# Patient Record
Sex: MALE | Race: BLACK OR AFRICAN AMERICAN | Employment: OTHER | URBAN - METROPOLITAN AREA
[De-identification: names, ages, dates, MRNs, and addresses within clinical notes are randomized per-mention and may not be internally consistent; named-entity substitution may affect disease eponyms.]

---

## 2018-09-06 ENCOUNTER — HOSPITAL ENCOUNTER (INPATIENT)
Age: 38
LOS: 3 days | Discharge: OTHER INSTITUTION | DRG: 885 | End: 2018-09-09
Attending: EMERGENCY MEDICINE | Admitting: PSYCHIATRY & NEUROLOGY
Payer: COMMERCIAL

## 2018-09-06 DIAGNOSIS — F20.0 PARANOID SCHIZOPHRENIA (HCC): Primary | ICD-10-CM

## 2018-09-06 PROBLEM — F20.9 SCHIZOPHRENIA (HCC): Status: ACTIVE | Noted: 2018-09-06

## 2018-09-06 LAB
ACETAMINOPHEN LEVEL: <5 MCG/ML (ref 10–30)
ALBUMIN SERPL-MCNC: 4.2 G/DL (ref 3.5–5.2)
ALP BLD-CCNC: 43 U/L (ref 40–129)
ALT SERPL-CCNC: 9 U/L (ref 0–40)
AMPHETAMINE SCREEN, URINE: NOT DETECTED
ANION GAP SERPL CALCULATED.3IONS-SCNC: 11 MMOL/L (ref 7–16)
AST SERPL-CCNC: 12 U/L (ref 0–39)
BACTERIA: ABNORMAL /HPF
BARBITURATE SCREEN URINE: NOT DETECTED
BASOPHILS ABSOLUTE: 0.07 E9/L (ref 0–0.2)
BASOPHILS RELATIVE PERCENT: 1.2 % (ref 0–2)
BENZODIAZEPINE SCREEN, URINE: NOT DETECTED
BILIRUB SERPL-MCNC: 0.3 MG/DL (ref 0–1.2)
BILIRUBIN URINE: ABNORMAL
BLOOD, URINE: ABNORMAL
BUN BLDV-MCNC: 13 MG/DL (ref 6–20)
CALCIUM SERPL-MCNC: 9.3 MG/DL (ref 8.6–10.2)
CANNABINOID SCREEN URINE: POSITIVE
CHLORIDE BLD-SCNC: 103 MMOL/L (ref 98–107)
CLARITY: CLEAR
CO2: 28 MMOL/L (ref 22–29)
COCAINE METABOLITE SCREEN URINE: NOT DETECTED
COLOR: YELLOW
CREAT SERPL-MCNC: 1 MG/DL (ref 0.7–1.2)
EOSINOPHILS ABSOLUTE: 0.17 E9/L (ref 0.05–0.5)
EOSINOPHILS RELATIVE PERCENT: 3 % (ref 0–6)
ETHANOL: <10 MG/DL (ref 0–0.08)
GFR AFRICAN AMERICAN: >60
GFR NON-AFRICAN AMERICAN: >60 ML/MIN/1.73
GLUCOSE BLD-MCNC: 90 MG/DL (ref 74–109)
GLUCOSE URINE: NEGATIVE MG/DL
HCT VFR BLD CALC: 43.6 % (ref 37–54)
HEMOGLOBIN: 14 G/DL (ref 12.5–16.5)
IMMATURE GRANULOCYTES #: 0.01 E9/L
IMMATURE GRANULOCYTES %: 0.2 % (ref 0–5)
KETONES, URINE: ABNORMAL MG/DL
LEUKOCYTE ESTERASE, URINE: NEGATIVE
LYMPHOCYTES ABSOLUTE: 1.54 E9/L (ref 1.5–4)
LYMPHOCYTES RELATIVE PERCENT: 27.3 % (ref 20–42)
MCH RBC QN AUTO: 29.7 PG (ref 26–35)
MCHC RBC AUTO-ENTMCNC: 32.1 % (ref 32–34.5)
MCV RBC AUTO: 92.6 FL (ref 80–99.9)
METHADONE SCREEN, URINE: NOT DETECTED
MONOCYTES ABSOLUTE: 0.4 E9/L (ref 0.1–0.95)
MONOCYTES RELATIVE PERCENT: 7.1 % (ref 2–12)
NEUTROPHILS ABSOLUTE: 3.46 E9/L (ref 1.8–7.3)
NEUTROPHILS RELATIVE PERCENT: 61.2 % (ref 43–80)
NITRITE, URINE: NEGATIVE
OPIATE SCREEN URINE: NOT DETECTED
PDW BLD-RTO: 13.2 FL (ref 11.5–15)
PH UA: 6 (ref 5–9)
PHENCYCLIDINE SCREEN URINE: NOT DETECTED
PLATELET # BLD: 277 E9/L (ref 130–450)
PMV BLD AUTO: 9.4 FL (ref 7–12)
POTASSIUM SERPL-SCNC: 3.6 MMOL/L (ref 3.5–5)
PROPOXYPHENE SCREEN: NOT DETECTED
PROTEIN UA: 100 MG/DL
RBC # BLD: 4.71 E12/L (ref 3.8–5.8)
RBC UA: ABNORMAL /HPF (ref 0–2)
SALICYLATE, SERUM: <0.3 MG/DL (ref 0–30)
SODIUM BLD-SCNC: 142 MMOL/L (ref 132–146)
SPECIFIC GRAVITY UA: >=1.03 (ref 1–1.03)
T4 TOTAL: 11.4 MCG/DL (ref 4.5–11.7)
TOTAL PROTEIN: 7 G/DL (ref 6.4–8.3)
TRICYCLIC ANTIDEPRESSANTS SCREEN SERUM: NEGATIVE NG/ML
TSH SERPL DL<=0.05 MIU/L-ACNC: 0.9 UIU/ML (ref 0.27–4.2)
UROBILINOGEN, URINE: 1 E.U./DL
VALPROIC ACID LEVEL: <3 MCG/ML (ref 50–100)
WBC # BLD: 5.7 E9/L (ref 4.5–11.5)
WBC UA: ABNORMAL /HPF (ref 0–5)

## 2018-09-06 PROCEDURE — 81001 URINALYSIS AUTO W/SCOPE: CPT

## 2018-09-06 PROCEDURE — 84702 CHORIONIC GONADOTROPIN TEST: CPT

## 2018-09-06 PROCEDURE — 84436 ASSAY OF TOTAL THYROXINE: CPT

## 2018-09-06 PROCEDURE — 80164 ASSAY DIPROPYLACETIC ACD TOT: CPT

## 2018-09-06 PROCEDURE — 1240000000 HC EMOTIONAL WELLNESS R&B

## 2018-09-06 PROCEDURE — 80307 DRUG TEST PRSMV CHEM ANLYZR: CPT

## 2018-09-06 PROCEDURE — 99285 EMERGENCY DEPT VISIT HI MDM: CPT

## 2018-09-06 PROCEDURE — G0480 DRUG TEST DEF 1-7 CLASSES: HCPCS

## 2018-09-06 PROCEDURE — 85025 COMPLETE CBC W/AUTO DIFF WBC: CPT

## 2018-09-06 PROCEDURE — 36415 COLL VENOUS BLD VENIPUNCTURE: CPT

## 2018-09-06 PROCEDURE — 80053 COMPREHEN METABOLIC PANEL: CPT

## 2018-09-06 PROCEDURE — 84443 ASSAY THYROID STIM HORMONE: CPT

## 2018-09-06 ASSESSMENT — PAIN DESCRIPTION - ORIENTATION: ORIENTATION: LEFT;RIGHT

## 2018-09-06 ASSESSMENT — PAIN DESCRIPTION - LOCATION: LOCATION: LEG

## 2018-09-06 ASSESSMENT — PAIN DESCRIPTION - PAIN TYPE: TYPE: CHRONIC PAIN

## 2018-09-07 LAB — GONADOTROPIN, CHORIONIC (HCG) QUANT: <0.1 MIU/ML

## 2018-09-07 PROCEDURE — 99222 1ST HOSP IP/OBS MODERATE 55: CPT | Performed by: PSYCHIATRY & NEUROLOGY

## 2018-09-07 PROCEDURE — 6370000000 HC RX 637 (ALT 250 FOR IP): Performed by: PSYCHIATRY & NEUROLOGY

## 2018-09-07 PROCEDURE — 1240000000 HC EMOTIONAL WELLNESS R&B

## 2018-09-07 RX ORDER — HALOPERIDOL 5 MG/ML
10 INJECTION INTRAMUSCULAR EVERY 6 HOURS PRN
Status: DISCONTINUED | OUTPATIENT
Start: 2018-09-07 | End: 2018-09-09 | Stop reason: HOSPADM

## 2018-09-07 RX ORDER — BENZTROPINE MESYLATE 1 MG/ML
2 INJECTION INTRAMUSCULAR; INTRAVENOUS 2 TIMES DAILY PRN
Status: DISCONTINUED | OUTPATIENT
Start: 2018-09-07 | End: 2018-09-09 | Stop reason: HOSPADM

## 2018-09-07 RX ORDER — DIVALPROEX SODIUM 500 MG/1
1000 TABLET, EXTENDED RELEASE ORAL DAILY
Status: DISCONTINUED | OUTPATIENT
Start: 2018-09-07 | End: 2018-09-09 | Stop reason: HOSPADM

## 2018-09-07 RX ORDER — ACETAMINOPHEN 325 MG/1
650 TABLET ORAL EVERY 4 HOURS PRN
Status: DISCONTINUED | OUTPATIENT
Start: 2018-09-07 | End: 2018-09-09 | Stop reason: HOSPADM

## 2018-09-07 RX ORDER — OLANZAPINE 10 MG/1
10 TABLET ORAL
Status: ACTIVE | OUTPATIENT
Start: 2018-09-07 | End: 2018-09-07

## 2018-09-07 RX ORDER — QUETIAPINE 200 MG/1
200 TABLET, FILM COATED, EXTENDED RELEASE ORAL NIGHTLY
Status: DISCONTINUED | OUTPATIENT
Start: 2018-09-07 | End: 2018-09-08

## 2018-09-07 RX ORDER — TRAZODONE HYDROCHLORIDE 50 MG/1
50 TABLET ORAL NIGHTLY PRN
Status: DISCONTINUED | OUTPATIENT
Start: 2018-09-07 | End: 2018-09-09 | Stop reason: HOSPADM

## 2018-09-07 RX ORDER — MAGNESIUM HYDROXIDE/ALUMINUM HYDROXICE/SIMETHICONE 120; 1200; 1200 MG/30ML; MG/30ML; MG/30ML
30 SUSPENSION ORAL PRN
Status: DISCONTINUED | OUTPATIENT
Start: 2018-09-07 | End: 2018-09-09 | Stop reason: HOSPADM

## 2018-09-07 RX ORDER — HYDROXYZINE PAMOATE 50 MG/1
50 CAPSULE ORAL EVERY 6 HOURS PRN
Status: DISCONTINUED | OUTPATIENT
Start: 2018-09-07 | End: 2018-09-09 | Stop reason: HOSPADM

## 2018-09-07 RX ADMIN — TRAZODONE HYDROCHLORIDE 50 MG: 50 TABLET ORAL at 20:52

## 2018-09-07 RX ADMIN — DIVALPROEX SODIUM 1000 MG: 500 TABLET, EXTENDED RELEASE ORAL at 13:47

## 2018-09-07 RX ADMIN — QUETIAPINE FUMARATE 200 MG: 200 TABLET, EXTENDED RELEASE ORAL at 21:14

## 2018-09-07 ASSESSMENT — PAIN DESCRIPTION - ORIENTATION: ORIENTATION: RIGHT;LEFT

## 2018-09-07 ASSESSMENT — PATIENT HEALTH QUESTIONNAIRE - PHQ9: SUM OF ALL RESPONSES TO PHQ QUESTIONS 1-9: 9

## 2018-09-07 ASSESSMENT — PAIN DESCRIPTION - PAIN TYPE: TYPE: CHRONIC PAIN

## 2018-09-07 ASSESSMENT — SLEEP AND FATIGUE QUESTIONNAIRES
DO YOU HAVE DIFFICULTY SLEEPING: YES
RESTFUL SLEEP: NO
DIFFICULTY ARISING: NO
DIFFICULTY STAYING ASLEEP: YES
SLEEP PATTERN: NIGHTMARES/TERRORS
DO YOU HAVE DIFFICULTY SLEEPING: NO
AVERAGE NUMBER OF SLEEP HOURS: 5
DO YOU USE A SLEEP AID: NO
DO YOU USE A SLEEP AID: NO
DIFFICULTY FALLING ASLEEP: YES

## 2018-09-07 ASSESSMENT — PAIN DESCRIPTION - FREQUENCY: FREQUENCY: INTERMITTENT

## 2018-09-07 ASSESSMENT — PAIN SCALES - GENERAL
PAINLEVEL_OUTOF10: 0
PAINLEVEL_OUTOF10: 10

## 2018-09-07 ASSESSMENT — LIFESTYLE VARIABLES: HISTORY_ALCOHOL_USE: NO

## 2018-09-07 ASSESSMENT — PAIN DESCRIPTION - LOCATION: LOCATION: LEG

## 2018-09-07 ASSESSMENT — PAIN DESCRIPTION - ONSET: ONSET: ON-GOING

## 2018-09-07 ASSESSMENT — PAIN DESCRIPTION - DESCRIPTORS: DESCRIPTORS: ACHING

## 2018-09-07 NOTE — ED NOTES
Patient accepted to 7W by Dr. Allan Yousif, room 7308-MONICA.         Apoorva Kurtz RN  09/06/18 [id number]

## 2018-09-07 NOTE — H&P
Depression  [] Anxiety  [] Bipolar  [] Psychosis  []  Other                  [] Mother               [] Depression  [] Anxiety  [] Bipolar  [] Psychosis  []  Other                  [] Sibling               [] Depression  [] Anxiety  [] Bipolar  [] Psychosis  []  Other                  [] Grandparent               [] Depression  [] Anxiety  [] Bipolar  [] Psychosis  []  Other       SOCIAL HISTORY:     1. Living Situation:[] Private Residence [x] Homeless [] [address]             [] [address] [] [facility]  [] Shelter [] Other   2. Employment:  [] Unemployed  [] Employed  [x] Disabled  [] Retired   1. Legal History: [x] No Arrest [] Arrest  [] Theft  []  Assault  [] Substances   4. History of Trama/ Abuse: [] Denies  [] Emotional [] Physical [] Sexual   5. Spirituality: [x] Spiritual [] Not Spiritual   6. Substance Abuse: [x] Denies  [] Drug of choice      [] Amphetamines [] Marijuana [] Cocaine      [] Opioids  [] Alcohol  [] Benzodiazepines     For further SH review SW note. Risk Assessment:  1. Risk Factors:   [x] Depression  [] Anxiety  [x] Psychosis   [] Suicidal/Homicidal Thoughts [] Suicide Attempt [] Substance Abuse     2. Protective Factors: [] Controlled Environment         [] Supportive Family []         [x] Yazidi Support     3. Level of Risk: [] Mild [] Moderate [x] Severe      Strengths & Weaknesses:    1. Strengths: [] Ability to communicate feelings     [] Independent ADL's     [] Supportive Family    [x] Current Health Status     2.  Weaknesses: [x] Emotional          [x] Motivational     MEDICATIONS: Current Facility-Administered Medications: acetaminophen (TYLENOL) tablet 650 mg, 650 mg, Oral, Q4H PRN  hydrOXYzine (VISTARIL) capsule 50 mg, 50 mg, Oral, Q6H PRN  OLANZapine (ZYPREXA) tablet 10 mg, 10 mg, Oral, Once PRN  haloperidol lactate (HALDOL) injection 10 mg, 10 mg, Intramuscular, Q6H PRN  traZODone (DESYREL) tablet 50 mg, 50 mg, Oral, Nightly PRN  benztropine mesylate (COGENTIN) injection 2 mg, 2 mg, Intramuscular, BID PRN  magnesium hydroxide (MILK OF MAGNESIA) 400 MG/5ML suspension 30 mL, 30 mL, Oral, Daily PRN  aluminum & magnesium hydroxide-simethicone (MAALOX) 200-200-20 MG/5ML suspension 30 mL, 30 mL, Oral, PRN  divalproex (DEPAKOTE ER) extended release tablet 1,000 mg, 1,000 mg, Oral, Daily  QUEtiapine (SEROQUEL XR) extended release tablet 200 mg, 200 mg, Oral, Nightly    Medical Review of Systems:     All other than marked systmes have been reviewed and are all negative. Constitutional Symptoms: []  fever []  Chills  Skin Symptoms: [] rash []  Pruritus   Eye Symptoms: [] Vision unchanged []  recent vision problems[] blurred vision   Respiratory Symptoms:[] shortness of breath [] cough  Cardiovascular Symptoms:  [] chest pain   [] palpitations   Gastrointestinal Symptoms: []  abdominal pain []  nausea []  vomiting []  diarrhea  Genitourinary Symptoms: []  dysuria  []  hematuria   Musculoskeletal Symptoms: []  back pain []  muscle pain []  joint pain  Neurologic Symptoms: []  headache []  dizziness  Hematolymphoid Symptoms: [] Adenopathy [] Bruises   [] Schimosis       VITALS: /72   Pulse 60   Temp 97.2 °F (36.2 °C) (Temporal)   Resp 16   Ht 5' 11\" (1.803 m)   Wt 170 lb (77.1 kg)   SpO2 98%   BMI 23.71 kg/m²     ALLERGIES: Patient has no known allergies.             Physical Examination:    Head:  [x] Atraumatic:  [x] normocephalic  Skin and Mucosa       [] Moist [] Dry [] Pale [x] Normal   Neck: [x] Thyroid [x] Palpable    [] Not palpable []  venus distention [] adenopathy   Chest: [x] Clear [] Rhonchi  [] Wheezing   CV: [x] S1 [x] S2 [] No murmer   Abdomen:  [x] Soft   [] Tender  [] Viceromegaly   Extremities:  [x] No Edema   [] Edema    Cranial Nerves Examination:    CN II: [x] Pupils are reactive to light [] Pupils are non reactive to light  CN III, IV, VI:[x] No eye deviation  [] No diplopia or ptosis   CN V: [x] Facial Sensation is intact  [] Facial
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
## 2018-09-07 NOTE — PROGRESS NOTES
Attempted to engaged patient in assessment, patient pulled blankets over head and stated people won't let me just sleep.

## 2018-09-07 NOTE — CARE COORDINATION
met with patient to complete the social assessment, Pt report he presented to the  Emergency as he is paranoid. Pt reports he has been off medication. Pt reports he resides in [address] [place] [address] and wants to return. Pt report he was residing in [facility],he came to  [address] [place] [address] to visit and his grandmother     Pt reports stressors as his grandmother death . Pt reports he resides with his family, however he does not want to return as he reports, the environment is unsanitary. Pt repots his goal is to return to [address] [place] [address] when he receives his Social security  check in Oct. Pt reports he will stay at the [address] [place] [address] until this time. Pt reports he receives out patient tx in [facility]. Pt refuses to follow up with mental health tx in [facility], . Pt report he is not a [place]. Pt reports he has no support  system in , his girlfriend is very supportive , they ar e engaged. Patient will be discharged to the [facility] upon discharge.

## 2018-09-07 NOTE — PLAN OF CARE
Problem: Altered Mood, Depressive Behavior:  Goal: Able to verbalize and/or display a decrease in depressive symptoms  Able to verbalize and/or display a decrease in depressive symptoms  Outcome: Ongoing
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
## 2018-09-08 PROCEDURE — 1240000000 HC EMOTIONAL WELLNESS R&B

## 2018-09-08 PROCEDURE — 99232 SBSQ HOSP IP/OBS MODERATE 35: CPT | Performed by: PSYCHIATRY & NEUROLOGY

## 2018-09-08 PROCEDURE — 6370000000 HC RX 637 (ALT 250 FOR IP): Performed by: PSYCHIATRY & NEUROLOGY

## 2018-09-08 RX ORDER — QUETIAPINE 150 MG/1
300 TABLET, FILM COATED, EXTENDED RELEASE ORAL NIGHTLY
Status: DISCONTINUED | OUTPATIENT
Start: 2018-09-08 | End: 2018-09-09 | Stop reason: HOSPADM

## 2018-09-08 RX ADMIN — TRAZODONE HYDROCHLORIDE 50 MG: 50 TABLET ORAL at 20:46

## 2018-09-08 RX ADMIN — DIVALPROEX SODIUM 1000 MG: 500 TABLET, EXTENDED RELEASE ORAL at 09:57

## 2018-09-08 RX ADMIN — QUETIAPINE 300 MG: 150 TABLET, EXTENDED RELEASE ORAL at 20:45

## 2018-09-08 ASSESSMENT — PAIN SCALES - GENERAL: PAINLEVEL_OUTOF10: 0
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
## 2018-09-08 NOTE — PROGRESS NOTES
Patient denies SI, HI or hallucinations. Patient states he feels depressed. Patient did not attend groups this evening. Patient stated that he would like to go to the crisis center upon discharge. Will continue to observe, and support.

## 2018-09-08 NOTE — PROGRESS NOTES
Patient declined to attend community meeting.  Attempted to have patient identify goal for th day but patient stated: \"No\"

## 2018-09-09 VITALS
BODY MASS INDEX: 23.8 KG/M2 | OXYGEN SATURATION: 96 % | RESPIRATION RATE: 16 BRPM | SYSTOLIC BLOOD PRESSURE: 123 MMHG | HEART RATE: 49 BPM | DIASTOLIC BLOOD PRESSURE: 67 MMHG | WEIGHT: 170 LBS | TEMPERATURE: 98.3 F | HEIGHT: 71 IN

## 2018-09-09 LAB — CANNABINOIDS CONF, URINE: 37 NG/ML

## 2018-09-09 PROCEDURE — 6370000000 HC RX 637 (ALT 250 FOR IP): Performed by: PSYCHIATRY & NEUROLOGY

## 2018-09-09 PROCEDURE — 99238 HOSP IP/OBS DSCHRG MGMT 30/<: CPT | Performed by: PSYCHIATRY & NEUROLOGY

## 2018-09-09 RX ORDER — DIVALPROEX SODIUM 500 MG/1
1000 TABLET, EXTENDED RELEASE ORAL DAILY
Qty: 30 TABLET | Refills: 0 | Status: ON HOLD | OUTPATIENT
Start: 2018-09-10 | End: 2018-12-13 | Stop reason: HOSPADM

## 2018-09-09 RX ORDER — QUETIAPINE 300 MG/1
300 TABLET, FILM COATED, EXTENDED RELEASE ORAL NIGHTLY
Qty: 30 TABLET | Refills: 0 | Status: ON HOLD | OUTPATIENT
Start: 2018-09-09 | End: 2018-12-13 | Stop reason: HOSPADM

## 2018-09-09 RX ADMIN — DIVALPROEX SODIUM 1000 MG: 500 TABLET, EXTENDED RELEASE ORAL at 09:05

## 2018-09-09 ASSESSMENT — PAIN SCALES - GENERAL: PAINLEVEL_OUTOF10: 0

## 2018-09-09 NOTE — PLAN OF CARE
Problem: Altered Mood, Depressive Behavior:  Goal: Able to verbalize acceptance of life and situations over which he or she has no control  Able to verbalize acceptance of life and situations over which he or she has no control   Outcome: Ongoing  Pt denies SI, HI, and hallucinations. Pt stated \"I been asleep\". Pt is irritable, stating \"I'm not trying to get to know any of you, I'm just trying to get up out of this hospital\". Pt refused to talk with one of  nursing staff, calling him \"faith\".

## 2018-09-09 NOTE — PROGRESS NOTES
[facility]  Discharge Note    Pt discharged with followings belongings:   Dentures: None  Vision - Corrective Lenses: None  Hearing Aid: None  Jewelry: Necklace  Body Piercings Removed: N/A  Clothing: Footwear, Pants, Shirt, Socks  Were All Patient Medications Collected?: Not Applicable  Other Valuables: Money (Comment), Other (Comment) (misc change, debit card, ID)   Valuables sent home with pt. Valuables retrieved from safe, Security envelope number:  [id number] and returned to patient. Patient left department with Departure Mode: By self, In cab via Mobility at Departure: Ambulatory, discharged to Discharged to: Private Residence. Patient education on aftercare instructions: yes  Information faxed to n/a by n/a Patient verbalize understanding of AVS:  yes. Status EXAM upon discharge:  Status and Exam  Normal:  (Patient sleeping)  Facial Expression: Avoids Gaze, Flat, Hostile  Affect: Unstable  Level of Consciousness: Alert  Mood:Normal: No  Mood: Anxious, Depressed, Suspicious, Irritable  Motor Activity:Normal: No  Motor Activity: Decreased  Interview Behavior: Cooperative  Preception: [place] to Person, Jenn Ahumada to Time, [place] to Place, [place] to Situation  Attention:Normal: No  Attention: Distractible  Thought Processes: Circumstantial  Thought Content:Normal: No  Thought Content: Paranoia, Preoccupations, Delusions  Hallucinations: None  Delusions: Yes  Delusions: Persecution  Memory:Normal: Yes  Insight and Judgment: No  Insight and Judgment: Poor Judgment, Poor Insight  Present Suicidal Ideation: No  Present Homicidal Ideation: No    Pt denies SI, HI, and hallucinations on discharge. Pt verbalized contract for safety on discharge. Pt stated \"I'm good, I just need to take my medications. \" Pt discharged with taxi voucher to [address].      Trevon Herrera RN

## 2018-09-30 ENCOUNTER — APPOINTMENT (OUTPATIENT)
Dept: GENERAL RADIOLOGY | Age: 38
End: 2018-09-30
Payer: COMMERCIAL

## 2018-09-30 ENCOUNTER — HOSPITAL ENCOUNTER (EMERGENCY)
Age: 38
Discharge: HOME | End: 2018-09-30
Payer: COMMERCIAL

## 2018-09-30 VITALS
WEIGHT: 174 LBS | BODY MASS INDEX: 24.36 KG/M2 | DIASTOLIC BLOOD PRESSURE: 79 MMHG | TEMPERATURE: 97.3 F | RESPIRATION RATE: 14 BRPM | HEIGHT: 71 IN | OXYGEN SATURATION: 97 % | SYSTOLIC BLOOD PRESSURE: 136 MMHG | HEART RATE: 64 BPM

## 2018-09-30 DIAGNOSIS — G89.29 CHRONIC PAIN OF RIGHT KNEE: ICD-10-CM

## 2018-09-30 DIAGNOSIS — M79.605 LEG PAIN, LEFT: Primary | ICD-10-CM

## 2018-09-30 DIAGNOSIS — M25.561 CHRONIC PAIN OF RIGHT KNEE: ICD-10-CM

## 2018-09-30 PROCEDURE — 73590 X-RAY EXAM OF LOWER LEG: CPT

## 2018-09-30 PROCEDURE — 73562 X-RAY EXAM OF KNEE 3: CPT

## 2018-09-30 PROCEDURE — 99283 EMERGENCY DEPT VISIT LOW MDM: CPT

## 2018-09-30 RX ORDER — IBUPROFEN 800 MG/1
800 TABLET ORAL EVERY 8 HOURS PRN
Qty: 21 TABLET | Refills: 0 | Status: ON HOLD | OUTPATIENT
Start: 2018-09-30 | End: 2019-02-14 | Stop reason: HOSPADM

## 2018-09-30 ASSESSMENT — PAIN SCALES - GENERAL: PAINLEVEL_OUTOF10: 10

## 2018-09-30 ASSESSMENT — PAIN DESCRIPTION - PAIN TYPE: TYPE: CHRONIC PAIN

## 2018-09-30 ASSESSMENT — PAIN DESCRIPTION - DESCRIPTORS: DESCRIPTORS: ACHING

## 2018-09-30 ASSESSMENT — PAIN DESCRIPTION - LOCATION: LOCATION: LEG

## 2018-09-30 ASSESSMENT — PAIN DESCRIPTION - ORIENTATION: ORIENTATION: LEFT;RIGHT

## 2018-09-30 ASSESSMENT — PAIN DESCRIPTION - PROGRESSION: CLINICAL_PROGRESSION: GRADUALLY WORSENING

## 2018-09-30 ASSESSMENT — PAIN DESCRIPTION - FREQUENCY: FREQUENCY: CONTINUOUS

## 2018-09-30 NOTE — ED NOTES
Pt alert and oriented x4. Respirations easy and unlabored. No signs of distress noted. Pt teaching provided: verbalized understanding. Pt stable for discharge.        Ilya Mccain RN  09/30/18 2935

## 2018-10-03 NOTE — ED PROVIDER NOTES
subsequently for symptom control, limited use and  immobilization with appropriate outpatient follow-up. Counseling: The emergency provider has spoken with the patient and discussed todays results, in addition to providing specific details for the plan of care and counseling regarding the diagnosis and prognosis. Questions are answered at this time and they are agreeable with the plan. Assessment      1. Chronic pain of right knee    2. Leg pain, left      Plan   Discharge to home  Patient condition is good    New Medications     Discharge Medication List as of 9/30/2018  4:02 PM      START taking these medications    Details   ibuprofen (IBU) 800 MG tablet Take 1 tablet by mouth every 8 hours as needed for Pain, Disp-21 tablet, R-0Print           Electronically signed by JUICE Montgomery CNP   DD: 10/3/18  **This report was transcribed using voice recognition software. Every effort was made to ensure accuracy; however, inadvertent computerized transcription errors may be present.   END OF ED PROVIDER NOTE      JUICE Smith CNP  10/03/18 1200

## 2018-10-22 ENCOUNTER — HOSPITAL ENCOUNTER (EMERGENCY)
Age: 38
Discharge: LEFT BEFORE DISCHARGE | End: 2018-10-22
Payer: COMMERCIAL

## 2018-10-22 VITALS
HEART RATE: 82 BPM | SYSTOLIC BLOOD PRESSURE: 131 MMHG | OXYGEN SATURATION: 99 % | TEMPERATURE: 98.5 F | BODY MASS INDEX: 25.06 KG/M2 | DIASTOLIC BLOOD PRESSURE: 73 MMHG | HEIGHT: 71 IN | RESPIRATION RATE: 17 BRPM | WEIGHT: 179 LBS

## 2018-12-09 ENCOUNTER — HOSPITAL ENCOUNTER (EMERGENCY)
Age: 38
Discharge: HOME | End: 2018-12-09
Attending: EMERGENCY MEDICINE
Payer: COMMERCIAL

## 2018-12-09 VITALS
RESPIRATION RATE: 16 BRPM | TEMPERATURE: 98.4 F | WEIGHT: 178 LBS | DIASTOLIC BLOOD PRESSURE: 73 MMHG | SYSTOLIC BLOOD PRESSURE: 109 MMHG | OXYGEN SATURATION: 97 % | BODY MASS INDEX: 24.92 KG/M2 | HEART RATE: 56 BPM | HEIGHT: 71 IN

## 2018-12-09 DIAGNOSIS — G89.21 CHRONIC PAIN DUE TO TRAUMA: Primary | ICD-10-CM

## 2018-12-09 DIAGNOSIS — F32.A DEPRESSION, UNSPECIFIED DEPRESSION TYPE: ICD-10-CM

## 2018-12-09 PROCEDURE — 6370000000 HC RX 637 (ALT 250 FOR IP): Performed by: EMERGENCY MEDICINE

## 2018-12-09 PROCEDURE — 99284 EMERGENCY DEPT VISIT MOD MDM: CPT

## 2018-12-09 RX ORDER — IBUPROFEN 800 MG/1
800 TABLET ORAL ONCE
Status: COMPLETED | OUTPATIENT
Start: 2018-12-09 | End: 2018-12-09

## 2018-12-09 RX ORDER — OXYCODONE HYDROCHLORIDE AND ACETAMINOPHEN 5; 325 MG/1; MG/1
1 TABLET ORAL ONCE
Status: COMPLETED | OUTPATIENT
Start: 2018-12-09 | End: 2018-12-09

## 2018-12-09 RX ADMIN — OXYCODONE AND ACETAMINOPHEN 1 TABLET: 5; 325 TABLET ORAL at 07:34

## 2018-12-09 RX ADMIN — IBUPROFEN 800 MG: 800 TABLET, FILM COATED ORAL at 07:34

## 2018-12-09 ASSESSMENT — PAIN SCALES - GENERAL: PAINLEVEL_OUTOF10: 10

## 2018-12-09 ASSESSMENT — ENCOUNTER SYMPTOMS
EYE DISCHARGE: 0
WHEEZING: 0
EYE REDNESS: 0
SINUS PRESSURE: 0
SORE THROAT: 0
EYE PAIN: 0
COUGH: 0
SHORTNESS OF BREATH: 0
NAUSEA: 0
BACK PAIN: 0
ABDOMINAL PAIN: 0
VOMITING: 0
DIARRHEA: 0

## 2018-12-09 NOTE — ED PROVIDER NOTES
smokeless tobacco. He reports that he does not drink alcohol or use drugs. Family History: family history is not on file. The patients home medications have been reviewed. Allergies: Patient has no known allergies. -------------------------------------------------- RESULTS -------------------------------------------------  Labs:  No results found for this visit on 12/09/18. Radiology:  No orders to display       ------------------------- NURSING NOTES AND VITALS REVIEWED ---------------------------  Date / Time Roomed:  12/9/2018  6:55 AM  ED Bed Assignment:  MART D/AMBROSIO    The nursing notes within the ED encounter and vital signs as below have been reviewed. /73   Pulse 56   Temp 98.4 °F (36.9 °C) (Temporal)   Resp 16   Ht 5' 11\" (1.803 m)   Wt 178 lb (80.7 kg)   SpO2 97%   BMI 24.83 kg/m²   Oxygen Saturation Interpretation: Normal      ------------------------------------------ PROGRESS NOTES ------------------------------------------         7:23 AM  I have spoken with the patient and discussed todays results, in addition to providing specific details for the plan of care and counseling regarding the diagnosis and prognosis. Their questions are answered at this time and they are agreeable with the plan. I discussed at length with them reasons for immediate return here for re evaluation. They will followup with their primary care physician by calling their office tomorrow. --------------------------------- ADDITIONAL PROVIDER NOTES ---------------------------------  At this time the patient is without objective evidence of an acute process requiring hospitalization or inpatient management. They have remained hemodynamically stable throughout their entire ED visit and are stable for discharge with outpatient follow-up. The plan has been discussed in detail and they are aware of the specific conditions for emergent return, as well as the importance of follow-up.       New Prescriptions    No medications on file       Diagnosis:  1. Chronic pain due to trauma    2. Depression, unspecified depression type        Disposition:  Patient's disposition: Discharge to home  Patient's condition is stable.             Boom Born, DO  Resident  12/09/18 1186

## 2018-12-10 ENCOUNTER — HOSPITAL ENCOUNTER (INPATIENT)
Age: 38
LOS: 3 days | Discharge: HOME | DRG: 885 | End: 2018-12-13
Attending: EMERGENCY MEDICINE | Admitting: PSYCHIATRY & NEUROLOGY
Payer: COMMERCIAL

## 2018-12-10 DIAGNOSIS — R45.850 HOMICIDAL IDEATION: ICD-10-CM

## 2018-12-10 DIAGNOSIS — R45.851 SUICIDAL IDEATION: Primary | ICD-10-CM

## 2018-12-10 LAB
ACETAMINOPHEN LEVEL: <5 MCG/ML (ref 10–30)
ALBUMIN SERPL-MCNC: 4.6 G/DL (ref 3.5–5.2)
ALP BLD-CCNC: 48 U/L (ref 40–129)
ALT SERPL-CCNC: 8 U/L (ref 0–40)
AMPHETAMINE SCREEN, URINE: NOT DETECTED
ANION GAP SERPL CALCULATED.3IONS-SCNC: 11 MMOL/L (ref 7–16)
AST SERPL-CCNC: 18 U/L (ref 0–39)
BARBITURATE SCREEN URINE: NOT DETECTED
BASOPHILS ABSOLUTE: 0.07 E9/L (ref 0–0.2)
BASOPHILS RELATIVE PERCENT: 0.9 % (ref 0–2)
BENZODIAZEPINE SCREEN, URINE: NOT DETECTED
BILIRUB SERPL-MCNC: 0.5 MG/DL (ref 0–1.2)
BUN BLDV-MCNC: 11 MG/DL (ref 6–20)
CALCIUM SERPL-MCNC: 9.4 MG/DL (ref 8.6–10.2)
CANNABINOID SCREEN URINE: POSITIVE
CHLORIDE BLD-SCNC: 102 MMOL/L (ref 98–107)
CO2: 29 MMOL/L (ref 22–29)
COCAINE METABOLITE SCREEN URINE: NOT DETECTED
CREAT SERPL-MCNC: 0.9 MG/DL (ref 0.7–1.2)
EKG ATRIAL RATE: 56 BPM
EKG P AXIS: 68 DEGREES
EKG P-R INTERVAL: 154 MS
EKG Q-T INTERVAL: 394 MS
EKG QRS DURATION: 92 MS
EKG QTC CALCULATION (BAZETT): 380 MS
EKG R AXIS: 97 DEGREES
EKG T AXIS: 55 DEGREES
EKG VENTRICULAR RATE: 56 BPM
EOSINOPHILS ABSOLUTE: 0.05 E9/L (ref 0.05–0.5)
EOSINOPHILS RELATIVE PERCENT: 0.6 % (ref 0–6)
ETHANOL: <10 MG/DL (ref 0–0.08)
GFR AFRICAN AMERICAN: >60
GFR NON-AFRICAN AMERICAN: >60 ML/MIN/1.73
GLUCOSE BLD-MCNC: 74 MG/DL (ref 74–99)
HCT VFR BLD CALC: 48.7 % (ref 37–54)
HEMOGLOBIN: 15.7 G/DL (ref 12.5–16.5)
IMMATURE GRANULOCYTES #: 0.02 E9/L
IMMATURE GRANULOCYTES %: 0.3 % (ref 0–5)
LYMPHOCYTES ABSOLUTE: 1.5 E9/L (ref 1.5–4)
LYMPHOCYTES RELATIVE PERCENT: 19.3 % (ref 20–42)
MCH RBC QN AUTO: 30.3 PG (ref 26–35)
MCHC RBC AUTO-ENTMCNC: 32.2 % (ref 32–34.5)
MCV RBC AUTO: 94 FL (ref 80–99.9)
METHADONE SCREEN, URINE: NOT DETECTED
MONOCYTES ABSOLUTE: 0.49 E9/L (ref 0.1–0.95)
MONOCYTES RELATIVE PERCENT: 6.3 % (ref 2–12)
NEUTROPHILS ABSOLUTE: 5.63 E9/L (ref 1.8–7.3)
NEUTROPHILS RELATIVE PERCENT: 72.6 % (ref 43–80)
OPIATE SCREEN URINE: NOT DETECTED
PDW BLD-RTO: 13.3 FL (ref 11.5–15)
PHENCYCLIDINE SCREEN URINE: NOT DETECTED
PLATELET # BLD: 336 E9/L (ref 130–450)
PMV BLD AUTO: 9.7 FL (ref 7–12)
POTASSIUM SERPL-SCNC: 3.8 MMOL/L (ref 3.5–5)
PROPOXYPHENE SCREEN: NOT DETECTED
RBC # BLD: 5.18 E12/L (ref 3.8–5.8)
SALICYLATE, SERUM: <0.3 MG/DL (ref 0–30)
SODIUM BLD-SCNC: 142 MMOL/L (ref 132–146)
TOTAL PROTEIN: 7.7 G/DL (ref 6.4–8.3)
TRICYCLIC ANTIDEPRESSANTS SCREEN SERUM: NEGATIVE NG/ML
VALPROIC ACID LEVEL: <3 MCG/ML (ref 50–100)
WBC # BLD: 7.8 E9/L (ref 4.5–11.5)

## 2018-12-10 PROCEDURE — 80164 ASSAY DIPROPYLACETIC ACD TOT: CPT

## 2018-12-10 PROCEDURE — G0480 DRUG TEST DEF 1-7 CLASSES: HCPCS

## 2018-12-10 PROCEDURE — 80307 DRUG TEST PRSMV CHEM ANLYZR: CPT

## 2018-12-10 PROCEDURE — 1240000000 HC EMOTIONAL WELLNESS R&B

## 2018-12-10 PROCEDURE — 99285 EMERGENCY DEPT VISIT HI MDM: CPT

## 2018-12-10 PROCEDURE — 85025 COMPLETE CBC W/AUTO DIFF WBC: CPT

## 2018-12-10 PROCEDURE — 80053 COMPREHEN METABOLIC PANEL: CPT

## 2018-12-10 PROCEDURE — 36415 COLL VENOUS BLD VENIPUNCTURE: CPT

## 2018-12-10 ASSESSMENT — PAIN - FUNCTIONAL ASSESSMENT: PAIN_FUNCTIONAL_ASSESSMENT: 0-10

## 2018-12-11 PROBLEM — F31.5 BIPOLAR AFFECTIVE DISORDER, DEPRESSED, SEVERE, WITH PSYCHOTIC BEHAVIOR (HCC): Status: ACTIVE | Noted: 2018-12-11

## 2018-12-11 PROCEDURE — 1240000000 HC EMOTIONAL WELLNESS R&B

## 2018-12-11 PROCEDURE — 6370000000 HC RX 637 (ALT 250 FOR IP): Performed by: NURSE PRACTITIONER

## 2018-12-11 PROCEDURE — 99222 1ST HOSP IP/OBS MODERATE 55: CPT | Performed by: NURSE PRACTITIONER

## 2018-12-11 RX ORDER — OLANZAPINE 10 MG/1
10 TABLET ORAL
Status: ACTIVE | OUTPATIENT
Start: 2018-12-11 | End: 2018-12-11

## 2018-12-11 RX ORDER — MAGNESIUM HYDROXIDE/ALUMINUM HYDROXICE/SIMETHICONE 120; 1200; 1200 MG/30ML; MG/30ML; MG/30ML
30 SUSPENSION ORAL PRN
Status: DISCONTINUED | OUTPATIENT
Start: 2018-12-11 | End: 2018-12-13 | Stop reason: HOSPADM

## 2018-12-11 RX ORDER — TRAZODONE HYDROCHLORIDE 50 MG/1
50 TABLET ORAL NIGHTLY PRN
Status: DISCONTINUED | OUTPATIENT
Start: 2018-12-11 | End: 2018-12-13 | Stop reason: HOSPADM

## 2018-12-11 RX ORDER — QUETIAPINE 150 MG/1
300 TABLET, FILM COATED, EXTENDED RELEASE ORAL NIGHTLY
Status: DISCONTINUED | OUTPATIENT
Start: 2018-12-11 | End: 2018-12-11

## 2018-12-11 RX ORDER — VALPROIC ACID 250 MG/1
250 CAPSULE, LIQUID FILLED ORAL 2 TIMES DAILY
Status: DISCONTINUED | OUTPATIENT
Start: 2018-12-11 | End: 2018-12-12

## 2018-12-11 RX ORDER — ACETAMINOPHEN 325 MG/1
650 TABLET ORAL EVERY 4 HOURS PRN
Status: DISCONTINUED | OUTPATIENT
Start: 2018-12-11 | End: 2018-12-13 | Stop reason: HOSPADM

## 2018-12-11 RX ORDER — HALOPERIDOL 5 MG/ML
10 INJECTION INTRAMUSCULAR EVERY 6 HOURS PRN
Status: DISCONTINUED | OUTPATIENT
Start: 2018-12-11 | End: 2018-12-13 | Stop reason: HOSPADM

## 2018-12-11 RX ORDER — HYDROXYZINE PAMOATE 50 MG/1
50 CAPSULE ORAL EVERY 6 HOURS PRN
Status: DISCONTINUED | OUTPATIENT
Start: 2018-12-11 | End: 2018-12-13 | Stop reason: HOSPADM

## 2018-12-11 RX ORDER — BENZTROPINE MESYLATE 1 MG/ML
2 INJECTION INTRAMUSCULAR; INTRAVENOUS 2 TIMES DAILY PRN
Status: DISCONTINUED | OUTPATIENT
Start: 2018-12-11 | End: 2018-12-13 | Stop reason: HOSPADM

## 2018-12-11 RX ORDER — NICOTINE 21 MG/24HR
1 PATCH, TRANSDERMAL 24 HOURS TRANSDERMAL DAILY
Status: DISCONTINUED | OUTPATIENT
Start: 2018-12-11 | End: 2018-12-13 | Stop reason: HOSPADM

## 2018-12-11 RX ORDER — IBUPROFEN 800 MG/1
800 TABLET ORAL EVERY 8 HOURS PRN
Status: DISCONTINUED | OUTPATIENT
Start: 2018-12-11 | End: 2018-12-13 | Stop reason: HOSPADM

## 2018-12-11 RX ORDER — QUETIAPINE 200 MG/1
200 TABLET, FILM COATED, EXTENDED RELEASE ORAL NIGHTLY
Status: DISCONTINUED | OUTPATIENT
Start: 2018-12-11 | End: 2018-12-12

## 2018-12-11 RX ADMIN — VALPROIC ACID 250 MG: 250 CAPSULE, LIQUID FILLED ORAL at 09:46

## 2018-12-11 ASSESSMENT — LIFESTYLE VARIABLES: HISTORY_ALCOHOL_USE: NO

## 2018-12-11 ASSESSMENT — SLEEP AND FATIGUE QUESTIONNAIRES
DO YOU USE A SLEEP AID: NO
AVERAGE NUMBER OF SLEEP HOURS: 8
DO YOU HAVE DIFFICULTY SLEEPING: NO

## 2018-12-11 ASSESSMENT — PAIN SCALES - GENERAL: PAINLEVEL_OUTOF10: 0

## 2018-12-11 ASSESSMENT — PATIENT HEALTH QUESTIONNAIRE - PHQ9: SUM OF ALL RESPONSES TO PHQ QUESTIONS 1-9: 10

## 2018-12-11 ASSESSMENT — PAIN DESCRIPTION - PAIN TYPE: TYPE: ACUTE PAIN

## 2018-12-11 NOTE — H&P
have been reviewed and are all negative. Constitutional Symptoms: []  fever []  Chills  Skin Symptoms: [] rash []  Pruritus   Eye Symptoms: [] Vision unchanged []  recent vision problems[] blurred vision   Respiratory Symptoms:[] shortness of breath [x] cough  Cardiovascular Symptoms:  [] chest pain   [] palpitations   Gastrointestinal Symptoms: []  abdominal pain []  nausea []  vomiting []  diarrhea  Genitourinary Symptoms: []  dysuria  []  hematuria   Musculoskeletal Symptoms: []  back pain []  muscle pain [x]  joint pain  Neurologic Symptoms: []  headache []  dizziness  Hematolymphoid Symptoms: [] Adenopathy [] Bruises   [] Schimosis       VITALS: /68   Pulse 67   Temp 99.2 °F (37.3 °C) (Oral)   Resp 18   Ht 5' 11\" (1.803 m)   Wt 178 lb (80.7 kg)   SpO2 96%   BMI 24.83 kg/m²     ALLERGIES: Patient has no known allergies.             Physical Examination:    Head:  [x] Atraumatic:  [x] normocephalic  Skin and Mucosa       [] Moist [] Dry [] Pale [x] Normal   Neck: [x] Thyroid [] Palpable    [x] Not palpable []  venus distention [] adenopathy   Chest: [x] Clear [] Rhonchi  [] Wheezing   CV: [x] S1 [x] S2 [x] No murmer   Abdomen:  [x] Soft   [] Tender  [] Viceromegaly   Extremities:  [] No Edema   [x] Edema BLE    Cranial Nerves Examination:    CN II: [x] Pupils are reactive to light [] Pupils are non reactive to light  CN III, IV, VI:[x] No eye deviation  [x] No diplopia or ptosis   CN V: [x] Facial Sensation is intact  [] Facial Sensation is not intact   CN IIIV:  [x] Hearing is normal to rubbing fingers   CN IX, X:  [x] Normal gag reflex and phonation   CN XI: [x] Shoulder shrug and neck rotation is normal  CNXII: [x] Tongue is midline no deviation or atrophy       For further PE refer to ED note      MENTAL STATUS EXAM:       Cognition:      [x] Alert  [x] Awake  [x] Oriented  [x] Person  [x] Place [x] Time      [] drowsy  [] tired  [] lethargic  [] distractable     Attention/Concentration:   [x] treatment:  1- admit to inpatient unit  [facility]   3- Medication Management  4- Group therapy and one on one. 5- Routine precautions      Signed:  Rani Gil  12/11/2018  8:30 AM      I saw and examined the patient and I agree with the above documentation.
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
## 2018-12-12 PROCEDURE — 2500000003 HC RX 250 WO HCPCS: Performed by: NURSE PRACTITIONER

## 2018-12-12 PROCEDURE — 6370000000 HC RX 637 (ALT 250 FOR IP): Performed by: NURSE PRACTITIONER

## 2018-12-12 PROCEDURE — 2580000003 HC RX 258

## 2018-12-12 PROCEDURE — 6370000000 HC RX 637 (ALT 250 FOR IP): Performed by: PSYCHIATRY & NEUROLOGY

## 2018-12-12 PROCEDURE — 6360000002 HC RX W HCPCS: Performed by: NURSE PRACTITIONER

## 2018-12-12 PROCEDURE — 99232 SBSQ HOSP IP/OBS MODERATE 35: CPT | Performed by: PSYCHIATRY & NEUROLOGY

## 2018-12-12 PROCEDURE — 1240000000 HC EMOTIONAL WELLNESS R&B

## 2018-12-12 RX ORDER — QUETIAPINE 150 MG/1
300 TABLET, FILM COATED, EXTENDED RELEASE ORAL NIGHTLY
Status: DISCONTINUED | OUTPATIENT
Start: 2018-12-12 | End: 2018-12-13 | Stop reason: HOSPADM

## 2018-12-12 RX ORDER — OLANZAPINE 10 MG/1
10 INJECTION, POWDER, LYOPHILIZED, FOR SOLUTION INTRAMUSCULAR ONCE
Status: COMPLETED | OUTPATIENT
Start: 2018-12-12 | End: 2018-12-12

## 2018-12-12 RX ORDER — VALPROIC ACID 250 MG/1
250 CAPSULE, LIQUID FILLED ORAL 2 TIMES DAILY
Status: DISCONTINUED | OUTPATIENT
Start: 2018-12-12 | End: 2018-12-13 | Stop reason: HOSPADM

## 2018-12-12 RX ADMIN — OLANZAPINE 10 MG: 10 INJECTION, POWDER, LYOPHILIZED, FOR SOLUTION INTRAMUSCULAR at 12:13

## 2018-12-12 RX ADMIN — WATER 10 ML: 1 INJECTION INTRAMUSCULAR; INTRAVENOUS; SUBCUTANEOUS at 12:15

## 2018-12-12 RX ADMIN — HALOPERIDOL LACTATE 10 MG: 5 INJECTION, SOLUTION INTRAMUSCULAR at 12:14

## 2018-12-12 RX ADMIN — BENZTROPINE MESYLATE 2 MG: 1 INJECTION, SOLUTION INTRAMUSCULAR; INTRAVENOUS at 12:14

## 2018-12-12 RX ADMIN — VALPROIC ACID 250 MG: 250 CAPSULE, LIQUID FILLED ORAL at 08:42

## 2018-12-12 NOTE — CARE COORDINATION
Left message with Erinn Gale at [facility] in [place] [phone] (phone) left message.   As client reports that he is currently receiving services through their ACTT Program.  Electronically signed by Karen Wallace on 12/12/2018 at 11:18 AM

## 2018-12-13 VITALS
TEMPERATURE: 97.5 F | RESPIRATION RATE: 16 BRPM | HEART RATE: 57 BPM | HEIGHT: 71 IN | WEIGHT: 178 LBS | SYSTOLIC BLOOD PRESSURE: 96 MMHG | BODY MASS INDEX: 24.92 KG/M2 | DIASTOLIC BLOOD PRESSURE: 65 MMHG | OXYGEN SATURATION: 97 %

## 2018-12-13 PROBLEM — F31.5 BIPOLAR AFFECTIVE DISORDER, DEPRESSED, SEVERE, WITH PSYCHOTIC BEHAVIOR (HCC): Status: RESOLVED | Noted: 2018-12-11 | Resolved: 2018-12-13

## 2018-12-13 PROCEDURE — 99238 HOSP IP/OBS DSCHRG MGMT 30/<: CPT | Performed by: PSYCHIATRY & NEUROLOGY

## 2018-12-13 PROCEDURE — G0008 ADMIN INFLUENZA VIRUS VAC: HCPCS | Performed by: PSYCHIATRY & NEUROLOGY

## 2018-12-13 PROCEDURE — 6370000000 HC RX 637 (ALT 250 FOR IP): Performed by: NURSE PRACTITIONER

## 2018-12-13 PROCEDURE — 6360000002 HC RX W HCPCS: Performed by: PSYCHIATRY & NEUROLOGY

## 2018-12-13 PROCEDURE — 90686 IIV4 VACC NO PRSV 0.5 ML IM: CPT | Performed by: PSYCHIATRY & NEUROLOGY

## 2018-12-13 RX ORDER — NICOTINE 21 MG/24HR
1 PATCH, TRANSDERMAL 24 HOURS TRANSDERMAL DAILY
Qty: 30 PATCH | Refills: 0 | Status: SHIPPED | OUTPATIENT
Start: 2018-12-13 | End: 2019-10-26

## 2018-12-13 RX ORDER — VALPROIC ACID 250 MG/1
250 CAPSULE, LIQUID FILLED ORAL 2 TIMES DAILY
Qty: 120 CAPSULE | Refills: 0 | Status: ON HOLD | OUTPATIENT
Start: 2018-12-13 | End: 2019-02-14 | Stop reason: HOSPADM

## 2018-12-13 RX ORDER — QUETIAPINE 300 MG/1
300 TABLET, FILM COATED, EXTENDED RELEASE ORAL NIGHTLY
Qty: 30 TABLET | Refills: 0 | Status: ON HOLD | OUTPATIENT
Start: 2018-12-13 | End: 2019-02-14 | Stop reason: HOSPADM

## 2018-12-13 RX ADMIN — HYDROXYZINE PAMOATE 50 MG: 50 CAPSULE ORAL at 11:28

## 2018-12-13 RX ADMIN — INFLUENZA A VIRUS A/MICHIGAN/45/2015 X-275 (H1N1) ANTIGEN (FORMALDEHYDE INACTIVATED), INFLUENZA A VIRUS A/SINGAPORE/INFIMH-16-0019/2016 IVR-186 (H3N2) ANTIGEN (FORMALDEHYDE INACTIVATED), INFLUENZA B VIRUS B/PHUKET/3073/2013 ANTIGEN (FORMALDEHYDE INACTIVATED), AND INFLUENZA B VIRUS B/MARYLAND/15/2016 BX-69A ANTIGEN (FORMALDEHYDE INACTIVATED) 0.5 ML: 15; 15; 15; 15 INJECTION, SUSPENSION INTRAMUSCULAR at 14:20

## 2018-12-13 NOTE — PROGRESS NOTES
[facility]  Initial Interdisciplinary Treatment Plan NOTE    Review Date & Time: 12/11/18 1130    Patient was in treatment team    Admission Type:   Admission Type: Involuntary    Reason for admission:  Reason for Admission: \"I actually got tired. I felt like I was gonna lose it. People got jealous and keep putting my name in thier mouth. Now I'm in contact with bad people\". Estimated Length of Stay Update:  3-5 days  Estimated Discharge Date Update: 12/15/18    PATIENT STRENGTHS:  Patient Strengths Strengths: Positive Support, Communication  Patient Strengths and Limitations:Limitations: External locus of control, Tendency to isolate self, Difficulty problem solving/relies on others to help solve problems, Hopeless about future, Multiple barriers to leisure interests  Addictive Behavior:Addictive Behavior  In the past 3 months, have you felt or has someone told you that you have a problem with:  : None  Do you have a history of Chemical Use?: No  Do you have a history of Alcohol Use?: No  Do you have a history of Street Drug Abuse?: Yes  Histroy of Prescripton Drug Abuse?: No  Medical Problems:  Past Medical History:   Diagnosis Date    Schizoaffective disorder ([address]        EDUCATION:   Learner Progress Toward Treatment Goals: Reviewed group plan and strategies    Method: Small group    Outcome: Verbalized understanding    PATIENT GOALS: \"Get back to [facility]    PLAN/TREATMENT RECOMMENDATIONS UPDATE: Provide emotional support. Offer and encourage groups. Transfer to [facility].     GOALS UPDATE:   Time frame for Short-Term Goals: 3-5 days    Aide Garcia RN
[facility]  Discharge Note    Pt discharged with followings belongings:   Dentures: None  Vision - Corrective Lenses: None  Hearing Aid: None  Jewelry: None  Clothing: Footwear, Other (Comment), Shirt, Pants, Jacket / coat (1 coat, 1 pair of shoes, 2 t shirts, 1 pant, 1 pair socks  )  Were All Patient Medications Collected?: Not Applicable  Other Valuables: Cell phone (1 cell phone. 1 mastercard)   Valuables sent home with yes. Valuables retrieved from safe, Security envelope number:  n/a and returned to patient. Patient left department with Departure Mode: By self, In cab via Mobility at Departure: Ambulatory, discharged to Discharged to: Other (Comment) (Crisis Unit). Patient education on aftercare instructions: yes  Information faxed to n/a by n/a Patient verbalize understanding of AVS:  yes.     Status EXAM upon discharge:  Status and Exam  Normal: No  Facial Expression: Worried, Exaggerated  Affect: Congruent  Level of Consciousness: Alert  Mood:Normal: No  Mood: Anxious, Labile, Suspicious, Irritable, Sad  Motor Activity:Normal: No  Motor Activity: Decreased  Interview Behavior: Cooperative, Evasive, Impulsive  Preception: [place] to Person, [place] to Time, [place] to Situation, [place] to Place  Attention:Normal: No  Attention: Distractible  Thought Processes: Circumstantial  Thought Content:Normal: No  Thought Content: Paranoia  Hallucinations: None  Delusions: Yes  Delusions: Persecution  Memory:Normal: No  Memory: Confabulation  Insight and Judgment: No  Insight and Judgment: Poor Judgment, Poor Insight  Present Suicidal Ideation: No  Present Homicidal Ideation: No    Marlin Dumont RN
Attended community meeting, shared goal for the day as to succeed in what I believe in.    Group Therapy Note    Date: 12/12/2018  Start Time: 1000  End Time:  1040  Number of Participants: 13    Type of Group: Psychoeducation    Wellness Binder Information  Module Name:  Dimensions of wellness   Session Number:  na    Patient's Goal: patient will be able to id dimensions of wellness and areas where one can improve. Notes: attended group, pleasant and sharing when prompted. Status After Intervention:  Improved    Participation Level:  Active Listener and Interactive    Participation Quality: Appropriate, Attentive, Sharing and Supportive      Speech:  normal      Thought Process/Content: Logical      Affective Functioning: Congruent      Mood: euthymic      Level of consciousness:  Alert, Oriented x4 and Attentive      Response to Learning: Able to verbalize/acknowledge new learning, Able to retain information and Progressing to goal      Endings: None Reported    Modes of Intervention: Education, Support, Socialization, Exploration and Problem-solving      Discipline Responsible: Psychoeducational Specialist      Signature:  Natalio Maloney
CLINICAL PHARMACY NOTE: MEDS TO [address] Select Patient?: No  Total # of Prescriptions Filled: 3   The following medications were delivered to the patient:  · seroquel er 300mg  · Nicotine 21mg patch  · Valproic 250mg  Total # of Interventions Completed: 3  Time Spent (min): 30    Additional Documentation:
DATE OF SERVICE:     12/12/2018    Karen Rodriguez seen today for the purpose of continuation of care. Nursing, social work reports, laboratory studies and vital signs are reviewed. Patient chief complaint today is:             [] Depression      [] Anxiety        [x] Psychosis         [] Suicidal/Homicidal                         [] Delusions           [] Aggression          Subjective: Today patient states that \" Something is wrong, book, ticket bus\"    Sleep:  [] Good [] Fair  [x] Poor  Appetite:  [] Good [x] Fair  [] Poor    Depression:  [] Mild [x] Moderate [] Severe                [] Constant [x] Sporadic     Anxiety: [] Mild [] Moderate [x] Severe    [] Constant [] Sporadic     Delusions: [] Mild [] Moderate [x] Severe     [] Constant [] Sporadic     [x] Paranoid [] Somatic [] Grandiose     Hallucinations: [] Mild [] Moderate [x] Severe     [] Constant [x] Sporadic    [x] Auditory  [] Visual [] Tactile       Suicidal: [] Constant [] Sporadic  Homicidal: [] Constant [x] Sporadic    Unscheduled Medications     [] Patient Receiving Emergency Medications \" Chemical Restraint\"   [] Requesting PRN medications for anxiety    Medical Review of Systems:     All other than marked systmes have been reviewed and are all negative.     Constitutional Symptoms: []  fever []  Chills  Skin Symptoms: [] rash []  Pruritus   Eye Symptoms: [] Vision unchanged []  recent vision problems[] blurred vision   Respiratory Symptoms:[] shortness of breath [] cough  Cardiovascular Symptoms:  [] chest pain   [] palpitations   Gastrointestinal Symptoms: []  abdominal pain []  nausea []  vomiting []  diarrhea  Genitourinary Symptoms: []  dysuria  []  hematuria   Musculoskeletal Symptoms: []  back pain []  muscle pain []  joint pain  Neurologic Symptoms: []  headache []  dizziness  Hematolymphoid Symptoms: [] Adenopathy [] Bruises   [] Schimosis       Psychiatric Review of systems  Delusions:  [] Denies [] Endorses   Withdrawals:  []
Patient declined to attend community meeting.  Patient identified goal for the day as: \"Stay away from trouble\"
Patient has only slept 3 hours this shift. No prn's administered this shift. Has been up to nurses station asking for random things but has remained in control and respectful. Patient currently denies all at this time. Will continue to monitor and observe.  Q 15 minute checks maintained
Pt in bed resting with eyes closed. No prn's administered at this time. No signs or symptoms of discomfort or distress noted. Will continue to monitor and observe.  Q 15 minute checks maintained
Report called to [facility] 7 se
Judgment: No  Insight and Judgment: Poor Judgment, Poor Insight  Present Suicidal Ideation: Yes  Present Homicidal Ideation: No    Tobacco Screening:  Practical Counseling, on admission, lydia X, if applicable and completed (first 3 are required if patient doesn't refuse): (x )  Recognizing danger situations (included triggers and roadblocks)                    (x )  Coping skills (new ways to manage stress, exercise, relaxation techniques, changing routine, distraction)                                                           ( x)  Basic information about quitting (benefits of quitting, techniques in how to quit, available resources  (x ) Referral for counseling faxed to [facility]                                           ( ) Patient refused counseling  ( ) Patient has not smoked in the last 30 days    Metabolic Screening:    No results found for: LABA1C    No results for input(s): CHOL, TRIG, HDL, LDLCALC, LABVLDL in the last 72 hours. Body mass index is 24.83 kg/m². BP Readings from Last 2 Encounters:   12/10/18 103/68   12/09/18 109/73           Pt admitted with followings belongings:  Dentures: None  Vision - Corrective Lenses: None  Hearing Aid: None  Jewelry: None  Clothing: Footwear, Other (Comment), Shirt, Pants, Jacket / coat (1 coat, 1 pair of shoes, 2 t shirts, 1 pant, 1 pair socks  )  Were All Patient Medications Collected?: Not Applicable  Other Valuables: Cell phone (1 cell phone. 1 mastercard)     Belongings placed in locker. Patient's home medications were not brought. Patient oriented to surroundings and program expectations and copy of patient rights given. Received admission packet:  yes. Consents reviewed, signed yes. Patient verbalize understanding:  yes.     Patient education on precautions: yes                  Betsy Duque

## 2018-12-13 NOTE — DISCHARGE SUMMARY
[] [facility]    [] AMA  [] Other           Prescriptions: Continue same medications, review with patient.        Reason for more than one antipsychotic:  [x] N/A  [] 3 failed monotherapy(drugs tried):  [] Cross over to a new antipsychotic  [] Taper to monotherapy from polypharmacy  [] Augmentation of Clozapine therapy due to treatment resistance to single therapy      Diagnosis:        Patient Active Problem List   Diagnosis Code    Severe bipolar affective disorder with psychosis ([facility] F31.89    Paranoia ([facility] F22    Schizophrenia ([facility] F20.9    Bipolar affective disorder, depressed, severe, with psychotic behavior ([facility] F31.5       Education and Follow-up:  Counseled:  [x] Patient     [] Family    [] Guardian      SignedVernsukumar Varela   12/13/2018   8:08 AM

## 2018-12-14 LAB — CANNABINOIDS CONF, URINE: 33 NG/ML

## 2019-02-04 ENCOUNTER — HOSPITAL ENCOUNTER (INPATIENT)
Age: 39
LOS: 10 days | Discharge: HOME | DRG: 885 | End: 2019-02-14
Attending: EMERGENCY MEDICINE | Admitting: PSYCHIATRY & NEUROLOGY
Payer: COMMERCIAL

## 2019-02-04 DIAGNOSIS — F29 PSYCHOSIS, UNSPECIFIED PSYCHOSIS TYPE (HCC): Primary | ICD-10-CM

## 2019-02-04 PROBLEM — F23 ACUTE PSYCHOSIS (HCC): Status: ACTIVE | Noted: 2019-02-04

## 2019-02-04 LAB
ACETAMINOPHEN LEVEL: <5 MCG/ML (ref 10–30)
ALBUMIN SERPL-MCNC: 4.3 G/DL (ref 3.5–5.2)
ALP BLD-CCNC: 39 U/L (ref 40–129)
ALT SERPL-CCNC: <5 U/L (ref 0–40)
AMPHETAMINE SCREEN, URINE: NOT DETECTED
ANION GAP SERPL CALCULATED.3IONS-SCNC: 7 MMOL/L (ref 7–16)
AST SERPL-CCNC: 12 U/L (ref 0–39)
BARBITURATE SCREEN URINE: NOT DETECTED
BENZODIAZEPINE SCREEN, URINE: NOT DETECTED
BILIRUB SERPL-MCNC: 0.6 MG/DL (ref 0–1.2)
BUN BLDV-MCNC: 16 MG/DL (ref 6–20)
CALCIUM SERPL-MCNC: 9.1 MG/DL (ref 8.6–10.2)
CANNABINOID SCREEN URINE: POSITIVE
CHLORIDE BLD-SCNC: 107 MMOL/L (ref 98–107)
CO2: 26 MMOL/L (ref 22–29)
COCAINE METABOLITE SCREEN URINE: POSITIVE
CREAT SERPL-MCNC: 0.9 MG/DL (ref 0.7–1.2)
ETHANOL: <10 MG/DL (ref 0–0.08)
GFR AFRICAN AMERICAN: >60
GFR NON-AFRICAN AMERICAN: >60 ML/MIN/1.73
GLUCOSE BLD-MCNC: 121 MG/DL (ref 74–99)
HCT VFR BLD CALC: 44.1 % (ref 37–54)
HEMOGLOBIN: 14.9 G/DL (ref 12.5–16.5)
MCH RBC QN AUTO: 30.7 PG (ref 26–35)
MCHC RBC AUTO-ENTMCNC: 33.8 % (ref 32–34.5)
MCV RBC AUTO: 90.7 FL (ref 80–99.9)
METHADONE SCREEN, URINE: NOT DETECTED
OPIATE SCREEN URINE: NOT DETECTED
PDW BLD-RTO: 13.2 FL (ref 11.5–15)
PHENCYCLIDINE SCREEN URINE: NOT DETECTED
PLATELET # BLD: 259 E9/L (ref 130–450)
PMV BLD AUTO: 10.5 FL (ref 7–12)
POTASSIUM SERPL-SCNC: 3.7 MMOL/L (ref 3.5–5)
PROPOXYPHENE SCREEN: NOT DETECTED
RBC # BLD: 4.86 E12/L (ref 3.8–5.8)
SALICYLATE, SERUM: <0.3 MG/DL (ref 0–30)
SODIUM BLD-SCNC: 140 MMOL/L (ref 132–146)
TOTAL PROTEIN: 6.9 G/DL (ref 6.4–8.3)
TRICYCLIC ANTIDEPRESSANTS SCREEN SERUM: NEGATIVE NG/ML
VALPROIC ACID LEVEL: <3 MCG/ML (ref 50–100)
WBC # BLD: 3.4 E9/L (ref 4.5–11.5)

## 2019-02-04 PROCEDURE — 1240000000 HC EMOTIONAL WELLNESS R&B

## 2019-02-04 PROCEDURE — G0480 DRUG TEST DEF 1-7 CLASSES: HCPCS

## 2019-02-04 PROCEDURE — 99285 EMERGENCY DEPT VISIT HI MDM: CPT

## 2019-02-04 PROCEDURE — 85027 COMPLETE CBC AUTOMATED: CPT

## 2019-02-04 PROCEDURE — 80164 ASSAY DIPROPYLACETIC ACD TOT: CPT

## 2019-02-04 PROCEDURE — 80053 COMPREHEN METABOLIC PANEL: CPT

## 2019-02-04 PROCEDURE — 36415 COLL VENOUS BLD VENIPUNCTURE: CPT

## 2019-02-04 PROCEDURE — 80307 DRUG TEST PRSMV CHEM ANLYZR: CPT

## 2019-02-04 RX ORDER — ZIPRASIDONE MESYLATE 20 MG/ML
10 INJECTION, POWDER, LYOPHILIZED, FOR SOLUTION INTRAMUSCULAR ONCE
Status: COMPLETED | OUTPATIENT
Start: 2019-02-05 | End: 2019-02-05

## 2019-02-04 RX ORDER — LORAZEPAM 2 MG/ML
1 INJECTION INTRAMUSCULAR ONCE
Status: COMPLETED | OUTPATIENT
Start: 2019-02-05 | End: 2019-02-05

## 2019-02-05 PROCEDURE — 2580000003 HC RX 258

## 2019-02-05 PROCEDURE — 99222 1ST HOSP IP/OBS MODERATE 55: CPT | Performed by: NURSE PRACTITIONER

## 2019-02-05 PROCEDURE — 6360000002 HC RX W HCPCS: Performed by: EMERGENCY MEDICINE

## 2019-02-05 PROCEDURE — 1240000000 HC EMOTIONAL WELLNESS R&B

## 2019-02-05 PROCEDURE — 6370000000 HC RX 637 (ALT 250 FOR IP): Performed by: PSYCHIATRY & NEUROLOGY

## 2019-02-05 RX ORDER — TRAZODONE HYDROCHLORIDE 50 MG/1
50 TABLET ORAL NIGHTLY PRN
Status: DISCONTINUED | OUTPATIENT
Start: 2019-02-05 | End: 2019-02-14 | Stop reason: HOSPADM

## 2019-02-05 RX ORDER — BENZTROPINE MESYLATE 1 MG/ML
2 INJECTION INTRAMUSCULAR; INTRAVENOUS 2 TIMES DAILY PRN
Status: DISCONTINUED | OUTPATIENT
Start: 2019-02-05 | End: 2019-02-14 | Stop reason: HOSPADM

## 2019-02-05 RX ORDER — HALOPERIDOL 5 MG/ML
10 INJECTION INTRAMUSCULAR EVERY 6 HOURS PRN
Status: DISCONTINUED | OUTPATIENT
Start: 2019-02-05 | End: 2019-02-14 | Stop reason: HOSPADM

## 2019-02-05 RX ORDER — OLANZAPINE 10 MG/1
10 TABLET ORAL
Status: ACTIVE | OUTPATIENT
Start: 2019-02-05 | End: 2019-02-05

## 2019-02-05 RX ORDER — MAGNESIUM HYDROXIDE/ALUMINUM HYDROXICE/SIMETHICONE 120; 1200; 1200 MG/30ML; MG/30ML; MG/30ML
30 SUSPENSION ORAL PRN
Status: DISCONTINUED | OUTPATIENT
Start: 2019-02-05 | End: 2019-02-14 | Stop reason: HOSPADM

## 2019-02-05 RX ORDER — ACETAMINOPHEN 325 MG/1
650 TABLET ORAL EVERY 4 HOURS PRN
Status: DISCONTINUED | OUTPATIENT
Start: 2019-02-05 | End: 2019-02-14 | Stop reason: HOSPADM

## 2019-02-05 RX ORDER — VALPROIC ACID 250 MG/1
250 CAPSULE, LIQUID FILLED ORAL 2 TIMES DAILY
Status: DISCONTINUED | OUTPATIENT
Start: 2019-02-05 | End: 2019-02-06

## 2019-02-05 RX ORDER — HYDROXYZINE PAMOATE 50 MG/1
50 CAPSULE ORAL EVERY 6 HOURS PRN
Status: DISCONTINUED | OUTPATIENT
Start: 2019-02-05 | End: 2019-02-14 | Stop reason: HOSPADM

## 2019-02-05 RX ORDER — QUETIAPINE 150 MG/1
300 TABLET, FILM COATED, EXTENDED RELEASE ORAL NIGHTLY
Status: DISCONTINUED | OUTPATIENT
Start: 2019-02-05 | End: 2019-02-08

## 2019-02-05 RX ORDER — NICOTINE 21 MG/24HR
1 PATCH, TRANSDERMAL 24 HOURS TRANSDERMAL DAILY
Status: DISCONTINUED | OUTPATIENT
Start: 2019-02-05 | End: 2019-02-14 | Stop reason: HOSPADM

## 2019-02-05 RX ADMIN — WATER 0.6 ML: 1 INJECTION INTRAMUSCULAR; INTRAVENOUS; SUBCUTANEOUS at 00:52

## 2019-02-05 RX ADMIN — LORAZEPAM 1 MG: 2 INJECTION INTRAMUSCULAR; INTRAVENOUS at 00:52

## 2019-02-05 RX ADMIN — ZIPRASIDONE MESYLATE 10 MG: 20 INJECTION, POWDER, LYOPHILIZED, FOR SOLUTION INTRAMUSCULAR at 00:52

## 2019-02-05 ASSESSMENT — SLEEP AND FATIGUE QUESTIONNAIRES
SLEEP PATTERN: DIFFICULTY FALLING ASLEEP
DIFFICULTY FALLING ASLEEP: YES
DO YOU USE A SLEEP AID: NO
DIFFICULTY ARISING: YES
DO YOU HAVE DIFFICULTY SLEEPING: YES
RESTFUL SLEEP: NO
AVERAGE NUMBER OF SLEEP HOURS: 7
DIFFICULTY STAYING ASLEEP: YES

## 2019-02-05 ASSESSMENT — PAIN SCALES - GENERAL
PAINLEVEL_OUTOF10: 0
PAINLEVEL_OUTOF10: 0

## 2019-02-05 ASSESSMENT — LIFESTYLE VARIABLES: HISTORY_ALCOHOL_USE: NO

## 2019-02-06 PROBLEM — F29 PSYCHOSIS (HCC): Status: ACTIVE | Noted: 2019-02-04

## 2019-02-06 PROCEDURE — 1240000000 HC EMOTIONAL WELLNESS R&B

## 2019-02-06 PROCEDURE — 99231 SBSQ HOSP IP/OBS SF/LOW 25: CPT | Performed by: NURSE PRACTITIONER

## 2019-02-06 RX ORDER — VALPROIC ACID 250 MG/1
250 CAPSULE, LIQUID FILLED ORAL 3 TIMES DAILY
Status: DISCONTINUED | OUTPATIENT
Start: 2019-02-06 | End: 2019-02-14 | Stop reason: HOSPADM

## 2019-02-06 ASSESSMENT — PAIN SCALES - GENERAL
PAINLEVEL_OUTOF10: 0
PAINLEVEL_OUTOF10: 0

## 2019-02-07 ENCOUNTER — APPOINTMENT (OUTPATIENT)
Dept: GENERAL RADIOLOGY | Age: 39
DRG: 885 | End: 2019-02-07
Payer: COMMERCIAL

## 2019-02-07 PROCEDURE — 1240000000 HC EMOTIONAL WELLNESS R&B

## 2019-02-07 PROCEDURE — 73590 X-RAY EXAM OF LOWER LEG: CPT

## 2019-02-07 PROCEDURE — 99231 SBSQ HOSP IP/OBS SF/LOW 25: CPT | Performed by: NURSE PRACTITIONER

## 2019-02-07 ASSESSMENT — PAIN SCALES - GENERAL: PAINLEVEL_OUTOF10: 10

## 2019-02-08 PROCEDURE — 1240000000 HC EMOTIONAL WELLNESS R&B

## 2019-02-08 PROCEDURE — 6370000000 HC RX 637 (ALT 250 FOR IP): Performed by: NURSE PRACTITIONER

## 2019-02-08 PROCEDURE — 99231 SBSQ HOSP IP/OBS SF/LOW 25: CPT | Performed by: NURSE PRACTITIONER

## 2019-02-08 RX ORDER — OLANZAPINE 10 MG/1
10 INJECTION, POWDER, LYOPHILIZED, FOR SOLUTION INTRAMUSCULAR DAILY
Status: DISCONTINUED | OUTPATIENT
Start: 2019-02-08 | End: 2019-02-09

## 2019-02-08 RX ORDER — LORAZEPAM 2 MG/ML
2 INJECTION INTRAMUSCULAR EVERY 6 HOURS PRN
Status: DISCONTINUED | OUTPATIENT
Start: 2019-02-08 | End: 2019-02-14 | Stop reason: HOSPADM

## 2019-02-08 RX ORDER — DIPHENHYDRAMINE HYDROCHLORIDE 50 MG/ML
50 INJECTION INTRAMUSCULAR; INTRAVENOUS EVERY 6 HOURS PRN
Status: DISCONTINUED | OUTPATIENT
Start: 2019-02-08 | End: 2019-02-14 | Stop reason: HOSPADM

## 2019-02-08 RX ORDER — PALIPERIDONE 3 MG/1
3 TABLET, EXTENDED RELEASE ORAL DAILY
Status: DISCONTINUED | OUTPATIENT
Start: 2019-02-08 | End: 2019-02-09

## 2019-02-08 RX ADMIN — PALIPERIDONE 3 MG: 3 TABLET, EXTENDED RELEASE ORAL at 13:48

## 2019-02-08 ASSESSMENT — PAIN SCALES - GENERAL
PAINLEVEL_OUTOF10: 0
PAINLEVEL_OUTOF10: 10

## 2019-02-09 PROCEDURE — 99231 SBSQ HOSP IP/OBS SF/LOW 25: CPT | Performed by: NURSE PRACTITIONER

## 2019-02-09 PROCEDURE — 6370000000 HC RX 637 (ALT 250 FOR IP): Performed by: NURSE PRACTITIONER

## 2019-02-09 PROCEDURE — 1240000000 HC EMOTIONAL WELLNESS R&B

## 2019-02-09 RX ORDER — PALIPERIDONE 6 MG/1
6 TABLET, EXTENDED RELEASE ORAL DAILY
Status: DISCONTINUED | OUTPATIENT
Start: 2019-02-10 | End: 2019-02-14 | Stop reason: HOSPADM

## 2019-02-09 RX ORDER — OLANZAPINE 10 MG/1
10 INJECTION, POWDER, LYOPHILIZED, FOR SOLUTION INTRAMUSCULAR DAILY
Status: DISCONTINUED | OUTPATIENT
Start: 2019-02-10 | End: 2019-02-14 | Stop reason: HOSPADM

## 2019-02-09 RX ADMIN — PALIPERIDONE 3 MG: 3 TABLET, EXTENDED RELEASE ORAL at 09:03

## 2019-02-09 ASSESSMENT — PAIN SCALES - GENERAL: PAINLEVEL_OUTOF10: 0

## 2019-02-10 LAB — COCAINE, CONFIRM, URINE: 361 NG/ML

## 2019-02-10 PROCEDURE — 6370000000 HC RX 637 (ALT 250 FOR IP): Performed by: PSYCHIATRY & NEUROLOGY

## 2019-02-10 PROCEDURE — 99231 SBSQ HOSP IP/OBS SF/LOW 25: CPT | Performed by: NURSE PRACTITIONER

## 2019-02-10 PROCEDURE — 6370000000 HC RX 637 (ALT 250 FOR IP): Performed by: NURSE PRACTITIONER

## 2019-02-10 PROCEDURE — 1240000000 HC EMOTIONAL WELLNESS R&B

## 2019-02-10 RX ADMIN — PALIPERIDONE 6 MG: 6 TABLET, EXTENDED RELEASE ORAL at 10:18

## 2019-02-10 RX ADMIN — VALPROIC ACID 250 MG: 250 CAPSULE, LIQUID FILLED ORAL at 10:18

## 2019-02-10 RX ADMIN — VALPROIC ACID 250 MG: 250 CAPSULE, LIQUID FILLED ORAL at 20:15

## 2019-02-10 RX ADMIN — TRAZODONE HYDROCHLORIDE 50 MG: 50 TABLET ORAL at 02:12

## 2019-02-10 RX ADMIN — HYDROXYZINE PAMOATE 50 MG: 50 CAPSULE ORAL at 02:12

## 2019-02-10 RX ADMIN — VALPROIC ACID 250 MG: 250 CAPSULE, LIQUID FILLED ORAL at 13:20

## 2019-02-11 PROBLEM — F25.0 SCHIZOAFFECTIVE DISORDER, BIPOLAR TYPE (HCC): Status: ACTIVE | Noted: 2019-02-11

## 2019-02-11 LAB — CANNABINOIDS CONF, URINE: 194 NG/ML

## 2019-02-11 PROCEDURE — 99231 SBSQ HOSP IP/OBS SF/LOW 25: CPT | Performed by: NURSE PRACTITIONER

## 2019-02-11 PROCEDURE — 6370000000 HC RX 637 (ALT 250 FOR IP): Performed by: PSYCHIATRY & NEUROLOGY

## 2019-02-11 PROCEDURE — 1240000000 HC EMOTIONAL WELLNESS R&B

## 2019-02-11 PROCEDURE — 6370000000 HC RX 637 (ALT 250 FOR IP): Performed by: NURSE PRACTITIONER

## 2019-02-11 RX ADMIN — VALPROIC ACID 250 MG: 250 CAPSULE, LIQUID FILLED ORAL at 22:09

## 2019-02-11 RX ADMIN — VALPROIC ACID 250 MG: 250 CAPSULE, LIQUID FILLED ORAL at 10:52

## 2019-02-11 RX ADMIN — VALPROIC ACID 250 MG: 250 CAPSULE, LIQUID FILLED ORAL at 14:09

## 2019-02-11 RX ADMIN — PALIPERIDONE 6 MG: 6 TABLET, EXTENDED RELEASE ORAL at 10:52

## 2019-02-11 ASSESSMENT — PAIN SCALES - GENERAL: PAINLEVEL_OUTOF10: 0

## 2019-02-12 PROCEDURE — 6370000000 HC RX 637 (ALT 250 FOR IP): Performed by: NURSE PRACTITIONER

## 2019-02-12 PROCEDURE — 99231 SBSQ HOSP IP/OBS SF/LOW 25: CPT | Performed by: NURSE PRACTITIONER

## 2019-02-12 PROCEDURE — 1240000000 HC EMOTIONAL WELLNESS R&B

## 2019-02-12 PROCEDURE — 6370000000 HC RX 637 (ALT 250 FOR IP): Performed by: PSYCHIATRY & NEUROLOGY

## 2019-02-12 RX ADMIN — PALIPERIDONE 6 MG: 6 TABLET, EXTENDED RELEASE ORAL at 09:40

## 2019-02-12 RX ADMIN — VALPROIC ACID 250 MG: 250 CAPSULE, LIQUID FILLED ORAL at 20:13

## 2019-02-12 RX ADMIN — VALPROIC ACID 250 MG: 250 CAPSULE, LIQUID FILLED ORAL at 09:40

## 2019-02-12 RX ADMIN — VALPROIC ACID 250 MG: 250 CAPSULE, LIQUID FILLED ORAL at 14:13

## 2019-02-12 ASSESSMENT — PAIN SCALES - GENERAL
PAINLEVEL_OUTOF10: 0

## 2019-02-13 PROCEDURE — 99231 SBSQ HOSP IP/OBS SF/LOW 25: CPT | Performed by: NURSE PRACTITIONER

## 2019-02-13 PROCEDURE — 1240000000 HC EMOTIONAL WELLNESS R&B

## 2019-02-13 PROCEDURE — 6370000000 HC RX 637 (ALT 250 FOR IP): Performed by: NURSE PRACTITIONER

## 2019-02-13 PROCEDURE — 6370000000 HC RX 637 (ALT 250 FOR IP): Performed by: PSYCHIATRY & NEUROLOGY

## 2019-02-13 RX ADMIN — VALPROIC ACID 250 MG: 250 CAPSULE, LIQUID FILLED ORAL at 14:30

## 2019-02-13 RX ADMIN — PALIPERIDONE 6 MG: 6 TABLET, EXTENDED RELEASE ORAL at 08:30

## 2019-02-13 RX ADMIN — VALPROIC ACID 250 MG: 250 CAPSULE, LIQUID FILLED ORAL at 20:44

## 2019-02-13 RX ADMIN — VALPROIC ACID 250 MG: 250 CAPSULE, LIQUID FILLED ORAL at 08:30

## 2019-02-13 ASSESSMENT — PAIN SCALES - GENERAL
PAINLEVEL_OUTOF10: 0

## 2019-02-14 ENCOUNTER — HOSPITAL ENCOUNTER (EMERGENCY)
Age: 39
Discharge: LEFT BEFORE DISCHARGE | End: 2019-02-14
Payer: COMMERCIAL

## 2019-02-14 VITALS
RESPIRATION RATE: 16 BRPM | OXYGEN SATURATION: 99 % | DIASTOLIC BLOOD PRESSURE: 94 MMHG | HEART RATE: 108 BPM | SYSTOLIC BLOOD PRESSURE: 133 MMHG | TEMPERATURE: 98 F

## 2019-02-14 VITALS
WEIGHT: 160 LBS | DIASTOLIC BLOOD PRESSURE: 64 MMHG | TEMPERATURE: 98.5 F | HEIGHT: 72 IN | SYSTOLIC BLOOD PRESSURE: 84 MMHG | BODY MASS INDEX: 21.67 KG/M2 | OXYGEN SATURATION: 100 % | HEART RATE: 60 BPM | RESPIRATION RATE: 14 BRPM

## 2019-02-14 DIAGNOSIS — Z53.21 ELOPED FROM EMERGENCY DEPARTMENT: Primary | ICD-10-CM

## 2019-02-14 PROCEDURE — 99238 HOSP IP/OBS DSCHRG MGMT 30/<: CPT | Performed by: NURSE PRACTITIONER

## 2019-02-14 PROCEDURE — 99281 EMR DPT VST MAYX REQ PHY/QHP: CPT

## 2019-02-14 PROCEDURE — 6370000000 HC RX 637 (ALT 250 FOR IP): Performed by: PSYCHIATRY & NEUROLOGY

## 2019-02-14 PROCEDURE — 6370000000 HC RX 637 (ALT 250 FOR IP): Performed by: NURSE PRACTITIONER

## 2019-02-14 RX ORDER — VALPROIC ACID 250 MG/1
250 CAPSULE, LIQUID FILLED ORAL 3 TIMES DAILY
Qty: 90 CAPSULE | Refills: 0 | Status: SHIPPED | OUTPATIENT
Start: 2019-02-14 | End: 2019-07-17

## 2019-02-14 RX ADMIN — VALPROIC ACID 250 MG: 250 CAPSULE, LIQUID FILLED ORAL at 09:57

## 2019-02-14 RX ADMIN — PALIPERIDONE 6 MG: 6 TABLET, EXTENDED RELEASE ORAL at 09:57

## 2019-02-14 ASSESSMENT — PAIN DESCRIPTION - ORIENTATION: ORIENTATION: LEFT;RIGHT

## 2019-02-14 ASSESSMENT — PAIN DESCRIPTION - PAIN TYPE: TYPE: CHRONIC PAIN

## 2019-02-14 ASSESSMENT — PAIN SCALES - GENERAL
PAINLEVEL_OUTOF10: 0
PAINLEVEL_OUTOF10: 10

## 2019-02-14 ASSESSMENT — PAIN DESCRIPTION - LOCATION: LOCATION: LEG

## 2019-02-19 ENCOUNTER — TELEPHONE (OUTPATIENT)
Dept: INTERNAL MEDICINE | Age: 39
End: 2019-02-19

## 2019-07-16 ENCOUNTER — HOSPITAL ENCOUNTER (EMERGENCY)
Age: 39
Discharge: NURSING, REHAB OR LONG-TERM CARE | End: 2019-07-17
Attending: EMERGENCY MEDICINE
Payer: COMMERCIAL

## 2019-07-16 DIAGNOSIS — F14.10 COCAINE ABUSE (HCC): ICD-10-CM

## 2019-07-16 DIAGNOSIS — F29 PSYCHOSIS, UNSPECIFIED PSYCHOSIS TYPE (HCC): Primary | ICD-10-CM

## 2019-07-16 LAB
ACETAMINOPHEN LEVEL: <5 MCG/ML (ref 10–30)
ALBUMIN SERPL-MCNC: 4.3 G/DL (ref 3.5–5.2)
ALP BLD-CCNC: 43 U/L (ref 40–129)
ALT SERPL-CCNC: 9 U/L (ref 0–40)
AMPHETAMINE SCREEN, URINE: NOT DETECTED
ANION GAP SERPL CALCULATED.3IONS-SCNC: 11 MMOL/L (ref 7–16)
AST SERPL-CCNC: 14 U/L (ref 0–39)
BACTERIA: ABNORMAL /HPF
BARBITURATE SCREEN URINE: NOT DETECTED
BASOPHILS ABSOLUTE: 0.06 E9/L (ref 0–0.2)
BASOPHILS RELATIVE PERCENT: 0.7 % (ref 0–2)
BENZODIAZEPINE SCREEN, URINE: NOT DETECTED
BILIRUB SERPL-MCNC: 0.4 MG/DL (ref 0–1.2)
BILIRUBIN URINE: NEGATIVE
BLOOD, URINE: NORMAL
BUN BLDV-MCNC: 8 MG/DL (ref 6–20)
CALCIUM SERPL-MCNC: 9.1 MG/DL (ref 8.6–10.2)
CANNABINOID SCREEN URINE: NOT DETECTED
CHLORIDE BLD-SCNC: 105 MMOL/L (ref 98–107)
CLARITY: CLEAR
CO2: 25 MMOL/L (ref 22–29)
COCAINE METABOLITE SCREEN URINE: POSITIVE
COLOR: YELLOW
CREAT SERPL-MCNC: 0.9 MG/DL (ref 0.7–1.2)
EOSINOPHILS ABSOLUTE: 0.31 E9/L (ref 0.05–0.5)
EOSINOPHILS RELATIVE PERCENT: 3.6 % (ref 0–6)
ETHANOL: <10 MG/DL (ref 0–0.08)
GFR AFRICAN AMERICAN: >60
GFR NON-AFRICAN AMERICAN: >60 ML/MIN/1.73
GLUCOSE BLD-MCNC: 89 MG/DL (ref 74–99)
GLUCOSE URINE: NEGATIVE MG/DL
HCT VFR BLD CALC: 44.4 % (ref 37–54)
HEMOGLOBIN: 14.9 G/DL (ref 12.5–16.5)
IMMATURE GRANULOCYTES #: 0.04 E9/L
IMMATURE GRANULOCYTES %: 0.5 % (ref 0–5)
KETONES, URINE: NEGATIVE MG/DL
LEUKOCYTE ESTERASE, URINE: NEGATIVE
LYMPHOCYTES ABSOLUTE: 1.84 E9/L (ref 1.5–4)
LYMPHOCYTES RELATIVE PERCENT: 21.3 % (ref 20–42)
MCH RBC QN AUTO: 30.8 PG (ref 26–35)
MCHC RBC AUTO-ENTMCNC: 33.6 % (ref 32–34.5)
MCV RBC AUTO: 91.9 FL (ref 80–99.9)
METHADONE SCREEN, URINE: NOT DETECTED
MONOCYTES ABSOLUTE: 0.62 E9/L (ref 0.1–0.95)
MONOCYTES RELATIVE PERCENT: 7.2 % (ref 2–12)
NEUTROPHILS ABSOLUTE: 5.75 E9/L (ref 1.8–7.3)
NEUTROPHILS RELATIVE PERCENT: 66.7 % (ref 43–80)
NITRITE, URINE: NEGATIVE
OPIATE SCREEN URINE: NOT DETECTED
PDW BLD-RTO: 13.4 FL (ref 11.5–15)
PH UA: 5.5 (ref 5–9)
PHENCYCLIDINE SCREEN URINE: NOT DETECTED
PLATELET # BLD: 307 E9/L (ref 130–450)
PMV BLD AUTO: 9.7 FL (ref 7–12)
POTASSIUM SERPL-SCNC: 3.9 MMOL/L (ref 3.5–5)
PROPOXYPHENE SCREEN: NOT DETECTED
PROTEIN UA: NEGATIVE MG/DL
RBC # BLD: 4.83 E12/L (ref 3.8–5.8)
RBC UA: ABNORMAL /HPF (ref 0–2)
SALICYLATE, SERUM: <0.3 MG/DL (ref 0–30)
SODIUM BLD-SCNC: 141 MMOL/L (ref 132–146)
SPECIFIC GRAVITY UA: >=1.03 (ref 1–1.03)
T4 TOTAL: 7.3 MCG/DL (ref 4.5–11.7)
TOTAL PROTEIN: 6.9 G/DL (ref 6.4–8.3)
TRICYCLIC ANTIDEPRESSANTS SCREEN SERUM: NEGATIVE NG/ML
TSH SERPL DL<=0.05 MIU/L-ACNC: 0.79 UIU/ML (ref 0.27–4.2)
UROBILINOGEN, URINE: 0.2 E.U./DL
WBC # BLD: 8.6 E9/L (ref 4.5–11.5)
WBC UA: ABNORMAL /HPF (ref 0–5)

## 2019-07-16 PROCEDURE — 84443 ASSAY THYROID STIM HORMONE: CPT

## 2019-07-16 PROCEDURE — G0480 DRUG TEST DEF 1-7 CLASSES: HCPCS

## 2019-07-16 PROCEDURE — 80053 COMPREHEN METABOLIC PANEL: CPT

## 2019-07-16 PROCEDURE — 81001 URINALYSIS AUTO W/SCOPE: CPT

## 2019-07-16 PROCEDURE — 36415 COLL VENOUS BLD VENIPUNCTURE: CPT

## 2019-07-16 PROCEDURE — 84436 ASSAY OF TOTAL THYROXINE: CPT

## 2019-07-16 PROCEDURE — 80307 DRUG TEST PRSMV CHEM ANLYZR: CPT

## 2019-07-16 PROCEDURE — 99285 EMERGENCY DEPT VISIT HI MDM: CPT

## 2019-07-16 PROCEDURE — 85025 COMPLETE CBC W/AUTO DIFF WBC: CPT

## 2019-07-16 RX ORDER — HYDROXYZINE PAMOATE 25 MG/1
25 CAPSULE ORAL 3 TIMES DAILY PRN
Qty: 21 CAPSULE | Refills: 0 | Status: SHIPPED | OUTPATIENT
Start: 2019-07-16 | End: 2019-07-23

## 2019-07-16 RX ORDER — FAMOTIDINE 20 MG/1
20 TABLET, FILM COATED ORAL 2 TIMES DAILY
Qty: 14 TABLET | Refills: 0 | Status: SHIPPED | OUTPATIENT
Start: 2019-07-16 | End: 2019-10-26

## 2019-07-16 RX ORDER — PREDNISONE 10 MG/1
40 TABLET ORAL DAILY
Qty: 20 TABLET | Refills: 0 | Status: SHIPPED | OUTPATIENT
Start: 2019-07-16 | End: 2019-07-21

## 2019-07-16 NOTE — ED NOTES
Completed observation assessment. Patient is a 41year old, male presenting to ED for psychiatric evaluation. Patient arrived in the [facility] AN AFFIL[facility]ATE OF [facility] bizarre, not answering questions appropriately, laughing inappropriately. Patient requires redirection, appears internally stimulated, and also talking to unseen others. Patient does not appear to know why he is in the ED. When inquired about SI/HI. Patient does not answer the questions, however this was patient initial complaint. Prior to sleeping, patient observed to be sitting in bed staring at the wall, mumbling to himself for 40 minutes. Patient has a mental health hx of schizoaffective disorder, patient is supposed to be on an Assisted Outpatient Treatment program with [facility] - however it is unclear if patient was compliant with his court hearing on 5/3/19. Patient semi cooperative, oriented x 4, elevated mood, congruent affect, thought process is delayed, speech rambling at times. Patient does not respond to assessment questions about A/V hallucinations - however patient is visibly talking to unseen others. Patient pink slipped for evaluation. SW will pursue inpatient admission for safety/stabilization.      FRANCISCO Leavitt, [place]  07/16/19 1940

## 2019-07-16 NOTE — ED PROVIDER NOTES
Oxygen Saturation Interpretation: Normal.    General Appearance:  well-appearing, hospital attire, lying in bed, poor grooming and fair hygiene. Constitutional:   Level of Consciousness: Awake and alert. ETOH: No.          Distress: none. Cooperativeness: cooperative. Eyes:  No discharge or conjunctival injection. Ears:  External ears without lesions. Throat:  Airway patient. Neck:  Normal ROM. Supple. Respiratory:  Clear to auscultation and breath sounds equal.  CV:  Regular rate and rhythm, normal heart sounds, without pathological murmurs, ectopy, gallops, or rubs. GI:  Abdomen Soft, nontender, good bowel sounds. No firm or pulsatile mass. Integument:  Normal turgor. Warm, dry, without visible rash, unless noted elsewhere. Lymphatics: No lymphangitis or adenopathy noted. Neurological:  Oriented. Motor functions intact. Psychiatric:        Thought Process:       Coherent:  Yes. Delusions / Paranoia: no evidence of paranoia. Flight of ideas:  No.         Rambling conversation:  No.       Affect: flat. Suicidal ideation:  See HPI. Homicidal ideation:  See HPI. Perceptions:  \"I don't know\". Insight: \"I don't know. \"          Judgement: \"I don't know\".     Lab / Imaging Results   (All laboratory and radiology results have been personally reviewed by myself)  Labs:  Results for orders placed or performed during the hospital encounter of 07/16/19   Comprehensive Metabolic Panel   Result Value Ref Range    Sodium 141 132 - 146 mmol/L    Potassium 3.9 3.5 - 5.0 mmol/L    Chloride 105 98 - 107 mmol/L    CO2 25 22 - 29 mmol/L    Anion Gap 11 7 - 16 mmol/L    Glucose 89 74 - 99 mg/dL    BUN 8 6 - 20 mg/dL    CREATININE 0.9 0.7 - 1.2 mg/dL    GFR Non-African American >60 >=60 mL/min/1.73    GFR African American >60     Calcium 9.1 8.6 - 10.2 mg/dL    Total Protein 6.9 6.4 - 8.3 g/dL    Alb 4.3 3.5 - 5.2 g/dL    Total Bilirubin 0.4 0.0 - 1.2 mg/dL Alkaline Phosphatase 43 40 - 129 U/L    ALT 9 0 - 40 U/L    AST 14 0 - 39 U/L   CBC Auto Differential   Result Value Ref Range    WBC 8.6 4.5 - 11.5 E9/L    RBC 4.83 3.80 - 5.80 E12/L    Hemoglobin 14.9 12.5 - 16.5 g/dL    Hematocrit 44.4 37.0 - 54.0 %    MCV 91.9 80.0 - 99.9 fL    MCH 30.8 26.0 - 35.0 pg    MCHC 33.6 32.0 - 34.5 %    RDW 13.4 11.5 - 15.0 fL    Platelets 138 292 - 603 E9/L    MPV 9.7 7.0 - 12.0 fL    Neutrophils % 66.7 43.0 - 80.0 %    Immature Granulocytes % 0.5 0.0 - 5.0 %    Lymphocytes % 21.3 20.0 - 42.0 %    Monocytes % 7.2 2.0 - 12.0 %    Eosinophils % 3.6 0.0 - 6.0 %    Basophils % 0.7 0.0 - 2.0 %    Neutrophils # 5.75 1.80 - 7.30 E9/L    Immature Granulocytes # 0.04 E9/L    Lymphocytes # 1.84 1.50 - 4.00 E9/L    Monocytes # 0.62 0.10 - 0.95 E9/L    Eosinophils # 0.31 0.05 - 0.50 E9/L    Basophils # 0.06 0.00 - 0.20 E9/L   Serum Drug Screen   Result Value Ref Range    Ethanol Lvl <10 mg/dL    Acetaminophen Level <5.0 (L) 10.0 - 84.5 mcg/mL    Salicylate, Serum <5.2 0.0 - 30.0 mg/dL    TCA Scrn NEGATIVE Cutoff:300 ng/mL   Urinalysis   Result Value Ref Range    Color, UA Yellow Straw/Yellow    Clarity, UA Clear Clear    Glucose, Ur Negative Negative mg/dL    Bilirubin Urine Negative Negative    Ketones, Urine Negative Negative mg/dL    Specific Gravity, UA >=1.030 1.005 - 1.030    Blood, Urine TRACE-INTACT Negative    pH, UA 5.5 5.0 - 9.0    Protein, UA Negative Negative mg/dL    Urobilinogen, Urine 0.2 <2.0 E.U./dL    Nitrite, Urine Negative Negative    Leukocyte Esterase, Urine Negative Negative   Urine Drug Screen   Result Value Ref Range    Amphetamine Screen, Urine NOT DETECTED Negative <1000 ng/mL    Barbiturate Screen, Ur NOT DETECTED Negative < 200 ng/mL    Benzodiazepine Screen, Urine NOT DETECTED Negative < 200 ng/mL    Cannabinoid Scrn, Ur NOT DETECTED Negative < 50ng/mL    Cocaine Metabolite Screen, Urine POSITIVE (A) Negative < 300 ng/mL    Opiate Scrn, Ur NOT DETECTED Negative <

## 2019-07-17 VITALS
TEMPERATURE: 97.1 F | OXYGEN SATURATION: 99 % | SYSTOLIC BLOOD PRESSURE: 123 MMHG | HEART RATE: 80 BPM | DIASTOLIC BLOOD PRESSURE: 74 MMHG | RESPIRATION RATE: 16 BRPM

## 2019-07-17 NOTE — CARE COORDINATION
Client was discharged from [address] [place] [address] with the Mental [facility] ht and Recovery Board and Probate Court on 3/13/19 for non compliance.    Electronically signed by Ronnell Jaimes, [facility] on 7/17/2019 at 8:08 AM

## 2019-07-17 NOTE — ED NOTES
Patient has been sleeping soundly in bed. No s/s pain or distress noted. Will continue to monitor.       Omar Masters RN  07/17/19 9514

## 2019-07-17 NOTE — ED NOTES
Patient referred to the [facility], spoke with Brian Nunes.       Luli Carney RN  07/16/19 2125

## 2019-07-20 LAB — COCAINE, CONFIRM, URINE: >1000 NG/ML

## 2019-08-15 ENCOUNTER — HOSPITAL ENCOUNTER (EMERGENCY)
Age: 39
Discharge: HOME | End: 2019-08-15
Payer: COMMERCIAL

## 2019-08-15 VITALS
HEIGHT: 72 IN | RESPIRATION RATE: 16 BRPM | DIASTOLIC BLOOD PRESSURE: 80 MMHG | HEART RATE: 89 BPM | BODY MASS INDEX: 21.67 KG/M2 | SYSTOLIC BLOOD PRESSURE: 109 MMHG | TEMPERATURE: 97.1 F | WEIGHT: 160 LBS | OXYGEN SATURATION: 98 %

## 2019-08-15 DIAGNOSIS — T50.905A ADVERSE EFFECT OF DRUG, INITIAL ENCOUNTER: Primary | ICD-10-CM

## 2019-08-15 PROCEDURE — 99281 EMR DPT VST MAYX REQ PHY/QHP: CPT

## 2019-09-05 ENCOUNTER — HOSPITAL ENCOUNTER (EMERGENCY)
Age: 39
Discharge: PSYCHIATRIC OR SUBSTANCE USE FACILITY | End: 2019-09-06
Attending: EMERGENCY MEDICINE
Payer: COMMERCIAL

## 2019-09-05 DIAGNOSIS — Z86.59 HISTORY OF SCHIZOPHRENIA: ICD-10-CM

## 2019-09-05 DIAGNOSIS — R44.0 AUDITORY HALLUCINATIONS: Primary | ICD-10-CM

## 2019-09-05 DIAGNOSIS — F14.10 COCAINE ABUSE (HCC): ICD-10-CM

## 2019-09-05 LAB
ACETAMINOPHEN LEVEL: <5 MCG/ML (ref 10–30)
ALBUMIN SERPL-MCNC: 4.6 G/DL (ref 3.5–5.2)
ALP BLD-CCNC: 45 U/L (ref 40–129)
ALT SERPL-CCNC: 10 U/L (ref 0–40)
AMPHETAMINE SCREEN, URINE: NOT DETECTED
ANION GAP SERPL CALCULATED.3IONS-SCNC: 12 MMOL/L (ref 7–16)
AST SERPL-CCNC: 18 U/L (ref 0–39)
BARBITURATE SCREEN URINE: NOT DETECTED
BASOPHILS ABSOLUTE: 0.08 E9/L (ref 0–0.2)
BASOPHILS RELATIVE PERCENT: 1.2 % (ref 0–2)
BENZODIAZEPINE SCREEN, URINE: NOT DETECTED
BILIRUB SERPL-MCNC: 0.7 MG/DL (ref 0–1.2)
BUN BLDV-MCNC: 15 MG/DL (ref 6–20)
CALCIUM SERPL-MCNC: 9.5 MG/DL (ref 8.6–10.2)
CANNABINOID SCREEN URINE: NOT DETECTED
CHLORIDE BLD-SCNC: 103 MMOL/L (ref 98–107)
CO2: 29 MMOL/L (ref 22–29)
COCAINE METABOLITE SCREEN URINE: POSITIVE
CREAT SERPL-MCNC: 1.2 MG/DL (ref 0.7–1.2)
EOSINOPHILS ABSOLUTE: 0.28 E9/L (ref 0.05–0.5)
EOSINOPHILS RELATIVE PERCENT: 4.2 % (ref 0–6)
ETHANOL: <10 MG/DL (ref 0–0.08)
GFR AFRICAN AMERICAN: >60
GFR NON-AFRICAN AMERICAN: >60 ML/MIN/1.73
GLUCOSE BLD-MCNC: 81 MG/DL (ref 74–99)
HCT VFR BLD CALC: 46.8 % (ref 37–54)
HEMOGLOBIN: 15.2 G/DL (ref 12.5–16.5)
IMMATURE GRANULOCYTES #: 0.01 E9/L
IMMATURE GRANULOCYTES %: 0.1 % (ref 0–5)
LYMPHOCYTES ABSOLUTE: 1.73 E9/L (ref 1.5–4)
LYMPHOCYTES RELATIVE PERCENT: 25.9 % (ref 20–42)
Lab: ABNORMAL
MCH RBC QN AUTO: 30.5 PG (ref 26–35)
MCHC RBC AUTO-ENTMCNC: 32.5 % (ref 32–34.5)
MCV RBC AUTO: 93.8 FL (ref 80–99.9)
METHADONE SCREEN, URINE: NOT DETECTED
MONOCYTES ABSOLUTE: 0.53 E9/L (ref 0.1–0.95)
MONOCYTES RELATIVE PERCENT: 7.9 % (ref 2–12)
NEUTROPHILS ABSOLUTE: 4.04 E9/L (ref 1.8–7.3)
NEUTROPHILS RELATIVE PERCENT: 60.7 % (ref 43–80)
OPIATE SCREEN URINE: NOT DETECTED
PDW BLD-RTO: 13.3 FL (ref 11.5–15)
PHENCYCLIDINE SCREEN URINE: NOT DETECTED
PLATELET # BLD: 367 E9/L (ref 130–450)
PMV BLD AUTO: 9.9 FL (ref 7–12)
POTASSIUM SERPL-SCNC: 4 MMOL/L (ref 3.5–5)
PROPOXYPHENE SCREEN: NOT DETECTED
RBC # BLD: 4.99 E12/L (ref 3.8–5.8)
SALICYLATE, SERUM: <0.3 MG/DL (ref 0–30)
SODIUM BLD-SCNC: 144 MMOL/L (ref 132–146)
TOTAL PROTEIN: 7.8 G/DL (ref 6.4–8.3)
TRICYCLIC ANTIDEPRESSANTS SCREEN SERUM: NEGATIVE NG/ML
WBC # BLD: 6.7 E9/L (ref 4.5–11.5)

## 2019-09-05 PROCEDURE — 36415 COLL VENOUS BLD VENIPUNCTURE: CPT

## 2019-09-05 PROCEDURE — 80053 COMPREHEN METABOLIC PANEL: CPT

## 2019-09-05 PROCEDURE — G0480 DRUG TEST DEF 1-7 CLASSES: HCPCS

## 2019-09-05 PROCEDURE — 80307 DRUG TEST PRSMV CHEM ANLYZR: CPT

## 2019-09-05 PROCEDURE — 85025 COMPLETE CBC W/AUTO DIFF WBC: CPT

## 2019-09-05 PROCEDURE — 99285 EMERGENCY DEPT VISIT HI MDM: CPT

## 2019-09-06 VITALS
HEART RATE: 83 BPM | TEMPERATURE: 98.2 F | SYSTOLIC BLOOD PRESSURE: 121 MMHG | OXYGEN SATURATION: 99 % | RESPIRATION RATE: 18 BRPM | DIASTOLIC BLOOD PRESSURE: 74 MMHG

## 2019-09-06 ASSESSMENT — PATIENT HEALTH QUESTIONNAIRE - PHQ9: SUM OF ALL RESPONSES TO PHQ QUESTIONS 1-9: 6

## 2019-09-06 NOTE — ED NOTES
Assessment, CSSRS and SBIRT complete    Pt is currently voluntary    Pt denies SI/HI, admits to [facility]    Pt reports seeing black dots/ images and reports hearing voices but is unable to understand what they are saying. Pt reports a Mh hx of Schizoaffective disorder. Pt reports he has been off meds for an unknown amount of time. Pt states he is no longer connected with [place] [address] outpatient services. Pt last hospitalization for MH according to our records was on 07/17/2019 at [facility]    Pt admits to using cocaine, denies alcohol    Pt was sleeping prior to assessment, Pt was calm, semi-alert, flat affect, low/mumbling speech, needed re-direction to answer assessment questions. Pt denies SI/HI, admits to [address] [place] [address]. Pt reports ok sleep/appetite. PATRICIO SW reviewed chart with ED Doc, Pt is in need of inpatient admission due to his psychosis to ensure Pt's safety and stabilization. Pt will be referred to the [address] [place] [address] due to no beds available at this facility at this time.      FRANCISCO Camacho, Loc Wade  09/06/19 9888

## 2019-09-06 NOTE — ED NOTES
Pt resting with eyes closed awakens easily alert oriented skin warm dry resp easy pt does not elaborate of feeling but denies suicidal ideations at this time     Beny Holguin RN  09/06/19 6575

## 2019-09-06 NOTE — ED NOTES
Pt resting with eyes closed awakens easily alert oriented skin warm dry resp easy pt denies suicidal ideations or depression pt c/o paranoia from a past incident pt has fair eye contact flat affect  Is calm and cooperative at this time pt states auditory hallucinations are telling him to get help     Darrell Carrel, RN  09/06/19 1881

## 2019-09-06 NOTE — ED NOTES
PATRICIO ALVAREZ spoke with Veronica Azevedo at [facility] to set up transport for Pt    Pt will be transported by [facility] [address] # [id number]     Katarina Castellon, MSW, LSW  09/06/19 0041

## 2019-09-09 LAB — COCAINE, CONFIRM, URINE: >1000 NG/ML

## 2019-09-14 ENCOUNTER — HOSPITAL ENCOUNTER (EMERGENCY)
Age: 39
Discharge: HOME | End: 2019-09-14
Payer: COMMERCIAL

## 2019-09-14 VITALS
RESPIRATION RATE: 16 BRPM | SYSTOLIC BLOOD PRESSURE: 115 MMHG | HEART RATE: 86 BPM | WEIGHT: 160 LBS | DIASTOLIC BLOOD PRESSURE: 74 MMHG | HEIGHT: 71 IN | TEMPERATURE: 97 F | BODY MASS INDEX: 22.4 KG/M2 | OXYGEN SATURATION: 97 %

## 2019-09-14 DIAGNOSIS — S30.812A ABRASION OF PENIS, INITIAL ENCOUNTER: ICD-10-CM

## 2019-09-14 DIAGNOSIS — Z71.1 CONCERN ABOUT STD IN MALE WITHOUT DIAGNOSIS: Primary | ICD-10-CM

## 2019-09-14 LAB
BACTERIA: ABNORMAL /HPF
BILIRUBIN URINE: ABNORMAL
BLOOD, URINE: ABNORMAL
CLARITY: CLEAR
COLOR: YELLOW
GLUCOSE URINE: NEGATIVE MG/DL
KETONES, URINE: ABNORMAL MG/DL
LEUKOCYTE ESTERASE, URINE: NEGATIVE
NITRITE, URINE: NEGATIVE
PH UA: 7 (ref 5–9)
PROTEIN UA: ABNORMAL MG/DL
RBC UA: ABNORMAL /HPF (ref 0–2)
SPECIFIC GRAVITY UA: 1.02 (ref 1–1.03)
UROBILINOGEN, URINE: 4 E.U./DL
WBC UA: ABNORMAL /HPF (ref 0–5)

## 2019-09-14 PROCEDURE — 87591 N.GONORRHOEAE DNA AMP PROB: CPT

## 2019-09-14 PROCEDURE — 6370000000 HC RX 637 (ALT 250 FOR IP): Performed by: NURSE PRACTITIONER

## 2019-09-14 PROCEDURE — 2580000003 HC RX 258

## 2019-09-14 PROCEDURE — 87491 CHLMYD TRACH DNA AMP PROBE: CPT

## 2019-09-14 PROCEDURE — 81001 URINALYSIS AUTO W/SCOPE: CPT

## 2019-09-14 PROCEDURE — 6360000002 HC RX W HCPCS: Performed by: NURSE PRACTITIONER

## 2019-09-14 PROCEDURE — 99283 EMERGENCY DEPT VISIT LOW MDM: CPT

## 2019-09-14 RX ORDER — DIAPER,BRIEF,INFANT-TODD,DISP
EACH MISCELLANEOUS ONCE
Status: COMPLETED | OUTPATIENT
Start: 2019-09-14 | End: 2019-09-14

## 2019-09-14 RX ORDER — METRONIDAZOLE 500 MG/1
2000 TABLET ORAL ONCE
Status: COMPLETED | OUTPATIENT
Start: 2019-09-14 | End: 2019-09-14

## 2019-09-14 RX ORDER — CEFTRIAXONE SODIUM 250 MG/1
250 INJECTION, POWDER, FOR SOLUTION INTRAMUSCULAR; INTRAVENOUS ONCE
Status: COMPLETED | OUTPATIENT
Start: 2019-09-14 | End: 2019-09-14

## 2019-09-14 RX ORDER — AZITHROMYCIN 250 MG/1
1000 TABLET, FILM COATED ORAL ONCE
Status: COMPLETED | OUTPATIENT
Start: 2019-09-14 | End: 2019-09-14

## 2019-09-14 RX ORDER — ONDANSETRON 4 MG/1
4 TABLET, ORALLY DISINTEGRATING ORAL ONCE
Status: COMPLETED | OUTPATIENT
Start: 2019-09-14 | End: 2019-09-14

## 2019-09-14 RX ORDER — BACITRACIN, NEOMYCIN, POLYMYXIN B 400; 3.5; 5 [USP'U]/G; MG/G; [USP'U]/G
OINTMENT TOPICAL
Qty: 30 G | Refills: 0 | Status: SHIPPED | OUTPATIENT
Start: 2019-09-14 | End: 2019-09-24

## 2019-09-14 RX ADMIN — AZITHROMYCIN 1000 MG: 250 TABLET, FILM COATED ORAL at 16:00

## 2019-09-14 RX ADMIN — ONDANSETRON 4 MG: 4 TABLET, ORALLY DISINTEGRATING ORAL at 16:01

## 2019-09-14 RX ADMIN — BACITRACIN ZINC: 500 OINTMENT TOPICAL at 16:01

## 2019-09-14 RX ADMIN — CEFTRIAXONE SODIUM 250 MG: 250 INJECTION, POWDER, FOR SOLUTION INTRAMUSCULAR; INTRAVENOUS at 16:01

## 2019-09-14 RX ADMIN — WATER 10 ML: 1 INJECTION INTRAMUSCULAR; INTRAVENOUS; SUBCUTANEOUS at 16:01

## 2019-09-14 RX ADMIN — METRONIDAZOLE 2000 MG: 500 TABLET ORAL at 16:00

## 2019-09-14 ASSESSMENT — PAIN DESCRIPTION - DESCRIPTORS: DESCRIPTORS: BURNING

## 2019-09-14 ASSESSMENT — PAIN SCALES - GENERAL: PAINLEVEL_OUTOF10: 10

## 2019-09-14 ASSESSMENT — PAIN DESCRIPTION - LOCATION: LOCATION: PENIS

## 2019-09-14 ASSESSMENT — PAIN DESCRIPTION - PAIN TYPE: TYPE: ACUTE PAIN

## 2019-09-14 ASSESSMENT — PAIN DESCRIPTION - FREQUENCY: FREQUENCY: CONTINUOUS

## 2019-09-18 LAB
C. TRACHOMATIS DNA ,URINE: NEGATIVE
N. GONORRHOEAE DNA, URINE: NEGATIVE
SOURCE: NORMAL

## 2019-10-20 ENCOUNTER — HOSPITAL ENCOUNTER (EMERGENCY)
Age: 39
Discharge: HOME | End: 2019-10-20
Attending: EMERGENCY MEDICINE
Payer: COMMERCIAL

## 2019-10-20 VITALS
SYSTOLIC BLOOD PRESSURE: 107 MMHG | RESPIRATION RATE: 18 BRPM | HEART RATE: 75 BPM | OXYGEN SATURATION: 97 % | DIASTOLIC BLOOD PRESSURE: 91 MMHG | TEMPERATURE: 98.6 F

## 2019-10-20 DIAGNOSIS — A60.00 GENITAL HERPES SIMPLEX, UNSPECIFIED SITE: Primary | ICD-10-CM

## 2019-10-20 LAB
BACTERIA: NORMAL /HPF
BILIRUBIN URINE: ABNORMAL
BLOOD, URINE: ABNORMAL
CLARITY: CLEAR
COLOR: YELLOW
CRYSTALS, UA: NORMAL
GLUCOSE URINE: NEGATIVE MG/DL
KETONES, URINE: ABNORMAL MG/DL
LEUKOCYTE ESTERASE, URINE: NEGATIVE
NITRITE, URINE: NEGATIVE
PH UA: 5.5 (ref 5–9)
PROTEIN UA: ABNORMAL MG/DL
RBC UA: NORMAL /HPF (ref 0–2)
SPECIFIC GRAVITY UA: >=1.03 (ref 1–1.03)
UROBILINOGEN, URINE: 0.2 E.U./DL
WBC UA: NORMAL /HPF (ref 0–5)

## 2019-10-20 PROCEDURE — 86592 SYPHILIS TEST NON-TREP QUAL: CPT

## 2019-10-20 PROCEDURE — 87491 CHLMYD TRACH DNA AMP PROBE: CPT

## 2019-10-20 PROCEDURE — 6370000000 HC RX 637 (ALT 250 FOR IP): Performed by: EMERGENCY MEDICINE

## 2019-10-20 PROCEDURE — 96372 THER/PROPH/DIAG INJ SC/IM: CPT

## 2019-10-20 PROCEDURE — 6360000002 HC RX W HCPCS: Performed by: EMERGENCY MEDICINE

## 2019-10-20 PROCEDURE — 81001 URINALYSIS AUTO W/SCOPE: CPT

## 2019-10-20 PROCEDURE — 99283 EMERGENCY DEPT VISIT LOW MDM: CPT

## 2019-10-20 PROCEDURE — 36415 COLL VENOUS BLD VENIPUNCTURE: CPT

## 2019-10-20 PROCEDURE — 87591 N.GONORRHOEAE DNA AMP PROB: CPT

## 2019-10-20 RX ORDER — AZITHROMYCIN 250 MG/1
1000 TABLET, FILM COATED ORAL ONCE
Status: COMPLETED | OUTPATIENT
Start: 2019-10-20 | End: 2019-10-20

## 2019-10-20 RX ORDER — VALACYCLOVIR HYDROCHLORIDE 1 G/1
1000 TABLET, FILM COATED ORAL 2 TIMES DAILY
Qty: 20 TABLET | Refills: 0 | Status: ON HOLD | OUTPATIENT
Start: 2019-10-20 | End: 2019-11-01 | Stop reason: HOSPADM

## 2019-10-20 RX ORDER — CEFTRIAXONE SODIUM 250 MG/1
250 INJECTION, POWDER, FOR SOLUTION INTRAMUSCULAR; INTRAVENOUS ONCE
Status: COMPLETED | OUTPATIENT
Start: 2019-10-20 | End: 2019-10-20

## 2019-10-20 RX ADMIN — CEFTRIAXONE SODIUM 250 MG: 250 INJECTION, POWDER, FOR SOLUTION INTRAMUSCULAR; INTRAVENOUS at 09:49

## 2019-10-20 RX ADMIN — AZITHROMYCIN 1000 MG: 250 TABLET, FILM COATED ORAL at 09:49

## 2019-10-20 ASSESSMENT — ENCOUNTER SYMPTOMS
SINUS PAIN: 0
COUGH: 0
BACK PAIN: 0
SHORTNESS OF BREATH: 0
VOMITING: 0
ABDOMINAL PAIN: 0
SORE THROAT: 0
DIARRHEA: 0
NAUSEA: 0
CHEST TIGHTNESS: 0

## 2019-10-21 LAB — RPR: NORMAL

## 2019-10-23 LAB
C. TRACHOMATIS DNA ,URINE: NEGATIVE
N. GONORRHOEAE DNA, URINE: NEGATIVE
SOURCE: NORMAL

## 2019-10-26 ENCOUNTER — HOSPITAL ENCOUNTER (INPATIENT)
Age: 39
LOS: 6 days | Discharge: HOME | DRG: 885 | End: 2019-11-01
Attending: EMERGENCY MEDICINE | Admitting: PSYCHIATRY & NEUROLOGY
Payer: COMMERCIAL

## 2019-10-26 DIAGNOSIS — F99 PSYCHIATRIC PROBLEM: Primary | ICD-10-CM

## 2019-10-26 DIAGNOSIS — F14.10 COCAINE ABUSE (HCC): ICD-10-CM

## 2019-10-26 LAB
ACETAMINOPHEN LEVEL: <5 MCG/ML (ref 10–30)
ALBUMIN SERPL-MCNC: 4 G/DL (ref 3.5–5.2)
ALP BLD-CCNC: 34 U/L (ref 40–129)
ALT SERPL-CCNC: 15 U/L (ref 0–40)
AMPHETAMINE SCREEN, URINE: NOT DETECTED
ANION GAP SERPL CALCULATED.3IONS-SCNC: 10 MMOL/L (ref 7–16)
AST SERPL-CCNC: 27 U/L (ref 0–39)
BARBITURATE SCREEN URINE: NOT DETECTED
BASOPHILS ABSOLUTE: 0.09 E9/L (ref 0–0.2)
BASOPHILS RELATIVE PERCENT: 2.1 % (ref 0–2)
BENZODIAZEPINE SCREEN, URINE: NOT DETECTED
BILIRUB SERPL-MCNC: 0.6 MG/DL (ref 0–1.2)
BUN BLDV-MCNC: 13 MG/DL (ref 6–20)
CALCIUM SERPL-MCNC: 8.9 MG/DL (ref 8.6–10.2)
CANNABINOID SCREEN URINE: POSITIVE
CHLORIDE BLD-SCNC: 106 MMOL/L (ref 98–107)
CO2: 27 MMOL/L (ref 22–29)
COCAINE METABOLITE SCREEN URINE: POSITIVE
CREAT SERPL-MCNC: 1 MG/DL (ref 0.7–1.2)
EOSINOPHILS ABSOLUTE: 0.3 E9/L (ref 0.05–0.5)
EOSINOPHILS RELATIVE PERCENT: 7.1 % (ref 0–6)
ETHANOL: <10 MG/DL (ref 0–0.08)
GFR AFRICAN AMERICAN: >60
GFR NON-AFRICAN AMERICAN: >60 ML/MIN/1.73
GLUCOSE BLD-MCNC: 87 MG/DL (ref 74–99)
HCT VFR BLD CALC: 46.6 % (ref 37–54)
HEMOGLOBIN: 14.8 G/DL (ref 12.5–16.5)
IMMATURE GRANULOCYTES #: 0 E9/L
IMMATURE GRANULOCYTES %: 0 % (ref 0–5)
LYMPHOCYTES ABSOLUTE: 1.46 E9/L (ref 1.5–4)
LYMPHOCYTES RELATIVE PERCENT: 34.4 % (ref 20–42)
Lab: ABNORMAL
MCH RBC QN AUTO: 30 PG (ref 26–35)
MCHC RBC AUTO-ENTMCNC: 31.8 % (ref 32–34.5)
MCV RBC AUTO: 94.3 FL (ref 80–99.9)
METER GLUCOSE: 43 MG/DL (ref 74–99)
METER GLUCOSE: 95 MG/DL (ref 74–99)
METHADONE SCREEN, URINE: NOT DETECTED
MONOCYTES ABSOLUTE: 0.33 E9/L (ref 0.1–0.95)
MONOCYTES RELATIVE PERCENT: 7.8 % (ref 2–12)
NEUTROPHILS ABSOLUTE: 2.07 E9/L (ref 1.8–7.3)
NEUTROPHILS RELATIVE PERCENT: 48.6 % (ref 43–80)
OPIATE SCREEN URINE: NOT DETECTED
PDW BLD-RTO: 13.7 FL (ref 11.5–15)
PHENCYCLIDINE SCREEN URINE: NOT DETECTED
PLATELET # BLD: 310 E9/L (ref 130–450)
PMV BLD AUTO: 10.2 FL (ref 7–12)
POTASSIUM SERPL-SCNC: 4.1 MMOL/L (ref 3.5–5)
PROPOXYPHENE SCREEN: NOT DETECTED
RBC # BLD: 4.94 E12/L (ref 3.8–5.8)
SALICYLATE, SERUM: <0.3 MG/DL (ref 0–30)
SODIUM BLD-SCNC: 143 MMOL/L (ref 132–146)
TOTAL PROTEIN: 6.5 G/DL (ref 6.4–8.3)
TRICYCLIC ANTIDEPRESSANTS SCREEN SERUM: NEGATIVE NG/ML
WBC # BLD: 4.3 E9/L (ref 4.5–11.5)

## 2019-10-26 PROCEDURE — G0480 DRUG TEST DEF 1-7 CLASSES: HCPCS

## 2019-10-26 PROCEDURE — 85025 COMPLETE CBC W/AUTO DIFF WBC: CPT

## 2019-10-26 PROCEDURE — 36415 COLL VENOUS BLD VENIPUNCTURE: CPT

## 2019-10-26 PROCEDURE — 2580000003 HC RX 258: Performed by: EMERGENCY MEDICINE

## 2019-10-26 PROCEDURE — 99284 EMERGENCY DEPT VISIT MOD MDM: CPT

## 2019-10-26 PROCEDURE — 80307 DRUG TEST PRSMV CHEM ANLYZR: CPT

## 2019-10-26 PROCEDURE — 1240000000 HC EMOTIONAL WELLNESS R&B

## 2019-10-26 PROCEDURE — 80053 COMPREHEN METABOLIC PANEL: CPT

## 2019-10-26 PROCEDURE — 82962 GLUCOSE BLOOD TEST: CPT

## 2019-10-26 RX ORDER — NICOTINE 21 MG/24HR
1 PATCH, TRANSDERMAL 24 HOURS TRANSDERMAL DAILY
Status: DISCONTINUED | OUTPATIENT
Start: 2019-10-26 | End: 2019-11-01 | Stop reason: HOSPADM

## 2019-10-26 RX ORDER — BENZTROPINE MESYLATE 1 MG/ML
2 INJECTION INTRAMUSCULAR; INTRAVENOUS 2 TIMES DAILY PRN
Status: DISCONTINUED | OUTPATIENT
Start: 2019-10-26 | End: 2019-11-01 | Stop reason: HOSPADM

## 2019-10-26 RX ORDER — TRAZODONE HYDROCHLORIDE 50 MG/1
50 TABLET ORAL NIGHTLY PRN
Status: DISCONTINUED | OUTPATIENT
Start: 2019-10-26 | End: 2019-11-01 | Stop reason: HOSPADM

## 2019-10-26 RX ORDER — HYDROXYZINE HYDROCHLORIDE 10 MG/1
50 TABLET, FILM COATED ORAL 3 TIMES DAILY PRN
Status: DISCONTINUED | OUTPATIENT
Start: 2019-10-26 | End: 2019-10-27 | Stop reason: SDUPTHER

## 2019-10-26 RX ORDER — MAGNESIUM HYDROXIDE/ALUMINUM HYDROXICE/SIMETHICONE 120; 1200; 1200 MG/30ML; MG/30ML; MG/30ML
30 SUSPENSION ORAL PRN
Status: DISCONTINUED | OUTPATIENT
Start: 2019-10-26 | End: 2019-11-01 | Stop reason: HOSPADM

## 2019-10-26 RX ORDER — 0.9 % SODIUM CHLORIDE 0.9 %
1000 INTRAVENOUS SOLUTION INTRAVENOUS ONCE
Status: COMPLETED | OUTPATIENT
Start: 2019-10-26 | End: 2019-10-26

## 2019-10-26 RX ORDER — OLANZAPINE 10 MG/1
10 INJECTION, POWDER, LYOPHILIZED, FOR SOLUTION INTRAMUSCULAR EVERY 4 HOURS PRN
Status: DISCONTINUED | OUTPATIENT
Start: 2019-10-26 | End: 2019-11-01 | Stop reason: HOSPADM

## 2019-10-26 RX ORDER — OLANZAPINE 5 MG/1
5 TABLET ORAL EVERY 4 HOURS PRN
Status: DISCONTINUED | OUTPATIENT
Start: 2019-10-26 | End: 2019-11-01 | Stop reason: HOSPADM

## 2019-10-26 RX ORDER — ACETAMINOPHEN 325 MG/1
650 TABLET ORAL EVERY 4 HOURS PRN
Status: DISCONTINUED | OUTPATIENT
Start: 2019-10-26 | End: 2019-11-01 | Stop reason: HOSPADM

## 2019-10-26 RX ORDER — VALPROIC ACID 250 MG/5ML
SOLUTION ORAL
Status: ON HOLD | COMMUNITY
Start: 2019-09-11 | End: 2019-11-01 | Stop reason: HOSPADM

## 2019-10-26 RX ORDER — QUETIAPINE FUMARATE 300 MG/1
TABLET, FILM COATED ORAL
Status: ON HOLD | COMMUNITY
Start: 2019-09-11 | End: 2019-11-01 | Stop reason: HOSPADM

## 2019-10-26 RX ADMIN — SODIUM CHLORIDE 1000 ML: 9 INJECTION, SOLUTION INTRAVENOUS at 16:08

## 2019-10-26 ASSESSMENT — ENCOUNTER SYMPTOMS
DIARRHEA: 0
EYE REDNESS: 0
NAUSEA: 0
ABDOMINAL PAIN: 0
RHINORRHEA: 0
VOMITING: 0
PHOTOPHOBIA: 0
COUGH: 0
EYE DISCHARGE: 0
BACK PAIN: 0
DOUBLE VISION: 0
WHEEZING: 0
SINUS PRESSURE: 0
CONSTIPATION: 0
SWOLLEN GLANDS: 0
SORE THROAT: 0
SHORTNESS OF BREATH: 0
STRIDOR: 0
EYE PAIN: 0
BLOOD IN STOOL: 0

## 2019-10-26 ASSESSMENT — SLEEP AND FATIGUE QUESTIONNAIRES
DIFFICULTY ARISING: YES
AVERAGE NUMBER OF SLEEP HOURS: 3
DIFFICULTY STAYING ASLEEP: YES
RESTFUL SLEEP: NO
DO YOU USE A SLEEP AID: NO
DO YOU HAVE DIFFICULTY SLEEPING: YES
DIFFICULTY FALLING ASLEEP: YES
SLEEP PATTERN: DIFFICULTY FALLING ASLEEP

## 2019-10-26 ASSESSMENT — PATIENT HEALTH QUESTIONNAIRE - PHQ9: SUM OF ALL RESPONSES TO PHQ QUESTIONS 1-9: 13

## 2019-10-26 ASSESSMENT — LIFESTYLE VARIABLES: HISTORY_ALCOHOL_USE: NO

## 2019-10-27 LAB
CHOLESTEROL, TOTAL: 120 MG/DL (ref 0–199)
HBA1C MFR BLD: 5.2 % (ref 4–5.6)
HDLC SERPL-MCNC: 42 MG/DL
LDL CHOLESTEROL CALCULATED: 68 MG/DL (ref 0–99)
TRIGL SERPL-MCNC: 52 MG/DL (ref 0–149)
VLDLC SERPL CALC-MCNC: 10 MG/DL

## 2019-10-27 PROCEDURE — 99222 1ST HOSP IP/OBS MODERATE 55: CPT | Performed by: NURSE PRACTITIONER

## 2019-10-27 PROCEDURE — 83036 HEMOGLOBIN GLYCOSYLATED A1C: CPT

## 2019-10-27 PROCEDURE — 1240000000 HC EMOTIONAL WELLNESS R&B

## 2019-10-27 PROCEDURE — 80061 LIPID PANEL: CPT

## 2019-10-27 PROCEDURE — 6360000002 HC RX W HCPCS

## 2019-10-27 PROCEDURE — 36415 COLL VENOUS BLD VENIPUNCTURE: CPT

## 2019-10-27 RX ORDER — DIPHENHYDRAMINE HYDROCHLORIDE 50 MG/ML
50 INJECTION INTRAMUSCULAR; INTRAVENOUS ONCE
Status: COMPLETED | OUTPATIENT
Start: 2019-10-27 | End: 2019-10-27

## 2019-10-27 RX ORDER — HALOPERIDOL 5 MG/ML
10 INJECTION INTRAMUSCULAR ONCE
Status: COMPLETED | OUTPATIENT
Start: 2019-10-27 | End: 2019-10-27

## 2019-10-27 RX ORDER — LORAZEPAM 2 MG/ML
INJECTION INTRAMUSCULAR
Status: COMPLETED
Start: 2019-10-27 | End: 2019-10-27

## 2019-10-27 RX ORDER — DIPHENHYDRAMINE HYDROCHLORIDE 50 MG/ML
INJECTION INTRAMUSCULAR; INTRAVENOUS
Status: COMPLETED
Start: 2019-10-27 | End: 2019-10-27

## 2019-10-27 RX ORDER — LORAZEPAM 2 MG/ML
2 INJECTION INTRAMUSCULAR ONCE
Status: COMPLETED | OUTPATIENT
Start: 2019-10-27 | End: 2019-10-27

## 2019-10-27 RX ORDER — HALOPERIDOL 5 MG/ML
INJECTION INTRAMUSCULAR
Status: COMPLETED
Start: 2019-10-27 | End: 2019-10-27

## 2019-10-27 RX ORDER — HYDROXYZINE PAMOATE 25 MG/1
50 CAPSULE ORAL 3 TIMES DAILY PRN
Status: DISCONTINUED | OUTPATIENT
Start: 2019-10-27 | End: 2019-11-01 | Stop reason: HOSPADM

## 2019-10-27 RX ADMIN — DIPHENHYDRAMINE HYDROCHLORIDE 50 MG: 50 INJECTION INTRAMUSCULAR; INTRAVENOUS at 08:43

## 2019-10-27 RX ADMIN — DIPHENHYDRAMINE HYDROCHLORIDE 50 MG: 50 INJECTION, SOLUTION INTRAMUSCULAR; INTRAVENOUS at 08:43

## 2019-10-27 RX ADMIN — LORAZEPAM 2 MG: 2 INJECTION INTRAMUSCULAR at 08:43

## 2019-10-27 RX ADMIN — LORAZEPAM 2 MG: 2 INJECTION INTRAMUSCULAR; INTRAVENOUS at 08:43

## 2019-10-27 RX ADMIN — HALOPERIDOL 10 MG: 5 INJECTION INTRAMUSCULAR at 08:43

## 2019-10-27 RX ADMIN — HALOPERIDOL LACTATE 10 MG: 5 INJECTION, SOLUTION INTRAMUSCULAR at 08:43

## 2019-10-27 ASSESSMENT — PAIN SCALES - GENERAL: PAINLEVEL_OUTOF10: 0

## 2019-10-28 PROBLEM — F23 ACUTE PSYCHOSIS (HCC): Status: ACTIVE | Noted: 2019-10-28

## 2019-10-28 PROCEDURE — 6370000000 HC RX 637 (ALT 250 FOR IP): Performed by: NURSE PRACTITIONER

## 2019-10-28 PROCEDURE — 99232 SBSQ HOSP IP/OBS MODERATE 35: CPT | Performed by: NURSE PRACTITIONER

## 2019-10-28 PROCEDURE — 1240000000 HC EMOTIONAL WELLNESS R&B

## 2019-10-28 RX ORDER — DIVALPROEX SODIUM 125 MG/1
500 CAPSULE, COATED PELLETS ORAL EVERY 12 HOURS SCHEDULED
Status: DISCONTINUED | OUTPATIENT
Start: 2019-10-28 | End: 2019-10-29

## 2019-10-28 RX ORDER — ARIPIPRAZOLE 2 MG/1
2 TABLET ORAL 2 TIMES DAILY
Status: DISCONTINUED | OUTPATIENT
Start: 2019-10-28 | End: 2019-10-29

## 2019-10-28 RX ADMIN — ARIPIPRAZOLE 2 MG: 2 TABLET ORAL at 12:50

## 2019-10-28 RX ADMIN — ARIPIPRAZOLE 2 MG: 2 TABLET ORAL at 21:27

## 2019-10-28 ASSESSMENT — SLEEP AND FATIGUE QUESTIONNAIRES
SLEEP PATTERN: DIFFICULTY FALLING ASLEEP
DIFFICULTY STAYING ASLEEP: NO
DO YOU USE A SLEEP AID: NO
RESTFUL SLEEP: NO
DO YOU HAVE DIFFICULTY SLEEPING: YES
DIFFICULTY ARISING: NO
DIFFICULTY FALLING ASLEEP: NO
AVERAGE NUMBER OF SLEEP HOURS: 3

## 2019-10-28 ASSESSMENT — LIFESTYLE VARIABLES: HISTORY_ALCOHOL_USE: NO

## 2019-10-28 ASSESSMENT — PATIENT HEALTH QUESTIONNAIRE - PHQ9: SUM OF ALL RESPONSES TO PHQ QUESTIONS 1-9: 0

## 2019-10-28 ASSESSMENT — PAIN SCALES - GENERAL: PAINLEVEL_OUTOF10: 0

## 2019-10-29 PROCEDURE — 6370000000 HC RX 637 (ALT 250 FOR IP): Performed by: NURSE PRACTITIONER

## 2019-10-29 PROCEDURE — 6370000000 HC RX 637 (ALT 250 FOR IP): Performed by: PSYCHIATRY & NEUROLOGY

## 2019-10-29 PROCEDURE — 1240000000 HC EMOTIONAL WELLNESS R&B

## 2019-10-29 PROCEDURE — 99232 SBSQ HOSP IP/OBS MODERATE 35: CPT | Performed by: NURSE PRACTITIONER

## 2019-10-29 RX ORDER — ARIPIPRAZOLE 10 MG/1
10 TABLET ORAL NIGHTLY
Status: DISCONTINUED | OUTPATIENT
Start: 2019-10-29 | End: 2019-10-30

## 2019-10-29 RX ORDER — CARBAMAZEPINE 200 MG/1
200 TABLET ORAL 2 TIMES DAILY
Status: DISCONTINUED | OUTPATIENT
Start: 2019-10-29 | End: 2019-10-30

## 2019-10-29 RX ADMIN — ARIPIPRAZOLE 2 MG: 2 TABLET ORAL at 09:02

## 2019-10-29 RX ADMIN — TRAZODONE HYDROCHLORIDE 50 MG: 50 TABLET ORAL at 21:00

## 2019-10-29 RX ADMIN — ARIPIPRAZOLE 10 MG: 10 TABLET ORAL at 21:00

## 2019-10-29 RX ADMIN — CARBAMAZEPINE 200 MG: 200 TABLET ORAL at 21:00

## 2019-10-29 ASSESSMENT — PAIN SCALES - GENERAL: PAINLEVEL_OUTOF10: 0

## 2019-10-30 PROCEDURE — 6370000000 HC RX 637 (ALT 250 FOR IP): Performed by: NURSE PRACTITIONER

## 2019-10-30 PROCEDURE — 99232 SBSQ HOSP IP/OBS MODERATE 35: CPT | Performed by: NURSE PRACTITIONER

## 2019-10-30 PROCEDURE — 1240000000 HC EMOTIONAL WELLNESS R&B

## 2019-10-30 RX ORDER — CARBAMAZEPINE 200 MG/1
300 TABLET ORAL 2 TIMES DAILY
Status: DISCONTINUED | OUTPATIENT
Start: 2019-10-30 | End: 2019-11-01 | Stop reason: HOSPADM

## 2019-10-30 RX ORDER — ARIPIPRAZOLE 10 MG/1
10 TABLET ORAL 2 TIMES DAILY
Status: DISCONTINUED | OUTPATIENT
Start: 2019-10-30 | End: 2019-10-31

## 2019-10-30 RX ADMIN — CARBAMAZEPINE 300 MG: 200 TABLET ORAL at 10:21

## 2019-10-30 RX ADMIN — CARBAMAZEPINE 300 MG: 200 TABLET ORAL at 22:35

## 2019-10-30 RX ADMIN — ARIPIPRAZOLE 10 MG: 10 TABLET ORAL at 10:22

## 2019-10-30 RX ADMIN — ARIPIPRAZOLE 10 MG: 10 TABLET ORAL at 22:37

## 2019-10-31 LAB — COCAINE, CONFIRM, URINE: >1000 NG/ML

## 2019-10-31 PROCEDURE — 99232 SBSQ HOSP IP/OBS MODERATE 35: CPT | Performed by: NURSE PRACTITIONER

## 2019-10-31 PROCEDURE — 6370000000 HC RX 637 (ALT 250 FOR IP): Performed by: NURSE PRACTITIONER

## 2019-10-31 PROCEDURE — 1240000000 HC EMOTIONAL WELLNESS R&B

## 2019-10-31 RX ADMIN — CARBAMAZEPINE 300 MG: 200 TABLET ORAL at 20:31

## 2019-10-31 RX ADMIN — CARBAMAZEPINE 300 MG: 200 TABLET ORAL at 09:37

## 2019-10-31 RX ADMIN — ARIPIPRAZOLE 10 MG: 10 TABLET ORAL at 09:37

## 2019-10-31 ASSESSMENT — PAIN SCALES - GENERAL: PAINLEVEL_OUTOF10: 0

## 2019-11-01 VITALS
HEIGHT: 70 IN | TEMPERATURE: 97.6 F | WEIGHT: 160 LBS | HEART RATE: 66 BPM | OXYGEN SATURATION: 97 % | BODY MASS INDEX: 22.9 KG/M2 | RESPIRATION RATE: 16 BRPM | SYSTOLIC BLOOD PRESSURE: 124 MMHG | DIASTOLIC BLOOD PRESSURE: 69 MMHG

## 2019-11-01 PROCEDURE — 99238 HOSP IP/OBS DSCHRG MGMT 30/<: CPT | Performed by: NURSE PRACTITIONER

## 2019-11-01 PROCEDURE — 6370000000 HC RX 637 (ALT 250 FOR IP): Performed by: NURSE PRACTITIONER

## 2019-11-01 RX ORDER — CARBAMAZEPINE 200 MG/1
300 TABLET ORAL 2 TIMES DAILY
Qty: 90 TABLET | Refills: 0 | Status: ON HOLD | OUTPATIENT
Start: 2019-11-01 | End: 2020-01-14 | Stop reason: SDUPTHER

## 2019-11-01 RX ORDER — NICOTINE 21 MG/24HR
1 PATCH, TRANSDERMAL 24 HOURS TRANSDERMAL DAILY
Qty: 30 PATCH | Refills: 0 | Status: ON HOLD | OUTPATIENT
Start: 2019-11-01 | End: 2020-03-27 | Stop reason: HOSPADM

## 2019-11-01 RX ADMIN — CARBAMAZEPINE 300 MG: 200 TABLET ORAL at 08:43

## 2019-11-01 ASSESSMENT — PAIN SCALES - GENERAL: PAINLEVEL_OUTOF10: 0

## 2019-11-02 LAB — CANNABINOIDS CONF, URINE: 85 NG/ML

## 2019-11-09 ENCOUNTER — HOSPITAL ENCOUNTER (EMERGENCY)
Age: 39
Discharge: LEFT BEFORE DISCHARGE | End: 2019-11-09
Payer: COMMERCIAL

## 2019-11-09 VITALS — OXYGEN SATURATION: 97 % | HEART RATE: 55 BPM

## 2019-11-10 ENCOUNTER — APPOINTMENT (OUTPATIENT)
Dept: CT IMAGING | Age: 39
End: 2019-11-10
Payer: COMMERCIAL

## 2019-11-10 ENCOUNTER — HOSPITAL ENCOUNTER (EMERGENCY)
Age: 39
Discharge: HOME | End: 2019-11-10
Attending: EMERGENCY MEDICINE
Payer: COMMERCIAL

## 2019-11-10 VITALS
RESPIRATION RATE: 18 BRPM | HEIGHT: 71 IN | DIASTOLIC BLOOD PRESSURE: 80 MMHG | SYSTOLIC BLOOD PRESSURE: 115 MMHG | OXYGEN SATURATION: 97 % | HEART RATE: 78 BPM | BODY MASS INDEX: 22.4 KG/M2 | TEMPERATURE: 96.9 F | WEIGHT: 160 LBS

## 2019-11-10 DIAGNOSIS — S09.90XA INJURY OF HEAD, INITIAL ENCOUNTER: Primary | ICD-10-CM

## 2019-11-10 PROCEDURE — 99284 EMERGENCY DEPT VISIT MOD MDM: CPT

## 2019-11-10 PROCEDURE — 72125 CT NECK SPINE W/O DYE: CPT

## 2019-11-10 PROCEDURE — 70450 CT HEAD/BRAIN W/O DYE: CPT

## 2020-01-03 ENCOUNTER — HOSPITAL ENCOUNTER (INPATIENT)
Age: 40
LOS: 4 days | Discharge: PSYCHIATRIC OR SUBSTANCE USE FACILITY | DRG: 200 | End: 2020-01-08
Attending: SURGERY | Admitting: SURGERY
Payer: COMMERCIAL

## 2020-01-03 ENCOUNTER — APPOINTMENT (OUTPATIENT)
Dept: GENERAL RADIOLOGY | Age: 40
DRG: 200 | End: 2020-01-03
Payer: COMMERCIAL

## 2020-01-03 LAB
ACETAMINOPHEN LEVEL: <5 MCG/ML (ref 10–30)
ALBUMIN SERPL-MCNC: 3.6 G/DL (ref 3.5–5.2)
ALP BLD-CCNC: 56 U/L (ref 40–129)
ALT SERPL-CCNC: 8 U/L (ref 0–40)
AMPHETAMINE SCREEN, URINE: NOT DETECTED
ANION GAP SERPL CALCULATED.3IONS-SCNC: 10 MMOL/L (ref 7–16)
AST SERPL-CCNC: 14 U/L (ref 0–39)
BARBITURATE SCREEN URINE: NOT DETECTED
BASOPHILS ABSOLUTE: 0.1 E9/L (ref 0–0.2)
BASOPHILS RELATIVE PERCENT: 1.3 % (ref 0–2)
BENZODIAZEPINE SCREEN, URINE: NOT DETECTED
BILIRUB SERPL-MCNC: 0.3 MG/DL (ref 0–1.2)
BUN BLDV-MCNC: 16 MG/DL (ref 6–20)
CALCIUM SERPL-MCNC: 8.7 MG/DL (ref 8.6–10.2)
CANNABINOID SCREEN URINE: POSITIVE
CHLORIDE BLD-SCNC: 107 MMOL/L (ref 98–107)
CO2: 29 MMOL/L (ref 22–29)
COCAINE METABOLITE SCREEN URINE: POSITIVE
CREAT SERPL-MCNC: 1 MG/DL (ref 0.7–1.2)
EOSINOPHILS ABSOLUTE: 0.5 E9/L (ref 0.05–0.5)
EOSINOPHILS RELATIVE PERCENT: 6.6 % (ref 0–6)
ETHANOL: <10 MG/DL (ref 0–0.08)
FENTANYL SCREEN, URINE: NOT DETECTED
GFR AFRICAN AMERICAN: >60
GFR NON-AFRICAN AMERICAN: >60 ML/MIN/1.73
GLUCOSE BLD-MCNC: 99 MG/DL (ref 74–99)
HCT VFR BLD CALC: 40.9 % (ref 37–54)
HEMOGLOBIN: 13.3 G/DL (ref 12.5–16.5)
IMMATURE GRANULOCYTES #: 0.01 E9/L
IMMATURE GRANULOCYTES %: 0.1 % (ref 0–5)
LYMPHOCYTES ABSOLUTE: 1.45 E9/L (ref 1.5–4)
LYMPHOCYTES RELATIVE PERCENT: 19 % (ref 20–42)
Lab: ABNORMAL
MCH RBC QN AUTO: 30.6 PG (ref 26–35)
MCHC RBC AUTO-ENTMCNC: 32.5 % (ref 32–34.5)
MCV RBC AUTO: 94.2 FL (ref 80–99.9)
METHADONE SCREEN, URINE: NOT DETECTED
MONOCYTES ABSOLUTE: 0.51 E9/L (ref 0.1–0.95)
MONOCYTES RELATIVE PERCENT: 6.7 % (ref 2–12)
NEUTROPHILS ABSOLUTE: 5.05 E9/L (ref 1.8–7.3)
NEUTROPHILS RELATIVE PERCENT: 66.3 % (ref 43–80)
OPIATE SCREEN URINE: NOT DETECTED
OXYCODONE URINE: NOT DETECTED
PDW BLD-RTO: 13.1 FL (ref 11.5–15)
PHENCYCLIDINE SCREEN URINE: NOT DETECTED
PLATELET # BLD: 485 E9/L (ref 130–450)
PMV BLD AUTO: 8.9 FL (ref 7–12)
POTASSIUM SERPL-SCNC: 4.4 MMOL/L (ref 3.5–5)
RBC # BLD: 4.34 E12/L (ref 3.8–5.8)
SALICYLATE, SERUM: <0.3 MG/DL (ref 0–30)
SODIUM BLD-SCNC: 146 MMOL/L (ref 132–146)
TOTAL PROTEIN: 6.5 G/DL (ref 6.4–8.3)
TRICYCLIC ANTIDEPRESSANTS SCREEN SERUM: NEGATIVE NG/ML
WBC # BLD: 7.6 E9/L (ref 4.5–11.5)

## 2020-01-03 PROCEDURE — 36415 COLL VENOUS BLD VENIPUNCTURE: CPT

## 2020-01-03 PROCEDURE — 32551 INSERTION OF CHEST TUBE: CPT

## 2020-01-03 PROCEDURE — 85025 COMPLETE CBC W/AUTO DIFF WBC: CPT

## 2020-01-03 PROCEDURE — 80053 COMPREHEN METABOLIC PANEL: CPT

## 2020-01-03 PROCEDURE — G0480 DRUG TEST DEF 1-7 CLASSES: HCPCS

## 2020-01-03 PROCEDURE — 71110 X-RAY EXAM RIBS BIL 3 VIEWS: CPT

## 2020-01-03 PROCEDURE — 99285 EMERGENCY DEPT VISIT HI MDM: CPT

## 2020-01-03 PROCEDURE — 80307 DRUG TEST PRSMV CHEM ANLYZR: CPT

## 2020-01-03 RX ORDER — IBUPROFEN 800 MG/1
800 TABLET ORAL ONCE
Status: DISCONTINUED | OUTPATIENT
Start: 2020-01-03 | End: 2020-01-04

## 2020-01-04 ENCOUNTER — APPOINTMENT (OUTPATIENT)
Dept: GENERAL RADIOLOGY | Age: 40
DRG: 200 | End: 2020-01-04
Payer: COMMERCIAL

## 2020-01-04 ENCOUNTER — APPOINTMENT (OUTPATIENT)
Dept: CT IMAGING | Age: 40
DRG: 200 | End: 2020-01-04
Payer: COMMERCIAL

## 2020-01-04 PROBLEM — T14.90XA TRAUMA: Status: ACTIVE | Noted: 2020-01-04

## 2020-01-04 PROBLEM — F25.9 SCHIZOAFFECTIVE DISORDER (HCC): Status: ACTIVE | Noted: 2019-10-28

## 2020-01-04 PROCEDURE — 93005 ELECTROCARDIOGRAM TRACING: CPT | Performed by: STUDENT IN AN ORGANIZED HEALTH CARE EDUCATION/TRAINING PROGRAM

## 2020-01-04 PROCEDURE — 6360000004 HC RX CONTRAST MEDICATION: Performed by: RADIOLOGY

## 2020-01-04 PROCEDURE — 0W9930Z DRAINAGE OF RIGHT PLEURAL CAVITY WITH DRAINAGE DEVICE, PERCUTANEOUS APPROACH: ICD-10-PCS | Performed by: SURGERY

## 2020-01-04 PROCEDURE — 72125 CT NECK SPINE W/O DYE: CPT

## 2020-01-04 PROCEDURE — 70450 CT HEAD/BRAIN W/O DYE: CPT

## 2020-01-04 PROCEDURE — 71045 X-RAY EXAM CHEST 1 VIEW: CPT

## 2020-01-04 PROCEDURE — 6360000002 HC RX W HCPCS: Performed by: SURGERY

## 2020-01-04 PROCEDURE — 71260 CT THORAX DX C+: CPT

## 2020-01-04 PROCEDURE — 74177 CT ABD & PELVIS W/CONTRAST: CPT

## 2020-01-04 PROCEDURE — 6360000002 HC RX W HCPCS: Performed by: EMERGENCY MEDICINE

## 2020-01-04 PROCEDURE — 2140000000 HC CCU INTERMEDIATE R&B

## 2020-01-04 PROCEDURE — 6360000002 HC RX W HCPCS

## 2020-01-04 PROCEDURE — 6370000000 HC RX 637 (ALT 250 FOR IP): Performed by: NURSE PRACTITIONER

## 2020-01-04 PROCEDURE — 6370000000 HC RX 637 (ALT 250 FOR IP): Performed by: STUDENT IN AN ORGANIZED HEALTH CARE EDUCATION/TRAINING PROGRAM

## 2020-01-04 PROCEDURE — 2500000003 HC RX 250 WO HCPCS: Performed by: STUDENT IN AN ORGANIZED HEALTH CARE EDUCATION/TRAINING PROGRAM

## 2020-01-04 PROCEDURE — 2580000003 HC RX 258: Performed by: RADIOLOGY

## 2020-01-04 PROCEDURE — 99223 1ST HOSP IP/OBS HIGH 75: CPT | Performed by: SURGERY

## 2020-01-04 PROCEDURE — 2580000003 HC RX 258: Performed by: STUDENT IN AN ORGANIZED HEALTH CARE EDUCATION/TRAINING PROGRAM

## 2020-01-04 RX ORDER — OXYCODONE HYDROCHLORIDE 5 MG/1
10 TABLET ORAL EVERY 4 HOURS PRN
Status: DISCONTINUED | OUTPATIENT
Start: 2020-01-04 | End: 2020-01-08 | Stop reason: HOSPADM

## 2020-01-04 RX ORDER — PROPOFOL 10 MG/ML
INJECTION, EMULSION INTRAVENOUS CONTINUOUS PRN
Status: COMPLETED | OUTPATIENT
Start: 2020-01-04 | End: 2020-01-04

## 2020-01-04 RX ORDER — PROPOFOL 10 MG/ML
INJECTION, EMULSION INTRAVENOUS
Status: COMPLETED
Start: 2020-01-04 | End: 2020-01-04

## 2020-01-04 RX ORDER — OXYCODONE HYDROCHLORIDE AND ACETAMINOPHEN 5; 325 MG/1; MG/1
1 TABLET ORAL ONCE
Status: COMPLETED | OUTPATIENT
Start: 2020-01-04 | End: 2020-01-04

## 2020-01-04 RX ORDER — PROPOFOL 10 MG/ML
200 INJECTION, EMULSION INTRAVENOUS ONCE
Status: COMPLETED | OUTPATIENT
Start: 2020-01-04 | End: 2020-01-04

## 2020-01-04 RX ORDER — SENNA AND DOCUSATE SODIUM 50; 8.6 MG/1; MG/1
1 TABLET, FILM COATED ORAL 2 TIMES DAILY
Status: DISCONTINUED | OUTPATIENT
Start: 2020-01-04 | End: 2020-01-08 | Stop reason: HOSPADM

## 2020-01-04 RX ORDER — OXYCODONE HYDROCHLORIDE 5 MG/1
5 TABLET ORAL EVERY 4 HOURS PRN
Status: DISCONTINUED | OUTPATIENT
Start: 2020-01-04 | End: 2020-01-08 | Stop reason: HOSPADM

## 2020-01-04 RX ORDER — KETOROLAC TROMETHAMINE 30 MG/ML
15 INJECTION, SOLUTION INTRAMUSCULAR; INTRAVENOUS EVERY 6 HOURS
Status: DISCONTINUED | OUTPATIENT
Start: 2020-01-04 | End: 2020-01-05

## 2020-01-04 RX ORDER — QUETIAPINE FUMARATE 300 MG/1
300 TABLET, FILM COATED ORAL NIGHTLY
Status: ON HOLD | COMMUNITY
End: 2020-01-14 | Stop reason: HOSPADM

## 2020-01-04 RX ORDER — HALOPERIDOL 5 MG/ML
5 INJECTION INTRAMUSCULAR EVERY 6 HOURS PRN
Status: DISCONTINUED | OUTPATIENT
Start: 2020-01-04 | End: 2020-01-08 | Stop reason: HOSPADM

## 2020-01-04 RX ORDER — LIDOCAINE HYDROCHLORIDE AND EPINEPHRINE 10; 10 MG/ML; UG/ML
20 INJECTION, SOLUTION INFILTRATION; PERINEURAL ONCE
Status: COMPLETED | OUTPATIENT
Start: 2020-01-04 | End: 2020-01-04

## 2020-01-04 RX ORDER — METHOCARBAMOL 500 MG/1
1500 TABLET, FILM COATED ORAL 4 TIMES DAILY
Status: DISCONTINUED | OUTPATIENT
Start: 2020-01-04 | End: 2020-01-08 | Stop reason: HOSPADM

## 2020-01-04 RX ORDER — SODIUM CHLORIDE 0.9 % (FLUSH) 0.9 %
10 SYRINGE (ML) INJECTION EVERY 12 HOURS SCHEDULED
Status: DISCONTINUED | OUTPATIENT
Start: 2020-01-04 | End: 2020-01-08 | Stop reason: HOSPADM

## 2020-01-04 RX ORDER — ONDANSETRON 2 MG/ML
4 INJECTION INTRAMUSCULAR; INTRAVENOUS EVERY 6 HOURS PRN
Status: DISCONTINUED | OUTPATIENT
Start: 2020-01-04 | End: 2020-01-08 | Stop reason: HOSPADM

## 2020-01-04 RX ORDER — ACETAMINOPHEN 500 MG
500 TABLET ORAL EVERY 6 HOURS SCHEDULED
Status: DISCONTINUED | OUTPATIENT
Start: 2020-01-04 | End: 2020-01-08 | Stop reason: HOSPADM

## 2020-01-04 RX ORDER — SODIUM CHLORIDE 0.9 % (FLUSH) 0.9 %
10 SYRINGE (ML) INJECTION PRN
Status: DISCONTINUED | OUTPATIENT
Start: 2020-01-04 | End: 2020-01-08 | Stop reason: HOSPADM

## 2020-01-04 RX ORDER — SODIUM CHLORIDE 0.9 % (FLUSH) 0.9 %
10 SYRINGE (ML) INJECTION ONCE
Status: COMPLETED | OUTPATIENT
Start: 2020-01-04 | End: 2020-01-04

## 2020-01-04 RX ORDER — FENTANYL CITRATE 50 UG/ML
50 INJECTION, SOLUTION INTRAMUSCULAR; INTRAVENOUS ONCE
Status: COMPLETED | OUTPATIENT
Start: 2020-01-04 | End: 2020-01-04

## 2020-01-04 RX ORDER — LIDOCAINE 4 G/G
1 PATCH TOPICAL DAILY
Status: DISCONTINUED | OUTPATIENT
Start: 2020-01-04 | End: 2020-01-08 | Stop reason: HOSPADM

## 2020-01-04 RX ADMIN — PROPOFOL 340 MG: 10 INJECTION, EMULSION INTRAVENOUS at 07:17

## 2020-01-04 RX ADMIN — METHOCARBAMOL TABLETS 1500 MG: 500 TABLET, COATED ORAL at 13:43

## 2020-01-04 RX ADMIN — METHOCARBAMOL TABLETS 1500 MG: 500 TABLET, COATED ORAL at 18:07

## 2020-01-04 RX ADMIN — KETOROLAC TROMETHAMINE 15 MG: 30 INJECTION, SOLUTION INTRAMUSCULAR; INTRAVENOUS at 20:38

## 2020-01-04 RX ADMIN — METHOCARBAMOL TABLETS 1500 MG: 500 TABLET, COATED ORAL at 22:52

## 2020-01-04 RX ADMIN — PROPOFOL 100 MG: 10 INJECTION, EMULSION INTRAVENOUS at 05:59

## 2020-01-04 RX ADMIN — LIDOCAINE HYDROCHLORIDE,EPINEPHRINE BITARTRATE 20 ML: 10; .01 INJECTION, SOLUTION INFILTRATION; PERINEURAL at 05:30

## 2020-01-04 RX ADMIN — OXYCODONE HYDROCHLORIDE AND ACETAMINOPHEN 1 TABLET: 5; 325 TABLET ORAL at 00:17

## 2020-01-04 RX ADMIN — IOPAMIDOL 110 ML: 755 INJECTION, SOLUTION INTRAVENOUS at 03:45

## 2020-01-04 RX ADMIN — SODIUM CHLORIDE, PRESERVATIVE FREE 10 ML: 5 INJECTION INTRAVENOUS at 22:53

## 2020-01-04 RX ADMIN — OXYCODONE 10 MG: 5 TABLET ORAL at 11:57

## 2020-01-04 RX ADMIN — SENNOSIDES AND DOCUSATE SODIUM 1 TABLET: 8.6; 5 TABLET ORAL at 22:53

## 2020-01-04 RX ADMIN — FENTANYL CITRATE 50 MCG: 50 INJECTION, SOLUTION INTRAMUSCULAR; INTRAVENOUS at 07:09

## 2020-01-04 RX ADMIN — Medication 10 ML: at 03:45

## 2020-01-04 RX ADMIN — KETOROLAC TROMETHAMINE 15 MG: 30 INJECTION, SOLUTION INTRAMUSCULAR; INTRAVENOUS at 15:10

## 2020-01-04 RX ADMIN — ACETAMINOPHEN 500 MG: 500 TABLET ORAL at 18:07

## 2020-01-04 ASSESSMENT — PAIN SCALES - GENERAL
PAINLEVEL_OUTOF10: 3
PAINLEVEL_OUTOF10: 3
PAINLEVEL_OUTOF10: 7
PAINLEVEL_OUTOF10: 10
PAINLEVEL_OUTOF10: 8
PAINLEVEL_OUTOF10: 10
PAINLEVEL_OUTOF10: 7
PAINLEVEL_OUTOF10: 0
PAINLEVEL_OUTOF10: 7
PAINLEVEL_OUTOF10: 7

## 2020-01-04 ASSESSMENT — PAIN DESCRIPTION - FREQUENCY
FREQUENCY: INTERMITTENT
FREQUENCY: CONTINUOUS

## 2020-01-04 ASSESSMENT — PAIN DESCRIPTION - ORIENTATION
ORIENTATION: RIGHT
ORIENTATION: RIGHT

## 2020-01-04 ASSESSMENT — PAIN DESCRIPTION - ONSET
ONSET: ON-GOING
ONSET: ON-GOING

## 2020-01-04 ASSESSMENT — PAIN DESCRIPTION - LOCATION
LOCATION: CHEST
LOCATION: CHEST

## 2020-01-04 ASSESSMENT — PAIN DESCRIPTION - DESCRIPTORS
DESCRIPTORS: DISCOMFORT;DULL;SORE
DESCRIPTORS: DISCOMFORT;SHARP;SHOOTING

## 2020-01-04 ASSESSMENT — PAIN - FUNCTIONAL ASSESSMENT
PAIN_FUNCTIONAL_ASSESSMENT: PREVENTS OR INTERFERES SOME ACTIVE ACTIVITIES AND ADLS
PAIN_FUNCTIONAL_ASSESSMENT: PREVENTS OR INTERFERES SOME ACTIVE ACTIVITIES AND ADLS

## 2020-01-04 ASSESSMENT — PAIN DESCRIPTION - PAIN TYPE
TYPE: ACUTE PAIN;SURGICAL PAIN
TYPE: ACUTE PAIN;SURGICAL PAIN

## 2020-01-04 ASSESSMENT — PAIN DESCRIPTION - PROGRESSION
CLINICAL_PROGRESSION: NOT CHANGED
CLINICAL_PROGRESSION: GRADUALLY IMPROVING
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
## 2020-01-04 NOTE — ED NOTES
Procedural sedation  Date/Time: 1/4/2020 6:33 AM  Performed by: Momo Lares DO  Authorized by: Momo Lares DO     Consent:     Consent obtained:  Emergent situation    Consent given by:  Healthcare agent    Risks discussed:  Prolonged hypoxia resulting in organ damage, prolonged sedation necessitating reversal, respiratory compromise necessitating ventilatory assistance and intubation, vomiting, allergic reaction, dysrhythmia, nausea and inadequate sedation  Indications:     Procedure performed:  Chest tube placement    Procedure necessitating sedation performed by:  Different physician    Intended level of sedation:  Moderate (conscious sedation)  Pre-sedation assessment:     NPO status caution: urgency dictates proceeding with non-ideal NPO status      ASA classification: class 1 - normal, healthy patient      Neck mobility: normal      Mouth opening:  3 or more finger widths    Thyromental distance:  4 finger widths    Mallampati score:  I - soft palate, uvula, fauces, pillars visible    Pre-sedation assessments completed and reviewed: airway patency, cardiovascular function, hydration status, mental status, nausea/vomiting, pain level, respiratory function and temperature      History of difficult intubation: no    Immediate pre-procedure details:     Reassessment: Patient reassessed immediately prior to procedure      Reviewed: vital signs, relevant labs/tests and NPO status      Verified: bag valve mask available, emergency equipment available, intubation equipment available, IV patency confirmed, oxygen available, reversal medications available and suction available    Procedure details (see MAR for exact dosages):     Preoxygenation:  Nasal cannula    Sedation:  Propofol    Intra-procedure monitoring:  Blood pressure monitoring, cardiac monitor, continuous capnometry, continuous pulse oximetry, frequent LOC assessments and frequent vital sign checks    Intra-procedure events: none Intra-procedure management:  Supplemental oxygen  Post-procedure details:     Attendance: Constant attendance by certified staff until patient recovered      Recovery: Patient returned to pre-procedure baseline      Post-sedation assessments completed and reviewed: airway patency, cardiovascular function and respiratory function      Specimens recovered:  None    Patient is stable for discharge or admission: yes      Patient tolerance:   Tolerated well, no immediate complications         Alexander Villa DO  01/04/20 6129

## 2020-01-04 NOTE — ED NOTES
x2 interrupted stitches   x1 U stitch   In place at chest tube site      Sybil Frye RN  01/04/20 2242

## 2020-01-04 NOTE — ED NOTES
Pt lying in bed with eyes closed, no distress noted, resp easy, skin warm and dry, right chest tube intact and functional, vss, nrs on monitor, call light in reach, safety maintained, will continue to monitor      Rafat Hernández RN  01/04/20 8518

## 2020-01-04 NOTE — ED NOTES
Surgical resident at bedside to insert Chest Tube at this time.       Carlos Degroot RN  01/04/20 1522
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
## 2020-01-05 ENCOUNTER — APPOINTMENT (OUTPATIENT)
Dept: GENERAL RADIOLOGY | Age: 40
DRG: 200 | End: 2020-01-05
Payer: COMMERCIAL

## 2020-01-05 LAB
ANION GAP SERPL CALCULATED.3IONS-SCNC: 11 MMOL/L (ref 7–16)
BASOPHILS ABSOLUTE: 0.05 E9/L (ref 0–0.2)
BASOPHILS RELATIVE PERCENT: 0.7 % (ref 0–2)
BUN BLDV-MCNC: 18 MG/DL (ref 6–20)
CALCIUM SERPL-MCNC: 8 MG/DL (ref 8.6–10.2)
CHLORIDE BLD-SCNC: 102 MMOL/L (ref 98–107)
CO2: 25 MMOL/L (ref 22–29)
CREAT SERPL-MCNC: 0.8 MG/DL (ref 0.7–1.2)
EKG ATRIAL RATE: 62 BPM
EKG P AXIS: 61 DEGREES
EKG P-R INTERVAL: 182 MS
EKG Q-T INTERVAL: 382 MS
EKG QRS DURATION: 94 MS
EKG QTC CALCULATION (BAZETT): 387 MS
EKG R AXIS: 84 DEGREES
EKG T AXIS: 70 DEGREES
EKG VENTRICULAR RATE: 62 BPM
EOSINOPHILS ABSOLUTE: 0.28 E9/L (ref 0.05–0.5)
EOSINOPHILS RELATIVE PERCENT: 4 % (ref 0–6)
GFR AFRICAN AMERICAN: >60
GFR NON-AFRICAN AMERICAN: >60 ML/MIN/1.73
GLUCOSE BLD-MCNC: 119 MG/DL (ref 74–99)
HCT VFR BLD CALC: 38.8 % (ref 37–54)
HEMOGLOBIN: 12.3 G/DL (ref 12.5–16.5)
IMMATURE GRANULOCYTES #: 0.02 E9/L
IMMATURE GRANULOCYTES %: 0.3 % (ref 0–5)
LYMPHOCYTES ABSOLUTE: 0.83 E9/L (ref 1.5–4)
LYMPHOCYTES RELATIVE PERCENT: 11.8 % (ref 20–42)
MCH RBC QN AUTO: 30.3 PG (ref 26–35)
MCHC RBC AUTO-ENTMCNC: 31.7 % (ref 32–34.5)
MCV RBC AUTO: 95.6 FL (ref 80–99.9)
MONOCYTES ABSOLUTE: 0.63 E9/L (ref 0.1–0.95)
MONOCYTES RELATIVE PERCENT: 9 % (ref 2–12)
NEUTROPHILS ABSOLUTE: 5.22 E9/L (ref 1.8–7.3)
NEUTROPHILS RELATIVE PERCENT: 74.2 % (ref 43–80)
PDW BLD-RTO: 13.2 FL (ref 11.5–15)
PLATELET # BLD: 407 E9/L (ref 130–450)
PMV BLD AUTO: 9 FL (ref 7–12)
POTASSIUM REFLEX MAGNESIUM: 3.8 MMOL/L (ref 3.5–5)
RBC # BLD: 4.06 E12/L (ref 3.8–5.8)
SODIUM BLD-SCNC: 138 MMOL/L (ref 132–146)
WBC # BLD: 7 E9/L (ref 4.5–11.5)

## 2020-01-05 PROCEDURE — 6370000000 HC RX 637 (ALT 250 FOR IP): Performed by: NURSE PRACTITIONER

## 2020-01-05 PROCEDURE — 99233 SBSQ HOSP IP/OBS HIGH 50: CPT | Performed by: SURGERY

## 2020-01-05 PROCEDURE — 1200000000 HC SEMI PRIVATE

## 2020-01-05 PROCEDURE — 6370000000 HC RX 637 (ALT 250 FOR IP): Performed by: STUDENT IN AN ORGANIZED HEALTH CARE EDUCATION/TRAINING PROGRAM

## 2020-01-05 PROCEDURE — 80048 BASIC METABOLIC PNL TOTAL CA: CPT

## 2020-01-05 PROCEDURE — 93010 ELECTROCARDIOGRAM REPORT: CPT | Performed by: INTERNAL MEDICINE

## 2020-01-05 PROCEDURE — 99221 1ST HOSP IP/OBS SF/LOW 40: CPT | Performed by: NURSE PRACTITIONER

## 2020-01-05 PROCEDURE — 97161 PT EVAL LOW COMPLEX 20 MIN: CPT

## 2020-01-05 PROCEDURE — 6370000000 HC RX 637 (ALT 250 FOR IP): Performed by: SURGERY

## 2020-01-05 PROCEDURE — 6360000002 HC RX W HCPCS: Performed by: SURGERY

## 2020-01-05 PROCEDURE — 85025 COMPLETE CBC W/AUTO DIFF WBC: CPT

## 2020-01-05 PROCEDURE — 2580000003 HC RX 258: Performed by: STUDENT IN AN ORGANIZED HEALTH CARE EDUCATION/TRAINING PROGRAM

## 2020-01-05 PROCEDURE — 71045 X-RAY EXAM CHEST 1 VIEW: CPT

## 2020-01-05 PROCEDURE — 97165 OT EVAL LOW COMPLEX 30 MIN: CPT

## 2020-01-05 PROCEDURE — 36415 COLL VENOUS BLD VENIPUNCTURE: CPT

## 2020-01-05 RX ORDER — QUETIAPINE FUMARATE 300 MG/1
300 TABLET, FILM COATED ORAL NIGHTLY
Status: DISCONTINUED | OUTPATIENT
Start: 2020-01-05 | End: 2020-01-08 | Stop reason: HOSPADM

## 2020-01-05 RX ORDER — QUETIAPINE FUMARATE 300 MG/1
300 TABLET, FILM COATED ORAL NIGHTLY
Status: DISCONTINUED | OUTPATIENT
Start: 2020-01-05 | End: 2020-01-05

## 2020-01-05 RX ORDER — NICOTINE 21 MG/24HR
1 PATCH, TRANSDERMAL 24 HOURS TRANSDERMAL DAILY
Status: DISCONTINUED | OUTPATIENT
Start: 2020-01-05 | End: 2020-01-08 | Stop reason: HOSPADM

## 2020-01-05 RX ORDER — CARBAMAZEPINE 200 MG/1
300 TABLET ORAL 2 TIMES DAILY
Status: DISCONTINUED | OUTPATIENT
Start: 2020-01-05 | End: 2020-01-08 | Stop reason: HOSPADM

## 2020-01-05 RX ADMIN — ACETAMINOPHEN 500 MG: 500 TABLET ORAL at 17:58

## 2020-01-05 RX ADMIN — OXYCODONE 10 MG: 5 TABLET ORAL at 12:05

## 2020-01-05 RX ADMIN — CARBAMAZEPINE 300 MG: 200 TABLET ORAL at 20:16

## 2020-01-05 RX ADMIN — ACETAMINOPHEN 500 MG: 500 TABLET ORAL at 12:06

## 2020-01-05 RX ADMIN — METHOCARBAMOL TABLETS 1500 MG: 500 TABLET, COATED ORAL at 12:05

## 2020-01-05 RX ADMIN — QUETIAPINE FUMARATE 300 MG: 300 TABLET ORAL at 20:16

## 2020-01-05 RX ADMIN — SODIUM CHLORIDE, PRESERVATIVE FREE 10 ML: 5 INJECTION INTRAVENOUS at 20:17

## 2020-01-05 RX ADMIN — METHOCARBAMOL TABLETS 1500 MG: 500 TABLET, COATED ORAL at 20:16

## 2020-01-05 RX ADMIN — OXYCODONE 10 MG: 5 TABLET ORAL at 16:07

## 2020-01-05 RX ADMIN — OXYCODONE 10 MG: 5 TABLET ORAL at 20:18

## 2020-01-05 RX ADMIN — ENOXAPARIN SODIUM 30 MG: 30 INJECTION SUBCUTANEOUS at 20:27

## 2020-01-05 RX ADMIN — METHOCARBAMOL TABLETS 1500 MG: 500 TABLET, COATED ORAL at 17:58

## 2020-01-05 RX ADMIN — SENNOSIDES AND DOCUSATE SODIUM 1 TABLET: 8.6; 5 TABLET ORAL at 20:18

## 2020-01-05 ASSESSMENT — PAIN DESCRIPTION - PAIN TYPE
TYPE: ACUTE PAIN
TYPE: ACUTE PAIN;SURGICAL PAIN

## 2020-01-05 ASSESSMENT — PAIN DESCRIPTION - DESCRIPTORS
DESCRIPTORS: DISCOMFORT;SORE;SHARP
DESCRIPTORS: STABBING

## 2020-01-05 ASSESSMENT — PAIN SCALES - GENERAL
PAINLEVEL_OUTOF10: 2
PAINLEVEL_OUTOF10: 9
PAINLEVEL_OUTOF10: 8
PAINLEVEL_OUTOF10: 9
PAINLEVEL_OUTOF10: 8
PAINLEVEL_OUTOF10: 0
PAINLEVEL_OUTOF10: 9

## 2020-01-05 ASSESSMENT — PAIN DESCRIPTION - LOCATION
LOCATION: CHEST
LOCATION: CHEST

## 2020-01-05 ASSESSMENT — PAIN DESCRIPTION - ONSET
ONSET: ON-GOING
ONSET: ON-GOING

## 2020-01-05 ASSESSMENT — PAIN DESCRIPTION - ORIENTATION
ORIENTATION: RIGHT
ORIENTATION: RIGHT

## 2020-01-05 ASSESSMENT — PAIN DESCRIPTION - PROGRESSION
CLINICAL_PROGRESSION: NOT CHANGED
CLINICAL_PROGRESSION: GRADUALLY IMPROVING

## 2020-01-05 ASSESSMENT — PAIN DESCRIPTION - FREQUENCY
FREQUENCY: CONTINUOUS
FREQUENCY: CONTINUOUS

## 2020-01-05 NOTE — PROGRESS NOTES
Patient agitated and feeling down his family prevent him getting the pain Percocet. Nurse educated patient and patient refuse to hear what nurse have to say. Patient say he doesn't want to live. Sitter at bed side near the door.     Electronically signed by Tessy Sloan RN on 1/5/2020 at 8:45 AM

## 2020-01-05 NOTE — CONSULTS
500 mg, 500 mg, Oral, 4 times per day  magnesium hydroxide (MILK OF MAGNESIA) 400 MG/5ML suspension 30 mL, 30 mL, Oral, Daily PRN  ondansetron (ZOFRAN) injection 4 mg, 4 mg, Intravenous, Q6H PRN  oxyCODONE (ROXICODONE) immediate release tablet 5 mg, 5 mg, Oral, Q4H PRN **OR** oxyCODONE (ROXICODONE) immediate release tablet 10 mg, 10 mg, Oral, Q4H PRN  methocarbamol (ROBAXIN) tablet 1,500 mg, 1,500 mg, Oral, 4x Daily  lidocaine 4 % external patch 1 patch, 1 patch, Transdermal, Daily  sennosides-docusate sodium (SENOKOT-S) 8.6-50 MG tablet 1 tablet, 1 tablet, Oral, BID  haloperidol lactate (HALDOL) injection 5 mg, 5 mg, Intravenous, Q6H PRN  ketorolac (TORADOL) injection 15 mg, 15 mg, Intravenous, Q6H    Medical Review of Systems:     All other than marked systmes have been reviewed and are all negative.     Constitutional Symptoms: []  fever []  Chills  Skin Symptoms: [] rash []  Pruritus   Eye Symptoms: [] Vision unchanged []  recent vision problems[] blurred vision   Respiratory Symptoms:[] shortness of breath [] cough  Cardiovascular Symptoms:  [] chest pain   [] palpitations   Gastrointestinal Symptoms: []  abdominal pain []  nausea []  vomiting []  diarrhea  Genitourinary Symptoms: []  dysuria  []  hematuria   Musculoskeletal Symptoms: []  back pain []  muscle pain []  joint pain  Neurologic Symptoms: []  headache []  dizziness  Hematolymphoid Symptoms: [] Adenopathy [] Bruises   [] Schimosis       VITALS: BP (!) 142/79   Pulse 87   Temp 98.6 °F (37 °C) (Temporal)   Resp 18   Ht 6' (1.829 m)   Wt 161 lb 11.2 oz (73.3 kg)   SpO2 95%   BMI 21.93 kg/m²     ALLERGIES: Rondec-d [chlophedianol-pseudoephedrine]                MENTAL STATUS EXAM:       Mental Status Examination:    Cognition:      [x] Alert  [x] Awake  [x] Oriented  [x] Person  [x] Place [x] Time      [] drowsy  [] tired  [] lethargic  [] distractable  []     Attention/Concentration:   [x] Attentive  [] Distracted        Memory Recent and Remote: [x] Intact   [] Impaired [] Partially Impaired     Language: [] Able to recognize and name objects          [] Unable to recognize and name Objects    Fund of Knowledge:  [] Poor []  Fair  [x] Good    Speech: [x] Normal  [] Soft  [] Slow  [] Fast [] Pressured            [] Loud [] Dysarthria  [] Incoherent       Appearance: [] Well Groomed  [x] Casual Dressed  [] Unkept  [] Disheveled          [] Normal weight[] Thin  [] Overweight  [] Obese           Attitude: [] Positive  [] Hostile  [] Demanding  [] Guarded  [] Defensive         [x] Cooperative  []  Uncooperative      Behavior:  [x] Normal Gait  [] Walks with Assistance  [] Daphne Austin    [] Walks with Farhat Long  [] In Hospital Bed  [] Sitting in Chair    Muscle-Skeletal:  [x] Normal Muscle Tone [] Muscle Atrophy       [] Abnormal Muscle Movement     Eye Contact:  [x] Good eye contact  [] Intermittent Eye Contact  [] Poor Eye Contact     Mood: [] Depressed  [x] Anxious  [] Irritated  [] Euthymic   [] Angry [] Restless    Affect:  [] Congruent  [] Incongruent  [x] Labile  [] Constricted  [] Flat  [] Bizarre     Thought Process and Association:  [] Logical [] Illogical       [x] Linear and Goal Directed  [] Tangential  [] Circumstantial     Thought Content:  [x] Denies [] Endorses [] Suicidal [] Homicidal  [] Delusional      [] Paranoid  [] Somatic  [] Grandiose    Perception: [x]  None  [] Auditory   [] Visual  [] tactile   [] olfactory  [] Illusions         Insight: [] Intact  [x] Fair  [] Limited    Judgement:  [] Intact  [x] Fair  [] Limited        ASSESSMENT  Patient Active Problem List   Diagnosis    Schizophrenia ([facility] [address]    Schizoaffective disorder, bipolar type ([facility] [address]    Schizoaffective disorder ([facility] [address]    Trauma    Pneumothorax, traumatic    Multiple closed fractures of ribs of right side    Rib pain on right side     Recommendations and plan of treatment:    Will order aristada Initio 675mg and Aristada 882 mg      Patient is mickey for safety denies

## 2020-01-05 NOTE — PROGRESS NOTES
[facility]  PROGRESS NOTE  ATTENDING NOTE    TRAUMA  MECHANISM:  ASSAULT 3 DAYS PTA    Patient Active Problem List   Diagnosis    Schizophrenia ([facility] [address]    Schizoaffective disorder, bipolar type ([facility] [address]    Schizoaffective disorder ([facility] [address]    Trauma    Pneumothorax, traumatic    Multiple closed fractures of ribs of right side    Rib pain on right side     BP (!) 147/72   Pulse 94   Temp 99.4 °F (37.4 °C) (Temporal)   Resp 18   Ht 6' (1.829 m)   Wt 161 lb 11.2 oz (73.3 kg)   SpO2 96%   BMI 21.93 kg/m²   Physical Exam  Constitutional:       Appearance: Normal appearance. He is normal weight. HENT:      Head: Normocephalic and atraumatic. Nose: Nose normal.      Mouth/Throat:      Mouth: Mucous membranes are moist.      Pharynx: Oropharynx is clear. Eyes:      Extraocular Movements: Extraocular movements intact. Pupils: Pupils are equal, round, and reactive to light. Neck:      Musculoskeletal: Normal range of motion and neck supple. Cardiovascular:      Rate and Rhythm: Normal rate and regular rhythm. Pulses: Normal pulses. Heart sounds: Normal heart sounds. Pulmonary:      Effort: Pulmonary effort is normal.      Breath sounds: Normal breath sounds. Comments: Right CT with serous drainage, no air leak  SMI 1250  Abdominal:      General: Abdomen is flat. There is no distension. Palpations: Abdomen is soft. Tenderness: There is no tenderness. Musculoskeletal: Normal range of motion. General: No swelling or tenderness. Skin:     General: Skin is warm and dry. Neurological:      General: No focal deficit present. Mental Status: He is alert and oriented to person, place, and time. Psychiatric:         Mood and Affect: Mood normal.         Behavior: Behavior normal.         Thought Content: Thought content normal.         Judgment: Judgment normal.     ASSESSMENT/PLAN:  1.   Right rib fractures  --analagesia  --pulmonary toilet  2. Right pneumothorax  --chest tube to waterseal  --pulmonary toilet  3.   Psychotic episode  --resume home meds  --haldol PRN  --psych following--f/u OP only    DVT/GI ppx--lovenox, diet    Natasha Beltran MD, MSc, FACS  1/5/2020  4:42 PM

## 2020-01-05 NOTE — PROGRESS NOTES
Attempted to give pt morning meds and he refused everything. Said he did not want anything except for his \"baby surekha\" [employer]. Pt is restless in bed. Pt will only take percocet for pain.

## 2020-01-05 NOTE — PLAN OF CARE
Problem: Suicide risk  Goal: Provide patient with safe environment  Description  Provide patient with safe environment  1/4/2020 1950 by Jolynn Hinds RN  Outcome: Met This Shift  1/4/2020 1950 by Jolynn Hinds RN  Outcome: Met This Shift  1/4/2020 1949 by Jolynn Hinds RN  Outcome: Met This Shift

## 2020-01-05 NOTE — PLAN OF CARE
Problem: Suicide risk  Goal: Provide patient with safe environment  Description  Provide patient with safe environment  1/5/2020 1631 by Nazia Rg RN  Outcome: Met This Shift  1/5/2020 1029 by Nazia Rg RN  Outcome: Met This Shift

## 2020-01-05 NOTE — PROGRESS NOTES
Pt in paper gown. Denies suicidal ideation at this time. CO at bedside. Pt calm and cooperative with direction.

## 2020-01-05 NOTE — PLAN OF CARE
Problem: Falls - Risk of:  Goal: Will remain free from falls  Description  Will remain free from falls  1/4/2020 1949 by Chana Worley RN  Outcome: Met This Shift  1/4/2020 1442 by Chana Worley RN  Outcome: Met This Shift  Goal: Absence of physical injury  Description  Absence of physical injury  1/4/2020 1949 by Chana Worley RN  Outcome: Met This Shift  1/4/2020 1442 by Chana Worley RN  Outcome: Met This Shift     Problem: Nutritional:  Goal: Nutritional status will improve  Description  Nutritional status will improve  1/4/2020 1949 by Chana Worley RN  Outcome: Met This Shift  1/4/2020 1442 by Chana Worley RN  Outcome: Met This Shift     Problem: Pain:  Goal: Pain level will decrease  Description  Pain level will decrease  Outcome: Met This Shift  Goal: Control of acute pain  Description  Control of acute pain  Outcome: Met This Shift  Goal: Control of chronic pain  Description  Control of chronic pain  Outcome: Met This Shift     Problem: Suicide risk  Goal: Provide patient with safe environment  Description  Provide patient with safe environment  Outcome: Met This Shift

## 2020-01-05 NOTE — PROGRESS NOTES
somewhat agitated. Pt assisted back to bed with sitter present at bedside. Pt educated on: safety with functional mobility     Pt's response to education:  Pt verbalized understanding  Pt demonstrated understanding  Pt unable to verbalize/ demonstrate understanding   No further education required in this area  Pt would benefit from further education           x        Pts/ family goals   1. To go home soon     Patient and or family understand(s) diagnosis, prognosis, and plan of care. Yes      Potential for reaching above stated goals: good     PLAN  PT care will be provided in accordance with the objectives noted above. Whenever appropriate, clear delegation orders will be provided for nursing staff. Exercises and functional mobility practice will be used as well as appropriate assistive devices or modalities to obtain goals. Patient and family education will also be administered as needed. Frequency of treatments: 2-5x/week x 3-5 days.      Time in: 0750   Time out: Manoj Hathorne, [place]   [id number]

## 2020-01-05 NOTE — PROGRESS NOTES
Surgery team on floor and over hear patient page out to nurse for pain medication. Surgical doctor agree for patient to receive Tylenol and oxyCodone. Will pass information to morning nurse.     Electronically signed by Zuhair Singh RN on 1/5/2020 at 7:10 AM

## 2020-01-05 NOTE — PLAN OF CARE
Problem: Falls - Risk of:  Goal: Will remain free from falls  Description  Will remain free from falls  Outcome: Met This Shift  Goal: Absence of physical injury  Description  Absence of physical injury  Outcome: Met This Shift     Problem: Nutritional:  Goal: Nutritional status will improve  Description  Nutritional status will improve  Outcome: Met This Shift     Problem: Physical Regulation:  Goal: Diagnostic test results will improve  Description  Diagnostic test results will improve  Outcome: Met This Shift  Goal: Will remain free from infection  Description  Will remain free from infection  Outcome: Met This Shift  Goal: Ability to maintain vital signs within normal range will improve  Description  Ability to maintain vital signs within normal range will improve  Outcome: Met This Shift     Problem: Respiratory:  Goal: Ability to maintain normal respiratory secretions will improve  Description  Ability to maintain normal respiratory secretions will improve  Outcome: Met This Shift     Problem: Pain:  Goal: Pain level will decrease  Description  Pain level will decrease  Outcome: Met This Shift  Goal: Control of acute pain  Description  Control of acute pain  Outcome: Met This Shift  Goal: Control of chronic pain  Description  Control of chronic pain  Outcome: Met This Shift     Problem: Suicide risk  Goal: Provide patient with safe environment  Description  Provide patient with safe environment  Outcome: Met This Shift

## 2020-01-06 ENCOUNTER — APPOINTMENT (OUTPATIENT)
Dept: GENERAL RADIOLOGY | Age: 40
DRG: 200 | End: 2020-01-06
Payer: COMMERCIAL

## 2020-01-06 PROCEDURE — 2140000000 HC CCU INTERMEDIATE R&B

## 2020-01-06 PROCEDURE — 6370000000 HC RX 637 (ALT 250 FOR IP): Performed by: STUDENT IN AN ORGANIZED HEALTH CARE EDUCATION/TRAINING PROGRAM

## 2020-01-06 PROCEDURE — 71045 X-RAY EXAM CHEST 1 VIEW: CPT

## 2020-01-06 PROCEDURE — 6360000002 HC RX W HCPCS: Performed by: SURGERY

## 2020-01-06 PROCEDURE — 2580000003 HC RX 258: Performed by: STUDENT IN AN ORGANIZED HEALTH CARE EDUCATION/TRAINING PROGRAM

## 2020-01-06 PROCEDURE — 6370000000 HC RX 637 (ALT 250 FOR IP): Performed by: SURGERY

## 2020-01-06 PROCEDURE — 99231 SBSQ HOSP IP/OBS SF/LOW 25: CPT | Performed by: SURGERY

## 2020-01-06 RX ADMIN — ACETAMINOPHEN 500 MG: 500 TABLET ORAL at 12:31

## 2020-01-06 RX ADMIN — SENNOSIDES AND DOCUSATE SODIUM 1 TABLET: 8.6; 5 TABLET ORAL at 10:24

## 2020-01-06 RX ADMIN — OXYCODONE 10 MG: 5 TABLET ORAL at 10:33

## 2020-01-06 RX ADMIN — SENNOSIDES AND DOCUSATE SODIUM 1 TABLET: 8.6; 5 TABLET ORAL at 20:42

## 2020-01-06 RX ADMIN — METHOCARBAMOL TABLETS 1500 MG: 500 TABLET, COATED ORAL at 14:20

## 2020-01-06 RX ADMIN — OXYCODONE 10 MG: 5 TABLET ORAL at 20:41

## 2020-01-06 RX ADMIN — SODIUM CHLORIDE, PRESERVATIVE FREE 10 ML: 5 INJECTION INTRAVENOUS at 10:22

## 2020-01-06 RX ADMIN — ENOXAPARIN SODIUM 30 MG: 30 INJECTION SUBCUTANEOUS at 20:41

## 2020-01-06 RX ADMIN — METHOCARBAMOL TABLETS 1500 MG: 500 TABLET, COATED ORAL at 20:46

## 2020-01-06 RX ADMIN — SODIUM CHLORIDE, PRESERVATIVE FREE 10 ML: 5 INJECTION INTRAVENOUS at 20:48

## 2020-01-06 RX ADMIN — ACETAMINOPHEN 500 MG: 500 TABLET ORAL at 17:55

## 2020-01-06 RX ADMIN — OXYCODONE 10 MG: 5 TABLET ORAL at 15:30

## 2020-01-06 RX ADMIN — OXYCODONE 10 MG: 5 TABLET ORAL at 03:33

## 2020-01-06 RX ADMIN — MAGNESIUM HYDROXIDE 30 ML: 400 SUSPENSION ORAL at 10:24

## 2020-01-06 RX ADMIN — METHOCARBAMOL TABLETS 1500 MG: 500 TABLET, COATED ORAL at 17:55

## 2020-01-06 ASSESSMENT — PAIN SCALES - GENERAL
PAINLEVEL_OUTOF10: 0
PAINLEVEL_OUTOF10: 10
PAINLEVEL_OUTOF10: 3
PAINLEVEL_OUTOF10: 0
PAINLEVEL_OUTOF10: 10
PAINLEVEL_OUTOF10: 10
PAINLEVEL_OUTOF10: 0
PAINLEVEL_OUTOF10: 10
PAINLEVEL_OUTOF10: 0
PAINLEVEL_OUTOF10: 0
PAINLEVEL_OUTOF10: 10

## 2020-01-06 NOTE — PROGRESS NOTES
Patient in RA with HOB 30 degree, breathing even and unlabored, table at bedside with belonging on top, and pain 8/10 and pain medication givne to patient. Patient resting quietly and watching tv with sitter at bedside near door. Nurse will keep monitor.     Electronically signed by Paloma Mathur RN on 1/5/2020 at 8:33 PM

## 2020-01-06 NOTE — PLAN OF CARE
Problem: Falls - Risk of:  Goal: Will remain free from falls  Description  Will remain free from falls  Outcome: Met This Shift  Goal: Absence of physical injury  Description  Absence of physical injury  Outcome: Met This Shift     Problem: Nutritional:  Goal: Nutritional status will improve  Description  Nutritional status will improve  Outcome: Met This Shift     Problem: Physical Regulation:  Goal: Diagnostic test results will improve  Description  Diagnostic test results will improve  Outcome: Met This Shift  Goal: Will remain free from infection  Description  Will remain free from infection  Outcome: Met This Shift  Goal: Ability to maintain vital signs within normal range will improve  Description  Ability to maintain vital signs within normal range will improve  Outcome: Met This Shift     Problem: Respiratory:  Goal: Ability to maintain normal respiratory secretions will improve  Description  Ability to maintain normal respiratory secretions will improve  Outcome: Met This Shift     Problem: Pain:  Goal: Pain level will decrease  Description  Pain level will decrease  Outcome: Met This Shift  Goal: Control of acute pain  Description  Control of acute pain  Outcome: Met This Shift  Goal: Control of chronic pain  Description  Control of chronic pain  Outcome: Met This Shift     Problem: Suicide risk  Goal: Provide patient with safe environment  Description  Provide patient with safe environment  Outcome: Met This Shift  Electronically signed by Shashank Fox RN on 1/6/2020 at 7:53 AM

## 2020-01-06 NOTE — CARE COORDINATION
SOCIAL WORK/DISCHARGE PLANNING;  Pt has C. O. went to see pt. He is sleeping soundly at this time. Will try back at later time. Kristina GONSALVES    Went back to see pt. He appears to be in the middle of personal care(pt standing in room in his underwear. Will try at another time.   Kristina GONSALVES

## 2020-01-06 NOTE — PLAN OF CARE
Problem: Falls - Risk of:  Goal: Will remain free from falls  Description  Will remain free from falls  1/6/2020 1650 by Judi Arteaga RN  Outcome: Met This Shift     Problem: Falls - Risk of:  Goal: Absence of physical injury  Description  Absence of physical injury  1/6/2020 1650 by Judi Arteaga RN  Outcome: Met This Shift     Problem: Nutritional:  Goal: Nutritional status will improve  Description  Nutritional status will improve  1/6/2020 1650 by Judi Arteaga RN  Outcome: Met This Shift     Problem: Physical Regulation:  Goal: Diagnostic test results will improve  Description  Diagnostic test results will improve  1/6/2020 0753 by Prema Lucero RN  Outcome: Met This Shift     Problem: Physical Regulation:  Goal: Will remain free from infection  Description  Will remain free from infection  1/6/2020 1650 by Judi Arteaga RN  Outcome: Met This Shift     Problem: Physical Regulation:  Goal: Ability to maintain vital signs within normal range will improve  Description  Ability to maintain vital signs within normal range will improve  1/6/2020 1650 by Judi Arteaga RN  Outcome: Met This Shift     Problem: Pain:  Goal: Pain level will decrease  Description  Pain level will decrease  1/6/2020 0753 by Prema Lucero RN  Outcome: Met This Shift     Problem: Suicide risk  Goal: Provide patient with safe environment  Description  Provide patient with safe environment  1/6/2020 1651 by Judi Arteaga RN  Outcome: Met This Shift

## 2020-01-06 NOTE — PLAN OF CARE
Problem: Falls - Risk of:  Goal: Will remain free from falls  Description  Will remain free from falls  1/6/2020 0753 by Prema Lucero RN  Outcome: Met This Shift  1/6/2020 0752 by Prema Lucero RN  Outcome: Met This Shift  Goal: Absence of physical injury  Description  Absence of physical injury  1/6/2020 0753 by Prema Lucero RN  Outcome: Met This Shift  1/6/2020 0752 by Prema Lucero RN  Outcome: Met This Shift     Problem: Nutritional:  Goal: Nutritional status will improve  Description  Nutritional status will improve  1/6/2020 0753 by Prema Lucero RN  Outcome: Met This Shift  1/6/2020 0752 by Prema Lucero RN  Outcome: Met This Shift     Problem: Physical Regulation:  Goal: Diagnostic test results will improve  Description  Diagnostic test results will improve  1/6/2020 0753 by Prema Lucero RN  Outcome: Met This Shift  1/6/2020 0752 by Prema Lucero RN  Outcome: Met This Shift  Goal: Will remain free from infection  Description  Will remain free from infection  1/6/2020 0753 by Prema Lucero RN  Outcome: Met This Shift  1/6/2020 0752 by Prema Lucero RN  Outcome: Met This Shift  Goal: Ability to maintain vital signs within normal range will improve  Description  Ability to maintain vital signs within normal range will improve  1/6/2020 0753 by Prema Lucero RN  Outcome: Met This Shift  1/6/2020 0752 by Prema Lucero RN  Outcome: Met This Shift     Problem: Respiratory:  Goal: Ability to maintain normal respiratory secretions will improve  Description  Ability to maintain normal respiratory secretions will improve  1/6/2020 0753 by Prema Lucero RN  Outcome: Met This Shift  1/6/2020 0752 by Perma Lucero RN  Outcome: Met This Shift     Problem: Pain:  Goal: Pain level will decrease  Description  Pain level will decrease  1/6/2020 0753 by Prema Lucero RN  Outcome: Met This Shift  1/6/2020 0752 by Prema Lucero RN  Outcome: Met This Shift  Goal: Control of acute pain  Description  Control of acute pain  1/6/2020 0753 by Prema Lucero RN  Outcome: Met This Shift  1/6/2020 0752 by Prema Lucero
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
## 2020-01-06 NOTE — PROGRESS NOTES
Patient share with nurse he has thought of harming self. Sitter at bedside and nurse will keep monitor.     Electronically signed by Judi Anguiano RN on 1/6/2020 at 7:49 AM
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
## 2020-01-07 ENCOUNTER — APPOINTMENT (OUTPATIENT)
Dept: GENERAL RADIOLOGY | Age: 40
DRG: 200 | End: 2020-01-07
Payer: COMMERCIAL

## 2020-01-07 PROBLEM — S22.41XA: Status: ACTIVE | Noted: 2020-01-07

## 2020-01-07 PROBLEM — J94.2 HEMOPNEUMOTHORAX, RIGHT: Status: ACTIVE | Noted: 2020-01-07

## 2020-01-07 PROBLEM — S82.91XA: Status: ACTIVE | Noted: 2020-01-07

## 2020-01-07 PROCEDURE — 2140000000 HC CCU INTERMEDIATE R&B

## 2020-01-07 PROCEDURE — 6370000000 HC RX 637 (ALT 250 FOR IP): Performed by: STUDENT IN AN ORGANIZED HEALTH CARE EDUCATION/TRAINING PROGRAM

## 2020-01-07 PROCEDURE — 71045 X-RAY EXAM CHEST 1 VIEW: CPT

## 2020-01-07 PROCEDURE — 6370000000 HC RX 637 (ALT 250 FOR IP): Performed by: NURSE PRACTITIONER

## 2020-01-07 PROCEDURE — 2580000003 HC RX 258: Performed by: STUDENT IN AN ORGANIZED HEALTH CARE EDUCATION/TRAINING PROGRAM

## 2020-01-07 PROCEDURE — APPSS15 APP SPLIT SHARED TIME 0-15 MINUTES: Performed by: NURSE PRACTITIONER

## 2020-01-07 PROCEDURE — 6360000002 HC RX W HCPCS: Performed by: SURGERY

## 2020-01-07 PROCEDURE — 99231 SBSQ HOSP IP/OBS SF/LOW 25: CPT | Performed by: SURGERY

## 2020-01-07 PROCEDURE — 6370000000 HC RX 637 (ALT 250 FOR IP): Performed by: SURGERY

## 2020-01-07 RX ORDER — PETROLATUM 42 G/100G
OINTMENT TOPICAL 2 TIMES DAILY PRN
Status: DISCONTINUED | OUTPATIENT
Start: 2020-01-07 | End: 2020-01-08 | Stop reason: HOSPADM

## 2020-01-07 RX ADMIN — ENOXAPARIN SODIUM 30 MG: 30 INJECTION SUBCUTANEOUS at 22:00

## 2020-01-07 RX ADMIN — CARBAMAZEPINE 300 MG: 200 TABLET ORAL at 21:52

## 2020-01-07 RX ADMIN — QUETIAPINE FUMARATE 300 MG: 300 TABLET ORAL at 21:52

## 2020-01-07 RX ADMIN — CARBAMAZEPINE 300 MG: 200 TABLET ORAL at 08:53

## 2020-01-07 RX ADMIN — ACETAMINOPHEN 500 MG: 500 TABLET ORAL at 02:34

## 2020-01-07 RX ADMIN — METHOCARBAMOL TABLETS 1500 MG: 500 TABLET, COATED ORAL at 12:57

## 2020-01-07 RX ADMIN — OXYCODONE 10 MG: 5 TABLET ORAL at 21:51

## 2020-01-07 RX ADMIN — ACETAMINOPHEN 500 MG: 500 TABLET ORAL at 06:45

## 2020-01-07 RX ADMIN — METHOCARBAMOL TABLETS 1500 MG: 500 TABLET, COATED ORAL at 08:52

## 2020-01-07 RX ADMIN — ACETAMINOPHEN 500 MG: 500 TABLET ORAL at 19:23

## 2020-01-07 RX ADMIN — OXYCODONE 10 MG: 5 TABLET ORAL at 17:51

## 2020-01-07 RX ADMIN — SODIUM CHLORIDE, PRESERVATIVE FREE 10 ML: 5 INJECTION INTRAVENOUS at 08:51

## 2020-01-07 RX ADMIN — SENNOSIDES AND DOCUSATE SODIUM 1 TABLET: 8.6; 5 TABLET ORAL at 21:51

## 2020-01-07 RX ADMIN — OXYCODONE 10 MG: 5 TABLET ORAL at 08:52

## 2020-01-07 RX ADMIN — SENNOSIDES AND DOCUSATE SODIUM 1 TABLET: 8.6; 5 TABLET ORAL at 08:55

## 2020-01-07 RX ADMIN — ACETAMINOPHEN 500 MG: 500 TABLET ORAL at 12:57

## 2020-01-07 RX ADMIN — SODIUM CHLORIDE, PRESERVATIVE FREE 10 ML: 5 INJECTION INTRAVENOUS at 21:52

## 2020-01-07 RX ADMIN — METHOCARBAMOL TABLETS 1500 MG: 500 TABLET, COATED ORAL at 21:54

## 2020-01-07 ASSESSMENT — PAIN SCALES - GENERAL
PAINLEVEL_OUTOF10: 5
PAINLEVEL_OUTOF10: 0
PAINLEVEL_OUTOF10: 5
PAINLEVEL_OUTOF10: 8
PAINLEVEL_OUTOF10: 8
PAINLEVEL_OUTOF10: 10
PAINLEVEL_OUTOF10: 8

## 2020-01-07 NOTE — PROGRESS NOTES
Right chest tube removed without issues. Occlusive dressing to site. SpO2 96%. No c/o SOB.       Electronically signed by Cande Panchal RN MSN APRN-NP [facility] NP  CCNS CCRN 1/7/2020 11:14 AM

## 2020-01-07 NOTE — PROGRESS NOTES
Message sent to Dr. Celestina Brody via [employer] serve that the primary team would like the patient to be re-evaluated by the doctor. Josy Brody messaged back and states to make sure there is another consult in the computer.   Cb Barrera

## 2020-01-07 NOTE — PLAN OF CARE
Problem: Suicide risk  Goal: Provide patient with safe environment  Description  Provide patient with safe environment  1/6/2020 2331 by Prema Lucero RN  Outcome: Met This Shift

## 2020-01-07 NOTE — PROGRESS NOTES
Patient is medically cleared for psychiatry discharge from trauma surgery perspective.     Electronically signed by Griselda Mays, MD on 1/7/2020 at 6:58 PM

## 2020-01-07 NOTE — PROGRESS NOTES
Patient agitated and patient share with nurse he still want to harm self and no plans in place. Emilia Mouse at bedside. Patient mother called Jasbir Benson. Nurse obtained patient's concent to give updated information to Manolo Galvez. Will keep monitor.

## 2020-01-07 NOTE — CARE COORDINATION
Care Coordination:  Received a call from Freedom Walls at [facility] for . She states she will follow him but in the past he has never returned calls or answered door. Kelsea Arvizu also follows in the community as well. Freedom Walls can be reached at [phone] ext [phone].   Freedom Walls will reach out to sister Renetta Swan

## 2020-01-07 NOTE — PROGRESS NOTES
hemopneumothorax. Right tib fractures 9 & 10. Room air. Monitor RR & SpO2. Chest tube to water seal.     Pep flutter valve. Encourage cough, SMI & deep breathing. Nicotine patch. GI: No acute issues. BMI 22. Monitor bowel function. Diet:  General.     Zofran. Renal: No acute issues -    ID: No acute issues  Endocrine: No acute issues. MSK: No acute issues.    ROM. Turn & reposition. Monitor for skin breakdown. Heme: No acute issues. Bowel regime:  Scheduled MOM. PRN MOM. Senokot. Pain control/Sedation: Lidoderm. Tylenol. Robaxin. Oxycodone. .    DVT prophylaxis: SCD. Lovenox. GI: Diet. Code status:  Full code. Patient/Family update:  Questions answered. Support given. Disposition: Continue 64. Discharge plan: Home with psych follow up.       Electronically signed by Yasmine Mason RN MSN APRN-NP [facility] NP  CCNS CCRN 1/7/2020 9:57 AM

## 2020-01-07 NOTE — PROGRESS NOTES
Message sent to trauma KRISTI via perfect serve regarding Dr. Carlos Gavin is on floor. Dr. Carlos Gavin recommends patient to have an inpatient stay once medically cleared.    Rob Coe

## 2020-01-08 ENCOUNTER — APPOINTMENT (OUTPATIENT)
Dept: GENERAL RADIOLOGY | Age: 40
DRG: 200 | End: 2020-01-08
Payer: COMMERCIAL

## 2020-01-08 ENCOUNTER — HOSPITAL ENCOUNTER (INPATIENT)
Age: 40
LOS: 6 days | Discharge: PSYCHIATRIC OR SUBSTANCE USE FACILITY | DRG: 885 | End: 2020-01-14
Attending: PSYCHIATRY & NEUROLOGY | Admitting: PSYCHIATRY & NEUROLOGY
Payer: COMMERCIAL

## 2020-01-08 VITALS
SYSTOLIC BLOOD PRESSURE: 124 MMHG | HEART RATE: 95 BPM | BODY MASS INDEX: 21.9 KG/M2 | DIASTOLIC BLOOD PRESSURE: 58 MMHG | WEIGHT: 161.7 LBS | HEIGHT: 72 IN | OXYGEN SATURATION: 97 % | TEMPERATURE: 98.3 F | RESPIRATION RATE: 16 BRPM

## 2020-01-08 PROBLEM — F32.4 MAJOR DEPRESSION SINGLE EPISODE, IN PARTIAL REMISSION (HCC): Status: ACTIVE | Noted: 2020-01-08

## 2020-01-08 PROCEDURE — 99231 SBSQ HOSP IP/OBS SF/LOW 25: CPT | Performed by: SURGERY

## 2020-01-08 PROCEDURE — 6370000000 HC RX 637 (ALT 250 FOR IP): Performed by: STUDENT IN AN ORGANIZED HEALTH CARE EDUCATION/TRAINING PROGRAM

## 2020-01-08 PROCEDURE — 1240000000 HC EMOTIONAL WELLNESS R&B

## 2020-01-08 PROCEDURE — 6370000000 HC RX 637 (ALT 250 FOR IP): Performed by: SURGERY

## 2020-01-08 PROCEDURE — 2580000003 HC RX 258: Performed by: STUDENT IN AN ORGANIZED HEALTH CARE EDUCATION/TRAINING PROGRAM

## 2020-01-08 PROCEDURE — 71045 X-RAY EXAM CHEST 1 VIEW: CPT

## 2020-01-08 RX ORDER — QUETIAPINE FUMARATE 300 MG/1
300 TABLET, FILM COATED ORAL NIGHTLY
Status: DISCONTINUED | OUTPATIENT
Start: 2020-01-08 | End: 2020-01-10

## 2020-01-08 RX ORDER — CARBAMAZEPINE 200 MG/1
300 TABLET ORAL 2 TIMES DAILY
Status: CANCELLED | OUTPATIENT
Start: 2020-01-08

## 2020-01-08 RX ORDER — OXYCODONE HYDROCHLORIDE 5 MG/1
5 TABLET ORAL EVERY 4 HOURS PRN
Status: DISCONTINUED | OUTPATIENT
Start: 2020-01-08 | End: 2020-01-14 | Stop reason: HOSPADM

## 2020-01-08 RX ORDER — LIDOCAINE 4 G/G
1 PATCH TOPICAL DAILY
Status: CANCELLED | OUTPATIENT
Start: 2020-01-09

## 2020-01-08 RX ORDER — CARBAMAZEPINE 200 MG/1
300 TABLET ORAL 2 TIMES DAILY
Status: DISCONTINUED | OUTPATIENT
Start: 2020-01-08 | End: 2020-01-14 | Stop reason: HOSPADM

## 2020-01-08 RX ORDER — LIDOCAINE 4 G/G
1 PATCH TOPICAL DAILY
Status: DISCONTINUED | OUTPATIENT
Start: 2020-01-09 | End: 2020-01-14 | Stop reason: HOSPADM

## 2020-01-08 RX ORDER — OXYCODONE HYDROCHLORIDE 5 MG/1
10 TABLET ORAL EVERY 4 HOURS PRN
Status: DISCONTINUED | OUTPATIENT
Start: 2020-01-08 | End: 2020-01-14 | Stop reason: HOSPADM

## 2020-01-08 RX ORDER — HALOPERIDOL 5 MG
5 TABLET ORAL EVERY 4 HOURS PRN
Status: DISCONTINUED | OUTPATIENT
Start: 2020-01-08 | End: 2020-01-14 | Stop reason: HOSPADM

## 2020-01-08 RX ORDER — HALOPERIDOL 5 MG/ML
5 INJECTION INTRAMUSCULAR EVERY 6 HOURS PRN
Status: CANCELLED | OUTPATIENT
Start: 2020-01-08

## 2020-01-08 RX ORDER — ONDANSETRON 2 MG/ML
4 INJECTION INTRAMUSCULAR; INTRAVENOUS EVERY 6 HOURS PRN
Status: DISCONTINUED | OUTPATIENT
Start: 2020-01-08 | End: 2020-01-14 | Stop reason: HOSPADM

## 2020-01-08 RX ORDER — SODIUM CHLORIDE 0.9 % (FLUSH) 0.9 %
10 SYRINGE (ML) INJECTION PRN
Status: CANCELLED | OUTPATIENT
Start: 2020-01-08

## 2020-01-08 RX ORDER — OXYCODONE HYDROCHLORIDE 5 MG/1
5 TABLET ORAL EVERY 4 HOURS PRN
Status: CANCELLED | OUTPATIENT
Start: 2020-01-08

## 2020-01-08 RX ORDER — HALOPERIDOL 5 MG/ML
5 INJECTION INTRAMUSCULAR EVERY 4 HOURS PRN
Status: DISCONTINUED | OUTPATIENT
Start: 2020-01-08 | End: 2020-01-14 | Stop reason: HOSPADM

## 2020-01-08 RX ORDER — NICOTINE 21 MG/24HR
1 PATCH, TRANSDERMAL 24 HOURS TRANSDERMAL DAILY
Status: DISCONTINUED | OUTPATIENT
Start: 2020-01-09 | End: 2020-01-14 | Stop reason: HOSPADM

## 2020-01-08 RX ORDER — ACETAMINOPHEN 500 MG
500 TABLET ORAL EVERY 6 HOURS SCHEDULED
Status: DISCONTINUED | OUTPATIENT
Start: 2020-01-09 | End: 2020-01-14 | Stop reason: HOSPADM

## 2020-01-08 RX ORDER — METHOCARBAMOL 750 MG/1
750 TABLET, FILM COATED ORAL 4 TIMES DAILY
Status: CANCELLED | OUTPATIENT
Start: 2020-01-08

## 2020-01-08 RX ORDER — METHOCARBAMOL 750 MG/1
750 TABLET, FILM COATED ORAL 4 TIMES DAILY
Status: DISCONTINUED | OUTPATIENT
Start: 2020-01-09 | End: 2020-01-14 | Stop reason: HOSPADM

## 2020-01-08 RX ORDER — SENNA AND DOCUSATE SODIUM 50; 8.6 MG/1; MG/1
1 TABLET, FILM COATED ORAL 2 TIMES DAILY
Status: DISCONTINUED | OUTPATIENT
Start: 2020-01-08 | End: 2020-01-14 | Stop reason: HOSPADM

## 2020-01-08 RX ORDER — HALOPERIDOL 5 MG/ML
5 INJECTION INTRAMUSCULAR EVERY 6 HOURS PRN
Status: DISCONTINUED | OUTPATIENT
Start: 2020-01-08 | End: 2020-01-08 | Stop reason: ALTCHOICE

## 2020-01-08 RX ORDER — SENNA AND DOCUSATE SODIUM 50; 8.6 MG/1; MG/1
1 TABLET, FILM COATED ORAL 2 TIMES DAILY
Status: CANCELLED | OUTPATIENT
Start: 2020-01-08

## 2020-01-08 RX ORDER — PETROLATUM 42 G/100G
OINTMENT TOPICAL 2 TIMES DAILY PRN
Status: CANCELLED | OUTPATIENT
Start: 2020-01-08

## 2020-01-08 RX ORDER — SODIUM CHLORIDE 0.9 % (FLUSH) 0.9 %
10 SYRINGE (ML) INJECTION PRN
Status: DISCONTINUED | OUTPATIENT
Start: 2020-01-08 | End: 2020-01-14 | Stop reason: HOSPADM

## 2020-01-08 RX ORDER — QUETIAPINE FUMARATE 300 MG/1
300 TABLET, FILM COATED ORAL NIGHTLY
Status: CANCELLED | OUTPATIENT
Start: 2020-01-08

## 2020-01-08 RX ORDER — HYDROXYZINE PAMOATE 50 MG/1
50 CAPSULE ORAL 3 TIMES DAILY PRN
Status: DISCONTINUED | OUTPATIENT
Start: 2020-01-08 | End: 2020-01-14 | Stop reason: HOSPADM

## 2020-01-08 RX ORDER — ONDANSETRON 2 MG/ML
4 INJECTION INTRAMUSCULAR; INTRAVENOUS EVERY 6 HOURS PRN
Status: CANCELLED | OUTPATIENT
Start: 2020-01-08

## 2020-01-08 RX ORDER — PETROLATUM 42 G/100G
OINTMENT TOPICAL 2 TIMES DAILY PRN
Status: DISCONTINUED | OUTPATIENT
Start: 2020-01-08 | End: 2020-01-14 | Stop reason: HOSPADM

## 2020-01-08 RX ORDER — OXYCODONE HYDROCHLORIDE 10 MG/1
10 TABLET ORAL EVERY 4 HOURS PRN
Status: CANCELLED | OUTPATIENT
Start: 2020-01-08

## 2020-01-08 RX ORDER — NICOTINE 21 MG/24HR
1 PATCH, TRANSDERMAL 24 HOURS TRANSDERMAL DAILY
Status: CANCELLED | OUTPATIENT
Start: 2020-01-09

## 2020-01-08 RX ORDER — ACETAMINOPHEN 500 MG
500 TABLET ORAL EVERY 6 HOURS SCHEDULED
Status: CANCELLED | OUTPATIENT
Start: 2020-01-08

## 2020-01-08 RX ADMIN — QUETIAPINE FUMARATE 300 MG: 300 TABLET ORAL at 21:07

## 2020-01-08 RX ADMIN — CARBAMAZEPINE 300 MG: 200 TABLET ORAL at 08:17

## 2020-01-08 RX ADMIN — SENNOSIDES AND DOCUSATE SODIUM 1 TABLET: 8.6; 5 TABLET ORAL at 08:17

## 2020-01-08 RX ADMIN — ACETAMINOPHEN 500 MG: 500 TABLET ORAL at 06:55

## 2020-01-08 RX ADMIN — SENNOSIDES AND DOCUSATE SODIUM 1 TABLET: 8.6; 5 TABLET ORAL at 21:03

## 2020-01-08 RX ADMIN — OXYCODONE HYDROCHLORIDE 5 MG: 5 TABLET ORAL at 08:29

## 2020-01-08 RX ADMIN — METHOCARBAMOL TABLETS 1500 MG: 500 TABLET, COATED ORAL at 08:17

## 2020-01-08 RX ADMIN — METHOCARBAMOL TABLETS 1500 MG: 500 TABLET, COATED ORAL at 18:47

## 2020-01-08 RX ADMIN — METHOCARBAMOL TABLETS 1500 MG: 500 TABLET, COATED ORAL at 13:21

## 2020-01-08 RX ADMIN — CARBAMAZEPINE 300 MG: 200 TABLET ORAL at 21:03

## 2020-01-08 RX ADMIN — SODIUM CHLORIDE, PRESERVATIVE FREE 10 ML: 5 INJECTION INTRAVENOUS at 08:18

## 2020-01-08 RX ADMIN — ACETAMINOPHEN 500 MG: 500 TABLET ORAL at 18:48

## 2020-01-08 RX ADMIN — MAGNESIUM HYDROXIDE 30 ML: 400 SUSPENSION ORAL at 08:13

## 2020-01-08 RX ADMIN — OXYCODONE 10 MG: 5 TABLET ORAL at 21:03

## 2020-01-08 RX ADMIN — ACETAMINOPHEN 500 MG: 500 TABLET ORAL at 13:20

## 2020-01-08 ASSESSMENT — PAIN DESCRIPTION - PROGRESSION
CLINICAL_PROGRESSION: GRADUALLY WORSENING
CLINICAL_PROGRESSION: GRADUALLY WORSENING

## 2020-01-08 ASSESSMENT — PAIN DESCRIPTION - ORIENTATION
ORIENTATION: RIGHT
ORIENTATION: RIGHT

## 2020-01-08 ASSESSMENT — PAIN SCALES - GENERAL
PAINLEVEL_OUTOF10: 10
PAINLEVEL_OUTOF10: 1
PAINLEVEL_OUTOF10: 0
PAINLEVEL_OUTOF10: 8
PAINLEVEL_OUTOF10: 0
PAINLEVEL_OUTOF10: 0
PAINLEVEL_OUTOF10: 6

## 2020-01-08 ASSESSMENT — PAIN DESCRIPTION - LOCATION
LOCATION: RIB CAGE
LOCATION: RIB CAGE

## 2020-01-08 ASSESSMENT — PAIN DESCRIPTION - ONSET
ONSET: ON-GOING
ONSET: ON-GOING

## 2020-01-08 ASSESSMENT — PAIN DESCRIPTION - FREQUENCY
FREQUENCY: INTERMITTENT
FREQUENCY: INTERMITTENT

## 2020-01-08 ASSESSMENT — PAIN DESCRIPTION - DESCRIPTORS: DESCRIPTORS: SHARP;DISCOMFORT;CONSTANT

## 2020-01-08 ASSESSMENT — PAIN - FUNCTIONAL ASSESSMENT
PAIN_FUNCTIONAL_ASSESSMENT: ACTIVITIES ARE NOT PREVENTED
PAIN_FUNCTIONAL_ASSESSMENT: ACTIVITIES ARE NOT PREVENTED

## 2020-01-08 ASSESSMENT — PAIN DESCRIPTION - PAIN TYPE
TYPE: ACUTE PAIN
TYPE: ACUTE PAIN

## 2020-01-08 NOTE — PROGRESS NOTES
Patient overnight pleasant and no sign of aggression or verbal abuse to staff. Sitter at bedside and nurse will keep monitor.     Electronically signed by Tessy Sloan RN on 1/8/2020 at 4:37 AM

## 2020-01-08 NOTE — PROGRESS NOTES
Patient family at bedside and patient eager to leave hospital.  After patient's family left bedside patient shared with nurse he denied wanting to harm himself. Patient smiling to nurse that his family came to visit him. Sitter at bedside and nurse will keep monitor.
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
## 2020-01-08 NOTE — CONSULTS
Patient seen, chart reviewed, spoke with nursing and his parents, who came to visit. Patients parents are concerned about his safety. Plan:    1. Transfer to psych once medically stable. Please psych consult report dated 1/5/20 for detail.     Will sign off

## 2020-01-08 NOTE — PROGRESS NOTES
Patient denies SI at this time. Patient remains calm and cooperative in room. CO remains at bedside.

## 2020-01-08 NOTE — PLAN OF CARE
Problem: Falls - Risk of:  Goal: Will remain free from falls  Description  Will remain free from falls  Outcome: Met This Shift  Goal: Absence of physical injury  Description  Absence of physical injury  Outcome: Met This Shift     Problem: Nutritional:  Goal: Nutritional status will improve  Description  Nutritional status will improve  Outcome: Met This Shift     Problem: Physical Regulation:  Goal: Diagnostic test results will improve  Description  Diagnostic test results will improve  Outcome: Met This Shift  Goal: Will remain free from infection  Description  Will remain free from infection  Outcome: Met This Shift  Goal: Ability to maintain vital signs within normal range will improve  Description  Ability to maintain vital signs within normal range will improve  Outcome: Met This Shift     Problem: Respiratory:  Goal: Ability to maintain normal respiratory secretions will improve  Description  Ability to maintain normal respiratory secretions will improve  Outcome: Met This Shift     Problem: Pain:  Goal: Pain level will decrease  Description  Pain level will decrease  Outcome: Met This Shift  Goal: Control of acute pain  Description  Control of acute pain  Outcome: Met This Shift  Goal: Control of chronic pain  Description  Control of chronic pain  Outcome: Met This Shift     Problem: Suicide risk  Goal: Provide patient with safe environment  Description  Provide patient with safe environment  1/8/2020 0424 by Zuhair Singh RN  Outcome: Met This Shift  1/7/2020 1836 by Joie Gomes RN  Outcome: Met This Shift     Electronically signed by Zuhair Singh RN on 1/8/2020 at 4:24 AM

## 2020-01-08 NOTE — PROGRESS NOTES
Patient denies SI/HI thoughts, extremely anxious and guarded at times. CO remains at bedtime for patient safety.

## 2020-01-08 NOTE — ED NOTES
PATRICIO AWARE OF PATIENT NEED FOR PSYCH BED.      FRANCISCO Samaniego, [employer]  01/07/20 2009

## 2020-01-09 PROCEDURE — 6370000000 HC RX 637 (ALT 250 FOR IP): Performed by: SURGERY

## 2020-01-09 PROCEDURE — 1240000000 HC EMOTIONAL WELLNESS R&B

## 2020-01-09 RX ADMIN — SENNOSIDES AND DOCUSATE SODIUM 1 TABLET: 8.6; 5 TABLET ORAL at 11:01

## 2020-01-09 RX ADMIN — CARBAMAZEPINE 300 MG: 200 TABLET ORAL at 19:34

## 2020-01-09 RX ADMIN — OXYCODONE 10 MG: 5 TABLET ORAL at 06:15

## 2020-01-09 RX ADMIN — ACETAMINOPHEN 500 MG: 500 TABLET ORAL at 06:15

## 2020-01-09 RX ADMIN — ACETAMINOPHEN 500 MG: 500 TABLET ORAL at 19:33

## 2020-01-09 RX ADMIN — METHOCARBAMOL TABLETS 750 MG: 750 TABLET, COATED ORAL at 11:02

## 2020-01-09 RX ADMIN — OXYCODONE 10 MG: 5 TABLET ORAL at 11:01

## 2020-01-09 RX ADMIN — METHOCARBAMOL TABLETS 750 MG: 750 TABLET, COATED ORAL at 19:33

## 2020-01-09 RX ADMIN — ACETAMINOPHEN 500 MG: 500 TABLET ORAL at 12:07

## 2020-01-09 RX ADMIN — OXYCODONE 10 MG: 5 TABLET ORAL at 19:33

## 2020-01-09 RX ADMIN — QUETIAPINE FUMARATE 300 MG: 300 TABLET ORAL at 19:34

## 2020-01-09 RX ADMIN — METHOCARBAMOL TABLETS 750 MG: 750 TABLET, COATED ORAL at 12:07

## 2020-01-09 RX ADMIN — CARBAMAZEPINE 300 MG: 200 TABLET ORAL at 11:02

## 2020-01-09 ASSESSMENT — SLEEP AND FATIGUE QUESTIONNAIRES
AVERAGE NUMBER OF SLEEP HOURS: 8
DO YOU HAVE DIFFICULTY SLEEPING: NO
DO YOU USE A SLEEP AID: NO

## 2020-01-09 ASSESSMENT — PAIN DESCRIPTION - FREQUENCY: FREQUENCY: CONTINUOUS

## 2020-01-09 ASSESSMENT — PAIN DESCRIPTION - PAIN TYPE
TYPE: ACUTE PAIN
TYPE: ACUTE PAIN

## 2020-01-09 ASSESSMENT — PAIN SCALES - GENERAL
PAINLEVEL_OUTOF10: 10
PAINLEVEL_OUTOF10: 0
PAINLEVEL_OUTOF10: 10
PAINLEVEL_OUTOF10: 10

## 2020-01-09 ASSESSMENT — PAIN DESCRIPTION - ORIENTATION
ORIENTATION: RIGHT
ORIENTATION: RIGHT

## 2020-01-09 ASSESSMENT — PAIN DESCRIPTION - LOCATION
LOCATION: RIB CAGE
LOCATION: RIB CAGE

## 2020-01-09 ASSESSMENT — PAIN DESCRIPTION - DESCRIPTORS
DESCRIPTORS: ACHING;STABBING;THROBBING
DESCRIPTORS: ACHING;STABBING;THROBBING

## 2020-01-09 ASSESSMENT — LIFESTYLE VARIABLES: HISTORY_ALCOHOL_USE: NO

## 2020-01-09 NOTE — PROGRESS NOTES
Pt. States he fills medications at [facility], per [facility] patient has not filled at location since early 2019. Pt. Is ordered Aristada 882mg for 02/04/2020. Unable to verify when last injection was given, per [facility] medication history, Aristada 882mg injection given on 11/30/2019. Will inform Nurse practitioner Karine Patino

## 2020-01-09 NOTE — PROGRESS NOTES
`[facility]  Admission Note     Admission Type:   Admission Type: Probate    Reason for admission:  Reason for Admission:  \"They lied on me\"    PATIENT STRENGTHS:  Strengths: No significant Physical Illness, Positive Support    Patient Strengths and Limitations:  Limitations: Tendency to isolate self    Addictive Behavior:   Addictive Behavior  In the past 3 months, have you felt or has someone told you that you have a problem with:  : None  Do you have a history of Chemical Use?: No  Do you have a history of Alcohol Use?: No  Do you have a history of Street Drug Abuse?: No  Histroy of Prescripton Drug Abuse?: No    Medical Problems:   Past Medical History:   Diagnosis Date    Schizoaffective disorder ([facility] [address]        Status EXAM:  Status and Exam  Normal: No  Facial Expression: Worried  Affect: Constricted  Level of Consciousness: Alert  Mood:Normal: No  Mood: Anxious, Labile, Irritable  Motor Activity:Normal: Yes  Interview Behavior: Cooperative, Irritable  Preception: [place] to Person, [place] to Time, [place] to Place, [place] to Situation  Attention:Normal: No  Attention: Distractible  Thought Content:Normal: Yes  Hallucinations: None  Delusions: No  Memory:Normal: No  Memory: Confabulation  Insight and Judgment: No  Insight and Judgment: Poor Insight, Poor Judgment  Present Suicidal Ideation: No  Present Homicidal Ideation: No    Tobacco Screening:  Practical Counseling, on admission, lydia X, if applicable and completed (first 3 are required if patient doesn't refuse):            ( )  Recognizing danger situations (included triggers and roadblocks)                    ( )  Coping skills (new ways to manage stress, exercise, relaxation techniques, changing routine, distraction)                                                           ( )  Basic information about quitting (benefits of quitting, techniques in how to quit, available resources  ( ) [facility]                                           ( ) Patient refused counseling  ( ) Patient has not smoked in the last 30 days    Metabolic Screening:    Lab Results   Component Value Date    LABA1C 5.2 10/27/2019       Lab Results   Component Value Date    CHOL 120 10/27/2019     Lab Results   Component Value Date    TRIG 52 10/27/2019     Lab Results   Component Value Date    HDL 42 10/27/2019     No components found for: [facility]  Lab Results   Component Value Date    LABVLDL 10 10/27/2019         Body mass index is 21.84 kg/m². BP Readings from Last 2 Encounters:   01/09/20 114/74   01/08/20 (!) 124/58           Pt admitted with followings belongings:  Dentures: None  Vision - Corrective Lenses: None  Hearing Aid: None  Jewelry: None  Body Piercings Removed: N/A  Clothing: Footwear, Jacket / coat, Shirt, Undergarments (Comment)  Were All Patient Medications Collected?: Not Applicable  Other Valuables: None     Valuables sent home with n/a. Valuables placed in safe in security envelope, number:  n/a. Patient's home medications were yes. Patient oriented to surroundings and program expectations and copy of patient rights given. Received admission packet:  yes. Consents reviewed, signed yes. Refused n/a. Patient verbalize understanding:  yes. Patient education on precautions: yes    Patient was complaint with admission with this staff, unable to locate patients paperwork from when patient was brought to unit during third shift. Patient states he is complaint with medication and his mother lied and stated he has not been on medications and had him probated. Patient did receive BARROS on medical unit on 01/05/2020. Patient denies SI, HI, hallucinations, history of abuse and drug and alcohol use, however, was positive for cocaine and marijuana. Patient states he lives with a cousin on the [address] [facility] and can return upon discharge.                     Eladio Neville RN
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
## 2020-01-09 NOTE — GROUP NOTE
Group Therapy Note    Group Therapy Note    Date: 1/9/2020    Group Start Time: 1120  Group End Time: 1200  Group Topic: Psychotherapy    [facility]    CELINA Lopez        Group Therapy Note    Attendees: 4         Patient's Goal:  To increase socialization and improve communication with others. Notes:  PT actively able to share feelings in group related to family dynamics. Pt shared frustration re: how he feels mom doesn't provide him with emotional or physical support he needs. He was able to provide positive feedback and support to other peers in group. Status After Intervention:  Improved    Participation Level: Active Listener and Interactive. Pt did fall asleep at end of group. Participation Quality: Appropriate, Attentive, Sharing and Supportive. Lethargic at end.       Speech:  normal      Thought Process/Content: Logical      Affective Functioning: Blunted      Mood: depressed      Level of consciousness:  Alert, Oriented x4 and Attentive      Response to Learning: Able to verbalize current knowledge/experience, Able to verbalize/acknowledge new learning and Progressing to goal      Endings: None Reported    Modes of Intervention: Support, Socialization, Exploration and Problem-solving      Discipline Responsible: /Counselor      Signature:  CELINA Morin

## 2020-01-09 NOTE — PROGRESS NOTES
Pt awoke hostile, intrusive and argumentative, easily offended. Would only let people of his race wait on him or take his vital signs. Refused to complete his admission and said \"see I have this hole in my side\", and he showed his left side dressing and it was dry and intact. Had to be frequently encouraged to put a shirt on. Maintained control of his behavior, but is irritable. Will monitor closely.

## 2020-01-10 PROCEDURE — 6370000000 HC RX 637 (ALT 250 FOR IP): Performed by: NURSE PRACTITIONER

## 2020-01-10 PROCEDURE — 1240000000 HC EMOTIONAL WELLNESS R&B

## 2020-01-10 PROCEDURE — 6370000000 HC RX 637 (ALT 250 FOR IP): Performed by: SURGERY

## 2020-01-10 RX ORDER — QUETIAPINE FUMARATE 200 MG/1
400 TABLET, FILM COATED ORAL NIGHTLY
Status: DISCONTINUED | OUTPATIENT
Start: 2020-01-10 | End: 2020-01-14 | Stop reason: HOSPADM

## 2020-01-10 RX ADMIN — CARBAMAZEPINE 300 MG: 200 TABLET ORAL at 20:00

## 2020-01-10 RX ADMIN — ACETAMINOPHEN 500 MG: 500 TABLET ORAL at 06:38

## 2020-01-10 RX ADMIN — CARBAMAZEPINE 300 MG: 200 TABLET ORAL at 10:43

## 2020-01-10 RX ADMIN — SENNOSIDES AND DOCUSATE SODIUM 1 TABLET: 8.6; 5 TABLET ORAL at 10:43

## 2020-01-10 RX ADMIN — ACETAMINOPHEN 500 MG: 500 TABLET ORAL at 01:53

## 2020-01-10 RX ADMIN — OXYCODONE 10 MG: 5 TABLET ORAL at 19:59

## 2020-01-10 RX ADMIN — OXYCODONE 10 MG: 5 TABLET ORAL at 01:53

## 2020-01-10 RX ADMIN — OXYCODONE 10 MG: 5 TABLET ORAL at 06:38

## 2020-01-10 RX ADMIN — METHOCARBAMOL TABLETS 750 MG: 750 TABLET, COATED ORAL at 20:00

## 2020-01-10 RX ADMIN — METHOCARBAMOL TABLETS 750 MG: 750 TABLET, COATED ORAL at 10:44

## 2020-01-10 RX ADMIN — OXYCODONE 10 MG: 5 TABLET ORAL at 10:44

## 2020-01-10 RX ADMIN — QUETIAPINE FUMARATE 400 MG: 200 TABLET ORAL at 20:00

## 2020-01-10 ASSESSMENT — PAIN SCALES - GENERAL
PAINLEVEL_OUTOF10: 10
PAINLEVEL_OUTOF10: 0
PAINLEVEL_OUTOF10: 10

## 2020-01-10 ASSESSMENT — PAIN DESCRIPTION - PAIN TYPE: TYPE: ACUTE PAIN

## 2020-01-10 ASSESSMENT — PAIN DESCRIPTION - DESCRIPTORS: DESCRIPTORS: CONSTANT;STABBING;THROBBING

## 2020-01-10 ASSESSMENT — PAIN DESCRIPTION - ORIENTATION: ORIENTATION: RIGHT

## 2020-01-10 ASSESSMENT — PAIN DESCRIPTION - LOCATION: LOCATION: RIB CAGE

## 2020-01-10 NOTE — PROGRESS NOTES
dizziness  Hematolymphoid Symptoms: [] Adenopathy [] Bruises   [] Schimosis       Psychiatric Review of systems  Delusions:  [] Denies [x] Endorses   Withdrawals:  [x] Denies [] Endorses    Hallucinations: [x] Denies [] Endorses    Extra Pyramidal Symptoms: [x] Denies [] Endorses      BP (!) 146/77   Pulse 99   Temp 97.6 °F (36.4 °C) (Temporal)   Resp 18   Ht 6' (1.829 m)   Wt 161 lb (73 kg)   SpO2 99%   BMI 21.84 kg/m²     Mental Status Examination:    Cognition:      [x] Alert  [x] Awake  [x] Oriented  [x] Person  [x] Place [x] Time      [] drowsy  [] tired  [] lethargic  [] distractable  [] Other     Attention/Concentration:   [x] Attentive  [] Distracted        Memory Recent and Remote: [x] Intact   [] Impaired [] Partially Impaired     Language: [x] Able to recognize and name objects          [] Unable to recognize and name Objects    Fund of Knowledge:  [] Poor [x]  Fair  [] Good    Speech: [] Normal  [] Soft  [] Slow  [] Fast [x] Pressured            [] Loud [] Dysarthria  [] Incoherent    Appearance: [] Well Groomed  [x] Casual Dressed  [] Unkept  [] Disheveled          [] Normal weight[] Thin  [] Overweight  [] Obese           Attitude: [] Positive  [] Hostile  [] Demanding  [] Guarded  [] Defensive         [] Cooperative  [x]  Uncooperative      Behavior:  [x] Normal Gait  [] Walks with Assistance  [] Rosaura Chair    [] Walks with Viraj Daniel  [] In Hospital Bed  [] Sitting in Chair    Muscle-Skeletal:  [x] Normal Muscle Tone [] Muscle Atrophy       [] Abnormal Muscle Movement     Eye Contact:  [] Good eye contact  [x] Intermittent Eye Contact  [] Poor Eye Contact     Mood: [] Depressed  [x] Anxious  [] Irritated  [] Euthymic   [] Angry [] Restless    Affect:  [] Congruent  [] Incongruent  [x] Labile  [] Constricted  [] Flat  [] Bizarre     Thought Process and Association:  [] Logical [] Illogical       [] Linear and Goal Directed  [] Tangential  [x] Circumstantial     Thought Content:  [] Denies [x]

## 2020-01-10 NOTE — PROGRESS NOTES
Spiritual Support Group Note    Number of Participants in Group:  5                      Time: 2    Goal: Relief from isolation and loneliness             Maddy Sharing             Self-understanding and gain insight              Acceptance and belonging            Recognize they are not alone                Socialization             Empowerment       Encouragement    Topic:  [] Spiritual Wellness and Self Care                  [] Hope                     [] Connecting with Divine/Others        [] Thankfulness and Gratitude               [x]  Meaningfulness and Purpose               [] Forgiveness               [] Peace               [] Connect to [employer]      [] Other    Participation Level:   [x] Active Listener   [] Minimal   [] Monopolizing   [] Interactive   [] No Participation   []  Other:     Attention:   [x] Alert   [] Distractible   [] Drowsy   [] Poor   [] Other:    Manner:   [x] Cooperative   [] Suspicious   [] Withdrawn   [] Guarded   [] Irritable   [] Inhospitable   [] Other:     Others Comments from Group:

## 2020-01-10 NOTE — GROUP NOTE
Group Therapy Note    Date: 1/10/2020    Group Start Time: 7235  Group End Time: 2159  Group Topic: Psychotherapy    [facility]  FRANCISCO Jimenez LSW        Group Therapy Note    Attendees: 9         Patient's Goal: HEALING AND IMPROVING RELATIONSHIPS WITH OTHERS    Notes:  Patient noted improvement could be made between him and his family members. Status After Intervention:  Unchanged    Participation Level:  Active Listener and Interactive    Participation Quality: Appropriate and Attentive      Speech:  normal      Thought Process/Content: Logical      Affective Functioning: Congruent      Mood: anxious and depressed      Level of consciousness:  Alert and Attentive      Response to Learning: Able to verbalize current knowledge/experience, Able to verbalize/acknowledge new learning, Able to retain information, Capable of insight, Able to change behavior and Progressing to goal      Endings: None Reported    Modes of Intervention: Education and Support      Discipline Responsible: /Counselor      Signature:  FRANCISCO Berry LSW

## 2020-01-10 NOTE — PLAN OF CARE
Patient is labile and hypomanic. Patient does present paranoid and suspicious. Patient patient presents blocking and irritability and will refrain from answering certain questions as Carlo Morataya is none of your business. \" No behavioral issues. Patient denies SI, HI, and AVH. Patient presents anxious, depressed and irritable. Insight is poor. Upset regarding discharge. Will monitor closely.

## 2020-01-11 PROCEDURE — 1240000000 HC EMOTIONAL WELLNESS R&B

## 2020-01-11 PROCEDURE — 6370000000 HC RX 637 (ALT 250 FOR IP): Performed by: NURSE PRACTITIONER

## 2020-01-11 PROCEDURE — 6370000000 HC RX 637 (ALT 250 FOR IP): Performed by: SURGERY

## 2020-01-11 RX ADMIN — OXYCODONE 10 MG: 5 TABLET ORAL at 06:07

## 2020-01-11 RX ADMIN — METHOCARBAMOL TABLETS 750 MG: 750 TABLET, COATED ORAL at 09:04

## 2020-01-11 RX ADMIN — QUETIAPINE FUMARATE 400 MG: 200 TABLET ORAL at 20:55

## 2020-01-11 RX ADMIN — CARBAMAZEPINE 300 MG: 200 TABLET ORAL at 09:04

## 2020-01-11 RX ADMIN — CARBAMAZEPINE 300 MG: 200 TABLET ORAL at 20:54

## 2020-01-11 RX ADMIN — SENNOSIDES AND DOCUSATE SODIUM 1 TABLET: 8.6; 5 TABLET ORAL at 09:04

## 2020-01-11 RX ADMIN — METHOCARBAMOL TABLETS 750 MG: 750 TABLET, COATED ORAL at 20:54

## 2020-01-11 RX ADMIN — METHOCARBAMOL TABLETS 750 MG: 750 TABLET, COATED ORAL at 17:02

## 2020-01-11 RX ADMIN — OXYCODONE 10 MG: 5 TABLET ORAL at 17:03

## 2020-01-11 RX ADMIN — OXYCODONE 10 MG: 5 TABLET ORAL at 21:09

## 2020-01-11 ASSESSMENT — PAIN SCALES - GENERAL
PAINLEVEL_OUTOF10: 10

## 2020-01-11 NOTE — PROGRESS NOTES
DATE OF SERVICE:     1/11/2020    Stacia Rodriguez seen today for the purpose of continuation of care. Nursing, social work reports, laboratory studies and vital signs are reviewed. Patient chief complaint today is:             [x] Depression      [x] Anxiety        [x] Psychosis         [] Suicidal/Homicidal                         [x] Delusions           [] Aggression          Subjective: Today patient states that \"I want to go home, I don't understand why you are not letting me go home. \"  Per nursing taking medications, no groups. Slept 4.5 hours. Continues to talk to unseen others. Denies SI HI    Sleep:  [] Good [] Fair  [x] Poor  Appetite:  [] Good [x] Fair  [] Poor    Depression:  [] Mild [] Moderate [] Severe                [] Constant [] Sporadic     Anxiety: [x] Mild [] Moderate [] Severe    [] Constant [x] Sporadic     Delusions: [] Mild [x] Moderate [] Severe     [x] Constant [] Sporadic     [] Paranoid [] Somatic [] Grandiose     Hallucinations: [] Mild [x] Moderate [] Severe     [] Constant [x] Sporadic    [x] Auditory  [] Visual [] Tactile       Suicidal: [] Constant [] Sporadic  Homicidal: [] Constant [] Sporadic    Unscheduled Medications     [] Patient Receiving Emergency Medications \" Chemical Restraint\"   [] Requesting PRN medications for anxiety    Medical Review of Systems:     All other than marked systmes have been reviewed and are all negative.     Constitutional Symptoms: []  fever []  Chills  Skin Symptoms: [] rash []  Pruritus   Eye Symptoms: [] Vision unchanged []  recent vision problems[] blurred vision   Respiratory Symptoms:[] shortness of breath [] cough  Cardiovascular Symptoms:  [] chest pain   [] palpitations   Gastrointestinal Symptoms: []  abdominal pain []  nausea []  vomiting []  diarrhea  Genitourinary Symptoms: []  dysuria  []  hematuria   Musculoskeletal Symptoms: []  back pain []  muscle pain []  joint pain  Neurologic Symptoms: []  headache [] Suicidal [] Homicidal  [x] Delusional      [x] Paranoid  [] Somatic  [] Grandiose    Perception: []  None  [x] Auditory   [] Visual  [] tactile   [] olfactory  [] Illusions         Insight: [] Intact  [] Fair  [x] Limited    Judgement:  [] Intact  [] Fair  [x] Limited      Assessment/Plan:        Patient Active Problem List   Diagnosis Code    Schizophrenia ([facility] [address] F20.9    Schizoaffective disorder, bipolar type ([facility] [address] F25.0    Schizoaffective disorder ([facility] [address] F25.9    Trauma T14.90XA    Pneumothorax, traumatic S27. 0XXA    Multiple closed fractures of ribs of right side S22.41XA    Rib pain on right side R07.81    Multiple fractures of right lower extremity and ribs, closed, initial encounter\. right tib fracture 9 & 10 S82. 91XA, S22.41XA    Hemopneumothorax, right J94.2    Major depression single episode, in partial remission ([facility] [address] F32.4         Plan:    []  Patient is refusing medications  [x] Improving as expected   [] Not improving as expected   [] Worsening    []  At Baseline     Continue current plan  Possible discharge Monday      Reason for more than one antipsychotic:  [x] N/A  [] 3 failed monotherapy(drugs tried):  [] Cross over to a new antipsychotic  [] Taper to monotherapy from polypharmacy  [] Augmentation of Clozapine therapy due to treatment resistance to single therapy    Met with patient and discussed the risks and benefits associated with treatment and the patient expressed understanding.      Signed:  Marten Leyden Carter-Robinson  1/11/2020  2:50 PM

## 2020-01-12 PROCEDURE — 1240000000 HC EMOTIONAL WELLNESS R&B

## 2020-01-12 PROCEDURE — 6370000000 HC RX 637 (ALT 250 FOR IP): Performed by: SURGERY

## 2020-01-12 PROCEDURE — 6370000000 HC RX 637 (ALT 250 FOR IP): Performed by: NURSE PRACTITIONER

## 2020-01-12 RX ADMIN — SENNOSIDES AND DOCUSATE SODIUM 1 TABLET: 8.6; 5 TABLET ORAL at 20:35

## 2020-01-12 RX ADMIN — SENNOSIDES AND DOCUSATE SODIUM 1 TABLET: 8.6; 5 TABLET ORAL at 09:25

## 2020-01-12 RX ADMIN — OXYCODONE 10 MG: 5 TABLET ORAL at 02:24

## 2020-01-12 RX ADMIN — CARBAMAZEPINE 300 MG: 200 TABLET ORAL at 20:35

## 2020-01-12 RX ADMIN — METHOCARBAMOL TABLETS 750 MG: 750 TABLET, COATED ORAL at 20:35

## 2020-01-12 RX ADMIN — QUETIAPINE FUMARATE 400 MG: 200 TABLET ORAL at 20:35

## 2020-01-12 RX ADMIN — OXYCODONE 10 MG: 5 TABLET ORAL at 09:25

## 2020-01-12 RX ADMIN — ACETAMINOPHEN 500 MG: 500 TABLET ORAL at 12:22

## 2020-01-12 RX ADMIN — CARBAMAZEPINE 300 MG: 200 TABLET ORAL at 09:25

## 2020-01-12 RX ADMIN — METHOCARBAMOL TABLETS 750 MG: 750 TABLET, COATED ORAL at 18:30

## 2020-01-12 RX ADMIN — OXYCODONE 10 MG: 5 TABLET ORAL at 18:31

## 2020-01-12 RX ADMIN — METHOCARBAMOL TABLETS 750 MG: 750 TABLET, COATED ORAL at 09:25

## 2020-01-12 RX ADMIN — METHOCARBAMOL TABLETS 750 MG: 750 TABLET, COATED ORAL at 12:22

## 2020-01-12 ASSESSMENT — PAIN SCALES - GENERAL
PAINLEVEL_OUTOF10: 10

## 2020-01-12 NOTE — GROUP NOTE
Group Therapy Note    Date: 1/12/2020    Group Start Time: 1115  Group End Time: 1200  Group Topic: Psychoeducation    [facility] [address]        Group Therapy Note    Attendees: 13       Notes: Active and engaged during discussion on coping with stress. Able to share and reflect with peers.       Status After Intervention:  Improved    Participation Level: Monopolizing    Participation Quality: Intrusive      Speech:  pressured      Thought Process/Content: Flight of ideas      Affective Functioning: Exaggerated      Mood: elevated      Level of consciousness:  Alert      Response to Learning: Progressing to goal      Endings: None Reported    Modes of Intervention: Education, Support, Socialization and Exploration      Discipline Responsible: Psychoeducational Specialist      Signature:  Rosalee Urrutia, [address]

## 2020-01-12 NOTE — PROGRESS NOTES
muscle pain []  joint pain  Neurologic Symptoms: []  headache []  dizziness  Hematolymphoid Symptoms: [] Adenopathy [] Bruises   [] Schimosis       Psychiatric Review of systems  Delusions:  [x] Denies [] Endorses   Withdrawals:  [x] Denies [] Endorses    Hallucinations: [] Denies [x] Endorses    Extra Pyramidal Symptoms: [x] Denies [] Endorses      BP (!) 123/59   Pulse 97   Temp 97.2 °F (36.2 °C) (Oral)   Resp 18   Ht 6' (1.829 m)   Wt 161 lb (73 kg)   SpO2 99%   BMI 21.84 kg/m²     Mental Status Examination:    Cognition:      [x] Alert  [x] Awake  [x] Oriented  [x] Person  [x] Place [x] Time      [] drowsy  [] tired  [] lethargic  [] distractable  [] Other     Attention/Concentration:   [x] Attentive  [] Distracted        Memory Recent and Remote: [x] Intact   [] Impaired [] Partially Impaired     Language: [x] Able to recognize and name objects          [] Unable to recognize and name Objects    Fund of Knowledge:  [] Poor []  Fair  [x] Good    Speech: [x] Normal  [] Soft  [] Slow  [] Fast [] Pressured            [] Loud [] Dysarthria  [] Incoherent    Appearance: [] Well Groomed  [x] Casual Dressed  [] Unkept  [] Disheveled          [] Normal weight[] Thin  [] Overweight  [] Obese           Attitude: [] Positive  [] Hostile  [] Demanding  [] Guarded  [] Defensive         [x] Cooperative  []  Uncooperative      Behavior:  [x] Normal Gait  [] Walks with Assistance  [] Rosaura Chair    [] Walks with THE FASHION  [] In Hospital Bed  [] Sitting in Chair    Muscle-Skeletal:  [x] Normal Muscle Tone [] Muscle Atrophy       [] Abnormal Muscle Movement     Eye Contact:  [x] Good eye contact  [] Intermittent Eye Contact  [] Poor Eye Contact     Mood: [] Depressed  [x] Anxious  [] Irritated  [] Euthymic   [] Angry [] Restless    Affect:  [] Congruent  [] Incongruent  [x] Labile  [] Constricted  [] Flat  [] Bizarre     Thought Process and Association:  [] Logical [] Illogical       [x] Linear and Goal Directed  []

## 2020-01-12 NOTE — PROGRESS NOTES
Pt resting in his room. Woke pt for assessment. States he has been \"trying to rest\". Denies SI, HI, and AVH. Per report earlier, pt has been seen talking to self. Pt c/o pain to rt ribcaleidy, 10/10, when getting up OOB. Fidencio Botello Pt encouraged to get up and move around and get a snack to eat. Night meds given with pain medications. Pt sat at table by self to eat and then went back to room. No social interaction or talking to unseen others noted. Will cont to monitor.  Pt currently sleeping in room

## 2020-01-13 PROCEDURE — 99231 SBSQ HOSP IP/OBS SF/LOW 25: CPT | Performed by: NURSE PRACTITIONER

## 2020-01-13 PROCEDURE — 1240000000 HC EMOTIONAL WELLNESS R&B

## 2020-01-13 PROCEDURE — 6370000000 HC RX 637 (ALT 250 FOR IP): Performed by: NURSE PRACTITIONER

## 2020-01-13 PROCEDURE — 6370000000 HC RX 637 (ALT 250 FOR IP): Performed by: SURGERY

## 2020-01-13 RX ADMIN — OXYCODONE 10 MG: 5 TABLET ORAL at 11:55

## 2020-01-13 RX ADMIN — SENNOSIDES AND DOCUSATE SODIUM 1 TABLET: 8.6; 5 TABLET ORAL at 20:35

## 2020-01-13 RX ADMIN — ACETAMINOPHEN 500 MG: 500 TABLET ORAL at 11:54

## 2020-01-13 RX ADMIN — SENNOSIDES AND DOCUSATE SODIUM 1 TABLET: 8.6; 5 TABLET ORAL at 09:04

## 2020-01-13 RX ADMIN — OXYCODONE 10 MG: 5 TABLET ORAL at 06:42

## 2020-01-13 RX ADMIN — METHOCARBAMOL TABLETS 750 MG: 750 TABLET, COATED ORAL at 11:55

## 2020-01-13 RX ADMIN — METHOCARBAMOL TABLETS 750 MG: 750 TABLET, COATED ORAL at 16:29

## 2020-01-13 RX ADMIN — CARBAMAZEPINE 300 MG: 200 TABLET ORAL at 09:04

## 2020-01-13 RX ADMIN — ACETAMINOPHEN 500 MG: 500 TABLET ORAL at 18:09

## 2020-01-13 RX ADMIN — OXYCODONE 10 MG: 5 TABLET ORAL at 20:35

## 2020-01-13 RX ADMIN — QUETIAPINE FUMARATE 400 MG: 200 TABLET ORAL at 20:35

## 2020-01-13 RX ADMIN — CARBAMAZEPINE 300 MG: 200 TABLET ORAL at 20:35

## 2020-01-13 RX ADMIN — OXYCODONE 10 MG: 5 TABLET ORAL at 16:29

## 2020-01-13 RX ADMIN — METHOCARBAMOL TABLETS 750 MG: 750 TABLET, COATED ORAL at 20:35

## 2020-01-13 RX ADMIN — METHOCARBAMOL TABLETS 750 MG: 750 TABLET, COATED ORAL at 09:04

## 2020-01-13 RX ADMIN — ACETAMINOPHEN 500 MG: 500 TABLET ORAL at 06:41

## 2020-01-13 ASSESSMENT — PAIN DESCRIPTION - LOCATION
LOCATION: RIB CAGE

## 2020-01-13 ASSESSMENT — PAIN SCALES - GENERAL
PAINLEVEL_OUTOF10: 10
PAINLEVEL_OUTOF10: 4
PAINLEVEL_OUTOF10: 10
PAINLEVEL_OUTOF10: 4
PAINLEVEL_OUTOF10: 0

## 2020-01-13 ASSESSMENT — PAIN DESCRIPTION - FREQUENCY
FREQUENCY: CONTINUOUS

## 2020-01-13 ASSESSMENT — PAIN DESCRIPTION - PAIN TYPE
TYPE: ACUTE PAIN;SURGICAL PAIN

## 2020-01-13 ASSESSMENT — PAIN DESCRIPTION - DESCRIPTORS
DESCRIPTORS: CONSTANT;STABBING;THROBBING

## 2020-01-13 ASSESSMENT — PAIN DESCRIPTION - ORIENTATION
ORIENTATION: RIGHT

## 2020-01-13 NOTE — GROUP NOTE
Group Therapy Note    Date: 1/13/2020    Group Start Time: 1115  Group End Time: 1200  Group Topic: Psychotherapy    [facility], MSW, LSW        Group Therapy Note    Attendees: 6       Patient's Goal:  To improve human relationships through social interactions    Notes:  Pt participated and made positive connections during group    Status After Intervention:  Improved    Participation Level:  Active Listener and Interactive    Participation Quality: Appropriate, Attentive and Sharing      Speech:  normal      Thought Process/Content: Logical      Affective Functioning: Congruent      Mood: euthymic      Level of consciousness:  Oriented x4      Response to Learning: Able to verbalize current knowledge/experience and Able to verbalize/acknowledge new learning      Endings: None Reported    Modes of Intervention: Support, Socialization and Exploration      Discipline Responsible: /Counselor      Signature:  FRANCISCO Brewer, LSW

## 2020-01-13 NOTE — PROGRESS NOTES
Continues to report 10/10 pain in right ribcage area, throbbing and stabbing, continues to request Oxycodone.

## 2020-01-13 NOTE — PROGRESS NOTES
Attended afternoon meet and greet and recreation activity of music. Patient was 1 of 11. Patient engaged and participated in group.

## 2020-01-13 NOTE — PROGRESS NOTES
Withdrawals:  [] Denies [] Endorses    Hallucinations: [] Denies [] Endorses    Extra Pyramidal Symptoms: [] Denies [] Endorses      /62   Pulse 104   Temp 98.4 °F (36.9 °C) (Temporal)   Resp 16   Ht 6' (1.829 m)   Wt 161 lb (73 kg)   SpO2 99%   BMI 21.84 kg/m²     Mental Status Examination:    Cognition:      [x] Alert  [x] Awake  [x] Oriented  [x] Person  [x] Place [] Time      [] drowsy  [] tired  [] lethargic  [] distractable  [] Other     Attention/Concentration:   [x] Attentive  [] Distracted        Memory Recent and Remote: [x] Intact   [] Impaired [] Partially Impaired     Language: [] Able to recognize and name objects          [] Unable to recognize and name Objects    Fund of Knowledge:  [] Poor []  Fair  [x] Good    Speech: [x] Normal  [] Soft  [] Slow  [] Fast [] Pressured            [] Loud [] Dysarthria  [] Incoherent    Appearance: [] Well Groomed  [x] Casual Dressed  [] Unkept  [] Disheveled          [] Normal weight[] Thin  [] Overweight  [] Obese           Attitude: [] Positive  [] Hostile  [] Demanding  [] Guarded  [] Defensive         [x] Cooperative  []  Uncooperative      Behavior:  [x] Normal Gait  [] Walks with Assistance  [] Rosaura Chair    [] Walks with [employer]  [] In Hospital Bed  [] Sitting in Chair    Muscle-Skeletal:  [] Normal Muscle Tone [] Muscle Atrophy       [] Abnormal Muscle Movement     Eye Contact:  [x] Good eye contact  [] Intermittent Eye Contact  [] Poor Eye Contact     Mood: [] Depressed  [x] Anxious  [] Irritated  [] Euthymic   [] Angry [] Restless    Affect:  [] Congruent  [] Incongruent  [] Labile  [] Constricted  [] Flat  [] Bizarre     Thought Process and Association:  [] Logical [] Illogical       [] Linear and Goal Directed  [x] Tangential  [] Circumstantial     Thought Content:  [x] Denies [] Endorses [] Suicidal [] Homicidal  [] Delusional      [] Paranoid  [] Somatic  [] Grandiose    Perception: [x]  None  [] Auditory   [] Visual  [] tactile   [] olfactory  [] Illusions         Insight: [] Intact  [] Fair  [x] Limited    Judgement:  [] Intact  [] Fair  [x] Limited      Assessment/Plan:        Patient Active Problem List   Diagnosis Code    Schizophrenia ([facility] [address] F20.9    Schizoaffective disorder, bipolar type ([facility] [address] F25.0    Schizoaffective disorder ([facility] [address] F25.9    Trauma T14.90XA    Pneumothorax, traumatic S27. 0XXA    Multiple closed fractures of ribs of right side S22.41XA    Rib pain on right side R07.81    Multiple fractures of right lower extremity and ribs, closed, initial encounter\. right tib fracture 9 & 10 S82. 91XA, S22.41XA    Hemopneumothorax, right J94.2    Major depression single episode, in partial remission ([facility] [address] F32.4         Plan:    []  Patient is refusing medications  [x] Improving as expected   [] Not improving as expected   [] Worsening    []  At Baseline     Probable d/c tomorrow    Reason for more than one antipsychotic:  [x] N/A  [] 3 failed monotherapy(drugs tried):  [] Cross over to a new antipsychotic  [] Taper to monotherapy from polypharmacy  [] Augmentation of Clozapine therapy due to treatment resistance to single therapy      Signed:  Alverto Rivera  1/13/2020  10:10 AM

## 2020-01-13 NOTE — GROUP NOTE
Group Therapy Note    Date: 1/13/2020    Group Start Time: 1020  Group End Time: 1100  Group Topic: Psychoeducation    [facility]  [address] [place], [place]                                                                        Group Therapy Note    Date: 1/13/2020  Type of Group: Psychoeducation    Wellness Binder Information  Patient's objective: patient will be able to id main components on being well lifestyles   Notes:  pleasant and engaged in group   Status After Intervention:  Improved  Participation Level:  Active Listener and Interactive  Participation Quality: Appropriate, Attentive, Sharing, and Supportive  Thought Process/Content: Logical  Affective Functioning: Congruent  Mood: euthymic  Level of consciousness:  Alert, Oriented x4, and Attentive  Response to Learning: Able to verbalize/acknowledge new learning, Able to retain information, and Progressing to goal  Endings: None Reported  Modes of Intervention: Education, Support, Socialization, Exploration, and Problem-solving  Discipline Responsible: Psychoeducational Specialist  Signature:  Alejandro Chandler   Group Therapy Note

## 2020-01-13 NOTE — CARE COORDINATION
SW attempted to call [facility] to schedule appointment    Pt attended tx and plans to be discharged tomorrow Pt denies si, denies Hi, and denies hallucinations

## 2020-01-14 ENCOUNTER — TELEPHONE (OUTPATIENT)
Dept: SURGERY | Age: 40
End: 2020-01-14

## 2020-01-14 VITALS
HEART RATE: 91 BPM | BODY MASS INDEX: 21.81 KG/M2 | TEMPERATURE: 97.7 F | HEIGHT: 72 IN | DIASTOLIC BLOOD PRESSURE: 70 MMHG | WEIGHT: 161 LBS | RESPIRATION RATE: 16 BRPM | SYSTOLIC BLOOD PRESSURE: 118 MMHG | OXYGEN SATURATION: 99 %

## 2020-01-14 PROCEDURE — 99238 HOSP IP/OBS DSCHRG MGMT 30/<: CPT | Performed by: NURSE PRACTITIONER

## 2020-01-14 PROCEDURE — 6370000000 HC RX 637 (ALT 250 FOR IP): Performed by: SURGERY

## 2020-01-14 RX ORDER — QUETIAPINE FUMARATE 400 MG/1
400 TABLET, FILM COATED ORAL NIGHTLY
Qty: 30 TABLET | Refills: 0 | Status: ON HOLD | OUTPATIENT
Start: 2020-01-14 | End: 2020-03-27 | Stop reason: HOSPADM

## 2020-01-14 RX ORDER — LIDOCAINE 4 G/G
1 PATCH TOPICAL DAILY
Qty: 14 PATCH | Refills: 0 | Status: ON HOLD | OUTPATIENT
Start: 2020-01-14 | End: 2020-03-27 | Stop reason: HOSPADM

## 2020-01-14 RX ORDER — CARBAMAZEPINE 200 MG/1
300 TABLET ORAL 2 TIMES DAILY
Qty: 90 TABLET | Refills: 0 | Status: ON HOLD | OUTPATIENT
Start: 2020-01-14 | End: 2020-03-27 | Stop reason: HOSPADM

## 2020-01-14 RX ADMIN — OXYCODONE 10 MG: 5 TABLET ORAL at 08:30

## 2020-01-14 RX ADMIN — CARBAMAZEPINE 300 MG: 200 TABLET ORAL at 08:30

## 2020-01-14 RX ADMIN — ACETAMINOPHEN 500 MG: 500 TABLET ORAL at 00:38

## 2020-01-14 RX ADMIN — SENNOSIDES AND DOCUSATE SODIUM 1 TABLET: 8.6; 5 TABLET ORAL at 12:06

## 2020-01-14 RX ADMIN — ACETAMINOPHEN 500 MG: 500 TABLET ORAL at 12:06

## 2020-01-14 RX ADMIN — METHOCARBAMOL TABLETS 750 MG: 750 TABLET, COATED ORAL at 08:30

## 2020-01-14 RX ADMIN — METHOCARBAMOL TABLETS 750 MG: 750 TABLET, COATED ORAL at 12:06

## 2020-01-14 RX ADMIN — ACETAMINOPHEN 500 MG: 500 TABLET ORAL at 06:58

## 2020-01-14 ASSESSMENT — PAIN SCALES - GENERAL
PAINLEVEL_OUTOF10: 8
PAINLEVEL_OUTOF10: 10
PAINLEVEL_OUTOF10: 8
PAINLEVEL_OUTOF10: 10

## 2020-01-14 NOTE — GROUP NOTE
Group Therapy Note    Date: 1/14/2020    Group Start Time: 9716  Group End Time: 1100  Group Topic: Psychoeducation    [facility] [address]        Group Therapy Note    Attendees: 6       Notes: Active and engaged during strengths exploration. Able to share and reflect one skill to apply today. Status After Intervention:  Improved    Participation Level:  Active Listener and Interactive    Participation Quality: Appropriate, Attentive and Sharing      Speech:  normal      Thought Process/Content: Logical      Affective Functioning: Congruent      Mood: euthymic      Level of consciousness:  Alert and Attentive      Response to Learning: Capable of insight, Able to change behavior and Progressing to goal      Endings: None Reported    Modes of Intervention: Education, Support, Socialization and Exploration      Discipline Responsible: Psychoeducational Specialist      Signature:  Karissa Epperson, [address]

## 2020-01-14 NOTE — GROUP NOTE
Group Therapy Note    Date: 1/14/2020    Group Start Time: 1115  Group End Time: 1200  Group Topic: Psychotherapy    [facility]    CELINA Hines        Group Therapy Note    Attendees: 6         Patient's Goal:  To increase socialization and improve communication with others through group psychotherapy. Notes: Pt was active in group discussion focusing on addiction and/or anger issues and how these problems can lead to decreased support from others and importance of respectful communication with others. Pt shared personal issues and provided a lot of positive feedback and support to peers. Status After Intervention:  Improved    Participation Level:  Active Listener and Interactive    Participation Quality: Appropriate, Attentive, Sharing and Supportive      Speech:  normal      Thought Process/Content: Logical      Affective Functioning: Blunted      Mood: depressed      Level of consciousness:  Alert, Oriented x4 and Attentive      Response to Learning: Able to verbalize current knowledge/experience, Able to verbalize/acknowledge new learning and Progressing to goal      Endings: None Reported    Modes of Intervention: Support, Socialization, Exploration and Problem-solving      Discipline Responsible: /Counselor      Signature:  CELINA Hines

## 2020-01-14 NOTE — PROGRESS NOTES
CLINICAL PHARMACY NOTE: MEDS TO [address] Select Patient?: No  Total # of Prescriptions Filled: 2   The following medications were delivered to the patient:  · CARBAMAZEPINE 200 MG   · SEROQUEL 400 MG   Total # of Interventions Completed: 3  Time Spent (min): 15    Additional Documentation:  MEDINA WILL BE TRANSFERRED TO PHARMACY OF CHOICE

## 2020-01-14 NOTE — PLAN OF CARE
Joce indicating gratitude re leandra today. However during 1:1 interview was briefly angry re reports that he has talked to himself in the past and how this beh may be reflective of hearing voices. He denies aH,vH this am.   Also denies hi, si.

## 2020-01-14 NOTE — PROGRESS NOTES
[facility]  Discharge Note    Pt discharged with followings belongings:   Dentures: None  Vision - Corrective Lenses: None  Hearing Aid: None  Jewelry: None  Body Piercings Removed: N/A  Clothing: Footwear, Jacket / coat, Shirt, Undergarments (Comment)  Were All Patient Medications Collected?: Not Applicable  Other Valuables: None   Valuables sent home with pt. No valuables to retriev fr safe. No security envelope number:  NA.  Patient education on aftercare instructions. Information faxed to [facility]. Patient verbalize understanding of AVS:  Y.     Status EXAM upon discharge:  Status and Exam  Normal: No  Facial Expression: Worried, Exaggerated  Affect: Unstable  Level of Consciousness: Alert  Mood:Normal: No  Mood: Anxious, Irritable, Suspicious  Motor Activity:Normal: No  Motor Activity: Increased  Interview Behavior: Cooperative, Irritable  Preception: [place]om to Person, Chaka Jeffers to Time, [place] to Place  Attention:Normal: No  Attention: Distractible  Thought Processes: Flt.of Ideas  Thought Content:Normal: No  Thought Content: Preoccupations  Hallucinations: None(denies but talks and laughs in the pat)  Delusions: No  Memory:Normal: No  Memory: Confabulation  Insight and Judgment: No  Insight and Judgment: Poor Judgment, Poor Insight  Present Suicidal Ideation: No  Present Homicidal Ideation: No      Metabolic Screening:    Lab Results   Component Value Date    LABA1C 5.2 10/27/2019       Lab Results   Component Value Date    CHOL 120 10/27/2019     Lab Results   Component Value Date    TRIG 52 10/27/2019     Lab Results   Component Value Date    HDL 42 10/27/2019     No components found for: [facility] EVALUATION AND TREATMENT [place]  Lab Results   Component Value Date    LABVLDL 10 10/27/2019       Scotty Hardwcik RN

## 2020-01-14 NOTE — PROGRESS NOTES
In hallway talks out loud to unseen others at times no c/o tells me he is going to be D/C rom support given

## 2020-01-14 NOTE — DISCHARGE SUMMARY
Mood: [] Depressed  [] Anxious  [] Irritated  [x] Euthymic   [] Angry [] Restless    Affect:  [x] Congruent  [] Incongruent  [] Labile  [] Constricted  [] Flat  [] Bizarre     Thought Process and Association:  [] Logical [] Illogical       [x] Linear and Goal Directed  [] Tangential  [] Circumstantial     Thought Content:  [x] Denies [] Endorses [] Suicidal [] Homicidal  [] Delusional      [] Paranoid  [] Somatic  [] Grandiose    Perception: [x]  None  [] Auditory   [] Visual  [] tactile   [] olfactory  [] Illusions         Insight: [] Intact  [x] Fair  [] Limited    Judgement:  [] Intact  [x] Fair  [] Limited    Hospital Course:   Admit Date: 1/8/2020     Discharge Date: 1/14/2020  Admitted from:  [x]  Emergency Room  []  Home  []  Another facility   []  [facility]     Admitting diagnosis:   Patient Active Problem List   Diagnosis    [facility]ia ([facility] [address]    Schizoaffective disorder, bipolar type ([facility] [address]    Schizoaffective disorder ([facility] [address]    Trauma    Pneumothorax, traumatic    Multiple closed fractures of ribs of right side    Rib pain on right side    Multiple fractures of right lower extremity and ribs, closed, initial encounter\. right tib fracture 9 & 10    Hemopneumothorax, right    Major depression single episode, in partial remission ([facility] [address]      Length of stay:  6 days              Francis Mcintosh was admitted in Psychiatric unit  from ER with depression and bipolar disorder. Patient was treated            With the above . Patient responded well to the treatment. Discharge Summary Plan:     Discharge Status:    [x] Improved [] Unchanged    [] Worse       Discharge instructions given:  [x] Patient    [] Family [] Other         Discharge disposition:  [x] Home [] Step Down unit  [] Group Home []  [facility]                                                    [] [facility] FAC[facility]    [] AMA  [] Other           Prescriptions: Continue same medications, review with patient.        Reason for more than one

## 2020-01-25 ENCOUNTER — HOSPITAL ENCOUNTER (EMERGENCY)
Age: 40
Discharge: HOME | End: 2020-01-25
Payer: COMMERCIAL

## 2020-01-25 VITALS
WEIGHT: 160 LBS | HEART RATE: 70 BPM | BODY MASS INDEX: 22.4 KG/M2 | SYSTOLIC BLOOD PRESSURE: 115 MMHG | RESPIRATION RATE: 18 BRPM | HEIGHT: 71 IN | TEMPERATURE: 98 F | OXYGEN SATURATION: 99 % | DIASTOLIC BLOOD PRESSURE: 77 MMHG

## 2020-01-25 PROCEDURE — 99281 EMR DPT VST MAYX REQ PHY/QHP: CPT

## 2020-01-25 RX ORDER — IBUPROFEN 600 MG/1
600 TABLET ORAL EVERY 8 HOURS PRN
Qty: 15 TABLET | Refills: 0 | Status: SHIPPED | OUTPATIENT
Start: 2020-01-25 | End: 2020-02-07 | Stop reason: ALTCHOICE

## 2020-01-25 NOTE — ED PROVIDER NOTES
[facility]       Department of Emergency Medicine   ED  Provider Note  Admit Date/RoomTime: 1/25/2020 11:13 AM  ED Room: /[facility]  Chief Complaint   Wound Check (sutures need removed, pt sutured removed a week ago and still one left in)    History of Present Illness   Source of history provided by:  patient. History/Exam Limitations: none. Gunner Gutierrez is a 46 y.o. old male who has a past medical history of:   Past Medical History:   Diagnosis Date    Schizoaffective disorder ([address]    presents to the emergency department for complaint of needing sutures removed from his left side. he reports that he had a collapsed lung and chest tube over 2 weeks ago. States that after that admission he was evaluated in the psychiatric [facility]. Reports that he did not follow-up with surgical clinic. Denies fever, chills, nausea, vomiting, chest pain, shortness of breath, abdominal pain, lightheadedness, dizziness, syncope, or any other complaints at this time. Current antibiotic use: None. ROS    Pertinent positives and negatives are stated within HPI, all other systems reviewed and are negative. Past Surgical History:   Procedure Laterality Date    EYE SURGERY  19 years ago   Social History:  reports that he has been smoking cigars. He has a 12.00 pack-year smoking history. He has never used smokeless tobacco. He reports current drug use. Drug: Marijuana. He reports that he does not drink alcohol. Family History: family history is not on file. Allergies: Rondec-d [chlophedianol-pseudoephedrine]    Physical Exam           ED Triage Vitals   BP Temp Temp Source Pulse Resp SpO2 Height Weight   01/25/20 1137 01/25/20 1137 01/25/20 1137 01/25/20 1113 01/25/20 1137 01/25/20 1113 01/25/20 1137 01/25/20 1137   115/77 98 °F (36.7 °C) Tympanic 88 18 100 % 5' 11\" (1.803 m) 160 lb (72.6 kg)      Oxygen Saturation Interpretation: Normal.    Constitutional:  Alert, development consistent with age. HEENT:  NC/NT. Airway patent. Neck:  Normal ROM. Supple. Respiratory:  Clear to auscultation and breath sounds equal.    CV: Regular rate and rhythm, normal heart sounds, without pathological murmurs, ectopy, gallops, or rubs. Peripheral pulses 2+ palpable  GI:  Abdomen Soft, nontender, good bowel sounds. No firm or pulsatile mass. Integument:  No rashes. Right lateral chest tube wound has crusting noted. Sutures present grossly loose. No surrounding erythema. No drainage noted. Mild swelling present with crusting. Lymphatics: No lymphangitis or adenopathy noted. Extremities: Moves all extremities equal.  Gait steady  Neurological:  Oriented. Motor functions intact. Lab / Imaging Results   (All laboratory and radiology results have been personally reviewed by myself)  Labs:  No results found for this visit on 01/25/20. Imaging: All Radiology results interpreted by Radiologist unless otherwise noted. No orders to display       ED Course / Medical Decision Making   Medications - No data to display     Consult(s):   None    Procedure(s):     PROCEDURE NOTE  1/25/20           SUTURE/STAPLE REMOVAL  Risks, benefits and alternatives (for applicable procedures below) described. Performed By: JUICE Sam CNP. Indication:  Wound healed. Informed consent obtained: The patient provided verbal consent for this procedure. .  Location: right lateral chest  Procedure:  3 sutures were removed without difficulty. Complications:  None. Wound care instructions provided. Patient/guardian was instructed to be alert for any signs of cutaneous infection. MDM:   Runell Boeck is a 61-year-old male who presents emergency department for complaint of needing sutures removed. Over 2 weeks ago he had a chest tube to his right chest wall. He subsequently went to the psychiatric [facility] after his pneumothorax admission. He did not follow-up with surgical clinic. Wound was cleansed and crusting removed.  Sutures

## 2020-01-26 ENCOUNTER — HOSPITAL ENCOUNTER (EMERGENCY)
Age: 40
Discharge: LEFT BEFORE DISCHARGE | End: 2020-01-26
Attending: EMERGENCY MEDICINE
Payer: COMMERCIAL

## 2020-01-26 ENCOUNTER — HOSPITAL ENCOUNTER (EMERGENCY)
Age: 40
Discharge: ANOTHER HOSPITAL | End: 2020-01-26
Attending: EMERGENCY MEDICINE
Payer: COMMERCIAL

## 2020-01-26 VITALS — OXYGEN SATURATION: 94 % | HEART RATE: 82 BPM

## 2020-01-26 VITALS
BODY MASS INDEX: 22.08 KG/M2 | HEIGHT: 72 IN | TEMPERATURE: 98.4 F | HEART RATE: 88 BPM | OXYGEN SATURATION: 97 % | RESPIRATION RATE: 14 BRPM | WEIGHT: 163 LBS | DIASTOLIC BLOOD PRESSURE: 71 MMHG | SYSTOLIC BLOOD PRESSURE: 123 MMHG

## 2020-01-26 LAB
ACETAMINOPHEN LEVEL: <5 MCG/ML (ref 10–30)
ALBUMIN SERPL-MCNC: 4.2 G/DL (ref 3.5–5.2)
ALP BLD-CCNC: 54 U/L (ref 40–129)
ALT SERPL-CCNC: 12 U/L (ref 0–40)
AMPHETAMINE SCREEN, URINE: NOT DETECTED
ANION GAP SERPL CALCULATED.3IONS-SCNC: 14 MMOL/L (ref 7–16)
AST SERPL-CCNC: 16 U/L (ref 0–39)
BARBITURATE SCREEN URINE: NOT DETECTED
BENZODIAZEPINE SCREEN, URINE: NOT DETECTED
BILIRUB SERPL-MCNC: 0.3 MG/DL (ref 0–1.2)
BUN BLDV-MCNC: 18 MG/DL (ref 6–20)
CALCIUM SERPL-MCNC: 9.2 MG/DL (ref 8.6–10.2)
CANNABINOID SCREEN URINE: NOT DETECTED
CHLORIDE BLD-SCNC: 104 MMOL/L (ref 98–107)
CO2: 24 MMOL/L (ref 22–29)
COCAINE METABOLITE SCREEN URINE: POSITIVE
CREAT SERPL-MCNC: 1 MG/DL (ref 0.7–1.2)
ETHANOL: <10 MG/DL (ref 0–0.08)
FENTANYL SCREEN, URINE: NOT DETECTED
GFR AFRICAN AMERICAN: >60
GFR NON-AFRICAN AMERICAN: >60 ML/MIN/1.73
GLUCOSE BLD-MCNC: 82 MG/DL (ref 74–99)
HCT VFR BLD CALC: 46.8 % (ref 37–54)
HEMOGLOBIN: 14.4 G/DL (ref 12.5–16.5)
Lab: ABNORMAL
MCH RBC QN AUTO: 29.9 PG (ref 26–35)
MCHC RBC AUTO-ENTMCNC: 30.8 % (ref 32–34.5)
MCV RBC AUTO: 97.3 FL (ref 80–99.9)
METHADONE SCREEN, URINE: NOT DETECTED
OPIATE SCREEN URINE: NOT DETECTED
OXYCODONE URINE: NOT DETECTED
PDW BLD-RTO: 13.5 FL (ref 11.5–15)
PHENCYCLIDINE SCREEN URINE: NOT DETECTED
PLATELET # BLD: 409 E9/L (ref 130–450)
PMV BLD AUTO: 9.3 FL (ref 7–12)
POTASSIUM SERPL-SCNC: 3.8 MMOL/L (ref 3.5–5)
RBC # BLD: 4.81 E12/L (ref 3.8–5.8)
SALICYLATE, SERUM: <0.3 MG/DL (ref 0–30)
SODIUM BLD-SCNC: 142 MMOL/L (ref 132–146)
TOTAL PROTEIN: 7.2 G/DL (ref 6.4–8.3)
TRICYCLIC ANTIDEPRESSANTS SCREEN SERUM: NEGATIVE NG/ML
WBC # BLD: 3.7 E9/L (ref 4.5–11.5)

## 2020-01-26 PROCEDURE — 99283 EMERGENCY DEPT VISIT LOW MDM: CPT

## 2020-01-26 PROCEDURE — 80307 DRUG TEST PRSMV CHEM ANLYZR: CPT

## 2020-01-26 PROCEDURE — 4500000002 HC ER NO CHARGE

## 2020-01-26 PROCEDURE — 80053 COMPREHEN METABOLIC PANEL: CPT

## 2020-01-26 PROCEDURE — G0480 DRUG TEST DEF 1-7 CLASSES: HCPCS

## 2020-01-26 PROCEDURE — 36415 COLL VENOUS BLD VENIPUNCTURE: CPT

## 2020-01-26 PROCEDURE — 85027 COMPLETE CBC AUTOMATED: CPT

## 2020-01-26 RX ORDER — ZIPRASIDONE MESYLATE 20 MG/ML
10 INJECTION, POWDER, LYOPHILIZED, FOR SOLUTION INTRAMUSCULAR ONCE
Status: DISCONTINUED | OUTPATIENT
Start: 2020-01-26 | End: 2020-01-26 | Stop reason: HOSPADM

## 2020-01-26 ASSESSMENT — PAIN DESCRIPTION - FREQUENCY: FREQUENCY: CONTINUOUS

## 2020-01-26 ASSESSMENT — PAIN SCALES - GENERAL: PAINLEVEL_OUTOF10: 3

## 2020-01-26 ASSESSMENT — PAIN DESCRIPTION - DESCRIPTORS: DESCRIPTORS: ACHING

## 2020-01-26 ASSESSMENT — PAIN DESCRIPTION - ORIENTATION: ORIENTATION: RIGHT

## 2020-01-26 ASSESSMENT — PAIN DESCRIPTION - LOCATION: LOCATION: RIB CAGE

## 2020-01-26 ASSESSMENT — PAIN DESCRIPTION - PAIN TYPE: TYPE: ACUTE PAIN
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
## 2020-01-26 NOTE — ED NOTES
[facility],    Patient is accepted to [facility] by Dr. Marjan Brown. Patient to go to [facility] Unit, Rm #[id number]    N2N: [phone]    SW placed phone call to [employer] [phone] and spoke with Rep. Betty Del Valle    [facility] to transport ETA 1400    Authorization #[id number]     FRANCISCO William, [place]  01/26/20 4961

## 2020-01-26 NOTE — ED NOTES
Received notification from [facility], patient is accepted to [facility]    Faxed pink slip to [phone], MSW, LSW  01/26/20 4991

## 2020-01-26 NOTE — ED NOTES
At this time this nurse went to call patient back for triage and he is no longer in the waiting room.  PD states that he walked out      Candie Putnam, MARY  01/26/20 0950

## 2020-02-07 ENCOUNTER — APPOINTMENT (OUTPATIENT)
Dept: GENERAL RADIOLOGY | Age: 40
End: 2020-02-07
Payer: COMMERCIAL

## 2020-02-07 ENCOUNTER — HOSPITAL ENCOUNTER (EMERGENCY)
Age: 40
Discharge: HOME | End: 2020-02-07
Payer: COMMERCIAL

## 2020-02-07 VITALS
RESPIRATION RATE: 16 BRPM | BODY MASS INDEX: 22.73 KG/M2 | TEMPERATURE: 97.3 F | HEART RATE: 78 BPM | OXYGEN SATURATION: 99 % | DIASTOLIC BLOOD PRESSURE: 84 MMHG | SYSTOLIC BLOOD PRESSURE: 125 MMHG | HEIGHT: 71 IN

## 2020-02-07 PROCEDURE — 71101 X-RAY EXAM UNILAT RIBS/CHEST: CPT

## 2020-02-07 PROCEDURE — 99283 EMERGENCY DEPT VISIT LOW MDM: CPT

## 2020-02-07 RX ORDER — IBUPROFEN 800 MG/1
800 TABLET ORAL EVERY 8 HOURS PRN
Qty: 21 TABLET | Refills: 0 | Status: SHIPPED | OUTPATIENT
Start: 2020-02-07 | End: 2020-08-09 | Stop reason: SDUPTHER

## 2020-02-07 RX ORDER — KETOROLAC TROMETHAMINE 30 MG/ML
30 INJECTION, SOLUTION INTRAMUSCULAR; INTRAVENOUS ONCE
Status: DISCONTINUED | OUTPATIENT
Start: 2020-02-07 | End: 2020-02-07 | Stop reason: HOSPADM

## 2020-02-07 ASSESSMENT — PAIN DESCRIPTION - LOCATION: LOCATION: RIB CAGE

## 2020-02-07 ASSESSMENT — PAIN DESCRIPTION - FREQUENCY: FREQUENCY: CONTINUOUS

## 2020-02-07 ASSESSMENT — PAIN SCALES - GENERAL: PAINLEVEL_OUTOF10: 10

## 2020-02-07 ASSESSMENT — PAIN DESCRIPTION - ORIENTATION: ORIENTATION: RIGHT

## 2020-02-07 ASSESSMENT — PAIN DESCRIPTION - PAIN TYPE: TYPE: CHRONIC PAIN

## 2020-02-07 ASSESSMENT — PAIN DESCRIPTION - DESCRIPTORS: DESCRIPTORS: THROBBING

## 2020-02-07 NOTE — ED PROVIDER NOTES
following up with his surgeon as well as the importance and reasoning why narcotics was not provided. Patient will be discharged home. Counseling: The emergency provider has spoken with the patient and discussed todays results, in addition to providing specific details for the plan of care and counseling regarding the diagnosis and prognosis. Questions are answered at this time and they are agreeable with the plan.      --------------------------------- IMPRESSION AND DISPOSITION ---------------------------------    IMPRESSION  1. Rib pain on right side        DISPOSITION  Disposition: Discharge to home  Patient condition is good      NOTE: This report was transcribed using voice recognition software.  Every effort was made to ensure accuracy; however, inadvertent computerized transcription errors may be present     JUICE Turpin - CNP  02/08/20 7981

## 2020-03-13 ENCOUNTER — HOSPITAL ENCOUNTER (EMERGENCY)
Age: 40
Discharge: PSYCHIATRIC OR SUBSTANCE USE FACILITY | End: 2020-03-13
Attending: EMERGENCY MEDICINE
Payer: COMMERCIAL

## 2020-03-13 VITALS
HEART RATE: 74 BPM | RESPIRATION RATE: 18 BRPM | OXYGEN SATURATION: 96 % | DIASTOLIC BLOOD PRESSURE: 53 MMHG | TEMPERATURE: 98.9 F | SYSTOLIC BLOOD PRESSURE: 111 MMHG

## 2020-03-13 LAB
ACETAMINOPHEN LEVEL: <5 MCG/ML (ref 10–30)
ALBUMIN SERPL-MCNC: 3.8 G/DL (ref 3.5–5.2)
ALP BLD-CCNC: 46 U/L (ref 40–129)
ALT SERPL-CCNC: 8 U/L (ref 0–40)
AMPHETAMINE SCREEN, URINE: NOT DETECTED
ANION GAP SERPL CALCULATED.3IONS-SCNC: 9 MMOL/L (ref 7–16)
AST SERPL-CCNC: 20 U/L (ref 0–39)
BACTERIA: NORMAL /HPF
BARBITURATE SCREEN URINE: NOT DETECTED
BENZODIAZEPINE SCREEN, URINE: NOT DETECTED
BILIRUB SERPL-MCNC: 0.4 MG/DL (ref 0–1.2)
BILIRUBIN URINE: NEGATIVE
BLOOD, URINE: ABNORMAL
BUN BLDV-MCNC: 17 MG/DL (ref 6–20)
CALCIUM SERPL-MCNC: 9 MG/DL (ref 8.6–10.2)
CANNABINOID SCREEN URINE: NOT DETECTED
CHLORIDE BLD-SCNC: 104 MMOL/L (ref 98–107)
CLARITY: CLEAR
CO2: 25 MMOL/L (ref 22–29)
COCAINE METABOLITE SCREEN URINE: POSITIVE
COLOR: YELLOW
CREAT SERPL-MCNC: 0.9 MG/DL (ref 0.7–1.2)
EKG ATRIAL RATE: 62 BPM
EKG P AXIS: 75 DEGREES
EKG P-R INTERVAL: 166 MS
EKG Q-T INTERVAL: 386 MS
EKG QRS DURATION: 92 MS
EKG QTC CALCULATION (BAZETT): 391 MS
EKG R AXIS: 88 DEGREES
EKG T AXIS: 71 DEGREES
EKG VENTRICULAR RATE: 62 BPM
ETHANOL: <10 MG/DL (ref 0–0.08)
FENTANYL SCREEN, URINE: POSITIVE
GFR AFRICAN AMERICAN: >60
GFR NON-AFRICAN AMERICAN: >60 ML/MIN/1.73
GLUCOSE BLD-MCNC: 85 MG/DL (ref 74–99)
GLUCOSE URINE: NEGATIVE MG/DL
HCT VFR BLD CALC: 40 % (ref 37–54)
HEMOGLOBIN: 12.7 G/DL (ref 12.5–16.5)
KETONES, URINE: NEGATIVE MG/DL
LEUKOCYTE ESTERASE, URINE: NEGATIVE
Lab: ABNORMAL
MCH RBC QN AUTO: 29.7 PG (ref 26–35)
MCHC RBC AUTO-ENTMCNC: 31.8 % (ref 32–34.5)
MCV RBC AUTO: 93.5 FL (ref 80–99.9)
METHADONE SCREEN, URINE: NOT DETECTED
NITRITE, URINE: NEGATIVE
OPIATE SCREEN URINE: NOT DETECTED
OXYCODONE URINE: NOT DETECTED
PDW BLD-RTO: 13.7 FL (ref 11.5–15)
PH UA: 5.5 (ref 5–9)
PHENCYCLIDINE SCREEN URINE: NOT DETECTED
PLATELET # BLD: 276 E9/L (ref 130–450)
PMV BLD AUTO: 9.4 FL (ref 7–12)
POTASSIUM SERPL-SCNC: 4 MMOL/L (ref 3.5–5)
PROTEIN UA: NEGATIVE MG/DL
RBC # BLD: 4.28 E12/L (ref 3.8–5.8)
RBC UA: NORMAL /HPF (ref 0–2)
SALICYLATE, SERUM: <0.3 MG/DL (ref 0–30)
SODIUM BLD-SCNC: 138 MMOL/L (ref 132–146)
SPECIFIC GRAVITY UA: >=1.03 (ref 1–1.03)
TOTAL PROTEIN: 5.9 G/DL (ref 6.4–8.3)
TRICYCLIC ANTIDEPRESSANTS SCREEN SERUM: NEGATIVE NG/ML
TROPONIN: <0.01 NG/ML (ref 0–0.03)
UROBILINOGEN, URINE: 0.2 E.U./DL
WBC # BLD: 5.2 E9/L (ref 4.5–11.5)
WBC UA: NORMAL /HPF (ref 0–5)

## 2020-03-13 PROCEDURE — 84484 ASSAY OF TROPONIN QUANT: CPT

## 2020-03-13 PROCEDURE — 81001 URINALYSIS AUTO W/SCOPE: CPT

## 2020-03-13 PROCEDURE — 85027 COMPLETE CBC AUTOMATED: CPT

## 2020-03-13 PROCEDURE — 80053 COMPREHEN METABOLIC PANEL: CPT

## 2020-03-13 PROCEDURE — 80307 DRUG TEST PRSMV CHEM ANLYZR: CPT

## 2020-03-13 PROCEDURE — 93010 ELECTROCARDIOGRAM REPORT: CPT | Performed by: INTERNAL MEDICINE

## 2020-03-13 PROCEDURE — 93005 ELECTROCARDIOGRAM TRACING: CPT | Performed by: EMERGENCY MEDICINE

## 2020-03-13 PROCEDURE — 99285 EMERGENCY DEPT VISIT HI MDM: CPT

## 2020-03-13 PROCEDURE — G0480 DRUG TEST DEF 1-7 CLASSES: HCPCS

## 2020-03-13 NOTE — ED NOTES
Joy Ozuna, [address] [facility] called     Pt accepted [facility] by Dr[facility] Kathia [facility]a  [phone]    Called, Chris Herrera to transport pt at 1800.      Berta Calvo  03/13/20 1633       Jessica Galvan  03/13/20 1728

## 2020-03-13 NOTE — ED NOTES
Nurse to nurse called to Sindy Delacruz at [facility], [facility]  03/13/20 4177

## 2020-03-13 NOTE — ED PROVIDER NOTES
HPI:  3/13/20, Time: 2:21 PM EDT         Jori Bhandari is a 46 y.o. male presenting to the ED for auditory hallucinations, beginning several days ago. The complaint has been persistent, moderate in severity, and worsened by nothing. Patient states he has been taking any of his psychiatric meds states for the last several days he has been hearing voices telling him to kill himself. Patient told staff that the voices are telling him that people are out to get him. .  He has no specific plan. He does suffer from schizophrenia and schizoaffective disorder. He denies fevers chills headache chest pain or shortness of breath. No recent trauma. ROS:   Pertinent positives and negatives are stated within HPI, all other systems reviewed and are negative.  --------------------------------------------- PAST HISTORY ---------------------------------------------  Past Medical History:  has a past medical history of Schizoaffective disorder ([facility] [address] Past Surgical History:  has a past surgical history that includes eye surgery (19 years ago). Social History:  reports that he has been smoking cigars. He has a 12.00 pack-year smoking history. He has never used smokeless tobacco. He reports current drug use. Drug: Marijuana. He reports that he does not drink alcohol. Family History: family history is not on file. The patients home medications have been reviewed. Allergies: Rondec-d [chlophedianol-pseudoephedrine]    ---------------------------------------------------PHYSICAL EXAM--------------------------------------    Constitutional/General: Alert and oriented x3, well appearing, non toxic in NAD  Head: Normocephalic and atraumatic  Eyes: PERRL, EOMI  Mouth: Oropharynx clear, handling secretions, no trismus  Neck: Supple, full ROM, non tender to palpation in the midline, no stridor, no crepitus, no meningeal signs  Pulmonary: Lungs clear to auscultation bilaterally, no wheezes, rales, or rhonchi.  Not in NOT DETECTED Negative <1000 ng/mL    Barbiturate Screen, Ur NOT DETECTED Negative < 200 ng/mL    Benzodiazepine Screen, Urine NOT DETECTED Negative < 200 ng/mL    Cannabinoid Scrn, Ur NOT DETECTED Negative < 50ng/mL    Cocaine Metabolite Screen, Urine POSITIVE (A) Negative < 300 ng/mL    Opiate Scrn, Ur NOT DETECTED Negative < 300ng/mL    PCP Screen, Urine NOT DETECTED Negative < 25 ng/mL    Methadone Screen, Urine NOT DETECTED Negative <300 ng/mL    Oxycodone Urine NOT DETECTED Negative <100 ng/mL    FENTANYL SCREEN, URINE POSITIVE (A) Negative <1 ng/mL    Drug Screen Comment: see below    Serum Drug Screen   Result Value Ref Range    Ethanol Lvl <10 mg/dL    Acetaminophen Level <5.0 (L) 10.0 - 08.7 mcg/mL    Salicylate, Serum <0.5 0.0 - 30.0 mg/dL    TCA Scrn NEGATIVE Cutoff:300 ng/mL   Urinalysis   Result Value Ref Range    Color, UA Yellow Straw/Yellow    Clarity, UA Clear Clear    Glucose, Ur Negative Negative mg/dL    Bilirubin Urine Negative Negative    Ketones, Urine Negative Negative mg/dL    Specific Gravity, UA >=1.030 1.005 - 1.030    Blood, Urine SMALL (A) Negative    pH, UA 5.5 5.0 - 9.0    Protein, UA Negative Negative mg/dL    Urobilinogen, Urine 0.2 <2.0 E.U./dL    Nitrite, Urine Negative Negative    Leukocyte Esterase, Urine Negative Negative   Troponin   Result Value Ref Range    Troponin <0.01 0.00 - 0.03 ng/mL   Microscopic Urinalysis   Result Value Ref Range    WBC, UA NONE 0 - 5 /HPF    RBC, UA 1-3 0 - 2 /HPF    Bacteria, UA NONE SEEN None Seen /HPF   EKG 12 Lead   Result Value Ref Range    Ventricular Rate 62 BPM    Atrial Rate 62 BPM    P-R Interval 166 ms    QRS Duration 92 ms    Q-T Interval 386 ms    QTc Calculation (Bazett) 391 ms    P Axis 75 degrees    R Axis 88 degrees    T Axis 71 degrees       RADIOLOGY:  Interpreted by Radiologist.  No orders to display         EKG Interpretation  Interpreted by emergency department physician    Rhythm: normal sinus   Rate: 62  Axis: normal  Conduction: normal  ST Segments: nonspecific changes  T Waves: non specific changes    Clinical Impression: NSR   Comparison to prior EKG: None      ------------------------- NURSING NOTES AND VITALS REVIEWED ---------------------------   The nursing notes within the ED encounter and vital signs as below have been reviewed by myself. BP (!) 111/53   Pulse 74   Temp 98.9 °F (37.2 °C)   Resp 18   SpO2 96%   Oxygen Saturation Interpretation: Normal    The patients available past medical records and past encounters were reviewed. ------------------------------ ED COURSE/MEDICAL DECISION MAKING----------------------  Medications - No data to display          Medical Decision Making:    Patient evaluated for acute psychosis. Patient is medically cleared for inpatient psychiatric valuation. Re-Evaluations:             Re-evaluation. Patients symptoms show no change      Consultations:             MOHSEN    Critical Care: NONE        This patient's ED course included: a personal history and physicial examination, re-evaluation prior to disposition, complex medical decision making and emergency management and a personal history and physicial eaxmination    This patient has remained hemodynamically stable and remained unchanged during their ED course. Counseling: The emergency provider has spoken with the patient and discussed todays results, in addition to providing specific details for the plan of care and counseling regarding the diagnosis and prognosis. Questions are answered at this time and they are agreeable with the plan.       --------------------------------- IMPRESSION AND DISPOSITION ---------------------------------    IMPRESSION  1. Acute psychosis ([facility] [address]        DISPOSITION  Disposition: Admit to mental health unit - medically cleared for admission  Patient condition is serious        NOTE: This report was transcribed using voice recognition software.  Every effort was made to ensure accuracy; however, inadvertent computerized transcription errors may be present        Medina Rahman DO  03/13/20 9415

## 2020-03-13 NOTE — ED NOTES
Attempt made to call nurse to nurse to [facility]. No answer at this time. Will attempt again later.      Chel Suazo RN  03/13/20 6445

## 2020-03-13 NOTE — ED NOTES
[facility] referred to [facility], spoke with Frantz Brown. Pt requested [address] due to just being there in Jan and wants to go back. Pt willing to sign voluntary. Advised will do VOL or PS depending on which [address] prefers.      Jessica Galvan  03/13/20 6103

## 2020-03-13 NOTE — ED NOTES
SW met with pt to complete assessment. Pt 46year old male appears stated age. Pt states he is here due to hearing voices, seeing things, and feeling paranoid. Pt states he has had increase in paranoia for the last month and feels that people are out to get him. Pt providing conflicting information throughout assessment. Pt appears anxious, paranoid, affect congruent, disorganized thoughts content, positive for auditory and visual hallucinations. Pt was observed by SW talking to unseen others several times in the ED. Pt states he is currently living with his cousin. Pt reports recent stressors including a break up roughly a month ago- although then states this is not a stressor and states his dad's health is declining and his uncle  this week. Pt reports last admission roughly 3 months ago at [facility]. Pt states his treatment provider is "[facility] (although SW is aware they do not provide outpatient services) then pt states that he has not follow up anywhere for medication since that admission. Pt out of meds for at least 3 months per pt. Pt reports he is unsure of how many hospitalizations but states \"more than 10. \"      Pt reports he quit smoking cigarettes and marijuana but started using crack. Pt reports smokes crack daily, \"not a lot\" for frequency,\" and last used today. Pt reports no alcohol use. Pt reports no history of substance abuse treatment. Pt denies HI and then adds \"but I feel like people want to come after me. \"  Pt provided conflicting response to SI and did not provide a clear answer to SW. Pt denies self harm. Pt states he knows that he needs help and would voluntarily be admitted for inpatient admission due to hallucinations and paranoia. Pt states he likes to treat at "[facility] as he was there last.  Pt reports he plans on moving to [place] after treatment and wants to continue treatment there.    Pt would benefit for inpatient hospitalization for

## 2020-03-18 ENCOUNTER — HOSPITAL ENCOUNTER (INPATIENT)
Age: 40
LOS: 9 days | Discharge: OTHER INSTITUTION | DRG: 885 | End: 2020-03-27
Attending: EMERGENCY MEDICINE | Admitting: PSYCHIATRY & NEUROLOGY
Payer: COMMERCIAL

## 2020-03-18 ENCOUNTER — APPOINTMENT (OUTPATIENT)
Dept: GENERAL RADIOLOGY | Age: 40
DRG: 885 | End: 2020-03-18
Payer: COMMERCIAL

## 2020-03-18 LAB
ACETAMINOPHEN LEVEL: <5 MCG/ML (ref 10–30)
AMPHETAMINE SCREEN, URINE: NOT DETECTED
ANION GAP SERPL CALCULATED.3IONS-SCNC: 12 MMOL/L (ref 7–16)
BACTERIA: NORMAL /HPF
BARBITURATE SCREEN URINE: NOT DETECTED
BASOPHILS ABSOLUTE: 0.08 E9/L (ref 0–0.2)
BASOPHILS RELATIVE PERCENT: 1.3 % (ref 0–2)
BENZODIAZEPINE SCREEN, URINE: NOT DETECTED
BILIRUBIN URINE: NEGATIVE
BLOOD, URINE: NORMAL
BUN BLDV-MCNC: 18 MG/DL (ref 6–20)
CALCIUM SERPL-MCNC: 9.5 MG/DL (ref 8.6–10.2)
CANNABINOID SCREEN URINE: POSITIVE
CHLORIDE BLD-SCNC: 101 MMOL/L (ref 98–107)
CLARITY: CLEAR
CO2: 30 MMOL/L (ref 22–29)
COCAINE METABOLITE SCREEN URINE: POSITIVE
COLOR: YELLOW
CREAT SERPL-MCNC: 0.9 MG/DL (ref 0.7–1.2)
EOSINOPHILS ABSOLUTE: 0.03 E9/L (ref 0.05–0.5)
EOSINOPHILS RELATIVE PERCENT: 0.5 % (ref 0–6)
ETHANOL: <10 MG/DL (ref 0–0.08)
FENTANYL SCREEN, URINE: NOT DETECTED
GFR AFRICAN AMERICAN: >60
GFR NON-AFRICAN AMERICAN: >60 ML/MIN/1.73
GLUCOSE BLD-MCNC: 112 MG/DL (ref 74–99)
GLUCOSE URINE: NEGATIVE MG/DL
HCT VFR BLD CALC: 48.5 % (ref 37–54)
HEMOGLOBIN: 15.5 G/DL (ref 12.5–16.5)
IMMATURE GRANULOCYTES #: 0.01 E9/L
IMMATURE GRANULOCYTES %: 0.2 % (ref 0–5)
KETONES, URINE: NEGATIVE MG/DL
LEUKOCYTE ESTERASE, URINE: NEGATIVE
LYMPHOCYTES ABSOLUTE: 1.35 E9/L (ref 1.5–4)
LYMPHOCYTES RELATIVE PERCENT: 22.5 % (ref 20–42)
Lab: ABNORMAL
MCH RBC QN AUTO: 30.3 PG (ref 26–35)
MCHC RBC AUTO-ENTMCNC: 32 % (ref 32–34.5)
MCV RBC AUTO: 94.9 FL (ref 80–99.9)
METHADONE SCREEN, URINE: NOT DETECTED
MONOCYTES ABSOLUTE: 0.49 E9/L (ref 0.1–0.95)
MONOCYTES RELATIVE PERCENT: 8.2 % (ref 2–12)
NEUTROPHILS ABSOLUTE: 4.03 E9/L (ref 1.8–7.3)
NEUTROPHILS RELATIVE PERCENT: 67.3 % (ref 43–80)
NITRITE, URINE: NEGATIVE
OPIATE SCREEN URINE: NOT DETECTED
OXYCODONE URINE: NOT DETECTED
PDW BLD-RTO: 13.5 FL (ref 11.5–15)
PH UA: 7 (ref 5–9)
PHENCYCLIDINE SCREEN URINE: NOT DETECTED
PLATELET # BLD: 338 E9/L (ref 130–450)
PMV BLD AUTO: 9.7 FL (ref 7–12)
POTASSIUM REFLEX MAGNESIUM: 4.4 MMOL/L (ref 3.5–5)
PROTEIN UA: NORMAL MG/DL
RBC # BLD: 5.11 E12/L (ref 3.8–5.8)
RBC UA: NORMAL /HPF (ref 0–2)
SALICYLATE, SERUM: <0.3 MG/DL (ref 0–30)
SODIUM BLD-SCNC: 143 MMOL/L (ref 132–146)
SPECIFIC GRAVITY UA: 1.02 (ref 1–1.03)
TRICYCLIC ANTIDEPRESSANTS SCREEN SERUM: NEGATIVE NG/ML
UROBILINOGEN, URINE: 1 E.U./DL
WBC # BLD: 6 E9/L (ref 4.5–11.5)
WBC UA: NORMAL /HPF (ref 0–5)

## 2020-03-18 PROCEDURE — 80307 DRUG TEST PRSMV CHEM ANLYZR: CPT

## 2020-03-18 PROCEDURE — 93005 ELECTROCARDIOGRAM TRACING: CPT | Performed by: EMERGENCY MEDICINE

## 2020-03-18 PROCEDURE — 80048 BASIC METABOLIC PNL TOTAL CA: CPT

## 2020-03-18 PROCEDURE — 71045 X-RAY EXAM CHEST 1 VIEW: CPT

## 2020-03-18 PROCEDURE — 81001 URINALYSIS AUTO W/SCOPE: CPT

## 2020-03-18 PROCEDURE — G0480 DRUG TEST DEF 1-7 CLASSES: HCPCS

## 2020-03-18 PROCEDURE — 1240000000 HC EMOTIONAL WELLNESS R&B

## 2020-03-18 PROCEDURE — 85025 COMPLETE CBC W/AUTO DIFF WBC: CPT

## 2020-03-18 PROCEDURE — 99285 EMERGENCY DEPT VISIT HI MDM: CPT

## 2020-03-18 PROCEDURE — 36415 COLL VENOUS BLD VENIPUNCTURE: CPT

## 2020-03-19 PROBLEM — F19.10 POLYSUBSTANCE ABUSE (HCC): Status: ACTIVE | Noted: 2020-03-19

## 2020-03-19 LAB
EKG ATRIAL RATE: 70 BPM
EKG P AXIS: 83 DEGREES
EKG P-R INTERVAL: 174 MS
EKG Q-T INTERVAL: 388 MS
EKG QRS DURATION: 92 MS
EKG QTC CALCULATION (BAZETT): 419 MS
EKG R AXIS: 70 DEGREES
EKG T AXIS: 76 DEGREES
EKG VENTRICULAR RATE: 70 BPM

## 2020-03-19 PROCEDURE — 6370000000 HC RX 637 (ALT 250 FOR IP): Performed by: NURSE PRACTITIONER

## 2020-03-19 PROCEDURE — 99222 1ST HOSP IP/OBS MODERATE 55: CPT | Performed by: NURSE PRACTITIONER

## 2020-03-19 PROCEDURE — 6370000000 HC RX 637 (ALT 250 FOR IP): Performed by: PSYCHIATRY & NEUROLOGY

## 2020-03-19 PROCEDURE — 1240000000 HC EMOTIONAL WELLNESS R&B

## 2020-03-19 RX ORDER — HALOPERIDOL 5 MG
5 TABLET ORAL EVERY 6 HOURS PRN
Status: DISCONTINUED | OUTPATIENT
Start: 2020-03-19 | End: 2020-03-27 | Stop reason: HOSPADM

## 2020-03-19 RX ORDER — TRAZODONE HYDROCHLORIDE 50 MG/1
50 TABLET ORAL NIGHTLY PRN
Status: DISCONTINUED | OUTPATIENT
Start: 2020-03-19 | End: 2020-03-27 | Stop reason: HOSPADM

## 2020-03-19 RX ORDER — ACETAMINOPHEN 325 MG/1
650 TABLET ORAL EVERY 6 HOURS PRN
Status: DISCONTINUED | OUTPATIENT
Start: 2020-03-19 | End: 2020-03-27 | Stop reason: HOSPADM

## 2020-03-19 RX ORDER — HYDROXYZINE PAMOATE 50 MG/1
50 CAPSULE ORAL 3 TIMES DAILY PRN
Status: DISCONTINUED | OUTPATIENT
Start: 2020-03-19 | End: 2020-03-27 | Stop reason: HOSPADM

## 2020-03-19 RX ORDER — OLANZAPINE 10 MG/1
10 TABLET ORAL NIGHTLY
Status: DISCONTINUED | OUTPATIENT
Start: 2020-03-19 | End: 2020-03-21

## 2020-03-19 RX ORDER — MAGNESIUM HYDROXIDE/ALUMINUM HYDROXICE/SIMETHICONE 120; 1200; 1200 MG/30ML; MG/30ML; MG/30ML
30 SUSPENSION ORAL PRN
Status: DISCONTINUED | OUTPATIENT
Start: 2020-03-19 | End: 2020-03-27 | Stop reason: HOSPADM

## 2020-03-19 RX ORDER — ARIPIPRAZOLE 10 MG/1
10 TABLET ORAL DAILY
Status: DISCONTINUED | OUTPATIENT
Start: 2020-03-19 | End: 2020-03-19

## 2020-03-19 RX ORDER — OXCARBAZEPINE 150 MG/1
150 TABLET, FILM COATED ORAL 2 TIMES DAILY
Status: DISCONTINUED | OUTPATIENT
Start: 2020-03-19 | End: 2020-03-21

## 2020-03-19 RX ORDER — HALOPERIDOL 5 MG/ML
5 INJECTION INTRAMUSCULAR EVERY 6 HOURS PRN
Status: DISCONTINUED | OUTPATIENT
Start: 2020-03-19 | End: 2020-03-27 | Stop reason: HOSPADM

## 2020-03-19 RX ADMIN — OLANZAPINE 10 MG: 10 TABLET, FILM COATED ORAL at 20:45

## 2020-03-19 RX ADMIN — OXCARBAZEPINE 150 MG: 150 TABLET, FILM COATED ORAL at 20:41

## 2020-03-19 RX ADMIN — OXCARBAZEPINE 150 MG: 150 TABLET, FILM COATED ORAL at 11:03

## 2020-03-19 RX ADMIN — TRAZODONE HYDROCHLORIDE 50 MG: 50 TABLET ORAL at 20:42

## 2020-03-19 ASSESSMENT — SLEEP AND FATIGUE QUESTIONNAIRES
AVERAGE NUMBER OF SLEEP HOURS: 4
SLEEP PATTERN: DIFFICULTY FALLING ASLEEP;INSOMNIA
SLEEP PATTERN: DIFFICULTY FALLING ASLEEP;DIFFICULTY ARISING;DISTURBED/INTERRUPTED SLEEP;RESTLESSNESS
DO YOU HAVE DIFFICULTY SLEEPING: YES
DIFFICULTY ARISING: YES
DIFFICULTY ARISING: YES
DIFFICULTY FALLING ASLEEP: YES
DO YOU USE A SLEEP AID: YES
DIFFICULTY FALLING ASLEEP: YES
RESTFUL SLEEP: NO
DO YOU HAVE DIFFICULTY SLEEPING: YES
DIFFICULTY STAYING ASLEEP: YES
DIFFICULTY STAYING ASLEEP: YES
DO YOU USE A SLEEP AID: NO
AVERAGE NUMBER OF SLEEP HOURS: 4
RESTFUL SLEEP: NO

## 2020-03-19 ASSESSMENT — PATIENT HEALTH QUESTIONNAIRE - PHQ9
SUM OF ALL RESPONSES TO PHQ QUESTIONS 1-9: 18
SUM OF ALL RESPONSES TO PHQ QUESTIONS 1-9: 24

## 2020-03-19 ASSESSMENT — LIFESTYLE VARIABLES
HISTORY_ALCOHOL_USE: NO
HISTORY_ALCOHOL_USE: NO

## 2020-03-19 ASSESSMENT — PAIN SCALES - GENERAL: PAINLEVEL_OUTOF10: 0

## 2020-03-19 NOTE — ED PROVIDER NOTES
Department of Emergency Medicine   ED  Provider Note  Admit Date/RoomTime: 3/18/2020  8:46 PM  ED Room: [address] [place] [address]     HPI: Chema Fox Favors 46 y.o. male with hx of schizoaffective disorder presents with a complaint of body aches with associated SOB that began today. Complaint has been constant and became more severe today which is what prompted the visit. Pt reports feeling depressed and anxious recently. He notes he is concerned about COVID-19 due to body aches and SOB. He denies Suicidal ideation or Homicidal ideation. No specific plan. Pt also denies A/V hallucinations, fever, chills, CP, abd pain, N/V/D. Nurse's triage note reviewed. Discussed suicidal and homicidal ideations with patient as well as A/V hallucinations. Pt denied all sx. Pt is concerned for having COVID-19. Review of Systems:   Pertinent positives and negatives are stated within HPI, all other systems reviewed and are negative.    --------------------------------------------- PAST HISTORY ---------------------------------------------  Past Medical History:  has a past medical history of Schizoaffective disorder ([facility] [address] Past Surgical History:  has a past surgical history that includes eye surgery (19 years ago). Social History:  reports that he has been smoking cigars. He has a 12.00 pack-year smoking history. He has never used smokeless tobacco. He reports current drug use. Drug: Marijuana. He reports that he does not drink alcohol. Family History: family history is not on file. The patients home medications have been reviewed. Allergies: Rondec-d [chlophedianol-pseudoephedrine]    ---------------------------------------------- RESULTS -------------------------------------------------  All laboratory and imaging studies were reviewed by myself.     LABS:  Results for orders placed or performed during the hospital encounter of 03/18/20   CBC Auto Differential   Result Value Ref Range    WBC 6.0 4.5 - 11.5 E9/L    RBC 5. 11 3.80 - 5.80 E12/L    Hemoglobin 15.5 12.5 - 16.5 g/dL    Hematocrit 48.5 37.0 - 54.0 %    MCV 94.9 80.0 - 99.9 fL    MCH 30.3 26.0 - 35.0 pg    MCHC 32.0 32.0 - 34.5 %    RDW 13.5 11.5 - 15.0 fL    Platelets 736 623 - 177 E9/L    MPV 9.7 7.0 - 12.0 fL    Neutrophils % 67.3 43.0 - 80.0 %    Immature Granulocytes % 0.2 0.0 - 5.0 %    Lymphocytes % 22.5 20.0 - 42.0 %    Monocytes % 8.2 2.0 - 12.0 %    Eosinophils % 0.5 0.0 - 6.0 %    Basophils % 1.3 0.0 - 2.0 %    Neutrophils Absolute 4.03 1.80 - 7.30 E9/L    Immature Granulocytes # 0.01 E9/L    Lymphocytes Absolute 1.35 (L) 1.50 - 4.00 E9/L    Monocytes Absolute 0.49 0.10 - 0.95 E9/L    Eosinophils Absolute 0.03 (L) 0.05 - 0.50 E9/L    Basophils Absolute 0.08 0.00 - 0.20 D4/O   Basic Metabolic Panel w/ Reflex to MG   Result Value Ref Range    Sodium 143 132 - 146 mmol/L    Potassium reflex Magnesium 4.4 3.5 - 5.0 mmol/L    Chloride 101 98 - 107 mmol/L    CO2 30 (H) 22 - 29 mmol/L    Anion Gap 12 7 - 16 mmol/L    Glucose 112 (H) 74 - 99 mg/dL    BUN 18 6 - 20 mg/dL    CREATININE 0.9 0.7 - 1.2 mg/dL    GFR Non-African American >60 >=60 mL/min/1.73    GFR African American >60     Calcium 9.5 8.6 - 10.2 mg/dL   Urinalysis, reflex to microscopic   Result Value Ref Range    Color, UA Yellow Straw/Yellow    Clarity, UA Clear Clear    Glucose, Ur Negative Negative mg/dL    Bilirubin Urine Negative Negative    Ketones, Urine Negative Negative mg/dL    Specific Gravity, UA 1.025 1.005 - 1.030    Blood, Urine TRACE-INTACT Negative    pH, UA 7.0 5.0 - 9.0    Protein, UA TRACE Negative mg/dL    Urobilinogen, Urine 1.0 <2.0 E.U./dL    Nitrite, Urine Negative Negative    Leukocyte Esterase, Urine Negative Negative   Urine Drug Screen   Result Value Ref Range    Drug Screen Comment: see below    Serum Drug Screen   Result Value Ref Range    Ethanol Lvl <10 mg/dL    Acetaminophen Level <5.0 (L) 10.0 - 19.1 mcg/mL    Salicylate, Serum <4.7 0.0 - 30.0 mg/dL    TCA Scrn NEGATIVE Cutoff:300 ng/mL   Microscopic Urinalysis   Result Value Ref Range    WBC, UA NONE 0 - 5 /HPF    RBC, UA 2-5 0 - 2 /HPF    Bacteria, UA NONE SEEN None Seen /HPF       RADIOLOGY:  Interpreted by Radiologist.  XR CHEST PORTABLE   Final Result      New right pleural effusion. Healing right rib fractures. No evidence of recurrent or residual pneumothorax.                ------------------------- NURSING NOTES AND VITALS REVIEWED ---------------------------   The nursing notes within the ED encounter and vital signs as below have been reviewed. /70   Pulse 55   Temp 97.9 °F (36.6 °C) (Oral)   Resp 16   Ht 6' (1.829 m)   Wt 150 lb (68 kg)   SpO2 99%   BMI 20.34 kg/m²   Oxygen Saturation Interpretation: Normal      ---------------------------------------------------PHYSICAL EXAM--------------------------------------    Constitutional/General: Alert and oriented x3, well appearing, non toxic in NAD  Head: Normocephalic, atraumatic  Eyes: PERRL, EOMI  Mouth: Oropharynx clear, handling secretions, no trismus  Neck: Supple, full ROM, non tenderness  Pulmonary: Lungs clear to auscultation bilaterally, no wheezes, rales, or rhonchi. Not in respiratory distress  Cardiovascular:  Regular rate and regular rhythm, no murmurs, gallops, or rubs. 2+ distal pulses  Abdomen: Soft, non tender, non distended, +BS, no rebound, guarding, or rigidity. Extremities: Moves all extremities x 4. Warm and well perfused, no clubbing or edema. Skin: warm and dry without rash  Neurologic: GCS 15, CN 2-12 grossly intact, no focal deficits  Psych: Anxious Affect, No Suicidal or Homicidal ideation, No A/V Hallucinations      ------------------------------------------ PROGRESS NOTES ------------------------------------------     Medical decision making:    Chest XR will be ordered to determine the progression of treatment that will be provided to the patient. Imaging obtained, reviewed; results discussed with patient. Patient's laboratory work-up reveals a normal CBC. Normal chemistry. Negative urinalysis. Serum drug screen negative. Patient's chest x-ray did show a new small pleural effusion on the right. Healing rib fractures noted. No evidence of pneumothorax. Patient does have stable vital signs. No hypoxia. No increased work of breathing. No indication for a chest tube at this time. Patient be medically clear at this time.  consulted. Disposition pending . Consultations:   Social work     Re-Evaluations:        Counseling: The emergency provider has spoken with thepatient and discussed todays results, in addition to providing specific details for the plan of care and counseling regarding the diagnosis and prognosis. Questions are answered at this time and they are agreeable with the plan.     ------------------------------ IMPRESSION AND DISPOSITION ---------------------------------    IMPRESSION  1. Depression, unspecified depression type    2. Pleural effusion        DISPOSITION  Disposition: as per consultation   Patient condition is stable    3/18/20, 8:55 PM EDT. This note is prepared by Jaden Temple, acting as Scribe for Ricki Zepeda DO. Ricki Zepeda DO:  The scribe's documentation has been prepared under my direction and personally reviewed by me in its entirety. I confirm that the note above accurately reflects all work, treatment, procedures, and medical decision making performed by me.       Ricki Zepeda DO  03/18/20 5536

## 2020-03-19 NOTE — PROGRESS NOTES
`[facility]  Admission Note     44yo male admitted from [facility] with dx of schizoaffective disorder. A&Ox4. Walks with a steady gait. Patient states \"I'm stressed out about my baby mom. I'm not allowed to see my kid. And we broke up. New relationships are stressing me out. I don't know what to do next\". Patient denies suicidal ideations at this time, but admitted to having them earlier in the day. Patient stated that when he is \"challeneged\", he becomes suicidal. Denies homicidal ideations and hallucinations as well. Patient admits to being noncompliant with his medications and does not remember when he took them last. Patient tox screen positive for marijuana and cocaine, but patient denies use and would not talk any further about the subject. Patient is homeless currently. Reports that the [address] [place] [address] will not accept him d/t not having his medications. Patient has a okay appetite and poor sleep. States that he sometimes does not sleep at night. Patient was guarded and suspicious during admission. Often replying with yes/no answers. Irritable and wanted to leave admission before completion. Patient would ramble at times and was difficult to understand. Patient is disheveled. Shown unit and room. Snack was provided. Will continue to monitor.         Admission Type:   Admission Type: Voluntary    Reason for admission:  Reason for Admission: \"I was feeling suicidal\"    PATIENT STRENGTHS:  Strengths: No significant Physical Illness, Motivated    Patient Strengths and Limitations:  Limitations: Difficult relationships / poor social skills, Lacks leisure interests, Inappropriate/potentially harmful leisure interests    Addictive Behavior:   Addictive Behavior  In the past 3 months, have you felt or has someone told you that you have a problem with:  : None  Do you have a history of Chemical Use?: No  Do you have a history of Alcohol Use?: No  Do you have a history of Street Drug Abuse?: Yes(patient found for: [facility]  Lab Results   Component Value Date    LABVLDL 10 10/27/2019         Body mass index is 20.79 kg/m². BP Readings from Last 2 Encounters:   03/19/20 126/71   03/13/20 (!) 111/53           Pt admitted with followings belongings:  Dentures: None  Vision - Corrective Lenses: None  Hearing Aid: None  Jewelry: None  Body Piercings Removed: N/A  Clothing: Footwear, Jacket / coat, Pants, Shirt, Socks, Other (Comment)(pr shoes, pr..socks, pants, jacket, shors. , coat)  Were All Patient Medications Collected?: Not Applicable  Other Valuables: None     Valuables sent home with NA. Valuables placed in safe in security envelope, number:  NA. Patient's home medications were NA. Patient oriented to surroundings and program expectations and copy of patient rights given. Received admission packet:  yes. Consents reviewed, signed yes. Refused NA. Patient verbalize understanding:  yes.     Patient education on precautions: yes                   Alphonso Morrison RN

## 2020-03-19 NOTE — ED NOTES
Assessment, CSSRS and SBIRT complete    Pt is voluntary    Pt admits to SI no plan, passive HI, denies AVH    Pt reports increased depression and paranoia over the past week. Pt is fearful of being around other people who may have the COVID 19. Pt reports a MH hx of schizo-affective d/o, is not med compliant and not connected with outpatient services. Pt's last inpatient [facility] was 01/08/2020. Pt admits to cocaine and marijuana use, denies alcohol    Pt is calm, oriented x 4, alert, congruent affect, good eye contact, clear speech pattern, admits to SI no plan, passive HI towards no one specific, denies AVH. Pt reports poor sleep and appetite. PATRICIO SW reviewed chart with ED Doc, Pt in need of inpatient admission to ensure safety and stabilization. PATRICIO RN to review chart with psych doc for admission to [address] [place] [address].        FRANCISCO Mason, Nicolle Calvo  03/18/20 3758

## 2020-03-19 NOTE — PLAN OF CARE
Problem: Anger Management/Homicidal Ideation:  Goal: Able to display appropriate communication and problem solving  Description: Able to display appropriate communication and problem solving  Outcome: Ongoing     Problem: Depressive Behavior With or Without Suicide Precautions:  Goal: Able to verbalize and/or display a decrease in depressive symptoms  Description: Able to verbalize and/or display a decrease in depressive symptoms  Outcome: Ongoing

## 2020-03-19 NOTE — PLAN OF CARE
Problem: Anger Management/Homicidal Ideation:  Goal: Ability to verbalize frustrations and anger appropriately will improve  Description: Ability to verbalize frustrations and anger appropriately will improve  Outcome: Met This Shift     Problem: Depressive Behavior With or Without Suicide Precautions:  Goal: Ability to disclose and discuss suicidal ideas will improve  Description: Ability to disclose and discuss suicidal ideas will improve  3/19/2020 1933 by Nicolas Mcgowan RN  Outcome: Met This Shift     Problem: Anger Management/Homicidal Ideation:  Goal: Able to display appropriate communication and problem solving  Description: Able to display appropriate communication and problem solving  3/19/2020 1933 by Nicolas Mcgowan RN  Outcome: Ongoing     Problem: Depressive Behavior With or Without Suicide Precautions:  Goal: Able to verbalize and/or display a decrease in depressive symptoms  Description: Able to verbalize and/or display a decrease in depressive symptoms  3/19/2020 1933 by Nicolas Mcgowan RN  Outcome: Ongoing

## 2020-03-19 NOTE — H&P
nightly. He has not followed up with any treatment did not get his next Aristada injection. He has been noncompliant is not currently on any medications. He states he is never attempted suicide he denies any when the family has any mental illness and he denies any when the family committed suicide. Legal history: Patient denies any legal history    Substance abuse history: Patient states he uses marijuana daily and states that he uses cocaine \"not that often about once a month. \"    Personal family and social history: Patient states he grew up in [place] was raised by his mom states he had 1 brother and 1 sister. He graduated high school. He states after high school he went to [employer]. He has never been  states he has 1 son who is 7 months old. He states he is currently homeless.   He is not currently working and states he gets $800 a month and Social Security disability he denies any history of physical sexual emotional abuse while growing up he denies access to any guns      Past Medical History:        Diagnosis Date    Schizoaffective disorder ([facility] [address]        Medications Prior to Admission:   Medications Prior to Admission: carBAMazepine (TEGRETOL) 200 MG tablet, Take 1.5 tablets by mouth 2 times daily  QUEtiapine (SEROQUEL) 400 MG tablet, Take 1 tablet by mouth nightly  ibuprofen (IBU) 800 MG tablet, Take 1 tablet by mouth every 8 hours as needed for Pain  ARIPiprazole lauroxil (ARISTADA) 882 MG/3.2ML PRSY injection, Inject 3.2 mLs into the muscle every 30 days  lidocaine 4 % external patch, Place 1 patch onto the skin daily  nicotine (NICODERM CQ) 21 MG/24HR, Place 1 patch onto the skin daily    Past Surgical History:        Procedure Laterality Date    EYE SURGERY  19 years ago       Allergies:   Rondec-d [chlophedianol-pseudoephedrine]    Family History  Family History   Problem Relation Age of Onset    No Known Problems Mother     No Known Problems Father abuse            LABS: REVIEWED TODAY:  Recent Labs     03/18/20 2111   WBC 6.0   HGB 15.5        Recent Labs     03/18/20 2111      K 4.4      CO2 30*   BUN 18   CREATININE 0.9   GLUCOSE 112*     No results for input(s): BILITOT, ALKPHOS, AST, ALT in the last 72 hours. Lab Results   Component Value Date    LABAMPH NOT DETECTED 03/18/2020    BARBSCNU NOT DETECTED 03/18/2020    LABBENZ NOT DETECTED 03/18/2020    LABMETH NOT DETECTED 03/18/2020    OPIATESCREENURINE NOT DETECTED 03/18/2020    PHENCYCLIDINESCREENURINE NOT DETECTED 03/18/2020    ETOH <10 03/18/2020     Lab Results   Component Value Date    TSH 0.791 07/16/2019     No results found for: LITHIUM  Lab Results   Component Value Date    VALPROATE <3 (L) 02/04/2019     Lab Results   Component Value Date    VALPROATE <3 02/04/2019         Radiology Xr Chest Portable    Result Date: 3/18/2020  Clinical indications: Shortness of breath. TECHNIQUE: Single frontal projection of the chest (1 view). COMPARISON: February 7, 2020. January 7, 2020. January 6, 2020. Fidencio Botello FINDINGS: Meniscus within the right lateral costophrenic angle. Healing right rib fractures. No evidence of recurrent or residual pneumothorax. The heart, lungs, mediastinum and regional skeleton are otherwise unremarkable. New right pleural effusion. Healing right rib fractures. No evidence of recurrent or residual pneumothorax. TREATMENT PLAN:    Risk Management: Based on the diagnosis and assessment biopsychosocial treatment model was presented to the patient and was given the opportunity to ask any question. The patient was agreeable to the plan and all the patient's questions were answered to the patient's satisfaction. I discussed with the patient the risk, benefit, alternative and common side effects for the proposed medication treatment. The patient is consenting to this treatment.      Collateral Information:  Will obtain collateral information from the family or

## 2020-03-20 PROCEDURE — 6370000000 HC RX 637 (ALT 250 FOR IP): Performed by: NURSE PRACTITIONER

## 2020-03-20 PROCEDURE — 6370000000 HC RX 637 (ALT 250 FOR IP): Performed by: PSYCHIATRY & NEUROLOGY

## 2020-03-20 PROCEDURE — 99232 SBSQ HOSP IP/OBS MODERATE 35: CPT | Performed by: NURSE PRACTITIONER

## 2020-03-20 PROCEDURE — 1240000000 HC EMOTIONAL WELLNESS R&B

## 2020-03-20 RX ADMIN — HYDROXYZINE PAMOATE 50 MG: 50 CAPSULE ORAL at 20:53

## 2020-03-20 RX ADMIN — OXCARBAZEPINE 150 MG: 150 TABLET, FILM COATED ORAL at 20:53

## 2020-03-20 RX ADMIN — TRAZODONE HYDROCHLORIDE 50 MG: 50 TABLET ORAL at 20:53

## 2020-03-20 RX ADMIN — OXCARBAZEPINE 150 MG: 150 TABLET, FILM COATED ORAL at 08:48

## 2020-03-20 RX ADMIN — OLANZAPINE 10 MG: 10 TABLET, FILM COATED ORAL at 20:53

## 2020-03-20 ASSESSMENT — PAIN SCALES - GENERAL: PAINLEVEL_OUTOF10: 0

## 2020-03-20 NOTE — PLAN OF CARE
Problem: Anger Management/Homicidal Ideation:  Goal: Able to display appropriate communication and problem solving  Description: Able to display appropriate communication and problem solving  Outcome: Ongoing  Goal: Ability to verbalize frustrations and anger appropriately will improve  Description: Ability to verbalize frustrations and anger appropriately will improve  Outcome: Ongoing     Problem: Depressive Behavior With or Without Suicide Precautions:  Goal: Able to verbalize and/or display a decrease in depressive symptoms  Description: Able to verbalize and/or display a decrease in depressive symptoms  3/20/2020 1054 by Odalys Montana RN  Outcome: Ongoing  3/20/2020 0533 by Alexa Carcamo RN  Outcome: Ongoing  Goal: Ability to disclose and discuss suicidal ideas will improve  Description: Ability to disclose and discuss suicidal ideas will improve  Outcome: Ongoing    Joce denies any SI, HI, or any Hallucinations at this time. Patient states he is sad and depressed 10/10. Patient also states he has a cold for about a week but refuses to take any meds when asked if he wanted something ordered. [facility] states he is homeless and requesting for inpatient therapy. [facility] states his drug choice is Cocaine and and MJ. Groups are offered and encouraged. Will continue to monitor patient.

## 2020-03-20 NOTE — PROGRESS NOTES
BEHAVIORAL HEALTH FOLLOW-UP NOTE     3/20/2020     Patient was seen and examined in person, Chart reviewed   Patient's case discussed with staff/team    Chief Complaint: \"I am good \"    Interim History: I met with the patient while he was in his room today. Per nursing staff he receives 7 hours of sleep last night. The patient remains with a flat and sad affect. The patient is taking his medications but he is not attending groups. The patient appears paranoid and is focused on people being \"racist \". The patient has been observed talking to himself and seems internally stimulated.       Appetite:  [x] Normal/Unchanged  [] Increased  [] Decreased      Sleep:       [x] Normal/Unchanged  [] Fair       [] Poor              Energy:    [x] Normal/Unchanged  [] Increased  [] Decreased        SI [] Present  [x] Absent    HI  []Present  [x] Absent     Aggression:  [] yes  [x] no    Patient is [x] able  [] unable to CONTRACT FOR SAFETY     PAST MEDICAL/PSYCHIATRIC HISTORY:   Past Medical History:   Diagnosis Date    [facility]        FAMILY/SOCIAL HISTORY:  Family History   Problem Relation Age of Onset    No Known Problems Mother     No Known Problems Father      Social History     Socioeconomic History    Marital status: Single     Spouse name: Not on file    Number of children: 0    Years of education: 12    Highest education level: Not on file   Occupational History    Not on file   Social Needs    Financial resource strain: Not on file    Food insecurity     Worry: Not on file     Inability: Not on file   [employer] needs     Medical: Not on file     Non-medical: Not on file   Tobacco Use    Smoking status: Current Every Day Smoker     Packs/day: 0.50     Years: 24.00     Pack years: 12.00     Types: Cigars    Smokeless tobacco: Never Used    Tobacco comment: \"alot\"   Substance and Sexual Activity    Alcohol use: No    Drug use: Yes     Types: Marijuana, Cocaine     Comment:

## 2020-03-20 NOTE — PROGRESS NOTES
Maria C Wright denies any SI, HI, or any Hallucinations at this time. Patient states he is sad and depressed 10/10. Patient also states he has a cold for about a week but refuses to take any meds when asked if he wanted something ordered. Maria C Wright states he is homeless and requesting for inpatient therapy. Maria C Wright states his drug choice is Cocaine and and MJ. Groups are offered and encouraged. Will continue to monitor patient.

## 2020-03-21 PROCEDURE — 6370000000 HC RX 637 (ALT 250 FOR IP): Performed by: PSYCHIATRY & NEUROLOGY

## 2020-03-21 PROCEDURE — 6370000000 HC RX 637 (ALT 250 FOR IP): Performed by: NURSE PRACTITIONER

## 2020-03-21 PROCEDURE — 99232 SBSQ HOSP IP/OBS MODERATE 35: CPT | Performed by: NURSE PRACTITIONER

## 2020-03-21 PROCEDURE — 1240000000 HC EMOTIONAL WELLNESS R&B

## 2020-03-21 RX ORDER — OXCARBAZEPINE 300 MG/1
300 TABLET, FILM COATED ORAL 2 TIMES DAILY
Status: DISCONTINUED | OUTPATIENT
Start: 2020-03-21 | End: 2020-03-21

## 2020-03-21 RX ORDER — OXCARBAZEPINE 150 MG/1
150 TABLET, FILM COATED ORAL 2 TIMES DAILY
Status: DISCONTINUED | OUTPATIENT
Start: 2020-03-21 | End: 2020-03-22

## 2020-03-21 RX ADMIN — HYDROXYZINE PAMOATE 50 MG: 50 CAPSULE ORAL at 20:27

## 2020-03-21 RX ADMIN — TRAZODONE HYDROCHLORIDE 50 MG: 50 TABLET ORAL at 20:27

## 2020-03-21 RX ADMIN — OLANZAPINE 15 MG: 5 TABLET, FILM COATED ORAL at 20:26

## 2020-03-21 RX ADMIN — ACETAMINOPHEN 650 MG: 325 TABLET, FILM COATED ORAL at 20:50

## 2020-03-21 RX ADMIN — OXCARBAZEPINE 150 MG: 150 TABLET, FILM COATED ORAL at 08:14

## 2020-03-21 RX ADMIN — OXCARBAZEPINE 150 MG: 150 TABLET, FILM COATED ORAL at 20:26

## 2020-03-21 ASSESSMENT — PAIN DESCRIPTION - DESCRIPTORS: DESCRIPTORS: ACHING;DISCOMFORT;THROBBING

## 2020-03-21 ASSESSMENT — PAIN SCALES - GENERAL
PAINLEVEL_OUTOF10: 0
PAINLEVEL_OUTOF10: 10
PAINLEVEL_OUTOF10: 0

## 2020-03-21 ASSESSMENT — PAIN DESCRIPTION - PAIN TYPE: TYPE: ACUTE PAIN

## 2020-03-21 ASSESSMENT — PAIN DESCRIPTION - ORIENTATION: ORIENTATION: LEFT;UPPER

## 2020-03-21 ASSESSMENT — PAIN DESCRIPTION - LOCATION: LOCATION: TEETH

## 2020-03-21 NOTE — GROUP NOTE
Group Therapy Note    Date: 3/21/2020    Group Start Time: 1100  Group End Time: 5602  Group Topic: Psychoeducation    [facility]    Vijaya Lees, CTRS        Group Therapy Note      Number of participants: 13  Type of group: Psychoeducation  Mode of intervention: Education, Support, Socialization, Exploration, Clarifying, and Problem-solving  Topic: A Mindful Response to Thoughts: APPLE  Objective: Pt will identify ways to acknowledge, pause, pull back, let go, and explore (APPLE) in recovery. Notes:  Pt was interactive during group sharing ways to utilize APPLE in recovery. Pt gave support and feedback to others. Status After Intervention:  Improved    Participation Level:  Active Listener and Interactive    Participation Quality: Appropriate, Attentive, Sharing and Supportive      Speech:  normal      Thought Process/Content: Logical      Affective Functioning: Congruent      Mood: euthymic      Level of consciousness:  Alert, Oriented x4 and Attentive      Response to Learning: Able to verbalize current knowledge/experience, Able to verbalize/acknowledge new learning, Able to retain information, Capable of insight, Able to change behavior and Progressing to goal      Endings: None Reported    Modes of Intervention: Education, Support, Socialization, Exploration, Clarifying and Problem-solving

## 2020-03-21 NOTE — PROGRESS NOTES
2:30-3:30    Pt attended and participated in music group. Enjoyed sharing favorite song. Pt was 1 out of 16 in attendance.

## 2020-03-21 NOTE — PLAN OF CARE
Loud, demanding, argumentative, pressured, suspicious, accusatory and intrusive when interacting at times, labile mood. Wearing a hospital gown wrapped tightly around his head. Taking po foods and fluids. Resting in his room at times. Noted to be talking aloud to unseen others.

## 2020-03-21 NOTE — BH NOTE
5 [facility]  Day 3 Interdisciplinary Treatment Plan NOTE    Review Date & Time: 3-21-20  045 am    Patient was not in treatment team.    Admission Type:   Admission Type: Voluntary    Reason for admission:  Reason for Admission: \"I was feeling suicidal\"  Estimated Length of Stay Update:  3-6 days  Estimated Discharge Date Update: 3-6 days    PATIENT STRENGTHS:  Patient Strengths Strengths: Communication, Social Skills  Patient Strengths and Limitations:Limitations: Multiple barriers to leisure interests  Addictive Behavior:Addictive Behavior  In the past 3 months, have you felt or has someone told you that you have a problem with:  : None  Do you have a history of Chemical Use?: Yes  Do you have a history of Alcohol Use?: No  Do you have a history of Street Drug Abuse?: Yes  Histroy of Prescripton Drug Abuse?: No  Medical Problems:  Past Medical History:   Diagnosis Date    Schizoaffective disorder ([facility] [address]        Risk:  Fall RiskTotal: 65  Andrew Scale:   BVC Total: 0  Change in scores:0. Changes to plan of Care: continue to assess and work to stabilize.      Status EXAM:   Status and Exam  Normal: No  Facial Expression: Worried, Exaggerated  Affect: Inappropriate, Unstable, Incongruent  Level of Consciousness: Alert  Mood:Normal: No  Mood: Anxious, Ambivalent, Labile  Motor Activity:Normal: Yes  Motor Activity: Increased  Interview Behavior: Cooperative  Preception: [place] to Person, [place] to Time, [place] to Place, [place] to Situation  Attention:Normal: No  Attention: Distractible  Thought Processes: Circumstantial, Tangential  Thought Content:Normal: No  Thought Content: Paranoia, Obsessions  Hallucinations: None  Delusions: Yes  Delusions: Persecution, Obsessions  Memory:Normal: No  Memory: Confabulation  Insight and Judgment: No  Insight and Judgment: Poor Judgment, Poor Insight, Unrealistic  Present Suicidal Ideation: No  Present Homicidal Ideation: No    Daily Assessment Last Entry:   Daily Sleep (WDL): Within Defined Limits         Patient Currently in Pain: Other (comment)(Patient is currently sleeping)  Daily Nutrition (WDL): Within Defined Limits    Patient Monitoring:  Frequency of Checks: 4 times per hour, close    Psychiatric Symptoms:   Depression Symptoms  Depression Symptoms: Impaired concentration, Increased irritability  Anxiety Symptoms  Anxiety Symptoms: Generalized, Compulsive  Candie Symptoms  Candie Symptoms: Poor judgment     Psychosis Symptoms  Hallucination Type: No problems reported or observed. Delusion Type: Grandeur, Paranoid(Believes he is a famous rapper. )    Suicide Risk CSSR-S:  1) Within the past month, have you wished you were dead or wished you could go to sleep and not wake up? : Yes  2) Have you actually had any thoughts of killing yourself? : No  3) Have you been thinking about how you might kill yourself? : No  5) Have you started to work out or worked out the details of how to kill yourself? Do you intend to carry out this plan? : No  6) Have you ever done anything, started to do anything, or prepared to do anything to end your life?: No  Change in Result: yes. Change in Plan of care: modify medications as needed for stabilization. EDUCATION:   Learner Progress Toward Treatment Goals: Reviewed results and recommendations of this team and Reviewed group plan and strategies    Method: Small group    Outcome: Refused Education    PATIENT GOALS: \"To get updates\" pr pt    PLAN/TREATMENT RECOMMENDATIONS UPDATE: To adjust medications for stabilization of pt course. GOALS UPDATE:   Time frame for Short-Term Goals: Daily re assessment.        Sinai Salinas RN

## 2020-03-21 NOTE — PROGRESS NOTES
BEHAVIORAL HEALTH FOLLOW-UP NOTE     3/21/2020     Patient was seen and examined in person, Chart reviewed   Patient's case discussed with staff/team    Chief Complaint: \"I am good \"    Interim History: I met with the patient while in his room today. He still remains highly guarded with a flat affect. Per nursing staff he is taking his medications and not going to any groups. The patient reports no depression and no anxiety. The patient denies suicidal homicidal ideation and audible and visual hallucinations. But can become easily agitated all of a sudden. Jennifer Reza       Appetite:  [x] Normal/Unchanged  [] Increased  [] Decreased      Sleep:       [x] Normal/Unchanged  [] Fair       [] Poor              Energy:    [x] Normal/Unchanged  [] Increased  [] Decreased        SI [] Present  [x] Absent    HI  []Present  [x] Absent     Aggression:  [] yes  [x] no    Patient is [x] able  [] unable to CONTRACT FOR SAFETY     PAST MEDICAL/PSYCHIATRIC HISTORY:   Past Medical History:   Diagnosis Date    [facility]        FAMILY/SOCIAL HISTORY:  Family History   Problem Relation Age of Onset    No Known Problems Mother     No Known Problems Father      Social History     Socioeconomic History    Marital status: Single     Spouse name: Not on file    Number of children: 0    Years of education: 12    Highest education level: Not on file   Occupational History    Not on file   Social Needs    Financial resource strain: Not on file    Food insecurity     Worry: Not on file     Inability: Not on file   [employer] needs     Medical: Not on file     Non-medical: Not on file   Tobacco Use    Smoking status: Current Every Day Smoker     Packs/day: 0.50     Years: 24.00     Pack years: 12.00     Types: Cigars    Smokeless tobacco: Never Used    Tobacco comment: \"alot\"   Substance and Sexual Activity    Alcohol use: No    Drug use: Yes     Types: Marijuana, Cocaine     Comment: patient denies use    200-200-20 MG/5ML suspension 30 mL, 30 mL, Oral, PRN, Kae Muniz MD      Examination:  BP (!) 113/57   Pulse 80   Temp 97.9 °F (36.6 °C) (Temporal)   Resp 16   Ht 6' (1.829 m)   Wt 153 lb 4.8 oz (69.5 kg)   SpO2 99%   BMI 20.79 kg/m²   Gait - steady  Medication side effects(SE):  No medication side effects to be noted, patient was educated on signs and symptoms of medication side effects instructed to notify medical staff if any signs and symptoms occur. Patient has the capacity to understand this information and what I instructed her to do if medication side effects arise. Mental Status Examination:    Level of consciousness:  within normal limits   Appearance:  fair grooming and fair hygiene  Behavior/Motor: No psychomotor agitation or retardation noted  Attitude toward examiner: Guarded and evasive  Speech: Normal rate rhythm volume and tone  Mood: constricted  Affect:  flat  Thought processes:  linear   Thought content: Paranoia  Cognition:  oriented to person, place, and time   Concentration intact  Insight poor   Judgement poor     ASSESSMENT:   Patient symptoms are:  [] Well controlled  [] Improving  [] Worsening  [x] No change      Diagnosis:  Principal Problem:    Schizoaffective disorder, bipolar type ([facility] [address]  Active Problems:    Polysubstance abuse ([facility]  Resolved Problems:    * No resolved hospital problems. *      LABS:    Recent Labs     03/18/20  2111   WBC 6.0   HGB 15.5        Recent Labs     03/18/20  2111      K 4.4      CO2 30*   BUN 18   CREATININE 0.9   GLUCOSE 112*     No results for input(s): BILITOT, ALKPHOS, AST, ALT in the last 72 hours.   Lab Results   Component Value Date    LABAMPH NOT DETECTED 03/18/2020    BARBSCNU NOT DETECTED 03/18/2020    LABBENZ NOT DETECTED 03/18/2020    LABMETH NOT DETECTED 03/18/2020    OPIATESCREENURINE NOT DETECTED 03/18/2020    PHENCYCLIDINESCREENURINE NOT DETECTED 03/18/2020    ETOH <10 03/18/2020     Lab Results   Component Value Date    TSH 0.791 07/16/2019     No results found for: LITHIUM  Lab Results   Component Value Date    VALPROATE <3 (L) 02/04/2019           Treatment Plan:  Reviewed current Medications with the patient. Zyprexa 10 mg nightly Trileptal 150 mg twice daily    Risks, benefits, side effects, drug-to-drug interactions and alternatives to treatment were discussed. Collateral information: To be obtained by  to insure patient safety on discharge  CD evaluation  Encourage patient to attend group and other milieu activities.   Discharge planning discussed with the patient and treatment team.    PSYCHOTHERAPY/COUNSELING:  [x] Therapeutic interview  [x] Supportive  [] CBT  [] Ongoing  [] Other    [x] Patient continues to need, on a daily basis, active treatment furnished directly by or requiring the supervision of inpatient psychiatric personnel      Anticipated Length of stay: 5 to 7 days            Electronically signed by JUICE Melendrez CNP on 3/21/2020 at 7:45 PM

## 2020-03-22 PROCEDURE — 1240000000 HC EMOTIONAL WELLNESS R&B

## 2020-03-22 PROCEDURE — 99232 SBSQ HOSP IP/OBS MODERATE 35: CPT | Performed by: NURSE PRACTITIONER

## 2020-03-22 PROCEDURE — 6370000000 HC RX 637 (ALT 250 FOR IP): Performed by: NURSE PRACTITIONER

## 2020-03-22 PROCEDURE — 6370000000 HC RX 637 (ALT 250 FOR IP): Performed by: PSYCHIATRY & NEUROLOGY

## 2020-03-22 RX ORDER — OXCARBAZEPINE 300 MG/1
300 TABLET, FILM COATED ORAL 2 TIMES DAILY
Status: DISCONTINUED | OUTPATIENT
Start: 2020-03-22 | End: 2020-03-23

## 2020-03-22 RX ADMIN — OXCARBAZEPINE 300 MG: 300 TABLET, FILM COATED ORAL at 20:27

## 2020-03-22 RX ADMIN — OLANZAPINE 15 MG: 5 TABLET, FILM COATED ORAL at 20:27

## 2020-03-22 RX ADMIN — OXCARBAZEPINE 150 MG: 150 TABLET, FILM COATED ORAL at 08:31

## 2020-03-22 RX ADMIN — TRAZODONE HYDROCHLORIDE 50 MG: 50 TABLET ORAL at 20:27

## 2020-03-22 RX ADMIN — HYDROXYZINE PAMOATE 50 MG: 50 CAPSULE ORAL at 20:27

## 2020-03-22 RX ADMIN — ACETAMINOPHEN 650 MG: 325 TABLET, FILM COATED ORAL at 22:21

## 2020-03-22 ASSESSMENT — PAIN SCALES - GENERAL
PAINLEVEL_OUTOF10: 0
PAINLEVEL_OUTOF10: 0
PAINLEVEL_OUTOF10: 2
PAINLEVEL_OUTOF10: 7

## 2020-03-22 NOTE — GROUP NOTE
Group Therapy Note    Date: 3/21/2020    Group Start Time: 2100  Group End Time: 2115  Group Topic: Healthy Living/Wellness    [facility]  Rogelio Foster, RN        Group Therapy Note    Attendees: 16/23       Signature:  Todd Vance RN

## 2020-03-22 NOTE — GROUP NOTE
Group Therapy Note    Date: 3/21/2020    Group Start Time: 2115  Group End Time: 2130  Group Topic: Wrap-Up    [facility] Nasir Wong, RN        Group Therapy Note    Attendees: 18/23       Signature:  Denisa Poon RN

## 2020-03-22 NOTE — PLAN OF CARE
Pt is stable and alert. Pt appears more in control, though can be irritable and spontaneous. Pt denies suicidal / homicidal ideations. Hallucinations are denied. Though pt has been seen talking to self on various subject topics. Pt is directable. Will follow and monitor.

## 2020-03-22 NOTE — GROUP NOTE
Group Therapy Note    Date: 3/22/2020    Group Start Time: 1100  Group End Time: 1140  Group Topic: Psychoeducation    [facility] 1    Vijaya Lees, CTRS        Group Therapy Note    Number of participants: 17  Type of group: Psychoeducation  Mode of intervention: Education, Support, Socialization, Exploration, Clarifying, Problem-solving, and Activity  Topic: Positive Reflections  Objective:  PT will identify 3 positive things from current treatment stay. Notes:  Pt was interactive during group sharing 3 positive things from current treatment stay. Pt able to give support and feedback to others. Status After Intervention:  Improved    Participation Level:  Active Listener and Interactive    Participation Quality: Appropriate, Attentive, Sharing and Supportive      Speech:  normal      Thought Process/Content: Logical      Affective Functioning: Congruent      Mood: irritable      Level of consciousness:  Alert, Oriented x4 and Attentive      Response to Learning: Able to verbalize current knowledge/experience, Able to verbalize/acknowledge new learning, Able to retain information, Capable of insight, Able to change behavior and Progressing to goal      Endings: None Reported    Modes of Intervention: Education, Support, Socialization, Exploration, Clarifying, Problem-solving and Activity

## 2020-03-22 NOTE — PROGRESS NOTES
Lab Results   Component Value Date    TSH 0.791 07/16/2019     No results found for: LITHIUM  Lab Results   Component Value Date    VALPROATE <3 (L) 02/04/2019           Treatment Plan:  Reviewed current Medications with the patient. Zyprexa 10 mg nightly   Trileptal 300 mg twice daily for mood stabilization    Risks, benefits, side effects, drug-to-drug interactions and alternatives to treatment were discussed. Collateral information: To be obtained by  to insure patient safety on discharge  CD evaluation  Encourage patient to attend group and other milieu activities.   Discharge planning discussed with the patient and treatment team.    PSYCHOTHERAPY/COUNSELING:  [x] Therapeutic interview  [x] Supportive  [] CBT  [] Ongoing  [] Other    [x] Patient continues to need, on a daily basis, active treatment furnished directly by or requiring the supervision of inpatient psychiatric personnel      Anticipated Length of stay: 5 to 7 days            Electronically signed by JUICE Vargas CNP on 3/22/2020 at 2:35 PM

## 2020-03-23 PROCEDURE — 99232 SBSQ HOSP IP/OBS MODERATE 35: CPT | Performed by: NURSE PRACTITIONER

## 2020-03-23 PROCEDURE — 6370000000 HC RX 637 (ALT 250 FOR IP): Performed by: NURSE PRACTITIONER

## 2020-03-23 PROCEDURE — 6370000000 HC RX 637 (ALT 250 FOR IP): Performed by: PSYCHIATRY & NEUROLOGY

## 2020-03-23 PROCEDURE — 1240000000 HC EMOTIONAL WELLNESS R&B

## 2020-03-23 RX ORDER — OLANZAPINE 10 MG/1
10 TABLET ORAL ONCE
Status: DISCONTINUED | OUTPATIENT
Start: 2020-03-23 | End: 2020-03-23

## 2020-03-23 RX ORDER — RISPERIDONE 1 MG/1
1 TABLET, FILM COATED ORAL 2 TIMES DAILY
Status: DISCONTINUED | OUTPATIENT
Start: 2020-03-23 | End: 2020-03-24

## 2020-03-23 RX ORDER — OLANZAPINE 10 MG/1
20 TABLET ORAL NIGHTLY
Status: DISCONTINUED | OUTPATIENT
Start: 2020-03-23 | End: 2020-03-23

## 2020-03-23 RX ORDER — DIVALPROEX SODIUM 500 MG/1
500 TABLET, EXTENDED RELEASE ORAL 2 TIMES DAILY
Status: DISCONTINUED | OUTPATIENT
Start: 2020-03-23 | End: 2020-03-24

## 2020-03-23 RX ADMIN — OXCARBAZEPINE 300 MG: 300 TABLET, FILM COATED ORAL at 08:33

## 2020-03-23 RX ADMIN — DIVALPROEX SODIUM 500 MG: 500 TABLET, EXTENDED RELEASE ORAL at 20:34

## 2020-03-23 RX ADMIN — RISPERIDONE 1 MG: 1 TABLET, FILM COATED ORAL at 20:34

## 2020-03-23 RX ADMIN — RISPERIDONE 1 MG: 1 TABLET, FILM COATED ORAL at 10:53

## 2020-03-23 RX ADMIN — TRAZODONE HYDROCHLORIDE 50 MG: 50 TABLET ORAL at 20:34

## 2020-03-23 RX ADMIN — DIVALPROEX SODIUM 500 MG: 500 TABLET, EXTENDED RELEASE ORAL at 10:53

## 2020-03-23 RX ADMIN — ACETAMINOPHEN 650 MG: 325 TABLET, FILM COATED ORAL at 11:02

## 2020-03-23 ASSESSMENT — PAIN SCALES - GENERAL
PAINLEVEL_OUTOF10: 8
PAINLEVEL_OUTOF10: 0
PAINLEVEL_OUTOF10: 0
PAINLEVEL_OUTOF10: 5
PAINLEVEL_OUTOF10: 0

## 2020-03-23 ASSESSMENT — PAIN DESCRIPTION - DESCRIPTORS: DESCRIPTORS: ACHING;DISCOMFORT;THROBBING

## 2020-03-23 ASSESSMENT — PAIN DESCRIPTION - ONSET: ONSET: ON-GOING

## 2020-03-23 ASSESSMENT — PAIN DESCRIPTION - LOCATION: LOCATION: TEETH

## 2020-03-23 ASSESSMENT — PAIN - FUNCTIONAL ASSESSMENT: PAIN_FUNCTIONAL_ASSESSMENT: ACTIVITIES ARE NOT PREVENTED

## 2020-03-23 ASSESSMENT — PAIN DESCRIPTION - PAIN TYPE: TYPE: CHRONIC PAIN

## 2020-03-23 ASSESSMENT — PAIN DESCRIPTION - PROGRESSION: CLINICAL_PROGRESSION: NOT CHANGED

## 2020-03-23 ASSESSMENT — PAIN DESCRIPTION - FREQUENCY: FREQUENCY: CONTINUOUS

## 2020-03-23 NOTE — CARE COORDINATION
ANTONIO note: d/c planning: SW met with pt and discussed d/c planning. ANTONIO informed him that referrals had been made to [address] and [facility]. He however states that he doesn't want inpt now as \"she's ruined it\" and he needs to focus on making money d/t coronavirus. [facility]

## 2020-03-23 NOTE — PROGRESS NOTES
BEHAVIORAL HEALTH FOLLOW-UP NOTE     3/23/2020     Patient was seen and examined in person, Chart reviewed   Patient's case discussed with staff/team    Chief Complaint: Internally stimulated    Interim History: Patient up on the unit agitated highly psychotic. He is internally stimulated talking and responding to unseen others. He is intrusive continues to ask about discharge. He shows no insight and judgment to hospitalization need for treatment. He is actively psychotic pacing the unit.     Appetite:  [x] Normal/Unchanged  [] Increased  [] Decreased      Sleep:       [x] Normal/Unchanged  [] Fair       [] Poor              Energy:    [x] Normal/Unchanged  [] Increased  [] Decreased        SI [] Present  [x] Absent    HI  []Present  [x] Absent     Aggression:  [x] yes  [] no    Patient is [x] able  [] unable to CONTRACT FOR SAFETY     PAST MEDICAL/PSYCHIATRIC HISTORY:   Past Medical History:   Diagnosis Date    Schizoaffective disorder ([facility] [address]        FAMILY/SOCIAL HISTORY:  Family History   Problem Relation Age of Onset    No Known Problems Mother     No Known Problems Father      Social History     Socioeconomic History    Marital status: Single     Spouse name: Not on file    Number of children: 0    Years of education: 12    Highest education level: Not on file   Occupational History    Not on file   Social Needs    Financial resource strain: Not on file    Food insecurity     Worry: Not on file     Inability: Not on file   [employer] needs     Medical: Not on file     Non-medical: Not on file   Tobacco Use    Smoking status: Current Every Day Smoker     Packs/day: 0.50     Years: 24.00     Pack years: 12.00     Types: Cigars    Smokeless tobacco: Never Used    Tobacco comment: \"alot\"   Substance and Sexual Activity    Alcohol use: No    Drug use: Yes     Types: Marijuana, Cocaine     Comment: patient denies use    Sexual activity: Not on file     Comment: refuses to answer   Lifestyle PHENCYCLIDINESCREENURINE NOT DETECTED 03/18/2020    ETOH <10 03/18/2020     Lab Results   Component Value Date    TSH 0.791 07/16/2019     No results found for: LITHIUM  Lab Results   Component Value Date    VALPROATE <3 (L) 02/04/2019           Treatment Plan:  Reviewed current Medications with the patient. Plan discussed with Dr. Mariana Joe and Trileptal  Start Depakote 5 mg twice daily for mood stabilization  Start Risperdal 1 mg twice daily for psychosis and plan for Risperdal Consta injection    Risks, benefits, side effects, drug-to-drug interactions and alternatives to treatment were discussed. Collateral information: To be obtained by  to insure patient safety on discharge  CD evaluation  Encourage patient to attend group and other milieu activities.   Discharge planning discussed with the patient and treatment team.    PSYCHOTHERAPY/COUNSELING:  [x] Therapeutic interview  [x] Supportive  [] CBT  [] Ongoing  [] Other    [x] Patient continues to need, on a daily basis, active treatment furnished directly by or requiring the supervision of inpatient psychiatric personnel      Anticipated Length of stay: 5 to 7 days            Electronically signed by JUICE Fulton CNP on 9/03/1874 at 2:43 PM

## 2020-03-23 NOTE — PLAN OF CARE
Problem: Depressive Behavior With or Without Suicide Precautions:  Goal: Ability to disclose and discuss suicidal ideas will improve  Description: Ability to disclose and discuss suicidal ideas will improve  3/23/2020 0909 by Rock Kanner, RN  Outcome: Met This Shift  3/23/2020 0331 by Ely Bojorquez RN  Outcome: Ongoing  3/22/2020 2218 by Ely Bojorquez RN  Outcome: Ongoing     Problem: Anger Management/Homicidal Ideation:  Goal: Able to display appropriate communication and problem solving  Description: Able to display appropriate communication and problem solving  3/23/2020 0909 by Rock Kanner, RN  Outcome: Ongoing  3/23/2020 0331 by Ely Bojorquez RN  Outcome: Ongoing  3/22/2020 2218 by Ely Bojorquez RN  Outcome: Ongoing  Goal: Ability to verbalize frustrations and anger appropriately will improve  Description: Ability to verbalize frustrations and anger appropriately will improve  3/23/2020 0909 by Rock Kanner, RN  Outcome: Ongoing  3/23/2020 0331 by Ely Bojorquez RN  Outcome: Ongoing  3/22/2020 2218 by Ely Bojorquez RN  Outcome: Ongoing     Problem: Depressive Behavior With or Without Suicide Precautions:  Goal: Able to verbalize and/or display a decrease in depressive symptoms  Description: Able to verbalize and/or display a decrease in depressive symptoms  3/23/2020 0909 by Rock Kanner, RN  Outcome: Ongoing  3/23/2020 0331 by Ely Bojorquez RN  Outcome: Ongoing  3/22/2020 2218 by Ely Bojorquez RN  Outcome: Ongoing     Problem: Pain:  Goal: Pain level will decrease  Description: Pain level will decrease  3/23/2020 0331 by Ely Bojorquez RN  Outcome: Ongoing  3/22/2020 2218 by Ely Bojorquez RN  Outcome: Ongoing     Joce denies any SI, HI, or any Hallucinations at this time. Patient denies and depression or anxiety. Patient states he feels better emotionally but has a cold, runny nose stating allergies. Patient taking his meds and going to groups. Will continue to monitor.

## 2020-03-23 NOTE — PLAN OF CARE
Patient resting quiet to self at this time, respirations are even and unlabored, no signs or symptoms of distress or discomfort. No medications given thus this shift for anxiety and sleep. Staff will continue to conduct environmental rounds to ensure the safety of everyone on the unit. Staff will provide support and interventions as requested or required.

## 2020-03-23 NOTE — PLAN OF CARE
Patient was out on unit, but noted little inaction with peers. Denies suicidal thought or thoughts of hurting others,seeing shadows or hearing voices. Patient does state his depression is 10 out of 10 do to \"I have female relationship problems. I'm not anxious,appetite is normal and sleep is good,I feel rested upon rising in am.\"  Patient observed talking to self,pacing in and out of room. Affect is flat,sad. Compliant with medications and attends groups. No unit problems or behaviors. Took shower this evening. Safety rounds continue.

## 2020-03-23 NOTE — PROGRESS NOTES
Tresa Key denies any SI, HI, or any Hallucinations at this time. Patient denies and depression or anxiety. Patient states he feels better emotionally but has a cold, runny nose stating allergies. Patient taking his meds and going to groups. Will continue to monitor.

## 2020-03-23 NOTE — PROGRESS NOTES
Attended afternoon meet and greet and group of amarilis howard. Patient pleasant and engaged in group. Patient was 1 of 14. Group was facilitated from [phone].

## 2020-03-23 NOTE — PROGRESS NOTES
Joce pacing the unit and talking to unseen others. Patient refused his one time order of Zyprexa 10 mg. Will continue to monitor.

## 2020-03-23 NOTE — GROUP NOTE
Group Therapy Note    Date: 3/23/2020    Group Start Time: 1000  Group End Time: 1050  Group Topic: Psychoeducation    [facility]  [id number]        Group Therapy Note    Attendees: 20                                                                      Group Therapy Note    Date: 3/23/2020  Type of Group: Psychoeducation    Wellness Binder Information  Module Name:my relapse prevention and management plan     Patient's Goal:  patient will be able to id core points to developing a relapse prevention plan and what signs to look for in the future. Modes of Intervention: Education, Support, Socialization, Exploration, and Problem-solving      Discipline Responsible: Psychoeducational Specialist      Signature:  Markus Yu, [address]        Notes: In and out of group, loud and talking to self thru-out. Patient left to see drs and declined to return. Status After Intervention:  Improved    Participation Level:  Active Listener and Interactive    Participation Quality: Appropriate, Attentive, Sharing and Supportive      Speech:  Normal       Thought Process/Content: Flight of ideas  Perseverating      Affective Functioning: Blunted      Mood: euthymic      Level of consciousness:  Alert, Oriented x4 and Attentive      Response to Learning: Able to verbalize/acknowledge new learning, Able to retain information and Progressing to goal      Endings: None Reported    Modes of Intervention: Education, Support, Socialization, Exploration and Problem-solving      Discipline Responsible: Psychoeducational Specialist      Signature:  Jigar Thompson

## 2020-03-24 PROCEDURE — 6370000000 HC RX 637 (ALT 250 FOR IP): Performed by: NURSE PRACTITIONER

## 2020-03-24 PROCEDURE — 6370000000 HC RX 637 (ALT 250 FOR IP): Performed by: PSYCHIATRY & NEUROLOGY

## 2020-03-24 PROCEDURE — 1240000000 HC EMOTIONAL WELLNESS R&B

## 2020-03-24 PROCEDURE — 99232 SBSQ HOSP IP/OBS MODERATE 35: CPT | Performed by: NURSE PRACTITIONER

## 2020-03-24 RX ORDER — DIVALPROEX SODIUM 500 MG/1
500 TABLET, DELAYED RELEASE ORAL EVERY 12 HOURS SCHEDULED
Status: DISCONTINUED | OUTPATIENT
Start: 2020-03-24 | End: 2020-03-27 | Stop reason: HOSPADM

## 2020-03-24 RX ORDER — RISPERIDONE 1 MG/1
1 TABLET, FILM COATED ORAL DAILY
Status: DISCONTINUED | OUTPATIENT
Start: 2020-03-25 | End: 2020-03-27 | Stop reason: HOSPADM

## 2020-03-24 RX ORDER — RISPERIDONE 2 MG/1
2 TABLET, FILM COATED ORAL NIGHTLY
Status: DISCONTINUED | OUTPATIENT
Start: 2020-03-24 | End: 2020-03-27 | Stop reason: HOSPADM

## 2020-03-24 RX ADMIN — TRAZODONE HYDROCHLORIDE 50 MG: 50 TABLET ORAL at 20:41

## 2020-03-24 RX ADMIN — DIVALPROEX SODIUM 500 MG: 500 TABLET, EXTENDED RELEASE ORAL at 09:13

## 2020-03-24 RX ADMIN — DIVALPROEX SODIUM 500 MG: 250 TABLET, DELAYED RELEASE ORAL at 20:43

## 2020-03-24 RX ADMIN — RISPERIDONE 2 MG: 2 TABLET, FILM COATED ORAL at 20:41

## 2020-03-24 RX ADMIN — RISPERIDONE 1 MG: 1 TABLET, FILM COATED ORAL at 09:13

## 2020-03-24 ASSESSMENT — PAIN SCALES - GENERAL
PAINLEVEL_OUTOF10: 0
PAINLEVEL_OUTOF10: 0

## 2020-03-24 NOTE — PLAN OF CARE
Problem: Anger Management/Homicidal Ideation:  Goal: Able to display appropriate communication and problem solving  Description: Able to display appropriate communication and problem solving  3/23/2020 2107 by Sridhar Bar RN  Outcome: Ongoing     Problem: Depressive Behavior With or Without Suicide Precautions:  Goal: Able to verbalize and/or display a decrease in depressive symptoms  Description: Able to verbalize and/or display a decrease in depressive symptoms  3/23/2020 2107 by Sridhar Bar RN  Outcome: Ongoing     Patient in and out of room this shift. Keeps to self. Calm and cooperative during conversation. States that he has depression and anxiety and that its \"iffy\" and that its \"up and down all day\". States that he feels safe here. Took HS medications without difficulty. Denies suicidal/homicidal ideations and hallucinations at this time. Was observed talking to unseen others. Purposeful rounding continued.

## 2020-03-24 NOTE — PROGRESS NOTES
BEHAVIORAL HEALTH FOLLOW-UP NOTE     3/24/2020     Patient was seen and examined in person, Chart reviewed   Patient's case discussed with staff/team    Chief Complaint: I feel better on the medications    Interim History: I met with the patient in his room today. The patient is taking his medications and going to groups. The patient is future oriented and stated that he wants to get his money together so we can get an apartment and get out of the homeless shelters. The patient was polite and cooperative with me today and stated that his thoughts are more clear. The patient was not witnessed talking to unseen others today. The patient seems less aggressive today than he did on previous days.       Appetite:  [x] Normal/Unchanged  [] Increased  [] Decreased      Sleep:       [x] Normal/Unchanged  [] Fair       [] Poor              Energy:    [x] Normal/Unchanged  [] Increased  [] Decreased        SI [] Present  [x] Absent    HI  []Present  [x] Absent     Aggression:  [x] yes  [] no    Patient is [x] able  [] unable to CONTRACT FOR SAFETY     PAST MEDICAL/PSYCHIATRIC HISTORY:   Past Medical History:   Diagnosis Date    [facility]        FAMILY/SOCIAL HISTORY:  Family History   Problem Relation Age of Onset    No Known Problems Mother     No Known Problems Father      Social History     Socioeconomic History    Marital status: Single     Spouse name: Not on file    Number of children: 0    Years of education: 12    Highest education level: Not on file   Occupational History    Not on file   Social Needs    Financial resource strain: Not on file    Food insecurity     Worry: Not on file     Inability: Not on file   [employer] needs     Medical: Not on file     Non-medical: Not on file   Tobacco Use    Smoking status: Current Every Day Smoker     Packs/day: 0.50     Years: 24.00     Pack years: 12.00     Types: Cigars    Smokeless tobacco: Never Used    Tobacco comment: \"alot\"

## 2020-03-24 NOTE — PROGRESS NOTES
(comment)(NAKIA pt. sleeping)  Daily Nutrition (WDL): Within Defined Limits    Patient Monitoring:  Frequency of Checks: 4 times per hour, close    Psychiatric Symptoms:   Depression Symptoms  Depression Symptoms: No problems reported or observed. Anxiety Symptoms  Anxiety Symptoms: Generalized  Candie Symptoms  Candie Symptoms: Poor judgment     Psychosis Symptoms  Hallucination Type: No problems reported or observed. Delusion Type: No problems reported or observed. Suicide Risk CSSR-S:  1) Within the past month, have you wished you were dead or wished you could go to sleep and not wake up? : Yes  2) Have you actually had any thoughts of killing yourself? : No  3) Have you been thinking about how you might kill yourself? : No  5) Have you started to work out or worked out the details of how to kill yourself?  Do you intend to carry out this plan? : No  6) Have you ever done anything, started to do anything, or prepared to do anything to end your life?: No  Change in Result none Change in Plan of care none      EDUCATION:   Learner Progress Toward Treatment Goals: Reviewed results and recommendations of this team    Method: Individual    Outcome: Verbalized understanding    PATIENT GOALS: no goal today    PLAN/TREATMENT RECOMMENDATIONS UPDATE:continue current treatment plan    GOALS UPDATE:   Time frame for Short-Term Goals: 3-5 days      Patrica Cooper RN

## 2020-03-24 NOTE — PROGRESS NOTES
[facility] Interdisciplinary Treatment Plan Note     Review Date & Time: 3/24/20 1000    Patient was in treatment team.    Admission Type:   Admission Type: Voluntary    Reason for admission:  Reason for Admission: \"I was feeling suicidal\"    Estimated Length of Stay Update:  3-5 days   Estimated Discharge Date Update: 3-5 days    PATIENT STRENGTHS:  Patient Strengths:Strengths: Communication, Social Skills  Patient Strengths and Limitations:Limitations: Multiple barriers to leisure interests  Addictive Behavior:Addictive Behavior  In the past 3 months, have you felt or has someone told you that you have a problem with:  : None  Do you have a history of Chemical Use?: Yes  Do you have a history of Alcohol Use?: No  Do you have a history of Street Drug Abuse?: Yes  Histroy of Prescripton Drug Abuse?: No  Medical Problems:   Past Medical History:   Diagnosis Date    [facility]izoaffective disorder ([facility] [address]        Risk:  Fall RiskTotal: 58  Andrew Scale Andrew Scale Score: 22  BVC Total: 0  Change in scores none.  Changes to plan of Care  none    Status EXAM:   Status and Exam  Normal: No  Facial Expression: Avoids Gaze, Sad, Worried  Affect: Congruent  Level of Consciousness: Alert  Mood:Normal: No  Mood: Anxious, Depressed, Suspicious  Motor Activity:Normal: No  Motor Activity: Decreased  Interview Behavior: Cooperative  Preception: Lilly to Person, Emily Hollis to Time, Lilly to Place, Lilly to Situation  Attention:Normal: No  Attention: Distractible  Thought Processes: Circumstantial  Thought Content:Normal: No  Thought Content: Preoccupations, Paranoia  Hallucinations: None  Delusions: Yes  Delusions: Obsessions  Memory:Normal: No  Memory: Confabulation  Insight and Judgment: No  Insight and Judgment: Poor Judgment, Poor Insight  Present Suicidal Ideation: No  Present Homicidal Ideation: No    Daily Assessment Last Entry:   Daily Sleep (WDL): Within Defined Limits         Patient Currently in Pain:

## 2020-03-24 NOTE — PLAN OF CARE
Problem: Depressive Behavior With or Without Suicide Precautions:  Goal: Ability to disclose and discuss suicidal ideas will improve  Description: Ability to disclose and discuss suicidal ideas will improve  Outcome: Met This Shift     Problem: Anger Management/Homicidal Ideation:  Goal: Able to display appropriate communication and problem solving  Description: Able to display appropriate communication and problem solving  Outcome: Ongoing     Problem: Depressive Behavior With or Without Suicide Precautions:  Goal: Able to verbalize and/or display a decrease in depressive symptoms  Description: Able to verbalize and/or display a decrease in depressive symptoms  Outcome: Ongoing     Joce denies SI, HI, or any Hallucinations at this time. Aki Magallanes has been pacing the unit and at times talking to self in the halls. Patient states he is being discharge and needs to go back to work. Patient taking his meds and attending groups. Will continue to monitor.

## 2020-03-25 PROCEDURE — 6370000000 HC RX 637 (ALT 250 FOR IP): Performed by: NURSE PRACTITIONER

## 2020-03-25 PROCEDURE — 1240000000 HC EMOTIONAL WELLNESS R&B

## 2020-03-25 PROCEDURE — 6360000002 HC RX W HCPCS: Performed by: NURSE PRACTITIONER

## 2020-03-25 PROCEDURE — 6370000000 HC RX 637 (ALT 250 FOR IP): Performed by: PSYCHIATRY & NEUROLOGY

## 2020-03-25 PROCEDURE — 99232 SBSQ HOSP IP/OBS MODERATE 35: CPT | Performed by: NURSE PRACTITIONER

## 2020-03-25 RX ADMIN — TRAZODONE HYDROCHLORIDE 50 MG: 50 TABLET ORAL at 20:27

## 2020-03-25 RX ADMIN — DIVALPROEX SODIUM 500 MG: 250 TABLET, DELAYED RELEASE ORAL at 08:12

## 2020-03-25 RX ADMIN — RISPERIDONE 25 MG: KIT at 13:34

## 2020-03-25 RX ADMIN — DIVALPROEX SODIUM 500 MG: 250 TABLET, DELAYED RELEASE ORAL at 20:27

## 2020-03-25 RX ADMIN — HYDROXYZINE PAMOATE 50 MG: 50 CAPSULE ORAL at 20:26

## 2020-03-25 RX ADMIN — RISPERIDONE 1 MG: 2 TABLET, FILM COATED ORAL at 08:17

## 2020-03-25 RX ADMIN — RISPERIDONE 2 MG: 2 TABLET, FILM COATED ORAL at 20:26

## 2020-03-25 ASSESSMENT — PAIN SCALES - GENERAL: PAINLEVEL_OUTOF10: 0

## 2020-03-25 NOTE — PROGRESS NOTES
BEHAVIORAL HEALTH FOLLOW-UP NOTE     3/25/2020     Patient was seen and examined in person, Chart reviewed   Patient's case discussed with staff/team    Chief Complaint: I want out here now    Interim History: I met with the patient in his room today with the . The patient is more aggressive with me today than he was on previous days. The patient is demanding to leave, I explained to the patient the necessity for him to further stabilize for a few more days and he agreed to this. The patient is seen at times walking the unit talking to unseen others, and being internally stimulated. The patient will be initiated on the Risperdal Consta injection today which he consents to treatment.     Appetite:  [x] Normal/Unchanged  [] Increased  [] Decreased      Sleep:       [x] Normal/Unchanged  [] Fair       [] Poor              Energy:    [x] Normal/Unchanged  [] Increased  [] Decreased        SI [] Present  [x] Absent    HI  []Present  [x] Absent     Aggression:  [x] yes  [] no    Patient is [x] able  [] unable to CONTRACT FOR SAFETY     PAST MEDICAL/PSYCHIATRIC HISTORY:   Past Medical History:   Diagnosis Date    Schizoaffective disorder ([facility] [address]        FAMILY/SOCIAL HISTORY:  Family History   Problem Relation Age of Onset    No Known Problems Mother     No Known Problems Father      Social History     Socioeconomic History    Marital status: Single     Spouse name: Not on file    Number of children: 0    Years of education: 12    Highest education level: Not on file   Occupational History    Not on file   Social Needs    Financial resource strain: Not on file    Food insecurity     Worry: Not on file     Inability: Not on file   [employer] needs     Medical: Not on file     Non-medical: Not on file   Tobacco Use    Smoking status: Current Every Day Smoker     Packs/day: 0.50     Years: 24.00     Pack years: 12.00     Types: Cigars    Smokeless tobacco: Never Used    Tobacco comment: mg at 03/22/20 2027    haloperidol lactate (HALDOL) injection 5 mg, 5 mg, Intramuscular, Q6H PRN **OR** haloperidol (HALDOL) tablet 5 mg, 5 mg, Oral, Q6H PRN, Chelsi Zuluaga MD    traZODone (DESYREL) tablet 50 mg, 50 mg, Oral, Nightly PRN, Chelsi Zuluaga MD, 50 mg at 03/24/20 2041    magnesium hydroxide (MILK OF MAGNESIA) 400 MG/5ML suspension 30 mL, 30 mL, Oral, Daily PRN, Chelsi Zuluaga MD    aluminum & magnesium hydroxide-simethicone (MAALOX) 200-200-20 MG/5ML suspension 30 mL, 30 mL, Oral, PRN, Chelsi Zuluaga MD      Examination:  /71   Pulse 67   Temp 97 °F (36.1 °C) (Oral)   Resp 16   Ht 6' (1.829 m)   Wt 153 lb 4.8 oz (69.5 kg)   SpO2 99%   BMI 20.79 kg/m²   Gait - steady  Medication side effects(SE):  No medication side effects to be noted, patient was educated on signs and symptoms of medication side effects instructed to notify medical staff if any signs and symptoms occur. Patient has the capacity to understand this information and what I instructed her to do if medication side effects arise. Mental Status Examination:    Level of consciousness:  within normal limits   Appearance:  fair grooming and fair hygiene  Behavior/Motor: No psychomotor agitation or retardation noted  Attitude toward examiner: Calm and cooperative  Speech: Normal rate rhythm volume and tone  Mood: Euthymic  Affect:  flat  Thought processes:  linear   Thought content: Without delusions hallucinations or paranoia  Cognition:  oriented to person, place, and time   Concentration intact  Insight poor   Judgement poor     ASSESSMENT:   Patient symptoms are:  [] Well controlled  [x] Improving  [] Worsening  [] No change      Diagnosis:  Principal Problem:    Schizoaffective disorder, bipolar type ([facility] [address]  Active Problems:    [facility]  Resolved Problems:    * No resolved hospital problems. *      LABS:    No results for input(s): WBC, HGB, PLT in the last 72 hours.   No results for input(s): NA, K,

## 2020-03-25 NOTE — PLAN OF CARE
Pt is stable and alert. Pt denies suicidal or homicidal ideations. Pt denies hallucinations. Pt has been seen talking to unseen others, rambling speech and loose associations in content. Pt can be directable. Pt is without other distress. Pt can be somewhat irritable. Pt has not been out of control. Pt denies suicidal / homicidal ideations. Pt denies hallucinations. Pt does appear to be internally stimulated. Pt reports goal, \"to talk with the Dr.\"  pr pt. Will follow and monitor.

## 2020-03-25 NOTE — GROUP NOTE
Group Therapy Note    Date: 3/25/2020    Group Start Time: 1115  Group End Time: 1200  Group Topic: Psychotherapy    [facility]    CELINA Joshi        Group Therapy Note    Attendees: 8         Patient's Goal:  Increase socialization with others in psychotherapy group. Notes:  Pt active in group discussion of importance of respectful communication with others. We discussed benefits of communication with others as well as barriers. PT called when called upon but didn't provide feedback with others. Status After Intervention:  Improved    Participation Level:  Active Listener and Interactive    Participation Quality: Appropriate, Attentive, Sharing and Supportive      Speech:  normal      Thought Process/Content: Logical      Affective Functioning: Blunted      Mood: depressed      Level of consciousness:  Alert, Oriented x4 and Attentive      Response to Learning: Able to verbalize current knowledge/experience, Able to verbalize/acknowledge new learning and Progressing to goal      Endings: None Reported    Modes of Intervention: Support, Socialization, Exploration and Problem-solving      Discipline Responsible: /Counselor      Signature:  CELINA Joshi

## 2020-03-25 NOTE — PLAN OF CARE
Problem: Anger Management/Homicidal Ideation:  Goal: Ability to verbalize frustrations and anger appropriately will improve  Description: Ability to verbalize frustrations and anger appropriately will improve  Outcome: Ongoing     Problem: Depressive Behavior With or Without Suicide Precautions:  Goal: Able to verbalize and/or display a decrease in depressive symptoms  Description: Able to verbalize and/or display a decrease in depressive symptoms  3/24/2020 2146 by Jose Melendez RN  Outcome: Ongoing     Patient out on unit. Keeps to self. Patient has an underlying irritability. Grandiose. Upset he wasn't discharged today. He states \"I have a lot to lose money wise\" and \"people are relying on me, everyone knows me out there, I'm Lil Joce\". Patient did take HS medications without difficulty. Denies suicidal/homicidal thoughts and hallucinations at this time. Purposeful rounding continued.

## 2020-03-26 PROCEDURE — 6370000000 HC RX 637 (ALT 250 FOR IP): Performed by: NURSE PRACTITIONER

## 2020-03-26 PROCEDURE — 1240000000 HC EMOTIONAL WELLNESS R&B

## 2020-03-26 PROCEDURE — 99232 SBSQ HOSP IP/OBS MODERATE 35: CPT | Performed by: NURSE PRACTITIONER

## 2020-03-26 RX ADMIN — DIVALPROEX SODIUM 500 MG: 250 TABLET, DELAYED RELEASE ORAL at 20:40

## 2020-03-26 RX ADMIN — RISPERIDONE 1 MG: 2 TABLET, FILM COATED ORAL at 09:02

## 2020-03-26 RX ADMIN — RISPERIDONE 2 MG: 2 TABLET, FILM COATED ORAL at 20:40

## 2020-03-26 RX ADMIN — DIVALPROEX SODIUM 500 MG: 250 TABLET, DELAYED RELEASE ORAL at 09:02

## 2020-03-26 ASSESSMENT — PAIN SCALES - GENERAL: PAINLEVEL_OUTOF10: 0

## 2020-03-26 NOTE — BH NOTE
Pt is stable. Cooperative at this time. Pt denies suicidal ideations. Pt denies homicidal ideations. Pt denies hallucinations. No other complications at this time. Will follow and monitor.

## 2020-03-26 NOTE — PLAN OF CARE
Pt is stable and alert. Pt denies suicidal or homicidal ideations. Pt denies hallucinations. Pt is less labile but remains talkative and can be mildly boisterous presently. Pt reports goal of \"To talk with DrNiko\" pr pt. Will follow and monitor.

## 2020-03-26 NOTE — PROGRESS NOTES
\"who are we to \" repetitive and talks to self wearing washcloth over his head  denies any SI HI or pat to me behavior relative good control at present time support given

## 2020-03-26 NOTE — PROGRESS NOTES
BEHAVIORAL HEALTH FOLLOW-UP NOTE     3/26/2020     Patient was seen and examined in person, Chart reviewed   Patient's case discussed with staff/team    Chief Complaint: I want out here now    Interim History: I met with the patient in his room today with the . The patient has a significant decrease in his aggression today. The patient is still eager to leave the hospital because he is about to receive his Social Security income check. The patient states that he has no suicidal or homicidal ideation. Patient denies any audible or visible hallucinations. The patient is more stable today than he was yesterday. Per  the patient is going to have a  and be referred to [facility] upon discharge.   Appetite:  [x] Normal/Unchanged  [] Increased  [] Decreased      Sleep:       [x] Normal/Unchanged  [] Fair       [] Poor              Energy:    [x] Normal/Unchanged  [] Increased  [] Decreased        SI [] Present  [x] Absent    HI  []Present  [x] Absent     Aggression:  [x] yes  [] no    Patient is [x] able  [] unable to CONTRACT FOR SAFETY     PAST MEDICAL/PSYCHIATRIC HISTORY:   Past Medical History:   Diagnosis Date    [facility]        FAMILY/SOCIAL HISTORY:  Family History   Problem Relation Age of Onset    No Known Problems Mother     No Known Problems Father      Social History     Socioeconomic History    Marital status: Single     Spouse name: Not on file    Number of children: 0    Years of education: 12    Highest education level: Not on file   Occupational History    Not on file   Social Needs    Financial resource strain: Not on file    Food insecurity     Worry: Not on file     Inability: Not on file   [employer] needs     Medical: Not on file     Non-medical: Not on file   Tobacco Use    Smoking status: Current Every Day Smoker     Packs/day: 0.50     Years: 24.00     Pack years: 12.00     Types: Cigars    Smokeless tobacco: Never Used    Tobacco comment: \"alot\"   Substance and Sexual Activity    Alcohol use: No    Drug use: Yes     Types: Marijuana, Cocaine     Comment: patient denies use    Sexual activity: Not on file     Comment: refuses to answer   Lifestyle    Physical activity     Days per week: Not on file     Minutes per session: Not on file    Stress: Not on file   Relationships    Social connections     Talks on phone: Not on file     Gets together: Not on file     Attends Mu-ism service: Not on file     Active member of club or organization: Not on file     Attends meetings of clubs or organizations: Not on file     Relationship status: Not on file    Intimate partner violence     Fear of current or ex partner: Not on file     Emotionally abused: Not on file     Physically abused: Not on file     Forced sexual activity: Not on file   Other Topics Concern    Not on file   Social History Narrative    Not on file           ROS:  [x] All negative/unchanged except if checked.  Explain positive(checked items) below:  [] Constitutional  [] Eyes  [] Ear/Nose/Mouth/Throat  [] Respiratory  [] CV  [] GI  []   [] Musculoskeletal  [] Skin/Breast  [] Neurological  [] Endocrine  [] Heme/Lymph  [] Allergic/Immunologic    Explanation:     MEDICATIONS:    Current Facility-Administered Medications:     risperiDONE microspheres ER (RISPERDAL CONSTA) injection 25 mg, 25 mg, Intramuscular, Q14 Days, Brain Cast, APRN - CNP, 25 mg at 03/25/20 1334    risperiDONE (RISPERDAL) tablet 1 mg, 1 mg, Oral, Daily, Brain Cast, APRN - CNP, 1 mg at 03/26/20 0902    risperiDONE (RISPERDAL) tablet 2 mg, 2 mg, Oral, Nightly, Brain Cast, APRN - CNP, 2 mg at 03/25/20 2026    divalproex (DEPAKOTE) DR tablet 500 mg, 500 mg, Oral, 2 times per day, Brain Cast, APRN - CNP, 500 mg at 03/26/20 0902    acetaminophen (TYLENOL) tablet 650 mg, 650 mg, Oral, Q6H PRN, Jeff Lacey MD, 650 mg at 03/23/20 1102    hydrOXYzine (VISTARIL) capsule 50 mg, 50 mg, Oral, TID PRN, Janina Haque MD, 50 mg at 03/25/20 2026    haloperidol lactate (HALDOL) injection 5 mg, 5 mg, Intramuscular, Q6H PRN **OR** haloperidol (HALDOL) tablet 5 mg, 5 mg, Oral, Q6H PRN, Janina Haque MD    traZODone (DESYREL) tablet 50 mg, 50 mg, Oral, Nightly PRN, Janina Haque MD, 50 mg at 03/25/20 2027    magnesium hydroxide (MILK OF MAGNESIA) 400 MG/5ML suspension 30 mL, 30 mL, Oral, Daily PRN, Janina Haque MD    aluminum & magnesium hydroxide-simethicone (MAALOX) 200-200-20 MG/5ML suspension 30 mL, 30 mL, Oral, PRN, Janina Haque MD      Examination:  /79   Pulse 90   Temp 94.6 °F (34.8 °C) (Oral)   Resp 16   Ht 6' (1.829 m)   Wt 153 lb 4.8 oz (69.5 kg)   SpO2 96%   BMI 20.79 kg/m²   Gait - steady  Medication side effects(SE):  No medication side effects to be noted, patient was educated on signs and symptoms of medication side effects instructed to notify medical staff if any signs and symptoms occur. Patient has the capacity to understand this information and what I instructed her to do if medication side effects arise. Mental Status Examination:    Level of consciousness:  within normal limits   Appearance:  fair grooming and fair hygiene  Behavior/Motor: No psychomotor agitation or retardation noted  Attitude toward examiner: Calm and cooperative  Speech: Normal rate rhythm volume and tone  Mood: Euthymic  Affect:  flat  Thought processes:  linear   Thought content: Without delusions hallucinations or paranoia  Cognition:  oriented to person, place, and time   Concentration intact  Insight poor   Judgement poor     ASSESSMENT:   Patient symptoms are:  [] Well controlled  [x] Improving  [] Worsening  [] No change      Diagnosis:  Principal Problem:    Schizoaffective disorder, bipolar type ([facility] [address] [facility]  Active Problems:    [facility]  Resolved Problems:    * No resolved hospital problems.  *      LABS:    No results for input(s): WBC, HGB, PLT in the

## 2020-03-27 VITALS
HEART RATE: 69 BPM | BODY MASS INDEX: 20.77 KG/M2 | DIASTOLIC BLOOD PRESSURE: 71 MMHG | OXYGEN SATURATION: 96 % | HEIGHT: 72 IN | SYSTOLIC BLOOD PRESSURE: 119 MMHG | RESPIRATION RATE: 14 BRPM | WEIGHT: 153.3 LBS | TEMPERATURE: 98.7 F

## 2020-03-27 LAB — VALPROIC ACID LEVEL: 51 MCG/ML (ref 50–100)

## 2020-03-27 PROCEDURE — 99239 HOSP IP/OBS DSCHRG MGMT >30: CPT | Performed by: NURSE PRACTITIONER

## 2020-03-27 PROCEDURE — 36415 COLL VENOUS BLD VENIPUNCTURE: CPT

## 2020-03-27 PROCEDURE — 6370000000 HC RX 637 (ALT 250 FOR IP): Performed by: NURSE PRACTITIONER

## 2020-03-27 PROCEDURE — 80164 ASSAY DIPROPYLACETIC ACD TOT: CPT

## 2020-03-27 RX ORDER — DIVALPROEX SODIUM 500 MG/1
500 TABLET, DELAYED RELEASE ORAL EVERY 12 HOURS SCHEDULED
Qty: 60 TABLET | Refills: 0 | Status: ON HOLD | OUTPATIENT
Start: 2020-03-27 | End: 2020-12-05

## 2020-03-27 RX ORDER — RISPERIDONE 1 MG/1
1 TABLET, FILM COATED ORAL DAILY
Qty: 30 TABLET | Refills: 0 | Status: ON HOLD | OUTPATIENT
Start: 2020-03-28 | End: 2020-12-05

## 2020-03-27 RX ORDER — RISPERIDONE 2 MG/1
2 TABLET, FILM COATED ORAL NIGHTLY
Qty: 30 TABLET | Refills: 0 | Status: ON HOLD | OUTPATIENT
Start: 2020-03-27 | End: 2020-12-05

## 2020-03-27 RX ADMIN — RISPERIDONE 1 MG: 2 TABLET, FILM COATED ORAL at 08:36

## 2020-03-27 RX ADMIN — DIVALPROEX SODIUM 500 MG: 250 TABLET, DELAYED RELEASE ORAL at 08:36

## 2020-03-27 ASSESSMENT — PAIN SCALES - GENERAL: PAINLEVEL_OUTOF10: 0

## 2020-03-27 NOTE — PROGRESS NOTES
Called [facility] outpatient pharmacy to forward Risperdal injection script to [address] [phone].

## 2020-03-27 NOTE — GROUP NOTE
Date: 3/27/2020    Group Start Time: 1010  Group End Time: 1100  Group Topic: Psychoeducation    [facility]    José Puri [place]                                                                        Group Therapy Note    Date: 3/27/2020    Wellness Binder Information  Module Name:  id of physical effects to stress and daily stressors     Patient's Goal: patient will be able to id physical effects to stress and current daily stressors. Notes:  pleasant and engaged in group. Able to participate appropriately, receptive to information shared, took hand out. Status After Intervention:  Improved    Participation Level:  Active Listener and Interactive    Participation Quality: Appropriate, Attentive, Sharing, and Supportive      Speech: normal       Thought Process/Content: Logical      Affective Functioning: Congruent      Mood: euthymic      Level of consciousness:  Alert, Oriented x4, and Attentive      Response to Learning: Able to verbalize/acknowledge new learning, Able to retain information, and Progressing to goal      Endings: None Reported    Modes of Intervention: Education, Support, Socialization, Exploration, and Problem-solving      Discipline Responsible: Psychoeducational Specialist      Signature:  Jonas Doan

## 2020-03-27 NOTE — CARE COORDINATION
In order to ensure appropriate transition and discharge planning is in place, the following documents have been transmitted to Avis José as the new outpatient provider:     The d/c diagnosis was transmitted to the next care provider   The reason for hospitalization was transmitted to the next care provider   The d/c medications (dosage and indication) were transmitted to the next care provider    The continuing care plan was transmitted to the next care provider

## 2020-03-27 NOTE — PROGRESS NOTES
CLINICAL PHARMACY NOTE: MEDS TO [address] Select Patient?: No  Total # of Prescriptions Filled: 3   The following medications were delivered to the patient:  · DEPAKOTE  MG   · RISPERDAL 1 MG   · RISPERDAL 2 MG   Total # of Interventions Completed: 3  Time Spent (min): 15    Additional Documentation:  RISPERDAL SINDHU- NURSE WILL INFORM US OF WHAT PHARMACY TO TRANSFER THIS TO.

## 2020-03-27 NOTE — PLAN OF CARE
Problem: Depressive Behavior With or Without Suicide Precautions:  Goal: Ability to disclose and discuss suicidal ideas will improve  Description: Ability to disclose and discuss suicidal ideas will improve  Outcome: Met This Shift     Problem: Anger Management/Homicidal Ideation:  Goal: Able to display appropriate communication and problem solving  Description: Able to display appropriate communication and problem solving  Outcome: Ongoing  Goal: Ability to verbalize frustrations and anger appropriately will improve  Description: Ability to verbalize frustrations and anger appropriately will improve  Outcome: Ongoing     Problem: Depressive Behavior With or Without Suicide Precautions:  Goal: Able to verbalize and/or display a decrease in depressive symptoms  Description: Able to verbalize and/or display a decrease in depressive symptoms  Outcome: Ongoing     Joce denies any SI, HI, Hallucinations at this time. Patient denies any depression or anxiety. Patients focus is on discharge approaching the nurses station numerous times asking. Patient taking his meds and groups are offered and encouraged. Will continue to monitor.

## 2020-03-27 NOTE — DISCHARGE SUMMARY
violence     Fear of current or ex partner: Not on file     Emotionally abused: Not on file     Physically abused: Not on file     Forced sexual activity: Not on file   Other Topics Concern    Not on file   Social History Narrative    Not on file       MEDICATIONS:    Current Facility-Administered Medications:     risperiDONE microspheres ER (RISPERDAL CONSTA) injection 25 mg, 25 mg, Intramuscular, Q14 Days, Nevin Renos, APRN - CNP, 25 mg at 03/25/20 1334    risperiDONE (RISPERDAL) tablet 1 mg, 1 mg, Oral, Daily, Horald Isha, APRN - CNP, 1 mg at 03/27/20 0836    risperiDONE (RISPERDAL) tablet 2 mg, 2 mg, Oral, Nightly, Horald Isha, APRN - CNP, 2 mg at 03/26/20 2040    divalproex (DEPAKOTE) DR tablet 500 mg, 500 mg, Oral, 2 times per day, Horald Isha, APRN - CNP, 500 mg at 03/27/20 0836    acetaminophen (TYLENOL) tablet 650 mg, 650 mg, Oral, Q6H PRN, Chance Samuels MD, 650 mg at 03/23/20 1102    hydrOXYzine (VISTARIL) capsule 50 mg, 50 mg, Oral, TID PRN, Chance Samuels MD, 50 mg at 03/25/20 2026    haloperidol lactate (HALDOL) injection 5 mg, 5 mg, Intramuscular, Q6H PRN **OR** haloperidol (HALDOL) tablet 5 mg, 5 mg, Oral, Q6H PRN, Chance Samuels MD    traZODone (DESYREL) tablet 50 mg, 50 mg, Oral, Nightly PRN, Chance Samuels MD, 50 mg at 03/25/20 2027    magnesium hydroxide (MILK OF MAGNESIA) 400 MG/5ML suspension 30 mL, 30 mL, Oral, Daily PRN, Chance Samuels MD    aluminum & magnesium hydroxide-simethicone (MAALOX) 200-200-20 MG/5ML suspension 30 mL, 30 mL, Oral, PRN, Chance Samuels MD    Examination:  /71   Pulse 69   Temp 98.7 °F (37.1 °C) (Oral)   Resp 14   Ht 6' (1.829 m)   Wt 153 lb 4.8 oz (69.5 kg)   SpO2 96%   BMI 20.79 kg/m²   Gait - steady    HOSPITAL COURSE[de-identified]  Following admission to the hospital, patient had a complete physical exam and blood work up.   Patient presented to the emergency department reporting he was depressed and paranoid and hearing voices telling

## 2020-03-27 NOTE — PROGRESS NOTES
Aki Magallanes denies any SI, HI, Hallucinations at this time. Patient denies any depression or anxiety. Patients focus is on discharge approaching the nurses station numerous times asking. Patient taking his meds and groups are offered and encouraged. Will continue to monitor.

## 2020-03-31 ENCOUNTER — HOSPITAL ENCOUNTER (EMERGENCY)
Age: 40
Discharge: HOME | End: 2020-03-31
Payer: COMMERCIAL

## 2020-03-31 VITALS
WEIGHT: 150 LBS | SYSTOLIC BLOOD PRESSURE: 103 MMHG | TEMPERATURE: 97 F | BODY MASS INDEX: 20.32 KG/M2 | DIASTOLIC BLOOD PRESSURE: 85 MMHG | HEIGHT: 72 IN | RESPIRATION RATE: 16 BRPM | HEART RATE: 96 BPM | OXYGEN SATURATION: 97 %

## 2020-03-31 PROCEDURE — 99283 EMERGENCY DEPT VISIT LOW MDM: CPT

## 2020-04-03 ENCOUNTER — HOSPITAL ENCOUNTER (EMERGENCY)
Age: 40
Discharge: PSYCHIATRIC OR SUBSTANCE USE FACILITY | End: 2020-04-03
Attending: EMERGENCY MEDICINE
Payer: COMMERCIAL

## 2020-04-03 VITALS
SYSTOLIC BLOOD PRESSURE: 115 MMHG | BODY MASS INDEX: 20.32 KG/M2 | HEIGHT: 72 IN | WEIGHT: 150 LBS | RESPIRATION RATE: 16 BRPM | TEMPERATURE: 98.2 F | OXYGEN SATURATION: 95 % | HEART RATE: 84 BPM | DIASTOLIC BLOOD PRESSURE: 64 MMHG

## 2020-04-03 LAB
ACETAMINOPHEN LEVEL: <5 MCG/ML (ref 10–30)
AMPHETAMINE SCREEN, URINE: NOT DETECTED
ANION GAP SERPL CALCULATED.3IONS-SCNC: 13 MMOL/L (ref 7–16)
BARBITURATE SCREEN URINE: NOT DETECTED
BASOPHILS ABSOLUTE: 0.09 E9/L (ref 0–0.2)
BASOPHILS RELATIVE PERCENT: 2.1 % (ref 0–2)
BENZODIAZEPINE SCREEN, URINE: NOT DETECTED
BUN BLDV-MCNC: 14 MG/DL (ref 6–20)
CALCIUM SERPL-MCNC: 9.4 MG/DL (ref 8.6–10.2)
CANNABINOID SCREEN URINE: POSITIVE
CHLORIDE BLD-SCNC: 103 MMOL/L (ref 98–107)
CO2: 27 MMOL/L (ref 22–29)
COCAINE METABOLITE SCREEN URINE: POSITIVE
CREAT SERPL-MCNC: 0.9 MG/DL (ref 0.7–1.2)
EOSINOPHILS ABSOLUTE: 0.1 E9/L (ref 0.05–0.5)
EOSINOPHILS RELATIVE PERCENT: 2.4 % (ref 0–6)
ETHANOL: <10 MG/DL (ref 0–0.08)
FENTANYL SCREEN, URINE: NOT DETECTED
GFR AFRICAN AMERICAN: >60
GFR NON-AFRICAN AMERICAN: >60 ML/MIN/1.73
GLUCOSE BLD-MCNC: 85 MG/DL (ref 74–99)
HCT VFR BLD CALC: 45.2 % (ref 37–54)
HEMOGLOBIN: 14.4 G/DL (ref 12.5–16.5)
IMMATURE GRANULOCYTES #: 0.01 E9/L
IMMATURE GRANULOCYTES %: 0.2 % (ref 0–5)
LYMPHOCYTES ABSOLUTE: 1.55 E9/L (ref 1.5–4)
LYMPHOCYTES RELATIVE PERCENT: 36.6 % (ref 20–42)
Lab: ABNORMAL
MCH RBC QN AUTO: 29.9 PG (ref 26–35)
MCHC RBC AUTO-ENTMCNC: 31.9 % (ref 32–34.5)
MCV RBC AUTO: 93.8 FL (ref 80–99.9)
METHADONE SCREEN, URINE: NOT DETECTED
MONOCYTES ABSOLUTE: 0.37 E9/L (ref 0.1–0.95)
MONOCYTES RELATIVE PERCENT: 8.7 % (ref 2–12)
NEUTROPHILS ABSOLUTE: 2.12 E9/L (ref 1.8–7.3)
NEUTROPHILS RELATIVE PERCENT: 50 % (ref 43–80)
OPIATE SCREEN URINE: NOT DETECTED
OXYCODONE URINE: NOT DETECTED
PDW BLD-RTO: 13.2 FL (ref 11.5–15)
PHENCYCLIDINE SCREEN URINE: NOT DETECTED
PLATELET # BLD: 356 E9/L (ref 130–450)
PMV BLD AUTO: 9.6 FL (ref 7–12)
POTASSIUM REFLEX MAGNESIUM: 4.3 MMOL/L (ref 3.5–5)
RBC # BLD: 4.82 E12/L (ref 3.8–5.8)
SALICYLATE, SERUM: <0.3 MG/DL (ref 0–30)
SODIUM BLD-SCNC: 143 MMOL/L (ref 132–146)
TRICYCLIC ANTIDEPRESSANTS SCREEN SERUM: NEGATIVE NG/ML
WBC # BLD: 4.2 E9/L (ref 4.5–11.5)

## 2020-04-03 PROCEDURE — 80048 BASIC METABOLIC PNL TOTAL CA: CPT

## 2020-04-03 PROCEDURE — 80307 DRUG TEST PRSMV CHEM ANLYZR: CPT

## 2020-04-03 PROCEDURE — 85025 COMPLETE CBC W/AUTO DIFF WBC: CPT

## 2020-04-03 PROCEDURE — G0480 DRUG TEST DEF 1-7 CLASSES: HCPCS

## 2020-04-03 PROCEDURE — 99285 EMERGENCY DEPT VISIT HI MDM: CPT

## 2020-04-03 ASSESSMENT — ENCOUNTER SYMPTOMS
VOMITING: 0
SORE THROAT: 0
EYE DISCHARGE: 0
EYE REDNESS: 0
SHORTNESS OF BREATH: 0
NAUSEA: 0
ABDOMINAL PAIN: 0
BACK PAIN: 0
EYE PAIN: 0
DIARRHEA: 0
SINUS PRESSURE: 0
WHEEZING: 0
COUGH: 0

## 2020-04-04 NOTE — ED PROVIDER NOTES
Patient is a 27-year-old male with past medical history of schizophrenia, depression, substance abuse presenting to the emergency department for hallucinations. Auditory hallucinations beginning 7 days ago. Symptoms are moderate in severity. The complaint has been persistent and worsened by nothing. History limited secondary to patient's mental status and posterior in the room, tangential speaking and not appropriately answering questions. States the voices are telling him to Amity the baby\". He states he has suicidal ideations but does not have a plan at this current time. Denies any homicidal ideations. He tells me he has been taking all of his medications as prescribed, but will not tolerate with his medications are. He denies any chest pain, shortness breath, fevers, chills, abdominal pain, nausea, vomiting, diarrhea, constipation. Review of Systems   Constitutional: Negative for chills and fever. HENT: Negative for ear pain, sinus pressure and sore throat. Eyes: Negative for pain, discharge and redness. Respiratory: Negative for cough, shortness of breath and wheezing. Cardiovascular: Negative for chest pain. Gastrointestinal: Negative for abdominal pain, diarrhea, nausea and vomiting. Genitourinary: Negative for dysuria and frequency. Musculoskeletal: Negative for arthralgias and back pain. Skin: Negative for rash and wound. Neurological: Negative for weakness and headaches. Hematological: Negative for adenopathy. Psychiatric/Behavioral: Positive for hallucinations and suicidal ideas. All other systems reviewed and are negative. Physical Exam  Vitals signs and nursing note reviewed. Constitutional:       Appearance: He is well-developed. HENT:      Head: Normocephalic and atraumatic. Eyes:      Pupils: Pupils are equal, round, and reactive to light. Neck:      Musculoskeletal: Normal range of motion and neck supple.    Cardiovascular:      Rate and

## 2020-04-04 NOTE — ED NOTES
Report called to Keron HERNANDEZ at [email or web address], [facility]  04/03/20 8304

## 2020-04-29 ENCOUNTER — HOSPITAL ENCOUNTER (EMERGENCY)
Age: 40
Discharge: PSYCHIATRIC OR SUBSTANCE USE FACILITY | End: 2020-04-29
Attending: EMERGENCY MEDICINE
Payer: COMMERCIAL

## 2020-04-29 VITALS
BODY MASS INDEX: 20.32 KG/M2 | RESPIRATION RATE: 16 BRPM | SYSTOLIC BLOOD PRESSURE: 102 MMHG | DIASTOLIC BLOOD PRESSURE: 61 MMHG | TEMPERATURE: 97.8 F | HEIGHT: 72 IN | OXYGEN SATURATION: 98 % | WEIGHT: 150 LBS | HEART RATE: 80 BPM

## 2020-04-29 LAB
ACETAMINOPHEN LEVEL: <5 MCG/ML (ref 10–30)
ALBUMIN SERPL-MCNC: 4.1 G/DL (ref 3.5–5.2)
ALP BLD-CCNC: 43 U/L (ref 40–129)
ALT SERPL-CCNC: 8 U/L (ref 0–40)
AMPHETAMINE SCREEN, URINE: NOT DETECTED
ANION GAP SERPL CALCULATED.3IONS-SCNC: 10 MMOL/L (ref 7–16)
AST SERPL-CCNC: 16 U/L (ref 0–39)
BARBITURATE SCREEN URINE: NOT DETECTED
BASOPHILS ABSOLUTE: 0.06 E9/L (ref 0–0.2)
BASOPHILS RELATIVE PERCENT: 2.1 % (ref 0–2)
BENZODIAZEPINE SCREEN, URINE: NOT DETECTED
BILIRUB SERPL-MCNC: 0.4 MG/DL (ref 0–1.2)
BILIRUBIN URINE: ABNORMAL
BLOOD, URINE: NEGATIVE
BUN BLDV-MCNC: 16 MG/DL (ref 6–20)
CALCIUM SERPL-MCNC: 8.7 MG/DL (ref 8.6–10.2)
CANNABINOID SCREEN URINE: POSITIVE
CHLORIDE BLD-SCNC: 102 MMOL/L (ref 98–107)
CLARITY: CLEAR
CO2: 26 MMOL/L (ref 22–29)
COCAINE METABOLITE SCREEN URINE: POSITIVE
COLOR: YELLOW
CREAT SERPL-MCNC: 1 MG/DL (ref 0.7–1.2)
EOSINOPHILS ABSOLUTE: 0.03 E9/L (ref 0.05–0.5)
EOSINOPHILS RELATIVE PERCENT: 1 % (ref 0–6)
ETHANOL: <10 MG/DL (ref 0–0.08)
FENTANYL SCREEN, URINE: NOT DETECTED
GFR AFRICAN AMERICAN: >60
GFR NON-AFRICAN AMERICAN: >60 ML/MIN/1.73
GLUCOSE BLD-MCNC: 128 MG/DL (ref 74–99)
GLUCOSE URINE: NEGATIVE MG/DL
HCT VFR BLD CALC: 42.4 % (ref 37–54)
HEMOGLOBIN: 13.9 G/DL (ref 12.5–16.5)
IMMATURE GRANULOCYTES #: 0 E9/L
IMMATURE GRANULOCYTES %: 0 % (ref 0–5)
KETONES, URINE: NEGATIVE MG/DL
LEUKOCYTE ESTERASE, URINE: NEGATIVE
LYMPHOCYTES ABSOLUTE: 1.55 E9/L (ref 1.5–4)
LYMPHOCYTES RELATIVE PERCENT: 54 % (ref 20–42)
Lab: ABNORMAL
MCH RBC QN AUTO: 30.6 PG (ref 26–35)
MCHC RBC AUTO-ENTMCNC: 32.8 % (ref 32–34.5)
MCV RBC AUTO: 93.4 FL (ref 80–99.9)
METHADONE SCREEN, URINE: NOT DETECTED
MONOCYTES ABSOLUTE: 0.21 E9/L (ref 0.1–0.95)
MONOCYTES RELATIVE PERCENT: 7.3 % (ref 2–12)
NEUTROPHILS ABSOLUTE: 1.02 E9/L (ref 1.8–7.3)
NEUTROPHILS RELATIVE PERCENT: 35.6 % (ref 43–80)
NITRITE, URINE: NEGATIVE
OPIATE SCREEN URINE: NOT DETECTED
OXYCODONE URINE: NOT DETECTED
PDW BLD-RTO: 12.9 FL (ref 11.5–15)
PH UA: 6 (ref 5–9)
PHENCYCLIDINE SCREEN URINE: NOT DETECTED
PLATELET # BLD: 287 E9/L (ref 130–450)
PMV BLD AUTO: 9.5 FL (ref 7–12)
POTASSIUM SERPL-SCNC: 4.2 MMOL/L (ref 3.5–5)
PROTEIN UA: NEGATIVE MG/DL
RBC # BLD: 4.54 E12/L (ref 3.8–5.8)
SALICYLATE, SERUM: <0.3 MG/DL (ref 0–30)
SODIUM BLD-SCNC: 138 MMOL/L (ref 132–146)
SPECIFIC GRAVITY UA: >=1.03 (ref 1–1.03)
TOTAL PROTEIN: 6.7 G/DL (ref 6.4–8.3)
TRICYCLIC ANTIDEPRESSANTS SCREEN SERUM: NEGATIVE NG/ML
UROBILINOGEN, URINE: 2 E.U./DL
VALPROIC ACID LEVEL: 3 MCG/ML (ref 50–100)
WBC # BLD: 2.9 E9/L (ref 4.5–11.5)

## 2020-04-29 PROCEDURE — 6370000000 HC RX 637 (ALT 250 FOR IP): Performed by: STUDENT IN AN ORGANIZED HEALTH CARE EDUCATION/TRAINING PROGRAM

## 2020-04-29 PROCEDURE — 81003 URINALYSIS AUTO W/O SCOPE: CPT

## 2020-04-29 PROCEDURE — 80307 DRUG TEST PRSMV CHEM ANLYZR: CPT

## 2020-04-29 PROCEDURE — 85025 COMPLETE CBC W/AUTO DIFF WBC: CPT

## 2020-04-29 PROCEDURE — 80053 COMPREHEN METABOLIC PANEL: CPT

## 2020-04-29 PROCEDURE — G0480 DRUG TEST DEF 1-7 CLASSES: HCPCS

## 2020-04-29 PROCEDURE — 93005 ELECTROCARDIOGRAM TRACING: CPT | Performed by: EMERGENCY MEDICINE

## 2020-04-29 PROCEDURE — 99285 EMERGENCY DEPT VISIT HI MDM: CPT

## 2020-04-29 PROCEDURE — 80164 ASSAY DIPROPYLACETIC ACD TOT: CPT

## 2020-04-29 RX ORDER — ACETAMINOPHEN 500 MG
1000 TABLET ORAL ONCE
Status: COMPLETED | OUTPATIENT
Start: 2020-04-29 | End: 2020-04-29

## 2020-04-29 RX ADMIN — ACETAMINOPHEN 1000 MG: 500 TABLET ORAL at 06:05

## 2020-04-29 ASSESSMENT — ENCOUNTER SYMPTOMS
COUGH: 0
DIARRHEA: 0
VOMITING: 0
COLOR CHANGE: 0
SHORTNESS OF BREATH: 0
ABDOMINAL PAIN: 0
CONSTIPATION: 0
SORE THROAT: 0
BACK PAIN: 0
NAUSEA: 0

## 2020-04-29 ASSESSMENT — PAIN DESCRIPTION - PAIN TYPE: TYPE: CHRONIC PAIN

## 2020-04-29 ASSESSMENT — PAIN SCALES - GENERAL: PAINLEVEL_OUTOF10: 10

## 2020-04-29 ASSESSMENT — PAIN DESCRIPTION - LOCATION: LOCATION: FOOT

## 2020-04-29 NOTE — ED NOTES
Patient referred to the [address] [place] [address] [facility] for placement.       Stan Paige RN  04/29/20 4193

## 2020-04-29 NOTE — ED NOTES
Sheldon Vaca ETA 0930    Ambulance packet is complete.      FRANCISCO Zacarias, DERECKW  04/29/20 2019

## 2020-04-29 NOTE — ED PROVIDER NOTES
swelling, deformity or signs of injury. Right lower leg: No edema. Left lower leg: No edema. Lymphadenopathy:      Cervical: No cervical adenopathy. Skin:     General: Skin is warm and dry. Capillary Refill: Capillary refill takes less than 2 seconds. Coloration: Skin is not jaundiced or pale. Findings: No bruising, erythema, lesion or rash. Neurological:      General: No focal deficit present. Mental Status: He is alert and oriented to person, place, and time. Procedures     MDM   Patient presents to the ED for suicidal thoughts, auditory hallucinations. Differential diagnoses included but not limited to suicidal, psychosis, electrolyte disturbance, drug use, other process. Workup in the ED revealed WBC count 2.9, cocaine and cannabinoid positive. Patient was given tylenol for their symptoms. Patient requires continued workup and management of their symptoms and will be admitted to the hospital for further evaluation and treatment. ED Course as of Apr 29 0638 Wed Apr 29, 2020 0625 Patient is medically cleared.    [LS]      ED Course User Index  [LS] Nick Hernadez DO      ED Course as of Apr 29 0638 Wed Apr 29, 2020 0625 Patient is medically cleared.    [LS]      ED Course User Index  [LS] Nick Hernadez DO       --------------------------------------------- PAST HISTORY ---------------------------------------------  Past Medical History:  has a past medical history of Schizoaffective disorder ([facility] [address] Past Surgical History:  has a past surgical history that includes eye surgery (19 years ago). Social History:  reports that he has been smoking cigars and cigarettes. He has a 12.00 pack-year smoking history. He has never used smokeless tobacco. He reports current drug use. Drugs: Marijuana, Cocaine, and Opiates . He reports that he does not drink alcohol. Family History: family history includes No Known Problems in his father and mother.      The

## 2020-05-01 LAB
EKG ATRIAL RATE: 52 BPM
EKG P AXIS: 76 DEGREES
EKG P-R INTERVAL: 166 MS
EKG Q-T INTERVAL: 424 MS
EKG QRS DURATION: 98 MS
EKG QTC CALCULATION (BAZETT): 394 MS
EKG R AXIS: 75 DEGREES
EKG T AXIS: 60 DEGREES
EKG VENTRICULAR RATE: 52 BPM

## 2020-05-29 ENCOUNTER — HOSPITAL ENCOUNTER (EMERGENCY)
Age: 40
Discharge: OTHER INSTITUTION | End: 2020-05-29
Attending: EMERGENCY MEDICINE
Payer: COMMERCIAL

## 2020-05-29 VITALS
TEMPERATURE: 97.9 F | BODY MASS INDEX: 21.54 KG/M2 | HEIGHT: 72 IN | WEIGHT: 159 LBS | OXYGEN SATURATION: 97 % | RESPIRATION RATE: 16 BRPM | DIASTOLIC BLOOD PRESSURE: 58 MMHG | SYSTOLIC BLOOD PRESSURE: 111 MMHG | HEART RATE: 69 BPM

## 2020-05-29 LAB
ACETAMINOPHEN LEVEL: <5 MCG/ML (ref 10–30)
AMPHETAMINE SCREEN, URINE: NOT DETECTED
ANION GAP SERPL CALCULATED.3IONS-SCNC: 11 MMOL/L (ref 7–16)
BACTERIA: NORMAL /HPF
BARBITURATE SCREEN URINE: NOT DETECTED
BASOPHILS ABSOLUTE: 0.08 E9/L (ref 0–0.2)
BASOPHILS RELATIVE PERCENT: 1.6 % (ref 0–2)
BENZODIAZEPINE SCREEN, URINE: NOT DETECTED
BILIRUBIN URINE: ABNORMAL
BLOOD, URINE: NEGATIVE
BUN BLDV-MCNC: 18 MG/DL (ref 6–20)
CALCIUM SERPL-MCNC: 8.9 MG/DL (ref 8.6–10.2)
CANNABINOID SCREEN URINE: POSITIVE
CHLORIDE BLD-SCNC: 109 MMOL/L (ref 98–107)
CLARITY: CLEAR
CO2: 26 MMOL/L (ref 22–29)
COCAINE METABOLITE SCREEN URINE: POSITIVE
COLOR: YELLOW
CREAT SERPL-MCNC: 1 MG/DL (ref 0.7–1.2)
EOSINOPHILS ABSOLUTE: 0.33 E9/L (ref 0.05–0.5)
EOSINOPHILS RELATIVE PERCENT: 6.6 % (ref 0–6)
ETHANOL: <10 MG/DL (ref 0–0.08)
FENTANYL SCREEN, URINE: NOT DETECTED
GFR AFRICAN AMERICAN: >60
GFR NON-AFRICAN AMERICAN: >60 ML/MIN/1.73
GLUCOSE BLD-MCNC: 91 MG/DL (ref 74–99)
GLUCOSE URINE: NEGATIVE MG/DL
HCT VFR BLD CALC: 43.4 % (ref 37–54)
HEMOGLOBIN: 13.9 G/DL (ref 12.5–16.5)
IMMATURE GRANULOCYTES #: 0.01 E9/L
IMMATURE GRANULOCYTES %: 0.2 % (ref 0–5)
KETONES, URINE: ABNORMAL MG/DL
LEUKOCYTE ESTERASE, URINE: NEGATIVE
LYMPHOCYTES ABSOLUTE: 1.71 E9/L (ref 1.5–4)
LYMPHOCYTES RELATIVE PERCENT: 34.3 % (ref 20–42)
Lab: ABNORMAL
MCH RBC QN AUTO: 30.1 PG (ref 26–35)
MCHC RBC AUTO-ENTMCNC: 32 % (ref 32–34.5)
MCV RBC AUTO: 93.9 FL (ref 80–99.9)
METHADONE SCREEN, URINE: NOT DETECTED
MONOCYTES ABSOLUTE: 0.36 E9/L (ref 0.1–0.95)
MONOCYTES RELATIVE PERCENT: 7.2 % (ref 2–12)
NEUTROPHILS ABSOLUTE: 2.5 E9/L (ref 1.8–7.3)
NEUTROPHILS RELATIVE PERCENT: 50.1 % (ref 43–80)
NITRITE, URINE: NEGATIVE
OPIATE SCREEN URINE: POSITIVE
OXYCODONE URINE: NOT DETECTED
PDW BLD-RTO: 13.5 FL (ref 11.5–15)
PH UA: 7.5 (ref 5–9)
PHENCYCLIDINE SCREEN URINE: NOT DETECTED
PLATELET # BLD: 306 E9/L (ref 130–450)
PMV BLD AUTO: 9.7 FL (ref 7–12)
POTASSIUM REFLEX MAGNESIUM: 4.2 MMOL/L (ref 3.5–5)
PROTEIN UA: 100 MG/DL
RBC # BLD: 4.62 E12/L (ref 3.8–5.8)
RBC UA: NORMAL /HPF (ref 0–2)
SALICYLATE, SERUM: <0.3 MG/DL (ref 0–30)
SODIUM BLD-SCNC: 146 MMOL/L (ref 132–146)
SPECIFIC GRAVITY UA: 1.02 (ref 1–1.03)
TRICYCLIC ANTIDEPRESSANTS SCREEN SERUM: NEGATIVE NG/ML
UROBILINOGEN, URINE: 4 E.U./DL
WBC # BLD: 5 E9/L (ref 4.5–11.5)
WBC UA: NORMAL /HPF (ref 0–5)

## 2020-05-29 PROCEDURE — G0480 DRUG TEST DEF 1-7 CLASSES: HCPCS

## 2020-05-29 PROCEDURE — 80307 DRUG TEST PRSMV CHEM ANLYZR: CPT

## 2020-05-29 PROCEDURE — 99284 EMERGENCY DEPT VISIT MOD MDM: CPT

## 2020-05-29 PROCEDURE — 80048 BASIC METABOLIC PNL TOTAL CA: CPT

## 2020-05-29 PROCEDURE — 81001 URINALYSIS AUTO W/SCOPE: CPT

## 2020-05-29 PROCEDURE — 85025 COMPLETE CBC W/AUTO DIFF WBC: CPT

## 2020-05-29 RX ORDER — HYDROCODONE BITARTRATE AND ACETAMINOPHEN 5; 325 MG/1; MG/1
2 TABLET ORAL ONCE
Status: DISCONTINUED | OUTPATIENT
Start: 2020-05-29 | End: 2020-05-29 | Stop reason: HOSPADM

## 2020-05-29 ASSESSMENT — PAIN SCALES - GENERAL: PAINLEVEL_OUTOF10: 10

## 2020-05-29 NOTE — ED NOTES
Bed: [facility].  Expected date:   Expected time:   Means of arrival:   Comments:  triage     Marlon Kaufman RN  05/29/20 0050

## 2020-05-29 NOTE — ED PROVIDER NOTES
HPI:  5/29/20,   Time: 1:20 AM EDT       Oliver Figueroa is a 46 y.o. male presenting to the ED for depression, beginning 1 day ago. The complaint has been persistent, mild in severity, and worsened by nothing. The patient states that over the last day he has been feeling depressed. He states he has been going through a lot with his family. He states he is also having some generalized body aches that comes and goes. Patient denies any SI HI or hallucinations to me but in triage the patient did state that he was SI with no plan and HI towards his mother. He states that his mother has not been listening to him and this is been anchoring him and depressing him therefore he came to the ED to be evaluated. Review of Systems:   Pertinent positives and negatives are stated within HPI, all other systems reviewed and are negative.          --------------------------------------------- PAST HISTORY ---------------------------------------------  Past Medical History:  has a past medical history of Depression and Schizoaffective disorder ([facility] [address] Past Surgical History:  has a past surgical history that includes eye surgery (19 years ago). Social History:  reports that he has been smoking cigars and cigarettes. He has a 12.00 pack-year smoking history. He has never used smokeless tobacco. He reports previous drug use. Drugs: Marijuana, Cocaine, and Opiates . He reports that he does not drink alcohol. Family History: family history includes No Known Problems in his father and mother. The patients home medications have been reviewed.     Allergies: Rondec-d [chlophedianol-pseudoephedrine]        ---------------------------------------------------PHYSICAL EXAM--------------------------------------    Constitutional/General: Alert and oriented x3, well appearing, non toxic in NAD  Head: Normocephalic and atraumatic  Eyes: PERRL, EOMI, conjunctive normal, sclera non icteric  Mouth: Oropharynx clear, handling Basophils Absolute 0.08 0.00 - 0.20 S2/E   Basic Metabolic Panel w/ Reflex to MG   Result Value Ref Range    Sodium 146 132 - 146 mmol/L    Potassium reflex Magnesium 4.2 3.5 - 5.0 mmol/L    Chloride 109 (H) 98 - 107 mmol/L    CO2 26 22 - 29 mmol/L    Anion Gap 11 7 - 16 mmol/L    Glucose 91 74 - 99 mg/dL    BUN 18 6 - 20 mg/dL    CREATININE 1.0 0.7 - 1.2 mg/dL    GFR Non-African American >60 >=60 mL/min/1.73    GFR African American >60     Calcium 8.9 8.6 - 10.2 mg/dL   Urinalysis, reflex to microscopic   Result Value Ref Range    Color, UA Yellow Straw/Yellow    Clarity, UA Clear Clear    Glucose, Ur Negative Negative mg/dL    Bilirubin Urine SMALL (A) Negative    Ketones, Urine TRACE (A) Negative mg/dL    Specific Gravity, UA 1.025 1.005 - 1.030    Blood, Urine Negative Negative    pH, UA 7.5 5.0 - 9.0    Protein,  (A) Negative mg/dL    Urobilinogen, Urine 4.0 (A) <2.0 E.U./dL    Nitrite, Urine Negative Negative    Leukocyte Esterase, Urine Negative Negative   Urine Drug Screen   Result Value Ref Range    Amphetamine Screen, Urine NOT DETECTED Negative <1000 ng/mL    Barbiturate Screen, Ur NOT DETECTED Negative < 200 ng/mL    Benzodiazepine Screen, Urine NOT DETECTED Negative < 200 ng/mL    Cannabinoid Scrn, Ur POSITIVE (A) Negative < 50ng/mL    Cocaine Metabolite Screen, Urine POSITIVE (A) Negative < 300 ng/mL    Opiate Scrn, Ur POSITIVE (A) Negative < 300ng/mL    PCP Screen, Urine NOT DETECTED Negative < 25 ng/mL    Methadone Screen, Urine NOT DETECTED Negative <300 ng/mL    Oxycodone Urine NOT DETECTED Negative <100 ng/mL    FENTANYL SCREEN, URINE NOT DETECTED Negative <1 ng/mL    Drug Screen Comment: see below    Serum Drug Screen   Result Value Ref Range    Ethanol Lvl <10 mg/dL    Acetaminophen Level <5.0 (L) 10.0 - 90.2 mcg/mL    Salicylate, Serum <4.0 0.0 - 30.0 mg/dL    TCA Scrn NEGATIVE Cutoff:300 ng/mL   Microscopic Urinalysis   Result Value Ref Range    WBC, UA NONE 0 - 5 /HPF    RBC, UA 0-1 0 -

## 2020-05-29 NOTE — ED NOTES
SW completed pt assessment. Pt is a  presenting to the ED with depression and suicidal ideation. Pt reports he is trying to get his life together and is having a difficult time in his life. Pt states he continues to make poor lifestyle choices and has put his life in danger on many recent occasions and states he needs help. Pt was vague and not forthcoming with details but states he was desperate for help and wants \"to get his baby mama back\". Pt's tox report is positive for cocaine and opioids and pt admits to recent drug use. Pt has mental health istory of Bipolar disorder and admits he has not been medication compliant. Pt's last admission for behavioral health at [facility]) was 3/18/2020. Pt admits to suicidal ideation but states he has no plan. Pt states he has not tried to harm himself in the past. Pt admits to poor sleep and poor appetite. Pt states he used to treat at [facility] and then [facility] and admits not following up with outpatient services. Pt is alert and oriented x 4, cooperative, mood anxious, irritable and depressed affect congruent, thought processes clear, organized, speech pattern normal.  Pt admits to SI without plan. Pt denies HI/AVH    Pt is voluntary. SW spoke with ED physician and reviewed pt's chart.  SW will pursue referral to Crisis Unit for safety/stabilization     FRANCISCO [facility]  05/29/20 [address] [facility] [place]  05/29/20 2604

## 2020-07-26 ENCOUNTER — HOSPITAL ENCOUNTER (EMERGENCY)
Age: 40
Discharge: PSYCHIATRIC OR SUBSTANCE USE FACILITY | End: 2020-07-27
Attending: EMERGENCY MEDICINE
Payer: COMMERCIAL

## 2020-07-26 LAB
ACETAMINOPHEN LEVEL: <5 MCG/ML (ref 10–30)
ALBUMIN SERPL-MCNC: 4.1 G/DL (ref 3.5–5.2)
ALP BLD-CCNC: 39 U/L (ref 40–129)
ALT SERPL-CCNC: 14 U/L (ref 0–40)
AMPHETAMINE SCREEN, URINE: NOT DETECTED
ANION GAP SERPL CALCULATED.3IONS-SCNC: 13 MMOL/L (ref 7–16)
AST SERPL-CCNC: 23 U/L (ref 0–39)
BARBITURATE SCREEN URINE: NOT DETECTED
BASOPHILS ABSOLUTE: 0.06 E9/L (ref 0–0.2)
BASOPHILS RELATIVE PERCENT: 1.4 % (ref 0–2)
BENZODIAZEPINE SCREEN, URINE: NOT DETECTED
BILIRUB SERPL-MCNC: 0.5 MG/DL (ref 0–1.2)
BUN BLDV-MCNC: 14 MG/DL (ref 6–20)
CALCIUM SERPL-MCNC: 8.7 MG/DL (ref 8.6–10.2)
CANNABINOID SCREEN URINE: NOT DETECTED
CHLORIDE BLD-SCNC: 105 MMOL/L (ref 98–107)
CO2: 25 MMOL/L (ref 22–29)
COCAINE METABOLITE SCREEN URINE: POSITIVE
CREAT SERPL-MCNC: 1 MG/DL (ref 0.7–1.2)
EOSINOPHILS ABSOLUTE: 0.12 E9/L (ref 0.05–0.5)
EOSINOPHILS RELATIVE PERCENT: 2.9 % (ref 0–6)
ETHANOL: <10 MG/DL (ref 0–0.08)
FENTANYL SCREEN, URINE: NOT DETECTED
GFR AFRICAN AMERICAN: >60
GFR NON-AFRICAN AMERICAN: >60 ML/MIN/1.73
GLUCOSE BLD-MCNC: 64 MG/DL (ref 74–99)
HCT VFR BLD CALC: 42.6 % (ref 37–54)
HEMOGLOBIN: 13.8 G/DL (ref 12.5–16.5)
IMMATURE GRANULOCYTES #: 0.01 E9/L
IMMATURE GRANULOCYTES %: 0.2 % (ref 0–5)
LYMPHOCYTES ABSOLUTE: 1.45 E9/L (ref 1.5–4)
LYMPHOCYTES RELATIVE PERCENT: 34.9 % (ref 20–42)
Lab: ABNORMAL
MCH RBC QN AUTO: 30.8 PG (ref 26–35)
MCHC RBC AUTO-ENTMCNC: 32.4 % (ref 32–34.5)
MCV RBC AUTO: 95.1 FL (ref 80–99.9)
METHADONE SCREEN, URINE: NOT DETECTED
MONOCYTES ABSOLUTE: 0.36 E9/L (ref 0.1–0.95)
MONOCYTES RELATIVE PERCENT: 8.7 % (ref 2–12)
NEUTROPHILS ABSOLUTE: 2.15 E9/L (ref 1.8–7.3)
NEUTROPHILS RELATIVE PERCENT: 51.9 % (ref 43–80)
OPIATE SCREEN URINE: NOT DETECTED
OXYCODONE URINE: NOT DETECTED
PDW BLD-RTO: 13 FL (ref 11.5–15)
PHENCYCLIDINE SCREEN URINE: NOT DETECTED
PLATELET # BLD: 311 E9/L (ref 130–450)
PMV BLD AUTO: 9 FL (ref 7–12)
POTASSIUM SERPL-SCNC: 4 MMOL/L (ref 3.5–5)
RBC # BLD: 4.48 E12/L (ref 3.8–5.8)
SALICYLATE, SERUM: <0.3 MG/DL (ref 0–30)
SODIUM BLD-SCNC: 143 MMOL/L (ref 132–146)
TOTAL PROTEIN: 6.3 G/DL (ref 6.4–8.3)
TRICYCLIC ANTIDEPRESSANTS SCREEN SERUM: NEGATIVE NG/ML
WBC # BLD: 4.2 E9/L (ref 4.5–11.5)

## 2020-07-26 PROCEDURE — 80053 COMPREHEN METABOLIC PANEL: CPT

## 2020-07-26 PROCEDURE — G0480 DRUG TEST DEF 1-7 CLASSES: HCPCS

## 2020-07-26 PROCEDURE — 80307 DRUG TEST PRSMV CHEM ANLYZR: CPT

## 2020-07-26 PROCEDURE — 99285 EMERGENCY DEPT VISIT HI MDM: CPT

## 2020-07-26 PROCEDURE — 85025 COMPLETE CBC W/AUTO DIFF WBC: CPT

## 2020-07-26 PROCEDURE — 82550 ASSAY OF CK (CPK): CPT

## 2020-07-26 PROCEDURE — 93005 ELECTROCARDIOGRAM TRACING: CPT | Performed by: EMERGENCY MEDICINE

## 2020-07-26 NOTE — ED NOTES
Belongings locked in [facility] 31 - pt placed into hospital scrub gown and bottoms at this time      Victoria Francis RN  07/26/20 Alden Mott RN  07/26/20 1928

## 2020-07-26 NOTE — ED PROVIDER NOTES
Department of Emergency Medicine   ED  Provider Note  Admit Date/RoomTime: 7/26/2020  6:06 PM  ED Room: [address] [place] [address]          History of Present Illness:  7/26/20, Time: 6:13 PM EDT  Chief Complaint   Patient presents with    Psychiatric Evaluation     states he needs evaluated, states he is depressed, hx depression, schizoaffective, bipolar, +SI, -HI, denies plan                Keyanna Yi is a 46 y.o. male presenting to the ED for paranoia with suicidal and homicidal ideations, beginning several days ago. The complaint has been persistent, severe in severity, and worsened by nothing. Patient states that he has history of paranoid schizophrenia and is feeling more paranoid. He has difficulty collecting his thoughts but stated several times \"I lost the balance. \"  When he describes the balance he describes a balance between thoughts of harming himself or the decision to harm others. Patient states that he feels right now that he is more suicidal but is unsure. He denies any hallucinations but states \"I do not call them hallucinations I call them influences. \"  Patient states that he has been noncompliant with any of his medication for several weeks. Review of Systems:   Pertinent positives and negatives are stated within HPI, all other systems reviewed and are negative.        --------------------------------------------- PAST HISTORY ---------------------------------------------  Past Medical History:  has a past medical history of Depression and Schizoaffective disorder ([facility] [address] Past Surgical History:  has a past surgical history that includes eye surgery (19 years ago). Social History:  reports that he has been smoking cigars and cigarettes. He has a 12.00 pack-year smoking history. He has never used smokeless tobacco. He reports previous drug use. Drugs: Marijuana, Cocaine, and Opiates . He reports that he does not drink alcohol.     Family History: family history includes No Known Problems in his father and mother. . Unless otherwise noted, family history is non contributory    The patients home medications have been reviewed. Allergies: Rondec-d [chlophedianol-pseudoephedrine]        ---------------------------------------------------PHYSICAL EXAM--------------------------------------    Constitutional/General: Alert and oriented x3  Head: Normocephalic and atraumatic  Eyes: PERRL, EOMI, sclera non icteric  Mouth: Oropharynx clear, handling secretions, no trismus, no asymmetry of the posterior oropharynx or uvular edema  Neck: Supple, full ROM, no stridor, no meningeal signs  Respiratory: Lungs clear to auscultation bilaterally, no wheezes, rales, or rhonchi. Not in respiratory distress  Cardiovascular:  Regular rate. Regular rhythm. No murmurs, no aortic murmurs, no gallops, or rubs. 2+ distal pulses. Equal extremity pulses. Chest: No chest wall tenderness  GI:  Abdomen Soft, Non tender, Non distended. No rebound, guarding, or rigidity. No pulsatile masses. Musculoskeletal: Moves all extremities x 4. Warm and well perfused, no clubbing, cyanosis, or edema. Capillary refill <3 seconds  Integument: skin warm and dry. No rashes. Neurologic: GCS 15, no focal deficits, symmetric strength 5/5 in the upper and lower extremities bilaterally  Psychiatric: Normal Affect, difficulty maintaining train of thought, flights of ideas. Suicidal ideation, homicidal ideation, apparent auditory hallucinations          -------------------------------------------------- RESULTS -------------------------------------------------  I have personally reviewed all laboratory and imaging results for this patient. Results are listed below.      LABS: (Lab results interpreted by me)  Results for orders placed or performed during the hospital encounter of 07/26/20   Urine Drug Screen   Result Value Ref Range    Amphetamine Screen, Urine NOT DETECTED Negative <1000 ng/mL    Barbiturate Screen, Ur NOT DETECTED Negative < 200 ng/mL    Benzodiazepine Screen, Urine NOT DETECTED Negative < 200 ng/mL    Cannabinoid Scrn, Ur NOT DETECTED Negative < 50ng/mL    Cocaine Metabolite Screen, Urine POSITIVE (A) Negative < 300 ng/mL    Opiate Scrn, Ur NOT DETECTED Negative < 300ng/mL    PCP Screen, Urine NOT DETECTED Negative < 25 ng/mL    Methadone Screen, Urine NOT DETECTED Negative <300 ng/mL    Oxycodone Urine NOT DETECTED Negative <100 ng/mL    FENTANYL SCREEN, URINE NOT DETECTED Negative <1 ng/mL    Drug Screen Comment: see below    Serum Drug Screen   Result Value Ref Range    Ethanol Lvl <10 mg/dL    Acetaminophen Level <5.0 (L) 10.0 - 19.4 mcg/mL    Salicylate, Serum <5.3 0.0 - 30.0 mg/dL    TCA Scrn NEGATIVE Cutoff:300 ng/mL   CBC Auto Differential   Result Value Ref Range    WBC 4.2 (L) 4.5 - 11.5 E9/L    RBC 4.48 3.80 - 5.80 E12/L    Hemoglobin 13.8 12.5 - 16.5 g/dL    Hematocrit 42.6 37.0 - 54.0 %    MCV 95.1 80.0 - 99.9 fL    MCH 30.8 26.0 - 35.0 pg    MCHC 32.4 32.0 - 34.5 %    RDW 13.0 11.5 - 15.0 fL    Platelets 756 469 - 321 E9/L    MPV 9.0 7.0 - 12.0 fL    Neutrophils % 51.9 43.0 - 80.0 %    Immature Granulocytes % 0.2 0.0 - 5.0 %    Lymphocytes % 34.9 20.0 - 42.0 %    Monocytes % 8.7 2.0 - 12.0 %    Eosinophils % 2.9 0.0 - 6.0 %    Basophils % 1.4 0.0 - 2.0 %    Neutrophils Absolute 2.15 1.80 - 7.30 E9/L    Immature Granulocytes # 0.01 E9/L    Lymphocytes Absolute 1.45 (L) 1.50 - 4.00 E9/L    Monocytes Absolute 0.36 0.10 - 0.95 E9/L    Eosinophils Absolute 0.12 0.05 - 0.50 E9/L    Basophils Absolute 0.06 0.00 - 0.20 E9/L   Comprehensive Metabolic Panel   Result Value Ref Range    Sodium 143 132 - 146 mmol/L    Potassium 4.0 3.5 - 5.0 mmol/L    Chloride 105 98 - 107 mmol/L    CO2 25 22 - 29 mmol/L    Anion Gap 13 7 - 16 mmol/L    Glucose 64 (L) 74 - 99 mg/dL    BUN 14 6 - 20 mg/dL    CREATININE 1.0 0.7 - 1.2 mg/dL    GFR Non-African American >60 >=60 mL/min/1.73    GFR African American >60     Calcium 8.7 8.6 - 10.2 mg/dL    Total Protein 6.3 (L) 6.4 - 8.3 g/dL    Alb 4.1 3.5 - 5.2 g/dL    Total Bilirubin 0.5 0.0 - 1.2 mg/dL    Alkaline Phosphatase 39 (L) 40 - 129 U/L    ALT 14 0 - 40 U/L    AST 23 0 - 39 U/L   EKG 12 Lead   Result Value Ref Range    Ventricular Rate 58 BPM    Atrial Rate 58 BPM    P-R Interval 176 ms    QRS Duration 102 ms    Q-T Interval 390 ms    QTc Calculation (Bazett) 382 ms    P Axis 82 degrees    R Axis 97 degrees    T Axis 79 degrees   ,       RADIOLOGY:  Interpreted by Radiologist unless otherwise specified  No orders to display         EKG Interpretation  Interpreted by emergency department physician, Dr. Norma Kinney    Date of EK20  Time:     Rhythm: sinus bradycardia  Rate: 58  Axis: right  Conduction: normal  ST Segments: no acute change  T Waves: no acute change    Clinical Impression: No findings suggestive of acute ischemia or injury  Comparison to prior EKG: stable as compared to patient's most recent EKG      ------------------------- NURSING NOTES AND VITALS REVIEWED ---------------------------   The nursing notes within the ED encounter and vital signs as below have been reviewed by myself  /87   Pulse 88   Temp 97.7 °F (36.5 °C)   Resp 16   Ht 5' 11\" (1.803 m)   Wt 150 lb (68 kg)   SpO2 94%   BMI 20.92 kg/m²     Oxygen Saturation Interpretation: Normal    The patients available past medical records and past encounters were reviewed. ------------------------------ ED COURSE/MEDICAL DECISION MAKING----------------------  Medications - No data to display              Medical Decision Making:     I, Dr. Natalie Aquino, am the primary provider of record    81-year-old male with history of paranoid schizophrenia presenting with flights of ideas as with thoughts of suicide as well as homicide. Patient appears to also be having some command hallucinations. Patient is a poor historian due to flights of ideas and non-tangential thoughts.   He appears to be acutely psychotic with some increased paranoia. Patient will be pink slipped based on the findings. He rested comfortably during his ED course. EKG shows no signs of ischemia or injury, no conduction delay. Metabolic panel is within acceptable limits, no leukocytosis or anemia. Urine drug screen positive for cocaine, serum drug screen negative. Patient is stable for mental health evaluation and admission for acute psychosis. Re-Evaluations: This patient's ED course included: a personal history and physicial examination and re-evaluation prior to disposition    This patient has remained hemodynamically stable and been closely monitored during their ED course. Counseling: The emergency provider has spoken with the patient and discussed todays results, in addition to providing specific details for the plan of care and counseling regarding the diagnosis and prognosis. Questions are answered at this time and they are agreeable with the plan.       --------------------------------- IMPRESSION AND DISPOSITION ---------------------------------    IMPRESSION  1. Suicidal ideation    2. Paranoid schizophrenia ([facility] [address]    3. Acute psychosis ([facility]        DISPOSITION  Disposition: Admit to mental health unit - medically cleared for admission  Patient condition is stable        NOTE: This report was transcribed using voice recognition software.  Every effort was made to ensure accuracy; however, inadvertent computerized transcription errors may be present        Goldie Villalta DO  07/26/20 4207

## 2020-07-27 VITALS
TEMPERATURE: 97.6 F | WEIGHT: 150 LBS | RESPIRATION RATE: 16 BRPM | HEART RATE: 65 BPM | HEIGHT: 71 IN | DIASTOLIC BLOOD PRESSURE: 57 MMHG | BODY MASS INDEX: 21 KG/M2 | SYSTOLIC BLOOD PRESSURE: 112 MMHG | OXYGEN SATURATION: 97 %

## 2020-07-27 LAB
SARS-COV-2, NAAT: NOT DETECTED
TOTAL CK: 459 U/L (ref 20–200)
TOTAL CK: 498 U/L (ref 20–200)
TOTAL CK: 576 U/L (ref 20–200)

## 2020-07-27 PROCEDURE — U0002 COVID-19 LAB TEST NON-CDC: HCPCS

## 2020-07-27 PROCEDURE — 82550 ASSAY OF CK (CPK): CPT

## 2020-07-27 ASSESSMENT — PATIENT HEALTH QUESTIONNAIRE - PHQ9: SUM OF ALL RESPONSES TO PHQ QUESTIONS 1-9: 14

## 2020-07-27 NOTE — ED PROVIDER NOTES
Patient is pink slipped. He is medically cleared. To be watched closely until he is evaluated by psych.      Arleth Bernard MD  07/27/20 2024

## 2020-07-27 NOTE — ED NOTES
Patient endorsed to me. Mauricio Lee is pink sliped.     He is medically clear     Jeff Crawford MD  07/27/20 0121

## 2020-07-27 NOTE — ED NOTES
Emergency Department [facility] AN AFFILIATE OF [facility] Biopsychosocial Assessment Note    Chief Complaint:   Pt is a 45 yo male presenting to the ED with increase depression with passive thoughts of SI/HI, no plan. Difficulty collecting his thoughts, reports \" I lost the balance\" describes thoughts of harming himself or the decision to harm others. Pt reports to this writer AVH of people. MSE:  Pt is calm, oriented x 1, sleepy, flat affect, no eye contact, mumbled speech, Pt admits to passive SI/HI and AVH. Pt reports good sleep and ok appetite. Clinical Summary/History:   Pt has a  hx of schizo-affective d/o. Pt reported to this writer that he is med compliant but told ED Doc he has been off for a few weeks. Pt reports he is not connected with [place] [address] outpatient services, the hospitals write his prescriptions for him. Pt's last inpatient hospitalization was in April 2020. Gender  [x] Male [] Female [] Transgender  [] Other    Sexual Orientation    [x] Heterosexual [] Homosexual [] Bisexual [] Other    Suicidal Behavioral: CSSR-S Complete. [x] Reports: passive SI, no plan   [x] Past [x] Present   [] Denies    Homicidal/ Violent Behavior  [x] Reports:passive, no plan   [x] Past [x] Present   [] Denies     Hallucinations/Delusions   [x] Reports: AVH, people  [] Denies     Substance Use/Alcohol Use/Addiction: SBIRT Screen Complete. [x] Reports: Cocaine. Pt is requesting help to stop using. [] Denies     Trauma History  [] Reports:  [x] Denies     Collateral Information:   No phone number listed to call for collateral information. Pt did not give name or phone # to call and get collateral information.     Level of Care/Disposition Plan  [] Home:   [] Outpatient Provider:   [] Crisis Unit:   [x] Inpatient Psychiatric Unit:  [] Other:        FRANCISCO Carlos, DERECKW  07/27/20 5291

## 2020-07-27 NOTE — ED NOTES
[facility] requested repeat CK Level to confirm trending downward. Order obtained and blood sent to lab. Pending results.

## 2020-07-27 NOTE — ED NOTES
Pt becoming slightly agitated at this time stating that \"no one has been here to see me, latrice been here for 5 hours and 52 min and no one has been over to see me. I have money in my pocket and I could have been eating this whole time. What are they going to do with [employer] on the roof? ?\" - This RN made patient aware that he has only been here for two hours and that a nurse and the doctor have both been over to see him since hes been here and that he is now awaiting social work consult      Robbie Prader, MARY  07/26/20 2014

## 2020-07-28 LAB
EKG ATRIAL RATE: 58 BPM
EKG P AXIS: 82 DEGREES
EKG P-R INTERVAL: 176 MS
EKG Q-T INTERVAL: 390 MS
EKG QRS DURATION: 102 MS
EKG QTC CALCULATION (BAZETT): 382 MS
EKG R AXIS: 97 DEGREES
EKG T AXIS: 79 DEGREES
EKG VENTRICULAR RATE: 58 BPM

## 2020-08-04 ENCOUNTER — HOSPITAL ENCOUNTER (EMERGENCY)
Age: 40
Discharge: HOME | End: 2020-08-04
Attending: EMERGENCY MEDICINE
Payer: COMMERCIAL

## 2020-08-04 VITALS
SYSTOLIC BLOOD PRESSURE: 116 MMHG | RESPIRATION RATE: 18 BRPM | HEART RATE: 71 BPM | OXYGEN SATURATION: 97 % | TEMPERATURE: 98.9 F | DIASTOLIC BLOOD PRESSURE: 87 MMHG

## 2020-08-04 LAB
ACETAMINOPHEN LEVEL: <5 MCG/ML (ref 10–30)
ALBUMIN SERPL-MCNC: 4.3 G/DL (ref 3.5–5.2)
ALP BLD-CCNC: 53 U/L (ref 40–129)
ALT SERPL-CCNC: 18 U/L (ref 0–40)
ANION GAP SERPL CALCULATED.3IONS-SCNC: 12 MMOL/L (ref 7–16)
AST SERPL-CCNC: 26 U/L (ref 0–39)
BASOPHILS ABSOLUTE: 0.1 E9/L (ref 0–0.2)
BASOPHILS RELATIVE PERCENT: 1.2 % (ref 0–2)
BILIRUB SERPL-MCNC: <0.2 MG/DL (ref 0–1.2)
BUN BLDV-MCNC: 20 MG/DL (ref 6–20)
CALCIUM SERPL-MCNC: 9.6 MG/DL (ref 8.6–10.2)
CHLORIDE BLD-SCNC: 101 MMOL/L (ref 98–107)
CO2: 27 MMOL/L (ref 22–29)
CREAT SERPL-MCNC: 0.9 MG/DL (ref 0.7–1.2)
EOSINOPHILS ABSOLUTE: 0.29 E9/L (ref 0.05–0.5)
EOSINOPHILS RELATIVE PERCENT: 3.6 % (ref 0–6)
ETHANOL: <10 MG/DL (ref 0–0.08)
GFR AFRICAN AMERICAN: >60
GFR NON-AFRICAN AMERICAN: >60 ML/MIN/1.73
GLUCOSE BLD-MCNC: 82 MG/DL (ref 74–99)
HCT VFR BLD CALC: 43.3 % (ref 37–54)
HEMOGLOBIN: 14 G/DL (ref 12.5–16.5)
IMMATURE GRANULOCYTES #: 0.03 E9/L
IMMATURE GRANULOCYTES %: 0.4 % (ref 0–5)
LYMPHOCYTES ABSOLUTE: 1.74 E9/L (ref 1.5–4)
LYMPHOCYTES RELATIVE PERCENT: 21.6 % (ref 20–42)
MCH RBC QN AUTO: 30.4 PG (ref 26–35)
MCHC RBC AUTO-ENTMCNC: 32.3 % (ref 32–34.5)
MCV RBC AUTO: 94.1 FL (ref 80–99.9)
MONOCYTES ABSOLUTE: 0.84 E9/L (ref 0.1–0.95)
MONOCYTES RELATIVE PERCENT: 10.4 % (ref 2–12)
NEUTROPHILS ABSOLUTE: 5.05 E9/L (ref 1.8–7.3)
NEUTROPHILS RELATIVE PERCENT: 62.8 % (ref 43–80)
PDW BLD-RTO: 13.3 FL (ref 11.5–15)
PLATELET # BLD: 316 E9/L (ref 130–450)
PMV BLD AUTO: 9.9 FL (ref 7–12)
POTASSIUM SERPL-SCNC: 4.4 MMOL/L (ref 3.5–5)
RBC # BLD: 4.6 E12/L (ref 3.8–5.8)
SALICYLATE, SERUM: <0.3 MG/DL (ref 0–30)
SODIUM BLD-SCNC: 140 MMOL/L (ref 132–146)
TOTAL PROTEIN: 6.8 G/DL (ref 6.4–8.3)
TRICYCLIC ANTIDEPRESSANTS SCREEN SERUM: NEGATIVE NG/ML
WBC # BLD: 8.1 E9/L (ref 4.5–11.5)

## 2020-08-04 PROCEDURE — 85025 COMPLETE CBC W/AUTO DIFF WBC: CPT

## 2020-08-04 PROCEDURE — 80053 COMPREHEN METABOLIC PANEL: CPT

## 2020-08-04 PROCEDURE — G0480 DRUG TEST DEF 1-7 CLASSES: HCPCS

## 2020-08-04 PROCEDURE — 99283 EMERGENCY DEPT VISIT LOW MDM: CPT

## 2020-08-04 PROCEDURE — 80307 DRUG TEST PRSMV CHEM ANLYZR: CPT

## 2020-08-04 NOTE — ED NOTES
Attempted to discharge patient. He reports he isn't leaving because he's paranoid and feels people are out to get him. Patient denies SI/HI. He states he never talked to anyone, but it was observed by [facility] staff that  spoke with patient. Patient states he doesn't have an outpatient provider. He was admitted to [facility] on 7/27/20 and discharged from [facility] on 8/3/20. Patient reported he was off his medications, as well. Alyssa Vazquez notified.       Gladis Mann RN  08/04/20 2004

## 2020-08-04 NOTE — ED PROVIDER NOTES
Department of Emergency Medicine   ED  Provider Note  Admit Date/RoomTime: 8/4/2020  5:01 PM  ED Room: [address] [place] [address]  Chief Complaint   Psychiatric Evaluation (-SI -HI has been feeling paranoid )    History of Present Illness   Source of history provided by:  patient. History/Exam Limitations: none. Shaw Rodriguez is a 46 y.o. old male who has a past medical history of:   Past Medical History:   Diagnosis Date    Depression     Schizoaffective disorder ([facility] [address]      Patient who is well-known to us for history of depression and schizoaffective disorder presents for psychiatric evaluation. He denies suicidal or homicidal ideation. He states that he does not feel right and that his brain is confusing him. He apparently told triage that he was feeling paranoid and depressed. Currently, he denies that to me. Per social workers, patient is at his usual baseline. Quality:      Hopelessness:   No.     Terror:   No.     Confusion:   No.     Hallucinations:   None. Able to care for self: Yes. Able to control self: Yes. ROS    Pertinent positives and negatives are stated within HPI, all other systems reviewed and are negative. Past Surgical History:  has a past surgical history that includes eye surgery (19 years ago). Social History:  reports that he has been smoking cigars and cigarettes. He has a 12.00 pack-year smoking history. He has never used smokeless tobacco. He reports previous drug use. Drugs: Marijuana, Cocaine, and Opiates . He reports that he does not drink alcohol. Family History: family history includes No Known Problems in his father and mother.   Allergies: Rondec-d [chlophedianol-pseudoephedrine]    Physical Exam           ED Triage Vitals   BP Temp Temp Source Pulse Resp SpO2 Height Weight   08/04/20 1618 08/04/20 1555 08/04/20 1555 08/04/20 1555 08/04/20 1618 08/04/20 1555 -- --   116/87 99 °F (37.2 °C) Tympanic 100 18 93 %        Oxygen Saturation Interpretation: Normal.    General Appearance:  well-appearing. Constitutional:   Level of Consciousness: Awake and alert. ETOH: No.          Distress: none. Cooperativeness: cooperative. Eyes:  PERRL, EOMI, no discharge or conjunctival injection. Ears:  External ears without lesions. Throat:  Pharynx without injection, exudate, or tonsillar hypertrophy. Airway patient. Neck:  Normal ROM. Supple. Respiratory:  Clear to auscultation and breath sounds equal.  CV:  Regular rate and rhythm, normal heart sounds, without pathological murmurs, ectopy, gallops, or rubs. GI:  Abdomen Soft, nontender, good bowel sounds. No firm or pulsatile mass. Back:  No costovertebral tenderness. Integument:  Normal turgor. Warm, dry, without visible rash, unless noted elsewhere. Lymphatics: No lymphangitis or adenopathy noted. Neurological:  Oriented. Motor functions intact. Psychiatric:        Thought Process:       Coherent:  Yes. Delusions / Paranoia: paranoid. Flight of ideas:  No.         Rambling conversation:  Yes. Affect: calm. Suicidal ideation:  no suicidal ideation. Homicidal ideation:  no homicidal ideation. Perceptions:  denies any perceptual disturbance present. Insight: below average. Judgement: below average.     Lab / Imaging Results   (All laboratory and radiology results have been personally reviewed by myself)  Labs:  Results for orders placed or performed during the hospital encounter of 08/04/20   Comprehensive Metabolic Panel   Result Value Ref Range    Sodium 140 132 - 146 mmol/L    Potassium 4.4 3.5 - 5.0 mmol/L    Chloride 101 98 - 107 mmol/L    CO2 27 22 - 29 mmol/L    Anion Gap 12 7 - 16 mmol/L    Glucose 82 74 - 99 mg/dL    BUN 20 6 - 20 mg/dL    CREATININE 0.9 0.7 - 1.2 mg/dL    GFR Non-African American >60 >=60 mL/min/1.73    GFR African American >60     Calcium 9.6 8.6 - 10.2 mg/dL    Total Protein 6.8 6.4 - 8.3 g/dL    Alb 4.3 3.5 - unspecified type [facility])      Plan   Discharge to home  Patient condition is stable    New Medications     Discharge Medication List as of 8/4/2020  7:44 PM        Electronically signed by Cristiane Hernandez DO   DD: 8/4/20  **This report was transcribed using voice recognition software. Every effort was made to ensure accuracy; however, inadvertent computerized transcription errors may be present.   END OF ED PROVIDER NOTE        Cristiane Hernandez DO  08/04/20 8337

## 2020-08-04 NOTE — ED NOTES
FIRST PROVIDER CONTACT ASSESSMENT NOTE      Department of Emergency Medicine   ED  First Provider Note   8/4/20  4:16 PM EDT    Chief Complaint: Psychiatric Evaluation (-SI -HI has been feeling paranoid )      History of Present Illness:    Satinder Fontaine is a 46 y.o. male who presents to the ED by private car for psychiatric evaluation. Patient states that he has been feeling paranoid. Patient states he feels like he might be \"depressed. \"  Patient is having rambling conversation. Patient states he needs a full physical before seeing a psychiatrist.  Patient denies any thoughts of hurting himself or others. Patient does not have a psychiatrist.  Patient denies any alcohol, marijuana, cigarettes or illicit drug abuse. Focused Screening Exam:  Constitutional:  Alert, appears stated age and is in no distress.       *ALLERGIES*     Rondec-d [chlophedianol-pseudoephedrine]     ED Triage Vitals [08/04/20 1555]   BP Temp Temp Source Pulse Resp SpO2 Height Weight   -- 99 °F (37.2 °C) Tympanic 100 -- 93 % -- --        Initial Plan of Care:  Initiate Treatment-Testing, Proceed toTreatment Area When Bed Available for ED Attending/MLP to Continue Care    -----------------END OF FIRST PROVIDER CONTACT ASSESSMENT NOTE--------------  Electronically signed by Zully Simms PA-C   DD: 8/4/20       Zully Simms PA-C  08/04/20 0000

## 2020-08-04 NOTE — ED NOTES
Bed: [facility]  Expected date:   Expected time:   Means of arrival:   Comments:  triage     Marco Cronin RN  08/04/20 0070

## 2020-08-05 ENCOUNTER — HOSPITAL ENCOUNTER (EMERGENCY)
Age: 40
Discharge: HOME | End: 2020-08-05
Payer: COMMERCIAL

## 2020-08-05 VITALS
HEIGHT: 71 IN | HEART RATE: 80 BPM | WEIGHT: 150 LBS | TEMPERATURE: 97.6 F | SYSTOLIC BLOOD PRESSURE: 127 MMHG | DIASTOLIC BLOOD PRESSURE: 79 MMHG | RESPIRATION RATE: 16 BRPM | OXYGEN SATURATION: 96 % | BODY MASS INDEX: 21 KG/M2

## 2020-08-05 PROCEDURE — 99282 EMERGENCY DEPT VISIT SF MDM: CPT

## 2020-08-05 PROCEDURE — 99281 EMR DPT VST MAYX REQ PHY/QHP: CPT

## 2020-08-05 RX ORDER — CLOTRIMAZOLE AND BETAMETHASONE DIPROPIONATE 10; .64 MG/G; MG/G
CREAM TOPICAL 2 TIMES DAILY
Qty: 1 TUBE | Refills: 0 | Status: SHIPPED | OUTPATIENT
Start: 2020-08-05 | End: 2020-09-18 | Stop reason: SDUPTHER

## 2020-08-05 ASSESSMENT — PAIN SCALES - GENERAL: PAINLEVEL_OUTOF10: 10

## 2020-08-05 ASSESSMENT — PAIN DESCRIPTION - DESCRIPTORS: DESCRIPTORS: ACHING

## 2020-08-05 ASSESSMENT — PAIN SCALES - WONG BAKER: WONGBAKER_NUMERICALRESPONSE: 0

## 2020-08-05 ASSESSMENT — PAIN DESCRIPTION - PAIN TYPE: TYPE: ACUTE PAIN

## 2020-08-05 NOTE — ED NOTES
FIRST PROVIDER CONTACT ASSESSMENT NOTE      Department of Emergency Medicine   ED  First Provider Note   8/5/20  3:24 PM EDT    Chief Complaint: Foot Pain (feet and leg blisters)      History of Present Illness:    Cullen Nesbitt is a 46 y.o. male who presents to the ED by private car for bilateral feet redness and swelling  Focused Screening Exam:  Constitutional:  Alert, appears stated age and is in no distress.       *ALLERGIES*     Rondec-d [chlophedianol-pseudoephedrine]     ED Triage Vitals [08/05/20 1520]   BP Temp Temp src Pulse Resp SpO2 Height Weight   -- 97.8 °F (36.6 °C) -- 76 -- 93 % -- --        Initial Plan of Care:  Initiate Treatment-Testing, Proceed toTreatment Area When Bed Available for ED Attending/MLP to Continue Care    -----------------END OF FIRST PROVIDER CONTACT ASSESSMENT NOTE--------------  Electronically signed by JUICE Lisa NP   DD: 8/5/20       JUICE To NP  08/05/20 1522

## 2020-08-05 NOTE — ED NOTES
Patient refused to sign discharge paperwork and refused vitals. Escorted out by [facility] TARIK TADEO.       Len Reinoso RN  08/04/20 2030

## 2020-08-06 NOTE — ED PROVIDER NOTES
HPI:  8/6/20, Time: 9:24 AM EDT  Independent MLP           Therese Smiley is a 46 y.o. male presenting to the ED for pain as well as a red rash noted to his feet. Patient states that he recently got a pair of air Jordans from his sister in which she has been wearing and started develop pain and a rash to his feet 2 days ago. Upon arrival to emergency department patient is ambulating with no difficulty. He is currently nondiabetic. He is having no other complaints    Review of Systems:   Pertinent positives and negatives are stated within HPI, all other systems reviewed and are negative.          --------------------------------------------- PAST HISTORY ---------------------------------------------  Past Medical History:  has a past medical history of Depression and Schizoaffective disorder ([facility] [address] Past Surgical History:  has a past surgical history that includes eye surgery (19 years ago). Social History:  reports that he has been smoking cigars and cigarettes. He has a 12.00 pack-year smoking history. He has never used smokeless tobacco. He reports previous drug use. Drugs: Marijuana, Cocaine, and Opiates . He reports that he does not drink alcohol. Family History: family history includes No Known Problems in his father and mother. The patients home medications have been reviewed. Allergies: Rondec-d [chlophedianol-pseudoephedrine]    -------------------------------------------------- RESULTS -------------------------------------------------  All laboratory and radiology results have been personally reviewed by myself   LABS:  No results found for this visit on 08/05/20. RADIOLOGY:  Interpreted by Radiologist.  No orders to display       ------------------------- NURSING NOTES AND VITALS REVIEWED ---------------------------   The nursing notes within the ED encounter and vital signs as below have been reviewed.    /79   Pulse 80   Temp 97.6 °F (36.4 °C) (Oral)   Resp 16   Ht 5' 11\" (1.803 m)   Wt 150 lb (68 kg)   SpO2 96%   BMI 20.92 kg/m²   Oxygen Saturation Interpretation: Normal      ---------------------------------------------------PHYSICAL EXAM--------------------------------------      Constitutional/General: Alert and oriented x3, well appearing, non toxic in NAD  Head: Normocephalic and atraumatic  Eyes: PERRL, EOMI  Mouth: Oropharynx clear, handling secretions, no trismus  Neck: Supple, full ROM,   Pulmonary: Lungs clear to auscultation bilaterally, no wheezes, rales, or rhonchi. Not in respiratory distress  Cardiovascular:  Regular rate and rhythm, no murmurs, gallops, or rubs. 2+ distal pulses  Abdomen: Soft, non tender, non distended,   Extremities: Moves all extremities x 4. Warm and well perfused  Skin: warm and dry without rash, bilateral feet dry peeling skin erythematous, no swelling noted, no vesicular rash noted pedal and posterior tibial pulses intact  Neurologic: GCS 15,  Psych: Normal Affect      ------------------------------ ED COURSE/MEDICAL DECISION MAKING----------------------  Medications - No data to display      ED COURSE:       Medical Decision Making:    Patient is a 71-year-old male came to the emergency department for evaluation of a rash and redness that he developed to both of his feet approximately 2 days ago. After examination it is thought that patient's erythema and rash is due to tinea pedis. Patient will be discharged at this time and is follow-up primary care provider. Patient was given Lotrisone cream to place twice a day to both feet. Counseling: The emergency provider has spoken with the patient and discussed todays results, in addition to providing specific details for the plan of care and counseling regarding the diagnosis and prognosis.   Questions are answered at this time and they are agreeable with the plan.      --------------------------------- IMPRESSION AND DISPOSITION ---------------------------------    IMPRESSION  1.

## 2020-08-09 ENCOUNTER — APPOINTMENT (OUTPATIENT)
Dept: GENERAL RADIOLOGY | Age: 40
End: 2020-08-09
Payer: COMMERCIAL

## 2020-08-09 ENCOUNTER — HOSPITAL ENCOUNTER (EMERGENCY)
Age: 40
Discharge: HOME | End: 2020-08-09
Attending: EMERGENCY MEDICINE
Payer: COMMERCIAL

## 2020-08-09 VITALS
OXYGEN SATURATION: 99 % | WEIGHT: 150 LBS | RESPIRATION RATE: 16 BRPM | TEMPERATURE: 97.3 F | HEART RATE: 77 BPM | DIASTOLIC BLOOD PRESSURE: 81 MMHG | BODY MASS INDEX: 21 KG/M2 | HEIGHT: 71 IN | SYSTOLIC BLOOD PRESSURE: 129 MMHG

## 2020-08-09 PROCEDURE — 99282 EMERGENCY DEPT VISIT SF MDM: CPT

## 2020-08-09 PROCEDURE — 71101 X-RAY EXAM UNILAT RIBS/CHEST: CPT

## 2020-08-09 PROCEDURE — 96372 THER/PROPH/DIAG INJ SC/IM: CPT

## 2020-08-09 PROCEDURE — 99283 EMERGENCY DEPT VISIT LOW MDM: CPT

## 2020-08-09 PROCEDURE — 6360000002 HC RX W HCPCS: Performed by: STUDENT IN AN ORGANIZED HEALTH CARE EDUCATION/TRAINING PROGRAM

## 2020-08-09 RX ORDER — IBUPROFEN 800 MG/1
800 TABLET ORAL EVERY 8 HOURS PRN
Qty: 21 TABLET | Refills: 0 | Status: ON HOLD | OUTPATIENT
Start: 2020-08-09 | End: 2020-12-05

## 2020-08-09 RX ORDER — KETOROLAC TROMETHAMINE 30 MG/ML
30 INJECTION, SOLUTION INTRAMUSCULAR; INTRAVENOUS ONCE
Status: COMPLETED | OUTPATIENT
Start: 2020-08-09 | End: 2020-08-09

## 2020-08-09 RX ADMIN — KETOROLAC TROMETHAMINE 30 MG: 30 INJECTION, SOLUTION INTRAMUSCULAR; INTRAVENOUS at 06:50

## 2020-08-09 ASSESSMENT — PAIN DESCRIPTION - FREQUENCY: FREQUENCY: INTERMITTENT

## 2020-08-09 ASSESSMENT — PAIN DESCRIPTION - PAIN TYPE: TYPE: ACUTE PAIN

## 2020-08-09 ASSESSMENT — PAIN SCALES - GENERAL
PAINLEVEL_OUTOF10: 10
PAINLEVEL_OUTOF10: 10

## 2020-08-09 ASSESSMENT — PAIN DESCRIPTION - LOCATION: LOCATION: RIB CAGE

## 2020-08-09 ASSESSMENT — PAIN DESCRIPTION - ORIENTATION: ORIENTATION: RIGHT

## 2020-08-09 ASSESSMENT — PAIN DESCRIPTION - DESCRIPTORS: DESCRIPTORS: DISCOMFORT

## 2020-08-09 NOTE — ED PROVIDER NOTES
[chlophedianol-pseudoephedrine]        ---------------------------------------------------PHYSICAL EXAM--------------------------------------    Constitutional:  Well developed, well nourished, no acute distress, non-toxic appearance   Eyes:  PERRL, conjunctiva normal, EOMI  HENT:  Atraumatic, external ears normal, nose normal, oropharynx moist. Neck- normal range of motion, no tenderness, supple   Respiratory:  No respiratory distress, normal breath sounds, no rales, no wheezing   Cardiovascular:  Normal rate, normal rhythm, no murmurs, no gallops, no rubs. Radial and DP pulses 2+ bilaterally. GI:  Soft, nondistended, normal bowel sounds, nontender, no organomegaly, no mass, no rebound, no guarding   :  No costovertebral angle tenderness   Musculoskeletal:  No edema, very tender to palpation of right rib area, no deformities. Back- no tenderness  Integument:  Well hydrated, no rash. Adequate perfusion. Lymphatic:  No lymphadenopathy noted   Neurologic:  Alert & oriented x 3, CN 2-12 normal, normal motor function, normal sensory function, no focal deficits noted. Psychiatric:  Speech and behavior appropriate       -------------------------------------------------- RESULTS -------------------------------------------------  I have personally reviewed all laboratory and imaging results for this patient. Results are listed below. LABS:  No results found for this visit on 08/09/20. RADIOLOGY:  Interpreted by Radiologist.  XR RIBS RIGHT INCLUDE CHEST (MIN 3 VIEWS)   Final Result   No evidence of pneumothorax. Nondisplaced fractures of the right posterior  10th rib and lateral   ninth rib, likely old. Possible acute component of the 10th rib   fracture.               ------------------------- NURSING NOTES AND VITALS REVIEWED ---------------------------   The nursing notes within the ED encounter and vital signs as below have been reviewed by myself.   /81   Pulse 77   Temp 97.3 °F (36.3 °C) (Infrared)   Resp 16   Ht 5' 11\" (1.803 m)   Wt 150 lb (68 kg)   SpO2 99%   BMI 20.92 kg/m²   Oxygen Saturation Interpretation: Normal    The patients available past medical records and past encounters were reviewed. ------------------------------ ED COURSE/MEDICAL DECISION MAKING----------------------  Medications   ketorolac (TORADOL) injection 30 mg (30 mg Intramuscular Given 8/9/20 0650)         Medical Decision Making:   Mr. Helen Gonzalez is a 60-year-old male presented with right rib pain following a fall a week ago. He has a history of pneumothorax and was concerned for repeat pneumothorax. X-ray of the right ribs and chest showed no evidence of pneumothorax did show a nondisplaced fracture of the right posterior 10th rib and lateral ninth rib that were thought to be old. He was given 1 dose of 30 mg IM Toradol and pain significantly improved. When reassessed he was laying on the right side sleeping without pain. Plan is for discharge with 7-day course of ibuprofen 800 mg for pain as needed every 8 hours. Patient was hemodynamically stable at the time of discharge. Patient was  to rest and preventing future falls. This patient's ED course included: a personal history and physicial examination    This patient has remained hemodynamically stable during their ED course. Re-Evaluations:             Time: 9:10 a.m. Re-evaluation. Patients symptoms are improving  Repeat physical examination is improved      Counseling: The emergency provider has spoken with the patient and discussed todays results, in addition to providing specific details for the plan of care and counseling regarding the diagnosis and prognosis. Questions are answered at this time and they are agreeable with the plan.       --------------------------------- IMPRESSION AND DISPOSITION ---------------------------------    IMPRESSION  1. Rib pain on right side    2.  Old nondisplaced rib fracture of 9th and 10th right rib    DISPOSITION  Disposition: Discharge to home  Patient condition is good                           Brittani Mack DO  Resident  08/09/20 4889

## 2020-09-02 ENCOUNTER — HOSPITAL ENCOUNTER (EMERGENCY)
Age: 40
Discharge: PSYCHIATRIC OR SUBSTANCE USE FACILITY | End: 2020-09-03
Attending: EMERGENCY MEDICINE
Payer: COMMERCIAL

## 2020-09-02 DIAGNOSIS — R45.851 DEPRESSION WITH SUICIDAL IDEATION: Primary | ICD-10-CM

## 2020-09-02 DIAGNOSIS — F32.A DEPRESSION WITH SUICIDAL IDEATION: Primary | ICD-10-CM

## 2020-09-02 LAB
ACETAMINOPHEN LEVEL: <5 MCG/ML (ref 10–30)
ALBUMIN SERPL-MCNC: 3.6 G/DL (ref 3.5–5.2)
ALP BLD-CCNC: 47 U/L (ref 40–129)
ALT SERPL-CCNC: 7 U/L (ref 0–40)
AMPHETAMINE SCREEN, URINE: NOT DETECTED
ANION GAP SERPL CALCULATED.3IONS-SCNC: 8 MMOL/L (ref 7–16)
AST SERPL-CCNC: 19 U/L (ref 0–39)
BARBITURATE SCREEN URINE: NOT DETECTED
BASOPHILS ABSOLUTE: 0.08 E9/L (ref 0–0.2)
BASOPHILS RELATIVE PERCENT: 2.1 % (ref 0–2)
BENZODIAZEPINE SCREEN, URINE: NOT DETECTED
BILIRUB SERPL-MCNC: <0.2 MG/DL (ref 0–1.2)
BUN BLDV-MCNC: 14 MG/DL (ref 6–20)
CALCIUM SERPL-MCNC: 8.8 MG/DL (ref 8.6–10.2)
CANNABINOID SCREEN URINE: NOT DETECTED
CHLORIDE BLD-SCNC: 107 MMOL/L (ref 98–107)
CO2: 29 MMOL/L (ref 22–29)
COCAINE METABOLITE SCREEN URINE: POSITIVE
CREAT SERPL-MCNC: 1 MG/DL (ref 0.7–1.2)
EOSINOPHILS ABSOLUTE: 0.23 E9/L (ref 0.05–0.5)
EOSINOPHILS RELATIVE PERCENT: 6 % (ref 0–6)
ETHANOL: <10 MG/DL (ref 0–0.08)
FENTANYL SCREEN, URINE: NOT DETECTED
GFR AFRICAN AMERICAN: >60
GFR NON-AFRICAN AMERICAN: >60 ML/MIN/1.73
GLUCOSE BLD-MCNC: 85 MG/DL (ref 74–99)
HCT VFR BLD CALC: 40.6 % (ref 37–54)
HEMOGLOBIN: 13.1 G/DL (ref 12.5–16.5)
IMMATURE GRANULOCYTES #: 0.01 E9/L
IMMATURE GRANULOCYTES %: 0.3 % (ref 0–5)
LYMPHOCYTES ABSOLUTE: 1.38 E9/L (ref 1.5–4)
LYMPHOCYTES RELATIVE PERCENT: 35.8 % (ref 20–42)
Lab: ABNORMAL
MCH RBC QN AUTO: 30.6 PG (ref 26–35)
MCHC RBC AUTO-ENTMCNC: 32.3 % (ref 32–34.5)
MCV RBC AUTO: 94.9 FL (ref 80–99.9)
METHADONE SCREEN, URINE: NOT DETECTED
MONOCYTES ABSOLUTE: 0.44 E9/L (ref 0.1–0.95)
MONOCYTES RELATIVE PERCENT: 11.4 % (ref 2–12)
NEUTROPHILS ABSOLUTE: 1.72 E9/L (ref 1.8–7.3)
NEUTROPHILS RELATIVE PERCENT: 44.4 % (ref 43–80)
OPIATE SCREEN URINE: NOT DETECTED
OXYCODONE URINE: NOT DETECTED
PDW BLD-RTO: 13 FL (ref 11.5–15)
PHENCYCLIDINE SCREEN URINE: NOT DETECTED
PLATELET # BLD: 278 E9/L (ref 130–450)
PMV BLD AUTO: 9.3 FL (ref 7–12)
POTASSIUM SERPL-SCNC: 4.6 MMOL/L (ref 3.5–5)
RBC # BLD: 4.28 E12/L (ref 3.8–5.8)
SALICYLATE, SERUM: <0.3 MG/DL (ref 0–30)
SODIUM BLD-SCNC: 144 MMOL/L (ref 132–146)
TOTAL CK: 454 U/L (ref 20–200)
TOTAL PROTEIN: 5.7 G/DL (ref 6.4–8.3)
TRICYCLIC ANTIDEPRESSANTS SCREEN SERUM: NEGATIVE NG/ML
WBC # BLD: 3.9 E9/L (ref 4.5–11.5)

## 2020-09-02 PROCEDURE — 99284 EMERGENCY DEPT VISIT MOD MDM: CPT

## 2020-09-02 PROCEDURE — 80053 COMPREHEN METABOLIC PANEL: CPT

## 2020-09-02 PROCEDURE — 85025 COMPLETE CBC W/AUTO DIFF WBC: CPT

## 2020-09-02 PROCEDURE — 99283 EMERGENCY DEPT VISIT LOW MDM: CPT

## 2020-09-02 PROCEDURE — 93005 ELECTROCARDIOGRAM TRACING: CPT | Performed by: EMERGENCY MEDICINE

## 2020-09-02 PROCEDURE — 82550 ASSAY OF CK (CPK): CPT

## 2020-09-02 PROCEDURE — U0003 INFECTIOUS AGENT DETECTION BY NUCLEIC ACID (DNA OR RNA); SEVERE ACUTE RESPIRATORY SYNDROME CORONAVIRUS 2 (SARS-COV-2) (CORONAVIRUS DISEASE [COVID-19]), AMPLIFIED PROBE TECHNIQUE, MAKING USE OF HIGH THROUGHPUT TECHNOLOGIES AS DESCRIBED BY CMS-2020-01-R: HCPCS

## 2020-09-02 PROCEDURE — 80307 DRUG TEST PRSMV CHEM ANLYZR: CPT

## 2020-09-02 PROCEDURE — G0480 DRUG TEST DEF 1-7 CLASSES: HCPCS

## 2020-09-02 NOTE — ED PROVIDER NOTES
Department of Emergency Medicine   ED  Provider Note  Admit Date/RoomTime: 9/2/2020  9:52 AM  ED Room: 28/BH-28              9/2/20  11:22 AM EDT    HPI: Kelsey Momin Favors 45 y.o. male presents with a complaint of depression and suicidal ideation beginning prior to arrival. Complaint has been constant and became more severe today which is what prompted the visit. Depression with suicidal ideation. No plan. Says he feels like no one at home is listening. He denies HI. He denies other complaints. Review of Systems:   Pertinent positives and negatives are stated within HPI, all other systems reviewed and are negative.        --------------------------------------------- PAST HISTORY ---------------------------------------------  Past Medical History:  has a past medical history of Depression and Schizoaffective disorder ([facility] [address] Past Surgical History:  has a past surgical history that includes eye surgery (19 years ago). Social History:  reports that he has been smoking cigars and cigarettes. He has a 12.00 pack-year smoking history. He has never used smokeless tobacco. He reports current drug use. Drugs: Marijuana and Cocaine. He reports that he does not drink alcohol. Family History: family history includes No Known Problems in his father and mother. Family history is non contributory unless otherwise noted    The patients home medications have been reviewed. Allergies: Rondec-d [chlophedianol-pseudoephedrine]    -------------------------------------------------- RESULTS -------------------------------------------------  All laboratory and imaging studies were reviewed by myself.     LABS: reviewed and interpreted by me  Results for orders placed or performed during the hospital encounter of 09/02/20   CBC Auto Differential   Result Value Ref Range    WBC 3.9 (L) 4.5 - 11.5 E9/L    RBC 4.28 3.80 - 5.80 E12/L    Hemoglobin 13.1 12.5 - 16.5 g/dL    Hematocrit 40.6 37.0 - 54.0 %    MCV 94.9 80.0 - 99.9 fL    MCH 30.6 26.0 - 35.0 pg    MCHC 32.3 32.0 - 34.5 %    RDW 13.0 11.5 - 15.0 fL    Platelets 218 785 - 263 E9/L    MPV 9.3 7.0 - 12.0 fL    Neutrophils % 44.4 43.0 - 80.0 %    Immature Granulocytes % 0.3 0.0 - 5.0 %    Lymphocytes % 35.8 20.0 - 42.0 %    Monocytes % 11.4 2.0 - 12.0 %    Eosinophils % 6.0 0.0 - 6.0 %    Basophils % 2.1 (H) 0.0 - 2.0 %    Neutrophils Absolute 1.72 (L) 1.80 - 7.30 E9/L    Immature Granulocytes # 0.01 E9/L    Lymphocytes Absolute 1.38 (L) 1.50 - 4.00 E9/L    Monocytes Absolute 0.44 0.10 - 0.95 E9/L    Eosinophils Absolute 0.23 0.05 - 0.50 E9/L    Basophils Absolute 0.08 0.00 - 0.20 E9/L   Urine Drug Screen   Result Value Ref Range    Drug Screen Comment: see below        RADIOLOGY:  Interpreted by Radiologist.  No orders to display            ------------------------- NURSING NOTES AND VITALS REVIEWED ---------------------------   The nursing notes within the ED encounter and vital signs as below have been reviewed. Pulse 86   Temp 97.6 °F (36.4 °C) (Tympanic)   SpO2 95%   Oxygen Saturation Interpretation: Normal            ---------------------------------------------------PHYSICAL EXAM--------------------------------------      Constitutional/General: Alert and oriented x3   Head: Normocephalic, atraumatic  Eyes: PERRL, EOMI  Mouth: Oropharynx clear, handling secretions, no trismus  Neck: Supple, full ROM, no meningeal signs  Pulmonary: Lungs clear to auscultation bilaterally, no wheezes, rales, or rhonchi. Not in respiratory distress  Cardiovascular:  Regular rate and rhythm, no murmurs, gallops, or rubs. 2+ distal pulses  Abdomen: Soft, non tender, non distended, +BS, no rebound, guarding, or rigidity. No pulsatile masses appreciated  Extremities: Moves all extremities x 4. Warm and well perfused, no clubbing, cyanosis, or edema.  Capillary refill <3 seconds  Skin: warm and dry without rash  Neurologic: GCS 15, no focal deficits  Psych: flat Affect      ------------------------------------------ PROGRESS NOTES ------------------------------------------     Medical decision making:  Patient is medically stable for mental health evaluation    Consultations:   Behavioral Health    Re-Evaluations:        Counseling: The emergency provider has spoken with thepatient and discussed todays results, in addition to providing specific details for the plan of care and counseling regarding the diagnosis and prognosis. Questions are answered at this time and they are agreeable with the plan.         --------------------------------- IMPRESSION AND DISPOSITION ---------------------------------    IMPRESSION  1.  Depression with suicidal ideation        DISPOSITION  Disposition: stable pending [facility] evaluation  Patient condition is stable           Leonela Mcelroy MD  09/02/20 7001

## 2020-09-02 NOTE — ED NOTES
Call placed to [facility], spoke to Maryjo Israel. Per Lidny, the COVID-19 send out test is fine due to patient's negative COVID-19 questionnaire. MARY Bender and ANTONIO Figueroa aware.       Jt Black  09/02/20 [place] 1  09/02/20 [place] 1 09/02/20 6263

## 2020-09-02 NOTE — ED NOTES
SW placed phone call to [employer] and spoke with Rep. Melany Hilton [phone].     Initiated referral for inpatient mental health Dual Dx.     FRANCISCO Selby, MAJOR  09/02/20 2406 [facility]HealthFRANCISCO, DERECKW  09/02/20 0613

## 2020-09-02 NOTE — ED NOTES
Emergency Department [facility] AN AFFILIATE OF [facility] Biopsychosocial Assessment Note    Chief Complaint:     Patient is a 45year old, male presenting to ED for depression, fleeting SI, & increased symptoms of psychosis - pacing, internally stimulated, laughing inappropriately, & requires multiple redirections. Upon assessment, patient has difficulties staying awake. Reports his main stressors have been homelessness and \"I'm going through some things\". MSE: Patient is alert & oriented x 3. Patient has a bizarre mood/affect. Patient thought process is tangential. Patient speech mumbled. Patient denies A/V hallucinations - however he is obviously internally stimulated and can be observed talking to unseen others at times. Clinical Summary/History:     Patient has a mental health hx of schizoaffective disorder, not following with an outpatient mental health provider - noncompliant with psychiatric medications; and patient last psychiatric hospitalization was 7/27/20 at [facility].      Gender  [x] Male [] Female [] Transgender  [] Other    Sexual Orientation    [x] Heterosexual [] Homosexual [] Bisexual [] Other    Suicidal Behavioral: CSSR-S Complete. [x] Reports:    [] Past [x] Present   [] Denies    Homicidal/ Violent Behavior  [] Reports:   [] Past [] Present   [x] Denies     Hallucinations/Delusions   [] Reports:   [x] Denies However, internally stimulated    Substance Use/Alcohol Use/Addiction: SBIRT Screen Complete. [x] Reports: Crack cocaine use  [] Denies     Trauma History  [] Reports:  [] Denies     Collateral Information:       Level of Care/Disposition Plan  [] Home:   [] Outpatient Provider:   [] Crisis Unit:   [x] Inpatient Psychiatric Unit:  [] Other:     SW will pursue inpatient admission for safety/stabilization.      Liliana Seo, FRANCISCO, MAJOR  09/02/20 6433

## 2020-09-03 VITALS
OXYGEN SATURATION: 97 % | RESPIRATION RATE: 16 BRPM | DIASTOLIC BLOOD PRESSURE: 72 MMHG | SYSTOLIC BLOOD PRESSURE: 116 MMHG | HEART RATE: 57 BPM | TEMPERATURE: 98.5 F

## 2020-09-03 LAB
EKG ATRIAL RATE: 57 BPM
EKG P AXIS: 74 DEGREES
EKG P-R INTERVAL: 184 MS
EKG Q-T INTERVAL: 390 MS
EKG QRS DURATION: 94 MS
EKG QTC CALCULATION (BAZETT): 379 MS
EKG R AXIS: 82 DEGREES
EKG T AXIS: 66 DEGREES
EKG VENTRICULAR RATE: 57 BPM

## 2020-09-03 PROCEDURE — 93010 ELECTROCARDIOGRAM REPORT: CPT | Performed by: INTERNAL MEDICINE

## 2020-09-03 NOTE — ED NOTES
Patient accepted to [facility] by Dr. Katherine Hughes, room 307-B. Nurse to nurse to be called to [phone].   [facility] ETA 0100     Corey Pallas, MARY  09/03/20 0004

## 2020-09-03 NOTE — ED NOTES
Attempted to call report to [facility], nurse stepped off unit.       Kamar Loomis RN  09/03/20 2488

## 2020-09-03 NOTE — ED NOTES
Received call from Lindy at [facility]. Lindy is requesting the patient's EKG, CK level, COVID-19 order request be faxed to [phone] for review with [facility] on-call doctor. Will call back if copy of pink slip is needed. RN [facility] and ANTONIO Ethel aware. Gerardo Theodore  09/02/20 1903    Received call from Lindy at [facility] requesting pink slip made out to [facility] be faxed. Patient is accepted to [facility]. [place] slip faxed.       Gerardo Theodore  09/02/20 9344

## 2020-09-09 LAB
SARS-COV-2: NOT DETECTED
SOURCE: NORMAL

## 2020-09-18 ENCOUNTER — HOSPITAL ENCOUNTER (EMERGENCY)
Age: 40
Discharge: HOME | End: 2020-09-18
Payer: COMMERCIAL

## 2020-09-18 VITALS
HEIGHT: 69 IN | DIASTOLIC BLOOD PRESSURE: 81 MMHG | SYSTOLIC BLOOD PRESSURE: 119 MMHG | BODY MASS INDEX: 22.22 KG/M2 | TEMPERATURE: 97.2 F | RESPIRATION RATE: 16 BRPM | WEIGHT: 150 LBS | OXYGEN SATURATION: 93 % | HEART RATE: 82 BPM

## 2020-09-18 VITALS
WEIGHT: 148 LBS | BODY MASS INDEX: 20.72 KG/M2 | SYSTOLIC BLOOD PRESSURE: 115 MMHG | RESPIRATION RATE: 16 BRPM | DIASTOLIC BLOOD PRESSURE: 57 MMHG | HEIGHT: 71 IN | TEMPERATURE: 97.7 F | OXYGEN SATURATION: 95 % | HEART RATE: 78 BPM

## 2020-09-18 LAB
BACTERIA: ABNORMAL /HPF
BILIRUBIN URINE: ABNORMAL
BLOOD, URINE: ABNORMAL
CLARITY: CLEAR
COLOR: YELLOW
EPITHELIAL CELLS, UA: ABNORMAL /HPF
GLUCOSE URINE: NEGATIVE MG/DL
KETONES, URINE: ABNORMAL MG/DL
LEUKOCYTE ESTERASE, URINE: NEGATIVE
NITRITE, URINE: NEGATIVE
PH UA: 5.5 (ref 5–9)
PROTEIN UA: NEGATIVE MG/DL
RBC UA: ABNORMAL /HPF (ref 0–2)
SPECIFIC GRAVITY UA: >=1.03 (ref 1–1.03)
UROBILINOGEN, URINE: 1 E.U./DL
WBC UA: ABNORMAL /HPF (ref 0–5)

## 2020-09-18 PROCEDURE — 81001 URINALYSIS AUTO W/SCOPE: CPT

## 2020-09-18 PROCEDURE — 6360000002 HC RX W HCPCS: Performed by: NURSE PRACTITIONER

## 2020-09-18 PROCEDURE — 99283 EMERGENCY DEPT VISIT LOW MDM: CPT

## 2020-09-18 PROCEDURE — 87491 CHLMYD TRACH DNA AMP PROBE: CPT

## 2020-09-18 PROCEDURE — 87591 N.GONORRHOEAE DNA AMP PROB: CPT

## 2020-09-18 PROCEDURE — 96372 THER/PROPH/DIAG INJ SC/IM: CPT

## 2020-09-18 PROCEDURE — 99282 EMERGENCY DEPT VISIT SF MDM: CPT

## 2020-09-18 PROCEDURE — 6370000000 HC RX 637 (ALT 250 FOR IP): Performed by: NURSE PRACTITIONER

## 2020-09-18 RX ORDER — AZITHROMYCIN 250 MG/1
1000 TABLET, FILM COATED ORAL ONCE
Status: COMPLETED | OUTPATIENT
Start: 2020-09-18 | End: 2020-09-18

## 2020-09-18 RX ORDER — ONDANSETRON 4 MG/1
4 TABLET, ORALLY DISINTEGRATING ORAL ONCE
Status: COMPLETED | OUTPATIENT
Start: 2020-09-18 | End: 2020-09-18

## 2020-09-18 RX ORDER — CLOTRIMAZOLE AND BETAMETHASONE DIPROPIONATE 10; .64 MG/G; MG/G
CREAM TOPICAL 2 TIMES DAILY
Qty: 1 TUBE | Refills: 0 | Status: ON HOLD | OUTPATIENT
Start: 2020-09-18 | End: 2020-12-05

## 2020-09-18 RX ORDER — CEFTRIAXONE SODIUM 250 MG/1
250 INJECTION, POWDER, FOR SOLUTION INTRAMUSCULAR; INTRAVENOUS ONCE
Status: COMPLETED | OUTPATIENT
Start: 2020-09-18 | End: 2020-09-18

## 2020-09-18 RX ADMIN — CEFTRIAXONE SODIUM 250 MG: 250 INJECTION, POWDER, FOR SOLUTION INTRAMUSCULAR; INTRAVENOUS at 02:05

## 2020-09-18 RX ADMIN — ONDANSETRON 4 MG: 4 TABLET, ORALLY DISINTEGRATING ORAL at 02:05

## 2020-09-18 RX ADMIN — AZITHROMYCIN 1000 MG: 250 TABLET, FILM COATED ORAL at 02:05

## 2020-09-18 NOTE — PROGRESS NOTES
CLINICAL PHARMACY NOTE: MEDS TO [address] [facility] Patient?: No  Total # of Prescriptions Filled: 1   The following medications were delivered to the patient:  · Clotrimazole-betamethasone 1-0.5 cream     Total # of Interventions Completed: 2  Time Spent (min): 45    Additional Documentation:

## 2020-09-18 NOTE — ED PROVIDER NOTES
noted.  Neurological:  Oriented. Motor functions intact. Lab / Imaging Results   (All laboratory and radiology results have been personally reviewed by myself)  Labs:  No results found for this visit on 09/18/20. Imaging: All Radiology results interpreted by Radiologist unless otherwise noted. No orders to display       ED Course / Medical Decision Making   Medications - No data to display     Consults:   None    Counseling/MDM:   The emergency provider has spoken with the patient and discussed todays emergency visit, in addition to providing specific details for the plan of care and counseling regarding the diagnosis and prognosis. He was counseled on the role of the emergency department regarding prescribing medications for chronic conditions including Narcotic and other controlled substances. Based on the presenting complaint and nature of illness, the requested medications will be provided today in prescription form and he is instructed to contact their provider for supplemental medications as soon as possible. Questions are answered at this time and they are agreeable with the plan. Assessment      1. Encounter for medication refill      Plan   Discharge to home  Patient condition is good    New Medications     Discharge Medication List as of 9/18/2020 12:38 PM        Electronically signed by JUICE Crooks CNP   DD: 9/18/20  **This report was transcribed using voice recognition software. Every effort was made to ensure accuracy; however, inadvertent computerized transcription errors may be present.   END OF ED PROVIDER NOTE      JUICE Atkins CNP  09/18/20 9640

## 2020-09-21 ENCOUNTER — HOSPITAL ENCOUNTER (EMERGENCY)
Age: 40
Discharge: PSYCHIATRIC OR SUBSTANCE USE FACILITY | End: 2020-09-21
Attending: EMERGENCY MEDICINE
Payer: COMMERCIAL

## 2020-09-21 VITALS
RESPIRATION RATE: 16 BRPM | DIASTOLIC BLOOD PRESSURE: 64 MMHG | SYSTOLIC BLOOD PRESSURE: 118 MMHG | OXYGEN SATURATION: 96 % | HEART RATE: 74 BPM | TEMPERATURE: 98 F

## 2020-09-21 LAB
ACETAMINOPHEN LEVEL: <5 MCG/ML (ref 10–30)
ALBUMIN SERPL-MCNC: 4.2 G/DL (ref 3.5–5.2)
ALP BLD-CCNC: 43 U/L (ref 40–129)
ALT SERPL-CCNC: 14 U/L (ref 0–40)
AMPHETAMINE SCREEN, URINE: NOT DETECTED
ANION GAP SERPL CALCULATED.3IONS-SCNC: 13 MMOL/L (ref 7–16)
AST SERPL-CCNC: 21 U/L (ref 0–39)
BARBITURATE SCREEN URINE: NOT DETECTED
BASOPHILS ABSOLUTE: 0.08 E9/L (ref 0–0.2)
BASOPHILS RELATIVE PERCENT: 1.5 % (ref 0–2)
BENZODIAZEPINE SCREEN, URINE: NOT DETECTED
BILIRUB SERPL-MCNC: 0.4 MG/DL (ref 0–1.2)
BILIRUBIN URINE: NEGATIVE
BLOOD, URINE: NEGATIVE
BUN BLDV-MCNC: 16 MG/DL (ref 6–20)
C. TRACHOMATIS DNA ,URINE: NEGATIVE
CALCIUM SERPL-MCNC: 9.2 MG/DL (ref 8.6–10.2)
CANNABINOID SCREEN URINE: NOT DETECTED
CHLORIDE BLD-SCNC: 104 MMOL/L (ref 98–107)
CLARITY: CLEAR
CO2: 26 MMOL/L (ref 22–29)
COCAINE METABOLITE SCREEN URINE: POSITIVE
COLOR: YELLOW
CREAT SERPL-MCNC: 1.1 MG/DL (ref 0.7–1.2)
EKG ATRIAL RATE: 60 BPM
EKG ATRIAL RATE: 63 BPM
EKG P AXIS: 78 DEGREES
EKG P AXIS: 79 DEGREES
EKG P-R INTERVAL: 176 MS
EKG P-R INTERVAL: 194 MS
EKG Q-T INTERVAL: 404 MS
EKG Q-T INTERVAL: 412 MS
EKG QRS DURATION: 104 MS
EKG QRS DURATION: 98 MS
EKG QTC CALCULATION (BAZETT): 404 MS
EKG QTC CALCULATION (BAZETT): 421 MS
EKG R AXIS: 86 DEGREES
EKG R AXIS: 87 DEGREES
EKG T AXIS: 63 DEGREES
EKG T AXIS: 68 DEGREES
EKG VENTRICULAR RATE: 60 BPM
EKG VENTRICULAR RATE: 63 BPM
EOSINOPHILS ABSOLUTE: 0.3 E9/L (ref 0.05–0.5)
EOSINOPHILS RELATIVE PERCENT: 5.5 % (ref 0–6)
ETHANOL: <10 MG/DL (ref 0–0.08)
FENTANYL SCREEN, URINE: NOT DETECTED
GFR AFRICAN AMERICAN: >60
GFR NON-AFRICAN AMERICAN: >60 ML/MIN/1.73
GLUCOSE BLD-MCNC: 82 MG/DL (ref 74–99)
GLUCOSE URINE: NEGATIVE MG/DL
HCT VFR BLD CALC: 45.2 % (ref 37–54)
HEMOGLOBIN: 14.7 G/DL (ref 12.5–16.5)
IMMATURE GRANULOCYTES #: 0.01 E9/L
IMMATURE GRANULOCYTES %: 0.2 % (ref 0–5)
KETONES, URINE: NEGATIVE MG/DL
LEUKOCYTE ESTERASE, URINE: NEGATIVE
LYMPHOCYTES ABSOLUTE: 1.76 E9/L (ref 1.5–4)
LYMPHOCYTES RELATIVE PERCENT: 32.3 % (ref 20–42)
Lab: ABNORMAL
MCH RBC QN AUTO: 30.6 PG (ref 26–35)
MCHC RBC AUTO-ENTMCNC: 32.5 % (ref 32–34.5)
MCV RBC AUTO: 94 FL (ref 80–99.9)
METHADONE SCREEN, URINE: NOT DETECTED
MONOCYTES ABSOLUTE: 0.35 E9/L (ref 0.1–0.95)
MONOCYTES RELATIVE PERCENT: 6.4 % (ref 2–12)
N. GONORRHOEAE DNA, URINE: NEGATIVE
NEUTROPHILS ABSOLUTE: 2.95 E9/L (ref 1.8–7.3)
NEUTROPHILS RELATIVE PERCENT: 54.1 % (ref 43–80)
NITRITE, URINE: NEGATIVE
OPIATE SCREEN URINE: NOT DETECTED
OXYCODONE URINE: NOT DETECTED
PDW BLD-RTO: 12.5 FL (ref 11.5–15)
PH UA: 7 (ref 5–9)
PHENCYCLIDINE SCREEN URINE: NOT DETECTED
PLATELET # BLD: 356 E9/L (ref 130–450)
PMV BLD AUTO: 9.6 FL (ref 7–12)
POTASSIUM SERPL-SCNC: 4.3 MMOL/L (ref 3.5–5)
PROTEIN UA: NEGATIVE MG/DL
RBC # BLD: 4.81 E12/L (ref 3.8–5.8)
SALICYLATE, SERUM: <0.3 MG/DL (ref 0–30)
SODIUM BLD-SCNC: 143 MMOL/L (ref 132–146)
SOURCE: NORMAL
SPECIFIC GRAVITY UA: 1.02 (ref 1–1.03)
T4 TOTAL: 9 MCG/DL (ref 4.5–11.7)
TOTAL CK: 548 U/L (ref 20–200)
TOTAL PROTEIN: 6.6 G/DL (ref 6.4–8.3)
TRICYCLIC ANTIDEPRESSANTS SCREEN SERUM: NEGATIVE NG/ML
TROPONIN: <0.01 NG/ML (ref 0–0.03)
TSH SERPL DL<=0.05 MIU/L-ACNC: 0.55 UIU/ML (ref 0.27–4.2)
UROBILINOGEN, URINE: 1 E.U./DL
WBC # BLD: 5.5 E9/L (ref 4.5–11.5)

## 2020-09-21 PROCEDURE — 80053 COMPREHEN METABOLIC PANEL: CPT

## 2020-09-21 PROCEDURE — 84436 ASSAY OF TOTAL THYROXINE: CPT

## 2020-09-21 PROCEDURE — 80307 DRUG TEST PRSMV CHEM ANLYZR: CPT

## 2020-09-21 PROCEDURE — 85025 COMPLETE CBC W/AUTO DIFF WBC: CPT

## 2020-09-21 PROCEDURE — 82550 ASSAY OF CK (CPK): CPT

## 2020-09-21 PROCEDURE — 93005 ELECTROCARDIOGRAM TRACING: CPT | Performed by: EMERGENCY MEDICINE

## 2020-09-21 PROCEDURE — 84443 ASSAY THYROID STIM HORMONE: CPT

## 2020-09-21 PROCEDURE — 84484 ASSAY OF TROPONIN QUANT: CPT

## 2020-09-21 PROCEDURE — 99284 EMERGENCY DEPT VISIT MOD MDM: CPT

## 2020-09-21 PROCEDURE — 93005 ELECTROCARDIOGRAM TRACING: CPT | Performed by: PHYSICIAN ASSISTANT

## 2020-09-21 PROCEDURE — U0003 INFECTIOUS AGENT DETECTION BY NUCLEIC ACID (DNA OR RNA); SEVERE ACUTE RESPIRATORY SYNDROME CORONAVIRUS 2 (SARS-COV-2) (CORONAVIRUS DISEASE [COVID-19]), AMPLIFIED PROBE TECHNIQUE, MAKING USE OF HIGH THROUGHPUT TECHNOLOGIES AS DESCRIBED BY CMS-2020-01-R: HCPCS

## 2020-09-21 PROCEDURE — 81003 URINALYSIS AUTO W/O SCOPE: CPT

## 2020-09-21 PROCEDURE — G0480 DRUG TEST DEF 1-7 CLASSES: HCPCS

## 2020-09-21 ASSESSMENT — PATIENT HEALTH QUESTIONNAIRE - PHQ9: SUM OF ALL RESPONSES TO PHQ QUESTIONS 1-9: 16

## 2020-09-21 NOTE — ED NOTES
Emergency Department [facility] AN AFFILIATE OF [facility] Biopsychosocial Assessment Note    Pt is pink slipped by ED Doc    Chief Complaint:     Pt states depression and SI. Denies HI and plan    MSE:    Pt is anxious, non sensical, alert, labile affect, poor eye contact, rambling speech, delusional, admits to SI w/ plan, and +auditory hallucinations, denies HI, reports ok sleep , poor appetite. Clinical Summary/History:     Pt has MH hx of schizo-affective d/o, Pt is suppose to be taking Zyprexa but it does not work. Pt reports SI, with multiple plans but will not disclose to this write, states his head has lost all information. Pt reports auditory hallucinations of the devil, denies HI. Pt reports he has used crack and heroin in the last 24 hours and wants to get help to stop using. Gender  [x] Male [] Female [] Transgender  [] Other    Sexual Orientation    [] Heterosexual [] Homosexual [] Bisexual [] Other    Suicidal Behavioral: CSSR-S Complete. [x] Reports:    [] Past [x] Present   [] Denies    Homicidal/ Violent Behavior  [] Reports:   [] Past [] Present   [x] Denies     Hallucinations/Delusions   [x] Reports: auditory  [] Denies     Substance Use/Alcohol Use/Addiction: SBIRT Screen Complete.    [x] Reports:   [] Denies     Trauma History  [] Reports:  [] Denies     Collateral Information:       Level of Care/Disposition Plan  [] Home:   [] Outpatient Provider:   [] Crisis Unit:   [x] Inpatient Psychiatric Unit: will be referred to a dual unit  [] Other:        FRANCISCO Montes, MAJOR  09/21/20 2780

## 2020-09-21 NOTE — ED NOTES
Pt cont with SI states \"I plead the 5th\" when asked if he has a plan.  Denies hi/ hallucinations pt sanjana makes no eye contact informed of plan of care      Polly Masters RN  09/21/20 7894

## 2020-09-21 NOTE — ED NOTES
Bed: [facility]  Expected date:   Expected time:   Means of arrival:   Comments:  triage     Nancy Briceño RN  09/21/20 1293

## 2020-09-21 NOTE — ED NOTES
Attempted to call report to generations informed rn is passing meds and will call this rn back     Sharon Marshall RN  09/21/20 6058

## 2020-09-21 NOTE — ED PROVIDER NOTES
ED Attending  CC: No  HPI:  9/21/20, Time: 12:32 AM EDT         Karie Bernardo is a 45 y.o. male presenting to the ED for Psych eval  beginning today . The complaint has been persistent, moderate in severity, and worsened by nothing. Patient comes in with complaint of hearing voices. States the voices are demanding respect. He has a history of schizophrenia states he has been off his medications. He is having suicidal ideations with no plan. He has attempted to cut himself in the past.  Denies any homicidal ideations. Patient states he last used cocaine 2 hours ago heroin yesterday. Denies any chest pain palpitations diaphoresis nausea. Dates he has intermittent congestion cough that started today. Denies any abdominal pain no leg pain or swelling. Review of Systems:   Pertinent positives and negatives are stated within HPI, all other systems reviewed and are negative.          --------------------------------------------- PAST HISTORY ---------------------------------------------  Past Medical History:  has a past medical history of Depression and Schizoaffective disorder ([facility] [address] Past Surgical History:  has a past surgical history that includes eye surgery (19 years ago). Social History:  reports that he has been smoking cigars and cigarettes. He has a 12.00 pack-year smoking history. He has never used smokeless tobacco. He reports current drug use. Drugs: Marijuana and Cocaine. He reports that he does not drink alcohol. Family History: family history includes No Known Problems in his father and mother. The patients home medications have been reviewed.     Allergies: Rondec-d [chlophedianol-pseudoephedrine]    -------------------------------------------------- RESULTS -------------------------------------------------  All laboratory and radiology results have been personally reviewed by myself   LABS:  Results for orders placed or performed during the hospital encounter of 09/21/20 CBC Auto Differential   Result Value Ref Range    WBC 5.5 4.5 - 11.5 E9/L    RBC 4.81 3.80 - 5.80 E12/L    Hemoglobin 14.7 12.5 - 16.5 g/dL    Hematocrit 45.2 37.0 - 54.0 %    MCV 94.0 80.0 - 99.9 fL    MCH 30.6 26.0 - 35.0 pg    MCHC 32.5 32.0 - 34.5 %    RDW 12.5 11.5 - 15.0 fL    Platelets 924 365 - 834 E9/L    MPV 9.6 7.0 - 12.0 fL    Neutrophils % 54.1 43.0 - 80.0 %    Immature Granulocytes % 0.2 0.0 - 5.0 %    Lymphocytes % 32.3 20.0 - 42.0 %    Monocytes % 6.4 2.0 - 12.0 %    Eosinophils % 5.5 0.0 - 6.0 %    Basophils % 1.5 0.0 - 2.0 %    Neutrophils Absolute 2.95 1.80 - 7.30 E9/L    Immature Granulocytes # 0.01 E9/L    Lymphocytes Absolute 1.76 1.50 - 4.00 E9/L    Monocytes Absolute 0.35 0.10 - 0.95 E9/L    Eosinophils Absolute 0.30 0.05 - 0.50 E9/L    Basophils Absolute 0.08 0.00 - 0.20 E9/L   Comprehensive Metabolic Panel   Result Value Ref Range    Sodium 143 132 - 146 mmol/L    Potassium 4.3 3.5 - 5.0 mmol/L    Chloride 104 98 - 107 mmol/L    CO2 26 22 - 29 mmol/L    Anion Gap 13 7 - 16 mmol/L    Glucose 82 74 - 99 mg/dL    BUN 16 6 - 20 mg/dL    CREATININE 1.1 0.7 - 1.2 mg/dL    GFR Non-African American >60 >=60 mL/min/1.73    GFR African American >60     Calcium 9.2 8.6 - 10.2 mg/dL    Total Protein 6.6 6.4 - 8.3 g/dL    Alb 4.2 3.5 - 5.2 g/dL    Total Bilirubin 0.4 0.0 - 1.2 mg/dL    Alkaline Phosphatase 43 40 - 129 U/L    ALT 14 0 - 40 U/L    AST 21 0 - 39 U/L   Urinalysis   Result Value Ref Range    Color, UA Yellow Straw/Yellow    Clarity, UA Clear Clear    Glucose, Ur Negative Negative mg/dL    Bilirubin Urine Negative Negative    Ketones, Urine Negative Negative mg/dL    Specific Gravity, UA 1.020 1.005 - 1.030    Blood, Urine Negative Negative    pH, UA 7.0 5.0 - 9.0    Protein, UA Negative Negative mg/dL    Urobilinogen, Urine 1.0 <2.0 E.U./dL    Nitrite, Urine Negative Negative    Leukocyte Esterase, Urine Negative Negative   Serum Drug Screen   Result Value Ref Range    Ethanol Lvl <10 mg/dL    Acetaminophen Level <5.0 (L) 10.0 - 08.2 mcg/mL    Salicylate, Serum <7.3 0.0 - 30.0 mg/dL    TCA Scrn NEGATIVE Cutoff:300 ng/mL   Urine Drug Screen   Result Value Ref Range    Amphetamine Screen, Urine NOT DETECTED Negative <1000 ng/mL    Barbiturate Screen, Ur NOT DETECTED Negative < 200 ng/mL    Benzodiazepine Screen, Urine NOT DETECTED Negative < 200 ng/mL    Cannabinoid Scrn, Ur NOT DETECTED Negative < 50ng/mL    Cocaine Metabolite Screen, Urine POSITIVE (A) Negative < 300 ng/mL    Opiate Scrn, Ur NOT DETECTED Negative < 300ng/mL    PCP Screen, Urine NOT DETECTED Negative < 25 ng/mL    Methadone Screen, Urine NOT DETECTED Negative <300 ng/mL    Oxycodone Urine NOT DETECTED Negative <100 ng/mL    FENTANYL SCREEN, URINE NOT DETECTED Negative <1 ng/mL    Drug Screen Comment: see below    TSH without Reflex   Result Value Ref Range    TSH 0.547 0.270 - 4.200 uIU/mL   T4   Result Value Ref Range    T4, Total 9.0 4.5 - 11.7 mcg/dL   CK   Result Value Ref Range    Total  (H) 20 - 200 U/L   Troponin   Result Value Ref Range    Troponin <0.01 0.00 - 0.03 ng/mL   EKG 12 Lead   Result Value Ref Range    Ventricular Rate 63 BPM    Atrial Rate 63 BPM    P-R Interval 176 ms    QRS Duration 104 ms    Q-T Interval 412 ms    QTc Calculation (Bazett) 421 ms    P Axis 79 degrees    R Axis 86 degrees    T Axis 68 degrees       RADIOLOGY:  Interpreted by Radiologist.  No orders to display       ------------------------- NURSING NOTES AND VITALS REVIEWED ---------------------------   The nursing notes within the ED encounter and vital signs as below have been reviewed.    /78   Pulse 70   Temp 97 °F (36.1 °C)   Resp 17   SpO2 93%   Oxygen Saturation Interpretation: Normal      ---------------------------------------------------PHYSICAL EXAM--------------------------------------      Constitutional/General: Alert and oriented x3, well appearing, non toxic in NAD  Head: Normocephalic and atraumatic  Eyes: PERRL, EOMI  Mouth: Oropharynx clear, handling secretions, no trismus  Neck: Supple, full ROM,   Pulmonary: Lungs clear to auscultation bilaterally, no wheezes, rales, or rhonchi. Not in respiratory distress  Cardiovascular:  Regular rate and rhythm, no murmurs, gallops, or rubs. 2+ distal pulses  Abdomen: Soft, non tender, non distended,   Extremities: Moves all extremities x 4. Warm and well perfused   Skin: warm and dry without rash  Neurologic: GCS 15,  Psych: Racing thoughts       ------------------------------ ED COURSE/MEDICAL DECISION MAKING----------------------  Medications - No data to display      ED COURSE:     EKG #1:  Interpreted by emergency department physician unless otherwise noted. Time:  0202   Rate:63  Rhythm: Sinus. Interpretation: normal sinus rhythm. With sinus arrhythmia      EKG # 1 Interpreted by emergency department physician unless otherwise noted. Time:  0245   Rate: 60  Rhythm: Sinus. Interpretation: Sinus with sinus arrhythmia. 8301 patient is medically cleared for social service evaluation        Medical Decision Making:    Patient was referred to [facility] for in patient treatment patient with history of schizoaffective disorder with suicidal ideation    Counseling: The emergency provider has spoken with the patient and discussed todays results, in addition to providing specific details for the plan of care and counseling regarding the diagnosis and prognosis. Questions are answered at this time and they are agreeable with the plan.      --------------------------------- IMPRESSION AND DISPOSITION ---------------------------------    IMPRESSION  1. [facility]ive disorder, [facility]        DISPOSITION  Disposition: Transfer to [facility] , mental health UNIT   Patient condition is fair      NOTE: This report was transcribed using voice recognition software.  Every effort was made to ensure accuracy; however, inadvertent computerized transcription

## 2020-09-21 NOTE — ED NOTES
Pt referred to Martina Cheadle at the [facility] for inpatient admission     FRANCISCO Fox, [place]  09/21/20 7356

## 2020-09-21 NOTE — ED NOTES
DR [place] PT TO DUAL UNIT ROOM 301A    [facility] CAN TRANSPORT BY [id number]    [address] 074 [facility]  09/21/20 3509

## 2020-09-21 NOTE — ED NOTES
[address] [facility], spoke with Kadeem. Advised to close case as pt accepted to [facility].      Chantell Ferreira  09/21/20 0929

## 2020-09-22 LAB
SARS-COV-2: NOT DETECTED
SOURCE: NORMAL

## 2020-10-18 ENCOUNTER — HOSPITAL ENCOUNTER (EMERGENCY)
Age: 40
Discharge: ANOTHER HOSPITAL | End: 2020-10-19
Attending: EMERGENCY MEDICINE
Payer: COMMERCIAL

## 2020-10-18 VITALS
OXYGEN SATURATION: 97 % | HEIGHT: 71 IN | WEIGHT: 148 LBS | HEART RATE: 69 BPM | TEMPERATURE: 97 F | RESPIRATION RATE: 16 BRPM | SYSTOLIC BLOOD PRESSURE: 126 MMHG | BODY MASS INDEX: 20.72 KG/M2 | DIASTOLIC BLOOD PRESSURE: 79 MMHG

## 2020-10-18 LAB
ACETAMINOPHEN LEVEL: <5 MCG/ML (ref 10–30)
ALBUMIN SERPL-MCNC: 4.2 G/DL (ref 3.5–5.2)
ALP BLD-CCNC: 41 U/L (ref 40–129)
ALT SERPL-CCNC: 9 U/L (ref 0–40)
AMPHETAMINE SCREEN, URINE: NOT DETECTED
ANION GAP SERPL CALCULATED.3IONS-SCNC: 12 MMOL/L (ref 7–16)
AST SERPL-CCNC: 18 U/L (ref 0–39)
BACTERIA: ABNORMAL /HPF
BARBITURATE SCREEN URINE: NOT DETECTED
BASOPHILS ABSOLUTE: 0.08 E9/L (ref 0–0.2)
BASOPHILS RELATIVE PERCENT: 2.1 % (ref 0–2)
BENZODIAZEPINE SCREEN, URINE: NOT DETECTED
BILIRUB SERPL-MCNC: 0.4 MG/DL (ref 0–1.2)
BILIRUBIN URINE: NEGATIVE
BLOOD, URINE: NORMAL
BUN BLDV-MCNC: 21 MG/DL (ref 6–20)
CALCIUM SERPL-MCNC: 9.1 MG/DL (ref 8.6–10.2)
CANNABINOID SCREEN URINE: POSITIVE
CHLORIDE BLD-SCNC: 103 MMOL/L (ref 98–107)
CLARITY: CLEAR
CO2: 24 MMOL/L (ref 22–29)
COCAINE METABOLITE SCREEN URINE: POSITIVE
COLOR: YELLOW
CREAT SERPL-MCNC: 1.1 MG/DL (ref 0.7–1.2)
CRYSTALS, UA: ABNORMAL /HPF
EOSINOPHILS ABSOLUTE: 0.06 E9/L (ref 0.05–0.5)
EOSINOPHILS RELATIVE PERCENT: 1.6 % (ref 0–6)
ETHANOL: <10 MG/DL (ref 0–0.08)
FENTANYL SCREEN, URINE: NOT DETECTED
GFR AFRICAN AMERICAN: >60
GFR NON-AFRICAN AMERICAN: >60 ML/MIN/1.73
GLUCOSE BLD-MCNC: 123 MG/DL (ref 74–99)
GLUCOSE URINE: NEGATIVE MG/DL
HCT VFR BLD CALC: 49.7 % (ref 37–54)
HEMOGLOBIN: 15.1 G/DL (ref 12.5–16.5)
IMMATURE GRANULOCYTES #: 0.01 E9/L
IMMATURE GRANULOCYTES %: 0.3 % (ref 0–5)
KETONES, URINE: NEGATIVE MG/DL
LEUKOCYTE ESTERASE, URINE: NEGATIVE
LYMPHOCYTES ABSOLUTE: 1.02 E9/L (ref 1.5–4)
LYMPHOCYTES RELATIVE PERCENT: 27 % (ref 20–42)
Lab: ABNORMAL
MCH RBC QN AUTO: 30.2 PG (ref 26–35)
MCHC RBC AUTO-ENTMCNC: 30.4 % (ref 32–34.5)
MCV RBC AUTO: 99.4 FL (ref 80–99.9)
METHADONE SCREEN, URINE: NOT DETECTED
MONOCYTES ABSOLUTE: 0.31 E9/L (ref 0.1–0.95)
MONOCYTES RELATIVE PERCENT: 8.2 % (ref 2–12)
NEUTROPHILS ABSOLUTE: 2.3 E9/L (ref 1.8–7.3)
NEUTROPHILS RELATIVE PERCENT: 60.8 % (ref 43–80)
NITRITE, URINE: NEGATIVE
OPIATE SCREEN URINE: NOT DETECTED
OXYCODONE URINE: NOT DETECTED
PDW BLD-RTO: 13.2 FL (ref 11.5–15)
PH UA: 5.5 (ref 5–9)
PHENCYCLIDINE SCREEN URINE: NOT DETECTED
PLATELET # BLD: 300 E9/L (ref 130–450)
PMV BLD AUTO: 9.6 FL (ref 7–12)
POTASSIUM SERPL-SCNC: 3.6 MMOL/L (ref 3.5–5)
PROTEIN UA: NEGATIVE MG/DL
RBC # BLD: 5 E12/L (ref 3.8–5.8)
RBC UA: ABNORMAL /HPF (ref 0–2)
SALICYLATE, SERUM: <0.3 MG/DL (ref 0–30)
SARS-COV-2, NAAT: NOT DETECTED
SODIUM BLD-SCNC: 139 MMOL/L (ref 132–146)
SPECIFIC GRAVITY UA: >=1.03 (ref 1–1.03)
TOTAL CK: 361 U/L (ref 20–200)
TOTAL PROTEIN: 6.7 G/DL (ref 6.4–8.3)
TRICYCLIC ANTIDEPRESSANTS SCREEN SERUM: NEGATIVE NG/ML
UROBILINOGEN, URINE: 1 E.U./DL
WBC # BLD: 3.8 E9/L (ref 4.5–11.5)
WBC UA: ABNORMAL /HPF (ref 0–5)

## 2020-10-18 PROCEDURE — 82550 ASSAY OF CK (CPK): CPT

## 2020-10-18 PROCEDURE — U0002 COVID-19 LAB TEST NON-CDC: HCPCS

## 2020-10-18 PROCEDURE — 6360000002 HC RX W HCPCS: Performed by: EMERGENCY MEDICINE

## 2020-10-18 PROCEDURE — 85025 COMPLETE CBC W/AUTO DIFF WBC: CPT

## 2020-10-18 PROCEDURE — 93005 ELECTROCARDIOGRAM TRACING: CPT | Performed by: EMERGENCY MEDICINE

## 2020-10-18 PROCEDURE — 99284 EMERGENCY DEPT VISIT MOD MDM: CPT

## 2020-10-18 PROCEDURE — 81001 URINALYSIS AUTO W/SCOPE: CPT

## 2020-10-18 PROCEDURE — 96372 THER/PROPH/DIAG INJ SC/IM: CPT

## 2020-10-18 PROCEDURE — G0480 DRUG TEST DEF 1-7 CLASSES: HCPCS

## 2020-10-18 PROCEDURE — 36415 COLL VENOUS BLD VENIPUNCTURE: CPT

## 2020-10-18 PROCEDURE — 99283 EMERGENCY DEPT VISIT LOW MDM: CPT

## 2020-10-18 PROCEDURE — 80053 COMPREHEN METABOLIC PANEL: CPT

## 2020-10-18 PROCEDURE — 80307 DRUG TEST PRSMV CHEM ANLYZR: CPT

## 2020-10-18 RX ORDER — DROPERIDOL 2.5 MG/ML
2.5 INJECTION, SOLUTION INTRAMUSCULAR; INTRAVENOUS ONCE
Status: COMPLETED | OUTPATIENT
Start: 2020-10-18 | End: 2020-10-18

## 2020-10-18 RX ADMIN — DROPERIDOL 2.5 MG: 2.5 INJECTION, SOLUTION INTRAMUSCULAR; INTRAVENOUS at 16:49

## 2020-10-18 NOTE — ED NOTES
Patient Sleeping at this time  Respirations easy and unlabored  No signs of distress noted  Will continue to monitor       Jessica Bhandari RN  10/18/20 1022

## 2020-10-18 NOTE — ED NOTES
Emergency Department [facility] AN AFFILIATE OF [facility] Biopsychosocial Assessment Note     Chief Complaint:      Patient is a 45year old, male presenting to ED for depression, fleeting SI, & increased symptoms of psychosis - auditory hallucinations and laughing inappropriately.     MSE: Patient is alert & oriented x 3. Patient has a bizarre mood/affect. Patient thought process is tangential. Patient speech mumbled. Patient denies A/V hallucinations - however he is obviously internally stimulated and can be observed talking to unseen others at times.     Clinical Summary/History:      Patient has a mental health hx of schizoaffective disorder, not following with an outpatient mental health provider - noncompliant with psychiatric medications; and patient [facility]  [x]? Male []? Female []? Transgender  []? Other     Sexual Orientation    [x]? Heterosexual []? Homosexual []? Bisexual []? Other     Suicidal Behavioral: CSSR-S Complete. [x]? Reports:               []? Past [x]? Present   []? Denies     Homicidal/ Violent Behavior  []? Reports:              []? Past []? Present   [x]? Denies      Hallucinations/Delusions   []? Reports:   [x]? Denies However, internally stimulated     Substance Use/Alcohol Use/Addiction: SBIRT Screen Complete. [x]? Reports: Crack cocaine use  []? Denies      Trauma History  []? Reports:  []? Denies      Collateral Information:         Level of Care/Disposition Plan  []? Home:   []? Outpatient Provider:   []? Crisis Unit:   [x]? Inpatient Psychiatric Unit:  []? Other:      SW will pursue inpatient admission for safety/stabilization.      FRANCISCO Gambino, [place]  10/18/20 0304

## 2020-10-18 NOTE — ED NOTES
Pt having conversation with unseen others. No needs at this time.       Fatou Cheek RN  10/18/20 4983

## 2020-10-18 NOTE — ED PROVIDER NOTES
Department of Emergency Medicine   ED  Provider Note  Admit Date/RoomTime: 10/18/2020  6:56 AM  ED Room: [facility]27              10/18/20  7:13 AM EDT    HPI: Alphonso Johnson 45 y.o. male presents with a complaint of hallucinations and hearing voices beginning a few days ago. Complaint has been constant and became more severe today which is what prompted the visit. Patient has a history of schizophrenia, he reports that he is hearing voices. He reports that he is depressed. He says he is afraid that he might harm himself although he has no plan. He says he hears voices all the time. He denies any other complaints. On arrival he is having some paranoid delusions. Review of Systems:   A complete review of systems was performed and pertinent positives and negatives are stated within HPI, all other systems reviewed and are negative.            --------------------------------------------- PAST HISTORY ---------------------------------------------  Past Medical History:  has a past medical history of Depression and Schizoaffective disorder ([facility] [address] Past Surgical History:  has a past surgical history that includes eye surgery (19 years ago). Social History:  reports that he has been smoking cigars and cigarettes. He has a 12.00 pack-year smoking history. He has never used smokeless tobacco. He reports current drug use. Drugs: Marijuana and Cocaine. He reports that he does not drink alcohol. Family History: family history includes No Known Problems in his father and mother. Family history is non contributory unless otherwise noted    The patients home medications have been reviewed. Allergies: Rondec-d [chlophedianol-pseudoephedrine]    -------------------------------------------------- RESULTS -------------------------------------------------  All laboratory and imaging studies were reviewed by myself.     LABS: reviewed and interpreted by me  Results for orders placed or performed during 50ng/mL    Cocaine Metabolite Screen, Urine POSITIVE (A) Negative < 300 ng/mL    Opiate Scrn, Ur NOT DETECTED Negative < 300ng/mL    PCP Screen, Urine NOT DETECTED Negative < 25 ng/mL    Methadone Screen, Urine NOT DETECTED Negative <300 ng/mL    Oxycodone Urine NOT DETECTED Negative <100 ng/mL    FENTANYL SCREEN, URINE NOT DETECTED Negative <1 ng/mL    Drug Screen Comment: see below    Urinalysis   Result Value Ref Range    Color, UA Yellow Straw/Yellow    Clarity, UA Clear Clear    Glucose, Ur Negative Negative mg/dL    Bilirubin Urine Negative Negative    Ketones, Urine Negative Negative mg/dL    Specific Gravity, UA >=1.030 1.005 - 1.030    Blood, Urine TRACE-INTACT Negative    pH, UA 5.5 5.0 - 9.0    Protein, UA Negative Negative mg/dL    Urobilinogen, Urine 1.0 <2.0 E.U./dL    Nitrite, Urine Negative Negative    Leukocyte Esterase, Urine Negative Negative   Microscopic Urinalysis   Result Value Ref Range    WBC, UA NONE 0 - 5 /HPF    RBC, UA 0-1 0 - 2 /HPF    Bacteria, UA NONE SEEN None Seen /HPF    Crystals, UA Many (A) None Seen /HPF       RADIOLOGY:  Interpreted by Radiologist.  No orders to display         ------------------------- NURSING NOTES AND VITALS REVIEWED ---------------------------   The nursing notes within the ED encounter and vital signs as below have been reviewed.    /79   Pulse 69   Temp 97 °F (36.1 °C) (Temporal)   Resp 16   Ht 5' 11\" (1.803 m)   Wt 148 lb (67.1 kg)   SpO2 97%   BMI 20.64 kg/m²   Oxygen Saturation Interpretation: Normal          EKG Interpretation  Interpreted by emergency department physician, Dr. Sarika Munguia     10/18/20  Time: 0767    Rhythm: normal sinus   Rate: normal  Axis: right  Conduction: normal  ST Segments: no acute change  T Waves: no acute change    Clinical Impression: Sinus rhythm, no acute ischemic changes  Comparison to Old EKG  Stable from prior      ---------------------------------------------------PHYSICAL EXAM--------------------------------------      Constitutional/General: Alert and oriented x3   Head: Normocephalic, atraumatic  Eyes: PERRL, EOMI  Mouth: Oropharynx clear, handling secretions, no trismus  Neck: Supple, full ROM, no meningeal signs  Pulmonary: Lungs clear to auscultation bilaterally, no wheezes, rales, or rhonchi. Not in respiratory distress  Cardiovascular:  Regular rate and rhythm, no murmurs, gallops, or rubs. 2+ distal pulses  Abdomen: Soft, non tender, non distended, +BS, no rebound, guarding, or rigidity. No pulsatile masses appreciated  Extremities: Moves all extremities x 4. Warm and well perfused, no clubbing, cyanosis, or edema. Capillary refill <3 seconds  Skin: warm and dry without rash  Neurologic: GCS 15, no focal deficits  Psych: Hallucinating with paranoia and flight of ideas. ------------------------------------------ PROGRESS NOTES ------------------------------------------     Medical decision making:  Patient is medically stable for mental health evaluation    Consultations:   Behavioral Health    Re-Evaluations:        Counseling: The emergency provider has spoken with thepatient and discussed todays results, in addition to providing specific details for the plan of care and counseling regarding the diagnosis and prognosis. Questions are answered at this time and they are agreeable with the plan.         --------------------------------- IMPRESSION AND DISPOSITION ---------------------------------    IMPRESSION  1.  [facility] ([facility]        DISPOSITION  Disposition: as per consultant  Patient condition is stable           Monik Suarez MD  10/18/20 0909

## 2020-10-18 NOTE — ED NOTES
Pt upset about being transferred, states he is just here for his shot.      Garima Tellez RN  10/18/20 9140

## 2020-10-18 NOTE — ED NOTES
Pt medicated for the safety of self and staff, becoming increasingly agitated and verbally abusive     Rupa Rodriguez RN  10/18/20 1898

## 2020-10-18 NOTE — ED NOTES
[facility] Burgos to collect Rapid COVID, [facility] is tentatively accepting.      Blanca Richter, MSW, LSW  10/18/20 7341

## 2020-10-18 NOTE — ED NOTES
2 bags of belongings secured in locker [address] [facility], [address] [place] [address]  10/18/20 7145

## 2020-10-19 NOTE — ED NOTES
Call from [address] [place] [address] [address] at the [facility] needs - Current med list, Covid results and the pink slip and CK levels. - fax to :[phone] [facility]  10/18/20 7812

## 2020-10-19 NOTE — ED NOTES
Antonette Angel called from [facility] and stated that they will not arrive until [address], [address], [facility]  10/18/20 4792

## 2020-10-19 NOTE — ED NOTES
The pt was accepted by Dr. Conrad Vines to [facility] room 214- [facility] side. N to N to be called to [phone]. [facility] will transport @ [phone].      Denisha Figueroa, [facility]  10/18/20 5731

## 2020-10-21 LAB
EKG ATRIAL RATE: 60 BPM
EKG P AXIS: 114 DEGREES
EKG P-R INTERVAL: 204 MS
EKG Q-T INTERVAL: 392 MS
EKG QRS DURATION: 104 MS
EKG QTC CALCULATION (BAZETT): 392 MS
EKG R AXIS: 99 DEGREES
EKG T AXIS: 124 DEGREES
EKG VENTRICULAR RATE: 60 BPM

## 2020-10-21 PROCEDURE — 93010 ELECTROCARDIOGRAM REPORT: CPT | Performed by: INTERNAL MEDICINE

## 2020-10-28 VITALS — TEMPERATURE: 97.7 F | OXYGEN SATURATION: 94 % | HEART RATE: 92 BPM

## 2020-10-28 RX ORDER — SODIUM CHLORIDE 9 MG/ML
INJECTION, SOLUTION INTRAVENOUS CONTINUOUS
Status: DISCONTINUED | OUTPATIENT
Start: 2020-10-29 | End: 2020-10-29 | Stop reason: HOSPADM

## 2020-10-29 ENCOUNTER — HOSPITAL ENCOUNTER (EMERGENCY)
Age: 40
Discharge: LEFT BEFORE DISCHARGE | End: 2020-10-29
Attending: EMERGENCY MEDICINE
Payer: COMMERCIAL

## 2020-11-11 ENCOUNTER — HOSPITAL ENCOUNTER (EMERGENCY)
Age: 40
Discharge: HOME | End: 2020-11-11
Attending: EMERGENCY MEDICINE
Payer: COMMERCIAL

## 2020-11-11 VITALS
SYSTOLIC BLOOD PRESSURE: 110 MMHG | HEIGHT: 71 IN | WEIGHT: 148 LBS | DIASTOLIC BLOOD PRESSURE: 67 MMHG | TEMPERATURE: 97.1 F | OXYGEN SATURATION: 97 % | BODY MASS INDEX: 20.72 KG/M2 | RESPIRATION RATE: 16 BRPM | HEART RATE: 58 BPM

## 2020-11-11 LAB
ACETAMINOPHEN LEVEL: <5 MCG/ML (ref 10–30)
ALBUMIN SERPL-MCNC: 3.9 G/DL (ref 3.5–5.2)
ALP BLD-CCNC: 38 U/L (ref 40–129)
ALT SERPL-CCNC: 12 U/L (ref 0–40)
AMPHETAMINE SCREEN, URINE: NOT DETECTED
ANION GAP SERPL CALCULATED.3IONS-SCNC: 10 MMOL/L (ref 7–16)
AST SERPL-CCNC: 17 U/L (ref 0–39)
BARBITURATE SCREEN URINE: NOT DETECTED
BASOPHILS ABSOLUTE: 0.07 E9/L (ref 0–0.2)
BASOPHILS RELATIVE PERCENT: 1.3 % (ref 0–2)
BENZODIAZEPINE SCREEN, URINE: NOT DETECTED
BILIRUB SERPL-MCNC: 0.2 MG/DL (ref 0–1.2)
BUN BLDV-MCNC: 16 MG/DL (ref 6–20)
CALCIUM SERPL-MCNC: 8.4 MG/DL (ref 8.6–10.2)
CANNABINOID SCREEN URINE: POSITIVE
CHLORIDE BLD-SCNC: 107 MMOL/L (ref 98–107)
CO2: 24 MMOL/L (ref 22–29)
COCAINE METABOLITE SCREEN URINE: POSITIVE
CREAT SERPL-MCNC: 1 MG/DL (ref 0.7–1.2)
EOSINOPHILS ABSOLUTE: 0.22 E9/L (ref 0.05–0.5)
EOSINOPHILS RELATIVE PERCENT: 4.2 % (ref 0–6)
ETHANOL: <10 MG/DL (ref 0–0.08)
FENTANYL SCREEN, URINE: NOT DETECTED
GFR AFRICAN AMERICAN: >60
GFR NON-AFRICAN AMERICAN: >60 ML/MIN/1.73
GLUCOSE BLD-MCNC: 92 MG/DL (ref 74–99)
HCT VFR BLD CALC: 40.6 % (ref 37–54)
HEMOGLOBIN: 13.5 G/DL (ref 12.5–16.5)
IMMATURE GRANULOCYTES #: 0.01 E9/L
IMMATURE GRANULOCYTES %: 0.2 % (ref 0–5)
LYMPHOCYTES ABSOLUTE: 1.99 E9/L (ref 1.5–4)
LYMPHOCYTES RELATIVE PERCENT: 38.3 % (ref 20–42)
Lab: ABNORMAL
MCH RBC QN AUTO: 30.8 PG (ref 26–35)
MCHC RBC AUTO-ENTMCNC: 33.3 % (ref 32–34.5)
MCV RBC AUTO: 92.5 FL (ref 80–99.9)
METHADONE SCREEN, URINE: NOT DETECTED
MONOCYTES ABSOLUTE: 0.43 E9/L (ref 0.1–0.95)
MONOCYTES RELATIVE PERCENT: 8.3 % (ref 2–12)
NEUTROPHILS ABSOLUTE: 2.47 E9/L (ref 1.8–7.3)
NEUTROPHILS RELATIVE PERCENT: 47.7 % (ref 43–80)
OPIATE SCREEN URINE: NOT DETECTED
OXYCODONE URINE: NOT DETECTED
PDW BLD-RTO: 13.4 FL (ref 11.5–15)
PHENCYCLIDINE SCREEN URINE: NOT DETECTED
PLATELET # BLD: 285 E9/L (ref 130–450)
PMV BLD AUTO: 9.3 FL (ref 7–12)
POTASSIUM SERPL-SCNC: 3.8 MMOL/L (ref 3.5–5)
RBC # BLD: 4.39 E12/L (ref 3.8–5.8)
SALICYLATE, SERUM: <0.3 MG/DL (ref 0–30)
SODIUM BLD-SCNC: 141 MMOL/L (ref 132–146)
TOTAL PROTEIN: 6 G/DL (ref 6.4–8.3)
TRICYCLIC ANTIDEPRESSANTS SCREEN SERUM: NEGATIVE NG/ML
WBC # BLD: 5.2 E9/L (ref 4.5–11.5)

## 2020-11-11 PROCEDURE — 80053 COMPREHEN METABOLIC PANEL: CPT

## 2020-11-11 PROCEDURE — G0480 DRUG TEST DEF 1-7 CLASSES: HCPCS

## 2020-11-11 PROCEDURE — 99283 EMERGENCY DEPT VISIT LOW MDM: CPT

## 2020-11-11 PROCEDURE — 80307 DRUG TEST PRSMV CHEM ANLYZR: CPT

## 2020-11-11 PROCEDURE — 85025 COMPLETE CBC W/AUTO DIFF WBC: CPT

## 2020-11-11 NOTE — ED NOTES
[facility] ACCEPTED PT TO U    [facility] APPROVED FOR TRANSPORT     Alize Vale, [facility]  11/11/20 8305

## 2020-11-11 NOTE — ED PROVIDER NOTES
Result Value Ref Range    Sodium 141 132 - 146 mmol/L    Potassium 3.8 3.5 - 5.0 mmol/L    Chloride 107 98 - 107 mmol/L    CO2 24 22 - 29 mmol/L    Anion Gap 10 7 - 16 mmol/L    Glucose 92 74 - 99 mg/dL    BUN 16 6 - 20 mg/dL    CREATININE 1.0 0.7 - 1.2 mg/dL    GFR Non-African American >60 >=60 mL/min/1.73    GFR African American >60     Calcium 8.4 (L) 8.6 - 10.2 mg/dL    Total Protein 6.0 (L) 6.4 - 8.3 g/dL    Alb 3.9 3.5 - 5.2 g/dL    Total Bilirubin 0.2 0.0 - 1.2 mg/dL    Alkaline Phosphatase 38 (L) 40 - 129 U/L    ALT 12 0 - 40 U/L    AST 17 0 - 39 U/L   Serum Drug Screen   Result Value Ref Range    Ethanol Lvl <10 mg/dL    Acetaminophen Level <5.0 (L) 10.0 - 49.5 mcg/mL    Salicylate, Serum <6.3 0.0 - 30.0 mg/dL    TCA Scrn NEGATIVE Cutoff:300 ng/mL   Urine Drug Screen   Result Value Ref Range    Amphetamine Screen, Urine NOT DETECTED Negative <1000 ng/mL    Barbiturate Screen, Ur NOT DETECTED Negative < 200 ng/mL    Benzodiazepine Screen, Urine NOT DETECTED Negative < 200 ng/mL    Cannabinoid Scrn, Ur POSITIVE (A) Negative < 50ng/mL    Cocaine Metabolite Screen, Urine POSITIVE (A) Negative < 300 ng/mL    Opiate Scrn, Ur NOT DETECTED Negative < 300ng/mL    PCP Screen, Urine NOT DETECTED Negative < 25 ng/mL    Methadone Screen, Urine NOT DETECTED Negative <300 ng/mL    Oxycodone Urine NOT DETECTED Negative <100 ng/mL    FENTANYL SCREEN, URINE NOT DETECTED Negative <1 ng/mL    Drug Screen Comment: see below        RADIOLOGY:  Interpreted by Radiologist.  No orders to display               ------------------------- NURSING NOTES AND VITALS REVIEWED ---------------------------   The nursing notes within the ED encounter and vital signs as below have been reviewed by myself.   /67   Pulse 58   Temp 97.1 °F (36.2 °C)   Resp 16   Ht 5' 11\" (1.803 m)   Wt 148 lb (67.1 kg)   SpO2 97%   BMI 20.64 kg/m²   Oxygen Saturation Interpretation: Normal    The patients available past medical records and past encounters were reviewed. ------------------------------ ED COURSE/MEDICAL DECISION MAKING----------------------  Medications - No data to display          Medical Decision Making:        Re-Evaluations:             Re-evaluation. Patients symptoms show no change    Patient reevaluated and unchanged. Patient medically clear  Consultations:                 Critical Care: This patient's ED course included: a personal history and physicial eaxmination    This patient has been closely monitored during their ED course. Counseling: The emergency provider has spoken with the patient and discussed todays results, in addition to providing specific details for the plan of care and counseling regarding the diagnosis and prognosis. Questions are answered at this time and they are agreeable with the plan.       --------------------------------- IMPRESSION AND DISPOSITION ---------------------------------    IMPRESSION  1. Mood disorder ([facility] [address]        DISPOSITION  Disposition:  to assist with disposition  Patient condition is stable        NOTE: This report was transcribed using voice recognition software.  Every effort was made to ensure accuracy; however, inadvertent computerized transcription errors may be present          Travis Abdi MD  11/11/20 46794 Fabian Shrestha MD  11/11/20 0005

## 2020-11-11 NOTE — ED NOTES
[facility] Biopsychosocial Assessment Note    Chief Complaint:     Patient presents to the ED for depression. Patient additionally reports suicidal thoughts and auditory hallucinations. MSE:    Pt presents as a 45year old male. He is alert and oriented. Speech is steady and clear. Mood is stable. Eye contact varies. Pt reports SI without a plan. He denies HI. Pt reports auditory hallucinations. Admits to drug abuse. Clinical Summary/History:     Upon entry to the [facility], pt reports that he came in because he is locked out of where he is staying, because he waited until it was too late. Pt rambles from topic to topic. He gives vague answers to questions throughout assessment and is somewhat difficult to follow. Pt reports having been hospitalized in the [facility] most recently. He stated it was scary, and he never wants to go back there. He also states that he was provided an [place] to hospitals, which was also scary to him. He states that he is not engaged in any outpatient services currently. Pt reports being on a shot, and believes he is prescribed a pill for psychiatric medication purposes. He is unable to name either. Pt is not taking any medications daily to address his psychiatric diagnoses. Pt reports feeling depressed. Fleeting SI is reported with no plan. The auditory hallucinations reported include voices that tell pt to \"get help\" and when he is angry to \"run away\" from his problems. Gender  [x] Male [] Female [] Transgender  [] Other    Sexual Orientation    [x] Heterosexual [] Homosexual [] Bisexual [] Other    Suicidal Behavioral: CSSR-S Complete. [x] Reports:    [] Past [] Present   [] Denies    Homicidal/ Violent Behavior  [] Reports:   [] Past [] Present   [x] Denies     Hallucinations/Delusions   [x] Reports:   [] Denies     Substance Use/Alcohol Use/Addiction: SBIRT Screen Complete.    [x] Reports:   [] Denies     Trauma History  [x] Reports:  [] Denies Collateral Information:   No collateral obtained at this time.     Level of Care/Disposition Plan  [] Home:   [] Outpatient Provider:   [] Crisis Unit:   [] Inpatient Psychiatric Unit:  [] Other:        FRANCISCO Goff, MAJOR  11/11/20 0422

## 2020-11-11 NOTE — ED NOTES
Called the crisis unit and spoke to Aj Calloway who inquired about pt's medications. PATRICIO nurse Kirsty Sumner and I spoke with pt, who reports that he has been off meds since 4/2020.       Faxed updated med list to [facility] - awaiting call back     Nicole B[facility]  11/11/20 4418

## 2020-11-26 ENCOUNTER — HOSPITAL ENCOUNTER (EMERGENCY)
Age: 40
Discharge: HOME | End: 2020-11-26
Payer: COMMERCIAL

## 2020-11-26 VITALS
OXYGEN SATURATION: 97 % | BODY MASS INDEX: 20.72 KG/M2 | HEART RATE: 61 BPM | WEIGHT: 148 LBS | HEIGHT: 71 IN | SYSTOLIC BLOOD PRESSURE: 129 MMHG | DIASTOLIC BLOOD PRESSURE: 75 MMHG | RESPIRATION RATE: 16 BRPM | TEMPERATURE: 97.2 F

## 2020-11-26 ASSESSMENT — PAIN DESCRIPTION - LOCATION: LOCATION: TEETH

## 2020-11-26 ASSESSMENT — PAIN DESCRIPTION - PAIN TYPE: TYPE: CHRONIC PAIN

## 2020-11-26 ASSESSMENT — PAIN DESCRIPTION - ORIENTATION: ORIENTATION: RIGHT;LEFT

## 2020-11-26 ASSESSMENT — PAIN DESCRIPTION - DESCRIPTORS: DESCRIPTORS: DISCOMFORT

## 2020-11-26 ASSESSMENT — PAIN SCALES - GENERAL: PAINLEVEL_OUTOF10: 8

## 2020-11-26 NOTE — ED NOTES
Pt approaches pivot desk at this time complaining about wait time to be seen. Explained to pt that all patients and taken back in order of acuity level and that there are currently no available beds for the doctor to see him. Pt states \"I just need a tooth pulled, that's why I came to the emergency clinic\". Educated pt that while we will be happy to get him more comfortable with medication, we do not have dental specialist on staff in the ED to pull teeth and that he will have to follow up with a dentist to have a tooth pulled if deemed necessary by the ED practitioner. Pt becoming argumentative with RN and states \"I came here for EMERGENT care\". Re-informed pt that there are no beds available at this time and about pts being seen in order of acuity and also that the ED staff will be able to give him something for his dental pain as soon as the doctor is able to see him and make recommendation for further care of his condition.  Pt storms away from desk loudly talking to self while pacing around waiting room making insulting statements towards RN and yelling that he isn't being seen due to being black       Mable Tello RN  11/26/20 6779

## 2020-11-26 NOTE — ED NOTES
Pt walks out of ED stating \"i'm leaving because yall aren't thomas do anything to help me\"     Donna Davison RN  11/26/20 9964

## 2020-12-05 ENCOUNTER — HOSPITAL ENCOUNTER (INPATIENT)
Age: 40
LOS: 4 days | Discharge: HOME | DRG: 885 | End: 2020-12-09
Attending: EMERGENCY MEDICINE | Admitting: PSYCHIATRY & NEUROLOGY
Payer: COMMERCIAL

## 2020-12-05 PROBLEM — F29 PSYCHOSIS (HCC): Status: ACTIVE | Noted: 2020-12-05

## 2020-12-05 LAB
ACETAMINOPHEN LEVEL: <5 MCG/ML (ref 10–30)
ALBUMIN SERPL-MCNC: 3.6 G/DL (ref 3.5–5.2)
ALP BLD-CCNC: 38 U/L (ref 40–129)
ALT SERPL-CCNC: 7 U/L (ref 0–40)
AMPHETAMINE SCREEN, URINE: NOT DETECTED
ANION GAP SERPL CALCULATED.3IONS-SCNC: 8 MMOL/L (ref 7–16)
AST SERPL-CCNC: 15 U/L (ref 0–39)
BARBITURATE SCREEN URINE: NOT DETECTED
BASOPHILS ABSOLUTE: 0.05 E9/L (ref 0–0.2)
BASOPHILS RELATIVE PERCENT: 1.1 % (ref 0–2)
BENZODIAZEPINE SCREEN, URINE: NOT DETECTED
BILIRUB SERPL-MCNC: 0.4 MG/DL (ref 0–1.2)
BUN BLDV-MCNC: 12 MG/DL (ref 6–20)
CALCIUM SERPL-MCNC: 8.4 MG/DL (ref 8.6–10.2)
CANNABINOID SCREEN URINE: NOT DETECTED
CHLORIDE BLD-SCNC: 107 MMOL/L (ref 98–107)
CO2: 27 MMOL/L (ref 22–29)
COCAINE METABOLITE SCREEN URINE: POSITIVE
CREAT SERPL-MCNC: 1.1 MG/DL (ref 0.7–1.2)
EKG ATRIAL RATE: 53 BPM
EKG P AXIS: 66 DEGREES
EKG P-R INTERVAL: 184 MS
EKG Q-T INTERVAL: 410 MS
EKG QRS DURATION: 96 MS
EKG QTC CALCULATION (BAZETT): 384 MS
EKG R AXIS: 66 DEGREES
EKG T AXIS: 34 DEGREES
EKG VENTRICULAR RATE: 53 BPM
EOSINOPHILS ABSOLUTE: 0.22 E9/L (ref 0.05–0.5)
EOSINOPHILS RELATIVE PERCENT: 4.8 % (ref 0–6)
ETHANOL: <10 MG/DL (ref 0–0.08)
FENTANYL SCREEN, URINE: NOT DETECTED
GFR AFRICAN AMERICAN: >60
GFR NON-AFRICAN AMERICAN: >60 ML/MIN/1.73
GLUCOSE BLD-MCNC: 89 MG/DL (ref 74–99)
HCT VFR BLD CALC: 40.5 % (ref 37–54)
HEMOGLOBIN: 13.6 G/DL (ref 12.5–16.5)
IMMATURE GRANULOCYTES #: 0.01 E9/L
IMMATURE GRANULOCYTES %: 0.2 % (ref 0–5)
LYMPHOCYTES ABSOLUTE: 1.85 E9/L (ref 1.5–4)
LYMPHOCYTES RELATIVE PERCENT: 40.7 % (ref 20–42)
Lab: ABNORMAL
MCH RBC QN AUTO: 30.6 PG (ref 26–35)
MCHC RBC AUTO-ENTMCNC: 33.6 % (ref 32–34.5)
MCV RBC AUTO: 91 FL (ref 80–99.9)
METHADONE SCREEN, URINE: NOT DETECTED
MONOCYTES ABSOLUTE: 0.3 E9/L (ref 0.1–0.95)
MONOCYTES RELATIVE PERCENT: 6.6 % (ref 2–12)
NEUTROPHILS ABSOLUTE: 2.11 E9/L (ref 1.8–7.3)
NEUTROPHILS RELATIVE PERCENT: 46.6 % (ref 43–80)
OPIATE SCREEN URINE: NOT DETECTED
OXYCODONE URINE: NOT DETECTED
PDW BLD-RTO: 13.2 FL (ref 11.5–15)
PHENCYCLIDINE SCREEN URINE: NOT DETECTED
PLATELET # BLD: 271 E9/L (ref 130–450)
PMV BLD AUTO: 9.5 FL (ref 7–12)
POTASSIUM SERPL-SCNC: 3.8 MMOL/L (ref 3.5–5)
RBC # BLD: 4.45 E12/L (ref 3.8–5.8)
SALICYLATE, SERUM: <0.3 MG/DL (ref 0–30)
SARS-COV-2, NAAT: NOT DETECTED
SODIUM BLD-SCNC: 142 MMOL/L (ref 132–146)
TOTAL PROTEIN: 6.1 G/DL (ref 6.4–8.3)
TRICYCLIC ANTIDEPRESSANTS SCREEN SERUM: NEGATIVE NG/ML
WBC # BLD: 4.5 E9/L (ref 4.5–11.5)

## 2020-12-05 PROCEDURE — 80307 DRUG TEST PRSMV CHEM ANLYZR: CPT

## 2020-12-05 PROCEDURE — 1240000000 HC EMOTIONAL WELLNESS R&B

## 2020-12-05 PROCEDURE — 6360000002 HC RX W HCPCS: Performed by: PSYCHIATRY & NEUROLOGY

## 2020-12-05 PROCEDURE — 93010 ELECTROCARDIOGRAM REPORT: CPT | Performed by: INTERNAL MEDICINE

## 2020-12-05 PROCEDURE — 93005 ELECTROCARDIOGRAM TRACING: CPT | Performed by: PSYCHIATRY & NEUROLOGY

## 2020-12-05 PROCEDURE — 85025 COMPLETE CBC W/AUTO DIFF WBC: CPT

## 2020-12-05 PROCEDURE — 6360000002 HC RX W HCPCS: Performed by: NURSE PRACTITIONER

## 2020-12-05 PROCEDURE — 80053 COMPREHEN METABOLIC PANEL: CPT

## 2020-12-05 PROCEDURE — 99285 EMERGENCY DEPT VISIT HI MDM: CPT

## 2020-12-05 PROCEDURE — G0480 DRUG TEST DEF 1-7 CLASSES: HCPCS

## 2020-12-05 PROCEDURE — U0002 COVID-19 LAB TEST NON-CDC: HCPCS

## 2020-12-05 RX ORDER — HALOPERIDOL 5 MG
5 TABLET ORAL EVERY 6 HOURS PRN
Status: DISCONTINUED | OUTPATIENT
Start: 2020-12-05 | End: 2020-12-09 | Stop reason: HOSPADM

## 2020-12-05 RX ORDER — LORAZEPAM 1 MG/1
2 TABLET ORAL EVERY 6 HOURS PRN
Status: DISCONTINUED | OUTPATIENT
Start: 2020-12-05 | End: 2020-12-09 | Stop reason: HOSPADM

## 2020-12-05 RX ORDER — ZIPRASIDONE MESYLATE 20 MG/ML
20 INJECTION, POWDER, LYOPHILIZED, FOR SOLUTION INTRAMUSCULAR ONCE
Status: DISCONTINUED | OUTPATIENT
Start: 2020-12-05 | End: 2020-12-09 | Stop reason: HOSPADM

## 2020-12-05 RX ORDER — MAGNESIUM HYDROXIDE/ALUMINUM HYDROXICE/SIMETHICONE 120; 1200; 1200 MG/30ML; MG/30ML; MG/30ML
30 SUSPENSION ORAL PRN
Status: DISCONTINUED | OUTPATIENT
Start: 2020-12-05 | End: 2020-12-09 | Stop reason: HOSPADM

## 2020-12-05 RX ORDER — HYDROXYZINE PAMOATE 25 MG/1
50 CAPSULE ORAL 3 TIMES DAILY PRN
Status: DISCONTINUED | OUTPATIENT
Start: 2020-12-05 | End: 2020-12-05

## 2020-12-05 RX ORDER — TRAZODONE HYDROCHLORIDE 50 MG/1
50 TABLET ORAL NIGHTLY PRN
Status: DISCONTINUED | OUTPATIENT
Start: 2020-12-05 | End: 2020-12-09 | Stop reason: HOSPADM

## 2020-12-05 RX ORDER — ACETAMINOPHEN 325 MG/1
650 TABLET ORAL EVERY 6 HOURS PRN
Status: DISCONTINUED | OUTPATIENT
Start: 2020-12-05 | End: 2020-12-09 | Stop reason: HOSPADM

## 2020-12-05 RX ORDER — DIPHENHYDRAMINE HYDROCHLORIDE 50 MG/ML
50 INJECTION INTRAMUSCULAR; INTRAVENOUS EVERY 6 HOURS PRN
Status: DISCONTINUED | OUTPATIENT
Start: 2020-12-05 | End: 2020-12-09 | Stop reason: HOSPADM

## 2020-12-05 RX ORDER — HALOPERIDOL 5 MG/ML
5 INJECTION INTRAMUSCULAR EVERY 6 HOURS PRN
Status: DISCONTINUED | OUTPATIENT
Start: 2020-12-05 | End: 2020-12-09 | Stop reason: HOSPADM

## 2020-12-05 RX ORDER — NICOTINE 21 MG/24HR
1 PATCH, TRANSDERMAL 24 HOURS TRANSDERMAL DAILY
Status: DISCONTINUED | OUTPATIENT
Start: 2020-12-05 | End: 2020-12-09 | Stop reason: HOSPADM

## 2020-12-05 RX ORDER — LORAZEPAM 2 MG/ML
2 INJECTION INTRAMUSCULAR EVERY 6 HOURS PRN
Status: DISCONTINUED | OUTPATIENT
Start: 2020-12-05 | End: 2020-12-09 | Stop reason: HOSPADM

## 2020-12-05 RX ORDER — DIPHENHYDRAMINE HCL 25 MG
50 TABLET ORAL EVERY 6 HOURS PRN
Status: DISCONTINUED | OUTPATIENT
Start: 2020-12-05 | End: 2020-12-09 | Stop reason: HOSPADM

## 2020-12-05 RX ADMIN — HALOPERIDOL LACTATE 5 MG: 5 INJECTION, SOLUTION INTRAMUSCULAR at 14:57

## 2020-12-05 RX ADMIN — DIPHENHYDRAMINE HYDROCHLORIDE 50 MG: 50 INJECTION, SOLUTION INTRAMUSCULAR; INTRAVENOUS at 14:56

## 2020-12-05 RX ADMIN — LORAZEPAM 2 MG: 2 INJECTION INTRAMUSCULAR; INTRAVENOUS at 14:56

## 2020-12-05 ASSESSMENT — PAIN - FUNCTIONAL ASSESSMENT: PAIN_FUNCTIONAL_ASSESSMENT: 0-10

## 2020-12-05 ASSESSMENT — SLEEP AND FATIGUE QUESTIONNAIRES
DO YOU HAVE DIFFICULTY SLEEPING: NO
AVERAGE NUMBER OF SLEEP HOURS: 4
DO YOU USE A SLEEP AID: NO

## 2020-12-05 ASSESSMENT — LIFESTYLE VARIABLES: HISTORY_ALCOHOL_USE: NO

## 2020-12-05 ASSESSMENT — PAIN SCALES - GENERAL: PAINLEVEL_OUTOF10: 0

## 2020-12-05 NOTE — ED NOTES
Notified Dr Burciaga/ Laura Silva NP of need for admission to [facility] answering machine received, message left will await return call     Leighann Kyle RN  12/05/20 6857

## 2020-12-05 NOTE — CARE COORDINATION
This note will not be viewable in [employer] for the following reason(s). Suspected substance abuse disorder. Peer Recovery Support Note    Name: Danilo Blakely  Date: 12/5/2020    Chief Complaint   Patient presents with    Psychiatric Evaluation     (+SI no plan, feeling depressed, \"I would hurt myself first before I would let anyone else hurt me\") rambling in triage. Peer Support met with patient. [x] Support and education provided  [x] Resources provided   [] Treatment referral:   [] Other:   [] Patient declined peer recovery services     Referred By: PATRICIO Glez    Notes:  Patient wants to go to inpatient drug rehab upon discharge. Will follow up on Tuesday.     Signed: Khushbu Parker, 12/5/2020

## 2020-12-05 NOTE — ED NOTES
Bed: [facility].  Expected date:   Expected time:   Means of arrival:   Comments:  triage     Alonzo Ortiz RN  12/05/20 7191

## 2020-12-05 NOTE — PROGRESS NOTES
and Judgment: No  Insight and Judgment: Poor Judgment, Poor Insight, Unrealistic  Present Suicidal Ideation: No  Present Homicidal Ideation: No    Tobacco Screening:  Practical Counseling, on admission, lydia X, if applicable and completed (first 3 are required if patient doesn't refuse):            ( )  Recognizing danger situations (included triggers and roadblocks)                    ( )  Coping skills (new ways to manage stress, exercise, relaxation techniques, changing routine, distraction)                                                           ( )  Basic information about quitting (benefits of quitting, techniques in how to quit, available resources  ( ) Referral for counseling faxed to [facility]                                           (x ) Patient refused counseling  ( ) Patient has not smoked in the last 30 days    Metabolic Screening:    Lab Results   Component Value Date    LABA1C 5.2 10/27/2019       Lab Results   Component Value Date    CHOL 113 10/20/2020    CHOL 120 10/27/2019     Lab Results   Component Value Date    TRIG 45 10/20/2020    TRIG 52 10/27/2019     Lab Results   Component Value Date    HDL 50 10/20/2020    HDL 42 10/27/2019     No components found for: LDLCAL  Lab Results   Component Value Date    LABVLDL 10 10/27/2019         Body mass index is 22.81 kg/m². BP Readings from Last 2 Encounters:   12/05/20 131/83   11/26/20 129/75           Pt admitted with followings belongings:  Dentures: None  Vision - Corrective Lenses: None  Hearing Aid: None  Jewelry: None  Body Piercings Removed: N/A  Clothing: Jacket / coat, Pants, Shirt, Socks, Undergarments (Comment)  Were All Patient Medications Collected?: Not Applicable     Valuables sent home with n/a. Valuables placed in safe in security envelope, number:  n/a. Patient's home medications were n/a. Patient oriented to surroundings and program expectations and copy of patient rights given. Received admission packet:  yes. Consents reviewed, signed no. Refused yes. Patient verbalize understanding:  yes.     Patient education on precautions: yes                   Freida Salazar RN
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
## 2020-12-05 NOTE — ED NOTES
Water given to patient and he has calmed down. He has declined to allow me to draw blood neither will he provide a urine sample until he gets back to section [facility].      Ramiro Horton RN  12/05/20 0693
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
## 2020-12-05 NOTE — ED PROVIDER NOTES
and dry without rash  Neurologic: GCS 15, CN 2-12 grossly intact, no focal deficits, symmetric strength 5/5 in the upper and lower extremities bilaterally  Psych: Bizarre Affect,Patient presents to the emergency department, noted to have rambling speech, aggressive speech at times as well. Patient reports having hallucinations screaming that he has paranoid schizophrenia, he reports that he has not taken any of his meds since since being released 1 month ago from [facility]. Patient reports that he sees Yady Ho and other people. Initially he denied suicidal ideations but then was repeatedly stating that he would hurt himself first he needed to. He also repeatedly is talking about homicidal ideations but will not name any one particular he just reports anyone that will hurt him. Patient reports not taking any meds. Patient is denying drug or alcohol use. Upon patient's arrival patient needed to have [facility] police called at bedside due to escalating behavior and screaming at staff. Patient otherwise denies chest pain denies shortness of breath denies any abdominal pain denies no recent fevers as well as no noted nausea vomiting or diarrhea      ------------------------------------------ PROGRESS NOTES ------------------------------------------     Medical decision making:  Plan will be for labs will also consult . Will pink slip patient    Consultations:   Social work     Re-Evaluations:        Counseling: The emergency provider has spoken with thepatient and discussed todays results, in addition to providing specific details for the plan of care and counseling regarding the diagnosis and prognosis. Questions are answered at this time and they are agreeable with the plan.         --------------------------------- IMPRESSION AND DISPOSITION ---------------------------------    IMPRESSION  1.  [facility]        DISPOSITION  Disposition: as per consultation   Patient condition is stable           JUICE Haro - CNP  12/07/20 3496

## 2020-12-06 PROBLEM — F14.10 COCAINE ABUSE (HCC): Status: ACTIVE | Noted: 2020-12-06

## 2020-12-06 PROCEDURE — 99222 1ST HOSP IP/OBS MODERATE 55: CPT | Performed by: NURSE PRACTITIONER

## 2020-12-06 PROCEDURE — 1240000000 HC EMOTIONAL WELLNESS R&B

## 2020-12-06 RX ORDER — DIVALPROEX SODIUM 500 MG/1
500 TABLET, DELAYED RELEASE ORAL EVERY 12 HOURS SCHEDULED
Status: DISCONTINUED | OUTPATIENT
Start: 2020-12-06 | End: 2020-12-09

## 2020-12-06 RX ORDER — RISPERIDONE 1 MG/1
1 TABLET, FILM COATED ORAL 2 TIMES DAILY
Status: DISCONTINUED | OUTPATIENT
Start: 2020-12-06 | End: 2020-12-07

## 2020-12-06 ASSESSMENT — PAIN SCALES - GENERAL: PAINLEVEL_OUTOF10: 0

## 2020-12-06 ASSESSMENT — LIFESTYLE VARIABLES: HISTORY_ALCOHOL_USE: NO

## 2020-12-06 ASSESSMENT — SLEEP AND FATIGUE QUESTIONNAIRES
DO YOU USE A SLEEP AID: NO
SLEEP PATTERN: NORMAL
DO YOU HAVE DIFFICULTY SLEEPING: NO
AVERAGE NUMBER OF SLEEP HOURS: 4

## 2020-12-06 NOTE — CARE COORDINATION
This note will not be viewable in [employer] for the following reason(s). Likely risk of substantial harm from context. Biopsychosocial Assessment Note    LPC met with patient to complete the biopsychosocial assessment and CSSR-S. Mental Status Exam: Pt presents alert and oriented x4, with euthymic mood. Pt's affect is congruent. Speech is unremarkable and motor functioning is normal. Pt's thought processes are disorganized, guarded, and somewhat paranoid. Pt denies AVH despite this. Pt denies current SI/HI at assessment. Pt was pleasant throughout assessment, but very guarded and resistant to provide a lot of information. Chief Complaint: Pt presented due to SI/HI and needing his medications. Patient Report: Pt admitted to having SI/HI at admission and stated \"I really just need my shot. \" Pt denies alcohol and substance use. He denies current SI/HI and AVH, however, he exhibits paranoid behaviors throughout the assessment. Gender  [x] Male [] Female [] Transgender  [] Other    Sexual Orientation    [x] Heterosexual [] Homosexual [] Bisexual [] Other    Suicidal Ideation  [] Reports [x] Denies    Homicidal Ideation  [] Reports [x] Denies      Hallucinations/Delusions (Specify type)  [] Reports [x] Denies     Substance Use/Alcohol Use/Addiction  [] Reports [x] Denies     Trauma History  [] Reports [x] Denies     Collateral Contact (CARL signed)  Name:   Opal Schmid (pt refused to provide last name and phone number)  Relationship: cousin  Number: unknown    Collateral Information: Pt states he would first like to call his cousin Opal Schmid to ask if he minds if he provides us with his phone number. Pt states he will try to inform SW as soon as he gets a hold of him. SW to follow up. Access to Weapons: Pt denies. Follow up provider: Pt denies current treatment. Plan for discharge (where they live can they return): Pt states he plans to return home to live with family.          Electronically signed by [employer], LPC on 12/6/2020 at 9:26 AM

## 2020-12-06 NOTE — H&P
Department of Psychiatry  History and Physical - Adult     CHIEF COMPLAINT:  \" I came here to get my shot. \"    Patient was seen after discussing with the treatment team and reviewing the chart    CIRCUMSTANCES OF ADMISSION: Patient presented to the ED reporting suicidal and homicidal ideations with rambling speech aggression Hunt hallucinations    HISTORY OF PRESENT ILLNESS:      The patient is a 42y. o. male with significant past history of schizoaffective disorder presented to the ED brought in by himself reporting he suicida and homicidal ideations. In the ED patient reported hearing screaming and reported that he sees Diego Cai, Emiliana Yu and other people. He reportedly talked about homicidal ideations but did not name any particular person. Patient had escalating behaviors with screaming at staff requiring [facility] police to be called to the ED. . In the ED his urine drug screen is positive for cocaine he was medically cleared admitted to [facility] adult psychiatric and for further psychiatric assessment stabilization treatment. Patient was last inpatient psychiatrically hospitalized here at [facility] in March 2020. At the time he was treated with Depakote and Risperdal and given the Risperdal Consta injection. Upon assessment today patient is minimizing the circumstance of his hospitalization. He states that he just came here to \"get my shot\". He states that he brought himself here by walking. He states that he had his last injection when he was at [facility] in [facility]. He is not sure the name of the medication he was given. Per the chart patient presented for suicidal and homicidal ideations however he is denying this and he is asking to be given his shot and be allowed to leave. He shows very poor insight and judgment hospitalization need for treatment.   He is guarded, paranoid and appears internally stimulated.        Past psychiatric history: Patient history of multiple inpatient psychiatric hospitalizations including here at [address] also [facility]. According the patient his last hospitalization was a [facility] in October 2020. He was last here at [facility] in March 2020. Papi Osei At that time he was prescribed Depakote Risperdal in the Risperdal consta injection  He follows outpatient at [facility]. . Per the chart he attempted to cut his wrists in high school. He denies any when the family has any mental illness and he denies any when the family committed suicide.     Legal history: Patient denies any legal history     Substance abuse history: Patient history of cannabis and crack cocaine use. His urine drug screen is positive for cocaine on admission. Personal family and social history: Patient states he grew up in [place] was raised by his mom states he had 1 brother and 1 sister. He graduated high school. He states after high school he went to [employer]. He has never been  states he has 1 son who is 7 months old. He states he is currently living with a family friend.   He is not currently working and states he gets $800 a month and Social Security disability he denies any history of physical sexual emotional abuse while growing up he denies access to any guns      Past Medical History:        Diagnosis Date    Depression     [facility]izoaffective disorder ([facility]        Medications Prior to Admission:   Medications Prior to Admission: [DISCONTINUED] clotrimazole-betamethasone (LOTRISONE) 1-0.05 % cream, Apply topically 2 times daily  [DISCONTINUED] ibuprofen (IBU) 800 MG tablet, Take 1 tablet by mouth every 8 hours as needed for Pain  [DISCONTINUED] divalproex (DEPAKOTE) 500 MG DR tablet, Take 1 tablet by mouth every 12 hours  [DISCONTINUED] risperiDONE (RISPERDAL) 1 MG tablet, Take 1 tablet by mouth daily  [DISCONTINUED] risperiDONE (RISPERDAL) 2 MG tablet, Take 1 tablet by mouth nightly  [DISCONTINUED] risperiDONE microspheres ER (RISPERDAL CONSTA) 25 MG SRER injection, Inject 2 mLs into the muscle every 14 days for 1 dose    Past Surgical History:        Procedure Laterality Date    EYE SURGERY  19 years ago       Allergies:   Rondec-d [chlophedianol-pseudoephedrine]    Family History  Family History   Problem Relation Age of Onset    No Known Problems Mother     No Known Problems Father              EXAMINATION:    REVIEW OF SYSTEMS:    ROS:  [x] All negative/unchanged except if checked.  Explain positive(checked items) below:  [] Constitutional  [] Eyes  [] Ear/Nose/Mouth/Throat  [] Respiratory  [] CV  [] GI  []   [] Musculoskeletal  [] Skin/Breast  [] Neurological  [] Endocrine  [] Heme/Lymph  [] Allergic/Immunologic    Explanation:     Vitals:  BP (!) 102/52   Pulse 57   Temp 97.5 °F (36.4 °C) (Tympanic)   Resp 14   Ht 5' 8\" (1.727 m)   Wt 150 lb (68 kg)   SpO2 99%   BMI 22.81 kg/m²      Physical Examination:   Head: x  Atraumatic: x normocephalic  Skin and Mucosa        Moist x  Dry   Pale  x Normal   Neck:  Thyroid  Palpable   x  Not palpable   venus distention   adenopathy   Chest: x Clear   Rhonchi     Wheezing   CV:  xS1   xS2    xNo murmer   Abdomen:  x  Soft    Tender    Viceromegaly   Extremities:  x No Edema     Edema     Cranial Nerves Examination:   CN II:   xPupils are reactive to light  Pupils are non reactive to light  CN III, IV, VI:  xNo eye deviation    No diplopia or ptosis   CN V:    xFacial Sensation is intact     Facial Sensation is not intact   CN IIIV:   x Hearing is normal to rubbing fingers   CN IX, X:     xNormal gag reflex and phonation   CN XI:   xShoulder shrug and neck rotation is normal  CNXII:    xTongue is midline no deviation or atrophy    Mental Status Examination:    Level of consciousness:  within normal limits   Appearance:  well-appearing  Behavior/Motor:  no abnormalities noted  Attitude toward examiner:  cooperative  Speech:  spontaneous, normal rate and normal volume   Mood: constricted  Affect:  mood congruent and flat  Thought processes:  goal directed   Thought content: Appears guarded and paranoid appears internally stimulated currently denies SI/HI intent or plan although he admitted this last night  Cognition:  oriented to person, place, and time   Concentration distractible  Memory intact  Insight poor   Judgement poor   Fund of Knowledge adequate      DIAGNOSIS:  Schizoaffective disorder bipolar type  Cocaine abuse          LABS: REVIEWED TODAY:  Recent Labs     12/05/20  0752   WBC 4.5   HGB 13.6        Recent Labs     12/05/20  0752      K 3.8      CO2 27   BUN 12   CREATININE 1.1   GLUCOSE 89     Recent Labs     12/05/20  0752   BILITOT 0.4   ALKPHOS 38*   AST 15   ALT 7     Lab Results   Component Value Date    LABAMPH NOT DETECTED 12/05/2020    BARBSCNU NOT DETECTED 12/05/2020    LABBENZ NOT DETECTED 12/05/2020    LABMETH NOT DETECTED 12/05/2020    OPIATESCREENURINE NOT DETECTED 12/05/2020    PHENCYCLIDINESCREENURINE NOT DETECTED 12/05/2020    ETOH <10 12/05/2020     Lab Results   Component Value Date    TSH 0.755 10/20/2020     No results found for: LITHIUM  Lab Results   Component Value Date    VALPROATE 3 (L) 04/29/2020     Lab Results   Component Value Date    VALPROATE 3 04/29/2020         Radiology No results found. TREATMENT PLAN:    Risk Management: Based on the diagnosis and assessment biopsychosocial treatment model was presented to the patient and was given the opportunity to ask any question. The patient was agreeable to the plan and all the patient's questions were answered to the patient's satisfaction. I discussed with the patient the risk, benefit, alternative and common side effects for the proposed medication treatment. The patient is consenting to this treatment. Collateral Information:  Will obtain collateral information from the family or friends.   Will obtain medical records as appropriate from out patient providers  Will consult the hospitalist for a physical exam to rule out any co-morbid physical condition. We will obtain records from [facility] about the last BARROS given    Patient seen and plan discussed with Dr. Hernandez Flatness  Start back on Depakote 500 mg twice daily for mood stabilization  Start back on Risperdal 1 mg twice daily for psychosis    Prn Haldol 5mg and Vistaril 50mg q6hr for extreme agitation. Trazodone as ordered for insomnia  Vistaril as ordered for anxiety      Psychotherapy:   Encourage participation in milieu and group therapy  Individual therapy as needed        Behavioral Services  Medicare Certification      Admission Day 1  I certify that this patient's inpatient psychiatric hospital admission is medically necessary for:     (1) treatment which could reasonably be expected to improve this patient's condition, or     (2) diagnostic study or its equivalent.        Electronically signed by JUICE Johnson CNP on 18/1/0966 at 8:39 AM

## 2020-12-06 NOTE — PLAN OF CARE
Problem: Altered Mood, Psychotic Behavior:  Goal: Able to demonstrate trust by eating, participating in treatment and following staff's direction  Description: Able to demonstrate trust by eating, participating in treatment and following staff's direction  12/6/2020 1642 by Aleisha Olvera RN  Outcome: Met This Shift  12/6/2020 1133 by Lauren Lujan RN  Outcome: Ongoing  12/6/2020 0506 by Randee Larsen RN  Outcome: Ongoing     Problem: Altered Mood, Psychotic Behavior:  Goal: Able to verbalize decrease in frequency and intensity of hallucinations  Description: Able to verbalize decrease in frequency and intensity of hallucinations  12/6/2020 1642 by Aleisha Olvera RN  Outcome: Met This Shift  12/6/2020 1133 by Lauren Lujan RN  Outcome: Ongoing  12/6/2020 0506 by Randee Larsen RN  Outcome: Ongoing     Problem: Altered Mood, Psychotic Behavior:  Goal: Able to verbalize reality based thinking  Description: Able to verbalize reality based thinking  12/6/2020 1642 by Aleisha Olvera RN  Outcome: Ongoing  12/6/2020 1133 by Lauren Lujan RN  Outcome: Ongoing  12/6/2020 0506 by Randee Larsen RN  Outcome: Ongoing       Pt denies suicidal ideations, homicidal ideations and hallucinations. Pt out on the unit for meals. Mostly isolative to room. Pt calm and cooperative. Denies depression and anxiety. Presents flat and depressed. More active after dinner. Will continue to monitor.

## 2020-12-07 PROCEDURE — 1240000000 HC EMOTIONAL WELLNESS R&B

## 2020-12-07 PROCEDURE — 99232 SBSQ HOSP IP/OBS MODERATE 35: CPT | Performed by: NURSE PRACTITIONER

## 2020-12-07 RX ORDER — PALIPERIDONE 3 MG/1
3 TABLET, EXTENDED RELEASE ORAL 2 TIMES DAILY
Status: DISCONTINUED | OUTPATIENT
Start: 2020-12-07 | End: 2020-12-09 | Stop reason: HOSPADM

## 2020-12-07 ASSESSMENT — PAIN SCALES - GENERAL: PAINLEVEL_OUTOF10: 0

## 2020-12-07 NOTE — PROGRESS NOTES
BEHAVIORAL HEALTH FOLLOW-UP NOTE     12/7/2020     Patient was seen and examined in person, Chart reviewed   Patient's case discussed with staff/team    Chief Complaint: \" I only take shots. \"    Interim History: Patient irritable easily agitated. He was yelling about his lunch tray than talking about $800 today. He is been isolate to his room refusing all oral medications taking that he \"only take shots. \"  He denies SI/HI intent or plan denies any auditory visualizations he appears internally stimulated and paranoid very poor insight and judgment hospitalization need for treatment.       Appetite:   [x] Normal/Unchanged  [] Increased  [] Decreased      Sleep:       [x] Normal/Unchanged  [] Fair       [] Poor              Energy:    [x] Normal/Unchanged  [] Increased  [] Decreased        SI [] Present  [x] Absent    HI  []Present  [x] Absent     Aggression:  [] yes  [x] no    Patient is [x] able  [] unable to CONTRACT FOR SAFETY     PAST MEDICAL/PSYCHIATRIC HISTORY:   Past Medical History:   Diagnosis Date    Depression     [facility]        FAMILY/SOCIAL HISTORY:  Family History   Problem Relation Age of Onset    No Known Problems Mother     No Known Problems Father      Social History     Socioeconomic History    Marital status: Single     Spouse name: Not on file    Number of children: 0    Years of education: 12    Highest education level: Not on file   Occupational History    Not on file   Social Needs    Financial resource strain: Not on file    Food insecurity     Worry: Not on file     Inability: Not on file   [employer] needs     Medical: Not on file     Non-medical: Not on file   Tobacco Use    Smoking status: Current Every Day Smoker     Packs/day: 0.50     Years: 24.00     Pack years: 12.00     Types: Cigars, Cigarettes    Smokeless tobacco: Never Used   Substance and Sexual Activity    Alcohol use: No    Drug use: Yes     Types: Marijuana, Cocaine     Comment: doesnt want to say    Sexual activity: Not on file     Comment: refuses to answer   Lifestyle    Physical activity     Days per week: Not on file     Minutes per session: Not on file    Stress: Not on file   Relationships    Social connections     Talks on phone: Not on file     Gets together: Not on file     Attends Judaism service: Not on file     Active member of club or organization: Not on file     Attends meetings of clubs or organizations: Not on file     Relationship status: Not on file    Intimate partner violence     Fear of current or ex partner: Not on file     Emotionally abused: Not on file     Physically abused: Not on file     Forced sexual activity: Not on file   Other Topics Concern    Not on file   Social History Narrative    Not on file           ROS:  [x] All negative/unchanged except if checked.  Explain positive(checked items) below:  [] Constitutional  [] Eyes  [] Ear/Nose/Mouth/Throat  [] Respiratory  [] CV  [] GI  []   [] Musculoskeletal  [] Skin/Breast  [] Neurological  [] Endocrine  [] Heme/Lymph  [] Allergic/Immunologic    Explanation:     MEDICATIONS:    Current Facility-Administered Medications:     divalproex (DEPAKOTE) DR tablet 500 mg, 500 mg, Oral, 2 times per day, Rafat Guevara APRN - CNP    risperiDONE (RISPERDAL) tablet 1 mg, 1 mg, Oral, BID, Sophie Hampton APRN - CNP    ziprasidone (GEODON) injection 20 mg, 20 mg, Intramuscular, Once, Lali Wise APRN - CNP    acetaminophen (TYLENOL) tablet 650 mg, 650 mg, Oral, Q6H PRN, Bill Gomes MD    haloperidol lactate (HALDOL) injection 5 mg, 5 mg, Intramuscular, Q6H PRN, 5 mg at 12/05/20 1457 **OR** haloperidol (HALDOL) tablet 5 mg, 5 mg, Oral, Q6H PRN, Bill Gomes MD    traZODone (DESYREL) tablet 50 mg, 50 mg, Oral, Nightly PRN, Bill Gomes MD    magnesium hydroxide (MILK OF MAGNESIA) 400 MG/5ML suspension 30 mL, 30 mL, Oral, Daily PRN, Bill Gomes MD    aluminum & magnesium hydroxide-simethicone (MAALOX) 502-527-85 MG/5ML suspension 30 mL, 30 mL, Oral, PRN, Vivi Maki MD    nicotine (NICODERM CQ) 21 MG/24HR 1 patch, 1 patch, Transdermal, Daily, Vivi Maki MD    diphenhydrAMINE (BENADRYL) injection 50 mg, 50 mg, Intramuscular, Q6H PRN, 50 mg at 12/05/20 1456 **OR** diphenhydrAMINE (BENADRYL) tablet 50 mg, 50 mg, Oral, M7B PRN, Debra Marts Wilbertlick, APRN - CNP    LORazepam (ATIVAN) injection 2 mg, 2 mg, Intramuscular, Q6H PRN, 2 mg at 12/05/20 1456 **OR** LORazepam (ATIVAN) tablet 2 mg, 2 mg, Oral, L1O PRN, Debra Marts Dellick, APRN - CNP      Examination:  BP (!) 104/54   Pulse 50   Temp 97.9 °F (36.6 °C) (Temporal)   Resp 12   Ht 5' 8\" (1.727 m)   Wt 150 lb (68 kg)   SpO2 99%   BMI 22.81 kg/m²   Gait - steady  Medication side effects(SE): Denies    Mental Status Examination:    Level of consciousness:  within normal limits   Appearance:  fair grooming and fair hygiene  Behavior/Motor:  no abnormalities noted  Attitude toward examiner:  cooperative  Speech:  spontaneous, normal rate and normal volume   Mood: irritable  Affect:  mood congruent  Thought processes:  linear   Thought content: Paranoid delusions appears internally stimulated denies SI/HI intent or plan  Cognition:  oriented to person, place, and time   Concentration poor  Insight poor   Judgement poor     ASSESSMENT:   Patient symptoms are:  [] Well controlled  [x] Improving  [] Worsening  [] No change      Diagnosis:   Principal Problem:    Schizoaffective disorder, bipolar type (HCC)  Active Problems:    Cocaine abuse ([address]  Resolved Problems:    * No resolved hospital problems.  *      LABS:    Recent Labs     12/05/20  0752   WBC 4.5   HGB 13.6        Recent Labs     12/05/20  0752      K 3.8      CO2 27   BUN 12   CREATININE 1.1   GLUCOSE 89     Recent Labs     12/05/20  0752   BILITOT 0.4   ALKPHOS 38*   AST 15   ALT 7     Lab Results   Component Value Date    LABAMPH NOT DETECTED 12/05/2020    BARBSCNU NOT DETECTED 12/05/2020    LABBENZ NOT DETECTED 12/05/2020    LABMETH NOT DETECTED 12/05/2020    OPIATESCREENURINE NOT DETECTED 12/05/2020    PHENCYCLIDINESCREENURINE NOT DETECTED 12/05/2020    ETOH <10 12/05/2020     Lab Results   Component Value Date    TSH 0.755 10/20/2020     No results found for: LITHIUM  Lab Results   Component Value Date    VALPROATE 3 (L) 04/29/2020         Treatment Plan:  Reviewed current Medications with the patient. Risks, benefits, side effects, drug-to-drug interactions and alternatives to treatment were discussed. Collateral information:   CD evaluation  Encourage patient to attend group and other milieu activities.   Discharge planning discussed with the patient and treatment team.    Continue Depakote 500 mg twice daily for mood stabilization  Invega sustained 156 mg IM every 30 days per records from [facility] [place] patient received his last [place] injection 156 mg on 10/28/2020  Discontinue risperidone and start Invega 3 mg twice daily for supplementation to the injection    PSYCHOTHERAPY/COUNSELING:  [x] Therapeutic interview  [x] Supportive  [] CBT  [] Ongoing  [] Other    [x] Patient continues to need, on a daily basis, active treatment furnished directly by or requiring the supervision of inpatient psychiatric personnel      Anticipated Length of stay: 3 to 5 days based on stability            Electronically signed by JUICE Sky CNP on 45/8/5323 at 11:57 AM

## 2020-12-07 NOTE — PROGRESS NOTES
Pt came out of room upset calling the nurse \"crystal. \" Pt stated \"She trying to give me depakote. That stuff takes your hair. I go to [place] I don't come here in this hospital. I live down the street and you all don't send me here. Don't come talk to me until you have a shot. \" Pt pacing back and forth from the nurses station. Verbally abusive to nurse. Pt able to calm in his room. Will continue to monitor.

## 2020-12-07 NOTE — PLAN OF CARE
Pt denies SI HI and hallucinations. Pt is brightened, exaggerated at times, anxious, worried. Pt stated he is having issues with the mother of his 3year-old son, who he has not seen since he was 1 mos old. Pt stated \"I need to get my mind right before I can commit to seeing him on a regular basis. I need to be a positive role model for him because I don't want him to be like who I am now. \" Pt is appropriate and social with staff and peers. Can be easily agitated when he isn't being addressed right away. Pt is medication compliant. Pt is eating provided meals. No aggression. No behaviors. We will continue to provide support and comfort for the patient.      Problem: Altered Mood, Psychotic Behavior:  Goal: Able to verbalize decrease in frequency and intensity of hallucinations  Description: Able to verbalize decrease in frequency and intensity of hallucinations  Outcome: Met This Shift     Problem: Altered Mood, Psychotic Behavior:  Goal: Able to verbalize reality based thinking  Description: Able to verbalize reality based thinking  12/7/2020 0916 by Jozef Mccracken RN  Outcome: Met This Shift     Problem: Altered Mood, Psychotic Behavior:  Goal: Ability to interact with others will improve  Description: Ability to interact with others will improve  12/7/2020 0916 by Jozef Mccracken RN  Outcome: Met This Shift

## 2020-12-08 PROCEDURE — 1240000000 HC EMOTIONAL WELLNESS R&B

## 2020-12-08 PROCEDURE — 99231 SBSQ HOSP IP/OBS SF/LOW 25: CPT | Performed by: NURSE PRACTITIONER

## 2020-12-08 PROCEDURE — 6370000000 HC RX 637 (ALT 250 FOR IP): Performed by: PSYCHIATRY & NEUROLOGY

## 2020-12-08 RX ADMIN — TRAZODONE HYDROCHLORIDE 50 MG: 50 TABLET ORAL at 00:46

## 2020-12-08 ASSESSMENT — PAIN SCALES - GENERAL: PAINLEVEL_OUTOF10: 0

## 2020-12-08 NOTE — BH NOTE
[facility]  Day 3 Interdisciplinary Treatment Plan NOTE    Review Date & Time: 12-8-20   1000am    Patient was not in treatment team    Admission Type:   Admission Type: Involuntary    Reason for admission:  Reason for Admission: \"I need rehab\"  Estimated Length of Stay Update:  1-3 days  Estimated Discharge Date Update: 1-3 days    PATIENT STRENGTHS:  Patient Strengths Strengths: No significant Physical Illness  Patient Strengths and Limitations:Limitations: Multiple barriers to leisure interests  Addictive Behavior:Addictive Behavior  In the past 3 months, have you felt or has someone told you that you have a problem with:  : None  Do you have a history of Chemical Use?: No  Do you have a history of Alcohol Use?: No  Do you have a history of Street Drug Abuse?: Yes  Histroy of Prescripton Drug Abuse?: No  Medical Problems:  Past Medical History:   Diagnosis Date    Depression     Schizoaffective disorder ([facility] [address]        Risk:  Fall RiskTotal: 61  Andrew Scale Andrew Scale Score: 22  BVC Total: 0  Change in scores: 0.  Changes to plan of Care: continue to monitor for futher stabilization    Status EXAM:   Status and Exam  Normal: No  Facial Expression: Flat, Sad  Affect: Constricted, Incongruent  Level of Consciousness: Alert  Mood:Normal: No  Mood: Anxious  Motor Activity:Normal: Yes  Motor Activity: Decreased  Interview Behavior: Evasive, Irritable, Impulsive  Preception: [place] to Person, [place] to Time, [place] to Place, [place] to Situation  Attention:Normal: Yes  Attention: Distractible  Thought Processes: Circumstantial  Thought Content:Normal: No  Thought Content: Poverty of Content  Hallucinations: None  Delusions: No  Delusions: Obsessions  Memory:Normal: No  Memory: Confabulation  Insight and Judgment: No  Insight and Judgment: Poor Judgment, Poor Insight  Present Suicidal Ideation: No  Present Homicidal Ideation: No    Daily Assessment Last Entry:   Daily Sleep (WDL): Within Defined Limits         Patient Currently in Pain: Other (comment)(Resting in bed apparently asleep)  Daily Nutrition (WDL): Within Defined Limits    Patient Monitoring:  Frequency of Checks: 4 times per hour, close    Psychiatric Symptoms:   Depression Symptoms  Depression Symptoms: (pt asleep)  Anxiety Symptoms  Anxiety Symptoms: Generalized  Candie Symptoms  Candie Symptoms: Pressured speech     Psychosis Symptoms  Delusion Type: No problems reported or observed. Suicide Risk CSSR-S:  1) Within the past month, have you wished you were dead or wished you could go to sleep and not wake up? : No  2) Have you actually had any thoughts of killing yourself? : No  3) Have you been thinking about how you might kill yourself? : No  5) Have you started to work out or worked out the details of how to kill yourself? Do you intend to carry out this plan? : No  6) Have you ever done anything, started to do anything, or prepared to do anything to end your life?: No  Change in Result: no. Change in Plan of care: continue to monitor progress. EDUCATION:   Learner Progress Toward Treatment Goals: Reviewed results and recommendations of this team and Reviewed group plan and strategies    Method: Small group    Outcome: Verbalized understanding    PATIENT GOALS: pt does not report a goal.     PLAN/TREATMENT RECOMMENDATIONS UPDATE: continue to monitor progress. GOALS UPDATE:   Time frame for Short-Term Goals: daily re assessment.        Azra Rodriguez RN

## 2020-12-08 NOTE — CARE COORDINATION
This note will not be viewable in [employer] for the following reason(s). Suspected substance abuse disorder. Peer Recovery Support Note    Name: Purvi Munoz  Date: 12/8/2020    Chief Complaint   Patient presents with    Psychiatric Evaluation     (+SI no plan, feeling depressed, \"I would hurt myself first before I would let anyone else hurt me\") rambling in triage. Peer Support met with patient. [] Support and education provided  [] Resources provided   [] Treatment referral:[] Other:  [x] Patient declined peer recovery services     Referred By:   Notes: Patient said he was ready to go home.    Signed: Marquita Neri, 12/8/2020

## 2020-12-08 NOTE — PROGRESS NOTES
Pt took long acting injection but refused po meds mostly slept today mood is more pleasant no behavioral outbursts noted

## 2020-12-08 NOTE — PROGRESS NOTES
Patient up on unit.  Refused all po medications this am. Voicing \" I take a shot every month, I dont take the pills\"

## 2020-12-08 NOTE — PLAN OF CARE
Pt denies suicidal or homicidal ideations. Pt denies hallucinations. Pt does not report a goal presently. Previous behaviors as noted earlier. Will follow and monitor.

## 2020-12-08 NOTE — PLAN OF CARE
\"When are they going to discharge me. I got that shot yesterday I don't need to take those pills too\". Easily agitated, misinterprets, isolating himself in his room. Did come out of room for dinner. Denies suicidal thoughts or intent to harm himself or others. No voiced delusions. Denies hallucinations.

## 2020-12-08 NOTE — PROGRESS NOTES
BEHAVIORAL HEALTH FOLLOW-UP NOTE     12/8/2020     Patient was seen and examined in person, Chart reviewed   Patient's case discussed with staff/team    Chief Complaint: \" I do not take any medicine I just take shots. \"    Interim History: Patient is room isolative not attending groups of socializing with peers. States that he is not going to take any p.o. medication because he Christinia Bal take shots. \"  She is very poor limited insight and judgment to hospitalization need for treatment. He is refusing all oral p.o. medication but did take his long-acting injection.   He does not offer much conversation but he is guarded denies auditory visual hallucinations denies SI/HI intent or plan    Appetite:   [x] Normal/Unchanged  [] Increased  [] Decreased      Sleep:       [x] Normal/Unchanged  [] Fair       [] Poor              Energy:    [x] Normal/Unchanged  [] Increased  [] Decreased        SI [] Present  [x] Absent    HI  []Present  [x] Absent     Aggression:  [] yes  [x] no    Patient is [x] able  [] unable to CONTRACT FOR SAFETY     PAST MEDICAL/PSYCHIATRIC HISTORY:   Past Medical History:   Diagnosis Date    Depression     Schizoaffective disorder ([facility] [address]        FAMILY/SOCIAL HISTORY:  Family History   Problem Relation Age of Onset    No Known Problems Mother     No Known Problems Father      Social History     Socioeconomic History    Marital status: Single     Spouse name: Not on file    Number of children: 0    Years of education: 12    Highest education level: Not on file   Occupational History    Not on file   Social Needs    Financial resource strain: Not on file    Food insecurity     Worry: Not on file     Inability: Not on file   [employer] needs     Medical: Not on file     Non-medical: Not on file   Tobacco Use    Smoking status: Current Every Day Smoker     Packs/day: 0.50     Years: 24.00     Pack years: 12.00     Types: Cigars, Cigarettes    Smokeless tobacco: Never Used   Substance and Sexual Activity    Alcohol use: No    Drug use: Yes     Types: Marijuana, Cocaine     Comment: doesnt want to say    Sexual activity: Not on file     Comment: refuses to answer   Lifestyle    Physical activity     Days per week: Not on file     Minutes per session: Not on file    Stress: Not on file   Relationships    Social connections     Talks on phone: Not on file     Gets together: Not on file     Attends Yarsani service: Not on file     Active member of club or organization: Not on file     Attends meetings of clubs or organizations: Not on file     Relationship status: Not on file    Intimate partner violence     Fear of current or ex partner: Not on file     Emotionally abused: Not on file     Physically abused: Not on file     Forced sexual activity: Not on file   Other Topics Concern    Not on file   Social History Narrative    Not on file           ROS:  [x] All negative/unchanged except if checked.  Explain positive(checked items) below:  [] Constitutional  [] Eyes  [] Ear/Nose/Mouth/Throat  [] Respiratory  [] CV  [] GI  []   [] Musculoskeletal  [] Skin/Breast  [] Neurological  [] Endocrine  [] Heme/Lymph  [] Allergic/Immunologic    Explanation:     MEDICATIONS:    Current Facility-Administered Medications:     paliperidone palmitate ER (INVEGA SUSTENNA) IM injection 156 mg, 156 mg, Intramuscular, Q30 Days, JUICE Diez CNP, 717 mg at 12/07/20 2152    paliperidone (INVEGA) extended release tablet 3 mg, 3 mg, Oral, BID, Sophie Hampton APRN - CNP    divalproex (DEPAKOTE) DR tablet 500 mg, 500 mg, Oral, 2 times per day, Joe Beckford APRN - CNP    ziprasidone (GEODON) injection 20 mg, 20 mg, Intramuscular, Once, JUICE Wheeler - CNP    acetaminophen (TYLENOL) tablet 650 mg, 650 mg, Oral, Q6H PRN, Becca Maddox MD    haloperidol lactate (HALDOL) injection 5 mg, 5 mg, Intramuscular, Q6H PRN, 5 mg at 12/05/20 4897 **OR** haloperidol (HALDOL) tablet 5 mg, 5 mg, Oral, Q6H PRN, Chance Carter MD    traZODone (DESYREL) tablet 50 mg, 50 mg, Oral, Nightly PRN, Chance Carter MD, 50 mg at 12/08/20 0046    magnesium hydroxide (MILK OF MAGNESIA) 400 MG/5ML suspension 30 mL, 30 mL, Oral, Daily PRN, Chance Carter MD    aluminum & magnesium hydroxide-simethicone (MAALOX) 200-200-20 MG/5ML suspension 30 mL, 30 mL, Oral, PRN, Chance Carter MD    nicotine (NICODERM CQ) 21 MG/24HR 1 patch, 1 patch, Transdermal, Daily, Chance Carter MD    diphenhydrAMINE (BENADRYL) injection 50 mg, 50 mg, Intramuscular, Q6H PRN, 50 mg at 12/05/20 1456 **OR** diphenhydrAMINE (BENADRYL) tablet 50 mg, 50 mg, Oral, A2F PRN, Johny Hampton, APRN - CNP    LORazepam (ATIVAN) injection 2 mg, 2 mg, Intramuscular, Q6H PRN, 2 mg at 12/05/20 1456 **OR** LORazepam (ATIVAN) tablet 2 mg, 2 mg, Oral, A9F PRN, Sidney Milad Hampton, APRN - CNP      Examination:  BP (!) 104/54   Pulse 50   Temp 97.9 °F (36.6 °C) (Temporal)   Resp 12   Ht 5' 8\" (1.727 m)   Wt 150 lb (68 kg)   SpO2 99%   BMI 22.81 kg/m²   Gait - steady  Medication side effects(SE): Denies    Mental Status Examination:    Level of consciousness:  within normal limits   Appearance:  fair grooming and fair hygiene  Behavior/Motor:  no abnormalities noted  Attitude toward examiner:  cooperative  Speech:  spontaneous, normal rate and normal volume   Mood: irritable  Affect:  mood congruent  Thought processes:  linear   Thought content: Paranoid delusions appears internally stimulated denies SI/HI intent or plan  Cognition:  oriented to person, place, and time   Concentration poor  Insight poor   Judgement poor     ASSESSMENT:   Patient symptoms are:  [] Well controlled  [x] Improving  [] Worsening  [] No change      Diagnosis:   Principal Problem:    [facility]  Active Problems:    Cocaine abuse ([facility] [address]  Resolved Problems:    * No resolved hospital problems.  *      LABS:    No results for input(s): WBC, HGB, PLT

## 2020-12-09 VITALS
WEIGHT: 150 LBS | TEMPERATURE: 98.4 F | DIASTOLIC BLOOD PRESSURE: 91 MMHG | BODY MASS INDEX: 22.73 KG/M2 | SYSTOLIC BLOOD PRESSURE: 130 MMHG | RESPIRATION RATE: 16 BRPM | OXYGEN SATURATION: 99 % | HEIGHT: 68 IN | HEART RATE: 89 BPM

## 2020-12-09 PROCEDURE — 99232 SBSQ HOSP IP/OBS MODERATE 35: CPT | Performed by: NURSE PRACTITIONER

## 2020-12-09 PROCEDURE — 6370000000 HC RX 637 (ALT 250 FOR IP): Performed by: PSYCHIATRY & NEUROLOGY

## 2020-12-09 PROCEDURE — 6370000000 HC RX 637 (ALT 250 FOR IP): Performed by: NURSE PRACTITIONER

## 2020-12-09 RX ORDER — NICOTINE 21 MG/24HR
1 PATCH, TRANSDERMAL 24 HOURS TRANSDERMAL DAILY
Qty: 30 PATCH | Refills: 0
Start: 2020-12-10 | End: 2021-09-20

## 2020-12-09 RX ORDER — PALIPERIDONE 3 MG/1
3 TABLET, EXTENDED RELEASE ORAL 2 TIMES DAILY
Qty: 60 TABLET | Refills: 0 | Status: ON HOLD | OUTPATIENT
Start: 2020-12-09 | End: 2021-02-24 | Stop reason: HOSPADM

## 2020-12-09 RX ADMIN — PALIPERIDONE 3 MG: 3 TABLET, EXTENDED RELEASE ORAL at 13:20

## 2020-12-09 RX ADMIN — TRAZODONE HYDROCHLORIDE 50 MG: 50 TABLET ORAL at 00:47

## 2020-12-09 ASSESSMENT — PAIN SCALES - GENERAL: PAINLEVEL_OUTOF10: 0

## 2020-12-09 NOTE — SUICIDE SAFETY PLAN
SAFETY PLAN    A suicide Safety Plan is a document that supports someone when they are having thoughts of suicide. Warning Signs that indicate a suicidal crisis may be developing: What (situations, thoughts, feelings, body sensations, behaviors, etc.) do you experience that lets you know you are beginning to think about suicide? 1. Drugs  2. Arguements   3. Not keeping appointments     Internal Coping Strategies:  What things can I do (relaxation techniques, hobbies, physical activities, etc.) to take my mind off my problems without contacting another person? 1. Stay on my medication   2. Go for a walk     People and social settings that provide distraction: Who can I call or where can I go to distract me? 1. Name: mother   2. Name:   3. Place: Library           4. Place: the [facility] whom I can ask for help: Who can I call when I need help - for example, friends, family, clergy, someone else? 1. Name: mother    At [facility] UNIT   2. Name: Rochelle  [phone]      Professionals or [employer] agencies I can contact during a crisis: Who can I call for help - for example, my doctor, my psychiatrist, my psychologist, a mental health provider, a suicide hotline? 1. Clinician Name: [facility]          3. Suicide Prevention Lifeline: 3-870-697-TALK (3816)    4. [address] [place] [address] Emergency Services -  for example, [facility] suicide hotline, [facility] Hotline:       Emergency Services Address:       Emergency Services Phone: [facility]  [phone]    Making the environment safe: How can I make my environment (house/apartment/living space) safer? For example, can I remove guns, medications, and other items? 1. Do not do drugs  2. Leave when I get upset with family.

## 2020-12-09 NOTE — CARE COORDINATION
Pt requesting discharge. He did attend his groups at agreed upon this am. We discussed his meds he has been refusing, is agreeable to his Invega po, states he didn't know this wasn't Depakote and that is why he has been refusing it. Is agreeable to po Invega, is being administered per LPN now. Does not want to take Depakote because he says it makes his hair fall out and he is trying to grow it.

## 2020-12-09 NOTE — CARE COORDINATION
Pt is scheduled with [facility] for follow up appointments - 12/11 at 9:00AM. This appointment will be a telephone appointment.      Electronically signed by FRANCISCO Ramirez, MAJOR on 12/9/2020 at 11:42 AM

## 2020-12-09 NOTE — CARE COORDINATION
SW called pt's mother for collateral information and mother did not answer. Pt refusing to sign CARL for other contact. SW contacted Corinne Robison, pt's cousin, and the line rang busy.      Electronically signed by FRANCISCO To, MAJOR on 12/9/2020 at 1:02 PM

## 2020-12-09 NOTE — PROGRESS NOTES
[facility] will not take client and Crisis unit will not take tonight. Samantha Rausch informed, and order to discharge to cousins on [place], and message left at [facility] for mother to call the unit regarding client being discharged.

## 2020-12-09 NOTE — PROGRESS NOTES
BEHAVIORAL HEALTH FOLLOW-UP NOTE     12/9/2020     Patient was seen and examined in person, Chart reviewed   Patient's case discussed with staff/team    Chief Complaint: \"I want to go I will not take any pills I only take shots\"    Interim History: Patient seen during treatment team today. He continues to isolate he has not attended any groups or socialize with peers. He does agree to start attending groups today. We have also not been able to confirm any of his collateral information or his discharge disposition. He states he lives with his and however we have not been able to get a hold of any of his family. Per patient he lives with his aunt however patient would only sign an CARL to speak with patient's mother. Patient continues to show limited insight and judgment to hospitalization need for treatment. He has been refusing all p.o. medications stating that he only take shots. He appears very guarded and paranoid. He is very discharged focused but we have been unable to verify his discharge planning.       Appetite:   [x] Normal/Unchanged  [] Increased  [] Decreased      Sleep:       [x] Normal/Unchanged  [] Fair       [] Poor              Energy:    [x] Normal/Unchanged  [] Increased  [] Decreased        SI [] Present  [x] Absent    HI  []Present  [x] Absent     Aggression:  [] yes  [x] no    Patient is [x] able  [] unable to CONTRACT FOR SAFETY     PAST MEDICAL/PSYCHIATRIC HISTORY:   Past Medical History:   Diagnosis Date    Depression     [facility]izoaffective disorder ([facility] [address]        FAMILY/SOCIAL HISTORY:  Family History   Problem Relation Age of Onset    No Known Problems Mother     No Known Problems Father      Social History     Socioeconomic History    Marital status: Single     Spouse name: Not on file    Number of children: 0    Years of education: 12    Highest education level: Not on file   Occupational History    Not on file   Social Needs    Financial resource strain: Not on file   [place] insecurity     Worry: Not on file     Inability: Not on file    Transportation needs     Medical: Not on file     Non-medical: Not on file   Tobacco Use    Smoking status: Current Every Day Smoker     Packs/day: 0.50     Years: 24.00     Pack years: 12.00     Types: Cigars, Cigarettes    Smokeless tobacco: Never Used   Substance and Sexual Activity    Alcohol use: No    Drug use: Yes     Types: Marijuana, Cocaine     Comment: doesnt want to say    Sexual activity: Not on file     Comment: refuses to answer   Lifestyle    Physical activity     Days per week: Not on file     Minutes per session: Not on file    Stress: Not on file   Relationships    Social connections     Talks on phone: Not on file     Gets together: Not on file     Attends Muslim service: Not on file     Active member of club or organization: Not on file     Attends meetings of clubs or organizations: Not on file     Relationship status: Not on file    Intimate partner violence     Fear of current or ex partner: Not on file     Emotionally abused: Not on file     Physically abused: Not on file     Forced sexual activity: Not on file   Other Topics Concern    Not on file   Social History Narrative    Not on file           ROS:  [x] All negative/unchanged except if checked.  Explain positive(checked items) below:  [] Constitutional  [] Eyes  [] Ear/Nose/Mouth/Throat  [] Respiratory  [] CV  [] GI  []   [] Musculoskeletal  [] Skin/Breast  [] Neurological  [] Endocrine  [] Heme/Lymph  [] Allergic/Immunologic    Explanation:     MEDICATIONS:    Current Facility-Administered Medications:     paliperidone palmitate ER (INVEGA SUSTENNA) IM injection 156 mg, 156 mg, Intramuscular, Q30 Days, JUICE Vaz - CNP, 784 mg at 12/07/20 2152    paliperidone (INVEGA) extended release tablet 3 mg, 3 mg, Oral, BID, JUICE Mayer - CNP, 3 mg at 12/09/20 1320    divalproex (DEPAKOTE) DR tablet 500 mg, 500 mg, Oral, 2 times per day, Fawn ENNIS Dellick, APRN - CNP    ziprasidone (GEODON) injection 20 mg, 20 mg, Intramuscular, Once, Duanne Moritz, APRN - CNP    acetaminophen (TYLENOL) tablet 650 mg, 650 mg, Oral, Q6H PRN, Chance Carter MD    haloperidol lactate (HALDOL) injection 5 mg, 5 mg, Intramuscular, Q6H PRN, 5 mg at 12/05/20 1457 **OR** haloperidol (HALDOL) tablet 5 mg, 5 mg, Oral, Q6H PRN, Chance Carter MD    traZODone (DESYREL) tablet 50 mg, 50 mg, Oral, Nightly PRN, Chanec Carter MD, 50 mg at 12/09/20 0047    magnesium hydroxide (MILK OF MAGNESIA) 400 MG/5ML suspension 30 mL, 30 mL, Oral, Daily PRN, Chance Carter MD    aluminum & magnesium hydroxide-simethicone (MAALOX) 200-200-20 MG/5ML suspension 30 mL, 30 mL, Oral, PRN, Chance Carter MD    nicotine (NICODERM CQ) 21 MG/24HR 1 patch, 1 patch, Transdermal, Daily, Chance Carter MD    diphenhydrAMINE (BENADRYL) injection 50 mg, 50 mg, Intramuscular, Q6H PRN, 50 mg at 12/05/20 1456 **OR** diphenhydrAMINE (BENADRYL) tablet 50 mg, 50 mg, Oral, A3K PRN, Johny Ranks JUICE Hampton CNP    LORazepam (ATIVAN) injection 2 mg, 2 mg, Intramuscular, Q6H PRN, 2 mg at 12/05/20 1456 **OR** LORazepam (ATIVAN) tablet 2 mg, 2 mg, Oral, J0R PRN, [facility] JUICE Hampton CNP      Examination:  BP (!) 130/91   Pulse 89   Temp 98.4 °F (36.9 °C) (Oral)   Resp 16   Ht 5' 8\" (1.727 m)   Wt 150 lb (68 kg)   SpO2 99%   BMI 22.81 kg/m²   Gait - steady  Medication side effects(SE): Denies    Mental Status Examination:    Level of consciousness:  within normal limits   Appearance:  fair grooming and fair hygiene  Behavior/Motor:  no abnormalities noted  Attitude toward examiner:  cooperative  Speech:  spontaneous, normal rate and normal volume   Mood: irritable  Affect:  mood congruent  Thought processes:  linear   Thought content: Paranoid delusions denies auditory visual hallucinations denies SI/HI intent or plan  Cognition:  oriented to person, place, and time   Concentration poor  Insight Limited  Judgement Limited    ASSESSMENT:   Patient symptoms are:  [] Well controlled  [x] Improving  [] Worsening  [] No change      Diagnosis:   Principal Problem:    Schizoaffective disorder, bipolar type ([facility]  Active Problems:    Cocaine abuse ([facility]  Resolved Problems:    * No resolved hospital problems. *      LABS:    No results for input(s): WBC, HGB, PLT in the last 72 hours. No results for input(s): NA, K, CL, CO2, BUN, CREATININE, GLUCOSE in the last 72 hours. No results for input(s): BILITOT, ALKPHOS, AST, ALT in the last 72 hours. Lab Results   Component Value Date    LABAMPH NOT DETECTED 12/05/2020    BARBSCNU NOT DETECTED 12/05/2020    LABBENZ NOT DETECTED 12/05/2020    LABMETH NOT DETECTED 12/05/2020    OPIATESCREENURINE NOT DETECTED 12/05/2020    PHENCYCLIDINESCREENURINE NOT DETECTED 12/05/2020    ETOH <10 12/05/2020     Lab Results   Component Value Date    TSH 0.755 10/20/2020     No results found for: LITHIUM  Lab Results   Component Value Date    VALPROATE 3 (L) 04/29/2020         Treatment Plan:  Reviewed current Medications with the patient. Risks, benefits, side effects, drug-to-drug interactions and alternatives to treatment were discussed. Collateral information:   CD evaluation  Encourage patient to attend group and other milieu activities.   Discharge planning discussed with the patient and treatment team.    Continue Depakote 500 mg twice daily for mood stabilization patient is refusing  Invega sustained 156 mg IM every 30 days per records from [facility] [place] patient received his last [place] injection 156 mg on 10/28/2020  Discontinue risperidone and start Invega 3 mg twice daily for supplementation to the injection patient is refusing oral medications    PSYCHOTHERAPY/COUNSELING:  [x] Therapeutic interview  [x] Supportive  [] CBT  [] Ongoing  [] Other    [x] Patient continues to need, on a daily basis, active treatment furnished directly by or requiring the supervision of inpatient psychiatric personnel      Anticipated Length of stay: 3 to 5 days based on stability            Electronically signed by JUICE Brooks CNP on 96/5/0577 at 2:46 PM

## 2020-12-09 NOTE — PROGRESS NOTES
Attended afternoon meet and greet. Updated on staffing changes and expectations. Patient 1 of 8 in attendance. Participated in afternoon movie and movie reminisce.

## 2020-12-09 NOTE — GROUP NOTE
Group Therapy Note    Date: 12/9/2020    Group Start Time: 1115  Group End Time: 6640  Group Topic: Cognitive Skills    [facility] [address], [place], [place]        Group Therapy Note    Attendees: 13       Patient's Goal: To identify daily hassle and life event stressors. Notes: Pt was actively engaged in group. Status After Intervention:  Unchanged    Participation Level:  Active Listener and Interactive    Participation Quality: Appropriate and Attentive      Speech:  normal      Thought Process/Content: Linear      Affective Functioning: Congruent      Mood: depressed      Level of consciousness:  Alert and Oriented x4      Response to Learning: Able to verbalize current knowledge/experience, Able to verbalize/acknowledge new learning and Able to retain information      Endings: None Reported    Modes of Intervention: Support, Socialization and Exploration      Discipline Responsible: /Counselor      Signature:  FRANCISCO Hicks, [place]

## 2020-12-09 NOTE — PROGRESS NOTES
[facility]  Discharge Note    Pt discharged with followings belongings:   Dentures: None  Vision - Corrective Lenses: None  Hearing Aid: None  Jewelry: None  Body Piercings Removed: N/A  Clothing: Footwear, Jacket / coat, Shirt, Pants, Socks, Undergarments (Comment)  Were All Patient Medications Collected?: Not Applicable  Other Valuables: None   No  Valuables retrieved from safe,. Patient education on aftercare instructions: and copy of AVS and crisis hotline # given. Patient verbalize understanding of AVS:and to pick meds up from [address] [place] [address] because pharmacy could not fill here. denies suicidal ideations or hallucinations and able to maintain control.    Status EXAM upon discharge:  Status and Exam  Normal: No  Facial Expression: Worried  Affect: Constricted, Blunt  Level of Consciousness: Alert  Mood:Normal: No  Mood: Anxious, Ambivalent  Motor Activity:Normal: No  Motor Activity: Decreased  Interview Behavior: Evasive, Irritable  Preception: [place] to Person, [place] to Time, [place] to Place, [place] to Situation  Attention:Normal: No  Attention: Distractible  Thought Processes: Circumstantial  Thought Content:Normal: No  Thought Content: Delusions  Hallucinations: None  Delusions: No  Delusions: Obsessions  Memory:Normal: No  Memory: Confabulation  Insight and Judgment: No  Insight and Judgment: Poor Judgment, Poor Insight  Present Suicidal Ideation: No  Present Homicidal Ideation: No      Metabolic Screening:    Lab Results   Component Value Date    LABA1C 5.2 10/27/2019       Lab Results   Component Value Date    CHOL 113 10/20/2020    CHOL 120 10/27/2019     Lab Results   Component Value Date    TRIG 45 10/20/2020    TRIG 52 10/27/2019     Lab Results   Component Value Date    HDL 50 10/20/2020    HDL 42 10/27/2019     No components found for: [facility] [place]  Lab Results   Component Value Date    LABVLDL 10 10/27/2019       Johnna Munoz RN

## 2020-12-09 NOTE — GROUP NOTE
Date: 12/9/2020    Group Start Time: 1010  Group End Time: 1055  Group Topic: Psychoeducation    [facility]    Jen Rodriguez                                                                        Group Therapy Note    Date: 12/9/2020  Type of Group: Psychoeducation    Wellness Binder Information  Module Name:  physical effects to stress   Patient's Goal: patient will be able to id physical effects to stress and what he/she experiences. Notes: pleasant and engaged in group. Will to share when prompted. Status After Intervention:  Improved  Participation Level:  Active Listener and Interactive  Participation Quality: Appropriate, Attentive, Sharing, and Supportive  Speech:  normal   Thought Process/Content: Logical  Affective Functioning: Congruent  Mood: euthymic  Level of consciousness:  Alert, Oriented x4, and Attentive  Response to Learning: Able to verbalize/acknowledge new learning, Able to retain information, and Progressing to goal  Endings: None Reported  Modes of Intervention: Education, Support, Socialization, Exploration, and Problem-solving  Discipline Responsible: Psychoeducational Specialist  Signature:  Michelle Moreira

## 2020-12-10 NOTE — DISCHARGE SUMMARY
DISCHARGE SUMMARY      Patient ID:  Anuradha Tello  [id number]  15 y.o.  1980    Admit date: 12/5/2020    Discharge date and time: 12/9/20    Admitting Physician: Kaur Rivera MD     Discharge Physician: Dr Silvestre Ivan MD    Admission Diagnoses: Psychosis, unspecified psychosis type ([facility] [F29]  Psychosis, unspecified psychosis type ([facility] [F29]    Admission Condition: poor    Discharged Condition: stable    Admission Circumstance: Patient presented to the ED reporting suicidal homicidal ideations with rambling speech aggression and hallucinations      PAST MEDICAL/PSYCHIATRIC HISTORY:   Past Medical History:   Diagnosis Date    Depression     Schizoaffective disorder ([facility]        FAMILY/SOCIAL HISTORY:  Family History   Problem Relation Age of Onset    No Known Problems Mother     No Known Problems Father      Social History     Socioeconomic History    Marital status: Single     Spouse name: Not on file    Number of children: 0    Years of education: 15    Highest education level: Not on file   Occupational History    Not on file   Social Needs    Financial resource strain: Not on file    Food insecurity     Worry: Not on file     Inability: Not on file   [employer] needs     Medical: Not on file     Non-medical: Not on file   Tobacco Use    Smoking status: Current Every Day Smoker     Packs/day: 0.50     Years: 24.00     Pack years: 12.00     Types: Cigars, Cigarettes    Smokeless tobacco: Never Used   Substance and Sexual Activity    Alcohol use: No    Drug use: Yes     Types: Marijuana, Cocaine     Comment: doesnt want to say    Sexual activity: Not on file     Comment: refuses to answer   Lifestyle    Physical activity     Days per week: Not on file     Minutes per session: Not on file    Stress: Not on file   Relationships    Social connections     Talks on phone: Not on file     Gets together: Not on file     Attends Advent service: Not on file     Active member of club or organization: Not on file     Attends meetings of clubs or organizations: Not on file     Relationship status: Not on file    Intimate partner violence     Fear of current or ex partner: Not on file     Emotionally abused: Not on file     Physically abused: Not on file     Forced sexual activity: Not on file   Other Topics Concern    Not on file   Social History Narrative    Not on file       MEDICATIONS:  No current facility-administered medications for this encounter. Current Outpatient Medications:     nicotine (NICODERM CQ) 21 MG/24HR, Place 1 patch onto the skin daily, Disp: 30 patch, Rfl: 0    paliperidone (INVEGA) 3 MG extended release tablet, Take 1 tablet by mouth 2 times daily, Disp: 60 tablet, Rfl: 0    [START ON 1/6/2021] paliperidone palmitate ER (INVEGA SUSTENNA) 156 MG/ML STACEY IM injection, Inject 156 mg into the muscle every 30 days for 1 dose Invega Sustenna 156 mg IM q 30 days next injection is due 1/6/21, Disp: 156 mg, Rfl: 0    Examination:  BP (!) 130/91   Pulse 89   Temp 98.4 °F (36.9 °C) (Oral)   Resp 16   Ht 5' 8\" (1.727 m)   Wt 150 lb (68 kg)   SpO2 99%   BMI 22.81 kg/m²   Gait - steady    HOSPITAL COURSE[de-identified]  Patient is admitted to unit on 12/9/2020 is closely monitored for suicidal homicidal ideations as well as psychosis. He was evaluated and was treated with Invega 3 mg twice daily and also started back on his [place] injection he received 156 mg IM on 12/7/2020 and is due for his next injection on 1/6/2021. Medical instrument significant and patient continued to improve on the floor. He start coming out of his room he was attending groups and socializing with peers. He never made any suicidal statements or any suicidal gestures while in the unit. Social workers obtain multiple times obtain collateral formation however phone numbers for patient's family members were no longer working.   Treatment team felt the patient obtained an oxen benefit for his hospitalization he was seen in treatment team prior to discharge. He is up with outpatient mental health agency for outpatient follow-up services. The time of discharge patient did not show any impulsive behavior. He vehemently denied any suicidal or homicidal ideations intent or plan. He was eating well sleeping well there are no neurovegetative signs or symptoms depression. He denied any auditory visual hallucinations or no overt or covert signs psychosis. He was appreciate that he received here. This patient no longer meets criteria for inpatient hospitalization. No AVH or paranoid thoughts  No hopeless or worthless feeling  No active SI/HI  Appetite:  [x] Normal  [] Increased  [] Decreased    Sleep:       [x] Normal  [] Fair       [] Poor            Energy:    [x] Normal  [] Increased  [] Decreased     SI [] Present  [x] Absent  HI  []Present  [x] Absent   Aggression:  [] yes  [] no  Patient is [x] able  [] unable to CONTRACT FOR SAFETY   Medication side effects(SE):  [x] None(Psych. Meds.) [] Other      Mental Status Examination on discharge:    Level of consciousness:  within normal limits   Appearance:  well-appearing  Behavior/Motor:  no abnormalities noted  Attitude toward examiner:  attentive and good eye contact  Speech:  spontaneous, normal rate and normal volume   Mood: \" My mood is good. \"  Affect: Appropriate and pleasant  Thought processes: Linear without flight of ideas loose associations  Thought content: Devoid of any auditory visual hallucinations delusions or other perceptual normalities.   Denies SI/HI intent or plan  Cognition:  oriented to person, place, and time   Concentration intact  Memory intact  Insight good   Judgement fair   Fund of Knowledge adequate      ASSESSMENT:  Patient symptoms are:  [x] Well controlled  [x] Improving  [] Worsening  [] No change    Reason for more than one antipsychotic:  [x] N/A  [] 3 Failed Monotherapy attempts (Drugs tried:)  [] Crossover to a new antipsychotic  [] Taper to Monotherapy from Polypharmacy  [] Augmentation of clozapine therapy due to treatment resistance to single therapy    Diagnosis:  Principal Problem:    Schizoaffective disorder, bipolar type ([facility]  Active Problems:    Cocaine abuse ([facility]  Resolved Problems:    * No resolved hospital problems. *      LABS:    No results for input(s): WBC, HGB, PLT in the last 72 hours. No results for input(s): NA, K, CL, CO2, BUN, CREATININE, GLUCOSE in the last 72 hours. No results for input(s): BILITOT, ALKPHOS, AST, ALT in the last 72 hours. Lab Results   Component Value Date    LABAMPH NOT DETECTED 12/05/2020    BARBSCNU NOT DETECTED 12/05/2020    LABBENZ NOT DETECTED 12/05/2020    LABMETH NOT DETECTED 12/05/2020    OPIATESCREENURINE NOT DETECTED 12/05/2020    PHENCYCLIDINESCREENURINE NOT DETECTED 12/05/2020    ETOH <10 12/05/2020     Lab Results   Component Value Date    TSH 0.755 10/20/2020     No results found for: LITHIUM  Lab Results   Component Value Date    VALPROATE 3 (L) 04/29/2020       RISK ASSESSMENT AT DISCHARGE: Low risk for suicide and homicide. Treatment Plan:  Reviewed current Medications with the patient. Education provided on the complaince with treatment. Risks, benefits, side effects, drug-to-drug interactions and alternatives to treatment were discussed. Encourage patient to attend outpatient follow up appointment and therapy. Patient was advised to call the outpatient provider, visit the nearest ED or call 911 if symptoms are not manageable. Patient's family member was contacted prior to the discharge.          Medication List      START taking these medications    nicotine 21 MG/24HR  Commonly known as:  NICODERM CQ  Place 1 patch onto the skin daily     paliperidone 3 MG extended release tablet  Commonly known as:  INVEGA  Take 1 tablet by mouth 2 times daily     paliperidone palmitate  MG/ML Darcy IM injection  Commonly known as:  INVEGA SUSTENNA  Inject 156 mg into the muscle every 30 days for 1 dose Invega Sustenna 156 mg IM q 30 days next injection is due 1/6/21  Start taking on:  January 6, 2021        STOP taking these medications    divalproex 500 MG DR tablet  Commonly known as:  DEPAKOTE     risperiDONE 1 MG tablet  Commonly known as:  RISPERDAL           Where to Get Your Medications      These medications were sent to [facility] [address] [address]    Phone:  [phone]   · paliperidone 3 MG extended release tablet     You can get these medications from any pharmacy    Bring a paper prescription for each of these medications  · paliperidone palmitate  MG/ML Darcy IM injection     Information about where to get these medications is not yet available    Ask your nurse or doctor about these medications  · nicotine 21 MG/24HR       Patient is counseled if he continues to abuse drugs or alcohol he could act out impulsively causing serious harm to himself or others even though may be unintentional.  He demonstrated understanding of this and has the capacity understand this    Patient is counseled he must been compliant with all medications outpatient follow-up appointments    She is discharged home in stable condition      TIME SPEND - 35 MINUTES TO COMPLETE THE EVALUATION, DISCHARGE SUMMARY, MEDICATION RECONCILIATION AND FOLLOW UP CARE     Signed:  Kindra Briones  [id number]  9:53 PM

## 2020-12-10 NOTE — CARE COORDINATION
In order to ensure appropriate transition and discharge planning is in place, the following documents have been transmitted to [facility], as the new outpatient provider:     The d/c diagnosis was transmitted to the next care provider   The reason for hospitalization was transmitted to the next care provider   The d/c medications (dosage and indication) were transmitted to the next care provider    The continuing care plan was transmitted to the next care provider

## 2020-12-22 ENCOUNTER — HOSPITAL ENCOUNTER (EMERGENCY)
Age: 40
Discharge: PSYCHIATRIC OR SUBSTANCE USE FACILITY | End: 2020-12-23
Attending: EMERGENCY MEDICINE
Payer: COMMERCIAL

## 2020-12-22 LAB
ACETAMINOPHEN LEVEL: <5 MCG/ML (ref 10–30)
AMPHETAMINE SCREEN, URINE: NOT DETECTED
ANION GAP SERPL CALCULATED.3IONS-SCNC: 10 MMOL/L (ref 7–16)
BARBITURATE SCREEN URINE: NOT DETECTED
BASOPHILS ABSOLUTE: 0.04 E9/L (ref 0–0.2)
BASOPHILS RELATIVE PERCENT: 1 % (ref 0–2)
BENZODIAZEPINE SCREEN, URINE: NOT DETECTED
BILIRUBIN URINE: NEGATIVE
BLOOD, URINE: NEGATIVE
BUN BLDV-MCNC: 16 MG/DL (ref 6–20)
CALCIUM SERPL-MCNC: 9.4 MG/DL (ref 8.6–10.2)
CANNABINOID SCREEN URINE: NOT DETECTED
CHLORIDE BLD-SCNC: 103 MMOL/L (ref 98–107)
CLARITY: CLEAR
CO2: 27 MMOL/L (ref 22–29)
COCAINE METABOLITE SCREEN URINE: POSITIVE
COLOR: YELLOW
CREAT SERPL-MCNC: 1.3 MG/DL (ref 0.7–1.2)
EOSINOPHILS ABSOLUTE: 0.07 E9/L (ref 0.05–0.5)
EOSINOPHILS RELATIVE PERCENT: 1.8 % (ref 0–6)
ETHANOL: <10 MG/DL (ref 0–0.08)
FENTANYL SCREEN, URINE: NOT DETECTED
GFR AFRICAN AMERICAN: >60
GFR NON-AFRICAN AMERICAN: >60 ML/MIN/1.73
GLUCOSE BLD-MCNC: 79 MG/DL (ref 74–99)
GLUCOSE URINE: NEGATIVE MG/DL
HCT VFR BLD CALC: 46 % (ref 37–54)
HEMOGLOBIN: 15 G/DL (ref 12.5–16.5)
IMMATURE GRANULOCYTES #: 0.01 E9/L
IMMATURE GRANULOCYTES %: 0.3 % (ref 0–5)
KETONES, URINE: NEGATIVE MG/DL
LEUKOCYTE ESTERASE, URINE: NEGATIVE
LYMPHOCYTES ABSOLUTE: 1.57 E9/L (ref 1.5–4)
LYMPHOCYTES RELATIVE PERCENT: 39.5 % (ref 20–42)
Lab: ABNORMAL
MCH RBC QN AUTO: 29.9 PG (ref 26–35)
MCHC RBC AUTO-ENTMCNC: 32.6 % (ref 32–34.5)
MCV RBC AUTO: 91.8 FL (ref 80–99.9)
METHADONE SCREEN, URINE: NOT DETECTED
MONOCYTES ABSOLUTE: 0.26 E9/L (ref 0.1–0.95)
MONOCYTES RELATIVE PERCENT: 6.5 % (ref 2–12)
NEUTROPHILS ABSOLUTE: 2.02 E9/L (ref 1.8–7.3)
NEUTROPHILS RELATIVE PERCENT: 50.9 % (ref 43–80)
NITRITE, URINE: NEGATIVE
OPIATE SCREEN URINE: NOT DETECTED
OXYCODONE URINE: NOT DETECTED
PDW BLD-RTO: 12.9 FL (ref 11.5–15)
PH UA: 6 (ref 5–9)
PHENCYCLIDINE SCREEN URINE: NOT DETECTED
PLATELET # BLD: 324 E9/L (ref 130–450)
PMV BLD AUTO: 9.9 FL (ref 7–12)
POTASSIUM REFLEX MAGNESIUM: 3.9 MMOL/L (ref 3.5–5)
PROTEIN UA: NEGATIVE MG/DL
RBC # BLD: 5.01 E12/L (ref 3.8–5.8)
SALICYLATE, SERUM: <0.3 MG/DL (ref 0–30)
SARS-COV-2, NAAT: ABNORMAL
SARS-COV-2, NAAT: NOT DETECTED
SODIUM BLD-SCNC: 140 MMOL/L (ref 132–146)
SPECIFIC GRAVITY UA: >=1.03 (ref 1–1.03)
TOTAL CK: 162 U/L (ref 20–200)
TRICYCLIC ANTIDEPRESSANTS SCREEN SERUM: NEGATIVE NG/ML
UROBILINOGEN, URINE: 1 E.U./DL
WBC # BLD: 4 E9/L (ref 4.5–11.5)

## 2020-12-22 PROCEDURE — 99283 EMERGENCY DEPT VISIT LOW MDM: CPT

## 2020-12-22 PROCEDURE — U0002 COVID-19 LAB TEST NON-CDC: HCPCS

## 2020-12-22 PROCEDURE — 81003 URINALYSIS AUTO W/O SCOPE: CPT

## 2020-12-22 PROCEDURE — 93005 ELECTROCARDIOGRAM TRACING: CPT | Performed by: EMERGENCY MEDICINE

## 2020-12-22 PROCEDURE — 82550 ASSAY OF CK (CPK): CPT

## 2020-12-22 PROCEDURE — G0480 DRUG TEST DEF 1-7 CLASSES: HCPCS

## 2020-12-22 PROCEDURE — 80048 BASIC METABOLIC PNL TOTAL CA: CPT

## 2020-12-22 PROCEDURE — 85025 COMPLETE CBC W/AUTO DIFF WBC: CPT

## 2020-12-22 PROCEDURE — 80307 DRUG TEST PRSMV CHEM ANLYZR: CPT

## 2020-12-22 NOTE — ED PROVIDER NOTES
HPI:  12/22/20,   Time: 12:56 AM JAYLENE Henning is a 45 y.o. male presenting to the ED for psychiatric evaluation, beginning 2 weeks ago. The complaint has been persistent, moderate in severity, and worsened by nothing. The patient states he has been off his meds for a month and a half. He states over the last 2 weeks he has been locked in a basement. He states he is unsure who locked him in there. He states he has been having a lot of homicidal ideations as well as hallucinations and paranoia. Therefore he came to the ED to be evaluated. Patient denies any chest pain shortness of breath nausea vomiting diarrhea numbness tingling or any other systemic symptoms    Review of Systems:   Pertinent positives and negatives are stated within HPI, all other systems reviewed and are negative.          --------------------------------------------- PAST HISTORY ---------------------------------------------  Past Medical History:  has a past medical history of Depression and Schizoaffective disorder ([facility] Past Surgical History:  has a past surgical history that includes eye surgery (19 years ago). Social History:  reports that he has been smoking cigars and cigarettes. He has a 12.00 pack-year smoking history. He has never used smokeless tobacco. He reports current drug use. Drugs: Marijuana and Cocaine. He reports that he does not drink alcohol. Family History: family history includes No Known Problems in his father and mother. The patients home medications have been reviewed.     Allergies: Rondec-d [chlophedianol-pseudoephedrine]        ---------------------------------------------------PHYSICAL EXAM--------------------------------------    Constitutional/General: Alert and oriented x3, well appearing, non toxic in NAD  Head: Normocephalic and atraumatic  Eyes: PERRL, EOMI, conjunctive normal, sclera non icteric  Mouth: Oropharynx clear, handling secretions, no trismus, no asymmetry of the posterior oropharynx or uvular edema  Neck: Supple, full ROM, non tender to palpation in the midline, no stridor, no crepitus, no meningeal signs  Respiratory: Lungs clear to auscultation bilaterally, no wheezes, rales, or rhonchi. Not in respiratory distress  Cardiovascular:  Regular rate. Regular rhythm. No murmurs, gallops, or rubs. 2+ distal pulses  GI:  Abdomen Soft, Non tender, Non distended. +BS. No organomegaly, no palpable masses,  No rebound, guarding, or rigidity. Musculoskeletal: Moves all extremities x 4. Warm and well perfused, no clubbing, cyanosis, or edema. Capillary refill <3 seconds  Integument: skin warm and dry. No rashes. Neurologic: GCS 15, no focal deficits, symmetric strength 5/5 in the upper and lower extremities bilaterally  Psychiatric: Flat affect, paranoia  -------------------------------------------------- RESULTS -------------------------------------------------  I have personally reviewed all laboratory and imaging results for this patient. Results are listed below.      LABS:  Results for orders placed or performed during the hospital encounter of 12/22/20   CBC Auto Differential   Result Value Ref Range    WBC 4.0 (L) 4.5 - 11.5 E9/L    RBC 5.01 3.80 - 5.80 E12/L    Hemoglobin 15.0 12.5 - 16.5 g/dL    Hematocrit 46.0 37.0 - 54.0 %    MCV 91.8 80.0 - 99.9 fL    MCH 29.9 26.0 - 35.0 pg    MCHC 32.6 32.0 - 34.5 %    RDW 12.9 11.5 - 15.0 fL    Platelets 637 491 - 272 E9/L    MPV 9.9 7.0 - 12.0 fL    Neutrophils % 50.9 43.0 - 80.0 %    Immature Granulocytes % 0.3 0.0 - 5.0 %    Lymphocytes % 39.5 20.0 - 42.0 %    Monocytes % 6.5 2.0 - 12.0 %    Eosinophils % 1.8 0.0 - 6.0 %    Basophils % 1.0 0.0 - 2.0 %    Neutrophils Absolute 2.02 1.80 - 7.30 E9/L    Immature Granulocytes # 0.01 E9/L    Lymphocytes Absolute 1.57 1.50 - 4.00 E9/L    Monocytes Absolute 0.26 0.10 - 0.95 E9/L    Eosinophils Absolute 0.07 0.05 - 0.50 E9/L    Basophils Absolute 0.04 0.00 - 0.20 B3/R   Basic Metabolic Panel w/ Reflex to MG   Result Value Ref Range    Sodium 140 132 - 146 mmol/L    Potassium reflex Magnesium 3.9 3.5 - 5.0 mmol/L    Chloride 103 98 - 107 mmol/L    CO2 27 22 - 29 mmol/L    Anion Gap 10 7 - 16 mmol/L    Glucose 79 74 - 99 mg/dL    BUN 16 6 - 20 mg/dL    CREATININE 1.3 (H) 0.7 - 1.2 mg/dL    GFR Non-African American >60 >=60 mL/min/1.73    GFR African American >60     Calcium 9.4 8.6 - 10.2 mg/dL   Urinalysis, reflex to microscopic   Result Value Ref Range    Color, UA Yellow Straw/Yellow    Clarity, UA Clear Clear    Glucose, Ur Negative Negative mg/dL    Bilirubin Urine Negative Negative    Ketones, Urine Negative Negative mg/dL    Specific Gravity, UA >=1.030 1.005 - 1.030    Blood, Urine Negative Negative    pH, UA 6.0 5.0 - 9.0    Protein, UA Negative Negative mg/dL    Urobilinogen, Urine 1.0 <2.0 E.U./dL    Nitrite, Urine Negative Negative    Leukocyte Esterase, Urine Negative Negative   Urine Drug Screen   Result Value Ref Range    Amphetamine Screen, Urine NOT DETECTED Negative <1000 ng/mL    Barbiturate Screen, Ur NOT DETECTED Negative < 200 ng/mL    Benzodiazepine Screen, Urine NOT DETECTED Negative < 200 ng/mL    Cannabinoid Scrn, Ur NOT DETECTED Negative < 50ng/mL    Cocaine Metabolite Screen, Urine POSITIVE (A) Negative < 300 ng/mL    Opiate Scrn, Ur NOT DETECTED Negative < 300ng/mL    PCP Screen, Urine NOT DETECTED Negative < 25 ng/mL    Methadone Screen, Urine NOT DETECTED Negative <300 ng/mL    Oxycodone Urine NOT DETECTED Negative <100 ng/mL    FENTANYL SCREEN, URINE NOT DETECTED Negative <1 ng/mL    Drug Screen Comment: see below    Serum Drug Screen   Result Value Ref Range    Ethanol Lvl <10 mg/dL    Acetaminophen Level <5.0 (L) 10.0 - 86.3 mcg/mL    Salicylate, Serum <5.8 0.0 - 30.0 mg/dL    TCA Scrn NEGATIVE Cutoff:300 ng/mL   COVID-19   Result Value Ref Range    SARS-CoV-2, NAAT Indeterminate (A) Not Detected   COVID-19   Result Value Ref Range    SARS-CoV-2, NAAT Not Detected Not Detected       RADIOLOGY:  Interpreted by Radiologist.  No orders to display       ------------------------- NURSING NOTES AND VITALS REVIEWED ---------------------------   The nursing notes within the ED encounter and vital signs as below have been reviewed by myself. /62   Pulse 59   Temp 98.3 °F (36.8 °C) (Oral)   Resp 16   SpO2 98%   Oxygen Saturation Interpretation: Normal    The patients available past medical records and past encounters were reviewed. ------------------------------ ED COURSE/MEDICAL DECISION MAKING----------------------  Medications - No data to display      ED COURSE:       Medical Decision Making: This is a 71-year-old male who presented to the ED for paranoia. Patient underwent laboratory work-up which showed a normal CBC except for leukopenia at 4.0. Normal chemistry. Negative urine and serum drug screen except for being positive for Covid pain. Urinalysis shows no signs of infection. Covid test negative. Patient medically cleared.  consulted. Disposition pending  evaluation. I, Dr. Raz Leonard, am the primary provider for this encounter    This patient's ED course included: a personal history and physicial examination and re-evaluation prior to disposition    This patient has remained hemodynamically stable during their ED course. Re-Evaluations:             Re-evaluation. Patients symptoms show no change    Counseling: The emergency provider has spoken with the patient and discussed todays results, in addition to providing specific details for the plan of care and counseling regarding the diagnosis and prognosis. Questions are answered at this time and they are agreeable with the plan.       --------------------------------- IMPRESSION AND DISPOSITION ---------------------------------    IMPRESSION  1.  Paranoia ([address]    2. Hallucinations        DISPOSITION  Disposition: Per   Patient condition is stable    NOTE: This report was transcribed using voice recognition software.  Every effort was made to ensure accuracy; however, inadvertent computerized transcription errors may be present        Karl Gitelman, DO  12/22/20 0402

## 2020-12-23 VITALS
TEMPERATURE: 97.6 F | RESPIRATION RATE: 16 BRPM | DIASTOLIC BLOOD PRESSURE: 96 MMHG | SYSTOLIC BLOOD PRESSURE: 117 MMHG | OXYGEN SATURATION: 99 % | HEART RATE: 64 BPM

## 2020-12-23 NOTE — ED NOTES
ANTONIO spoke with pt. Pt requesting [place] or [facility] [place]. ANTONIO spoke with Chioma Rodriguez NP and informed her of pt request. Chioma Rodriguez is in agreement. ANTONIO placed referral to [facility] and spoke with Genevia Roles. SW informed Genevia Roles that pt requests [facility] any facility not in the [facility] area.       FRANCISCO Goncalves, [place]  12/22/20 6561
Call to [facility] [phone], requested status of trip. Dispatcher states they \". .. are running really behind right now. \"  Reports they estimate they will be here in approximately minutes for now which would be 23:00.       [facility] [address], FRANCISCO, [facility]  12/22/20 2120
Celeste Blue at [place] court house notified of discharge.       Anupama العلي RN  12/23/20 9692
Dr. Montserrat Jolly will assess patient in the morning to determine disposition.       Birgit Benavidez RN  12/22/20 2787
[facility] ETA now 30-45 minutes.       Khalif King RN  12/22/20 9788
Pt accepted at [facility] per Ernesto Virgen RN at [facility], accepted by Dr Shanell Rahman to N #: [phone], per [facility] transport  Will not be arranged by [facility] until they have received report. Notified PATRICIO nurse Kiersten [facility], transport packet prepared and in pt record slot.       [facility], [facility], [place]  12/22/20 012
Pt belongings, 2 bags in [address]  12/22/20 6853
Report called to Damir Dias at [facility], RN  12/22/20 5864
States \"I want out of [facility]. They aren't doing anything for me physically or mentally\". States he wants to go to [facility] because they were begging to help him. Pt blaming \"[place]\" on all his demise and vague on what he wants done. Admits to being homeless, presently sleeping in someones' basement.       Donell Lacey RN  12/22/20 7349
Yudy from [facility] reports that [facility] will arrive for transport to [address] [place] [address]. Shared with [facility] AN AFFILIATE OF [facility] nurse Madelyn Benites      [address] [place] [address], [facility], [place]  12/22/20 0832
review with Psych Doc for ok to refer Pt out.      FRANCISCO Loera, [place]  12/22/20 9153

## 2020-12-24 LAB
EKG ATRIAL RATE: 58 BPM
EKG P AXIS: 67 DEGREES
EKG P-R INTERVAL: 194 MS
EKG Q-T INTERVAL: 388 MS
EKG QRS DURATION: 90 MS
EKG QTC CALCULATION (BAZETT): 380 MS
EKG R AXIS: 68 DEGREES
EKG T AXIS: 58 DEGREES
EKG VENTRICULAR RATE: 58 BPM

## 2021-01-16 ENCOUNTER — APPOINTMENT (OUTPATIENT)
Dept: CT IMAGING | Age: 41
End: 2021-01-16
Payer: COMMERCIAL

## 2021-01-16 ENCOUNTER — HOSPITAL ENCOUNTER (EMERGENCY)
Age: 41
Discharge: PSYCHIATRIC OR SUBSTANCE USE FACILITY | End: 2021-01-17
Attending: EMERGENCY MEDICINE
Payer: COMMERCIAL

## 2021-01-16 DIAGNOSIS — F25.9 SCHIZOAFFECTIVE DISORDER, UNSPECIFIED TYPE (HCC): Primary | ICD-10-CM

## 2021-01-16 DIAGNOSIS — R11.2 NON-INTRACTABLE VOMITING WITH NAUSEA, UNSPECIFIED VOMITING TYPE: ICD-10-CM

## 2021-01-16 DIAGNOSIS — R45.851 SUICIDAL IDEATION: ICD-10-CM

## 2021-01-16 LAB
ALBUMIN SERPL-MCNC: 4.2 G/DL (ref 3.5–5.2)
ALP BLD-CCNC: 52 U/L (ref 40–129)
ALT SERPL-CCNC: 11 U/L (ref 0–40)
AMPHETAMINE SCREEN, URINE: NOT DETECTED
ANION GAP SERPL CALCULATED.3IONS-SCNC: 10 MMOL/L (ref 7–16)
AST SERPL-CCNC: 21 U/L (ref 0–39)
BARBITURATE SCREEN URINE: NOT DETECTED
BASOPHILS ABSOLUTE: 0.02 E9/L (ref 0–0.2)
BASOPHILS RELATIVE PERCENT: 0.2 % (ref 0–2)
BENZODIAZEPINE SCREEN, URINE: NOT DETECTED
BILIRUB SERPL-MCNC: 0.7 MG/DL (ref 0–1.2)
BILIRUBIN URINE: NEGATIVE
BLOOD, URINE: NEGATIVE
BUN BLDV-MCNC: 18 MG/DL (ref 6–20)
CALCIUM SERPL-MCNC: 9.3 MG/DL (ref 8.6–10.2)
CANNABINOID SCREEN URINE: NOT DETECTED
CHLORIDE BLD-SCNC: 103 MMOL/L (ref 98–107)
CLARITY: CLEAR
CO2: 27 MMOL/L (ref 22–29)
COCAINE METABOLITE SCREEN URINE: POSITIVE
COLOR: YELLOW
CREAT SERPL-MCNC: 0.9 MG/DL (ref 0.7–1.2)
EOSINOPHILS ABSOLUTE: 0.06 E9/L (ref 0.05–0.5)
EOSINOPHILS RELATIVE PERCENT: 0.7 % (ref 0–6)
FENTANYL SCREEN, URINE: NOT DETECTED
GFR AFRICAN AMERICAN: >60
GFR NON-AFRICAN AMERICAN: >60 ML/MIN/1.73
GLUCOSE BLD-MCNC: 80 MG/DL (ref 74–99)
GLUCOSE URINE: NEGATIVE MG/DL
HCT VFR BLD CALC: 48.7 % (ref 37–54)
HEMOGLOBIN: 16.2 G/DL (ref 12.5–16.5)
IMMATURE GRANULOCYTES #: 0.02 E9/L
IMMATURE GRANULOCYTES %: 0.2 % (ref 0–5)
KETONES, URINE: ABNORMAL MG/DL
LACTIC ACID, SEPSIS: 1.3 MMOL/L (ref 0.5–1.9)
LEUKOCYTE ESTERASE, URINE: NEGATIVE
LIPASE: 13 U/L (ref 13–60)
LYMPHOCYTES ABSOLUTE: 0.34 E9/L (ref 1.5–4)
LYMPHOCYTES RELATIVE PERCENT: 3.8 % (ref 20–42)
Lab: ABNORMAL
MCH RBC QN AUTO: 30.7 PG (ref 26–35)
MCHC RBC AUTO-ENTMCNC: 33.3 % (ref 32–34.5)
MCV RBC AUTO: 92.2 FL (ref 80–99.9)
METHADONE SCREEN, URINE: NOT DETECTED
MONOCYTES ABSOLUTE: 0.46 E9/L (ref 0.1–0.95)
MONOCYTES RELATIVE PERCENT: 5.2 % (ref 2–12)
NEUTROPHILS ABSOLUTE: 7.98 E9/L (ref 1.8–7.3)
NEUTROPHILS RELATIVE PERCENT: 89.9 % (ref 43–80)
NITRITE, URINE: NEGATIVE
OPIATE SCREEN URINE: NOT DETECTED
OXYCODONE URINE: NOT DETECTED
PDW BLD-RTO: 13.2 FL (ref 11.5–15)
PH UA: 7 (ref 5–9)
PHENCYCLIDINE SCREEN URINE: NOT DETECTED
PLATELET # BLD: 322 E9/L (ref 130–450)
PMV BLD AUTO: 9.5 FL (ref 7–12)
POTASSIUM SERPL-SCNC: 4.4 MMOL/L (ref 3.5–5)
PROTEIN UA: NEGATIVE MG/DL
RBC # BLD: 5.28 E12/L (ref 3.8–5.8)
SARS-COV-2, NAAT: NOT DETECTED
SODIUM BLD-SCNC: 140 MMOL/L (ref 132–146)
SPECIFIC GRAVITY UA: 1.01 (ref 1–1.03)
TOTAL PROTEIN: 7.1 G/DL (ref 6.4–8.3)
TROPONIN: <0.01 NG/ML (ref 0–0.03)
TSH SERPL DL<=0.05 MIU/L-ACNC: 0.46 UIU/ML (ref 0.27–4.2)
UROBILINOGEN, URINE: 0.2 E.U./DL
WBC # BLD: 8.9 E9/L (ref 4.5–11.5)

## 2021-01-16 PROCEDURE — 93005 ELECTROCARDIOGRAM TRACING: CPT | Performed by: EMERGENCY MEDICINE

## 2021-01-16 PROCEDURE — U0002 COVID-19 LAB TEST NON-CDC: HCPCS

## 2021-01-16 PROCEDURE — 80307 DRUG TEST PRSMV CHEM ANLYZR: CPT

## 2021-01-16 PROCEDURE — 74177 CT ABD & PELVIS W/CONTRAST: CPT

## 2021-01-16 PROCEDURE — G0480 DRUG TEST DEF 1-7 CLASSES: HCPCS

## 2021-01-16 PROCEDURE — 83690 ASSAY OF LIPASE: CPT

## 2021-01-16 PROCEDURE — 80053 COMPREHEN METABOLIC PANEL: CPT

## 2021-01-16 PROCEDURE — 96374 THER/PROPH/DIAG INJ IV PUSH: CPT

## 2021-01-16 PROCEDURE — 6360000004 HC RX CONTRAST MEDICATION: Performed by: RADIOLOGY

## 2021-01-16 PROCEDURE — 81003 URINALYSIS AUTO W/O SCOPE: CPT

## 2021-01-16 PROCEDURE — 83605 ASSAY OF LACTIC ACID: CPT

## 2021-01-16 PROCEDURE — 99283 EMERGENCY DEPT VISIT LOW MDM: CPT

## 2021-01-16 PROCEDURE — 84443 ASSAY THYROID STIM HORMONE: CPT

## 2021-01-16 PROCEDURE — 84484 ASSAY OF TROPONIN QUANT: CPT

## 2021-01-16 PROCEDURE — 85025 COMPLETE CBC W/AUTO DIFF WBC: CPT

## 2021-01-16 RX ADMIN — IOPAMIDOL 75 ML: 755 INJECTION, SOLUTION INTRAVENOUS at 20:10

## 2021-01-16 ASSESSMENT — ENCOUNTER SYMPTOMS
SINUS PRESSURE: 0
VOMITING: 1
WHEEZING: 0
EYE REDNESS: 0
SORE THROAT: 0
COUGH: 0
EYE PAIN: 0
NAUSEA: 1
ABDOMINAL PAIN: 1
BACK PAIN: 0
EYE DISCHARGE: 0
SHORTNESS OF BREATH: 0
DIARRHEA: 0

## 2021-01-17 VITALS
BODY MASS INDEX: 21 KG/M2 | DIASTOLIC BLOOD PRESSURE: 83 MMHG | SYSTOLIC BLOOD PRESSURE: 108 MMHG | WEIGHT: 150 LBS | OXYGEN SATURATION: 94 % | TEMPERATURE: 98 F | HEIGHT: 71 IN | RESPIRATION RATE: 18 BRPM | HEART RATE: 93 BPM

## 2021-01-17 LAB
ACETAMINOPHEN LEVEL: <5 MCG/ML (ref 10–30)
EKG ATRIAL RATE: 86 BPM
EKG P AXIS: 78 DEGREES
EKG P-R INTERVAL: 162 MS
EKG Q-T INTERVAL: 354 MS
EKG QRS DURATION: 92 MS
EKG QTC CALCULATION (BAZETT): 423 MS
EKG R AXIS: 86 DEGREES
EKG T AXIS: 66 DEGREES
EKG VENTRICULAR RATE: 86 BPM
ETHANOL: <10 MG/DL (ref 0–0.08)
SALICYLATE, SERUM: <0.3 MG/DL (ref 0–30)
TRICYCLIC ANTIDEPRESSANTS SCREEN SERUM: NEGATIVE NG/ML

## 2021-01-17 PROCEDURE — 93010 ELECTROCARDIOGRAM REPORT: CPT | Performed by: INTERNAL MEDICINE

## 2021-01-17 PROCEDURE — 6370000000 HC RX 637 (ALT 250 FOR IP)

## 2021-01-17 RX ORDER — LORAZEPAM 1 MG/1
TABLET ORAL
Status: COMPLETED
Start: 2021-01-17 | End: 2021-01-17

## 2021-01-17 RX ORDER — LORAZEPAM 1 MG/1
1 TABLET ORAL ONCE
Status: COMPLETED | OUTPATIENT
Start: 2021-01-17 | End: 2021-01-17

## 2021-01-17 RX ADMIN — LORAZEPAM 1 MG: 1 TABLET ORAL at 10:18

## 2021-01-17 NOTE — ED NOTES
Esthela Case is performing tele health visit at this time.       Enmanuel Philip RN  01/17/21 4730
Call from Emil Jones, [address] [facility], [address]    Pt accepted to [facility].  Acceptance information is as follows:  Physician: Dr. Gonzales Huggins  [address]  N2N: Shira Aragon 171 arranging transport     FRANCISCO Solis, [facility]  01/17/21 0153
Consent for tele health was signed at this time.       Brian Sarabia RN  01/17/21 0040
Eric Rivera, RN  01/17/21 0227
Initiation of referral for inpatient psychiatric hospitalization to Pepper Vu RN, [facility].      FRANCISCO Silva, Tyson Delgado  01/17/21 9094
Konrad Swan onsite, report given and patient belonging bags given to EMS.      Daniel Quiroga RN  01/17/21 1024
Nurse 2 nurse report Gwynneth Gowers to [facility] @ [facility] [phone] [id number] unit     [address] [place] [address]  01/17/21 9847
Pt was uncooperative for blood draw and IV insertion. Would not sit up properly and was agitated with nurse who was attempting to complete the orders. This RN explained all the procedures and why they were ordered and being done and the pt became increasingly agitated when RN explained that we could not give him anything to eat or drink other than ice chips at this time because he was c/o nausea and vomiting and there is a CT scan ordered. The provider recommends that the pt not eat or drink until the results are back.       Shanon Stanley RN  01/16/21 6117
from his nurse. Pt must be redirected to focus on assessment. Endorses SI, state no plan. Pt relays that he has felt SI previously, with plans. He explains that he has \"tried to cut\" himself, but has never followed through on a plan/attempt. Pt denies HI. He reports experiencing auditory hallucinations. Pt relays he cannot make out what the voices he is hearing are saying. Denies visual hallucinations. Crack use is admitted, once pt is advised this was present in his UDS. Initially, he denied any drug use. Pt states he was scared to say he abuses any substances. Pt denies drinking. He states he is homeless, and has been \"for a while. \"    Gender  [x] Male [] Female [] Transgender  [] Other    Sexual Orientation    [x] Heterosexual [] Homosexual [] Bisexual [] Other    Suicidal Behavioral: CSSR-S Complete. [x] Reports:    [x] Past [x] Present   [] Denies    Homicidal/ Violent Behavior  [] Reports:   [] Past [] Present   [x] Denies     Hallucinations/Delusions   [x] Reports:   [] Denies     Substance Use/Alcohol Use/Addiction: SBIRT Screen Complete. [x] Reports:   [] Denies     Trauma History  [x] Reports:  [] Denies     Collateral Information:   No collateral collected at this time.     Level of Care/Disposition Plan  [] Home:   [] Outpatient Provider:   [] Crisis Unit:   [x] Inpatient Psychiatric Unit:  [] Other:   Pt will be reviewed with Dr. Kalyn Cedeño for potential admission to [facility], or permission to refer out     FRANCISCO Borden, [facility]  01/17/21 [address] [place] [address] MSMOHSEN, [facility]  01/17/21 6958

## 2021-01-17 NOTE — ED PROVIDER NOTES
Genie Zee is a 61-year-old male who presents with a chief complaint of nausea and vomiting. History comes primarily from the patient. Patient states that these symptoms came on this afternoon, after eating a spaghetti lunch that he thinks was bad. He states that since that time he has had persistent nausea, vomiting, decreased p.o. intake. States that he feels very dehydrated. Denies any other associated symptoms. Symptoms are made worse by nothing, made better by nothing. Patient also has a longstanding chronic history of schizophrenia, and states that his hallucinations have come worse. He states that he is now hearing voices that are telling him to hurt himself. Believe that he needs to be admitted for psychiatric management. On arrival, he was assessed with history, physical exam, imaging studies, laboratory studies, EKG, vital signs. Patient's vital signs were notable for tachycardia 108 but were otherwise stable and he was afebrile. Review of Systems   Constitutional: Negative for chills and fever. HENT: Negative for ear pain, sinus pressure and sore throat. Eyes: Negative for pain, discharge and redness. Respiratory: Negative for cough, shortness of breath and wheezing. Cardiovascular: Negative for chest pain. Gastrointestinal: Positive for abdominal pain, nausea and vomiting. Negative for diarrhea. Genitourinary: Negative for dysuria and frequency. Musculoskeletal: Negative for arthralgias and back pain. Skin: Negative for rash and wound. Neurological: Negative for weakness and headaches. Hematological: Negative for adenopathy. Psychiatric/Behavioral: Positive for agitation, hallucinations and suicidal ideas (Patient states that he hears voices telling him to hurt himself). All other systems reviewed and are negative. Physical Exam  Vitals signs reviewed. Constitutional:       General: He is not in acute distress. Appearance: He is well-developed.  He is not ill-appearing or diaphoretic. HENT:      Head: Normocephalic and atraumatic. Mouth/Throat:      Dentition: Abnormal dentition. Eyes:      Pupils: Pupils are equal, round, and reactive to light. Neck:      Musculoskeletal: Normal range of motion. Vascular: No JVD. Trachea: No tracheal deviation. Cardiovascular:      Rate and Rhythm: Regular rhythm. Heart sounds: No murmur. No friction rub. No gallop. Pulmonary:      Effort: Pulmonary effort is normal. No respiratory distress. Breath sounds: Normal breath sounds. No stridor. No wheezing or rales. Chest:      Chest wall: No tenderness. Abdominal:      General: Bowel sounds are normal. There is no distension. Palpations: Abdomen is soft. Tenderness: There is no abdominal tenderness. There is no guarding. Musculoskeletal: Normal range of motion. Skin:     General: Skin is warm and dry. Neurological:      General: No focal deficit present. Mental Status: He is alert and oriented to person, place, and time. Cranial Nerves: No cranial nerve deficit. Sensory: No sensory deficit. Psychiatric:         Behavior: Behavior normal.      Comments: Patient behaves in aggressive and agitated fashion at physician and nursing staff, asking why he has being repeatedly questioned. Procedures     MDM  Number of Diagnoses or Management Options  Non-intractable vomiting with nausea, unspecified vomiting type  Schizoaffective disorder, unspecified type [facility])  Diagnosis management comments: Emergency department evaluation was notable for laboratory studies revealed no significant pathophysiologic findings that would impair the patient receiving further psychiatric evaluation and treatment. The patient is medically cleared for psychiatric care. This information was relayed to the patient who understood this plan of care and was amenable to the plan.   Patient was discussed with the admitting service Platelets [phone] 612 E9/L    MPV 9.5 7.0 - 12.0 fL    Neutrophils % 89.9 (H) 43.0 - 80.0 %    Immature Granulocytes % 0.2 0.0 - 5.0 %    Lymphocytes % 3.8 (L) 20.0 - 42.0 %    Monocytes % 5.2 2.0 - 12.0 %    Eosinophils % 0.7 0.0 - 6.0 %    Basophils % 0.2 0.0 - 2.0 %    Neutrophils Absolute 7.98 (H) 1.80 - 7.30 E9/L    Immature Granulocytes # 0.02 E9/L    Lymphocytes Absolute 0.34 (L) 1.50 - 4.00 E9/L    Monocytes Absolute 0.46 0.10 - 0.95 E9/L    Eosinophils Absolute 0.06 0.05 - 0.50 E9/L    Basophils Absolute 0.02 0.00 - 0.20 E9/L   Serum Drug Screen   Result Value Ref Range    Ethanol Lvl <10 mg/dL    Acetaminophen Level <5.0 (L) 10.0 - 69.8 mcg/mL    Salicylate, Serum <5.2 0.0 - 30.0 mg/dL   Urine Drug Screen   Result Value Ref Range    Amphetamine Screen, Urine NOT DETECTED Negative <1000 ng/mL    Barbiturate Screen, Ur NOT DETECTED Negative < 200 ng/mL    Benzodiazepine Screen, Urine NOT DETECTED Negative < 200 ng/mL    Cannabinoid Scrn, Ur NOT DETECTED Negative < 50ng/mL    Cocaine Metabolite Screen, Urine POSITIVE (A) Negative < 300 ng/mL    Opiate Scrn, Ur NOT DETECTED Negative < 300ng/mL    PCP Screen, Urine NOT DETECTED Negative < 25 ng/mL    Methadone Screen, Urine NOT DETECTED Negative <300 ng/mL    Oxycodone Urine NOT DETECTED Negative <100 ng/mL    FENTANYL SCREEN, URINE NOT DETECTED Negative <1 ng/mL    Drug Screen Comment: see below    Urinalysis   Result Value Ref Range    Color, UA Yellow Straw/Yellow    Clarity, UA Clear Clear    Glucose, Ur Negative Negative mg/dL    Bilirubin Urine Negative Negative    Ketones, Urine TRACE (A) Negative mg/dL    Specific Gravity, UA 1.015 1.005 - 1.030    Blood, Urine Negative Negative    pH, UA 7.0 5.0 - 9.0    Protein, UA Negative Negative mg/dL    Urobilinogen, Urine 0.2 <2.0 E.U./dL    Nitrite, Urine Negative Negative    Leukocyte Esterase, Urine Negative Negative   TSH without Reflex   Result Value Ref Range    TSH 0.462 0.270 - 4.200 uIU/mL   Lipase Result Value Ref Range    Lipase 13 13 - 60 U/L   Troponin   Result Value Ref Range    Troponin <0.01 0.00 - 0.03 ng/mL   Lactate, Sepsis   Result Value Ref Range    Lactic Acid, Sepsis 1.3 0.5 - 1.9 mmol/L   COVID-19   Result Value Ref Range    SARS-CoV-2, NAAT Not Detected Not Detected   EKG 12 Lead   Result Value Ref Range    Ventricular Rate 86 BPM    Atrial Rate 86 BPM    P-R Interval 162 ms    QRS Duration 92 ms    Q-T Interval 354 ms    QTc Calculation (Bazett) 423 ms    P Axis 78 degrees    R Axis 86 degrees    T Axis 66 degrees       Radiology  CT ABDOMEN PELVIS W IV CONTRAST Additional Contrast? None   Final Result   Fluid-filled small bowel loops. Correlate with gastroenteritis clinically. Diffuse colonic fecal retention. The remainder of the exam is essentially   unremarkable. EKG #1:  Interpreted by emergency department physician unless otherwise noted. Time:  1816    Rate: 86 bpm  Rhythm: Sinus rhythm. Interpretation: Normal EKG, normal sinus rhythm.        ------------------------- NURSING NOTES AND VITALS REVIEWED ---------------------------  Date / Time Roomed:  1/16/2021  5:33 PM  ED Bed Assignment:  22/22    The nursing notes within the ED encounter and vital signs as below have been reviewed. Patient Vitals for the past 24 hrs:   BP Temp Temp src Pulse Resp SpO2 Height Weight   01/16/21 2023 111/70 -- -- 94 16 97 % -- --   01/16/21 1731 112/76 98 °F (36.7 °C) Oral 108 16 98 % 5' 11\" (1.803 m) 150 lb (68 kg)       Oxygen Saturation Interpretation: Normal      ------------------------------------------ PROGRESS NOTES ------------------------------------------  Re-evaluation(s):  Time: 12:55 AM EST. Patients symptoms show no change  Repeat physical examination is no worse        I have spoken with the patient and discussed todays results, in addition to providing specific details for the plan of care and counseling regarding the diagnosis and prognosis.   Their questions are answered at this time and they are agreeable with the plan. I have discussed the risks and benefits of transfer and they wish to proceed with the transfer. --------------------------------- ADDITIONAL PROVIDER NOTES ---------------------------------      Reason for transfer: Psychiatric care. This patient's ED course included: a personal history and physicial examination, re-evaluation prior to disposition, multiple bedside re-evaluations and continuous pulse oximetry    This patient has remained unchanged during their ED course. Please note that the withdrawal or failure to initiate urgent interventions for this patient would likely result in a life threatening deterioration or permanent disability. Clinical Impression  1. Schizoaffective disorder, unspecified type ([facility]    2. Non-intractable vomiting with nausea, unspecified vomiting type    3. Suicidal ideation          Disposition  Patient's disposition: Transfer to [facility]. Transferred by: ground. Patient's condition is fair.                    Gonzalez  44., DO  Resident  01/17/21 8134

## 2021-02-18 ENCOUNTER — HOSPITAL ENCOUNTER (INPATIENT)
Age: 41
LOS: 5 days | Discharge: HOME | DRG: 885 | End: 2021-02-24
Attending: EMERGENCY MEDICINE | Admitting: PSYCHIATRY & NEUROLOGY
Payer: COMMERCIAL

## 2021-02-18 DIAGNOSIS — F19.10 SUBSTANCE ABUSE (HCC): ICD-10-CM

## 2021-02-18 DIAGNOSIS — F39 MOOD DISORDER (HCC): Primary | ICD-10-CM

## 2021-02-18 LAB
ACETAMINOPHEN LEVEL: <5 MCG/ML (ref 10–30)
ALBUMIN SERPL-MCNC: 4.3 G/DL (ref 3.5–5.2)
ALP BLD-CCNC: 47 U/L (ref 40–129)
ALT SERPL-CCNC: 15 U/L (ref 0–40)
AMPHETAMINE SCREEN, URINE: NOT DETECTED
ANION GAP SERPL CALCULATED.3IONS-SCNC: 11 MMOL/L (ref 7–16)
AST SERPL-CCNC: 42 U/L (ref 0–39)
BARBITURATE SCREEN URINE: NOT DETECTED
BASOPHILS ABSOLUTE: 0.07 E9/L (ref 0–0.2)
BASOPHILS RELATIVE PERCENT: 1.5 % (ref 0–2)
BENZODIAZEPINE SCREEN, URINE: NOT DETECTED
BILIRUB SERPL-MCNC: 0.5 MG/DL (ref 0–1.2)
BILIRUBIN URINE: ABNORMAL
BLOOD, URINE: NEGATIVE
BUN BLDV-MCNC: 14 MG/DL (ref 6–20)
CALCIUM SERPL-MCNC: 8.9 MG/DL (ref 8.6–10.2)
CANNABINOID SCREEN URINE: NOT DETECTED
CHLORIDE BLD-SCNC: 105 MMOL/L (ref 98–107)
CLARITY: CLEAR
CO2: 25 MMOL/L (ref 22–29)
COCAINE METABOLITE SCREEN URINE: POSITIVE
COLOR: YELLOW
CREAT SERPL-MCNC: 1 MG/DL (ref 0.7–1.2)
EOSINOPHILS ABSOLUTE: 0.15 E9/L (ref 0.05–0.5)
EOSINOPHILS RELATIVE PERCENT: 3.2 % (ref 0–6)
ETHANOL: <10 MG/DL (ref 0–0.08)
FENTANYL SCREEN, URINE: NOT DETECTED
GFR AFRICAN AMERICAN: >60
GFR NON-AFRICAN AMERICAN: >60 ML/MIN/1.73
GLUCOSE BLD-MCNC: 64 MG/DL (ref 74–99)
GLUCOSE URINE: NEGATIVE MG/DL
HCT VFR BLD CALC: 46.5 % (ref 37–54)
HEMOGLOBIN: 14.9 G/DL (ref 12.5–16.5)
IMMATURE GRANULOCYTES #: 0.02 E9/L
IMMATURE GRANULOCYTES %: 0.4 % (ref 0–5)
KETONES, URINE: ABNORMAL MG/DL
LEUKOCYTE ESTERASE, URINE: NEGATIVE
LYMPHOCYTES ABSOLUTE: 1.74 E9/L (ref 1.5–4)
LYMPHOCYTES RELATIVE PERCENT: 37.1 % (ref 20–42)
Lab: ABNORMAL
MCH RBC QN AUTO: 29.5 PG (ref 26–35)
MCHC RBC AUTO-ENTMCNC: 32 % (ref 32–34.5)
MCV RBC AUTO: 92.1 FL (ref 80–99.9)
METHADONE SCREEN, URINE: NOT DETECTED
MONOCYTES ABSOLUTE: 0.3 E9/L (ref 0.1–0.95)
MONOCYTES RELATIVE PERCENT: 6.4 % (ref 2–12)
NEUTROPHILS ABSOLUTE: 2.41 E9/L (ref 1.8–7.3)
NEUTROPHILS RELATIVE PERCENT: 51.4 % (ref 43–80)
NITRITE, URINE: NEGATIVE
OPIATE SCREEN URINE: NOT DETECTED
OXYCODONE URINE: NOT DETECTED
PDW BLD-RTO: 13.6 FL (ref 11.5–15)
PH UA: 5.5 (ref 5–9)
PHENCYCLIDINE SCREEN URINE: NOT DETECTED
PLATELET # BLD: 292 E9/L (ref 130–450)
PMV BLD AUTO: 9.5 FL (ref 7–12)
POTASSIUM SERPL-SCNC: 3.7 MMOL/L (ref 3.5–5)
PROTEIN UA: NEGATIVE MG/DL
RBC # BLD: 5.05 E12/L (ref 3.8–5.8)
SALICYLATE, SERUM: <0.3 MG/DL (ref 0–30)
SODIUM BLD-SCNC: 141 MMOL/L (ref 132–146)
SPECIFIC GRAVITY UA: >=1.03 (ref 1–1.03)
TOTAL CK: 1904 U/L (ref 20–200)
TOTAL PROTEIN: 7.3 G/DL (ref 6.4–8.3)
TRICYCLIC ANTIDEPRESSANTS SCREEN SERUM: NEGATIVE NG/ML
TROPONIN: <0.01 NG/ML (ref 0–0.03)
UROBILINOGEN, URINE: 1 E.U./DL
WBC # BLD: 4.7 E9/L (ref 4.5–11.5)

## 2021-02-18 PROCEDURE — 84484 ASSAY OF TROPONIN QUANT: CPT

## 2021-02-18 PROCEDURE — 82550 ASSAY OF CK (CPK): CPT

## 2021-02-18 PROCEDURE — 93005 ELECTROCARDIOGRAM TRACING: CPT | Performed by: NURSE PRACTITIONER

## 2021-02-18 PROCEDURE — 82077 ASSAY SPEC XCP UR&BREATH IA: CPT

## 2021-02-18 PROCEDURE — 80179 DRUG ASSAY SALICYLATE: CPT

## 2021-02-18 PROCEDURE — 80053 COMPREHEN METABOLIC PANEL: CPT

## 2021-02-18 PROCEDURE — 2580000003 HC RX 258: Performed by: EMERGENCY MEDICINE

## 2021-02-18 PROCEDURE — 81003 URINALYSIS AUTO W/O SCOPE: CPT

## 2021-02-18 PROCEDURE — 85025 COMPLETE CBC W/AUTO DIFF WBC: CPT

## 2021-02-18 PROCEDURE — 80307 DRUG TEST PRSMV CHEM ANLYZR: CPT

## 2021-02-18 PROCEDURE — 80143 DRUG ASSAY ACETAMINOPHEN: CPT

## 2021-02-18 RX ORDER — 0.9 % SODIUM CHLORIDE 0.9 %
1000 INTRAVENOUS SOLUTION INTRAVENOUS ONCE
Status: COMPLETED | OUTPATIENT
Start: 2021-02-18 | End: 2021-02-19

## 2021-02-18 RX ADMIN — SODIUM CHLORIDE 1000 ML: 9 INJECTION, SOLUTION INTRAVENOUS at 23:42

## 2021-02-19 LAB
EKG ATRIAL RATE: 53 BPM
EKG P AXIS: 74 DEGREES
EKG P-R INTERVAL: 188 MS
EKG Q-T INTERVAL: 436 MS
EKG QRS DURATION: 98 MS
EKG QTC CALCULATION (BAZETT): 409 MS
EKG R AXIS: 69 DEGREES
EKG T AXIS: 54 DEGREES
EKG VENTRICULAR RATE: 53 BPM
GLUCOSE BLD-MCNC: 119 MG/DL
METER GLUCOSE: 119 MG/DL (ref 74–99)
SARS-COV-2, NAAT: NOT DETECTED
TOTAL CK: 1143 U/L (ref 20–200)
TOTAL CK: 1333 U/L (ref 20–200)

## 2021-02-19 PROCEDURE — 82962 GLUCOSE BLOOD TEST: CPT

## 2021-02-19 PROCEDURE — 6370000000 HC RX 637 (ALT 250 FOR IP): Performed by: PSYCHIATRY & NEUROLOGY

## 2021-02-19 PROCEDURE — 82550 ASSAY OF CK (CPK): CPT

## 2021-02-19 PROCEDURE — 93010 ELECTROCARDIOGRAM REPORT: CPT | Performed by: INTERNAL MEDICINE

## 2021-02-19 PROCEDURE — 1240000000 HC EMOTIONAL WELLNESS R&B

## 2021-02-19 PROCEDURE — 99285 EMERGENCY DEPT VISIT HI MDM: CPT

## 2021-02-19 PROCEDURE — 2580000003 HC RX 258: Performed by: EMERGENCY MEDICINE

## 2021-02-19 PROCEDURE — 87635 SARS-COV-2 COVID-19 AMP PRB: CPT

## 2021-02-19 RX ORDER — 0.9 % SODIUM CHLORIDE 0.9 %
2000 INTRAVENOUS SOLUTION INTRAVENOUS ONCE
Status: COMPLETED | OUTPATIENT
Start: 2021-02-19 | End: 2021-02-19

## 2021-02-19 RX ORDER — TRAZODONE HYDROCHLORIDE 50 MG/1
50 TABLET ORAL NIGHTLY PRN
Status: DISCONTINUED | OUTPATIENT
Start: 2021-02-19 | End: 2021-02-24 | Stop reason: HOSPADM

## 2021-02-19 RX ORDER — HYDROXYZINE PAMOATE 50 MG/1
50 CAPSULE ORAL 3 TIMES DAILY PRN
Status: DISCONTINUED | OUTPATIENT
Start: 2021-02-19 | End: 2021-02-24 | Stop reason: HOSPADM

## 2021-02-19 RX ORDER — NICOTINE 21 MG/24HR
1 PATCH, TRANSDERMAL 24 HOURS TRANSDERMAL DAILY
Status: DISCONTINUED | OUTPATIENT
Start: 2021-02-19 | End: 2021-02-24 | Stop reason: HOSPADM

## 2021-02-19 RX ORDER — MAGNESIUM HYDROXIDE/ALUMINUM HYDROXICE/SIMETHICONE 120; 1200; 1200 MG/30ML; MG/30ML; MG/30ML
30 SUSPENSION ORAL PRN
Status: DISCONTINUED | OUTPATIENT
Start: 2021-02-19 | End: 2021-02-24 | Stop reason: HOSPADM

## 2021-02-19 RX ORDER — HALOPERIDOL 5 MG
5 TABLET ORAL EVERY 6 HOURS PRN
Status: DISCONTINUED | OUTPATIENT
Start: 2021-02-19 | End: 2021-02-24 | Stop reason: HOSPADM

## 2021-02-19 RX ORDER — ACETAMINOPHEN 325 MG/1
650 TABLET ORAL EVERY 6 HOURS PRN
Status: DISCONTINUED | OUTPATIENT
Start: 2021-02-19 | End: 2021-02-24 | Stop reason: HOSPADM

## 2021-02-19 RX ADMIN — SODIUM CHLORIDE 2000 ML: 9 INJECTION, SOLUTION INTRAVENOUS at 04:49

## 2021-02-19 RX ADMIN — TRAZODONE HYDROCHLORIDE 50 MG: 50 TABLET ORAL at 21:28

## 2021-02-19 RX ADMIN — HALOPERIDOL 5 MG: 5 TABLET ORAL at 21:28

## 2021-02-19 RX ADMIN — HYDROXYZINE PAMOATE 50 MG: 50 CAPSULE ORAL at 21:28

## 2021-02-19 ASSESSMENT — SLEEP AND FATIGUE QUESTIONNAIRES
RESTFUL SLEEP: NO
AVERAGE NUMBER OF SLEEP HOURS: 2
DIFFICULTY FALLING ASLEEP: YES
DO YOU USE A SLEEP AID: YES
DIFFICULTY ARISING: NO
SLEEP PATTERN: INSOMNIA
DO YOU HAVE DIFFICULTY SLEEPING: YES

## 2021-02-19 ASSESSMENT — PATIENT HEALTH QUESTIONNAIRE - PHQ9
SUM OF ALL RESPONSES TO PHQ QUESTIONS 1-9: 6
SUM OF ALL RESPONSES TO PHQ QUESTIONS 1-9: 4

## 2021-02-19 NOTE — PROGRESS NOTES
`[facility]  Admission Note     Admission Type:   Admission Type: Involuntary    Reason for admission:  Reason for Admission: \"my voices in my head tell me to respect the flesh that I am\"/ Per ER report, patient was sujicidal with plan to freeze to death    PATIENT STRENGTHS:  Strengths: Positive Support, Social Skills, No significant Physical Illness, Communication    Patient Strengths and Limitations:  Limitations: Unrealistic self-view, Lacks leisure interests, Inappropriate/potentially harmful leisure interests, Apathetic / unmotivated, External locus of control, Multiple barriers to leisure interests    Addictive Behavior:   Addictive Behavior  In the past 3 months, have you felt or has someone told you that you have a problem with:  : None  Do you have a history of Chemical Use?: No  Do you have a history of Alcohol Use?: No  Do you have a history of Street Drug Abuse?: Yes  Histroy of Prescripton Drug Abuse?: No    Medical Problems:   Past Medical History:   Diagnosis Date    Depression     Schizoaffective disorder ([facility]        Status EXAM:  Status and Exam  Normal: No  Facial Expression: Worried, Avoids Gaze  Affect: Blunt  Level of Consciousness: Alert  Mood:Normal: No  Mood: Anxious, Depressed, Suspicious  Motor Activity:Normal: No  Motor Activity: Increased, Unusual Posture/Gait  Interview Behavior: Cooperative, Evasive  Preception: [employer] to Person, [employer] to Time, [employer] to Place, [employer] to Situation  Attention:Normal: No  Attention: Unable to Concentrate  Thought Processes: Flt.of Ideas, Loose Assoc., Tangential  Thought Content:Normal: No  Thought Content: Paranoia  Hallucinations:  Auditory (Comment)  Delusions: Yes  Delusions: Persecution  Memory:Normal: No  Memory: Poor Recent  Insight and Judgment: No  Insight and Judgment: Poor Insight, Poor Judgment  Present Suicidal Ideation: No  Present Homicidal Ideation: No    Tobacco Screening: Practical Counseling, on admission, lydia X, if applicable and completed (first 3 are required if patient doesn't refuse): (x )  Recognizing danger situations (included triggers and roadblocks)                    (x )  Coping skills (new ways to manage stress, exercise, relaxation techniques, changing routine, distraction)                                                           (x  Basic information about quitting (benefits of quitting, techniques in how to quit, available resources  ( ) Referral for counseling faxed to [facility]                                           (x ) Patient refused counseling  ( ) Patient has not smoked in the last 30 days    Metabolic Screening:    Lab Results   Component Value Date    LABA1C 5.2 10/27/2019       Lab Results   Component Value Date    CHOL 113 10/20/2020    CHOL 120 10/27/2019     Lab Results   Component Value Date    TRIG 45 10/20/2020    TRIG 52 10/27/2019     Lab Results   Component Value Date    HDL 50 10/20/2020    HDL 42 10/27/2019     No components found for: LDLCAL  Lab Results   Component Value Date    LABVLDL 10 10/27/2019         Body mass index is 21.62 kg/m². BP Readings from Last 2 Encounters:   02/19/21 133/73   01/17/21 108/83           Pt admitted with followings belongings:  Dentures: None  Vision - Corrective Lenses: None  Hearing Aid: None  Were All Patient Medications Collected?: Not Applicable     Valuables sent home with none. Valuables placed in safe in security envelope, number:  none. Patient's home medications were none. Patient oriented to surroundings and program expectations and copy of patient rights given. Received admission packet:  yes. Consents reviewed, signed yes. Refused no. Patient verbalize understanding:  yes.     Patient education on precautions: yes Patient admitted from [facility] states having  auditory hallucinations \"telling me to respect the flesh that I am\". Patient denies thoughts to harm self at this time, was stating in ER, \"I wanted to kill myself by freezing to death\". Patient paces unit and continues to \"rap \" and \"rhyme \". Patient is grandiose and believes he has special fuchs and is famous. Patient thoughts are racing and patient is disorganized, walks away in med sentence. Patient is cooperative and signed consents. Patient diner ordered. Patient oriented to unit, eye contact is poor.  Behavior in control                   Brendon Salvador RN

## 2021-02-19 NOTE — ED NOTES
[facility] in [facility] AN [facility]FFILIATE OF [facility] talking to picture on wall nonsensical redirected back to his room     Silvestre Monaco RN  02/19/21 2837

## 2021-02-19 NOTE — ED PROVIDER NOTES
Pulmonary: Lungs clear to auscultation bilaterally, no wheezes, rales, or rhonchi. Not in respiratory distress  Cardiovascular:  Regular rate. Regular rhythm. No murmurs, gallops, or rubs. 2+ distal pulses  Chest: no chest wall tenderness  Abdomen: Soft. Non tender. Non distended. +BS. No rebound, guarding, or rigidity. No pulsatile masses appreciated. Musculoskeletal: Moves all extremities x 4. Warm and well perfused, no clubbing, cyanosis, or edema. Capillary refill <3 seconds  Skin: warm and dry. No rashes. Neurologic: GCS 15, CN 2-12 grossly intact, no focal deficits, symmetric strength 5/5 in the upper and lower extremities bilaterally  Psych: Suicidal thoughts and positive hallucinations. Patient reporting no plan he reports no homicidal ideation    -------------------------------------------------- RESULTS -------------------------------------------------  I have personally reviewed all laboratory and imaging results for this patient. Results are listed below.      LABS:  Results for orders placed or performed during the hospital encounter of 02/18/21   Urine Drug Screen   Result Value Ref Range    Amphetamine Screen, Urine NOT DETECTED Negative <1000 ng/mL    Barbiturate Screen, Ur NOT DETECTED Negative < 200 ng/mL    Benzodiazepine Screen, Urine NOT DETECTED Negative < 200 ng/mL    Cannabinoid Scrn, Ur NOT DETECTED Negative < 50ng/mL    Cocaine Metabolite Screen, Urine POSITIVE (A) Negative < 300 ng/mL    Opiate Scrn, Ur NOT DETECTED Negative < 300ng/mL    PCP Screen, Urine NOT DETECTED Negative < 25 ng/mL    Methadone Screen, Urine NOT DETECTED Negative <300 ng/mL    Oxycodone Urine NOT DETECTED Negative <100 ng/mL    FENTANYL SCREEN, URINE NOT DETECTED Negative <1 ng/mL    Drug Screen Comment: see below    Serum Drug Screen   Result Value Ref Range    Ethanol Lvl <10 mg/dL    Acetaminophen Level <5.0 (L) 10.0 - 44.8 mcg/mL    Salicylate, Serum <4.4 0.0 - 30.0 mg/dL TCA Scrn NEGATIVE Cutoff:300 ng/mL   CBC Auto Differential   Result Value Ref Range    WBC 4.7 4.5 - 11.5 E9/L    RBC 5.05 3.80 - 5.80 E12/L    Hemoglobin 14.9 12.5 - 16.5 g/dL    Hematocrit 46.5 37.0 - 54.0 %    MCV 92.1 80.0 - 99.9 fL    MCH 29.5 26.0 - 35.0 pg    MCHC 32.0 32.0 - 34.5 %    RDW 13.6 11.5 - 15.0 fL    Platelets 419 822 - 740 E9/L    MPV 9.5 7.0 - 12.0 fL    Neutrophils % 51.4 43.0 - 80.0 %    Immature Granulocytes % 0.4 0.0 - 5.0 %    Lymphocytes % 37.1 20.0 - 42.0 %    Monocytes % 6.4 2.0 - 12.0 %    Eosinophils % 3.2 0.0 - 6.0 %    Basophils % 1.5 0.0 - 2.0 %    Neutrophils Absolute 2.41 1.80 - 7.30 E9/L    Immature Granulocytes # 0.02 E9/L    Lymphocytes Absolute 1.74 1.50 - 4.00 E9/L    Monocytes Absolute 0.30 0.10 - 0.95 E9/L    Eosinophils Absolute 0.15 0.05 - 0.50 E9/L    Basophils Absolute 0.07 0.00 - 0.20 E9/L   Comprehensive Metabolic Panel   Result Value Ref Range    Sodium 141 132 - 146 mmol/L    Potassium 3.7 3.5 - 5.0 mmol/L    Chloride 105 98 - 107 mmol/L    CO2 25 22 - 29 mmol/L    Anion Gap 11 7 - 16 mmol/L    Glucose 64 (L) 74 - 99 mg/dL    BUN 14 6 - 20 mg/dL    CREATININE 1.0 0.7 - 1.2 mg/dL    GFR Non-African American >60 >=60 mL/min/1.73    GFR African American >60     Calcium 8.9 8.6 - 10.2 mg/dL    Total Protein 7.3 6.4 - 8.3 g/dL    Albumin 4.3 3.5 - 5.2 g/dL    Total Bilirubin 0.5 0.0 - 1.2 mg/dL    Alkaline Phosphatase 47 40 - 129 U/L    ALT 15 0 - 40 U/L    AST 42 (H) 0 - 39 U/L   Urinalysis   Result Value Ref Range    Color, UA Yellow Straw/Yellow    Clarity, UA Clear Clear    Glucose, Ur Negative Negative mg/dL    Bilirubin Urine SMALL (A) Negative    Ketones, Urine TRACE (A) Negative mg/dL    Specific Gravity, UA >=1.030 1.005 - 1.030    Blood, Urine Negative Negative    pH, UA 5.5 5.0 - 9.0    Protein, UA Negative Negative mg/dL    Urobilinogen, Urine 1.0 <2.0 E.U./dL    Nitrite, Urine Negative Negative    Leukocyte Esterase, Urine Negative Negative   Troponin Result Value Ref Range    Troponin <0.01 0.00 - 0.03 ng/mL   CK   Result Value Ref Range    Total CK 1,904 (H) 20 - 200 U/L       RADIOLOGY:  Interpreted by Radiologist.  No orders to display       EKG: This EKG is signed and interpreted by me. Interpretation: no acute changes  Comparison: no previous EKG available        ------------------------- NURSING NOTES AND VITALS REVIEWED ---------------------------   The nursing notes within the ED encounter and vital signs as below have been reviewed by myself. /80   Pulse 106   Temp 97 °F (36.1 °C) (Temporal)   Resp 17   SpO2 93%   Oxygen Saturation Interpretation: Normal    The patients available past medical records and past encounters were reviewed. ------------------------------ ED COURSE/MEDICAL DECISION MAKING----------------------  Medications   0.9 % sodium chloride bolus (has no administration in time range)             Medical Decision Making:   Patient presents here because of having suicidal thoughts. Patient reporting no chest pain or difficulty breathing. Patient awake alert oriented. Labs noted reviewed EKG shows no acute ST elevation. Patient's labs noted reviewed. Patient in no distress here as far as any medical issues. Patient is medically clear from my standpoint    Re-Evaluations:             Re-evaluation. Patients symptoms show no change      Consultations:                 Critical Care: This patient's ED course included: a personal history and physicial eaxmination    This patient has been closely monitored during their ED course. Counseling: The emergency provider has spoken with the patient and discussed todays results, in addition to providing specific details for the plan of care and counseling regarding the diagnosis and prognosis. Questions are answered at this time and they are agreeable with the plan. --------------------------------- IMPRESSION AND DISPOSITION ---------------------------------    IMPRESSION  1. Mood disorder ([facility]    2. Substance abuse ([facility]        DISPOSITION  Disposition:  to assist with disposition patient is medically clear  Patient condition is stable        NOTE: This report was transcribed using voice recognition software.  Every effort was made to ensure accuracy; however, inadvertent computerized transcription errors may be present          Osiris Mixon MD  02/18/21 2017       Osiris Mixon MD  02/18/21 5723

## 2021-02-19 NOTE — ED NOTES
Emergency Department PATRICIO Biopsychosocial Assessment Note    Pt is voluntary at this time    Chief Complaint:     Pt is a 44 yo male presenting to the ED for a psych eval, +SI/-HI, non compliant with medication, states he is depressed, denies plan, hearing voices    MSE:    Pt is calm, oriented x 4, alert, flat affect, poor eye contact, pressured speech, admits to SI with plan to freeze to death, admits to [address], denies HI, reports good sleep and poor appetite. Pt likes to eat healthy and that's hard to due when your homeless    Clinical Summary/History:     Pt has a  hx of schizoaffective disorder and depression, Pt reports he stopped taking his medications a few weeks ago as he feels they were not the right ones. Pt is suppose to be connecting with [facility] for [facility] services but has not had his 1st appt yet. Pt reports increased depression with SI, plan to freeze to death. Pt states he has been staying at the [address] [place] [address] but was helping a cousin and stayed out to long and they gave his bed away and now he has no where to go. Pt reports visual hallucinations of dead people and hearing noises. Pt admits to using cocaine yesterday, denies alcohol. Gender  [x] Male [] Female [] Transgender  [] Other    Sexual Orientation    [x] Heterosexual [] Homosexual [] Bisexual [] Other    Suicidal Behavioral: CSSR-S Complete. [x] Reports:    [] Past [x] Present   [] Denies    Homicidal/ Violent Behavior  [] Reports:   [] Past [] Present   [x] Denies     Hallucinations/Delusions   [x] Reports:   [] Denies     Substance Use/Alcohol Use/Addiction: SBIRT Screen Complete. [x] Reports:   [] Denies     Trauma History  [] Reports:  [x] Denies     Collateral Information:     No collateral obtained at this time.     Level of Care/Disposition Plan  [] Home:   [] Outpatient Provider:   [] Crisis Unit:   [x] Inpatient Psychiatric Unit:  [] Other: PATRICIO SW reviewed chart with ED Doc, Pt in need of inpatient admission to ensure safety and stabilization.   Pt to be reviewed when CK level is < 500    PATRICIO SW called and spoke with Sudie Leventhal at [employer] and was told Pt may be able to return but will need to speak with management first.     Toney Dandy, MSW, LSW  02/19/21 3913

## 2021-02-19 NOTE — ED NOTES
The pt was accepted to [facility] room 7903. Disposition called to Katie Harrell in admitting.      [facility]  02/19/21 1600

## 2021-02-19 NOTE — ED NOTES
FIRST PROVIDER CONTACT ASSESSMENT NOTE      Department of Emergency Medicine   ED  First Provider Note   2/18/21  8:11 PM EST    Chief Complaint: Psychiatric Evaluation (+SI/-HI, non compliant with medication, states he is depressed, denies plan, hearing voices)      History of Present Illness:    Júnior Yuan is a 45 y.o. male who presents to the ED by private car for worsening depression, suicidal thoughts and having auditory hallucinations. Patient reports that he is not taking his meds for couple weeks now. Reports normally follows up with [facility]. Does report suicidal ideations but denies any specific plan. He also admits to cocaine use, reports using earlier today. Denies homicidal ideations. Focused Screening Exam:  Constitutional:  Alert, appears stated age and is in no distress.           *ALLERGIES*     Rondec-d [chlophedianol-pseudoephedrine]     ED Triage Vitals   BP Temp Temp Source Pulse Resp SpO2 Height Weight   -- 02/18/21 2002 02/18/21 2002 02/18/21 2002 02/18/21 2010 02/18/21 2002 -- --    97 °F (36.1 °C) Temporal 106 17 93 %          Initial Plan of Care:  Initiate Treatment-Testing, Proceed toTreatment Area When Bed Available for ED Attending/MLP to Continue Care    -----------------END OF FIRST PROVIDER CONTACT ASSESSMENT NOTE--------------  Electronically signed by JUICE Mcdonald CNP   DD: 2/18/21         JUICE Mcdonald CNP  02/18/21 2012

## 2021-02-19 NOTE — ED NOTES
Pt CK level down from 1333 to 1143, pt to be discussed with Momannika for possible admission when beds available on [address]. Advised PATRICIO nurse Isabella Orellana.       [address] [facility], [place] [facility] [place], [place]  02/19/21 [address] [facility], [place]  02/19/21 1148

## 2021-02-19 NOTE — ED NOTES
SW reviewed chart, EKG and Labs complete, pt in need of COVID prior to admission, advised PATRICIO nurse Brenda Bedoya.  FRANCISCO Cordero, [facility]  02/19/21 FRANCISCO Uribe, [place]  02/19/21 0238

## 2021-02-20 LAB — TOTAL CK: 795 U/L (ref 20–200)

## 2021-02-20 PROCEDURE — 6370000000 HC RX 637 (ALT 250 FOR IP): Performed by: PSYCHIATRY & NEUROLOGY

## 2021-02-20 PROCEDURE — 82550 ASSAY OF CK (CPK): CPT

## 2021-02-20 PROCEDURE — 36415 COLL VENOUS BLD VENIPUNCTURE: CPT

## 2021-02-20 PROCEDURE — 99222 1ST HOSP IP/OBS MODERATE 55: CPT | Performed by: PSYCHIATRY & NEUROLOGY

## 2021-02-20 PROCEDURE — 1240000000 HC EMOTIONAL WELLNESS R&B

## 2021-02-20 RX ORDER — PALIPERIDONE 3 MG/1
3 TABLET, EXTENDED RELEASE ORAL 2 TIMES DAILY
Status: DISCONTINUED | OUTPATIENT
Start: 2021-02-20 | End: 2021-02-21

## 2021-02-20 RX ADMIN — TRAZODONE HYDROCHLORIDE 50 MG: 50 TABLET ORAL at 20:59

## 2021-02-20 RX ADMIN — PALIPERIDONE 3 MG: 3 TABLET, EXTENDED RELEASE ORAL at 10:28

## 2021-02-20 RX ADMIN — PALIPERIDONE 3 MG: 3 TABLET, EXTENDED RELEASE ORAL at 15:41

## 2021-02-20 RX ADMIN — HYDROXYZINE PAMOATE 50 MG: 50 CAPSULE ORAL at 20:59

## 2021-02-20 RX ADMIN — HALOPERIDOL 5 MG: 5 TABLET ORAL at 21:00

## 2021-02-20 ASSESSMENT — SLEEP AND FATIGUE QUESTIONNAIRES
DIFFICULTY FALLING ASLEEP: YES
AVERAGE NUMBER OF SLEEP HOURS: 2
SLEEP PATTERN: INSOMNIA

## 2021-02-20 NOTE — CARE COORDINATION
Biopsychosocial Assessment Note    Social work met with patient to complete the biopsychosocial assessment and CSSR-S. Mental Status Exam: Pt was alert and oriented x4. Pt's mood anxious, affect congruent. Pt's thought processes loosely associated at times. Pt denied present SI/HI. Pt denied auditory hallucinations at time of assessment. Chief Complaint: +SI/-HI, non compliant with medication, states he is depressed, denies plan, hearing voices    Patient Report: Pt reported his \"schizoprhenia went into overdrive\". Pt expressed feeling worn and had increase in auditory hallucinations. Pt reported that he brought himself to the ED. Chart review indicates that pt expressed SI with a plan. Pt reported noncompliance with medications. 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 Pt reported that he was informed by [facility] ([place], [place]) that there is housing available for him. Pt receives SSDI. Pt reported having support from friends but not able to stay with them. Pt reported use of crack cocaine every 2-3 days for the past 1.5 years. Pt denied hx of treatment for substance abuse but was open to inpatient tx. Gender  [x] Male [] Female [] Transgender  [] Other    Sexual Orientation    [x] Heterosexual [] Homosexual [] Bisexual [] Other    Suicidal Ideation  [x] Reports [] Denies    Homicidal Ideation  [] Reports [x] Denies      Hallucinations/Delusions (Specify type)  [x] Reports [] Denies     Substance Use/Alcohol Use/Addiction  [x] Reports [] Denies     Trauma History  [] Reports [x] Denies     Collateral Contact (CARL signed)  Name: Nanetta Riedel   Relationship: mother  Number: [phone]    Collateral Information: LVM for mother Jasmyne Chappell at [facility] [phone]    Access to Weapons per Collateral Contact: [] Reports [x] Denies, per pt at this time     Follow up provider preference: Pt is open at [facility] and plans to continue tx there. 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 Pt reported interest in inpatient substance abuse treatment if possible.

## 2021-02-20 NOTE — BH NOTE
Pt is stable, but can be irritable at times. Is not out of control. Pt denies suicidal or homicidal ideations. Pt denies denies hallucinations. Pt is seen talking to unseen others or making random statements. Will follow and monitor.

## 2021-02-20 NOTE — PLAN OF CARE
Pt is stable and without distress. Pt denies suicidal or homicidal ideations. Pt denies hallucinations. Pt reports goal, \"to get better\" pr pt. Pt continues to have loose associations and flight of ideas. Pt mostly stands in the hallway areas and also gestures in front of the TV, occasional verbal statements to unseen others. Will follow and monitor.

## 2021-02-20 NOTE — H&P
Department of Psychiatry  History and Physical - Adult     CHIEF COMPLAINT:  \" I am afraid\"    Patient was seen after discussing with the treatment team and reviewing the chart    CIRCUMSTANCES OF ADMISSION: start ed note    Chris Arboleda. is a 45 y.o. male presenting to the ED for psychiatric evaluation, beginning days ago. The complaint has been persistent, moderate in severity, and worsened by nothing. Patient presenting here because of suicidal thoughts. Patient reporting feeling depressed. Patient denies plan he does admit to auditory hallucinations. Patient reporting no chest pain no difficulty breathing or cough. He reports no abdominal pain. Patient reporting no fever. There is no headache there is no history of trauma. Patient does have history of schizoaffective disorder and depression. End ED note.   + cocaine       HISTORY OF PRESENT ILLNESS:      The patient is a 45 y.o. male with significant past history of Schizoaffective Disorder who came in in context of hearing voices. He is worried that his condition is hereditary and he feels that he was fine without medications but when something stressful happens the voices are right back. \"The voices are justifications the state of matter, its illegal preferences of the more of the outer reaches, how can I put this at levels of communication that some people would overlook\". He was rambling. He noted that he is afraid as he has issues with the voices, not that they are bad but confusing. He admitted to suicidal thoughts yesterday, he had to cry out for help or he would have done something bad to himself. He had thought of doing drugs until he . He started doing cocaine and needed to stop so came in before he killed himself. He feels he had to come in here. paliperidone palmitate ER (INVEGA SUSTENNA) 156 MG/ML STACEY IM injection, Inject 156 mg into the muscle every 30 days for 1 dose Invega Sustenna 156 mg IM q 30 days next injection is due 1/6/21    Past Surgical History:        Procedure Laterality Date    EYE SURGERY  19 years ago       Allergies:   Rondec-d [chlophedianol-pseudoephedrine]    Family History  Family History   Problem Relation Age of Onset    No Known Problems Mother     No Known Problems Father              EXAMINATION:    REVIEW OF SYSTEMS:    ROS:  [x] All negative/unchanged except if checked.  Explain positive(checked items) below:  [] Constitutional  [] Eyes  [] Ear/Nose/Mouth/Throat  [] Respiratory  [] CV  [] GI  []   [] Musculoskeletal  [] Skin/Breast  [] Neurological  [] Endocrine  [] Heme/Lymph  [] Allergic/Immunologic    Explanation: denied all    Vitals:  /73   Pulse 70   Temp 97.6 °F (36.4 °C) (Temporal)   Resp 15   Ht 5' 11\" (1.803 m)   Wt 155 lb (70.3 kg)   SpO2 99%   BMI 21.62 kg/m²      Physical Examination:   Head: x  Atraumatic: x normocephalic  Skin and Mucosa        Moist  Normal   Neck:  Thyroid   Not palpable   venus distention   adenopathy   Chest: x Clear      CV:  xS1   xS2   Abdomen:  x  Soft       Extremities:  x No Edema      Cranial Nerves Examination:   CN II:   xPupils are reactive to light    CN III, IV, VI:  xNo eye deviation    No diplopia or ptosis   CN V:    xFacial Sensation is intact      CN IIIV:   x Hearing is normal to rubbing fingers   CN IX, X:     xNormal gag reflex and phonation   CN XI:   xShoulder shrug and neck rotation is normal  CNXII:    xTongue is midline no deviation or atrophy    Mental Status Examination:    Level of consciousness:  within normal limits   Appearance:  hospital attire  Behavior/Motor:  no abnormalities noted  Attitude toward examiner:  cooperative and attentive  Speech:  normal rate, hyperverbal and overabundance of ideas, rambling   Mood: anxious  Affect:  anxious Thought processes:  overabundance of ideas, illogical, loose associations and tangential   Thought content:  Homocidal ideation denied  Suicidal Ideation:  without intent but is thinking of hurting himself, but dont want to overdose or do anything here, is here for safety per himself  Delusions:  paranoid and persecutory  Perceptual Disturbance: Auditory command in nature telling him to overdose  Cognition:  oriented to person, place, and time   Concentration distractible  Memory intact  Insight poor   Judgement poor   Fund of Knowledge adequate      DIAGNOSIS:  Schizoaffective disorder acute exacerbation with command hallucinations asking him to overdose on drugs            LABS: REVIEWED TODAY:  Recent Labs     02/18/21 2139   WBC 4.7   HGB 14.9        Recent Labs     02/18/21 2139 02/19/21 0212     --    K 3.7  --      --    CO2 25  --    BUN 14  --    CREATININE 1.0  --    GLUCOSE 64* 119     Recent Labs     02/18/21 2139   BILITOT 0.5   ALKPHOS 47   AST 42*   ALT 15     Lab Results   Component Value Date    LABAMPH NOT DETECTED 02/18/2021    BARBSCNU NOT DETECTED 02/18/2021    LABBENZ NOT DETECTED 02/18/2021    LABMETH NOT DETECTED 02/18/2021    OPIATESCREENURINE NOT DETECTED 02/18/2021    PHENCYCLIDINESCREENURINE NOT DETECTED 02/18/2021    ETOH <10 02/18/2021     Lab Results   Component Value Date    TSH 0.462 01/16/2021     No results found for: LITHIUM  Lab Results   Component Value Date    VALPROATE 3 (L) 04/29/2020     Lab Results   Component Value Date    VALPROATE 3 04/29/2020         Radiology No results found.       TREATMENT PLAN: Risk Management: Based on the diagnosis and assessment biopsychosocial treatment model was presented to the patient and was given the opportunity to ask any question. The patient was agreeable to the plan and all the patient's questions were answered to the patient's satisfaction. I discussed with the patient the risk, benefit, alternative and common side effects for the proposed medication treatment. The patient is consenting to this treatment. Collateral Information:  Will obtain collateral information from the family or friends. Will obtain medical records as appropriate from out patient providers  Will consult the hospitalist for a physical exam to rule out any co-morbid physical condition. Home medication Reconciled       New Medications started during this admission :     Paliperidone 3mg po bid, plan for BARROS last injection in December 2020, needs verified    Prn Haldol 5mg and Vistaril 50mg q6hr for extreme agitation. Trazodone as ordered for insomnia  Vistaril as ordered for anxiety      Psychotherapy:   Encourage participation in milieu and group therapy  Individual therapy as needed        [address]  Medicare Certification Upon Admission    I certify that this patient's inpatient psychiatric hospital admission is medically necessary for:    [x] (1) Treatment which could reasonably be expected to improve this patient's condition,       [x] (2) Or for diagnostic study;     AND     [x](2) The inpatient psychiatric services are provided while the individual is under the care of a physician and are included in the individualized plan of care.     Estimated length of stay/service7-10 days    Plan for post-hospital care outpatient follow up    Electronically signed by Amada Beck MD on 2/21/2021 at 2:10 PM        Electronically signed by Amada Beck MD on 2/20/2021 at 10:00 AM

## 2021-02-20 NOTE — GROUP NOTE
Group Therapy Note    Date: 2/20/2021    Group Start Time: 1115  Group End Time: 5965  Group Topic: Cognitive Skills    [facility]    FRANCISCO Mueller, [facility]        Group Therapy Note    Attendees: 20         Patient's Goal:  To gain insight and awareness into cognitive distortions. Notes: Pt was actively engaged in group discussion and activity. Status After Intervention:  Unchanged    Participation Level:  Active Listener and Interactive    Participation Quality: Inappropriate      Speech:  normal      Thought Process/Content: Delusional      Affective Functioning: Congruent      Mood: euthymic      Level of consciousness:  Attentive      Response to Learning: Able to retain information      Endings: None Reported    Modes of Intervention: Support, Socialization and Exploration      Discipline Responsible: /Counselor      Signature:  FRANCISCO Mueller, [place]

## 2021-02-21 PROCEDURE — 6370000000 HC RX 637 (ALT 250 FOR IP): Performed by: PSYCHIATRY & NEUROLOGY

## 2021-02-21 PROCEDURE — 99232 SBSQ HOSP IP/OBS MODERATE 35: CPT | Performed by: PSYCHIATRY & NEUROLOGY

## 2021-02-21 PROCEDURE — 1240000000 HC EMOTIONAL WELLNESS R&B

## 2021-02-21 RX ORDER — PALIPERIDONE 3 MG/1
3 TABLET, EXTENDED RELEASE ORAL DAILY
Status: DISCONTINUED | OUTPATIENT
Start: 2021-02-22 | End: 2021-02-23

## 2021-02-21 RX ORDER — PALIPERIDONE 6 MG/1
6 TABLET, EXTENDED RELEASE ORAL NIGHTLY
Status: DISCONTINUED | OUTPATIENT
Start: 2021-02-21 | End: 2021-02-23

## 2021-02-21 RX ADMIN — TRAZODONE HYDROCHLORIDE 50 MG: 50 TABLET ORAL at 21:42

## 2021-02-21 RX ADMIN — PALIPERIDONE 3 MG: 3 TABLET, EXTENDED RELEASE ORAL at 09:19

## 2021-02-21 RX ADMIN — ACETAMINOPHEN 650 MG: 325 TABLET ORAL at 04:53

## 2021-02-21 RX ADMIN — PALIPERIDONE 6 MG: 6 TABLET, FILM COATED, EXTENDED RELEASE ORAL at 21:42

## 2021-02-21 RX ADMIN — HYDROXYZINE PAMOATE 50 MG: 50 CAPSULE ORAL at 21:42

## 2021-02-21 RX ADMIN — Medication: at 10:27

## 2021-02-21 RX ADMIN — Medication: at 21:43

## 2021-02-21 ASSESSMENT — PAIN SCALES - GENERAL
PAINLEVEL_OUTOF10: 0
PAINLEVEL_OUTOF10: 0
PAINLEVEL_OUTOF10: 10

## 2021-02-21 NOTE — GROUP NOTE
Group Therapy Note    Date: 2/21/2021    Group Start Time: 1000  Group End Time: 1050  Group Topic: Psychoeducation    [facility]    Vijaya Lees, [facility]S        Group Therapy Note    Number of participants: 13  Type of group: Psychoeducation  Mode of intervention: Education, Support, Socialization, Exploration, Clarifying, and Problem-solving  Topic: Overcoming Our Fears  Objective: Pt will identify 3 ways to overcome fears in recovery. Patient's Goal: \"get better\"     Notes:  Pt was interactive during group on discussion on overcoming our fears. Pt gave support and feedback to others. Pt also shared 3 ways to overcome fears in recovery. Status After Intervention:  Improved    Participation Level:  Active Listener and Interactive    Participation Quality: Appropriate, Attentive, Sharing and Supportive      Speech:  normal      Thought Process/Content: Logical      Affective Functioning: Congruent      Mood: euthymic      Level of consciousness:  Alert, Oriented x4 and Attentive      Response to Learning: Able to verbalize current knowledge/experience, Able to verbalize/acknowledge new learning, Able to retain information, Capable of insight, Able to change behavior and Progressing to goal      Endings: None reported      Modes of Intervention: Education, Support, Socialization, Exploration, Clarifying and Problem-solving

## 2021-02-21 NOTE — BH NOTE
[address] [place] [address]  Day 3 Interdisciplinary Treatment Plan NOTE    Review Date & Time: 2-21-21  0900 am    Patient was not in treatment team    Admission Type:   Admission Type: Involuntary    Reason for admission:  Reason for Admission: \"my voices in my head tell me to respect the flesh that I am\"/ Per ER report, patient was sujicidal with plan to freeze to death  Estimated Length of Stay Update:  1-3 days  Estimated Discharge Date Update: 1-3 days    PATIENT STRENGTHS:  Patient Strengths Strengths: Communication, Positive Support, Connection to output provider, No significant Physical Illness, Social Skills  Patient Strengths and Limitations:Limitations: Multiple barriers to leisure interests, Difficulty problem solving/relies on others to help solve problems, Tendency to isolate self  Addictive Behavior:Addictive Behavior  In the past 3 months, have you felt or has someone told you that you have a problem with:  : None  Do you have a history of Chemical Use?: No  Do you have a history of Alcohol Use?: No  Do you have a history of Street Drug Abuse?: Yes  Histroy of Prescripton Drug Abuse?: No  Medical Problems:  Past Medical History:   Diagnosis Date    Depression     Schizoaffective disorder ([facility] [address]        Risk:  Fall RiskTotal: 61  Andrew Scale Andrew Scale Score: 22  BVC Total: 0  Change in scores: 0. Changes to plan of Care: continue to monitor pt progress.      Status EXAM:   Status and Exam  Normal: No  Facial Expression: Worried  Affect: Blunt  Level of Consciousness: Alert  Mood:Normal: No  Mood: Depressed, Anxious  Motor Activity:Normal: Yes  Motor Activity: Increased  Interview Behavior: Cooperative  Preception: [place] to Person, [place] to Time, [place] to Place, [place] to Situation  Attention:Normal: Yes  Attention: Unable to Concentrate  Thought Processes: Tangential  Thought Content:Normal: Yes  Thought Content: Preoccupations Hallucinations: Other (Comment)(can be seen talking to unseen others at times.)  Delusions: No  Delusions: Persecution  Memory:Normal: Yes  Memory: Poor Recent  Insight and Judgment: No  Insight and Judgment: Poor Insight, Poor Judgment  Present Suicidal Ideation: No  Present Homicidal Ideation: No    Daily Assessment Last Entry:             Patient Currently in Pain: Denies       Patient Monitoring:       Psychiatric Symptoms:                    Suicide Risk CSSR-S:  1) Within the past month, have you wished you were dead or wished you could go to sleep and not wake up? : Yes  2) Have you actually had any thoughts of killing yourself? : Yes  3) Have you been thinking about how you might kill yourself? : No  5) Have you started to work out or worked out the details of how to kill yourself? Do you intend to carry out this plan? : No  6) Have you ever done anything, started to do anything, or prepared to do anything to end your life?: No  Change in Result: yes, pt denies all. Change in Plan of care: continue to monitor and assess pt progress. Adjustments as needed. EDUCATION:   Learner Progress Toward Treatment Goals: Reviewed results and recommendations of this team and Reviewed group plan and strategies    Method: Small group    Outcome: Verbalized understanding    PATIENT GOALS: pt does not report a goal during morning assessment. PLAN/TREATMENT RECOMMENDATIONS UPDATE: continue to monitor pt progress. GOALS UPDATE:   Time frame for Short-Term Goals: Daily re assessments.        Juliane Spangler RN

## 2021-02-21 NOTE — PROGRESS NOTES
 Smokeless tobacco: Never Used   Substance and Sexual Activity    Alcohol use: No    Drug use: Yes     Types: Marijuana, Cocaine     Comment: doesnt want to say    Sexual activity: Not on file     Comment: last use cocaine today 2/18/21   Lifestyle    Physical activity     Days per week: Not on file     Minutes per session: Not on file    Stress: Not on file   Relationships    Social connections     Talks on phone: Not on file     Gets together: Not on file     Attends [employer]ism service: Not on file     Active member of club or organization: Not on file     Attends meetings of clubs or organizations: Not on file     Relationship status: Not on file    Intimate partner violence     Fear of current or ex partner: Not on file     Emotionally abused: Not on file     Physically abused: Not on file     Forced sexual activity: Not on file   Other Topics Concern    Not on file   Social History Narrative    Not on file           ROS:  [x] All negative/unchanged except if checked.  Explain positive(checked items) below:  [] Constitutional  [] Eyes  [] Ear/Nose/Mouth/Throat  [] Respiratory  [] CV  [] GI  []   [] Musculoskeletal  [] Skin/Breast  [] Neurological  [] Endocrine  [] Heme/Lymph  [] Allergic/Immunologic    Explanation:     MEDICATIONS:    Current Facility-Administered Medications:     benzocaine (ORAJEL) 20 % mucosal gel, , Mouth/Throat, BID PRN, Jackie Hsu MD, Given at 02/21/21 1027    [START ON 2/22/2021] paliperidone (INVEGA) extended release tablet 3 mg, 3 mg, Oral, Daily, Jackie Hsu MD    paliperidone [facility]) extended release tablet 6 mg, 6 mg, Oral, Nightly, Jackie Hsu MD    acetaminophen (TYLENOL) tablet 650 mg, 650 mg, Oral, Q6H PRN, Lance Lemos MD, 650 mg at 02/21/21 2865    magnesium hydroxide (MILK OF MAGNESIA) 400 MG/5ML suspension 30 mL, 30 mL, Oral, Daily PRN, Lance Lemos MD   aluminum & magnesium hydroxide-simethicone (MAALOX) 200-200-20 MG/5ML suspension 30 mL, 30 mL, Oral, PRN, Amy Godinez MD    hydrOXYzine (VISTARIL) capsule 50 mg, 50 mg, Oral, TID PRN, Amy Godinez MD, 50 mg at 02/20/21 2059    traZODone (DESYREL) tablet 50 mg, 50 mg, Oral, Nightly PRN, Amy Godinez MD, 50 mg at 02/20/21 2059    nicotine (NICODERM CQ) 21 MG/24HR 1 patch, 1 patch, Transdermal, Daily, Amy Godinez MD, 1 patch at 02/21/21 0919    haloperidol (HALDOL) tablet 5 mg, 5 mg, Oral, Q6H PRN, Amy Godinez MD, 5 mg at 02/20/21 2100      Examination:  /73   Pulse 83   Temp 97.8 °F (36.6 °C) (Temporal)   Resp 14   Ht 5' 11\" (1.803 m)   Wt 155 lb (70.3 kg)   SpO2 97%   BMI 21.62 kg/m²   Gait - steady  Medication side effects(SE): denied    Mental Status Examination:    Level of consciousness:  within normal limits   Appearance:  good grooming and good hygiene  Behavior/Motor:  no abnormalities noted  Attitude toward examiner:  cooperative and attentive  Speech:  normal rate and whispered   Mood: anxious  Affect:  anxious  Thought processes:  linear and goal directed   Thought content:  Homocidal ideation denied  Suicidal Ideation:  denies suicidal ideation  Delusions:  paranoid  Perceptual Disturbance:  auditory, command  Cognition:  oriented to person, place, and time   Concentration intact  Insight poor   Judgement poor     ASSESSMENT:Schizoaffective disorder acute exacerbation with command hallucinations asking him to overdose on drugs     Patient symptoms are:  [] Well controlled  [x] Improving  [] Worsening  [] No change      Diagnosis: Schizoaffective disorder acute exacerbation with command hallucinations asking him to overdose on drugs     Active Problems:    Schizophrenia ([address]  Resolved Problems:    * No resolved hospital problems.  *      LABS:    Recent Labs     02/18/21 2139   WBC 4.7   HGB 14.9        Recent Labs     02/18/21 2139 02/19/21  [id number]

## 2021-02-21 NOTE — GROUP NOTE
Group Therapy Note    Date: 2/21/2021    Group Start Time: 1115  Group End Time: 1113  Group Topic: Cognitive Skills    [facility] [address], [place]W, DERECKW        Group Therapy Note    Attendees: 14         Patient's Goal: To participate in writing activity exploring feelings related to barriers. Notes: Pt was engaged in group mostly through active listening. Status After Intervention:  Unchanged    Participation Level:  Active Listener    Participation Quality: Appropriate and Attentive      Speech:  normal      Thought Process/Content: Logical  Linear      Affective Functioning: Congruent      Mood: anxious      Level of consciousness:  Alert and Oriented x4      Response to Learning: Able to retain information      Endings: None Reported    Modes of Intervention: Education, Support, Socialization, Exploration, Clarifying, Problem-solving and Activity      Discipline Responsible: /Counselor      Signature:  FRANCISCO Car, MAJOR

## 2021-02-21 NOTE — PROGRESS NOTES
Patient isolates in room and comes out occasionally. Makes needs known when he comes out. Does not stay out on unit when many other patients are out. Does come out at night when most of his peers are in bed. Has remained in control. No PRNs needed. Was not irritable this shift so far. Does appear internally stimulated. Patient remains flat and depressed. Visual hallucinations but did not elaborate. Paces and a bit anxious. Came out for snack, took HS meds.

## 2021-02-21 NOTE — BH NOTE
Pt is stable and alert. Pt is cooperative and in control. Pt denies suicidal or homicidal ideations. Pt denies hallucinations. No other behavioral concerns presently. Will follow and monitor.

## 2021-02-21 NOTE — PLAN OF CARE
Pt is stable and without major distress. Pt denies suicidal or homicidal ideations. Pt denies hallucinations. Pt is however, seen talking to the television or to unseen others at times. These episodes are not frequent. Pt does not report a goal during morning assessment. Pt remains in overall control. Will follow and monitor.

## 2021-02-22 PROCEDURE — 1240000000 HC EMOTIONAL WELLNESS R&B

## 2021-02-22 PROCEDURE — 99232 SBSQ HOSP IP/OBS MODERATE 35: CPT | Performed by: NURSE PRACTITIONER

## 2021-02-22 PROCEDURE — 6370000000 HC RX 637 (ALT 250 FOR IP): Performed by: PSYCHIATRY & NEUROLOGY

## 2021-02-22 RX ADMIN — ACETAMINOPHEN 650 MG: 325 TABLET ORAL at 21:22

## 2021-02-22 RX ADMIN — TRAZODONE HYDROCHLORIDE 50 MG: 50 TABLET ORAL at 21:18

## 2021-02-22 RX ADMIN — Medication: at 21:25

## 2021-02-22 RX ADMIN — Medication: at 09:19

## 2021-02-22 RX ADMIN — PALIPERIDONE 3 MG: 3 TABLET, EXTENDED RELEASE ORAL at 09:16

## 2021-02-22 RX ADMIN — PALIPERIDONE 6 MG: 6 TABLET, FILM COATED, EXTENDED RELEASE ORAL at 21:18

## 2021-02-22 ASSESSMENT — PAIN SCALES - GENERAL: PAINLEVEL_OUTOF10: 0

## 2021-02-22 NOTE — PROGRESS NOTES
BEHAVIORAL HEALTH FOLLOW-UP NOTE     2/22/2021     Patient was seen and examined in person, Chart reviewed   Patient's case discussed with staff/team    Chief Complaint:\" I'm tired\"    Interim History: Patient seen in his room his affect is flat and blunted he is offering very little conversation. He states he feels tired. He is isolative to his room he is not attending groups or socializing with peers. He makes very poor eye contact speech is low in rate low in tone.   He appears guarded and paranoid endorses auditory hallucinations      Appetite:  [] Normal/Unchanged  [x] Increased  [] Decreased      Sleep:       [x] Normal/Unchanged  [] Fair       [] Poor              Energy:    [] Normal/Unchanged  [] Increased  [x] Decreased        SI [] Present  [x] Absent    HI  []Present  [x] Absent     Aggression:  [] yes  [x] no    Patient is [x] able  [] unable to CONTRACT FOR SAFETY     PAST MEDICAL/PSYCHIATRIC HISTORY:   Past Medical History:   Diagnosis Date    Depression     Schizoaffective disorder ([facility] [address]        FAMILY/SOCIAL HISTORY:  Family History   Problem Relation Age of Onset    No Known Problems Mother     No Known Problems Father      Social History     Socioeconomic History    Marital status: Single     Spouse name: Not on file    Number of children: 0    Years of education: 12    Highest education level: Not on file   Occupational History    Not on file   Social Needs    Financial resource strain: Not on file    Food insecurity     Worry: Not on file     Inability: Not on file   [employer] needs     Medical: Not on file     Non-medical: Not on file   Tobacco Use    Smoking status: Current Every Day Smoker     Packs/day: 0.50     Years: 24.00     Pack years: 12.00     Types: Cigars, Cigarettes    Smokeless tobacco: Never Used   Substance and Sexual Activity    Alcohol use: No    Drug use: Yes     Types: Marijuana, Cocaine     Comment: doesnt want to say  Sexual activity: Not on file     Comment: last use cocaine today 2/18/21   Lifestyle    Physical activity     Days per week: Not on file     Minutes per session: Not on file    Stress: Not on file   Relationships    Social connections     Talks on phone: Not on file     Gets together: Not on file     Attends Rastafari service: Not on file     Active member of club or organization: Not on file     Attends meetings of clubs or organizations: Not on file     Relationship status: Not on file    Intimate partner violence     Fear of current or ex partner: Not on file     Emotionally abused: Not on file     Physically abused: Not on file     Forced sexual activity: Not on file   Other Topics Concern    Not on file   Social History Narrative    Not on file           ROS:  [x] All negative/unchanged except if checked.  Explain positive(checked items) below:  [] Constitutional  [] Eyes  [] Ear/Nose/Mouth/Throat  [] Respiratory  [] CV  [] GI  []   [] Musculoskeletal  [] Skin/Breast  [] Neurological  [] Endocrine  [] Heme/Lymph  [] Allergic/Immunologic    Explanation:     MEDICATIONS:    Current Facility-Administered Medications:     benzocaine (ORAJEL) 20 % mucosal gel, , Mouth/Throat, BID PRN, Jackelyn Vizcaino MD, Given at 02/22/21 0919    paliperidone (INVEGA) extended release tablet 3 mg, 3 mg, Oral, Daily, Jackelyn Vizcaino MD, 3 mg at 02/22/21 8321    paliperidone (INVEGA) extended release tablet 6 mg, 6 mg, Oral, Nightly, Jackelyn Vizcaino MD, 6 mg at 02/21/21 2142    acetaminophen (TYLENOL) tablet 650 mg, 650 mg, Oral, Q6H PRN, Sona Murrell MD, 650 mg at 02/21/21 0453    magnesium hydroxide (MILK OF MAGNESIA) 400 MG/5ML suspension 30 mL, 30 mL, Oral, Daily PRN, Sona Murrell MD    aluminum & magnesium hydroxide-simethicone (MAALOX) 200-200-20 MG/5ML suspension 30 mL, 30 mL, Oral, PRN, Sona Murrell MD   hydrOXYzine (VISTARIL) capsule 50 mg, 50 mg, Oral, TID PRN, Dinh Cassidy MD, 50 mg at 02/21/21 2142    traZODone (DESYREL) tablet 50 mg, 50 mg, Oral, Nightly PRN, Dinh Cassidy MD, 50 mg at 02/21/21 2142    nicotine (NICODERM CQ) 21 MG/24HR 1 patch, 1 patch, Transdermal, Daily, Dinh Cassidy MD, 1 patch at 02/22/21 0916    haloperidol (HALDOL) tablet 5 mg, 5 mg, Oral, Q6H PRN, Dinh Cassidy MD, 5 mg at 02/20/21 2100      Examination:  BP (!) 93/55   Pulse 74   Temp 97.8 °F (36.6 °C) (Oral)   Resp 15   Ht 5' 11\" (1.803 m)   Wt 155 lb (70.3 kg)   SpO2 98%   BMI 21.62 kg/m²   Gait - steady  Medication side effects(SE): denied    Mental Status Examination:    Level of consciousness:  within normal limits   Appearance:  good grooming and good hygiene  Behavior/Motor:  no abnormalities noted  Attitude toward examiner:  cooperative and attentive  Speech:  normal rate and whispered   Mood: anxious  Affect:  anxious  Thought processes:  linear and goal directed   Thought content:  Homocidal ideation denied  Suicidal Ideation:  denies suicidal ideation  Delusions:  paranoid  Perceptual Disturbance:  auditory, command  Cognition:  oriented to person, place, and time   Concentration intact  Insight poor   Judgement poor     ASSESSMENT:Schizoaffective disorder acute exacerbation with command hallucinations asking him to overdose on drugs     Patient symptoms are:  [] Well controlled  [x] Improving  [] Worsening  [] No change      Diagnosis: Schizoaffective disorder acute exacerbation with command hallucinations asking him to overdose on drugs     Principal Problem:    Schizoaffective disorder, bipolar type ([facility] [address]  Active Problems:    [facility]  Resolved Problems:    * No resolved hospital problems. *      LABS:    No results for input(s): WBC, HGB, PLT in the last 72 hours. No results for input(s): NA, K, CL, CO2, BUN, CREATININE, GLUCOSE in the last 72 hours. No results for input(s): BILITOT, ALKPHOS, AST, ALT in the last 72 hours. Lab Results   Component Value Date    LABAMPH NOT DETECTED 02/18/2021    BARBSCNU NOT DETECTED 02/18/2021    LABBENZ NOT DETECTED 02/18/2021    LABMETH NOT DETECTED 02/18/2021    OPIATESCREENURINE NOT DETECTED 02/18/2021    PHENCYCLIDINESCREENURINE NOT DETECTED 02/18/2021    ETOH <10 02/18/2021     Lab Results   Component Value Date    TSH 0.462 01/16/2021     No results found for: LITHIUM  Lab Results   Component Value Date    VALPROATE 3 (L) 04/29/2020       RISK ASSESSMENT: high risk due to paranoia    Treatment Plan:  Reviewed current Medications with the patient. Continue paliperidone to  3mg po daily and 6mg nightly  He refused BARROS, he noted that he will not take an injectable, he refused cogentin due to side effects    Risks, benefits, side effects, drug-to-drug interactions and alternatives to treatment were discussed. Collateral information:   CD evaluation  Encourage patient to attend group and other milieu activities.   Discharge planning discussed with the patient and treatment team.    PSYCHOTHERAPY/COUNSELING:  [x] Therapeutic interview  [x] Supportive  [] CBT  [] Ongoing  [] Other    [x] Patient continues to need, on a daily basis, active treatment furnished directly by or requiring the supervision of inpatient psychiatric personnel      Anticipated Length of stay:7-10 days            Electronically signed by JUICE Scott CNP on 4/82/6888 at 3:24 PM

## 2021-02-22 NOTE — PLAN OF CARE
Patient is isolative but is free from any behavioral disturbances. Patient is evasive during assessment and presents suspicious and watchful. He notes he is here because he was hearing voices but he had arrived at the hospital before the voices could tell him to do bad things. Patient denies SI, HI, and AVH. Patient insight and judgement improving. He is medication compliant. At times he is seen talking to himself. Impaired concentration and some internal stimulation. Will monitor closely.

## 2021-02-22 NOTE — GROUP NOTE
Group Therapy Note    Date: 2/22/2021    Group Start Time: 0115  Group End Time: 0200  Group Topic: Cognitive Skills    [facility]    FRANCISCO Alicia LSW        Group Therapy Note    Attendees: 14         Patient's Goal: To identify,develop or build  healthy habits. Notes:  Pt was observed to be attentive throughout group session. Status After Intervention:  Improved    Participation Level:  Active Listener and Interactive    Participation Quality: Appropriate, Attentive, Sharing and Supportive      Speech:  normal      Thought Process/Content: Logical      Affective Functioning: Congruent      Mood: depressed      Level of consciousness:  Alert, Oriented x4 and Attentive      Response to Learning: Able to verbalize/acknowledge new learning      Endings: None Reported    Modes of Intervention: Education, Support and Socialization      Discipline Responsible: /Counselor      Signature:  FRANCISCO Alicia LSW

## 2021-02-22 NOTE — PROGRESS NOTES
This note will not be viewable in [employer] for the following reason(s). This is a Psychotherapy Note. Patient has been isolated to room most of evening . Patient denies SI,HI and hallucinations. Patient denies depression or anxiety. States that his toothache is relieved with the orajel.  Voices no questions or concerns at this time will continue to monitor and observe

## 2021-02-23 PROCEDURE — 6370000000 HC RX 637 (ALT 250 FOR IP): Performed by: PSYCHIATRY & NEUROLOGY

## 2021-02-23 PROCEDURE — 1240000000 HC EMOTIONAL WELLNESS R&B

## 2021-02-23 PROCEDURE — 99232 SBSQ HOSP IP/OBS MODERATE 35: CPT | Performed by: NURSE PRACTITIONER

## 2021-02-23 PROCEDURE — 6370000000 HC RX 637 (ALT 250 FOR IP): Performed by: NURSE PRACTITIONER

## 2021-02-23 RX ORDER — PALIPERIDONE 6 MG/1
6 TABLET, EXTENDED RELEASE ORAL 2 TIMES DAILY
Status: DISCONTINUED | OUTPATIENT
Start: 2021-02-23 | End: 2021-02-24

## 2021-02-23 RX ADMIN — TRAZODONE HYDROCHLORIDE 50 MG: 50 TABLET ORAL at 20:47

## 2021-02-23 RX ADMIN — PALIPERIDONE 6 MG: 6 TABLET, EXTENDED RELEASE ORAL at 20:46

## 2021-02-23 RX ADMIN — ACETAMINOPHEN 650 MG: 325 TABLET ORAL at 20:47

## 2021-02-23 RX ADMIN — Medication: at 08:54

## 2021-02-23 RX ADMIN — PALIPERIDONE 3 MG: 3 TABLET, EXTENDED RELEASE ORAL at 08:55

## 2021-02-23 ASSESSMENT — PAIN SCALES - GENERAL
PAINLEVEL_OUTOF10: 0
PAINLEVEL_OUTOF10: 5

## 2021-02-23 NOTE — GROUP NOTE
Group Therapy Note    Date: 2/23/2021    Group Start Time: 1100  Group End Time: 8970  Group Topic: Cognitive Skills    [facility]    FRANCISCO Banda LSW        Group Therapy Note    Attendees: 13           Patient's Goal:  To understand the importance of establishing personal boundaries. Notes:  Pt was observed to be attentive throughout group discussion. Status After Intervention:  Improved    Participation Level:  Active Listener    Participation Quality: Attentive      Speech:  normal      Thought Process/Content: Logical      Affective Functioning: Congruent      Mood: depressed      Level of consciousness:  Alert, Oriented x4 and Attentive      Response to Learning: Able to verbalize current knowledge/experience      Endings: None Reported    Modes of Intervention: Education      Discipline Responsible: /Counselor      Signature:  FRANCISCO Banda LSW

## 2021-02-23 NOTE — PLAN OF CARE
Patient is pleasant but isolative. He remains in his room but attends occasional groups. Patient is anxious but he notes it is improving. He reports that he is anxious and excited about his date on the first of the month. Patient is evasive during assessment. Patient denies SI, HI, AVH. Patient insight and judgement improving. No behavioral issues. Will monitor closely.  Patient is free from paranoia

## 2021-02-23 NOTE — PLAN OF CARE
Problem: Depressive Behavior With or Without Suicide Precautions:  Goal: Able to verbalize acceptance of life and situations over which he or she has no control  Description: Able to verbalize acceptance of life and situations over which he or she has no control  Outcome: Ongoing     Problem: Depressive Behavior With or Without Suicide Precautions:  Goal: Able to verbalize and/or display a decrease in depressive symptoms  Description: Able to verbalize and/or display a decrease in depressive symptoms  2/22/2021 2052 by Pablito Horn RN  Outcome: Ongoing     Patient has been withdrawn to his room. Avoids eye contact during conversation. Has a flat affect. Depressed mood. Evasive. Denies suicidal/homicidal ideations and hallucinations. Purposeful rounding continued.

## 2021-02-23 NOTE — PROGRESS NOTES
BEHAVIORAL HEALTH FOLLOW-UP NOTE     2/23/2021     Patient was seen and examined in person, Chart reviewed   Patient's case discussed with staff/team    Chief Complaint:\" I am not illiterate\"    Interim History: Patient came out of his room today highly agitated yelling \"I am not illiterate. He is mostly isolative on the unit not attending groups and socializing with peers. Very irritable easily agitated states he is ready to leave his very poor insight and judgment. When I explained to patient that he has not been attending groups has not been participating in treatment and he would not be discharged today he became agitated stating \"I  am not illiterate. \"      Appetite:  [] Normal/Unchanged  [x] Increased  [] Decreased      Sleep:       [x] Normal/Unchanged  [] Fair       [] Poor              Energy:    [] Normal/Unchanged  [] Increased  [x] Decreased        SI [] Present  [x] Absent    HI  []Present  [x] Absent     Aggression:  [] yes  [x] no    Patient is [x] able  [] unable to CONTRACT FOR SAFETY     PAST MEDICAL/PSYCHIATRIC HISTORY:   Past Medical History:   Diagnosis Date    Depression     [facility]        FAMILY/SOCIAL HISTORY:  Family History   Problem Relation Age of Onset    No Known Problems Mother     No Known Problems Father      Social History     Socioeconomic History    Marital status: Single     Spouse name: Not on file    Number of children: 0    Years of education: 12    Highest education level: Not on file   Occupational History    Not on file   Social Needs    Financial resource strain: Not on file    Food insecurity     Worry: Not on file     Inability: Not on file   [employer] needs     Medical: Not on file     Non-medical: Not on file   Tobacco Use    Smoking status: Current Every Day Smoker     Packs/day: 0.50     Years: 24.00     Pack years: 12.00     Types: Cigars, Cigarettes    Smokeless tobacco: Never Used   Substance and Sexual Activity  Alcohol use: No    Drug use: Yes     Types: Marijuana, Cocaine     Comment: doesnt want to say    Sexual activity: Not on file     Comment: last use cocaine today 2/18/21   Lifestyle    Physical activity     Days per week: Not on file     Minutes per session: Not on file    Stress: Not on file   Relationships    Social connections     Talks on phone: Not on file     Gets together: Not on file     Attends Episcopal service: Not on file     Active member of club or organization: Not on file     Attends meetings of clubs or organizations: Not on file     Relationship status: Not on file    Intimate partner violence     Fear of current or ex partner: Not on file     Emotionally abused: Not on file     Physically abused: Not on file     Forced sexual activity: Not on file   Other Topics Concern    Not on file   Social History Narrative    Not on file           ROS:  [x] All negative/unchanged except if checked.  Explain positive(checked items) below:  [] Constitutional  [] Eyes  [] Ear/Nose/Mouth/Throat  [] Respiratory  [] CV  [] GI  []   [] Musculoskeletal  [] Skin/Breast  [] Neurological  [] Endocrine  [] Heme/Lymph  [] Allergic/Immunologic    Explanation:     MEDICATIONS:    Current Facility-Administered Medications:     benzocaine (ORAJEL) 20 % mucosal gel, , Mouth/Throat, BID PRN, Escobar Rosario MD, Given at 02/23/21 0854    paliperidone (INVEGA) extended release tablet 3 mg, 3 mg, Oral, Daily, Escobar Rosario MD, 3 mg at 02/23/21 0855    paliperidone (INVEGA) extended release tablet 6 mg, 6 mg, Oral, Nightly, Escobar Rosario MD, 6 mg at 02/22/21 2118    acetaminophen (TYLENOL) tablet 650 mg, 650 mg, Oral, Q6H PRN, Lawson Reyes MD, 650 mg at 02/22/21 2122    magnesium hydroxide (MILK OF MAGNESIA) 400 MG/5ML suspension 30 mL, 30 mL, Oral, Daily PRN, Lawson Reyes MD   aluminum & magnesium hydroxide-simethicone (MAALOX) 200-200-20 MG/5ML suspension 30 mL, 30 mL, Oral, PRN, Aisha Tirado MD    hydrOXYzine (VISTARIL) capsule 50 mg, 50 mg, Oral, TID PRN, Aishasalina Tirado MD, 50 mg at 02/21/21 2142    traZODone (DESYREL) tablet 50 mg, 50 mg, Oral, Nightly PRN, Aishasalina Tirado MD, 50 mg at 02/22/21 2118    nicotine (NICODERM CQ) 21 MG/24HR 1 patch, 1 patch, Transdermal, Daily, Aisha Tirado MD, 1 patch at 02/23/21 0855    haloperidol (HALDOL) tablet 5 mg, 5 mg, Oral, Q6H PRN, Aishasalina Tirado MD, 5 mg at 02/20/21 2100      Examination:  BP (!) 95/59   Pulse 54   Temp 97.8 °F (36.6 °C) (Temporal)   Resp 14   Ht 5' 11\" (1.803 m)   Wt 155 lb (70.3 kg)   SpO2 98%   BMI 21.62 kg/m²   Gait - steady  Medication side effects(SE): denied    Mental Status Examination:    Level of consciousness:  within normal limits   Appearance:  good grooming and good hygiene  Behavior/Motor:  no abnormalities noted  Attitude toward examiner:  cooperative and attentive  Speech:  normal rate and whispered   Mood: anxious  Affect:  anxious  Thought processes:  linear and goal directed   Thought content:  Homocidal ideation denied  Suicidal Ideation:  denies suicidal ideation  Delusions:  paranoid  Perceptual Disturbance:  auditory, command  Cognition:  oriented to person, place, and time   Concentration intact  Insight poor   Judgement poor     ASSESSMENT:Schizoaffective disorder acute exacerbation with command hallucinations asking him to overdose on drugs     Patient symptoms are:  [] Well controlled  [x] Improving  [] Worsening  [] No change      Diagnosis: Schizoaffective disorder acute exacerbation with command hallucinations asking him to overdose on drugs     Principal Problem:    Schizoaffective disorder, bipolar type ([facility] [address]  Active Problems:    [facility]  Resolved Problems:    * No resolved hospital problems.  *      LABS: No results for input(s): WBC, HGB, PLT in the last 72 hours. No results for input(s): NA, K, CL, CO2, BUN, CREATININE, GLUCOSE in the last 72 hours. No results for input(s): BILITOT, ALKPHOS, AST, ALT in the last 72 hours. Lab Results   Component Value Date    LABAMPH NOT DETECTED 02/18/2021    BARBSCNU NOT DETECTED 02/18/2021    LABBENZ NOT DETECTED 02/18/2021    LABMETH NOT DETECTED 02/18/2021    OPIATESCREENURINE NOT DETECTED 02/18/2021    PHENCYCLIDINESCREENURINE NOT DETECTED 02/18/2021    ETOH <10 02/18/2021     Lab Results   Component Value Date    TSH 0.462 01/16/2021     No results found for: LITHIUM  Lab Results   Component Value Date    VALPROATE 3 (L) 04/29/2020       RISK ASSESSMENT: high risk due to paranoia    Treatment Plan:  Reviewed current Medications with the patient. Increase paliperidone to  6 mg po daily and 6 mg nightly  He refused BARROS, he noted that he will not take an injectable, he refused cogentin due to side effects    Risks, benefits, side effects, drug-to-drug interactions and alternatives to treatment were discussed. Collateral information:   CD evaluation  Encourage patient to attend group and other milieu activities.   Discharge planning discussed with the patient and treatment team.    PSYCHOTHERAPY/COUNSELING:  [x] Therapeutic interview  [x] Supportive  [] CBT  [] Ongoing  [] Other    [x] Patient continues to need, on a daily basis, active treatment furnished directly by or requiring the supervision of inpatient psychiatric personnel      Anticipated Length of stay:7-10 days            Electronically signed by JUICE Higgins CNP on 2/53/8217 at 3:43 PM

## 2021-02-23 NOTE — CARE COORDINATION
SW attempted to contact his mother Tamera Lemus ([phone]) today for collateral information, no answer, left voicemail. SW met with pt to discuss discharge plan. Pt was alert/oriented, very pleasant, had lucid thought process, normal affect, stated that he is feeling better now that he is taking Invega. Pt reported that he was staying at the [facility] but cannot return there at discharge. He stated that he was approved for [facility] but cannot get an apartment until he receives his check at the beginning of next month. Pt reported that he does not have any support and does not speak to his mother often. Pt is active with [facility] in [facility], reported that he plans on going to the Crisis Unit when discharged. Per CM, pt has a [facility]ocate- Lawson Chang ([phone]).      Tiffany Narayanan MSW, MAKENZIE

## 2021-02-24 VITALS
TEMPERATURE: 98.4 F | OXYGEN SATURATION: 98 % | HEIGHT: 71 IN | BODY MASS INDEX: 21.7 KG/M2 | HEART RATE: 58 BPM | SYSTOLIC BLOOD PRESSURE: 109 MMHG | DIASTOLIC BLOOD PRESSURE: 55 MMHG | WEIGHT: 155 LBS | RESPIRATION RATE: 16 BRPM

## 2021-02-24 PROCEDURE — 6370000000 HC RX 637 (ALT 250 FOR IP): Performed by: NURSE PRACTITIONER

## 2021-02-24 PROCEDURE — 6370000000 HC RX 637 (ALT 250 FOR IP): Performed by: PSYCHIATRY & NEUROLOGY

## 2021-02-24 PROCEDURE — 99239 HOSP IP/OBS DSCHRG MGMT >30: CPT | Performed by: NURSE PRACTITIONER

## 2021-02-24 RX ORDER — PALIPERIDONE 6 MG/1
6 TABLET, EXTENDED RELEASE ORAL DAILY
Status: DISCONTINUED | OUTPATIENT
Start: 2021-02-25 | End: 2021-02-24 | Stop reason: HOSPADM

## 2021-02-24 RX ORDER — PALIPERIDONE 3 MG/1
3 TABLET, EXTENDED RELEASE ORAL NIGHTLY
Qty: 30 TABLET | Refills: 0 | Status: ON HOLD | OUTPATIENT
Start: 2021-02-24 | End: 2021-04-15 | Stop reason: HOSPADM

## 2021-02-24 RX ORDER — PALIPERIDONE 6 MG/1
6 TABLET, EXTENDED RELEASE ORAL DAILY
Qty: 30 TABLET | Refills: 0 | Status: ON HOLD | OUTPATIENT
Start: 2021-02-25 | End: 2021-04-15 | Stop reason: HOSPADM

## 2021-02-24 RX ORDER — PALIPERIDONE 3 MG/1
3 TABLET, EXTENDED RELEASE ORAL NIGHTLY
Status: DISCONTINUED | OUTPATIENT
Start: 2021-02-24 | End: 2021-02-24 | Stop reason: HOSPADM

## 2021-02-24 RX ADMIN — PALIPERIDONE 6 MG: 6 TABLET, EXTENDED RELEASE ORAL at 08:54

## 2021-02-24 RX ADMIN — Medication: at 08:54

## 2021-02-24 NOTE — CARE COORDINATION
ANTONIO called [employer] and faxed information. Awaiting acceptance.     FRANCISCO Schroeder, MAKENZIE

## 2021-02-24 NOTE — PROGRESS NOTES
[facility]  Discharge Note    Pt discharged with followings belongings:   Dentures: None  Vision - Corrective Lenses: None  Hearing Aid: None  Clothing: Footwear, Jacket / coat, Pants, Shirt, Socks  Were All Patient Medications Collected?: Not Applicable   Valuables sent home with pt. Patient education on aftercare instructions: yes Patient verbalize understanding of AVS:  yes.     Status EXAM upon discharge:  Status and Exam  Normal: Yes  Facial Expression: Brightened  Affect: Appropriate  Level of Consciousness: Alert  Mood:Normal: No  Mood: Anxious, Sad  Motor Activity:Normal: No  Motor Activity: Decreased  Interview Behavior: Cooperative  Preception: Lena to Person, [place]gen [place] to Time, Lena to Place, Lena to Situation  Attention:Normal: No  Attention: Others(See comment)(Impaired but improving.)  Thought Processes: Other(See comment)(Impaired but improving.)  Thought Content:Normal: No  Thought Content: Poverty of Content  Hallucinations: None  Delusions: No  Delusions: Persecution  Memory:Normal: No  Memory: Poor Recent  Insight and Judgment: No  Insight and Judgment: Poor Judgment, Poor Insight  Present Suicidal Ideation: No  Present Homicidal Ideation: No      Metabolic Screening:    Lab Results   Component Value Date    LABA1C 5.2 10/27/2019       Lab Results   Component Value Date    CHOL 113 10/20/2020    CHOL 120 10/27/2019     Lab Results   Component Value Date    TRIG 45 10/20/2020    TRIG 52 10/27/2019     Lab Results   Component Value Date    HDL 50 10/20/2020    HDL 42 10/27/2019     No components found for: [facility] EVALUATION AND TREATMENT [place]  Lab Results   Component Value Date    LABVLDL 10 10/27/2019       Breanna Terry RN

## 2021-02-24 NOTE — CARE COORDINATION
Per Ruby at [facility], they have accepted patient. ANTONIO Conteh aware, will notify nurse and arrange transportation.     FRANCISCO Roa, MAKENZIE

## 2021-02-24 NOTE — PROGRESS NOTES
Nurse to nurse called to gilberto at the crisis unit, pt. Awaiting ride by taxi to [facility] to [facility] to  medications then to crisis unit.

## 2021-02-24 NOTE — CARE COORDINATION
ANTONIO spoke with the [facility], who confirmed that pt's clothes and [place] were still there. SW called [employer], they will  patient, transport him to the [facility] to  his belongings then take him to the [address] [place] [address]. Taxi voucher filled out. RN and patient aware.     Ernestina Ribera, FRANCISCO, MAKENZIE

## 2021-02-24 NOTE — PROGRESS NOTES
[id number] [facility] Interdisciplinary Treatment Plan Note     Review Date & Time: 02/24/2021    Patient was in treatment team.    Admission Type:   Admission Type: Involuntary    Reason for admission:  Reason for Admission: \"my voices in my head tell me to respect the flesh that I am\"/ Per ER report, patient was sujicidal with plan to freeze to death    Estimated Length of Stay Update:  3-5 days  Estimated Discharge Date Update: 5-8 days    PATIENT STRENGTHS:  Patient Strengths:Strengths: Communication, Positive Support, Connection to output provider, No significant Physical Illness, Social Skills  Patient Strengths and Limitations:Limitations: Multiple barriers to leisure interests, Difficulty problem solving/relies on others to help solve problems, Tendency to isolate self  Addictive Behavior:Addictive Behavior  In the past 3 months, have you felt or has someone told you that you have a problem with:  : None  Do you have a history of Chemical Use?: No  Do you have a history of Alcohol Use?: No  Do you have a history of Street Drug Abuse?: Yes  Histroy of Prescripton Drug Abuse?: No  Medical Problems:   Past Medical History:   Diagnosis Date    Depression     Schizoaffective disorder ([facility]        Risk:  Fall RiskTotal: 61  Andrew Scale Andrew Scale Score: 23  BVC Total: 0  Change in scores no.  Changes to plan of Care none    Status EXAM:   Status and Exam  Normal: Yes  Facial Expression: Brightened  Affect: Appropriate  Level of Consciousness: Alert  Mood:Normal: No  Mood: Anxious, Sad  Motor Activity:Normal: No  Motor Activity: Decreased  Interview Behavior: Cooperative  Preception: [place] to Person, [place] to Time, [place] to Place, [place] to Situation  Attention:Normal: No  Attention: Others(See comment)(Impaired but improving.)  Thought Processes: Other(See comment)(Impaired but improving.)  Thought Content:Normal: No  Thought Content: Poverty of Content  Hallucinations: None  Delusions: No

## 2021-02-24 NOTE — DISCHARGE SUMMARY
DISCHARGE SUMMARY      Patient ID:  David Hashimoto  [id number]  45 y.o.  1980    Admit date: 2/18/2021    Discharge date and time: 2/24/2021    Admitting Physician: Aisha Tirado MD     Discharge Physician: Dr Son Cruz MD    Discharge Diagnoses:   Patient Active Problem List   Diagnosis    Schizoaffective disorder, bipolar type ([facility] [address] [facility]    Trauma    Pneumothorax, traumatic    Multiple closed fractures of ribs of right side    Rib pain on right side    Multiple fractures of right lower extremity and ribs, closed, initial encounter\.   right tib fracture 9 & 10    Hemopneumothorax, right    Major depression single episode, in partial remission ([facility]    Polysubstance abuse ([facility]    Cocaine abuse ([facility]       Admission Condition: poor    Discharged Condition: stable    Admission Circumstance: Patient presented to the ED reporting suicidal thoughts and auditory hallucinations      PAST MEDICAL/PSYCHIATRIC HISTORY:   Past Medical History:   Diagnosis Date    Depression     Schizoaffective disorder ([facility]        FAMILY/SOCIAL HISTORY:  Family History   Problem Relation Age of Onset    No Known Problems Mother     No Known Problems Father      Social History     Socioeconomic History    Marital status: Single     Spouse name: Not on file    Number of children: 0    Years of education: 15    Highest education level: Not on file   Occupational History    Not on file   Social Needs    Financial resource strain: Not on file    Food insecurity     Worry: Not on file     Inability: Not on file   [employer] needs     Medical: Not on file     Non-medical: Not on file   Tobacco Use    Smoking status: Current Every Day Smoker     Packs/day: 0.50     Years: 24.00     Pack years: 12.00     Types: Cigars, Cigarettes    Smokeless tobacco: Never Used   Substance and Sexual Activity    Alcohol use: No    Drug use: Yes     Types: Marijuana, Cocaine     Comment: doesnt want to say  Sexual activity: Not on file     Comment: last use cocaine today 2/18/21   Lifestyle    Physical activity     Days per week: Not on file     Minutes per session: Not on file    Stress: Not on file   Relationships    Social connections     Talks on phone: Not on file     Gets together: Not on file     Attends Nondenominational service: Not on file     Active member of club or organization: Not on file     Attends meetings of clubs or organizations: Not on file     Relationship status: Not on file    Intimate partner violence     Fear of current or ex partner: Not on file     Emotionally abused: Not on file     Physically abused: Not on file     Forced sexual activity: Not on file   Other Topics Concern    Not on file   Social History Narrative    Not on file       MEDICATIONS:    Current Facility-Administered Medications:     [START ON 2/25/2021] paliperidone (INVEGA) extended release tablet 6 mg, 6 mg, Oral, Daily, Sophieanthony Hampton APRN - CNP    paliperidone (INVEGA) extended release tablet 3 mg, 3 mg, Oral, Nightly, Sophie RAYMON Hampton APRN - CNP    [START ON 3/26/2021] paliperidone palmitate ER (INVEGA SUSTENNA) IM injection 156 mg, 156 mg, Intramuscular, Q30 Days, Sophie RAYMON Hampton APRN - CNP    benzocaine (ORAJEL) 20 % mucosal gel, , Mouth/Throat, BID PRN, Mary Hardy MD, Given at 02/24/21 0854    acetaminophen (TYLENOL) tablet 650 mg, 650 mg, Oral, Q6H PRN, Rupesh Garber MD, 650 mg at 02/23/21 2047    magnesium hydroxide (MILK OF MAGNESIA) 400 MG/5ML suspension 30 mL, 30 mL, Oral, Daily PRN, Rupesh Garber MD    aluminum & magnesium hydroxide-simethicone (MAALOX) 200-200-20 MG/5ML suspension 30 mL, 30 mL, Oral, PRN, Rupesh Garber MD    hydrOXYzine (VISTARIL) capsule 50 mg, 50 mg, Oral, TID PRN, Rupesh Garber MD, 50 mg at 02/21/21 2142    traZODone (DESYREL) tablet 50 mg, 50 mg, Oral, Nightly PRN, Rupesh Garber MD, 50 mg at 02/23/21 2047   nicotine (NICODERM CQ) 21 MG/24HR 1 patch, 1 patch, Transdermal, Daily, Gabriela Solano MD, 1 patch at 02/24/21 0856    haloperidol (HALDOL) tablet 5 mg, 5 mg, Oral, Q6H PRN, Gabriela Solano MD, 5 mg at 02/20/21 2100    Examination:  BP (!) 109/55   Pulse 58   Temp 98.4 °F (36.9 °C) (Temporal)   Resp 16   Ht 5' 11\" (1.803 m)   Wt 155 lb (70.3 kg)   SpO2 98%   BMI 21.62 kg/m²   Gait - steady    HOSPITAL COURSE[de-identified]    Patient is been seen on 2/18/2021 was closely monitored for psychosis. He was evaluated was treated with Jessica Ropes which is optimized up to 3 mg daily and 6 mg at bedtime and started back on the [place] injection. Per patient he missed his last injection that was due in January he is agreeable to receive 234 mg IM on 2/24/2021 and he will be due for a booster injection 156 mg IM every 30 days on 3/26/21. Medical instrument significant patient continued to improve on the floor. He start coming out of his room and he was attending some groups. He never made any suicidal statements or any suicidal gestures while in the unit. He was seen in treatment team prior to discharge and treatment team felt the patient obtain the maximum benefit for his hospitalization. Patient went to stepdown the crisis stabilization unit and he was future oriented stating that on the first he was going to be getting housing through [employer]. At the time of discharge patient did not show impulsive behavior. He vehemently denied any suicidal homicidal ideations intent or plan. He is eating well sleeping well there are no neurovegetative signs symptoms of depression. He denies any auditory visualizations or no overt overt sign psychosis.   He was appreciate that he received here this patient no longer is criteria for inpatient hospitalization        No AVH or paranoid thoughts  No hopeless or worthless feeling  No active SI/HI  Appetite:  [x] Normal  [] Increased  [] Decreased Sleep:       [x] Normal  [] Fair       [] Poor            Energy:    [x] Normal  [] Increased  [] Decreased     SI [] Present  [x] Absent  HI  []Present  [x] Absent   Aggression:  [] yes  [x] no  Patient is [x] able  [] unable to CONTRACT FOR SAFETY   Medication side effects(SE):  [x] None(Psych. Meds.) [] Other      Mental Status Examination on discharge:    Level of consciousness:  within normal limits   Appearance:  well-appearing  Behavior/Motor:  no abnormalities noted  Attitude toward examiner:  attentive and good eye contact  Speech:  spontaneous, normal rate and normal volume   Mood: \" My mood is good. \"  Affect: Appropriate and pleasant  Thought processes: Linear without flight of ideas loose associations  Thought content: Devoid of any auditory visualizations delusions or perceptual abnormalities. Denies SI/HI intent or plan  Cognition:  oriented to person, place, and time   Concentration intact  Memory intact  Insight good   Judgement fair   Fund of Knowledge adequate      ASSESSMENT:  Patient symptoms are:  [x] Well controlled  [x] Improving  [] Worsening  [] No change    Reason for more than one antipsychotic:  [x] N/A  [] 3 Failed Monotherapy attempts (Drugs tried:)  [] Crossover to a new antipsychotic  [] Taper to Monotherapy from Polypharmacy  [] Augmentation of clozapine therapy due to treatment resistance to single therapy    Diagnosis:  Principal Problem:    Schizoaffective disorder, bipolar type ([facility]  Active Problems:    Cocaine abuse ([facility]  Resolved Problems:    * No resolved hospital problems. *      LABS:    No results for input(s): WBC, HGB, PLT in the last 72 hours. No results for input(s): NA, K, CL, CO2, BUN, CREATININE, GLUCOSE in the last 72 hours. No results for input(s): BILITOT, ALKPHOS, AST, ALT in the last 72 hours.   Lab Results   Component Value Date    LABAMPH NOT DETECTED 02/18/2021    BARBSCNU NOT DETECTED 02/18/2021    LABBENZ NOT DETECTED 02/18/2021 LABMETH NOT DETECTED 02/18/2021    OPIATESCREENURINE NOT DETECTED 02/18/2021    PHENCYCLIDINESCREENURINE NOT DETECTED 02/18/2021    ETOH <10 02/18/2021     Lab Results   Component Value Date    TSH 0.462 01/16/2021     No results found for: LITHIUM  Lab Results   Component Value Date    VALPROATE 3 (L) 04/29/2020       RISK ASSESSMENT AT DISCHARGE: Low risk for suicide and homicide. Treatment Plan:  Reviewed current Medications with the patient. Education provided on the complaince with treatment. Risks, benefits, side effects, drug-to-drug interactions and alternatives to treatment were discussed. Encourage patient to attend outpatient follow up appointment and therapy. Patient was advised to call the outpatient provider, visit the nearest ED or call 911 if symptoms are not manageable. Patient's family member was contacted prior to the discharge. Medication List      CHANGE how you take these medications    * paliperidone 3 MG extended release tablet  Commonly known as: INVEGA  Take 1 tablet by mouth nightly  What changed: You were already taking a medication with the same name, and this prescription was added. Make sure you understand how and when to take each. * paliperidone 6 MG extended release tablet  Commonly known as: INVEGA  Take 1 tablet by mouth daily  Start taking on: February 25, 2021  What changed:   · medication strength  · how much to take  · when to take this     paliperidone palmitate  MG/ML Darcy IM injection  Commonly known as: INVEGA SUSTENNA  Inject 156 mg into the muscle every 30 days for 1 dose Next injection is due 3/26/21  Start taking on: March 26, 2021  What changed: additional instructions         * This list has 2 medication(s) that are the same as other medications prescribed for you. Read the directions carefully, and ask your doctor or other care provider to review them with you.             CONTINUE taking these medications    nicotine 21 MG/24HR Commonly known as: Rubi Shelling  Place 1 patch onto the skin daily           Where to Get Your Medications      These medications were sent to [facility] [phone] [address]    Phone: [phone]   · paliperidone 3 MG extended release tablet  · paliperidone 6 MG extended release tablet  · paliperidone palmitate  MG/ML Darcy IM injection       Patient is counseled for continuous abuse drugs or alcohol could act on impulsively causing serious harm to himself or others even though may be unintentional.  He demonstrated dosing of this as the capacity understand this    Patient is counseled th medical treatment is difficult to optimize with the ongoing use of drugs or alcohol    Patient is counseled most been compliant with all medications outpatient follow-up appointments    Patient is discharged to crisis stabilization unit in stable condition    TIME SPEND - 35 MINUTES TO COMPLETE THE EVALUATION, DISCHARGE SUMMARY, MEDICATION RECONCILIATION AND FOLLOW UP CARE     Signed:  Abdelrahman Mays  0/97/5685  8:46 PM

## 2021-02-24 NOTE — PROGRESS NOTES
BEHAVIORAL HEALTH FOLLOW-UP NOTE     2/24/2021     Patient was seen and examined in person, Chart reviewed   Patient's case discussed with staff/team    Chief Complaint: \"I don't know. \"    Interim History: Patient was seen in treatment team in AM. When told he looks sleepy, he explained he does that to \"pass the time. \" He explained he learned that if surround yourself with people with the same psychiatric illness as you, it can trigger you \"off balance,\" which he believes is what happened to him after being near his schizophrenic cousin. Patient shows lack of understanding in why the medications are necessary and says he is too afraid of needles to get the long-acting injection. He is currently denying any suicidal or homicidal ideations and auditory or visual hallucinations.       Appetite:   [x] Normal/Unchanged  [] Increased  [] Decreased      Sleep:       [x] Normal/Unchanged  [] Fair       [] Poor              Energy:    [x] Normal/Unchanged  [] Increased  [] Decreased        SI [] Present  [x] Absent    HI  []Present  [x] Absent     Aggression:  [] yes  [x] no    Patient is [x] able  [] unable to CONTRACT FOR SAFETY     PAST MEDICAL/PSYCHIATRIC HISTORY:   Past Medical History:   Diagnosis Date    Depression     [facility]        FAMILY/SOCIAL HISTORY:  Family History   Problem Relation Age of Onset    No Known Problems Mother     No Known Problems Father      Social History     Socioeconomic History    Marital status: Single     Spouse name: Not on file    Number of children: 0    Years of education: 12    Highest education level: Not on file   Occupational History    Not on file   Social Needs    Financial resource strain: Not on file    Food insecurity     Worry: Not on file     Inability: Not on file   [employer] needs     Medical: Not on file     Non-medical: Not on file   Tobacco Use    Smoking status: Current Every Day Smoker     Packs/day: 0.50     Years: 24.00 Pack years: 12.00     Types: Cigars, Cigarettes    Smokeless tobacco: Never Used   Substance and Sexual Activity    Alcohol use: No    Drug use: Yes     Types: Marijuana, Cocaine     Comment: doesnt want to say    Sexual activity: Not on file     Comment: last use cocaine today 2/18/21   Lifestyle    Physical activity     Days per week: Not on file     Minutes per session: Not on file    Stress: Not on file   Relationships    Social connections     Talks on phone: Not on file     Gets together: Not on file     Attends Scientologist service: Not on file     Active member of club or organization: Not on file     Attends meetings of clubs or organizations: Not on file     Relationship status: Not on file    Intimate partner violence     Fear of current or ex partner: Not on file     Emotionally abused: Not on file     Physically abused: Not on file     Forced sexual activity: Not on file   Other Topics Concern    Not on file   Social History Narrative    Not on file           ROS:  [x] All negative/unchanged except if checked.  Explain positive(checked items) below:  [] Constitutional  [] Eyes  [] Ear/Nose/Mouth/Throat  [] Respiratory  [] CV  [] GI  []   [] Musculoskeletal  [] Skin/Breast  [] Neurological  [] Endocrine  [] Heme/Lymph  [] Allergic/Immunologic    MEDICATIONS:    Current Facility-Administered Medications:     paliperidone (INVEGA) extended release tablet 6 mg, 6 mg, Oral, BID, JUICE Vaz - CNP, 6 mg at 02/24/21 0854    benzocaine (ORAJEL) 20 % mucosal gel, , Mouth/Throat, BID PRN, Tawana Crawley MD, Given at 02/24/21 0854    acetaminophen (TYLENOL) tablet 650 mg, 650 mg, Oral, Q6H PRN, Aisha Tirado MD, 650 mg at 02/23/21 2047    magnesium hydroxide (MILK OF MAGNESIA) 400 MG/5ML suspension 30 mL, 30 mL, Oral, Daily PRN, Aisha Tirado MD    aluminum & magnesium hydroxide-simethicone (MAALOX) 200-200-20 MG/5ML suspension 30 mL, 30 mL, Oral, PRN, Aisha Tirado MD   hydrOXYzine (VISTARIL) capsule 50 mg, 50 mg, Oral, TID PRN, Nila Duffy MD, 50 mg at 02/21/21 2142    traZODone (DESYREL) tablet 50 mg, 50 mg, Oral, Nightly PRN, Nila Duffy MD, 50 mg at 02/23/21 2047    nicotine (NICODERM CQ) 21 MG/24HR 1 patch, 1 patch, Transdermal, Daily, Nila Duffy MD, 1 patch at 02/24/21 0856    haloperidol (HALDOL) tablet 5 mg, 5 mg, Oral, Q6H PRN, Nila Duffy MD, 5 mg at 02/20/21 2100      Examination:  BP (!) 109/55   Pulse 58   Temp 98.4 °F (36.9 °C) (Temporal)   Resp 16   Ht 5' 11\" (1.803 m)   Wt 155 lb (70.3 kg)   SpO2 98%   BMI 21.62 kg/m²   Gait - steady  Medication side effects(SE): none noted    Mental Status Examination:    Level of consciousness:  within normal limits   Appearance:  good grooming and good hygiene  Behavior/Motor:  no abnormalities noted  Attitude toward examiner:  cooperative and attentive  Speech:  normal rate and whispered   Mood: anxious  Affect:  anxious  Thought processes:  linear and goal directed   Thought content:  Homocidal ideation denied  Suicidal Ideation:  denies suicidal ideation  Delusions:  paranoid  Perceptual Disturbance:  auditory, command  Cognition:  oriented to person, place, and time   Concentration intact  Insight poor   Judgement poor     ASSESSMENT:  Patient symptoms are:  [] Well controlled  [x] Improving  [] Worsening  [] No change      Diagnosis:   Principal Problem:    Schizoaffective disorder, bipolar type ([facility]  Active Problems:    Cocaine abuse ([facility]  Resolved Problems:    * No resolved hospital problems. *      LABS:    No results for input(s): WBC, HGB, PLT in the last 72 hours. No results for input(s): NA, K, CL, CO2, BUN, CREATININE, GLUCOSE in the last 72 hours. No results for input(s): BILITOT, ALKPHOS, AST, ALT in the last 72 hours.   Lab Results   Component Value Date    LABAMPH NOT DETECTED 02/18/2021    BARBSCNU NOT DETECTED 02/18/2021    LABBENZ NOT DETECTED 02/18/2021 LABMETH NOT DETECTED 02/18/2021    OPIATESCREENURINE NOT DETECTED 02/18/2021    PHENCYCLIDINESCREENURINE NOT DETECTED 02/18/2021    ETOH <10 02/18/2021     Lab Results   Component Value Date    TSH 0.462 01/16/2021     No results found for: LITHIUM  Lab Results   Component Value Date    VALPROATE 3 (L) 04/29/2020       Treatment Plan:  Reviewed current Medications with the patient. Risks, benefits, side effects, drug-to-drug interactions and alternatives to treatment were discussed. Collateral information: per social work  CD evaluation  Encourage patient to attend group and other milieu activities.   Discharge planning discussed with the patient and treatment team.    PSYCHOTHERAPY/COUNSELING:  [x] Therapeutic interview  [x] Supportive  [] CBT  [] Ongoing  [] Other    [x] Patient continues to need, on a daily basis, active treatment furnished directly by or requiring the supervision of inpatient psychiatric personnel      Anticipated Length of stay: 7-10 days            Electronically signed by Macrina Bhatti on 2/24/2021 at 10:11 AM

## 2021-03-04 ENCOUNTER — APPOINTMENT (OUTPATIENT)
Dept: CT IMAGING | Age: 41
End: 2021-03-04
Payer: COMMERCIAL

## 2021-03-04 ENCOUNTER — HOSPITAL ENCOUNTER (EMERGENCY)
Age: 41
Discharge: HOME | End: 2021-03-04
Attending: EMERGENCY MEDICINE
Payer: COMMERCIAL

## 2021-03-04 VITALS
SYSTOLIC BLOOD PRESSURE: 118 MMHG | RESPIRATION RATE: 18 BRPM | BODY MASS INDEX: 21.7 KG/M2 | HEART RATE: 55 BPM | HEIGHT: 71 IN | TEMPERATURE: 96.9 F | WEIGHT: 155 LBS | DIASTOLIC BLOOD PRESSURE: 62 MMHG | OXYGEN SATURATION: 97 %

## 2021-03-04 DIAGNOSIS — R10.9 ABDOMINAL PAIN, UNSPECIFIED ABDOMINAL LOCATION: Primary | ICD-10-CM

## 2021-03-04 DIAGNOSIS — R19.7 DIARRHEA, UNSPECIFIED TYPE: ICD-10-CM

## 2021-03-04 DIAGNOSIS — R11.2 NAUSEA AND VOMITING, INTRACTABILITY OF VOMITING NOT SPECIFIED, UNSPECIFIED VOMITING TYPE: ICD-10-CM

## 2021-03-04 DIAGNOSIS — F19.10 SUBSTANCE ABUSE (HCC): ICD-10-CM

## 2021-03-04 LAB
ACETAMINOPHEN LEVEL: <5 MCG/ML (ref 10–30)
ALBUMIN SERPL-MCNC: 4.2 G/DL (ref 3.5–5.2)
ALP BLD-CCNC: 38 U/L (ref 40–129)
ALT SERPL-CCNC: 9 U/L (ref 0–40)
AMPHETAMINE SCREEN, URINE: NOT DETECTED
ANION GAP SERPL CALCULATED.3IONS-SCNC: 13 MMOL/L (ref 7–16)
AST SERPL-CCNC: 24 U/L (ref 0–39)
BARBITURATE SCREEN URINE: NOT DETECTED
BASOPHILS ABSOLUTE: 0.06 E9/L (ref 0–0.2)
BASOPHILS RELATIVE PERCENT: 1.1 % (ref 0–2)
BENZODIAZEPINE SCREEN, URINE: NOT DETECTED
BILIRUB SERPL-MCNC: 0.7 MG/DL (ref 0–1.2)
BILIRUBIN URINE: NEGATIVE
BLOOD, URINE: NEGATIVE
BUN BLDV-MCNC: 15 MG/DL (ref 6–20)
CALCIUM SERPL-MCNC: 9.1 MG/DL (ref 8.6–10.2)
CANNABINOID SCREEN URINE: NOT DETECTED
CHLORIDE BLD-SCNC: 104 MMOL/L (ref 98–107)
CLARITY: CLEAR
CO2: 23 MMOL/L (ref 22–29)
COCAINE METABOLITE SCREEN URINE: POSITIVE
COLOR: YELLOW
CREAT SERPL-MCNC: 1.1 MG/DL (ref 0.7–1.2)
EKG ATRIAL RATE: 51 BPM
EKG P AXIS: 54 DEGREES
EKG P-R INTERVAL: 182 MS
EKG Q-T INTERVAL: 444 MS
EKG QRS DURATION: 98 MS
EKG QTC CALCULATION (BAZETT): 409 MS
EKG R AXIS: 88 DEGREES
EKG T AXIS: 59 DEGREES
EKG VENTRICULAR RATE: 51 BPM
EOSINOPHILS ABSOLUTE: 0.14 E9/L (ref 0.05–0.5)
EOSINOPHILS RELATIVE PERCENT: 2.5 % (ref 0–6)
ETHANOL: <10 MG/DL (ref 0–0.08)
FENTANYL SCREEN, URINE: NOT DETECTED
GFR AFRICAN AMERICAN: >60
GFR NON-AFRICAN AMERICAN: >60 ML/MIN/1.73
GLUCOSE BLD-MCNC: 72 MG/DL (ref 74–99)
GLUCOSE URINE: NEGATIVE MG/DL
HCT VFR BLD CALC: 41 % (ref 37–54)
HEMOGLOBIN: 13.7 G/DL (ref 12.5–16.5)
IMMATURE GRANULOCYTES #: 0.01 E9/L
IMMATURE GRANULOCYTES %: 0.2 % (ref 0–5)
KETONES, URINE: 15 MG/DL
LACTIC ACID: 1.5 MMOL/L (ref 0.5–2.2)
LEUKOCYTE ESTERASE, URINE: NEGATIVE
LIPASE: 19 U/L (ref 13–60)
LYMPHOCYTES ABSOLUTE: 2.09 E9/L (ref 1.5–4)
LYMPHOCYTES RELATIVE PERCENT: 36.8 % (ref 20–42)
Lab: ABNORMAL
MCH RBC QN AUTO: 30.9 PG (ref 26–35)
MCHC RBC AUTO-ENTMCNC: 33.4 % (ref 32–34.5)
MCV RBC AUTO: 92.3 FL (ref 80–99.9)
METHADONE SCREEN, URINE: NOT DETECTED
MONOCYTES ABSOLUTE: 0.51 E9/L (ref 0.1–0.95)
MONOCYTES RELATIVE PERCENT: 9 % (ref 2–12)
NEUTROPHILS ABSOLUTE: 2.87 E9/L (ref 1.8–7.3)
NEUTROPHILS RELATIVE PERCENT: 50.4 % (ref 43–80)
NITRITE, URINE: NEGATIVE
OPIATE SCREEN URINE: NOT DETECTED
OXYCODONE URINE: NOT DETECTED
PDW BLD-RTO: 13.5 FL (ref 11.5–15)
PH UA: 5.5 (ref 5–9)
PHENCYCLIDINE SCREEN URINE: NOT DETECTED
PLATELET # BLD: 311 E9/L (ref 130–450)
PMV BLD AUTO: 9.5 FL (ref 7–12)
POTASSIUM SERPL-SCNC: 5.4 MMOL/L (ref 3.5–5)
PROTEIN UA: NEGATIVE MG/DL
RBC # BLD: 4.44 E12/L (ref 3.8–5.8)
SALICYLATE, SERUM: <0.3 MG/DL (ref 0–30)
SODIUM BLD-SCNC: 140 MMOL/L (ref 132–146)
SPECIFIC GRAVITY UA: 1.01 (ref 1–1.03)
TOTAL PROTEIN: 6.4 G/DL (ref 6.4–8.3)
TRICYCLIC ANTIDEPRESSANTS SCREEN SERUM: NEGATIVE NG/ML
TROPONIN: <0.01 NG/ML (ref 0–0.03)
UROBILINOGEN, URINE: 0.2 E.U./DL
WBC # BLD: 5.7 E9/L (ref 4.5–11.5)

## 2021-03-04 PROCEDURE — 6360000004 HC RX CONTRAST MEDICATION: Performed by: RADIOLOGY

## 2021-03-04 PROCEDURE — 83690 ASSAY OF LIPASE: CPT

## 2021-03-04 PROCEDURE — 80143 DRUG ASSAY ACETAMINOPHEN: CPT

## 2021-03-04 PROCEDURE — 81003 URINALYSIS AUTO W/O SCOPE: CPT

## 2021-03-04 PROCEDURE — 80053 COMPREHEN METABOLIC PANEL: CPT

## 2021-03-04 PROCEDURE — 2500000003 HC RX 250 WO HCPCS: Performed by: NURSE PRACTITIONER

## 2021-03-04 PROCEDURE — 96374 THER/PROPH/DIAG INJ IV PUSH: CPT

## 2021-03-04 PROCEDURE — 2580000003 HC RX 258: Performed by: NURSE PRACTITIONER

## 2021-03-04 PROCEDURE — 83605 ASSAY OF LACTIC ACID: CPT

## 2021-03-04 PROCEDURE — 84484 ASSAY OF TROPONIN QUANT: CPT

## 2021-03-04 PROCEDURE — 82077 ASSAY SPEC XCP UR&BREATH IA: CPT

## 2021-03-04 PROCEDURE — 85025 COMPLETE CBC W/AUTO DIFF WBC: CPT

## 2021-03-04 PROCEDURE — 93005 ELECTROCARDIOGRAM TRACING: CPT | Performed by: NURSE PRACTITIONER

## 2021-03-04 PROCEDURE — 80179 DRUG ASSAY SALICYLATE: CPT

## 2021-03-04 PROCEDURE — 6360000002 HC RX W HCPCS: Performed by: NURSE PRACTITIONER

## 2021-03-04 PROCEDURE — 80307 DRUG TEST PRSMV CHEM ANLYZR: CPT

## 2021-03-04 PROCEDURE — 96375 TX/PRO/DX INJ NEW DRUG ADDON: CPT

## 2021-03-04 PROCEDURE — 96372 THER/PROPH/DIAG INJ SC/IM: CPT

## 2021-03-04 PROCEDURE — 93010 ELECTROCARDIOGRAM REPORT: CPT | Performed by: INTERNAL MEDICINE

## 2021-03-04 PROCEDURE — 74177 CT ABD & PELVIS W/CONTRAST: CPT

## 2021-03-04 PROCEDURE — 99283 EMERGENCY DEPT VISIT LOW MDM: CPT

## 2021-03-04 RX ORDER — 0.9 % SODIUM CHLORIDE 0.9 %
1000 INTRAVENOUS SOLUTION INTRAVENOUS ONCE
Status: COMPLETED | OUTPATIENT
Start: 2021-03-04 | End: 2021-03-04

## 2021-03-04 RX ORDER — ONDANSETRON 2 MG/ML
4 INJECTION INTRAMUSCULAR; INTRAVENOUS ONCE
Status: COMPLETED | OUTPATIENT
Start: 2021-03-04 | End: 2021-03-04

## 2021-03-04 RX ORDER — DICYCLOMINE HYDROCHLORIDE 10 MG/ML
20 INJECTION INTRAMUSCULAR ONCE
Status: COMPLETED | OUTPATIENT
Start: 2021-03-04 | End: 2021-03-04

## 2021-03-04 RX ORDER — ONDANSETRON 4 MG/1
4 TABLET, ORALLY DISINTEGRATING ORAL EVERY 8 HOURS PRN
Qty: 24 TABLET | Refills: 0 | Status: ON HOLD | OUTPATIENT
Start: 2021-03-04 | End: 2021-04-15 | Stop reason: HOSPADM

## 2021-03-04 RX ORDER — DICYCLOMINE HYDROCHLORIDE 10 MG/1
20 CAPSULE ORAL 4 TIMES DAILY PRN
Qty: 10 CAPSULE | Refills: 0 | Status: ON HOLD | OUTPATIENT
Start: 2021-03-04 | End: 2021-04-15 | Stop reason: HOSPADM

## 2021-03-04 RX ADMIN — SODIUM CHLORIDE 1000 ML: 9 INJECTION, SOLUTION INTRAVENOUS at 01:24

## 2021-03-04 RX ADMIN — DICYCLOMINE HYDROCHLORIDE 20 MG: 10 INJECTION INTRAMUSCULAR at 01:25

## 2021-03-04 RX ADMIN — FAMOTIDINE 20 MG: 10 INJECTION INTRAVENOUS at 01:25

## 2021-03-04 RX ADMIN — IOPAMIDOL 90 ML: 755 INJECTION, SOLUTION INTRAVENOUS at 04:37

## 2021-03-04 RX ADMIN — ONDANSETRON 4 MG: 2 INJECTION INTRAMUSCULAR; INTRAVENOUS at 01:25

## 2021-03-04 NOTE — ED NOTES
Urine collected and sent to lab Lazarus Rhein, RN  03/04/21 7667
[] Inpatient Psychiatric Unit:  [] Other:        FRANCISCO Hoskins, MAJOR  03/04/21 9974

## 2021-03-04 NOTE — ED PROVIDER NOTES
Independent   HPI:  3/4/21, Time: 12:55 AM JAYLENE Schofield. is a 45 y.o. male presenting to the ED for 1 day history of nausea, vomiting, diarrhea. Patient presents emergency department with complaints of lower abdominal pain primarily to the left lower quadrant as well as having associated nausea vomiting and diarrhea. States that wearing his mask makes his symptoms worsen. Patient also does have a psychiatric history including psychosis as well as schizophrenia. He reports that he used to get Invega injections but no longer gets them and would like to get set back up to see a counselor/psychiatrist.  Patient states that he was living in a group home that provided him with his meds but got kicked out and states now he is just living with friends. He does report being depressed but denies any suicidal or homicidal ideations, reports his hallucinations are at baseline for him. Patient denies any drug or alcohol use. Denies any fevers. Also denies any chest pain or shortness of breath. Review of Systems:   A complete review of systems was performed and pertinent positives and negatives are stated within HPI, all other systems reviewed and are negative.          --------------------------------------------- PAST HISTORY ---------------------------------------------  Past Medical History:  has a past medical history of Depression and Schizoaffective disorder ([facility] [address] Past Surgical History:  has a past surgical history that includes eye surgery (19 years ago). Social History:  reports that he has been smoking cigars and cigarettes. He has a 12.00 pack-year smoking history. He has never used smokeless tobacco. He reports current drug use. Drugs: Marijuana and Cocaine. He reports that he does not drink alcohol. Family History: family history includes No Known Problems in his father and mother. The patients home medications have been reviewed.     Allergies: Mahendra [chlophedianol-pseudoephedrine]    -------------------------------------------------- RESULTS -------------------------------------------------  All laboratory and radiology results have been personally reviewed by myself   LABS:  Results for orders placed or performed during the hospital encounter of 03/04/21   Urine Drug Screen   Result Value Ref Range    Amphetamine Screen, Urine NOT DETECTED Negative <1000 ng/mL    Barbiturate Screen, Ur NOT DETECTED Negative < 200 ng/mL    Benzodiazepine Screen, Urine NOT DETECTED Negative < 200 ng/mL    Cannabinoid Scrn, Ur NOT DETECTED Negative < 50ng/mL    Cocaine Metabolite Screen, Urine POSITIVE (A) Negative < 300 ng/mL    Opiate Scrn, Ur NOT DETECTED Negative < 300ng/mL    PCP Screen, Urine NOT DETECTED Negative < 25 ng/mL    Methadone Screen, Urine NOT DETECTED Negative <300 ng/mL    Oxycodone Urine NOT DETECTED Negative <100 ng/mL    FENTANYL SCREEN, URINE NOT DETECTED Negative <1 ng/mL    Drug Screen Comment: see below    Serum Drug Screen   Result Value Ref Range    Ethanol Lvl <10 mg/dL    Acetaminophen Level <5.0 (L) 10.0 - 94.7 mcg/mL    Salicylate, Serum <9.3 0.0 - 30.0 mg/dL    TCA Scrn NEGATIVE Cutoff:300 ng/mL   Troponin   Result Value Ref Range    Troponin <0.01 0.00 - 0.03 ng/mL   Comprehensive Metabolic Panel   Result Value Ref Range    Sodium 140 132 - 146 mmol/L    Potassium 5.4 (H) 3.5 - 5.0 mmol/L    Chloride 104 98 - 107 mmol/L    CO2 23 22 - 29 mmol/L    Anion Gap 13 7 - 16 mmol/L    Glucose 72 (L) 74 - 99 mg/dL    BUN 15 6 - 20 mg/dL    CREATININE 1.1 0.7 - 1.2 mg/dL    GFR Non-African American >60 >=60 mL/min/1.73    GFR African American >60     Calcium 9.1 8.6 - 10.2 mg/dL    Total Protein 6.4 6.4 - 8.3 g/dL    Albumin 4.2 3.5 - 5.2 g/dL    Total Bilirubin 0.7 0.0 - 1.2 mg/dL    Alkaline Phosphatase 38 (L) 40 - 129 U/L    ALT 9 0 - 40 U/L    AST 24 0 - 39 U/L   Lactic Acid, Plasma   Result Value Ref Range    Lactic Acid 1.5 0.5 - 2.2 mmol/L   Lipase Result Value Ref Range    Lipase 19 13 - 60 U/L   Urinalysis   Result Value Ref Range    Color, UA Yellow Straw/Yellow    Clarity, UA Clear Clear    Glucose, Ur Negative Negative mg/dL    Bilirubin Urine Negative Negative    Ketones, Urine 15 (A) Negative mg/dL    Specific Gravity, UA 1.010 1.005 - 1.030    Blood, Urine Negative Negative    pH, UA 5.5 5.0 - 9.0    Protein, UA Negative Negative mg/dL    Urobilinogen, Urine 0.2 <2.0 E.U./dL    Nitrite, Urine Negative Negative    Leukocyte Esterase, Urine Negative Negative   CBC Auto Differential   Result Value Ref Range    WBC 5.7 4.5 - 11.5 E9/L    RBC 4.44 3.80 - 5.80 E12/L    Hemoglobin 13.7 12.5 - 16.5 g/dL    Hematocrit 41.0 37.0 - 54.0 %    MCV 92.3 80.0 - 99.9 fL    MCH 30.9 26.0 - 35.0 pg    MCHC 33.4 32.0 - 34.5 %    RDW 13.5 11.5 - 15.0 fL    Platelets 142 847 - 617 E9/L    MPV 9.5 7.0 - 12.0 fL    Neutrophils % 50.4 43.0 - 80.0 %    Immature Granulocytes % 0.2 0.0 - 5.0 %    Lymphocytes % 36.8 20.0 - 42.0 %    Monocytes % 9.0 2.0 - 12.0 %    Eosinophils % 2.5 0.0 - 6.0 %    Basophils % 1.1 0.0 - 2.0 %    Neutrophils Absolute 2.87 1.80 - 7.30 E9/L    Immature Granulocytes # 0.01 E9/L    Lymphocytes Absolute 2.09 1.50 - 4.00 E9/L    Monocytes Absolute 0.51 0.10 - 0.95 E9/L    Eosinophils Absolute 0.14 0.05 - 0.50 E9/L    Basophils Absolute 0.06 0.00 - 0.20 E9/L   EKG 12 Lead   Result Value Ref Range    Ventricular Rate 51 BPM    Atrial Rate 51 BPM    P-R Interval 182 ms    QRS Duration 98 ms    Q-T Interval 444 ms    QTc Calculation (Bazett) 409 ms    P Axis 54 degrees    R Axis 88 degrees    T Axis 59 degrees       RADIOLOGY:  Interpreted by Radiologist.  CT ABDOMEN PELVIS W IV CONTRAST Additional Contrast? None   Final Result   Trace free fluid in the pelvis. This is considered abnormal for a male   patient but is of uncertain etiology. An occult infectious or inflammatory   process is not excluded.  Short-term follow-up if symptoms persist.      Unusual increased attenuation involving the inferior aspect of the shaft of   the penis to the inferior margin of the prostate. This may be incidental or   due to inflammatory change. However, correlate with clinical presentation to   exclude any concern for injury. ------------------------- NURSING NOTES AND VITALS REVIEWED ---------------------------   The nursing notes within the ED encounter and vital signs as below have been reviewed. /62   Pulse 55   Temp 96.9 °F (36.1 °C) (Tympanic)   Resp 18   Ht 5' 11\" (1.803 m)   Wt 155 lb (70.3 kg)   SpO2 97%   BMI 21.62 kg/m²   Oxygen Saturation Interpretation: Normal      ---------------------------------------------------PHYSICAL EXAM--------------------------------------      Constitutional/General: Alert and oriented x3, well appearing, non toxic in NAD  Head: Normocephalic and atraumatic  Eyes: PERRL, EOMI  Mouth: Oropharynx clear, handling secretions, no trismus  Neck: Supple, full ROM,   Pulmonary: Lungs clear to auscultation bilaterally, no wheezes, rales, or rhonchi. Not in respiratory distress  Cardiovascular:  Regular rate and rhythm, no murmurs, gallops, or rubs. 2+ distal pulses  Abdomen: Soft,  tender, non distended, point tenderness to left lower quadrant on exam.  No unusual bulges or areas of pulsation  Extremities: Moves all extremities x 4.  Warm and well perfused  Skin: warm and dry without rash  Neurologic: GCS 15,  Psych: Normal Affect normal, clear and coherent speech, patient denies suicidal or homicidal ideations.      ------------------------------ ED COURSE/MEDICAL DECISION MAKING----------------------  Medications   0.9 % sodium chloride bolus (0 mLs Intravenous Stopped 3/4/21 0242)   ondansetron (ZOFRAN) injection 4 mg (4 mg Intravenous Given 3/4/21 0125)   famotidine (PEPCID) injection 20 mg (20 mg Intravenous Given 3/4/21 0125)   dicyclomine (BENTYL) injection 20 mg (20 mg Intramuscular Given 3/4/21 0125)   iopamidol (ISOVUE-370) 76 % injection 90 mL (90 mLs Intravenous Given 3/4/21 0437)         ED COURSE:       Medical Decision Making: Plan be for labs will also medicate for symptom relief will also consult . Labs resulted serum drug screen negative, CBC negative, troponin negative, chemistry panel essentially unremarkable, lactic acid level negative lipase negative, twelve-lead EKG interpreted showing a heart rate of 51, sinus bradycardia. No acute ST elevation or depression noted. Normal axis deviation.  did evaluate patient. He reported that he has been off his Invega injection which actually is not accurate, patient was actually provided with a prescription for Invega medication February 26 and he actually should have enough medication till the end of March. Also per medical records from reading psychiatric note patient did receive an Invega injection on February 24, 2021 234 mg IM injection. He is not due again until March 26, 2021 and as stated above was also sent home with prescription tablets. Patient was becoming agitated with  because he was demanding to be sent to the rescue mission in [place] and then wanting to just be placed in generations for a place to stay. It is reported that patient has poor outpatient follow-up. Patient was in a group home but no longer is due to being kicked out of it and currently staying with friends. Patient otherwise is not suicidal homicidal.    Patient with up-to-date prescription for his Invega medication. Patient was just recently admitted to the behavioral health unit from February 18 through February 24, 2021    Currently awaiting CAT scan. Patient resting comfortably. Anticipate discharge home. Counseling: The emergency provider has spoken with the patient and discussed todays results, in addition to providing specific details for the plan of care and counseling regarding the diagnosis and prognosis.   Questions are answered at this time and they are agreeable with the plan.      --------------------------------- IMPRESSION AND DISPOSITION ---------------------------------    IMPRESSION  1. Abdominal pain, unspecified abdominal location    2. Nausea and vomiting, intractability of vomiting not specified, unspecified vomiting type    3. Diarrhea, unspecified type    4. Substance abuse ([facility] [address]        DISPOSITION  Disposition: Discharge to home  Patient condition is good      NOTE: This report was transcribed using voice recognition software. Every effort was made to ensure accuracy; however, inadvertent computerized transcription errors may be present      Seen with Sandy:     I have personally performed and/or participated in the history, exam, medical decision making, and procedures and agree with all pertinent clinical information. I have also reviewed and agree with the past medical, family and social history unless otherwise noted. My findings/Plan: Patient presenting because of left lower abdominal pain he states it started months ago. But reported to use nurse practitioner that it was 1 day ago. When I am asked the patient patient reports is been going for months he has had some diarrhea he reports some vomiting. Patient reporting no fever no chills he reports no chest pain or difficulty breathing. Patient reporting no headache he reports no homicidal suicidal thoughts. Patient reporting no urinary discharge. Patient is awake alert oriented x3 he is tender in his left lower abdomen. He has no rebound no guarding. Labs noted and reviewed CT also noted reviewed. Patient white count is normal lactic is normal.  Patient is nontoxic-appearing patient has been here for quite some time still awaiting urinalysis. CT was noted. CT was reviewed. Patient rechecked and hungry.   Patient in no distress he is nontoxic-appearing patient was actually somewhat belligerent with staff members and swearing at them because we wanted a urine. Patient again nontoxic. Patient having no rebound no guarding.   Patient will be discharged home he is to follow-up with his PCP is to return if symptoms worsen or persist.     Lyssa Alexander MD  03/04/21 4800 Kedar Marcelino MD  03/04/21 7015

## 2021-03-19 ENCOUNTER — HOSPITAL ENCOUNTER (EMERGENCY)
Age: 41
Discharge: PSYCHIATRIC OR SUBSTANCE USE FACILITY | End: 2021-03-19
Attending: EMERGENCY MEDICINE
Payer: COMMERCIAL

## 2021-03-19 VITALS
WEIGHT: 157 LBS | BODY MASS INDEX: 21.98 KG/M2 | HEART RATE: 95 BPM | TEMPERATURE: 96.8 F | DIASTOLIC BLOOD PRESSURE: 76 MMHG | HEIGHT: 71 IN | SYSTOLIC BLOOD PRESSURE: 125 MMHG | OXYGEN SATURATION: 96 % | RESPIRATION RATE: 16 BRPM

## 2021-03-19 DIAGNOSIS — R45.850 HOMICIDAL IDEATION: Primary | ICD-10-CM

## 2021-03-19 LAB
ACETAMINOPHEN LEVEL: <5 MCG/ML (ref 10–30)
ALBUMIN SERPL-MCNC: 4 G/DL (ref 3.5–5.2)
ALP BLD-CCNC: 41 U/L (ref 40–129)
ALT SERPL-CCNC: 6 U/L (ref 0–40)
AMPHETAMINE SCREEN, URINE: NOT DETECTED
ANION GAP SERPL CALCULATED.3IONS-SCNC: 7 MMOL/L (ref 7–16)
AST SERPL-CCNC: 13 U/L (ref 0–39)
BARBITURATE SCREEN URINE: NOT DETECTED
BASOPHILS ABSOLUTE: 0.09 E9/L (ref 0–0.2)
BASOPHILS RELATIVE PERCENT: 2 % (ref 0–2)
BENZODIAZEPINE SCREEN, URINE: NOT DETECTED
BILIRUB SERPL-MCNC: 0.4 MG/DL (ref 0–1.2)
BUN BLDV-MCNC: 13 MG/DL (ref 6–20)
CALCIUM SERPL-MCNC: 8.8 MG/DL (ref 8.6–10.2)
CANNABINOID SCREEN URINE: NOT DETECTED
CHLORIDE BLD-SCNC: 107 MMOL/L (ref 98–107)
CO2: 26 MMOL/L (ref 22–29)
COCAINE METABOLITE SCREEN URINE: POSITIVE
CREAT SERPL-MCNC: 1.1 MG/DL (ref 0.7–1.2)
EKG ATRIAL RATE: 70 BPM
EKG P AXIS: 75 DEGREES
EKG P-R INTERVAL: 176 MS
EKG Q-T INTERVAL: 374 MS
EKG QRS DURATION: 96 MS
EKG QTC CALCULATION (BAZETT): 403 MS
EKG R AXIS: 72 DEGREES
EKG T AXIS: 53 DEGREES
EKG VENTRICULAR RATE: 70 BPM
EOSINOPHILS ABSOLUTE: 0.19 E9/L (ref 0.05–0.5)
EOSINOPHILS RELATIVE PERCENT: 4.1 % (ref 0–6)
ETHANOL: <10 MG/DL (ref 0–0.08)
FENTANYL SCREEN, URINE: NOT DETECTED
GFR AFRICAN AMERICAN: >60
GFR NON-AFRICAN AMERICAN: >60 ML/MIN/1.73
GLUCOSE BLD-MCNC: 108 MG/DL (ref 74–99)
HCT VFR BLD CALC: 42.2 % (ref 37–54)
HEMOGLOBIN: 13.7 G/DL (ref 12.5–16.5)
IMMATURE GRANULOCYTES #: 0.01 E9/L
IMMATURE GRANULOCYTES %: 0.2 % (ref 0–5)
LYMPHOCYTES ABSOLUTE: 1.32 E9/L (ref 1.5–4)
LYMPHOCYTES RELATIVE PERCENT: 28.6 % (ref 20–42)
Lab: ABNORMAL
MCH RBC QN AUTO: 30 PG (ref 26–35)
MCHC RBC AUTO-ENTMCNC: 32.5 % (ref 32–34.5)
MCV RBC AUTO: 92.3 FL (ref 80–99.9)
METHADONE SCREEN, URINE: NOT DETECTED
MONOCYTES ABSOLUTE: 0.55 E9/L (ref 0.1–0.95)
MONOCYTES RELATIVE PERCENT: 11.9 % (ref 2–12)
NEUTROPHILS ABSOLUTE: 2.45 E9/L (ref 1.8–7.3)
NEUTROPHILS RELATIVE PERCENT: 53.2 % (ref 43–80)
OPIATE SCREEN URINE: NOT DETECTED
OXYCODONE URINE: NOT DETECTED
PDW BLD-RTO: 13.2 FL (ref 11.5–15)
PHENCYCLIDINE SCREEN URINE: NOT DETECTED
PLATELET # BLD: 279 E9/L (ref 130–450)
PMV BLD AUTO: 9.9 FL (ref 7–12)
POTASSIUM SERPL-SCNC: 3.6 MMOL/L (ref 3.5–5)
RBC # BLD: 4.57 E12/L (ref 3.8–5.8)
SALICYLATE, SERUM: <0.3 MG/DL (ref 0–30)
SARS-COV-2, NAAT: NOT DETECTED
SODIUM BLD-SCNC: 140 MMOL/L (ref 132–146)
TOTAL CK: 266 U/L (ref 20–200)
TOTAL PROTEIN: 6.5 G/DL (ref 6.4–8.3)
TRICYCLIC ANTIDEPRESSANTS SCREEN SERUM: NEGATIVE NG/ML
WBC # BLD: 4.6 E9/L (ref 4.5–11.5)

## 2021-03-19 PROCEDURE — 80307 DRUG TEST PRSMV CHEM ANLYZR: CPT

## 2021-03-19 PROCEDURE — 80053 COMPREHEN METABOLIC PANEL: CPT

## 2021-03-19 PROCEDURE — 85025 COMPLETE CBC W/AUTO DIFF WBC: CPT

## 2021-03-19 PROCEDURE — 82077 ASSAY SPEC XCP UR&BREATH IA: CPT

## 2021-03-19 PROCEDURE — 82550 ASSAY OF CK (CPK): CPT

## 2021-03-19 PROCEDURE — 93010 ELECTROCARDIOGRAM REPORT: CPT | Performed by: INTERNAL MEDICINE

## 2021-03-19 PROCEDURE — 80179 DRUG ASSAY SALICYLATE: CPT

## 2021-03-19 PROCEDURE — 80143 DRUG ASSAY ACETAMINOPHEN: CPT

## 2021-03-19 PROCEDURE — 87635 SARS-COV-2 COVID-19 AMP PRB: CPT

## 2021-03-19 PROCEDURE — 99285 EMERGENCY DEPT VISIT HI MDM: CPT

## 2021-03-19 PROCEDURE — 93005 ELECTROCARDIOGRAM TRACING: CPT | Performed by: EMERGENCY MEDICINE

## 2021-03-19 ASSESSMENT — PATIENT HEALTH QUESTIONNAIRE - PHQ9: SUM OF ALL RESPONSES TO PHQ QUESTIONS 1-9: 3

## 2021-03-19 NOTE — ED NOTES
Patient was asked if he had been given any hospital attire to change into. Patient immediately raised his voice and stated he wasn't changing until he got some food. Patient was told that once he gets changed and we get labs/urine from him, that we could get him food. Patient began escalating and yelling \"If you knew how I was feeling and thinking, you wouldn't be talking to me and treating me this way\". This RN did not continue conversation and [employer] tech took over.       Neha Calhoun RN  03/19/21 2091

## 2021-03-19 NOTE — ED PROVIDER NOTES
HPI:  3/19/21, Time: 12:45 AM EDT         Peyton Veras. is a 45 y.o. male presenting to the ED for . Psychiatric evaluation. The complaint has been persistent, moderate in severity, and worsened by nothing. Reporting hearing voices and reporting having thoughts of hurting others. He reports is been going on for last several months. Patient reporting no suicidal thoughts. Patient reporting no chest pain no difficulty breathing no cough or fever. Patient reporting no headache. Patient reporting his been off his medications. Patient reporting no weakness or dizziness. ROS:   Pertinent positives and negatives are stated within HPI, all other systems reviewed and are negative.  --------------------------------------------- PAST HISTORY ---------------------------------------------  Past Medical History:  has a past medical history of Depression and Schizoaffective disorder ([facility] [address] Past Surgical History:  has a past surgical history that includes eye surgery (19 years ago). Social History:  reports that he has been smoking cigars and cigarettes. He has a 12.00 pack-year smoking history. He has never used smokeless tobacco. He reports current drug use. Drugs: Marijuana and Cocaine. He reports that he does not drink alcohol. Family History: family history includes No Known Problems in his father and mother. The patients home medications have been reviewed.     Allergies: Rondec-d [chlophedianol-pseudoephedrine]    ---------------------------------------------------PHYSICAL EXAM--------------------------------------    Constitutional/General: Alert and oriented x3, well appearing, non toxic in NAD  Head: Normocephalic and atraumatic  Eyes: PERRL, EOMI  Mouth: Oropharynx clear, handling secretions, no trismus  Neck: Supple, full ROM, non tender to palpation in the midline, no stridor, no crepitus, no meningeal signs  Pulmonary: Lungs clear to auscultation bilaterally, no wheezes, rales, or rhonchi. Not in respiratory distress  Cardiovascular:  Regular rate. Regular rhythm. No murmurs, gallops, or rubs. 2+ distal pulses  Chest: no chest wall tenderness  Abdomen: Soft. Non tender. Non distended. +BS. No rebound, guarding, or rigidity. No pulsatile masses appreciated. Musculoskeletal: Moves all extremities x 4. Warm and well perfused, no clubbing, cyanosis, or edema. Capillary refill <3 seconds  Skin: warm and dry. No rashes. Neurologic: GCS 15, CN 2-12 grossly intact, no focal deficits, symmetric strength 5/5 in the upper and lower extremities bilaterally  Psych: Patient is homicidal also reporting hallucinations no suicidal thoughts    -------------------------------------------------- RESULTS -------------------------------------------------  I have personally reviewed all laboratory and imaging results for this patient. Results are listed below.      LABS:  Results for orders placed or performed during the hospital encounter of 03/19/21   COVID-19, Rapid   Result Value Ref Range    SARS-CoV-2, NAAT Not Detected Not Detected   CBC auto differential   Result Value Ref Range    WBC 4.6 4.5 - 11.5 E9/L    RBC 4.57 3.80 - 5.80 E12/L    Hemoglobin 13.7 12.5 - 16.5 g/dL    Hematocrit 42.2 37.0 - 54.0 %    MCV 92.3 80.0 - 99.9 fL    MCH 30.0 26.0 - 35.0 pg    MCHC 32.5 32.0 - 34.5 %    RDW 13.2 11.5 - 15.0 fL    Platelets 781 179 - 063 E9/L    MPV 9.9 7.0 - 12.0 fL    Neutrophils % 53.2 43.0 - 80.0 %    Immature Granulocytes % 0.2 0.0 - 5.0 %    Lymphocytes % 28.6 20.0 - 42.0 %    Monocytes % 11.9 2.0 - 12.0 %    Eosinophils % 4.1 0.0 - 6.0 %    Basophils % 2.0 0.0 - 2.0 %    Neutrophils Absolute 2.45 1.80 - 7.30 E9/L    Immature Granulocytes # 0.01 E9/L    Lymphocytes Absolute 1.32 (L) 1.50 - 4.00 E9/L    Monocytes Absolute 0.55 0.10 - 0.95 E9/L    Eosinophils Absolute 0.19 0.05 - 0.50 E9/L    Basophils Absolute 0.09 0.00 - 0.20 E9/L   Comprehensive Metabolic Panel   Result Value Ref Range    Sodium 140 132 - 146 mmol/L    Potassium 3.6 3.5 - 5.0 mmol/L    Chloride 107 98 - 107 mmol/L    CO2 26 22 - 29 mmol/L    Anion Gap 7 7 - 16 mmol/L    Glucose 108 (H) 74 - 99 mg/dL    BUN 13 6 - 20 mg/dL    CREATININE 1.1 0.7 - 1.2 mg/dL    GFR Non-African American >60 >=60 mL/min/1.73    GFR African American >60     Calcium 8.8 8.6 - 10.2 mg/dL    Total Protein 6.5 6.4 - 8.3 g/dL    Albumin 4.0 3.5 - 5.2 g/dL    Total Bilirubin 0.4 0.0 - 1.2 mg/dL    Alkaline Phosphatase 41 40 - 129 U/L    ALT 6 0 - 40 U/L    AST 13 0 - 39 U/L   Serum Drug Screen   Result Value Ref Range    Ethanol Lvl <10 mg/dL    Acetaminophen Level <5.0 (L) 10.0 - 56.7 mcg/mL    Salicylate, Serum <2.3 0.0 - 30.0 mg/dL    TCA Scrn NEGATIVE Cutoff:300 ng/mL   Urine Drug Screen   Result Value Ref Range    Amphetamine Screen, Urine NOT DETECTED Negative <1000 ng/mL    Barbiturate Screen, Ur NOT DETECTED Negative < 200 ng/mL    Benzodiazepine Screen, Urine NOT DETECTED Negative < 200 ng/mL    Cannabinoid Scrn, Ur NOT DETECTED Negative < 50ng/mL    Cocaine Metabolite Screen, Urine POSITIVE (A) Negative < 300 ng/mL    Opiate Scrn, Ur NOT DETECTED Negative < 300ng/mL    PCP Screen, Urine NOT DETECTED Negative < 25 ng/mL    Methadone Screen, Urine NOT DETECTED Negative <300 ng/mL    Oxycodone Urine NOT DETECTED Negative <100 ng/mL    FENTANYL SCREEN, URINE NOT DETECTED Negative <1 ng/mL    Drug Screen Comment: see below    EKG 12 Lead   Result Value Ref Range    Ventricular Rate 70 BPM    Atrial Rate 70 BPM    P-R Interval 176 ms    QRS Duration 96 ms    Q-T Interval 374 ms    QTc Calculation (Bazett) 403 ms    P Axis 75 degrees    R Axis 72 degrees    T Axis 53 degrees       RADIOLOGY:  Interpreted by Radiologist.  No orders to display       EKG: This EKG is signed and interpreted by me.     Rate: 70  Rhythm: Sinus  Interpretation: no acute changes  Comparison: stable as compared to patient's most recent EKG          ------------------------- NURSING NOTES AND VITALS REVIEWED ---------------------------   The nursing notes within the ED encounter and vital signs as below have been reviewed by myself. /76   Pulse 95   Temp 96.8 °F (36 °C)   Resp 16   Ht 5' 11\" (1.803 m)   Wt 157 lb (71.2 kg)   SpO2 96%   BMI 21.90 kg/m²   Oxygen Saturation Interpretation: Normal    The patients available past medical records and past encounters were reviewed. ------------------------------ ED COURSE/MEDICAL DECISION MAKING----------------------  Medications - No data to display          Medical Decision Making:    Patient presenting here because of homicidal ideation for the last several months has been off his medications. Patient does have history of schizophrenia and depression. Patient reporting no suicidal thoughts. He has no physical complaints chest pain shortness of breath or abdominal pain. Labs will be obtained as well as within normal its patient will be medically cleared for  to evaluate    Re-Evaluations:             Re-evaluation. Patients symptoms show no change  Patient  is medically clear    Consultations:                 Critical Care: This patient's ED course included: a personal history and physicial eaxmination    This patient has been closely monitored during their ED course. Counseling: The emergency provider has spoken with the patient and discussed todays results, in addition to providing specific details for the plan of care and counseling regarding the diagnosis and prognosis. Questions are answered at this time and they are agreeable with the plan.       --------------------------------- IMPRESSION AND DISPOSITION ---------------------------------    IMPRESSION  1. Homicidal ideation        DISPOSITION  Disposition: To be admitted to general psychiatric unit  Patient condition is stable        NOTE: This report was transcribed using voice recognition software.  Every effort was made to ensure accuracy; however, inadvertent computerized transcription errors may be present          Reyes Fern, MD  03/19/21 0229              Reyes Fern, MD  03/19/21 4629

## 2021-03-19 NOTE — ED NOTES
[facility] from [facility] is requested pink slip and covid paper screening be faxed to them at [address] [place] [address] [place], [place]  03/19/21 7552

## 2021-03-19 NOTE — ED NOTES
Marianela from [facility] called to inform that Dr Peyton Ball has accepted this Pt to their facility     Pt will go to [address] Adult Unit     [id number] [phone]     [facility] [id number]     FRANCISCO Cameron, LSW  03/19/21 6177

## 2021-03-19 NOTE — ED NOTES
PATRICIO ALVAREZ attempted to call the [facility] Ele[facility] Luis Lyons and reached voice mail. SW left message hours ago on another Pt and have not received a return call yet.     [facility] ANTONIO will go ahead and fax this referral to [facility] per Pt request.     Charli Soares, MSW, LSW  03/19/21 3042

## 2021-03-19 NOTE — ED NOTES
Emergency Department [facility] AN AFFILIATE OF [facility] Biopsychosocial Assessment Note    Pt is pink slipped    Chief Complaint:     Pt is a 46 yo male presenting to the ED for a psych eval, reports homicidal feelings towards \"anyone that gets in my way.  i'm in a rage\".  denies SI.  also reporting a/v hallucinations but will not elaborate to this nurse in triage    MSE:    Pt is calm, oriented x 4, alert, labile mood, fleeting eye contact, pressured/non sensical/rambling speech, denies SI, admits to HI and Visual Halllucinations, reports good sleep and appetite    Clinical Summary/History:     Pt has a MH hx of schizoaffective disorder and depression, Pt reports he has been off his meds for over a month. Pt reports he use to be connected to [employer].    Pt tends to goes ramble with non sensical speech, but when asked yes or no questions is able to answer properly. Pt reports he is homicidal to not 1 specific person. Pt reports hallucinations of animals attacking bad humans. During his ramblings it sound like it may be toward racist individuals and people who do not understand that he has mental health issues. Pt denies alcohol but admits to using cocaine prior to coming in. SW educated Pt on cocaine use and damaging heart muscles. Pt reports he wants to stop. Gender  [] Male [] Female [] Transgender  [] Other    Sexual Orientation    [x] Heterosexual [] Homosexual [] Bisexual [] Other    Suicidal Behavioral: CSSR-S Complete. [] Reports:    [] Past [] Present   [x] Denies    Homicidal/ Violent Behavior  [x] Reports:   [] Past [x] Present   [] Denies     Hallucinations/Delusions   [x] Reports:   [] Denies     Substance Use/Alcohol Use/Addiction: SBIRT Screen Complete.    [x] Reports:   [] Denies     Trauma History  [x] Reports: will not disclose  [] Denies     Collateral Information:     No collateral information obtained at this time    Level of Care/Disposition Plan  [] Home:   [] Outpatient Provider:   [] Crisis Unit:   [x] Inpatient Psychiatric Unit:  [] Other:     PATRICIO ALVAREZ reviewed chart with ED Doc, Pt requires inpatient admission to ensure safety and stabilization.      FRANCISCO Hernandez, MAJOR  03/19/21 0329       FRANCISCO Hernandez, MAJOR  03/19/21 0405

## 2021-04-08 ENCOUNTER — HOSPITAL ENCOUNTER (INPATIENT)
Age: 41
LOS: 6 days | Discharge: OTHER INSTITUTION | DRG: 885 | End: 2021-04-15
Attending: EMERGENCY MEDICINE | Admitting: PSYCHIATRY & NEUROLOGY
Payer: COMMERCIAL

## 2021-04-08 LAB
ACETAMINOPHEN LEVEL: <5 MCG/ML (ref 10–30)
ALBUMIN SERPL-MCNC: 3.6 G/DL (ref 3.5–5.2)
ALP BLD-CCNC: 39 U/L (ref 40–129)
ALT SERPL-CCNC: 6 U/L (ref 0–40)
AMPHETAMINE SCREEN, URINE: NOT DETECTED
ANION GAP SERPL CALCULATED.3IONS-SCNC: 8 MMOL/L (ref 7–16)
AST SERPL-CCNC: 13 U/L (ref 0–39)
BARBITURATE SCREEN URINE: NOT DETECTED
BASOPHILS ABSOLUTE: 0.08 E9/L (ref 0–0.2)
BASOPHILS RELATIVE PERCENT: 1.8 % (ref 0–2)
BENZODIAZEPINE SCREEN, URINE: NOT DETECTED
BILIRUB SERPL-MCNC: 0.2 MG/DL (ref 0–1.2)
BILIRUBIN URINE: NEGATIVE
BLOOD, URINE: NEGATIVE
BUN BLDV-MCNC: 16 MG/DL (ref 6–20)
CALCIUM SERPL-MCNC: 8.5 MG/DL (ref 8.6–10.2)
CANNABINOID SCREEN URINE: POSITIVE
CHLORIDE BLD-SCNC: 104 MMOL/L (ref 98–107)
CLARITY: CLEAR
CO2: 27 MMOL/L (ref 22–29)
COCAINE METABOLITE SCREEN URINE: POSITIVE
COLOR: YELLOW
CREAT SERPL-MCNC: 1.1 MG/DL (ref 0.7–1.2)
EOSINOPHILS ABSOLUTE: 0.3 E9/L (ref 0.05–0.5)
EOSINOPHILS RELATIVE PERCENT: 6.9 % (ref 0–6)
ETHANOL: <10 MG/DL (ref 0–0.08)
FENTANYL SCREEN, URINE: NOT DETECTED
GFR AFRICAN AMERICAN: >60
GFR NON-AFRICAN AMERICAN: >60 ML/MIN/1.73
GLUCOSE BLD-MCNC: 129 MG/DL (ref 74–99)
GLUCOSE URINE: NEGATIVE MG/DL
HCT VFR BLD CALC: 39.9 % (ref 37–54)
HEMOGLOBIN: 13.1 G/DL (ref 12.5–16.5)
IMMATURE GRANULOCYTES #: 0.01 E9/L
IMMATURE GRANULOCYTES %: 0.2 % (ref 0–5)
KETONES, URINE: NEGATIVE MG/DL
LEUKOCYTE ESTERASE, URINE: NEGATIVE
LYMPHOCYTES ABSOLUTE: 1.44 E9/L (ref 1.5–4)
LYMPHOCYTES RELATIVE PERCENT: 33.1 % (ref 20–42)
Lab: ABNORMAL
MCH RBC QN AUTO: 30.5 PG (ref 26–35)
MCHC RBC AUTO-ENTMCNC: 32.8 % (ref 32–34.5)
MCV RBC AUTO: 92.8 FL (ref 80–99.9)
METHADONE SCREEN, URINE: NOT DETECTED
MONOCYTES ABSOLUTE: 0.42 E9/L (ref 0.1–0.95)
MONOCYTES RELATIVE PERCENT: 9.7 % (ref 2–12)
NEUTROPHILS ABSOLUTE: 2.1 E9/L (ref 1.8–7.3)
NEUTROPHILS RELATIVE PERCENT: 48.3 % (ref 43–80)
NITRITE, URINE: NEGATIVE
OPIATE SCREEN URINE: NOT DETECTED
OXYCODONE URINE: NOT DETECTED
PDW BLD-RTO: 13.2 FL (ref 11.5–15)
PH UA: 6 (ref 5–9)
PHENCYCLIDINE SCREEN URINE: NOT DETECTED
PLATELET # BLD: 285 E9/L (ref 130–450)
PMV BLD AUTO: 9.5 FL (ref 7–12)
POTASSIUM SERPL-SCNC: 3.9 MMOL/L (ref 3.5–5)
PROTEIN UA: NEGATIVE MG/DL
RBC # BLD: 4.3 E12/L (ref 3.8–5.8)
SALICYLATE, SERUM: <0.3 MG/DL (ref 0–30)
SARS-COV-2, NAAT: NOT DETECTED
SODIUM BLD-SCNC: 139 MMOL/L (ref 132–146)
SPECIFIC GRAVITY UA: 1.02 (ref 1–1.03)
TOTAL PROTEIN: 6 G/DL (ref 6.4–8.3)
TRICYCLIC ANTIDEPRESSANTS SCREEN SERUM: NEGATIVE NG/ML
UROBILINOGEN, URINE: 1 E.U./DL
WBC # BLD: 4.4 E9/L (ref 4.5–11.5)

## 2021-04-08 PROCEDURE — 80053 COMPREHEN METABOLIC PANEL: CPT

## 2021-04-08 PROCEDURE — 85025 COMPLETE CBC W/AUTO DIFF WBC: CPT

## 2021-04-08 PROCEDURE — 81003 URINALYSIS AUTO W/O SCOPE: CPT

## 2021-04-08 PROCEDURE — 99284 EMERGENCY DEPT VISIT MOD MDM: CPT

## 2021-04-08 PROCEDURE — 80143 DRUG ASSAY ACETAMINOPHEN: CPT

## 2021-04-08 PROCEDURE — 80179 DRUG ASSAY SALICYLATE: CPT

## 2021-04-08 PROCEDURE — 82077 ASSAY SPEC XCP UR&BREATH IA: CPT

## 2021-04-08 PROCEDURE — 80307 DRUG TEST PRSMV CHEM ANLYZR: CPT

## 2021-04-08 PROCEDURE — 87635 SARS-COV-2 COVID-19 AMP PRB: CPT

## 2021-04-08 ASSESSMENT — PAIN DESCRIPTION - LOCATION: LOCATION: TEETH

## 2021-04-09 PROBLEM — F25.9 SCHIZOAFFECTIVE DISORDER (HCC): Status: ACTIVE | Noted: 2021-04-09

## 2021-04-09 LAB
EKG ATRIAL RATE: 52 BPM
EKG P AXIS: 55 DEGREES
EKG P-R INTERVAL: 186 MS
EKG Q-T INTERVAL: 430 MS
EKG QRS DURATION: 100 MS
EKG QTC CALCULATION (BAZETT): 399 MS
EKG R AXIS: 80 DEGREES
EKG T AXIS: 56 DEGREES
EKG VENTRICULAR RATE: 52 BPM

## 2021-04-09 PROCEDURE — 93005 ELECTROCARDIOGRAM TRACING: CPT | Performed by: PHYSICIAN ASSISTANT

## 2021-04-09 PROCEDURE — 6370000000 HC RX 637 (ALT 250 FOR IP): Performed by: NURSE PRACTITIONER

## 2021-04-09 PROCEDURE — 99222 1ST HOSP IP/OBS MODERATE 55: CPT | Performed by: NURSE PRACTITIONER

## 2021-04-09 PROCEDURE — 1240000000 HC EMOTIONAL WELLNESS R&B

## 2021-04-09 PROCEDURE — 6370000000 HC RX 637 (ALT 250 FOR IP): Performed by: PSYCHIATRY & NEUROLOGY

## 2021-04-09 RX ORDER — HALOPERIDOL 5 MG
5 TABLET ORAL EVERY 6 HOURS PRN
Status: DISCONTINUED | OUTPATIENT
Start: 2021-04-09 | End: 2021-04-15 | Stop reason: HOSPADM

## 2021-04-09 RX ORDER — ACETAMINOPHEN 325 MG/1
650 TABLET ORAL EVERY 6 HOURS PRN
Status: DISCONTINUED | OUTPATIENT
Start: 2021-04-09 | End: 2021-04-15 | Stop reason: HOSPADM

## 2021-04-09 RX ORDER — TRAZODONE HYDROCHLORIDE 50 MG/1
50 TABLET ORAL NIGHTLY PRN
Status: DISCONTINUED | OUTPATIENT
Start: 2021-04-09 | End: 2021-04-15 | Stop reason: HOSPADM

## 2021-04-09 RX ORDER — HYDROXYZINE PAMOATE 50 MG/1
50 CAPSULE ORAL 3 TIMES DAILY PRN
Status: DISCONTINUED | OUTPATIENT
Start: 2021-04-09 | End: 2021-04-15 | Stop reason: HOSPADM

## 2021-04-09 RX ORDER — HALOPERIDOL 5 MG/ML
5 INJECTION INTRAMUSCULAR EVERY 6 HOURS PRN
Status: DISCONTINUED | OUTPATIENT
Start: 2021-04-09 | End: 2021-04-15 | Stop reason: HOSPADM

## 2021-04-09 RX ORDER — PALIPERIDONE 6 MG/1
6 TABLET, EXTENDED RELEASE ORAL DAILY
Status: DISCONTINUED | OUTPATIENT
Start: 2021-04-09 | End: 2021-04-11

## 2021-04-09 RX ORDER — MAGNESIUM HYDROXIDE/ALUMINUM HYDROXICE/SIMETHICONE 120; 1200; 1200 MG/30ML; MG/30ML; MG/30ML
30 SUSPENSION ORAL PRN
Status: DISCONTINUED | OUTPATIENT
Start: 2021-04-09 | End: 2021-04-15 | Stop reason: HOSPADM

## 2021-04-09 RX ADMIN — ACETAMINOPHEN 650 MG: 325 TABLET ORAL at 21:57

## 2021-04-09 RX ADMIN — HYDROXYZINE PAMOATE 50 MG: 25 CAPSULE ORAL at 21:57

## 2021-04-09 RX ADMIN — PALIPERIDONE 6 MG: 6 TABLET, EXTENDED RELEASE ORAL at 13:56

## 2021-04-09 ASSESSMENT — SLEEP AND FATIGUE QUESTIONNAIRES
RESTFUL SLEEP: NO
DO YOU USE A SLEEP AID: NO
DIFFICULTY ARISING: YES
DIFFICULTY ARISING: YES
SLEEP PATTERN: DIFFICULTY FALLING ASLEEP
DO YOU HAVE DIFFICULTY SLEEPING: YES
AVERAGE NUMBER OF SLEEP HOURS: 3
DO YOU HAVE DIFFICULTY SLEEPING: YES
DIFFICULTY STAYING ASLEEP: YES
DO YOU USE A SLEEP AID: NO
RESTFUL SLEEP: NO

## 2021-04-09 ASSESSMENT — PATIENT HEALTH QUESTIONNAIRE - PHQ9
SUM OF ALL RESPONSES TO PHQ QUESTIONS 1-9: 12
SUM OF ALL RESPONSES TO PHQ QUESTIONS 1-9: 11

## 2021-04-09 ASSESSMENT — PAIN SCALES - GENERAL: PAINLEVEL_OUTOF10: 10

## 2021-04-09 ASSESSMENT — LIFESTYLE VARIABLES: HISTORY_ALCOHOL_USE: NO

## 2021-04-09 NOTE — ED NOTES
Patient refusing to speak with SW. When asked what he is doing in the ED, he states \"nothing, I'm good. \" He does not engage further with SW attempts to conduct assessment.  Pt lays on the bed, turned toward wall away from [address] [place] [address]  04/09/21 0004

## 2021-04-09 NOTE — BH NOTE
5 [facility]  Initial Interdisciplinary Treatment Plan NOTE    Review Date & Time:  4-9-21  930 am    Patient was not in treatment team    Admission Type:   Admission Type: Involuntary    Reason for admission:  Reason for Admission: \"I been hanging around dangerous people paranoid thoughts to hurt myself      Estimated Length of Stay Update:  3-5 days  Estimated Discharge Date Update: 3-5 days    PATIENT STRENGTHS:  Patient Strengths Strengths: Communication  Patient Strengths and Limitations:Limitations: Difficulty problem solving/relies on others to help solve problems  Addictive Behavior:Addictive Behavior  Do you have a history of Chemical Use?: No  Do you have a history of Alcohol Use?: No  Do you have a history of Street Drug Abuse?: Yes(crack daily and cannabis)  Histroy of Prescripton Drug Abuse?: No  Medical Problems:  Past Medical History:   Diagnosis Date    Depression     Schizoaffective disorder ([facility] [address]        EDUCATION:   Learner Progress Toward Treatment Goals: Reviewed results and recommendations of this team and Reviewed group plan and strategies    Method: Small group    Outcome: Verbalized understanding    PATIENT GOALS: pt does not report a goal during morning assessment. PLAN/TREATMENT RECOMMENDATIONS UPDATE: Begin medications regimen and assess pt responses to them. GOALS UPDATE:   Time frame for Short-Term Goals: Daily re assessment.      Rocky Chiang RN

## 2021-04-09 NOTE — CARE COORDINATION
Biopsychosocial Assessment Note    Social work met with patient to complete the biopsychosocial assessment and CSSR-S. Mental Status Exam: Pt alert and oriented x 4. Pt was withdrawn and guarded throughout assessment. Pt's thought process was preoccupied, speech was clear. Chief Complaint: Shantel Moreira. presented to the emergency department for evaluation of Psychiatric Evaluation (Hearing voices that people want to hurt him and get to safety quick. +SI \"I feel other people have plans\")\"    Patient Report: Previous admissions to this psychiatric unit. Pt states that he has been hearing voices, that are telling him that people want to hurt him. Pt denied suicide attempt hx, although this varies in staff documentation. Pt currently denying SI/ HI/ hallucinations/ delusions. Pt admits to substance use in the form of cocaine, as well as marijuana. Pt denied trauma history. Gender  [x] Male [] Female [] Transgender  [] Other    Sexual Orientation    [x] Heterosexual [] Homosexual [] Bisexual [] Other    Suicidal Ideation  [x] Past [] Present [] Denies     Homicidal Ideation  [x] Past [] Present [] Denies     Hallucinations/Delusions (Specify type)  [] Reports [x] Denies     Substance Use/Alcohol Use/Addiction  [x] Reports [] Denies     Tobacco Use (within the last 6 months)  [x] Reports [] Denies     Trauma History  [] Reports [x] Denies     Collateral Contact (CARL signed)  Name:   Relationship:  Number:     Collateral Information: Pt appears guarded and paranoid. Would not sign CARL at this time. Access to Weapons per Collateral Contact: [] Reports [] Denies       Follow up provider preference: [facility] for discharge  Location (where do they plan on discharging to?): Could benefit from inpatient. Sw will make referral to Peer Recovery.      Transportation (who will pick them up at discharge?) Insurance    Medications (will they have money for copays at discharge?):  Pt or mother

## 2021-04-09 NOTE — CARE COORDINATION
[facility] for the following reason(s). Suspected substance abuse disorder. Peer Recovery Support Note    Name: Hemant Link. Date: 4/9/2021    Chief Complaint   Patient presents with    Psychiatric Evaluation     Hearing voices that people want to hurt him and get to safety quick. +SI \"I feel other people have plans\"       Peer Support met with patient.   [] Support and education provided  [] Resources provided   [x] Treatment referral: Sharmila Lynn  [] Other:  [] Patient declined peer recovery services     Referred By: Osiel Zelaya (ANTONIO)     Notes:   Signed: [facility], 4/9/2021

## 2021-04-09 NOTE — ED PROVIDER NOTES
ED Attending  CC: Rae       Eleazar Mullerphancaleb HartAmandaidalia Coffey [address]  Department of Emergency Medicine   ED  Encounter Note  Admit Date/RoomTime: 2021  9:00 PM  ED Room: 7522/7522-A    NAME: Eugene Hogue. : 1980  MRN: [id number]     Chief Complaint:  Psychiatric Evaluation (Hearing voices that people want to hurt him and get to safety quick. +SI \"I feel other people have plans\")    History of Present Illness       Eugene Olea is a 45 y.o. old male who presents to the emergency department by private vehicle, for \"depression and rage\" which began a few day(s) prior to arrival.  He has a prior history of depression and schizoaffective disorder of which the problem is long-standing. His reported drug use history: Never. He has previously been in counseling and was last seen about 2 months ago. There has been no history of recent trauma. He denies suicidal ideation and admits to homicidal ideation but denies any plans. The patient reported he needed to be evaluated for \"depression and rage. \" The patient reported that he has been hearing things through the DrAvailable that people are talking about him and doing things to him that are not good. He reported that he is angry because his mom is taking care of someone who is not her child and is pushing the patient away. The pt noted that because his mom his pushing him away, it is pushing him into a \"bad crowd. \" The patient noted that he needed to \"get himself right\" before anything happens because he is hearing voices and has homicidal ideation of the person who is becoming close with his mom and his family members that are not taking his side. He denied any specific plan at this time. The patient was prescribed Depakote but refused to take it because it was causing him to bald. He is also prescribed Invega but cannot remember the last time he took it.      Quality:      Hopelessness:   No.     Terror:   No.     Confusion:   No.     Hallucinations: Immature Granulocytes # 0.01 E9/L    Lymphocytes Absolute 1.44 (L) 1.50 - 4.00 E9/L    Monocytes Absolute 0.42 0.10 - 0.95 E9/L    Eosinophils Absolute 0.30 0.05 - 0.50 E9/L    Basophils Absolute 0.08 0.00 - 0.20 E9/L   Serum Drug Screen   Result Value Ref Range    Ethanol Lvl <10 mg/dL    Acetaminophen Level <5.0 (L) 10.0 - 19.8 mcg/mL    Salicylate, Serum <9.8 0.0 - 30.0 mg/dL    TCA Scrn NEGATIVE Cutoff:300 ng/mL   Urine Drug Screen   Result Value Ref Range    Amphetamine Screen, Urine NOT DETECTED Negative <1000 ng/mL    Barbiturate Screen, Ur NOT DETECTED Negative < 200 ng/mL    Benzodiazepine Screen, Urine NOT DETECTED Negative < 200 ng/mL    Cannabinoid Scrn, Ur POSITIVE (A) Negative < 50ng/mL    Cocaine Metabolite Screen, Urine POSITIVE (A) Negative < 300 ng/mL    Opiate Scrn, Ur NOT DETECTED Negative < 300ng/mL    PCP Screen, Urine NOT DETECTED Negative < 25 ng/mL    Methadone Screen, Urine NOT DETECTED Negative <300 ng/mL    Oxycodone Urine NOT DETECTED Negative <100 ng/mL    FENTANYL SCREEN, URINE NOT DETECTED Negative <1 ng/mL    Drug Screen Comment: see below    Urinalysis   Result Value Ref Range    Color, UA Yellow Straw/Yellow    Clarity, UA Clear Clear    Glucose, Ur Negative Negative mg/dL    Bilirubin Urine Negative Negative    Ketones, Urine Negative Negative mg/dL    Specific Gravity, UA 1.025 1.005 - 1.030    Blood, Urine Negative Negative    pH, UA 6.0 5.0 - 9.0    Protein, UA Negative Negative mg/dL    Urobilinogen, Urine 1.0 <2.0 E.U./dL    Nitrite, Urine Negative Negative    Leukocyte Esterase, Urine Negative Negative   EKG 12 Lead   Result Value Ref Range    Ventricular Rate 52 BPM    Atrial Rate 52 BPM    P-R Interval 186 ms    QRS Duration 100 ms    Q-T Interval 430 ms    QTc Calculation (Bazett) 399 ms    P Axis 55 degrees    R Axis 80 degrees    T Axis 56 degrees     Imaging: All Radiology results interpreted by Radiologist unless otherwise noted.   No orders to display     ED Course / Medical Decision Making     Medications   acetaminophen (TYLENOL) tablet 650 mg (has no administration in time range)   magnesium hydroxide (MILK OF MAGNESIA) 400 MG/5ML suspension 30 mL (has no administration in time range)   aluminum & magnesium hydroxide-simethicone (MAALOX) 200-200-20 MG/5ML suspension 30 mL (has no administration in time range)   hydrOXYzine (VISTARIL) capsule 50 mg (has no administration in time range)   haloperidol lactate (HALDOL) injection 5 mg (has no administration in time range)     Or   haloperidol (HALDOL) tablet 5 mg (has no administration in time range)   traZODone (DESYREL) tablet 50 mg (has no administration in time range)        Re-examination:  4/8/21       Time: 23:50  The patient is medically cleared and ready for social work evaluation. Consult(s):   IP CONSULT TO SOCIAL WORK    Procedure(s):   none    MDM:   44 y/o male with history of depression and schizoaffective disorder presents to the ED for evaluation of \"rage and depression. \" The patient has not been compliant with his medications and is unsure of the last time he took his [place]. The patient admits to homicidal ideation and auditory hallucinations. Patient is denying any suicidal ideations. Patient had a full psychiatric work-up. He is medically cleared and will be evaluated by social work. The patient will be pink-slipped and admitted for further psychiatric evaluation and treatment. Patient is agreeable with the plan of management. Plan of Care/Counseling:  I reviewed today's visit with the patient in addition to providing specific details for the plan of care and counseling regarding the diagnosis and prognosis. Assessment    Homicidal ideation  Plan   Inpatient Admission to Mental Health / Behavioral Unit - Patient is medically cleared for mental/behavioral health unit admission.   Patient condition is stable    New Medications     Current Discharge Medication List        Electronically signed by Berenice Ochoa   DD: 4/8/21  **This report was transcribed using voice recognition software. Every effort was made to ensure accuracy; however, inadvertent computerized transcription errors may be present.   END OF ED PROVIDER NOTE       Xaiver Sinclair PA-C  04/09/21 [id number]

## 2021-04-09 NOTE — ED NOTES
Patient reported to this RN during triage that he has not been taking his medications and did not get the [place] shot he was due for this past month.       Lambert Bryant RN  04/09/21 [id number]

## 2021-04-09 NOTE — PLAN OF CARE
Pt denies suicidal or homicidal ideations. Pt denies hallucinations,  Pt has poverty of content, avoidant eye contact. No other distress observed at this time. pt does not report a goal.  Will follow and monitor.

## 2021-04-09 NOTE — PROGRESS NOTES
`[facility]  Admission Note     Admitted 28 y/o A/A male a/o x3 co depressed mood and suicidal thoughts having paranoid persecutory delusions angry at his mother stated \"she steals my money took my stimulus check\"it was reported in ER he is angry and maybe having HI thoughts towards a man his mother cares for \"she's pushing me out\" currently homeless and living on the streets off his meds since D/C from this facility in February he was a poor historian exit seeking refused fill out safety plan and assessment and questionnaire given snack to bed     Admission Type:   Admission Type:  Involuntary    Reason for admission:  Reason for Admission: \"I been hanging around dangerous people paranoid thoughts to hurt myself    PATIENT STRENGTHS:  Strengths: Communication    Patient Strengths and Limitations:  Limitations: Difficulty problem solving/relies on others to help solve problems    Addictive Behavior:   Addictive Behavior  Do you have a history of Chemical Use?: No  Do you have a history of Alcohol Use?: No  Do you have a history of Street Drug Abuse?: Yes(crack daily and cannabis)  Histroy of Prescripton Drug Abuse?: No    Medical Problems:   Past Medical History:   Diagnosis Date    Depression     Schizoaffective disorder ([facility] [address]        Status EXAM:  Status and Exam  Normal: Yes  Facial Expression: Avoids Gaze  Affect: Appropriate  Level of Consciousness: Alert  Mood:Normal: No  Mood: Depressed, Anxious, Sad  Motor Activity:Normal: Yes  Interview Behavior: Evasive  Preception: [place] to Person, [place] to Time, [place] to Place  Attention:Normal: No  Attention: Distractible  Thought Content:Normal: No  Thought Content: Paranoia  Hallucinations: Unable to assess  Delusions: Yes  Delusions: Persecution  Memory:Normal: Yes  Insight and Judgment: No  Insight and Judgment: Poor Judgment, Poor Insight  Present Suicidal Ideation: Yes  Present Homicidal Ideation: Yes    Tobacco Screening:  Practical Counseling, on admission, lydia X, if applicable and completed (first 3 are required if patient doesn't refuse): (x )  Recognizing danger situations (included triggers and roadblocks)                    (x )  Coping skills (new ways to manage stress, exercise, relaxation techniques, changing routine, distraction)                                                           (x )  Basic information about quitting (benefits of quitting, techniques in how to quit, available resources  ( ) Referral for counseling faxed to [facility]                                           ( ) Patient refused counseling  ( ) Patient has not smoked in the last 30 days    Metabolic Screening:    Lab Results   Component Value Date    LABA1C 5.2 10/27/2019       Lab Results   Component Value Date    CHOL 113 10/20/2020    CHOL 120 10/27/2019     Lab Results   Component Value Date    TRIG 45 10/20/2020    TRIG 52 10/27/2019     Lab Results   Component Value Date    HDL 50 10/20/2020    HDL 42 10/27/2019     No components found for: LDLCAL  Lab Results   Component Value Date    LABVLDL 10 10/27/2019         Body mass index is 22.32 kg/m².     BP Readings from Last 2 Encounters:   04/09/21 100/65   03/19/21 125/76           Santosh Man RN

## 2021-04-09 NOTE — H&P
Department of Psychiatry  History and Physical - Adult     CHIEF COMPLAINT: Not offering any conversation irritable and evasive. Patient was seen after discussing with the treatment team and reviewing the chart    CIRCUMSTANCES OF ADMISSION: for depression and rage reported him and hearing things the [place] the people are talking about him and doing things to him that are not good. Also reported homicidal ideations    HISTORY OF PRESENT ILLNESS:      The patient is a 45 y.o. male with significant past history of Schizoaffective Disorder presents the ED for depression and rage reported him and hearing things the [place] the people are talking about him and doing things to him that are not good. He also reported he is angry because his mom is taking care of someone who is not her child and is pushing the patient away. He reported in the ED that he has to \"get himself right. \"  Unknown Later that he is afraid things going to happen because he is hearing voices and has homicidal ideation of the person who is becoming close with his mom and his family members are not taking his side. In the ED his urine tox was positive cannabis and cocaine he was medically cleared admitted to [address] [place] [address] for further psychiatric assessment and stabilization and treatment. Upon assessment today patient is irritable and evasive he does not offer any conversations lies in his bed. He appears flat blunted he is  irritable he appears to be internally stimulated. No insight and judgment hospitalization for treatment. History is limited as patient is uncooperative assessment at this time.       Past Medical History:        Diagnosis Date    Depression     Schizoaffective disorder ([facility] [address]        Medications Prior to Admission:   Medications Prior to Admission: dicyclomine (BENTYL) 10 MG capsule, Take 2 capsules by mouth 4 times daily as needed (abd pain)  ondansetron (ZOFRAN ODT) 4 MG disintegrating tablet, Take 1 tablet by mouth every 8 hours as needed for Nausea or Vomiting  paliperidone (INVEGA) 6 MG extended release tablet, Take 1 tablet by mouth daily  paliperidone (INVEGA) 3 MG extended release tablet, Take 1 tablet by mouth nightly  paliperidone palmitate ER (INVEGA SUSTENNA) 156 MG/ML STACEY IM injection, Inject 156 mg into the muscle every 30 days for 1 dose Next injection is due 3/26/21  nicotine (NICODERM CQ) 21 MG/24HR, Place 1 patch onto the skin daily    Past Surgical History:        Procedure Laterality Date    EYE SURGERY  19 years ago       Allergies:   Rondec-d [chlophedianol-pseudoephedrine]    Family History  Family History   Problem Relation Age of Onset    No Known Problems Mother     No Known Problems Father              EXAMINATION:    REVIEW OF SYSTEMS:    ROS:  [x] All negative/unchanged except if checked.  Explain positive(checked items) below:  [] Constitutional  [] Eyes  [] Ear/Nose/Mouth/Throat  [] Respiratory  [] CV  [] GI  []   [] Musculoskeletal  [] Skin/Breast  [] Neurological  [] Endocrine  [] Heme/Lymph  [] Allergic/Immunologic    Explanation:     Vitals:  /65   Pulse 65   Temp 97 °F (36.1 °C) (Tympanic)   Resp 16   Ht 5' 11\" (1.803 m)   Wt 160 lb (72.6 kg)   SpO2 97%   BMI 22.32 kg/m²      Physical Examination:   Head: x  Atraumatic: x normocephalic  Skin and Mucosa        Moist x  Dry   Pale  x Normal   Neck:  Thyroid  Palpable   x  Not palpable   venus distention   adenopathy   Chest: x Clear   Rhonchi     Wheezing   CV:  xS1   xS2    xNo murmer   Abdomen:  x  Soft    Tender    Viceromegaly   Extremities:  x No Edema     Edema     Cranial Nerves Examination:   CN II:   xPupils are reactive to light  Pupils are non reactive to light  CN III, IV, VI:  xNo eye deviation    No diplopia or ptosis   CN V:    xFacial Sensation is intact     Facial Sensation is not intact   CN IIIV:   x Hearing is normal to rubbing fingers   CN IX, X:     xNormal gag reflex and phonation   CN XI: xShoulder shrug and neck rotation is normal  CNXII:    xTongue is midline no deviation or atrophy    Mental Status Examination:    Level of consciousness:  within normal limits   Appearance:  well-appearing  Behavior/Motor:  no abnormalities noted  Attitude toward examiner:  uncooperative  Speech: Not offer any conversation   mood: Unable to assess  Affect: Irritable  Thought processes:  Unable to assess  Thought content:  Unable to assess  Cognition:  Unable to assess   Concentration poor  Memory Unable to assess  Insight poor   Judgement poor  Fund of Knowledge poor    DIAGNOSIS:  Schizoaffective disorder bipolar type   polysubstance abuse            LABS: REVIEWED TODAY:  Recent Labs     04/08/21 2220   WBC 4.4*   HGB 13.1        Recent Labs     04/08/21 2220      K 3.9      CO2 27   BUN 16   CREATININE 1.1   GLUCOSE 129*     Recent Labs     04/08/21 2220   BILITOT 0.2   ALKPHOS 39*   AST 13   ALT 6     Lab Results   Component Value Date    LABAMPH NOT DETECTED 04/08/2021    BARBSCNU NOT DETECTED 04/08/2021    LABBENZ NOT DETECTED 04/08/2021    LABMETH NOT DETECTED 04/08/2021    OPIATESCREENURINE NOT DETECTED 04/08/2021    PHENCYCLIDINESCREENURINE NOT DETECTED 04/08/2021    ETOH <10 04/08/2021     Lab Results   Component Value Date    TSH 0.462 01/16/2021     No results found for: LITHIUM  Lab Results   Component Value Date    VALPROATE 3 (L) 04/29/2020     Lab Results   Component Value Date    VALPROATE 3 04/29/2020         Radiology No results found. TREATMENT PLAN:    Risk Management: Based on the diagnosis and assessment biopsychosocial treatment model was presented to the patient and was given the opportunity to ask any question. The patient was agreeable to the plan and all the patient's questions were answered to the patient's satisfaction. I discussed with the patient the risk, benefit, alternative and common side effects for the proposed medication treatment.   The patient is consenting to this treatment. Collateral Information:  Will obtain collateral information from the family or friends. Will obtain medical records as appropriate from out patient providers  Will consult the hospitalist for a physical exam to rule out any co-morbid physical condition. Patient's diagnosis, treatment plan, medication management was formulated at the end of evaluation and after reviewing relevant documentation. Patient was seen directly by myself and Dr. Daphne Brown    Start back on [place] 6 mg daily  Obtain confirmation about the date of patient's last long-acting injection per records he was due for his injection on 3/26/2021        Prn Haldol 5mg and Vistaril 50mg q6hr for extreme agitation. Trazodone as ordered for insomnia  Vistaril as ordered for anxiety      Psychotherapy:   Encourage participation in milieu and group therapy  Individual therapy as needed              Behavioral Services  Medicare Certification Upon Admission    I certify that this patient's inpatient psychiatric hospital admission is medically necessary for:    [x] (1) Treatment which could reasonably be expected to improve this patient's condition,       [] (2) Or for diagnostic study;     AND     [x](2) The inpatient psychiatric services are provided while the individual is under the care of a physician and are included in the individualized plan of care.     Estimated length of stay/service 3-5 days based on stability    Plan for post-hospital care outpatient psychiatric and counseling    Electronically signed by JUICE Martinez CNP on 6/9/9101 at 8:31 AM        Electronically signed by JUICE Martinez CNP on 0/7/3405 at 8:31 AM

## 2021-04-10 LAB — HBA1C MFR BLD: 5.1 % (ref 4–5.6)

## 2021-04-10 PROCEDURE — 6370000000 HC RX 637 (ALT 250 FOR IP): Performed by: PSYCHIATRY & NEUROLOGY

## 2021-04-10 PROCEDURE — 6370000000 HC RX 637 (ALT 250 FOR IP): Performed by: NURSE PRACTITIONER

## 2021-04-10 PROCEDURE — 1240000000 HC EMOTIONAL WELLNESS R&B

## 2021-04-10 PROCEDURE — 36415 COLL VENOUS BLD VENIPUNCTURE: CPT

## 2021-04-10 PROCEDURE — 99231 SBSQ HOSP IP/OBS SF/LOW 25: CPT | Performed by: NURSE PRACTITIONER

## 2021-04-10 PROCEDURE — 83036 HEMOGLOBIN GLYCOSYLATED A1C: CPT

## 2021-04-10 RX ORDER — IBUPROFEN 400 MG/1
400 TABLET ORAL EVERY 6 HOURS PRN
Status: DISCONTINUED | OUTPATIENT
Start: 2021-04-10 | End: 2021-04-13

## 2021-04-10 RX ADMIN — PALIPERIDONE 6 MG: 6 TABLET, EXTENDED RELEASE ORAL at 09:40

## 2021-04-10 RX ADMIN — Medication 1 DOSE: at 19:00

## 2021-04-10 RX ADMIN — ACETAMINOPHEN 650 MG: 325 TABLET ORAL at 16:35

## 2021-04-10 RX ADMIN — ACETAMINOPHEN 650 MG: 325 TABLET ORAL at 08:27

## 2021-04-10 RX ADMIN — IBUPROFEN 400 MG: 400 TABLET, FILM COATED ORAL at 18:17

## 2021-04-10 ASSESSMENT — PAIN SCALES - GENERAL
PAINLEVEL_OUTOF10: 2
PAINLEVEL_OUTOF10: 10

## 2021-04-10 ASSESSMENT — PAIN DESCRIPTION - LOCATION: LOCATION: TEETH

## 2021-04-10 NOTE — PROGRESS NOTES
Pt sleeping in his room. Woke. Pt c/o being tired today. Pt is flat and evasive. Denies SI, HI, and AVH. When asked if he still felt paranoid, pt stated \"I dont know. I was sleeping\". Pt stated he wanted rehab after discharge for his drug problem. Pt states hw has not been on any meds since his last d/c in Feb.  Pt was seen today by [facility] for this. Pt advised snack was out and he got up to eat.  Will continue to monitor

## 2021-04-10 NOTE — PROGRESS NOTES
BEHAVIORAL HEALTH FOLLOW-UP NOTE     4/10/2021     Patient was seen and examined in person, Chart reviewed   Patient's case discussed with staff/team    Chief Complaint: I am tired  Interim History:     Assessment limited this morning the patient would not uncover his head from his blanket. He did not offer much conversation. He answered questions with a grunting sound opposed to a verbal response. He appears low mood, low energy. He denies suicidal plan, however endorses that people are out to get him.       Appetite:   [x] Normal/Unchanged  [] Increased  [] Decreased      Sleep:       [x] Normal/Unchanged  [] Fair       [] Poor              Energy:    [x] Normal/Unchanged  [] Increased  [] Decreased        SI [] Present  [x] Absent    HI  []Present  [x] Absent     Aggression:  [] yes  [x] no    Patient is [x] able  [] unable to CONTRACT FOR SAFETY     PAST MEDICAL/PSYCHIATRIC HISTORY:   Past Medical History:   Diagnosis Date    Depression     Schizoaffective disorder ([facility]        FAMILY/SOCIAL HISTORY:  Family History   Problem Relation Age of Onset    No Known Problems Mother     No Known Problems Father      Social History     Socioeconomic History    Marital status: Single     Spouse name: Not on file    Number of children: 0    Years of education: 12    Highest education level: Not on file   Occupational History    Not on file   Social Needs    Financial resource strain: Not on file    Food insecurity     Worry: Not on file     Inability: Not on file   [employer] needs     Medical: Not on file     Non-medical: Not on file   Tobacco Use    Smoking status: Current Every Day Smoker     Packs/day: 0.50     Years: 24.00     Pack years: 12.00     Types: Cigars, Cigarettes    Smokeless tobacco: Never Used   Substance and Sexual Activity    Alcohol use: No    Drug use: Yes     Types: Marijuana, Cocaine    Sexual activity: Not on file     Comment: last use cocaine today 2/18/21   Lifestyle    Physical activity     Days per week: Not on file     Minutes per session: Not on file    Stress: Not on file   Relationships    Social connections     Talks on phone: Not on file     Gets together: Not on file     Attends Judaism service: Not on file     Active member of club or organization: Not on file     Attends meetings of clubs or organizations: Not on file     Relationship status: Not on file    Intimate partner violence     Fear of current or ex partner: Not on file     Emotionally abused: Not on file     Physically abused: Not on file     Forced sexual activity: Not on file   Other Topics Concern    Not on file   Social History Narrative    Not on file           ROS:  [x] All negative/unchanged except if checked.  Explain positive(checked items) below:  [] Constitutional  [] Eyes  [] Ear/Nose/Mouth/Throat  [] Respiratory  [] CV  [] GI  []   [] Musculoskeletal  [] Skin/Breast  [] Neurological  [] Endocrine  [] Heme/Lymph  [] Allergic/Immunologic    Explanation:     MEDICATIONS:    Current Facility-Administered Medications:     acetaminophen (TYLENOL) tablet 650 mg, 650 mg, Oral, Q6H PRN, Ladi Casper MD, 650 mg at 04/09/21 2157    magnesium hydroxide (MILK OF MAGNESIA) 400 MG/5ML suspension 30 mL, 30 mL, Oral, Daily PRN, Ladi Casper MD    aluminum & magnesium hydroxide-simethicone (MAALOX) 200-200-20 MG/5ML suspension 30 mL, 30 mL, Oral, PRN, Ladi Casper MD    hydrOXYzine (VISTARIL) capsule 50 mg, 50 mg, Oral, TID PRN, Ladi Casper MD, 50 mg at 04/09/21 2157    haloperidol lactate (HALDOL) injection 5 mg, 5 mg, Intramuscular, Q6H PRN **OR** haloperidol (HALDOL) tablet 5 mg, 5 mg, Oral, Q6H PRN, Ladi Casper MD    traZODone (DESYREL) tablet 50 mg, 50 mg, Oral, Nightly PRN, Ladi Casper MD    paliperidone (INVEGA) extended release tablet 6 mg, 6 mg, Oral, Daily, Xiao Hampton, APRN - CNP, 6 mg at 04/09/21 1356      Examination:  /65   Pulse 65   Temp 97 patient. Risk, benefit, side effects, possible outcomes of the medication and alternatives discussed with the patient and the patient demonstrated understanding. The patient was also educated that the outcome of treatment will depend on the medication compliance as directed by the prescribers along with regular follow-up, compliance with the labs and other work-up, as clinically indicated. Continue Invega 6 mg daily plan to optimize medication        Collateral information: Followed by social work  CD evaluation  Encourage patient to attend group and other milieu activities.   Discharge planning discussed with the patient and treatment team.    PSYCHOTHERAPY/COUNSELING:  [x] Therapeutic interview  [x] Supportive  [] CBT  [] Ongoing  [] Other    [x] Patient continues to need, on a daily basis, active treatment furnished directly by or requiring the supervision of inpatient psychiatric personnel      Anticipated Length of stay: 5 to 10 days based on stability            Electronically signed by JUICE Jose CNP on 4/10/2021 at 7:56 AM

## 2021-04-10 NOTE — PLAN OF CARE
Patient is isolative and does not interact with peers. Patient does present suspicious and paranoid and reports he believes people are out to get him. He is not hallucinating and denies SI, HI, and AVH. Patient denies depression but says he becomes anxious around peers. No behavioral issues. Motivation and judgement are poor. Will monitor closely.

## 2021-04-11 PROCEDURE — 6370000000 HC RX 637 (ALT 250 FOR IP): Performed by: PSYCHIATRY & NEUROLOGY

## 2021-04-11 PROCEDURE — 99231 SBSQ HOSP IP/OBS SF/LOW 25: CPT | Performed by: NURSE PRACTITIONER

## 2021-04-11 PROCEDURE — 6370000000 HC RX 637 (ALT 250 FOR IP): Performed by: NURSE PRACTITIONER

## 2021-04-11 PROCEDURE — 1240000000 HC EMOTIONAL WELLNESS R&B

## 2021-04-11 RX ORDER — OXCARBAZEPINE 300 MG/1
300 TABLET, FILM COATED ORAL 2 TIMES DAILY
Status: DISCONTINUED | OUTPATIENT
Start: 2021-04-11 | End: 2021-04-15 | Stop reason: HOSPADM

## 2021-04-11 RX ORDER — PALIPERIDONE 3 MG/1
3 TABLET, EXTENDED RELEASE ORAL DAILY
Status: DISCONTINUED | OUTPATIENT
Start: 2021-04-11 | End: 2021-04-13

## 2021-04-11 RX ORDER — PALIPERIDONE 6 MG/1
6 TABLET, EXTENDED RELEASE ORAL NIGHTLY
Status: DISCONTINUED | OUTPATIENT
Start: 2021-04-12 | End: 2021-04-15 | Stop reason: HOSPADM

## 2021-04-11 RX ADMIN — Medication: at 20:05

## 2021-04-11 RX ADMIN — OXCARBAZEPINE 300 MG: 300 TABLET, FILM COATED ORAL at 22:21

## 2021-04-11 RX ADMIN — OXCARBAZEPINE 300 MG: 300 TABLET, FILM COATED ORAL at 10:03

## 2021-04-11 RX ADMIN — IBUPROFEN 400 MG: 400 TABLET, FILM COATED ORAL at 09:41

## 2021-04-11 RX ADMIN — Medication: at 09:41

## 2021-04-11 RX ADMIN — TRAZODONE HYDROCHLORIDE 50 MG: 50 TABLET ORAL at 22:21

## 2021-04-11 RX ADMIN — PALIPERIDONE 3 MG: 6 TABLET, EXTENDED RELEASE ORAL at 09:41

## 2021-04-11 RX ADMIN — ACETAMINOPHEN 650 MG: 325 TABLET ORAL at 22:41

## 2021-04-11 RX ADMIN — IBUPROFEN 400 MG: 400 TABLET, FILM COATED ORAL at 22:21

## 2021-04-11 ASSESSMENT — PAIN SCALES - GENERAL
PAINLEVEL_OUTOF10: 10
PAINLEVEL_OUTOF10: 10

## 2021-04-11 NOTE — PLAN OF CARE
Patient is isolative and reports that he is still having racing thoughts. Patient continues to display paranoid and suspicious. He misinterprets frequently. Patient present preoccupied on rehab and presents with periodic persecutory delusions. Patient denies SI, HI, and AVH. Patient insight and judgement are poor. Patient is medication compliant. Will monitor closely.

## 2021-04-11 NOTE — PROGRESS NOTES
BEHAVIORAL HEALTH FOLLOW-UP NOTE     4/11/2021     Patient was seen and examined in person, Chart reviewed   Patient's case discussed with staff/team    Chief Complaint: I am tired  Interim History:     Assessment limited this morning the patient would not uncover his head from his blanket. He did not offer much conversation. He answered questions with a grunting sound opposed to a verbal response. He appears low mood, low energy. He denies suicidal plan, however endorses that people are out to get him. Staff report the patient remains paranoid, suspicious and he is misinterpreting. He is persecutory. His interactions with me a minimal, and he refuses to uncover his face from his blanket. Increased invega to 3 mg daily and 6 mg at night. Added trileptal 300 mg twice daily for mood stabilization and brighten affect.        Appetite:   [x] Normal/Unchanged  [] Increased  [] Decreased      Sleep:       [x] Normal/Unchanged  [] Fair       [] Poor              Energy:    [x] Normal/Unchanged  [] Increased  [] Decreased        SI [] Present  [x] Absent    HI  []Present  [x] Absent     Aggression:  [] yes  [x] no    Patient is [x] able  [] unable to CONTRACT FOR SAFETY     PAST MEDICAL/PSYCHIATRIC HISTORY:   Past Medical History:   Diagnosis Date    Depression     Schizoaffective disorder ([facility] [address]        FAMILY/SOCIAL HISTORY:  Family History   Problem Relation Age of Onset    No Known Problems Mother     No Known Problems Father      Social History     Socioeconomic History    Marital status: Single     Spouse name: Not on file    Number of children: 0    Years of education: 12    Highest education level: Not on file   Occupational History    Not on file   Social Needs    Financial resource strain: Not on file    Food insecurity     Worry: Not on file     Inability: Not on file    Transportation needs     Medical: Not on file     Non-medical: Not on file   Tobacco Use    Smoking status: Current Every Day Smoker     Packs/day: 0.50     Years: 24.00     Pack years: 12.00     Types: Cigars, Cigarettes    Smokeless tobacco: Never Used   Substance and Sexual Activity    Alcohol use: No    Drug use: Yes     Types: Marijuana, Cocaine    Sexual activity: Not on file     Comment: last use cocaine today 2/18/21   Lifestyle    Physical activity     Days per week: Not on file     Minutes per session: Not on file    Stress: Not on file   Relationships    Social connections     Talks on phone: Not on file     Gets together: Not on file     Attends Holiness service: Not on file     Active member of club or organization: Not on file     Attends meetings of clubs or organizations: Not on file     Relationship status: Not on file    Intimate partner violence     Fear of current or ex partner: Not on file     Emotionally abused: Not on file     Physically abused: Not on file     Forced sexual activity: Not on file   Other Topics Concern    Not on file   Social History Narrative    Not on file           ROS:  [x] All negative/unchanged except if checked.  Explain positive(checked items) below:  [] Constitutional  [] Eyes  [] Ear/Nose/Mouth/Throat  [] Respiratory  [] CV  [] GI  []   [] Musculoskeletal  [] Skin/Breast  [] Neurological  [] Endocrine  [] Heme/Lymph  [] Allergic/Immunologic    Explanation:     MEDICATIONS:    Current Facility-Administered Medications:     paliperidone (INVEGA) extended release tablet 3 mg, 3 mg, Oral, Daily, Valetta Muff, APRN - CNP, 3 mg at 04/11/21 0941    [START ON 4/12/2021] paliperidone (INVEGA) extended release tablet 6 mg, 6 mg, Oral, Nightly, Valetta Muff, APRN - CNP    OXcarbazepine (TRILEPTAL) tablet 300 mg, 300 mg, Oral, BID, Valetta Muff, APRN - CNP, 300 mg at 04/11/21 1003    ibuprofen (ADVIL;MOTRIN) tablet 400 mg, 400 mg, Oral, Q6H PRN, Ky Luciano MD, 400 mg at 04/11/21 0941    benzocaine (ORAJEL) 20 % mucosal gel, , Mouth/Throat, BID PRN, Ky Luciano MD, Given at 04/11/21 0941    acetaminophen (TYLENOL) tablet 650 mg, 650 mg, Oral, Q6H PRN, Dorothea Ramos MD, 650 mg at 04/10/21 1635    magnesium hydroxide (MILK OF MAGNESIA) 400 MG/5ML suspension 30 mL, 30 mL, Oral, Daily PRN, Dorothea Ramos MD    aluminum & magnesium hydroxide-simethicone (MAALOX) 200-200-20 MG/5ML suspension 30 mL, 30 mL, Oral, PRN, Dorothea Ramos MD    hydrOXYzine (VISTARIL) capsule 50 mg, 50 mg, Oral, TID PRN, Dorothea Ramos MD, 50 mg at 04/09/21 2157    haloperidol lactate (HALDOL) injection 5 mg, 5 mg, Intramuscular, Q6H PRN **OR** haloperidol (HALDOL) tablet 5 mg, 5 mg, Oral, Q6H PRN, Dorothea Ramos MD    traZODone (DESYREL) tablet 50 mg, 50 mg, Oral, Nightly PRN, Dorothea Ramos MD      Examination:  BP (!) 108/58   Pulse 60   Temp 98.8 °F (37.1 °C) (Temporal)   Resp 16   Ht 5' 11\" (1.803 m)   Wt 160 lb (72.6 kg)   SpO2 97%   BMI 22.32 kg/m²   Gait - steady  Medication side effects(SE): None reported    Mental Status Examination:    Level of consciousness:  within normal limits   Appearance:  poor grooming and poor hygiene  Behavior/Motor:  psychomotor retardation  Attitude toward examiner:  withdrawn  Speech:  poverty of speech   Mood: decreased range and depressed  Affect:  flat  Thought processes:  illogical   Thought content: Paranoid  Cognition:  oriented to person, place, and time   Concentration poor  Insight poor   Judgement poor     ASSESSMENT:   Patient symptoms are:  [] Well controlled  [] Improving  [] Worsening  [x] No change      Diagnosis:   Principal Problem:    [facility]  Active Problems:    Polysubstance abuse ([facility]    Schizoaffective disorder ([facility]  Resolved Problems:    * No resolved hospital problems.  *      LABS:    Recent Labs     04/08/21  2220   WBC 4.4*   HGB 13.1        Recent Labs     04/08/21  2220      K 3.9      CO2 27   BUN 16   CREATININE 1.1   GLUCOSE 129*     Recent Labs

## 2021-04-12 PROCEDURE — 6370000000 HC RX 637 (ALT 250 FOR IP): Performed by: NURSE PRACTITIONER

## 2021-04-12 PROCEDURE — 99232 SBSQ HOSP IP/OBS MODERATE 35: CPT | Performed by: NURSE PRACTITIONER

## 2021-04-12 PROCEDURE — 1240000000 HC EMOTIONAL WELLNESS R&B

## 2021-04-12 PROCEDURE — 6370000000 HC RX 637 (ALT 250 FOR IP): Performed by: PSYCHIATRY & NEUROLOGY

## 2021-04-12 RX ADMIN — PALIPERIDONE 3 MG: 6 TABLET, EXTENDED RELEASE ORAL at 09:47

## 2021-04-12 RX ADMIN — ACETAMINOPHEN 650 MG: 325 TABLET ORAL at 09:47

## 2021-04-12 RX ADMIN — PALIPERIDONE 6 MG: 6 TABLET, EXTENDED RELEASE ORAL at 21:13

## 2021-04-12 RX ADMIN — Medication: at 09:47

## 2021-04-12 RX ADMIN — TRAZODONE HYDROCHLORIDE 50 MG: 50 TABLET ORAL at 21:13

## 2021-04-12 RX ADMIN — OXCARBAZEPINE 300 MG: 300 TABLET, FILM COATED ORAL at 21:13

## 2021-04-12 RX ADMIN — OXCARBAZEPINE 300 MG: 300 TABLET, FILM COATED ORAL at 09:47

## 2021-04-12 ASSESSMENT — PAIN SCALES - GENERAL: PAINLEVEL_OUTOF10: 0

## 2021-04-12 NOTE — PROGRESS NOTES
BEHAVIORAL HEALTH FOLLOW-UP NOTE     4/12/2021     Patient was seen and examined in person, Chart reviewed   Patient's case discussed with staff/team    Chief Complaint: \" I never threatened anybody everybody is lying. \"  Interim History:   Patient seen in the treatment team.  He states he wants to go to drug rehab. When asked he was having any thoughts of hurting the man that his mom takes care of of as he admitted on admission he became very disorganized agitated was yelling becoming loud very paranoid misinterpreting denies SI/HI intent or plan denies any auditory visualizations he is isolate to his room not attending groups not socializing with peers.     Appetite:   [x] Normal/Unchanged  [] Increased  [] Decreased      Sleep:       [x] Normal/Unchanged  [] Fair       [] Poor              Energy:    [x] Normal/Unchanged  [] Increased  [] Decreased        SI [] Present  [x] Absent    HI  []Present  [x] Absent     Aggression:  [] yes  [x] no    Patient is [x] able  [] unable to CONTRACT FOR SAFETY     PAST MEDICAL/PSYCHIATRIC HISTORY:   Past Medical History:   Diagnosis Date    Depression     [facility]izoaffective disorder ([facility] [address]        FAMILY/SOCIAL HISTORY:  Family History   Problem Relation Age of Onset    No Known Problems Mother     No Known Problems Father      Social History     Socioeconomic History    Marital status: Single     Spouse name: Not on file    Number of children: 0    Years of education: 12    Highest education level: Not on file   Occupational History    Not on file   Social Needs    Financial resource strain: Not on file    Food insecurity     Worry: Not on file     Inability: Not on file   [employer] needs     Medical: Not on file     Non-medical: Not on file   Tobacco Use    Smoking status: Current Every Day Smoker     Packs/day: 0.50     Years: 24.00     Pack years: 12.00     Types: Cigars, Cigarettes    Smokeless tobacco: Never Used   Substance and Sexual Activity    Alcohol use: No    Drug use: Yes     Types: Marijuana, Cocaine    Sexual activity: Not on file     Comment: last use cocaine today 2/18/21   Lifestyle    Physical activity     Days per week: Not on file     Minutes per session: Not on file    Stress: Not on file   Relationships    Social connections     Talks on phone: Not on file     Gets together: Not on file     Attends Faith service: Not on file     Active member of club or organization: Not on file     Attends meetings of clubs or organizations: Not on file     Relationship status: Not on file    Intimate partner violence     Fear of current or ex partner: Not on file     Emotionally abused: Not on file     Physically abused: Not on file     Forced sexual activity: Not on file   Other Topics Concern    Not on file   Social History Narrative    Not on file           ROS:  [x] All negative/unchanged except if checked.  Explain positive(checked items) below:  [] Constitutional  [] Eyes  [] Ear/Nose/Mouth/Throat  [] Respiratory  [] CV  [] GI  []   [] Musculoskeletal  [] Skin/Breast  [] Neurological  [] Endocrine  [] Heme/Lymph  [] Allergic/Immunologic    Explanation:     MEDICATIONS:    Current Facility-Administered Medications:     paliperidone (INVEGA) extended release tablet 3 mg, 3 mg, Oral, Daily, Ronold Butts, APRN - CNP, 3 mg at 04/12/21 0947    paliperidone (INVEGA) extended release tablet 6 mg, 6 mg, Oral, Nightly, Ronold Butts, APRN - CNP    OXcarbazepine (TRILEPTAL) tablet 300 mg, 300 mg, Oral, BID, Ronold Butts, APRN - CNP, 300 mg at 04/12/21 0947    ibuprofen (ADVIL;MOTRIN) tablet 400 mg, 400 mg, Oral, Q6H PRN, Courtney Adkins MD, 400 mg at 04/11/21 2221    benzocaine (ORAJEL) 20 % mucosal gel, , Mouth/Throat, BID PRN, Courtney Adkins MD, Given at 04/12/21 0947    acetaminophen (TYLENOL) tablet 650 mg, 650 mg, Oral, Q6H PRN, Catalino Rushing MD, 650 mg at 04/12/21 0947    magnesium hydroxide (MILK OF MAGNESIA) 400 MG/5ML suspension 30 mL, 30 mL, Oral, Daily PRN, Joel Pereira MD    aluminum & magnesium hydroxide-simethicone (MAALOX) 200-200-20 MG/5ML suspension 30 mL, 30 mL, Oral, PRN, Joel Pereira MD    hydrOXYzine (VISTARIL) capsule 50 mg, 50 mg, Oral, TID PRN, Joel Pereira MD, 50 mg at 04/09/21 2157    haloperidol lactate (HALDOL) injection 5 mg, 5 mg, Intramuscular, Q6H PRN **OR** haloperidol (HALDOL) tablet 5 mg, 5 mg, Oral, Q6H PRN, Joel Pereira MD    traZODone (DESYREL) tablet 50 mg, 50 mg, Oral, Nightly PRN, Joel Pereira MD, 50 mg at 04/11/21 2221      Examination:  BP (!) 89/54   Pulse 57   Temp 99 °F (37.2 °C) (Temporal)   Resp 14   Ht 5' 11\" (1.803 m)   Wt 160 lb (72.6 kg)   SpO2 97%   BMI 22.32 kg/m²   Gait - steady  Medication side effects(SE): None reported    Mental Status Examination:    Level of consciousness:  within normal limits   Appearance:  poor grooming and poor hygiene  Behavior/Motor:  psychomotor retardation  Attitude toward examiner:  withdrawn  Speech:  poverty of speech   Mood: decreased range and depressed  Affect:  flat  Thought processes:  illogical   Thought content: Paranoid  Cognition:  oriented to person, place, and time   Concentration poor  Insight poor   Judgement poor     ASSESSMENT:   Patient symptoms are:  [] Well controlled  [] Improving  [] Worsening  [x] No change      Diagnosis:   Principal Problem:    Schizoaffective disorder, bipolar type ([facility]  Active Problems:    Polysubstance abuse ([facility]    Schizoaffective disorder ([facility]  Resolved Problems:    * No resolved hospital problems. *      LABS:    No results for input(s): WBC, HGB, PLT in the last 72 hours. No results for input(s): NA, K, CL, CO2, BUN, CREATININE, GLUCOSE in the last 72 hours. No results for input(s): BILITOT, ALKPHOS, AST, ALT in the last 72 hours.   Lab Results   Component Value Date    LABAMPH NOT DETECTED 04/08/2021    BARBSCNU NOT DETECTED 04/08/2021    LABBENZ NOT DETECTED 04/08/2021 LABMETH NOT DETECTED 04/08/2021    OPIATESCREENURINE NOT DETECTED 04/08/2021    PHENCYCLIDINESCREENURINE NOT DETECTED 04/08/2021    ETOH <10 04/08/2021     Lab Results   Component Value Date    TSH 0.462 01/16/2021     No results found for: LITHIUM  Lab Results   Component Value Date    VALPROATE 3 (L) 04/29/2020           Treatment Plan:  The patient's diagnosis, treatment plan, medication management were formulated after patient was seen directly by the attending physician and myself and all relevant documentation was reviewed. Reviewed current Medications with the patient. Risk, benefit, side effects, possible outcomes of the medication and alternatives discussed with the patient and the patient demonstrated understanding. The patient was also educated that the outcome of treatment will depend on the medication compliance as directed by the prescribers along with regular follow-up, compliance with the labs and other work-up, as clinically indicated. Continue inVega 3 mg daily and 6 mg at bedtime for psychosis      Collateral information: Followed by social work  CD evaluation  Encourage patient to attend group and other milieu activities.   Discharge planning discussed with the patient and treatment team.    PSYCHOTHERAPY/COUNSELING:  [x] Therapeutic interview  [x] Supportive  [] CBT  [] Ongoing  [] Other    [x] Patient continues to need, on a daily basis, active treatment furnished directly by or requiring the supervision of inpatient psychiatric personnel      Anticipated Length of stay: 5 to 10 days based on stability            Electronically signed by JUICE Murrell CNP on 6/71/9352 at 3:39 PM

## 2021-04-12 NOTE — PROGRESS NOTES
Ideation: No    Daily Assessment Last Entry:   Daily Sleep (WDL): Within Defined Limits         Patient Currently in Pain: No  Daily Nutrition (WDL): Within Defined Limits    Patient Monitoring:  Frequency of Checks: 4 times per hour, close    Psychiatric Symptoms:   Depression Symptoms  Depression Symptoms: Isolative, Increased irritability, Impaired concentration  Anxiety Symptoms  Anxiety Symptoms: Generalized  Candie Symptoms  Candie Symptoms: Labile, Poor judgment, Pressured speech     Psychosis Symptoms  Delusion Type: Paranoid, Persecutory    Suicide Risk CSSR-S:  1) Within the past month, have you wished you were dead or wished you could go to sleep and not wake up? : Yes  2) Have you actually had any thoughts of killing yourself? : Yes  3) Have you been thinking about how you might kill yourself? : No  5) Have you started to work out or worked out the details of how to kill yourself?  Do you intend to carry out this plan? : No  6) Have you ever done anything, started to do anything, or prepared to do anything to end your life?: No  Change in Result no Change in Plan of care none      EDUCATION:   Learner Progress Toward Treatment Goals: progressing    Method: follow care plan, attend groups    Outcome: meet goals and return home    PATIENT GOALS: \"find out what is going on\"    PLAN/TREATMENT RECOMMENDATIONS UPDATE: continue present care plan    GOALS UPDATE:   Time frame for Short-Term Goals: 3-5 days      Rafy Diaz RN

## 2021-04-13 PROCEDURE — 6370000000 HC RX 637 (ALT 250 FOR IP): Performed by: NURSE PRACTITIONER

## 2021-04-13 PROCEDURE — 6370000000 HC RX 637 (ALT 250 FOR IP): Performed by: PSYCHIATRY & NEUROLOGY

## 2021-04-13 PROCEDURE — 1240000000 HC EMOTIONAL WELLNESS R&B

## 2021-04-13 PROCEDURE — 6370000000 HC RX 637 (ALT 250 FOR IP): Performed by: STUDENT IN AN ORGANIZED HEALTH CARE EDUCATION/TRAINING PROGRAM

## 2021-04-13 PROCEDURE — 99232 SBSQ HOSP IP/OBS MODERATE 35: CPT | Performed by: NURSE PRACTITIONER

## 2021-04-13 RX ORDER — IBUPROFEN 400 MG/1
800 TABLET ORAL EVERY 6 HOURS PRN
Status: DISCONTINUED | OUTPATIENT
Start: 2021-04-13 | End: 2021-04-15 | Stop reason: HOSPADM

## 2021-04-13 RX ORDER — ACETAMINOPHEN 500 MG
500 TABLET ORAL EVERY 6 HOURS PRN
Status: DISCONTINUED | OUTPATIENT
Start: 2021-04-13 | End: 2021-04-15 | Stop reason: HOSPADM

## 2021-04-13 RX ORDER — PALIPERIDONE 6 MG/1
6 TABLET, EXTENDED RELEASE ORAL DAILY
Status: DISCONTINUED | OUTPATIENT
Start: 2021-04-14 | End: 2021-04-15 | Stop reason: HOSPADM

## 2021-04-13 RX ADMIN — PALIPERIDONE 3 MG: 6 TABLET, EXTENDED RELEASE ORAL at 09:20

## 2021-04-13 RX ADMIN — OXCARBAZEPINE 300 MG: 300 TABLET, FILM COATED ORAL at 21:19

## 2021-04-13 RX ADMIN — PALIPERIDONE 6 MG: 6 TABLET, EXTENDED RELEASE ORAL at 21:19

## 2021-04-13 RX ADMIN — OXCARBAZEPINE 300 MG: 300 TABLET, FILM COATED ORAL at 09:20

## 2021-04-13 RX ADMIN — ACETAMINOPHEN 500 MG: 500 TABLET ORAL at 18:05

## 2021-04-13 RX ADMIN — Medication: at 18:05

## 2021-04-13 RX ADMIN — IBUPROFEN 800 MG: 400 TABLET, FILM COATED ORAL at 18:05

## 2021-04-13 RX ADMIN — TRAZODONE HYDROCHLORIDE 50 MG: 50 TABLET ORAL at 21:19

## 2021-04-13 ASSESSMENT — PAIN DESCRIPTION - LOCATION
LOCATION: TEETH
LOCATION: TEETH

## 2021-04-13 ASSESSMENT — PAIN SCALES - GENERAL
PAINLEVEL_OUTOF10: 4
PAINLEVEL_OUTOF10: 10
PAINLEVEL_OUTOF10: 4

## 2021-04-13 ASSESSMENT — PAIN DESCRIPTION - PAIN TYPE
TYPE: ACUTE PAIN
TYPE: ACUTE PAIN

## 2021-04-13 ASSESSMENT — PAIN - FUNCTIONAL ASSESSMENT: PAIN_FUNCTIONAL_ASSESSMENT: ACTIVITIES ARE NOT PREVENTED

## 2021-04-13 NOTE — PLAN OF CARE
Patient denies suicidal ideation, homicidal ideations and AVH. Patient denies anxiety and depression. Patient presents flat on approach but does brighten with conversation. Patient states \"I'm just ready to go to rehab. \"  Presents calm and cooperative during assessment. Patient is out on the unit and is social with peers. Medications taken without issue. No complaints or concerns verbalized at this time. No unit problems reported. Will continue to observe and support.     Problem: Suicide risk  Goal: Provide patient with safe environment  Description: Provide patient with safe environment  Outcome: Ongoing     Problem: Altered Mood, Psychotic Behavior:  Goal: Able to verbalize reality based thinking  Description: Able to verbalize reality based thinking  Outcome: Ongoing     Problem: Altered Mood, Psychotic Behavior:  Goal: Compliance with prescribed medication regimen will improve  Description: Compliance with prescribed medication regimen will improve  Outcome: Ongoing

## 2021-04-13 NOTE — PROGRESS NOTES
BEHAVIORAL HEALTH FOLLOW-UP NOTE     4/13/2021     Patient was seen and examined in person, Chart reviewed   Patient's case discussed with staff/team    Chief Complaint: \" I want to go to rehab. \"  Interim History:   Patient seen in his room he is isolative flat blunted. He does not offer much conversation. He states he just wants to go to rehab. He denies SI/HI intent or plan he denies any auditory or visual hallucinations. He states that he only wants to go to rehab. He is not agreeable to long-acting injection states he scared of needles and only wants to take the pills. He is not attending groups or socialize with peers.       Appetite:   [x] Normal/Unchanged  [] Increased  [] Decreased      Sleep:       [x] Normal/Unchanged  [] Fair       [] Poor              Energy:    [x] Normal/Unchanged  [] Increased  [] Decreased        SI [] Present  [x] Absent    HI  []Present  [x] Absent     Aggression:  [] yes  [x] no    Patient is [x] able  [] unable to CONTRACT FOR SAFETY     PAST MEDICAL/PSYCHIATRIC HISTORY:   Past Medical History:   Diagnosis Date    Depression     Schizoaffective disorder ([facility] [address]        FAMILY/SOCIAL HISTORY:  Family History   Problem Relation Age of Onset    No Known Problems Mother     No Known Problems Father      Social History     Socioeconomic History    Marital status: Single     Spouse name: Not on file    Number of children: 0    Years of education: 12    Highest education level: Not on file   Occupational History    Not on file   Social Needs    Financial resource strain: Not on file    Food insecurity     Worry: Not on file     Inability: Not on file   [employer] needs     Medical: Not on file     Non-medical: Not on file   Tobacco Use    Smoking status: Current Every Day Smoker     Packs/day: 0.50     Years: 24.00     Pack years: 12.00     Types: Cigars, Cigarettes    Smokeless tobacco: Never Used   Substance and Sexual Activity    Alcohol use: No    Drug use: Yes     Types: Marijuana, Cocaine    Sexual activity: Not on file     Comment: last use cocaine today 2/18/21   Lifestyle    Physical activity     Days per week: Not on file     Minutes per session: Not on file    Stress: Not on file   Relationships    Social connections     Talks on phone: Not on file     Gets together: Not on file     Attends Adventist service: Not on file     Active member of club or organization: Not on file     Attends meetings of clubs or organizations: Not on file     Relationship status: Not on file    Intimate partner violence     Fear of current or ex partner: Not on file     Emotionally abused: Not on file     Physically abused: Not on file     Forced sexual activity: Not on file   Other Topics Concern    Not on file   Social History Narrative    Not on file           ROS:  [x] All negative/unchanged except if checked.  Explain positive(checked items) below:  [] Constitutional  [] Eyes  [] Ear/Nose/Mouth/Throat  [] Respiratory  [] CV  [] GI  []   [] Musculoskeletal  [] Skin/Breast  [] Neurological  [] Endocrine  [] Heme/Lymph  [] Allergic/Immunologic    Explanation:     MEDICATIONS:    Current Facility-Administered Medications:     paliperidone (INVEGA) extended release tablet 3 mg, 3 mg, Oral, Daily, Chely Elizalde APRN - CNP, 3 mg at 04/13/21 0920    paliperidone (INVEGA) extended release tablet 6 mg, 6 mg, Oral, Nightly, Chely Elizalde APRN - CNP, 6 mg at 04/12/21 2113    OXcarbazepine (TRILEPTAL) tablet 300 mg, 300 mg, Oral, BID, Chely Elizalde APRN - CNP, 300 mg at 04/13/21 0920    ibuprofen (ADVIL;MOTRIN) tablet 400 mg, 400 mg, Oral, Q6H PRN, Tg Coronel MD, 400 mg at 04/11/21 2221    benzocaine (ORAJEL) 20 % mucosal gel, , Mouth/Throat, BID PRN, Tg Coronel MD, Given at 04/12/21 0947    acetaminophen (TYLENOL) tablet 650 mg, 650 mg, Oral, Q6H PRN, Nomi oYung MD, 650 mg at 04/12/21 0947    magnesium hydroxide (MILK OF MAGNESIA) 400 MG/5ML suspension [facility] NOT DETECTED 04/08/2021    BARBSCNU NOT DETECTED 04/08/2021    LABBENZ NOT DETECTED 04/08/2021    LABMETH NOT DETECTED 04/08/2021    OPIATESCREENURINE NOT DETECTED 04/08/2021    PHENCYCLIDINESCREENURINE NOT DETECTED 04/08/2021    ETOH <10 04/08/2021     Lab Results   Component Value Date    TSH 0.462 01/16/2021     No results found for: LITHIUM  Lab Results   Component Value Date    VALPROATE 3 (L) 04/29/2020           Treatment Plan:  The patient's diagnosis, treatment plan, medication management were formulated after patient was seen directly by the attending physician and myself and all relevant documentation was reviewed. Reviewed current Medications with the patient. Risk, benefit, side effects, possible outcomes of the medication and alternatives discussed with the patient and the patient demonstrated understanding. The patient was also educated that the outcome of treatment will depend on the medication compliance as directed by the prescribers along with regular follow-up, compliance with the labs and other work-up, as clinically indicated. Increase  inVega 6 mg daily and 6 mg at bedtime for psychosis      Collateral information: Followed by social work  CD evaluation  Encourage patient to attend group and other milieu activities.   Discharge planning discussed with the patient and treatment team.    PSYCHOTHERAPY/COUNSELING:  [x] Therapeutic interview  [x] Supportive  [] CBT  [] Ongoing  [] Other    [x] Patient continues to need, on a daily basis, active treatment furnished directly by or requiring the supervision of inpatient psychiatric personnel      Anticipated Length of stay: 5 to 10 days based on stability            Electronically signed by JUICE Michel CNP on 4/67/7631 at 9:44 AM

## 2021-04-14 VITALS
SYSTOLIC BLOOD PRESSURE: 140 MMHG | TEMPERATURE: 97.8 F | OXYGEN SATURATION: 96 % | WEIGHT: 160 LBS | RESPIRATION RATE: 16 BRPM | HEIGHT: 71 IN | DIASTOLIC BLOOD PRESSURE: 70 MMHG | BODY MASS INDEX: 22.4 KG/M2 | HEART RATE: 92 BPM

## 2021-04-14 PROCEDURE — 1240000000 HC EMOTIONAL WELLNESS R&B

## 2021-04-14 PROCEDURE — 99232 SBSQ HOSP IP/OBS MODERATE 35: CPT | Performed by: NURSE PRACTITIONER

## 2021-04-14 PROCEDURE — 6370000000 HC RX 637 (ALT 250 FOR IP): Performed by: PSYCHIATRY & NEUROLOGY

## 2021-04-14 PROCEDURE — 6370000000 HC RX 637 (ALT 250 FOR IP): Performed by: STUDENT IN AN ORGANIZED HEALTH CARE EDUCATION/TRAINING PROGRAM

## 2021-04-14 PROCEDURE — 6370000000 HC RX 637 (ALT 250 FOR IP): Performed by: NURSE PRACTITIONER

## 2021-04-14 RX ADMIN — OXCARBAZEPINE 300 MG: 300 TABLET, FILM COATED ORAL at 20:45

## 2021-04-14 RX ADMIN — TRAZODONE HYDROCHLORIDE 50 MG: 50 TABLET ORAL at 20:45

## 2021-04-14 RX ADMIN — IBUPROFEN 800 MG: 400 TABLET, FILM COATED ORAL at 09:54

## 2021-04-14 RX ADMIN — IBUPROFEN 800 MG: 400 TABLET, FILM COATED ORAL at 20:45

## 2021-04-14 RX ADMIN — PALIPERIDONE 6 MG: 6 TABLET, EXTENDED RELEASE ORAL at 20:45

## 2021-04-14 RX ADMIN — PALIPERIDONE 6 MG: 6 TABLET, EXTENDED RELEASE ORAL at 09:54

## 2021-04-14 RX ADMIN — OXCARBAZEPINE 300 MG: 300 TABLET, FILM COATED ORAL at 09:54

## 2021-04-14 ASSESSMENT — PAIN SCALES - GENERAL
PAINLEVEL_OUTOF10: 0
PAINLEVEL_OUTOF10: 6
PAINLEVEL_OUTOF10: 5

## 2021-04-14 NOTE — PLAN OF CARE
Problem: Suicide risk  Goal: Provide patient with safe environment  Description: Provide patient with safe environment  Outcome: Ongoing     Problem: Altered Mood, Psychotic Behavior:  Goal: Able to verbalize reality based thinking  Description: Able to verbalize reality based thinking  Outcome: Ongoing     Problem: Altered Mood, Psychotic Behavior:  Goal: Compliance with prescribed medication regimen will improve  Description: Compliance with prescribed medication regimen will improve  Outcome: Ongoing

## 2021-04-14 NOTE — PLAN OF CARE
Denies SI/HI  denies hallucinations  pleasant on approach  isolative to self   attended treatment team  cooperative with meds   will continue to monitor

## 2021-04-14 NOTE — PLAN OF CARE
[facility] Interdisciplinary Treatment Plan Note     Review Date & Time: 4/14/2021 1100    Patient was in treatment team.    Admission Type:   Admission Type: Involuntary    Reason for admission:  Reason for Admission: \"I been hanging around dangerous people paranoid thoughts to hurt myself    Estimated Length of Stay Update:  3-5 DAYS   Estimated Discharge Date Update: 5-7 DAYS    PATIENT STRENGTHS:  Patient Strengths:Strengths: Communication  Patient Strengths and Limitations:Limitations: Difficulty problem solving/relies on others to help solve problems  Addictive Behavior:Addictive Behavior  In the past 3 months, have you felt or has someone told you that you have a problem with:  : None  Do you have a history of Chemical Use?: No  Do you have a history of Alcohol Use?: No  Do you have a history of Street Drug Abuse?: Yes  Histroy of Prescripton Drug Abuse?: No  Medical Problems:   Past Medical History:   Diagnosis Date    Depression     Schizoaffective disorder ([facility] [address]        Risk:  Fall RiskTotal: 73  Andrew Scale Andrew Scale Score: 22  BVC Total: 0  Change in scoresNONE.  Changes to plan of Care NONE    Status EXAM:   Status and Exam  Normal: Yes(NAKIA, pt sleeping at this time.)  Facial Expression: Exaggerated  Affect: Congruent  Level of Consciousness: Alert  Mood:Normal: No  Mood: Labile, Irritable  Motor Activity:Normal: No  Motor Activity: Increased  Interview Behavior: Cooperative, Irritable  Preception: [place] to Person, [place] to Time, [place] to Place, [place] to Situation  Attention:Normal: No  Attention: Unable to Concentrate  Thought Processes: (impaired)  Thought Content:Normal: No  Thought Content: Paranoia  Hallucinations: None  Delusions: Yes  Delusions: Persecution  Memory:Normal: No  Memory: Poor Recent  Insight and Judgment: No  Insight and Judgment: Poor Judgment, Poor Insight, Unmotivated  Present Suicidal Ideation: No  Present Homicidal Ideation: No    Daily Assessment Last Entry:   Daily Sleep (WDL): Within Defined Limits         Patient Currently in Pain: Denies  Daily Nutrition (WDL): Within Defined Limits    Patient Monitoring:  Frequency of Checks: 4 times per hour, close    Psychiatric Symptoms:   Depression Symptoms  Depression Symptoms: No problems reported or observed. (NAKIA, pt sleeping at this time.)  Anxiety Symptoms  Anxiety Symptoms: No problems reported or observed. (NAKIA, pt sleeping at this time.)  Candie Symptoms  Candie Symptoms: Poor judgment, Labile, Grandiosity     Psychosis Symptoms  Delusion Type: Paranoid    Suicide Risk CSSR-S:  1) Within the past month, have you wished you were dead or wished you could go to sleep and not wake up? : Yes  2) Have you actually had any thoughts of killing yourself? : Yes  3) Have you been thinking about how you might kill yourself? : No  5) Have you started to work out or worked out the details of how to kill yourself? Do you intend to carry out this plan? : No  6) Have you ever done anything, started to do anything, or prepared to do anything to end your life?: No  Change in ResultNONE Change in Plan of [address]:   Learner Progress Toward Treatment Goals: Reviewed results and recommendations of this team    Method: Group    Outcome: Verbalized understanding    PATIENT GOALS: \"FIND OUT ABOUT REHAB\"    PLAN/TREATMENT RECOMMENDATIONS UPDATE: 7 days    GOALS UPDATE:  Time frame for Short-Term Goals: 3-5 days      [address] available 24 hours a day  [phone] or 211  [phone]  WEB Site: [email or web address]

## 2021-04-14 NOTE — CARE COORDINATION
SW signed CARL for pt's mother Amber Briseno. SW called contact number for pt's mother in the chart. Rut's  answered the phone and she reported that Jameel Gomez will not give out her phone number to people who call from the hospital regarding her son. Left message with her apartment staff member to call SW back if able.        Electronically signed by FRANCISCO Pinto, MAJOR on 2021 at 1:00 PM

## 2021-04-14 NOTE — PROGRESS NOTES
Asked client to start filling out his discharge safety plan. Eating regular food after teeth extracted today, encouraged to rinse mouth gently with salt water so does not cause bleeding. Tylenol and motrin effective for tooth pain.

## 2021-04-14 NOTE — CARE COORDINATION
Received phone call from Sis at [facility]. She reported that pt needs to wait at least 30 days to be readmitted with them as he was recently there left on his own.        Electronically signed by FRANCISCO Howe LSW on 4/14/2021 at 1:17 PM

## 2021-04-14 NOTE — PROGRESS NOTES
BEHAVIORAL HEALTH FOLLOW-UP NOTE     4/14/2021     Patient was seen and examined in person, Chart reviewed   Patient's case discussed with staff/team    Chief Complaint: \" I want to go to rehab. \"  Interim History:   Patient seen in treatment team.   He states he just wants to go to rehab. He denies SI/HI intent or plan he denies any auditory or visual hallucinations. He states that he only wants to go to rehab. He is not agreeable to long-acting injection states he scared of needles and only wants to take the pills. He has been out in the milieu more attending select groups. No behavioral disturbances on the unit. States he is eating well sleeping well no neurovegetative signs symptoms of depression.     Appetite:   [x] Normal/Unchanged  [] Increased  [] Decreased      Sleep:       [x] Normal/Unchanged  [] Fair       [] Poor              Energy:    [x] Normal/Unchanged  [] Increased  [] Decreased        SI [] Present  [x] Absent    HI  []Present  [x] Absent     Aggression:  [] yes  [x] no    Patient is [x] able  [] unable to CONTRACT FOR SAFETY     PAST MEDICAL/PSYCHIATRIC HISTORY:   Past Medical History:   Diagnosis Date    Depression     Schizoaffective disorder ([facility] [address]        FAMILY/SOCIAL HISTORY:  Family History   Problem Relation Age of Onset    No Known Problems Mother     No Known Problems Father      Social History     Socioeconomic History    Marital status: Single     Spouse name: Not on file    Number of children: 0    Years of education: 12    Highest education level: Not on file   Occupational History    Not on file   Social Needs    Financial resource strain: Not on file    Food insecurity     Worry: Not on file     Inability: Not on file   [employer] needs     Medical: Not on file     Non-medical: Not on file   Tobacco Use    Smoking status: Current Every Day Smoker     Packs/day: 0.50     Years: 24.00     Pack years: 12.00     Types: Cigars, Cigarettes    Smokeless tobacco: Never Used   Substance and Sexual Activity    Alcohol use: No    Drug use: Yes     Types: Marijuana, Cocaine    Sexual activity: Not on file     Comment: last use cocaine today 2/18/21   Lifestyle    Physical activity     Days per week: Not on file     Minutes per session: Not on file    Stress: Not on file   Relationships    Social connections     Talks on phone: Not on file     Gets together: Not on file     Attends Gnosticism service: Not on file     Active member of club or organization: Not on file     Attends meetings of clubs or organizations: Not on file     Relationship status: Not on file    Intimate partner violence     Fear of current or ex partner: Not on file     Emotionally abused: Not on file     Physically abused: Not on file     Forced sexual activity: Not on file   Other Topics Concern    Not on file   Social History Narrative    Not on file           ROS:  [x] All negative/unchanged except if checked.  Explain positive(checked items) below:  [] Constitutional  [] Eyes  [] Ear/Nose/Mouth/Throat  [] Respiratory  [] CV  [] GI  []   [] Musculoskeletal  [] Skin/Breast  [] Neurological  [] Endocrine  [] Heme/Lymph  [] Allergic/Immunologic    Explanation:     MEDICATIONS:    Current Facility-Administered Medications:     paliperidone (INVEGA) extended release tablet 6 mg, 6 mg, Oral, Daily, Sophie Hampton APRN - CNP    ibuprofen (ADVIL;MOTRIN) tablet 800 mg, 800 mg, Oral, Q6H PRN, Ruthie [place], DDS, 800 mg at 04/13/21 1805    acetaminophen (TYLENOL) tablet 500 mg, 500 mg, Oral, Q6H PRN, Ruthie [place], DDS, 500 mg at 04/13/21 1805    paliperidone (INVEGA) extended release tablet 6 mg, 6 mg, Oral, Nightly, Marykyaw Aarti, APRN - CNP, 6 mg at 04/13/21 2119    OXcarbazepine (TRILEPTAL) tablet 300 mg, 300 mg, Oral, BID, Maryagnes Aarti, APRN - CNP, 300 mg at 04/13/21 2119    benzocaine (ORAJEL) 20 % mucosal gel, , Mouth/Throat, BID PRN, Raulito Vargas MD, Given at 04/13/21 1805   NA, K, CL, CO2, BUN, CREATININE, GLUCOSE in the last 72 hours. No results for input(s): BILITOT, ALKPHOS, AST, ALT in the last 72 hours. Lab Results   Component Value Date    LABAMPH NOT DETECTED 04/08/2021    BARBSCNU NOT DETECTED 04/08/2021    LABBENZ NOT DETECTED 04/08/2021    LABMETH NOT DETECTED 04/08/2021    OPIATESCREENURINE NOT DETECTED 04/08/2021    PHENCYCLIDINESCREENURINE NOT DETECTED 04/08/2021    ETOH <10 04/08/2021     Lab Results   Component Value Date    TSH 0.462 01/16/2021     No results found for: LITHIUM  Lab Results   Component Value Date    VALPROATE 3 (L) 04/29/2020           Treatment Plan:  The patient's diagnosis, treatment plan, medication management were formulated after patient was seen directly by the attending physician and myself and all relevant documentation was reviewed. Reviewed current Medications with the patient. Risk, benefit, side effects, possible outcomes of the medication and alternatives discussed with the patient and the patient demonstrated understanding. The patient was also educated that the outcome of treatment will depend on the medication compliance as directed by the prescribers along with regular follow-up, compliance with the labs and other work-up, as clinically indicated. Continue inVega 6 mg daily and 6 mg at bedtime for psychosis      Collateral information: Followed by social work  CD evaluation  Encourage patient to attend group and other milieu activities.   Discharge planning discussed with the patient and treatment team.    PSYCHOTHERAPY/COUNSELING:  [x] Therapeutic interview  [x] Supportive  [] CBT  [] Ongoing  [] Other    [x] Patient continues to need, on a daily basis, active treatment furnished directly by or requiring the supervision of inpatient psychiatric personnel      Anticipated Length of stay: 5 to 10 days based on stability            Electronically signed by JUICE Rodriguez CNP on 4/73/4249 at 9:26 AM

## 2021-04-14 NOTE — CARE COORDINATION
ANTONIO spoke with [facility] who reported that [facility]. LVM for Jkae Gu at [facility] to follow-up with referral.     ANTONIO also sent referral to Andie.        Electronically signed by FRANCISCO Qureshi, DERECKW on 4/14/2021 at 12:40 PM

## 2021-04-15 PROCEDURE — 6370000000 HC RX 637 (ALT 250 FOR IP): Performed by: STUDENT IN AN ORGANIZED HEALTH CARE EDUCATION/TRAINING PROGRAM

## 2021-04-15 PROCEDURE — 99239 HOSP IP/OBS DSCHRG MGMT >30: CPT | Performed by: NURSE PRACTITIONER

## 2021-04-15 PROCEDURE — 6370000000 HC RX 637 (ALT 250 FOR IP): Performed by: NURSE PRACTITIONER

## 2021-04-15 RX ORDER — OXCARBAZEPINE 300 MG/1
300 TABLET, FILM COATED ORAL 2 TIMES DAILY
Qty: 60 TABLET | Refills: 0 | Status: ON HOLD | OUTPATIENT
Start: 2021-04-15 | End: 2021-04-27 | Stop reason: HOSPADM

## 2021-04-15 RX ORDER — PALIPERIDONE 6 MG/1
6 TABLET, EXTENDED RELEASE ORAL NIGHTLY
Qty: 30 TABLET | Refills: 0 | Status: ON HOLD | OUTPATIENT
Start: 2021-04-15 | End: 2021-04-27 | Stop reason: HOSPADM

## 2021-04-15 RX ORDER — PALIPERIDONE 6 MG/1
6 TABLET, EXTENDED RELEASE ORAL DAILY
Qty: 30 TABLET | Refills: 0 | Status: ON HOLD | OUTPATIENT
Start: 2021-04-16 | End: 2021-04-27 | Stop reason: HOSPADM

## 2021-04-15 RX ADMIN — OXCARBAZEPINE 300 MG: 300 TABLET, FILM COATED ORAL at 09:01

## 2021-04-15 RX ADMIN — PALIPERIDONE 6 MG: 6 TABLET, EXTENDED RELEASE ORAL at 09:01

## 2021-04-15 RX ADMIN — IBUPROFEN 800 MG: 400 TABLET, FILM COATED ORAL at 09:01

## 2021-04-15 NOTE — PLAN OF CARE
Problem: Altered Mood, Psychotic Behavior:  Goal: Able to verbalize reality based thinking  Description: Able to verbalize reality based thinking  4/14/2021 2114 by Chaka Mejia RN  Outcome: Ongoing     Problem: Altered Mood, Psychotic Behavior:  Goal: Ability to interact with others will improve  Description: Ability to interact with others will improve  4/14/2021 2114 by Chaka Mejia RN  Outcome: Ongoing     Patient has been out on the unit. Keeps to self. Flat. Depressed mood. States that he is \"just waiting for rehab\". Denies suicidal/homicidal ideations and hallucinations at this time. Purposeful rounding continued.

## 2021-04-15 NOTE — PLAN OF CARE
Problem: Suicide risk  Goal: Provide patient with safe environment  Description: Provide patient with safe environment  Outcome: Met This Shift     Problem: Altered Mood, Psychotic Behavior:  Goal: Able to verbalize reality based thinking  Description: Able to verbalize reality based thinking  4/15/2021 0858 by Steffen Almaraz RN  Outcome: Ongoing  4/14/2021 2114 by Agustina Humphrey RN  Outcome: Ongoing  Goal: Compliance with prescribed medication regimen will improve  Description: Compliance with prescribed medication regimen will improve  Outcome: Ongoing  Goal: Ability to interact with others will improve  Description: Ability to interact with others will improve  4/15/2021 0858 by Steffen Almaraz RN  Outcome: Ongoing  4/14/2021 2114 by Agustina Humphrey RN  Outcome: Ongoing

## 2021-04-15 NOTE — PROGRESS NOTES
Micaela La NP signed scripts. [phone]  Verified received by Pharmacist Gurinder at [phone]

## 2021-04-15 NOTE — CONSULTS
Smokeless tobacco: Never Used   Substance and Sexual Activity    Alcohol use: No    Drug use: Yes     Types: Marijuana, Cocaine    Sexual activity: Not on file     Comment: last use cocaine today 2/18/21   Lifestyle    Physical activity     Days per week: Not on file     Minutes per session: Not on file    Stress: Not on file   Relationships    Social connections     Talks on phone: Not on file     Gets together: Not on file     Attends Bahai service: Not on file     Active member of club or organization: Not on file     Attends meetings of clubs or organizations: Not on file     Relationship status: Not on file    Intimate partner violence     Fear of current or ex partner: Not on file     Emotionally abused: Not on file     Physically abused: Not on file     Forced sexual activity: Not on file   Other Topics Concern    Not on file   Social History Narrative    Not on file       Review of Systems      Vitals:    04/12/21 0545 04/13/21 0615 04/13/21 1630 04/14/21 0545   BP: (!) 89/54 (!) 93/54 117/67 (!) 140/70   Pulse: 57 65 90 92   Resp: 14 16 16 16   Temp: 99 °F (37.2 °C) 97.8 °F (36.6 °C) 97.8 °F (36.6 °C) 97.8 °F (36.6 °C)   TempSrc: Temporal Temporal Temporal Temporal   SpO2:   96%    Weight:       Height:         Physical Exam  Intraoral exam: Patient exhibits multiple teeth with caries. Mandibular right second molar (#31) had extensive decay. Upper left third molar (#16) was fractured with extensive decay. Performed diagnostic testing:  Tooth #31 was non responsive to ice test and had an exaggerated response to percussion testing. Radiographic periapical radiolucency noted on panorex. Tooth deemed non restorable due to extensive decay     Upper left third molar (#16) was not responding to ice and exaggerated response to percussion, tooth was deemed  Non restorable due to extensive decay. Extraoral exam: no soft tissue pathology noted. Within normal limits.      Periodontal exam: generalized

## 2021-04-15 NOTE — PROGRESS NOTES
Nurse to Nurse called to Meneses Echaria at [facility].   [facility] outpatient pharmacy updated to forward patients meds to [facility] [phone]

## 2021-04-15 NOTE — PROGRESS NOTES
[facility]  Discharge Note    Pt discharged with followings belongings:   Dentures: None  Vision - Corrective Lenses: None  Hearing Aid: None  Jewelry: Necklace  Body Piercings Removed: N/A  Clothing: Shirt, Pants, Socks, Footwear, Jacket / coat  Were All Patient Medications Collected?: Yes  Other Valuables: None   Valuables sent home with patient Valuables retrieved from safe, Security envelope number: . [id number]   and returned to patient. Patient education on aftercare instructions: yes. Patient verbalize understanding of AVS:  yes.     Status EXAM upon discharge:  Status and Exam  Normal: No  Facial Expression: Sad, Flat, Worried  Affect: Congruent  Level of Consciousness: Alert  Mood:Normal: No  Mood: Anxious  Motor Activity:Normal: No  Motor Activity: Decreased  Interview Behavior: Cooperative  Preception: [place] to Person, [place] to Time, [place] to Place, [place] to Situation  Attention:Normal: No  Attention: Distractible  Thought Processes: (impaired)  Thought Content:Normal: No  Thought Content: Preoccupations  Hallucinations: None  Delusions: No  Delusions: Persecution  Memory:Normal: No  Memory: Poor Recent  Insight and Judgment: No  Insight and Judgment: Poor Judgment, Poor Insight  Present Suicidal Ideation: No  Present Homicidal Ideation: No      Metabolic Screening:    Lab Results   Component Value Date    LABA1C 5.1 04/10/2021       Lab Results   Component Value Date    CHOL 113 10/20/2020    CHOL 120 10/27/2019     Lab Results   Component Value Date    TRIG 45 10/20/2020    TRIG 52 10/27/2019     Lab Results   Component Value Date    HDL 50 10/20/2020    HDL 42 10/27/2019     No components found for: [facility] EVALUATION AND TREATMENT [place]  Lab Results   Component Value Date    LABVLDL 10 10/27/2019       Susy Jason RN

## 2021-04-15 NOTE — DISCHARGE SUMMARY
DISCHARGE SUMMARY      Patient ID:  Dick Jewell  [id number]  45 y.o.  1980    Admit date: 4/8/2021    Discharge date and time: 4/15/2021    Admitting Physician: Salinas Thurston MD     Discharge Physician: Dr Julianna Harden MD    Discharge Diagnoses:   Patient Active Problem List   Diagnosis    Schizoaffective disorder, bipolar type ([facility]    Trauma    Pneumothorax, traumatic    Multiple closed fractures of ribs of right side    Rib pain on right side    Multiple fractures of right lower extremity and ribs, closed, initial encounter\. right tib fracture 9 & 10    Hemopneumothorax, right    Major depression single episode, in partial remission ([facility]    Polysubstance abuse ([facility]    Cocaine abuse ([facility]    Schizoaffective disorder ([facility]       Admission Condition: poor    Discharged Condition: stable    Admission Circumstance: Presented to the ED for depression and rage reported him and hearing things the [place] the people are talking about him and doing things to him that are not good.   Also reported homicidal ideations      PAST MEDICAL/PSYCHIATRIC HISTORY:   Past Medical History:   Diagnosis Date    Depression     Schizoaffective disorder (HCC)        FAMILY/SOCIAL HISTORY:  Family History   Problem Relation Age of Onset    No Known Problems Mother     No Known Problems Father      Social History     Socioeconomic History    Marital status: Single     Spouse name: Not on file    Number of children: 0    Years of education: 15    Highest education level: Not on file   Occupational History    Not on file   Social Needs    Financial resource strain: Not on file    Food insecurity     Worry: Not on file     Inability: Not on file   [employer] needs     Medical: Not on file     Non-medical: Not on file   Tobacco Use    Smoking status: Current Every Day Smoker     Packs/day: 0.50     Years: 24.00     Pack years: 12.00     Types: Cigars, Cigarettes    Smokeless tobacco: Never Used Substance and Sexual Activity    Alcohol use: No    Drug use: Yes     Types: Marijuana, Cocaine    Sexual activity: Not on file     Comment: last use cocaine today 2/18/21   Lifestyle    Physical activity     Days per week: Not on file     Minutes per session: Not on file    Stress: Not on file   Relationships    Social connections     Talks on phone: Not on file     Gets together: Not on file     Attends Confucianism service: Not on file     Active member of club or organization: Not on file     Attends meetings of clubs or organizations: Not on file     Relationship status: Not on file    Intimate partner violence     Fear of current or ex partner: Not on file     Emotionally abused: Not on file     Physically abused: Not on file     Forced sexual activity: Not on file   Other Topics Concern    Not on file   Social History Narrative    Not on file       MEDICATIONS:    Current Facility-Administered Medications:     paliperidone (INVEGA) extended release tablet 6 mg, 6 mg, Oral, Daily, Maye Hampton, APRN - CNP, 6 mg at 04/15/21 0901    ibuprofen (ADVIL;MOTRIN) tablet 800 mg, 800 mg, Oral, Q6H PRN, Bhaskar Del Rosario, DDS, 800 mg at 04/15/21 0901    acetaminophen (TYLENOL) tablet 500 mg, 500 mg, Oral, Q6H PRN, Bhaskar Del Rosario, DDS, 500 mg at 04/13/21 1805    paliperidone (INVEGA) extended release tablet 6 mg, 6 mg, Oral, Nightly, Tatonani Vasquezless, APRN - CNP, 6 mg at 04/14/21 2045    OXcarbazepine (TRILEPTAL) tablet 300 mg, 300 mg, Oral, BID, Tato Brannon, APRN - CNP, 300 mg at 04/15/21 0901    benzocaine (ORAJEL) 20 % mucosal gel, , Mouth/Throat, BID PRN, Murtaza Hoffmann MD, Given at 04/13/21 1805    acetaminophen (TYLENOL) tablet 650 mg, 650 mg, Oral, Q6H PRN, Joel Pereira MD, 650 mg at 04/12/21 0947    magnesium hydroxide (MILK OF MAGNESIA) 400 MG/5ML suspension 30 mL, 30 mL, Oral, Daily PRN, Joel Pereira MD    aluminum & magnesium hydroxide-simethicone (MAALOX) 844-726-33 MG/5ML suspension 30 mL, 30 mL, Oral, PRN, Nomi Young MD    hydrOXYzine (VISTARIL) capsule 50 mg, 50 mg, Oral, TID PRN, Nomi Young MD, 50 mg at 04/09/21 2157    haloperidol lactate (HALDOL) injection 5 mg, 5 mg, Intramuscular, Q6H PRN **OR** haloperidol (HALDOL) tablet 5 mg, 5 mg, Oral, Q6H PRN, Nomi Young MD    traZODone (DESYREL) tablet 50 mg, 50 mg, Oral, Nightly PRN, Nomi Young MD, 50 mg at 04/14/21 2045    Current Outpatient Medications:     [START ON 4/16/2021] paliperidone (INVEGA) 6 MG extended release tablet, Take 1 tablet by mouth daily, Disp: 30 tablet, Rfl: 0    paliperidone (INVEGA) 6 MG extended release tablet, Take 1 tablet by mouth nightly, Disp: 30 tablet, Rfl: 0    OXcarbazepine (TRILEPTAL) 300 MG tablet, Take 1 tablet by mouth 2 times daily, Disp: 60 tablet, Rfl: 0    nicotine (NICODERM CQ) 21 MG/24HR, Place 1 patch onto the skin daily, Disp: 30 patch, Rfl: 0    Examination:  BP (!) 140/70   Pulse 92   Temp 97.8 °F (36.6 °C) (Temporal)   Resp 16   Ht 5' 11\" (1.803 m)   Wt 160 lb (72.6 kg)   SpO2 96%   BMI 22.32 kg/m²   Gait - steady    HOSPITAL COURSE[de-identified]     Patient was admitted to the unit on 4/8/2021 was closely monitored for suicidal ideations. He was evaluated was treated with Fay 6 mg twice daily. Patient refused a long-acting injection stating that he does not like needles. .  Medical events were insignificant and patient continued to improve on the floor. He start coming out of his room he is attending groups to socializing with select peers. He never made any suicidal statements or any suicidal gestures while in the unit. He never made any homicidal statements or any homicidal gestures while in the unit. Patient said that he want to go to inpatient rehab and social workers assisted patient setting up inpatient rehab.     Treatment team felt the patient obtain the maximum benefit from his hospitalization he was set up with an outpatient mental health agency for outpatient follow-up services. At the time of discharge patient did not show any impulsive behavior. He was up on the unit he was attending groups and socializing with peers. He vehemently denied any suicidal homicidal ideations intent or plan. He was eating well and sleeping well there are no neurovegetative signs or symptoms of depression he denied any auditory or visual hallucinations. There are no overt or covert signs of psychosis. He was appreciative of the help that he received here. This patient no longer meets criteria for inpatient hospitalization. No AVH or paranoid thoughts  No hopeless or worthless feeling  No active SI/HI  Appetite:  [x] Normal  [] Increased  [] Decreased    Sleep:       [x] Normal  [] Fair       [] Poor            Energy:    [x] Normal  [] Increased  [] Decreased     SI [] Present  [x] Absent  HI  []Present  [x] Absent   Aggression:  [] yes  [] no  Patient is [x] able  [] unable to CONTRACT FOR SAFETY   Medication side effects(SE):  [x] None(Psych. Meds.) [] Other      Mental Status Examination on discharge:    Level of consciousness:  within normal limits   Appearance:  well-appearing  Behavior/Motor:  no abnormalities noted  Attitude toward examiner:  attentive and good eye contact  Speech:  spontaneous, normal rate and normal volume   Mood: \" My mood is good. \"  I will  Affect: Appropriate and pleasant   Thought processes: Linear without flight of ideas loose associations  Thought content: Devoid of any auditory visual hallucinations delusions or perceptual rise.   Denies SI/HI intent or plan  Cognition:  oriented to person, place, and time   Concentration intact  Memory intact  Insight good   Judgement fair   Fund of Knowledge adequate      ASSESSMENT:  Patient symptoms are:  [x] Well controlled  [x] Improving  [] Worsening  [] No change    Reason for more than one antipsychotic:  [x] N/A  [] 3 Failed Monotherapy attempts (Drugs tried:)  [] Crossover to a changed: when to take this     * paliperidone 6 MG extended release tablet  Commonly known as: INVEGA  Take 1 tablet by mouth daily  Start taking on: April 16, 2021  What changed:   · medication strength  · how much to take  · when to take this         * This list has 2 medication(s) that are the same as other medications prescribed for you. Read the directions carefully, and ask your doctor or other care provider to review them with you.             CONTINUE taking these medications    nicotine 21 MG/24HR  Commonly known as: [address] [facility] 1 patch onto the skin daily        STOP taking these medications    dicyclomine 10 MG capsule  Commonly known as: Bentyl     ondansetron 4 MG disintegrating tablet  Commonly known as: Zofran ODT     paliperidone palmitate  MG/ML Darcy IM injection  Commonly known as: Usman Kyle           Where to Get Your Medications      You can get these medications from any pharmacy    Bring a paper prescription for each of these medications  · OXcarbazepine 300 MG tablet  · paliperidone 6 MG extended release tablet  · paliperidone 6 MG extended release tablet       Patient is counseled if he continues to abuse drugs or alcohol he could act out impulsively causing serious harm to himself or others even though may be unintentional.  He demonstrated understanding of this and has the capacity understand this    Patient is counseled hisr mental health treatment will be difficult to optimize with ongoing use of drugs or alcohol he demonstrate understanding of this as the past understand this     Patient is counseled that he he uses any amount of opiates after any clean time he could have an unintentional overdose he demonstrated understanding of this and has the capacity to understand this    Patient is counseled he must remain compliant with all medications outpatient follow-up ointments    Patient is discharged to rehab in stable condition    TIME SPEND - 35 MINUTES TO COMPLETE THE EVALUATION, DISCHARGE SUMMARY, MEDICATION RECONCILIATION AND FOLLOW UP CARE     Signed:  Felicity Nixon  8/87/0054  8:32 PM

## 2021-04-22 ENCOUNTER — HOSPITAL ENCOUNTER (INPATIENT)
Age: 41
LOS: 5 days | Discharge: OTHER INSTITUTION | DRG: 885 | End: 2021-04-27
Attending: EMERGENCY MEDICINE | Admitting: PSYCHIATRY & NEUROLOGY
Payer: COMMERCIAL

## 2021-04-22 DIAGNOSIS — F20.9 SCHIZOPHRENIA, UNSPECIFIED TYPE (HCC): ICD-10-CM

## 2021-04-22 DIAGNOSIS — F22 PARANOIA (HCC): Primary | ICD-10-CM

## 2021-04-22 PROBLEM — F29 PSYCHOSIS (HCC): Status: ACTIVE | Noted: 2021-04-22

## 2021-04-22 LAB
ACETAMINOPHEN LEVEL: <5 MCG/ML (ref 10–30)
ALBUMIN SERPL-MCNC: 4.3 G/DL (ref 3.5–5.2)
ALP BLD-CCNC: 51 U/L (ref 40–129)
ALT SERPL-CCNC: 13 U/L (ref 0–40)
AMPHETAMINE SCREEN, URINE: NOT DETECTED
ANION GAP SERPL CALCULATED.3IONS-SCNC: 12 MMOL/L (ref 7–16)
AST SERPL-CCNC: 24 U/L (ref 0–39)
BARBITURATE SCREEN URINE: NOT DETECTED
BASOPHILS ABSOLUTE: 0.06 E9/L (ref 0–0.2)
BASOPHILS RELATIVE PERCENT: 0.6 % (ref 0–2)
BENZODIAZEPINE SCREEN, URINE: NOT DETECTED
BILIRUB SERPL-MCNC: 0.4 MG/DL (ref 0–1.2)
BILIRUBIN URINE: ABNORMAL
BLOOD, URINE: NEGATIVE
BUN BLDV-MCNC: 11 MG/DL (ref 6–20)
CALCIUM SERPL-MCNC: 9.3 MG/DL (ref 8.6–10.2)
CANNABINOID SCREEN URINE: POSITIVE
CHLORIDE BLD-SCNC: 97 MMOL/L (ref 98–107)
CLARITY: CLEAR
CO2: 27 MMOL/L (ref 22–29)
COCAINE METABOLITE SCREEN URINE: POSITIVE
COLOR: YELLOW
CREAT SERPL-MCNC: 0.8 MG/DL (ref 0.7–1.2)
EKG ATRIAL RATE: 70 BPM
EKG P AXIS: 78 DEGREES
EKG P-R INTERVAL: 192 MS
EKG Q-T INTERVAL: 388 MS
EKG QRS DURATION: 94 MS
EKG QTC CALCULATION (BAZETT): 419 MS
EKG R AXIS: 81 DEGREES
EKG T AXIS: 73 DEGREES
EKG VENTRICULAR RATE: 70 BPM
EOSINOPHILS ABSOLUTE: 0.04 E9/L (ref 0.05–0.5)
EOSINOPHILS RELATIVE PERCENT: 0.4 % (ref 0–6)
ETHANOL: <10 MG/DL (ref 0–0.08)
FENTANYL SCREEN, URINE: NOT DETECTED
GFR AFRICAN AMERICAN: >60
GFR NON-AFRICAN AMERICAN: >60 ML/MIN/1.73
GLUCOSE BLD-MCNC: 87 MG/DL (ref 74–99)
GLUCOSE URINE: NEGATIVE MG/DL
HCT VFR BLD CALC: 45.4 % (ref 37–54)
HEMOGLOBIN: 14.7 G/DL (ref 12.5–16.5)
IMMATURE GRANULOCYTES #: 0.02 E9/L
IMMATURE GRANULOCYTES %: 0.2 % (ref 0–5)
KETONES, URINE: ABNORMAL MG/DL
LEUKOCYTE ESTERASE, URINE: NEGATIVE
LYMPHOCYTES ABSOLUTE: 1.44 E9/L (ref 1.5–4)
LYMPHOCYTES RELATIVE PERCENT: 14.7 % (ref 20–42)
Lab: ABNORMAL
MCH RBC QN AUTO: 29.7 PG (ref 26–35)
MCHC RBC AUTO-ENTMCNC: 32.4 % (ref 32–34.5)
MCV RBC AUTO: 91.7 FL (ref 80–99.9)
METHADONE SCREEN, URINE: NOT DETECTED
MONOCYTES ABSOLUTE: 0.75 E9/L (ref 0.1–0.95)
MONOCYTES RELATIVE PERCENT: 7.6 % (ref 2–12)
NEUTROPHILS ABSOLUTE: 7.5 E9/L (ref 1.8–7.3)
NEUTROPHILS RELATIVE PERCENT: 76.5 % (ref 43–80)
NITRITE, URINE: NEGATIVE
OPIATE SCREEN URINE: NOT DETECTED
OXYCODONE URINE: NOT DETECTED
PDW BLD-RTO: 12.9 FL (ref 11.5–15)
PH UA: 6.5 (ref 5–9)
PHENCYCLIDINE SCREEN URINE: NOT DETECTED
PLATELET # BLD: 351 E9/L (ref 130–450)
PMV BLD AUTO: 10 FL (ref 7–12)
POTASSIUM SERPL-SCNC: 4.3 MMOL/L (ref 3.5–5)
PROTEIN UA: NEGATIVE MG/DL
RBC # BLD: 4.95 E12/L (ref 3.8–5.8)
SALICYLATE, SERUM: <0.3 MG/DL (ref 0–30)
SARS-COV-2, NAAT: NOT DETECTED
SODIUM BLD-SCNC: 136 MMOL/L (ref 132–146)
SPECIFIC GRAVITY UA: 1.02 (ref 1–1.03)
TOTAL PROTEIN: 7.5 G/DL (ref 6.4–8.3)
TRICYCLIC ANTIDEPRESSANTS SCREEN SERUM: NEGATIVE NG/ML
UROBILINOGEN, URINE: 1 E.U./DL
WBC # BLD: 9.8 E9/L (ref 4.5–11.5)

## 2021-04-22 PROCEDURE — 85025 COMPLETE CBC W/AUTO DIFF WBC: CPT

## 2021-04-22 PROCEDURE — 93005 ELECTROCARDIOGRAM TRACING: CPT | Performed by: EMERGENCY MEDICINE

## 2021-04-22 PROCEDURE — 80307 DRUG TEST PRSMV CHEM ANLYZR: CPT

## 2021-04-22 PROCEDURE — 6370000000 HC RX 637 (ALT 250 FOR IP): Performed by: PSYCHIATRY & NEUROLOGY

## 2021-04-22 PROCEDURE — 1240000000 HC EMOTIONAL WELLNESS R&B

## 2021-04-22 PROCEDURE — 80143 DRUG ASSAY ACETAMINOPHEN: CPT

## 2021-04-22 PROCEDURE — 87635 SARS-COV-2 COVID-19 AMP PRB: CPT

## 2021-04-22 PROCEDURE — 82077 ASSAY SPEC XCP UR&BREATH IA: CPT

## 2021-04-22 PROCEDURE — 80053 COMPREHEN METABOLIC PANEL: CPT

## 2021-04-22 PROCEDURE — 80179 DRUG ASSAY SALICYLATE: CPT

## 2021-04-22 PROCEDURE — 99284 EMERGENCY DEPT VISIT MOD MDM: CPT

## 2021-04-22 PROCEDURE — 93010 ELECTROCARDIOGRAM REPORT: CPT | Performed by: INTERNAL MEDICINE

## 2021-04-22 PROCEDURE — 81003 URINALYSIS AUTO W/O SCOPE: CPT

## 2021-04-22 RX ORDER — HALOPERIDOL 5 MG
5 TABLET ORAL EVERY 6 HOURS PRN
Status: DISCONTINUED | OUTPATIENT
Start: 2021-04-22 | End: 2021-04-27 | Stop reason: HOSPADM

## 2021-04-22 RX ORDER — TRAZODONE HYDROCHLORIDE 50 MG/1
50 TABLET ORAL NIGHTLY PRN
Status: DISCONTINUED | OUTPATIENT
Start: 2021-04-22 | End: 2021-04-27 | Stop reason: HOSPADM

## 2021-04-22 RX ORDER — HALOPERIDOL 5 MG/ML
5 INJECTION INTRAMUSCULAR EVERY 6 HOURS PRN
Status: DISCONTINUED | OUTPATIENT
Start: 2021-04-22 | End: 2021-04-27 | Stop reason: HOSPADM

## 2021-04-22 RX ORDER — NICOTINE 21 MG/24HR
1 PATCH, TRANSDERMAL 24 HOURS TRANSDERMAL DAILY
Status: DISCONTINUED | OUTPATIENT
Start: 2021-04-22 | End: 2021-04-27 | Stop reason: HOSPADM

## 2021-04-22 RX ORDER — HYDROXYZINE PAMOATE 50 MG/1
50 CAPSULE ORAL 3 TIMES DAILY PRN
Status: DISCONTINUED | OUTPATIENT
Start: 2021-04-22 | End: 2021-04-27 | Stop reason: HOSPADM

## 2021-04-22 RX ORDER — ACETAMINOPHEN 325 MG/1
650 TABLET ORAL EVERY 6 HOURS PRN
Status: DISCONTINUED | OUTPATIENT
Start: 2021-04-22 | End: 2021-04-27 | Stop reason: HOSPADM

## 2021-04-22 RX ORDER — MAGNESIUM HYDROXIDE/ALUMINUM HYDROXICE/SIMETHICONE 120; 1200; 1200 MG/30ML; MG/30ML; MG/30ML
30 SUSPENSION ORAL PRN
Status: DISCONTINUED | OUTPATIENT
Start: 2021-04-22 | End: 2021-04-27 | Stop reason: HOSPADM

## 2021-04-22 ASSESSMENT — SLEEP AND FATIGUE QUESTIONNAIRES
DIFFICULTY STAYING ASLEEP: YES
DO YOU HAVE DIFFICULTY SLEEPING: YES
RESTFUL SLEEP: NO
SLEEP PATTERN: DIFFICULTY FALLING ASLEEP;INSOMNIA

## 2021-04-22 ASSESSMENT — PAIN DESCRIPTION - FREQUENCY: FREQUENCY: CONTINUOUS

## 2021-04-22 ASSESSMENT — PAIN DESCRIPTION - PAIN TYPE: TYPE: OTHER (COMMENT)

## 2021-04-22 ASSESSMENT — PATIENT HEALTH QUESTIONNAIRE - PHQ9: SUM OF ALL RESPONSES TO PHQ QUESTIONS 1-9: 14

## 2021-04-22 NOTE — ED NOTES
Pt sent up to [facility] with belongings, including a baggie of money. Called floor and advised Aissatou Roberts.       Manuel Juarez  04/22/21 9600

## 2021-04-22 NOTE — ED PROVIDER NOTES
HPI:  4/22/21, Time: 12:44 AM EDT         Judith Pinon is a 45 y.o. male presenting to the ED for psychiatric evaluation, beginning months ago. The complaint has been persistent, moderate in severity, and worsened by nothing. Patient has underlying history of schizophrenia. Patient reports that people are out to get him. Patient reporting auditory hallucinations he reports no visual hallucinations he reports no thoughts of hurting self or others. But he believes that his mother is out to harm him. Patient reporting no chest pain no difficulty breathing or abdominal pain or vomiting. Patient reporting no fever chills he reports no leg pain or swelling. There is no history of headache. ROS:   Pertinent positives and negatives are stated within HPI, all other systems reviewed and are negative.  --------------------------------------------- PAST HISTORY ---------------------------------------------  Past Medical History:  has a past medical history of Depression and Schizoaffective disorder ([facility] Past Surgical History:  has a past surgical history that includes eye surgery (19 years ago). Social History:  reports that he has been smoking cigars and cigarettes. He has a 12.00 pack-year smoking history. He has never used smokeless tobacco. He reports current drug use. Drugs: Marijuana and Cocaine. He reports that he does not drink alcohol. Family History: family history includes No Known Problems in his father and mother. The patients home medications have been reviewed.     Allergies: Rondec-d [chlophedianol-pseudoephedrine]    ---------------------------------------------------PHYSICAL EXAM--------------------------------------    Constitutional/General: Alert and oriented x3, well appearing, non toxic in NAD  Head: Normocephalic and atraumatic  Eyes: PERRL, EOMI  Mouth: Oropharynx clear, handling secretions, no trismus  Neck: Supple, full ROM, non tender to palpation in the midline, no stridor, no crepitus, no meningeal signs  Pulmonary: Lungs clear to auscultation bilaterally, no wheezes, rales, or rhonchi. Not in respiratory distress  Cardiovascular:  Regular rate. Regular rhythm. No murmurs, gallops, or rubs. 2+ distal pulses  Chest: no chest wall tenderness  Abdomen: Soft. Non tender. Non distended. +BS. No rebound, guarding, or rigidity. No pulsatile masses appreciated. Musculoskeletal: Moves all extremities x 4. Warm and well perfused, no clubbing, cyanosis, or edema. Capillary refill <3 seconds  Skin: warm and dry. No rashes. Neurologic: GCS 15, CN 2-12 grossly intact, no focal deficits, symmetric strength 5/5 in the upper and lower extremities bilaterally  Psych: Positive hallucinations no homicidal suicidal thoughts patient paranoid    -------------------------------------------------- RESULTS -------------------------------------------------  I have personally reviewed all laboratory and imaging results for this patient. Results are listed below.      LABS:  Results for orders placed or performed during the hospital encounter of 04/22/21   CBC auto differential   Result Value Ref Range    WBC 9.8 4.5 - 11.5 E9/L    RBC 4.95 3.80 - 5.80 E12/L    Hemoglobin 14.7 12.5 - 16.5 g/dL    Hematocrit 45.4 37.0 - 54.0 %    MCV 91.7 80.0 - 99.9 fL    MCH 29.7 26.0 - 35.0 pg    MCHC 32.4 32.0 - 34.5 %    RDW 12.9 11.5 - 15.0 fL    Platelets 039 518 - 588 E9/L    MPV 10.0 7.0 - 12.0 fL    Neutrophils % 76.5 43.0 - 80.0 %    Immature Granulocytes % 0.2 0.0 - 5.0 %    Lymphocytes % 14.7 (L) 20.0 - 42.0 %    Monocytes % 7.6 2.0 - 12.0 %    Eosinophils % 0.4 0.0 - 6.0 %    Basophils % 0.6 0.0 - 2.0 %    Neutrophils Absolute 7.50 (H) 1.80 - 7.30 E9/L    Immature Granulocytes # 0.02 E9/L    Lymphocytes Absolute 1.44 (L) 1.50 - 4.00 E9/L    Monocytes Absolute 0.75 0.10 - 0.95 E9/L    Eosinophils Absolute 0.04 (L) 0.05 - 0.50 E9/L    Basophils Absolute 0.06 0.00 - 0.20 E9/L   Comprehensive Metabolic provider has spoken with the patient and discussed todays results, in addition to providing specific details for the plan of care and counseling regarding the diagnosis and prognosis. Questions are answered at this time and they are agreeable with the plan.       --------------------------------- IMPRESSION AND DISPOSITION ---------------------------------    IMPRESSION  1. [facility]ia ([facility]    2. Schizophrenia, unspecified type ([facility]        DISPOSITION  Disposition:  to assist with evaluation  Patient condition is stable        NOTE: This report was transcribed using voice recognition software.  Every effort was made to ensure accuracy; however, inadvertent computerized transcription errors may be present          Nicholas Silva MD  04/22/21 1153              Nicholas Silva MD  04/22/21 3801

## 2021-04-22 NOTE — ED NOTES
Emergency Department [facility] AN AFFILIATE OF [facility] Biopsychosocial Assessment Note    Chief Complaint:     Pt is a 44 yo male presenting to the ED for hallucinations, states that he believes someone is trying to kill him. pt denies si/hi just paranoid ;visual hallucinations, people are coming to him so that negative does not come back on him    MSE:    Pt is calm, oriented x 4, alert, labile affect, no eye contact, pressured speech, paranoid, delusional, denies SI, HI and admits to hallucinations, Pt reports poor sleep and appetite. Clinical Summary/History:     Pt has a MH hx os schizoaffective d/o and depression, Pt reports he is not connected with outpatient mental health services at this time. Pt reports he is not taking his meds and does not know how long he has been off of them. Pt's last inpatient admission was on 04/08/2021 at this facility for depression and rage, the [place] the people are talking about him and doing things to him that are not good. Also reported HI at that time as well. Pt reports his mother is out to kill due to mistaken identity and that he has been on the run and hiding from her so that she does not kill him. Pt is unsure of how long this has been going on. Pt reports voice telling him this but also sees his mother coming after him. Pt reports using cocaine recently    Gender  [x] Male [] Female [] Transgender  [] Other    Sexual Orientation    [x] Heterosexual [] Homosexual [] Bisexual [] Other    Suicidal Behavioral: CSSR-S Complete. [] Reports:    [] Past [] Present   [x] Denies    Homicidal/ Violent Behavior  [] Reports:   [] Past [] Present   [x] Denies     Hallucinations/Delusions   [x] Reports:   [] Denies     Substance Use/Alcohol Use/Addiction: SBIRT Screen Complete.    [x] Reports:   [] Denies     Trauma History  [] Reports:  [] Denies     Collateral Information:     No collateral information obtained at this time    Level of Care/Disposition Plan  [] Home:   [] Outpatient Provider:   []

## 2021-04-22 NOTE — ED NOTES
Bed: [facility]  Expected date:   Expected time:   Means of arrival:   Comments:  triage     Adina Tyson RN  04/22/21 0959

## 2021-04-22 NOTE — ED NOTES
The pt was accepted to [address] [place] [address] room 2848. Disposition called to Ruth in admitting.      [facility], [facility]  04/22/21 0896

## 2021-04-22 NOTE — ED NOTES
Pt to be admitted to 7 [facility] pending UA and covid results per Dr Courtney Laurent / Nate Osei RN  04/22/21 9210

## 2021-04-23 PROCEDURE — 99221 1ST HOSP IP/OBS SF/LOW 40: CPT | Performed by: NURSE PRACTITIONER

## 2021-04-23 PROCEDURE — 1240000000 HC EMOTIONAL WELLNESS R&B

## 2021-04-23 PROCEDURE — 6370000000 HC RX 637 (ALT 250 FOR IP): Performed by: PSYCHIATRY & NEUROLOGY

## 2021-04-23 PROCEDURE — 6370000000 HC RX 637 (ALT 250 FOR IP): Performed by: NURSE PRACTITIONER

## 2021-04-23 RX ORDER — DIVALPROEX SODIUM 250 MG/1
250 TABLET, DELAYED RELEASE ORAL EVERY 12 HOURS SCHEDULED
Status: DISCONTINUED | OUTPATIENT
Start: 2021-04-23 | End: 2021-04-27 | Stop reason: HOSPADM

## 2021-04-23 RX ORDER — PALIPERIDONE 6 MG/1
6 TABLET, EXTENDED RELEASE ORAL NIGHTLY
Status: DISCONTINUED | OUTPATIENT
Start: 2021-04-23 | End: 2021-04-23

## 2021-04-23 RX ORDER — PALIPERIDONE 6 MG/1
6 TABLET, EXTENDED RELEASE ORAL DAILY
Status: DISCONTINUED | OUTPATIENT
Start: 2021-04-23 | End: 2021-04-27 | Stop reason: HOSPADM

## 2021-04-23 RX ORDER — PALIPERIDONE 3 MG/1
3 TABLET, EXTENDED RELEASE ORAL NIGHTLY
Status: DISCONTINUED | OUTPATIENT
Start: 2021-04-23 | End: 2021-04-27 | Stop reason: HOSPADM

## 2021-04-23 RX ADMIN — TRAZODONE HYDROCHLORIDE 50 MG: 50 TABLET ORAL at 22:09

## 2021-04-23 RX ADMIN — HYDROXYZINE PAMOATE 50 MG: 50 CAPSULE ORAL at 22:09

## 2021-04-23 RX ADMIN — DIVALPROEX SODIUM 250 MG: 250 TABLET, DELAYED RELEASE ORAL at 22:09

## 2021-04-23 ASSESSMENT — SLEEP AND FATIGUE QUESTIONNAIRES
RESTFUL SLEEP: NO
DO YOU HAVE DIFFICULTY SLEEPING: YES
DIFFICULTY STAYING ASLEEP: YES
DO YOU USE A SLEEP AID: NO
DIFFICULTY ARISING: YES
DIFFICULTY FALLING ASLEEP: YES

## 2021-04-23 NOTE — H&P
Department of Psychiatry  History and Physical - Adult     CHIEF COMPLAINT: Flat and blunted not offer much conversation    Patient was seen after discussing with the treatment team and reviewing the chart    CIRCUMSTANCES OF ADMISSION:  presents the ED for presents to the ED reporting auditory hallucinations and visual hallucinations of people coming to him. He also believe that his mother was out to kill him due to mistaken identity and he has been hiding from her so that she does not kill him. HISTORY OF PRESENT ILLNESS:      The patient is a 45 y.o. male with significant past history of Schizoaffective Disorder presents the ED for presents to the ED reporting auditory hallucinations and visual hallucinations of people coming to him. He also believe that his mother was out to kill him due to mistaken identity and he has been hiding from her so that she does not kill him. In the ED his urine drug screen is positive for cocaine and cannabis he was medically clear admitted [facility]. adult psychiatric unit for further psychiatric assessment stabilization and treatment. Upon assessment today patient lies in his bed keeps his back to his does not offer any conversation. Patient is well-known to our services and was just discharged here from [facility] approximately 1 week ago where he was discharged to drug rehab. Unknown how long he stated the drug rehabilitation center. Upon this admission he is again positive for cocaine and cannabis. On his last admission he refused to take the [place] injection however he agrees to take it now after discussing with Dr. Tee Dunn. Currently he appears to be internally stimulated paranoid guarded irritable flat blunted affect. Unable to obtain any history from patient at this time as he is uncooperative with assessment.     Past Medical History:        Diagnosis Date    Depression     Schizoaffective disorder ([facility]        Medications Prior to Admission: rotation is normal  CNXII:    xTongue is midline no deviation or atrophy    Mental Status Examination:    Level of consciousness:  within normal limits   Appearance:  well-appearing  Behavior/Motor:  no abnormalities noted  Attitude toward examiner:  cooperative  Speech:  spontaneous, normal rate and normal volume   Mood: \" I am okay. \"  Affect: Incongruent flat and blunted  Thought processes: Linear without flight of ideas loose associations  Thought content: Devoid any auditory visualizations delusions or perceptual rise. Denies SI/HI intent or plan  Cognition:  oriented to person, place, and time   Concentration intact  Memory intact  Insight poor   Judgement poor   Fund of Knowledge limited      DIAGNOSIS:  Schizoaffective disorder bipolar type  Polysubstance abuse          LABS: REVIEWED TODAY:  Recent Labs     04/22/21  0127   WBC 9.8   HGB 14.7        Recent Labs     04/22/21 0127      K 4.3   CL 97*   CO2 27   BUN 11   CREATININE 0.8   GLUCOSE 87     Recent Labs     04/22/21 0127   BILITOT 0.4   ALKPHOS 51   AST 24   ALT 13     Lab Results   Component Value Date    LABAMPH NOT DETECTED 04/22/2021    BARBSCNU NOT DETECTED 04/22/2021    LABBENZ NOT DETECTED 04/22/2021    LABMETH NOT DETECTED 04/22/2021    OPIATESCREENURINE NOT DETECTED 04/22/2021    PHENCYCLIDINESCREENURINE NOT DETECTED 04/22/2021    ETOH <10 04/22/2021     Lab Results   Component Value Date    TSH 0.462 01/16/2021     No results found for: LITHIUM  Lab Results   Component Value Date    VALPROATE 3 (L) 04/29/2020     Lab Results   Component Value Date    VALPROATE 3 04/29/2020         Radiology No results found. TREATMENT PLAN:    Risk Management: Based on the diagnosis and assessment biopsychosocial treatment model was presented to the patient and was given the opportunity to ask any question. The patient was agreeable to the plan and all the patient's questions were answered to the patient's satisfaction.   I discussed with the patient the risk, benefit, alternative and common side effects for the proposed medication treatment. The patient is consenting to this treatment. Collateral Information:  Will obtain collateral information from the family or friends. Will obtain medical records as appropriate from out patient providers  Will consult the hospitalist for a physical exam to rule out any co-morbid physical condition. Patient's diagnosis, treatment plan, medication management was formulated at the end of evaluation and after reviewing relevant documentation. Patient was seen directly by myself and Dr. Daphne Brown    We will start back on Invega 6 mg daily 3 mg at bedtime  Start Depakote ER 50 mg twice daily for mood stabilization  Give Invega Sustenna injection 234 mg IM once on 4/23/2021 followed by 156 mg IM every 30 days on 4/30/2021      Prn Haldol 5mg and Vistaril 50mg q6hr for extreme agitation. Trazodone as ordered for insomnia  Vistaril as ordered for anxiety      Psychotherapy:   Encourage participation in milieu and group therapy  Individual therapy as needed              Behavioral Services  Medicare Certification Upon Admission    I certify that this patient's inpatient psychiatric hospital admission is medically necessary for:    [x] (1) Treatment which could reasonably be expected to improve this patient's condition,       [] (2) Or for diagnostic study;     AND     [x](2) The inpatient psychiatric services are provided while the individual is under the care of a physician and are included in the individualized plan of care.     Estimated length of stay/service 3-7 days based on stability    Plan for post-hospital care  3-5 days based on sability    Electronically signed by JUICE Martinez CNP on 3/01/1789 at 8:24 AM        Electronically signed by JUICE Martinez CNP on 5/79/5299 at 8:24 AM
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
## 2021-04-23 NOTE — PLAN OF CARE
Problem: Anger Management/Homicidal Ideation:  Goal: Absence of angry outbursts  Description: Absence of angry outbursts  4/23/2021 0924 by Yudy Ceron RN  Outcome: Ongoing     Problem: Anger Management/Homicidal Ideation:  Goal: Participates in care planning  Description: Participates in care planning  4/23/2021 0924 by Yudy Ceron RN  Outcome: Ongoing   pt denies si/hi and hallucinations. Pt is isolative to his room most of the day only coming out for meals. Pt is paranoid and preoccupied. Pt refusing his po invega but took his long acting invega. Pt not attending groups but is encouraged to attend groups and participate.
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
## 2021-04-23 NOTE — PROGRESS NOTES
`[facility]  Admission Note     Admission Type:   Admission Type: Involuntary    Reason for admission:  Reason for Admission: \"MY MOTHER IS TAKING ALL MY MONEY AWAY FROM ME, OTHER PEOPLE ARE OUT AFTER ME, NOT SI NOT HI, DENIES VOICES OR VISIONS. \"    PATIENT STRENGTHS:  Strengths: Communication    Patient Strengths and Limitations:  Limitations: Demonstrates discomfort with /lack of social skills, Limited education -> difficulty reading or writing, Lacks leisure interests    Addictive Behavior:   Addictive Behavior  In the past 3 months, have you felt or has someone told you that you have a problem with:  : None  Do you have a history of Chemical Use?: No  Do you have a history of Street Drug Abuse?: Yes  Histroy of Prescripton Drug Abuse?: No    Medical Problems:   Past Medical History:   Diagnosis Date    Depression     Schizoaffective disorder ([facility] [address]        Status EXAM:  Status and Exam  Normal: No  Facial Expression: Worried, Avoids Gaze  Affect: Constricted  Level of Consciousness: Alert  Mood:Normal: No  Mood: Depressed, Anxious, Suspicious, Sad, Irritable  Motor Activity:Normal: Yes  Interview Behavior: Cooperative  Preception: [place] to Person, [place] to Place, [place] to Situation  Attention:Normal: No  Attention: Unable to Concentrate  Thought Processes: Perseveration  Thought Content:Normal: No  Thought Content: Preoccupations, Delusions, Paranoia  Hallucinations: None  Delusions: Yes  Delusions: Persecution, Other(See Comment)(PARANOIA)  Memory:Normal: No  Memory: Poor Recent, Poor Remote, Confabulation  Insight and Judgment: No  Insight and Judgment: Poor Judgment, Poor Insight, Unrealistic  Present Suicidal Ideation: No  Present Homicidal Ideation: No    Tobacco Screening:  Practical Counseling, on admission, lydia X, if applicable and completed (first 3 are required if patient doesn't refuse):             (x )  Recognizing danger situations (included triggers and roadblocks)

## 2021-04-24 PROCEDURE — 6370000000 HC RX 637 (ALT 250 FOR IP): Performed by: PSYCHIATRY & NEUROLOGY

## 2021-04-24 PROCEDURE — 99231 SBSQ HOSP IP/OBS SF/LOW 25: CPT | Performed by: NURSE PRACTITIONER

## 2021-04-24 PROCEDURE — 1240000000 HC EMOTIONAL WELLNESS R&B

## 2021-04-24 PROCEDURE — 6370000000 HC RX 637 (ALT 250 FOR IP): Performed by: NURSE PRACTITIONER

## 2021-04-24 RX ADMIN — PALIPERIDONE 6 MG: 6 TABLET, EXTENDED RELEASE ORAL at 09:22

## 2021-04-24 RX ADMIN — PALIPERIDONE 3 MG: 3 TABLET, EXTENDED RELEASE ORAL at 21:40

## 2021-04-24 RX ADMIN — HYDROXYZINE PAMOATE 50 MG: 50 CAPSULE ORAL at 21:40

## 2021-04-24 RX ADMIN — TRAZODONE HYDROCHLORIDE 50 MG: 50 TABLET ORAL at 21:40

## 2021-04-24 NOTE — PLAN OF CARE
Patient is isolative and irritable. Patient asked for the reason for admission and he stated, \"I don't wanna talk about it. \" Patient is evasive during assessment and presents with poverty of content. Patient presents paranoid and suspicious. Denies SI, HI, and AVH. Patient insight and judgement, and motivation is poor at this time. Will monitor closely.

## 2021-04-24 NOTE — PROGRESS NOTES
Pt in his room. States he has been in there \"most of the day\". Pt states he was \"keeping to himself\". Pt states he was upset over staff refusing to give him a sandwich. Pt was irritable but then quickly changed the subject and asked this nurse how her day was going so far. Pt denies SI, HI, and AVH. Appears paranoid, but has good eye contact and a sense of humor with conversation. Pt remains upset over living situation. Pt is med compliant other than his PO Invega. This nurse explained to the pt that it is normal to continue taking the PO for some days after the LA injection is given. \"Oh, I didn't know that\". Pt is currently drawing in [employer] and listening to music. Denies any needs or issues @ this time. Will continue to monitor.

## 2021-04-24 NOTE — PROGRESS NOTES
hydroxide-simethicone (MAALOX) 757-830-02 MG/5ML suspension 30 mL, 30 mL, Oral, PRN, Siddharth Sue MD    hydrOXYzine (VISTARIL) capsule 50 mg, 50 mg, Oral, TID PRN, Siddharth Sue MD, 50 mg at 04/23/21 2209    haloperidol lactate (HALDOL) injection 5 mg, 5 mg, Intramuscular, Q6H PRN **OR** haloperidol (HALDOL) tablet 5 mg, 5 mg, Oral, Q6H PRN, Siddharth Sue MD    traZODone (DESYREL) tablet 50 mg, 50 mg, Oral, Nightly PRN, Siddharth Sue MD, 50 mg at 04/23/21 2209    nicotine (NICODERM CQ) 21 MG/24HR 1 patch, 1 patch, Transdermal, Daily, Siddharth Sue MD, 1 patch at 04/24/21 0716      Examination:  BP (!) 111/56   Pulse 65   Temp 98.2 °F (36.8 °C) (Temporal)   Resp 14   Ht 6' (1.829 m)   Wt 176 lb 0.3 oz (79.8 kg)   SpO2 99%   BMI 23.87 kg/m²   Gait - steady  Medication side effects(SE): None reported    Mental Status Examination:    Level of consciousness:  within normal limits   Appearance:  poor grooming and poor hygiene  Behavior/Motor:  psychomotor retardation  Attitude toward examiner:  withdrawn  Speech:  poverty of speech   Mood: decreased range and depressed  Affect:  flat  Thought processes: Linear not offer much conversation  Thought content: Devoid any auditory visualizations delusions or perceptual rise. Denies SI/HI intent or plan  Cognition:  oriented to person, place, and time   Concentration poor  Insight poor   Judgement poor     ASSESSMENT:   Patient symptoms are:  [] Well controlled  [] Improving  [] Worsening  [x] No change      Diagnosis:   Principal Problem:    Schizoaffective disorder, bipolar type ([facility] [address]  Active Problems:    [facility]  Resolved Problems:    * No resolved hospital problems.  *      LABS:    Recent Labs     04/22/21  0127   WBC 9.8   HGB 14.7        Recent Labs     04/22/21  0127      K 4.3   CL 97*   CO2 27   BUN 11   CREATININE 0.8   GLUCOSE 87     Recent Labs     04/22/21  0127   BILITOT 0.4   ALKPHOS 51   AST 24   ALT 13     Lab Results   Component Value Date    LABAMPH NOT DETECTED 04/22/2021    BARBSCNU NOT DETECTED 04/22/2021    LABBENZ NOT DETECTED 04/22/2021    LABMETH NOT DETECTED 04/22/2021    OPIATESCREENURINE NOT DETECTED 04/22/2021    PHENCYCLIDINESCREENURINE NOT DETECTED 04/22/2021    ETOH <10 04/22/2021     Lab Results   Component Value Date    TSH 0.462 01/16/2021     No results found for: LITHIUM  Lab Results   Component Value Date    VALPROATE 3 (L) 04/29/2020           Treatment Plan:  The patient's diagnosis, treatment plan, medication management were formulated after patient was seen directly by the attending physician and myself and all relevant documentation was reviewed. Reviewed current Medications with the patient. Risk, benefit, side effects, possible outcomes of the medication and alternatives discussed with the patient and the patient demonstrated understanding. The patient was also educated that the outcome of treatment will depend on the medication compliance as directed by the prescribers along with regular follow-up, compliance with the labs and other work-up, as clinically indicated. Continue inVega 6 mg daily and 6 mg at bedtime for psychosis      Collateral information: Followed by social work  CD evaluation  Encourage patient to attend group and other milieu activities.   Discharge planning discussed with the patient and treatment team.    PSYCHOTHERAPY/COUNSELING:  [x] Therapeutic interview  [x] Supportive  [] CBT  [] Ongoing  [] Other    [x] Patient continues to need, on a daily basis, active treatment furnished directly by or requiring the supervision of inpatient psychiatric personnel      Anticipated Length of stay: 5 to 10 days based on stability            Electronically signed by JUICE Caballero CNP on 4/24/2021 at 11:45 AM

## 2021-04-25 PROCEDURE — 1240000000 HC EMOTIONAL WELLNESS R&B

## 2021-04-25 PROCEDURE — 6370000000 HC RX 637 (ALT 250 FOR IP): Performed by: NURSE PRACTITIONER

## 2021-04-25 PROCEDURE — 6370000000 HC RX 637 (ALT 250 FOR IP): Performed by: PSYCHIATRY & NEUROLOGY

## 2021-04-25 PROCEDURE — 99231 SBSQ HOSP IP/OBS SF/LOW 25: CPT | Performed by: NURSE PRACTITIONER

## 2021-04-25 RX ADMIN — PALIPERIDONE 6 MG: 6 TABLET, EXTENDED RELEASE ORAL at 08:27

## 2021-04-25 RX ADMIN — HYDROXYZINE PAMOATE 50 MG: 50 CAPSULE ORAL at 20:54

## 2021-04-25 RX ADMIN — PALIPERIDONE 3 MG: 3 TABLET, EXTENDED RELEASE ORAL at 20:55

## 2021-04-25 RX ADMIN — TRAZODONE HYDROCHLORIDE 50 MG: 50 TABLET ORAL at 20:54

## 2021-04-25 NOTE — PLAN OF CARE
Patient presents isolative, noninteractive with peers, and remains in his room most of shift. He is not attending groups. He is refusing his depakote at this time, but is otherwise medication compliant. Patient presents paranoid and suspicious and is irritable at times. Presents preoccupied. Patient denies SI, HI, and AVH. Patient is no behavioral issues. Judgement and motivation are poor. Will monitor closely.

## 2021-04-26 PROCEDURE — 99232 SBSQ HOSP IP/OBS MODERATE 35: CPT | Performed by: NURSE PRACTITIONER

## 2021-04-26 PROCEDURE — 1240000000 HC EMOTIONAL WELLNESS R&B

## 2021-04-26 PROCEDURE — 6370000000 HC RX 637 (ALT 250 FOR IP): Performed by: NURSE PRACTITIONER

## 2021-04-26 PROCEDURE — 6370000000 HC RX 637 (ALT 250 FOR IP): Performed by: PSYCHIATRY & NEUROLOGY

## 2021-04-26 RX ADMIN — PALIPERIDONE 6 MG: 6 TABLET, EXTENDED RELEASE ORAL at 09:52

## 2021-04-26 RX ADMIN — PALIPERIDONE 3 MG: 3 TABLET, EXTENDED RELEASE ORAL at 21:17

## 2021-04-26 RX ADMIN — TRAZODONE HYDROCHLORIDE 50 MG: 50 TABLET ORAL at 21:17

## 2021-04-26 ASSESSMENT — PAIN SCALES - GENERAL: PAINLEVEL_OUTOF10: 0

## 2021-04-26 NOTE — PLAN OF CARE
Pt is stable and alert. Pt denies suicidal and homicidal ideations. Pt denies hallucinations. Pt avoids eye contact, has poverty of content, appears flat and unengaged. Pt does not report a goal during morning assessment. Will follow and monitor.

## 2021-04-26 NOTE — PROGRESS NOTES
Pt up on the unit and minimally social. Asked to sit in [place] [address] and listen to music. Pt states he had a good day. Per report he was isolative. Encouraged pt to stay out and try to attend some groups or watch tv. Pt stated he would \"try\". Denies SI, HI, and AVH. However, pt was seen talking to self in hallway.  Will continue to monitor

## 2021-04-26 NOTE — CARE COORDINATION
Pt was present in treatment team, he had a flat affect and depressed mood. Pt reported that he was living on the streets prior to admission and that he plans on discharging to the [address] [place] [address]. Pt declines to provide collateral contact or sign CARL. SW following.     Elise Quintero, MSMOHSEN, MAKENZIE

## 2021-04-26 NOTE — PROGRESS NOTES
BEHAVIORAL HEALTH FOLLOW-UP NOTE     4/26/2021     Patient was seen and examined in person, Chart reviewed   Patient's case discussed with staff/team    Chief Complaint: \" I am fine. \"  Interim History:   Patient seen during treatment team.  He is discharge focused [facility]. Per staff  he appears paranoid and internally stimulated. He does deny SI/HI intent or plan denies any auditory visualizations he is not participate in groups or in the milieu. He has limited insight and judgment hospitalization for treatment underlying irritability.   Very poor insight and judgment          Appetite:   [x] Normal/Unchanged  [] Increased  [] Decreased      Sleep:       [x] Normal/Unchanged  [] Fair       [] Poor              Energy:    [x] Normal/Unchanged  [] Increased  [] Decreased        SI [] Present  [x] Absent    HI  []Present  [x] Absent     Aggression:  [] yes  [x] no    Patient is [x] able  [] unable to CONTRACT FOR SAFETY     PAST MEDICAL/PSYCHIATRIC HISTORY:   Past Medical History:   Diagnosis Date    Depression     Schizoaffective disorder ([facility] [address]        FAMILY/SOCIAL HISTORY:  Family History   Problem Relation Age of Onset    Mental Illness Mother     Substance Abuse Mother     No Known Problems Father      Social History     Socioeconomic History    Marital status: Single     Spouse name: Not on file    Number of children: 1    Years of education: 15    Highest education level: Not on file   Occupational History     Comment: SSDI   Social Needs    Financial resource strain: Not on file    Food insecurity     Worry: Not on file     Inability: Not on file   [employer] needs     Medical: Not on file     Non-medical: Not on file   Tobacco Use    Smoking status: Current Every Day Smoker     Packs/day: 0.50     Years: 24.00     Pack years: 12.00     Types: Cigars, Cigarettes    Smokeless tobacco: Never Used   Substance and Sexual Activity    Alcohol use: No    Drug use: Yes     Types: Marijuana, Cocaine     Comment: \"NOT OFTEN\"    Sexual activity: Yes     Partners: Female   Lifestyle    Physical activity     Days per week: Not on file     Minutes per session: Not on file    Stress: Not on file   Relationships    Social connections     Talks on phone: Not on file     Gets together: Not on file     Attends [employer] service: Not on file     Active member of club or organization: Not on file     Attends meetings of clubs or organizations: Not on file     Relationship status: Not on file    Intimate partner violence     Fear of current or ex partner: Not on file     Emotionally abused: Not on file     Physically abused: Not on file     Forced sexual activity: Not on file   Other Topics Concern    Not on file   Social History Narrative    Not on file           ROS:  [x] All negative/unchanged except if checked.  Explain positive(checked items) below:  [] Constitutional  [] Eyes  [] Ear/Nose/Mouth/Throat  [] Respiratory  [] CV  [] GI  []   [] Musculoskeletal  [] Skin/Breast  [] Neurological  [] Endocrine  [] Heme/Lymph  [] Allergic/Immunologic    Explanation:     MEDICATIONS:    Current Facility-Administered Medications:     [START ON 4/30/2021] paliperidone palmitate ER (INVEGA SUSTENNA) IM injection 156 mg, 156 mg, Intramuscular, Q30 Days, JUICE Vaz - CNP    paliperidone (INVEGA) extended release tablet 6 mg, 6 mg, Oral, Daily, JUICE Petersen - CNP, 6 mg at 04/26/21 1134    divalproex (DEPAKOTE) DR tablet 250 mg, 250 mg, Oral, 2 times per day, JUICE Rodriguez - CNP, 766 mg at 04/23/21 2209    paliperidone (INVEGA) extended release tablet 3 mg, 3 mg, Oral, Nightly, JUICE Petersen - CNP, 3 mg at 04/25/21 2055    acetaminophen (TYLENOL) tablet 650 mg, 650 mg, Oral, Q6H PRN, Rosemarie Merida MD    magnesium hydroxide (MILK OF MAGNESIA) 400 MG/5ML suspension 30 mL, 30 mL, Oral, Daily PRN, Rosemarie Merida MD    aluminum & magnesium hydroxide-simethicone (MAALOX) [phone] MG/5ML suspension 30 mL, 30 mL, Oral, PRN, Tone Bauer MD    hydrOXYzine (VISTARIL) capsule 50 mg, 50 mg, Oral, TID PRN, Tone Bauer MD, 50 mg at 04/25/21 2054    haloperidol lactate (HALDOL) injection 5 mg, 5 mg, Intramuscular, Q6H PRN **OR** haloperidol (HALDOL) tablet 5 mg, 5 mg, Oral, Q6H PRN, Tone Bauer MD    traZODone (DESYREL) tablet 50 mg, 50 mg, Oral, Nightly PRN, Tone Bauer MD, 50 mg at 04/25/21 2054    nicotine (NICODERM CQ) 21 MG/24HR 1 patch, 1 patch, Transdermal, Daily, Tone Bauer MD, 1 patch at 04/24/21 6059      Examination:  /64   Pulse 58   Temp 97.7 °F (36.5 °C)   Resp 14   Ht 6' (1.829 m)   Wt 176 lb 0.3 oz (79.8 kg)   SpO2 99%   BMI 23.87 kg/m²   Gait - steady  Medication side effects(SE): None reported    Mental Status Examination:    Level of consciousness:  within normal limits   Appearance:  poor grooming and poor hygiene  Behavior/Motor:  psychomotor retardation  Attitude toward examiner:  withdrawn  Speech:  poverty of speech   Mood: decreased range and depressed  Affect:  flat  Thought processes: Linear not offer much conversation  Thought content: Devoid any auditory visualizations delusions or perceptual rise. Denies SI/HI intent or plan  Cognition:  oriented to person, place, and time   Concentration poor  Insight poor   Judgement poor     ASSESSMENT:   Patient symptoms are:  [] Well controlled  [] Improving  [] Worsening  [x] No change      Diagnosis:   Principal Problem:    Schizoaffective disorder, bipolar type ([facility] [address]  Active Problems:    [facility]  Resolved Problems:    * No resolved hospital problems. *      LABS:    No results for input(s): WBC, HGB, PLT in the last 72 hours. No results for input(s): NA, K, CL, CO2, BUN, CREATININE, GLUCOSE in the last 72 hours. No results for input(s): BILITOT, ALKPHOS, AST, ALT in the last 72 hours.   Lab Results   Component Value Date [address] NOT DETECTED 04/22/2021    BARBSCNU NOT DETECTED 04/22/2021    LABBENZ NOT DETECTED 04/22/2021    LABMETH NOT DETECTED 04/22/2021    OPIATESCREENURINE NOT DETECTED 04/22/2021    PHENCYCLIDINESCREENURINE NOT DETECTED 04/22/2021    ETOH <10 04/22/2021     Lab Results   Component Value Date    TSH 0.462 01/16/2021     No results found for: LITHIUM  Lab Results   Component Value Date    VALPROATE 3 (L) 04/29/2020           Treatment Plan:  The patient's diagnosis, treatment plan, medication management were formulated after patient was seen directly by the attending physician and myself and all relevant documentation was reviewed. Reviewed current Medications with the patient. Risk, benefit, side effects, possible outcomes of the medication and alternatives discussed with the patient and the patient demonstrated understanding. The patient was also educated that the outcome of treatment will depend on the medication compliance as directed by the prescribers along with regular follow-up, compliance with the labs and other work-up, as clinically indicated. Continue inVega 6 mg daily and 6 mg at bedtime for psychosis  Continue Depakote ER 50 mg twice daily  Patient is due for his [facility] injection booster 156 mg IM on 4/30/2021    Collateral information: Followed by social work  CD evaluation  Encourage patient to attend group and other milieu activities.   Discharge planning discussed with the patient and treatment team.    PSYCHOTHERAPY/COUNSELING:  [x] Therapeutic interview  [x] Supportive  [] CBT  [] Ongoing  [] Other    [x] Patient continues to need, on a daily basis, active treatment furnished directly by or requiring the supervision of inpatient psychiatric personnel      Anticipated Length of stay: 5 to 10 days based on stability            Electronically signed by JUICE Jimenez CNP on 9/64/4065 at 10:40 AM

## 2021-04-26 NOTE — BH NOTE
Pt denies suicidal / homicidal ideations. Pt denies hallucinations. Pt is irritable at times, mood is labile. Will monitor and follow, will medicate if needed. No other distress at this time.

## 2021-04-27 VITALS
RESPIRATION RATE: 15 BRPM | TEMPERATURE: 98.5 F | SYSTOLIC BLOOD PRESSURE: 131 MMHG | WEIGHT: 176.02 LBS | HEART RATE: 57 BPM | DIASTOLIC BLOOD PRESSURE: 51 MMHG | BODY MASS INDEX: 23.84 KG/M2 | OXYGEN SATURATION: 96 % | HEIGHT: 72 IN

## 2021-04-27 LAB — VALPROIC ACID LEVEL: 3 MCG/ML (ref 50–100)

## 2021-04-27 PROCEDURE — 6370000000 HC RX 637 (ALT 250 FOR IP): Performed by: NURSE PRACTITIONER

## 2021-04-27 PROCEDURE — 80164 ASSAY DIPROPYLACETIC ACD TOT: CPT

## 2021-04-27 PROCEDURE — 36415 COLL VENOUS BLD VENIPUNCTURE: CPT

## 2021-04-27 PROCEDURE — 99239 HOSP IP/OBS DSCHRG MGMT >30: CPT | Performed by: NURSE PRACTITIONER

## 2021-04-27 RX ORDER — PALIPERIDONE 3 MG/1
3 TABLET, EXTENDED RELEASE ORAL NIGHTLY
Qty: 30 TABLET | Refills: 0 | Status: ON HOLD | OUTPATIENT
Start: 2021-04-27 | End: 2021-08-16 | Stop reason: HOSPADM

## 2021-04-27 RX ORDER — PALIPERIDONE 6 MG/1
6 TABLET, EXTENDED RELEASE ORAL DAILY
Qty: 30 TABLET | Refills: 0 | Status: ON HOLD | OUTPATIENT
Start: 2021-04-27 | End: 2021-08-16 | Stop reason: HOSPADM

## 2021-04-27 RX ORDER — DIVALPROEX SODIUM 250 MG/1
250 TABLET, DELAYED RELEASE ORAL EVERY 12 HOURS SCHEDULED
Qty: 60 TABLET | Refills: 0 | Status: ON HOLD | OUTPATIENT
Start: 2021-04-27 | End: 2021-08-16 | Stop reason: HOSPADM

## 2021-04-27 RX ADMIN — PALIPERIDONE 6 MG: 6 TABLET, EXTENDED RELEASE ORAL at 09:28

## 2021-04-27 ASSESSMENT — PAIN SCALES - GENERAL: PAINLEVEL_OUTOF10: 0

## 2021-04-27 NOTE — CARE COORDINATION
ANTONIO note: d/c planning: ANTONIO spoke with Allan Shukla at [facility]. He states pt was just there and is on restriction until Feb, 2022. ANTONIO informed pt and he got on phone and called there asking them to reconsider and take him back. They declined stating he has been there 3 x. He then asks where else he could go. ANTONIO discussed crisis unit and [facility]. He is agreeable to both.

## 2021-04-27 NOTE — PLAN OF CARE
Pt is stable, remains flat affect with poverty of content. Pt denies suicidal or homicidal ideations. Pt denies hallucinations. Pt remains discharged focus and can exhibit low level irritability. Pt is in over all control. Mostly isolative to room presently. Pt does not report a goal during first morning assessment. Will follow and monitor.

## 2021-04-27 NOTE — DISCHARGE SUMMARY
DISCHARGE SUMMARY      Patient ID:  Larisa Santacruz  [id number]  45 y.o.  1980    Admit date: 4/22/2021    Discharge date and time: 4/27/2021    Admitting Physician: Yunier Lawrence MD     Discharge Physician: Dr Myriam Prieto MD    Discharge Diagnoses:   Patient Active Problem List   Diagnosis    Schizoaffective disorder, bipolar type ([facility] [address]    Trauma    Pneumothorax, traumatic    Multiple closed fractures of ribs of right side    Rib pain on right side    Multiple fractures of right lower extremity and ribs, closed, initial encounter\. right tib fracture 9 & 10    Hemopneumothorax, right    Major depression single episode, in partial remission ([facility] [address] [facility]    Polysubstance abuse ([facility] [address] [facility]    Cocaine abuse ([facility] [address]       Admission Condition: poor    Discharged Condition: stable    Admission Circumstance: presents the ED for presents to the ED reporting auditory hallucinations and visual hallucinations of people coming to him. He also believe that his mother was out to kill him due to mistaken identity and he has been hiding from her so that she does not kill him.       PAST MEDICAL/PSYCHIATRIC HISTORY:   Past Medical History:   Diagnosis Date    Depression     Schizoaffective disorder ([facility] [address]        FAMILY/SOCIAL HISTORY:  Family History   Problem Relation Age of Onset    Mental Illness Mother     Substance Abuse Mother     No Known Problems Father      Social History     Socioeconomic History    Marital status: Single     Spouse name: Not on file    Number of children: 1    Years of education: 15    Highest education level: Not on file   Occupational History     Comment: SSDI   Social Needs    Financial resource strain: Not on file    Food insecurity     Worry: Not on file     Inability: Not on file   [employer] needs     Medical: Not on file     Non-medical: Not on file   Tobacco Use    Smoking status: Current Every Day Smoker     Packs/day: 0.50     Years: 24.00     Pack years: 12.00     Types: Cigars, Cigarettes    Smokeless tobacco: Never Used   Substance and Sexual Activity    Alcohol use: No    Drug use: Yes     Types: Marijuana, Cocaine     Comment: \"NOT OFTEN\"    Sexual activity: Yes     Partners: Female   Lifestyle    Physical activity     Days per week: Not on file     Minutes per session: Not on file    Stress: Not on file   Relationships    Social connections     Talks on phone: Not on file     Gets together: Not on file     Attends Mu-ism service: Not on file     Active member of club or organization: Not on file     Attends meetings of clubs or organizations: Not on file     Relationship status: Not on file    Intimate partner violence     Fear of current or ex partner: Not on file     Emotionally abused: Not on file     Physically abused: Not on file     Forced sexual activity: Not on file   Other Topics Concern    Not on file   Social History Narrative    Not on file       MEDICATIONS:    Current Facility-Administered Medications:     [START ON 4/30/2021] paliperidone palmitate ER (INVEGA SUSTENNA) IM injection 156 mg, 156 mg, Intramuscular, Q30 Days, JUICE Vaz - CNP    paliperidone (INVEGA) extended release tablet 6 mg, 6 mg, Oral, Daily, Lenoard DecreJUICE De La Torre - CNP, 6 mg at 04/27/21 7906    divalproex (DEPAKOTE) DR tablet 250 mg, 250 mg, Oral, 2 times per day, JUICE Harris CNP, 916 mg at 04/23/21 2209    paliperidone (INVEGA) extended release tablet 3 mg, 3 mg, Oral, Nightly, Lenoard DecreJUICE De La Torre - CNP, 3 mg at 04/26/21 2117    acetaminophen (TYLENOL) tablet 650 mg, 650 mg, Oral, Q6H PRN, Vicente Laguerre MD    magnesium hydroxide (MILK OF MAGNESIA) 400 MG/5ML suspension 30 mL, 30 mL, Oral, Daily PRN, Vicente Laguerre MD    aluminum & magnesium hydroxide-simethicone (MAALOX) 200-200-20 MG/5ML suspension 30 mL, 30 mL, Oral, PRN, Vicente Laguerre MD    hydrOXYzine (VISTARIL) capsule 50 mg, 50 mg, Oral, TID PRN, Vicente Laguerre MD, 50 mg at 04/25/21 2054    haloperidol lactate (HALDOL) injection 5 mg, 5 mg, Intramuscular, Q6H PRN **OR** haloperidol (HALDOL) tablet 5 mg, 5 mg, Oral, Q6H PRN, Yunier Lawrence MD    traZODone (DESYREL) tablet 50 mg, 50 mg, Oral, Nightly PRN, Yunier Lawrence MD, 50 mg at 04/26/21 2117    nicotine (NICODERM CQ) 21 MG/24HR 1 patch, 1 patch, Transdermal, Daily, Yunier Lawrence MD, 1 patch at 04/24/21 4316    Examination:  BP (!) 131/51   Pulse 57   Temp 98.5 °F (36.9 °C) (Oral)   Resp 15   Ht 6' (1.829 m)   Wt 176 lb 0.3 oz (79.8 kg)   SpO2 96%   BMI 23.87 kg/m²   Gait - steady    HOSPITAL COURSE[de-identified]   Patient was admitted to the unit on 4/22/2021 was closely monitored for CODE STATUS. He was evaluated was treated with Invega 6 mg daily and 3 mg at bedtime also Depakote 250 mg twice daily and he was initially on the [place] injection received 2 34 mg IM on 4/23/2020 when he is due for his booster injection 156 mg on 4/30/2021. Medical events were insignificant and patient continued to improve on the floor. He start coming out of his room was more visible in the milieu. He never made any suicidal statements or any suicidal gestures while in the unit. S patient reported he wanted to the crisis unit on discharge and  assisted patient in being accepted the crisis unit. .  Treatment team felt the patient obtain the maximum benefit from his hospitalization he was set up with an outpatient mental health agency for outpatient follow-up services. At the time of discharge patient did not show any impulsive behavior. He was up on the unit visible in the milieu. He vehemently denied any suicidal homicidal ideations intent or plan. He was eating well and sleeping well there are no neurovegetative signs or symptoms of depression he denied any auditory or visual hallucinations. There are no overt or covert signs of psychosis. He was appreciative of the help that he received here.   This patient no ALKPHOS, AST, ALT in the last 72 hours. Lab Results   Component Value Date    LABAMPH NOT DETECTED 04/22/2021    BARBSCNU NOT DETECTED 04/22/2021    LABBENZ NOT DETECTED 04/22/2021    LABMETH NOT DETECTED 04/22/2021    OPIATESCREENURINE NOT DETECTED 04/22/2021    PHENCYCLIDINESCREENURINE NOT DETECTED 04/22/2021    ETOH <10 04/22/2021     Lab Results   Component Value Date    TSH 0.462 01/16/2021     No results found for: LITHIUM  Lab Results   Component Value Date    VALPROATE 3 (L) 04/27/2021       RISK ASSESSMENT AT DISCHARGE: Low risk for suicide and homicide. Treatment Plan:  Reviewed current Medications with the patient. Education provided on the complaince with treatment. Risks, benefits, side effects, drug-to-drug interactions and alternatives to treatment were discussed. Encourage patient to attend outpatient follow up appointment and therapy. Patient was advised to call the outpatient provider, visit the nearest ED or call 911 if symptoms are not manageable. Patient's family member was contacted prior to the discharge. Medication List      START taking these medications    divalproex 250 MG DR tablet  Commonly known as: DEPAKOTE  Take 1 tablet by mouth every 12 hours     paliperidone palmitate  MG/ML Darcy IM injection  Commonly known as: INVEGA SUSTENNA  Inject 156 mg into the muscle every 30 days for 1 dose [place] injection 234 mg IM received on 4/23/2021 due for booster injection 156 mg IM every 30 days on 4/30/2021  Start taking on: April 30, 2021        CHANGE how you take these medications    * paliperidone 6 MG extended release tablet  Commonly known as: INVEGA  Take 1 tablet by mouth daily  What changed: Another medication with the same name was changed. Make sure you understand how and when to take each.      * paliperidone 3 MG extended release tablet  Commonly known as: INVEGA  Take 1 tablet by mouth nightly  What changed:   · medication strength  · how much to take         * This list has 2 medication(s) that are the same as other medications prescribed for you. Read the directions carefully, and ask your doctor or other care provider to review them with you.             CONTINUE taking these medications    nicotine 21 MG/24HR  Commonly known as: NICODERM CQ  Place 1 patch onto the skin daily        STOP taking these medications    OXcarbazepine 300 MG tablet  Commonly known as: TRILEPTAL           Where to Get Your Medications      These medications were sent to [facility], [address] [address]    Phone: [phone]   · divalproex 250 MG DR tablet  · paliperidone 3 MG extended release tablet  · paliperidone 6 MG extended release tablet     You can get these medications from any pharmacy    Bring a paper prescription for each of these medications  · paliperidone palmitate  MG/ML Darcy IM injection       Patient is counseled if he continues to abuse drugs or alcohol he could act out impulsively causing serious harm to himself or others even though may be unintentional.  He demonstrated understanding of this and has the capacity understand this    Patient is counseled hisr mental health treatment will be difficult to optimize with ongoing use of drugs or alcohol he demonstrate understanding of this as the past understand this     Patient is counseled that he he uses any amount of opiates after any clean time he could have an unintentional overdose he demonstrated understanding of this and has the capacity to understand this    Patient is counseled he must remain compliant with all medications outpatient follow-up ointments    Patient is discharged to crisis stabilization unit in stable condition    TIME SPEND - 35 MINUTES TO COMPLETE THE EVALUATION, DISCHARGE SUMMARY, MEDICATION RECONCILIATION AND FOLLOW UP CARE     Signed:  Mariano Ruiz  1/51/0950  4:30 AM

## 2021-04-27 NOTE — SUICIDE SAFETY PLAN
SAFETY PLAN    A suicide Safety Plan is a document that supports someone when they are having thoughts of suicide. Warning Signs that indicate a suicidal crisis may be developing: What (situations, thoughts, feelings, body sensations, behaviors, etc.) do you experience that lets you know you are beginning to think about suicide? 1. Pt declined to answer all questions on this form. 2.   3. Internal Coping Strategies:  What things can I do (relaxation techniques, hobbies, physical activities, etc.) to take my mind off my problems without contacting another person? 1.   2.   3.    People and social settings that provide distraction: Who can I call or where can I go to distract me? 1. Name:   Phone:    3. Place:         People whom I can ask for help: Who can I call when I need help - for example, friends, family, clergy, someone else? 1. Name:                 Phone:   2. Name:   Phone:   3. Name:   Phone:     Professionals or [address] [place], [place] [address]vd I can contact during a crisis: Who can I call for help - for example, my doctor, my psychiatrist, my psychologist, a mental health provider, a suicide hotline? 1. Clinician Name:    Phone:       Clinician Pager or Emergency Contact #:     2. Clinician Name:    Phone:       Clinician Pager or Emergency Contact #:     3. Suicide Prevention Lifeline: 4-725-536-TALK (8940)    4. [address] [place] [address] Emergency Services -  for example, [facility] suicide hotline, [facility] Hotline:       Emergency Services Address:       Emergency Services Phone:     Making the environment safe: How can I make my environment (house/apartment/living space) safer? For example, can I remove guns, medications, and other items?   1.   2.

## 2021-04-27 NOTE — PLAN OF CARE
Patient denies suicidal ideation, homicidal ideations and AVH. Patient denies anxiety and depression. Patient appears flat, sad, isolative and blunt with responses. Patient states \"I'm ready for discharge. \"  Presents calm and cooperative during assessment. Patient is isolative to room and does not appear to be social with peers. Patient took nightly medications besides Depakote; patient states that \"Depakote makes my hair fall out. \"  No complaints or concerns verbalized at this time. No unit problems reported. Will continue to observe and support.     Problem: Anger Management/Homicidal Ideation:  Goal: Able to display appropriate communication and problem solving  Description: Able to display appropriate communication and problem solving  4/26/2021 2042 by Angus Perea RN  Outcome: Ongoing     Problem: Anger Management/Homicidal Ideation:  Goal: Ability to verbalize frustrations and anger appropriately will improve  Description: Ability to verbalize frustrations and anger appropriately will improve  Outcome: Ongoing     Problem: Anger Management/Homicidal Ideation:  Goal: Absence of angry outbursts  Description: Absence of angry outbursts  Outcome: Ongoing     Problem: Anger Management/Homicidal Ideation:  Goal: Absence of homicidal ideation  Description: Absence of homicidal ideation  Outcome: Ongoing

## 2021-04-27 NOTE — PROGRESS NOTES
CLINICAL PHARMACY NOTE: MEDS TO [address] Select Patient?: No  Total # of Prescriptions Filled: 2   The following medications were delivered to the patient:  · INVEGA 3 MG   · DEPAKOTE  MG   Total # of Interventions Completed: 3  Time Spent (min): 15    Additional Documentation:  INVEGA 6 MG- TOO SOON.  DUE 5/8/21

## 2021-05-01 ENCOUNTER — HOSPITAL ENCOUNTER (EMERGENCY)
Age: 41
Discharge: OTHER INSTITUTION | End: 2021-05-03
Attending: EMERGENCY MEDICINE
Payer: COMMERCIAL

## 2021-05-01 DIAGNOSIS — F19.10 POLYSUBSTANCE ABUSE (HCC): ICD-10-CM

## 2021-05-01 DIAGNOSIS — F32.A DEPRESSION, UNSPECIFIED DEPRESSION TYPE: Primary | ICD-10-CM

## 2021-05-01 LAB
BASOPHILS ABSOLUTE: 0.06 E9/L (ref 0–0.2)
BASOPHILS RELATIVE PERCENT: 1.1 % (ref 0–2)
BILIRUBIN URINE: NEGATIVE
BLOOD, URINE: ABNORMAL
CLARITY: CLEAR
COLOR: YELLOW
EOSINOPHILS ABSOLUTE: 0.29 E9/L (ref 0.05–0.5)
EOSINOPHILS RELATIVE PERCENT: 5.2 % (ref 0–6)
GLUCOSE URINE: NEGATIVE MG/DL
HCT VFR BLD CALC: 42.1 % (ref 37–54)
HEMOGLOBIN: 14 G/DL (ref 12.5–16.5)
IMMATURE GRANULOCYTES #: 0.01 E9/L
IMMATURE GRANULOCYTES %: 0.2 % (ref 0–5)
KETONES, URINE: NEGATIVE MG/DL
LEUKOCYTE ESTERASE, URINE: ABNORMAL
LYMPHOCYTES ABSOLUTE: 1.67 E9/L (ref 1.5–4)
LYMPHOCYTES RELATIVE PERCENT: 30 % (ref 20–42)
MCH RBC QN AUTO: 30.6 PG (ref 26–35)
MCHC RBC AUTO-ENTMCNC: 33.3 % (ref 32–34.5)
MCV RBC AUTO: 92.1 FL (ref 80–99.9)
MONOCYTES ABSOLUTE: 0.58 E9/L (ref 0.1–0.95)
MONOCYTES RELATIVE PERCENT: 10.4 % (ref 2–12)
NEUTROPHILS ABSOLUTE: 2.95 E9/L (ref 1.8–7.3)
NEUTROPHILS RELATIVE PERCENT: 53.1 % (ref 43–80)
NITRITE, URINE: NEGATIVE
PDW BLD-RTO: 13.4 FL (ref 11.5–15)
PH UA: 6 (ref 5–9)
PLATELET # BLD: 356 E9/L (ref 130–450)
PMV BLD AUTO: 9.7 FL (ref 7–12)
PROTEIN UA: NEGATIVE MG/DL
RBC # BLD: 4.57 E12/L (ref 3.8–5.8)
SPECIFIC GRAVITY UA: 1.01 (ref 1–1.03)
UROBILINOGEN, URINE: 0.2 E.U./DL
WBC # BLD: 5.6 E9/L (ref 4.5–11.5)

## 2021-05-01 PROCEDURE — 80164 ASSAY DIPROPYLACETIC ACD TOT: CPT

## 2021-05-01 PROCEDURE — 85025 COMPLETE CBC W/AUTO DIFF WBC: CPT

## 2021-05-01 PROCEDURE — 81001 URINALYSIS AUTO W/SCOPE: CPT

## 2021-05-01 PROCEDURE — 80179 DRUG ASSAY SALICYLATE: CPT

## 2021-05-01 PROCEDURE — 80307 DRUG TEST PRSMV CHEM ANLYZR: CPT

## 2021-05-01 PROCEDURE — 99285 EMERGENCY DEPT VISIT HI MDM: CPT

## 2021-05-01 PROCEDURE — 82077 ASSAY SPEC XCP UR&BREATH IA: CPT

## 2021-05-01 PROCEDURE — 80053 COMPREHEN METABOLIC PANEL: CPT

## 2021-05-01 PROCEDURE — 80143 DRUG ASSAY ACETAMINOPHEN: CPT

## 2021-05-01 PROCEDURE — 36415 COLL VENOUS BLD VENIPUNCTURE: CPT

## 2021-05-02 VITALS
TEMPERATURE: 97.8 F | HEIGHT: 72 IN | BODY MASS INDEX: 21.54 KG/M2 | SYSTOLIC BLOOD PRESSURE: 113 MMHG | DIASTOLIC BLOOD PRESSURE: 81 MMHG | RESPIRATION RATE: 16 BRPM | WEIGHT: 159 LBS | HEART RATE: 75 BPM | OXYGEN SATURATION: 98 %

## 2021-05-02 LAB
ACETAMINOPHEN LEVEL: <5 MCG/ML (ref 10–30)
ALBUMIN SERPL-MCNC: 4 G/DL (ref 3.5–5.2)
ALP BLD-CCNC: 53 U/L (ref 40–129)
ALT SERPL-CCNC: 10 U/L (ref 0–40)
AMPHETAMINE SCREEN, URINE: NOT DETECTED
ANION GAP SERPL CALCULATED.3IONS-SCNC: 9 MMOL/L (ref 7–16)
AST SERPL-CCNC: 17 U/L (ref 0–39)
BACTERIA: ABNORMAL /HPF
BARBITURATE SCREEN URINE: NOT DETECTED
BENZODIAZEPINE SCREEN, URINE: NOT DETECTED
BILIRUB SERPL-MCNC: <0.2 MG/DL (ref 0–1.2)
BUN BLDV-MCNC: 8 MG/DL (ref 6–20)
CALCIUM SERPL-MCNC: 8.7 MG/DL (ref 8.6–10.2)
CANNABINOID SCREEN URINE: POSITIVE
CHLORIDE BLD-SCNC: 102 MMOL/L (ref 98–107)
CO2: 29 MMOL/L (ref 22–29)
COCAINE METABOLITE SCREEN URINE: POSITIVE
CREAT SERPL-MCNC: 1.2 MG/DL (ref 0.7–1.2)
EKG ATRIAL RATE: 67 BPM
EKG P AXIS: -8 DEGREES
EKG P-R INTERVAL: 206 MS
EKG Q-T INTERVAL: 410 MS
EKG QRS DURATION: 96 MS
EKG QTC CALCULATION (BAZETT): 433 MS
EKG R AXIS: -14 DEGREES
EKG VENTRICULAR RATE: 67 BPM
ETHANOL: <10 MG/DL (ref 0–0.08)
FENTANYL SCREEN, URINE: NOT DETECTED
GFR AFRICAN AMERICAN: >60
GFR NON-AFRICAN AMERICAN: >60 ML/MIN/1.73
GLUCOSE BLD-MCNC: 76 MG/DL (ref 74–99)
Lab: ABNORMAL
METHADONE SCREEN, URINE: NOT DETECTED
OPIATE SCREEN URINE: NOT DETECTED
OXYCODONE URINE: NOT DETECTED
PHENCYCLIDINE SCREEN URINE: NOT DETECTED
POTASSIUM SERPL-SCNC: 3.7 MMOL/L (ref 3.5–5)
RBC UA: ABNORMAL /HPF (ref 0–2)
SALICYLATE, SERUM: <0.3 MG/DL (ref 0–30)
SARS-COV-2, NAAT: NOT DETECTED
SODIUM BLD-SCNC: 140 MMOL/L (ref 132–146)
TOTAL PROTEIN: 6.5 G/DL (ref 6.4–8.3)
TRICYCLIC ANTIDEPRESSANTS SCREEN SERUM: NEGATIVE NG/ML
VALPROIC ACID LEVEL: 3 MCG/ML (ref 50–100)
WBC UA: ABNORMAL /HPF (ref 0–5)

## 2021-05-02 PROCEDURE — 87635 SARS-COV-2 COVID-19 AMP PRB: CPT

## 2021-05-02 PROCEDURE — 93005 ELECTROCARDIOGRAM TRACING: CPT | Performed by: EMERGENCY MEDICINE

## 2021-05-02 PROCEDURE — 93010 ELECTROCARDIOGRAM REPORT: CPT | Performed by: INTERNAL MEDICINE

## 2021-05-02 NOTE — ED NOTES
PATRICIO SW attempted to assess Pt. Pt is sleeping at this time and is unable to wake up long enough to answer any questions.      Pt will be assessed when he is awake and alert     FRANCISCO Correa, DERECKW  05/02/21 9466

## 2021-05-02 NOTE — ED NOTES
Patient referred to [facility], spoke to [facility]. Rosealee Bones patient is needing dual diagnosis.       Sylvie Poag  05/02/21 1607 96

## 2021-05-02 NOTE — ED NOTES
The pt was accepted to [facility] by Dr. Samantha MORSE, [facility] to the 1500 unit. N to N to be called to [phone].    [facility] @11pm.       Jeanne [facility]  05/02/21 2010

## 2021-05-02 NOTE — ED NOTES
Pt is sleeping soundly. PATRICIO SW woke Pt up, Pt is in a bad mood. Does not want to be assessed, only wants to sleep right now. Rolled over and went back to sleep.      FRANCISCO Rios, [facility]  05/02/21 7899

## 2021-05-02 NOTE — ED NOTES
Bed: HE  Expected date:   Expected time:   Means of arrival:   Comments:  triage     Suhail Suarez RN  05/01/21 1064

## 2021-05-02 NOTE — ED PROVIDER NOTES
social work disposition. Plan of Care/Counseling:  I reviewed today's visit with the patient in addition to providing specific details for the plan of care and counseling regarding the diagnosis and prognosis. Assessment      1. Depression, unspecified depression type    2. Polysubstance abuse ([facility] [address]      Plan   Social work eval.  Patient condition is stable    New Medications     New Prescriptions    No medications on file     Electronically signed by Inocencio Abel DO   DD: 5/1/21  **This report was transcribed using voice recognition software. Every effort was made to ensure accuracy; however, inadvertent computerized transcription errors may be present.   END OF ED PROVIDER NOTE        Inocencio Abel DO  05/02/21 5521

## 2021-05-03 PROCEDURE — 99285 EMERGENCY DEPT VISIT HI MDM: CPT

## 2021-05-15 ENCOUNTER — HOSPITAL ENCOUNTER (EMERGENCY)
Age: 41
Discharge: OTHER INSTITUTION | End: 2021-05-15
Attending: EMERGENCY MEDICINE
Payer: COMMERCIAL

## 2021-05-15 VITALS
TEMPERATURE: 98.7 F | DIASTOLIC BLOOD PRESSURE: 68 MMHG | OXYGEN SATURATION: 98 % | RESPIRATION RATE: 16 BRPM | SYSTOLIC BLOOD PRESSURE: 104 MMHG | HEART RATE: 61 BPM

## 2021-05-15 DIAGNOSIS — R44.3 HALLUCINATIONS: Primary | ICD-10-CM

## 2021-05-15 LAB
ACETAMINOPHEN LEVEL: <5 MCG/ML (ref 10–30)
ALBUMIN SERPL-MCNC: 4.2 G/DL (ref 3.5–5.2)
ALP BLD-CCNC: 45 U/L (ref 40–129)
ALT SERPL-CCNC: 13 U/L (ref 0–40)
AMPHETAMINE SCREEN, URINE: NOT DETECTED
ANION GAP SERPL CALCULATED.3IONS-SCNC: 10 MMOL/L (ref 7–16)
AST SERPL-CCNC: 24 U/L (ref 0–39)
BACTERIA: ABNORMAL /HPF
BARBITURATE SCREEN URINE: NOT DETECTED
BASOPHILS ABSOLUTE: 0.05 E9/L (ref 0–0.2)
BASOPHILS RELATIVE PERCENT: 1 % (ref 0–2)
BENZODIAZEPINE SCREEN, URINE: NOT DETECTED
BILIRUB SERPL-MCNC: 0.5 MG/DL (ref 0–1.2)
BILIRUBIN URINE: NEGATIVE
BLOOD, URINE: NORMAL
BUN BLDV-MCNC: 11 MG/DL (ref 6–20)
CALCIUM SERPL-MCNC: 9.2 MG/DL (ref 8.6–10.2)
CANNABINOID SCREEN URINE: POSITIVE
CHLORIDE BLD-SCNC: 100 MMOL/L (ref 98–107)
CLARITY: CLEAR
CO2: 28 MMOL/L (ref 22–29)
COCAINE METABOLITE SCREEN URINE: POSITIVE
COLOR: YELLOW
CREAT SERPL-MCNC: 1 MG/DL (ref 0.7–1.2)
EKG ATRIAL RATE: 73 BPM
EKG P AXIS: 76 DEGREES
EKG P-R INTERVAL: 188 MS
EKG Q-T INTERVAL: 388 MS
EKG QRS DURATION: 92 MS
EKG QTC CALCULATION (BAZETT): 427 MS
EKG R AXIS: 78 DEGREES
EKG T AXIS: 62 DEGREES
EKG VENTRICULAR RATE: 73 BPM
EOSINOPHILS ABSOLUTE: 0.06 E9/L (ref 0.05–0.5)
EOSINOPHILS RELATIVE PERCENT: 1.2 % (ref 0–6)
ETHANOL: <10 MG/DL (ref 0–0.08)
FENTANYL SCREEN, URINE: NOT DETECTED
GFR AFRICAN AMERICAN: >60
GFR NON-AFRICAN AMERICAN: >60 ML/MIN/1.73
GLUCOSE BLD-MCNC: 85 MG/DL (ref 74–99)
GLUCOSE URINE: NEGATIVE MG/DL
HCT VFR BLD CALC: 40.8 % (ref 37–54)
HEMOGLOBIN: 13.4 G/DL (ref 12.5–16.5)
IMMATURE GRANULOCYTES #: 0.01 E9/L
IMMATURE GRANULOCYTES %: 0.2 % (ref 0–5)
KETONES, URINE: NEGATIVE MG/DL
LEUKOCYTE ESTERASE, URINE: NEGATIVE
LYMPHOCYTES ABSOLUTE: 1.53 E9/L (ref 1.5–4)
LYMPHOCYTES RELATIVE PERCENT: 30.1 % (ref 20–42)
Lab: ABNORMAL
MCH RBC QN AUTO: 29.7 PG (ref 26–35)
MCHC RBC AUTO-ENTMCNC: 32.8 % (ref 32–34.5)
MCV RBC AUTO: 90.5 FL (ref 80–99.9)
METHADONE SCREEN, URINE: NOT DETECTED
MONOCYTES ABSOLUTE: 0.51 E9/L (ref 0.1–0.95)
MONOCYTES RELATIVE PERCENT: 10 % (ref 2–12)
NEUTROPHILS ABSOLUTE: 2.93 E9/L (ref 1.8–7.3)
NEUTROPHILS RELATIVE PERCENT: 57.5 % (ref 43–80)
NITRITE, URINE: NEGATIVE
OPIATE SCREEN URINE: NOT DETECTED
OXYCODONE URINE: NOT DETECTED
PDW BLD-RTO: 13.7 FL (ref 11.5–15)
PH UA: 6 (ref 5–9)
PHENCYCLIDINE SCREEN URINE: NOT DETECTED
PLATELET # BLD: 293 E9/L (ref 130–450)
PMV BLD AUTO: 9.9 FL (ref 7–12)
POTASSIUM REFLEX MAGNESIUM: 3.9 MMOL/L (ref 3.5–5)
PROTEIN UA: NEGATIVE MG/DL
RBC # BLD: 4.51 E12/L (ref 3.8–5.8)
RBC UA: ABNORMAL /HPF (ref 0–2)
SALICYLATE, SERUM: <0.3 MG/DL (ref 0–30)
SARS-COV-2, NAAT: NOT DETECTED
SODIUM BLD-SCNC: 138 MMOL/L (ref 132–146)
SPECIFIC GRAVITY UA: 1.02 (ref 1–1.03)
TOTAL PROTEIN: 7.1 G/DL (ref 6.4–8.3)
TRICYCLIC ANTIDEPRESSANTS SCREEN SERUM: NEGATIVE NG/ML
UROBILINOGEN, URINE: 0.2 E.U./DL
WBC # BLD: 5.1 E9/L (ref 4.5–11.5)
WBC UA: ABNORMAL /HPF (ref 0–5)

## 2021-05-15 PROCEDURE — 85025 COMPLETE CBC W/AUTO DIFF WBC: CPT

## 2021-05-15 PROCEDURE — 80307 DRUG TEST PRSMV CHEM ANLYZR: CPT

## 2021-05-15 PROCEDURE — 99284 EMERGENCY DEPT VISIT MOD MDM: CPT

## 2021-05-15 PROCEDURE — 81001 URINALYSIS AUTO W/SCOPE: CPT

## 2021-05-15 PROCEDURE — 80179 DRUG ASSAY SALICYLATE: CPT

## 2021-05-15 PROCEDURE — 93010 ELECTROCARDIOGRAM REPORT: CPT | Performed by: INTERNAL MEDICINE

## 2021-05-15 PROCEDURE — 99283 EMERGENCY DEPT VISIT LOW MDM: CPT

## 2021-05-15 PROCEDURE — 80053 COMPREHEN METABOLIC PANEL: CPT

## 2021-05-15 PROCEDURE — 80143 DRUG ASSAY ACETAMINOPHEN: CPT

## 2021-05-15 PROCEDURE — 82077 ASSAY SPEC XCP UR&BREATH IA: CPT

## 2021-05-15 PROCEDURE — 93005 ELECTROCARDIOGRAM TRACING: CPT | Performed by: EMERGENCY MEDICINE

## 2021-05-15 PROCEDURE — 87635 SARS-COV-2 COVID-19 AMP PRB: CPT

## 2021-05-15 ASSESSMENT — PAIN SCALES - GENERAL: PAINLEVEL_OUTOF10: 7

## 2021-05-15 NOTE — ED NOTES
Attempt to wake pt to provide urine sample. Pt refusing to provide urine sample at this time. Given breakfast tray and states he will attempt sample after he eats.      Marci Loya RN  05/15/21 2507
Emergency Department [facility] AN AFFILIATE OF [facility] Biopsychosocial Assessment Note     Chief Complaint:      Patient is a 45year old, male presenting to ED for increased paranoia and feeling that people are out to harm him.      Patient denies suicidal ideation. Patient denies homicidal ideation.     MSE: Patient is alert & oriented x 4. Patient mood is stable, affect flat. Patient thought process is clear, speech pressured at times. Patient has some baseline auditory hallucinations - patient can be observed talking to unseen others at times. No reports of visual hallucinations.     Clinical Summary/History:      Patient has a mental health hx of schizoaffective disorder, non-compliant with outpatient mental health treatment. Patient last psychiatric hospitalization was 5/2/21 for acute psychosis and suicidal ideation at [address] sleep, ok appetite, no reports of hopelessness/helplessness.     Gender  [x]? Male []? Female []? Transgender  []? Other     Sexual Orientation    [x]? Heterosexual []? Homosexual []? Bisexual []? Other     Suicidal Behavioral: CSSR-S Complete. [x]? Reports:               [x]? Past [x]? Present   []? Denies     Homicidal/ Violent Behavior  []? Reports:              []? Past []? Present   [x]? Denies      Hallucinations/Delusions   [x]? Reports: Auditory hallucinations  []? Denies      Substance Use/Alcohol Use/Addiction: SBIRT Screen Complete. [x]? Reports:  Crack cocaine & cannabis  []? Denies      Trauma History  []? Reports:  []? Denies      Collateral Information:         Level of Care/Disposition Plan  []? Home:   []? Outpatient Provider:   [x]? Crisis Unit:   []? Inpatient Psychiatric Unit:  []? Other:      Patient is currently voluntary. Patient [facility] to [facility] to continue stabilization of psychiatric symptoms.      FRANCISCO Lerner, MAJOR  05/15/21 8004
Faxed clinical information to [facility] crisis unit.      Chandrika Lara, FRANCISCO, DERECKW  05/15/21 5779
Late Entry due to [address] [facility]      Emergency Department [facility] AN [facility]FFILIATE OF [facility] Biopsychosocial Assessment Note    Chief Complaint:     Patient presents to the ED for psychiatric evaluation. Patient states he feels as if people are out to harm him. MSE:     Pt presents as a 45year old male. He is groggy, however oriented. Is calm and cooperative presently. Normal speech pattern. Poor eye contact. Denies SI, HI and AVH. Behavior is in control. Pt currently presenting as rather sleepy, does not engage more than answering the questions asked. Clinical Summary/History:     Pt is well known to [facility] staff. He is presenting reporting paranoia and hallucinations. Pt has an extensive history of non compliance with mental health treatment and medication. He reports that he is up to date with his Invega shot, but not his pills. Pt was hospitalized at [facility] on 5/2. Gender  [x] Male [] Female [] Transgender  [] Other    Sexual Orientation    [x] Heterosexual [] Homosexual [] Bisexual [] Other    Suicidal Behavioral: CSSR-S Complete. [] Reports:    [] Past [] Present   [x] Denies    Homicidal/ Violent Behavior  [] Reports:   [] Past [] Present   [x] Denies     Hallucinations/Delusions   [x] Reports:   [] Denies     Substance Use/Alcohol Use/Addiction: SBIRT Screen Complete. [x] Reports:   [] Denies     Trauma History  [] Reports:  [x] Denies     Collateral Information:   No collateral obtained at this time.     Level of Care/Disposition Plan  [] Home:   [] Outpatient Provider:   [] Crisis Unit:   [] Inpatient Psychiatric Unit:  [] Other:        FRANCISCO Lyle, MAJOR  05/15/21 2251
Patient has been accepted for admission to [address]      FRANCISCO Amato, MAJOR  05/15/21 3589
Patient has been reminded of need for urine specimen. Patient falls back asleep very quickly after waking to tell him. Will continue to remind patient until specimen is collected.       Erinn Harrell RN  05/15/21 0632
Patient reports that he is only on the [place] injection which he received recently during his hospitalization at [facility].      Suhas Vasquez, FRANCISCO, LSW  05/15/21 1257
Patient was woken up again to obtain urine specimen. Educated that we cannot continue to move forward with placement until we have a urine specimen resulted. Patient nodded and said yes to being able to go and went back to sleep.       Tacho Lennon RN  05/15/21 9310
Offered and patient declined

## 2021-05-18 ENCOUNTER — HOSPITAL ENCOUNTER (EMERGENCY)
Age: 41
Discharge: HOME | End: 2021-05-18
Attending: EMERGENCY MEDICINE
Payer: COMMERCIAL

## 2021-05-18 VITALS
HEART RATE: 80 BPM | SYSTOLIC BLOOD PRESSURE: 139 MMHG | OXYGEN SATURATION: 97 % | RESPIRATION RATE: 18 BRPM | DIASTOLIC BLOOD PRESSURE: 69 MMHG | TEMPERATURE: 96.9 F

## 2021-05-18 DIAGNOSIS — F20.9 SCHIZOPHRENIA, UNSPECIFIED TYPE (HCC): Primary | ICD-10-CM

## 2021-05-18 DIAGNOSIS — F19.10 SUBSTANCE ABUSE (HCC): ICD-10-CM

## 2021-05-18 LAB
ACETAMINOPHEN LEVEL: <5 MCG/ML (ref 10–30)
ALBUMIN SERPL-MCNC: 4.3 G/DL (ref 3.5–5.2)
ALP BLD-CCNC: 49 U/L (ref 40–129)
ALT SERPL-CCNC: 11 U/L (ref 0–40)
AMPHETAMINE SCREEN, URINE: NOT DETECTED
ANION GAP SERPL CALCULATED.3IONS-SCNC: 9 MMOL/L (ref 7–16)
AST SERPL-CCNC: 15 U/L (ref 0–39)
BARBITURATE SCREEN URINE: NOT DETECTED
BASOPHILS ABSOLUTE: 0.06 E9/L (ref 0–0.2)
BASOPHILS RELATIVE PERCENT: 0.9 % (ref 0–2)
BENZODIAZEPINE SCREEN, URINE: NOT DETECTED
BILIRUB SERPL-MCNC: 0.3 MG/DL (ref 0–1.2)
BUN BLDV-MCNC: 8 MG/DL (ref 6–20)
CALCIUM SERPL-MCNC: 9.2 MG/DL (ref 8.6–10.2)
CANNABINOID SCREEN URINE: NOT DETECTED
CHLORIDE BLD-SCNC: 103 MMOL/L (ref 98–107)
CO2: 27 MMOL/L (ref 22–29)
COCAINE METABOLITE SCREEN URINE: POSITIVE
CREAT SERPL-MCNC: 0.9 MG/DL (ref 0.7–1.2)
EOSINOPHILS ABSOLUTE: 0.07 E9/L (ref 0.05–0.5)
EOSINOPHILS RELATIVE PERCENT: 1 % (ref 0–6)
ETHANOL: <10 MG/DL (ref 0–0.08)
FENTANYL SCREEN, URINE: NOT DETECTED
GFR AFRICAN AMERICAN: >60
GFR NON-AFRICAN AMERICAN: >60 ML/MIN/1.73
GLUCOSE BLD-MCNC: 90 MG/DL (ref 74–99)
HCT VFR BLD CALC: 42.2 % (ref 37–54)
HEMOGLOBIN: 13.9 G/DL (ref 12.5–16.5)
IMMATURE GRANULOCYTES #: 0.01 E9/L
IMMATURE GRANULOCYTES %: 0.1 % (ref 0–5)
INFLUENZA A: NOT DETECTED
INFLUENZA B: NOT DETECTED
LYMPHOCYTES ABSOLUTE: 1.49 E9/L (ref 1.5–4)
LYMPHOCYTES RELATIVE PERCENT: 21.9 % (ref 20–42)
Lab: ABNORMAL
MCH RBC QN AUTO: 29.6 PG (ref 26–35)
MCHC RBC AUTO-ENTMCNC: 32.9 % (ref 32–34.5)
MCV RBC AUTO: 90 FL (ref 80–99.9)
METHADONE SCREEN, URINE: NOT DETECTED
MONOCYTES ABSOLUTE: 0.54 E9/L (ref 0.1–0.95)
MONOCYTES RELATIVE PERCENT: 7.9 % (ref 2–12)
NEUTROPHILS ABSOLUTE: 4.64 E9/L (ref 1.8–7.3)
NEUTROPHILS RELATIVE PERCENT: 68.2 % (ref 43–80)
OPIATE SCREEN URINE: NOT DETECTED
OXYCODONE URINE: NOT DETECTED
PDW BLD-RTO: 13.4 FL (ref 11.5–15)
PHENCYCLIDINE SCREEN URINE: NOT DETECTED
PLATELET # BLD: 345 E9/L (ref 130–450)
PMV BLD AUTO: 9.9 FL (ref 7–12)
POTASSIUM SERPL-SCNC: 3.9 MMOL/L (ref 3.5–5)
RBC # BLD: 4.69 E12/L (ref 3.8–5.8)
SALICYLATE, SERUM: <0.3 MG/DL (ref 0–30)
SARS-COV-2 RNA, RT PCR: NOT DETECTED
SODIUM BLD-SCNC: 139 MMOL/L (ref 132–146)
TOTAL PROTEIN: 7.1 G/DL (ref 6.4–8.3)
TRICYCLIC ANTIDEPRESSANTS SCREEN SERUM: NEGATIVE NG/ML
VALPROIC ACID LEVEL: 3 MCG/ML (ref 50–100)
WBC # BLD: 6.8 E9/L (ref 4.5–11.5)

## 2021-05-18 PROCEDURE — 99282 EMERGENCY DEPT VISIT SF MDM: CPT

## 2021-05-18 PROCEDURE — 80164 ASSAY DIPROPYLACETIC ACD TOT: CPT

## 2021-05-18 PROCEDURE — 85025 COMPLETE CBC W/AUTO DIFF WBC: CPT

## 2021-05-18 PROCEDURE — 80143 DRUG ASSAY ACETAMINOPHEN: CPT

## 2021-05-18 PROCEDURE — 80307 DRUG TEST PRSMV CHEM ANLYZR: CPT

## 2021-05-18 PROCEDURE — 80053 COMPREHEN METABOLIC PANEL: CPT

## 2021-05-18 PROCEDURE — 82077 ASSAY SPEC XCP UR&BREATH IA: CPT

## 2021-05-18 PROCEDURE — 80179 DRUG ASSAY SALICYLATE: CPT

## 2021-05-18 PROCEDURE — 87636 SARSCOV2 & INF A&B AMP PRB: CPT

## 2021-05-18 NOTE — ED PROVIDER NOTES
HPI:  5/18/21, Time: 1:52 AM EDT         Julio C Chamorro. is a 45 y.o. male presenting to the ED for psychiatric evaluation, beginning 1 day ago. The complaint has been persistent, moderate in severity, and worsened by nothing. Patient reporting feeling paranoid. Patient reporting no homicidal suicidal thoughts. He reports no physical planes of chest pain or shortness of breath he reports no abdominal pain or vomiting he does report that he has schizophrenia and bipolar disease. Patient reports no fever chills or cough he reports no headache he reports no trauma or injury. ROS:   Pertinent positives and negatives are stated within HPI, all other systems reviewed and are negative.  --------------------------------------------- PAST HISTORY ---------------------------------------------  Past Medical History:  has a past medical history of Depression and Schizoaffective disorder ([facility] [address] Past Surgical History:  has a past surgical history that includes eye surgery (19 years ago). Social History:  reports that he has been smoking cigars and cigarettes. He has a 12.00 pack-year smoking history. He has never used smokeless tobacco. He reports current drug use. Drugs: Marijuana and Cocaine. He reports that he does not drink alcohol. Family History: family history includes Mental Illness in his mother; No Known Problems in his father; Substance Abuse in his mother. The patients home medications have been reviewed.     Allergies: Chlorpheniramine-phenylephrine, Penicillins, and Rondec-d [chlophedianol-pseudoephedrine]    ---------------------------------------------------PHYSICAL EXAM--------------------------------------    Constitutional/General: Alert and oriented x3, anxious  Head: Normocephalic and atraumatic  Eyes: PERRL, EOMI  Mouth: Oropharynx clear, handling secretions, no trismus  Neck: Supple, full ROM, non tender to palpation in the midline, no stridor, no crepitus, no meningeal signs  Pulmonary: Lungs clear to auscultation bilaterally, no wheezes, rales, or rhonchi. Not in respiratory distress  Cardiovascular:  Regular rate. Regular rhythm. No murmurs, gallops, or rubs. 2+ distal pulses  Chest: no chest wall tenderness  Abdomen: Soft. Non tender. Non distended. +BS. No rebound, guarding, or rigidity. No pulsatile masses appreciated. Musculoskeletal: Moves all extremities x 4. Warm and well perfused, no clubbing, cyanosis, or edema. Capillary refill <3 seconds  Skin: warm and dry. No rashes. Neurologic: GCS 15, CN 2-12 grossly intact, no focal deficits, symmetric strength 5/5 in the upper and lower extremities bilaterally  Psych: Patient reporting paranoia has pressured speech reporting no homicidal suicidal thoughts no hallucinations    -------------------------------------------------- RESULTS -------------------------------------------------  I have personally reviewed all laboratory and imaging results for this patient. Results are listed below.      LABS:  Results for orders placed or performed during the hospital encounter of 05/18/21   COVID-19 & Influenza Combo    Specimen: Nasopharyngeal Swab   Result Value Ref Range    SARS-CoV-2 RNA, RT PCR NOT DETECTED NOT DETECTED    INFLUENZA A NOT DETECTED NOT DETECTED    INFLUENZA B NOT DETECTED NOT DETECTED   CBC auto differential   Result Value Ref Range    WBC 6.8 4.5 - 11.5 E9/L    RBC 4.69 3.80 - 5.80 E12/L    Hemoglobin 13.9 12.5 - 16.5 g/dL    Hematocrit 42.2 37.0 - 54.0 %    MCV 90.0 80.0 - 99.9 fL    MCH 29.6 26.0 - 35.0 pg    MCHC 32.9 32.0 - 34.5 %    RDW 13.4 11.5 - 15.0 fL    Platelets 306 005 - 930 E9/L    MPV 9.9 7.0 - 12.0 fL    Neutrophils % 68.2 43.0 - 80.0 %    Immature Granulocytes % 0.1 0.0 - 5.0 %    Lymphocytes % 21.9 20.0 - 42.0 %    Monocytes % 7.9 2.0 - 12.0 %    Eosinophils % 1.0 0.0 - 6.0 %    Basophils % 0.9 0.0 - 2.0 %    Neutrophils Absolute 4.64 1.80 - 7.30 E9/L    Immature Granulocytes # 0.01 E9/L Lymphocytes Absolute 1.49 (L) 1.50 - 4.00 E9/L    Monocytes Absolute 0.54 0.10 - 0.95 E9/L    Eosinophils Absolute 0.07 0.05 - 0.50 E9/L    Basophils Absolute 0.06 0.00 - 0.20 E9/L   Comprehensive Metabolic Panel   Result Value Ref Range    Sodium 139 132 - 146 mmol/L    Potassium 3.9 3.5 - 5.0 mmol/L    Chloride 103 98 - 107 mmol/L    CO2 27 22 - 29 mmol/L    Anion Gap 9 7 - 16 mmol/L    Glucose 90 74 - 99 mg/dL    BUN 8 6 - 20 mg/dL    CREATININE 0.9 0.7 - 1.2 mg/dL    GFR Non-African American >60 >=60 mL/min/1.73    GFR African American >60     Calcium 9.2 8.6 - 10.2 mg/dL    Total Protein 7.1 6.4 - 8.3 g/dL    Albumin 4.3 3.5 - 5.2 g/dL    Total Bilirubin 0.3 0.0 - 1.2 mg/dL    Alkaline Phosphatase 49 40 - 129 U/L    ALT 11 0 - 40 U/L    AST 15 0 - 39 U/L   Serum Drug Screen   Result Value Ref Range    Ethanol Lvl <10 mg/dL    Acetaminophen Level <5.0 (L) 10.0 - 57.1 mcg/mL    Salicylate, Serum <4.2 0.0 - 30.0 mg/dL    TCA Scrn NEGATIVE Cutoff:300 ng/mL   Urine Drug Screen   Result Value Ref Range    Amphetamine Screen, Urine NOT DETECTED Negative <1000 ng/mL    Barbiturate Screen, Ur NOT DETECTED Negative < 200 ng/mL    Benzodiazepine Screen, Urine NOT DETECTED Negative < 200 ng/mL    Cannabinoid Scrn, Ur NOT DETECTED Negative < 50ng/mL    Cocaine Metabolite Screen, Urine POSITIVE (A) Negative < 300 ng/mL    Opiate Scrn, Ur NOT DETECTED Negative < 300ng/mL    PCP Screen, Urine NOT DETECTED Negative < 25 ng/mL    Methadone Screen, Urine NOT DETECTED Negative <300 ng/mL    Oxycodone Urine NOT DETECTED Negative <100 ng/mL    FENTANYL SCREEN, URINE NOT DETECTED Negative <1 ng/mL    Drug Screen Comment: see below    Valproic acid level, total   Result Value Ref Range    Valproic Acid Lvl 3 (L) 50 - 100 mcg/mL       RADIOLOGY:  Interpreted by Radiologist.  No orders to display         EKG Interpretation        ------------------------- NURSING NOTES AND VITALS REVIEWED ---------------------------   The nursing notes within the ED encounter and vital signs as below have been reviewed by myself. /69   Pulse 80   Temp 96.9 °F (36.1 °C) (Temporal)   Resp 18   SpO2 97%   Oxygen Saturation Interpretation: Normal    The patients available past medical records and past encounters were reviewed. ------------------------------ ED COURSE/MEDICAL DECISION MAKING----------------------  Medications - No data to display          Medical Decision Making:   Patient presenting here because of feeling paranoid. Patient does report history of schizophrenia bipolar disease. Patient having flight of ideas here. Patient labs noted reviewed he is positive for cocaine. Patient reporting no chest pain no difficulty breathing. Patient is medically clear for  to evaluate     Re-Evaluations:             Re-evaluation. Patients symptoms show no change      Consultations:                 Critical Care: This patient's ED course included: a personal history and physicial eaxmination    This patient has remained hemodynamically stable during their ED course. Counseling: The emergency provider has spoken with the patient and discussed todays results, in addition to providing specific details for the plan of care and counseling regarding the diagnosis and prognosis. Questions are answered at this time and they are agreeable with the plan.       --------------------------------- IMPRESSION AND DISPOSITION ---------------------------------    IMPRESSION  1. Schizophrenia, unspecified type ([facility]    2. Substance abuse ([facility]        DISPOSITION  Disposition:  to assist with disposition  Patient condition is stable        NOTE: This report was transcribed using voice recognition software.  Every effort was made to ensure accuracy; however, inadvertent computerized transcription errors may be present          Roxane Seay MD  05/18/21 1704       Roxane Seay MD  05/18/21 0244

## 2021-05-18 NOTE — ED NOTES
Referral packet faxed to [facility] at this time.      FRANCISCO Ma, [facility]  05/18/21 7540

## 2021-05-18 NOTE — ED NOTES
Pt refusing to sign discharge papers he reported he was going to be fine and didn t need our help or follow up help. He reported we did nothing to help him and he wanted to leave.   He left without signing discharge papers     Shawna Lema RN  05/18/21 3511

## 2021-05-18 NOTE — ED NOTES
PT HAS BEEN STABLE, DENIES SI, NO HI, NO REPORTED HALLUCINATIONS, NOT PINK SLIPPED. PT DENIES TO [facility] DUE TO HIM LEAVING AGAINST [facility] AND HAVING A PERSONAL/HIPPA CONFLICT WITH ANOTHER CLIENT AT [facility]. PT CAN FOLLOW UP WITH OUT PT SERVICES AS NEEDED.      MAJOR Ingram  05/18/21 5594

## 2021-05-18 NOTE — ED NOTES
Emergency Department [facility] AN AFFILIATE OF [facility] Biopsychosocial Assessment Note    Chief Complaint:     Patient presents to the ED reporting paranoia. MSE:    Pt presents as a 45year old male. He is alert and oriented x4. Affect is flat. Speech is low in volume. Avoidant eye contact. Denies SI, HI, AVH to SW. Pt has been calm and cooperative thus far. Clinical Summary/History:     Pt relays he has been staying in the drop-in center. He reports that he got paranoid, felt like there were things he needed to handle right away, and left the center. Explains he was being given a pill while he was inpatient at [facility], but they did not send him home with a prescription for it. Engagement with [facility] Boswell is reported. Pt states he just recently received his Invega injection. Pt reports that the drop-in center has been attempting to help him to schedule an appointment to adjust medications, due to the paranoia pt reports he has been experiencing following his discharge from [facility]. Pt is hopeful to return to [facility]. He advises that he was no asked to leave, he left as he felt his paranoia reached a level he could not stay. Gender  [x] Male [] Female [] Transgender  [] Other    Sexual Orientation    [x] Heterosexual [] Homosexual [] Bisexual [] Other    Suicidal Behavioral: CSSR-S Complete. [] Reports:    [] Past [] Present   [x] Denies    Homicidal/ Violent Behavior  [] Reports:   [] Past [] Present   [x] Denies     Hallucinations/Delusions   [] Reports:   [x] Denies     Substance Use/Alcohol Use/Addiction: SBIRT Screen Complete. [x] Reports:   [] Denies     Trauma History  [x] Reports:  [] Denies     Collateral Information:   No collateral obtained at this time.     Level of Care/Disposition Plan  [] Home:   [] Outpatient Provider:   [x] Crisis Unit:   [] Inpatient Psychiatric Unit:  [] Other:   Pt being referred to Crisis Unit at his request     FRANCISCO Salas, MAJOR  05/18/21 2943

## 2021-05-24 ENCOUNTER — HOSPITAL ENCOUNTER (EMERGENCY)
Age: 41
Discharge: HOME | End: 2021-05-24
Attending: EMERGENCY MEDICINE
Payer: COMMERCIAL

## 2021-05-24 VITALS
TEMPERATURE: 98.1 F | DIASTOLIC BLOOD PRESSURE: 72 MMHG | HEIGHT: 72 IN | HEART RATE: 75 BPM | OXYGEN SATURATION: 99 % | WEIGHT: 159 LBS | RESPIRATION RATE: 15 BRPM | BODY MASS INDEX: 21.54 KG/M2 | SYSTOLIC BLOOD PRESSURE: 118 MMHG

## 2021-05-24 DIAGNOSIS — F39 MOOD DISORDER (HCC): Primary | ICD-10-CM

## 2021-05-24 LAB
ACETAMINOPHEN LEVEL: <5 MCG/ML (ref 10–30)
ALBUMIN SERPL-MCNC: 3.8 G/DL (ref 3.5–5.2)
ALP BLD-CCNC: 44 U/L (ref 40–129)
ALT SERPL-CCNC: 8 U/L (ref 0–40)
ANION GAP SERPL CALCULATED.3IONS-SCNC: 10 MMOL/L (ref 7–16)
AST SERPL-CCNC: 13 U/L (ref 0–39)
BASOPHILS ABSOLUTE: 0.07 E9/L (ref 0–0.2)
BASOPHILS RELATIVE PERCENT: 1.3 % (ref 0–2)
BILIRUB SERPL-MCNC: 0.3 MG/DL (ref 0–1.2)
BUN BLDV-MCNC: 9 MG/DL (ref 6–20)
CALCIUM SERPL-MCNC: 8.7 MG/DL (ref 8.6–10.2)
CHLORIDE BLD-SCNC: 107 MMOL/L (ref 98–107)
CO2: 26 MMOL/L (ref 22–29)
CREAT SERPL-MCNC: 1.1 MG/DL (ref 0.7–1.2)
EOSINOPHILS ABSOLUTE: 0.15 E9/L (ref 0.05–0.5)
EOSINOPHILS RELATIVE PERCENT: 2.8 % (ref 0–6)
ETHANOL: <10 MG/DL (ref 0–0.08)
GFR AFRICAN AMERICAN: >60
GFR NON-AFRICAN AMERICAN: >60 ML/MIN/1.73
GLUCOSE BLD-MCNC: 84 MG/DL (ref 74–99)
HCT VFR BLD CALC: 40 % (ref 37–54)
HEMOGLOBIN: 12.9 G/DL (ref 12.5–16.5)
IMMATURE GRANULOCYTES #: 0.02 E9/L
IMMATURE GRANULOCYTES %: 0.4 % (ref 0–5)
LYMPHOCYTES ABSOLUTE: 1.76 E9/L (ref 1.5–4)
LYMPHOCYTES RELATIVE PERCENT: 32.3 % (ref 20–42)
MCH RBC QN AUTO: 29.7 PG (ref 26–35)
MCHC RBC AUTO-ENTMCNC: 32.3 % (ref 32–34.5)
MCV RBC AUTO: 92.2 FL (ref 80–99.9)
MONOCYTES ABSOLUTE: 0.44 E9/L (ref 0.1–0.95)
MONOCYTES RELATIVE PERCENT: 8.1 % (ref 2–12)
NEUTROPHILS ABSOLUTE: 3.01 E9/L (ref 1.8–7.3)
NEUTROPHILS RELATIVE PERCENT: 55.1 % (ref 43–80)
PDW BLD-RTO: 13.9 FL (ref 11.5–15)
PLATELET # BLD: 330 E9/L (ref 130–450)
PMV BLD AUTO: 9.6 FL (ref 7–12)
POTASSIUM SERPL-SCNC: 3.9 MMOL/L (ref 3.5–5)
RBC # BLD: 4.34 E12/L (ref 3.8–5.8)
SALICYLATE, SERUM: <0.3 MG/DL (ref 0–30)
SODIUM BLD-SCNC: 143 MMOL/L (ref 132–146)
TOTAL PROTEIN: 6.5 G/DL (ref 6.4–8.3)
TRICYCLIC ANTIDEPRESSANTS SCREEN SERUM: NEGATIVE NG/ML
WBC # BLD: 5.5 E9/L (ref 4.5–11.5)

## 2021-05-24 PROCEDURE — 80307 DRUG TEST PRSMV CHEM ANLYZR: CPT

## 2021-05-24 PROCEDURE — 80179 DRUG ASSAY SALICYLATE: CPT

## 2021-05-24 PROCEDURE — 80053 COMPREHEN METABOLIC PANEL: CPT

## 2021-05-24 PROCEDURE — 99283 EMERGENCY DEPT VISIT LOW MDM: CPT

## 2021-05-24 PROCEDURE — 80143 DRUG ASSAY ACETAMINOPHEN: CPT

## 2021-05-24 PROCEDURE — 85025 COMPLETE CBC W/AUTO DIFF WBC: CPT

## 2021-05-24 PROCEDURE — 82077 ASSAY SPEC XCP UR&BREATH IA: CPT

## 2021-05-24 NOTE — ED PROVIDER NOTES
HPI:  5/24/21, Time: 12:17 AM EDT         Yancy Tran is a 45 y.o. male presenting to the ED for psychiatric evaluation, beginning 1 day ago. The complaint has been persistent, moderate in severity, and worsened by nothing. Patient reports that his home is feeling stressed and reporting hearing voices telling him to harm himself. Patient reports he has been off his medications as well. He reports feeling suicidal at times reports no homicidal ideations. Patient reporting no complaints of abdominal pain vomiting diarrhea reports no chest pain or difficulty breathing. Patient reporting no fever or chills. ROS:   Pertinent positives and negatives are stated within HPI, all other systems reviewed and are negative.  --------------------------------------------- PAST HISTORY ---------------------------------------------  Past Medical History:  has a past medical history of Depression and Schizoaffective disorder ([facility] [address] Past Surgical History:  has a past surgical history that includes eye surgery (19 years ago). Social History:  reports that he has been smoking cigars and cigarettes. He has a 12.00 pack-year smoking history. He has never used smokeless tobacco. He reports current drug use. Drugs: Marijuana and Cocaine. He reports that he does not drink alcohol. Family History: family history includes Mental Illness in his mother; No Known Problems in his father; Substance Abuse in his mother. The patients home medications have been reviewed.     Allergies: Chlorpheniramine-phenylephrine, Penicillins, and Rondec-d [chlophedianol-pseudoephedrine]    ---------------------------------------------------PHYSICAL EXAM--------------------------------------    Constitutional/General: Alert and oriented x3,   Head: Normocephalic and atraumatic  Eyes: PERRL, EOMI  Mouth: Oropharynx clear, handling secretions, no trismus  Neck: Supple, full ROM, non tender to palpation in the midline, no stridor, no crepitus, no meningeal signs  Pulmonary: Lungs clear to auscultation bilaterally, no wheezes, rales, or rhonchi. Not in respiratory distress  Cardiovascular:  Regular rate. Regular rhythm. No murmurs, gallops, or rubs. 2+ distal pulses  Chest: no chest wall tenderness  Abdomen: Soft. Non tender. Non distended. +BS. No rebound, guarding, or rigidity. No pulsatile masses appreciated. Musculoskeletal: Moves all extremities x 4. Warm and well perfused, no clubbing, cyanosis, or edema. Capillary refill <3 seconds  Skin: warm and dry. No rashes. Neurologic: GCS 15, CN 2-12 grossly intact, no focal deficits, symmetric strength 5/5 in the upper and lower extremities bilaterally  Psych: Auditory hallucinations reporting suicidal ideation no homicidal ideation    -------------------------------------------------- RESULTS -------------------------------------------------  I have personally reviewed all laboratory and imaging results for this patient. Results are listed below.      LABS:  Results for orders placed or performed during the hospital encounter of 05/24/21   CBC auto differential   Result Value Ref Range    WBC 5.5 4.5 - 11.5 E9/L    RBC 4.34 3.80 - 5.80 E12/L    Hemoglobin 12.9 12.5 - 16.5 g/dL    Hematocrit 40.0 37.0 - 54.0 %    MCV 92.2 80.0 - 99.9 fL    MCH 29.7 26.0 - 35.0 pg    MCHC 32.3 32.0 - 34.5 %    RDW 13.9 11.5 - 15.0 fL    Platelets 352 684 - 420 E9/L    MPV 9.6 7.0 - 12.0 fL    Neutrophils % 55.1 43.0 - 80.0 %    Immature Granulocytes % 0.4 0.0 - 5.0 %    Lymphocytes % 32.3 20.0 - 42.0 %    Monocytes % 8.1 2.0 - 12.0 %    Eosinophils % 2.8 0.0 - 6.0 %    Basophils % 1.3 0.0 - 2.0 %    Neutrophils Absolute 3.01 1.80 - 7.30 E9/L    Immature Granulocytes # 0.02 E9/L    Lymphocytes Absolute 1.76 1.50 - 4.00 E9/L    Monocytes Absolute 0.44 0.10 - 0.95 E9/L    Eosinophils Absolute 0.15 0.05 - 0.50 E9/L    Basophils Absolute 0.07 0.00 - 0.20 E9/L   Comprehensive Metabolic Panel   Result Value Ref Range Sodium 143 132 - 146 mmol/L    Potassium 3.9 3.5 - 5.0 mmol/L    Chloride 107 98 - 107 mmol/L    CO2 26 22 - 29 mmol/L    Anion Gap 10 7 - 16 mmol/L    Glucose 84 74 - 99 mg/dL    BUN 9 6 - 20 mg/dL    CREATININE 1.1 0.7 - 1.2 mg/dL    GFR Non-African American >60 >=60 mL/min/1.73    GFR African American >60     Calcium 8.7 8.6 - 10.2 mg/dL    Total Protein 6.5 6.4 - 8.3 g/dL    Albumin 3.8 3.5 - 5.2 g/dL    Total Bilirubin 0.3 0.0 - 1.2 mg/dL    Alkaline Phosphatase 44 40 - 129 U/L    ALT 8 0 - 40 U/L    AST 13 0 - 39 U/L   Serum Drug Screen   Result Value Ref Range    Ethanol Lvl <10 mg/dL    Acetaminophen Level <5.0 (L) 10.0 - 72.9 mcg/mL    Salicylate, Serum <3.8 0.0 - 30.0 mg/dL    TCA Scrn NEGATIVE Cutoff:300 ng/mL       RADIOLOGY:  Interpreted by Radiologist.  No orders to display               ------------------------- NURSING NOTES AND VITALS REVIEWED ---------------------------   The nursing notes within the ED encounter and vital signs as below have been reviewed by myself. /69   Pulse 88   Temp 95.6 °F (35.3 °C) (Tympanic)   Resp 18   Ht 6' (1.829 m)   Wt 159 lb (72.1 kg)   SpO2 96%   BMI 21.56 kg/m²   Oxygen Saturation Interpretation: Normal    The patients available past medical records and past encounters were reviewed. ------------------------------ ED COURSE/MEDICAL DECISION MAKING----------------------  Medications - No data to display          Medical Decision Making:    Patient presenting here because of being homeless reporting feeling stressed having suicidal thoughts. Patient also having auditory hallucinations. Patient reporting no physical planes of chest pain shortness of breath or abdominal pain or headache. Labs will be obtained and plan will be for  to evaluate once patient is medically cleared    Re-Evaluations:             Re-evaluation. Patients symptoms show no change  Patient rechecked and was seen by .   Patient okay with being discharged home. Patient not wanting to hurt himself. And comfortable with being discharged home    Consultations:                 Critical Care: This patient's ED course included: a personal history and physicial eaxmination    This patient has been closely monitored during their ED course. Counseling: The emergency provider has spoken with the patient and discussed todays results, in addition to providing specific details for the plan of care and counseling regarding the diagnosis and prognosis. Questions are answered at this time and they are agreeable with the plan.       --------------------------------- IMPRESSION AND DISPOSITION ---------------------------------    IMPRESSION  1. Mood disorder ([facility]        DISPOSITION  Disposition:  to assist with disposition  Patient condition is stable        NOTE: This report was transcribed using voice recognition software.  Every effort was made to ensure accuracy; however, inadvertent computerized transcription errors may be present          Abi Villa MD  05/24/21 0025       Abi Villa MD  05/24/21 Steven Koch MD  05/24/21 1033

## 2021-05-24 NOTE — ED NOTES
Emergency Department [facility]FFILIATE [facility] Biopsychosocial Assessment Note    Chief Complaint:     Patient presents to the ED for psychiatric evaluation and homelessness. He states he is off of his pills, but received his shot. Hoping to get back on his pills. MSE:    Pt presents as a 45year old male. Is oriented x4, drowsy. Presently calm and cooperative. Speech pattern is normal. No eye contact is made during assessment. At time of assessment, denies SI, HI and AVH. Clinical Summary/History:     Reportedly, patient and his girlfriend were found near the [place] entrance of this facility. They apparently were trying to get inside to use a microwave to cook some raw chicken they had. Pt and his girlfriend checked into ED when they were approached by staff. He denies SI at time of assessment. States he was not able to schedule an appointment with Ashlie Nevarez as he was hoping to in order to get back on his \"pill. \" Did receive Invega shot while hospitalized at [facility]. At last presentation, pt had requested to be sent to [facility]. Pt had left [facility] and was not permitted to return. Gender  [x] Male [] Female [] Transgender  [] Other    Sexual Orientation    [x] Heterosexual [] Homosexual [] Bisexual [] Other    Suicidal Behavioral: CSSR-S Complete. [] Reports:    [] Past [] Present   [x] Denies    Homicidal/ Violent Behavior  [] Reports:   [] Past [] Present   [x] Denies     Hallucinations/Delusions   [] Reports:   [x] Denies     Substance Use/Alcohol Use/Addiction: SBIRT Screen Complete. [x] Reports:   [] Denies     Trauma History  [] Reports:  [] Denies     Collateral Information:   No collateral obtained at this time. Level of Care/Disposition Plan  [x] Home:   [] Outpatient Provider:   [] Crisis Unit:   [] Inpatient Psychiatric Unit:  [] Other:   Pt will be discharged to follow-up with his outpatient provider.      FRANCISCO Aj, [facility]  05/24/21 8018

## 2021-05-27 ENCOUNTER — HOSPITAL ENCOUNTER (EMERGENCY)
Age: 41
Discharge: HOME | End: 2021-05-27
Attending: EMERGENCY MEDICINE
Payer: COMMERCIAL

## 2021-05-27 VITALS
OXYGEN SATURATION: 98 % | HEIGHT: 72 IN | TEMPERATURE: 98.2 F | WEIGHT: 163 LBS | DIASTOLIC BLOOD PRESSURE: 79 MMHG | BODY MASS INDEX: 22.08 KG/M2 | SYSTOLIC BLOOD PRESSURE: 118 MMHG | HEART RATE: 81 BPM | RESPIRATION RATE: 16 BRPM

## 2021-05-27 DIAGNOSIS — F39 MOOD DISORDER (HCC): Primary | ICD-10-CM

## 2021-05-27 LAB
ACETAMINOPHEN LEVEL: <5 MCG/ML (ref 10–30)
ALBUMIN SERPL-MCNC: 4 G/DL (ref 3.5–5.2)
ALP BLD-CCNC: 46 U/L (ref 40–129)
ALT SERPL-CCNC: 8 U/L (ref 0–40)
ANION GAP SERPL CALCULATED.3IONS-SCNC: 9 MMOL/L (ref 7–16)
AST SERPL-CCNC: 17 U/L (ref 0–39)
BASOPHILS ABSOLUTE: 0.06 E9/L (ref 0–0.2)
BASOPHILS RELATIVE PERCENT: 1.2 % (ref 0–2)
BILIRUB SERPL-MCNC: 0.6 MG/DL (ref 0–1.2)
BUN BLDV-MCNC: 12 MG/DL (ref 6–20)
CALCIUM SERPL-MCNC: 8.8 MG/DL (ref 8.6–10.2)
CHLORIDE BLD-SCNC: 102 MMOL/L (ref 98–107)
CO2: 29 MMOL/L (ref 22–29)
CREAT SERPL-MCNC: 1 MG/DL (ref 0.7–1.2)
EOSINOPHILS ABSOLUTE: 0.19 E9/L (ref 0.05–0.5)
EOSINOPHILS RELATIVE PERCENT: 3.7 % (ref 0–6)
ETHANOL: <10 MG/DL (ref 0–0.08)
GFR AFRICAN AMERICAN: >60
GFR NON-AFRICAN AMERICAN: >60 ML/MIN/1.73
GLUCOSE BLD-MCNC: 78 MG/DL (ref 74–99)
HCT VFR BLD CALC: 44.9 % (ref 37–54)
HEMOGLOBIN: 14.4 G/DL (ref 12.5–16.5)
IMMATURE GRANULOCYTES #: 0.01 E9/L
IMMATURE GRANULOCYTES %: 0.2 % (ref 0–5)
LYMPHOCYTES ABSOLUTE: 1.8 E9/L (ref 1.5–4)
LYMPHOCYTES RELATIVE PERCENT: 35 % (ref 20–42)
MCH RBC QN AUTO: 29.6 PG (ref 26–35)
MCHC RBC AUTO-ENTMCNC: 32.1 % (ref 32–34.5)
MCV RBC AUTO: 92.2 FL (ref 80–99.9)
MONOCYTES ABSOLUTE: 0.56 E9/L (ref 0.1–0.95)
MONOCYTES RELATIVE PERCENT: 10.9 % (ref 2–12)
NEUTROPHILS ABSOLUTE: 2.52 E9/L (ref 1.8–7.3)
NEUTROPHILS RELATIVE PERCENT: 49 % (ref 43–80)
PDW BLD-RTO: 14.2 FL (ref 11.5–15)
PLATELET # BLD: 335 E9/L (ref 130–450)
PMV BLD AUTO: 9.6 FL (ref 7–12)
POTASSIUM SERPL-SCNC: 3.6 MMOL/L (ref 3.5–5)
RBC # BLD: 4.87 E12/L (ref 3.8–5.8)
SALICYLATE, SERUM: <0.3 MG/DL (ref 0–30)
SODIUM BLD-SCNC: 140 MMOL/L (ref 132–146)
TOTAL PROTEIN: 6.6 G/DL (ref 6.4–8.3)
TRICYCLIC ANTIDEPRESSANTS SCREEN SERUM: NEGATIVE NG/ML
VALPROIC ACID LEVEL: 3 MCG/ML (ref 50–100)
WBC # BLD: 5.1 E9/L (ref 4.5–11.5)

## 2021-05-27 PROCEDURE — 80164 ASSAY DIPROPYLACETIC ACD TOT: CPT

## 2021-05-27 PROCEDURE — 99285 EMERGENCY DEPT VISIT HI MDM: CPT

## 2021-05-27 PROCEDURE — 80143 DRUG ASSAY ACETAMINOPHEN: CPT

## 2021-05-27 PROCEDURE — 80179 DRUG ASSAY SALICYLATE: CPT

## 2021-05-27 PROCEDURE — 80053 COMPREHEN METABOLIC PANEL: CPT

## 2021-05-27 PROCEDURE — 85025 COMPLETE CBC W/AUTO DIFF WBC: CPT

## 2021-05-27 PROCEDURE — 82077 ASSAY SPEC XCP UR&BREATH IA: CPT

## 2021-05-27 PROCEDURE — 80307 DRUG TEST PRSMV CHEM ANLYZR: CPT

## 2021-05-27 NOTE — ED NOTES
Pt now willing to sign papers and vs obtained pt agreeable to discharge and contracts for safety     Carroll Darden RN  05/27/21 7033

## 2021-05-27 NOTE — ED NOTES
Emergency Department [facility] AN [facility]FFILIATE OF [facility] Biopsychosocial Assessment Note     Chief Complaint:      Patient is a 45year old, male presenting to ED for increased paranoia and feeling that people are out to harm him. This is patient's baseline delusion. Patient presented with his girlfriend, another patient, who is also homeless. This patient is well known to this  and these are patient baseline behaviors.     Patient denies suicidal ideation. Patient denies homicidal ideation.     MSE: Patient is alert & oriented x 4. Patient mood is stable, affect flat. Patient thought process is clear, speech pressured at times. Patient has some baseline auditory hallucinations. No reports of visual hallucinations.     Clinical Summary/History:      Patient has a mental health hx of schizoaffective disorder, non-compliant with outpatient mental health treatment. Patient last psychiatric hospitalization was 5/2/21 for acute psychosis and suicidal ideation at [address] sleep, ok appetite, no reports of hopelessness/helplessness. Patient has a hx of polysubstance abuse.     Gender  [x]? ? Male []? ? Female []? ? Transgender  []? ? Other     Sexual Orientation    [x]? ? Heterosexual []? ? Homosexual []? ? Bisexual []? ? Other     Suicidal Behavioral: CSSR-S Complete. [x]? ? Reports:               [x]? ? Past [x]? ? Present   []? ? Denies     Homicidal/ Violent Behavior  []? ? Reports:              []? ? Past []? ? Present   [x]? ? Denies      Hallucinations/Delusions   [x]? ? Reports:  Auditory hallucinations  []? ? Denies      Substance Use/Alcohol Use/Addiction: SBIRT Screen Complete. [x]? ? Reports:  Crack cocaine & cannabis  []? ? Denies      Trauma History  []? ? Reports:  []?? Denies      Collateral Information:         Level of Care/Disposition Plan  [x]? ? Home:   [x]? ? Outpatient Provider:   []?? Crisis Unit:   []?? Inpatient Psychiatric Unit:  []?? Other:        Patient is not currently presenting with any acute psychiatric symptoms that would warrant inpatient or crisis unit referral. Patient symptoms can be effectively managed with an outpatient provider. Patient to be provided resources for local homeless shelters and local outpatient providers with walk-in services.      FRANCISCO Hunter, [facility]  05/27/21 8778

## 2021-05-27 NOTE — ED NOTES
SW attempted to wake pt to assess. He is presently unable to remain alert. Assessment will take place at a later time.          FRANCISCO Auguste, [facility]  05/27/21 5851

## 2021-05-27 NOTE — ED NOTES
Pt reported as this rn awakens him to discharge that he is not signing the papers pt then refused vital signs.   Pt contracts for safety     Dhara Dunham RN  05/27/21 7386

## 2021-05-27 NOTE — ED PROVIDER NOTES
HPI:  5/27/21, Time: 4:51 AM EDT         Umer Gasca. is a 45 y.o. male presenting to the ED for psychiatric evaluation feeling paranoid, beginning ongoing problem days ago. The complaint has been persistent, moderate in severity, and worsened by nothing. Patient has underlying schizophrenia. Patient reporting no place to stay. Patient reporting feeling some is out to hurt him. Patient denying wanting to hurt others or himself. Patient reporting no abdominal pain no vomit no diarrhea reports no fever chills or cough. Patient reporting no weakness or dizziness there is no shortness of breath. There is no leg pain or swelling there is no history of trauma or injury. ROS:   Pertinent positives and negatives are stated within HPI, all other systems reviewed and are negative.  --------------------------------------------- PAST HISTORY ---------------------------------------------  Past Medical History:  has a past medical history of Depression and Schizoaffective disorder ([facility] Past Surgical History:  has a past surgical history that includes eye surgery (19 years ago). Social History:  reports that he has been smoking cigars and cigarettes. He has a 12.00 pack-year smoking history. He has never used smokeless tobacco. He reports current drug use. Drugs: Marijuana and Cocaine. He reports that he does not drink alcohol. Family History: family history includes Mental Illness in his mother; No Known Problems in his father; Substance Abuse in his mother. The patients home medications have been reviewed.     Allergies: Chlorpheniramine-phenylephrine, Penicillins, and Rondec-d [chlophedianol-pseudoephedrine]    ---------------------------------------------------PHYSICAL EXAM--------------------------------------    Constitutional/General: Alert and oriented x3,   Head: Normocephalic and atraumatic  Eyes: PERRL, EOMI  Mouth: Oropharynx clear, handling secretions, no trismus  Neck: Supple, full ROM, non tender to palpation in the midline, no stridor, no crepitus, no meningeal signs  Pulmonary: Lungs clear to auscultation bilaterally, no wheezes, rales, or rhonchi. Not in respiratory distress  Cardiovascular:  Regular rate. Regular rhythm. No murmurs, gallops, or rubs. 2+ distal pulses  Chest: no chest wall tenderness  Abdomen: Soft. Non tender. Non distended. +BS. No rebound, guarding, or rigidity. No pulsatile masses appreciated. Musculoskeletal: Moves all extremities x 4. Warm and well perfused, no clubbing, cyanosis, or edema. Capillary refill <3 seconds  Skin: warm and dry. No rashes. Neurologic: GCS 15, CN 2-12 grossly intact, no focal deficits, symmetric strength 5/5 in the upper and lower extremities bilaterally  Psych: Mildly anxious not homicidal or suicidal.  Patient reporting people are after him.      -------------------------------------------------- RESULTS -------------------------------------------------  I have personally reviewed all laboratory and imaging results for this patient. Results are listed below. LABS:  No results found for this visit on 05/27/21. RADIOLOGY:  Interpreted by Radiologist.  No orders to display         EKG Interpretation        ------------------------- NURSING NOTES AND VITALS REVIEWED ---------------------------   The nursing notes within the ED encounter and vital signs as below have been reviewed by myself. /83   Pulse 82   Temp 97 °F (36.1 °C)   Resp 16   Ht 6' (1.829 m)   Wt 163 lb (73.9 kg)   SpO2 98%   BMI 22.11 kg/m²   Oxygen Saturation Interpretation: Normal    The patients available past medical records and past encounters were reviewed. ------------------------------ ED COURSE/MEDICAL DECISION MAKING----------------------  Medications - No data to display          Medical Decision Making:      Patient has been here before for similar complaints. He does have history of schizophrenia.   Patient reporting no physical complaints. Patient will undergo lab work and plan will be for  to evaluate. Re-Evaluations:             Re-evaluation. Patients symptoms show no change      Consultations:                 Critical Care: This patient's ED course included: a personal history and physicial eaxmination    This patient has been closely monitored during their ED course. Counseling: The emergency provider has spoken with the patient and discussed todays results, in addition to providing specific details for the plan of care and counseling regarding the diagnosis and prognosis. Questions are answered at this time and they are agreeable with the plan.       --------------------------------- IMPRESSION AND DISPOSITION ---------------------------------    IMPRESSION  1. Mood disorder ([facility] [address]        DISPOSITION  Disposition:  to evaluate  Patient condition is stable        NOTE: This report was transcribed using voice recognition software.  Every effort was made to ensure accuracy; however, inadvertent computerized transcription errors may be present          Kiana Hagan MD  05/27/21 Eleazar Rudolph MD  05/27/21 [id number]

## 2021-05-28 ENCOUNTER — HOSPITAL ENCOUNTER (EMERGENCY)
Age: 41
Discharge: HOME | End: 2021-05-28
Attending: EMERGENCY MEDICINE
Payer: COMMERCIAL

## 2021-05-28 VITALS
TEMPERATURE: 96.7 F | SYSTOLIC BLOOD PRESSURE: 118 MMHG | OXYGEN SATURATION: 98 % | WEIGHT: 159 LBS | HEART RATE: 79 BPM | BODY MASS INDEX: 21.54 KG/M2 | HEIGHT: 72 IN | DIASTOLIC BLOOD PRESSURE: 89 MMHG | RESPIRATION RATE: 16 BRPM

## 2021-05-28 DIAGNOSIS — F19.10 SUBSTANCE ABUSE (HCC): ICD-10-CM

## 2021-05-28 DIAGNOSIS — F20.9 SCHIZOPHRENIA, UNSPECIFIED TYPE (HCC): Primary | ICD-10-CM

## 2021-05-28 LAB
ACETAMINOPHEN LEVEL: <5 MCG/ML (ref 10–30)
ALBUMIN SERPL-MCNC: 4.4 G/DL (ref 3.5–5.2)
ALP BLD-CCNC: 56 U/L (ref 40–129)
ALT SERPL-CCNC: 9 U/L (ref 0–40)
AMPHETAMINE SCREEN, URINE: NOT DETECTED
ANION GAP SERPL CALCULATED.3IONS-SCNC: 7 MMOL/L (ref 7–16)
AST SERPL-CCNC: 17 U/L (ref 0–39)
BACTERIA: ABNORMAL /HPF
BARBITURATE SCREEN URINE: NOT DETECTED
BASOPHILS ABSOLUTE: 0.09 E9/L (ref 0–0.2)
BASOPHILS RELATIVE PERCENT: 1.4 % (ref 0–2)
BENZODIAZEPINE SCREEN, URINE: NOT DETECTED
BILIRUB SERPL-MCNC: 0.3 MG/DL (ref 0–1.2)
BILIRUBIN URINE: NEGATIVE
BLOOD, URINE: NEGATIVE
BUN BLDV-MCNC: 11 MG/DL (ref 6–20)
CALCIUM SERPL-MCNC: 9 MG/DL (ref 8.6–10.2)
CANNABINOID SCREEN URINE: NOT DETECTED
CHLORIDE BLD-SCNC: 102 MMOL/L (ref 98–107)
CLARITY: CLEAR
CO2: 29 MMOL/L (ref 22–29)
COCAINE METABOLITE SCREEN URINE: POSITIVE
COLOR: YELLOW
CREAT SERPL-MCNC: 1 MG/DL (ref 0.7–1.2)
EOSINOPHILS ABSOLUTE: 0.34 E9/L (ref 0.05–0.5)
EOSINOPHILS RELATIVE PERCENT: 5.4 % (ref 0–6)
ETHANOL: <10 MG/DL (ref 0–0.08)
FENTANYL SCREEN, URINE: NOT DETECTED
GFR AFRICAN AMERICAN: >60
GFR NON-AFRICAN AMERICAN: >60 ML/MIN/1.73
GLUCOSE BLD-MCNC: 88 MG/DL (ref 74–99)
GLUCOSE URINE: NEGATIVE MG/DL
HCT VFR BLD CALC: 46.5 % (ref 37–54)
HEMOGLOBIN: 15 G/DL (ref 12.5–16.5)
IMMATURE GRANULOCYTES #: 0.01 E9/L
IMMATURE GRANULOCYTES %: 0.2 % (ref 0–5)
KETONES, URINE: NEGATIVE MG/DL
LEUKOCYTE ESTERASE, URINE: ABNORMAL
LYMPHOCYTES ABSOLUTE: 1.88 E9/L (ref 1.5–4)
LYMPHOCYTES RELATIVE PERCENT: 30.1 % (ref 20–42)
Lab: ABNORMAL
MCH RBC QN AUTO: 29.7 PG (ref 26–35)
MCHC RBC AUTO-ENTMCNC: 32.3 % (ref 32–34.5)
MCV RBC AUTO: 92.1 FL (ref 80–99.9)
METHADONE SCREEN, URINE: NOT DETECTED
MONOCYTES ABSOLUTE: 0.41 E9/L (ref 0.1–0.95)
MONOCYTES RELATIVE PERCENT: 6.6 % (ref 2–12)
NEUTROPHILS ABSOLUTE: 3.51 E9/L (ref 1.8–7.3)
NEUTROPHILS RELATIVE PERCENT: 56.3 % (ref 43–80)
NITRITE, URINE: NEGATIVE
OPIATE SCREEN URINE: NOT DETECTED
OXYCODONE URINE: NOT DETECTED
PDW BLD-RTO: 14.4 FL (ref 11.5–15)
PH UA: 6 (ref 5–9)
PHENCYCLIDINE SCREEN URINE: NOT DETECTED
PLATELET # BLD: 345 E9/L (ref 130–450)
PMV BLD AUTO: 9.3 FL (ref 7–12)
POTASSIUM SERPL-SCNC: 4.1 MMOL/L (ref 3.5–5)
PROTEIN UA: NEGATIVE MG/DL
RBC # BLD: 5.05 E12/L (ref 3.8–5.8)
RBC UA: ABNORMAL /HPF (ref 0–2)
SALICYLATE, SERUM: <0.3 MG/DL (ref 0–30)
SODIUM BLD-SCNC: 138 MMOL/L (ref 132–146)
SPECIFIC GRAVITY UA: 1.01 (ref 1–1.03)
TOTAL PROTEIN: 7.3 G/DL (ref 6.4–8.3)
TRICYCLIC ANTIDEPRESSANTS SCREEN SERUM: NEGATIVE NG/ML
UROBILINOGEN, URINE: 1 E.U./DL
WBC # BLD: 6.2 E9/L (ref 4.5–11.5)
WBC UA: ABNORMAL /HPF (ref 0–5)

## 2021-05-28 PROCEDURE — 82077 ASSAY SPEC XCP UR&BREATH IA: CPT

## 2021-05-28 PROCEDURE — 99284 EMERGENCY DEPT VISIT MOD MDM: CPT

## 2021-05-28 PROCEDURE — 80053 COMPREHEN METABOLIC PANEL: CPT

## 2021-05-28 PROCEDURE — 81001 URINALYSIS AUTO W/SCOPE: CPT

## 2021-05-28 PROCEDURE — 85025 COMPLETE CBC W/AUTO DIFF WBC: CPT

## 2021-05-28 PROCEDURE — 80179 DRUG ASSAY SALICYLATE: CPT

## 2021-05-28 PROCEDURE — 80307 DRUG TEST PRSMV CHEM ANLYZR: CPT

## 2021-05-28 PROCEDURE — 80143 DRUG ASSAY ACETAMINOPHEN: CPT

## 2021-05-28 NOTE — ED PROVIDER NOTES
ED Attending  CC: No  HPI:  5/28/21, Time: 4:20 AM EDT         Josafat Kapadia is a 45 y.o. male presenting to the ED for no psych medications beginning today . The complaint has been persistent, moderate in severity, and worsened by nothing. Patient states he is feeling paranoid since he is not on his medications. He states he has intermittent suicidal ideations. He was just seen yesterday discharge he states he was discussed charged without any of his medications. No recent illness no runny nose congestion sore throat cough fever chills    Review of Systems:   A complete review of systems was performed and pertinent positives and negatives are stated within HPI, all other systems reviewed and are negative.          --------------------------------------------- PAST HISTORY ---------------------------------------------  Past Medical History:  has a past medical history of Depression and Schizoaffective disorder ([facility] [address] Past Surgical History:  has a past surgical history that includes eye surgery (19 years ago). Social History:  reports that he has been smoking cigars and cigarettes. He has a 12.00 pack-year smoking history. He has never used smokeless tobacco. He reports current drug use. Drugs: Marijuana and Cocaine. He reports that he does not drink alcohol. Family History: family history includes Mental Illness in his mother; No Known Problems in his father; Substance Abuse in his mother. The patients home medications have been reviewed.     Allergies: Chlorpheniramine-phenylephrine, Penicillins, and Rondec-d [chlophedianol-pseudoephedrine]    -------------------------------------------------- RESULTS -------------------------------------------------  All laboratory and radiology results have been personally reviewed by myself   LABS:  Results for orders placed or performed during the hospital encounter of 05/28/21   CBC Auto Differential   Result Value Ref Range    WBC 6.2 4.5 - 11.5 E9/L DETECTED Negative < 200 ng/mL    Benzodiazepine Screen, Urine NOT DETECTED Negative < 200 ng/mL    Cannabinoid Scrn, Ur NOT DETECTED Negative < 50ng/mL    Cocaine Metabolite Screen, Urine POSITIVE (A) Negative < 300 ng/mL    Opiate Scrn, Ur NOT DETECTED Negative < 300ng/mL    PCP Screen, Urine NOT DETECTED Negative < 25 ng/mL    Methadone Screen, Urine NOT DETECTED Negative <300 ng/mL    Oxycodone Urine NOT DETECTED Negative <100 ng/mL    FENTANYL SCREEN, URINE NOT DETECTED Negative <1 ng/mL    Drug Screen Comment: see below    Serum Drug Screen   Result Value Ref Range    Ethanol Lvl <10 mg/dL    Acetaminophen Level <5.0 (L) 10.0 - 16.1 mcg/mL    Salicylate, Serum <8.3 0.0 - 30.0 mg/dL    TCA Scrn NEGATIVE Cutoff:300 ng/mL   Microscopic Urinalysis   Result Value Ref Range    WBC, UA 0-1 0 - 5 /HPF    RBC, UA 0-1 0 - 2 /HPF    Bacteria, UA RARE (A) None Seen /HPF       RADIOLOGY:  Interpreted by Radiologist.  No orders to display       ------------------------- NURSING NOTES AND VITALS REVIEWED ---------------------------   The nursing notes within the ED encounter and vital signs as below have been reviewed. /85   Pulse 78   Temp 96 °F (35.6 °C) (Temporal)   Resp 16   Ht 6' (1.829 m)   Wt 159 lb (72.1 kg)   SpO2 96%   BMI 21.56 kg/m²   Oxygen Saturation Interpretation: Normal      ---------------------------------------------------PHYSICAL EXAM--------------------------------------      Constitutional/General: Alert and oriented x3, well appearing, non toxic in NAD  Head: Normocephalic and atraumatic  Eyes: PERRL, EOMI  Mouth: Oropharynx clear, handling secretions, no trismus  Neck: Supple, full ROM,   Pulmonary: Lungs clear to auscultation bilaterally, no wheezes, rales, or rhonchi. Not in respiratory distress  Cardiovascular:  Regular rate and rhythm, no murmurs, gallops, or rubs. 2+ distal pulses  Abdomen: Soft, non tender, non distended,   Extremities: Moves all extremities x 4.  Warm and well perfused  Skin: warm and dry without rash  Neurologic: GCS 15,  Psych: Normal Affect      ------------------------------ ED COURSE/MEDICAL DECISION MAKING----------------------  Medications - No data to display      ED COURSE:       Medical Decision Making:    Patient was just seen yesterday and evaluated discharged. Patient claims he was discharged without medication. Counseling: The emergency provider has spoken with the patient and discussed todays results, in addition to providing specific details for the plan of care and counseling regarding the diagnosis and prognosis. Questions are answered at this time and they are agreeable with the plan.      --------------------------------- IMPRESSION AND DISPOSITION ---------------------------------    IMPRESSION  1. Schizophrenia, unspecified type ([facility]    2. Substance abuse ([facility]        DISPOSITION  Disposition:  to evaluate  Patient condition is stable      NOTE: This report was transcribed using voice recognition software. Every effort was made to ensure accuracy; however, inadvertent computerized transcription errors may be present    ATTENDING PROVIDER ATTESTATION:     I have personally performed and/or participated in the history, exam, medical decision making, and procedures and agree with all pertinent clinical information. I have also reviewed and agree with the past medical, family and social history unless otherwise noted. My findings/Plan: Patient presenting here because of psychiatric evaluation. Patient reporting paranoid he is having some suicidal thoughts. Patient reporting no homicidal thoughts he reports no fever chills or chest pain. Patient reporting no abdominal pain. Patient has been seen here several times for the same complaint. Patient is awake alert [place] x3 heart lung exam normal abdomen is soft and nontender patient neurologically intact labs noted reviewed. Plan will be for  to evaluate. Dread Cadena MD  05/28/21 [id number]       Dread Cadena MD  05/28/21 9137

## 2021-05-28 NOTE — ED NOTES
Emergency Department [facility] AN AFFILIATE OF [facility] Biopsychosocial Assessment Note     Chief Complaint:      Patient is a 45year old, male presenting to ED for increased paranoia and feeling that people are out to harm him. This is patient's baseline delusion.     This patient is well known to this SW and these are patient baseline behaviors.     Patient denies suicidal ideation. Patient denies homicidal ideation.     MSE: Patient is alert & oriented x 4. Patient mood is stable, affect flat. Patient thought process is clear, speech pressured at times. Patient has some baseline auditory hallucinations. No reports of visual hallucinations.     Clinical Summary/History:      Patient has a mental health hx of schizoaffective disorder, non-compliant with outpatient mental health treatment. Patient last psychiatric hospitalization was 5/2/21 for acute psychosis and suicidal ideation at [facility]. Patient last received his invega injection during his hospitalization was [facility] in May 2021. Patient needs to follow up with an outpatient provider to regularly administer his psychiatric medications.     Ok sleep, ok appetite, no reports of hopelessness/helplessness.     Patient has a hx of polysubstance abuse.     Gender  [x]? ?? Male []? ?? Female []? ?? Transgender  []??? Other     Sexual Orientation    [x]? ?? Heterosexual []? ?? Homosexual []??? Bisexual []? ?? Other     Suicidal Behavioral: CSSR-S Complete. [x]? ?? Reports:               [x]? ?? Past [x]? ?? Present   []? ?? Denies     Homicidal/ Violent Behavior  []? ?? Reports:              []??? Past []? ?? Present   [x]? ?? Denies      Hallucinations/Delusions   [x]? ?? Reports:  Auditory hallucinations  []? ?? Denies      Substance Use/Alcohol Use/Addiction: SBIRT Screen Complete. [x]? ?? Reports:  Crack cocaine & cannabis  []? ?? Denies      Trauma History  []? ?? Reports:  []??? Denies      Collateral Information:         Level of Care/Disposition Plan  [x]? ?? Home: [x]??? Outpatient Provider:   []??? Crisis Unit:   []??? Inpatient Psychiatric Unit:  []??? Other:         Patient is not currently presenting with any acute psychiatric symptoms that would warrant inpatient or crisis unit referral. Patient symptoms can be effectively managed with an outpatient provider.     Patient to be provided resources for local homeless shelters and local outpatient providers with walk-in services.      FRANCISCO Selby, [employer]  05/28/21 0961

## 2021-05-30 ENCOUNTER — HOSPITAL ENCOUNTER (EMERGENCY)
Age: 41
Discharge: HOME | End: 2021-05-30
Attending: EMERGENCY MEDICINE
Payer: COMMERCIAL

## 2021-05-30 VITALS
SYSTOLIC BLOOD PRESSURE: 111 MMHG | OXYGEN SATURATION: 96 % | DIASTOLIC BLOOD PRESSURE: 73 MMHG | RESPIRATION RATE: 17 BRPM | TEMPERATURE: 97.3 F | HEART RATE: 73 BPM

## 2021-05-30 DIAGNOSIS — F39 MOOD DISORDER (HCC): Primary | ICD-10-CM

## 2021-05-30 DIAGNOSIS — F19.10 SUBSTANCE ABUSE (HCC): ICD-10-CM

## 2021-05-30 PROCEDURE — 99283 EMERGENCY DEPT VISIT LOW MDM: CPT

## 2021-05-30 NOTE — ED NOTES
Patient extremely aggressive upon discharge \"I ain't signing a fucking thing\" refusing to sign discharge packet. Threw discharge packet on floor, when instructed to pick it up by this nurse patient responded with \"[facility], [facility]\" informed to not make threats because charges will be pressed, patient continues to talk in loud voice about being a black man and [place] is his city.  \"I played this fucking game, you all wish you knew how to play it like me\"      Mildred Stevenson, MARY  05/30/21 Nasir Marcos, MARY  05/30/21 6607

## 2021-05-30 NOTE — ED NOTES
Patient aggressive when approached by Dr. Heather Carmona, yelling \"You got me bent mother f*cker! \"      Anali Arias RN  05/30/21 7264

## 2021-05-30 NOTE — ED PROVIDER NOTES
examination    This patient has remained hemodynamically stable during their ED course. Counseling: The emergency provider has spoken with the patient and discussed todays results, in addition to providing specific details for the plan of care and counseling regarding the diagnosis and prognosis. Questions are answered at this time and they are agreeable with the plan.       --------------------------------- IMPRESSION AND DISPOSITION ---------------------------------    IMPRESSION  1. [facility]    2. Substance abuse ([facility]        DISPOSITION  Disposition: Discharge to home  Patient condition is stable    NOTE: This report was transcribed using voice recognition software.  Every effort was made to ensure accuracy; however, inadvertent computerized transcription errors may be present        Jairo Pickard MD  05/30/21 [id number]

## 2021-06-02 ENCOUNTER — HOSPITAL ENCOUNTER (EMERGENCY)
Age: 41
Discharge: PSYCHIATRIC OR SUBSTANCE USE FACILITY | End: 2021-06-02
Attending: EMERGENCY MEDICINE
Payer: COMMERCIAL

## 2021-06-02 VITALS
DIASTOLIC BLOOD PRESSURE: 59 MMHG | SYSTOLIC BLOOD PRESSURE: 101 MMHG | HEIGHT: 72 IN | TEMPERATURE: 97.3 F | RESPIRATION RATE: 16 BRPM | HEART RATE: 92 BPM | WEIGHT: 159 LBS | OXYGEN SATURATION: 96 % | BODY MASS INDEX: 21.54 KG/M2

## 2021-06-02 DIAGNOSIS — F19.10 SUBSTANCE ABUSE (HCC): Primary | ICD-10-CM

## 2021-06-02 LAB
ACETAMINOPHEN LEVEL: <5 MCG/ML (ref 10–30)
ALBUMIN SERPL-MCNC: 4 G/DL (ref 3.5–5.2)
ALP BLD-CCNC: 46 U/L (ref 40–129)
ALT SERPL-CCNC: 11 U/L (ref 0–40)
AMPHETAMINE SCREEN, URINE: NOT DETECTED
ANION GAP SERPL CALCULATED.3IONS-SCNC: 9 MMOL/L (ref 7–16)
AST SERPL-CCNC: 21 U/L (ref 0–39)
BACTERIA: NORMAL /HPF
BARBITURATE SCREEN URINE: NOT DETECTED
BASOPHILS ABSOLUTE: 0.05 E9/L (ref 0–0.2)
BASOPHILS RELATIVE PERCENT: 1.4 % (ref 0–2)
BENZODIAZEPINE SCREEN, URINE: NOT DETECTED
BILIRUB SERPL-MCNC: 0.7 MG/DL (ref 0–1.2)
BILIRUBIN URINE: ABNORMAL
BLOOD, URINE: NEGATIVE
BUN BLDV-MCNC: 13 MG/DL (ref 6–20)
CALCIUM SERPL-MCNC: 9 MG/DL (ref 8.6–10.2)
CANNABINOID SCREEN URINE: POSITIVE
CHLORIDE BLD-SCNC: 103 MMOL/L (ref 98–107)
CLARITY: CLEAR
CO2: 28 MMOL/L (ref 22–29)
COCAINE METABOLITE SCREEN URINE: POSITIVE
COLOR: YELLOW
CREAT SERPL-MCNC: 1.2 MG/DL (ref 0.7–1.2)
EKG ATRIAL RATE: 62 BPM
EKG P AXIS: 78 DEGREES
EKG P-R INTERVAL: 170 MS
EKG Q-T INTERVAL: 406 MS
EKG QRS DURATION: 102 MS
EKG QTC CALCULATION (BAZETT): 412 MS
EKG R AXIS: 83 DEGREES
EKG T AXIS: 61 DEGREES
EKG VENTRICULAR RATE: 62 BPM
EOSINOPHILS ABSOLUTE: 0.12 E9/L (ref 0.05–0.5)
EOSINOPHILS RELATIVE PERCENT: 3.3 % (ref 0–6)
ETHANOL: <10 MG/DL (ref 0–0.08)
FENTANYL SCREEN, URINE: NOT DETECTED
GFR AFRICAN AMERICAN: >60
GFR NON-AFRICAN AMERICAN: >60 ML/MIN/1.73
GLUCOSE BLD-MCNC: 89 MG/DL (ref 74–99)
GLUCOSE URINE: NEGATIVE MG/DL
HCT VFR BLD CALC: 44.3 % (ref 37–54)
HEMOGLOBIN: 14.2 G/DL (ref 12.5–16.5)
IMMATURE GRANULOCYTES #: 0 E9/L
IMMATURE GRANULOCYTES %: 0 % (ref 0–5)
INFLUENZA A: NOT DETECTED
INFLUENZA B: NOT DETECTED
KETONES, URINE: ABNORMAL MG/DL
LEUKOCYTE ESTERASE, URINE: NEGATIVE
LYMPHOCYTES ABSOLUTE: 1.14 E9/L (ref 1.5–4)
LYMPHOCYTES RELATIVE PERCENT: 31.1 % (ref 20–42)
Lab: ABNORMAL
MCH RBC QN AUTO: 29.8 PG (ref 26–35)
MCHC RBC AUTO-ENTMCNC: 32.1 % (ref 32–34.5)
MCV RBC AUTO: 93.1 FL (ref 80–99.9)
METHADONE SCREEN, URINE: POSITIVE
MONOCYTES ABSOLUTE: 0.4 E9/L (ref 0.1–0.95)
MONOCYTES RELATIVE PERCENT: 10.9 % (ref 2–12)
NEUTROPHILS ABSOLUTE: 1.96 E9/L (ref 1.8–7.3)
NEUTROPHILS RELATIVE PERCENT: 53.3 % (ref 43–80)
NITRITE, URINE: NEGATIVE
OPIATE SCREEN URINE: NOT DETECTED
OXYCODONE URINE: NOT DETECTED
PDW BLD-RTO: 14.6 FL (ref 11.5–15)
PH UA: 6 (ref 5–9)
PHENCYCLIDINE SCREEN URINE: NOT DETECTED
PLATELET # BLD: 290 E9/L (ref 130–450)
PMV BLD AUTO: 9.9 FL (ref 7–12)
POTASSIUM REFLEX MAGNESIUM: 4.3 MMOL/L (ref 3.5–5)
PROTEIN UA: ABNORMAL MG/DL
RBC # BLD: 4.76 E12/L (ref 3.8–5.8)
RBC UA: NORMAL /HPF (ref 0–2)
SALICYLATE, SERUM: <0.3 MG/DL (ref 0–30)
SARS-COV-2 RNA, RT PCR: NOT DETECTED
SODIUM BLD-SCNC: 140 MMOL/L (ref 132–146)
SPECIFIC GRAVITY UA: >=1.03 (ref 1–1.03)
TOTAL PROTEIN: 6.7 G/DL (ref 6.4–8.3)
TRICYCLIC ANTIDEPRESSANTS SCREEN SERUM: NEGATIVE NG/ML
UROBILINOGEN, URINE: 1 E.U./DL
WBC # BLD: 3.7 E9/L (ref 4.5–11.5)
WBC UA: NORMAL /HPF (ref 0–5)

## 2021-06-02 PROCEDURE — 82077 ASSAY SPEC XCP UR&BREATH IA: CPT

## 2021-06-02 PROCEDURE — 85025 COMPLETE CBC W/AUTO DIFF WBC: CPT

## 2021-06-02 PROCEDURE — 93005 ELECTROCARDIOGRAM TRACING: CPT | Performed by: EMERGENCY MEDICINE

## 2021-06-02 PROCEDURE — 81001 URINALYSIS AUTO W/SCOPE: CPT

## 2021-06-02 PROCEDURE — 80053 COMPREHEN METABOLIC PANEL: CPT

## 2021-06-02 PROCEDURE — 87636 SARSCOV2 & INF A&B AMP PRB: CPT

## 2021-06-02 PROCEDURE — 80307 DRUG TEST PRSMV CHEM ANLYZR: CPT

## 2021-06-02 PROCEDURE — 99285 EMERGENCY DEPT VISIT HI MDM: CPT

## 2021-06-02 PROCEDURE — 80179 DRUG ASSAY SALICYLATE: CPT

## 2021-06-02 PROCEDURE — 80143 DRUG ASSAY ACETAMINOPHEN: CPT

## 2021-06-02 ASSESSMENT — PAIN DESCRIPTION - DESCRIPTORS: DESCRIPTORS: ACHING;OTHER (COMMENT)

## 2021-06-02 ASSESSMENT — PAIN DESCRIPTION - FREQUENCY: FREQUENCY: INTERMITTENT

## 2021-06-02 ASSESSMENT — PAIN DESCRIPTION - PROGRESSION: CLINICAL_PROGRESSION: NOT CHANGED

## 2021-06-02 ASSESSMENT — PAIN DESCRIPTION - LOCATION: LOCATION: FOOT

## 2021-06-02 ASSESSMENT — PAIN DESCRIPTION - ORIENTATION: ORIENTATION: RIGHT;LEFT

## 2021-06-02 ASSESSMENT — PAIN SCALES - GENERAL: PAINLEVEL_OUTOF10: 10

## 2021-06-02 ASSESSMENT — PAIN DESCRIPTION - PAIN TYPE: TYPE: CHRONIC PAIN

## 2021-06-02 NOTE — ED PROVIDER NOTES
HPI:  6/2/21,   Time: 10:28 AM EDT       Jodie Wyatt. is a 45 y.o. male presenting to the ED for cocaine abuse, beginning unk ago. The complaint has been persistent, moderate in severity, and worsened by nothing. Here for referal to rehab, last used yesterday. No n/v/cp/sob/cough/congestoin/si/hi/hallucinations    Review of Systems:   Pertinent positives and negatives are stated within HPI, all other systems reviewed and are negative.          --------------------------------------------- PAST HISTORY ---------------------------------------------  Past Medical History:  has a past medical history of Depression and Schizoaffective disorder ([facility] [address] [facility] Past Surgical History:  has a past surgical history that includes eye surgery (19 years ago). Social History:  reports that he has been smoking cigars and cigarettes. He has a 12.00 pack-year smoking history. He has never used smokeless tobacco. He reports current drug use. Frequency: 7.00 times per week. Drugs: Cocaine and Marijuana. He reports that he does not drink alcohol. Family History: family history includes Mental Illness in his mother; No Known Problems in his father; Substance Abuse in his mother. The patients home medications have been reviewed. Allergies: Chlorpheniramine-phenylephrine, Penicillins, and Rondec-d [chlophedianol-pseudoephedrine]        ---------------------------------------------------PHYSICAL EXAM--------------------------------------    Constitutional/General: Alert and oriented x3, well appearing, non toxic in NAD  Head: Normocephalic and atraumatic  Eyes: PERRL, EOMI, conjunctive normal, sclera non icteric  Mouth: Oropharynx clear, handling secretions,   Neck: Supple, full ROM,   Respiratory: Lungs clear to auscultation bilaterally, no wheezes, rales, or rhonchi. Not in respiratory distress  Cardiovascular:  Regular rate. Regular rhythm. No murmurs, gallops, or rubs.  2+ distal pulses  Chest: No chest wall tenderness  GI:  Abdomen Soft, Non tender, Non distended. Musculoskeletal: Moves all extremities x 4. Warm and well perfused, no clubbing, cyanosis, or edema. Capillary refill <3 seconds  Integument: skin warm and dry. No rashes. Lymphatic: no lymphadenopathy noted  Neurologic: GCS 15, no focal deficits, symmetric strength 5/5 in the upper and lower extremities bilaterally  Psychiatric: Normal Affect    -------------------------------------------------- RESULTS -------------------------------------------------  I have personally reviewed all laboratory and imaging results for this patient. Results are listed below.      LABS:  Results for orders placed or performed during the hospital encounter of 06/02/21   COVID-19 & Influenza Combo    Specimen: Nasopharyngeal Swab   Result Value Ref Range    SARS-CoV-2 RNA, RT PCR NOT DETECTED NOT DETECTED    INFLUENZA A NOT DETECTED NOT DETECTED    INFLUENZA B NOT DETECTED NOT DETECTED   CBC Auto Differential   Result Value Ref Range    WBC 3.7 (L) 4.5 - 11.5 E9/L    RBC 4.76 3.80 - 5.80 E12/L    Hemoglobin 14.2 12.5 - 16.5 g/dL    Hematocrit 44.3 37.0 - 54.0 %    MCV 93.1 80.0 - 99.9 fL    MCH 29.8 26.0 - 35.0 pg    MCHC 32.1 32.0 - 34.5 %    RDW 14.6 11.5 - 15.0 fL    Platelets 843 447 - 234 E9/L    MPV 9.9 7.0 - 12.0 fL    Neutrophils % 53.3 43.0 - 80.0 %    Immature Granulocytes % 0.0 0.0 - 5.0 %    Lymphocytes % 31.1 20.0 - 42.0 %    Monocytes % 10.9 2.0 - 12.0 %    Eosinophils % 3.3 0.0 - 6.0 %    Basophils % 1.4 0.0 - 2.0 %    Neutrophils Absolute 1.96 1.80 - 7.30 E9/L    Immature Granulocytes # 0.00 E9/L    Lymphocytes Absolute 1.14 (L) 1.50 - 4.00 E9/L    Monocytes Absolute 0.40 0.10 - 0.95 E9/L    Eosinophils Absolute 0.12 0.05 - 0.50 E9/L    Basophils Absolute 0.05 0.00 - 0.20 E9/L   Comprehensive Metabolic Panel w/ Reflex to MG   Result Value Ref Range    Sodium 140 132 - 146 mmol/L    Potassium reflex Magnesium 4.3 3.5 - 5.0 mmol/L    Chloride 103 98 - 107 mmol/L    CO2 28 22 - 29 mmol/L    Anion Gap 9 7 - 16 mmol/L    Glucose 89 74 - 99 mg/dL    BUN 13 6 - 20 mg/dL    CREATININE 1.2 0.7 - 1.2 mg/dL    GFR Non-African American >60 >=60 mL/min/1.73    GFR African American >60     Calcium 9.0 8.6 - 10.2 mg/dL    Total Protein 6.7 6.4 - 8.3 g/dL    Albumin 4.0 3.5 - 5.2 g/dL    Total Bilirubin 0.7 0.0 - 1.2 mg/dL    Alkaline Phosphatase 46 40 - 129 U/L    ALT 11 0 - 40 U/L    AST 21 0 - 39 U/L   Urinalysis, reflex to microscopic   Result Value Ref Range    Color, UA Yellow Straw/Yellow    Clarity, UA Clear Clear    Glucose, Ur Negative Negative mg/dL    Bilirubin Urine SMALL (A) Negative    Ketones, Urine TRACE (A) Negative mg/dL    Specific Gravity, UA >=1.030 1.005 - 1.030    Blood, Urine Negative Negative    pH, UA 6.0 5.0 - 9.0    Protein, UA TRACE Negative mg/dL    Urobilinogen, Urine 1.0 <2.0 E.U./dL    Nitrite, Urine Negative Negative    Leukocyte Esterase, Urine Negative Negative   Serum Drug Screen   Result Value Ref Range    Ethanol Lvl <10 mg/dL    Acetaminophen Level <5.0 (L) 10.0 - 68.4 mcg/mL    Salicylate, Serum <6.3 0.0 - 30.0 mg/dL    TCA Scrn NEGATIVE Cutoff:300 ng/mL   Urine Drug Screen   Result Value Ref Range    Amphetamine Screen, Urine NOT DETECTED Negative <1000 ng/mL    Barbiturate Screen, Ur NOT DETECTED Negative < 200 ng/mL    Benzodiazepine Screen, Urine NOT DETECTED Negative < 200 ng/mL    Cannabinoid Scrn, Ur POSITIVE (A) Negative < 50ng/mL    Cocaine Metabolite Screen, Urine POSITIVE (A) Negative < 300 ng/mL    Opiate Scrn, Ur NOT DETECTED Negative < 300ng/mL    PCP Screen, Urine NOT DETECTED Negative < 25 ng/mL    Methadone Screen, Urine POSITIVE (A) Negative <300 ng/mL    Oxycodone Urine NOT DETECTED Negative <100 ng/mL    FENTANYL SCREEN, URINE NOT DETECTED Negative <1 ng/mL    Drug Screen Comment: see below    Microscopic Urinalysis   Result Value Ref Range    WBC, UA 1-3 0 - 5 /HPF    RBC, UA NONE 0 - 2 /HPF    Bacteria, UA NONE SEEN None Seen /HPF   EKG 12 Lead   Result Value Ref Range    Ventricular Rate 62 BPM    Atrial Rate 62 BPM    P-R Interval 170 ms    QRS Duration 102 ms    Q-T Interval 406 ms    QTc Calculation (Bazett) 412 ms    P Axis 78 degrees    R Axis 83 degrees    T Axis 61 degrees       RADIOLOGY:  Interpreted by Radiologist.  No orders to display       EKG: This EKG is signed and interpreted by the EP. Time: 1042  Rate: 60  Rhythm: sinus  Interpretation: non spec  Comparison:       ------------------------- NURSING NOTES AND VITALS REVIEWED ---------------------------   The nursing notes within the ED encounter and vital signs as below have been reviewed by myself. BP (!) 102/49   Pulse 94   Temp 97.3 °F (36.3 °C)   Resp 16   Ht 6' (1.829 m)   Wt 159 lb (72.1 kg)   SpO2 95%   BMI 21.56 kg/m²   Oxygen Saturation Interpretation: Normal    The patients available past medical records and past encounters were reviewed. ------------------------------ ED COURSE/MEDICAL DECISION MAKING----------------------  Medications - No data to display      ED COURSE:       Medical Decision Making:    Pt medically clear for social work referal to rehab      This patient's ED course included: a personal history and physicial examination    This patient has remained hemodynamically stable during their ED course. Re-Evaluations:             Re-evaluation. Patients symptoms show no change          Counseling: The emergency provider has spoken with the patient and discussed todays results, in addition to providing specific details for the plan of care and counseling regarding the diagnosis and prognosis. Questions are answered at this time and they are agreeable with the plan.       --------------------------------- IMPRESSION AND DISPOSITION ---------------------------------    IMPRESSION  1.  [facility]        DISPOSITION  Disposition: transfer to [place]  Patient condition is stable    NOTE: This report was

## 2021-06-02 NOTE — ED NOTES
SW placed phone call to [employer] [phone] and spoke with Rep. [id number]. Initiated referral for Dual Dx treatment per patient request. Requested referral to [facility].      FRANCISCO Selby, DERECKW  06/02/21 1500

## 2021-06-02 NOTE — ED NOTES
Emergency Department [facility] AN AFFILIATE [facility] Biopsychosocial Assessment Note     Chief Complaint:      Patient is a 45year old, male presenting to ED for increased depression associated with chronic substance abuse. Patient has a hx of crack cocaine abuse and cannabis use.     Patient endorses suicidal ideation - no plan. Patient denies homicidal ideation.     MSE: Patient is alert & oriented x 4. Patient mood is stable, affect flat. Patient thought process is clear, speech pressured at times. Patient denies auditory hallucinations. No reports of visual hallucinations.     Clinical Summary/History:      Patient has a mental health hx of schizoaffective disorder, non-compliant with outpatient mental health treatment. Patient last psychiatric hospitalization was 5/2/21 for acute psychosis and suicidal ideation at [facility].     Patient last received his invega injection during his hospitalization was [facility] in May 2021. Patient needs to follow up with an outpatient provider to regularly administer his psychiatric medications.     Ok sleep, ok appetite, no reports of hopelessness/helplessness.     Patient has a hx of polysubstance abuse.     Gender  [x]???? Male []???? Female []???? Transgender  []???? Other     Sexual Orientation    [x]???? Heterosexual []???? Homosexual []???? Bisexual []???? Other     Suicidal Behavioral: CSSR-S Complete. [x]???? Reports:               [x]? ??? Past [x]???? Present   []???? Denies     Homicidal/ Violent Behavior  []???? Reports:              []???? Past []???? Present   [x]???? Denies      Hallucinations/Delusions   [x]???? Reports:  Auditory hallucinations  []???? Denies      Substance Use/Alcohol Use/Addiction: SBIRT Screen Complete.    [x]???? Reports:  Crack cocaine & cannabis  []???? Denies      Trauma History  []???? Reports:  []???? Denies      Collateral Information:         Level of Care/Disposition Plan  []???? Home:   []???? Outpatient Provider:   []???? Crisis Unit: [x]???? Inpatient Psychiatric Unit:  []???? Other:      Patient would benefit from a referral for Dual Dx treatment. Patient requesting drug/alcohol treatment.   FRANCISCO Pope, MAJOR  06/02/21 [address], MSW, MAJOR  06/02/21 5640

## 2021-06-02 NOTE — ED NOTES
Consulted peer support to assess patient for drug/alcohol services/resources.      Mary Godinez, MSW, LSW  06/02/21 9666

## 2021-06-02 NOTE — ED NOTES
The pt was accepted to [facility] by Dr. Nikia Wren. N to N to be called to [phone]. The pt is going to the 1600 unit.   Physicians ETA is for [facility]  06/02/21 0725

## 2021-06-16 ENCOUNTER — HOSPITAL ENCOUNTER (EMERGENCY)
Age: 41
Discharge: HOME | End: 2021-06-16
Attending: EMERGENCY MEDICINE
Payer: COMMERCIAL

## 2021-06-16 VITALS
SYSTOLIC BLOOD PRESSURE: 112 MMHG | BODY MASS INDEX: 20.86 KG/M2 | DIASTOLIC BLOOD PRESSURE: 57 MMHG | RESPIRATION RATE: 16 BRPM | WEIGHT: 154 LBS | HEART RATE: 68 BPM | TEMPERATURE: 97.4 F | HEIGHT: 72 IN | OXYGEN SATURATION: 96 %

## 2021-06-16 DIAGNOSIS — F39 MOOD DISORDER (HCC): Primary | ICD-10-CM

## 2021-06-16 LAB
ACETAMINOPHEN LEVEL: <5 MCG/ML (ref 10–30)
ALBUMIN SERPL-MCNC: 3.9 G/DL (ref 3.5–5.2)
ALP BLD-CCNC: 45 U/L (ref 40–129)
ALT SERPL-CCNC: 11 U/L (ref 0–40)
AMPHETAMINE SCREEN, URINE: NOT DETECTED
ANION GAP SERPL CALCULATED.3IONS-SCNC: 9 MMOL/L (ref 7–16)
AST SERPL-CCNC: 13 U/L (ref 0–39)
BARBITURATE SCREEN URINE: NOT DETECTED
BASOPHILS ABSOLUTE: 0.07 E9/L (ref 0–0.2)
BASOPHILS RELATIVE PERCENT: 1.3 % (ref 0–2)
BENZODIAZEPINE SCREEN, URINE: NOT DETECTED
BILIRUB SERPL-MCNC: 0.4 MG/DL (ref 0–1.2)
BUN BLDV-MCNC: 14 MG/DL (ref 6–20)
CALCIUM SERPL-MCNC: 8.8 MG/DL (ref 8.6–10.2)
CANNABINOID SCREEN URINE: POSITIVE
CHLORIDE BLD-SCNC: 103 MMOL/L (ref 98–107)
CO2: 27 MMOL/L (ref 22–29)
COCAINE METABOLITE SCREEN URINE: POSITIVE
CREAT SERPL-MCNC: 1 MG/DL (ref 0.7–1.2)
EOSINOPHILS ABSOLUTE: 0.2 E9/L (ref 0.05–0.5)
EOSINOPHILS RELATIVE PERCENT: 3.8 % (ref 0–6)
ETHANOL: <10 MG/DL (ref 0–0.08)
FENTANYL SCREEN, URINE: NOT DETECTED
GFR AFRICAN AMERICAN: >60
GFR NON-AFRICAN AMERICAN: >60 ML/MIN/1.73
GLUCOSE BLD-MCNC: 89 MG/DL (ref 74–99)
HCT VFR BLD CALC: 42.3 % (ref 37–54)
HEMOGLOBIN: 13.6 G/DL (ref 12.5–16.5)
IMMATURE GRANULOCYTES #: 0.02 E9/L
IMMATURE GRANULOCYTES %: 0.4 % (ref 0–5)
LYMPHOCYTES ABSOLUTE: 1.71 E9/L (ref 1.5–4)
LYMPHOCYTES RELATIVE PERCENT: 32.8 % (ref 20–42)
Lab: ABNORMAL
MCH RBC QN AUTO: 29.8 PG (ref 26–35)
MCHC RBC AUTO-ENTMCNC: 32.2 % (ref 32–34.5)
MCV RBC AUTO: 92.6 FL (ref 80–99.9)
METHADONE SCREEN, URINE: NOT DETECTED
MONOCYTES ABSOLUTE: 0.35 E9/L (ref 0.1–0.95)
MONOCYTES RELATIVE PERCENT: 6.7 % (ref 2–12)
NEUTROPHILS ABSOLUTE: 2.86 E9/L (ref 1.8–7.3)
NEUTROPHILS RELATIVE PERCENT: 55 % (ref 43–80)
OPIATE SCREEN URINE: NOT DETECTED
OXYCODONE URINE: NOT DETECTED
PDW BLD-RTO: 14.2 FL (ref 11.5–15)
PHENCYCLIDINE SCREEN URINE: NOT DETECTED
PLATELET # BLD: 319 E9/L (ref 130–450)
PMV BLD AUTO: 9.2 FL (ref 7–12)
POTASSIUM SERPL-SCNC: 3.9 MMOL/L (ref 3.5–5)
RBC # BLD: 4.57 E12/L (ref 3.8–5.8)
SALICYLATE, SERUM: <0.3 MG/DL (ref 0–30)
SODIUM BLD-SCNC: 139 MMOL/L (ref 132–146)
TOTAL PROTEIN: 6.2 G/DL (ref 6.4–8.3)
TRICYCLIC ANTIDEPRESSANTS SCREEN SERUM: NEGATIVE NG/ML
WBC # BLD: 5.2 E9/L (ref 4.5–11.5)

## 2021-06-16 PROCEDURE — 80307 DRUG TEST PRSMV CHEM ANLYZR: CPT

## 2021-06-16 PROCEDURE — 99285 EMERGENCY DEPT VISIT HI MDM: CPT

## 2021-06-16 PROCEDURE — 82077 ASSAY SPEC XCP UR&BREATH IA: CPT

## 2021-06-16 PROCEDURE — 80143 DRUG ASSAY ACETAMINOPHEN: CPT

## 2021-06-16 PROCEDURE — 80053 COMPREHEN METABOLIC PANEL: CPT

## 2021-06-16 PROCEDURE — 80179 DRUG ASSAY SALICYLATE: CPT

## 2021-06-16 PROCEDURE — 85025 COMPLETE CBC W/AUTO DIFF WBC: CPT

## 2021-06-16 NOTE — ED NOTES
Pink wrist band placed on pt for c/o SI  Charge RN aware of pt complaint and to be sending ER staff up to triage pt.  No further orders at this time     Marie Acevedo RN  06/16/21 2733

## 2021-06-16 NOTE — ED NOTES
Patient provided with boxed lunch and orange juice per request. Patient notified of need for urine specimen. Specimen cup at bedside.       David Goldberg RN  06/16/21 4181

## 2021-06-16 NOTE — ED NOTES
Patient sleeping, responds to voice. Informed patient urine specimen is still required. Patient ambulated to restroom with specimen cup. No distress noted.       Arun Hinds RN  06/16/21 5492

## 2021-06-16 NOTE — ED NOTES
All patient's belongings placed in 2 labeled belongings bag and bags locked in [facility] AN AFFILIATE OF [place] [id number] per policy.       Marylu Thurston RN  06/16/21 0127

## 2021-06-16 NOTE — ED PROVIDER NOTES
HPI:  6/16/21, Time: 12:39 AM EDT         Janessa Cole is a 45 y.o. male presenting to the ED for feeling paranoid beginning 1 day ago. The complaint has been persistent, mild in severity, and worsened by nothing. She reports that he feels more paranoid and feeling that people are out to get him. Patient reports to me that he does not feel suicidal homicidal he is worried about someone hurting him. Patient reporting no physical planes of chest pain shortness of breath or abdominal pain. Patient reporting no fever or chills. Patient does have a court case this morning. He reports he just does not want to \"do something stupid \". Patient reporting no weakness no dizziness no headache. ROS:   Pertinent positives and negatives are stated within HPI, all other systems reviewed and are negative.  --------------------------------------------- PAST HISTORY ---------------------------------------------  Past Medical History:  has a past medical history of Depression and Schizoaffective disorder ([facility] [address] Past Surgical History:  has a past surgical history that includes eye surgery (19 years ago). Social History:  reports that he has been smoking cigars and cigarettes. He has a 12.00 pack-year smoking history. He has never used smokeless tobacco. He reports current drug use. Frequency: 7.00 times per week. Drugs: Cocaine and Marijuana. He reports that he does not drink alcohol. Family History: family history includes Mental Illness in his mother; No Known Problems in his father; Substance Abuse in his mother. The patients home medications have been reviewed.     Allergies: Chlorpheniramine-phenylephrine, Penicillins, and Rondec-d [chlophedianol-pseudoephedrine]    ---------------------------------------------------PHYSICAL EXAM--------------------------------------    Constitutional/General: Alert and oriented x3, well appearing, non toxic in NAD  Head: Normocephalic and atraumatic  Eyes: PERRL, EOMI  Mouth: Oropharynx clear, handling secretions, no trismus  Neck: Supple, full ROM, non tender to palpation in the midline, no stridor, no crepitus, no meningeal signs  Pulmonary: Lungs clear to auscultation bilaterally, no wheezes, rales, or rhonchi. Not in respiratory distress  Cardiovascular:  Regular rate. Regular rhythm. No murmurs, gallops, or rubs. 2+ distal pulses  Chest: no chest wall tenderness  Abdomen: Soft. Non tender. Non distended. +BS. No rebound, guarding, or rigidity. No pulsatile masses appreciated. Musculoskeletal: Moves all extremities x 4. Warm and well perfused, no clubbing, cyanosis, or edema. Capillary refill <3 seconds  Skin: warm and dry. No rashes. Neurologic: GCS 15, CN 2-12 grossly intact, no focal deficits, symmetric strength 5/5 in the upper and lower extremities bilaterally  Psych: Denies homicidal or suicidal thoughts no hallucinations does report paranoia    -------------------------------------------------- RESULTS -------------------------------------------------  I have personally reviewed all laboratory and imaging results for this patient. Results are listed below.      LABS:  Results for orders placed or performed during the hospital encounter of 06/16/21   CBC auto differential   Result Value Ref Range    WBC 5.2 4.5 - 11.5 E9/L    RBC 4.57 3.80 - 5.80 E12/L    Hemoglobin 13.6 12.5 - 16.5 g/dL    Hematocrit 42.3 37.0 - 54.0 %    MCV 92.6 80.0 - 99.9 fL    MCH 29.8 26.0 - 35.0 pg    MCHC 32.2 32.0 - 34.5 %    RDW 14.2 11.5 - 15.0 fL    Platelets 288 676 - 526 E9/L    MPV 9.2 7.0 - 12.0 fL    Neutrophils % 55.0 43.0 - 80.0 %    Immature Granulocytes % 0.4 0.0 - 5.0 %    Lymphocytes % 32.8 20.0 - 42.0 %    Monocytes % 6.7 2.0 - 12.0 %    Eosinophils % 3.8 0.0 - 6.0 %    Basophils % 1.3 0.0 - 2.0 %    Neutrophils Absolute 2.86 1.80 - 7.30 E9/L    Immature Granulocytes # 0.02 E9/L    Lymphocytes Absolute 1.71 1.50 - 4.00 E9/L    Monocytes Absolute 0.35 0.10 - 0.95 E9/L Eosinophils Absolute 0.20 0.05 - 0.50 E9/L    Basophils Absolute 0.07 0.00 - 0.20 E9/L   Comprehensive Metabolic Panel   Result Value Ref Range    Sodium 139 132 - 146 mmol/L    Potassium 3.9 3.5 - 5.0 mmol/L    Chloride 103 98 - 107 mmol/L    CO2 27 22 - 29 mmol/L    Anion Gap 9 7 - 16 mmol/L    Glucose 89 74 - 99 mg/dL    BUN 14 6 - 20 mg/dL    CREATININE 1.0 0.7 - 1.2 mg/dL    GFR Non-African American >60 >=60 mL/min/1.73    GFR African American >60     Calcium 8.8 8.6 - 10.2 mg/dL    Total Protein 6.2 (L) 6.4 - 8.3 g/dL    Albumin 3.9 3.5 - 5.2 g/dL    Total Bilirubin 0.4 0.0 - 1.2 mg/dL    Alkaline Phosphatase 45 40 - 129 U/L    ALT 11 0 - 40 U/L    AST 13 0 - 39 U/L   Serum Drug Screen   Result Value Ref Range    Ethanol Lvl <10 mg/dL    Acetaminophen Level <5.0 (L) 10.0 - 64.7 mcg/mL    Salicylate, Serum <3.5 0.0 - 30.0 mg/dL    TCA Scrn NEGATIVE Cutoff:300 ng/mL       RADIOLOGY:  Interpreted by Radiologist.  No orders to display         EKG Interpretation        ------------------------- NURSING NOTES AND VITALS REVIEWED ---------------------------   The nursing notes within the ED encounter and vital signs as below have been reviewed by myself. /79   Pulse 75   Temp 96.7 °F (35.9 °C) (Temporal)   Resp 16   Ht 6' (1.829 m)   Wt 154 lb (69.9 kg)   SpO2 95%   BMI 20.89 kg/m²   Oxygen Saturation Interpretation: Normal    The patients available past medical records and past encounters were reviewed. ------------------------------ ED COURSE/MEDICAL DECISION MAKING----------------------  Medications - No data to display          Medical Decision Making:      Patient presenting here because of feeling paranoid. .  Patient has been here before for the same complaint. Patient not homicidal or suicidal.  Patient reporting no chest pain no difficulty breathing or abdominal pain vital signs are stable. Labs will be reviewed.   Patient will be observed here in the emergency department plan will be to discharge in the a.m. Re-Evaluations:             Re-evaluation. Patients symptoms show no change    Patient reevaluated no distress. Patient gait steady is normal.  Consultations:                 Critical Care: This patient's ED course included: a personal history and physicial eaxmination    This patient has been closely monitored during their ED course. Counseling: The emergency provider has spoken with the patient and discussed todays results, in addition to providing specific details for the plan of care and counseling regarding the diagnosis and prognosis. Questions are answered at this time and they are agreeable with the plan.       --------------------------------- IMPRESSION AND DISPOSITION ---------------------------------    IMPRESSION  1. Mood disorder ([facility] [address]        DISPOSITION  Disposition: To be discharged  Patient condition is stable        NOTE: This report was transcribed using voice recognition software.  Every effort was made to ensure accuracy; however, inadvertent computerized transcription errors may be present          Faye Harden MD  06/16/21 1589 Taj Troy MD  06/16/21 1225

## 2021-06-16 NOTE — ED NOTES
Patient A&OX4, respirations non-labored, skin warm/dry, no distress noted. Patient calm and cooperative at present. Patient denies SI or HI at present. Patient states \"I have to go to court in the morning and I came here because I didn't want to get into any trouble before\". Updated patient on plan of care.  Patient changed into hospital gown without ties, hospital pants, and hospital socks per request.      Nila Tustin, RN  06/16/21 9586

## 2021-06-17 ENCOUNTER — HOSPITAL ENCOUNTER (EMERGENCY)
Age: 41
Discharge: HOME | End: 2021-06-17
Attending: EMERGENCY MEDICINE
Payer: COMMERCIAL

## 2021-06-17 VITALS
HEART RATE: 78 BPM | WEIGHT: 154 LBS | BODY MASS INDEX: 20.86 KG/M2 | HEIGHT: 72 IN | SYSTOLIC BLOOD PRESSURE: 127 MMHG | TEMPERATURE: 97.8 F | RESPIRATION RATE: 16 BRPM | DIASTOLIC BLOOD PRESSURE: 80 MMHG | OXYGEN SATURATION: 98 %

## 2021-06-17 DIAGNOSIS — F39 MOOD DISORDER (HCC): Primary | ICD-10-CM

## 2021-06-17 PROCEDURE — 99283 EMERGENCY DEPT VISIT LOW MDM: CPT

## 2021-06-17 NOTE — ED NOTES
Bed: [facility].  Expected date:   Expected time:   Means of arrival:   Comments:  Jose Luis Velasco RN  06/17/21 9742

## 2021-06-17 NOTE — ED PROVIDER NOTES
HPI:  6/17/21,   Time: 5:27 AM EDT         Tariq Fierro is a 45 y.o. male presenting to the ED for psychiatric evaluation, beginning 1 day ago. The complaint has been persistent, mild in severity, and worsened by nothing. Patient presenting here because of feeling people are out to harm him. Patient also having reports of suicidal thoughts. Patient reporting having relationship issues. Patient reports no active plan. Patient does have history of schizophrenia. Patient denies any alcohol use. Patient was seen here yesterday for similar complaints. Patient reporting no abdominal pain no vomiting or diarrhea no cough he reports no headache. Patient reporting no weakness. He reports no homicidal ideation he does report some hallucinations as far as auditory which are not new for him. ROS:   Pertinent positives and negatives are stated within HPI, all other systems reviewed and are negative.  --------------------------------------------- PAST HISTORY ---------------------------------------------  Past Medical History:  has a past medical history of Depression and Schizoaffective disorder ([facility] [address] Past Surgical History:  has a past surgical history that includes eye surgery (19 years ago). Social History:  reports that he has been smoking cigars and cigarettes. He has a 24.00 pack-year smoking history. He has never used smokeless tobacco. He reports current drug use. Frequency: 7.00 times per week. Drugs: Cocaine and Marijuana. He reports that he does not drink alcohol. Family History: family history includes Mental Illness in his mother; No Known Problems in his father; Substance Abuse in his mother. The patients home medications have been reviewed.     Allergies: Chlorpheniramine-phenylephrine, Penicillins, and Rondec-d [chlophedianol-pseudoephedrine]    -------------------------------------------------- RESULTS -------------------------------------------------  All laboratory and radiology results have been personally reviewed by myself   LABS:  No results found for this visit on 06/17/21. RADIOLOGY:  Interpreted by Radiologist.  No orders to display       ------------------------- NURSING NOTES AND VITALS REVIEWED ---------------------------   The nursing notes within the ED encounter and vital signs as below have been reviewed. /80   Pulse 78   Temp 97.8 °F (36.6 °C)   Resp 16   Ht 6' (1.829 m)   Wt 154 lb (69.9 kg)   SpO2 98%   BMI 20.89 kg/m²   Oxygen Saturation Interpretation: Normal      ---------------------------------------------------PHYSICAL EXAM--------------------------------------      Constitutional/General: Alert and oriented x3, well appearing, non toxic in NAD  Head: NC/AT  Eyes: PERRL, EOMI  Mouth: Oropharynx clear, handling secretions, no trismus  Neck: Supple, full ROM, no meningeal signs  Pulmonary: Lungs clear to auscultation bilaterally, no wheezes, rales, or rhonchi. Not in respiratory distress  Cardiovascular:  Regular rate and rhythm, no murmurs, gallops, or rubs. 2+ distal pulses  Abdomen: Soft, non tender, non distended,   Extremities: Moves all extremities x 4. Warm and well perfused  Skin: warm and dry without rash  Neurologic: GCS 15,  Psych: Suicidal ideation not homicidal, does report auditory hallucinations      ------------------------------ ED COURSE/MEDICAL DECISION MAKING----------------------  Medications - No data to display      Medical Decision Making:    Patient presenting here because of similar claims that he is had in the past.  Patient reporting having suicidal thoughts but no active plan. Patient reporting homicidal ideation. He does report auditory hallucinations. Patient was seen here yesterday and had labs obtained they were within normal limits his drug screens were positive. Patient reports no alcohol use here he is awake alert oriented x3 neurologically intact. Plan will be for  to evaluate in the a.m. labs will not be ordered due to the fact that the patient was just here the day before. Patient is medically clear from my standpoint. Counseling: The emergency provider has spoken with the patient and discussed todays results, in addition to providing specific details for the plan of care and counseling regarding the diagnosis and prognosis. Questions are answered at this time and they are agreeable with the plan.      --------------------------------- IMPRESSION AND DISPOSITION ---------------------------------    IMPRESSION  1.  Mood disorder ([facility] [address]        DISPOSITION  Disposition:  to assist with disposition  Patient condition is stable                  Roxane Seay MD  06/17/21 [address], MD  06/17/21 9860

## 2021-06-17 NOTE — ED NOTES
Emergency Department [facility] AN AFFILIATE OF [facility] Biopsychosocial Assessment Note     Chief Complaint:      Patient is a 45year old, male presenting to ED for increased paranoia and feeling that people are out to harm him. This is patient's baseline delusion.     This patient is well known to this SW and these are patient baseline behaviors.     Patient denies suicidal ideation. Patient denies homicidal ideation.     MSE: Patient is alert & oriented x 4. Patient mood is stable, affect flat. Patient thought process is clear, speech pressured at times. Patient has some baseline auditory hallucinations. No reports of visual hallucinations.     Clinical Summary/History:      Patient has a mental health hx of schizoaffective disorder, non-compliant with outpatient mental health treatment. Patient last psychiatric hospitalization was 6/2/21 for mood disorder and suicidal ideation at [facility].     Patient last received his invega injection during his hospitalization was [facility] in June 2021. Patient needs to follow up with an outpatient provider to regularly administer his psychiatric medications.     Ok sleep, ok appetite, no reports of hopelessness/helplessness.     Patient has a hx of polysubstance abuse.     Gender  [x]???? Male []???? Female []???? Transgender  []???? Other     Sexual Orientation    [x]???? Heterosexual []???? Homosexual []???? Bisexual []???? Other     Suicidal Behavioral: CSSR-S Complete. [x]???? Reports:               [x]? ??? Past [x]???? Present   []???? Denies     Homicidal/ Violent Behavior  []???? Reports:              []???? Past []???? Present   [x]???? Denies      Hallucinations/Delusions   [x]???? Reports:  Auditory hallucinations  []???? Denies      Substance Use/Alcohol Use/Addiction: SBIRT Screen Complete.    [x]???? Reports:  Crack cocaine & cannabis  []???? Denies      Trauma History  []???? Reports:  []???? Denies      Collateral Information:         Level of Care/Disposition Plan  [x]???? Home:   [x]???? Outpatient Provider:   []???? Crisis Unit:   []???? Inpatient Psychiatric Unit:  []???? Other:         Patient is not currently presenting with any acute psychiatric symptoms that would warrant inpatient or crisis unit referral. Patient symptoms can be effectively managed with an outpatient provider.     Patient to be provided resources for local homeless shelters and local outpatient providers with walk-in services.           Kylah Oconnell MSW, LSW  06/17/21 9389

## 2021-06-17 NOTE — ED NOTES
Discharge instructions given and pt verbalized understanding. Gait steady. No distress noted.      Deborah Valdovinos RN  06/17/21 5125

## 2021-06-20 ENCOUNTER — HOSPITAL ENCOUNTER (EMERGENCY)
Age: 41
Discharge: HOME | End: 2021-06-20
Payer: COMMERCIAL

## 2021-06-20 VITALS
HEART RATE: 82 BPM | DIASTOLIC BLOOD PRESSURE: 78 MMHG | HEIGHT: 72 IN | OXYGEN SATURATION: 96 % | RESPIRATION RATE: 18 BRPM | WEIGHT: 154 LBS | TEMPERATURE: 98 F | BODY MASS INDEX: 20.86 KG/M2 | SYSTOLIC BLOOD PRESSURE: 126 MMHG

## 2021-06-20 DIAGNOSIS — A64 STI (SEXUALLY TRANSMITTED INFECTION): Primary | ICD-10-CM

## 2021-06-20 PROCEDURE — 87591 N.GONORRHOEAE DNA AMP PROB: CPT

## 2021-06-20 PROCEDURE — 99283 EMERGENCY DEPT VISIT LOW MDM: CPT

## 2021-06-20 PROCEDURE — 87491 CHLMYD TRACH DNA AMP PROBE: CPT

## 2021-06-20 NOTE — ED PROVIDER NOTES
06/20/21 0203   126/78 96.8 °F (36 °C) Temporal 99 18 94 % 6' (1.829 m) 154 lb (69.9 kg)         · Constitutional:  Alert, development consistent with age. · HEENT:  NC/NT. Airway patent  · Neck:  Normal ROM. Supple. · Respiratory:  Lungs Clear to auscultation and breath sounds equal.  · CV:  Regular rate and rhythm, normal heart sounds, without pathological murmurs, ectopy, gallops, or rubs. · GI:  AbdomenSoft, nontender, good bowel sounds. No firm or pulsatile mass. · :          Penis: non focal circumcised. Scrotum: normal.           Testicle: normal without masses bilateral.  · Integument:  Normal turgor. Warm, dry, without visible rash, unless noted elsewhere.  Lymphatics: No lymphangitis or adenopathy noted. · Neurological:  Orientation age-appropriate. Motor functions intact. Lab / Imaging Results   (All laboratory and radiology results have been personally reviewed by myself)  Labs:  Results for orders placed or performed during the hospital encounter of 06/20/21   C.trachomatis N.gonorrhoeae DNA, Urine    Specimen: Urine   Result Value Ref Range    Source Urine      Imaging: All Radiology results interpreted by Radiologist unless otherwise noted. No orders to display     ED Course / Medical Decision Making   Medications - No data to display     Consult(s):   None    Procedure(s):   none    Medical Decision Making:    Patient presented for asymptomatic routine screening for STI. He is asymptomatic. He appeared well nontoxic. Plan to obtain cultures for STI. He is appropriate for discharge and outpatient follow-up. Through shared decision making, no prophylactic treatment will be administered at this time. He is instructed to return to the emergency department with any new or worsening symptoms.     Plan of Care/Counseling:  JUICE Cifuentes CNP reviewed today's visit with the patient in addition to providing specific details for the plan of care and counseling regarding the diagnosis and prognosis. Questions are answered at this time and are agreeable with the plan. Assessment     1. STI (sexually transmitted infection)      Plan   Discharged home. Patient condition is good    New Medications     Discharge Medication List as of 6/20/2021  3:02 AM        Electronically signed by JUICE Veras CNP   DD: 6/20/21  **This report was transcribed using voice recognition software. Every effort was made to ensure accuracy; however, inadvertent computerized transcription errors may be present.   END OF ED PROVIDER NOTE     JUICE Cervantes CNP  06/20/21 2054

## 2021-06-22 ENCOUNTER — HOSPITAL ENCOUNTER (EMERGENCY)
Age: 41
Discharge: HOME | End: 2021-06-23
Attending: EMERGENCY MEDICINE
Payer: COMMERCIAL

## 2021-06-22 DIAGNOSIS — F19.10 SUBSTANCE ABUSE (HCC): ICD-10-CM

## 2021-06-22 DIAGNOSIS — F39 MOOD DISORDER (HCC): Primary | ICD-10-CM

## 2021-06-22 PROCEDURE — 80143 DRUG ASSAY ACETAMINOPHEN: CPT

## 2021-06-22 PROCEDURE — 82077 ASSAY SPEC XCP UR&BREATH IA: CPT

## 2021-06-22 PROCEDURE — 99283 EMERGENCY DEPT VISIT LOW MDM: CPT

## 2021-06-22 PROCEDURE — 80307 DRUG TEST PRSMV CHEM ANLYZR: CPT

## 2021-06-22 PROCEDURE — 80179 DRUG ASSAY SALICYLATE: CPT

## 2021-06-23 VITALS
DIASTOLIC BLOOD PRESSURE: 90 MMHG | SYSTOLIC BLOOD PRESSURE: 148 MMHG | TEMPERATURE: 98.6 F | HEART RATE: 92 BPM | OXYGEN SATURATION: 94 % | RESPIRATION RATE: 20 BRPM

## 2021-06-23 LAB
ACETAMINOPHEN LEVEL: <5 MCG/ML (ref 10–30)
AMPHETAMINE SCREEN, URINE: NOT DETECTED
BARBITURATE SCREEN URINE: NOT DETECTED
BENZODIAZEPINE SCREEN, URINE: NOT DETECTED
C. TRACHOMATIS DNA ,URINE: NEGATIVE
CANNABINOID SCREEN URINE: POSITIVE
COCAINE METABOLITE SCREEN URINE: POSITIVE
ETHANOL: <10 MG/DL (ref 0–0.08)
FENTANYL SCREEN, URINE: NOT DETECTED
Lab: ABNORMAL
METHADONE SCREEN, URINE: NOT DETECTED
N. GONORRHOEAE DNA, URINE: NEGATIVE
OPIATE SCREEN URINE: NOT DETECTED
OXYCODONE URINE: NOT DETECTED
PHENCYCLIDINE SCREEN URINE: NOT DETECTED
SALICYLATE, SERUM: <0.3 MG/DL (ref 0–30)
SOURCE: NORMAL
TRICYCLIC ANTIDEPRESSANTS SCREEN SERUM: NEGATIVE NG/ML

## 2021-06-23 NOTE — ED NOTES
Bed: [facility].  Expected date:   Expected time:   Means of arrival:   Comments:  loc Tillman RN  06/22/21 3075

## 2021-06-23 NOTE — ED NOTES
Emergency Department [facility] AN AFFILIATE OF [facility] Biopsychosocial Assessment Note     Chief Complaint:      Patient is a 45year old, male presenting to ED for increased paranoia and feeling that people are out to harm him. This is patient's baseline delusion.     This patient is well known to this SW and these are patient baseline behaviors.     MSE: Patient is alert & oriented x 4. Patient mood is stable, affect flat. Patient thought process is clear, speech pressured at times. Patient has some baseline auditory hallucinations. No reports of visual hallucinations.     Clinical Summary/History:      Patient has a mental health hx of schizoaffective disorder, non-compliant with outpatient mental health treatment. Patient last psychiatric hospitalization was 6/2/21 for mood disorder and suicidal ideation at [facility].     Patient last received his invega injection during his hospitalization at [facility] earlier this month. Patient needs to follow up with an outpatient provider to regularly administer his psychiatric medications.     Ok sleep, ok appetite, no reports of hopelessness/helplessness.     Patient has a hx of polysubstance abuse.     Gender  [x]????? Male []????? Female []????? Transgender  []????? Other     Sexual Orientation    [x]????? Heterosexual []????? Homosexual []????? Bisexual []????? Other     Suicidal Behavioral: CSSR-S Complete. [x]????? Reports:               [x]????? Past [x]????? Present   []????? Denies     Homicidal/ Violent Behavior  []????? Reports:              []????? Past []????? Present   [x]????? Denies      Hallucinations/Delusions   [x]????? Reports:  Auditory hallucinations  []????? Denies      Substance Use/Alcohol Use/Addiction: SBIRT Screen Complete.    [x]????? Reports:  Crack cocaine & cannabis  []????? Denies      Trauma History  []????? Reports:  []????? Denies      Collateral Information:         Level of Care/Disposition Plan  [x]????? Home:   [x]????? Outpatient Provider: []????? Crisis Unit:   []????? Inpatient Psychiatric Unit:  []????? Other:         Patient is not currently presenting with any acute psychiatric symptoms that would warrant inpatient or crisis unit referral. Patient symptoms can be effectively managed with an outpatient provider.     Patient to be provided resources for local homeless shelters and local outpatient providers with walk-in services.      FRANCISCO Salcedo, [place]  06/23/21 8042

## 2021-06-23 NOTE — ED NOTES
Pt initially not wanting to allow this RN to draw blood. [place] police [place]. Pt then allowed blood draw and ambulated to restroom to provide urine and change. Pt not pink slipped by physician. [facility] police aware.       Nemesio Hess RN  06/22/21 0138

## 2021-06-23 NOTE — ED PROVIDER NOTES
HPI:  6/22/21, Time: 11:02 PM EDT         Judith Pinon is a 45 y.o. male presenting to the ED for psychiatric evaluation, beginning days ago. The complaint has been constant, moderate in severity, and worsened by nothing. Patient does have underlying history of schizophrenia and depression. Patient reporting having thoughts of hurting others as well as himself he has no plan to harm himself. Patient reporting that people are out to get him. Patient reporting that \"fagotts are falling on him. \"  Patient reporting no chest pain no abdominal pain no vomiting no diarrhea there is no fever chills or cough. Patient reporting no headache there is no weakness or dizziness    ROS:   Pertinent positives and negatives are stated within HPI, all other systems reviewed and are negative.  --------------------------------------------- PAST HISTORY ---------------------------------------------  Past Medical History:  has a past medical history of Depression and Schizoaffective disorder ([facility] [address] Past Surgical History:  has a past surgical history that includes eye surgery (19 years ago). Social History:  reports that he has been smoking cigars and cigarettes. He has a 6.00 pack-year smoking history. He has never used smokeless tobacco. He reports current drug use. Frequency: 7.00 times per week. Drugs: Cocaine and Marijuana. He reports that he does not drink alcohol. Family History: family history includes Mental Illness in his mother; No Known Problems in his father; Substance Abuse in his mother. The patients home medications have been reviewed.     Allergies: Chlorpheniramine-phenylephrine, Penicillins, and Rondec-d [chlophedianol-pseudoephedrine]    ---------------------------------------------------PHYSICAL EXAM--------------------------------------    Constitutional/General: Alert and oriented x3, well appearing, non toxic in NAD  Head: Normocephalic and atraumatic  Eyes: PERRL, EOMI  Mouth: Oropharynx

## 2021-06-23 NOTE — ED NOTES
Pt has remained sleeping for the majority of his stay. Pt has been changing positions independently and pt appears in no distress. Pt has not been pink slipped as of this time.       Roni Raymond RN  06/23/21 [id number]

## 2021-06-25 ENCOUNTER — HOSPITAL ENCOUNTER (EMERGENCY)
Age: 41
Discharge: HOME | End: 2021-06-25
Attending: EMERGENCY MEDICINE
Payer: COMMERCIAL

## 2021-06-25 VITALS
OXYGEN SATURATION: 98 % | DIASTOLIC BLOOD PRESSURE: 62 MMHG | TEMPERATURE: 96.3 F | RESPIRATION RATE: 16 BRPM | SYSTOLIC BLOOD PRESSURE: 128 MMHG | HEART RATE: 86 BPM

## 2021-06-25 DIAGNOSIS — F14.10 COCAINE ABUSE (HCC): Primary | ICD-10-CM

## 2021-06-25 DIAGNOSIS — F39 MOOD DISORDER (HCC): ICD-10-CM

## 2021-06-25 DIAGNOSIS — Z59.00 HOMELESS: ICD-10-CM

## 2021-06-25 LAB
ACETAMINOPHEN LEVEL: <5 MCG/ML (ref 10–30)
ALBUMIN SERPL-MCNC: 4.2 G/DL (ref 3.5–5.2)
ALP BLD-CCNC: 43 U/L (ref 40–129)
ALT SERPL-CCNC: 8 U/L (ref 0–40)
ANION GAP SERPL CALCULATED.3IONS-SCNC: 4 MMOL/L (ref 7–16)
AST SERPL-CCNC: 14 U/L (ref 0–39)
BASOPHILS ABSOLUTE: 0.08 E9/L (ref 0–0.2)
BASOPHILS RELATIVE PERCENT: 1.5 % (ref 0–2)
BILIRUB SERPL-MCNC: 0.6 MG/DL (ref 0–1.2)
BUN BLDV-MCNC: 11 MG/DL (ref 6–20)
CALCIUM SERPL-MCNC: 8.8 MG/DL (ref 8.6–10.2)
CHLORIDE BLD-SCNC: 103 MMOL/L (ref 98–107)
CO2: 36 MMOL/L (ref 22–29)
CREAT SERPL-MCNC: 1.2 MG/DL (ref 0.7–1.2)
EOSINOPHILS ABSOLUTE: 0.3 E9/L (ref 0.05–0.5)
EOSINOPHILS RELATIVE PERCENT: 5.7 % (ref 0–6)
ETHANOL: <10 MG/DL (ref 0–0.08)
GFR AFRICAN AMERICAN: >60
GFR NON-AFRICAN AMERICAN: >60 ML/MIN/1.73
GLUCOSE BLD-MCNC: 90 MG/DL (ref 74–99)
HCT VFR BLD CALC: 44.7 % (ref 37–54)
HEMOGLOBIN: 14.3 G/DL (ref 12.5–16.5)
IMMATURE GRANULOCYTES #: 0.02 E9/L
IMMATURE GRANULOCYTES %: 0.4 % (ref 0–5)
LYMPHOCYTES ABSOLUTE: 1.96 E9/L (ref 1.5–4)
LYMPHOCYTES RELATIVE PERCENT: 37.3 % (ref 20–42)
MCH RBC QN AUTO: 29.8 PG (ref 26–35)
MCHC RBC AUTO-ENTMCNC: 32 % (ref 32–34.5)
MCV RBC AUTO: 93.1 FL (ref 80–99.9)
MONOCYTES ABSOLUTE: 0.35 E9/L (ref 0.1–0.95)
MONOCYTES RELATIVE PERCENT: 6.7 % (ref 2–12)
NEUTROPHILS ABSOLUTE: 2.55 E9/L (ref 1.8–7.3)
NEUTROPHILS RELATIVE PERCENT: 48.4 % (ref 43–80)
PDW BLD-RTO: 14 FL (ref 11.5–15)
PLATELET # BLD: 358 E9/L (ref 130–450)
PMV BLD AUTO: 9.4 FL (ref 7–12)
POTASSIUM SERPL-SCNC: 3.7 MMOL/L (ref 3.5–5)
RBC # BLD: 4.8 E12/L (ref 3.8–5.8)
SALICYLATE, SERUM: <0.3 MG/DL (ref 0–30)
SODIUM BLD-SCNC: 143 MMOL/L (ref 132–146)
T4 TOTAL: 7.9 MCG/DL (ref 4.5–11.7)
TOTAL PROTEIN: 6.7 G/DL (ref 6.4–8.3)
TRICYCLIC ANTIDEPRESSANTS SCREEN SERUM: NEGATIVE NG/ML
TSH SERPL DL<=0.05 MIU/L-ACNC: 0.91 UIU/ML (ref 0.27–4.2)
WBC # BLD: 5.3 E9/L (ref 4.5–11.5)

## 2021-06-25 PROCEDURE — 82077 ASSAY SPEC XCP UR&BREATH IA: CPT

## 2021-06-25 PROCEDURE — 80307 DRUG TEST PRSMV CHEM ANLYZR: CPT

## 2021-06-25 PROCEDURE — 80179 DRUG ASSAY SALICYLATE: CPT

## 2021-06-25 PROCEDURE — 85025 COMPLETE CBC W/AUTO DIFF WBC: CPT

## 2021-06-25 PROCEDURE — 80143 DRUG ASSAY ACETAMINOPHEN: CPT

## 2021-06-25 PROCEDURE — 80053 COMPREHEN METABOLIC PANEL: CPT

## 2021-06-25 PROCEDURE — 84443 ASSAY THYROID STIM HORMONE: CPT

## 2021-06-25 PROCEDURE — 99284 EMERGENCY DEPT VISIT MOD MDM: CPT

## 2021-06-25 PROCEDURE — 84436 ASSAY OF TOTAL THYROXINE: CPT

## 2021-06-25 SDOH — ECONOMIC STABILITY - HOUSING INSECURITY: HOMELESSNESS UNSPECIFIED: Z59.00

## 2021-06-25 ASSESSMENT — ENCOUNTER SYMPTOMS
VOICE CHANGE: 0
RHINORRHEA: 0
DIARRHEA: 0
COUGH: 0
SHORTNESS OF BREATH: 0
CONSTIPATION: 0
ABDOMINAL PAIN: 0
VOMITING: 0
TROUBLE SWALLOWING: 0
NAUSEA: 0

## 2021-06-25 NOTE — ED NOTES
Emergency Department [facility] AN AFFILIATE OF [facility] Biopsychosocial Assessment Note     Chief Complaint:      Patient is a 45year old, male presenting to ED for increased paranoia and feeling that people are out to harm him. This is patient's baseline delusion.     This patient is well known to this SW and these are patient baseline behaviors.     MSE: Patient is alert & oriented x 4. Patient mood is stable, affect flat. Patient thought process is clear, speech pressured at times. Patient has some baseline auditory hallucinations. No reports of visual hallucinations.     Clinical Summary/History:      Patient has a mental health hx of schizoaffective disorder, non-compliant with outpatient mental health treatment. Patient last psychiatric hospitalization was 6/2/21 for mood disorder and suicidal ideation at [facility].     Patient last received his invega injection during his hospitalization at [facility] earlier this month. Patient needs to follow up with an outpatient provider to regularly administer his psychiatric medications.     Ok sleep, ok appetite, no reports of hopelessness/helplessness.     Patient has a hx of polysubstance abuse.     Gender  [x]?????? Male []?????? Female []?????? Transgender  []?????? Other     Sexual Orientation    [x]?????? Heterosexual []?????? Homosexual []?????? Bisexual []?????? Other     Suicidal Behavioral: CSSR-S Complete. [x]?????? Reports:               [x]?????? Past [x]?????? Present   []?????? Denies     Homicidal/ Violent Behavior  []?????? Reports:              []?????? Past []?????? Present   [x]?????? Denies      Hallucinations/Delusions   [x]?????? Reports:  Auditory hallucinations  []?????? Denies      Substance Use/Alcohol Use/Addiction: SBIRT Screen Complete.    [x]?????? Reports:  Crack cocaine & cannabis  []?????? Denies      Trauma History  []?????? Reports:  []?????? Denies      Collateral Information:         Level of Care/Disposition Plan  [x]?????? Home: [x]?????? Outpatient Provider:   []?????? Crisis Unit:   []?????? Inpatient Psychiatric Unit:  []?????? Other:         Patient is not currently presenting with any acute psychiatric symptoms that would warrant inpatient or crisis unit referral. Patient symptoms can be effectively managed with an outpatient provider.     Patient to be provided resources for local homeless shelters and local outpatient providers with walk-in services.      FRANCISCO Briscoe, [place]  06/25/21 151

## 2021-06-25 NOTE — ED NOTES
2 bags of patient belongings labeled and placed in bin # [id number].      [facility]  06/25/21 934 [facility]  06/25/21 1197

## 2021-06-25 NOTE — ED NOTES
Patient presents to unit voicing feelings of increased depression, denies si/hi \"I have mixed emotion\"     Patient requesting food and drink immediately upon coming into unit     Nestor Banuelos RN  06/25/21 3058

## 2021-06-25 NOTE — ED PROVIDER NOTES
Patient is a 71-year-old male with past medical history of depression,, schizophrenia who presents to the emergency department with complaint of depression. Patient states that he has \"not been feeling well\" because of his depression. States he has been sleeping on the street for the past 2 nights and wants to be admitted so that he can go to a shelter. Patient states he has been taking Geodon as well as he gets a \"shot\" once a month, unable to recollect the name. Patient endorses suicidal ideation \"sometimes\" with no plan. He does have a history of suicide attempt with \"knives\". Patient denies any homicidal ideation. Patient states he smokes, denies any alcohol, did admit to cocaine use to nursing staff. Patient had reported hallucinations of voice and visual to our nursing staff, not endorsed to physician. Patient denies any other trauma or pain. Patient states that he is depressed because he has been \"going through some stuff with my girl\". Review of Systems   Constitutional: Positive for fatigue. Negative for chills and fever. HENT: Negative for congestion, rhinorrhea, trouble swallowing and voice change. Eyes: Negative for visual disturbance. Respiratory: Negative for cough and shortness of breath. Cardiovascular: Negative for chest pain, palpitations and leg swelling. Gastrointestinal: Negative for abdominal pain, constipation, diarrhea, nausea and vomiting. Endocrine: Negative for polyuria. Genitourinary: Negative for dysuria, frequency, hematuria and urgency. Musculoskeletal: Negative for arthralgias and myalgias. Skin: Negative for rash and wound. Allergic/Immunologic: Negative for immunocompromised state. Neurological: Negative for dizziness, syncope, weakness, light-headedness, numbness and headaches. Psychiatric/Behavioral: Positive for hallucinations and suicidal ideas. Negative for confusion and self-injury. The patient is not nervous/anxious. Depression      Physical Exam  Vitals and nursing note reviewed. Constitutional:       General: He is awake. He is not in acute distress. Appearance: He is normal weight. He is not ill-appearing or toxic-appearing. HENT:      Head: Normocephalic and atraumatic. Nose: Nose normal.      Mouth/Throat:      Mouth: Mucous membranes are moist.      Pharynx: Oropharynx is clear. Eyes:      Extraocular Movements: Extraocular movements intact. Pupils: Pupils are equal, round, and reactive to light. Cardiovascular:      Rate and Rhythm: Normal rate and regular rhythm. Pulses: Normal pulses. Heart sounds: Normal heart sounds. Pulmonary:      Effort: Pulmonary effort is normal.      Breath sounds: Normal breath sounds. Abdominal:      General: There is no distension. Palpations: Abdomen is soft. Tenderness: There is no abdominal tenderness. Musculoskeletal:         General: Normal range of motion. Cervical back: Normal range of motion and neck supple. Skin:     General: Skin is warm and dry. Capillary Refill: Capillary refill takes less than 2 seconds. Neurological:      General: No focal deficit present. Mental Status: He is alert and oriented to person, place, and time. GCS: GCS eye subscore is 4. GCS verbal subscore is 5. GCS motor subscore is 6. Psychiatric:         Behavior: Behavior is cooperative. MDM  Number of Diagnoses or Management Options  [facility] ([facility]  Homeless  Mood disorder [facility])  Diagnosis management comments: Patient is a 71-year-old male who present to the emergency department with utterances of feeling that he was making other sick as well as himself. Patient believes he was doing this through his breath as well as his diarrhea. Patient presented anxious, awake, jittery.   Patient vital signs were normal.  Patient physical exam is unremarkable except for anxiety, slight agitation however easy read redirectable, cooperative. Patient without SI or HI. Patient without acute psychosis. Patient EMR was reviewed and patient was seen earlier today for diarrhea and discharged. Patient did not have any psychological utterances at that time. Patient presented now and medical evaluation was performed. Patient also so  who recommended patient continue with outpatient follow-up. Patient was cleared medically. --------------------------------------------- PAST HISTORY ---------------------------------------------  Past Medical History:  has a past medical history of Depression and Schizoaffective disorder ([facility] [address] Past Surgical History:  has a past surgical history that includes eye surgery (19 years ago). Social History:  reports that he has been smoking cigars and cigarettes. He has a 6.00 pack-year smoking history. He has never used smokeless tobacco. He reports current drug use. Frequency: 7.00 times per week. Drugs: Cocaine and Marijuana. He reports that he does not drink alcohol. Family History: family history includes Mental Illness in his mother; No Known Problems in his father; Substance Abuse in his mother. The patients home medications have been reviewed.     Allergies: Chlorpheniramine-phenylephrine, Penicillins, and Rondec-d [chlophedianol-pseudoephedrine]    -------------------------------------------------- RESULTS -------------------------------------------------    LABS:  Results for orders placed or performed during the hospital encounter of 06/25/21   CBC Auto Differential   Result Value Ref Range    WBC 5.3 4.5 - 11.5 E9/L    RBC 4.80 3.80 - 5.80 E12/L    Hemoglobin 14.3 12.5 - 16.5 g/dL    Hematocrit 44.7 37.0 - 54.0 %    MCV 93.1 80.0 - 99.9 fL    MCH 29.8 26.0 - 35.0 pg    MCHC 32.0 32.0 - 34.5 %    RDW 14.0 11.5 - 15.0 fL    Platelets 105 039 - 703 E9/L    MPV 9.4 7.0 - 12.0 fL    Neutrophils % 48.4 43.0 - 80.0 %    Immature Granulocytes % 0.4 0.0 - 5.0 %    Lymphocytes % 37.3 20.0 - 42.0 %    Monocytes % 6.7 2.0 - 12.0 %    Eosinophils % 5.7 0.0 - 6.0 %    Basophils % 1.5 0.0 - 2.0 %    Neutrophils Absolute 2.55 1.80 - 7.30 E9/L    Immature Granulocytes # 0.02 E9/L    Lymphocytes Absolute 1.96 1.50 - 4.00 E9/L    Monocytes Absolute 0.35 0.10 - 0.95 E9/L    Eosinophils Absolute 0.30 0.05 - 0.50 E9/L    Basophils Absolute 0.08 0.00 - 0.20 E9/L   Comprehensive metabolic panel   Result Value Ref Range    Sodium 143 132 - 146 mmol/L    Potassium 3.7 3.5 - 5.0 mmol/L    Chloride 103 98 - 107 mmol/L    CO2 36 (H) 22 - 29 mmol/L    Anion Gap 4 (L) 7 - 16 mmol/L    Glucose 90 74 - 99 mg/dL    BUN 11 6 - 20 mg/dL    CREATININE 1.2 0.7 - 1.2 mg/dL    GFR Non-African American >60 >=60 mL/min/1.73    GFR African American >60     Calcium 8.8 8.6 - 10.2 mg/dL    Total Protein 6.7 6.4 - 8.3 g/dL    Albumin 4.2 3.5 - 5.2 g/dL    Total Bilirubin 0.6 0.0 - 1.2 mg/dL    Alkaline Phosphatase 43 40 - 129 U/L    ALT 8 0 - 40 U/L    AST 14 0 - 39 U/L   Serum Drug Screen   Result Value Ref Range    Ethanol Lvl <10 mg/dL    Acetaminophen Level <5.0 (L) 10.0 - 95.4 mcg/mL    Salicylate, Serum <8.2 0.0 - 30.0 mg/dL    TCA Scrn NEGATIVE Cutoff:300 ng/mL   TSH WITHOUT REFLEX   Result Value Ref Range    TSH 0.913 0.270 - 4.200 uIU/mL   T4   Result Value Ref Range    T4, Total 7.9 4.5 - 11.7 mcg/dL       RADIOLOGY:  No orders to display     ------------------------- NURSING NOTES AND VITALS REVIEWED ---------------------------  Date / Time Roomed:  6/25/2021  3:45 AM  ED Bed Assignment:  GAUDENCIO/GAUDENCIO    The nursing notes within the ED encounter and vital signs as below have been reviewed. No data found.     Oxygen Saturation Interpretation: Normal    ------------------------------------------ PROGRESS NOTES ------------------------------------------  Re-evaluation(s):  Patients symptoms show no change  Repeat physical examination is not changed    Counseling:  I have spoken with the patient and discussed todays results, in addition to providing specific details for the plan of care and counseling regarding the diagnosis and prognosis. Their questions are answered at this time and they are agreeable with the plan of admission.    --------------------------------- ADDITIONAL PROVIDER NOTES ---------------------------------  Consultations: This patient's ED course included: a personal history and physicial examination and re-evaluation prior to disposition    This patient has remained hemodynamically stable during their ED course. Diagnosis:  1. Cocaine abuse ([facility] [address]    2. Homeless    3. Mood disorder ([facility]      Disposition:  Patient's disposition: Discharge as per social work. Patient's condition is stable. 6/25/21, 6:30 AM EDT.     This note is prepared by Maryanne Erickson MD -PGY- 2           Maryanne Erickson MD  Resident  06/26/21 8597

## 2021-06-29 PROCEDURE — 99283 EMERGENCY DEPT VISIT LOW MDM: CPT

## 2021-06-30 ENCOUNTER — HOSPITAL ENCOUNTER (EMERGENCY)
Age: 41
Discharge: HOME | End: 2021-06-30
Attending: EMERGENCY MEDICINE
Payer: COMMERCIAL

## 2021-06-30 VITALS
HEART RATE: 77 BPM | WEIGHT: 154 LBS | DIASTOLIC BLOOD PRESSURE: 69 MMHG | RESPIRATION RATE: 16 BRPM | OXYGEN SATURATION: 98 % | TEMPERATURE: 97.3 F | HEIGHT: 72 IN | SYSTOLIC BLOOD PRESSURE: 100 MMHG | BODY MASS INDEX: 20.86 KG/M2

## 2021-06-30 DIAGNOSIS — F39 MOOD DISORDER (HCC): Primary | ICD-10-CM

## 2021-06-30 LAB
ACETAMINOPHEN LEVEL: <5 MCG/ML (ref 10–30)
ALBUMIN SERPL-MCNC: 3.9 G/DL (ref 3.5–5.2)
ALP BLD-CCNC: 40 U/L (ref 40–129)
ALT SERPL-CCNC: 9 U/L (ref 0–40)
AMPHETAMINE SCREEN, URINE: POSITIVE
ANION GAP SERPL CALCULATED.3IONS-SCNC: 9 MMOL/L (ref 7–16)
AST SERPL-CCNC: 19 U/L (ref 0–39)
BACTERIA: ABNORMAL /HPF
BARBITURATE SCREEN URINE: NOT DETECTED
BASOPHILS ABSOLUTE: 0.1 E9/L (ref 0–0.2)
BASOPHILS RELATIVE PERCENT: 2 % (ref 0–2)
BENZODIAZEPINE SCREEN, URINE: NOT DETECTED
BILIRUB SERPL-MCNC: 0.4 MG/DL (ref 0–1.2)
BILIRUBIN URINE: ABNORMAL
BLOOD, URINE: NEGATIVE
BUN BLDV-MCNC: 12 MG/DL (ref 6–20)
CALCIUM SERPL-MCNC: 8.4 MG/DL (ref 8.6–10.2)
CANNABINOID SCREEN URINE: POSITIVE
CHLORIDE BLD-SCNC: 105 MMOL/L (ref 98–107)
CLARITY: CLEAR
CO2: 27 MMOL/L (ref 22–29)
COCAINE METABOLITE SCREEN URINE: POSITIVE
COLOR: YELLOW
CREAT SERPL-MCNC: 1.2 MG/DL (ref 0.7–1.2)
EKG ATRIAL RATE: 61 BPM
EKG P AXIS: 67 DEGREES
EKG P-R INTERVAL: 190 MS
EKG Q-T INTERVAL: 408 MS
EKG QRS DURATION: 98 MS
EKG QTC CALCULATION (BAZETT): 410 MS
EKG R AXIS: 84 DEGREES
EKG T AXIS: 56 DEGREES
EKG VENTRICULAR RATE: 61 BPM
EOSINOPHILS ABSOLUTE: 0.3 E9/L (ref 0.05–0.5)
EOSINOPHILS RELATIVE PERCENT: 5.9 % (ref 0–6)
ETHANOL: 21 MG/DL (ref 0–0.08)
FENTANYL SCREEN, URINE: NOT DETECTED
GFR AFRICAN AMERICAN: >60
GFR NON-AFRICAN AMERICAN: >60 ML/MIN/1.73
GLUCOSE BLD-MCNC: 69 MG/DL (ref 74–99)
GLUCOSE URINE: NEGATIVE MG/DL
HCT VFR BLD CALC: 41.8 % (ref 37–54)
HEMOGLOBIN: 13.3 G/DL (ref 12.5–16.5)
IMMATURE GRANULOCYTES #: 0.01 E9/L
IMMATURE GRANULOCYTES %: 0.2 % (ref 0–5)
INFLUENZA A: NOT DETECTED
INFLUENZA B: NOT DETECTED
KETONES, URINE: ABNORMAL MG/DL
LEUKOCYTE ESTERASE, URINE: NEGATIVE
LYMPHOCYTES ABSOLUTE: 2.05 E9/L (ref 1.5–4)
LYMPHOCYTES RELATIVE PERCENT: 40.5 % (ref 20–42)
Lab: ABNORMAL
MCH RBC QN AUTO: 29.4 PG (ref 26–35)
MCHC RBC AUTO-ENTMCNC: 31.8 % (ref 32–34.5)
MCV RBC AUTO: 92.5 FL (ref 80–99.9)
METHADONE SCREEN, URINE: NOT DETECTED
MONOCYTES ABSOLUTE: 0.45 E9/L (ref 0.1–0.95)
MONOCYTES RELATIVE PERCENT: 8.9 % (ref 2–12)
MUCUS: PRESENT /LPF
NEUTROPHILS ABSOLUTE: 2.15 E9/L (ref 1.8–7.3)
NEUTROPHILS RELATIVE PERCENT: 42.5 % (ref 43–80)
NITRITE, URINE: NEGATIVE
OPIATE SCREEN URINE: POSITIVE
OXYCODONE URINE: NOT DETECTED
PDW BLD-RTO: 14.1 FL (ref 11.5–15)
PH UA: 5.5 (ref 5–9)
PHENCYCLIDINE SCREEN URINE: NOT DETECTED
PLATELET # BLD: 325 E9/L (ref 130–450)
PMV BLD AUTO: 9.4 FL (ref 7–12)
POTASSIUM SERPL-SCNC: 3.5 MMOL/L (ref 3.5–5)
PROTEIN UA: 30 MG/DL
RBC # BLD: 4.52 E12/L (ref 3.8–5.8)
RBC UA: ABNORMAL /HPF (ref 0–2)
SALICYLATE, SERUM: <0.3 MG/DL (ref 0–30)
SARS-COV-2 RNA, RT PCR: NOT DETECTED
SODIUM BLD-SCNC: 141 MMOL/L (ref 132–146)
SPECIFIC GRAVITY UA: >=1.03 (ref 1–1.03)
TOTAL PROTEIN: 6 G/DL (ref 6.4–8.3)
TRICYCLIC ANTIDEPRESSANTS SCREEN SERUM: NEGATIVE NG/ML
UROBILINOGEN, URINE: 2 E.U./DL
WBC # BLD: 5.1 E9/L (ref 4.5–11.5)
WBC UA: ABNORMAL /HPF (ref 0–5)

## 2021-06-30 PROCEDURE — 82077 ASSAY SPEC XCP UR&BREATH IA: CPT

## 2021-06-30 PROCEDURE — 85025 COMPLETE CBC W/AUTO DIFF WBC: CPT

## 2021-06-30 PROCEDURE — 80053 COMPREHEN METABOLIC PANEL: CPT

## 2021-06-30 PROCEDURE — 80307 DRUG TEST PRSMV CHEM ANLYZR: CPT

## 2021-06-30 PROCEDURE — 87636 SARSCOV2 & INF A&B AMP PRB: CPT

## 2021-06-30 PROCEDURE — 80179 DRUG ASSAY SALICYLATE: CPT

## 2021-06-30 PROCEDURE — 81001 URINALYSIS AUTO W/SCOPE: CPT

## 2021-06-30 PROCEDURE — 93010 ELECTROCARDIOGRAM REPORT: CPT | Performed by: INTERNAL MEDICINE

## 2021-06-30 PROCEDURE — 93005 ELECTROCARDIOGRAM TRACING: CPT | Performed by: NURSE PRACTITIONER

## 2021-06-30 PROCEDURE — 80143 DRUG ASSAY ACETAMINOPHEN: CPT

## 2021-06-30 ASSESSMENT — PAIN DESCRIPTION - DESCRIPTORS: DESCRIPTORS: SHARP

## 2021-06-30 ASSESSMENT — PAIN DESCRIPTION - LOCATION: LOCATION: FOOT

## 2021-06-30 ASSESSMENT — PAIN DESCRIPTION - FREQUENCY: FREQUENCY: CONTINUOUS

## 2021-06-30 ASSESSMENT — PAIN DESCRIPTION - ORIENTATION: ORIENTATION: LEFT;RIGHT

## 2021-06-30 ASSESSMENT — PAIN SCALES - GENERAL: PAINLEVEL_OUTOF10: 10

## 2021-06-30 ASSESSMENT — PAIN DESCRIPTION - PAIN TYPE: TYPE: ACUTE PAIN

## 2021-06-30 NOTE — ED PROVIDER NOTES
HPI:  6/30/21,   Time: 12:38 AM EDT       Floridalma Pena is a 45 y.o. male presenting to the ED for psychiatric evaluation, beginning 2 weeks ago. The complaint has been persistent, moderate in severity, and worsened by nothing. The patient states over the last 2 weeks he has been having voices. He states that they have been telling him to hurt himself. He states that during this time he has been having thoughts of hurting his family members and that is more of the plan of why he wants to hurt himself. He has no specific plan. He states he is seeing dead people. No chest pain shortness of breath. No nausea vomiting. Review of Systems:   Pertinent positives and negatives are stated within HPI, all other systems reviewed and are negative.          --------------------------------------------- PAST HISTORY ---------------------------------------------  Past Medical History:  has a past medical history of Depression and Schizoaffective disorder ([facility] [address] Past Surgical History:  has a past surgical history that includes eye surgery (19 years ago). Social History:  reports that he has been smoking cigars and cigarettes. He has a 6.00 pack-year smoking history. He has never used smokeless tobacco. He reports current drug use. Frequency: 7.00 times per week. Drugs: Cocaine and Marijuana. He reports that he does not drink alcohol. Family History: family history includes Mental Illness in his mother; No Known Problems in his father; Substance Abuse in his mother. The patients home medications have been reviewed.     Allergies: Chlorpheniramine-phenylephrine, Penicillins, and Rondec-d [chlophedianol-pseudoephedrine]        ---------------------------------------------------PHYSICAL EXAM--------------------------------------    Constitutional/General: Alert and oriented x3, well appearing, non toxic in NAD  Head: Normocephalic and atraumatic  Eyes: PERRL, EOMI, conjunctive normal, sclera non icteric  Mouth: Oropharynx clear, handling secretions, no trismus, no asymmetry of the posterior oropharynx or uvular edema  Neck: Supple, full ROM, non tender to palpation in the midline, no stridor, no crepitus, no meningeal signs  Respiratory: Lungs clear to auscultation bilaterally, no wheezes, rales, or rhonchi. Not in respiratory distress  Cardiovascular:  Regular rate. Regular rhythm. No murmurs, gallops, or rubs. 2+ distal pulses  GI:  Abdomen Soft, Non tender, Non distended. +BS. No organomegaly, no palpable masses,  No rebound, guarding, or rigidity. Musculoskeletal: Moves all extremities x 4. Warm and well perfused, no clubbing, cyanosis, or edema. Capillary refill <3 seconds  Integument: skin warm and dry. No rashes. Neurologic: GCS 15, no focal deficits, symmetric strength 5/5 in the upper and lower extremities bilaterally  Psychiatric: Depressed affect, poor judgment, poor thought process    -------------------------------------------------- RESULTS -------------------------------------------------  I have personally reviewed all laboratory and imaging results for this patient. Results are listed below.      LABS:  Results for orders placed or performed during the hospital encounter of 06/30/21   COVID-19 & Influenza Combo    Specimen: Nasopharyngeal Swab   Result Value Ref Range    SARS-CoV-2 RNA, RT PCR NOT DETECTED NOT DETECTED    INFLUENZA A NOT DETECTED NOT DETECTED    INFLUENZA B NOT DETECTED NOT DETECTED   Serum Drug Screen   Result Value Ref Range    Ethanol Lvl 21 mg/dL    Acetaminophen Level <5.0 (L) 10.0 - 29.8 mcg/mL    Salicylate, Serum <3.9 0.0 - 30.0 mg/dL    TCA Scrn NEGATIVE Cutoff:300 ng/mL   CBC Auto Differential   Result Value Ref Range    WBC 5.1 4.5 - 11.5 E9/L    RBC 4.52 3.80 - 5.80 E12/L    Hemoglobin 13.3 12.5 - 16.5 g/dL    Hematocrit 41.8 37.0 - 54.0 %    MCV 92.5 80.0 - 99.9 fL    MCH 29.4 26.0 - 35.0 pg    MCHC 31.8 (L) 32.0 - 34.5 %    RDW 14.1 11.5 - 15.0 fL Platelets 565 915 - 488 E9/L    MPV 9.4 7.0 - 12.0 fL    Neutrophils % 42.5 (L) 43.0 - 80.0 %    Immature Granulocytes % 0.2 0.0 - 5.0 %    Lymphocytes % 40.5 20.0 - 42.0 %    Monocytes % 8.9 2.0 - 12.0 %    Eosinophils % 5.9 0.0 - 6.0 %    Basophils % 2.0 0.0 - 2.0 %    Neutrophils Absolute 2.15 1.80 - 7.30 E9/L    Immature Granulocytes # 0.01 E9/L    Lymphocytes Absolute 2.05 1.50 - 4.00 E9/L    Monocytes Absolute 0.45 0.10 - 0.95 E9/L    Eosinophils Absolute 0.30 0.05 - 0.50 E9/L    Basophils Absolute 0.10 0.00 - 0.20 E9/L   Comprehensive Metabolic Panel   Result Value Ref Range    Sodium 141 132 - 146 mmol/L    Potassium 3.5 3.5 - 5.0 mmol/L    Chloride 105 98 - 107 mmol/L    CO2 27 22 - 29 mmol/L    Anion Gap 9 7 - 16 mmol/L    Glucose 69 (L) 74 - 99 mg/dL    BUN 12 6 - 20 mg/dL    CREATININE 1.2 0.7 - 1.2 mg/dL    GFR Non-African American >60 >=60 mL/min/1.73    GFR African American >60     Calcium 8.4 (L) 8.6 - 10.2 mg/dL    Total Protein 6.0 (L) 6.4 - 8.3 g/dL    Albumin 3.9 3.5 - 5.2 g/dL    Total Bilirubin 0.4 0.0 - 1.2 mg/dL    Alkaline Phosphatase 40 40 - 129 U/L    ALT 9 0 - 40 U/L    AST 19 0 - 39 U/L   EKG 12 Lead   Result Value Ref Range    Ventricular Rate 61 BPM    Atrial Rate 61 BPM    P-R Interval 190 ms    QRS Duration 98 ms    Q-T Interval 408 ms    QTc Calculation (Bazett) 410 ms    P Axis 67 degrees    R Axis 84 degrees    T Axis 56 degrees       RADIOLOGY:  Interpreted by Radiologist.  No orders to display       EKG: This EKG is signed and interpreted by the EP. EKG shows normal sinus rhythm sinus rhythm at 61 bpm.  Normal axis. Normal QRS. Nonspecific ST-T wave changes noted. No STEMI.    ------------------------- NURSING NOTES AND VITALS REVIEWED ---------------------------   The nursing notes within the ED encounter and vital signs as below have been reviewed by myself.   /69   Pulse 77   Temp 97.3 °F (36.3 °C) (Temporal)   Resp 16   Ht 6' (1.829 m)   Wt 154 lb (69.9 kg)

## 2021-06-30 NOTE — ED NOTES
Bed: HD  Expected date:   Expected time:   Means of arrival:   Comments:  favors     Alonzo Ji RN  06/30/21 0010

## 2021-06-30 NOTE — ED NOTES
Emergency Department [facility] AN AFFILIATE OF [facility] Biopsychosocial Assessment Note     Chief Complaint:      Patient is a 45year old, male presenting to ED for increased paranoia and feeling that people are out to harm him. This is patient's baseline delusion. Patient SI/HI are typically exacerbated by drug use.      This patient is well known to this SW and these are patient baseline behaviors.     MSE: Patient is alert & oriented x 4. Patient mood is stable, affect flat. Patient thought process is clear, speech pressured at times. Patient has some baseline auditory hallucinations. No reports of visual hallucinations.     Clinical Summary/History:      Patient has a mental health hx of schizoaffective disorder, non-compliant with outpatient mental health treatment. Patient last psychiatric hospitalization was 6/2/21 for mood disorder and suicidal ideation at [facility].     Patient last received his invega injection during his hospitalization at [facility] earlier this month. Patient needs to follow up with an outpatient provider to regularly administer his psychiatric medications.     Ok sleep, ok appetite, no reports of hopelessness/helplessness.     Patient has a hx of polysubstance abuse.     Gender  [x]??????? Male []??????? Female []??????? Transgender  []??????? Other     Sexual Orientation    [x]??????? Heterosexual []??????? Homosexual []??????? Bisexual []??????? Other     Suicidal Behavioral: CSSR-S Complete. [x]??????? Reports:               [x]??????? Past [x]??????? Present   []??????? Denies     Homicidal/ Violent Behavior  []??????? Reports:              []??????? Past []??????? Present   [x]??????? Denies      Hallucinations/Delusions   [x]??????? Reports:  Auditory hallucinations  []??????? Denies      Substance Use/Alcohol Use/Addiction: SBIRT Screen Complete.    [x]??????? Reports:  Crack cocaine & cannabis  []??????? Denies      Trauma History  []??????? Reports:  []??????? Denies      Collateral Information:         Level of Care/Disposition Plan  [x]??????? Home:   [x]??????? Outpatient Provider:   []??????? Crisis Unit:   []??????? Inpatient Psychiatric Unit:  []??????? Other:         Patient continues to be non-compliant with all resources repeatedly provided to patient on a weekly basis. Plan is for patient to discharge to the street.      FRANCISCO Peraza, [place]  06/30/21 2950

## 2021-07-02 ENCOUNTER — APPOINTMENT (OUTPATIENT)
Dept: GENERAL RADIOLOGY | Age: 41
End: 2021-07-02
Payer: COMMERCIAL

## 2021-07-02 ENCOUNTER — HOSPITAL ENCOUNTER (EMERGENCY)
Age: 41
Discharge: HOME | End: 2021-07-02
Payer: COMMERCIAL

## 2021-07-02 VITALS
RESPIRATION RATE: 20 BRPM | HEART RATE: 98 BPM | OXYGEN SATURATION: 94 % | DIASTOLIC BLOOD PRESSURE: 99 MMHG | TEMPERATURE: 96.9 F | SYSTOLIC BLOOD PRESSURE: 157 MMHG

## 2021-07-02 DIAGNOSIS — S40.022A ARM CONTUSION, LEFT, INITIAL ENCOUNTER: ICD-10-CM

## 2021-07-02 DIAGNOSIS — M79.602 LEFT ARM PAIN: Primary | ICD-10-CM

## 2021-07-02 PROCEDURE — 99283 EMERGENCY DEPT VISIT LOW MDM: CPT

## 2021-07-02 PROCEDURE — 73060 X-RAY EXAM OF HUMERUS: CPT

## 2021-07-02 PROCEDURE — 6370000000 HC RX 637 (ALT 250 FOR IP): Performed by: NURSE PRACTITIONER

## 2021-07-02 RX ORDER — OXYCODONE HYDROCHLORIDE AND ACETAMINOPHEN 5; 325 MG/1; MG/1
1 TABLET ORAL ONCE
Status: COMPLETED | OUTPATIENT
Start: 2021-07-02 | End: 2021-07-02

## 2021-07-02 RX ORDER — IBUPROFEN 800 MG/1
800 TABLET ORAL EVERY 8 HOURS PRN
Qty: 21 TABLET | Refills: 0 | Status: ON HOLD | OUTPATIENT
Start: 2021-07-02 | End: 2021-08-16 | Stop reason: HOSPADM

## 2021-07-02 RX ADMIN — OXYCODONE AND ACETAMINOPHEN 1 TABLET: 5; 325 TABLET ORAL at 00:54

## 2021-07-02 ASSESSMENT — PAIN SCALES - GENERAL: PAINLEVEL_OUTOF10: 10
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
## 2021-07-10 ENCOUNTER — HOSPITAL ENCOUNTER (EMERGENCY)
Age: 41
Discharge: PSYCHIATRIC OR SUBSTANCE USE FACILITY | End: 2021-07-11
Attending: EMERGENCY MEDICINE
Payer: COMMERCIAL

## 2021-07-10 DIAGNOSIS — Z91.14 NONCOMPLIANCE WITH MEDICATIONS: ICD-10-CM

## 2021-07-10 DIAGNOSIS — F22 PARANOIA (HCC): ICD-10-CM

## 2021-07-10 DIAGNOSIS — R45.851 DEPRESSION WITH SUICIDAL IDEATION: Primary | ICD-10-CM

## 2021-07-10 DIAGNOSIS — F32.A DEPRESSION WITH SUICIDAL IDEATION: Primary | ICD-10-CM

## 2021-07-10 PROCEDURE — 96372 THER/PROPH/DIAG INJ SC/IM: CPT

## 2021-07-10 PROCEDURE — 99284 EMERGENCY DEPT VISIT MOD MDM: CPT

## 2021-07-10 ASSESSMENT — PAIN DESCRIPTION - LOCATION: LOCATION: ABDOMEN

## 2021-07-10 ASSESSMENT — PAIN SCALES - GENERAL: PAINLEVEL_OUTOF10: 6

## 2021-07-11 VITALS
DIASTOLIC BLOOD PRESSURE: 76 MMHG | TEMPERATURE: 98 F | WEIGHT: 154 LBS | SYSTOLIC BLOOD PRESSURE: 116 MMHG | OXYGEN SATURATION: 98 % | RESPIRATION RATE: 18 BRPM | HEART RATE: 63 BPM | BODY MASS INDEX: 20.89 KG/M2

## 2021-07-11 LAB
ACETAMINOPHEN LEVEL: <5 MCG/ML (ref 10–30)
ALBUMIN SERPL-MCNC: 3.7 G/DL (ref 3.5–5.2)
ALP BLD-CCNC: 40 U/L (ref 40–129)
ALT SERPL-CCNC: 9 U/L (ref 0–40)
AMPHETAMINE SCREEN, URINE: NOT DETECTED
ANION GAP SERPL CALCULATED.3IONS-SCNC: 3 MMOL/L (ref 7–16)
AST SERPL-CCNC: 14 U/L (ref 0–39)
BACTERIA: NORMAL /HPF
BARBITURATE SCREEN URINE: NOT DETECTED
BASOPHILS ABSOLUTE: 0.05 E9/L (ref 0–0.2)
BASOPHILS RELATIVE PERCENT: 1.2 % (ref 0–2)
BENZODIAZEPINE SCREEN, URINE: NOT DETECTED
BILIRUB SERPL-MCNC: <0.2 MG/DL (ref 0–1.2)
BILIRUBIN URINE: NEGATIVE
BLOOD, URINE: NEGATIVE
BUN BLDV-MCNC: 13 MG/DL (ref 6–20)
CALCIUM SERPL-MCNC: 8.6 MG/DL (ref 8.6–10.2)
CANNABINOID SCREEN URINE: POSITIVE
CHLORIDE BLD-SCNC: 107 MMOL/L (ref 98–107)
CLARITY: CLEAR
CO2: 29 MMOL/L (ref 22–29)
COCAINE METABOLITE SCREEN URINE: POSITIVE
COLOR: YELLOW
CREAT SERPL-MCNC: 1.1 MG/DL (ref 0.7–1.2)
EKG ATRIAL RATE: 63 BPM
EKG P AXIS: 76 DEGREES
EKG P-R INTERVAL: 182 MS
EKG Q-T INTERVAL: 390 MS
EKG QRS DURATION: 94 MS
EKG QTC CALCULATION (BAZETT): 399 MS
EKG R AXIS: 83 DEGREES
EKG T AXIS: 67 DEGREES
EKG VENTRICULAR RATE: 63 BPM
EOSINOPHILS ABSOLUTE: 0.2 E9/L (ref 0.05–0.5)
EOSINOPHILS RELATIVE PERCENT: 4.7 % (ref 0–6)
ETHANOL: <10 MG/DL (ref 0–0.08)
FENTANYL SCREEN, URINE: NOT DETECTED
GFR AFRICAN AMERICAN: >60
GFR NON-AFRICAN AMERICAN: >60 ML/MIN/1.73
GLUCOSE BLD-MCNC: 92 MG/DL (ref 74–99)
GLUCOSE URINE: NEGATIVE MG/DL
HCT VFR BLD CALC: 39.8 % (ref 37–54)
HEMOGLOBIN: 12.8 G/DL (ref 12.5–16.5)
IMMATURE GRANULOCYTES #: 0.01 E9/L
IMMATURE GRANULOCYTES %: 0.2 % (ref 0–5)
INFLUENZA A: NOT DETECTED
INFLUENZA B: NOT DETECTED
KETONES, URINE: NEGATIVE MG/DL
LEUKOCYTE ESTERASE, URINE: NEGATIVE
LYMPHOCYTES ABSOLUTE: 1.44 E9/L (ref 1.5–4)
LYMPHOCYTES RELATIVE PERCENT: 34.1 % (ref 20–42)
Lab: ABNORMAL
MCH RBC QN AUTO: 29.4 PG (ref 26–35)
MCHC RBC AUTO-ENTMCNC: 32.2 % (ref 32–34.5)
MCV RBC AUTO: 91.5 FL (ref 80–99.9)
METHADONE SCREEN, URINE: NOT DETECTED
MONOCYTES ABSOLUTE: 0.32 E9/L (ref 0.1–0.95)
MONOCYTES RELATIVE PERCENT: 7.6 % (ref 2–12)
NEUTROPHILS ABSOLUTE: 2.2 E9/L (ref 1.8–7.3)
NEUTROPHILS RELATIVE PERCENT: 52.2 % (ref 43–80)
NITRITE, URINE: NEGATIVE
OPIATE SCREEN URINE: NOT DETECTED
OXYCODONE URINE: NOT DETECTED
PDW BLD-RTO: 13.8 FL (ref 11.5–15)
PH UA: 6 (ref 5–9)
PHENCYCLIDINE SCREEN URINE: NOT DETECTED
PLATELET # BLD: 281 E9/L (ref 130–450)
PMV BLD AUTO: 9.8 FL (ref 7–12)
POTASSIUM SERPL-SCNC: 4 MMOL/L (ref 3.5–5)
PROTEIN UA: NEGATIVE MG/DL
RBC # BLD: 4.35 E12/L (ref 3.8–5.8)
RBC UA: NORMAL /HPF (ref 0–2)
SALICYLATE, SERUM: <0.3 MG/DL (ref 0–30)
SARS-COV-2 RNA, RT PCR: NOT DETECTED
SODIUM BLD-SCNC: 139 MMOL/L (ref 132–146)
SPECIFIC GRAVITY UA: 1.02 (ref 1–1.03)
TOTAL PROTEIN: 5.9 G/DL (ref 6.4–8.3)
TRICYCLIC ANTIDEPRESSANTS SCREEN SERUM: NEGATIVE NG/ML
UROBILINOGEN, URINE: 0.2 E.U./DL
WBC # BLD: 4.2 E9/L (ref 4.5–11.5)
WBC UA: NORMAL /HPF (ref 0–5)

## 2021-07-11 PROCEDURE — 80143 DRUG ASSAY ACETAMINOPHEN: CPT

## 2021-07-11 PROCEDURE — 85025 COMPLETE CBC W/AUTO DIFF WBC: CPT

## 2021-07-11 PROCEDURE — 93010 ELECTROCARDIOGRAM REPORT: CPT | Performed by: INTERNAL MEDICINE

## 2021-07-11 PROCEDURE — 81001 URINALYSIS AUTO W/SCOPE: CPT

## 2021-07-11 PROCEDURE — 80179 DRUG ASSAY SALICYLATE: CPT

## 2021-07-11 PROCEDURE — 87636 SARSCOV2 & INF A&B AMP PRB: CPT

## 2021-07-11 PROCEDURE — 80053 COMPREHEN METABOLIC PANEL: CPT

## 2021-07-11 PROCEDURE — 80307 DRUG TEST PRSMV CHEM ANLYZR: CPT

## 2021-07-11 PROCEDURE — 82077 ASSAY SPEC XCP UR&BREATH IA: CPT

## 2021-07-11 PROCEDURE — 93005 ELECTROCARDIOGRAM TRACING: CPT | Performed by: PHYSICIAN ASSISTANT

## 2021-07-11 RX ORDER — AZITHROMYCIN 250 MG/1
1000 TABLET, FILM COATED ORAL ONCE
Status: DISCONTINUED | OUTPATIENT
Start: 2021-07-11 | End: 2021-07-11 | Stop reason: HOSPADM

## 2021-07-11 RX ORDER — ONDANSETRON 4 MG/1
4 TABLET, ORALLY DISINTEGRATING ORAL ONCE
Status: DISCONTINUED | OUTPATIENT
Start: 2021-07-11 | End: 2021-07-11 | Stop reason: HOSPADM

## 2021-07-11 NOTE — ED NOTES
Received notification from [address], no beds available today.  Patient is on wait list for review tomorrow AM.     Jose Mcdaniel, FRANCISCO, LSW  07/11/21 1255

## 2021-07-11 NOTE — ED NOTES
Received notification from [address] [place] [address] Pkwy, no beds available until tomorrow.      Landry Ramachandran, FRANCISCO, LSW  07/11/21 4235

## 2021-07-11 NOTE — ED NOTES
Bed: [facility]  Expected date:   Expected time:   Means of arrival:   Comments:  triage     Ernestina Brizuela RN  07/10/21 9676

## 2021-07-11 NOTE — ED NOTES
Emergency Department [facility] AN AFFILIATE OF [facility] Biopsychosocial Assessment Note    Chief Complaint:     Patient is a 45year old, male presenting to ED for increased paranoia, command auditory hallucinations, and suicidal ideation w/ thoughts of wanting to jump off of a bridge. Patient is chronically homeless and has an extensive polysubstance abuse hx - crack cocaine,  Opiates, amphetamine, and cannabis. Patient has not had his long-acting injection Christal Males) since mid-June. Patient has been non-compliant with outpatient follow up. Patient endorses suicidal ideation. No reports of homicidal ideation. MSE: Patient is alert & oriented x 4. Patient mood is irritable, affect flat. Thought process is paranoid, speech normal. Patient endorses auditory hallucinations. Patient denies visual hallucinations. Clinical Summary/History:     Patient has a mental health hx of schizoaffective disorder, non-compliant with outpatient treatment, and patient last psychiatric hospitalization was on 6/2 at [facility]. Patient reports ok sleep, ok appetite, no reports of hopelessness/helplessness. Patient has no hx of suicide attempts or self injurious behaviors. Gender  [x] Male [] Female [] Transgender  [] Other    Sexual Orientation    [x] Heterosexual [] Homosexual [] Bisexual [] Other    Suicidal Behavioral: CSSR-S Complete. [x] Reports:    [x] Past [x] Present   [] Denies    Homicidal/ Violent Behavior  [] Reports:   [] Past [] Present   [x] Denies     Hallucinations/Delusions   [x] Reports: Auditory hallucinations, paranoia  [] Denies     Substance Use/Alcohol Use/Addiction: SBIRT Screen Complete. [x] Reports:  Crack cocaine, opiates, amphetamine, cannabis  [] Denies     Trauma History  [] Reports:  [x] Denies     Collateral Information:       Level of Care/Disposition Plan  [] Home:   [] Outpatient Provider:   [] Crisis Unit:   [x] Inpatient Psychiatric Unit:  [] Other:     Patient was pink slipped by ED Doctor. SW will pursue inpatient admission for safety/stabilization.      FRANCISCO Singh, [facility]  07/11/21 1723
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
## 2021-07-11 NOTE — ED NOTES
Left  with [facility], for return phone call to complete referral.     Tonie Chacon, FRANCISCO, LSW  07/11/21 7407

## 2021-07-11 NOTE — ED NOTES
Received return call from Angel Luis Bales. Initiated referral for inpatient mental health.       Leonce Sandifer, MSW, LSW  07/11/21 1666

## 2021-07-11 NOTE — ED NOTES
The pt was accepted to [facility] by Dr. Chika Aleman to the 1600 unit. N to N to be called to [phone].   Physicians will arrive @ 8:45pm.     Ying Gates, [facility]  07/11/21 6727

## 2021-07-11 NOTE — ED NOTES
Patient to desk demanding personal hygiene items.       Rosalba Alberts RN  07/10/21 22 LITO Smith RN  07/10/21 1317

## 2021-07-11 NOTE — ED PROVIDER NOTES
ED Attending  CC: Rae Coffey [phone]  Department of Emergency Medicine   ED  Encounter Note  Admit Date/RoomTime: 7/10/2021 11:27 PM  ED Room: [address] [place] [address]    NAME: Pinky Carmona. : 1980  MRN: [id number]     Chief Complaint:  Paranoid (states that he has been feeling paranoid today. states plans to jump of the bridge.)    History of Present Illness       Lashanda Zaragoza. is a 45 y.o. old male who presents to the emergency department by private vehicle, for psychiatric evaluation/medical clearance which began a few day(s) prior to arrival.  He has a prior history of depression and Schizoaffective disorder of which the problem is long-standing. His reported drug use history: denies. He  has previously been in counseling. Patient states that he is supposed to be on a \"injection. \"  Patient states that he did go to [facility] and was seen and evaluated 2 months ago was supposed to have an injection and never showed because he was Niue his girl. \"  Patient states that he is paranoid and he is hearing voices that are telling him to \"jump off a bridge. \"  Patient states that he is also been having unprotected intercourse and would like treated for STDs as a secondary complaint. Patient denies any homicidal ideations. There has been no history of recent trauma. He admits to suicidal ideation  and denies homicidal ideation. Quality:      Hopelessness:   No.     Terror:   No.     Confusion:   No.     Hallucinations:   None. Able to care for self: No.  Able to control self: No.    ROS   Pertinent positives and negatives are stated within HPI, all other systems reviewed and are negative. Past Medical History:  has a past medical history of Depression and Schizoaffective disorder ([facility] [address] Surgical History:  has a past surgical history that includes eye surgery (19 years ago). Social History:  reports that he has been smoking cigars and cigarettes.  He has a 6.00 pack-year smoking history. He has never used smokeless tobacco. He reports current drug use. Frequency: 7.00 times per week. Drugs: Cocaine and Marijuana. He reports that he does not drink alcohol. Family History: family history includes Mental Illness in his mother; No Known Problems in his father; Substance Abuse in his mother. Allergies: Chlorpheniramine-phenylephrine, Penicillins, and Rondec-d [chlophedianol-pseudoephedrine]    Physical Exam   Oxygen Saturation Interpretation: Normal.        ED Triage Vitals   BP Temp Temp Source Pulse Resp SpO2 Height Weight   07/10/21 2324 07/10/21 2257 07/10/21 2257 07/10/21 2257 07/10/21 2324 07/10/21 2257 -- 07/10/21 2324   117/81 98.2 °F (36.8 °C) Temporal 86 14 93 %  154 lb (69.9 kg)         General Appearance:  well-appearing. Constitutional:   Level of Consciousness: Awake and alert. ETOH: No.          Distress: none. Cooperativeness: cooperative. Eyes:  PERRL, EOMI, no discharge or conjunctival injection. Ears:  External ears without lesions. Throat:  Pharynx without injection, exudate, or tonsillar hypertrophy. Airway patient. Neck:  Normal ROM. Supple. Respiratory:  Clear to auscultation and breath sounds equal.  CV:  Regular rate and rhythm, normal heart sounds, without pathological murmurs, ectopy, gallops, or rubs. GI:  Abdomen Soft, nontender, good bowel sounds. No firm or pulsatile mass. Back:  No costovertebral tenderness. Integument:  Normal turgor. Warm, dry, without visible rash, unless noted elsewhere. Lymphatics: No lymphangitis or adenopathy noted. Neurological:  Oriented. Motor functions intact. Psychiatric:        Thought Process:       Coherent:  Yes. Delusions / Paranoia: no evidence of paranoia. Flight of ideas:  No.         Rambling conversation:  No.       Affect: calm. Suicidal ideation:  suicidal ideation with clear plan and intent. Homicidal ideation:  no homicidal ideation. Perceptions:  auditory present. Insight: below average. Judgement: below average.     Lab / Imaging Results   (All laboratory and radiology results have been personally reviewed by myself)  Labs:  Results for orders placed or performed during the hospital encounter of 07/10/21   COVID-19 & Influenza Combo    Specimen: Nasopharyngeal Swab   Result Value Ref Range    SARS-CoV-2 RNA, RT PCR NOT DETECTED NOT DETECTED    INFLUENZA A NOT DETECTED NOT DETECTED    INFLUENZA B NOT DETECTED NOT DETECTED   Comprehensive Metabolic Panel   Result Value Ref Range    Sodium 139 132 - 146 mmol/L    Potassium 4.0 3.5 - 5.0 mmol/L    Chloride 107 98 - 107 mmol/L    CO2 29 22 - 29 mmol/L    Anion Gap 3 (L) 7 - 16 mmol/L    Glucose 92 74 - 99 mg/dL    BUN 13 6 - 20 mg/dL    CREATININE 1.1 0.7 - 1.2 mg/dL    GFR Non-African American >60 >=60 mL/min/1.73    GFR African American >60     Calcium 8.6 8.6 - 10.2 mg/dL    Total Protein 5.9 (L) 6.4 - 8.3 g/dL    Albumin 3.7 3.5 - 5.2 g/dL    Total Bilirubin <0.2 0.0 - 1.2 mg/dL    Alkaline Phosphatase 40 40 - 129 U/L    ALT 9 0 - 40 U/L    AST 14 0 - 39 U/L   CBC Auto Differential   Result Value Ref Range    WBC 4.2 (L) 4.5 - 11.5 E9/L    RBC 4.35 3.80 - 5.80 E12/L    Hemoglobin 12.8 12.5 - 16.5 g/dL    Hematocrit 39.8 37.0 - 54.0 %    MCV 91.5 80.0 - 99.9 fL    MCH 29.4 26.0 - 35.0 pg    MCHC 32.2 32.0 - 34.5 %    RDW 13.8 11.5 - 15.0 fL    Platelets 780 665 - 163 E9/L    MPV 9.8 7.0 - 12.0 fL    Neutrophils % 52.2 43.0 - 80.0 %    Immature Granulocytes % 0.2 0.0 - 5.0 %    Lymphocytes % 34.1 20.0 - 42.0 %    Monocytes % 7.6 2.0 - 12.0 %    Eosinophils % 4.7 0.0 - 6.0 %    Basophils % 1.2 0.0 - 2.0 %    Neutrophils Absolute 2.20 1.80 - 7.30 E9/L    Immature Granulocytes # 0.01 E9/L    Lymphocytes Absolute 1.44 (L) 1.50 - 4.00 E9/L    Monocytes Absolute 0.32 0.10 - 0.95 E9/L    Eosinophils Absolute 0.20 0.05 - 0.50 E9/L    Basophils Absolute 0.05 0.00 - 0.20 E9/L   Serum Drug Screen   Result Value Ref Range    Ethanol Lvl <10 mg/dL    Acetaminophen Level <5.0 (L) 10.0 - 59.6 mcg/mL    Salicylate, Serum <9.2 0.0 - 30.0 mg/dL    TCA Scrn NEGATIVE Cutoff:300 ng/mL   EKG 12 Lead   Result Value Ref Range    Ventricular Rate 63 BPM    Atrial Rate 63 BPM    P-R Interval 182 ms    QRS Duration 94 ms    Q-T Interval 390 ms    QTc Calculation (Bazett) 399 ms    P Axis 76 degrees    R Axis 83 degrees    T Axis 67 degrees     Imaging: All Radiology results interpreted by Radiologist unless otherwise noted. No orders to display       ED Course / Medical Decision Making     Medications   cefTRIAXone (ROCEPHIN) 500 mg in lidocaine 1 % 1.43 mL IM Injection (has no administration in time range)   azithromycin (ZITHROMAX) tablet 1,000 mg (has no administration in time range)   ondansetron (ZOFRAN-ODT) disintegrating tablet 4 mg (has no administration in time range)        Consult(s):   IP CONSULT TO SOCIAL WORK    Procedure(s):   none    MDM:   Patient is a 67-year-old male that presented to the emergency department with paranoia and hearing voices which are telling him to \"jump off a bridge. \"  Patient also states that he is concerned for an STD and would like to be prophylactically treated. Patient states that he has had this paranoia before. Patient states he has been noncompliant with his medications and has not followed up for his injection. Patient was pink slipped, social work consulted. Patient had a full work-up including lab work, urine and serum drug screen as well as a Covid influenza swab. Always found to be within normal limits. Patient was prophylactically treated for STDs due to his concern. Patient tolerated the medications well.   Patient is remained vitally stable and will be admitted for further psychiatric work-up    Plan of Care/Counseling:  Gely Lennon PA-C reviewed today's visit with the patient in addition to providing specific details for the plan of care and counseling regarding the diagnosis and prognosis. Questions are answered at this time and are agreeable with the plan. Assessment      1. Depression with suicidal ideation    2. Noncompliance with medications    3. Paranoia ([address]      Plan   Admit for further psychiatric evaluation-medically cleared  Patient condition is stable    New Medications     New Prescriptions    No medications on file     Electronically signed by Rachael Kern PA-C   DD: 7/11/21  **This report was transcribed using voice recognition software. Every effort was made to ensure accuracy; however, inadvertent computerized transcription errors may be present.   END OF ED PROVIDER NOTE        Rachael Kern PA-C  07/11/21 0206

## 2021-07-12 NOTE — ED NOTES
Patient remains in bed, eyes closed, respirations easy, even and unlabored. Awaiting transport to [facility].       Boom Garber RN  07/11/21 2013

## 2021-07-19 ENCOUNTER — HOSPITAL ENCOUNTER (EMERGENCY)
Age: 41
Discharge: HOME | End: 2021-07-19
Attending: EMERGENCY MEDICINE
Payer: COMMERCIAL

## 2021-07-19 VITALS
TEMPERATURE: 97.9 F | BODY MASS INDEX: 22.38 KG/M2 | SYSTOLIC BLOOD PRESSURE: 111 MMHG | WEIGHT: 165 LBS | HEART RATE: 78 BPM | RESPIRATION RATE: 18 BRPM | OXYGEN SATURATION: 97 % | DIASTOLIC BLOOD PRESSURE: 66 MMHG

## 2021-07-19 DIAGNOSIS — Z91.14 NONCOMPLIANCE WITH MEDICATION REGIMEN: Primary | ICD-10-CM

## 2021-07-19 DIAGNOSIS — F32.0 CURRENT MILD EPISODE OF MAJOR DEPRESSIVE DISORDER, UNSPECIFIED WHETHER RECURRENT (HCC): ICD-10-CM

## 2021-07-19 LAB
ACETAMINOPHEN LEVEL: <5 MCG/ML (ref 10–30)
ALBUMIN SERPL-MCNC: 4 G/DL (ref 3.5–5.2)
ALP BLD-CCNC: 41 U/L (ref 40–129)
ALT SERPL-CCNC: 15 U/L (ref 0–40)
AMPHETAMINE SCREEN, URINE: NOT DETECTED
ANION GAP SERPL CALCULATED.3IONS-SCNC: 10 MMOL/L (ref 7–16)
AST SERPL-CCNC: 23 U/L (ref 0–39)
BARBITURATE SCREEN URINE: NOT DETECTED
BASOPHILS ABSOLUTE: 0.1 E9/L (ref 0–0.2)
BASOPHILS RELATIVE PERCENT: 1.4 % (ref 0–2)
BENZODIAZEPINE SCREEN, URINE: NOT DETECTED
BILIRUB SERPL-MCNC: 0.4 MG/DL (ref 0–1.2)
BUN BLDV-MCNC: 13 MG/DL (ref 6–20)
CALCIUM SERPL-MCNC: 8.8 MG/DL (ref 8.6–10.2)
CANNABINOID SCREEN URINE: NOT DETECTED
CHLORIDE BLD-SCNC: 103 MMOL/L (ref 98–107)
CO2: 26 MMOL/L (ref 22–29)
COCAINE METABOLITE SCREEN URINE: POSITIVE
CREAT SERPL-MCNC: 1.1 MG/DL (ref 0.7–1.2)
EOSINOPHILS ABSOLUTE: 0.34 E9/L (ref 0.05–0.5)
EOSINOPHILS RELATIVE PERCENT: 4.8 % (ref 0–6)
ETHANOL: <10 MG/DL (ref 0–0.08)
FENTANYL SCREEN, URINE: NOT DETECTED
GFR AFRICAN AMERICAN: >60
GFR NON-AFRICAN AMERICAN: >60 ML/MIN/1.73
GLUCOSE BLD-MCNC: 84 MG/DL (ref 74–99)
HCT VFR BLD CALC: 42.1 % (ref 37–54)
HEMOGLOBIN: 13.4 G/DL (ref 12.5–16.5)
IMMATURE GRANULOCYTES #: 0.01 E9/L
IMMATURE GRANULOCYTES %: 0.1 % (ref 0–5)
LYMPHOCYTES ABSOLUTE: 2.34 E9/L (ref 1.5–4)
LYMPHOCYTES RELATIVE PERCENT: 33 % (ref 20–42)
Lab: ABNORMAL
MCH RBC QN AUTO: 29.5 PG (ref 26–35)
MCHC RBC AUTO-ENTMCNC: 31.8 % (ref 32–34.5)
MCV RBC AUTO: 92.7 FL (ref 80–99.9)
METHADONE SCREEN, URINE: NOT DETECTED
MONOCYTES ABSOLUTE: 0.54 E9/L (ref 0.1–0.95)
MONOCYTES RELATIVE PERCENT: 7.6 % (ref 2–12)
NEUTROPHILS ABSOLUTE: 3.76 E9/L (ref 1.8–7.3)
NEUTROPHILS RELATIVE PERCENT: 53.1 % (ref 43–80)
OPIATE SCREEN URINE: NOT DETECTED
OXYCODONE URINE: NOT DETECTED
PDW BLD-RTO: 14.2 FL (ref 11.5–15)
PHENCYCLIDINE SCREEN URINE: NOT DETECTED
PLATELET # BLD: 352 E9/L (ref 130–450)
PMV BLD AUTO: 9.5 FL (ref 7–12)
POTASSIUM SERPL-SCNC: 4 MMOL/L (ref 3.5–5)
RBC # BLD: 4.54 E12/L (ref 3.8–5.8)
SALICYLATE, SERUM: <0.3 MG/DL (ref 0–30)
SODIUM BLD-SCNC: 139 MMOL/L (ref 132–146)
TOTAL PROTEIN: 6.4 G/DL (ref 6.4–8.3)
TRICYCLIC ANTIDEPRESSANTS SCREEN SERUM: NEGATIVE NG/ML
VALPROIC ACID LEVEL: 3 MCG/ML (ref 50–100)
WBC # BLD: 7.1 E9/L (ref 4.5–11.5)

## 2021-07-19 PROCEDURE — 80053 COMPREHEN METABOLIC PANEL: CPT

## 2021-07-19 PROCEDURE — 80164 ASSAY DIPROPYLACETIC ACD TOT: CPT

## 2021-07-19 PROCEDURE — 82077 ASSAY SPEC XCP UR&BREATH IA: CPT

## 2021-07-19 PROCEDURE — 80307 DRUG TEST PRSMV CHEM ANLYZR: CPT

## 2021-07-19 PROCEDURE — 99285 EMERGENCY DEPT VISIT HI MDM: CPT

## 2021-07-19 PROCEDURE — 85025 COMPLETE CBC W/AUTO DIFF WBC: CPT

## 2021-07-19 PROCEDURE — 36415 COLL VENOUS BLD VENIPUNCTURE: CPT

## 2021-07-19 PROCEDURE — 80143 DRUG ASSAY ACETAMINOPHEN: CPT

## 2021-07-19 PROCEDURE — 80179 DRUG ASSAY SALICYLATE: CPT

## 2021-07-19 ASSESSMENT — ENCOUNTER SYMPTOMS
RHINORRHEA: 0
VOMITING: 0
DIARRHEA: 0
CONSTIPATION: 0
BACK PAIN: 0
COUGH: 0
NAUSEA: 0
ABDOMINAL DISTENTION: 0
BLOOD IN STOOL: 0
ABDOMINAL PAIN: 0
SHORTNESS OF BREATH: 0
WHEEZING: 0
EYE REDNESS: 0
SORE THROAT: 0

## 2021-07-19 NOTE — ED NOTES
Patient states he wants to leave. Per  Soto, she is working on his discharge and patient asked to wait for discharge paperwork before leaving. Patient pacing in pat. Dr. Manuel Mejia speaking with patient at this time.       Karli Orellana RN  07/19/21 2974

## 2021-07-19 NOTE — ED NOTES
FIRST PROVIDER CONTACT ASSESSMENT NOTE                                                                                                Department of Emergency Medicine                                                      First Provider Note  21  1:16 AM EDT  NAME: Hipolito Medrano : 1980  MRN: [id number]    Chief Complaint: No chief complaint on file. History of Present Illness:   Hipolito Medrano is a 45 y.o. male who presents to the ED for anxiousness, restless behavior since being off of his medication and not following up. Patient presents emergency department, states that he is not taking his Invega injection since mid . He also reports that he has not followed up because he does not know who to follow-up with. Patient reports worsening symptoms over the last several days, denies feeling suicidal or homicidal    Focused Physical Exam:  VS:    ED Triage Vitals [21 0042]   BP Temp Temp Source Pulse Resp SpO2 Height Weight   -- 97.9 °F (36.6 °C) Temporal 84 -- 92 % -- --        General: Alert and in no apparent distress. Medical History:  has a past medical history of Depression and Schizoaffective disorder ([facility] [address] Surgical History:  has a past surgical history that includes eye surgery (19 years ago). Social History:  reports that he has been smoking cigars and cigarettes. He has a 6.00 pack-year smoking history. He has never used smokeless tobacco. He reports current drug use. Frequency: 7.00 times per week. Drugs: Cocaine and Marijuana. He reports that he does not drink alcohol. Family History: family history includes Mental Illness in his mother; No Known Problems in his father; Substance Abuse in his mother.     Allergies: Chlorpheniramine-phenylephrine, Penicillins, and Rondec-d [chlophedianol-pseudoephedrine]     Initial Plan of Care:  Initiate Treatment-Testing, Proceed toTreatment Area When Bed Available for ED Attending/MLP to Continue

## 2021-07-19 NOTE — ED NOTES
Police searched backpack in garbage can and stated to leave it in the garbage can as it just has empty food cans in it.       Precious Pa RN  07/19/21 6150

## 2021-07-19 NOTE — ED NOTES
Emergency Department [facility] AN [facility]FFILIATE OF [facility] Biopsychosocial Assessment Note    Chief Complaint: Pt is a 46 yo male that presents to the ED voluntarily \"recent admitted for psych, not taking medication, not following up, parnoid, restless, asking for boxed lunch, -SI, -HI. \"     MSE: Pt is alert and oriented x4. Pt's mood is anxious, affect is congruent. Pt stated that his sleep and appetite have been good but he is currently hungry and no one here has given him food. Pt's eye contact is poor, speech is mumbled, rate is normal, volume is low. Pt's insight and judgement is fair. Pt denies SI/HI/ AVH. Pt is calm during assessment and minimally cooperative. Clinical Summary/History: Pt stated that he brought himself to the hospital because he normally get his Invega injection every month and they changed it to every 2 months. Pt stated that he get his injection at his PCP. Pt is diagnosed with bipolar, schizophrenia and depression. Pt stated he has had multiple psychiatric admissions but he is unsure of the last admission and he doesn't know where his follow up appointments were made. Pt stated he came to the hospital to get checked to make sure everything is okay with him. Pt reports that he using crack cocaine but doesn't remember the last time he used. According to pt's UDS pt is positive for cocaine. Pt stated that he has used it in the last year. Pt reports that he has had SI thoughts in the past but it was a very long time ago and he doesn't think like that anymore, he has never had a plan. Pt had been admitted onto the [facility] unit in the past, last admission for paranoia 4/22/2021. Pt's follow up provider at this time was Nichol Child, pt is non-compliant with his out-pt provider. Pt denies feelings of being hopeless/ helpless. Gender  [x] Male [] Female [] Transgender  [] Other    Sexual Orientation    [] Heterosexual [] Homosexual [] Bisexual [] Other  NAKIA    Suicidal Behavioral: CSSR-S Complete.   [] Reports:    [] Past [] Present   [x] Denies    Homicidal/ Violent Behavior  [] Reports:   [] Past [] Present   [x] Denies     Hallucinations/Delusions   [] Reports:   [x] Denies     Substance Use/Alcohol Use/Addiction: SBIRT Screen Complete. [x] Reports: Pt stated that he uses crack/cocaine but is unable to recall last use. [] Denies     Trauma History  [] Reports:  [x] Denies     Collateral Information: None collected at this time.        Level of Care/Disposition Plan  [x] Home:   [x] Outpatient Provider:   [] Crisis Unit:   [] Inpatient Psychiatric Unit:  [] Other:        FRANCISCO Sher, LSW  07/19/21 6738

## 2021-07-19 NOTE — ED PROVIDER NOTES
stool, constipation, diarrhea, nausea and vomiting. Genitourinary: Negative for difficulty urinating, dysuria, flank pain, frequency and hematuria. Musculoskeletal: Negative for arthralgias, back pain, myalgias and neck pain. Skin: Negative for rash and wound. Neurological: Negative for dizziness, syncope, speech difficulty, weakness, light-headedness, numbness and headaches. Psychiatric/Behavioral: Positive for confusion and suicidal ideas. Negative for hallucinations and self-injury. The patient is nervous/anxious. Physical Exam  Vitals and nursing note reviewed. Constitutional:       General: He is awake. He is not in acute distress. Appearance: He is well-developed. He is not diaphoretic. HENT:      Head: Normocephalic and atraumatic. Right Ear: External ear normal.      Left Ear: External ear normal.      Nose: Nose normal.      Mouth/Throat:      Mouth: Mucous membranes are moist.      Pharynx: Oropharynx is clear. Eyes:      General: No scleral icterus. Right eye: No discharge. Left eye: No discharge. Extraocular Movements: Extraocular movements intact. Conjunctiva/sclera: Conjunctivae normal.      Pupils: Pupils are equal, round, and reactive to light. Cardiovascular:      Rate and Rhythm: Normal rate and regular rhythm. Heart sounds: Normal heart sounds. No murmur heard. No friction rub. No gallop. Comments: Upper extremity and lower extremity distal pulses intact bilaterally +2/4  Pulmonary:      Effort: Pulmonary effort is normal. No respiratory distress. Breath sounds: Normal breath sounds. No wheezing, rhonchi or rales. Abdominal:      General: Bowel sounds are normal. There is no distension. Palpations: Abdomen is soft. Tenderness: There is no abdominal tenderness. There is no guarding or rebound. Musculoskeletal:         General: No tenderness or deformity. Normal range of motion.       Cervical back: Normal range of motion and neck supple. No rigidity. No muscular tenderness. Right lower leg: No edema. Left lower leg: No edema. Lymphadenopathy:      Cervical: No cervical adenopathy. Skin:     General: Skin is warm and dry. Capillary Refill: Capillary refill takes less than 2 seconds. Findings: No erythema or rash. Neurological:      General: No focal deficit present. Mental Status: He is alert and oriented to person, place, and time. Cranial Nerves: No cranial nerve deficit. Sensory: No sensory deficit. Motor: No weakness. Coordination: Coordination normal.   Psychiatric:         Attention and Perception: He does not perceive auditory or visual hallucinations. Mood and Affect: Mood is anxious and depressed. Affect is flat. Speech: Speech normal.         Behavior: Behavior is cooperative. Thought Content: Thought content includes suicidal ideation. Thought content does not include homicidal ideation. Thought content does not include homicidal or suicidal plan.        ---------------------------------- PAST HISTORY ---------------------------------------------  Past Medical History:  has a past medical history of Depression and Schizoaffective disorder ([facility] [address] Past Surgical History:  has a past surgical history that includes eye surgery (19 years ago). Social History:  reports that he has been smoking cigars and cigarettes. He has a 6.00 pack-year smoking history. He has never used smokeless tobacco. He reports current drug use. Frequency: 7.00 times per week. Drugs: Cocaine and Marijuana. He reports that he does not drink alcohol. Family History: family history includes Mental Illness in his mother; No Known Problems in his father; Substance Abuse in his mother. Home Meds: Not in a hospital admission. The patients home medications have been reviewed.     Allergies: Chlorpheniramine-phenylephrine, Penicillins, and Rondec-d [chlophedianol-pseudoephedrine]    ------------------------- NURSING NOTES AND VITALS REVIEWED ---------------------------  Date / Time Roomed:  7/19/2021  5:40 AM  ED Bed Assignment:  Singh Crowder    The nursing notes within the ED encounter and vital signs as below have been reviewed. /66   Pulse 78   Temp 97.9 °F (36.6 °C) (Temporal)   Resp 18   Wt 165 lb (74.8 kg)   SpO2 97%   BMI 22.38 kg/m²   -------------------------------------------------- RESULTS / INTERVENTIONS -------------------------------------------------  All laboratory and radiology tests have been reviewed by this physician.     LABS:  Results for orders placed or performed during the hospital encounter of 07/19/21   CBC auto differential   Result Value Ref Range    WBC 7.1 4.5 - 11.5 E9/L    RBC 4.54 3.80 - 5.80 E12/L    Hemoglobin 13.4 12.5 - 16.5 g/dL    Hematocrit 42.1 37.0 - 54.0 %    MCV 92.7 80.0 - 99.9 fL    MCH 29.5 26.0 - 35.0 pg    MCHC 31.8 (L) 32.0 - 34.5 %    RDW 14.2 11.5 - 15.0 fL    Platelets 663 325 - 705 E9/L    MPV 9.5 7.0 - 12.0 fL    Neutrophils % 53.1 43.0 - 80.0 %    Immature Granulocytes % 0.1 0.0 - 5.0 %    Lymphocytes % 33.0 20.0 - 42.0 %    Monocytes % 7.6 2.0 - 12.0 %    Eosinophils % 4.8 0.0 - 6.0 %    Basophils % 1.4 0.0 - 2.0 %    Neutrophils Absolute 3.76 1.80 - 7.30 E9/L    Immature Granulocytes # 0.01 E9/L    Lymphocytes Absolute 2.34 1.50 - 4.00 E9/L    Monocytes Absolute 0.54 0.10 - 0.95 E9/L    Eosinophils Absolute 0.34 0.05 - 0.50 E9/L    Basophils Absolute 0.10 0.00 - 0.20 E9/L   Comprehensive Metabolic Panel   Result Value Ref Range    Sodium 139 132 - 146 mmol/L    Potassium 4.0 3.5 - 5.0 mmol/L    Chloride 103 98 - 107 mmol/L    CO2 26 22 - 29 mmol/L    Anion Gap 10 7 - 16 mmol/L    Glucose 84 74 - 99 mg/dL    BUN 13 6 - 20 mg/dL    CREATININE 1.1 0.7 - 1.2 mg/dL    GFR Non-African American >60 >=60 mL/min/1.73    GFR African American >60     Calcium 8.8 8.6 - 10.2 mg/dL    Total Protein 6.4 6.4 - 8.3 g/dL    Albumin 4.0 3.5 - 5.2 g/dL    Total Bilirubin 0.4 0.0 - 1.2 mg/dL    Alkaline Phosphatase 41 40 - 129 U/L    ALT 15 0 - 40 U/L    AST 23 0 - 39 U/L   Serum Drug Screen   Result Value Ref Range    Ethanol Lvl <10 mg/dL    Acetaminophen Level <5.0 (L) 10.0 - 85.0 mcg/mL    Salicylate, Serum <6.8 0.0 - 30.0 mg/dL    TCA Scrn NEGATIVE Cutoff:300 ng/mL   Urine Drug Screen   Result Value Ref Range    Amphetamine Screen, Urine NOT DETECTED Negative <1000 ng/mL    Barbiturate Screen, Ur NOT DETECTED Negative < 200 ng/mL    Benzodiazepine Screen, Urine NOT DETECTED Negative < 200 ng/mL    Cannabinoid Scrn, Ur NOT DETECTED Negative < 50ng/mL    Cocaine Metabolite Screen, Urine POSITIVE (A) Negative < 300 ng/mL    Opiate Scrn, Ur NOT DETECTED Negative < 300ng/mL    PCP Screen, Urine NOT DETECTED Negative < 25 ng/mL    Methadone Screen, Urine NOT DETECTED Negative <300 ng/mL    Oxycodone Urine NOT DETECTED Negative <100 ng/mL    FENTANYL SCREEN, URINE NOT DETECTED Negative <1 ng/mL    Drug Screen Comment: see below    Valproic acid level, total   Result Value Ref Range    Valproic Acid Lvl 3 (L) 50 - 100 mcg/mL       RADIOLOGY: Interpreted by Radiologist unless otherwise noted. No orders to display       Oxygen Saturation Interpretation: Normal    Meds Given:  Medications - No data to display    Procedures:  No procedures performed. --------------------------------- PROGRESS NOTES / ADDITIONAL PROVIDER NOTES ---------------------------------  Consultations:  As outlined below. ED Course:  ED Course as of Jul 20 0600   Mon Jul 19, 2021   1358 Social work evaluated the patient. At this time he is denying any suicidal ideation, homicidal ideation and AVH. They state that the patient is at his baseline and follows at [facility] for his psych care. They have follow-up set up for the patient. This time the patient is stable and safe for discharge.     [ML]      ED Course User Index  [ML] Joon Willoughby DO 0732: All results were discussed with the patient and I have provided specific details regarding the plan of care, diagnosis and associated prognosis. The patient tolerated the visit well and, at the time of discharge, he was without objective evidence of hemodynamic instability or an acute process (biological or psychological) requiring hospitalization and inpatient management. The patient was seen by myself and the assigned attending physician, Dr. Evangelina Ahumada, who agreed with my assessment and plan as laid out herein. The importance of follow-up was discussed at the end of the visit and I recommended that the patient be seen by his primary care physician in 2-3 days. The patient verbalized his understanding and agreement with the plan as presented and stated his intention to follow up. Reasons to return to the ER or seek immediate evaluation by a medical provider were discussed at length and all questions were answered. The patient was discharged home in stable condition. MDM:  Patient presented from home complaining of worsening depressive symptoms. On arrival, all vital signs were within normal limits. Physical exam was as documented above. A work-up was initiated to rule out any organic causes for the patient's symptoms. Labs were remarkable only for a drug screen that revealed cocaine. The patient admitted that he has been feeling significantly suicidal and has not been taking his medications. He states that he has not had a plan. He denies any homicidal ideation or AVH. Given his history and presenting symptoms, the decision was made to have social work evaluate the patient. Social work determined that the patient was appropriate and did not express any significant SI or HI. Given that, the decision was made to discharge the patient with recommended follow-up at [facility] for continued psych care. The patient was stable to time of his disposition.       Discharge Medication List as of 7/19/2021 1:58 PM          Diagnosis:  1. Noncompliance with medication regimen    2. Current mild episode of major depressive disorder, unspecified whether recurrent ([facility]        Disposition:  Patient's disposition: Discharge to home  Patient's condition is stable. This patient was seen, examined and treated with Dr. Kelsey Oscar. All pertinent aspects of the patient's care were discussed with the attending physician.        Farideh Bhatt DO  Resident  07/20/21 0600

## 2021-07-19 NOTE — ED NOTES
After patient left department following review of his discharge instructions, CO for another patient in the emergency department notified this RN that the patient had thrown his backpack in the garbage can in the back of ER prior to leaving. Police notified to come retrieve backpack. Patient has already left the department.       Prachi Salinas RN  07/19/21 6559

## 2021-08-09 ENCOUNTER — HOSPITAL ENCOUNTER (INPATIENT)
Age: 41
LOS: 7 days | Discharge: OTHER INSTITUTION | DRG: 885 | End: 2021-08-16
Attending: STUDENT IN AN ORGANIZED HEALTH CARE EDUCATION/TRAINING PROGRAM | Admitting: PSYCHIATRY & NEUROLOGY
Payer: COMMERCIAL

## 2021-08-09 DIAGNOSIS — F25.9 SCHIZOAFFECTIVE DISORDER, UNSPECIFIED TYPE (HCC): Primary | ICD-10-CM

## 2021-08-09 DIAGNOSIS — R45.851 SUICIDAL IDEATION: ICD-10-CM

## 2021-08-09 DIAGNOSIS — F19.90 SUBSTANCE USE DISORDER: ICD-10-CM

## 2021-08-09 PROBLEM — F23 ACUTE PSYCHOSIS (HCC): Status: ACTIVE | Noted: 2021-08-09

## 2021-08-09 LAB
ACETAMINOPHEN LEVEL: <5 MCG/ML (ref 10–30)
ALBUMIN SERPL-MCNC: 3.9 G/DL (ref 3.5–5.2)
ALP BLD-CCNC: 42 U/L (ref 40–129)
ALT SERPL-CCNC: 11 U/L (ref 0–40)
AMPHETAMINE SCREEN, URINE: NOT DETECTED
ANION GAP SERPL CALCULATED.3IONS-SCNC: 3 MMOL/L (ref 7–16)
AST SERPL-CCNC: 23 U/L (ref 0–39)
BACTERIA: ABNORMAL /HPF
BARBITURATE SCREEN URINE: NOT DETECTED
BASOPHILS ABSOLUTE: 0.1 E9/L (ref 0–0.2)
BASOPHILS RELATIVE PERCENT: 2 % (ref 0–2)
BENZODIAZEPINE SCREEN, URINE: NOT DETECTED
BILIRUB SERPL-MCNC: 0.5 MG/DL (ref 0–1.2)
BILIRUBIN URINE: ABNORMAL
BLOOD, URINE: NEGATIVE
BUN BLDV-MCNC: 14 MG/DL (ref 6–20)
CALCIUM SERPL-MCNC: 8.7 MG/DL (ref 8.6–10.2)
CANNABINOID SCREEN URINE: POSITIVE
CHLORIDE BLD-SCNC: 107 MMOL/L (ref 98–107)
CLARITY: CLEAR
CO2: 31 MMOL/L (ref 22–29)
COCAINE METABOLITE SCREEN URINE: POSITIVE
COLOR: YELLOW
CREAT SERPL-MCNC: 1.1 MG/DL (ref 0.7–1.2)
EOSINOPHILS ABSOLUTE: 0.19 E9/L (ref 0.05–0.5)
EOSINOPHILS RELATIVE PERCENT: 3.8 % (ref 0–6)
ETHANOL: <10 MG/DL (ref 0–0.08)
FENTANYL SCREEN, URINE: NOT DETECTED
GFR AFRICAN AMERICAN: >60
GFR NON-AFRICAN AMERICAN: >60 ML/MIN/1.73
GLUCOSE BLD-MCNC: 81 MG/DL (ref 74–99)
GLUCOSE URINE: NEGATIVE MG/DL
HCT VFR BLD CALC: 43.1 % (ref 37–54)
HEMOGLOBIN: 13.7 G/DL (ref 12.5–16.5)
IMMATURE GRANULOCYTES #: 0.01 E9/L
IMMATURE GRANULOCYTES %: 0.2 % (ref 0–5)
INFLUENZA A: NOT DETECTED
INFLUENZA B: NOT DETECTED
KETONES, URINE: NEGATIVE MG/DL
LEUKOCYTE ESTERASE, URINE: NEGATIVE
LYMPHOCYTES ABSOLUTE: 1.49 E9/L (ref 1.5–4)
LYMPHOCYTES RELATIVE PERCENT: 29.9 % (ref 20–42)
Lab: ABNORMAL
MCH RBC QN AUTO: 29.7 PG (ref 26–35)
MCHC RBC AUTO-ENTMCNC: 31.8 % (ref 32–34.5)
MCV RBC AUTO: 93.5 FL (ref 80–99.9)
METHADONE SCREEN, URINE: NOT DETECTED
MONOCYTES ABSOLUTE: 0.44 E9/L (ref 0.1–0.95)
MONOCYTES RELATIVE PERCENT: 8.8 % (ref 2–12)
MUCUS: PRESENT /LPF
NEUTROPHILS ABSOLUTE: 2.76 E9/L (ref 1.8–7.3)
NEUTROPHILS RELATIVE PERCENT: 55.3 % (ref 43–80)
NITRITE, URINE: NEGATIVE
OPIATE SCREEN URINE: NOT DETECTED
OXYCODONE URINE: NOT DETECTED
PDW BLD-RTO: 13.6 FL (ref 11.5–15)
PH UA: 6 (ref 5–9)
PHENCYCLIDINE SCREEN URINE: NOT DETECTED
PLATELET # BLD: 288 E9/L (ref 130–450)
PMV BLD AUTO: 9.6 FL (ref 7–12)
POTASSIUM SERPL-SCNC: 4 MMOL/L (ref 3.5–5)
PROTEIN UA: ABNORMAL MG/DL
RBC # BLD: 4.61 E12/L (ref 3.8–5.8)
RBC UA: ABNORMAL /HPF (ref 0–2)
SALICYLATE, SERUM: <0.3 MG/DL (ref 0–30)
SARS-COV-2 RNA, RT PCR: NOT DETECTED
SODIUM BLD-SCNC: 141 MMOL/L (ref 132–146)
SPECIFIC GRAVITY UA: >=1.03 (ref 1–1.03)
TOTAL PROTEIN: 6.3 G/DL (ref 6.4–8.3)
TRICYCLIC ANTIDEPRESSANTS SCREEN SERUM: NEGATIVE NG/ML
UROBILINOGEN, URINE: 2 E.U./DL
WBC # BLD: 5 E9/L (ref 4.5–11.5)
WBC UA: ABNORMAL /HPF (ref 0–5)

## 2021-08-09 PROCEDURE — 1240000000 HC EMOTIONAL WELLNESS R&B

## 2021-08-09 PROCEDURE — 80143 DRUG ASSAY ACETAMINOPHEN: CPT

## 2021-08-09 PROCEDURE — 87636 SARSCOV2 & INF A&B AMP PRB: CPT

## 2021-08-09 PROCEDURE — 99285 EMERGENCY DEPT VISIT HI MDM: CPT

## 2021-08-09 PROCEDURE — 93005 ELECTROCARDIOGRAM TRACING: CPT | Performed by: STUDENT IN AN ORGANIZED HEALTH CARE EDUCATION/TRAINING PROGRAM

## 2021-08-09 PROCEDURE — 85025 COMPLETE CBC W/AUTO DIFF WBC: CPT

## 2021-08-09 PROCEDURE — 80179 DRUG ASSAY SALICYLATE: CPT

## 2021-08-09 PROCEDURE — 81001 URINALYSIS AUTO W/SCOPE: CPT

## 2021-08-09 PROCEDURE — 80307 DRUG TEST PRSMV CHEM ANLYZR: CPT

## 2021-08-09 PROCEDURE — 82077 ASSAY SPEC XCP UR&BREATH IA: CPT

## 2021-08-09 PROCEDURE — 80053 COMPREHEN METABOLIC PANEL: CPT

## 2021-08-09 RX ORDER — NICOTINE 21 MG/24HR
1 PATCH, TRANSDERMAL 24 HOURS TRANSDERMAL DAILY
Status: DISCONTINUED | OUTPATIENT
Start: 2021-08-09 | End: 2021-08-09

## 2021-08-09 RX ORDER — HYDROXYZINE PAMOATE 50 MG/1
50 CAPSULE ORAL 3 TIMES DAILY PRN
Status: DISCONTINUED | OUTPATIENT
Start: 2021-08-09 | End: 2021-08-16 | Stop reason: HOSPADM

## 2021-08-09 RX ORDER — HALOPERIDOL 5 MG
5 TABLET ORAL EVERY 6 HOURS PRN
Status: DISCONTINUED | OUTPATIENT
Start: 2021-08-09 | End: 2021-08-16 | Stop reason: HOSPADM

## 2021-08-09 RX ORDER — HALOPERIDOL 5 MG/ML
5 INJECTION INTRAMUSCULAR EVERY 6 HOURS PRN
Status: DISCONTINUED | OUTPATIENT
Start: 2021-08-09 | End: 2021-08-16 | Stop reason: HOSPADM

## 2021-08-09 RX ORDER — ACETAMINOPHEN 325 MG/1
650 TABLET ORAL EVERY 6 HOURS PRN
Status: DISCONTINUED | OUTPATIENT
Start: 2021-08-09 | End: 2021-08-16 | Stop reason: HOSPADM

## 2021-08-09 RX ORDER — MAGNESIUM HYDROXIDE/ALUMINUM HYDROXICE/SIMETHICONE 120; 1200; 1200 MG/30ML; MG/30ML; MG/30ML
30 SUSPENSION ORAL PRN
Status: DISCONTINUED | OUTPATIENT
Start: 2021-08-09 | End: 2021-08-16 | Stop reason: HOSPADM

## 2021-08-09 ASSESSMENT — PAIN SCALES - GENERAL
PAINLEVEL_OUTOF10: 0

## 2021-08-09 ASSESSMENT — SLEEP AND FATIGUE QUESTIONNAIRES
DO YOU HAVE DIFFICULTY SLEEPING: YES
DO YOU USE A SLEEP AID: NO
RESTFUL SLEEP: NO
DIFFICULTY ARISING: NO
SLEEP PATTERN: INSOMNIA;DIFFICULTY FALLING ASLEEP
DIFFICULTY FALLING ASLEEP: YES
AVERAGE NUMBER OF SLEEP HOURS: 2
DIFFICULTY STAYING ASLEEP: YES

## 2021-08-09 ASSESSMENT — LIFESTYLE VARIABLES: HISTORY_ALCOHOL_USE: NO

## 2021-08-09 ASSESSMENT — PAIN - FUNCTIONAL ASSESSMENT: PAIN_FUNCTIONAL_ASSESSMENT: 0-10

## 2021-08-09 ASSESSMENT — PATIENT HEALTH QUESTIONNAIRE - PHQ9: SUM OF ALL RESPONSES TO PHQ QUESTIONS 1-9: 9

## 2021-08-09 NOTE — ED NOTES
Emergency Department [facility] AN AFFILIATE OF [facility] Biopsychosocial Assessment Note    Chief Complaint:    states that he needs his shot and monitored every 2 months. pt also states that he is homeless and hearing voices. the voices are telling him that someone is out to get him. states SI thoughts denies hi. MSE:  Pt is calm, needs to be encouraged to cooperate, mostly asleep, behavior controlled, appears to be easily agitated, reported SI and HI, hearing commanding voices. Clinical Summary/History:   Pt presents with paranoid delusional thoughts, fears that someone is trying to get him. Pt reported to me that he came to the ER hearing voices telling him to kill people. He said that she has been off meds for 2 months, forgetting to get his shot. He reports that he has no provider and only goes to the er to get his \"shot\". Pt has a hx of ER visits with the same complaint. Pt is having a difficult time staying awake as we speak. He is a poor historian at this time. Gender  [x] Male [] Female [] Transgender  [] Other    Sexual Orientation    [x] Heterosexual [] Homosexual [] Bisexual [] Other    Suicidal Behavioral: CSSR-S Complete. [x] Reports:    [] Past [] Present   [] Denies    Homicidal/ Violent Behavior  [x] Reports:   [] Past [] Present   [] Denies     Hallucinations/Delusions   [x] Reports:   [] Denies     Substance Use/Alcohol Use/Addiction: SBIRT Screen Complete.    [x] Reports:   [] Denies     Trauma History  [] Reports:  [x] Denies     Collateral Information:   na    Level of Care/Disposition Plan  [] Home:   [] Outpatient Provider:   [] Crisis Unit:   [x] Inpatient Psychiatric Unit:  [] Other:        MAJOR Murguia  08/09/21 3873

## 2021-08-09 NOTE — ED PROVIDER NOTES
Department of Emergency Medicine   ED  Provider Note  Admit Date/RoomTime: 8/9/2021  6:48 AM  ED Room: [address] [place] [address]          History of Present Illness:  8/9/21, Time: 7:10 AM EDT  Chief Complaint   Patient presents with    Psychiatric Evaluation     states that he needs his shot and monitored every 2 months. pt also states that he is homeless and hearing voices. the voices are telling him that someone is out to get him. states SI thoughts denies Yuki Finger. is a 45 y.o. male presenting to the ED for suicidal ideation and hearing voices. The patient has past medical history of schizoaffective disorder. He is homeless. Patient states that lately he has been feeling down. He is having suicidal ideations but denies having a plan. He states he has tried to hurt himself in the past by cutting himself. He also feels homicidal at times towards people. He endorses hearing voices but denies any visual hallucinations. He has required inpatient psychiatric care in the past.  Denies any substance use or ingestion. Denies any fevers, chest pain, shortness of breath abdominal pain, nausea, vomiting or other complaints at this time. No recent trauma. Review of Systems:   Constitutional symptoms: Denies fever  Eyes: Denies eye pain  Ears, Nose, Mouth, Throat: Denies ear pain  Cardiovascular: Denies chest pain  Respiratory: Denies shortness of breath  Gastrointestinal: Denies blood per rectum  Genitourinary: Denies hematuria  Skin: Denies rashes  Neurological: Denies headache  Musculoskeletal: Denies extremity pain    --------------------------------------------- PAST HISTORY ---------------------------------------------  Past Medical History:  has a past medical history of Depression and Schizoaffective disorder ([facility] [address] Past Surgical History:  has a past surgical history that includes eye surgery (19 years ago). Social History:  reports that he has been smoking cigars and cigarettes.  He has a 6.00 pack-year smoking history. He has never used smokeless tobacco. He reports current drug use. Frequency: 7.00 times per week. Drugs: Cocaine and Marijuana. He reports that he does not drink alcohol. Family History: family history includes Mental Illness in his mother; No Known Problems in his father; Substance Abuse in his mother. . Unless otherwise noted, family history is non contributory    The patients home medications have been reviewed. Allergies: Chlorpheniramine-phenylephrine, Penicillins, and Rondec-d [chlophedianol-pseudoephedrine]    I have reviewed the past medical history, past surgical history, social history, and family history    ---------------------------------------------------PHYSICAL EXAM--------------------------------------    General: The patient is conversational and lying comfortably in bed. Head: Normocephalic and atraumatic. Eyes: Sclera non-icteric and no conjunctival injection  ENT: Nasal and oral membranes moist  Neck: Trachea midline. No JVD  Respiratory: Lungs clear to auscultation bilaterally. Patient speaking in full sentences. Cardiovascular: Regular rate. No pedal edema. Gastrointestinal:  Abdomen is soft and nondistended. Bowel sounds present. There is no tenderness. There is no guarding or rebound. Musculoskeletal: No deformity  Skin: Skin warm and dry. No rashes. Neurologic: No gross motor deficits. No aphasia. Alert and answering questions  Psychiatric: Normal affect. The patient is not responding to internal stimuli. Thought process linear    -------------------------------------------------- RESULTS -------------------------------------------------  I have personally reviewed all laboratory and imaging results for this patient. Results are listed below.      LABS: (Lab results interpreted by me)  Results for orders placed or performed during the hospital encounter of 08/09/21   COVID-19 & Influenza Combo    Specimen: Nasopharyngeal Swab   Result Value Ref Range    SARS-CoV-2 RNA, RT PCR NOT DETECTED NOT DETECTED    INFLUENZA A NOT DETECTED NOT DETECTED    INFLUENZA B NOT DETECTED NOT DETECTED   Comprehensive Metabolic Panel   Result Value Ref Range    Sodium 141 132 - 146 mmol/L    Potassium 4.0 3.5 - 5.0 mmol/L    Chloride 107 98 - 107 mmol/L    CO2 31 (H) 22 - 29 mmol/L    Anion Gap 3 (L) 7 - 16 mmol/L    Glucose 81 74 - 99 mg/dL    BUN 14 6 - 20 mg/dL    CREATININE 1.1 0.7 - 1.2 mg/dL    GFR Non-African American >60 >=60 mL/min/1.73    GFR African American >60     Calcium 8.7 8.6 - 10.2 mg/dL    Total Protein 6.3 (L) 6.4 - 8.3 g/dL    Albumin 3.9 3.5 - 5.2 g/dL    Total Bilirubin 0.5 0.0 - 1.2 mg/dL    Alkaline Phosphatase 42 40 - 129 U/L    ALT 11 0 - 40 U/L    AST 23 0 - 39 U/L   CBC Auto Differential   Result Value Ref Range    WBC 5.0 4.5 - 11.5 E9/L    RBC 4.61 3.80 - 5.80 E12/L    Hemoglobin 13.7 12.5 - 16.5 g/dL    Hematocrit 43.1 37.0 - 54.0 %    MCV 93.5 80.0 - 99.9 fL    MCH 29.7 26.0 - 35.0 pg    MCHC 31.8 (L) 32.0 - 34.5 %    RDW 13.6 11.5 - 15.0 fL    Platelets 336 933 - 194 E9/L    MPV 9.6 7.0 - 12.0 fL    Neutrophils % 55.3 43.0 - 80.0 %    Immature Granulocytes % 0.2 0.0 - 5.0 %    Lymphocytes % 29.9 20.0 - 42.0 %    Monocytes % 8.8 2.0 - 12.0 %    Eosinophils % 3.8 0.0 - 6.0 %    Basophils % 2.0 0.0 - 2.0 %    Neutrophils Absolute 2.76 1.80 - 7.30 E9/L    Immature Granulocytes # 0.01 E9/L    Lymphocytes Absolute 1.49 (L) 1.50 - 4.00 E9/L    Monocytes Absolute 0.44 0.10 - 0.95 E9/L    Eosinophils Absolute 0.19 0.05 - 0.50 E9/L    Basophils Absolute 0.10 0.00 - 0.20 E9/L   Serum Drug Screen   Result Value Ref Range    Ethanol Lvl <10 mg/dL    Acetaminophen Level <5.0 (L) 10.0 - 39.6 mcg/mL    Salicylate, Serum <1.2 0.0 - 30.0 mg/dL    TCA Scrn NEGATIVE Cutoff:300 ng/mL   Urine Drug Screen   Result Value Ref Range    Amphetamine Screen, Urine NOT DETECTED Negative <1000 ng/mL    Barbiturate Screen, Ur NOT DETECTED Negative < 200 ng/mL Benzodiazepine Screen, Urine NOT DETECTED Negative < 200 ng/mL    Cannabinoid Scrn, Ur POSITIVE (A) Negative < 50ng/mL    Cocaine Metabolite Screen, Urine POSITIVE (A) Negative < 300 ng/mL    Opiate Scrn, Ur NOT DETECTED Negative < 300ng/mL    PCP Screen, Urine NOT DETECTED Negative < 25 ng/mL    Methadone Screen, Urine NOT DETECTED Negative <300 ng/mL    Oxycodone Urine NOT DETECTED Negative <100 ng/mL    FENTANYL SCREEN, URINE NOT DETECTED Negative <1 ng/mL    Drug Screen Comment: see below    Urinalysis   Result Value Ref Range    Color, UA Yellow Straw/Yellow    Clarity, UA Clear Clear    Glucose, Ur Negative Negative mg/dL    Bilirubin Urine SMALL (A) Negative    Ketones, Urine Negative Negative mg/dL    Specific Gravity, UA >=1.030 1.005 - 1.030    Blood, Urine Negative Negative    pH, UA 6.0 5.0 - 9.0    Protein, UA TRACE Negative mg/dL    Urobilinogen, Urine 2.0 (A) <2.0 E.U./dL    Nitrite, Urine Negative Negative    Leukocyte Esterase, Urine Negative Negative   Microscopic Urinalysis   Result Value Ref Range    Mucus, UA Present (A) None Seen /LPF    WBC, UA 0-1 0 - 5 /HPF    RBC, UA 1-3 0 - 2 /HPF    Bacteria, UA RARE (A) None Seen /HPF   EKG 12 Lead   Result Value Ref Range    Ventricular Rate 49 BPM    Atrial Rate 49 BPM    P-R Interval 188 ms    QRS Duration 98 ms    Q-T Interval 418 ms    QTc Calculation (Bazett) 377 ms    P Axis 58 degrees    R Axis 83 degrees    T Axis 67 degrees   ,     RADIOLOGY:  Interpreted by Radiologist unless otherwise specified  No orders to display       ------------------------- NURSING NOTES AND VITALS REVIEWED ---------------------------   The nursing notes within the ED encounter and vital signs as below have been reviewed by myself  /63   Pulse (!) 49   Temp 97.3 °F (36.3 °C) (Temporal)   Resp 16   Ht 6' (1.829 m)   Wt 165 lb (74.8 kg)   SpO2 98%   BMI 22.38 kg/m²     Oxygen Saturation Interpretation: Normal    The patients available past medical records and past encounters were reviewed. ------------------------------ ED COURSE/MEDICAL DECISION MAKING----------------------  Medications - No data to display    Medical Decision Making: This is a 45 y.o. male presenting to the ED for suicidal ideation and hearing voices. On initial presentation, the patient's vital signs are interpreted as normotensive, afebrile and saturating well on room air. He is in no acute distress. The patient has known psychiatric disorder. He denies any ingestions but does endorse suicidal ideation, homicidal ideation and auditory hallucinations. At this time, we will obtain laboratory studies to evaluate for organic causes. Social work will be consulted for further evaluation. The patient has no further complaints. A 12-lead EKG was performed and interpreted by myself. The rate is 49 with sinus bradycardia and normal axis. Sinus arrhythmia. The SD interval is 188, QRS interval is 98, and QTC interval is 377. R r pime in V1. T wave inversions in V1 and avl. No acute st elevation or depressions. Good r wave progression. This is sinus bradycardia with sinus arrhythmia and nonspecific abnormality. Laboratory studies show minimally elevated bicarb of 31. The patient's electrolytes otherwise within normal limits. LFTs within normal limits. Point-of-care glucose normal.  Alcohol and tricyclic negative. Drug screen does come show cannabinoids and cocaine. Tylenol and salicylates negative. The patient does not have leukocytosis or anemia. Flu and Covid negative. Urinalysis shows no evidence of dehydration with hematuria but no evidence of infection. At this time, the patient is medically cleared for psychiatry evaluation. Social work feels the patient requires admission to psychiatry. The patient will be admitted and is agreeable to this plan.     This patient's ED course included: a personal history and physicial examination    This patient has remained hemodynamically stable during their ED course. Consultations:  Psychiatry, Social work    The cardiac monitor revealed sinus rhythm with a heart rate in the 40-50s as interpreted by me. The cardiac monitor was ordered secondary to the patient's psychiatric complaints and to monitor the patient for dysrhythmia. [facility] [id number]    Oxygen Saturation Interpretation: 98 % on room air. Normal    Counseling:   I have spoken with the patient and discussed todays results, in addition to providing specific details for the plan of care and counseling regarding the diagnosis and prognosis. Questions are answered at this time and they are agreeable with the plan.     --------------------------------- IMPRESSION AND DISPOSITION ---------------------------------    IMPRESSION  1. Schizoaffective disorder, unspecified type ([address]    2. Suicidal ideation    3. Substance use disorder        DISPOSITION  Disposition: Admit to psychiatry  Patient condition is fair    IDr. Maximino, am the primary provider of record    NOTE: This report was transcribed using voice recognition software.  Every effort was made to ensure accuracy; however, inadvertent computerized transcription errors may be present      Maximino Sow MD  08/09/21 2910

## 2021-08-09 NOTE — ED NOTES
ASSIGNED [id number] TO JUAN C IN ADMITTING     Adry Dave, [facility]  08/09/21 17[address] Iglesias [facility], [facility]  08/09/21 1210

## 2021-08-09 NOTE — PROGRESS NOTES
Recreation assessment completed. Patient appears paranoid and avoidant. Willing to complete assessment.
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
## 2021-08-09 NOTE — ED NOTES
Pt being very avoidant not acknowledging staff and sleeping pt awoken several times to obtain blood work and ekg and for regestration at times the pt becomes agitated reports that he is off his \"shot for 2 months\"  He tells  he is off his medications for 2 months       [facility]  08/09/21 [id number]

## 2021-08-09 NOTE — ED TRIAGE NOTES
Patient drowsy in triage and constantly needs woken up. Pt states that he has not slept for a week because he is on the streets.  Pt did bring a garbage bag of belongings with him

## 2021-08-10 LAB
CHOLESTEROL, FASTING: 107 MG/DL (ref 0–199)
EKG ATRIAL RATE: 49 BPM
EKG P AXIS: 58 DEGREES
EKG P-R INTERVAL: 188 MS
EKG Q-T INTERVAL: 418 MS
EKG QRS DURATION: 98 MS
EKG QTC CALCULATION (BAZETT): 377 MS
EKG R AXIS: 83 DEGREES
EKG T AXIS: 67 DEGREES
EKG VENTRICULAR RATE: 49 BPM
HBA1C MFR BLD: 5.2 % (ref 4–5.6)
HDLC SERPL-MCNC: 39 MG/DL
LDL CHOLESTEROL CALCULATED: 60 MG/DL (ref 0–99)
TRIGLYCERIDE, FASTING: 42 MG/DL (ref 0–149)
VLDLC SERPL CALC-MCNC: 8 MG/DL

## 2021-08-10 PROCEDURE — 1240000000 HC EMOTIONAL WELLNESS R&B

## 2021-08-10 PROCEDURE — 36415 COLL VENOUS BLD VENIPUNCTURE: CPT

## 2021-08-10 PROCEDURE — 93010 ELECTROCARDIOGRAM REPORT: CPT | Performed by: INTERNAL MEDICINE

## 2021-08-10 PROCEDURE — 83036 HEMOGLOBIN GLYCOSYLATED A1C: CPT

## 2021-08-10 PROCEDURE — 99221 1ST HOSP IP/OBS SF/LOW 40: CPT | Performed by: NURSE PRACTITIONER

## 2021-08-10 PROCEDURE — 80061 LIPID PANEL: CPT

## 2021-08-10 RX ORDER — DIVALPROEX SODIUM 500 MG/1
500 TABLET, DELAYED RELEASE ORAL EVERY 12 HOURS SCHEDULED
Status: DISCONTINUED | OUTPATIENT
Start: 2021-08-10 | End: 2021-08-14

## 2021-08-10 RX ORDER — PALIPERIDONE 3 MG/1
3 TABLET, EXTENDED RELEASE ORAL 2 TIMES DAILY
Status: DISCONTINUED | OUTPATIENT
Start: 2021-08-10 | End: 2021-08-11

## 2021-08-10 ASSESSMENT — SLEEP AND FATIGUE QUESTIONNAIRES
RESTFUL SLEEP: NO
DIFFICULTY STAYING ASLEEP: YES
DIFFICULTY ARISING: NO
DO YOU HAVE DIFFICULTY SLEEPING: YES
DIFFICULTY FALLING ASLEEP: YES
DO YOU USE A SLEEP AID: NO
SLEEP PATTERN: INSOMNIA;DIFFICULTY FALLING ASLEEP
AVERAGE NUMBER OF SLEEP HOURS: 2

## 2021-08-10 ASSESSMENT — PAIN SCALES - GENERAL
PAINLEVEL_OUTOF10: 0
PAINLEVEL_OUTOF10: 0

## 2021-08-10 ASSESSMENT — LIFESTYLE VARIABLES: HISTORY_ALCOHOL_USE: NO

## 2021-08-10 ASSESSMENT — PATIENT HEALTH QUESTIONNAIRE - PHQ9: SUM OF ALL RESPONSES TO PHQ QUESTIONS 1-9: 9

## 2021-08-10 NOTE — PLAN OF CARE
Problem: Anger Management/Homicidal Ideation:  Goal: Ability to verbalize frustrations and anger appropriately will improve  Description: Ability to verbalize frustrations and anger appropriately will improve  Outcome: Ongoing     Problem: Altered Mood, Psychotic Behavior:  Goal: Able to verbalize reality based thinking  Description: Able to verbalize reality based thinking  Outcome: Ongoing     Patient has been withdrawn to his room. Avoids eye contact during conversation. Mood is irritable. Upset he was called Joce-states that he wants to be called \" Favors\". Currently denies suicidal/homicidal ideations and hallucinations at this time. States that the hallucinations \"come and go\". Purposeful rounding continued.

## 2021-08-10 NOTE — H&P
Department of Psychiatry  History and Physical - Adult     CHIEF COMPLAINT:  \" I am going to donate my ties\"    Patient was seen after discussing with the treatment team and reviewing the chart    CIRCUMSTANCES OF ADMISSION: presented to the ED reported that he is hearing voices telling him that someone is out to get him and reporting suicidal ideation        HISTORY OF PRESENT ILLNESS:      The patient is a 45 y.o. male with significant past history of schizoaffective sorter bipolar type and polysubstance abuse presented to the ED reported that he is hearing voices telling him that someone is out to get him and reporting suicidal ideation in the ED he reports has been off his Erica Litten sustain injection for for 2 months. His urine drug screen is positive for cannabis and cocaine his blood alcohol level is negative he is likely cleared admit to [facility]. adult psychiatric and for further psychiatric assessment stabilization and treatment. Upon assessment today patient is hyperverbal rapid pressured speech he is talking about needing to donate ties to people. Then he states that he has not had a shot in 2 months. He states he last had a shot in [place] but cannot tell the name of the place. He then starts describing the place. He states he been off his medications he is disorganized rapid pressured speech flights of ideas. He endorses paranoia and auditory hallucinations. He states that he has been living on the streets. He is observed up on the unit talking to unseen others. He is clearly internally stimulated psychotic and paranoid. He is agreeable to go back on his medications. Past psychiatric history: Patient is a history of multiple inpatient psychiatric hospitalizations including here at [facility] according to the chart he was also recently J[facility] in June in July of 2021.   He reports a past history of suicide attempt by cutting himself    Substance history: Patient is history of polysubstance abuse urine drug screen  positive for marijuana and cocaine, his blood alcohol level was negative on admission    Personal family and social history: Patient goes limited history he is currently homeless has been living on the streets. .      Past Medical History:        Diagnosis Date    Depression     Schizoaffective disorder ([facility] [address]        Medications Prior to Admission:   Medications Prior to Admission: ibuprofen (IBU) 800 MG tablet, Take 1 tablet by mouth every 8 hours as needed for Pain  paliperidone (INVEGA) 6 MG extended release tablet, Take 1 tablet by mouth daily  paliperidone (INVEGA) 3 MG extended release tablet, Take 1 tablet by mouth nightly  divalproex (DEPAKOTE) 250 MG DR tablet, Take 1 tablet by mouth every 12 hours  paliperidone palmitate ER (INVEGA SUSTENNA) 156 MG/ML STACEY IM injection, Inject 156 mg into the muscle every 30 days for 1 dose [place] injection 234 mg IM received on 4/23/2021 due for booster injection 156 mg IM every 30 days on 4/30/2021  nicotine (NICODERM CQ) 21 MG/24HR, Place 1 patch onto the skin daily    Past Surgical History:        Procedure Laterality Date    EYE SURGERY  19 years ago       Allergies:   Chlorpheniramine-phenylephrine, Penicillins, and Rondec-d [chlophedianol-pseudoephedrine]    Family History  Family History   Problem Relation Age of Onset    Mental Illness Mother     Substance Abuse Mother     No Known Problems Father              EXAMINATION:    REVIEW OF SYSTEMS:    ROS:  [x] All negative/unchanged except if checked.  Explain positive(checked items) below:  [] Constitutional  [] Eyes  [] Ear/Nose/Mouth/Throat  [] Respiratory  [] CV  [] GI  []   [] Musculoskeletal  [] Skin/Breast  [] Neurological  [] Endocrine  [] Heme/Lymph  [] Allergic/Immunologic    Explanation:     Vitals:  BP (!) 102/51   Pulse 63   Temp 98.1 °F (36.7 °C)   Resp 15   Ht 6' (1.829 m)   Wt 170 lb (77.1 kg)   SpO2 97%   BMI 23.06 kg/m² Physical Examination:   Head: x  Atraumatic: x normocephalic  Skin and Mucosa        Moist x  Dry   Pale  x Normal   Neck:  Thyroid  Palpable   x  Not palpable   venus distention   adenopathy   Chest: x Clear   Rhonchi     Wheezing   CV:  xS1   xS2    xNo murmer   Abdomen:  x  Soft    Tender    Viceromegaly   Extremities:  x No Edema     Edema     Cranial Nerves Examination:   CN II:   xPupils are reactive to light  Pupils are non reactive to light  CN III, IV, VI:  xNo eye deviation    No diplopia or ptosis   CN V:    xFacial Sensation is intact     Facial Sensation is not intact   CN IIIV:   x Hearing is normal to rubbing fingers   CN IX, X:     xNormal gag reflex and phonation   CN XI:   xShoulder shrug and neck rotation is normal  CNXII:    xTongue is midline no deviation or atrophy    Mental Status Examination:    Level of consciousness:  within normal limits   Appearance:  well-appearing  Behavior/Motor:  no abnormalities noted  Attitude toward examiner:  cooperative  Speech:  spontaneous, normal rate and normal volume   Mood: \" My mood is good. \"  Affect: Appropriate pleasant  Thought processes: Flight of ideas loose disorganized   thought content: Paranoid delusions auditory hallucinations,  Denies SI/HI intent or plan  Cognition:  oriented to person, place, and time   Concentration intact  Memory intact  Insight poor   Judgement poor   Fund of Knowledge limited      DIAGNOSIS:  Schizoaffective disorder bipolar type  Polysubstance abuse          LABS: REVIEWED TODAY:  Recent Labs     08/09/21  0752   WBC 5.0   HGB 13.7        Recent Labs     08/09/21  0752      K 4.0      CO2 31*   BUN 14   CREATININE 1.1   GLUCOSE 81     Recent Labs     08/09/21  0752   BILITOT 0.5   ALKPHOS 42   AST 23   ALT 11     Lab Results   Component Value Date    LABAMPH NOT DETECTED 08/09/2021    BARBSCNU NOT DETECTED 08/09/2021    LABBENZ NOT DETECTED 08/09/2021    LABMETH NOT DETECTED 08/09/2021 OPIATESCREENURINE NOT DETECTED 08/09/2021    PHENCYCLIDINESCREENURINE NOT DETECTED 08/09/2021    ETOH <10 08/09/2021     Lab Results   Component Value Date    TSH 0.913 06/25/2021     No results found for: LITHIUM  Lab Results   Component Value Date    VALPROATE 3 (L) 07/19/2021     Lab Results   Component Value Date    VALPROATE 3 07/19/2021         Radiology No results found. TREATMENT PLAN:    Risk Management: Based on the diagnosis and assessment biopsychosocial treatment model was presented to the patient and was given the opportunity to ask any question. The patient was agreeable to the plan and all the patient's questions were answered to the patient's satisfaction. I discussed with the patient the risk, benefit, alternative and common side effects for the proposed medication treatment. The patient is consenting to this treatment. Collateral Information:  Will obtain collateral information from the family or friends. Will obtain medical records as appropriate from out patient providers  Will consult the hospitalist for a physical exam to rule out any co-morbid physical condition. Depakote 500 mg twice daily for mood stabilization  Invega 3 mg twice daily for psychosis  We will verify the date of his last [place] injection    Prn Haldol 5mg and Vistaril 50mg q6hr for extreme agitation.   Trazodone as ordered for insomnia  Vistaril as ordered for anxiety      Psychotherapy:   Encourage participation in milieu and group therapy  Individual therapy as needed              Behavioral Services  Medicare Certification Upon Admission    I certify that this patient's inpatient psychiatric hospital admission is medically necessary for:    [x] (1) Treatment which could reasonably be expected to improve this patient's condition,       [] (2) Or for diagnostic study;     AND     [x](2) The inpatient psychiatric services are provided while the individual is under the care of a physician and are included in the individualized plan of care.     Estimated length of stay/service 3-7 days based on stability    Plan for post-hospital care outpatient psychiatric and counseling services    Electronically signed by JUICE Parker CNP on 0/89/6428 at 8:25 AM        Electronically signed by JUICE Parker CNP on 2/08/8032 at 8:25 AM

## 2021-08-10 NOTE — PROGRESS NOTES
[facility]  Initial Interdisciplinary Treatment Plan NOTE    Review Date & Time:  1000    Patient was not in treatment team    Admission Type:   Admission Type: Involuntary    Reason for admission:  Reason for Admission: \"need my meds, woried by self\".  \"someone is stilling my medical stuff\"      Estimated Length of Stay Update:  3-5 days  Estimated Discharge Date Update: 3-5 days    EDUCATION:   Learner Progress Toward Treatment Goals: Reviewed results and recommendations of this team    Method: Individual    Outcome: Verbalized understanding    PATIENT GOALS: no shared goals    PLAN/TREATMENT RECOMMENDATIONS UPDATE:continue current treatment plan    GOALS UPDATE:   Time frame for Short-Term Goals: 3-5 days    Jani Mercedes RN

## 2021-08-10 NOTE — PROGRESS NOTES
Patient laying in bed with covered head to toe with blanket. No eye contact. Irritable. Refusing medications Depakote 500 mg and Invega 3 mg, stating \" I'll take the shot\". MARY Mix aware of refusal of medications.

## 2021-08-10 NOTE — PLAN OF CARE
Problem: Anger Management/Homicidal Ideation:  Goal: Ability to verbalize frustrations and anger appropriately will improve  Description: Ability to verbalize frustrations and anger appropriately will improve  8/10/2021 1015 by Juan Miguel Mi RN  Outcome: Ongoing  8/9/2021 2051 by Becca Farmer RN  Outcome: Ongoing     Problem: Altered Mood, Psychotic Behavior:  Goal: Able to verbalize reality based thinking  Description: Able to verbalize reality based thinking  8/10/2021 1015 by Juan Miguel Mi RN  Outcome: Ongoing  8/9/2021 2051 by Becca Farmer RN  Outcome: Ongoing no back pain, no gout, no musculoskeletal pain, no neck pain, and no weakness.

## 2021-08-10 NOTE — PROGRESS NOTES
Patient denies SI,HI, or any Hallucinations at this time but seen talking to unseen others in the pat. Patient pacing halls, to self. Is cooperative but irritable at times. Will continue to monitor.
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
## 2021-08-11 PROCEDURE — 99232 SBSQ HOSP IP/OBS MODERATE 35: CPT | Performed by: NURSE PRACTITIONER

## 2021-08-11 PROCEDURE — 1240000000 HC EMOTIONAL WELLNESS R&B

## 2021-08-11 RX ORDER — ARIPIPRAZOLE 5 MG/1
5 TABLET ORAL DAILY
Status: DISCONTINUED | OUTPATIENT
Start: 2021-08-11 | End: 2021-08-14

## 2021-08-11 ASSESSMENT — PAIN SCALES - GENERAL
PAINLEVEL_OUTOF10: 0
PAINLEVEL_OUTOF10: 0

## 2021-08-11 NOTE — PLAN OF CARE
Problem: Anger Management/Homicidal Ideation:  Goal: Ability to verbalize frustrations and anger appropriately will improve  Description: Ability to verbalize frustrations and anger appropriately will improve  Outcome: Ongoing  Goal: Absence of homicidal ideation  Description: Absence of homicidal ideation  Outcome: Ongoing     Problem: Altered Mood, Psychotic Behavior:  Goal: Able to verbalize reality based thinking  Description: Able to verbalize reality based thinking  Outcome: Ongoing

## 2021-08-11 NOTE — PROGRESS NOTES
Patient denies SI,HI, or any Hallucinations at this time. He has been isolated to his room in the morning and did not eat breakfast. He took his meds but did not attend any am groups. He is cooperative during assessment. He did not attend Team meeting as he stated he was too tired. Will continue to monitor.

## 2021-08-11 NOTE — PROGRESS NOTES
[address] [facility]  Day 3 Interdisciplinary Treatment Plan NOTE    Review Date & Time: 8/11/2021 1000    Patient was not in treatment team    Estimated Length of Stay Update:  3-5 days  Estimated Discharge Date Update: 3-5 days    EDUCATION:   Learner Progress Toward Treatment Goals: Reviewed results and recommendations of this team    Method: Individual    Outcome: Verbalized understanding    PATIENT GOALS: no shared goals    PLAN/TREATMENT RECOMMENDATIONS UPDATE: continue current treatment  plan    GOALS UPDATE:   Time frame for Short-Term Goals: 3-5 days      Rashi Paredes RN

## 2021-08-11 NOTE — PROGRESS NOTES
[facility] [phone] regarding last administration of long acting Injection. Medication sheet verified via fax. Call out to [facility] [place] for verification date and time not listed.

## 2021-08-11 NOTE — PROGRESS NOTES
BEHAVIORAL HEALTH FOLLOW-UP NOTE     8/11/2021     Patient was seen and examined in person, Chart reviewed   Patient's case discussed with staff/team    Chief Complaint: \" I want to be on the Abilify. \"    Interim History: Patient seen in his room he is irritable and disorganized he is isolative to his room not attending groups and socialized with peers. He does not want to take the Shakira Case would rather take the Abilify which she was last put on while he is at [facility]. He shows very poor living insight judgment to hospitalization need for treatment.   He still psychotic internally stimulated denies SI/HI intent or plan      Appetite:   [x] Normal/Unchanged  [] Increased  [] Decreased      Sleep:       [x] Normal/Unchanged  [] Fair       [] Poor              Energy:    [x] Normal/Unchanged  [] Increased  [] Decreased        SI [] Present  [x] Absent    HI  []Present  [x] Absent     Aggression:  [] yes  [x] no    Patient is [x] able  [] unable to CONTRACT FOR SAFETY     PAST MEDICAL/PSYCHIATRIC HISTORY:   Past Medical History:   Diagnosis Date    Depression     [facility] ([facility] [address]        FAMILY/SOCIAL HISTORY:  Family History   Problem Relation Age of Onset    Mental Illness Mother     Substance Abuse Mother     No Known Problems Father      Social History     Socioeconomic History    Marital status: Single     Spouse name: Not on file    Number of children: 1    Years of education: 15    Highest education level: Not on file   Occupational History     Comment: SSDI   Tobacco Use    Smoking status: Current Every Day Smoker     Packs/day: 0.25     Years: 24.00     Pack years: 6.00     Types: Cigars, Cigarettes    Smokeless tobacco: Never Used   Vaping Use    Vaping Use: Former   Substance and Sexual Activity    Alcohol use: No    Drug use: Yes     Frequency: 7.0 times per week     Types: Cocaine, Marijuana     Comment: yesterday last used today    Sexual activity: Yes     Partners: Female   Other Topics Concern    Not on file   Social History Narrative    Not on file     Social Determinants of Health     Financial Resource Strain:     Difficulty of Paying Living Expenses:    Food Insecurity:     Worried About Running Out of Food in the Last Year:     [address] in the Last Year:    Transportation Needs:     Lack of Transportation (Medical):  Lack of Transportation (Non-Medical):    Physical Activity:     Days of Exercise per Week:     Minutes of Exercise per Session:    Stress:     Feeling of Stress :    Social Connections:     Frequency of Communication with Friends and Family:     Frequency of Social Gatherings with Friends and Family:     Attends Rastafarian Services:     Active Member of Clubs or Organizations:     Attends Club or Organization Meetings:     Marital Status:    Intimate Partner Violence:     Fear of Current or Ex-Partner:     Emotionally Abused:     Physically Abused:     Sexually Abused:            ROS:  [x] All negative/unchanged except if checked.  Explain positive(checked items) below:  [] Constitutional  [] Eyes  [] Ear/Nose/Mouth/Throat  [] Respiratory  [] CV  [] GI  []   [] Musculoskeletal  [] Skin/Breast  [] Neurological  [] Endocrine  [] Heme/Lymph  [] Allergic/Immunologic    Explanation:     MEDICATIONS:    Current Facility-Administered Medications:     divalproex (DEPAKOTE) DR tablet 500 mg, 500 mg, Oral, 2 times per day, JUICE Caceres CNP    paliperidone (INVEGA) extended release tablet 3 mg, 3 mg, Oral, BID, JUICE Vaz - CNP    acetaminophen (TYLENOL) tablet 650 mg, 650 mg, Oral, Q6H PRN, Willian Moffett MD    magnesium hydroxide (MILK OF MAGNESIA) 400 MG/5ML suspension 30 mL, 30 mL, Oral, Daily PRN, Willian Moffett MD    aluminum & magnesium hydroxide-simethicone (MAALOX) 200-200-20 MG/5ML suspension 30 mL, 30 mL, Oral, PRN, Willian Moffett MD    hydrOXYzine (VISTARIL) capsule 50 mg, 50 mg, Oral, TID PRN, Steven Oliver MD    haloperidol (HALDOL) tablet 5 mg, 5 mg, Oral, Q6H PRN **OR** haloperidol lactate (HALDOL) injection 5 mg, 5 mg, Intramuscular, Q6H PRN, Steven Oliver MD    nicotine polacrilex (NICORETTE) gum 2 mg, 2 mg, Oral, PRN, Liliya Hampton, APRN - CNP      Examination:  BP (!) 102/51   Pulse 63   Temp 98.1 °F (36.7 °C)   Resp 15   Ht 6' (1.829 m)   Wt 170 lb (77.1 kg)   SpO2 97%   BMI 23.06 kg/m²   Gait - steady  Medication side effects(SE): No    Mental Status Examination:    Level of consciousness:  within normal limits   Appearance:  fair grooming and fair hygiene  Behavior/Motor:  no abnormalities noted  Attitude toward examiner:  cooperative  Speech:  spontaneous, normal rate and normal volume   Mood: \" I am okay. \"  Affect: Mood incongruent irritable easily agitated  Thought processes: Disorganized tangential  Thought content: Appears guarded and paranoid appears internally stimulated denies SI/HI intent or plan  Cognition:  oriented to person, place, and time   Concentration intact  Insight poor   Judgement poor     ASSESSMENT:   Patient symptoms are:  [] Well controlled  [] Improving  [] Worsening  [x] No change      Diagnosis:  Principal Problem:    Schizoaffective disorder, bipolar type ([facility] [address]  Active Problems:    Polysubstance abuse ([facility]  Resolved Problems:    * No resolved hospital problems.  *      LABS:    Recent Labs     08/09/21  0752   WBC 5.0   HGB 13.7        Recent Labs     08/09/21  0752      K 4.0      CO2 31*   BUN 14   CREATININE 1.1   GLUCOSE 81     Recent Labs     08/09/21  0752   BILITOT 0.5   ALKPHOS 42   AST 23   ALT 11     Lab Results   Component Value Date    LABAMPH NOT DETECTED 08/09/2021    BARBSCNU NOT DETECTED 08/09/2021    LABBENZ NOT DETECTED 08/09/2021    LABMETH NOT DETECTED 08/09/2021    OPIATESCREENURINE NOT DETECTED 08/09/2021    PHENCYCLIDINESCREENURINE NOT DETECTED 08/09/2021    ETOH <10 08/09/2021     Lab Results Component Value Date    TSH 0.913 06/25/2021     No results found for: LITHIUM  Lab Results   Component Value Date    VALPROATE 3 (L) 07/19/2021       Treatment Plan:  Reviewed current Medications with the patient. Risks, benefits, side effects, drug-to-drug interactions and alternatives to treatment were discussed. Collateral information:   CD evaluation  Encourage patient to attend group and other milieu activities.   Discharge planning discussed with the patient and treatment team.    García Saldivar and start Abilify 5 mg daily plan to increase up to 20 mg daily  We will start patient back on his Aristada injection he received his last injection of 1064 mg IM every 30 days on 6/9/2021    PSYCHOTHERAPY/COUNSELING:  [x] Therapeutic interview  [x] Supportive  [] CBT  [] Ongoing  [] Other    [x] Patient continues to need, on a daily basis, active treatment furnished directly by or requiring the supervision of inpatient psychiatric personnel      Anticipated Length of stay: 3 to 7 days based on stability            Electronically signed by JUICE Moore CNP on 2/56/0536 at 8:48 AM

## 2021-08-11 NOTE — PLAN OF CARE
Problem: Anger Management/Homicidal Ideation:  Goal: Ability to verbalize frustrations and anger appropriately will improve  Description: Ability to verbalize frustrations and anger appropriately will improve  8/10/2021 2050 by Jj Trejo RN  Outcome: Ongoing     Problem: Altered Mood, Psychotic Behavior:  Goal: Able to verbalize reality based thinking  Description: Able to verbalize reality based thinking  8/10/2021 2050 by Jj Trejo RN  Outcome: Ongoing     Patient has been withdrawn to his room. Did come out for snack. Avoids eye contact during conversation. Responses were minimal. States that he is \"alright\". Refusing medications because he only wants \"the shot\". Denies suicidal/homicidal ideations and hallucinations at this time. Purposeful rounding continued.

## 2021-08-12 PROCEDURE — 1240000000 HC EMOTIONAL WELLNESS R&B

## 2021-08-12 PROCEDURE — 99232 SBSQ HOSP IP/OBS MODERATE 35: CPT | Performed by: NURSE PRACTITIONER

## 2021-08-12 ASSESSMENT — PAIN SCALES - GENERAL
PAINLEVEL_OUTOF10: 0

## 2021-08-12 NOTE — PLAN OF CARE
Problem: Falls - Risk of:  Goal: Will remain free from falls  Description: Will remain free from falls  8/12/2021 0357 by Harvis Holstein, RN  Outcome: Met This Shift  Goal: Absence of physical injury  Description: Absence of physical injury  8/12/2021 0357 by Harvis Holstein, RN  Outcome: Met This Shift     Problem: Anger Management/Homicidal Ideation:  Goal: Ability to verbalize frustrations and anger appropriately will improve  Description: Ability to verbalize frustrations and anger appropriately will improve  8/12/2021 1422 by Magdy Clements RN  Outcome: Ongoing  8/12/2021 0357 by Harvis Holstein, RN  Outcome: Ongoing  Goal: Absence of homicidal ideation  Description: Absence of homicidal ideation  8/12/2021 1422 by Magdy Clements RN  Outcome: Ongoing  8/12/2021 0357 by Harvis Holstein, RN  Outcome: Ongoing     Problem: Altered Mood, Psychotic Behavior:  Goal: Able to verbalize reality based thinking  Description: Able to verbalize reality based thinking  8/12/2021 1422 by Magdy Clements RN  Outcome: Ongoing  8/12/2021 0357 by Harvis Holstein, RN  Outcome: Ongoing            Patient refusing oral mediations. When we approach for injection, \" I am not getting in the butt\". Aristada 662 mg is to be given gluteal. Called pharmacy. That dose can be split and given to separate arms per pharmisist Rita Sampson. Guarded and watchful. Paranoid . Denies suicidal and homicidal thoughts. Denies hallucinations.  Appears internally stimulated

## 2021-08-12 NOTE — PLAN OF CARE
Denies SI, HI, and hallucinations. Patient states \"I'm just waiting for my shot. \" He continues to refuse PO medications this evening. He is isolative to room and states \"I'm sleepy. \" No behavioral issues or PRNs administered. No complaints or concerns verbalized at this time. Will continue to monitor and offer support.         Problem: Falls - Risk of:  Goal: Will remain free from falls  Description: Will remain free from falls  Outcome: Met This Shift     Problem: Anger Management/Homicidal Ideation:  Goal: Absence of homicidal ideation  Description: Absence of homicidal ideation  8/11/2021 2037 by Jessica Shukla RN  Outcome: Met This Shift     Problem: Anger Management/Homicidal Ideation:  Goal: Ability to verbalize frustrations and anger appropriately will improve  Description: Ability to verbalize frustrations and anger appropriately will improve  8/11/2021 2037 by Jessica Shukla RN  Outcome: Not Met This Shift     Problem: Altered Mood, Psychotic Behavior:  Goal: Able to verbalize reality based thinking  Description: Able to verbalize reality based thinking  8/11/2021 2037 by Jessica Shukla RN  Outcome: Not Met This Shift           Electronically signed by Jessica Shukla RN on 8/11/2021 at 8:39 PM

## 2021-08-12 NOTE — PROGRESS NOTES
BEHAVIORAL HEALTH FOLLOW-UP NOTE     8/12/2021     Patient was seen and examined in person, Chart reviewed   Patient's case discussed with staff/team    Chief Complaint: \" I do not want to take Invega. \"    Interim History: Patient upon the unit states he does not want to take the Invega. Staff reports he has been refusing his Abilify and Depakote. Explained to patient that he has not prescribed Invega rather he is prescribed Abilify and that we would order his Abilify injection after we received information yesterday from [facility] that he received his last injection on 06/09/2021. He is asking to go to the rest commission after he gets his injection. He denies SI/HI intent or plan he denies any auditory or visual hallucinations staff reports no behavioral disturbances on the unit.     Appetite:   [x] Normal/Unchanged  [] Increased  [] Decreased      Sleep:       [x] Normal/Unchanged  [] Fair       [] Poor              Energy:    [x] Normal/Unchanged  [] Increased  [] Decreased        SI [] Present  [x] Absent    HI  []Present  [x] Absent     Aggression:  [] yes  [x] no    Patient is [x] able  [] unable to CONTRACT FOR SAFETY     PAST MEDICAL/PSYCHIATRIC HISTORY:   Past Medical History:   Diagnosis Date    Depression     Schizoaffective disorder ([facility] [address]        FAMILY/SOCIAL HISTORY:  Family History   Problem Relation Age of Onset    Mental Illness Mother     Substance Abuse Mother     No Known Problems Father      Social History     Socioeconomic History    Marital status: Single     Spouse name: Not on file    Number of children: 1    Years of education: 15    Highest education level: Not on file   Occupational History     Comment: SSDI   Tobacco Use    Smoking status: Current Every Day Smoker     Packs/day: 0.25     Years: 24.00     Pack years: 6.00     Types: Cigars, Cigarettes    Smokeless tobacco: Never Used   Vaping Use    Vaping Use: Former   Substance and Sexual Activity    Alcohol MAGNESIA) 400 MG/5ML suspension 30 mL, 30 mL, Oral, Daily PRN, Rj Barry MD    aluminum & magnesium hydroxide-simethicone (MAALOX) 200-200-20 MG/5ML suspension 30 mL, 30 mL, Oral, PRN, Rj Barry MD    hydrOXYzine (VISTARIL) capsule 50 mg, 50 mg, Oral, TID PRN, Rj Barry MD    haloperidol (HALDOL) tablet 5 mg, 5 mg, Oral, Q6H PRN **OR** haloperidol lactate (HALDOL) injection 5 mg, 5 mg, Intramuscular, Q6H PRN, Rj Barry MD    nicotine polacrilex (NICORETTE) gum 2 mg, 2 mg, Oral, PRN, Nia Hampton APRN - CNP      Examination:  BP (!) 120/56   Pulse 58   Temp 98.1 °F (36.7 °C) (Axillary)   Resp 16   Ht 6' (1.829 m)   Wt 170 lb (77.1 kg)   SpO2 96%   BMI 23.06 kg/m²   Gait - steady  Medication side effects(SE): No    Mental Status Examination:    Level of consciousness:  within normal limits   Appearance:  fair grooming and fair hygiene  Behavior/Motor:  no abnormalities noted  Attitude toward examiner:  cooperative  Speech:  spontaneous, normal rate and normal volume   Mood: \" I am okay. \"  Affect: Mood incongruent irritable easily agitated  Thought processes: Disorganized tangential  Thought content: Appears guarded and paranoid appears internally stimulated denies SI/HI intent or plan  Cognition:  oriented to person, place, and time   Concentration intact  Insight poor   Judgement poor     ASSESSMENT:   Patient symptoms are:  [] Well controlled  [] Improving  [] Worsening  [x] No change      Diagnosis:  Principal Problem:    Schizoaffective disorder, bipolar type ([facility] [address]  Active Problems:    [facility]  Resolved Problems:    * No resolved hospital problems. *      LABS:    No results for input(s): WBC, HGB, PLT in the last 72 hours. No results for input(s): NA, K, CL, CO2, BUN, CREATININE, GLUCOSE in the last 72 hours. No results for input(s): BILITOT, ALKPHOS, AST, ALT in the last 72 hours.   Lab Results   Component Value Date    LABAMPH NOT DETECTED 08/09/2021    BARBSCNU NOT DETECTED 08/09/2021    LABBENZ NOT DETECTED 08/09/2021    LABMETH NOT DETECTED 08/09/2021    OPIATESCREENURINE NOT DETECTED 08/09/2021    PHENCYCLIDINESCREENURINE NOT DETECTED 08/09/2021    ETOH <10 08/09/2021     Lab Results   Component Value Date    TSH 0.913 06/25/2021     No results found for: LITHIUM  Lab Results   Component Value Date    VALPROATE 3 (L) 07/19/2021       Treatment Plan:  Reviewed current Medications with the patient. Risks, benefits, side effects, drug-to-drug interactions and alternatives to treatment were discussed. Collateral information:   CD evaluation  Encourage patient to attend group and other milieu activities.   Discharge planning discussed with the patient and treatment team.    Abilify 5 mg daily plan to increase   Aristada 662 mg IM every 30 days  Continue Depakote 500 mg twice daily for mood stabilization    PSYCHOTHERAPY/COUNSELING:  [x] Therapeutic interview  [x] Supportive  [] CBT  [] Ongoing  [] Other    [x] Patient continues to need, on a daily basis, active treatment furnished directly by or requiring the supervision of inpatient psychiatric personnel      Anticipated Length of stay: 3 to 7 days based on stability            Electronically signed by JUICE Kelsey CNP on 1/13/0285 at 1:44 PM

## 2021-08-12 NOTE — BH NOTE
Aristada 662 given to patient in left and right deltoids, as told to do so by pharmacy. The reason is because patient was refusing to have the injection in the gluteal area, which is where the injection is instructed to be administered.  The dose was split evenly between two syringes, per pharmacy instruction, and given to left and right deltoids at patient's request.

## 2021-08-13 PROCEDURE — 99232 SBSQ HOSP IP/OBS MODERATE 35: CPT | Performed by: NURSE PRACTITIONER

## 2021-08-13 PROCEDURE — 6370000000 HC RX 637 (ALT 250 FOR IP): Performed by: NURSE PRACTITIONER

## 2021-08-13 PROCEDURE — 1240000000 HC EMOTIONAL WELLNESS R&B

## 2021-08-13 RX ADMIN — ARIPIPRAZOLE 5 MG: 5 TABLET ORAL at 18:45

## 2021-08-13 ASSESSMENT — PAIN SCALES - GENERAL: PAINLEVEL_OUTOF10: 0

## 2021-08-13 NOTE — PLAN OF CARE
Problem: Anger Management/Homicidal Ideation:  Goal: Ability to verbalize frustrations and anger appropriately will improve  Description: Ability to verbalize frustrations and anger appropriately will improve  8/12/2021 2009 by Rohit Bledsoe RN  Outcome: Ongoing     Problem: Altered Mood, Psychotic Behavior:  Goal: Able to verbalize reality based thinking  Description: Able to verbalize reality based thinking  8/12/2021 2009 by Rohit Bledsoe RN  Outcome: Ongoing     Patient has been out on the unit, but keeps to self. Remaining in control. States that he is ready for discharge. Would like to speak with social work tomorrow about going to [facility] drop in shelter on [address] or the [facility] upon discharge. Denies suicidal/homicidal ideations and hallucinations at this time. Purposeful rounding continued.

## 2021-08-13 NOTE — PLAN OF CARE
Problem: Anger Management/Homicidal Ideation:  Goal: Absence of homicidal ideation  Description: Absence of homicidal ideation  Outcome: Completed     Problem: Altered Mood, Psychotic Behavior:  Goal: Able to verbalize reality based thinking  Description: Able to verbalize reality based thinking  Outcome: Completed           Patient low profile. Mis interrupts. Encouraged oral medication compliance with oral Abilify. Focused on discharge. Denies suicidal and homicidal thoughts. Denies hallucinations.

## 2021-08-13 NOTE — PROGRESS NOTES
BEHAVIORAL HEALTH FOLLOW-UP NOTE     8/13/2021     Patient was seen and examined in person, Chart reviewed   Patient's case discussed with staff/team    Chief Complaint: Paranoid misinterpreting    Interim History: Patient seen during treatment team.  He is paranoid is misinterpreting. He has been refusing oral medications becomes paranoid when asked to take them. He is easily agitated he misinterprets.   Denies SI/HI intent or plan he denies auditory visualizations although he appears internally stimulated      Appetite:   [x] Normal/Unchanged  [] Increased  [] Decreased      Sleep:       [x] Normal/Unchanged  [] Fair       [] Poor              Energy:    [x] Normal/Unchanged  [] Increased  [] Decreased        SI [] Present  [x] Absent    HI  []Present  [x] Absent     Aggression:  [] yes  [x] no    Patient is [x] able  [] unable to CONTRACT FOR SAFETY     PAST MEDICAL/PSYCHIATRIC HISTORY:   Past Medical History:   Diagnosis Date    Depression     Schizoaffective disorder ([facility] [address]        FAMILY/SOCIAL HISTORY:  Family History   Problem Relation Age of Onset    Mental Illness Mother     Substance Abuse Mother     No Known Problems Father      Social History     Socioeconomic History    Marital status: Single     Spouse name: Not on file    Number of children: 1    Years of education: 15    Highest education level: Not on file   Occupational History     Comment: SSDI   Tobacco Use    Smoking status: Current Every Day Smoker     Packs/day: 0.25     Years: 24.00     Pack years: 6.00     Types: Cigars, Cigarettes    Smokeless tobacco: Never Used   Vaping Use    Vaping Use: Former   Substance and Sexual Activity    Alcohol use: No    Drug use: Yes     Frequency: 7.0 times per week     Types: Cocaine, Marijuana     Comment: yesterday last used today    Sexual activity: Yes     Partners: Female   Other Topics Concern    Not on file   Social History Narrative    Not on file     Social Determinants of Health Financial Resource Strain:     Difficulty of Paying Living Expenses:    Food Insecurity:     Worried About Running Out of Food in the Last Year:     [address] in the Last Year:    Transportation Needs:     Lack of Transportation (Medical):  Lack of Transportation (Non-Medical):    Physical Activity:     Days of Exercise per Week:     Minutes of Exercise per Session:    Stress:     Feeling of Stress :    Social Connections:     Frequency of Communication with Friends and Family:     Frequency of Social Gatherings with Friends and Family:     Attends Gnosticism Services:     Active Member of Clubs or Organizations:     Attends Club or Organization Meetings:     Marital Status:    Intimate Partner Violence:     Fear of Current or Ex-Partner:     Emotionally Abused:     Physically Abused:     Sexually Abused:            ROS:  [x] All negative/unchanged except if checked.  Explain positive(checked items) below:  [] Constitutional  [] Eyes  [] Ear/Nose/Mouth/Throat  [] Respiratory  [] CV  [] GI  []   [] Musculoskeletal  [] Skin/Breast  [] Neurological  [] Endocrine  [] Heme/Lymph  [] Allergic/Immunologic    Explanation:     MEDICATIONS:    Current Facility-Administered Medications:     ARIPiprazole lauroxil (ARISTADA) injection 662 mg, 662 mg, Intramuscular, Q30 Days, JUICE Vaz CNP, 636 mg at 08/12/21 1434    ARIPiprazole (ABILIFY) tablet 5 mg, 5 mg, Oral, Daily, JUICE Vaz CNP    divalproex (DEPAKOTE) DR tablet 500 mg, 500 mg, Oral, 2 times per day, JUICE De Guzman CNP    acetaminophen (TYLENOL) tablet 650 mg, 650 mg, Oral, Q6H PRN, Vivi Maki MD    magnesium hydroxide (MILK OF MAGNESIA) 400 MG/5ML suspension 30 mL, 30 mL, Oral, Daily PRN, Vivi Maki MD    aluminum & magnesium hydroxide-simethicone (MAALOX) 200-200-20 MG/5ML suspension 30 mL, 30 mL, Oral, PRN, Vivi Maki MD    hydrOXYzine (VISTARIL) capsule 50 mg, 50 mg, Oral, TID PRN, Austyn Dixon MD    haloperidol (HALDOL) tablet 5 mg, 5 mg, Oral, Q6H PRN **OR** haloperidol lactate (HALDOL) injection 5 mg, 5 mg, Intramuscular, Q6H PRN, Austyn Dixon MD    nicotine polacrilex (NICORETTE) gum 2 mg, 2 mg, Oral, PRN, Shalom Hampton, APRN - CNP      Examination:  BP (!) 103/51   Pulse 51   Temp 98.9 °F (37.2 °C) (Temporal)   Resp 16   Ht 6' (1.829 m)   Wt 170 lb (77.1 kg)   SpO2 96%   BMI 23.06 kg/m²   Gait - steady  Medication side effects(SE): No    Mental Status Examination:    Level of consciousness:  within normal limits   Appearance:  fair grooming and fair hygiene  Behavior/Motor:  no abnormalities noted  Attitude toward examiner:  cooperative  Speech:  spontaneous, normal rate and normal volume   Mood: \" I am okay. \"  Affect: Mood incongruent irritable easily agitated  Thought processes: Disorganized tangential  Thought content: Paranoid misinterpreting denies SI/HI intent or plan denies auditory visual hallucinations although appears to also be internally stimulated   cognition:  oriented to person, place, and time   Concentration intact  Insight poor   Judgement poor     ASSESSMENT:   Patient symptoms are:  [] Well controlled  [] Improving  [] Worsening  [x] No change      Diagnosis:  Principal Problem:    Schizoaffective disorder, bipolar type ([facility] [address]  Active Problems:    [facility]  Resolved Problems:    * No resolved hospital problems. *      LABS:    No results for input(s): WBC, HGB, PLT in the last 72 hours. No results for input(s): NA, K, CL, CO2, BUN, CREATININE, GLUCOSE in the last 72 hours. No results for input(s): BILITOT, ALKPHOS, AST, ALT in the last 72 hours.   Lab Results   Component Value Date    LABAMPH NOT DETECTED 08/09/2021    BARBSCNU NOT DETECTED 08/09/2021    LABBENZ NOT DETECTED 08/09/2021    LABMETH NOT DETECTED 08/09/2021    OPIATESCREENURINE NOT DETECTED 08/09/2021    PHENCYCLIDINESCREENURINE NOT DETECTED 08/09/2021    ETOH <10

## 2021-08-14 PROCEDURE — 99231 SBSQ HOSP IP/OBS SF/LOW 25: CPT | Performed by: NURSE PRACTITIONER

## 2021-08-14 PROCEDURE — 6370000000 HC RX 637 (ALT 250 FOR IP): Performed by: NURSE PRACTITIONER

## 2021-08-14 PROCEDURE — 1240000000 HC EMOTIONAL WELLNESS R&B

## 2021-08-14 RX ORDER — OXCARBAZEPINE 300 MG/1
300 TABLET, FILM COATED ORAL 2 TIMES DAILY
Status: DISCONTINUED | OUTPATIENT
Start: 2021-08-14 | End: 2021-08-16 | Stop reason: HOSPADM

## 2021-08-14 RX ORDER — ARIPIPRAZOLE 10 MG/1
10 TABLET ORAL DAILY
Status: DISCONTINUED | OUTPATIENT
Start: 2021-08-15 | End: 2021-08-16 | Stop reason: HOSPADM

## 2021-08-14 RX ADMIN — ARIPIPRAZOLE 5 MG: 5 TABLET ORAL at 10:06

## 2021-08-14 RX ADMIN — OXCARBAZEPINE 300 MG: 300 TABLET, FILM COATED ORAL at 20:35

## 2021-08-14 ASSESSMENT — PAIN SCALES - GENERAL: PAINLEVEL_OUTOF10: 0

## 2021-08-14 NOTE — PROGRESS NOTES
BEHAVIORAL HEALTH FOLLOW-UP NOTE     8/14/2021     Patient was seen and examined in person, Chart reviewed   Patient's case discussed with staff/team    Chief Complaint: Guarded, suspicious, discharge focused    Interim History: Patient is observed in his room this morning he is somewhat guarded and suspicious of this interaction. States that he will not take the Depakote because it makes his hair fall out. Tells me that at one point time in his life he was completely bald because of taking the Depakote he asks if there are other mood stabilizers. Telling me that he is agreeable to taking a mood stabilizer along with the antipsychotic just not the Depakote because of the side effect. Vinod Kirkpatrick He is discharged focused and asking if he can be discharged to the crisis unit or to the mission. Provided counseling that the patient must take medications and engage in treatment to help with discharge planning. He is very calm and pleasant during this interaction.   SI/HI intent or plan he denies auditory visualizations although he appears internally stimulated      Appetite:   [x] Normal/Unchanged  [] Increased  [] Decreased      Sleep:       [x] Normal/Unchanged  [] Fair       [] Poor              Energy:    [x] Normal/Unchanged  [] Increased  [] Decreased        SI [] Present  [x] Absent    HI  []Present  [x] Absent     Aggression:  [] yes  [x] no    Patient is [x] able  [] unable to CONTRACT FOR SAFETY     PAST MEDICAL/PSYCHIATRIC HISTORY:   Past Medical History:   Diagnosis Date    Depression     Schizoaffective disorder ([address]        FAMILY/SOCIAL HISTORY:  Family History   Problem Relation Age of Onset    Mental Illness Mother     Substance Abuse Mother     No Known Problems Father      Social History     Socioeconomic History    Marital status: Single     Spouse name: Not on file    Number of children: 1    Years of education: 12    Highest education level: Not on file   Occupational History     Comment: SSDI Tobacco Use    Smoking status: Current Every Day Smoker     Packs/day: 0.25     Years: 24.00     Pack years: 6.00     Types: Cigars, Cigarettes    Smokeless tobacco: Never Used   Vaping Use    Vaping Use: Former   Substance and Sexual Activity    Alcohol use: No    Drug use: Yes     Frequency: 7.0 times per week     Types: Cocaine, Marijuana     Comment: yesterday last used today    Sexual activity: Yes     Partners: Female   Other Topics Concern    Not on file   Social History Narrative    Not on file     Social Determinants of Health     Financial Resource Strain:     Difficulty of Paying Living Expenses:    Food Insecurity:     Worried About Running Out of Food in the Last Year:     Ran Out of Food in the Last Year:    Transportation Needs:     Lack of Transportation (Medical):  Lack of Transportation (Non-Medical):    Physical Activity:     Days of Exercise per Week:     Minutes of Exercise per Session:    Stress:     Feeling of Stress :    Social Connections:     Frequency of Communication with Friends and Family:     Frequency of Social Gatherings with Friends and Family:     Attends Yazidi Services:     Active Member of Clubs or Organizations:     Attends Club or Organization Meetings:     Marital Status:    Intimate Partner Violence:     Fear of Current or Ex-Partner:     Emotionally Abused:     Physically Abused:     Sexually Abused:            ROS:  [x] All negative/unchanged except if checked.  Explain positive(checked items) below:  [] Constitutional  [] Eyes  [] Ear/Nose/Mouth/Throat  [] Respiratory  [] CV  [] GI  []   [] Musculoskeletal  [] Skin/Breast  [] Neurological  [] Endocrine  [] Heme/Lymph  [] Allergic/Immunologic    Explanation:     MEDICATIONS:    Current Facility-Administered Medications:     ARIPiprazole lauroxil (ARISTADA) injection 662 mg, 662 mg, Intramuscular, Q30 Days, Cassia Hampton, APRN - CNP, 607 mg at 08/12/21 1434    ARIPiprazole (ABILIFY) tablet 5 mg, 5 mg, Oral, Daily, [facility], APRN - CNP, 5 mg at 08/14/21 1006    divalproex (DEPAKOTE) DR tablet 500 mg, 500 mg, Oral, 2 times per day, [place], APRN - CNP    acetaminophen (TYLENOL) tablet 650 mg, 650 mg, Oral, Q6H PRN, Enrique Duarte MD    magnesium hydroxide (MILK OF MAGNESIA) 400 MG/5ML suspension 30 mL, 30 mL, Oral, Daily PRN, Enrique Duarte MD    aluminum & magnesium hydroxide-simethicone (MAALOX) 200-200-20 MG/5ML suspension 30 mL, 30 mL, Oral, PRN, Colon MD Gerald    hydrOXYzine (VISTARIL) capsule 50 mg, 50 mg, Oral, TID PRN, Enrique Duarte MD    haloperidol (HALDOL) tablet 5 mg, 5 mg, Oral, Q6H PRN **OR** haloperidol lactate (HALDOL) injection 5 mg, 5 mg, Intramuscular, Q6H PRN, Enrique Duarte MD    nicotine polacrilex (NICORETTE) gum 2 mg, 2 mg, Oral, PRN, [facility], APRN - CNP      Examination:  BP (!) 109/55   Pulse 53   Temp 97 °F (36.1 °C)   Resp 14   Ht 6' (1.829 m)   Wt 170 lb (77.1 kg)   SpO2 96%   BMI 23.06 kg/m²   Gait - steady  Medication side effects(SE): No    Mental Status Examination:    Level of consciousness:  within normal limits   Appearance:  fair grooming and fair hygiene  Behavior/Motor:  no abnormalities noted  Attitude toward examiner:  cooperative  Speech:  spontaneous, normal rate and normal volume   Mood: \" I am okay. \"  Affect: Mood incongruent irritable easily agitated  Thought processes: Disorganized tangential  Thought content: Paranoid misinterpreting denies SI/HI intent or plan denies auditory visual hallucinations although appears to also be internally stimulated   cognition:  oriented to person, place, and time   Concentration intact  Insight poor   Judgement poor     ASSESSMENT:   Patient symptoms are:  [] Well controlled  [] Improving  [] Worsening  [x] No change      Diagnosis:  Principal Problem:    Schizoaffective disorder, bipolar type ([facility]  Active Problems:    Polysubstance abuse ([address]  Resolved Problems:    * No resolved hospital problems. *      LABS:    No results for input(s): WBC, HGB, PLT in the last 72 hours. No results for input(s): NA, K, CL, CO2, BUN, CREATININE, GLUCOSE in the last 72 hours. No results for input(s): BILITOT, ALKPHOS, AST, ALT in the last 72 hours. Lab Results   Component Value Date    LABAMPH NOT DETECTED 08/09/2021    BARBSCNU NOT DETECTED 08/09/2021    LABBENZ NOT DETECTED 08/09/2021    LABMETH NOT DETECTED 08/09/2021    OPIATESCREENURINE NOT DETECTED 08/09/2021    PHENCYCLIDINESCREENURINE NOT DETECTED 08/09/2021    ETOH <10 08/09/2021     Lab Results   Component Value Date    TSH 0.913 06/25/2021     No results found for: LITHIUM  Lab Results   Component Value Date    VALPROATE 3 (L) 07/19/2021       Treatment Plan:  Reviewed current Medications with the patient. Risks, benefits, side effects, drug-to-drug interactions and alternatives to treatment were discussed. Collateral information:   CD evaluation  Encourage patient to attend group and other milieu activities. Discharge planning discussed with the patient and treatment team.    Abilify 5 mg daily plan to increase once patient starts taking  Aristada 662 mg IM every 30 days  Discontinue Depakote 500 mg twice daily for mood stabilization  Begin Trileptal 300 mg twice daily for mood stabilization patient is agreeable to trying this mood stabilizer, he continues to refuse Depakote citing that it makes his hair fall out.   PSYCHOTHERAPY/COUNSELING:  [x] Therapeutic interview  [x] Supportive  [] CBT  [] Ongoing  [] Other    [x] Patient continues to need, on a daily basis, active treatment furnished directly by or requiring the supervision of inpatient psychiatric personnel      Anticipated Length of stay: 3 to 7 days based on stability            Electronically signed by JUICE Laughlin CNP on 8/14/2021 at 11:59 AM

## 2021-08-14 NOTE — PROGRESS NOTES
Patient refused scheduled Depakote. States \"I don't take that. That take my hair out. \" Education provided and patient continued to refused, stating \"I'll take anything else they want me to besides Depakote. \" Will continue to monitor.         Electronically signed by Tran Carl RN on 8/13/2021 at 8:54 PM

## 2021-08-14 NOTE — PROGRESS NOTES
Khanh Anay continues to deny SI,HI, or any Hallucinations at this time. He appears suspicious and guarded at times and internally stimulated. He tends to isolate to his room sleeping. Groups continue to be offered. Will continue to monitor.

## 2021-08-14 NOTE — PROGRESS NOTES
Joce denies SI,HI, or any Hallucinations at this time. He isolates to room and missed his breakfast. He refused his Depakote this morning (States it makes his hair fall out)but took his Abilify. Groups continue to be offered and encouraged. Will continue to monitor.

## 2021-08-14 NOTE — PROGRESS NOTES
Pt up on the unit and social. Pt is irritable talking about \"going through some things with Summer\". Pt states his GF is talking hto a [place] man and that he is American. States he had a fight with this man and is here because he \"needed a break\". Pt  Has flight of ideas. Denies SI, HI, and AVH. Pt refused his Depakote tonight but did take Abilify earlier as agreed with Dr Yuly Martinez. Pt denies any issues @ this time.  Will continue to monitor

## 2021-08-15 PROCEDURE — 1240000000 HC EMOTIONAL WELLNESS R&B

## 2021-08-15 PROCEDURE — 6370000000 HC RX 637 (ALT 250 FOR IP): Performed by: NURSE PRACTITIONER

## 2021-08-15 PROCEDURE — 99231 SBSQ HOSP IP/OBS SF/LOW 25: CPT | Performed by: NURSE PRACTITIONER

## 2021-08-15 RX ADMIN — OXCARBAZEPINE 300 MG: 300 TABLET, FILM COATED ORAL at 10:22

## 2021-08-15 RX ADMIN — OXCARBAZEPINE 300 MG: 300 TABLET, FILM COATED ORAL at 21:18

## 2021-08-15 RX ADMIN — ARIPIPRAZOLE 10 MG: 10 TABLET ORAL at 10:22

## 2021-08-15 NOTE — PLAN OF CARE
Denies SI, HI, and hallucinations. Patient states \"I'm doing good tonight. \" He is internally stimulated, out on the unit pacing and talking and laughing to himself at times. He has also been watching TV and socializing with peers. Compliant with medications this evening. No behavioral issues or PRNs administered. No complains or concerns verbalized at this time. Will continue to monitor and offer support.       Problem: Anger Management/Homicidal Ideation:  Goal: Absence of angry outbursts  Description: Absence of angry outbursts  Outcome: Met This Shift     Problem: Altered Mood, Psychotic Behavior:  Goal: Able to verbalize decrease in frequency and intensity of hallucinations  Description: Able to verbalize decrease in frequency and intensity of hallucinations  8/14/2021 2118 by Joseph Zuluaga RN  Outcome: Met This Shift     Problem: Altered Mood, Psychotic Behavior:  Goal: Able to verbalize reality based thinking  Description: Able to verbalize reality based thinking  8/14/2021 2118 by Joseph Zuluaga RN  Outcome: Not Met This Shift           Electronically signed by Joseph Zuluaga RN on 8/14/2021 at 9:21 PM

## 2021-08-15 NOTE — PLAN OF CARE
Problem: Anger Management/Homicidal Ideation:  Goal: Ability to verbalize frustrations and anger appropriately will improve  Description: Ability to verbalize frustrations and anger appropriately will improve  Outcome: Ongoing  Goal: Absence of angry outbursts  Description: Absence of angry outbursts  Outcome: Ongoing     Problem: Altered Mood, Psychotic Behavior:  Goal: Able to verbalize decrease in frequency and intensity of hallucinations  Description: Able to verbalize decrease in frequency and intensity of hallucinations  Outcome: Ongoing  Goal: Able to verbalize reality based thinking  Description: Able to verbalize reality based thinking  Outcome: Ongoing

## 2021-08-15 NOTE — PROGRESS NOTES
BEHAVIORAL HEALTH FOLLOW-UP NOTE     8/15/2021     Patient was seen and examined in person, Chart reviewed   Patient's case discussed with staff/team    Chief Complaint: Guarded, suspicious, discharge focused, irritable    Interim History: Patient is observed in his room this morning he is somewhat guarded and suspicious of this interaction. States that he will not take the Depakote because it makes his hair fall out. Tells me that at one point time in his life he was completely bald because of taking the Depakote he asks if there are other mood stabilizers. Telling me that he is agreeable to taking a mood stabilizer along with the antipsychotic just not the Depakote because of the side effect. . However now today he is refusing the trileptal stating that he only wants the \"shot and wont take anything else. \"   He is discharged focused and asking if he can be discharged to the crisis unit or to the mission. Provided counseling that the patient must take medications and engage in treatment to help with discharge planning. He is very calm and pleasant during this interaction.   SI/HI intent or plan he denies auditory visualizations although he appears internally stimulated  Increased abilify to 10 mg daily        Appetite:   [x] Normal/Unchanged  [] Increased  [] Decreased      Sleep:       [x] Normal/Unchanged  [] Fair       [] Poor              Energy:    [x] Normal/Unchanged  [] Increased  [] Decreased        SI [] Present  [x] Absent    HI  []Present  [x] Absent     Aggression:  [] yes  [x] no    Patient is [x] able  [] unable to CONTRACT FOR SAFETY     PAST MEDICAL/PSYCHIATRIC HISTORY:   Past Medical History:   Diagnosis Date    Depression     [address] ([facility]        FAMILY/SOCIAL HISTORY:  Family History   Problem Relation Age of Onset    Mental Illness Mother     Substance Abuse Mother     No Known Problems Father      Social History     Socioeconomic History    Marital status: Single Spouse name: Not on file    Number of children: 1    Years of education: 15    Highest education level: Not on file   Occupational History     Comment: SSDI   Tobacco Use    Smoking status: Current Every Day Smoker     Packs/day: 0.25     Years: 24.00     Pack years: 6.00     Types: Cigars, Cigarettes    Smokeless tobacco: Never Used   Vaping Use    Vaping Use: Former   Substance and Sexual Activity    Alcohol use: No    Drug use: Yes     Frequency: 7.0 times per week     Types: Cocaine, Marijuana     Comment: yesterday last used today    Sexual activity: Yes     Partners: Female   Other Topics Concern    Not on file   Social History Narrative    Not on file     Social Determinants of Health     Financial Resource Strain:     Difficulty of Paying Living Expenses:    Food Insecurity:     Worried About Running Out of Food in the Last Year:     Ran Out of Food in the Last Year:    Transportation Needs:     Lack of Transportation (Medical):  Lack of Transportation (Non-Medical):    Physical Activity:     Days of Exercise per Week:     Minutes of Exercise per Session:    Stress:     Feeling of Stress :    Social Connections:     Frequency of Communication with Friends and Family:     Frequency of Social Gatherings with Friends and Family:     Attends Alevism Services:     Active Member of Clubs or Organizations:     Attends Club or Organization Meetings:     Marital Status:    Intimate Partner Violence:     Fear of Current or Ex-Partner:     Emotionally Abused:     Physically Abused:     Sexually Abused:            ROS:  [x] All negative/unchanged except if checked.  Explain positive(checked items) below:  [] Constitutional  [] Eyes  [] Ear/Nose/Mouth/Throat  [] Respiratory  [] CV  [] GI  []   [] Musculoskeletal  [] Skin/Breast  [] Neurological  [] Endocrine  [] Heme/Lymph  [] Allergic/Immunologic    Explanation:     MEDICATIONS:    Current Facility-Administered Medications:    Improving  [] Worsening  [x] No change      Diagnosis:  Principal Problem:    Schizoaffective disorder, bipolar type ([facility])  Active Problems:    Polysubstance abuse ([facility]  Resolved Problems:    * No resolved hospital problems. *      LABS:    No results for input(s): WBC, HGB, PLT in the last 72 hours. No results for input(s): NA, K, CL, CO2, BUN, CREATININE, GLUCOSE in the last 72 hours. No results for input(s): BILITOT, ALKPHOS, AST, ALT in the last 72 hours. Lab Results   Component Value Date    LABAMPH NOT DETECTED 08/09/2021    BARBSCNU NOT DETECTED 08/09/2021    LABBENZ NOT DETECTED 08/09/2021    LABMETH NOT DETECTED 08/09/2021    OPIATESCREENURINE NOT DETECTED 08/09/2021    PHENCYCLIDINESCREENURINE NOT DETECTED 08/09/2021    ETOH <10 08/09/2021     Lab Results   Component Value Date    TSH 0.913 06/25/2021     No results found for: LITHIUM  Lab Results   Component Value Date    VALPROATE 3 (L) 07/19/2021       Treatment Plan:  Reviewed current Medications with the patient. Risks, benefits, side effects, drug-to-drug interactions and alternatives to treatment were discussed. Collateral information:   CD evaluation  Encourage patient to attend group and other milieu activities. Discharge planning discussed with the patient and treatment team.    Abilify 10 mg daily   Aristada 662 mg IM every 30 days  Discontinue Depakote 500 mg twice daily for mood stabilization  Begin Trileptal 300 mg twice daily for mood stabilization patient is agreeable to trying this mood stabilizer, he continues to refuse Depakote citing that it makes his hair fall out.   PSYCHOTHERAPY/COUNSELING:  [x] Therapeutic interview  [x] Supportive  [] CBT  [] Ongoing  [] Other    [x] Patient continues to need, on a daily basis, active treatment furnished directly by or requiring the supervision of inpatient psychiatric personnel      Anticipated Length of stay: 3 to 7 days based on stability            Electronically signed by Saranya Cantor Katie Zaragoza - CNP on 8/15/2021 at 11:34 AM

## 2021-08-16 VITALS
BODY MASS INDEX: 23.03 KG/M2 | RESPIRATION RATE: 16 BRPM | OXYGEN SATURATION: 96 % | TEMPERATURE: 98.6 F | WEIGHT: 170 LBS | HEART RATE: 86 BPM | HEIGHT: 72 IN | DIASTOLIC BLOOD PRESSURE: 68 MMHG | SYSTOLIC BLOOD PRESSURE: 109 MMHG

## 2021-08-16 PROCEDURE — 99239 HOSP IP/OBS DSCHRG MGMT >30: CPT | Performed by: NURSE PRACTITIONER

## 2021-08-16 RX ORDER — OXCARBAZEPINE 300 MG/1
300 TABLET, FILM COATED ORAL 2 TIMES DAILY
Qty: 60 TABLET | Refills: 0 | Status: SHIPPED | OUTPATIENT
Start: 2021-08-16 | End: 2021-09-20

## 2021-08-16 RX ORDER — ARIPIPRAZOLE 10 MG/1
10 TABLET ORAL DAILY
Qty: 30 TABLET | Refills: 0 | Status: SHIPPED | OUTPATIENT
Start: 2021-08-17 | End: 2021-09-20

## 2021-08-16 NOTE — PROGRESS NOTES
Pt came out on the unit walking around the halls. Pt was asked to return to his room and get some sleep. Pt stated he \"isnt in [facility] and he dont feel like it\". Pt states he wanted to see the paperwork where it says he has to be \"locked up in that cell\". Pt continued to yell on the unit. Pt was asked to lower his voice and go to his room. Pt walked down to his room and slammed the door. He is currently talking to himself in there.

## 2021-08-16 NOTE — BH NOTE
Pt discharged with followings belongings:   Dentures: None  Vision - Corrective Lenses: None  Hearing Aid: None  Jewelry: None  Body Piercings Removed: N/A  Clothing: Footwear, Pants, Shirt, Undergarments (Comment), Socks, Jacket / coat (multiple ties and coats, stuff animal, 4 shirts and 2 jeans)  Were All Patient Medications Collected?: Not Applicable  Other Valuables: None   Valuables sent home with patient. Valuables retrieved from safe, Security envelope number:  na and returned to patient. Patient left department with Departure Mode: By self, In cab via Mobility at Departure: Ambulatory, discharged to Discharged to: Other (Comment) (crises unit). Patient education on aftercare instructions:  yes  Patient verbalize understanding of AVS:   yes. Given suicide prevention handout . Discharged in stable condition.   Status EXAM upon discharge:  Status and Exam  Normal: No  Facial Expression: Brightened  Affect: Appropriate, Congruent  Level of Consciousness: Alert  Mood:Normal: No  Mood: Irritable  Motor Activity:Normal: No  Motor Activity: Decreased  Interview Behavior: Cooperative  Preception: Bradford to Person, Bruce Borrow to Time, [place] to Place, Bradford to Situation  Attention:Normal: No  Attention: Distractible  Thought Processes: Circumstantial  Thought Content:Normal: Yes  Thought Content: Paranoia  Hallucinations: None  Delusions: No  Delusions: Persecution  Memory:Normal: No  Memory: Poor Recent  Insight and Judgment: No  Insight and Judgment: Poor Insight, Poor Judgment  Present Suicidal Ideation: No  Present Homicidal Ideation: No    Soila Beauchamp, RN

## 2021-08-16 NOTE — SUICIDE SAFETY PLAN
SAFETY PLAN    A suicide Safety Plan is a document that supports someone when they are having thoughts of suicide. Warning Signs that indicate a suicidal crisis may be developing: What (situations, thoughts, feelings, body sensations, behaviors, etc.) do you experience that lets you know you are beginning to think about suicide? 1. Heavy breathing  2. Panic attack  3. Racing thoughts    Internal Coping Strategies:  What things can I do (relaxation techniques, hobbies, physical activities, etc.) to take my mind off my problems without contacting another person? 1.  drawing  2.  music  3.  animals    People and social settings that provide distraction: Who can I call or where can I go to distract me? 1. Name: na Phone:  na  2. Name:   Phone:    3. Place: crises unit          4. Place: shelter    People whom I can ask for help: Who can I call when I need help - for example, friends, family, clergy, someone else? 1. Name:  na                Phone: na  2. Name: na  Phone: na  3. Name:  na  Phone:  na    Professionals or Behavioral Health agencies I can contact during a crisis: Who can I call for help - for example, my doctor, my psychiatrist, my psychologist, a mental health provider, a suicide hotline? 1. Clinician [facility]:  [facility]   Phone: [phone] or Emergency Contact #: na    2. Clinician Name:  na   Phone:  na      Clinician Pager or Emergency Contact #:  na    3. Suicide Prevention Lifeline: 2-874-664-TALK (0563)    4. [address] [place] [address] Emergency Services -  for example, [facility] suicide hotline, [facility] Hotline:  211 or 021      Emergency Services Address:  na      Emergency Services Phone:  na    Making the environment safe: How can I make my environment (house/apartment/living space) safer? For example, can I remove guns, medications, and other items? 1.  Come to the hospital  2. na

## 2021-08-16 NOTE — CARE COORDINATION
In order to ensure appropriate transition and discharge planning is in place, the following documents have been transmitted to [facility], as the new outpatient provider:     The d/c diagnosis was transmitted to the next care provider   The reason for hospitalization was transmitted to the next care provider   The d/c medications (dosage and indication) were transmitted to the next care provider    The continuing care plan was transmitted to the next care provider      [place] [facility] to provide [facility]. back pain

## 2021-08-16 NOTE — DISCHARGE SUMMARY
DISCHARGE SUMMARY      Patient ID:  Tess Davis  [id number]  45 y.o.  1980    Admit date: 8/9/2021    Discharge date and time: 8/16/2021    Admitting Physician: Abbie Berkowitz MD     Discharge Physician: Dr Yfn Roque MD    Discharge Diagnoses:   Patient Active Problem List   Diagnosis    Schizoaffective disorder, bipolar type ([facility] [address]    Trauma    Pneumothorax, traumatic    Multiple closed fractures of ribs of right side    Rib pain on right side    Multiple fractures of right lower extremity and ribs, closed, initial encounter\.   right tib fracture 9 & 10    Hemopneumothorax, right    Major depression single episode, in partial remission ([facility] [address] [facility]    Polysubstance abuse ([facility] [address] [facility]    Cocaine abuse ([facility] [address]       Admission Condition: poor    Discharged Condition: stable    Admission Circumstance: presented to the ED reported that he is hearing voices telling him that someone is out to get him and reporting suicidal ideation      PAST MEDICAL/PSYCHIATRIC HISTORY:   Past Medical History:   Diagnosis Date    Depression     Schizoaffective disorder ([facility] [address]        FAMILY/SOCIAL HISTORY:  Family History   Problem Relation Age of Onset    Mental Illness Mother     Substance Abuse Mother     No Known Problems Father      Social History     Socioeconomic History    Marital status: Single     Spouse name: Not on file    Number of children: 1    Years of education: 15    Highest education level: Not on file   Occupational History     Comment: SSDI   Tobacco Use    Smoking status: Current Every Day Smoker     Packs/day: 0.25     Years: 24.00     Pack years: 6.00     Types: Cigars, Cigarettes    Smokeless tobacco: Never Used   Vaping Use    Vaping Use: Former   Substance and Sexual Activity    Alcohol use: No    Drug use: Yes     Frequency: 7.0 times per week     Types: Cocaine, Marijuana     Comment: yesterday last used today    Sexual activity: Yes     Partners: Female   Other Topics Concern    Not on file Social History Narrative    Not on file     Social Determinants of Health     Financial Resource Strain:     Difficulty of Paying Living Expenses:    Food Insecurity:     Worried About Running Out of Food in the Last Year:     [address] in the Last Year:    Transportation Needs:     Lack of Transportation (Medical):      Lack of Transportation (Non-Medical):    Physical Activity:     Days of Exercise per Week:     Minutes of Exercise per Session:    Stress:     Feeling of Stress :    Social Connections:     Frequency of Communication with Friends and Family:     Frequency of Social Gatherings with Friends and Family:     Attends Mu-ism Services:     Active Member of Clubs or Organizations:     Attends Club or Organization Meetings:     Marital Status:    Intimate Partner Violence:     Fear of Current or Ex-Partner:     Emotionally Abused:     Physically Abused:     Sexually Abused:        MEDICATIONS:    Current Facility-Administered Medications:     OXcarbazepine (TRILEPTAL) tablet 300 mg, 300 mg, Oral, BID, JUICE Barlow CNP, 300 mg at 08/15/21 2118    ARIPiprazole (ABILIFY) tablet 10 mg, 10 mg, Oral, Daily, JUICE Barlow CNP, 10 mg at 08/15/21 1022    ARIPiprazole lauroxil (ARISTADA) injection 662 mg, 662 mg, Intramuscular, Q30 Days, JUICE Vaz CNP, 734 mg at 08/12/21 1434    acetaminophen (TYLENOL) tablet 650 mg, 650 mg, Oral, Q6H PRN, Ofelia Geronimo MD    magnesium hydroxide (MILK OF MAGNESIA) 400 MG/5ML suspension 30 mL, 30 mL, Oral, Daily PRN, Ofelia Geronimo MD    aluminum & magnesium hydroxide-simethicone (MAALOX) 200-200-20 MG/5ML suspension 30 mL, 30 mL, Oral, PRN, Ofelia Geronimo MD    hydrOXYzine (VISTARIL) capsule 50 mg, 50 mg, Oral, TID PRN, Ofelia Geronimo MD    haloperidol (HALDOL) tablet 5 mg, 5 mg, Oral, Q6H PRN **OR** haloperidol lactate (HALDOL) injection 5 mg, 5 mg, Intramuscular, Q6H PRN, Ofelia Geronimo MD  [employer] nicotine polacrilex (NICORETTE) gum 2 mg, 2 mg, Oral, PRN, Bill Bamberger Dellick, APRN - CNP    Examination:  /68   Pulse 86   Temp 98.6 °F (37 °C)   Resp 16   Ht 6' (1.829 m)   Wt 170 lb (77.1 kg)   SpO2 96%   BMI 23.06 kg/m²   Gait - steady    HOSPITAL COURSE[de-identified]   Patient was admitted to the unit on 8/9/2021 was closely monitored for suicidal ideations and psychosis. He was evaluated was treated with Trileptal which is optimized up to 300 mg twice daily, Abilify 10 mg daily and was started back on his Aristada injection he received 660 mg IM every 30 days on 8/12/2021. Medical events were insignificant and patient continued to improve on the floor. He start coming out of his room he is attending groups to socializing with peers. He never made any suicidal statements or any suicidal gestures while in the unit. Social was assisted patient in getting placement to crisis stabilization unit for next level of care. Patient seen in treatment team prior to discharge and treatment team felt the patient obtain the maximum benefit from his hospitalization he was set up with an outpatient mental health agency for outpatient follow-up services. At the time of discharge patient did not show any impulsive behavior. He was up on the unit he was attending groups and socializing with peers. He vehemently denied any suicidal homicidal ideations intent or plan. He was eating well and sleeping well there are no neurovegetative signs or symptoms of depression he denied any auditory or visual hallucinations. There are no overt or covert signs of psychosis. He was appreciative of the help that he received here. This patient no longer meets criteria for inpatient hospitalization.             No AVH or paranoid thoughts  No hopeless or worthless feeling  No active SI/HI  Appetite:  [x] Normal  [] Increased  [] Decreased    Sleep:       [x] Normal  [] Fair       [] Poor            Energy:    [x] Normal  [] Increased  [] PHENCYCLIDINESCREENURINE NOT DETECTED 08/09/2021    ETOH <10 08/09/2021     Lab Results   Component Value Date    TSH 0.913 06/25/2021     No results found for: LITHIUM  Lab Results   Component Value Date    VALPROATE 3 (L) 07/19/2021       RISK ASSESSMENT AT DISCHARGE: Low risk for suicide and homicide. Treatment Plan:  Reviewed current Medications with the patient. Education provided on the complaince with treatment. Risks, benefits, side effects, drug-to-drug interactions and alternatives to treatment were discussed. Encourage patient to attend outpatient follow up appointment and therapy. Patient was advised to call the outpatient provider, visit the nearest ED or call 911 if symptoms are not manageable. Patient's family member was contacted prior to the discharge. Medication List      ASK your doctor about these medications    divalproex 250 MG DR tablet  Commonly known as: DEPAKOTE  Take 1 tablet by mouth every 12 hours     ibuprofen 800 MG tablet  Commonly known as: IBU  Take 1 tablet by mouth every 8 hours as needed for Pain     nicotine 21 MG/24HR  Commonly known as: NICODERM CQ  Place 1 patch onto the skin daily     * paliperidone 6 MG extended release tablet  Commonly known as: INVEGA  Take 1 tablet by mouth daily     * paliperidone 3 MG extended release tablet  Commonly known as: INVEGA  Take 1 tablet by mouth nightly     paliperidone palmitate  MG/ML Darcy IM injection  Commonly known as: INVEGA SUSTENNA  Inject 156 mg into the muscle every 30 days for 1 dose [place]athan Solid injection 234 mg IM received on 4/23/2021 due for booster injection 156 mg IM every 30 days on 4/30/2021         * This list has 2 medication(s) that are the same as other medications prescribed for you. Read the directions carefully, and ask your doctor or other care provider to review them with you.               Patient is counseled if he continues to abuse drugs or alcohol he could act out impulsively causing serious harm to himself or others even though may be unintentional.  He demonstrated understanding of this and has the capacity understand this    Patient is counseled hisr mental health treatment will be difficult to optimize with ongoing use of drugs or alcohol he demonstrate understanding of this as the past understand this     Patient is counseled that he he uses any amount of opiates after any clean time he could have an unintentional overdose he demonstrated understanding of this and has the capacity to understand this    Patient is counseled he must remain compliant with all medications outpatient follow-up ointments    Patient is discharged to the crisis unit in stable condition    TIME SPEND - 35 MINUTES TO COMPLETE THE EVALUATION, DISCHARGE SUMMARY, MEDICATION RECONCILIATION AND FOLLOW UP CARE     Signed:  JUICE Harding CNP  5/51/5887  10:55 AM

## 2021-08-16 NOTE — PROGRESS NOTES
CLINICAL PHARMACY NOTE: MEDS TO BEDS    Total # of Prescriptions Filled: 2   The following medications were delivered to the patient:  · ABILIFY 10 MG  · TRILEPTAL 300 MG    Additional Documentation:

## 2021-08-16 NOTE — PROGRESS NOTES
Pt came back out of room to Nurse station stating he feels he has been violated and that he suffers social injustice. Advised pt that the rules are for all.  Pt walked away talking to self again

## 2021-08-16 NOTE — PROGRESS NOTES
Patient approached nurse desk, tells this nurse that he just had a nightmare that someone was chopping him up. States that he was chopping wood with someone. Educated patient that would make note. Patient walking up an down hallway scratching head with brush.

## 2021-08-16 NOTE — PROGRESS NOTES
Female pt came forward to notify her nurse Wilda Hunt) that this pt had asked her to come into [employer] with him and asked her if she \"wanted to have some fun and something this long\" as pt was holding his hands to describe the length of his penis. Female pt walked away from him. Pt was in the shower after when female pt came forward. After his shower, pt was out on the unit making reference to his \"Brown sugar\". Discussed this with pt and he became irritable and demanded to know who it was. \"I dont mess with no white chics'. Pt was never told what color the pt was that came forward. She was white.  Will continue to monitor

## 2021-08-16 NOTE — PROGRESS NOTES
Pt came out to desk stating that he was joking about being paranoid that someone was out to get him. Pt continued and was asked to lower his voice. Pt stated that he was \"really mad right now and I could do some time\". Pt was giving dirty looks to staff and walked down the pat, again, talking to self in his room. Will continue to monitor pt for escalation.

## 2021-08-16 NOTE — PROGRESS NOTES
Pt alert, out on the unit and social. Pt denies SI, HI, and AVH. Pt upset over his belongings that he cant find some clothes. Was upset with other RN. Pt calmed. States his meds are working well. Appetite is good. Med compliant. Denies any issues.  Will continue to monitor

## 2021-08-16 NOTE — CARE COORDINATION
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 Pt is on restriction. [facility] staff could not provide additional information for pt's restriction.

## 2021-08-16 NOTE — PLAN OF CARE
[facility] Interdisciplinary Treatment Plan Note     Review Date & Time: 8/16/2021    10:52 AM      Patient was in treatment team.    Estimated Length of Stay Update:  8  Estimated Discharge Date Update:  8/17/21    EDUCATION:   Learner Progress Toward Treatment Goals: Reviewed results and recommendations of this team    Method: Group    Outcome: Verbalized understanding    PATIENT GOALS: \"get up out of here'    PLAN/TREATMENT RECOMMENDATIONS UPDATE:  Encourage daily goals and groups    GOALS UPDATE:  Time frame for Short-Term Goals:  3 to 5 days      Jyotsna Meza RN

## 2021-08-19 ENCOUNTER — HOSPITAL ENCOUNTER (EMERGENCY)
Age: 41
Discharge: HOME | End: 2021-08-20
Attending: EMERGENCY MEDICINE
Payer: COMMERCIAL

## 2021-08-19 DIAGNOSIS — S61.012A LACERATION OF LEFT THUMB WITHOUT FOREIGN BODY WITHOUT DAMAGE TO NAIL, INITIAL ENCOUNTER: Primary | ICD-10-CM

## 2021-08-19 DIAGNOSIS — F39 MOOD DISORDER (HCC): ICD-10-CM

## 2021-08-19 PROCEDURE — 99284 EMERGENCY DEPT VISIT MOD MDM: CPT

## 2021-08-19 RX ORDER — HALOPERIDOL 5 MG/ML
5 INJECTION INTRAMUSCULAR ONCE
Status: DISCONTINUED | OUTPATIENT
Start: 2021-08-19 | End: 2021-08-20 | Stop reason: HOSPADM

## 2021-08-19 ASSESSMENT — PAIN DESCRIPTION - PAIN TYPE: TYPE: ACUTE PAIN

## 2021-08-19 ASSESSMENT — PAIN DESCRIPTION - DESCRIPTORS: DESCRIPTORS: BURNING;THROBBING

## 2021-08-19 ASSESSMENT — PAIN DESCRIPTION - PROGRESSION: CLINICAL_PROGRESSION: GRADUALLY WORSENING

## 2021-08-19 ASSESSMENT — PAIN DESCRIPTION - FREQUENCY: FREQUENCY: CONTINUOUS

## 2021-08-19 ASSESSMENT — PAIN DESCRIPTION - ONSET: ONSET: SUDDEN

## 2021-08-19 ASSESSMENT — PAIN DESCRIPTION - LOCATION: LOCATION: FINGER (COMMENT WHICH ONE)

## 2021-08-19 ASSESSMENT — PAIN SCALES - GENERAL: PAINLEVEL_OUTOF10: 8

## 2021-08-19 ASSESSMENT — PAIN DESCRIPTION - ORIENTATION: ORIENTATION: LEFT

## 2021-08-20 VITALS
SYSTOLIC BLOOD PRESSURE: 114 MMHG | TEMPERATURE: 96.8 F | HEART RATE: 50 BPM | RESPIRATION RATE: 20 BRPM | OXYGEN SATURATION: 94 % | DIASTOLIC BLOOD PRESSURE: 76 MMHG

## 2021-08-20 LAB
ACETAMINOPHEN LEVEL: <5 MCG/ML (ref 10–30)
ALBUMIN SERPL-MCNC: 3.8 G/DL (ref 3.5–5.2)
ALP BLD-CCNC: 39 U/L (ref 40–129)
ALT SERPL-CCNC: 9 U/L (ref 0–40)
AMPHETAMINE SCREEN, URINE: NOT DETECTED
ANION GAP SERPL CALCULATED.3IONS-SCNC: 6 MMOL/L (ref 7–16)
AST SERPL-CCNC: 13 U/L (ref 0–39)
BARBITURATE SCREEN URINE: NOT DETECTED
BENZODIAZEPINE SCREEN, URINE: NOT DETECTED
BILIRUB SERPL-MCNC: 0.6 MG/DL (ref 0–1.2)
BUN BLDV-MCNC: 13 MG/DL (ref 6–20)
CALCIUM SERPL-MCNC: 8.7 MG/DL (ref 8.6–10.2)
CANNABINOID SCREEN URINE: NOT DETECTED
CHLORIDE BLD-SCNC: 106 MMOL/L (ref 98–107)
CO2: 28 MMOL/L (ref 22–29)
COCAINE METABOLITE SCREEN URINE: POSITIVE
CREAT SERPL-MCNC: 1 MG/DL (ref 0.7–1.2)
ETHANOL: <10 MG/DL (ref 0–0.08)
FENTANYL SCREEN, URINE: NOT DETECTED
GFR AFRICAN AMERICAN: >60
GFR NON-AFRICAN AMERICAN: >60 ML/MIN/1.73
GLUCOSE BLD-MCNC: 92 MG/DL (ref 74–99)
HCT VFR BLD CALC: 39.1 % (ref 37–54)
HEMOGLOBIN: 13 G/DL (ref 12.5–16.5)
INFLUENZA A: NOT DETECTED
INFLUENZA B: NOT DETECTED
Lab: ABNORMAL
MCH RBC QN AUTO: 30.4 PG (ref 26–35)
MCHC RBC AUTO-ENTMCNC: 33.2 % (ref 32–34.5)
MCV RBC AUTO: 91.4 FL (ref 80–99.9)
METHADONE SCREEN, URINE: NOT DETECTED
OPIATE SCREEN URINE: NOT DETECTED
OXYCODONE URINE: NOT DETECTED
PDW BLD-RTO: 13.7 FL (ref 11.5–15)
PHENCYCLIDINE SCREEN URINE: NOT DETECTED
PLATELET # BLD: 276 E9/L (ref 130–450)
PMV BLD AUTO: 9.6 FL (ref 7–12)
POTASSIUM SERPL-SCNC: 3.7 MMOL/L (ref 3.5–5)
RBC # BLD: 4.28 E12/L (ref 3.8–5.8)
SALICYLATE, SERUM: <0.3 MG/DL (ref 0–30)
SARS-COV-2 RNA, RT PCR: NOT DETECTED
SODIUM BLD-SCNC: 140 MMOL/L (ref 132–146)
TOTAL PROTEIN: 6 G/DL (ref 6.4–8.3)
TRICYCLIC ANTIDEPRESSANTS SCREEN SERUM: NEGATIVE NG/ML
VALPROIC ACID LEVEL: 3 MCG/ML (ref 50–100)
WBC # BLD: 3.9 E9/L (ref 4.5–11.5)

## 2021-08-20 PROCEDURE — 80307 DRUG TEST PRSMV CHEM ANLYZR: CPT

## 2021-08-20 PROCEDURE — 80164 ASSAY DIPROPYLACETIC ACD TOT: CPT

## 2021-08-20 PROCEDURE — 80143 DRUG ASSAY ACETAMINOPHEN: CPT

## 2021-08-20 PROCEDURE — 85027 COMPLETE CBC AUTOMATED: CPT

## 2021-08-20 PROCEDURE — 82077 ASSAY SPEC XCP UR&BREATH IA: CPT

## 2021-08-20 PROCEDURE — 80179 DRUG ASSAY SALICYLATE: CPT

## 2021-08-20 PROCEDURE — 80053 COMPREHEN METABOLIC PANEL: CPT

## 2021-08-20 PROCEDURE — 87636 SARSCOV2 & INF A&B AMP PRB: CPT

## 2021-08-20 NOTE — ED NOTES
PATRICIO ALVAREZ attempted to assess Pt. Pt was sleeping. Attempted to wake Pt up. Pt moved around a little bit but did not wake up. RN informed she did not do his labs yet. PATRICIO ALVAREZ spoke with ED Doc who  Informed to let Pt sleep longer and try to assess later.      FRANCISCO Martin, [place]  08/20/21 3986

## 2021-08-20 NOTE — ED PROVIDER NOTES
ED Attending  CC: Rae Coffey [id number]  Department of Emergency Medicine   ED  Encounter Note  Admit Date/RoomTime: 2021 10:00 PM  ED Room: [facility]    NAME: Nile Kayser. : 1980  MRN: [id number]     Chief Complaint:  Laceration (Patient states he cut thumb on left hand on a piece of glass.) and Psychiatric Evaluation (wants help for voices)    History of Present Illness       Nile Kayser. is a 37 y.o. old male who presents to the emergency department by private vehicle, for psychiatric evaluation/medical clearance which began several day(s) prior to arrival.  He has a prior history of Schizoaffective disorder of which the problem is long-standing. His reported drug use history: Current. He  is currently in counseling. There has been no history of recent trauma has laceration to thumb from glass. He denies suicidal ideation  and denies homicidal ideation. He reports his last tetanus is up to date. ROS   Pertinent positives and negatives are stated within HPI, all other systems reviewed and are negative. Past Medical History:  has a past medical history of Depression and Schizoaffective disorder ([facility] [address] Surgical History:  has a past surgical history that includes eye surgery (19 years ago). Social History:  reports that he has been smoking cigars and cigarettes. He has a 6.00 pack-year smoking history. He has never used smokeless tobacco. He reports current drug use. Frequency: 7.00 times per week. Drugs: Cocaine and Marijuana. He reports that he does not drink alcohol. Family History: family history includes Mental Illness in his mother; No Known Problems in his father; Substance Abuse in his mother.      Allergies: Chlorpheniramine-phenylephrine, Penicillins, and Rondec-d [chlophedianol-pseudoephedrine]    Physical Exam   Oxygen Saturation Interpretation: Normal.        ED Triage Vitals   BP Temp Temp src Pulse Resp SpO2 Height Weight   21 2221 08/19/21 2157 -- 08/19/21 2157 08/19/21 2221 08/19/21 2157 -- --   124/78 96.8 °F (36 °C)  94 16 96 %           General Appearance:  well-appearing. Constitutional:   Level of Consciousness: Awake and alert. ETOH: No.          Distress: none. Cooperativeness: uncooperative. Eyes:  PERRL, EOMI, no discharge or conjunctival injection. Ears:  External ears without lesions. Throat:  Pharynx without injection, exudate, or tonsillar hypertrophy. Airway patient. Neck:  Normal ROM. Supple. Respiratory:  Clear to auscultation and breath sounds equal.  CV:  Regular rate and rhythm, normal heart sounds, without pathological murmurs, ectopy, gallops, or rubs. GI:  Abdomen Soft, nontender, good bowel sounds. No firm or pulsatile mass. Back:  No costovertebral tenderness. Integument: Laceration 0.5 cm left volar thumb distal phalynx. Normal turgor. Warm, dry, without visible rash, unless noted elsewhere. Lymphatics: No lymphangitis or adenopathy noted. Neurological:  Oriented. Motor functions intact. Psychiatric:        Thought Process:       Coherent:  No.        Delusions / Paranoia: no evidence of paranoia. Flight of ideas:  Yes. Rambling conversation:  Yes. Affect: irritable, suspicious and incongruent. Suicidal ideation:  no suicidal ideation. Homicidal ideation:  no homicidal ideation. Perceptions:  auditory present. Insight: below average. Judgement: below average. Lab / Imaging Results   (All laboratory and radiology results have been personally reviewed by myself)  Labs:  No results found for this visit on 08/19/21. Imaging: All Radiology results interpreted by Radiologist unless otherwise noted.   No orders to display     ED Course / Medical Decision Making     Medications   haloperidol lactate (HALDOL) injection 5 mg (has no administration in time range)          Consult(s):   IP CONSULT TO SOCIAL WORK    Procedure(s):   none    MDM:   Patient is well-appearing, afebrile. Presented to the emergency room initially for a laceration to left thumb however was angry and agitated would not let this provider close laceration to thumb therefore it was cleaned and bacitracin dressing was applied. During ED stay patient reported he is hearing voices and wants help for this. He was uncooperative and verbally aggressive with the ED staff. He denies any suicidal or homicidal thoughts labs were ordered for medical clearance social work consult added and pending. Plan of Care/Counseling:  JUICE Aguila CNP reviewed today's visit with the patient in addition to providing specific details for the plan of care and counseling regarding the diagnosis and prognosis. Questions are answered at this time and are agreeable with the plan. Assessment      1. Laceration of left thumb without foreign body without damage to nail, initial encounter    2. Mood disorder [facility])      Plan   ED observation  Patient condition is good    New Medications     New Prescriptions    No medications on file     Electronically signed by JUICE Aguila CNP   DD: 8/19/21  **This report was transcribed using voice recognition software. Every effort was made to ensure accuracy; however, inadvertent computerized transcription errors may be present.   END OF ED PROVIDER NOTE           JUICE Isidro Mt, CNP  08/20/21 8879

## 2021-08-20 NOTE — ED NOTES
Bed: HF  Expected date:   Expected time:   Means of arrival:   Comments:  HFF     Santa Ramos RN  08/19/21 6177

## 2021-08-20 NOTE — ED NOTES
Emergency Department [facility] AN AFFILIATE OF [facility] Biopsychosocial Assessment Note     Chief Complaint:      Patient is a 45year old, male presenting to ED for baseline auditory hallucinations, typically worsened by substance abuse. Patient recently discharged from inpatient psych and is homeless.     This patient is well known to this  and these are patient baseline behaviors.     Patient denies suicidal ideation. Patient denies homicidal ideation.     MSE: Patient is alert & oriented x 4. Patient mood is stable, affect flat. Patient thought process is clear, speech pressured at times. Patient has some baseline auditory hallucinations. No reports of visual hallucinations.     Clinical Summary/History:      Patient has a mental health hx of schizoaffective disorder, non-compliant with outpatient mental health treatment. Patient last psychiatric hospitalization was 8/9/21 for schizoaffective disorder on [facility].      Patient needs to follow up with an outpatient provider to regularly administer his psychiatric medications.     Ok sleep, ok appetite, no reports of hopelessness/helplessness.     Patient has a hx of polysubstance abuse.     Gender  [x]????? Male []????? Female []????? Transgender  []????? Other     Sexual Orientation    [x]????? Heterosexual []????? Homosexual []????? Bisexual []????? Other     Suicidal Behavioral: CSSR-S Complete. [x]????? Reports:               [x]????? Past [x]????? Present   []????? Denies     Homicidal/ Violent Behavior  []????? Reports:              []????? Past []????? Present   [x]????? Denies      Hallucinations/Delusions   [x]????? Reports:  Auditory hallucinations  []????? Denies      Substance Use/Alcohol Use/Addiction: SBIRT Screen Complete.    [x]????? Reports:  Crack cocaine & cannabis  []????? Denies      Trauma History  []????? Reports:  []????? Denies      Collateral Information:         Level of Care/Disposition Plan  [x]????? Home:   [x]????? Outpatient Provider:   []????? Crisis Unit: []????? Inpatient Psychiatric Unit:  []????? Other:         Patient is not currently presenting with any acute psychiatric symptoms that would warrant inpatient or crisis unit referral. Patient symptoms can be effectively managed with an outpatient provider.      FRANCISCO Dick, [place]  08/20/21 9891

## 2021-08-27 ENCOUNTER — HOSPITAL ENCOUNTER (EMERGENCY)
Age: 41
Discharge: HOME | End: 2021-08-27
Attending: EMERGENCY MEDICINE
Payer: COMMERCIAL

## 2021-08-27 VITALS
DIASTOLIC BLOOD PRESSURE: 63 MMHG | BODY MASS INDEX: 19.6 KG/M2 | SYSTOLIC BLOOD PRESSURE: 147 MMHG | RESPIRATION RATE: 16 BRPM | TEMPERATURE: 97.3 F | HEIGHT: 71 IN | WEIGHT: 140 LBS | HEART RATE: 62 BPM | OXYGEN SATURATION: 98 %

## 2021-08-27 NOTE — ED NOTES
This nurse attempted to call patient but states to the police that he was leaving      [facility], RN  08/27/21 6385

## 2021-08-27 NOTE — ED PROVIDER NOTES
Went to see the patient in triage with the triage nurse.  He told police he was leaving and he walked out of the ED prior to me being able to evaluate or speak with him      Peg Olivas MD  08/27/21 6599

## 2021-08-30 ENCOUNTER — HOSPITAL ENCOUNTER (EMERGENCY)
Age: 41
Discharge: HOME | End: 2021-08-30
Payer: COMMERCIAL

## 2021-08-30 VITALS
DIASTOLIC BLOOD PRESSURE: 74 MMHG | RESPIRATION RATE: 18 BRPM | TEMPERATURE: 97.1 F | SYSTOLIC BLOOD PRESSURE: 138 MMHG | HEART RATE: 88 BPM | OXYGEN SATURATION: 98 %

## 2021-08-30 NOTE — ED NOTES
FIRST PROVIDER CONTACT ASSESSMENT NOTE                                                                                                Department of Emergency Medicine                                                      First Provider Note  21  2:40 AM EDT  NAME: Kee Power. : 1980  MRN: [id number]    Chief Complaint: Homeless (pt states he is paranoid and has not slept in 2 days because he is homeless and it's raining. states he just wants \"to go in the back and lay my head down\")      History of Present Illness:   Kee Gregory is a 37 y.o. male who presents to the ED for Patient states that he is feeling paranoid's not slept over the last couple of days because of being homeless asking if he could lay his head down somewhere in the back    Focused Physical Exam:  VS:    ED Triage Vitals   BP Temp Temp src Pulse Resp SpO2 Height Weight   21 0203 21 0145 -- 21 0145 21 0145 21 0145 -- --   138/74 97.1 °F (36.2 °C)  88 18 98 %          General: Alert and in no apparent distress. Heart regular rate and rhythm  Lungs clear    Medical History:  has a past medical history of Depression and Schizoaffective disorder ([address] Surgical History:  has a past surgical history that includes eye surgery (19 years ago). Social History:  reports that he has been smoking cigars and cigarettes. He has a 6.00 pack-year smoking history. He has never used smokeless tobacco. He reports current drug use. Frequency: 7.00 times per week. Drugs: Cocaine and Marijuana. He reports that he does not drink alcohol. Family History: family history includes Mental Illness in his mother; No Known Problems in his father; Substance Abuse in his mother.     Allergies: Chlorpheniramine-phenylephrine, Penicillins, and Rondec-d [chlophedianol-pseudoephedrine]     Initial Plan of Care:  Initiate Treatment-Testing, Proceed toTreatment Area When Bed Available for ED Attending/MLP to Continue Care    -------------------------------------------------END OF FIRST PROVIDER CONTACT ASSESSMENT NOTE--------------------------------------------------------  Electronically signed by CECILE Carroll   DD: 8/30/21     Tim Carroll  08/30/21 3014

## 2021-09-01 ENCOUNTER — HOSPITAL ENCOUNTER (EMERGENCY)
Age: 41
Discharge: HOME | End: 2021-09-01
Attending: EMERGENCY MEDICINE
Payer: COMMERCIAL

## 2021-09-01 VITALS
SYSTOLIC BLOOD PRESSURE: 108 MMHG | OXYGEN SATURATION: 98 % | WEIGHT: 140 LBS | HEIGHT: 71 IN | DIASTOLIC BLOOD PRESSURE: 66 MMHG | RESPIRATION RATE: 16 BRPM | TEMPERATURE: 97.3 F | BODY MASS INDEX: 19.6 KG/M2 | HEART RATE: 68 BPM

## 2021-09-01 DIAGNOSIS — F20.9 SCHIZOPHRENIA, UNSPECIFIED TYPE (HCC): Primary | ICD-10-CM

## 2021-09-01 LAB
ACETAMINOPHEN LEVEL: <5 MCG/ML (ref 10–30)
ALBUMIN SERPL-MCNC: 4 G/DL (ref 3.5–5.2)
ALP BLD-CCNC: 42 U/L (ref 40–129)
ALT SERPL-CCNC: 14 U/L (ref 0–40)
AMPHETAMINE SCREEN, URINE: NOT DETECTED
ANION GAP SERPL CALCULATED.3IONS-SCNC: 6 MMOL/L (ref 7–16)
AST SERPL-CCNC: 18 U/L (ref 0–39)
BARBITURATE SCREEN URINE: NOT DETECTED
BASOPHILS ABSOLUTE: 0.1 E9/L (ref 0–0.2)
BASOPHILS RELATIVE PERCENT: 1.9 % (ref 0–2)
BENZODIAZEPINE SCREEN, URINE: NOT DETECTED
BILIRUB SERPL-MCNC: 0.6 MG/DL (ref 0–1.2)
BUN BLDV-MCNC: 13 MG/DL (ref 6–20)
CALCIUM SERPL-MCNC: 9.1 MG/DL (ref 8.6–10.2)
CANNABINOID SCREEN URINE: POSITIVE
CHLORIDE BLD-SCNC: 108 MMOL/L (ref 98–107)
CO2: 29 MMOL/L (ref 22–29)
COCAINE METABOLITE SCREEN URINE: POSITIVE
CREAT SERPL-MCNC: 1.1 MG/DL (ref 0.7–1.2)
EOSINOPHILS ABSOLUTE: 0.35 E9/L (ref 0.05–0.5)
EOSINOPHILS RELATIVE PERCENT: 6.5 % (ref 0–6)
ETHANOL: <10 MG/DL (ref 0–0.08)
FENTANYL SCREEN, URINE: NOT DETECTED
GFR AFRICAN AMERICAN: >60
GFR NON-AFRICAN AMERICAN: >60 ML/MIN/1.73
GLUCOSE BLD-MCNC: 89 MG/DL (ref 74–99)
HCT VFR BLD CALC: 41.9 % (ref 37–54)
HEMOGLOBIN: 13.7 G/DL (ref 12.5–16.5)
IMMATURE GRANULOCYTES #: 0.01 E9/L
IMMATURE GRANULOCYTES %: 0.2 % (ref 0–5)
LYMPHOCYTES ABSOLUTE: 1.69 E9/L (ref 1.5–4)
LYMPHOCYTES RELATIVE PERCENT: 31.4 % (ref 20–42)
Lab: ABNORMAL
MCH RBC QN AUTO: 29.5 PG (ref 26–35)
MCHC RBC AUTO-ENTMCNC: 32.7 % (ref 32–34.5)
MCV RBC AUTO: 90.3 FL (ref 80–99.9)
METHADONE SCREEN, URINE: NOT DETECTED
MONOCYTES ABSOLUTE: 0.48 E9/L (ref 0.1–0.95)
MONOCYTES RELATIVE PERCENT: 8.9 % (ref 2–12)
NEUTROPHILS ABSOLUTE: 2.75 E9/L (ref 1.8–7.3)
NEUTROPHILS RELATIVE PERCENT: 51.1 % (ref 43–80)
OPIATE SCREEN URINE: POSITIVE
OXYCODONE URINE: NOT DETECTED
PDW BLD-RTO: 13.8 FL (ref 11.5–15)
PHENCYCLIDINE SCREEN URINE: NOT DETECTED
PLATELET # BLD: 336 E9/L (ref 130–450)
PMV BLD AUTO: 9.7 FL (ref 7–12)
POTASSIUM SERPL-SCNC: 3.9 MMOL/L (ref 3.5–5)
RBC # BLD: 4.64 E12/L (ref 3.8–5.8)
SALICYLATE, SERUM: <0.3 MG/DL (ref 0–30)
SODIUM BLD-SCNC: 143 MMOL/L (ref 132–146)
TOTAL PROTEIN: 6.6 G/DL (ref 6.4–8.3)
TRICYCLIC ANTIDEPRESSANTS SCREEN SERUM: NEGATIVE NG/ML
WBC # BLD: 5.4 E9/L (ref 4.5–11.5)

## 2021-09-01 PROCEDURE — 85025 COMPLETE CBC W/AUTO DIFF WBC: CPT

## 2021-09-01 PROCEDURE — 80143 DRUG ASSAY ACETAMINOPHEN: CPT

## 2021-09-01 PROCEDURE — 80053 COMPREHEN METABOLIC PANEL: CPT

## 2021-09-01 PROCEDURE — 80307 DRUG TEST PRSMV CHEM ANLYZR: CPT

## 2021-09-01 PROCEDURE — 80179 DRUG ASSAY SALICYLATE: CPT

## 2021-09-01 PROCEDURE — 99284 EMERGENCY DEPT VISIT MOD MDM: CPT

## 2021-09-01 PROCEDURE — 82077 ASSAY SPEC XCP UR&BREATH IA: CPT

## 2021-09-01 ASSESSMENT — PAIN DESCRIPTION - LOCATION: LOCATION: EYE

## 2021-09-01 ASSESSMENT — PAIN SCALES - GENERAL: PAINLEVEL_OUTOF10: 5

## 2021-09-01 ASSESSMENT — PAIN DESCRIPTION - PAIN TYPE: TYPE: ACUTE PAIN

## 2021-09-01 ASSESSMENT — PAIN DESCRIPTION - ORIENTATION: ORIENTATION: RIGHT

## 2021-09-01 NOTE — ED NOTES
2 bags of patient belongings  Labeled and placed in locker #28B containing, shoes, socks, t-shirt, shirt, hoodie.      Jannette Garsia  09/01/21 0029

## 2021-09-01 NOTE — ED NOTES
Emergency Department [facility] AN AFFILIATE OF [facility] Biopsychosocial Assessment Note     Chief Complaint:     Patient is a 45year old, male presenting to ED for increased paranoia and feeling that people are out to harm him. This is patient's baseline delusion. Patient SI/HI are typically exacerbated by drug use. This patient is well known to this SW and these are patient baseline behaviors.     Patient denies suicidal ideation. Patient denies homicidal ideation.     MSE: Patient is alert & oriented x 4. Patient mood is stable, affect flat. Patient thought process is clear, speech pressured at times. Patient has some baseline auditory hallucinations. No reports of visual hallucinations.     Clinical Summary/History:      Patient has a mental health hx of schizoaffective disorder, non-compliant with outpatient mental health treatment. Patient last psychiatric hospitalization was 8/9/21 for schizoaffective disorder on [facility].     Patient needs to follow up with an outpatient provider to regularly administer his psychiatric medications.     Ok sleep, ok appetite, no reports of hopelessness/helplessness.     Patient has a hx of polysubstance abuse.     Gender  [x]?????? Male []?????? Female []?????? Transgender  []?????? Other     Sexual Orientation    [x]?????? Heterosexual []?????? Homosexual []?????? Bisexual []?????? Other     Suicidal Behavioral: CSSR-S Complete. [x]?????? Reports:               [x]?????? Past [x]?????? Present   []?????? Denies     Homicidal/ Violent Behavior  []?????? Reports:              []?????? Past []?????? Present   [x]?????? Denies      Hallucinations/Delusions   [x]?????? Reports:  Auditory hallucinations  []?????? Denies      Substance Use/Alcohol Use/Addiction: SBIRT Screen Complete.    [x]?????? Reports:  Crack cocaine & cannabis  []?????? Denies      Trauma History  []?????? Reports:  []?????? Denies      Collateral Information:         Level of Care/Disposition Plan  [x]?????? Home: [x]?????? Outpatient Provider:   []?????? Crisis Unit:   []?????? Inpatient Psychiatric Unit:  []?????? Other:         Patient is not currently presenting with any acute psychiatric symptoms that would warrant inpatient or crisis unit referral. Patient symptoms can be effectively managed with an outpatient provider.      FRANCISCO Rubin, [place]  09/01/21 0347
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
## 2021-09-02 ENCOUNTER — APPOINTMENT (OUTPATIENT)
Dept: CT IMAGING | Age: 41
End: 2021-09-02
Payer: COMMERCIAL

## 2021-09-02 ENCOUNTER — HOSPITAL ENCOUNTER (EMERGENCY)
Age: 41
Discharge: HOME | End: 2021-09-02
Payer: COMMERCIAL

## 2021-09-02 VITALS
HEART RATE: 83 BPM | BODY MASS INDEX: 19.6 KG/M2 | OXYGEN SATURATION: 97 % | TEMPERATURE: 98.2 F | WEIGHT: 140 LBS | RESPIRATION RATE: 18 BRPM | HEIGHT: 71 IN | SYSTOLIC BLOOD PRESSURE: 118 MMHG | DIASTOLIC BLOOD PRESSURE: 84 MMHG
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
## 2021-09-02 DIAGNOSIS — S02.40DA CLOSED FRACTURE OF LEFT SIDE OF MAXILLA, INITIAL ENCOUNTER (HCC): Primary | ICD-10-CM

## 2021-09-02 DIAGNOSIS — S09.90XA CLOSED HEAD INJURY, INITIAL ENCOUNTER: ICD-10-CM

## 2021-09-02 DIAGNOSIS — S00.83XA CONTUSION OF FACE, INITIAL ENCOUNTER: ICD-10-CM

## 2021-09-02 PROCEDURE — 70486 CT MAXILLOFACIAL W/O DYE: CPT

## 2021-09-02 PROCEDURE — 99282 EMERGENCY DEPT VISIT SF MDM: CPT

## 2021-09-02 PROCEDURE — 70450 CT HEAD/BRAIN W/O DYE: CPT

## 2021-09-02 PROCEDURE — 72125 CT NECK SPINE W/O DYE: CPT

## 2021-09-02 RX ORDER — NAPROXEN 500 MG/1
500 TABLET ORAL 2 TIMES DAILY PRN
Qty: 28 TABLET | Refills: 0 | Status: SHIPPED | OUTPATIENT
Start: 2021-09-02 | End: 2021-09-20

## 2021-09-02 ASSESSMENT — PAIN DESCRIPTION - DESCRIPTORS: DESCRIPTORS: DISCOMFORT

## 2021-09-02 ASSESSMENT — PAIN DESCRIPTION - PAIN TYPE: TYPE: ACUTE PAIN

## 2021-09-02 ASSESSMENT — PAIN DESCRIPTION - LOCATION: LOCATION: HEAD

## 2021-09-02 ASSESSMENT — PAIN DESCRIPTION - FREQUENCY: FREQUENCY: CONTINUOUS

## 2021-09-02 ASSESSMENT — PAIN SCALES - WONG BAKER: WONGBAKER_NUMERICALRESPONSE: 2
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
## 2021-09-03 ENCOUNTER — HOSPITAL ENCOUNTER (EMERGENCY)
Age: 41
Discharge: HOME | End: 2021-09-03
Attending: EMERGENCY MEDICINE
Payer: COMMERCIAL

## 2021-09-03 VITALS
DIASTOLIC BLOOD PRESSURE: 81 MMHG | RESPIRATION RATE: 18 BRPM | HEART RATE: 73 BPM | BODY MASS INDEX: 19.6 KG/M2 | OXYGEN SATURATION: 99 % | HEIGHT: 71 IN | WEIGHT: 140 LBS | TEMPERATURE: 97 F | SYSTOLIC BLOOD PRESSURE: 131 MMHG

## 2021-09-03 DIAGNOSIS — F22 PARANOIA (HCC): Primary | ICD-10-CM

## 2021-09-03 LAB
ACETAMINOPHEN LEVEL: <5 MCG/ML (ref 10–30)
ALBUMIN SERPL-MCNC: 3.7 G/DL (ref 3.5–5.2)
ALP BLD-CCNC: 38 U/L (ref 40–129)
ALT SERPL-CCNC: 13 U/L (ref 0–40)
AMPHETAMINE SCREEN, URINE: NOT DETECTED
ANION GAP SERPL CALCULATED.3IONS-SCNC: 11 MMOL/L (ref 7–16)
AST SERPL-CCNC: 14 U/L (ref 0–39)
BARBITURATE SCREEN URINE: NOT DETECTED
BASOPHILS ABSOLUTE: 0.07 E9/L (ref 0–0.2)
BASOPHILS RELATIVE PERCENT: 1.7 % (ref 0–2)
BENZODIAZEPINE SCREEN, URINE: NOT DETECTED
BILIRUB SERPL-MCNC: 0.3 MG/DL (ref 0–1.2)
BUN BLDV-MCNC: 12 MG/DL (ref 6–20)
CALCIUM SERPL-MCNC: 8.7 MG/DL (ref 8.6–10.2)
CANNABINOID SCREEN URINE: NOT DETECTED
CHLORIDE BLD-SCNC: 105 MMOL/L (ref 98–107)
CO2: 24 MMOL/L (ref 22–29)
COCAINE METABOLITE SCREEN URINE: POSITIVE
CREAT SERPL-MCNC: 1 MG/DL (ref 0.7–1.2)
EKG ATRIAL RATE: 70 BPM
EKG P AXIS: 82 DEGREES
EKG P-R INTERVAL: 194 MS
EKG Q-T INTERVAL: 394 MS
EKG QRS DURATION: 100 MS
EKG QTC CALCULATION (BAZETT): 425 MS
EKG R AXIS: 88 DEGREES
EKG T AXIS: 77 DEGREES
EKG VENTRICULAR RATE: 70 BPM
EOSINOPHILS ABSOLUTE: 0.45 E9/L (ref 0.05–0.5)
EOSINOPHILS RELATIVE PERCENT: 10.9 % (ref 0–6)
ETHANOL: <10 MG/DL (ref 0–0.08)
FENTANYL SCREEN, URINE: NOT DETECTED
GFR AFRICAN AMERICAN: >60
GFR NON-AFRICAN AMERICAN: >60 ML/MIN/1.73
GLUCOSE BLD-MCNC: 104 MG/DL (ref 74–99)
HCT VFR BLD CALC: 40.1 % (ref 37–54)
HEMOGLOBIN: 13.3 G/DL (ref 12.5–16.5)
IMMATURE GRANULOCYTES #: 0 E9/L
IMMATURE GRANULOCYTES %: 0 % (ref 0–5)
LYMPHOCYTES ABSOLUTE: 1.38 E9/L (ref 1.5–4)
LYMPHOCYTES RELATIVE PERCENT: 33.4 % (ref 20–42)
Lab: ABNORMAL
MCH RBC QN AUTO: 30 PG (ref 26–35)
MCHC RBC AUTO-ENTMCNC: 33.2 % (ref 32–34.5)
MCV RBC AUTO: 90.5 FL (ref 80–99.9)
METHADONE SCREEN, URINE: NOT DETECTED
MONOCYTES ABSOLUTE: 0.41 E9/L (ref 0.1–0.95)
MONOCYTES RELATIVE PERCENT: 9.9 % (ref 2–12)
NEUTROPHILS ABSOLUTE: 1.82 E9/L (ref 1.8–7.3)
NEUTROPHILS RELATIVE PERCENT: 44.1 % (ref 43–80)
OPIATE SCREEN URINE: NOT DETECTED
OXYCODONE URINE: NOT DETECTED
PDW BLD-RTO: 13.6 FL (ref 11.5–15)
PHENCYCLIDINE SCREEN URINE: NOT DETECTED
PLATELET # BLD: 292 E9/L (ref 130–450)
PMV BLD AUTO: 9.2 FL (ref 7–12)
POTASSIUM SERPL-SCNC: 3.4 MMOL/L (ref 3.5–5)
RBC # BLD: 4.43 E12/L (ref 3.8–5.8)
SALICYLATE, SERUM: <0.3 MG/DL (ref 0–30)
SODIUM BLD-SCNC: 140 MMOL/L (ref 132–146)
TOTAL PROTEIN: 5.9 G/DL (ref 6.4–8.3)
TRICYCLIC ANTIDEPRESSANTS SCREEN SERUM: NEGATIVE NG/ML
WBC # BLD: 4.1 E9/L (ref 4.5–11.5)

## 2021-09-03 PROCEDURE — 82077 ASSAY SPEC XCP UR&BREATH IA: CPT

## 2021-09-03 PROCEDURE — 80179 DRUG ASSAY SALICYLATE: CPT

## 2021-09-03 PROCEDURE — 85025 COMPLETE CBC W/AUTO DIFF WBC: CPT

## 2021-09-03 PROCEDURE — 99284 EMERGENCY DEPT VISIT MOD MDM: CPT

## 2021-09-03 PROCEDURE — 80307 DRUG TEST PRSMV CHEM ANLYZR: CPT

## 2021-09-03 PROCEDURE — 80143 DRUG ASSAY ACETAMINOPHEN: CPT

## 2021-09-03 PROCEDURE — 80053 COMPREHEN METABOLIC PANEL: CPT

## 2021-09-03 NOTE — ED NOTES
Patient stating he doesn't want to leave.  Tatianna Lopez went to bedside to speak to patient regarding his need to follow up with outpatient treatment as he has been instructed to multiple times. Patient argued with  stating he doesn't want to go anywhere he wants treatment here.  explained that he has been referred to [facility] multiple times and they have walk in outpatient services at 11am. Patient given discharge instructions which he refused to sign. All patient belongings returned to patient by behavioral health tech. Patient refused to leave. Patient escorted out by [facility].       Sandeep Aragon, RN  09/03/21 8303

## 2021-09-03 NOTE — ED NOTES
SW placed phone call to [employer] [phone] and spoke with [employer] Notified that patient plans to attend a walk-in appointment today to get established with outpatient services.      FRANCISCO Arango, [place]  09/03/21 8099

## 2021-09-03 NOTE — ED NOTES
Emergency Department [facility] AN [facility]FFILIATE OF [facility] Biopsychosocial Assessment Note     Chief Complaint:      Patient is a 45year old, male presenting to ED for auditory hallucinations and paranoia about being arrested. This patient is well known to this SW and these are patient baseline behaviors. Typically worsened by polysubstance abuse.     Patient denies suicidal ideation. Patient denies homicidal ideation.     MSE: Patient is alert & oriented x 4. Patient mood is stable, affect flat. Patient thought process is clear, speech pressured at times. Patient has some baseline auditory hallucinations. No reports of visual hallucinations.     Clinical Summary/History:      Patient has a mental health hx of schizoaffective disorder, non-compliant with outpatient mental health treatment. Patient last psychiatric hospitalization was 8/9/21 for schizoaffective disorder on [facility].     Patient needs to follow up with an outpatient provider to regularly administer his psychiatric medications.     Ok sleep, ok appetite, no reports of hopelessness/helplessness.     Patient has a hx of polysubstance abuse.     Gender  [x]??????? Male []??????? Female []??????? Transgender  []??????? Other     Sexual Orientation    [x]??????? Heterosexual []??????? Homosexual []??????? Bisexual []??????? Other     Suicidal Behavioral: CSSR-S Complete. [x]??????? Reports:               [x]??????? Past [x]??????? Present   []??????? Denies     Homicidal/ Violent Behavior  []??????? Reports:              []??????? Past []??????? Present   [x]??????? Denies      Hallucinations/Delusions   [x]??????? Reports:  Auditory hallucinations  []??????? Denies      Substance Use/Alcohol Use/Addiction: SBIRT Screen Complete.    [x]??????? Reports:  Crack cocaine & cannabis  []??????? Denies      Trauma History  []??????? Reports:  []??????? Denies      Collateral Information:         Level of Care/Disposition Plan  [x]??????? Home:   [x]??????? Outpatient Provider: []??????? Crisis Unit:   []??????? Inpatient Psychiatric Unit:  []??????? Other:         Patient is not currently presenting with any acute psychiatric symptoms that would warrant inpatient or crisis unit referral. Patient symptoms can be effectively managed with an outpatient provider.      FRANCISCO Stephens, MAJOR  09/03/21 0894

## 2021-09-03 NOTE — ED NOTES
Emergency Department [facility] AN AFFILIATE OF [facility] Biopsychosocial Assessment Note    Chief Complaint:   Pt presented into the ED for Pt states he is hearing voices and worried about being arrested. denies SI/HI. MSE:      Clinical Summary/History:   Pt is a 38 yo male who presented into the ED as a walk in due to hearing voices and being worried about being arrested. Pt last inpatient [address] hospitalization was on 8/9/2021 at [facility]. Gender  [x] Male [] Female [] Transgender  [] Other    Sexual Orientation    [x] Heterosexual [] Homosexual [] Bisexual [] Other    Suicidal Behavioral: CSSR-S Complete. [] Reports:    [] Past [] Present   [] Denies    Homicidal/ Violent Behavior  [] Reports:   [] Past [] Present   [] Denies     Hallucinations/Delusions   [x] Reports: auditory   [] Denies     Substance Use/Alcohol Use/Addiction: SBIRT Screen Complete.    [] Reports:   [] Denies     Trauma History  [] Reports:  [] Denies     Collateral Information:       Level of Care/Disposition Plan  [] Home:   [] Outpatient Provider:   [] Crisis Unit:   [] Inpatient Psychiatric Unit:  [] Other:

## 2021-09-03 NOTE — ED NOTES
FIRST PROVIDER CONTACT ASSESSMENT NOTE                                                                                                Department of Emergency Medicine                                                      First Provider Note  9/3/21  12:41 AM EDT  NAME: Dusty Pantoja. : 1980  MRN: [id number]    Chief Complaint: Hallucinations (Pt states he is hearing voices and worried about being arrested. denies SI/HI. )      History of Present Illness:   Dusty Trinidad is a 37 y.o. male who presents to the ED for auditory hallucinations. Patient reports that he is having auditory hallucinations, states that he is worried about being arrested. He is denying any homicidal or suicidal thoughts. Denies any depression was actually just here 1 day prior. He does report he is homeless. Focused Physical Exam:  VS:    ED Triage Vitals   BP Temp Temp Source Pulse Resp SpO2 Height Weight   21 0027 21 0024 21 0024 21 0024 21 0024 21 0024 21 0026 21 0026   122/76 97.2 °F (36.2 °C) Temporal 67 16 93 % 5' 11\" (1.803 m) 140 lb (63.5 kg)        General: Alert and in no apparent distress. Medical History:  has a past medical history of Depression and Schizoaffective disorder ([facility] [address] Surgical History:  has a past surgical history that includes eye surgery (19 years ago). Social History:  reports that he has been smoking cigars and cigarettes. He has a 6.00 pack-year smoking history. He has never used smokeless tobacco. He reports current drug use. Frequency: 7.00 times per week. Drugs: Cocaine and Marijuana. He reports that he does not drink alcohol. Family History: family history includes Mental Illness in his mother; No Known Problems in his father; Substance Abuse in his mother.     Allergies: Chlorpheniramine-phenylephrine, Penicillins, and Rondec-d [chlophedianol-pseudoephedrine]     Initial Plan of Care:  Initiate Treatment-Testing, Proceed toTreatment Area When Bed Available for ED Attending/MLP to Continue Care    -------------------------------------------------END OF FIRST PROVIDER CONTACT ASSESSMENT NOTE--------------------------------------------------------  Electronically signed by Verner Rotunda, APRN - CNP   DD: 9/3/21         Verner Rotunda, APRN - CNP  09/03/21 0041

## 2021-09-03 NOTE — ED PROVIDER NOTES
HPI:  9/3/21,   Time: 3:12 AM EDT       Ghassan Bautista. is a 37 y.o. male presenting to the ED for paranoia and hallucinations, beginning 1 day ago. The complaint has been persistent, moderate in severity, and worsened by nothing. The patient states he has been on his shot but has not been taking his oral medications. He states over the last day has been hearing voices as well as feeling paranoid. He states he feels as if people are out to get him and is worried about getting arrested. Denies any SI or HI. No nausea vomiting. No chest pain shortness of breath. Review of Systems:   Pertinent positives and negatives are stated within HPI, all other systems reviewed and are negative.          --------------------------------------------- PAST HISTORY ---------------------------------------------  Past Medical History:  has a past medical history of Depression and Schizoaffective disorder ([facility] [address] Past Surgical History:  has a past surgical history that includes eye surgery (19 years ago). Social History:  reports that he has been smoking cigars and cigarettes. He has a 6.00 pack-year smoking history. He has never used smokeless tobacco. He reports current drug use. Frequency: 7.00 times per week. Drugs: Cocaine and Marijuana. He reports that he does not drink alcohol. Family History: family history includes Mental Illness in his mother; No Known Problems in his father; Substance Abuse in his mother. The patients home medications have been reviewed.     Allergies: Chlorpheniramine-phenylephrine, Penicillins, and Rondec-d [chlophedianol-pseudoephedrine]        ---------------------------------------------------PHYSICAL EXAM--------------------------------------    Constitutional/General: Alert and oriented x3, well appearing, non toxic in NAD  Head: Normocephalic and atraumatic  Eyes: PERRL, EOMI, conjunctive normal, sclera non icteric  Mouth: Oropharynx clear, handling secretions, no trismus, no asymmetry of the posterior oropharynx or uvular edema  Neck: Supple, full ROM, non tender to palpation in the midline, no stridor, no crepitus, no meningeal signs  Respiratory: Lungs clear to auscultation bilaterally, no wheezes, rales, or rhonchi. Not in respiratory distress  Cardiovascular:  Regular rate. Regular rhythm. No murmurs, gallops, or rubs. 2+ distal pulses  GI:  Abdomen Soft, Non tender, Non distended. +BS. No organomegaly, no palpable masses,  No rebound, guarding, or rigidity. Musculoskeletal: Moves all extremities x 4. Warm and well perfused, no clubbing, cyanosis, or edema. Capillary refill <3 seconds  Integument: skin warm and dry. No rashes. Neurologic: GCS 15, no focal deficits, symmetric strength 5/5 in the upper and lower extremities bilaterally  Psychiatric: Depressed affect, poor judgment, poor thought process, paranoia    -------------------------------------------------- RESULTS -------------------------------------------------  I have personally reviewed all laboratory and imaging results for this patient. Results are listed below.      LABS:  Results for orders placed or performed during the hospital encounter of 09/03/21   Urine Drug Screen   Result Value Ref Range    Amphetamine Screen, Urine NOT DETECTED Negative <1000 ng/mL    Barbiturate Screen, Ur NOT DETECTED Negative < 200 ng/mL    Benzodiazepine Screen, Urine NOT DETECTED Negative < 200 ng/mL    Cannabinoid Scrn, Ur NOT DETECTED Negative < 50ng/mL    Cocaine Metabolite Screen, Urine POSITIVE (A) Negative < 300 ng/mL    Opiate Scrn, Ur NOT DETECTED Negative < 300ng/mL    PCP Screen, Urine NOT DETECTED Negative < 25 ng/mL    Methadone Screen, Urine NOT DETECTED Negative <300 ng/mL    Oxycodone Urine NOT DETECTED Negative <100 ng/mL    FENTANYL SCREEN, URINE NOT DETECTED Negative <1 ng/mL    Drug Screen Comment: see below    Serum Drug Screen   Result Value Ref Range    Ethanol Lvl <10 mg/dL    Acetaminophen Level <5.0 (L) 10.0 - 97.7 mcg/mL    Salicylate, Serum <7.5 0.0 - 30.0 mg/dL    TCA Scrn NEGATIVE Cutoff:300 ng/mL   CBC Auto Differential   Result Value Ref Range    WBC 4.1 (L) 4.5 - 11.5 E9/L    RBC 4.43 3.80 - 5.80 E12/L    Hemoglobin 13.3 12.5 - 16.5 g/dL    Hematocrit 40.1 37.0 - 54.0 %    MCV 90.5 80.0 - 99.9 fL    MCH 30.0 26.0 - 35.0 pg    MCHC 33.2 32.0 - 34.5 %    RDW 13.6 11.5 - 15.0 fL    Platelets 477 492 - 055 E9/L    MPV 9.2 7.0 - 12.0 fL    Neutrophils % 44.1 43.0 - 80.0 %    Immature Granulocytes % 0.0 0.0 - 5.0 %    Lymphocytes % 33.4 20.0 - 42.0 %    Monocytes % 9.9 2.0 - 12.0 %    Eosinophils % 10.9 (H) 0.0 - 6.0 %    Basophils % 1.7 0.0 - 2.0 %    Neutrophils Absolute 1.82 1.80 - 7.30 E9/L    Immature Granulocytes # 0.00 E9/L    Lymphocytes Absolute 1.38 (L) 1.50 - 4.00 E9/L    Monocytes Absolute 0.41 0.10 - 0.95 E9/L    Eosinophils Absolute 0.45 0.05 - 0.50 E9/L    Basophils Absolute 0.07 0.00 - 0.20 E9/L   Comprehensive Metabolic Panel   Result Value Ref Range    Sodium 140 132 - 146 mmol/L    Potassium 3.4 (L) 3.5 - 5.0 mmol/L    Chloride 105 98 - 107 mmol/L    CO2 24 22 - 29 mmol/L    Anion Gap 11 7 - 16 mmol/L    Glucose 104 (H) 74 - 99 mg/dL    BUN 12 6 - 20 mg/dL    CREATININE 1.0 0.7 - 1.2 mg/dL    GFR Non-African American >60 >=60 mL/min/1.73    GFR African American >60     Calcium 8.7 8.6 - 10.2 mg/dL    Total Protein 5.9 (L) 6.4 - 8.3 g/dL    Albumin 3.7 3.5 - 5.2 g/dL    Total Bilirubin 0.3 0.0 - 1.2 mg/dL    Alkaline Phosphatase 38 (L) 40 - 129 U/L    ALT 13 0 - 40 U/L    AST 14 0 - 39 U/L   EKG 12 Lead   Result Value Ref Range    Ventricular Rate 70 BPM    Atrial Rate 70 BPM    P-R Interval 194 ms    QRS Duration 100 ms    Q-T Interval 394 ms    QTc Calculation (Bazett) 425 ms    P Axis 82 degrees    R Axis 88 degrees    T Axis 77 degrees       RADIOLOGY:  Interpreted by Radiologist.  No orders to display       EKG: This EKG is signed and interpreted by the EP.     EKG shows normal sinus rhythm with sinus arrhythmia. Normal QRS. Normal intervals. No STEMI.    ------------------------- NURSING NOTES AND VITALS REVIEWED ---------------------------   The nursing notes within the ED encounter and vital signs as below have been reviewed by myself. /76   Pulse 67   Temp 97.2 °F (36.2 °C) (Temporal)   Resp 16   Ht 5' 11\" (1.803 m)   Wt 140 lb (63.5 kg)   SpO2 93%   BMI 19.53 kg/m²   Oxygen Saturation Interpretation: Normal    The patients available past medical records and past encounters were reviewed. ------------------------------ ED COURSE/MEDICAL DECISION MAKING----------------------  Medications - No data to display      ED COURSE:       Medical Decision Making: This is a 56-year-old male who presents to the ED for paranoia and hallucinations. Patient underwent laboratory work-up which showed a normal CBC except for white count of 4.1. Normal chemistry except for potassium of 3.4. EKG showed no ischemic findings. Serum drug screen negative. Patient was positive for cocaine. Patient medically cleared at this time.  consulted. Disposition pending  evaluation. I, Dr. Paola Peters, am the primary provider for this encounter    This patient's ED course included: a personal history and physicial examination, re-evaluation prior to disposition and multiple bedside re-evaluations    This patient has remained hemodynamically stable during their ED course. Re-Evaluations:             Re-evaluation. Patients symptoms show no change        Counseling: The emergency provider has spoken with the patient and discussed todays results, in addition to providing specific details for the plan of care and counseling regarding the diagnosis and prognosis. Questions are answered at this time and they are agreeable with the plan.       --------------------------------- IMPRESSION AND DISPOSITION ---------------------------------    IMPRESSION  1.  Paranoia ([facility] DISPOSITION  Disposition: Per   Patient condition is stable    NOTE: This report was transcribed using voice recognition software.  Every effort was made to ensure accuracy; however, inadvertent computerized transcription errors may be present        Susan Steiner DO  09/03/21 5746

## 2021-09-08 ENCOUNTER — HOSPITAL ENCOUNTER (EMERGENCY)
Age: 41
Discharge: LEFT BEFORE DISCHARGE | End: 2021-09-08
Payer: COMMERCIAL

## 2021-09-08 VITALS — HEART RATE: 92 BPM | OXYGEN SATURATION: 90 %

## 2021-09-08 NOTE — ED NOTES
Nurse in section G stated that patient could not come to room for 15 minutes, triage nurse not aware and was with another patient so pivot nurse placed pt in waiting room, pt then eloped from emergency department.       Karon Zelaya RN  09/08/21 7648

## 2021-09-09 ENCOUNTER — HOSPITAL ENCOUNTER (EMERGENCY)
Age: 41
Discharge: HOME | End: 2021-09-09
Attending: EMERGENCY MEDICINE
Payer: COMMERCIAL

## 2021-09-09 VITALS
BODY MASS INDEX: 19.6 KG/M2 | OXYGEN SATURATION: 98 % | RESPIRATION RATE: 16 BRPM | WEIGHT: 140 LBS | TEMPERATURE: 97.2 F | DIASTOLIC BLOOD PRESSURE: 70 MMHG | HEART RATE: 88 BPM | HEIGHT: 71 IN | SYSTOLIC BLOOD PRESSURE: 110 MMHG

## 2021-09-09 DIAGNOSIS — F39 MOOD DISORDER (HCC): Primary | ICD-10-CM

## 2021-09-09 LAB
ACETAMINOPHEN LEVEL: <5 MCG/ML (ref 10–30)
ALBUMIN SERPL-MCNC: 3.8 G/DL (ref 3.5–5.2)
ALP BLD-CCNC: 42 U/L (ref 40–129)
ALT SERPL-CCNC: 14 U/L (ref 0–40)
AMPHETAMINE SCREEN, URINE: NOT DETECTED
ANION GAP SERPL CALCULATED.3IONS-SCNC: 5 MMOL/L (ref 7–16)
AST SERPL-CCNC: 21 U/L (ref 0–39)
BARBITURATE SCREEN URINE: NOT DETECTED
BASOPHILS ABSOLUTE: 0.1 E9/L (ref 0–0.2)
BASOPHILS RELATIVE PERCENT: 2.4 % (ref 0–2)
BENZODIAZEPINE SCREEN, URINE: NOT DETECTED
BILIRUB SERPL-MCNC: 0.4 MG/DL (ref 0–1.2)
BUN BLDV-MCNC: 10 MG/DL (ref 6–20)
CALCIUM SERPL-MCNC: 8.4 MG/DL (ref 8.6–10.2)
CANNABINOID SCREEN URINE: POSITIVE
CHLORIDE BLD-SCNC: 104 MMOL/L (ref 98–107)
CO2: 31 MMOL/L (ref 22–29)
COCAINE METABOLITE SCREEN URINE: POSITIVE
CREAT SERPL-MCNC: 1.1 MG/DL (ref 0.7–1.2)
EOSINOPHILS ABSOLUTE: 0.56 E9/L (ref 0.05–0.5)
EOSINOPHILS RELATIVE PERCENT: 13.2 % (ref 0–6)
ETHANOL: <10 MG/DL (ref 0–0.08)
FENTANYL SCREEN, URINE: NOT DETECTED
GFR AFRICAN AMERICAN: >60
GFR NON-AFRICAN AMERICAN: >60 ML/MIN/1.73
GLUCOSE BLD-MCNC: 94 MG/DL (ref 74–99)
HCT VFR BLD CALC: 40.9 % (ref 37–54)
HEMOGLOBIN: 13.7 G/DL (ref 12.5–16.5)
IMMATURE GRANULOCYTES #: 0 E9/L
IMMATURE GRANULOCYTES %: 0 % (ref 0–5)
LYMPHOCYTES ABSOLUTE: 1.44 E9/L (ref 1.5–4)
LYMPHOCYTES RELATIVE PERCENT: 34 % (ref 20–42)
Lab: ABNORMAL
MCH RBC QN AUTO: 30.2 PG (ref 26–35)
MCHC RBC AUTO-ENTMCNC: 33.5 % (ref 32–34.5)
MCV RBC AUTO: 90.3 FL (ref 80–99.9)
METHADONE SCREEN, URINE: NOT DETECTED
MONOCYTES ABSOLUTE: 0.39 E9/L (ref 0.1–0.95)
MONOCYTES RELATIVE PERCENT: 9.2 % (ref 2–12)
NEUTROPHILS ABSOLUTE: 1.74 E9/L (ref 1.8–7.3)
NEUTROPHILS RELATIVE PERCENT: 41.2 % (ref 43–80)
OPIATE SCREEN URINE: NOT DETECTED
OXYCODONE URINE: NOT DETECTED
PDW BLD-RTO: 13.5 FL (ref 11.5–15)
PHENCYCLIDINE SCREEN URINE: NOT DETECTED
PLATELET # BLD: 297 E9/L (ref 130–450)
PMV BLD AUTO: 9.7 FL (ref 7–12)
POTASSIUM SERPL-SCNC: 3.8 MMOL/L (ref 3.5–5)
RBC # BLD: 4.53 E12/L (ref 3.8–5.8)
SALICYLATE, SERUM: <0.3 MG/DL (ref 0–30)
SODIUM BLD-SCNC: 140 MMOL/L (ref 132–146)
TOTAL PROTEIN: 6 G/DL (ref 6.4–8.3)
TRICYCLIC ANTIDEPRESSANTS SCREEN SERUM: NEGATIVE NG/ML
WBC # BLD: 4.2 E9/L (ref 4.5–11.5)

## 2021-09-09 PROCEDURE — 80179 DRUG ASSAY SALICYLATE: CPT

## 2021-09-09 PROCEDURE — 99285 EMERGENCY DEPT VISIT HI MDM: CPT

## 2021-09-09 PROCEDURE — 80053 COMPREHEN METABOLIC PANEL: CPT

## 2021-09-09 PROCEDURE — 85025 COMPLETE CBC W/AUTO DIFF WBC: CPT

## 2021-09-09 PROCEDURE — 82077 ASSAY SPEC XCP UR&BREATH IA: CPT

## 2021-09-09 PROCEDURE — 80143 DRUG ASSAY ACETAMINOPHEN: CPT

## 2021-09-09 PROCEDURE — 80307 DRUG TEST PRSMV CHEM ANLYZR: CPT

## 2021-09-09 NOTE — ED NOTES
Informed by  that lab called and stated that they are looking for the serum drug tube.       Susan Rice RN  09/09/21 1795

## 2021-09-09 NOTE — ED NOTES
Pt awakened for discharge cont to deny si/hi/hallucinations reports needing meds informed pt he was to present self to [address] reports he \"just didn't\" go states will go today or tomorrow calm and cooperative and agreeable to discharge.   Contracts for safety     Faye Silvestre RN  09/09/21 8521

## 2021-09-09 NOTE — ED NOTES
Patient brought back to room # 30. He told this BHT that ,\"I am in love with a woman called Courtney Givens and I'm not even looking at your type or anything about you\". This BHT reminded patient that I was here in a professional role. Patient went on to say, \"What do you think I'm studying\" and proceeded to read me my Elvia Guzman. Patient also reported that he needs on his \"meds\" and wants to be admitted to do that. Patient was condescending with this BHT and repeatedly spoke to me about doing my job and asking if he had to go to [employer] to get what he wanted while he was being asked to change. Patient used vulgarity calling this BHT a \"bitch\".       Lis Garsia  09/09/21 8718

## 2021-09-09 NOTE — ED NOTES
[facility] SW attempted multiple times to wake Pt up. Put Pt's bed in upright position as far as it would go, Pt would not open eyes or answer any questions. This SW believes Pt is not answering any questions. This Pt has been at the facility multiple times. This SW has assessed this Pt multiple times. Pt has given this SW a hard time multiple times in the past.    Pt was here on 09/03/2021 and appt was arranged for him to go to walk in services at [facility] to help arrange to get him on medications. Pt was inpatient at this facility one month ago today and was put on medications. [facility] SW will pass info onto oncoming SW to continue assessment.      FRANCISCO Montelongo, Dipika Boone  09/09/21 1094

## 2021-09-09 NOTE — ED NOTES
Patient in hospital gown without ties and pants. All patient belongings in 3 labeled belongings bags and 1 suitcase that patient brought to ED. Optio label applied to suitcase and all 3 bags and suitcase placed in locker 26 (which is the available locker at this time) per protocol.       Juliane Clark RN  09/09/21 7162

## 2021-09-09 NOTE — ED NOTES
Emergency Department [facility] AN AFFILIATE OF [facility] Biopsychosocial Assessment Note     Chief Complaint:      Patient is a 45year old, male presenting to ED for psychiatric evaluation and requesting to be placed back on his oral medication. Patient has a significant hx of non-compliance. Patient was referred to the walk-in clinic at [facility] during his last ED presentation. Patient did not attend the clinic, despite requesting SW call ahead to notify them.     This patient is well known to this SW and these are patient baseline behaviors. Typically worsened by polysubstance abuse & homelessness.     Patient denies suicidal ideation. Patient denies homicidal ideation.     MSE: Patient is alert & oriented x 4. Patient mood is stable, affect flat. Patient thought process is clear, speech pressured at times. Patient has some baseline auditory hallucinations but reports none currently. No reports of visual hallucinations.     Clinical Summary/History:      Patient has a mental health hx of schizoaffective disorder, non-compliant with outpatient mental health treatment. Patient last psychiatric hospitalization was 8/9/21 for schizoaffective disorder on [facility].     Patient needs to follow up with an outpatient provider to regularly administer his psychiatric medications.     Ok sleep, ok appetite, no reports of hopelessness/helplessness.     Patient has a hx of polysubstance abuse.     Gender  [x]???????? Male []???????? Female []???????? Transgender  []???????? Other     Sexual Orientation    [x]???????? Heterosexual []???????? Homosexual []???????? Bisexual []???????? Other     Suicidal Behavioral: CSSR-S Complete.   [x]???????? Reports:               []???????? Past []???????? Present   [x]???????? Denies     Homicidal/ Violent Behavior  []???????? Reports:              []???????? Past []???????? Present   [x]???????? Denies      Hallucinations/Delusions   []???????? Reports:   [x]???????? Denies      Substance Use/Alcohol Use/Addiction: SBIRT Screen Complete. [x]???????? Reports:  Crack cocaine & cannabis  []???????? Denies      Trauma History  []???????? Reports:  []???????? Denies      Collateral Information:         Level of Care/Disposition Plan  [x]???????? Home:   [x]???????? Outpatient Provider:   []???????? Crisis Unit:   []???????? Inpatient Psychiatric Unit:  []???????? Other:         Patient is not currently presenting with any acute psychiatric symptoms that would warrant inpatient or crisis unit referral. Patient symptoms can be effectively managed with an outpatient provider.      FRANCISCO Wallis, DERECKW  09/09/21 0263

## 2021-09-09 NOTE — ED PROVIDER NOTES
HPI:  9/9/21, Time: 4:15 AM EDT         Dusty Pantoja. is a 37 y.o. male presenting to the ED for psychiatric evaluation. Patient states he wants to get back on his medications. Is been off of them for a while. He states he feels may be admitted for. He denies any suicidal or homicidal ideation. He just wants help. Denies any hallucinations. He denies any other symptoms or complaints. Review of Systems:   A complete review of systems was performed and pertinent positives and negatives are stated within HPI, all other systems reviewed and are negative.          --------------------------------------------- PAST HISTORY ---------------------------------------------  Past Medical History:  has a past medical history of Depression and Schizoaffective disorder ([facility] [address] Past Surgical History:  has a past surgical history that includes eye surgery (19 years ago). Social History:  reports that he has been smoking cigars and cigarettes. He has a 6.00 pack-year smoking history. He has never used smokeless tobacco. He reports current drug use. Frequency: 7.00 times per week. Drugs: Cocaine and Marijuana. He reports that he does not drink alcohol. Family History: family history includes Mental Illness in his mother; No Known Problems in his father; Substance Abuse in his mother. The patients home medications have been reviewed.     Allergies: Chlorpheniramine-phenylephrine, Penicillins, and Rondec-d [chlophedianol-pseudoephedrine]    -------------------------------------------------- RESULTS -------------------------------------------------  All laboratory and radiology results have been personally reviewed by myself   LABS:  Results for orders placed or performed during the hospital encounter of 09/09/21   Comprehensive Metabolic Panel   Result Value Ref Range    Sodium 140 132 - 146 mmol/L    Potassium 3.8 3.5 - 5.0 mmol/L    Chloride 104 98 - 107 mmol/L    CO2 31 (H) 22 - 29 mmol/L    Anion Gap 5 (L) 7 - 16 mmol/L    Glucose 94 74 - 99 mg/dL    BUN 10 6 - 20 mg/dL    CREATININE 1.1 0.7 - 1.2 mg/dL    GFR Non-African American >60 >=60 mL/min/1.73    GFR African American >60     Calcium 8.4 (L) 8.6 - 10.2 mg/dL    Total Protein 6.0 (L) 6.4 - 8.3 g/dL    Albumin 3.8 3.5 - 5.2 g/dL    Total Bilirubin 0.4 0.0 - 1.2 mg/dL    Alkaline Phosphatase 42 40 - 129 U/L    ALT 14 0 - 40 U/L    AST 21 0 - 39 U/L   CBC Auto Differential   Result Value Ref Range    WBC 4.2 (L) 4.5 - 11.5 E9/L    RBC 4.53 3.80 - 5.80 E12/L    Hemoglobin 13.7 12.5 - 16.5 g/dL    Hematocrit 40.9 37.0 - 54.0 %    MCV 90.3 80.0 - 99.9 fL    MCH 30.2 26.0 - 35.0 pg    MCHC 33.5 32.0 - 34.5 %    RDW 13.5 11.5 - 15.0 fL    Platelets 538 072 - 245 E9/L    MPV 9.7 7.0 - 12.0 fL    Neutrophils % 41.2 (L) 43.0 - 80.0 %    Immature Granulocytes % 0.0 0.0 - 5.0 %    Lymphocytes % 34.0 20.0 - 42.0 %    Monocytes % 9.2 2.0 - 12.0 %    Eosinophils % 13.2 (H) 0.0 - 6.0 %    Basophils % 2.4 (H) 0.0 - 2.0 %    Neutrophils Absolute 1.74 (L) 1.80 - 7.30 E9/L    Immature Granulocytes # 0.00 E9/L    Lymphocytes Absolute 1.44 (L) 1.50 - 4.00 E9/L    Monocytes Absolute 0.39 0.10 - 0.95 E9/L    Eosinophils Absolute 0.56 (H) 0.05 - 0.50 E9/L    Basophils Absolute 0.10 0.00 - 0.20 E9/L   Urine Drug Screen   Result Value Ref Range    Amphetamine Screen, Urine NOT DETECTED Negative <1000 ng/mL    Barbiturate Screen, Ur NOT DETECTED Negative < 200 ng/mL    Benzodiazepine Screen, Urine NOT DETECTED Negative < 200 ng/mL    Cannabinoid Scrn, Ur POSITIVE (A) Negative < 50ng/mL    Cocaine Metabolite Screen, Urine POSITIVE (A) Negative < 300 ng/mL    Opiate Scrn, Ur NOT DETECTED Negative < 300ng/mL    PCP Screen, Urine NOT DETECTED Negative < 25 ng/mL    Methadone Screen, Urine NOT DETECTED Negative <300 ng/mL    Oxycodone Urine NOT DETECTED Negative <100 ng/mL    FENTANYL SCREEN, URINE NOT DETECTED Negative <1 ng/mL    Drug Screen Comment: see below RADIOLOGY:  Interpreted by Radiologist.  No orders to display       ------------------------- NURSING NOTES AND VITALS REVIEWED ---------------------------   The nursing notes within the ED encounter and vital signs as below have been reviewed. /69   Pulse 89   Temp 97.3 °F (36.3 °C) (Oral)   Resp 16   Ht 5' 11\" (1.803 m)   Wt 140 lb (63.5 kg)   SpO2 95%   BMI 19.53 kg/m²   Oxygen Saturation Interpretation: Normal      ---------------------------------------------------PHYSICAL EXAM--------------------------------------      Constitutional/General: Alert and oriented x3, well appearing, non toxic in NAD  Head: Normocephalic and atraumatic  Eyes: PERRL, EOMI  Mouth: Oropharynx clear, handling secretions, no trismus  Neck: Supple, full ROM,   Pulmonary: Lungs clear to auscultation bilaterally, no wheezes, rales, or rhonchi. Not in respiratory distress  Cardiovascular:  Regular rate and rhythm, no murmurs, gallops, or rubs. 2+ distal pulses  Abdomen: Soft, non tender, non distended,   Extremities: Moves all extremities x 4. Warm and well perfused  Skin: warm and dry without rash  Neurologic: GCS 15,  Psych: Normal Affect      ------------------------------ ED COURSE/MEDICAL DECISION MAKING----------------------  Medications - No data to display      ED COURSE:       Medical Decision Making:    Medically clear    Counseling: The emergency provider has spoken with the patient and discussed todays results, in addition to providing specific details for the plan of care and counseling regarding the diagnosis and prognosis. Questions are answered at this time and they are agreeable with the plan.      --------------------------------- IMPRESSION AND DISPOSITION ---------------------------------    IMPRESSION  1. Mood disorder ([facility]        DISPOSITION  Disposition: Pending  Patient condition is stable      NOTE: This report was transcribed using voice recognition software.  Every effort was made to ensure accuracy; however, inadvertent computerized transcription errors may be present       Fany Lopez MD  09/09/21 9790

## 2021-09-10 ENCOUNTER — APPOINTMENT (OUTPATIENT)
Dept: CT IMAGING | Age: 41
DRG: 502 | End: 2021-09-10
Payer: COMMERCIAL

## 2021-09-10 ENCOUNTER — ANESTHESIA EVENT (OUTPATIENT)
Dept: OPERATING ROOM | Age: 41
DRG: 502 | End: 2021-09-10
Payer: COMMERCIAL

## 2021-09-10 ENCOUNTER — HOSPITAL ENCOUNTER (INPATIENT)
Age: 41
LOS: 7 days | Discharge: NURSING, REHAB OR LONG-TERM CARE | DRG: 502 | End: 2021-09-17
Attending: EMERGENCY MEDICINE | Admitting: SURGERY
Payer: COMMERCIAL

## 2021-09-10 ENCOUNTER — APPOINTMENT (OUTPATIENT)
Dept: GENERAL RADIOLOGY | Age: 41
DRG: 502 | End: 2021-09-10
Payer: COMMERCIAL

## 2021-09-10 DIAGNOSIS — S09.93XA FACIAL INJURY, INITIAL ENCOUNTER: ICD-10-CM

## 2021-09-10 DIAGNOSIS — T14.90XA TRAUMA: Primary | ICD-10-CM
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
## 2021-09-10 DIAGNOSIS — S32.402A CLOSED DISPLACED FRACTURE OF LEFT ACETABULUM, UNSPECIFIED PORTION OF ACETABULUM, INITIAL ENCOUNTER (HCC): ICD-10-CM

## 2021-09-10 PROBLEM — S01.112A EYEBROW LACERATION, LEFT, INITIAL ENCOUNTER: Status: ACTIVE | Noted: 2021-09-10

## 2021-09-10 PROBLEM — F07.81 POST CONCUSSIVE SYNDROME: Status: ACTIVE | Noted: 2021-09-10

## 2021-09-10 PROBLEM — R91.1 PULMONARY NODULE: Status: ACTIVE | Noted: 2021-09-10

## 2021-09-10 PROBLEM — K02.9 ACTIVE DENTAL CARIES: Status: ACTIVE | Noted: 2021-09-10

## 2021-09-10 LAB
ABO/RH: NORMAL
ACETAMINOPHEN LEVEL: <5 MCG/ML (ref 10–30)
ALBUMIN SERPL-MCNC: 3.8 G/DL (ref 3.5–5.2)
ALP BLD-CCNC: 39 U/L (ref 40–129)
ALT SERPL-CCNC: 16 U/L (ref 0–40)
ANION GAP SERPL CALCULATED.3IONS-SCNC: 5 MMOL/L (ref 7–16)
ANTIBODY SCREEN: NORMAL
APTT: 22.1 SEC (ref 24.5–35.1)
AST SERPL-CCNC: 28 U/L (ref 0–39)
B.E.: -2.7 MMOL/L (ref -3–3)
BASOPHILS ABSOLUTE: 0.06 E9/L (ref 0–0.2)
BASOPHILS RELATIVE PERCENT: 1.2 % (ref 0–2)
BILIRUB SERPL-MCNC: 0.5 MG/DL (ref 0–1.2)
BUN BLDV-MCNC: 11 MG/DL (ref 6–20)
CALCIUM SERPL-MCNC: 8.6 MG/DL (ref 8.6–10.2)
CHLORIDE BLD-SCNC: 108 MMOL/L (ref 98–107)
CO2: 28 MMOL/L (ref 22–29)
COHB: 3.8 % (ref 0–1.5)
CREAT SERPL-MCNC: 1.1 MG/DL (ref 0.7–1.2)
CRITICAL: ABNORMAL
DATE ANALYZED: ABNORMAL
DATE OF COLLECTION: ABNORMAL
EOSINOPHILS ABSOLUTE: 0.11 E9/L (ref 0.05–0.5)
EOSINOPHILS RELATIVE PERCENT: 2.2 % (ref 0–6)
ETHANOL: <10 MG/DL (ref 0–0.08)
GFR AFRICAN AMERICAN: >60
GFR NON-AFRICAN AMERICAN: >60 ML/MIN/1.73
GLUCOSE BLD-MCNC: 115 MG/DL (ref 74–99)
HCO3: 22.4 MMOL/L (ref 22–26)
HCT VFR BLD CALC: 40.4 % (ref 37–54)
HEMOGLOBIN: 13.3 G/DL (ref 12.5–16.5)
HHB: 0.6 % (ref 0–5)
IMMATURE GRANULOCYTES #: 0.04 E9/L
IMMATURE GRANULOCYTES %: 0.8 % (ref 0–5)
INR BLD: 1.1
LAB: ABNORMAL
LACTIC ACID: 2.2 MMOL/L (ref 0.5–2.2)
LYMPHOCYTES ABSOLUTE: 1 E9/L (ref 1.5–4)
LYMPHOCYTES RELATIVE PERCENT: 19.8 % (ref 20–42)
Lab: ABNORMAL
MCH RBC QN AUTO: 30.1 PG (ref 26–35)
MCHC RBC AUTO-ENTMCNC: 32.9 % (ref 32–34.5)
MCV RBC AUTO: 91.4 FL (ref 80–99.9)
METHB: 0.4 % (ref 0–1.5)
MODE: ABNORMAL
MONOCYTES ABSOLUTE: 0.39 E9/L (ref 0.1–0.95)
MONOCYTES RELATIVE PERCENT: 7.7 % (ref 2–12)
NEUTROPHILS ABSOLUTE: 3.44 E9/L (ref 1.8–7.3)
NEUTROPHILS RELATIVE PERCENT: 68.3 % (ref 43–80)
O2 CONTENT: 20.5 ML/DL
O2 SATURATION: 99.4 % (ref 92–98.5)
O2HB: 95.2 % (ref 94–97)
OPERATOR ID: 1868
PATIENT TEMP: 37 C
PCO2: 40.3 MMHG (ref 35–45)
PDW BLD-RTO: 13.8 FL (ref 11.5–15)
PH BLOOD GAS: 7.36 (ref 7.35–7.45)
PLATELET # BLD: 256 E9/L (ref 130–450)
PMV BLD AUTO: 10.2 FL (ref 7–12)
PO2: 334.7 MMHG (ref 75–100)
POTASSIUM SERPL-SCNC: 3.32 MMOL/L (ref 3.5–5)
POTASSIUM SERPL-SCNC: 4.1 MMOL/L (ref 3.5–5)
PROTHROMBIN TIME: 12 SEC (ref 9.3–12.4)
RBC # BLD: 4.42 E12/L (ref 3.8–5.8)
REASON FOR REJECTION: NORMAL
REJECTED TEST: NORMAL
SALICYLATE, SERUM: <0.3 MG/DL (ref 0–30)
SODIUM BLD-SCNC: 141 MMOL/L (ref 132–146)
SOURCE, BLOOD GAS: ABNORMAL
THB: 14.7 G/DL (ref 11.5–16.5)
TIME ANALYZED: 750
TOTAL PROTEIN: 6 G/DL (ref 6.4–8.3)
TRICYCLIC ANTIDEPRESSANTS SCREEN SERUM: NEGATIVE NG/ML
WBC # BLD: 5 E9/L (ref 4.5–11.5)

## 2021-09-10 PROCEDURE — 90714 TD VACC NO PRESV 7 YRS+ IM: CPT | Performed by: EMERGENCY MEDICINE

## 2021-09-10 PROCEDURE — 99284 EMERGENCY DEPT VISIT MOD MDM: CPT

## 2021-09-10 PROCEDURE — 86901 BLOOD TYPING SEROLOGIC RH(D): CPT

## 2021-09-10 PROCEDURE — 36415 COLL VENOUS BLD VENIPUNCTURE: CPT

## 2021-09-10 PROCEDURE — 76376 3D RENDER W/INTRP POSTPROCES: CPT

## 2021-09-10 PROCEDURE — 83605 ASSAY OF LACTIC ACID: CPT

## 2021-09-10 PROCEDURE — 86850 RBC ANTIBODY SCREEN: CPT

## 2021-09-10 PROCEDURE — 73502 X-RAY EXAM HIP UNI 2-3 VIEWS: CPT

## 2021-09-10 PROCEDURE — 99223 1ST HOSP IP/OBS HIGH 75: CPT | Performed by: SURGERY

## 2021-09-10 PROCEDURE — 80143 DRUG ASSAY ACETAMINOPHEN: CPT

## 2021-09-10 PROCEDURE — 6370000000 HC RX 637 (ALT 250 FOR IP): Performed by: STUDENT IN AN ORGANIZED HEALTH CARE EDUCATION/TRAINING PROGRAM

## 2021-09-10 PROCEDURE — 72125 CT NECK SPINE W/O DYE: CPT

## 2021-09-10 PROCEDURE — 36000 PLACE NEEDLE IN VEIN: CPT | Performed by: SURGERY

## 2021-09-10 PROCEDURE — 72170 X-RAY EXAM OF PELVIS: CPT

## 2021-09-10 PROCEDURE — 84132 ASSAY OF SERUM POTASSIUM: CPT

## 2021-09-10 PROCEDURE — 6360000004 HC RX CONTRAST MEDICATION: Performed by: RADIOLOGY

## 2021-09-10 PROCEDURE — 2580000003 HC RX 258: Performed by: EMERGENCY MEDICINE

## 2021-09-10 PROCEDURE — 70486 CT MAXILLOFACIAL W/O DYE: CPT

## 2021-09-10 PROCEDURE — 6360000002 HC RX W HCPCS: Performed by: STUDENT IN AN ORGANIZED HEALTH CARE EDUCATION/TRAINING PROGRAM

## 2021-09-10 PROCEDURE — 36410 VNPNXR 3YR/> PHY/QHP DX/THER: CPT | Performed by: SURGERY

## 2021-09-10 PROCEDURE — 82805 BLOOD GASES W/O2 SATURATION: CPT

## 2021-09-10 PROCEDURE — 82077 ASSAY SPEC XCP UR&BREATH IA: CPT

## 2021-09-10 PROCEDURE — 0JD10ZZ EXTRACTION OF FACE SUBCUTANEOUS TISSUE AND FASCIA, OPEN APPROACH: ICD-10-PCS

## 2021-09-10 PROCEDURE — 6810039000 HC L1 TRAUMA ALERT

## 2021-09-10 PROCEDURE — 36600 WITHDRAWAL OF ARTERIAL BLOOD: CPT | Performed by: SURGERY

## 2021-09-10 PROCEDURE — 73552 X-RAY EXAM OF FEMUR 2/>: CPT

## 2021-09-10 PROCEDURE — 70450 CT HEAD/BRAIN W/O DYE: CPT

## 2021-09-10 PROCEDURE — 86900 BLOOD TYPING SEROLOGIC ABO: CPT

## 2021-09-10 PROCEDURE — 85730 THROMBOPLASTIN TIME PARTIAL: CPT

## 2021-09-10 PROCEDURE — 80053 COMPREHEN METABOLIC PANEL: CPT

## 2021-09-10 PROCEDURE — 80179 DRUG ASSAY SALICYLATE: CPT

## 2021-09-10 PROCEDURE — 90471 IMMUNIZATION ADMIN: CPT | Performed by: EMERGENCY MEDICINE

## 2021-09-10 PROCEDURE — 72190 X-RAY EXAM OF PELVIS: CPT

## 2021-09-10 PROCEDURE — 85610 PROTHROMBIN TIME: CPT

## 2021-09-10 PROCEDURE — 73560 X-RAY EXAM OF KNEE 1 OR 2: CPT

## 2021-09-10 PROCEDURE — 74177 CT ABD & PELVIS W/CONTRAST: CPT

## 2021-09-10 PROCEDURE — 2500000003 HC RX 250 WO HCPCS: Performed by: EMERGENCY MEDICINE

## 2021-09-10 PROCEDURE — 2700000000 HC OXYGEN THERAPY PER DAY

## 2021-09-10 PROCEDURE — 1200000000 HC SEMI PRIVATE

## 2021-09-10 PROCEDURE — 80307 DRUG TEST PRSMV CHEM ANLYZR: CPT

## 2021-09-10 PROCEDURE — 71045 X-RAY EXAM CHEST 1 VIEW: CPT

## 2021-09-10 PROCEDURE — 85025 COMPLETE CBC W/AUTO DIFF WBC: CPT

## 2021-09-10 PROCEDURE — 6360000002 HC RX W HCPCS: Performed by: EMERGENCY MEDICINE

## 2021-09-10 PROCEDURE — 99222 1ST HOSP IP/OBS MODERATE 55: CPT | Performed by: ORTHOPAEDIC SURGERY

## 2021-09-10 PROCEDURE — 73110 X-RAY EXAM OF WRIST: CPT

## 2021-09-10 RX ORDER — ONDANSETRON 2 MG/ML
4 INJECTION INTRAMUSCULAR; INTRAVENOUS EVERY 6 HOURS PRN
Status: DISCONTINUED | OUTPATIENT
Start: 2021-09-10 | End: 2021-09-17 | Stop reason: HOSPADM

## 2021-09-10 RX ORDER — ONDANSETRON 2 MG/ML
4 INJECTION INTRAMUSCULAR; INTRAVENOUS EVERY 6 HOURS PRN
Status: DISCONTINUED | OUTPATIENT
Start: 2021-09-10 | End: 2021-09-14

## 2021-09-10 RX ORDER — LIDOCAINE HYDROCHLORIDE 10 MG/ML
INJECTION, SOLUTION INFILTRATION; PERINEURAL
Status: DISPENSED
Start: 2021-09-10 | End: 2021-09-10

## 2021-09-10 RX ORDER — SODIUM CHLORIDE 9 MG/ML
INJECTION, SOLUTION INTRAVENOUS CONTINUOUS
Status: DISCONTINUED | OUTPATIENT
Start: 2021-09-10 | End: 2021-09-14

## 2021-09-10 RX ORDER — SODIUM CHLORIDE 0.9 % (FLUSH) 0.9 %
10 SYRINGE (ML) INJECTION EVERY 12 HOURS SCHEDULED
Status: DISCONTINUED | OUTPATIENT
Start: 2021-09-10 | End: 2021-09-17 | Stop reason: HOSPADM

## 2021-09-10 RX ORDER — SODIUM CHLORIDE 0.9 % (FLUSH) 0.9 %
10 SYRINGE (ML) INJECTION PRN
Status: DISCONTINUED | OUTPATIENT
Start: 2021-09-10 | End: 2021-09-17 | Stop reason: HOSPADM

## 2021-09-10 RX ORDER — TETANUS AND DIPHTHERIA TOXOIDS ADSORBED 2; 2 [LF]/.5ML; [LF]/.5ML
0.5 INJECTION INTRAMUSCULAR ONCE
Status: COMPLETED | OUTPATIENT
Start: 2021-09-10 | End: 2021-09-10

## 2021-09-10 RX ORDER — METHOCARBAMOL 500 MG/1
1000 TABLET, FILM COATED ORAL 4 TIMES DAILY
Status: DISCONTINUED | OUTPATIENT
Start: 2021-09-10 | End: 2021-09-14

## 2021-09-10 RX ORDER — ONDANSETRON 4 MG/1
4 TABLET, ORALLY DISINTEGRATING ORAL EVERY 8 HOURS PRN
Status: DISCONTINUED | OUTPATIENT
Start: 2021-09-10 | End: 2021-09-17 | Stop reason: HOSPADM

## 2021-09-10 RX ORDER — SODIUM CHLORIDE 0.9 % (FLUSH) 0.9 %
10 SYRINGE (ML) INJECTION ONCE
Status: DISCONTINUED | OUTPATIENT
Start: 2021-09-10 | End: 2021-09-17 | Stop reason: HOSPADM

## 2021-09-10 RX ORDER — PROPOFOL 10 MG/ML
1 INJECTION, EMULSION INTRAVENOUS ONCE
Status: DISCONTINUED | OUTPATIENT
Start: 2021-09-10 | End: 2021-09-14

## 2021-09-10 RX ORDER — KETAMINE HYDROCHLORIDE 10 MG/ML
INJECTION, SOLUTION INTRAMUSCULAR; INTRAVENOUS DAILY PRN
Status: COMPLETED | OUTPATIENT
Start: 2021-09-10 | End: 2021-09-10

## 2021-09-10 RX ORDER — SODIUM CHLORIDE 9 MG/ML
INJECTION, SOLUTION INTRAVENOUS CONTINUOUS
Status: DISCONTINUED | OUTPATIENT
Start: 2021-09-10 | End: 2021-09-10 | Stop reason: SDUPTHER

## 2021-09-10 RX ORDER — SODIUM CHLORIDE 9 MG/ML
25 INJECTION, SOLUTION INTRAVENOUS PRN
Status: DISCONTINUED | OUTPATIENT
Start: 2021-09-10 | End: 2021-09-17 | Stop reason: HOSPADM

## 2021-09-10 RX ORDER — ACETAMINOPHEN 500 MG
1000 TABLET ORAL EVERY 8 HOURS SCHEDULED
Status: DISCONTINUED | OUTPATIENT
Start: 2021-09-10 | End: 2021-09-17 | Stop reason: HOSPADM

## 2021-09-10 RX ORDER — POLYETHYLENE GLYCOL 3350 17 G/17G
17 POWDER, FOR SOLUTION ORAL DAILY
Status: DISCONTINUED | OUTPATIENT
Start: 2021-09-10 | End: 2021-09-14

## 2021-09-10 RX ORDER — KETAMINE HYDROCHLORIDE 10 MG/ML
1 INJECTION, SOLUTION INTRAMUSCULAR; INTRAVENOUS ONCE
Status: DISCONTINUED | OUTPATIENT
Start: 2021-09-10 | End: 2021-09-14

## 2021-09-10 RX ORDER — LIDOCAINE HYDROCHLORIDE AND EPINEPHRINE 10; 10 MG/ML; UG/ML
20 INJECTION, SOLUTION INFILTRATION; PERINEURAL ONCE
Status: DISCONTINUED | OUTPATIENT
Start: 2021-09-10 | End: 2021-09-14

## 2021-09-10 RX ADMIN — KETAMINE HYDROCHLORIDE 40 MG: 10 INJECTION INTRAMUSCULAR; INTRAVENOUS at 13:42

## 2021-09-10 RX ADMIN — METHOCARBAMOL TABLETS 1000 MG: 500 TABLET, COATED ORAL at 10:18

## 2021-09-10 RX ADMIN — TETANUS AND DIPHTHERIA TOXOIDS ADSORBED 0.5 ML: 2; 2 INJECTION INTRAMUSCULAR at 10:15

## 2021-09-10 RX ADMIN — SODIUM CHLORIDE: 9 INJECTION, SOLUTION INTRAVENOUS at 10:17

## 2021-09-10 RX ADMIN — HYDROMORPHONE HYDROCHLORIDE 1 MG: 1 INJECTION, SOLUTION INTRAMUSCULAR; INTRAVENOUS; SUBCUTANEOUS at 18:20

## 2021-09-10 RX ADMIN — IOPAMIDOL 90 ML: 755 INJECTION, SOLUTION INTRAVENOUS at 08:22

## 2021-09-10 RX ADMIN — KETAMINE HYDROCHLORIDE 80 MG: 10 INJECTION INTRAMUSCULAR; INTRAVENOUS at 13:40

## 2021-09-10 RX ADMIN — SODIUM CHLORIDE: 9 INJECTION, SOLUTION INTRAVENOUS at 14:38

## 2021-09-10 RX ADMIN — HYDROMORPHONE HYDROCHLORIDE 1 MG: 1 INJECTION, SOLUTION INTRAMUSCULAR; INTRAVENOUS; SUBCUTANEOUS at 10:15

## 2021-09-10 RX ADMIN — ONDANSETRON 4 MG: 2 INJECTION INTRAMUSCULAR; INTRAVENOUS at 10:14

## 2021-09-10 ASSESSMENT — PAIN SCALES - GENERAL
PAINLEVEL_OUTOF10: 9
PAINLEVEL_OUTOF10: 10
PAINLEVEL_OUTOF10: 10
PAINLEVEL_OUTOF10: 0

## 2021-09-10 ASSESSMENT — PAIN SCALES - WONG BAKER: WONGBAKER_NUMERICALRESPONSE: 0
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
## 2021-09-10 NOTE — ED NOTES
Breath sound present b/l  B/l fem pulses   GCS 13   1 off eyes  1 off confusion  130/70 manual bp  Pupils 2 mm and reactive b/l       Sunitha Cantor RN  09/10/21 5888

## 2021-09-10 NOTE — ED NOTES
Notified floor Meghan Holcomb) that [facility] faxed. Will prepare patient for transport.      Mercedes Perera RN  09/10/21 4058
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
## 2021-09-10 NOTE — ED NOTES
Pt log rolled to right side, C-Spine maintained in neutral position, no spine tenderness, no signs of trauma, no step-offs, no deformities      Betty Desai RN  09/10/21 6582
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
## 2021-09-10 NOTE — PROGRESS NOTES
1512Only able to open his eye 1/4 of the way and unable to open his mouth to check his tongue. Very drowsy voice very weak and cannot stay awake enough to do the database .
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
## 2021-09-11 ENCOUNTER — ANESTHESIA (OUTPATIENT)
Dept: OPERATING ROOM | Age: 41
DRG: 502 | End: 2021-09-11
Payer: COMMERCIAL

## 2021-09-11 LAB
AMPHETAMINE SCREEN, URINE: NOT DETECTED
ANION GAP SERPL CALCULATED.3IONS-SCNC: 10 MMOL/L (ref 7–16)
BARBITURATE SCREEN URINE: NOT DETECTED
BASOPHILS ABSOLUTE: 0.03 E9/L (ref 0–0.2)
BASOPHILS RELATIVE PERCENT: 0.4 % (ref 0–2)
BENZODIAZEPINE SCREEN, URINE: NOT DETECTED
BUN BLDV-MCNC: 8 MG/DL (ref 6–20)
CALCIUM SERPL-MCNC: 8.5 MG/DL (ref 8.6–10.2)
CANNABINOID SCREEN URINE: POSITIVE
CHLORIDE BLD-SCNC: 106 MMOL/L (ref 98–107)
CO2: 25 MMOL/L (ref 22–29)
COCAINE METABOLITE SCREEN URINE: POSITIVE
CREAT SERPL-MCNC: 1 MG/DL (ref 0.7–1.2)
EOSINOPHILS ABSOLUTE: 0.02 E9/L (ref 0.05–0.5)
EOSINOPHILS RELATIVE PERCENT: 0.3 % (ref 0–6)
FENTANYL SCREEN, URINE: NOT DETECTED
GFR AFRICAN AMERICAN: >60
GFR NON-AFRICAN AMERICAN: >60 ML/MIN/1.73
GLUCOSE BLD-MCNC: 83 MG/DL (ref 74–99)
HCT VFR BLD CALC: 40 % (ref 37–54)
HEMOGLOBIN: 13.3 G/DL (ref 12.5–16.5)
IMMATURE GRANULOCYTES #: 0.03 E9/L
IMMATURE GRANULOCYTES %: 0.4 % (ref 0–5)
LYMPHOCYTES ABSOLUTE: 0.87 E9/L (ref 1.5–4)
LYMPHOCYTES RELATIVE PERCENT: 11.5 % (ref 20–42)
Lab: ABNORMAL
MCH RBC QN AUTO: 30.3 PG (ref 26–35)
MCHC RBC AUTO-ENTMCNC: 33.3 % (ref 32–34.5)
MCV RBC AUTO: 91.1 FL (ref 80–99.9)
METHADONE SCREEN, URINE: NOT DETECTED
MONOCYTES ABSOLUTE: 0.57 E9/L (ref 0.1–0.95)
MONOCYTES RELATIVE PERCENT: 7.5 % (ref 2–12)
NEUTROPHILS ABSOLUTE: 6.05 E9/L (ref 1.8–7.3)
NEUTROPHILS RELATIVE PERCENT: 79.9 % (ref 43–80)
OPIATE SCREEN URINE: POSITIVE
OXYCODONE URINE: NOT DETECTED
PDW BLD-RTO: 13.7 FL (ref 11.5–15)
PHENCYCLIDINE SCREEN URINE: NOT DETECTED
PLATELET # BLD: 243 E9/L (ref 130–450)
PMV BLD AUTO: 10.6 FL (ref 7–12)
POTASSIUM SERPL-SCNC: 3.9 MMOL/L (ref 3.5–5)
RBC # BLD: 4.39 E12/L (ref 3.8–5.8)
SODIUM BLD-SCNC: 141 MMOL/L (ref 132–146)
WBC # BLD: 7.6 E9/L (ref 4.5–11.5)

## 2021-09-11 PROCEDURE — 6360000002 HC RX W HCPCS: Performed by: STUDENT IN AN ORGANIZED HEALTH CARE EDUCATION/TRAINING PROGRAM

## 2021-09-11 PROCEDURE — 6370000000 HC RX 637 (ALT 250 FOR IP): Performed by: STUDENT IN AN ORGANIZED HEALTH CARE EDUCATION/TRAINING PROGRAM

## 2021-09-11 PROCEDURE — 85025 COMPLETE CBC W/AUTO DIFF WBC: CPT

## 2021-09-11 PROCEDURE — 2580000003 HC RX 258: Performed by: EMERGENCY MEDICINE

## 2021-09-11 PROCEDURE — 2580000003 HC RX 258: Performed by: STUDENT IN AN ORGANIZED HEALTH CARE EDUCATION/TRAINING PROGRAM

## 2021-09-11 PROCEDURE — 36415 COLL VENOUS BLD VENIPUNCTURE: CPT

## 2021-09-11 PROCEDURE — 99232 SBSQ HOSP IP/OBS MODERATE 35: CPT | Performed by: SURGERY

## 2021-09-11 PROCEDURE — 80307 DRUG TEST PRSMV CHEM ANLYZR: CPT

## 2021-09-11 PROCEDURE — 1200000000 HC SEMI PRIVATE

## 2021-09-11 PROCEDURE — 80048 BASIC METABOLIC PNL TOTAL CA: CPT

## 2021-09-11 RX ADMIN — HYDROMORPHONE HYDROCHLORIDE 1 MG: 1 INJECTION, SOLUTION INTRAMUSCULAR; INTRAVENOUS; SUBCUTANEOUS at 05:24

## 2021-09-11 RX ADMIN — ACETAMINOPHEN 1000 MG: 500 TABLET ORAL at 05:24

## 2021-09-11 RX ADMIN — POLYETHYLENE GLYCOL 3350 17 G: 17 POWDER, FOR SOLUTION ORAL at 10:04

## 2021-09-11 RX ADMIN — SODIUM CHLORIDE: 9 INJECTION, SOLUTION INTRAVENOUS at 00:34

## 2021-09-11 RX ADMIN — METHOCARBAMOL TABLETS 1000 MG: 500 TABLET, COATED ORAL at 13:38

## 2021-09-11 RX ADMIN — ACETAMINOPHEN 1000 MG: 500 TABLET ORAL at 20:28

## 2021-09-11 RX ADMIN — METHOCARBAMOL TABLETS 1000 MG: 500 TABLET, COATED ORAL at 20:28

## 2021-09-11 RX ADMIN — ACETAMINOPHEN 1000 MG: 500 TABLET ORAL at 14:00

## 2021-09-11 RX ADMIN — Medication 10 ML: at 20:29

## 2021-09-11 RX ADMIN — SODIUM CHLORIDE: 9 INJECTION, SOLUTION INTRAVENOUS at 20:28

## 2021-09-11 RX ADMIN — METHOCARBAMOL TABLETS 1000 MG: 500 TABLET, COATED ORAL at 10:04

## 2021-09-11 RX ADMIN — METHOCARBAMOL TABLETS 1000 MG: 500 TABLET, COATED ORAL at 18:10

## 2021-09-11 RX ADMIN — SODIUM CHLORIDE: 9 INJECTION, SOLUTION INTRAVENOUS at 10:40

## 2021-09-11 ASSESSMENT — PAIN DESCRIPTION - ORIENTATION
ORIENTATION: LEFT

## 2021-09-11 ASSESSMENT — PAIN DESCRIPTION - DESCRIPTORS
DESCRIPTORS: ACHING;CONSTANT;DISCOMFORT
DESCRIPTORS: ACHING;CONSTANT;DISCOMFORT;SORE
DESCRIPTORS: ACHING;CONSTANT;DISCOMFORT;DULL;SORE

## 2021-09-11 ASSESSMENT — PAIN - FUNCTIONAL ASSESSMENT
PAIN_FUNCTIONAL_ASSESSMENT: PREVENTS OR INTERFERES SOME ACTIVE ACTIVITIES AND ADLS
PAIN_FUNCTIONAL_ASSESSMENT: PREVENTS OR INTERFERES SOME ACTIVE ACTIVITIES AND ADLS
PAIN_FUNCTIONAL_ASSESSMENT: PREVENTS OR INTERFERES WITH ALL ACTIVE AND SOME PASSIVE ACTIVITIES

## 2021-09-11 ASSESSMENT — PAIN DESCRIPTION - ONSET
ONSET: ON-GOING
ONSET: AWAKENED FROM SLEEP
ONSET: AWAKENED FROM SLEEP

## 2021-09-11 ASSESSMENT — PAIN SCALES - GENERAL
PAINLEVEL_OUTOF10: 9
PAINLEVEL_OUTOF10: 4
PAINLEVEL_OUTOF10: 0
PAINLEVEL_OUTOF10: 10
PAINLEVEL_OUTOF10: 5

## 2021-09-11 ASSESSMENT — PAIN DESCRIPTION - PAIN TYPE
TYPE: ACUTE PAIN

## 2021-09-11 ASSESSMENT — PAIN DESCRIPTION - FREQUENCY
FREQUENCY: INTERMITTENT

## 2021-09-11 ASSESSMENT — PAIN DESCRIPTION - LOCATION
LOCATION: FACE;ARM;LEG
LOCATION: FACE;LEG
LOCATION: GENERALIZED

## 2021-09-11 ASSESSMENT — PAIN SCALES - WONG BAKER: WONGBAKER_NUMERICALRESPONSE: 0

## 2021-09-11 NOTE — PLAN OF CARE
Problem: Pain:  Goal: Control of acute pain  Description: Control of acute pain  Outcome: Met This Shift     Problem: Falls - Risk of:  Goal: Will remain free from falls  Description: Will remain free from falls  Outcome: Met This Shift     Problem: Pain:  Goal: Pain level will decrease  Description: Pain level will decrease  Outcome: Ongoing
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
## 2021-09-11 NOTE — PROGRESS NOTES
Patient is not allowing me to change his collar or wash him. I have explained to him that he will be having surgery today. He does not understand the surgery or the need for it at this time. I am contacting the resident on call for consent.
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
## 2021-09-11 NOTE — PROGRESS NOTES
Sent a message to ortho resident. The patient would like them to come to bedside and discuss the surgery with him before he signs the consents.

## 2021-09-11 NOTE — DISCHARGE SUMMARY
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 may last for days. Report worsening pain or uncontrolled pain with prescribed medicine.  Numbness, tingling or weakness -- Present in arms or legs; unsteady walking.  Eye Changes/light sensation -- Vision problems; blurred or double-vision; unequal sized pupils.  Nausea/Vomiting -- Episodes of vomiting may occur initially after a head injury. Persistent vomiting or difficulty taking medication by mouth.  Increased Sleepiness -- Difficulty waking from sleep with increased confusion.  Dizziness -- Does not go away or occurs repeatedly. Vomiting may accompany dizziness.  Drainage -- Clear fluid or blood from the nose and ears.  Fever -- Temperature over 101 degrees.  Neck Pain. The First Four Weeks  The symptoms below are common after a mild brain injury.  They usually get better on their own within a few weeks:    feeling tired or ?low   problems falling or staying asleep   feeling confused, poor concentration, or slow to answer questions   feeling dizzy, poor balance, or poor coordination   being sensitive to light   being sensitive to sounds   ringing in the ears   a mild headache, sometimes with nausea and/or vomiting   being irritable, having mood swings, or feeling somewhat sad or down  Contact the [facility] [address] if your symptoms are affecting your everyday activities. Remember that letting yourself get too tired can make your symptoms worse. Listen to your body: if doing a certain activity increases your symptoms, take a break from that activity. Build up the amount of time you do an activity and stay under the threshold of symptoms. Long term Effects (Post-Concussive Syndrome)    Notify physician if any of the following persists longer than 2-4 weeks.  Difficulty with concentration or attention (easily distracted)   Frequent headaches   Memory problems    Sensitivity to light    Sleeping difficulties      There is a higher risk of having a more serious head injury if:   Previous history of head injury or concussion   Taking medicine that thins your blood, or have a bleeding disorder   Have other neurologic (brain) problems   Have difficulty walking or frequent falls   Active in high impact contact sports, like soccer or football. Activities   Stay away from activities that could cause another head injury (like sports), until the doctor says it's okay. A second blow to the head can cause more damage to the brain   Limit reading, television, video games, etc. the first 48 hours. Your brain needs to rest so that it can recover. You may find that it helps to take time off school or work.  Limit exposure to bright lights, loud noises, and crowds for the first 48 hours, as these can make your symptoms worse   Limit use of screens, such as an IPad, computer, cellphone, TV, etc, as these can make symptoms, especially headaches, worse. Work/School   It is recommended that you wait to return to work/school until after your follow-up appointment with trauma or your family doctor.   If you are having no symptoms, please call for an earlier appointment   Some people find it hard to concentrate well so return to your normal activities slowly. Go back to work or school for half days at first, and increase as tolerated. Trauma services can help you with a graduated schedule.  If you feel comfortable doing so, tell work or school about your concussion. You may have to adjust your activities, depending on your job or school demands. Rest and Sleep   Rest for the first 24 hours; it's one of the best things to help your brain recover. It's okay to sleep if you want   You don't have to be woken up every few hours. If someone does wake you up, you should awaken easily.  Do some light physical activity (housework) or light exercise (walking, stationary bike) as soon as you can tolerate the movement. Strenuous exercise (such as jogging or weight lifting) can make your concussion symptoms worse or last longer. Diet   Return to a normal diet as tolerated, you may want to start with liquids first   Eat healthy meals (including breakfast) and snacks throughout the day as your brain heals. Managing Pain   Tylenol or Ibuprofen are the best meds to take for headaches.  Your doctor may have prescribed you medications if your headaches were severe or you have other injuries. Please take as directed. Driving   Your ability to concentrate and react quickly might be affected by the concussion. Please contact trauma services for advice on when to resume driving.  Do not drive if you're concerned about vision problems, slowed thinking, slowed reaction time, reduced attention or poor judgment.  Wear sunglasses even during winter if jessie while driving. The bright light may induce a headache. Alcohol use/Drug use   Don't drink alcohol or use recreational drugs as they may make you feel worse and/or hide the warning signs.         Follow-up:  Trauma Clinic: [phone] -- press option [address] [facility] [address], [address], [address]    Call [phone], option 2, for any questions/concerns and for follow-up appointment in 1 week after discharge from facility     Please follow the instructions checked below:      ACTIVITY INSTRUCTIONS  Increase activity as tolerated  No heavy lifting or strenuous activity  Take your incentive spirometer home and use 4-6 times/day   [x]  No driving until cleared by all Providers    WOUND/DRESSING INSTRUCTIONS:  You may shower. No sitting in bath tub, hot tub or swimming until cleared by physician. Ice to areas of pain for first 24 hours. Heat to areas of pain after that. Wash areas of lacerations/abrasions with soap & water. Rinse well. Pat dry with clean towel. Apply thin layer of Bacitracin, Neosporin, or triple antibiotic cream to affected area 2-3 times per day. Keep wounds clean and dry. MEDICATION INSTRUCTIONS  Take medication as prescribed. When taking pain medications, you may experience dizziness or drowsiness. Do not drink alcohol or drive when taking these medications. You may experience constipation while taking pain medication. You may take over the counter stool softeners such as docusate (Colace), sennosides S (Senokot-S), or Miralax. [x]  You may take Ibuprofen (over the counter) as directed for mild pain. --You may take up to 800mg every 8 hours for pain, please take with food or milk. [x]  You may take acetaminophen (Tylenol) products. Do NOT take more than 4000mg of Tylenol in 24h. [x]  Do not take any other acetaminophen (Tylenol) products if you are taking Percocet or Norco, as these contain Tylenol. --Do NOT take more than 4000mg of Tylenol in 24h. OPIOID MEDICATION INSTRUCTIONS  Read the medication guide that is included with your prescription. Take your medication exactly as prescribed. Store medication away from children and in a safe place.   Do NOT share your medication with others. Do NOT take medication unless it is prescribed for you. Do NOT drink alcohol while taking opioids (I.e., Norco, Percocet, Oxycodone, etc). Discuss with the Trauma Clinic staff if the dose of medication you are taking does not control your pain and any side effects that you may be having. CALL 911 OR YOUR LOCAL EMERGENCY SERVICE:  --If you take too much medication  --If you have trouble breathing or shortness of breath  --A child has taken this medication. WORK:  You may not return to work until you receive follow-up with the Trauma Clinic or clearance by all consultants. Call the trauma clinic for any of the following or for questions/concerns;  --fever over 101F  --redness, swelling, hardness or warmth at the wound site(s).   --Unrelieved nausea/vomiting  --Foul smelling or cloudy drainage at the wound site(s)  --Unrelieved pain or increase in pain  --Increase in shortness of breath    Follow-up:  Trauma Clinic: [phone] option [address]  [facility], [id number] [place] [address]            Follow up:   Corrie Dance, [address] [facility]  [phone]    In 2 weeks      [address] Luis Ga[place] [phone]  In 1 week  1 week after discharge from facility        Signed:  JUICE Hernandez CNP  9/17/2021  9:04 AM

## 2021-09-12 ENCOUNTER — APPOINTMENT (OUTPATIENT)
Dept: GENERAL RADIOLOGY | Age: 41
DRG: 502 | End: 2021-09-12
Payer: COMMERCIAL

## 2021-09-12 VITALS
RESPIRATION RATE: 2 BRPM | TEMPERATURE: 96.4 F | SYSTOLIC BLOOD PRESSURE: 121 MMHG | DIASTOLIC BLOOD PRESSURE: 84 MMHG | OXYGEN SATURATION: 100 %

## 2021-09-12 LAB
ANION GAP SERPL CALCULATED.3IONS-SCNC: 3 MMOL/L (ref 7–16)
BASOPHILS ABSOLUTE: 0.06 E9/L (ref 0–0.2)
BASOPHILS RELATIVE PERCENT: 0.9 % (ref 0–2)
BUN BLDV-MCNC: 11 MG/DL (ref 6–20)
CALCIUM SERPL-MCNC: 7.8 MG/DL (ref 8.6–10.2)
CHLORIDE BLD-SCNC: 109 MMOL/L (ref 98–107)
CO2: 28 MMOL/L (ref 22–29)
CREAT SERPL-MCNC: 0.9 MG/DL (ref 0.7–1.2)
EOSINOPHILS ABSOLUTE: 0.29 E9/L (ref 0.05–0.5)
EOSINOPHILS RELATIVE PERCENT: 4.3 % (ref 0–6)
GFR AFRICAN AMERICAN: >60
GFR NON-AFRICAN AMERICAN: >60 ML/MIN/1.73
GLUCOSE BLD-MCNC: 110 MG/DL (ref 74–99)
HCT VFR BLD CALC: 39.1 % (ref 37–54)
HEMOGLOBIN: 12.8 G/DL (ref 12.5–16.5)
IMMATURE GRANULOCYTES #: 0.02 E9/L
IMMATURE GRANULOCYTES %: 0.3 % (ref 0–5)
LYMPHOCYTES ABSOLUTE: 1.05 E9/L (ref 1.5–4)
LYMPHOCYTES RELATIVE PERCENT: 15.7 % (ref 20–42)
MCH RBC QN AUTO: 29.6 PG (ref 26–35)
MCHC RBC AUTO-ENTMCNC: 32.7 % (ref 32–34.5)
MCV RBC AUTO: 90.5 FL (ref 80–99.9)
MONOCYTES ABSOLUTE: 0.61 E9/L (ref 0.1–0.95)
MONOCYTES RELATIVE PERCENT: 9.1 % (ref 2–12)
NEUTROPHILS ABSOLUTE: 4.64 E9/L (ref 1.8–7.3)
NEUTROPHILS RELATIVE PERCENT: 69.7 % (ref 43–80)
PDW BLD-RTO: 13.5 FL (ref 11.5–15)
PLATELET # BLD: 217 E9/L (ref 130–450)
PMV BLD AUTO: 9.9 FL (ref 7–12)
POTASSIUM SERPL-SCNC: 4 MMOL/L (ref 3.5–5)
RBC # BLD: 4.32 E12/L (ref 3.8–5.8)
SODIUM BLD-SCNC: 140 MMOL/L (ref 132–146)
WBC # BLD: 6.7 E9/L (ref 4.5–11.5)

## 2021-09-12 PROCEDURE — 6360000002 HC RX W HCPCS

## 2021-09-12 PROCEDURE — 2580000003 HC RX 258: Performed by: STUDENT IN AN ORGANIZED HEALTH CARE EDUCATION/TRAINING PROGRAM

## 2021-09-12 PROCEDURE — 6370000000 HC RX 637 (ALT 250 FOR IP): Performed by: STUDENT IN AN ORGANIZED HEALTH CARE EDUCATION/TRAINING PROGRAM

## 2021-09-12 PROCEDURE — 3700000001 HC ADD 15 MINUTES (ANESTHESIA): Performed by: ORTHOPAEDIC SURGERY

## 2021-09-12 PROCEDURE — 1200000000 HC SEMI PRIVATE

## 2021-09-12 PROCEDURE — 36415 COLL VENOUS BLD VENIPUNCTURE: CPT

## 2021-09-12 PROCEDURE — P9041 ALBUMIN (HUMAN),5%, 50ML: HCPCS

## 2021-09-12 PROCEDURE — 2709999900 HC NON-CHARGEABLE SUPPLY: Performed by: ORTHOPAEDIC SURGERY

## 2021-09-12 PROCEDURE — 2500000003 HC RX 250 WO HCPCS: Performed by: ORTHOPAEDIC SURGERY

## 2021-09-12 PROCEDURE — 73502 X-RAY EXAM HIP UNI 2-3 VIEWS: CPT

## 2021-09-12 PROCEDURE — 72170 X-RAY EXAM OF PELVIS: CPT

## 2021-09-12 PROCEDURE — 3600000005 HC SURGERY LEVEL 5 BASE: Performed by: ORTHOPAEDIC SURGERY

## 2021-09-12 PROCEDURE — 6360000002 HC RX W HCPCS: Performed by: STUDENT IN AN ORGANIZED HEALTH CARE EDUCATION/TRAINING PROGRAM

## 2021-09-12 PROCEDURE — 80048 BASIC METABOLIC PNL TOTAL CA: CPT

## 2021-09-12 PROCEDURE — 3700000000 HC ANESTHESIA ATTENDED CARE: Performed by: ORTHOPAEDIC SURGERY

## 2021-09-12 PROCEDURE — 7100000000 HC PACU RECOVERY - FIRST 15 MIN: Performed by: ORTHOPAEDIC SURGERY

## 2021-09-12 PROCEDURE — 6360000002 HC RX W HCPCS: Performed by: ANESTHESIOLOGY

## 2021-09-12 PROCEDURE — 27228 TREAT HIP FRACTURE(S): CPT | Performed by: ORTHOPAEDIC SURGERY

## 2021-09-12 PROCEDURE — 2500000003 HC RX 250 WO HCPCS

## 2021-09-12 PROCEDURE — 85025 COMPLETE CBC W/AUTO DIFF WBC: CPT

## 2021-09-12 PROCEDURE — 2580000003 HC RX 258: Performed by: EMERGENCY MEDICINE

## 2021-09-12 PROCEDURE — 2720000010 HC SURG SUPPLY STERILE: Performed by: ORTHOPAEDIC SURGERY

## 2021-09-12 PROCEDURE — C1713 ANCHOR/SCREW BN/BN,TIS/BN: HCPCS | Performed by: ORTHOPAEDIC SURGERY

## 2021-09-12 PROCEDURE — 3600000015 HC SURGERY LEVEL 5 ADDTL 15MIN: Performed by: ORTHOPAEDIC SURGERY

## 2021-09-12 PROCEDURE — 7100000001 HC PACU RECOVERY - ADDTL 15 MIN: Performed by: ORTHOPAEDIC SURGERY

## 2021-09-12 DEVICE — SCREW BNE L40MM DIA3.5MM CORT S STL ST NONCANNULATED LOK: Type: IMPLANTABLE DEVICE | Site: HIP | Status: FUNCTIONAL

## 2021-09-12 DEVICE — SCREW BNE L34MM DIA3.5MM CORT S STL ST NONCANNULATED LOK: Type: IMPLANTABLE DEVICE | Site: HIP | Status: FUNCTIONAL

## 2021-09-12 DEVICE — PLATE BNE L435MM 3 H ST BILAT S STL SPR LO PROF RIG: Type: IMPLANTABLE DEVICE | Site: HIP | Status: FUNCTIONAL

## 2021-09-12 DEVICE — SCREW BNE L28MM DIA3.5MM CORT S STL ST NONCANNULATED LOK: Type: IMPLANTABLE DEVICE | Site: HIP | Status: FUNCTIONAL

## 2021-09-12 DEVICE — PLATE BNE W102XL104MM THK27MM RAD 88MM 8 H BILAT PELV S STL: Type: IMPLANTABLE DEVICE | Site: HIP | Status: FUNCTIONAL

## 2021-09-12 DEVICE — SCREW BNE L26MM DIA3.5MM CORT S STL ST NONCANNULATED LOK: Type: IMPLANTABLE DEVICE | Site: HIP | Status: FUNCTIONAL

## 2021-09-12 DEVICE — SCREW BNE L32MM DIA3.5MM CORT S STL ST NONCANNULATED LOK: Type: IMPLANTABLE DEVICE | Site: HIP | Status: FUNCTIONAL

## 2021-09-12 RX ORDER — CEFAZOLIN SODIUM 1 G/3ML
INJECTION, POWDER, FOR SOLUTION INTRAMUSCULAR; INTRAVENOUS PRN
Status: DISCONTINUED | OUTPATIENT
Start: 2021-09-12 | End: 2021-09-12 | Stop reason: SDUPTHER

## 2021-09-12 RX ORDER — MEPERIDINE HYDROCHLORIDE 25 MG/ML
INJECTION INTRAMUSCULAR; INTRAVENOUS; SUBCUTANEOUS
Status: COMPLETED
Start: 2021-09-12 | End: 2021-09-12

## 2021-09-12 RX ORDER — ALBUMIN, HUMAN INJ 5% 5 %
SOLUTION INTRAVENOUS PRN
Status: DISCONTINUED | OUTPATIENT
Start: 2021-09-12 | End: 2021-09-12 | Stop reason: SDUPTHER

## 2021-09-12 RX ORDER — DEXAMETHASONE SODIUM PHOSPHATE 4 MG/ML
INJECTION, SOLUTION INTRA-ARTICULAR; INTRALESIONAL; INTRAMUSCULAR; INTRAVENOUS; SOFT TISSUE PRN
Status: DISCONTINUED | OUTPATIENT
Start: 2021-09-12 | End: 2021-09-12 | Stop reason: SDUPTHER

## 2021-09-12 RX ORDER — ROCURONIUM BROMIDE 10 MG/ML
INJECTION, SOLUTION INTRAVENOUS PRN
Status: DISCONTINUED | OUTPATIENT
Start: 2021-09-12 | End: 2021-09-12 | Stop reason: SDUPTHER

## 2021-09-12 RX ORDER — LIDOCAINE HYDROCHLORIDE 20 MG/ML
INJECTION, SOLUTION EPIDURAL; INFILTRATION; INTRACAUDAL; PERINEURAL PRN
Status: DISCONTINUED | OUTPATIENT
Start: 2021-09-12 | End: 2021-09-12 | Stop reason: SDUPTHER

## 2021-09-12 RX ORDER — MEPERIDINE HYDROCHLORIDE 25 MG/ML
12.5 INJECTION INTRAMUSCULAR; INTRAVENOUS; SUBCUTANEOUS EVERY 5 MIN PRN
Status: DISCONTINUED | OUTPATIENT
Start: 2021-09-12 | End: 2021-09-12 | Stop reason: HOSPADM

## 2021-09-12 RX ORDER — MIDAZOLAM HYDROCHLORIDE 1 MG/ML
INJECTION INTRAMUSCULAR; INTRAVENOUS PRN
Status: DISCONTINUED | OUTPATIENT
Start: 2021-09-12 | End: 2021-09-12 | Stop reason: SDUPTHER

## 2021-09-12 RX ORDER — PROPOFOL 10 MG/ML
INJECTION, EMULSION INTRAVENOUS PRN
Status: DISCONTINUED | OUTPATIENT
Start: 2021-09-12 | End: 2021-09-12 | Stop reason: SDUPTHER

## 2021-09-12 RX ORDER — KETOROLAC TROMETHAMINE 30 MG/ML
INJECTION, SOLUTION INTRAMUSCULAR; INTRAVENOUS PRN
Status: DISCONTINUED | OUTPATIENT
Start: 2021-09-12 | End: 2021-09-12 | Stop reason: SDUPTHER

## 2021-09-12 RX ORDER — OXYCODONE HYDROCHLORIDE AND ACETAMINOPHEN 5; 325 MG/1; MG/1
1 TABLET ORAL EVERY 6 HOURS PRN
Qty: 28 TABLET | Refills: 0 | Status: SHIPPED | OUTPATIENT
Start: 2021-09-12 | End: 2021-09-19

## 2021-09-12 RX ORDER — DIPHENHYDRAMINE HYDROCHLORIDE 50 MG/ML
12.5 INJECTION INTRAMUSCULAR; INTRAVENOUS
Status: DISCONTINUED | OUTPATIENT
Start: 2021-09-12 | End: 2021-09-12 | Stop reason: HOSPADM

## 2021-09-12 RX ORDER — FENTANYL CITRATE 50 UG/ML
INJECTION, SOLUTION INTRAMUSCULAR; INTRAVENOUS PRN
Status: DISCONTINUED | OUTPATIENT
Start: 2021-09-12 | End: 2021-09-12 | Stop reason: SDUPTHER

## 2021-09-12 RX ORDER — ONDANSETRON 2 MG/ML
INJECTION INTRAMUSCULAR; INTRAVENOUS PRN
Status: DISCONTINUED | OUTPATIENT
Start: 2021-09-12 | End: 2021-09-12 | Stop reason: SDUPTHER

## 2021-09-12 RX ADMIN — SODIUM CHLORIDE: 9 INJECTION, SOLUTION INTRAVENOUS at 13:54

## 2021-09-12 RX ADMIN — MIDAZOLAM 2 MG: 1 INJECTION INTRAMUSCULAR; INTRAVENOUS at 10:12

## 2021-09-12 RX ADMIN — CEFAZOLIN 2000 MG: 1 INJECTION, POWDER, FOR SOLUTION INTRAMUSCULAR; INTRAVENOUS at 10:39

## 2021-09-12 RX ADMIN — PHENYLEPHRINE HYDROCHLORIDE 200 MCG: 10 INJECTION INTRAVENOUS at 10:42

## 2021-09-12 RX ADMIN — HYDROMORPHONE HYDROCHLORIDE 0.5 MG: 1 INJECTION, SOLUTION INTRAMUSCULAR; INTRAVENOUS; SUBCUTANEOUS at 12:40

## 2021-09-12 RX ADMIN — METHOCARBAMOL TABLETS 1000 MG: 500 TABLET, COATED ORAL at 21:01

## 2021-09-12 RX ADMIN — HYDROMORPHONE HYDROCHLORIDE 0.5 MG: 1 INJECTION, SOLUTION INTRAMUSCULAR; INTRAVENOUS; SUBCUTANEOUS at 12:45

## 2021-09-12 RX ADMIN — SUGAMMADEX 154 MG: 100 INJECTION, SOLUTION INTRAVENOUS at 12:02

## 2021-09-12 RX ADMIN — KETOROLAC TROMETHAMINE 30 MG: 30 INJECTION, SOLUTION INTRAMUSCULAR; INTRAVENOUS at 11:50

## 2021-09-12 RX ADMIN — MEPERIDINE HYDROCHLORIDE 12.5 MG: 25 INJECTION, SOLUTION INTRAMUSCULAR; INTRAVENOUS; SUBCUTANEOUS at 12:30

## 2021-09-12 RX ADMIN — WATER 1000 MG: 1 INJECTION INTRAMUSCULAR; INTRAVENOUS; SUBCUTANEOUS at 18:38

## 2021-09-12 RX ADMIN — PROPOFOL 30 MG: 10 INJECTION, EMULSION INTRAVENOUS at 11:58

## 2021-09-12 RX ADMIN — ALBUMIN (HUMAN) 500 ML: 12.5 INJECTION, SOLUTION INTRAVENOUS at 11:15

## 2021-09-12 RX ADMIN — ONDANSETRON HYDROCHLORIDE 4 MG: 2 INJECTION, SOLUTION INTRAMUSCULAR; INTRAVENOUS at 11:50

## 2021-09-12 RX ADMIN — MEPERIDINE HYDROCHLORIDE 12.5 MG: 25 INJECTION, SOLUTION INTRAMUSCULAR; INTRAVENOUS; SUBCUTANEOUS at 12:35

## 2021-09-12 RX ADMIN — ROCURONIUM BROMIDE 30 MG: 10 INJECTION, SOLUTION INTRAVENOUS at 10:49

## 2021-09-12 RX ADMIN — ACETAMINOPHEN 1000 MG: 500 TABLET ORAL at 21:02

## 2021-09-12 RX ADMIN — ENOXAPARIN SODIUM 40 MG: 40 INJECTION SUBCUTANEOUS at 18:20

## 2021-09-12 RX ADMIN — DEXAMETHASONE SODIUM PHOSPHATE 10 MG: 4 INJECTION, SOLUTION INTRAMUSCULAR; INTRAVENOUS at 11:08

## 2021-09-12 RX ADMIN — ROCURONIUM BROMIDE 50 MG: 10 INJECTION, SOLUTION INTRAVENOUS at 10:17

## 2021-09-12 RX ADMIN — SODIUM CHLORIDE: 9 INJECTION, SOLUTION INTRAVENOUS at 06:13

## 2021-09-12 RX ADMIN — HYDROMORPHONE HYDROCHLORIDE 1 MG: 1 INJECTION, SOLUTION INTRAMUSCULAR; INTRAVENOUS; SUBCUTANEOUS at 00:48

## 2021-09-12 RX ADMIN — SODIUM CHLORIDE: 9 INJECTION, SOLUTION INTRAVENOUS at 11:59

## 2021-09-12 RX ADMIN — LIDOCAINE HYDROCHLORIDE 100 MG: 20 INJECTION, SOLUTION EPIDURAL; INFILTRATION; INTRACAUDAL; PERINEURAL at 10:17

## 2021-09-12 RX ADMIN — PHENYLEPHRINE HYDROCHLORIDE 200 MCG: 10 INJECTION INTRAVENOUS at 10:56

## 2021-09-12 RX ADMIN — METHOCARBAMOL TABLETS 1000 MG: 500 TABLET, COATED ORAL at 18:16

## 2021-09-12 RX ADMIN — PROPOFOL 90 MG: 10 INJECTION, EMULSION INTRAVENOUS at 10:17

## 2021-09-12 RX ADMIN — Medication 10 ML: at 21:04

## 2021-09-12 RX ADMIN — PHENYLEPHRINE HYDROCHLORIDE 200 MCG: 10 INJECTION INTRAVENOUS at 10:37

## 2021-09-12 RX ADMIN — ACETAMINOPHEN 1000 MG: 500 TABLET ORAL at 13:54

## 2021-09-12 RX ADMIN — HYDROMORPHONE HYDROCHLORIDE 1 MG: 1 INJECTION, SOLUTION INTRAMUSCULAR; INTRAVENOUS; SUBCUTANEOUS at 06:13

## 2021-09-12 RX ADMIN — METHOCARBAMOL TABLETS 1000 MG: 500 TABLET, COATED ORAL at 13:54

## 2021-09-12 RX ADMIN — FENTANYL CITRATE 100 MCG: 50 INJECTION, SOLUTION INTRAMUSCULAR; INTRAVENOUS at 10:17

## 2021-09-12 RX ADMIN — HYDROMORPHONE HYDROCHLORIDE 1 MG: 1 INJECTION, SOLUTION INTRAMUSCULAR; INTRAVENOUS; SUBCUTANEOUS at 14:01

## 2021-09-12 RX ADMIN — ACETAMINOPHEN 1000 MG: 500 TABLET ORAL at 06:13

## 2021-09-12 RX ADMIN — FENTANYL CITRATE 50 MCG: 50 INJECTION, SOLUTION INTRAMUSCULAR; INTRAVENOUS at 10:46

## 2021-09-12 ASSESSMENT — PAIN DESCRIPTION - LOCATION
LOCATION: HIP
LOCATION: HIP
LOCATION: ARM;FACE;LEG
LOCATION: HIP
LOCATION: ARM;FACE;LEG
LOCATION: HIP
LOCATION: HIP

## 2021-09-12 ASSESSMENT — PAIN SCALES - GENERAL
PAINLEVEL_OUTOF10: 4
PAINLEVEL_OUTOF10: 10
PAINLEVEL_OUTOF10: 10
PAINLEVEL_OUTOF10: 0
PAINLEVEL_OUTOF10: 8
PAINLEVEL_OUTOF10: 3
PAINLEVEL_OUTOF10: 5
PAINLEVEL_OUTOF10: 10
PAINLEVEL_OUTOF10: 10
PAINLEVEL_OUTOF10: 8
PAINLEVEL_OUTOF10: 7

## 2021-09-12 ASSESSMENT — PULMONARY FUNCTION TESTS
PIF_VALUE: 19
PIF_VALUE: 25
PIF_VALUE: 27
PIF_VALUE: 24
PIF_VALUE: 22
PIF_VALUE: 24
PIF_VALUE: 18
PIF_VALUE: 27
PIF_VALUE: 2
PIF_VALUE: 25
PIF_VALUE: 24
PIF_VALUE: 26
PIF_VALUE: 25
PIF_VALUE: 24
PIF_VALUE: 26
PIF_VALUE: 4
PIF_VALUE: 25
PIF_VALUE: 27
PIF_VALUE: 26
PIF_VALUE: 5
PIF_VALUE: 25
PIF_VALUE: 25
PIF_VALUE: 27
PIF_VALUE: 26
PIF_VALUE: 26
PIF_VALUE: 27
PIF_VALUE: 22
PIF_VALUE: 25
PIF_VALUE: 25
PIF_VALUE: 27
PIF_VALUE: 26
PIF_VALUE: 22
PIF_VALUE: 19
PIF_VALUE: 22
PIF_VALUE: 26
PIF_VALUE: 25
PIF_VALUE: 27
PIF_VALUE: 26
PIF_VALUE: 27
PIF_VALUE: 2
PIF_VALUE: 26
PIF_VALUE: 24
PIF_VALUE: 26
PIF_VALUE: 24
PIF_VALUE: 1
PIF_VALUE: 2
PIF_VALUE: 26
PIF_VALUE: 27
PIF_VALUE: 27
PIF_VALUE: 25
PIF_VALUE: 25
PIF_VALUE: 27
PIF_VALUE: 25
PIF_VALUE: 25
PIF_VALUE: 3
PIF_VALUE: 2
PIF_VALUE: 20
PIF_VALUE: 26
PIF_VALUE: 22
PIF_VALUE: 22
PIF_VALUE: 26
PIF_VALUE: 27
PIF_VALUE: 2
PIF_VALUE: 25
PIF_VALUE: 4
PIF_VALUE: 27
PIF_VALUE: 26
PIF_VALUE: 25
PIF_VALUE: 27
PIF_VALUE: 25
PIF_VALUE: 15
PIF_VALUE: 22
PIF_VALUE: 26
PIF_VALUE: 25
PIF_VALUE: 24
PIF_VALUE: 20
PIF_VALUE: 27
PIF_VALUE: 26
PIF_VALUE: 5
PIF_VALUE: 26
PIF_VALUE: 25
PIF_VALUE: 24
PIF_VALUE: 26
PIF_VALUE: 27
PIF_VALUE: 22
PIF_VALUE: 23
PIF_VALUE: 2
PIF_VALUE: 25
PIF_VALUE: 1
PIF_VALUE: 24
PIF_VALUE: 0
PIF_VALUE: 27
PIF_VALUE: 27
PIF_VALUE: 24
PIF_VALUE: 21
PIF_VALUE: 4
PIF_VALUE: 26
PIF_VALUE: 1
PIF_VALUE: 2
PIF_VALUE: 26
PIF_VALUE: 2
PIF_VALUE: 2
PIF_VALUE: 27
PIF_VALUE: 24
PIF_VALUE: 27
PIF_VALUE: 2
PIF_VALUE: 21
PIF_VALUE: 26
PIF_VALUE: 26
PIF_VALUE: 27
PIF_VALUE: 25
PIF_VALUE: 27
PIF_VALUE: 25
PIF_VALUE: 26
PIF_VALUE: 27
PIF_VALUE: 22
PIF_VALUE: 27
PIF_VALUE: 26
PIF_VALUE: 0
PIF_VALUE: 26
PIF_VALUE: 25
PIF_VALUE: 26
PIF_VALUE: 27
PIF_VALUE: 24
PIF_VALUE: 26
PIF_VALUE: 26
PIF_VALUE: 27

## 2021-09-12 ASSESSMENT — PAIN DESCRIPTION - ORIENTATION
ORIENTATION: LEFT

## 2021-09-12 ASSESSMENT — PAIN DESCRIPTION - FREQUENCY
FREQUENCY: INTERMITTENT
FREQUENCY: CONTINUOUS
FREQUENCY: CONTINUOUS
FREQUENCY: INTERMITTENT
FREQUENCY: CONTINUOUS

## 2021-09-12 ASSESSMENT — PAIN - FUNCTIONAL ASSESSMENT
PAIN_FUNCTIONAL_ASSESSMENT: PREVENTS OR INTERFERES SOME ACTIVE ACTIVITIES AND ADLS

## 2021-09-12 ASSESSMENT — PAIN DESCRIPTION - DESCRIPTORS
DESCRIPTORS: ACHING;BURNING
DESCRIPTORS: ACHING;BURNING;CONSTANT
DESCRIPTORS: DISCOMFORT;SORE;TENDER
DESCRIPTORS: ACHING;CONSTANT;DISCOMFORT;SORE
DESCRIPTORS: ACHING;CONSTANT;DISCOMFORT;SORE
DESCRIPTORS: ACHING;BURNING
DESCRIPTORS: SORE;TENDER;DISCOMFORT

## 2021-09-12 ASSESSMENT — PAIN DESCRIPTION - PROGRESSION
CLINICAL_PROGRESSION: NOT CHANGED
CLINICAL_PROGRESSION: NOT CHANGED

## 2021-09-12 ASSESSMENT — PAIN DESCRIPTION - PAIN TYPE
TYPE: ACUTE PAIN
TYPE: SURGICAL PAIN
TYPE: SURGICAL PAIN
TYPE: ACUTE PAIN
TYPE: SURGICAL PAIN
TYPE: SURGICAL PAIN

## 2021-09-12 ASSESSMENT — PAIN DESCRIPTION - ONSET
ONSET: ON-GOING
ONSET: AWAKENED FROM SLEEP
ONSET: ON-GOING

## 2021-09-12 ASSESSMENT — LIFESTYLE VARIABLES: SMOKING_STATUS: 1
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
## 2021-09-12 NOTE — OP NOTE
Operative Note      Patient: Cara Guzman  YOB: 1980  MRN: [id number]    Date of Procedure: 9/12/2021    Pre-Op Diagnosis: Left transverse posterior wall acetabular fracture    Post-Op Diagnosis: Same         Procedure:  Open reduction internal fixation left transverse posterior wall the acetabulum fracture        Surgeon(s):  Iris Bernabe MD    Assistant:   Resident: Trevon Shen DO    Anesthesia: General    Estimated Blood Loss (mL): 981     Complications: None    Specimens:   * No specimens in log *    Implants:  Implant Name Type Inv.  Item Serial No.  Lot No. LRB No. Used Action   PLATE BNE [id number] 3 H ST BILAT S STL SPR LO PROF RIG  PLATE BNE [id number] 3 H ST BILAT S STL SPR LO PROF RIG  [employer]WD [id number] Left 1 Implanted   PLATE BNE [id number] RAD 88MM 8 H BILAT PELV S STL  PLATE BNE [id number] RAD 88MM 8 H BILAT PELV S STL  [employer]WD  Left 1 Implanted   SCREW BNE L40MM DIA3.5MM MONA S STL ST NONCANNULATED [place]ERN[place]N  SCREW BNE L40MM DIA3.5MM MONA S STL ST NONCANNULATED HERNÁN  DEPUY MD2U USA-WD  Left 2 Implanted   SCREW BNE L26MM DIA3.5MM MONA S STL ST NONCANNULATED HERN[place]N  SCREW BNE L26MM DIA3.5MM MONA S STL ST NONCANNULATED HERNÁN  DEPUY MD2U [employer]-WD  Left 2 Implanted   SCREW BNE L28MM DIA3.5MM MONA S STL ST NONCANNULATED HERNÁN  SCREW BNE L28MM DIA3.5MM MONA S STL ST NONCANNULATED HERNÁN  DEPUY MD2U [employer]-WD  Left 1 Implanted   SCREW BNE L30MM DIA3.5MM MONA S STL ST NONCANNULATED HERNÁN  SCREW BNE L30MM DIA3.5MM MONA S STL ST NONCANNULATED HERNÁN  [employer]WD  Left 1 Implanted   SCREW BNE L32MM DIA3.5MM MONA S STL ST NONCANNULATED HERNÁN  SCREW BNE L32MM DIA3.5MM MONA S STL ST NONCANNULATED HERNÁN  [employer]WD  Left 1 Implanted   SCREW BNE L34MM DIA3.5MM MONA S STL ST NONCANNULATED HERNÁN  SCREW BNE L34MM DIA3.5MM MONA S STL ST NONCANNULATED HERNÁN  [employer]WD  Left 1 Implanted         Drains:   Urethral Catheter 16 fr (Active) Catheter Indications Perioperative use for selected surgical procedures 09/10/21 1355   Urine Color Yellow 09/12/21 0815   Urine Appearance Clear 09/12/21 0815   Output (mL) 850 mL 09/12/21 0612       Findings: Was able to buttress down the posterior wall very nicely and spanning the transverse fracture. Used a 3-hole spring plate and an 8 hole pelvic plate    Detailed Description of Procedure:   Patient was brought to the operating room in a supine position on hospital room bed. Patient was transferred to the operating room table in supine position. Patient was then appropriately anesthetized by anesthesia with them in control of the C-spine area. His traction pin was safely removed from his left distal femur. Patient was then placed in a left lateral decubitus position. All points pressure identified well-padded. An axillary roll was placed. Patient was held securely with the beanbag. His left lower extremity was sterilely prepped and draped in the standard orthopedic fashion. A timeout was performed indicating the appropriate identification of the patient, the procedure to be performed, and the side to be performed upon. This was agreed upon by all individuals in the room. After the timeout was performed a Bettylou Ring approach was marked out over the left posterior hip. A 10 blade used to make incision. Careful dissection was carried down to the iliotibial band and the fascia of the gluteus rod. This was appropriately incised. The obturator internus and the piriformis tendons were then tagged amputated and retracted which I did retract the sciatic nerve. The fracture was then exposed using a Hawley elevator. After the fracture was exposed a ball spike pusher was used to reduce it anatomically as far as the posterior wall. This was held with provisional K wires. The transverse was reduced with manipulation a clamp and then held with K wires.   At this point time a 3-hole spring plate was placed on the superior portion of the transverse and the posterior wall. This was used to buttress it down very nicely. We then went to our 8 hole posterior column plate where screw was placed distally in the ischium below the transverse fracture and then the screw proximally to compress it down. Appropriate bicortical fixation was achieved above and below the fracture at this point time. K wires were removed. Fluoroscopic view showed no screws violating the joint and the joint was anatomically reduced. The sciatic nerve was free and clear of any hardware. The wound was then micah irrigated sterile saline. We placed Surgi-Neville to help with bleeding as well as heterotopic ossification. The piriformis and obturator internus tendons were reattached with Ethibond suture. The iliotibial band and gluteus rod fascia were closed with 0 Vicryl in a watertight fashion. Subtenons tissue was closed with 2-0 Monocryl and skin with staples. Patient had an Aquasol dressing put in position his compartments soft compressible. Patient was taken to the PACU in stable condition.       Postoperative plan:  Touchdown weightbearing left lower extremity    Lovenox for DVT prophylaxis    Follow in the office in 2 weeks for staple removal x-rays of pelvis including Judet views    We will continue to monitor for occult injury    Electronically signed by Dina Monte MD on 9/12/2021 at 11:51 AM

## 2021-09-12 NOTE — ANESTHESIA POSTPROCEDURE EVALUATION
Department of Anesthesiology  Postprocedure Note    Patient: Bala Pond  MRN: [id number]  YOB: 1980  Date of evaluation: 9/12/2021  Time:  1:45 PM     Procedure Summary     Date: 09/12/21 Room / Location: [facility] OR 08 / [facility]    Anesthesia Start: 1012 Anesthesia Stop: 0050    Procedure: LEFT ACETABULUM OPEN REDUCTION INTERNAL FIXATION - SYNTHES (Left Hip) Diagnosis: (RIGHT ACETABULUM FRACTURE)    Surgeons: Karine Maher MD Responsible Provider: Renetta Melvin MD    Anesthesia Type: general ASA Status: 3          Anesthesia Type: general    Chana Phase I: Chana Score: 10    Chana Phase II:      Last vitals: Reviewed and per EMR flowsheets.        Anesthesia Post Evaluation    Patient location during evaluation: PACU  Patient participation: complete - patient participated  Level of consciousness: awake and alert  Airway patency: patent  Nausea & Vomiting: no nausea and no vomiting  Complications: no  Cardiovascular status: hemodynamically stable  Respiratory status: acceptable  Hydration status: euvolemic

## 2021-09-13 LAB
ANION GAP SERPL CALCULATED.3IONS-SCNC: 3 MMOL/L (ref 7–16)
BASOPHILS ABSOLUTE: 0.02 E9/L (ref 0–0.2)
BASOPHILS RELATIVE PERCENT: 0.2 % (ref 0–2)
BUN BLDV-MCNC: 12 MG/DL (ref 6–20)
CALCIUM SERPL-MCNC: 7.9 MG/DL (ref 8.6–10.2)
CHLORIDE BLD-SCNC: 106 MMOL/L (ref 98–107)
CO2: 27 MMOL/L (ref 22–29)
CREAT SERPL-MCNC: 0.8 MG/DL (ref 0.7–1.2)
EOSINOPHILS ABSOLUTE: 0.01 E9/L (ref 0.05–0.5)
EOSINOPHILS RELATIVE PERCENT: 0.1 % (ref 0–6)
GFR AFRICAN AMERICAN: >60
GFR NON-AFRICAN AMERICAN: >60 ML/MIN/1.73
GLUCOSE BLD-MCNC: 109 MG/DL (ref 74–99)
HCT VFR BLD CALC: 32.2 % (ref 37–54)
HEMOGLOBIN: 10.6 G/DL (ref 12.5–16.5)
IMMATURE GRANULOCYTES #: 0.04 E9/L
IMMATURE GRANULOCYTES %: 0.4 % (ref 0–5)
LYMPHOCYTES ABSOLUTE: 0.87 E9/L (ref 1.5–4)
LYMPHOCYTES RELATIVE PERCENT: 9.6 % (ref 20–42)
MCH RBC QN AUTO: 30 PG (ref 26–35)
MCHC RBC AUTO-ENTMCNC: 32.9 % (ref 32–34.5)
MCV RBC AUTO: 91.2 FL (ref 80–99.9)
MONOCYTES ABSOLUTE: 0.92 E9/L (ref 0.1–0.95)
MONOCYTES RELATIVE PERCENT: 10.2 % (ref 2–12)
NEUTROPHILS ABSOLUTE: 7.16 E9/L (ref 1.8–7.3)
NEUTROPHILS RELATIVE PERCENT: 79.5 % (ref 43–80)
PDW BLD-RTO: 13.7 FL (ref 11.5–15)
PLATELET # BLD: 222 E9/L (ref 130–450)
PMV BLD AUTO: 10.4 FL (ref 7–12)
POTASSIUM SERPL-SCNC: 4 MMOL/L (ref 3.5–5)
RBC # BLD: 3.53 E12/L (ref 3.8–5.8)
SODIUM BLD-SCNC: 136 MMOL/L (ref 132–146)
WBC # BLD: 9 E9/L (ref 4.5–11.5)

## 2021-09-13 PROCEDURE — 97161 PT EVAL LOW COMPLEX 20 MIN: CPT

## 2021-09-13 PROCEDURE — 1200000000 HC SEMI PRIVATE

## 2021-09-13 PROCEDURE — 2580000003 HC RX 258: Performed by: EMERGENCY MEDICINE

## 2021-09-13 PROCEDURE — 6360000002 HC RX W HCPCS: Performed by: STUDENT IN AN ORGANIZED HEALTH CARE EDUCATION/TRAINING PROGRAM

## 2021-09-13 PROCEDURE — 97530 THERAPEUTIC ACTIVITIES: CPT

## 2021-09-13 PROCEDURE — 6370000000 HC RX 637 (ALT 250 FOR IP): Performed by: STUDENT IN AN ORGANIZED HEALTH CARE EDUCATION/TRAINING PROGRAM

## 2021-09-13 PROCEDURE — 99232 SBSQ HOSP IP/OBS MODERATE 35: CPT | Performed by: SURGERY

## 2021-09-13 PROCEDURE — 80048 BASIC METABOLIC PNL TOTAL CA: CPT

## 2021-09-13 PROCEDURE — 97165 OT EVAL LOW COMPLEX 30 MIN: CPT

## 2021-09-13 PROCEDURE — 2580000003 HC RX 258: Performed by: STUDENT IN AN ORGANIZED HEALTH CARE EDUCATION/TRAINING PROGRAM

## 2021-09-13 PROCEDURE — 85025 COMPLETE CBC W/AUTO DIFF WBC: CPT

## 2021-09-13 PROCEDURE — 36415 COLL VENOUS BLD VENIPUNCTURE: CPT

## 2021-09-13 PROCEDURE — 6360000002 HC RX W HCPCS: Performed by: NURSE PRACTITIONER

## 2021-09-13 RX ADMIN — ACETAMINOPHEN 1000 MG: 500 TABLET ORAL at 13:16

## 2021-09-13 RX ADMIN — METHOCARBAMOL TABLETS 1000 MG: 500 TABLET, COATED ORAL at 08:42

## 2021-09-13 RX ADMIN — METHOCARBAMOL TABLETS 1000 MG: 500 TABLET, COATED ORAL at 17:22

## 2021-09-13 RX ADMIN — METHOCARBAMOL TABLETS 1000 MG: 500 TABLET, COATED ORAL at 13:16

## 2021-09-13 RX ADMIN — SODIUM CHLORIDE: 9 INJECTION, SOLUTION INTRAVENOUS at 14:30

## 2021-09-13 RX ADMIN — HYDROMORPHONE HYDROCHLORIDE 1 MG: 1 INJECTION, SOLUTION INTRAMUSCULAR; INTRAVENOUS; SUBCUTANEOUS at 06:16

## 2021-09-13 RX ADMIN — ENOXAPARIN SODIUM 30 MG: 30 INJECTION SUBCUTANEOUS at 22:32

## 2021-09-13 RX ADMIN — HYDROMORPHONE HYDROCHLORIDE 1 MG: 1 INJECTION, SOLUTION INTRAMUSCULAR; INTRAVENOUS; SUBCUTANEOUS at 22:56

## 2021-09-13 RX ADMIN — WATER 1000 MG: 1 INJECTION INTRAMUSCULAR; INTRAVENOUS; SUBCUTANEOUS at 02:57

## 2021-09-13 RX ADMIN — ACETAMINOPHEN 1000 MG: 500 TABLET ORAL at 22:33

## 2021-09-13 RX ADMIN — METHOCARBAMOL TABLETS 1000 MG: 500 TABLET, COATED ORAL at 22:32

## 2021-09-13 RX ADMIN — ACETAMINOPHEN 1000 MG: 500 TABLET ORAL at 06:11

## 2021-09-13 RX ADMIN — SODIUM CHLORIDE, PRESERVATIVE FREE 10 ML: 5 INJECTION INTRAVENOUS at 22:57

## 2021-09-13 ASSESSMENT — PAIN DESCRIPTION - PAIN TYPE
TYPE: SURGICAL PAIN
TYPE: SURGICAL PAIN

## 2021-09-13 ASSESSMENT — PAIN DESCRIPTION - LOCATION
LOCATION: HIP;LEG
LOCATION: HIP;LEG

## 2021-09-13 ASSESSMENT — PAIN DESCRIPTION - DESCRIPTORS: DESCRIPTORS: ACHING;THROBBING;STABBING

## 2021-09-13 ASSESSMENT — PAIN DESCRIPTION - ORIENTATION
ORIENTATION: LEFT;ANTERIOR
ORIENTATION: ANTERIOR;LEFT

## 2021-09-13 ASSESSMENT — PAIN SCALES - GENERAL
PAINLEVEL_OUTOF10: 10
PAINLEVEL_OUTOF10: 10
PAINLEVEL_OUTOF10: 2
PAINLEVEL_OUTOF10: 10
PAINLEVEL_OUTOF10: 10
PAINLEVEL_OUTOF10: 2

## 2021-09-13 ASSESSMENT — PAIN DESCRIPTION - PROGRESSION: CLINICAL_PROGRESSION: NOT CHANGED

## 2021-09-13 ASSESSMENT — PAIN SCALES - WONG BAKER
WONGBAKER_NUMERICALRESPONSE: 0
WONGBAKER_NUMERICALRESPONSE: 2
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
## 2021-09-13 NOTE — CARE COORDINATION
Per [facility] from [facility] they are unable to accept patient. Referral given to Krissy at [facility]. Await return call . Alirio from [facility] called and said they cannot accept. Referral faxed to Muna at [address] [facility] at  [facility]  Await response  Patient not wanting a facility in [facility], but agreeable to a facility anywhere in [facility]. SW updated. Ambulette form in envelope in soft chart. CM/SW will continue to follow.
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
## 2021-09-13 NOTE — PROGRESS NOTES
OT consult received and appreciated. Chart reviewed. Will hold evaluation due to patient agitated and difficult to redirect and would not cooperate with OOB activity. . Will evaluate at a later time. Thank you.  Loida Santos, OTR/L #[id number]

## 2021-09-13 NOTE — PROGRESS NOTES
Physical Therapy    Date: 2021       Patient Name: Sonia Camp  : 1980      MRN: [id number]    PT order received. Chart has been reviewed. PT evaluation attempted this date. Patient agitated and was unable to be redirected to participate. Declined participation until he speaks to a physician. Will continue to follow and complete evaluation at later time. Patient is to be TTWB. Will attempt later this date.      Ammy Pedro, PT

## 2021-09-13 NOTE — CARE COORDINATION
Patient is POD  #1 Susy is nwb LLE. He has IVF running and peña. Met with patient at bedside and he is homeless and has been living on the streets. He has no PCP or Pharmacy. Would be receptive to [facility] and would like [facility] and declined list. Referral called to Milli Pearson ,the facility liaison. Await acceptance. Patient encouraged to participate in therapy. CM/SW will continue to follow. Per Milli [facility] unable to accept at any of their facilities. With agreement from patient a referral was made to Ksenia Loya from "[facility] Await return call. CM/SW will continue to follow.
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
## 2021-09-13 NOTE — PLAN OF CARE
Problem: Pain:  Goal: Pain level will decrease  Description: Pain level will decrease  9/13/2021 0137 by Jacqueline Singh RN  Outcome: Met This Shift  9/12/2021 1943 by Korey Jesus RN  Outcome: Ongoing  Goal: Control of acute pain  Description: Control of acute pain  9/13/2021 0137 by Jacqueline Singh RN  Outcome: Met This Shift  9/12/2021 1943 by Korey Jesus RN  Outcome: Ongoing  Goal: Control of chronic pain  Description: Control of chronic pain  9/13/2021 0137 by Jacqueline Singh RN  Outcome: Met This Shift  9/12/2021 1943 by Korey Jesus RN  Outcome: Ongoing     Problem: Skin Integrity:  Goal: Will show no infection signs and symptoms  Description: Will show no infection signs and symptoms  9/13/2021 0137 by Jacqueline Singh RN  Outcome: Met This Shift  9/12/2021 1943 by Korey Jesus RN  Outcome: Met This Shift  Goal: Absence of new skin breakdown  Description: Absence of new skin breakdown  9/13/2021 0137 by Jacqueline Singh RN  Outcome: Met This Shift  9/12/2021 1943 by Korey Jesus RN  Outcome: Met This Shift  Goal: Risk for impaired skin integrity will decrease  Description: Risk for impaired skin integrity will decrease  Outcome: Met This Shift     Problem: Falls - Risk of:  Goal: Will remain free from falls  Description: Will remain free from falls  9/13/2021 0137 by Jacqueline Singh RN  Outcome: Met This Shift  9/12/2021 1943 by Korey Jesus RN  Outcome: Met This Shift  Goal: Absence of physical injury  Description: Absence of physical injury  9/13/2021 0137 by Jacqueline Singh RN  Outcome: Met This Shift  9/12/2021 1943 by Korey Jesus RN  Outcome: Met This Shift     Problem:  Activity:  Goal: Risk for activity intolerance will decrease  Description: Risk for activity intolerance will decrease  Outcome: Ongoing     Problem: Coping:  Goal: Ability to adjust to condition or change in health will improve  Description: Ability to adjust to condition or change in health will improve  Outcome: Met This Shift     Problem: Fluid Volume:  Goal: Ability to maintain a balanced intake and output will improve  Description: Ability to maintain a balanced intake and output will improve  Outcome: Met This Shift     Problem: Health Behavior:  Goal: Ability to identify and utilize available resources and services will improve  Description: Ability to identify and utilize available resources and services will improve  Outcome: Ongoing  Goal: Ability to manage health-related needs will improve  Description: Ability to manage health-related needs will improve  Outcome: Ongoing     Problem: Metabolic:  Goal: Ability to maintain appropriate glucose levels will improve  Description: Ability to maintain appropriate glucose levels will improve  Outcome: Met This Shift     Problem: Nutritional:  Goal: Maintenance of adequate nutrition will improve  Description: Maintenance of adequate nutrition will improve  Outcome: Met This Shift  Goal: Progress toward achieving an optimal weight will improve  Description: Progress toward achieving an optimal weight will improve  Outcome: Met This Shift     Problem: Physical Regulation:  Goal: Complications related to the disease process, condition or treatment will be avoided or minimized  Description: Complications related to the disease process, condition or treatment will be avoided or minimized  Outcome: Met This Shift  Goal: Diagnostic test results will improve  Description: Diagnostic test results will improve  Outcome: Met This Shift     Problem: Tissue Perfusion:  Goal: Adequacy of tissue perfusion will improve  Description: Adequacy of tissue perfusion will improve  Outcome: Met This Shift

## 2021-09-14 LAB
ANION GAP SERPL CALCULATED.3IONS-SCNC: 13 MMOL/L (ref 7–16)
BASOPHILS ABSOLUTE: 0.07 E9/L (ref 0–0.2)
BASOPHILS RELATIVE PERCENT: 1 % (ref 0–2)
BUN BLDV-MCNC: 8 MG/DL (ref 6–20)
CALCIUM SERPL-MCNC: 8.1 MG/DL (ref 8.6–10.2)
CHLORIDE BLD-SCNC: 104 MMOL/L (ref 98–107)
CO2: 24 MMOL/L (ref 22–29)
CREAT SERPL-MCNC: 0.7 MG/DL (ref 0.7–1.2)
EOSINOPHILS ABSOLUTE: 0.06 E9/L (ref 0.05–0.5)
EOSINOPHILS RELATIVE PERCENT: 0.9 % (ref 0–6)
GFR AFRICAN AMERICAN: >60
GFR NON-AFRICAN AMERICAN: >60 ML/MIN/1.73
GLUCOSE BLD-MCNC: 87 MG/DL (ref 74–99)
HCT VFR BLD CALC: 31.7 % (ref 37–54)
HEMOGLOBIN: 10.5 G/DL (ref 12.5–16.5)
IMMATURE GRANULOCYTES #: 0.02 E9/L
IMMATURE GRANULOCYTES %: 0.3 % (ref 0–5)
LYMPHOCYTES ABSOLUTE: 0.93 E9/L (ref 1.5–4)
LYMPHOCYTES RELATIVE PERCENT: 13.7 % (ref 20–42)
MCH RBC QN AUTO: 29.8 PG (ref 26–35)
MCHC RBC AUTO-ENTMCNC: 33.1 % (ref 32–34.5)
MCV RBC AUTO: 90.1 FL (ref 80–99.9)
MONOCYTES ABSOLUTE: 0.69 E9/L (ref 0.1–0.95)
MONOCYTES RELATIVE PERCENT: 10.1 % (ref 2–12)
NEUTROPHILS ABSOLUTE: 5.03 E9/L (ref 1.8–7.3)
NEUTROPHILS RELATIVE PERCENT: 74 % (ref 43–80)
PDW BLD-RTO: 13.9 FL (ref 11.5–15)
PLATELET # BLD: 260 E9/L (ref 130–450)
PMV BLD AUTO: 10.1 FL (ref 7–12)
POTASSIUM SERPL-SCNC: 3.9 MMOL/L (ref 3.5–5)
RBC # BLD: 3.52 E12/L (ref 3.8–5.8)
SODIUM BLD-SCNC: 141 MMOL/L (ref 132–146)
WBC # BLD: 6.8 E9/L (ref 4.5–11.5)

## 2021-09-14 PROCEDURE — 6370000000 HC RX 637 (ALT 250 FOR IP): Performed by: STUDENT IN AN ORGANIZED HEALTH CARE EDUCATION/TRAINING PROGRAM

## 2021-09-14 PROCEDURE — 36415 COLL VENOUS BLD VENIPUNCTURE: CPT

## 2021-09-14 PROCEDURE — 99232 SBSQ HOSP IP/OBS MODERATE 35: CPT | Performed by: SURGERY

## 2021-09-14 PROCEDURE — 6360000002 HC RX W HCPCS: Performed by: NURSE PRACTITIONER

## 2021-09-14 PROCEDURE — 1200000000 HC SEMI PRIVATE

## 2021-09-14 PROCEDURE — 80048 BASIC METABOLIC PNL TOTAL CA: CPT

## 2021-09-14 PROCEDURE — 2580000003 HC RX 258: Performed by: STUDENT IN AN ORGANIZED HEALTH CARE EDUCATION/TRAINING PROGRAM

## 2021-09-14 PROCEDURE — 85025 COMPLETE CBC W/AUTO DIFF WBC: CPT

## 2021-09-14 RX ORDER — SENNA AND DOCUSATE SODIUM 50; 8.6 MG/1; MG/1
2 TABLET, FILM COATED ORAL DAILY PRN
Status: DISCONTINUED | OUTPATIENT
Start: 2021-09-14 | End: 2021-09-14

## 2021-09-14 RX ORDER — METHOCARBAMOL 750 MG/1
1500 TABLET, FILM COATED ORAL 4 TIMES DAILY
Status: DISCONTINUED | OUTPATIENT
Start: 2021-09-14 | End: 2021-09-17 | Stop reason: HOSPADM

## 2021-09-14 RX ORDER — SENNA AND DOCUSATE SODIUM 50; 8.6 MG/1; MG/1
2 TABLET, FILM COATED ORAL DAILY
Status: DISCONTINUED | OUTPATIENT
Start: 2021-09-14 | End: 2021-09-17 | Stop reason: HOSPADM

## 2021-09-14 RX ORDER — LACTULOSE 10 G/15ML
20 SOLUTION ORAL 3 TIMES DAILY
Status: DISCONTINUED | OUTPATIENT
Start: 2021-09-14 | End: 2021-09-17 | Stop reason: HOSPADM

## 2021-09-14 RX ORDER — OXYCODONE HYDROCHLORIDE 5 MG/1
5 TABLET ORAL EVERY 4 HOURS PRN
Status: DISCONTINUED | OUTPATIENT
Start: 2021-09-14 | End: 2021-09-17 | Stop reason: HOSPADM

## 2021-09-14 RX ORDER — POLYETHYLENE GLYCOL 3350 17 G/17G
17 POWDER, FOR SOLUTION ORAL 2 TIMES DAILY
Status: DISCONTINUED | OUTPATIENT
Start: 2021-09-14 | End: 2021-09-17 | Stop reason: HOSPADM

## 2021-09-14 RX ORDER — OXYCODONE HYDROCHLORIDE 10 MG/1
10 TABLET ORAL EVERY 4 HOURS PRN
Status: DISCONTINUED | OUTPATIENT
Start: 2021-09-14 | End: 2021-09-17 | Stop reason: HOSPADM

## 2021-09-14 RX ADMIN — POLYETHYLENE GLYCOL 3350 17 G: 17 POWDER, FOR SOLUTION ORAL at 22:11

## 2021-09-14 RX ADMIN — METHOCARBAMOL TABLETS 1500 MG: 750 TABLET, COATED ORAL at 22:11

## 2021-09-14 RX ADMIN — LACTULOSE 20 G: 20 SOLUTION ORAL at 17:03

## 2021-09-14 RX ADMIN — LACTULOSE 20 G: 20 SOLUTION ORAL at 22:11

## 2021-09-14 RX ADMIN — ACETAMINOPHEN 1000 MG: 500 TABLET ORAL at 13:00

## 2021-09-14 RX ADMIN — Medication 10 ML: at 09:33

## 2021-09-14 RX ADMIN — METHOCARBAMOL TABLETS 1500 MG: 750 TABLET, COATED ORAL at 13:00

## 2021-09-14 RX ADMIN — Medication 10 ML: at 22:11

## 2021-09-14 RX ADMIN — ENOXAPARIN SODIUM 30 MG: 30 INJECTION SUBCUTANEOUS at 09:32

## 2021-09-14 RX ADMIN — METHOCARBAMOL TABLETS 1500 MG: 750 TABLET, COATED ORAL at 17:03

## 2021-09-14 RX ADMIN — METHOCARBAMOL TABLETS 1500 MG: 750 TABLET, COATED ORAL at 09:32

## 2021-09-14 RX ADMIN — DOCUSATE SODIUM 50 MG AND SENNOSIDES 8.6 MG 2 TABLET: 8.6; 5 TABLET, FILM COATED ORAL at 17:03

## 2021-09-14 RX ADMIN — POLYETHYLENE GLYCOL 3350 17 G: 17 POWDER, FOR SOLUTION ORAL at 09:33

## 2021-09-14 RX ADMIN — ACETAMINOPHEN 1000 MG: 500 TABLET ORAL at 22:11

## 2021-09-14 ASSESSMENT — PAIN SCALES - GENERAL
PAINLEVEL_OUTOF10: 10
PAINLEVEL_OUTOF10: 0
PAINLEVEL_OUTOF10: 6
PAINLEVEL_OUTOF10: 0

## 2021-09-14 ASSESSMENT — PAIN SCALES - WONG BAKER
WONGBAKER_NUMERICALRESPONSE: 0

## 2021-09-14 ASSESSMENT — PAIN DESCRIPTION - PAIN TYPE: TYPE: SURGICAL PAIN

## 2021-09-14 ASSESSMENT — PAIN DESCRIPTION - LOCATION: LOCATION: HIP;LEG

## 2021-09-14 ASSESSMENT — PAIN DESCRIPTION - ORIENTATION: ORIENTATION: LEFT

## 2021-09-14 ASSESSMENT — PAIN DESCRIPTION - DESCRIPTORS: DESCRIPTORS: ACHING;DISCOMFORT

## 2021-09-14 NOTE — PROGRESS NOTES
According to patient, he has urinated and was actually incontinent. Educated patient to use the urinal or use the call light for assistance.

## 2021-09-14 NOTE — PROGRESS NOTES
Occupational Therapy  OT SESSION ATTEMPT     Date:2021  Patient Name: Isabel Vang  MRN: [id number]  : 1980  Room: [address] [place], [place] [address]     Attempted OT session this date:    [] unavailable due to other medical staff currently with pt   [] on hold per nursing staff   [] on hold per nursing staff secondary to lab / radiology results    [x] Politely declined treatment this date due to being in too much pain. [] off unit    [] Other:     Will reattempt OT session at a later time.       Kayleigh Dixon 47, [address]
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
## 2021-09-15 PROCEDURE — 99238 HOSP IP/OBS DSCHRG MGMT 30/<: CPT | Performed by: SURGERY

## 2021-09-15 PROCEDURE — 97535 SELF CARE MNGMENT TRAINING: CPT

## 2021-09-15 PROCEDURE — 97530 THERAPEUTIC ACTIVITIES: CPT

## 2021-09-15 PROCEDURE — 6370000000 HC RX 637 (ALT 250 FOR IP): Performed by: STUDENT IN AN ORGANIZED HEALTH CARE EDUCATION/TRAINING PROGRAM

## 2021-09-15 PROCEDURE — 6360000002 HC RX W HCPCS: Performed by: NURSE PRACTITIONER

## 2021-09-15 PROCEDURE — 2580000003 HC RX 258: Performed by: STUDENT IN AN ORGANIZED HEALTH CARE EDUCATION/TRAINING PROGRAM

## 2021-09-15 PROCEDURE — 1200000000 HC SEMI PRIVATE

## 2021-09-15 RX ADMIN — METHOCARBAMOL TABLETS 1500 MG: 750 TABLET, COATED ORAL at 10:04

## 2021-09-15 RX ADMIN — Medication 10 ML: at 23:03

## 2021-09-15 RX ADMIN — POLYETHYLENE GLYCOL 3350 17 G: 17 POWDER, FOR SOLUTION ORAL at 10:05

## 2021-09-15 RX ADMIN — LACTULOSE 20 G: 20 SOLUTION ORAL at 23:09

## 2021-09-15 RX ADMIN — ACETAMINOPHEN 1000 MG: 500 TABLET ORAL at 23:00

## 2021-09-15 RX ADMIN — DOCUSATE SODIUM 50 MG AND SENNOSIDES 8.6 MG 2 TABLET: 8.6; 5 TABLET, FILM COATED ORAL at 10:05

## 2021-09-15 RX ADMIN — METHOCARBAMOL TABLETS 1500 MG: 750 TABLET, COATED ORAL at 18:26

## 2021-09-15 RX ADMIN — Medication 10 ML: at 10:05

## 2021-09-15 RX ADMIN — LACTULOSE 20 G: 20 SOLUTION ORAL at 10:05

## 2021-09-15 RX ADMIN — METHOCARBAMOL TABLETS 1500 MG: 750 TABLET, COATED ORAL at 23:00

## 2021-09-15 RX ADMIN — LACTULOSE 20 G: 20 SOLUTION ORAL at 14:29

## 2021-09-15 RX ADMIN — OXYCODONE HYDROCHLORIDE 10 MG: 10 TABLET ORAL at 11:38

## 2021-09-15 RX ADMIN — ACETAMINOPHEN 1000 MG: 500 TABLET ORAL at 06:07

## 2021-09-15 RX ADMIN — ACETAMINOPHEN 1000 MG: 500 TABLET ORAL at 14:29

## 2021-09-15 RX ADMIN — POLYETHYLENE GLYCOL 3350 17 G: 17 POWDER, FOR SOLUTION ORAL at 23:08

## 2021-09-15 RX ADMIN — METHOCARBAMOL TABLETS 1500 MG: 750 TABLET, COATED ORAL at 14:29

## 2021-09-15 RX ADMIN — ENOXAPARIN SODIUM 30 MG: 30 INJECTION SUBCUTANEOUS at 23:00

## 2021-09-15 ASSESSMENT — PAIN DESCRIPTION - LOCATION
LOCATION: HIP;LEG
LOCATION: GENERALIZED;HIP
LOCATION: HIP;GENERALIZED
LOCATION: HIP;LEG

## 2021-09-15 ASSESSMENT — PAIN SCALES - GENERAL
PAINLEVEL_OUTOF10: 6
PAINLEVEL_OUTOF10: 10

## 2021-09-15 ASSESSMENT — PAIN DESCRIPTION - PAIN TYPE
TYPE: SURGICAL PAIN;ACUTE PAIN
TYPE: SURGICAL PAIN;ACUTE PAIN
TYPE: SURGICAL PAIN
TYPE: SURGICAL PAIN

## 2021-09-15 ASSESSMENT — PAIN DESCRIPTION - ORIENTATION
ORIENTATION: LEFT

## 2021-09-15 ASSESSMENT — PAIN DESCRIPTION - ONSET
ONSET: ON-GOING
ONSET: ON-GOING

## 2021-09-15 ASSESSMENT — PAIN DESCRIPTION - DESCRIPTORS
DESCRIPTORS: DISCOMFORT;ACHING;SORE
DESCRIPTORS: DISCOMFORT;SORE;TENDER
DESCRIPTORS: ACHING;DISCOMFORT
DESCRIPTORS: ACHING;DISCOMFORT

## 2021-09-15 ASSESSMENT — PAIN DESCRIPTION - FREQUENCY
FREQUENCY: CONTINUOUS
FREQUENCY: CONTINUOUS

## 2021-09-15 ASSESSMENT — PAIN DESCRIPTION - PROGRESSION
CLINICAL_PROGRESSION: NOT CHANGED
CLINICAL_PROGRESSION: NOT CHANGED
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
## 2021-09-15 NOTE — CARE COORDINATION
9/15, SW spoke with Williams Flores from [facility] facility can take at this time-no beds. Spoke with Soren from [facility] and unable to accept patient at this time. Contacted Elo from [facility] and made referral.  Will wait to see if accepted. SW to follow for further discharge planning needs.       Tyler Harvey, [facility] Case Management  [phone]

## 2021-09-16 PROCEDURE — 6370000000 HC RX 637 (ALT 250 FOR IP): Performed by: STUDENT IN AN ORGANIZED HEALTH CARE EDUCATION/TRAINING PROGRAM

## 2021-09-16 PROCEDURE — 99238 HOSP IP/OBS DSCHRG MGMT 30/<: CPT | Performed by: SURGERY

## 2021-09-16 PROCEDURE — 6360000002 HC RX W HCPCS: Performed by: NURSE PRACTITIONER

## 2021-09-16 PROCEDURE — 97535 SELF CARE MNGMENT TRAINING: CPT

## 2021-09-16 PROCEDURE — 1200000000 HC SEMI PRIVATE

## 2021-09-16 PROCEDURE — 2580000003 HC RX 258: Performed by: STUDENT IN AN ORGANIZED HEALTH CARE EDUCATION/TRAINING PROGRAM

## 2021-09-16 PROCEDURE — 97530 THERAPEUTIC ACTIVITIES: CPT

## 2021-09-16 RX ADMIN — OXYCODONE HYDROCHLORIDE 10 MG: 10 TABLET ORAL at 21:47

## 2021-09-16 RX ADMIN — ACETAMINOPHEN 1000 MG: 500 TABLET ORAL at 05:53

## 2021-09-16 RX ADMIN — OXYCODONE HYDROCHLORIDE 10 MG: 10 TABLET ORAL at 14:25

## 2021-09-16 RX ADMIN — METHOCARBAMOL TABLETS 1500 MG: 750 TABLET, COATED ORAL at 14:22

## 2021-09-16 RX ADMIN — METHOCARBAMOL TABLETS 1500 MG: 750 TABLET, COATED ORAL at 10:49

## 2021-09-16 RX ADMIN — LACTULOSE 20 G: 20 SOLUTION ORAL at 21:45

## 2021-09-16 RX ADMIN — ENOXAPARIN SODIUM 30 MG: 30 INJECTION SUBCUTANEOUS at 21:53

## 2021-09-16 RX ADMIN — ACETAMINOPHEN 1000 MG: 500 TABLET ORAL at 14:22

## 2021-09-16 RX ADMIN — METHOCARBAMOL TABLETS 1500 MG: 750 TABLET, COATED ORAL at 21:45

## 2021-09-16 RX ADMIN — Medication 10 ML: at 21:51

## 2021-09-16 RX ADMIN — ACETAMINOPHEN 1000 MG: 500 TABLET ORAL at 21:46

## 2021-09-16 RX ADMIN — METHOCARBAMOL TABLETS 1500 MG: 750 TABLET, COATED ORAL at 16:57

## 2021-09-16 ASSESSMENT — PAIN SCALES - GENERAL
PAINLEVEL_OUTOF10: 8
PAINLEVEL_OUTOF10: 10
PAINLEVEL_OUTOF10: 6
PAINLEVEL_OUTOF10: 8
PAINLEVEL_OUTOF10: 10
PAINLEVEL_OUTOF10: 5
PAINLEVEL_OUTOF10: 5

## 2021-09-16 ASSESSMENT — PAIN DESCRIPTION - DESCRIPTORS
DESCRIPTORS: ACHING;CONSTANT;DISCOMFORT;SORE
DESCRIPTORS: ACHING;CONSTANT;DISCOMFORT;SORE

## 2021-09-16 ASSESSMENT — PAIN DESCRIPTION - FREQUENCY
FREQUENCY: CONTINUOUS
FREQUENCY: CONTINUOUS

## 2021-09-16 ASSESSMENT — PAIN - FUNCTIONAL ASSESSMENT
PAIN_FUNCTIONAL_ASSESSMENT: PREVENTS OR INTERFERES WITH ALL ACTIVE AND SOME PASSIVE ACTIVITIES
PAIN_FUNCTIONAL_ASSESSMENT: PREVENTS OR INTERFERES WITH ALL ACTIVE AND SOME PASSIVE ACTIVITIES

## 2021-09-16 ASSESSMENT — PAIN DESCRIPTION - ORIENTATION
ORIENTATION: LEFT

## 2021-09-16 ASSESSMENT — PAIN DESCRIPTION - ONSET
ONSET: ON-GOING
ONSET: ON-GOING

## 2021-09-16 ASSESSMENT — PAIN DESCRIPTION - PROGRESSION
CLINICAL_PROGRESSION: NOT CHANGED

## 2021-09-16 ASSESSMENT — PAIN DESCRIPTION - PAIN TYPE
TYPE: SURGICAL PAIN

## 2021-09-16 ASSESSMENT — PAIN DESCRIPTION - LOCATION
LOCATION: LEG;HIP

## 2021-09-16 ASSESSMENT — PAIN SCALES - WONG BAKER: WONGBAKER_NUMERICALRESPONSE: 0

## 2021-09-16 NOTE — CARE COORDINATION
9/16, ANTONIO spoke with Ramu Clayton from [facility] on looking into bed availability. Ramu Clayton may have bed available tomorrow. Will be in touch with Ramu Clayton. Contact made with Milli from [facility] on taking a look at patient. Milli to contact ANTONIO back on referral and bed availability. ANTONIO to follow.       Walker Goins [facility] Case Management  [phone]
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
## 2021-09-16 NOTE — PROGRESS NOTES
Patient refusing lovenox and all bowel meds at this time stating \"I'm trained, I don't need medicine to go to the bathroom. \"  This RN tried to educate patient on constipation risk, patient started yelling incoherently. Will continue to monitor.

## 2021-09-16 NOTE — PLAN OF CARE
Problem: Falls - Risk of:  Goal: Will remain free from falls  Description: Will remain free from falls  Outcome: Met This Shift  Goal: Absence of physical injury  Description: Absence of physical injury  Outcome: Met This Shift     Problem: Nutritional:  Goal: Maintenance of adequate nutrition will improve  Description: Maintenance of adequate nutrition will improve  Outcome: Met This Shift     Problem: Tissue Perfusion:  Goal: Adequacy of tissue perfusion will improve  Description: Adequacy of tissue perfusion will improve  Outcome: Met This Shift     Problem: Pain:  Goal: Pain level will decrease  Description: Pain level will decrease  Outcome: Ongoing  Goal: Control of acute pain  Description: Control of acute pain  Outcome: Ongoing  Goal: Control of chronic pain  Description: Control of chronic pain  Outcome: Ongoing     Problem: Skin Integrity:  Goal: Will show no infection signs and symptoms  Description: Will show no infection signs and symptoms  Outcome: Ongoing  Goal: Absence of new skin breakdown  Description: Absence of new skin breakdown  Outcome: Ongoing  Goal: Risk for impaired skin integrity will decrease  Description: Risk for impaired skin integrity will decrease  Outcome: Ongoing     Problem:  Activity:  Goal: Risk for activity intolerance will decrease  Description: Risk for activity intolerance will decrease  Outcome: Ongoing
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
## 2021-09-17 VITALS
RESPIRATION RATE: 18 BRPM | TEMPERATURE: 98.2 F | HEIGHT: 71 IN | BODY MASS INDEX: 23.8 KG/M2 | WEIGHT: 170 LBS | DIASTOLIC BLOOD PRESSURE: 65 MMHG | SYSTOLIC BLOOD PRESSURE: 144 MMHG | HEART RATE: 79 BPM | OXYGEN SATURATION: 97 %

## 2021-09-17 LAB — SARS-COV-2, NAAT: NOT DETECTED

## 2021-09-17 PROCEDURE — 99238 HOSP IP/OBS DSCHRG MGMT 30/<: CPT | Performed by: SURGERY

## 2021-09-17 PROCEDURE — 87635 SARS-COV-2 COVID-19 AMP PRB: CPT

## 2021-09-17 PROCEDURE — 6370000000 HC RX 637 (ALT 250 FOR IP): Performed by: STUDENT IN AN ORGANIZED HEALTH CARE EDUCATION/TRAINING PROGRAM

## 2021-09-17 PROCEDURE — 2580000003 HC RX 258: Performed by: STUDENT IN AN ORGANIZED HEALTH CARE EDUCATION/TRAINING PROGRAM

## 2021-09-17 RX ORDER — BISACODYL 10 MG
10 SUPPOSITORY, RECTAL RECTAL DAILY PRN
DISCHARGE
Start: 2021-09-17 | End: 2021-10-17

## 2021-09-17 RX ORDER — LACTULOSE 10 G/15ML
20 SOLUTION ORAL 3 TIMES DAILY
Refills: 1 | DISCHARGE
Start: 2021-09-17

## 2021-09-17 RX ORDER — ONDANSETRON 4 MG/1
4 TABLET, ORALLY DISINTEGRATING ORAL EVERY 8 HOURS PRN
DISCHARGE
Start: 2021-09-17

## 2021-09-17 RX ORDER — SENNA AND DOCUSATE SODIUM 50; 8.6 MG/1; MG/1
2 TABLET, FILM COATED ORAL DAILY
DISCHARGE
Start: 2021-09-17

## 2021-09-17 RX ORDER — BISACODYL 10 MG
10 SUPPOSITORY, RECTAL RECTAL DAILY PRN
Status: DISCONTINUED | OUTPATIENT
Start: 2021-09-17 | End: 2021-09-17 | Stop reason: HOSPADM

## 2021-09-17 RX ORDER — METHOCARBAMOL 750 MG/1
1500 TABLET, FILM COATED ORAL 4 TIMES DAILY
Qty: 80 TABLET | Refills: 0 | DISCHARGE
Start: 2021-09-17 | End: 2021-09-27

## 2021-09-17 RX ORDER — POLYETHYLENE GLYCOL 3350 17 G/17G
17 POWDER, FOR SOLUTION ORAL 2 TIMES DAILY
Qty: 527 G | Refills: 1 | DISCHARGE
Start: 2021-09-17 | End: 2021-10-17

## 2021-09-17 RX ADMIN — METHOCARBAMOL TABLETS 1500 MG: 750 TABLET, COATED ORAL at 09:50

## 2021-09-17 RX ADMIN — ACETAMINOPHEN 1000 MG: 500 TABLET ORAL at 06:39

## 2021-09-17 RX ADMIN — OXYCODONE HYDROCHLORIDE 10 MG: 10 TABLET ORAL at 06:42

## 2021-09-17 RX ADMIN — Medication 10 ML: at 09:51

## 2021-09-17 ASSESSMENT — PAIN DESCRIPTION - ORIENTATION: ORIENTATION: LEFT

## 2021-09-17 ASSESSMENT — PAIN SCALES - GENERAL
PAINLEVEL_OUTOF10: 5
PAINLEVEL_OUTOF10: 10
PAINLEVEL_OUTOF10: 10

## 2021-09-17 ASSESSMENT — PAIN DESCRIPTION - LOCATION: LOCATION: HIP;LEG

## 2021-09-17 ASSESSMENT — PAIN DESCRIPTION - DESCRIPTORS: DESCRIPTORS: ACHING;CONSTANT;DISCOMFORT

## 2021-09-17 ASSESSMENT — PAIN DESCRIPTION - PAIN TYPE: TYPE: SURGICAL PAIN

## 2021-09-17 NOTE — PROGRESS NOTES
Attempted to call N2N report to [facility]. During the first attempt, someone answered and transferred my call to the correct department. Someone then proceeded to  the phone multiple times, immediately put me on hold each time, and then they eventually hung up. No answer during my second attempt.     Electronically signed by Carey Shultz RN on 9/17/2021 at 10:09 AM
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
## 2021-09-17 NOTE — PROGRESS NOTES
Patient refused all bowel medications despite education and encouragement. I asked him when his last BM was, he replied \"I'm trained. I don't do that. \" When I asked again to confirm, he states \"I only do it once a week. \" Pt continued to refuse despite last charted BM 9/9/21. He states he has had a BM since surgery.     Electronically signed by Power Castanon RN on 9/17/2021 at 10:05 AM

## 2021-09-17 NOTE — PROGRESS NOTES
Occupational Therapy     Date:2021  Patient Name: Terence Langley  MRN: [id number]  : 1980  Room: [address] [place] [address]     Completed chart review & attempted to see pt with pt declining due to pain & waiting for medicine to \"kick in\". Nsg present & reports he will be d/c to rehab at 12. Will attempt to see pt at another time. Yaakov Burnett.  [address] [place] [address], [address] [place] [address]

## 2021-09-17 NOTE — PLAN OF CARE
Problem: Pain:  Goal: Pain level will decrease  Description: Pain level will decrease  Outcome: Met This Shift  Goal: Control of acute pain  Description: Control of acute pain  Outcome: Met This Shift     Problem: Skin Integrity:  Goal: Will show no infection signs and symptoms  Description: Will show no infection signs and symptoms  9/17/2021 0213 by Rupert Edmonds RN  Outcome: Met This Shift  9/16/2021 1630 by Carlos Thornton RN  Outcome: Met This Shift  Goal: Absence of new skin breakdown  Description: Absence of new skin breakdown  9/17/2021 0213 by Rupert Edmonds RN  Outcome: Met This Shift  9/16/2021 1630 by Carlos Thornton RN  Outcome: Met This Shift  Goal: Risk for impaired skin integrity will decrease  Description: Risk for impaired skin integrity will decrease  Outcome: Met This Shift     Problem: Falls - Risk of:  Goal: Will remain free from falls  Description: Will remain free from falls  9/17/2021 0213 by Rupert Edmonds RN  Outcome: Met This Shift  9/16/2021 1630 by Carlos Thornton RN  Outcome: Met This Shift  Goal: Absence of physical injury  Description: Absence of physical injury  9/17/2021 0213 by Rupert Edmonds RN  Outcome: Met This Shift  9/16/2021 1630 by Carlos Thornton RN  Outcome: Met This Shift
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
## 2021-09-17 NOTE — CARE COORDINATION
9/17, SW spoke with Jonah Soares from [facility] on patient being accepted and can go to facility today. Precert was also obatined. COVID test that was done is negative. Transportation has been set up by facility to pick patient up at 12:00 today (noon). Those informed:  Patient (who is in agreement with discharge plan) and nursing. Contacted Summer person on chart-unable to leave message on phone-informed patient. Completed SBI on patient. At this time, patient does not want any services information and does not want to speak with a .  HENS, face sheet and envelope on soft chart. SW to follow for further discharge planning needs.       MAJOR Salinas  PHYSICIANS [facility] SURGICAL [facility] Case Management  [phone]

## 2021-09-18 ENCOUNTER — HOSPITAL ENCOUNTER (EMERGENCY)
Age: 41
Discharge: HOME | End: 2021-09-18
Attending: EMERGENCY MEDICINE
Payer: COMMERCIAL

## 2021-09-18 VITALS
SYSTOLIC BLOOD PRESSURE: 141 MMHG | DIASTOLIC BLOOD PRESSURE: 86 MMHG | BODY MASS INDEX: 20.92 KG/M2 | OXYGEN SATURATION: 97 % | RESPIRATION RATE: 16 BRPM | TEMPERATURE: 98.4 F | WEIGHT: 150 LBS | HEART RATE: 100 BPM

## 2021-09-18 DIAGNOSIS — S32.402D CLOSED NONDISPLACED FRACTURE OF LEFT ACETABULUM WITH ROUTINE HEALING, UNSPECIFIED PORTION OF ACETABULUM, SUBSEQUENT ENCOUNTER: Primary | ICD-10-CM

## 2021-09-18 PROCEDURE — 6370000000 HC RX 637 (ALT 250 FOR IP): Performed by: EMERGENCY MEDICINE

## 2021-09-18 PROCEDURE — 99284 EMERGENCY DEPT VISIT MOD MDM: CPT

## 2021-09-18 RX ORDER — IBUPROFEN 400 MG/1
400 TABLET ORAL ONCE
Status: COMPLETED | OUTPATIENT
Start: 2021-09-18 | End: 2021-09-18

## 2021-09-18 RX ORDER — OXYCODONE HYDROCHLORIDE AND ACETAMINOPHEN 5; 325 MG/1; MG/1
2 TABLET ORAL ONCE
Status: COMPLETED | OUTPATIENT
Start: 2021-09-18 | End: 2021-09-18

## 2021-09-18 RX ORDER — MORPHINE SULFATE 4 MG/ML
8 INJECTION, SOLUTION INTRAMUSCULAR; INTRAVENOUS ONCE
Status: DISCONTINUED | OUTPATIENT
Start: 2021-09-18 | End: 2021-09-18

## 2021-09-18 RX ADMIN — IBUPROFEN 400 MG: 400 TABLET ORAL at 19:24

## 2021-09-18 RX ADMIN — OXYCODONE HYDROCHLORIDE AND ACETAMINOPHEN 2 TABLET: 5; 325 TABLET ORAL at 15:03

## 2021-09-18 ASSESSMENT — PAIN SCALES - GENERAL
PAINLEVEL_OUTOF10: 8
PAINLEVEL_OUTOF10: 6

## 2021-09-18 NOTE — ED NOTES
Pt refusing to stay and go back to [facility].   Pt refusing to sign discharge papers and walked out ganga Hanks RN  09/18/21 0373

## 2021-09-18 NOTE — ED PROVIDER NOTES
HPI:  9/18/21,   Time: 2:48 PM EDT         Sabrina Ferraro is a 37 y.o. male presenting to the ED for left hip after being discharged 1 day ago for an acetabular fracture, beginning week ago. The complaint has been persistent, moderate in severity, and worsened by nothing, changing position. It is an extended care facility and is sent over via ambulance for evaluation. Patient has known history of polysubstance abuse as well as schizophrenia. ROS:   Pertinent positives and negatives are stated within HPI, all other systems reviewed and are negative.  --------------------------------------------- PAST HISTORY ---------------------------------------------  Past Medical History:  has a past medical history of Depression and Schizoaffective disorder ([facility] [address] Past Surgical History:  has a past surgical history that includes eye surgery (19 years ago). Social History:  reports that he has been smoking cigars and cigarettes. He has a 6.00 pack-year smoking history. He has never used smokeless tobacco. He reports current drug use. Frequency: 7.00 times per week. Drugs: Cocaine and Marijuana. He reports that he does not drink alcohol. Family History: family history includes Mental Illness in his mother; No Known Problems in his father; Substance Abuse in his mother. The patients home medications have been reviewed. Allergies: Chlorpheniramine-phenylephrine, Penicillins, and Rondec-d [chlophedianol-pseudoephedrine]    -------------------------------------------------- RESULTS -------------------------------------------------  All laboratory and radiology results have been personally reviewed by myself   LABS:  No results found for this visit on 09/18/21. RADIOLOGY:  Interpreted by Radiologist.  No orders to display       ------------------------- NURSING NOTES AND VITALS REVIEWED ---------------------------   The nursing notes within the ED encounter and vital signs as below have been reviewed.    BP (!) 141/86   Pulse 100   Temp 98.4 °F (36.9 °C)   Resp 16   Wt 150 lb (68 kg)   SpO2 97%   BMI 20.92 kg/m²   Oxygen Saturation Interpretation: Normal      ---------------------------------------------------PHYSICAL EXAM--------------------------------------      Constitutional/General: Alert and oriented x3, well appearing, non toxic in NAD  Head: NC/AT healing frontal laceration  Eyes: PERRL, EOMI  Mouth: Oropharynx clear, handling secretions, no trismus  Neck: Supple, full ROM, no meningeal signs  Pulmonary: Lungs clear to auscultation bilaterally, no wheezes, rales, or rhonchi. Not in respiratory distress  Cardiovascular:  Regular rate and rhythm, no murmurs, gallops, or rubs. 2+ distal pulses  Abdomen: Soft, non tender, non distended,   Extremities: Moves all extremities x 4. Warm and well perfused clean stapled incision with minimal surrounding tenderness no erythema or induration  Skin: warm and dry without rash  Neurologic: GCS 15,  Psych: Normal Affect      ------------------------------ ED COURSE/MEDICAL DECISION MAKING----------------------  Medications   ibuprofen (ADVIL;MOTRIN) tablet 400 mg (has no administration in time range)   oxyCODONE-acetaminophen (PERCOCET) 5-325 MG per tablet 2 tablet (2 tablets Oral Given 9/18/21 1503)         Medical Decision Making:    Was given a dose of morphine and social work was consulted. We were unable to obtain any response from [facility]. Patient became impatient with the process asked to be given crutches and discharged. Multiple attempts to dissuade the patient from this course were attempted but he insisted that he would not go back to [facility] because it was full of old people and he feels that they are discriminating against him. Patient was given crutches and discharged    Counseling:    The emergency provider has spoken with the patient and discussed todays results, in addition to providing specific details for the plan of care and counseling regarding the diagnosis and prognosis. Questions are answered at this time and they are agreeable with the plan.      --------------------------------- IMPRESSION AND DISPOSITION ---------------------------------    IMPRESSION  1.  Closed nondisplaced fracture of left acetabulum with routine healing, unspecified portion of acetabulum, subsequent encounter        DISPOSITION  Disposition: Discharge to home  Patient condition is stable                  Ling Reyes MD  09/18/21 2003

## 2021-09-18 NOTE — ED NOTES
Spoke to [employer] and she is notified that will be signing out here at hospital and refusing to go back to nursing facility. Dr Melia Yuan notified as well and ok to discharge pt.   Pt denies any suicidal homicidal or delusional audio visiual hallucination     Magdiel Isaac RN  09/18/21 1927

## 2021-09-18 NOTE — ED NOTES
Call to [facility] [phone],  reports she has been trying to transfer people to nurse all day but she thinks something is wrong with phone.  transferred and call was answered, did transfer, spoke with attendent who reports the nurse I need to speak with has left the floor. Explained that SW's have been calling repeatedly and have been unable to get in touch with a nurse despite having left the call back number multiple times. Explained SW will call back repeatedly in order to reach nurse in order to determine status of pt. This is per Dr Asim Lara request, need to establish pt status as there is some question about whether or not pt is still a resident there. SW did attempt to talk with pt, pt declined to talk, turned over and ignored Kaila Ordonez did check pt face sheet, no family or significant other number noted. Only number noted was [phone], this number disconnected. SW will continue to try to contact [facility]. [address], [place]  09/18/21 9396    Call to [facility], no answer.         [address], [place], [place]  09/18/21 9176

## 2021-09-18 NOTE — ED NOTES
SW placed phone call to [facility] [phone] attempted for the past 1 hour to speak to RN regarding patient discharging back to the facility for continued skilled nursing rehab. No answer yet. Next shift SW to follow up again.      FRANCISCO Gambino, [facility]  09/18/21 [address], [place]  09/18/21 2200

## 2021-09-19 ENCOUNTER — HOSPITAL ENCOUNTER (EMERGENCY)
Age: 41
Discharge: HOME | End: 2021-09-19
Attending: EMERGENCY MEDICINE
Payer: COMMERCIAL

## 2021-09-19 VITALS
OXYGEN SATURATION: 98 % | DIASTOLIC BLOOD PRESSURE: 68 MMHG | TEMPERATURE: 98.2 F | RESPIRATION RATE: 20 BRPM | WEIGHT: 150 LBS | SYSTOLIC BLOOD PRESSURE: 145 MMHG | BODY MASS INDEX: 20.32 KG/M2 | HEART RATE: 80 BPM | HEIGHT: 72 IN

## 2021-09-19 DIAGNOSIS — M25.552 LEFT HIP PAIN: ICD-10-CM

## 2021-09-19 DIAGNOSIS — F39 MOOD DISORDER (HCC): Primary | ICD-10-CM

## 2021-09-19 LAB
ACETAMINOPHEN LEVEL: <5 MCG/ML (ref 10–30)
ALBUMIN SERPL-MCNC: 3.1 G/DL (ref 3.5–5.2)
ALP BLD-CCNC: 44 U/L (ref 40–129)
ALT SERPL-CCNC: 41 U/L (ref 0–40)
ANION GAP SERPL CALCULATED.3IONS-SCNC: 9 MMOL/L (ref 7–16)
AST SERPL-CCNC: 33 U/L (ref 0–39)
BASOPHILS ABSOLUTE: 0.08 E9/L (ref 0–0.2)
BASOPHILS RELATIVE PERCENT: 0.9 % (ref 0–2)
BILIRUB SERPL-MCNC: 0.3 MG/DL (ref 0–1.2)
BUN BLDV-MCNC: 22 MG/DL (ref 6–20)
CALCIUM SERPL-MCNC: 8.8 MG/DL (ref 8.6–10.2)
CHLORIDE BLD-SCNC: 99 MMOL/L (ref 98–107)
CO2: 27 MMOL/L (ref 22–29)
CREAT SERPL-MCNC: 0.8 MG/DL (ref 0.7–1.2)
EOSINOPHILS ABSOLUTE: 0.06 E9/L (ref 0.05–0.5)
EOSINOPHILS RELATIVE PERCENT: 0.7 % (ref 0–6)
ETHANOL: <10 MG/DL (ref 0–0.08)
GFR AFRICAN AMERICAN: >60
GFR NON-AFRICAN AMERICAN: >60 ML/MIN/1.73
GLUCOSE BLD-MCNC: 95 MG/DL (ref 74–99)
HCT VFR BLD CALC: 33 % (ref 37–54)
HEMOGLOBIN: 10.7 G/DL (ref 12.5–16.5)
IMMATURE GRANULOCYTES #: 0.04 E9/L
IMMATURE GRANULOCYTES %: 0.5 % (ref 0–5)
LYMPHOCYTES ABSOLUTE: 1.2 E9/L (ref 1.5–4)
LYMPHOCYTES RELATIVE PERCENT: 13.6 % (ref 20–42)
MCH RBC QN AUTO: 29.4 PG (ref 26–35)
MCHC RBC AUTO-ENTMCNC: 32.4 % (ref 32–34.5)
MCV RBC AUTO: 90.7 FL (ref 80–99.9)
MONOCYTES ABSOLUTE: 0.94 E9/L (ref 0.1–0.95)
MONOCYTES RELATIVE PERCENT: 10.7 % (ref 2–12)
NEUTROPHILS ABSOLUTE: 6.5 E9/L (ref 1.8–7.3)
NEUTROPHILS RELATIVE PERCENT: 73.6 % (ref 43–80)
PDW BLD-RTO: 13.6 FL (ref 11.5–15)
PLATELET # BLD: 559 E9/L (ref 130–450)
PMV BLD AUTO: 8.6 FL (ref 7–12)
POTASSIUM SERPL-SCNC: 4.6 MMOL/L (ref 3.5–5)
RBC # BLD: 3.64 E12/L (ref 3.8–5.8)
SALICYLATE, SERUM: <0.3 MG/DL (ref 0–30)
SODIUM BLD-SCNC: 135 MMOL/L (ref 132–146)
TOTAL PROTEIN: 6.3 G/DL (ref 6.4–8.3)
TRICYCLIC ANTIDEPRESSANTS SCREEN SERUM: NEGATIVE NG/ML
WBC # BLD: 8.8 E9/L (ref 4.5–11.5)

## 2021-09-19 PROCEDURE — 80143 DRUG ASSAY ACETAMINOPHEN: CPT

## 2021-09-19 PROCEDURE — 80179 DRUG ASSAY SALICYLATE: CPT

## 2021-09-19 PROCEDURE — 99283 EMERGENCY DEPT VISIT LOW MDM: CPT

## 2021-09-19 PROCEDURE — 85025 COMPLETE CBC W/AUTO DIFF WBC: CPT

## 2021-09-19 PROCEDURE — 80307 DRUG TEST PRSMV CHEM ANLYZR: CPT

## 2021-09-19 PROCEDURE — 82077 ASSAY SPEC XCP UR&BREATH IA: CPT

## 2021-09-19 PROCEDURE — 80053 COMPREHEN METABOLIC PANEL: CPT

## 2021-09-19 ASSESSMENT — PAIN DESCRIPTION - LOCATION
LOCATION: LEG
LOCATION: HIP

## 2021-09-19 ASSESSMENT — PAIN DESCRIPTION - PAIN TYPE
TYPE: ACUTE PAIN
TYPE: SURGICAL PAIN

## 2021-09-19 ASSESSMENT — PAIN SCALES - GENERAL
PAINLEVEL_OUTOF10: 10
PAINLEVEL_OUTOF10: 4

## 2021-09-19 NOTE — ED PROVIDER NOTES
HPI:  9/19/21, Time: 3:51 AM EDT         Kentrell Leonard is a 37 y.o. male presenting to the ED for psychiatric evaluation, beginning days ago. The complaint has been persistent, mild in severity, and worsened by nothing. Patient does have underlying history of schizophrenia. Patient reporting hearing voices and reports some is out to harm him. Patient also reporting having suicidal thoughts but no plan. Patient reporting no abdominal pain no chest pain no difficulty breathing. Reports no fever chills or cough. Patient does complain of left hip pain he states that he had surgery on his hip recently. ROS:   Pertinent positives and negatives are stated within HPI, all other systems reviewed and are negative.  --------------------------------------------- PAST HISTORY ---------------------------------------------  Past Medical History:  has a past medical history of Depression and Schizoaffective disorder ([facility] [address] Past Surgical History:  has a past surgical history that includes eye surgery (19 years ago). Social History:  reports that he has been smoking cigars and cigarettes. He has a 6.00 pack-year smoking history. He has never used smokeless tobacco. He reports current drug use. Frequency: 7.00 times per week. Drugs: Cocaine and Marijuana. He reports that he does not drink alcohol. Family History: family history includes Mental Illness in his mother; No Known Problems in his father; Substance Abuse in his mother. The patients home medications have been reviewed.     Allergies: Chlorpheniramine-phenylephrine, Penicillins, and Rondec-d [chlophedianol-pseudoephedrine]    ---------------------------------------------------PHYSICAL EXAM--------------------------------------    Constitutional/General: Alert and oriented x3,   Head: Normocephalic and atraumatic  Eyes: PERRL, EOMI  Mouth: Oropharynx clear, handling secretions, no trismus  Neck: Supple, full ROM, non tender to palpation in the midline, no stridor, no crepitus, no meningeal signs  Pulmonary: Lungs clear to auscultation bilaterally, no wheezes, rales, or rhonchi. Not in respiratory distress  Cardiovascular:  Regular rate. Regular rhythm. No murmurs, gallops, or rubs. 2+ distal pulses  Chest: no chest wall tenderness  Abdomen: Soft. Non tender. Non distended. +BS. No rebound, guarding, or rigidity. No pulsatile masses appreciated. Musculoskeletal: Moves all extremities x 4. Tenderness to left hip. There are staples in place left lateral hip area. Warm and well perfused, no clubbing, cyanosis, or edema. Capillary refill <3 seconds  Skin: warm and dry. No rashes. Neurologic: GCS 15, CN 2-12 grossly intact, no focal deficits, symmetric strength 5/5 in the upper and lower extremities bilaterally  Psych: Patient reporting hallucinations auditory as well as reporting suicidal thoughts but no plan. Patient reports people are out to harm him and he is very paranoid.    -------------------------------------------------- RESULTS -------------------------------------------------  I have personally reviewed all laboratory and imaging results for this patient. Results are listed below. LABS:  No results found for this visit on 09/19/21. RADIOLOGY:  Interpreted by Radiologist.  No orders to display       }      ------------------------- NURSING NOTES AND VITALS REVIEWED ---------------------------   The nursing notes within the ED encounter and vital signs as below have been reviewed by myself. /72   Pulse 97   Temp 98.2 °F (36.8 °C) (Oral)   Resp 16   Ht 6' (1.829 m)   Wt 150 lb (68 kg)   SpO2 98%   BMI 20.34 kg/m²   Oxygen Saturation Interpretation: Normal    The patients available past medical records and past encounters were reviewed.         ------------------------------ ED COURSE/MEDICAL DECISION MAKING----------------------  Medications - No data to display          Medical Decision Making:   Patient presenting here because of auditory hallucinations he does have history of schizophrenia. Patient reporting feeling paranoid feeling that someone is out to harm him he does report history of suicidal ideation. Patient reports no plan to harm himself. Patient reports recent hip surgery. Patient reporting pain in his hand. Patient reporting no fever chills he does have staples in place the incision is clean and dry there is no cellulitis. Pulses are intact he has no lower extremity edema. Labs will be obtained and plan will be for  to evaluate. Re-Evaluations:             Re-evaluation. Patients symptoms show no change      Consultations:                 Critical Care: This patient's ED course included: a personal history and physicial eaxmination    This patient has been closely monitored during their ED course. Counseling: The emergency provider has spoken with the patient and discussed todays results, in addition to providing specific details for the plan of care and counseling regarding the diagnosis and prognosis. Questions are answered at this time and they are agreeable with the plan.       --------------------------------- IMPRESSION AND DISPOSITION ---------------------------------    IMPRESSION  1. Mood disorder ([address]    2. Left hip pain        DISPOSITION  Disposition:  to evaluate  Patient condition is stable        NOTE: This report was transcribed using voice recognition software.  Every effort was made to ensure accuracy; however, inadvertent computerized transcription errors may be present          Sula Rinne, MD  09/19/21 3962 Sylvester Pillai MD  09/19/21 2563

## 2021-09-19 NOTE — PROGRESS NOTES
Pt sleeping on cart on stomach, pt awakened and asked to give urine specimen pt stated, ok,  and went back to sleep.

## 2021-09-19 NOTE — ED NOTES
Bed: 17B-17  Expected date:   Expected time:   Means of arrival:   Comments:  Jaxson Sheikh RN  09/19/21 1962
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
## 2021-09-19 NOTE — ED NOTES
Patient to discharge with outpatient follow up. Patient presenting with baseline complaints. Patient was to be discharged back to his nursing facility yesterday prior to re-presenting to the ED, patient refused and left the ED without any psychiatric complaint. Patient has an extensive hx of on-going behavioral disturbances associated with polysubstance abuse. Patient is chronically homeless and refuses to follow through with any of the recommended outpatient providers. Patient can be very manipulative and stated many times in the past that \"I know what to say\" to try and get placed inpatient.       Paulie Donovan, MSW, LSW  09/19/21 9571

## 2021-09-19 NOTE — ED NOTES
Patient verbalized understanding of need for urine specimen at this time      Kelsey Wolf, MARY  09/19/21 9058

## 2021-09-20 VITALS
RESPIRATION RATE: 16 BRPM | BODY MASS INDEX: 19.64 KG/M2 | HEIGHT: 72 IN | OXYGEN SATURATION: 97 % | HEART RATE: 100 BPM | SYSTOLIC BLOOD PRESSURE: 98 MMHG | DIASTOLIC BLOOD PRESSURE: 70 MMHG | WEIGHT: 145 LBS

## 2021-09-20 LAB
ACETAMINOPHEN LEVEL: <5 MCG/ML (ref 10–30)
ALBUMIN SERPL-MCNC: 3.9 G/DL (ref 3.5–5.2)
ALP BLD-CCNC: 49 U/L (ref 40–129)
ALT SERPL-CCNC: 36 U/L (ref 0–40)
ANION GAP SERPL CALCULATED.3IONS-SCNC: 12 MMOL/L (ref 7–16)
AST SERPL-CCNC: 30 U/L (ref 0–39)
BASOPHILS ABSOLUTE: 0.07 E9/L (ref 0–0.2)
BASOPHILS RELATIVE PERCENT: 0.8 % (ref 0–2)
BILIRUB SERPL-MCNC: 0.4 MG/DL (ref 0–1.2)
BUN BLDV-MCNC: 20 MG/DL (ref 6–20)
CALCIUM SERPL-MCNC: 9.2 MG/DL (ref 8.6–10.2)
CHLORIDE BLD-SCNC: 100 MMOL/L (ref 98–107)
CO2: 26 MMOL/L (ref 22–29)
CREAT SERPL-MCNC: 0.7 MG/DL (ref 0.7–1.2)
EOSINOPHILS ABSOLUTE: 0.07 E9/L (ref 0.05–0.5)
EOSINOPHILS RELATIVE PERCENT: 0.8 % (ref 0–6)
ETHANOL: <10 MG/DL (ref 0–0.08)
GFR AFRICAN AMERICAN: >60
GFR NON-AFRICAN AMERICAN: >60 ML/MIN/1.73
GLUCOSE BLD-MCNC: 95 MG/DL (ref 74–99)
HCT VFR BLD CALC: 32.8 % (ref 37–54)
HEMOGLOBIN: 10.7 G/DL (ref 12.5–16.5)
IMMATURE GRANULOCYTES #: 0.05 E9/L
IMMATURE GRANULOCYTES %: 0.6 % (ref 0–5)
LYMPHOCYTES ABSOLUTE: 1.18 E9/L (ref 1.5–4)
LYMPHOCYTES RELATIVE PERCENT: 13.1 % (ref 20–42)
MCH RBC QN AUTO: 29.6 PG (ref 26–35)
MCHC RBC AUTO-ENTMCNC: 32.6 % (ref 32–34.5)
MCV RBC AUTO: 90.6 FL (ref 80–99.9)
MONOCYTES ABSOLUTE: 0.97 E9/L (ref 0.1–0.95)
MONOCYTES RELATIVE PERCENT: 10.8 % (ref 2–12)
NEUTROPHILS ABSOLUTE: 6.65 E9/L (ref 1.8–7.3)
NEUTROPHILS RELATIVE PERCENT: 73.9 % (ref 43–80)
PDW BLD-RTO: 13.8 FL (ref 11.5–15)
PLATELET # BLD: 715 E9/L (ref 130–450)
PMV BLD AUTO: 8.7 FL (ref 7–12)
POTASSIUM SERPL-SCNC: 4.7 MMOL/L (ref 3.5–5)
RBC # BLD: 3.62 E12/L (ref 3.8–5.8)
SALICYLATE, SERUM: <0.3 MG/DL (ref 0–30)
SODIUM BLD-SCNC: 138 MMOL/L (ref 132–146)
TOTAL PROTEIN: 7.1 G/DL (ref 6.4–8.3)
TRICYCLIC ANTIDEPRESSANTS SCREEN SERUM: NEGATIVE NG/ML
WBC # BLD: 9 E9/L (ref 4.5–11.5)

## 2021-09-20 PROCEDURE — 80143 DRUG ASSAY ACETAMINOPHEN: CPT

## 2021-09-20 PROCEDURE — 99283 EMERGENCY DEPT VISIT LOW MDM: CPT

## 2021-09-20 PROCEDURE — 80307 DRUG TEST PRSMV CHEM ANLYZR: CPT

## 2021-09-20 PROCEDURE — 80053 COMPREHEN METABOLIC PANEL: CPT

## 2021-09-20 PROCEDURE — 82077 ASSAY SPEC XCP UR&BREATH IA: CPT

## 2021-09-20 PROCEDURE — 80179 DRUG ASSAY SALICYLATE: CPT

## 2021-09-20 PROCEDURE — 85025 COMPLETE CBC W/AUTO DIFF WBC: CPT

## 2021-09-20 ASSESSMENT — PAIN SCALES - GENERAL: PAINLEVEL_OUTOF10: 10
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
## 2021-09-21 ENCOUNTER — HOSPITAL ENCOUNTER (EMERGENCY)
Age: 41
Discharge: HOME | End: 2021-09-21
Attending: EMERGENCY MEDICINE
Payer: COMMERCIAL

## 2021-09-21 DIAGNOSIS — Z00.8 PSYCHOLOGICAL ASSESSMENT: Primary | ICD-10-CM

## 2021-09-21 LAB
AMPHETAMINE SCREEN, URINE: NOT DETECTED
BACTERIA: ABNORMAL /HPF
BARBITURATE SCREEN URINE: NOT DETECTED
BENZODIAZEPINE SCREEN, URINE: NOT DETECTED
BILIRUBIN URINE: NEGATIVE
BLOOD, URINE: ABNORMAL
CANNABINOID SCREEN URINE: NOT DETECTED
CLARITY: CLEAR
COCAINE METABOLITE SCREEN URINE: POSITIVE
COLOR: YELLOW
EPITHELIAL CELLS, UA: ABNORMAL /HPF
FENTANYL SCREEN, URINE: NOT DETECTED
GLUCOSE URINE: NEGATIVE MG/DL
KETONES, URINE: NEGATIVE MG/DL
LEUKOCYTE ESTERASE, URINE: NEGATIVE
Lab: ABNORMAL
METHADONE SCREEN, URINE: NOT DETECTED
NITRITE, URINE: NEGATIVE
OPIATE SCREEN URINE: NOT DETECTED
OXYCODONE URINE: NOT DETECTED
PH UA: 5.5 (ref 5–9)
PHENCYCLIDINE SCREEN URINE: NOT DETECTED
PROTEIN UA: NEGATIVE MG/DL
RBC UA: ABNORMAL /HPF (ref 0–2)
SPECIFIC GRAVITY UA: <=1.005 (ref 1–1.03)
UROBILINOGEN, URINE: 1 E.U./DL
WBC UA: ABNORMAL /HPF (ref 0–5)

## 2021-09-21 PROCEDURE — 80307 DRUG TEST PRSMV CHEM ANLYZR: CPT

## 2021-09-21 PROCEDURE — 81001 URINALYSIS AUTO W/SCOPE: CPT

## 2021-09-21 PROCEDURE — 6370000000 HC RX 637 (ALT 250 FOR IP): Performed by: EMERGENCY MEDICINE

## 2021-09-21 RX ORDER — HYDROCODONE BITARTRATE AND ACETAMINOPHEN 5; 325 MG/1; MG/1
1 TABLET ORAL EVERY 6 HOURS PRN
Status: DISCONTINUED | OUTPATIENT
Start: 2021-09-21 | End: 2021-09-21 | Stop reason: HOSPADM

## 2021-09-21 RX ADMIN — HYDROCODONE BITARTRATE AND ACETAMINOPHEN 1 TABLET: 5; 325 TABLET ORAL at 12:17

## 2021-09-21 ASSESSMENT — ENCOUNTER SYMPTOMS
ABDOMINAL PAIN: 0
EYE PAIN: 0
SINUS PRESSURE: 0
VOMITING: 0
EYE REDNESS: 0
SHORTNESS OF BREATH: 0
EYE DISCHARGE: 0
DIARRHEA: 0
COUGH: 0
BACK PAIN: 0
WHEEZING: 0
NAUSEA: 0
SORE THROAT: 0

## 2021-09-21 ASSESSMENT — PAIN SCALES - GENERAL: PAINLEVEL_OUTOF10: 10

## 2021-09-21 NOTE — ED NOTES
PT'S FOCUS IN ON OBTAINING PAIN PILLS \"I NEED OXY'S\".       PT DECLINED ANY PEER SUPPORT [address] [id number], [place]  09/21/21 1186
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
## 2021-09-21 NOTE — ED NOTES
PEER IS HERE ATTEMPTING TO [facility]  [address]  MONICA Garcia [place], [place]  09/21/21 2701

## 2021-09-21 NOTE — ED NOTES
PT IS NOT SUICIDAL, NO HI AND NO HALLUCINATIONS. PEER WAS UNABLE TO PLACE HIM FOR IN PT AOD SERVICES. HE WILL NEED TO FOLLOW UP THROUGH OUT PT MEETINGS. DISCUSSED WITH DR. Nevin Araya.      Molly Rodriguez, MAJOR  09/21/21 8228
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
## 2021-09-22 ENCOUNTER — HOSPITAL ENCOUNTER (EMERGENCY)
Age: 41
Discharge: OTHER INSTITUTION | End: 2021-09-22
Attending: STUDENT IN AN ORGANIZED HEALTH CARE EDUCATION/TRAINING PROGRAM
Payer: COMMERCIAL

## 2021-09-22 VITALS
OXYGEN SATURATION: 97 % | DIASTOLIC BLOOD PRESSURE: 62 MMHG | SYSTOLIC BLOOD PRESSURE: 110 MMHG | BODY MASS INDEX: 23.06 KG/M2 | RESPIRATION RATE: 16 BRPM | TEMPERATURE: 98.2 F | HEIGHT: 72 IN | HEART RATE: 70 BPM

## 2021-09-22 DIAGNOSIS — Z86.59 HISTORY OF BEHAVIORAL AND MENTAL HEALTH PROBLEMS: Primary | ICD-10-CM

## 2021-09-22 LAB
ACETAMINOPHEN LEVEL: <5 MCG/ML (ref 10–30)
ALBUMIN SERPL-MCNC: 3.6 G/DL (ref 3.5–5.2)
ALP BLD-CCNC: 57 U/L (ref 40–129)
ALT SERPL-CCNC: 26 U/L (ref 0–40)
AMPHETAMINE SCREEN, URINE: NOT DETECTED
ANION GAP SERPL CALCULATED.3IONS-SCNC: 4 MMOL/L (ref 7–16)
AST SERPL-CCNC: 20 U/L (ref 0–39)
BARBITURATE SCREEN URINE: NOT DETECTED
BASOPHILS ABSOLUTE: 0.06 E9/L (ref 0–0.2)
BASOPHILS RELATIVE PERCENT: 0.7 % (ref 0–2)
BENZODIAZEPINE SCREEN, URINE: NOT DETECTED
BILIRUB SERPL-MCNC: 0.4 MG/DL (ref 0–1.2)
BUN BLDV-MCNC: 17 MG/DL (ref 6–20)
CALCIUM SERPL-MCNC: 8.5 MG/DL (ref 8.6–10.2)
CANNABINOID SCREEN URINE: NOT DETECTED
CHLORIDE BLD-SCNC: 102 MMOL/L (ref 98–107)
CHP ED QC CHECK: NORMAL
CO2: 33 MMOL/L (ref 22–29)
COCAINE METABOLITE SCREEN URINE: POSITIVE
CREAT SERPL-MCNC: 0.9 MG/DL (ref 0.7–1.2)
EOSINOPHILS ABSOLUTE: 0.15 E9/L (ref 0.05–0.5)
EOSINOPHILS RELATIVE PERCENT: 1.8 % (ref 0–6)
ETHANOL: <10 MG/DL (ref 0–0.08)
FENTANYL SCREEN, URINE: NOT DETECTED
GFR AFRICAN AMERICAN: >60
GFR NON-AFRICAN AMERICAN: >60 ML/MIN/1.73
GLUCOSE BLD-MCNC: 85 MG/DL (ref 74–99)
GLUCOSE BLD-MCNC: 86 MG/DL
HCT VFR BLD CALC: 31.3 % (ref 37–54)
HEMOGLOBIN: 10 G/DL (ref 12.5–16.5)
IMMATURE GRANULOCYTES #: 0.04 E9/L
IMMATURE GRANULOCYTES %: 0.5 % (ref 0–5)
INFLUENZA A: NOT DETECTED
INFLUENZA B: NOT DETECTED
LYMPHOCYTES ABSOLUTE: 1.14 E9/L (ref 1.5–4)
LYMPHOCYTES RELATIVE PERCENT: 13.9 % (ref 20–42)
Lab: ABNORMAL
MCH RBC QN AUTO: 29.6 PG (ref 26–35)
MCHC RBC AUTO-ENTMCNC: 31.9 % (ref 32–34.5)
MCV RBC AUTO: 92.6 FL (ref 80–99.9)
METER GLUCOSE: 86 MG/DL (ref 74–99)
METHADONE SCREEN, URINE: NOT DETECTED
MONOCYTES ABSOLUTE: 0.7 E9/L (ref 0.1–0.95)
MONOCYTES RELATIVE PERCENT: 8.5 % (ref 2–12)
NEUTROPHILS ABSOLUTE: 6.14 E9/L (ref 1.8–7.3)
NEUTROPHILS RELATIVE PERCENT: 74.6 % (ref 43–80)
OPIATE SCREEN URINE: NOT DETECTED
OXYCODONE URINE: NOT DETECTED
PDW BLD-RTO: 13.9 FL (ref 11.5–15)
PHENCYCLIDINE SCREEN URINE: NOT DETECTED
PLATELET # BLD: 700 E9/L (ref 130–450)
PMV BLD AUTO: 8.2 FL (ref 7–12)
POTASSIUM REFLEX MAGNESIUM: 4.3 MMOL/L (ref 3.5–5)
RBC # BLD: 3.38 E12/L (ref 3.8–5.8)
SALICYLATE, SERUM: <0.3 MG/DL (ref 0–30)
SARS-COV-2 RNA, RT PCR: NOT DETECTED
SODIUM BLD-SCNC: 139 MMOL/L (ref 132–146)
TOTAL PROTEIN: 6.3 G/DL (ref 6.4–8.3)
TRICYCLIC ANTIDEPRESSANTS SCREEN SERUM: NEGATIVE NG/ML
WBC # BLD: 8.2 E9/L (ref 4.5–11.5)

## 2021-09-22 PROCEDURE — 85025 COMPLETE CBC W/AUTO DIFF WBC: CPT

## 2021-09-22 PROCEDURE — 80053 COMPREHEN METABOLIC PANEL: CPT

## 2021-09-22 PROCEDURE — 87636 SARSCOV2 & INF A&B AMP PRB: CPT

## 2021-09-22 PROCEDURE — 82962 GLUCOSE BLOOD TEST: CPT

## 2021-09-22 PROCEDURE — 80307 DRUG TEST PRSMV CHEM ANLYZR: CPT

## 2021-09-22 PROCEDURE — 93005 ELECTROCARDIOGRAM TRACING: CPT | Performed by: STUDENT IN AN ORGANIZED HEALTH CARE EDUCATION/TRAINING PROGRAM

## 2021-09-22 PROCEDURE — 99285 EMERGENCY DEPT VISIT HI MDM: CPT

## 2021-09-22 PROCEDURE — 82077 ASSAY SPEC XCP UR&BREATH IA: CPT

## 2021-09-22 PROCEDURE — 80179 DRUG ASSAY SALICYLATE: CPT

## 2021-09-22 PROCEDURE — 80143 DRUG ASSAY ACETAMINOPHEN: CPT

## 2021-09-22 ASSESSMENT — PAIN DESCRIPTION - DESCRIPTORS: DESCRIPTORS: CONSTANT

## 2021-09-22 ASSESSMENT — PAIN DESCRIPTION - LOCATION: LOCATION: LEG

## 2021-09-22 ASSESSMENT — PAIN DESCRIPTION - FREQUENCY: FREQUENCY: CONTINUOUS

## 2021-09-22 ASSESSMENT — PAIN DESCRIPTION - PAIN TYPE: TYPE: CHRONIC PAIN

## 2021-09-22 ASSESSMENT — PAIN DESCRIPTION - ONSET: ONSET: ON-GOING

## 2021-09-22 ASSESSMENT — PAIN DESCRIPTION - PROGRESSION: CLINICAL_PROGRESSION: NOT CHANGED

## 2021-09-22 ASSESSMENT — PAIN SCALES - GENERAL
PAINLEVEL_OUTOF10: 4
PAINLEVEL_OUTOF10: 10

## 2021-09-22 ASSESSMENT — PAIN DESCRIPTION - ORIENTATION: ORIENTATION: LEFT

## 2021-09-22 NOTE — ED NOTES
Faxed clinical information to [facility] [address] [facility] [phone].      Landry Ramachandran, MSW, LSW  09/22/21 5407

## 2021-09-22 NOTE — ED PROVIDER NOTES
ED  Provider Note  Admit Date/RoomTime: 9/22/2021 12:36 PM  ED Room: [address] [place] [address]      History of Present Illness:  9/22/21, Time: 12:38 PM EDT  Chief Complaint   Patient presents with    Other     requesting to be back on psych meds stated now ready to go to [facility] for rehab  for lft leg denies si/hi /admits to sometimes hallucinations       Cheryl Reyna is a 37 y.o. male presenting to the ED for depression, auditory hallucinations, requesting psychiatric placement. Crack cocaine 2d PTA. EtOH 2d PTA. Denies HI, positive SI passive, no plan, denies recent attempt. Patient has been seen multiple times in the last few days, and has departed the emergency department, from documentation review, due to becoming agitated and demanding pain medication then department. He denies falls or trauma, no chest pain or shortness of breath, no abdominal pain. He does have pain in his left hip from recent surgical excision with staples in situ, denies drainage, warmth or erythema of the area. Pain is severe no relieving or exacerbating factors. No F/c. Review of Systems:   A complete review of systems was performed and pertinent positives and negatives are stated within HPI, all other systems reviewed and are negative.        --------------------------------------------- PATIENT HISTORY--------------------------------------------  Past Medical History:  has no past medical history on file. Past Surgical History:  has a past surgical history that includes Hip fracture surgery (Left, 9/12/2021). Family History: family history is not on file. . Unless otherwise noted, family history is non contributory    Social History:  reports that he has been smoking cigarettes. He has been smoking about 1.00 pack per day. He has never used smokeless tobacco. He reports previous alcohol use. The patients home medications have been reviewed.     Allergies: Pcn [penicillins] and Rondec-d [chlophedianol-pseudoephedrine]    I have reviewed the past medical history, past surgical history, social history, and family history    ---------------------------------------------------PHYSICAL EXAM--------------------------------------    Constitutional/General: Alert and oriented x3  Head: Normocephalic and atraumatic  Eyes: PERRL, EOMI, sclera non icteric  ENT: Oropharynx clear, handling secretions, no trismus  Neck: Supple, full ROM, no stridor, no meningismus  Respiratory: Lungs clear to auscultation bilaterally, no wheezes, rales, or rhonchi  Cardiovascular: RRR, no R/G/M, 2+ peripheral pulses  Chest: No chest wall tenderness, equal chest rise  Gastrointestinal:  Abdomen Soft, Non tender, Non distended. No rebound, guarding, or rigidity. No pulsatile masses. Musculoskeletal: Extremities warm and well perfused, moving all extremities  Skin: skin warm and dry. No rashes. Neurologic: No focal deficits, strength and sensation grossly intact   Psychiatric: Normal Affect, behavior normal       -------------------------------------------------- RESULTS -------------------------------------------------  I have personally reviewed all laboratory and imaging results for this patient. Results are listed below.      LABS: (Lab results interpreted by me)  Results for orders placed or performed during the hospital encounter of 09/22/21   COVID-19 & Influenza Combo    Specimen: Nasopharyngeal Swab   Result Value Ref Range    SARS-CoV-2 RNA, RT PCR NOT DETECTED NOT DETECTED    INFLUENZA A NOT DETECTED NOT DETECTED    INFLUENZA B NOT DETECTED NOT DETECTED   CBC Auto Differential   Result Value Ref Range    WBC 8.2 4.5 - 11.5 E9/L    RBC 3.38 (L) 3.80 - 5.80 E12/L    Hemoglobin 10.0 (L) 12.5 - 16.5 g/dL    Hematocrit 31.3 (L) 37.0 - 54.0 %    MCV 92.6 80.0 - 99.9 fL    MCH 29.6 26.0 - 35.0 pg    MCHC 31.9 (L) 32.0 - 34.5 %    RDW 13.9 11.5 - 15.0 fL    Platelets 541 (H) 081 - 450 E9/L    MPV 8.2 7.0 - 12.0 fL    Neutrophils % 74.6 43.0 - 80.0 %    Immature Granulocytes % 0.5 0.0 - 5.0 %    Lymphocytes % 13.9 (L) 20.0 - 42.0 %    Monocytes % 8.5 2.0 - 12.0 %    Eosinophils % 1.8 0.0 - 6.0 %    Basophils % 0.7 0.0 - 2.0 %    Neutrophils Absolute 6.14 1.80 - 7.30 E9/L    Immature Granulocytes # 0.04 E9/L    Lymphocytes Absolute 1.14 (L) 1.50 - 4.00 E9/L    Monocytes Absolute 0.70 0.10 - 0.95 E9/L    Eosinophils Absolute 0.15 0.05 - 0.50 E9/L    Basophils Absolute 0.06 0.00 - 0.20 E9/L   Comprehensive Metabolic Panel w/ Reflex to MG   Result Value Ref Range    Sodium 139 132 - 146 mmol/L    Potassium reflex Magnesium 4.3 3.5 - 5.0 mmol/L    Chloride 102 98 - 107 mmol/L    CO2 33 (H) 22 - 29 mmol/L    Anion Gap 4 (L) 7 - 16 mmol/L    Glucose 85 74 - 99 mg/dL    BUN 17 6 - 20 mg/dL    CREATININE 0.9 0.7 - 1.2 mg/dL    GFR Non-African American >60 >=60 mL/min/1.73    GFR African American >60     Calcium 8.5 (L) 8.6 - 10.2 mg/dL    Total Protein 6.3 (L) 6.4 - 8.3 g/dL    Albumin 3.6 3.5 - 5.2 g/dL    Total Bilirubin 0.4 0.0 - 1.2 mg/dL    Alkaline Phosphatase 57 40 - 129 U/L    ALT 26 0 - 40 U/L    AST 20 0 - 39 U/L   Serum Drug Screen   Result Value Ref Range    Ethanol Lvl <10 mg/dL    Acetaminophen Level <5.0 (L) 10.0 - 61.9 mcg/mL    Salicylate, Serum <1.4 0.0 - 30.0 mg/dL    TCA Scrn NEGATIVE Cutoff:300 ng/mL   URINE DRUG SCREEN   Result Value Ref Range    Amphetamine Screen, Urine NOT DETECTED Negative <1000 ng/mL    Barbiturate Screen, Ur NOT DETECTED Negative < 200 ng/mL    Benzodiazepine Screen, Urine NOT DETECTED Negative < 200 ng/mL    Cannabinoid Scrn, Ur NOT DETECTED Negative < 50ng/mL    Cocaine Metabolite Screen, Urine POSITIVE (A) Negative < 300 ng/mL    Opiate Scrn, Ur NOT DETECTED Negative < 300ng/mL    PCP Screen, Urine NOT DETECTED Negative < 25 ng/mL    Methadone Screen, Urine NOT DETECTED Negative <300 ng/mL    Oxycodone Urine NOT DETECTED Negative <100 ng/mL    FENTANYL SCREEN, URINE NOT DETECTED Negative <1 ng/mL    Drug Screen Comment: see below POCT Glucose   Result Value Ref Range    Glucose 86 mg/dL    QC OK? k    POCT Glucose   Result Value Ref Range    Meter Glucose 86 74 - 99 mg/dL   EKG 12 Lead   Result Value Ref Range    Ventricular Rate 88 BPM    Atrial Rate 88 BPM    P-R Interval 170 ms    QRS Duration 92 ms    Q-T Interval 354 ms    QTc Calculation (Bazett) 428 ms    P Axis 80 degrees    R Axis 79 degrees    T Axis 72 degrees   ,       RADIOLOGY:  Imaging interpreted by Radiologist unless otherwise specified  No orders to display         Date of EK21    Rhythm: normal sinus   Rate: normal  Axis: normal  Conduction: normal  ST Segments: normal  T Waves: normal    Clinical Impression: NSR  Comparison to prior EKG: stable as compared to patient's most recent EKG        ------------------------- NURSING NOTES AND VITALS REVIEWED ---------------------------  The nursing notes within the ED encounter and vital signs as below have been reviewed by myself  /62   Pulse 70   Temp 98.2 °F (36.8 °C) (Oral)   Resp 16   Ht 6' (1.829 m)   SpO2 97%   BMI 23.06 kg/m²      The patients available past medical records and past encounters were reviewed. ------------------------------ ED COURSE/MEDICAL DECISION MAKING----------------------  Medications - No data to display    I, Dr. Hever Lam, am the primary provider of record    Medical Decision Making:   Patient well-known to the department in the behavioral health unit presenting with request for psychiatric placement in the setting of depression, auditory hallucinations, recent drug use. Patient will be medically screened for psych placement. Re-Evaluation(s):   Patient medically cleared for psychiatric placement. Oxygen Saturation Interpretation: 97 % on ra. This patient's ED course included: a personal history and physicial examination    This patient has remained hemodynamically stable during their ED course. Consultations:  [facility]    ED Counseling:     This emergency provider has spoken with the patient and any family present to discuss clinical status, results, plan of care, diagnosis and prognosis as able to be determined at this time. Any questions were answered and patient and/or family/POA are agreeable with the plan.       --------------------------------- IMPRESSION AND DISPOSITION ---------------------------------    IMPRESSION  1. History of behavioral and mental health problems        DISPOSITION  Disposition: Discharge to self present at crisis unit  Patient condition is good      This report was transcribed using voice recognition software. Every effort was made to ensure accuracy; however, transcription errors may be present.          Fran Torres MD  09/22/21 9525

## 2021-09-22 NOTE — ED NOTES
Emergency Department [facility] AN AFFILIATE OF [facility] Biopsychosocial Assessment Note     Chief Complaint:      Patient is a 45year old, male presenting to ED for increased depression and suicidal thoughts with no plan or intent. Patient reports that he would like to be placed back of his psychiatric medication. Patient admits to suicidal ideation - no plan. Patient denies homicidal ideation.     MSE: Patient is alert & oriented x 4. Patient mood is sad, affect flat. Patient thought process is clear, speech pressured at times. Patient has some baseline auditory hallucinations but reports none currently. No reports of visual hallucinations.     Clinical Summary/History:      Patient has a mental health hx of schizoaffective disorder, non-compliant with outpatient mental health treatment. Patient last psychiatric hospitalization was 8/9/21 for schizoaffective disorder on [facility].     Patient needs to follow up with an outpatient provider to regularly administer his psychiatric medications.     Ok sleep, ok appetite, no reports of hopelessness/helplessness.     Patient has a hx of polysubstance abuse.     Gender  [x]????????? Male []????????? Female []????????? Transgender  []????????? Other     Sexual Orientation    [x]????????? Heterosexual []????????? Homosexual []????????? Bisexual []????????? Other     Suicidal Behavioral: CSSR-S Complete. [x]????????? Reports:               []????????? Past []????????? Present   [x]????????? Denies     Homicidal/ Violent Behavior  []????????? Reports:              []????????? Past []????????? Present   [x]????????? Denies      Hallucinations/Delusions   []????????? Reports:   [x]????????? Denies      Substance Use/Alcohol Use/Addiction: SBIRT Screen Complete.    [x]????????? Reports:  Crack cocaine & cannabis  []????????? Denies      Trauma History  []????????? Reports:  []????????? Denies      Collateral Information:         Level of Care/Disposition Plan  []????????? Home:   []????????? Outpatient Provider:   [x]????????? Crisis Unit:   []????????? Inpatient Psychiatric Unit:  []????????? Other:         Patient is not currently presenting with any acute psychiatric symptoms that would warrant inpatient admission. Patient would benefit from a referral to [place] [id number] unit.      FRANCISCO Briscoe, MAJOR  09/22/21 FRANCISOC Harry, MAJOR  09/22/21 1506

## 2021-09-23 DIAGNOSIS — S32.402A CLOSED DISPLACED FRACTURE OF LEFT ACETABULUM, UNSPECIFIED PORTION OF ACETABULUM, INITIAL ENCOUNTER (HCC): Primary | ICD-10-CM

## 2021-09-23 LAB
EKG ATRIAL RATE: 88 BPM
EKG P AXIS: 80 DEGREES
EKG P-R INTERVAL: 170 MS
EKG Q-T INTERVAL: 354 MS
EKG QRS DURATION: 92 MS
EKG QTC CALCULATION (BAZETT): 428 MS
EKG R AXIS: 79 DEGREES
EKG T AXIS: 72 DEGREES
EKG VENTRICULAR RATE: 88 BPM

## 2021-09-26 ENCOUNTER — HOSPITAL ENCOUNTER (EMERGENCY)
Age: 41
Discharge: HOME | End: 2021-09-27
Attending: EMERGENCY MEDICINE
Payer: COMMERCIAL

## 2021-09-26 DIAGNOSIS — R44.3 HALLUCINATIONS: ICD-10-CM

## 2021-09-26 DIAGNOSIS — R45.851 SUICIDAL IDEATION: Primary | ICD-10-CM

## 2021-09-26 LAB
ACETAMINOPHEN LEVEL: <5 MCG/ML (ref 10–30)
ALBUMIN SERPL-MCNC: 3.3 G/DL (ref 3.5–5.2)
ALP BLD-CCNC: 69 U/L (ref 40–129)
ALT SERPL-CCNC: 15 U/L (ref 0–40)
AMPHETAMINE SCREEN, URINE: NOT DETECTED
ANION GAP SERPL CALCULATED.3IONS-SCNC: 4 MMOL/L (ref 7–16)
AST SERPL-CCNC: 12 U/L (ref 0–39)
BACTERIA: ABNORMAL /HPF
BARBITURATE SCREEN URINE: NOT DETECTED
BASOPHILS ABSOLUTE: 0.08 E9/L (ref 0–0.2)
BASOPHILS RELATIVE PERCENT: 1 % (ref 0–2)
BENZODIAZEPINE SCREEN, URINE: NOT DETECTED
BILIRUB SERPL-MCNC: <0.2 MG/DL (ref 0–1.2)
BILIRUBIN URINE: NEGATIVE
BLOOD, URINE: ABNORMAL
BUN BLDV-MCNC: 13 MG/DL (ref 6–20)
CALCIUM SERPL-MCNC: 8.5 MG/DL (ref 8.6–10.2)
CANNABINOID SCREEN URINE: NOT DETECTED
CHLORIDE BLD-SCNC: 103 MMOL/L (ref 98–107)
CLARITY: CLEAR
CO2: 30 MMOL/L (ref 22–29)
COCAINE METABOLITE SCREEN URINE: POSITIVE
COLOR: YELLOW
CREAT SERPL-MCNC: 0.8 MG/DL (ref 0.7–1.2)
EOSINOPHILS ABSOLUTE: 0.09 E9/L (ref 0.05–0.5)
EOSINOPHILS RELATIVE PERCENT: 1.1 % (ref 0–6)
ETHANOL: <10 MG/DL (ref 0–0.08)
FENTANYL SCREEN, URINE: NOT DETECTED
GFR AFRICAN AMERICAN: >60
GFR NON-AFRICAN AMERICAN: >60 ML/MIN/1.73
GLUCOSE BLD-MCNC: 94 MG/DL (ref 74–99)
GLUCOSE URINE: NEGATIVE MG/DL
HCT VFR BLD CALC: 31.4 % (ref 37–54)
HEMOGLOBIN: 10 G/DL (ref 12.5–16.5)
IMMATURE GRANULOCYTES #: 0.04 E9/L
IMMATURE GRANULOCYTES %: 0.5 % (ref 0–5)
INFLUENZA A: NOT DETECTED
INFLUENZA B: NOT DETECTED
KETONES, URINE: NEGATIVE MG/DL
LEUKOCYTE ESTERASE, URINE: NEGATIVE
LYMPHOCYTES ABSOLUTE: 1.38 E9/L (ref 1.5–4)
LYMPHOCYTES RELATIVE PERCENT: 16.5 % (ref 20–42)
Lab: ABNORMAL
MCH RBC QN AUTO: 29.7 PG (ref 26–35)
MCHC RBC AUTO-ENTMCNC: 31.8 % (ref 32–34.5)
MCV RBC AUTO: 93.2 FL (ref 80–99.9)
METHADONE SCREEN, URINE: NOT DETECTED
MONOCYTES ABSOLUTE: 0.7 E9/L (ref 0.1–0.95)
MONOCYTES RELATIVE PERCENT: 8.4 % (ref 2–12)
NEUTROPHILS ABSOLUTE: 6.05 E9/L (ref 1.8–7.3)
NEUTROPHILS RELATIVE PERCENT: 72.5 % (ref 43–80)
NITRITE, URINE: NEGATIVE
OPIATE SCREEN URINE: NOT DETECTED
OXYCODONE URINE: NOT DETECTED
PDW BLD-RTO: 14 FL (ref 11.5–15)
PH UA: 6.5 (ref 5–9)
PHENCYCLIDINE SCREEN URINE: NOT DETECTED
PLATELET # BLD: 746 E9/L (ref 130–450)
PMV BLD AUTO: 8.4 FL (ref 7–12)
POTASSIUM SERPL-SCNC: 3.6 MMOL/L (ref 3.5–5)
PROTEIN UA: NEGATIVE MG/DL
RBC # BLD: 3.37 E12/L (ref 3.8–5.8)
RBC UA: ABNORMAL /HPF (ref 0–2)
SALICYLATE, SERUM: <0.3 MG/DL (ref 0–30)
SARS-COV-2 RNA, RT PCR: NOT DETECTED
SODIUM BLD-SCNC: 137 MMOL/L (ref 132–146)
SPECIFIC GRAVITY UA: 1.01 (ref 1–1.03)
TOTAL PROTEIN: 5.7 G/DL (ref 6.4–8.3)
TRICYCLIC ANTIDEPRESSANTS SCREEN SERUM: NEGATIVE NG/ML
UROBILINOGEN, URINE: 1 E.U./DL
WBC # BLD: 8.3 E9/L (ref 4.5–11.5)
WBC UA: ABNORMAL /HPF (ref 0–5)

## 2021-09-26 PROCEDURE — 87636 SARSCOV2 & INF A&B AMP PRB: CPT

## 2021-09-26 PROCEDURE — 80143 DRUG ASSAY ACETAMINOPHEN: CPT

## 2021-09-26 PROCEDURE — 80179 DRUG ASSAY SALICYLATE: CPT

## 2021-09-26 PROCEDURE — 85025 COMPLETE CBC W/AUTO DIFF WBC: CPT

## 2021-09-26 PROCEDURE — 80307 DRUG TEST PRSMV CHEM ANLYZR: CPT

## 2021-09-26 PROCEDURE — 80053 COMPREHEN METABOLIC PANEL: CPT

## 2021-09-26 PROCEDURE — 99284 EMERGENCY DEPT VISIT MOD MDM: CPT

## 2021-09-26 PROCEDURE — 93005 ELECTROCARDIOGRAM TRACING: CPT

## 2021-09-26 PROCEDURE — 82077 ASSAY SPEC XCP UR&BREATH IA: CPT

## 2021-09-26 PROCEDURE — 81001 URINALYSIS AUTO W/SCOPE: CPT

## 2021-09-26 ASSESSMENT — PAIN SCALES - GENERAL: PAINLEVEL_OUTOF10: 10

## 2021-09-27 VITALS
RESPIRATION RATE: 16 BRPM | SYSTOLIC BLOOD PRESSURE: 114 MMHG | DIASTOLIC BLOOD PRESSURE: 73 MMHG | TEMPERATURE: 99.5 F | HEART RATE: 91 BPM | OXYGEN SATURATION: 97 %

## 2021-09-27 LAB
EKG ATRIAL RATE: 71 BPM
EKG P AXIS: 77 DEGREES
EKG P-R INTERVAL: 178 MS
EKG Q-T INTERVAL: 366 MS
EKG QRS DURATION: 100 MS
EKG QTC CALCULATION (BAZETT): 397 MS
EKG R AXIS: 82 DEGREES
EKG T AXIS: 73 DEGREES
EKG VENTRICULAR RATE: 71 BPM

## 2021-09-27 NOTE — ED PROVIDER NOTES
HPI:  9/26/21,   Time: 10:17 PM EDT       Nuzhat Lee is a 37 y.o. male presenting to the ED for psychiatric evaluation, beginning 1 day ago. The complaint has been persistent, moderate in severity, and worsened by nothing. The patient states that he was having visual hallucinations and felt as if someone was out to kill him. He states he was feeling suicidal after having these episodes when he attempted to punch himself in the left leg to hurt himself. Therefore he came to the ED to be evaluated. Denies any HI. No chest pain shortness of breath no fevers or chills. No numbness tingling. No focal deficits. Review of Systems:   Pertinent positives and negatives are stated within HPI, all other systems reviewed and are negative.          --------------------------------------------- PAST HISTORY ---------------------------------------------  Past Medical History:  has a past medical history of Depression and Schizoaffective disorder ([facility] [address] Past Surgical History:  has a past surgical history that includes eye surgery (19 years ago). Social History:  reports that he has been smoking cigars and cigarettes. He has a 6.00 pack-year smoking history. He has never used smokeless tobacco. He reports current alcohol use. He reports current drug use. Frequency: 7.00 times per week. Drugs: Cocaine and Marijuana. Family History: family history includes Mental Illness in his mother; No Known Problems in his father; Substance Abuse in his mother. The patients home medications have been reviewed.     Allergies: Chlorpheniramine-phenylephrine, Penicillins, and Rondec-d [chlophedianol-pseudoephedrine]        ---------------------------------------------------PHYSICAL EXAM--------------------------------------    Constitutional/General: Alert and oriented x3, well appearing, non toxic in NAD  Head: Normocephalic and atraumatic  Eyes: PERRL, EOMI, conjunctive normal, sclera non icteric  Mouth: Oropharynx Total Protein 5.7 (L) 6.4 - 8.3 g/dL    Albumin 3.3 (L) 3.5 - 5.2 g/dL    Total Bilirubin <0.2 0.0 - 1.2 mg/dL    Alkaline Phosphatase 69 40 - 129 U/L    ALT 15 0 - 40 U/L    AST 12 0 - 39 U/L   CBC Auto Differential   Result Value Ref Range    WBC 8.3 4.5 - 11.5 E9/L    RBC 3.37 (L) 3.80 - 5.80 E12/L    Hemoglobin 10.0 (L) 12.5 - 16.5 g/dL    Hematocrit 31.4 (L) 37.0 - 54.0 %    MCV 93.2 80.0 - 99.9 fL    MCH 29.7 26.0 - 35.0 pg    MCHC 31.8 (L) 32.0 - 34.5 %    RDW 14.0 11.5 - 15.0 fL    Platelets 811 (H) 787 - 450 E9/L    MPV 8.4 7.0 - 12.0 fL    Neutrophils % 72.5 43.0 - 80.0 %    Immature Granulocytes % 0.5 0.0 - 5.0 %    Lymphocytes % 16.5 (L) 20.0 - 42.0 %    Monocytes % 8.4 2.0 - 12.0 %    Eosinophils % 1.1 0.0 - 6.0 %    Basophils % 1.0 0.0 - 2.0 %    Neutrophils Absolute 6.05 1.80 - 7.30 E9/L    Immature Granulocytes # 0.04 E9/L    Lymphocytes Absolute 1.38 (L) 1.50 - 4.00 E9/L    Monocytes Absolute 0.70 0.10 - 0.95 E9/L    Eosinophils Absolute 0.09 0.05 - 0.50 E9/L    Basophils Absolute 0.08 0.00 - 0.20 E9/L   Serum Drug Screen   Result Value Ref Range    Ethanol Lvl <10 mg/dL    Acetaminophen Level <5.0 (L) 10.0 - 93.5 mcg/mL    Salicylate, Serum <1.1 0.0 - 30.0 mg/dL    TCA Scrn NEGATIVE Cutoff:300 ng/mL   Urinalysis   Result Value Ref Range    Color, UA Yellow Straw/Yellow    Clarity, UA Clear Clear    Glucose, Ur Negative Negative mg/dL    Bilirubin Urine Negative Negative    Ketones, Urine Negative Negative mg/dL    Specific Gravity, UA 1.010 1.005 - 1.030    Blood, Urine TRACE (A) Negative    pH, UA 6.5 5.0 - 9.0    Protein, UA Negative Negative mg/dL    Urobilinogen, Urine 1.0 <2.0 E.U./dL    Nitrite, Urine Negative Negative    Leukocyte Esterase, Urine Negative Negative   Urine Drug Screen   Result Value Ref Range    Drug Screen Comment: see below    Microscopic Urinalysis   Result Value Ref Range    WBC, UA NONE 0 - 5 /HPF    RBC, UA 0-1 0 - 2 /HPF    Bacteria, UA RARE (A) None Seen /HPF DISPOSITION ---------------------------------    IMPRESSION  1. Suicidal ideation    2. Hallucinations        DISPOSITION  Disposition: Per   Patient condition is stable    NOTE: This report was transcribed using voice recognition software.  Every effort was made to ensure accuracy; however, inadvertent computerized transcription errors may be present        Clementina Duncan DO  09/26/21 1767

## 2021-09-27 NOTE — ED NOTES
FIRST PROVIDER CONTACT ASSESSMENT NOTE      Department of Emergency Medicine   9/26/21  9:04 PM EDT    Chief Complaint: No chief complaint on file. History of Present Illness:   Stephen Houston is a 37 y.o. male who presents to the ED for    Medical History:  has a past medical history of Depression and Schizoaffective disorder ([facility] [address] Surgical History:  has a past surgical history that includes eye surgery (19 years ago). Social History:  reports that he has been smoking cigars and cigarettes. He has a 6.00 pack-year smoking history. He has never used smokeless tobacco. He reports current alcohol use. He reports current drug use. Frequency: 7.00 times per week. Drugs: Cocaine and Marijuana. Family History: family history includes Mental Illness in his mother; No Known Problems in his father; Substance Abuse in his mother.     *ALLERGIES*     Chlorpheniramine-phenylephrine, Penicillins, and Rondec-d [chlophedianol-pseudoephedrine]     Physical Exam:      VS:  Pulse 94   Temp 97.1 °F (36.2 °C)   SpO2 98%      Initial Plan of Care:  Initiate Treatment-Testing, Proceed toTreatment Area When Bed Available for ED Attending/MLP to Continue Care    -----------------END OF FIRST PROVIDER CONTACT ASSESSMENT NOTE--------------  Electronically signed by JUICE Bajwa CNP   DD: 9/26/21             JUICE Bell CNP  09/26/21 3223

## 2021-09-27 NOTE — ED NOTES
Patient lying on ED stretcher sleeping and appears in no acute distress. Charge RN aware that patient needs CO and CO to be requested from staffing by charge RN.      Precious Pa RN  09/27/21 8756
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
## 2021-09-27 NOTE — ED NOTES
Patient remains lying on ED stretcher in no distress. Respirations unlabored.       Otto Mcclure RN  09/27/21 4914

## 2021-09-27 NOTE — ED NOTES
Patient in bathroom. Social work cleared patient for d/c and gave cab voucher for patient. Awaiting patient to come back from bathroom to obtain home address and request taxi.      Precious Pa RN  09/27/21 2373

## 2021-09-27 NOTE — ED NOTES
Patient provided with boxed lunch and juice. Denies other complaints.      Kelin Dey RN  09/27/21 2802

## 2021-09-27 NOTE — ED NOTES
Lying on stretcher with eyes closed. Arouses to name. States needs met.   Contracts verbally for safety     Yunire Butler RN  09/27/21 4291

## 2021-09-27 NOTE — ED NOTES
Patient signed d/c paperwork and left ED A&Ox3 and ambulatory with no complaints of SI/HI/other concerns. Patient d/c to his home address in [place].      James Montoya RN  09/27/21 9922

## 2021-09-28 ENCOUNTER — ANESTHESIA EVENT (OUTPATIENT)
Dept: OPERATING ROOM | Age: 41
End: 2021-09-28
Payer: COMMERCIAL

## 2021-09-28 ENCOUNTER — ANESTHESIA (OUTPATIENT)
Dept: OPERATING ROOM | Age: 41
End: 2021-09-28
Payer: COMMERCIAL

## 2021-09-28 ENCOUNTER — APPOINTMENT (OUTPATIENT)
Dept: CT IMAGING | Age: 41
End: 2021-09-28
Payer: COMMERCIAL

## 2021-09-28 ENCOUNTER — HOSPITAL ENCOUNTER (OUTPATIENT)
Age: 41
Setting detail: OBSERVATION
Discharge: NURSING, REHAB OR LONG-TERM CARE | End: 2021-10-02
Attending: EMERGENCY MEDICINE | Admitting: ORTHOPAEDIC SURGERY
Payer: COMMERCIAL

## 2021-09-28 VITALS — DIASTOLIC BLOOD PRESSURE: 48 MMHG | TEMPERATURE: 96.6 F | SYSTOLIC BLOOD PRESSURE: 90 MMHG | OXYGEN SATURATION: 100 %

## 2021-09-28 DIAGNOSIS — Z48.89 ENCOUNTER FOR POST SURGICAL WOUND CHECK: Primary | ICD-10-CM

## 2021-09-28 DIAGNOSIS — F20.9 SCHIZOPHRENIA, UNSPECIFIED TYPE (HCC): ICD-10-CM

## 2021-09-28 PROBLEM — S70.01XA HEMATOMA OF RIGHT HIP: Status: ACTIVE | Noted: 2021-09-28

## 2021-09-28 LAB
ACETAMINOPHEN LEVEL: <5 MCG/ML (ref 10–30)
ALBUMIN SERPL-MCNC: 3.5 G/DL (ref 3.5–5.2)
ALP BLD-CCNC: 78 U/L (ref 40–129)
ALT SERPL-CCNC: 15 U/L (ref 0–40)
AMPHETAMINE SCREEN, URINE: NOT DETECTED
ANION GAP SERPL CALCULATED.3IONS-SCNC: 6 MMOL/L (ref 7–16)
AST SERPL-CCNC: 13 U/L (ref 0–39)
BACTERIA: NORMAL /HPF
BARBITURATE SCREEN URINE: NOT DETECTED
BASOPHILS ABSOLUTE: 0.07 E9/L (ref 0–0.2)
BASOPHILS RELATIVE PERCENT: 0.9 % (ref 0–2)
BENZODIAZEPINE SCREEN, URINE: NOT DETECTED
BILIRUB SERPL-MCNC: <0.2 MG/DL (ref 0–1.2)
BILIRUBIN URINE: NEGATIVE
BLOOD, URINE: ABNORMAL
BUN BLDV-MCNC: 14 MG/DL (ref 6–20)
C-REACTIVE PROTEIN: 0.7 MG/DL (ref 0–0.4)
CALCIUM SERPL-MCNC: 8.8 MG/DL (ref 8.6–10.2)
CANNABINOID SCREEN URINE: NOT DETECTED
CHLORIDE BLD-SCNC: 106 MMOL/L (ref 98–107)
CLARITY: CLEAR
CO2: 32 MMOL/L (ref 22–29)
COCAINE METABOLITE SCREEN URINE: POSITIVE
COLOR: YELLOW
CREAT SERPL-MCNC: 0.9 MG/DL (ref 0.7–1.2)
EKG ATRIAL RATE: 72 BPM
EKG P AXIS: 75 DEGREES
EKG P-R INTERVAL: 190 MS
EKG Q-T INTERVAL: 372 MS
EKG QRS DURATION: 94 MS
EKG QTC CALCULATION (BAZETT): 407 MS
EKG R AXIS: 81 DEGREES
EKG T AXIS: 65 DEGREES
EKG VENTRICULAR RATE: 72 BPM
EOSINOPHILS ABSOLUTE: 0.1 E9/L (ref 0.05–0.5)
EOSINOPHILS RELATIVE PERCENT: 1.3 % (ref 0–6)
ETHANOL: <10 MG/DL (ref 0–0.08)
FENTANYL SCREEN, URINE: NOT DETECTED
GFR AFRICAN AMERICAN: >60
GFR NON-AFRICAN AMERICAN: >60 ML/MIN/1.73
GLUCOSE BLD-MCNC: 106 MG/DL (ref 74–99)
GLUCOSE URINE: NEGATIVE MG/DL
HCT VFR BLD CALC: 32.8 % (ref 37–54)
HEMOGLOBIN: 10.7 G/DL (ref 12.5–16.5)
IMMATURE GRANULOCYTES #: 0.04 E9/L
IMMATURE GRANULOCYTES %: 0.5 % (ref 0–5)
INR BLD: 1.1
KETONES, URINE: NEGATIVE MG/DL
LACTIC ACID: 2.1 MMOL/L (ref 0.5–2.2)
LEUKOCYTE ESTERASE, URINE: NEGATIVE
LYMPHOCYTES ABSOLUTE: 1.33 E9/L (ref 1.5–4)
LYMPHOCYTES RELATIVE PERCENT: 16.9 % (ref 20–42)
Lab: ABNORMAL
MCH RBC QN AUTO: 30 PG (ref 26–35)
MCHC RBC AUTO-ENTMCNC: 32.6 % (ref 32–34.5)
MCV RBC AUTO: 91.9 FL (ref 80–99.9)
METHADONE SCREEN, URINE: NOT DETECTED
MONOCYTES ABSOLUTE: 0.59 E9/L (ref 0.1–0.95)
MONOCYTES RELATIVE PERCENT: 7.5 % (ref 2–12)
NEUTROPHILS ABSOLUTE: 5.76 E9/L (ref 1.8–7.3)
NEUTROPHILS RELATIVE PERCENT: 72.9 % (ref 43–80)
NITRITE, URINE: NEGATIVE
OPIATE SCREEN URINE: NOT DETECTED
OXYCODONE URINE: NOT DETECTED
PDW BLD-RTO: 14 FL (ref 11.5–15)
PH UA: 6 (ref 5–9)
PHENCYCLIDINE SCREEN URINE: NOT DETECTED
PLATELET # BLD: 747 E9/L (ref 130–450)
PMV BLD AUTO: 8.4 FL (ref 7–12)
POTASSIUM SERPL-SCNC: 4 MMOL/L (ref 3.5–5)
PROTEIN UA: NEGATIVE MG/DL
PROTHROMBIN TIME: 11.8 SEC (ref 9.3–12.4)
RBC # BLD: 3.57 E12/L (ref 3.8–5.8)
RBC UA: NORMAL /HPF (ref 0–2)
SALICYLATE, SERUM: <0.3 MG/DL (ref 0–30)
SEDIMENTATION RATE, ERYTHROCYTE: 22 MM/HR (ref 0–15)
SODIUM BLD-SCNC: 144 MMOL/L (ref 132–146)
SPECIFIC GRAVITY UA: >=1.03 (ref 1–1.03)
TOTAL CK: 128 U/L (ref 20–200)
TOTAL PROTEIN: 5.9 G/DL (ref 6.4–8.3)
TRICYCLIC ANTIDEPRESSANTS SCREEN SERUM: NEGATIVE NG/ML
UROBILINOGEN, URINE: 1 E.U./DL
WBC # BLD: 7.9 E9/L (ref 4.5–11.5)
WBC UA: NORMAL /HPF (ref 0–5)

## 2021-09-28 PROCEDURE — 80053 COMPREHEN METABOLIC PANEL: CPT

## 2021-09-28 PROCEDURE — 6370000000 HC RX 637 (ALT 250 FOR IP): Performed by: PHYSICAL THERAPY ASSISTANT

## 2021-09-28 PROCEDURE — 85610 PROTHROMBIN TIME: CPT

## 2021-09-28 PROCEDURE — 87205 SMEAR GRAM STAIN: CPT

## 2021-09-28 PROCEDURE — 3700000000 HC ANESTHESIA ATTENDED CARE: Performed by: ORTHOPAEDIC SURGERY

## 2021-09-28 PROCEDURE — 3600000012 HC SURGERY LEVEL 2 ADDTL 15MIN: Performed by: ORTHOPAEDIC SURGERY

## 2021-09-28 PROCEDURE — 3700000001 HC ADD 15 MINUTES (ANESTHESIA): Performed by: ORTHOPAEDIC SURGERY

## 2021-09-28 PROCEDURE — 80307 DRUG TEST PRSMV CHEM ANLYZR: CPT

## 2021-09-28 PROCEDURE — 85651 RBC SED RATE NONAUTOMATED: CPT

## 2021-09-28 PROCEDURE — 87075 CULTR BACTERIA EXCEPT BLOOD: CPT

## 2021-09-28 PROCEDURE — 99284 EMERGENCY DEPT VISIT MOD MDM: CPT

## 2021-09-28 PROCEDURE — 6360000002 HC RX W HCPCS: Performed by: ANESTHESIOLOGY

## 2021-09-28 PROCEDURE — 80143 DRUG ASSAY ACETAMINOPHEN: CPT

## 2021-09-28 PROCEDURE — 81001 URINALYSIS AUTO W/SCOPE: CPT

## 2021-09-28 PROCEDURE — 87070 CULTURE OTHR SPECIMN AEROBIC: CPT

## 2021-09-28 PROCEDURE — 6360000002 HC RX W HCPCS: Performed by: PHYSICAL THERAPY ASSISTANT

## 2021-09-28 PROCEDURE — 87176 TISSUE HOMOGENIZATION CULTR: CPT

## 2021-09-28 PROCEDURE — 87206 SMEAR FLUORESCENT/ACID STAI: CPT

## 2021-09-28 PROCEDURE — 87077 CULTURE AEROBIC IDENTIFY: CPT

## 2021-09-28 PROCEDURE — 2709999900 HC NON-CHARGEABLE SUPPLY: Performed by: ORTHOPAEDIC SURGERY

## 2021-09-28 PROCEDURE — 83605 ASSAY OF LACTIC ACID: CPT

## 2021-09-28 PROCEDURE — 3600000002 HC SURGERY LEVEL 2 BASE: Performed by: ORTHOPAEDIC SURGERY

## 2021-09-28 PROCEDURE — 93005 ELECTROCARDIOGRAM TRACING: CPT | Performed by: EMERGENCY MEDICINE

## 2021-09-28 PROCEDURE — 80179 DRUG ASSAY SALICYLATE: CPT

## 2021-09-28 PROCEDURE — G0378 HOSPITAL OBSERVATION PER HR: HCPCS

## 2021-09-28 PROCEDURE — 2580000003 HC RX 258: Performed by: RADIOLOGY

## 2021-09-28 PROCEDURE — 2500000003 HC RX 250 WO HCPCS

## 2021-09-28 PROCEDURE — 2500000003 HC RX 250 WO HCPCS: Performed by: PHYSICAL THERAPY ASSISTANT

## 2021-09-28 PROCEDURE — 2580000003 HC RX 258: Performed by: EMERGENCY MEDICINE

## 2021-09-28 PROCEDURE — 73701 CT LOWER EXTREMITY W/DYE: CPT

## 2021-09-28 PROCEDURE — 6360000004 HC RX CONTRAST MEDICATION: Performed by: RADIOLOGY

## 2021-09-28 PROCEDURE — 82550 ASSAY OF CK (CPK): CPT

## 2021-09-28 PROCEDURE — 87102 FUNGUS ISOLATION CULTURE: CPT

## 2021-09-28 PROCEDURE — 87015 SPECIMEN INFECT AGNT CONCNTJ: CPT

## 2021-09-28 PROCEDURE — 27030 ARTHROTOMY HIP W/DRAINAGE: CPT | Performed by: ORTHOPAEDIC SURGERY

## 2021-09-28 PROCEDURE — 87186 SC STD MICRODIL/AGAR DIL: CPT

## 2021-09-28 PROCEDURE — 6360000002 HC RX W HCPCS: Performed by: ORTHOPAEDIC SURGERY

## 2021-09-28 PROCEDURE — 36415 COLL VENOUS BLD VENIPUNCTURE: CPT

## 2021-09-28 PROCEDURE — 87116 MYCOBACTERIA CULTURE: CPT

## 2021-09-28 PROCEDURE — 82077 ASSAY SPEC XCP UR&BREATH IA: CPT

## 2021-09-28 PROCEDURE — 86140 C-REACTIVE PROTEIN: CPT

## 2021-09-28 PROCEDURE — 85025 COMPLETE CBC W/AUTO DIFF WBC: CPT

## 2021-09-28 PROCEDURE — 7100000001 HC PACU RECOVERY - ADDTL 15 MIN: Performed by: ORTHOPAEDIC SURGERY

## 2021-09-28 PROCEDURE — 6360000002 HC RX W HCPCS

## 2021-09-28 PROCEDURE — 7100000000 HC PACU RECOVERY - FIRST 15 MIN: Performed by: ORTHOPAEDIC SURGERY

## 2021-09-28 RX ORDER — LIDOCAINE HYDROCHLORIDE 20 MG/ML
INJECTION, SOLUTION INTRAVENOUS PRN
Status: DISCONTINUED | OUTPATIENT
Start: 2021-09-28 | End: 2021-09-28 | Stop reason: SDUPTHER

## 2021-09-28 RX ORDER — MIDAZOLAM HYDROCHLORIDE 1 MG/ML
INJECTION INTRAMUSCULAR; INTRAVENOUS PRN
Status: DISCONTINUED | OUTPATIENT
Start: 2021-09-28 | End: 2021-09-28 | Stop reason: SDUPTHER

## 2021-09-28 RX ORDER — VANCOMYCIN HYDROCHLORIDE 1 G/20ML
INJECTION, POWDER, LYOPHILIZED, FOR SOLUTION INTRAVENOUS PRN
Status: DISCONTINUED | OUTPATIENT
Start: 2021-09-28 | End: 2021-09-28 | Stop reason: ALTCHOICE

## 2021-09-28 RX ORDER — ACETAMINOPHEN 325 MG/1
650 TABLET ORAL EVERY 6 HOURS
Status: DISCONTINUED | OUTPATIENT
Start: 2021-09-28 | End: 2021-10-02 | Stop reason: HOSPADM

## 2021-09-28 RX ORDER — MORPHINE SULFATE 2 MG/ML
1 INJECTION, SOLUTION INTRAMUSCULAR; INTRAVENOUS EVERY 5 MIN PRN
Status: DISCONTINUED | OUTPATIENT
Start: 2021-09-28 | End: 2021-09-28 | Stop reason: HOSPADM

## 2021-09-28 RX ORDER — GLYCOPYRROLATE 1 MG/5 ML
SYRINGE (ML) INTRAVENOUS PRN
Status: DISCONTINUED | OUTPATIENT
Start: 2021-09-28 | End: 2021-09-28 | Stop reason: SDUPTHER

## 2021-09-28 RX ORDER — HYDROCODONE BITARTRATE AND ACETAMINOPHEN 5; 325 MG/1; MG/1
2 TABLET ORAL PRN
Status: DISCONTINUED | OUTPATIENT
Start: 2021-09-28 | End: 2021-09-28 | Stop reason: HOSPADM

## 2021-09-28 RX ORDER — SODIUM CHLORIDE 9 MG/ML
25 INJECTION, SOLUTION INTRAVENOUS PRN
Status: DISCONTINUED | OUTPATIENT
Start: 2021-09-28 | End: 2021-10-02 | Stop reason: HOSPADM

## 2021-09-28 RX ORDER — MORPHINE SULFATE 2 MG/ML
2 INJECTION, SOLUTION INTRAMUSCULAR; INTRAVENOUS EVERY 5 MIN PRN
Status: DISCONTINUED | OUTPATIENT
Start: 2021-09-28 | End: 2021-09-28 | Stop reason: HOSPADM

## 2021-09-28 RX ORDER — SODIUM CHLORIDE 9 MG/ML
INJECTION, SOLUTION INTRAVENOUS CONTINUOUS
Status: DISCONTINUED | OUTPATIENT
Start: 2021-09-28 | End: 2021-10-02 | Stop reason: HOSPADM

## 2021-09-28 RX ORDER — PROPOFOL 10 MG/ML
INJECTION, EMULSION INTRAVENOUS PRN
Status: DISCONTINUED | OUTPATIENT
Start: 2021-09-28 | End: 2021-09-28 | Stop reason: SDUPTHER

## 2021-09-28 RX ORDER — ONDANSETRON 2 MG/ML
4 INJECTION INTRAMUSCULAR; INTRAVENOUS EVERY 6 HOURS PRN
Status: DISCONTINUED | OUTPATIENT
Start: 2021-09-28 | End: 2021-10-02 | Stop reason: HOSPADM

## 2021-09-28 RX ORDER — CEFAZOLIN SODIUM 1 G/3ML
INJECTION, POWDER, FOR SOLUTION INTRAMUSCULAR; INTRAVENOUS PRN
Status: DISCONTINUED | OUTPATIENT
Start: 2021-09-28 | End: 2021-09-28

## 2021-09-28 RX ORDER — HYDROCODONE BITARTRATE AND ACETAMINOPHEN 5; 325 MG/1; MG/1
1 TABLET ORAL EVERY 6 HOURS PRN
Qty: 28 TABLET | Refills: 0 | Status: SHIPPED | OUTPATIENT
Start: 2021-09-28 | End: 2021-10-01 | Stop reason: SDUPTHER

## 2021-09-28 RX ORDER — PHENYLEPHRINE HCL IN 0.9% NACL 1 MG/10 ML
SYRINGE (ML) INTRAVENOUS PRN
Status: DISCONTINUED | OUTPATIENT
Start: 2021-09-28 | End: 2021-09-28 | Stop reason: SDUPTHER

## 2021-09-28 RX ORDER — SODIUM CHLORIDE 0.9 % (FLUSH) 0.9 %
5-40 SYRINGE (ML) INJECTION EVERY 12 HOURS SCHEDULED
Status: DISCONTINUED | OUTPATIENT
Start: 2021-09-28 | End: 2021-10-02 | Stop reason: HOSPADM

## 2021-09-28 RX ORDER — MEPERIDINE HYDROCHLORIDE 25 MG/ML
12.5 INJECTION INTRAMUSCULAR; INTRAVENOUS; SUBCUTANEOUS EVERY 5 MIN PRN
Status: DISCONTINUED | OUTPATIENT
Start: 2021-09-28 | End: 2021-09-28 | Stop reason: HOSPADM

## 2021-09-28 RX ORDER — OXYCODONE HYDROCHLORIDE 5 MG/1
5 TABLET ORAL EVERY 4 HOURS PRN
Status: DISCONTINUED | OUTPATIENT
Start: 2021-09-28 | End: 2021-10-02 | Stop reason: HOSPADM

## 2021-09-28 RX ORDER — SODIUM CHLORIDE 0.9 % (FLUSH) 0.9 %
10 SYRINGE (ML) INJECTION PRN
Status: COMPLETED | OUTPATIENT
Start: 2021-09-28 | End: 2021-09-28

## 2021-09-28 RX ORDER — ONDANSETRON 4 MG/1
4 TABLET, ORALLY DISINTEGRATING ORAL EVERY 8 HOURS PRN
Status: DISCONTINUED | OUTPATIENT
Start: 2021-09-28 | End: 2021-10-02 | Stop reason: HOSPADM

## 2021-09-28 RX ORDER — PROMETHAZINE HYDROCHLORIDE 25 MG/ML
6.25 INJECTION, SOLUTION INTRAMUSCULAR; INTRAVENOUS EVERY 10 MIN PRN
Status: DISCONTINUED | OUTPATIENT
Start: 2021-09-28 | End: 2021-09-28 | Stop reason: HOSPADM

## 2021-09-28 RX ORDER — NEOSTIGMINE METHYLSULFATE 1 MG/ML
INJECTION, SOLUTION INTRAVENOUS PRN
Status: DISCONTINUED | OUTPATIENT
Start: 2021-09-28 | End: 2021-09-28 | Stop reason: SDUPTHER

## 2021-09-28 RX ORDER — OXYCODONE HYDROCHLORIDE 10 MG/1
10 TABLET ORAL EVERY 4 HOURS PRN
Status: DISCONTINUED | OUTPATIENT
Start: 2021-09-28 | End: 2021-10-02 | Stop reason: HOSPADM

## 2021-09-28 RX ORDER — SODIUM CHLORIDE 0.9 % (FLUSH) 0.9 %
5-40 SYRINGE (ML) INJECTION PRN
Status: DISCONTINUED | OUTPATIENT
Start: 2021-09-28 | End: 2021-10-02 | Stop reason: HOSPADM

## 2021-09-28 RX ORDER — MORPHINE SULFATE 4 MG/ML
4 INJECTION, SOLUTION INTRAMUSCULAR; INTRAVENOUS
Status: DISCONTINUED | OUTPATIENT
Start: 2021-09-28 | End: 2021-10-01

## 2021-09-28 RX ORDER — DEXAMETHASONE SODIUM PHOSPHATE 10 MG/ML
INJECTION INTRAMUSCULAR; INTRAVENOUS PRN
Status: DISCONTINUED | OUTPATIENT
Start: 2021-09-28 | End: 2021-09-28 | Stop reason: SDUPTHER

## 2021-09-28 RX ORDER — ONDANSETRON 2 MG/ML
INJECTION INTRAMUSCULAR; INTRAVENOUS PRN
Status: DISCONTINUED | OUTPATIENT
Start: 2021-09-28 | End: 2021-09-28 | Stop reason: SDUPTHER

## 2021-09-28 RX ORDER — FENTANYL CITRATE 50 UG/ML
INJECTION, SOLUTION INTRAMUSCULAR; INTRAVENOUS PRN
Status: DISCONTINUED | OUTPATIENT
Start: 2021-09-28 | End: 2021-09-28 | Stop reason: SDUPTHER

## 2021-09-28 RX ORDER — HYDROCODONE BITARTRATE AND ACETAMINOPHEN 5; 325 MG/1; MG/1
1 TABLET ORAL PRN
Status: DISCONTINUED | OUTPATIENT
Start: 2021-09-28 | End: 2021-09-28 | Stop reason: HOSPADM

## 2021-09-28 RX ORDER — POLYETHYLENE GLYCOL 3350 17 G/17G
17 POWDER, FOR SOLUTION ORAL DAILY PRN
Status: DISCONTINUED | OUTPATIENT
Start: 2021-09-28 | End: 2021-10-02 | Stop reason: HOSPADM

## 2021-09-28 RX ORDER — ROCURONIUM BROMIDE 10 MG/ML
INJECTION, SOLUTION INTRAVENOUS PRN
Status: DISCONTINUED | OUTPATIENT
Start: 2021-09-28 | End: 2021-09-28 | Stop reason: SDUPTHER

## 2021-09-28 RX ORDER — MORPHINE SULFATE 2 MG/ML
2 INJECTION, SOLUTION INTRAMUSCULAR; INTRAVENOUS
Status: DISCONTINUED | OUTPATIENT
Start: 2021-09-28 | End: 2021-10-01

## 2021-09-28 RX ADMIN — PROPOFOL 50 MG: 10 INJECTION, EMULSION INTRAVENOUS at 10:17

## 2021-09-28 RX ADMIN — Medication 0.2 MG: at 10:31

## 2021-09-28 RX ADMIN — IOPAMIDOL 70 ML: 755 INJECTION, SOLUTION INTRAVENOUS at 03:11

## 2021-09-28 RX ADMIN — Medication 100 MCG: at 10:24

## 2021-09-28 RX ADMIN — Medication 100 MCG: at 09:59

## 2021-09-28 RX ADMIN — CEFAZOLIN 2000 MG: 10 INJECTION, POWDER, FOR SOLUTION INTRAVENOUS at 18:13

## 2021-09-28 RX ADMIN — Medication 100 MCG: at 09:49

## 2021-09-28 RX ADMIN — PROPOFOL 50 MG: 10 INJECTION, EMULSION INTRAVENOUS at 10:14

## 2021-09-28 RX ADMIN — Medication 2000 MG: at 09:28

## 2021-09-28 RX ADMIN — Medication 10 ML: at 03:12

## 2021-09-28 RX ADMIN — DEXAMETHASONE SODIUM PHOSPHATE 10 MG: 10 INJECTION INTRAMUSCULAR; INTRAVENOUS at 09:35

## 2021-09-28 RX ADMIN — PROPOFOL 150 MG: 10 INJECTION, EMULSION INTRAVENOUS at 09:24

## 2021-09-28 RX ADMIN — PROPOFOL 30 MG: 10 INJECTION, EMULSION INTRAVENOUS at 10:22

## 2021-09-28 RX ADMIN — PROPOFOL 50 MG: 10 INJECTION, EMULSION INTRAVENOUS at 09:28

## 2021-09-28 RX ADMIN — HYDROMORPHONE HYDROCHLORIDE 0.5 MG: 1 INJECTION, SOLUTION INTRAMUSCULAR; INTRAVENOUS; SUBCUTANEOUS at 11:01

## 2021-09-28 RX ADMIN — SODIUM CHLORIDE: 9 INJECTION, SOLUTION INTRAVENOUS at 02:21

## 2021-09-28 RX ADMIN — LIDOCAINE HYDROCHLORIDE 80 MG: 20 INJECTION, SOLUTION INTRAVENOUS at 09:24

## 2021-09-28 RX ADMIN — Medication 100 MCG: at 10:07

## 2021-09-28 RX ADMIN — ACETAMINOPHEN 650 MG: 325 TABLET ORAL at 18:13

## 2021-09-28 RX ADMIN — ACETAMINOPHEN 650 MG: 325 TABLET ORAL at 12:49

## 2021-09-28 RX ADMIN — HYDROMORPHONE HYDROCHLORIDE 0.5 MG: 1 INJECTION, SOLUTION INTRAMUSCULAR; INTRAVENOUS; SUBCUTANEOUS at 10:48

## 2021-09-28 RX ADMIN — MIDAZOLAM 2 MG: 1 INJECTION INTRAMUSCULAR; INTRAVENOUS at 09:15

## 2021-09-28 RX ADMIN — ROCURONIUM BROMIDE 30 MG: 10 INJECTION, SOLUTION INTRAVENOUS at 09:24

## 2021-09-28 RX ADMIN — ONDANSETRON HYDROCHLORIDE 4 MG: 2 INJECTION, SOLUTION INTRAMUSCULAR; INTRAVENOUS at 09:35

## 2021-09-28 RX ADMIN — SODIUM CHLORIDE: 9 INJECTION, SOLUTION INTRAVENOUS at 10:05

## 2021-09-28 RX ADMIN — OXYCODONE HYDROCHLORIDE 10 MG: 10 TABLET ORAL at 16:00

## 2021-09-28 RX ADMIN — Medication 1 MG: at 10:31

## 2021-09-28 RX ADMIN — FENTANYL CITRATE 100 MCG: 50 INJECTION, SOLUTION INTRAMUSCULAR; INTRAVENOUS at 09:24

## 2021-09-28 RX ADMIN — LIDOCAINE HYDROCHLORIDE 20 MG: 20 INJECTION, SOLUTION INTRAVENOUS at 10:14

## 2021-09-28 ASSESSMENT — PULMONARY FUNCTION TESTS
PIF_VALUE: 21
PIF_VALUE: 21
PIF_VALUE: 1
PIF_VALUE: 19
PIF_VALUE: 19
PIF_VALUE: 0
PIF_VALUE: 0
PIF_VALUE: 15
PIF_VALUE: 7
PIF_VALUE: 22
PIF_VALUE: 18
PIF_VALUE: 0
PIF_VALUE: 1
PIF_VALUE: 15
PIF_VALUE: 21
PIF_VALUE: 2
PIF_VALUE: 18
PIF_VALUE: 0
PIF_VALUE: 8
PIF_VALUE: 21
PIF_VALUE: 15
PIF_VALUE: 20
PIF_VALUE: 5
PIF_VALUE: 15
PIF_VALUE: 21
PIF_VALUE: 21
PIF_VALUE: 19
PIF_VALUE: 0
PIF_VALUE: 18
PIF_VALUE: 21
PIF_VALUE: 1
PIF_VALUE: 18
PIF_VALUE: 15
PIF_VALUE: 1
PIF_VALUE: 19
PIF_VALUE: 20
PIF_VALUE: 2
PIF_VALUE: 19
PIF_VALUE: 0
PIF_VALUE: 19
PIF_VALUE: 20
PIF_VALUE: 15
PIF_VALUE: 18
PIF_VALUE: 4
PIF_VALUE: 21
PIF_VALUE: 18
PIF_VALUE: 19
PIF_VALUE: 21
PIF_VALUE: 2
PIF_VALUE: 15
PIF_VALUE: 22
PIF_VALUE: 21
PIF_VALUE: 1
PIF_VALUE: 20
PIF_VALUE: 15
PIF_VALUE: 18
PIF_VALUE: 21
PIF_VALUE: 16
PIF_VALUE: 15
PIF_VALUE: 22
PIF_VALUE: 19
PIF_VALUE: 18
PIF_VALUE: 19
PIF_VALUE: 18
PIF_VALUE: 18
PIF_VALUE: 20
PIF_VALUE: 20
PIF_VALUE: 1
PIF_VALUE: 19
PIF_VALUE: 20
PIF_VALUE: 22
PIF_VALUE: 20
PIF_VALUE: 22
PIF_VALUE: 21
PIF_VALUE: 25
PIF_VALUE: 2
PIF_VALUE: 20
PIF_VALUE: 21
PIF_VALUE: 22

## 2021-09-28 ASSESSMENT — PAIN SCALES - GENERAL
PAINLEVEL_OUTOF10: 2
PAINLEVEL_OUTOF10: 5
PAINLEVEL_OUTOF10: 8
PAINLEVEL_OUTOF10: 0
PAINLEVEL_OUTOF10: 10
PAINLEVEL_OUTOF10: 8
PAINLEVEL_OUTOF10: 3
PAINLEVEL_OUTOF10: 8
PAINLEVEL_OUTOF10: 8
PAINLEVEL_OUTOF10: 10
PAINLEVEL_OUTOF10: 0

## 2021-09-28 ASSESSMENT — PAIN DESCRIPTION - DESCRIPTORS
DESCRIPTORS: ACHING;DISCOMFORT
DESCRIPTORS: ACHING;BURNING;STABBING
DESCRIPTORS: ACHING;DISCOMFORT
DESCRIPTORS: ACHING;DISCOMFORT;SORE
DESCRIPTORS: ACHING;DISCOMFORT
DESCRIPTORS: ACHING;DISCOMFORT;SORE

## 2021-09-28 ASSESSMENT — PAIN DESCRIPTION - PROGRESSION: CLINICAL_PROGRESSION: NOT CHANGED

## 2021-09-28 ASSESSMENT — PAIN DESCRIPTION - ORIENTATION
ORIENTATION: LEFT

## 2021-09-28 ASSESSMENT — PAIN DESCRIPTION - LOCATION
LOCATION: HIP

## 2021-09-28 ASSESSMENT — PAIN DESCRIPTION - PAIN TYPE
TYPE: SURGICAL PAIN
TYPE: ACUTE PAIN

## 2021-09-28 ASSESSMENT — PAIN - FUNCTIONAL ASSESSMENT: PAIN_FUNCTIONAL_ASSESSMENT: PREVENTS OR INTERFERES SOME ACTIVE ACTIVITIES AND ADLS

## 2021-09-28 ASSESSMENT — PAIN DESCRIPTION - FREQUENCY
FREQUENCY: CONTINUOUS
FREQUENCY: INTERMITTENT

## 2021-09-28 ASSESSMENT — PAIN DESCRIPTION - ONSET: ONSET: ON-GOING

## 2021-09-28 ASSESSMENT — LIFESTYLE VARIABLES: SMOKING_STATUS: 1
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
## 2021-09-28 NOTE — ANESTHESIA PRE PROCEDURE
Department of Anesthesiology  Preprocedure Note       Name:  Floridalma Duran. Age:  37 y.o.  :  1980                                          MRN:  [id number]         Date:  2021      Surgeon: Gabrielle Suarez):  Rupert Garcia MD    Procedure: Procedure(s):  IRRIGATION AND DEBRIDEMENT LEFT HIP    Medications prior to admission:   Prior to Admission medications    Not on File       Current medications:    Current Facility-Administered Medications   Medication Dose Route Frequency Provider Last Rate Last Admin    0.9 % sodium chloride infusion   IntraVENous Continuous Shyam Perales  mL/hr at 21 New Bag at 21    ceFAZolin (ANCEF) 2000 mg in sterile water 20 mL IV syringe  2,000 mg IntraVENous See Admin Instructions 502 Jennifer Foster DO         No current outpatient medications on file. Allergies: Allergies   Allergen Reactions    Chlorpheniramine-Phenylephrine Swelling    Penicillins     Rondec-D [Chlophedianol-Pseudoephedrine]        Problem List:    Patient Active Problem List   Diagnosis Code    Schizoaffective disorder, bipolar type ([facility] [address] F25.0    Trauma T14.90XA    Pneumothorax, traumatic S27. 0XXA    Multiple closed fractures of ribs of right side S22.41XA    Rib pain on right side R07.81    Multiple fractures of right lower extremity and ribs, closed, initial encounter\. right tib fracture 9 & 10 S82. 91XA, S22.41XA    Hemopneumothorax, right J94.2    Major depression single episode, in partial remission (HCC) F32.4    Polysubstance abuse ([address] F19.10    Cocaine abuse ([address] F14.10    Hematoma of right hip S70. Phu Flick       Past Medical History:        Diagnosis Date    Depression     Schizoaffective disorder ([address]        Past Surgical History:        Procedure Laterality Date    EYE SURGERY  19 years ago       Social History:    Social History     Tobacco Use    Smoking status: Current Every Day Smoker     Packs/day: 0.25     Years: 24.00 Pack years: 6.00     Types: Cigars, Cigarettes    Smokeless tobacco: Never Used   Substance Use Topics    Alcohol use: Yes                                Ready to quit: Not Answered  Counseling given: Not Answered      Vital Signs (Current):   Vitals:    09/28/21 0143 09/28/21 0444   BP: (!) 101/59 (!) 114/52   Pulse: 88 70   Resp: 18 16   Temp: 37 °C (98.6 °F) 36.7 °C (98.1 °F)   TempSrc: Oral Axillary   SpO2: 97% 98%   Weight: 145 lb (65.8 kg)    Height: 6' (1.829 m)                                               BP Readings from Last 3 Encounters:   09/28/21 (!) 114/52   09/27/21 114/73   09/20/21 98/70       NPO Status: Time of last liquid consumption: 2200                        Time of last solid consumption: 2200                        Date of last liquid consumption: 09/27/21                        Date of last solid food consumption: 09/27/21    BMI:   Wt Readings from Last 3 Encounters:   09/28/21 145 lb (65.8 kg)   09/20/21 145 lb (65.8 kg)   09/19/21 150 lb (68 kg)     Body mass index is 19.67 kg/m². CBC:   Lab Results   Component Value Date    WBC 7.9 09/28/2021    RBC 3.57 09/28/2021    HGB 10.7 09/28/2021    HCT 32.8 09/28/2021    MCV 91.9 09/28/2021    RDW 14.0 09/28/2021     09/28/2021       CMP:   Lab Results   Component Value Date     09/28/2021    K 4.0 09/28/2021    K 4.3 06/02/2021     09/28/2021    CO2 32 09/28/2021    BUN 14 09/28/2021    CREATININE 0.9 09/28/2021    GFRAA >60 09/28/2021    LABGLOM >60 09/28/2021    GLUCOSE 106 09/28/2021    PROT 5.9 09/28/2021    CALCIUM 8.8 09/28/2021    BILITOT <0.2 09/28/2021    ALKPHOS 78 09/28/2021    AST 13 09/28/2021    ALT 15 09/28/2021       POC Tests: No results for input(s): POCGLU, POCNA, POCK, POCCL, POCBUN, POCHEMO, POCHCT in the last 72 hours.     Coags: No results found for: PROTIME, INR, APTT    HCG (If Applicable): No results found for: PREGTESTUR, PREGSERUM, HCG, HCGQUANT     ABGs: No results found for: PHART, CRNA and attending. Trent Carpenter RN   9/28/2021        Pt seen, examined, chart reviewed, plan discussed.   Joel Romano MD  9/28/2021  10:19 AM

## 2021-09-28 NOTE — CONSULTS
Reason for consult: \" Extensive psychiatric history\" patient is with an extensive psychiatric history able to continue following up outpatient. Please specify the reason for an acute inpatient psychiatric consultation such as is patient suicidal, homicidal, psychotic or manic.   Otherwise patient can follow-up with his outpatient provider after discharge

## 2021-09-28 NOTE — CONSULTS
Department of Orthopedic Surgery  Resident Consult Note  Reason for Consult: Left hip hematoma    HISTORY OF PRESENT ILLNESS:       Patient is a 37 y.o. male with hematoma to his left hip. He is proximately 2 weeks s/p left pelvis open reduction internal fixation. Patient reports noticing significant swelling about the left hip but that started approximately 1 week ago. Denies any trauma since surgery and reports he is been weightbearing somewhat with crutches. Patient is to be toe-touch weightbearing. Admits to experiencing fevers and mild chest pain but denies chills, nausea, vomiting, or shortness of breath. Denies numbness/tingling/paresthesias. Denies any other orthopedic complaints at this time. Of note, patient has 2 charts which are been marked to merge today but will not actually merge for approximately 7 days. Past Medical History:        Diagnosis Date    Depression     Schizoaffective disorder ([facility] [address]      Past Surgical History:        Procedure Laterality Date    EYE SURGERY  19 years ago     Current Medications:   Current Facility-Administered Medications: 0.9 % sodium chloride infusion, , IntraVENous, Continuous  ceFAZolin (ANCEF) 2000 mg in sterile water 20 mL IV syringe, 2,000 mg, IntraVENous, See Admin Instructions  Allergies:  Chlorpheniramine-phenylephrine, Penicillins, and Rondec-d [chlophedianol-pseudoephedrine]    Social History:   TOBACCO:   reports that he has been smoking cigars and cigarettes. He has a 6.00 pack-year smoking history. He has never used smokeless tobacco.  ETOH:   reports current alcohol use. DRUGS:   reports current drug use. Frequency: 7.00 times per week. Drugs: Cocaine and Marijuana.   ACTIVITIES OF DAILY LIVING:    OCCUPATION:    Family History:       Problem Relation Age of Onset    Mental Illness Mother     Substance Abuse Mother     No Known Problems Father        REVIEW OF SYSTEMS:  CONSTITUTIONAL:  negative for fevers, chills  EYES:  negative for acute changes  HEENT:  negative for acute changes  RESPIRATORY:  negative for dyspnea  CARDIOVASCULAR:  negative for chest pain, palpitations  GASTROINTESTINAL:  negative for nausea, vomiting  GENITOURINARY:  negative for frequency  HEMATOLOGIC/LYMPHATIC:  negative for bleeding and petechiae  MUSCULOSKELETAL:  positive for left hip swelling  NEUROLOGICAL:  negative for head trauma or LOC  BEHAVIOR/PSYCH:  negative for increased agitation and anxiety    PHYSICAL EXAM:    VITALS:  BP (!) 114/52   Pulse 70   Temp 98.1 °F (36.7 °C) (Axillary)   Resp 16   Ht 6' (1.829 m)   Wt 145 lb (65.8 kg)   SpO2 98%   BMI 19.67 kg/m²   CONSTITUTIONAL:  awake, alert, cooperative, no apparent distress, and appears stated age  EYES: Lids and lashes normal, pupils equal, round and reactive to light, extra ocular muscles intact, sclera clear, conjunctiva normal  ENT: Normocephalic, without obvious abnormality, atraumatic, external ears without lesions, oral pharynx with moist mucus membranes  NECK: Trachea midline, skin normal  LUNGS: No increased work of breathing, good air exchange  CARDIOVASCULAR: 2+ pulses throughout and capillary Refill less than 2 seconds  ABDOMEN: soft, non-distended, non-tender and no rebound tenderness or guarding  NEUROLOGIC: Awake, alert, oriented to name, place and time. Cranial nerves II-XII are grossly intact. Motor is 5 out of 5 bilaterally. Sensory is intact.  Babinski down going    MUSCULOSKELETAL:  Left lower Extremity:  Incision healing well, staples in place  Significant subcutaneous mobile fluid collection appreciated directly beneath the incision that extends past the ends of the incision  Mild TTP about the fluid collection  No significant erythema or temperature difference compared to surrounding tissues appreciated  Compartments soft and compressible  Calf soft and non tender  +PF/DF/EHL  +2/4 DP & PT pulses, Brisk Cap refill, Toes warm and perfused  Distal sensation grossly intact to Peroneals, Sural, Saphenous, and tibial nrs    Secondary Exam:   · Bilateral UE: No obvious signs of trauma. -TTP to fingers, hand, wrist, forearm, elbow, humerus, shoulder or clavicle. -- Patient able to flex/extend fingers, wrist, elbow and shoulder with active and passive ROM without pain, compartments soft and compressible. · Right LE: No obvious signs of trauma. -TTP to foot, ankle, leg, knee, thigh, hip.-- Patient able to flex/extend toes, ankle, knee and hip with active and passive ROM without pain, compartments soft and compressible. · Pelvis: -TTP, -Log roll, -Heel strike     DATA:    CBC:   Lab Results   Component Value Date    WBC 7.9 09/28/2021    RBC 3.57 09/28/2021    HGB 10.7 09/28/2021    HCT 32.8 09/28/2021    MCV 91.9 09/28/2021    MCH 30.0 09/28/2021    MCHC 32.6 09/28/2021    RDW 14.0 09/28/2021     09/28/2021    MPV 8.4 09/28/2021     PT/INR:  No results found for: PROTIME, INR     C-reactive protein: 0.7 (0.0-0.4 mg/dL)    Sed rate: 22 (0-15 mm/hour)    Radiology Review:  CT left hip: Significant subcutaneous fluid collection appreciated measuring approximately 16 mm x 85 mm x 75 mm with extension into the lateral musculature of the hip. S/p pelvis ORIF. No evidence of hardware loosening or failure. Healing left posterior wall hemitransverse pelvis fracture. No other fractures or dislocations noted.     IMPRESSION:  · Left hip hematoma, aseptic versus septic    PLAN:  · Plan for irrigation debridement of left hip hematoma later today  · Treatment consent  · N.p.o. now  · Ancef on-call to the OR  · Preoperative medical optimization  · Plan to be discussed with attending    All questions were sought and answered during encounter    Electronically signed by Mallory Martin DO on 9/28/2021 at 4:47 AM

## 2021-09-28 NOTE — ANESTHESIA POSTPROCEDURE EVALUATION
Department of Anesthesiology  Postprocedure Note    Patient: Ashanti Rodríguez MRN: [id number]  YOB: 1980  Date of evaluation: 9/29/2021  Time:  7:37 AM     Procedure Summary     Date: 09/28/21 Room / Location: [facility] 09 / [facility]    Anesthesia Start: 7894 Anesthesia Stop:     Procedure: IRRIGATION AND DEBRIDEMENT LEFT HIP WITH EXCISION HEMATOMA (Left Hip) Diagnosis: (LEFT HIP HEMATOMA)    Surgeons: Lew Marcelo MD Responsible Provider: Yasmine Awad MD    Anesthesia Type: general ASA Status: 3          Anesthesia Type: general    Chana Phase I: Chana Score: 10    Chana Phase II:      Last vitals: Reviewed and per EMR flowsheets.        Anesthesia Post Evaluation    Patient location during evaluation: PACU  Patient participation: complete - patient participated  Level of consciousness: awake  Pain score: 3  Airway patency: patent  Nausea & Vomiting: no nausea and no vomiting  Complications: no  Cardiovascular status: blood pressure returned to baseline  Respiratory status: acceptable  Hydration status: euvolemic

## 2021-09-28 NOTE — H&P
Department of Orthopedic Surgery  Resident Consult Note  Reason for Consult: Left hip hematoma    HISTORY OF PRESENT ILLNESS:       Patient is a 37 y.o. male with hematoma to his left hip. He is proximately 2 weeks s/p left pelvis open reduction internal fixation. Patient reports noticing significant swelling about the left hip but that started approximately 1 week ago. Denies any trauma since surgery and reports he is been weightbearing somewhat with crutches. Patient is to be toe-touch weightbearing. Admits to experiencing fevers and mild chest pain but denies chills, nausea, vomiting, or shortness of breath. Denies numbness/tingling/paresthesias. Denies any other orthopedic complaints at this time. Of note, patient has 2 charts which are been marked to merge today but will not actually merge for approximately 7 days. Past Medical History:        Diagnosis Date    Depression     Schizoaffective disorder ([facility] [address]      Past Surgical History:        Procedure Laterality Date    EYE SURGERY  19 years ago     Current Medications:   Current Facility-Administered Medications: 0.9 % sodium chloride infusion, , IntraVENous, Continuous  ceFAZolin (ANCEF) 2000 mg in sterile water 20 mL IV syringe, 2,000 mg, IntraVENous, See Admin Instructions  Allergies:  Chlorpheniramine-phenylephrine, Penicillins, and Rondec-d [chlophedianol-pseudoephedrine]    Social History:   TOBACCO:   reports that he has been smoking cigars and cigarettes. He has a 6.00 pack-year smoking history. He has never used smokeless tobacco.  ETOH:   reports current alcohol use. DRUGS:   reports current drug use. Frequency: 7.00 times per week. Drugs: Cocaine and Marijuana.   ACTIVITIES OF DAILY LIVING:    OCCUPATION:    Family History:       Problem Relation Age of Onset    Mental Illness Mother     Substance Abuse Mother     No Known Problems Father        REVIEW OF SYSTEMS:  CONSTITUTIONAL:  negative for fevers, chills  EYES:  negative for acute changes  HEENT:  negative for acute changes  RESPIRATORY:  negative for dyspnea  CARDIOVASCULAR:  negative for chest pain, palpitations  GASTROINTESTINAL:  negative for nausea, vomiting  GENITOURINARY:  negative for frequency  HEMATOLOGIC/LYMPHATIC:  negative for bleeding and petechiae  MUSCULOSKELETAL:  positive for left hip swelling  NEUROLOGICAL:  negative for head trauma or LOC  BEHAVIOR/PSYCH:  negative for increased agitation and anxiety    PHYSICAL EXAM:    VITALS:  BP (!) 114/52   Pulse 70   Temp 98.1 °F (36.7 °C) (Axillary)   Resp 16   Ht 6' (1.829 m)   Wt 145 lb (65.8 kg)   SpO2 98%   BMI 19.67 kg/m²   CONSTITUTIONAL:  awake, alert, cooperative, no apparent distress, and appears stated age  EYES: Lids and lashes normal, pupils equal, round and reactive to light, extra ocular muscles intact, sclera clear, conjunctiva normal  ENT: Normocephalic, without obvious abnormality, atraumatic, external ears without lesions, oral pharynx with moist mucus membranes  NECK: Trachea midline, skin normal  LUNGS: No increased work of breathing, good air exchange  CARDIOVASCULAR: 2+ pulses throughout and capillary Refill less than 2 seconds  ABDOMEN: soft, non-distended, non-tender and no rebound tenderness or guarding  NEUROLOGIC: Awake, alert, oriented to name, place and time. Cranial nerves II-XII are grossly intact. Motor is 5 out of 5 bilaterally. Sensory is intact.  Babinski down going    MUSCULOSKELETAL:  Left lower Extremity:  Incision healing well, staples in place  Significant subcutaneous mobile fluid collection appreciated directly beneath the incision that extends past the ends of the incision  Mild TTP about the fluid collection  No significant erythema or temperature difference compared to surrounding tissues appreciated  Compartments soft and compressible  Calf soft and non tender  +PF/DF/EHL  +2/4 DP & PT pulses, Brisk Cap refill, Toes warm and perfused  Distal sensation grossly intact to Peroneals, Sural, Saphenous, and tibial nrs    Secondary Exam:   · Bilateral UE: No obvious signs of trauma. -TTP to fingers, hand, wrist, forearm, elbow, humerus, shoulder or clavicle. -- Patient able to flex/extend fingers, wrist, elbow and shoulder with active and passive ROM without pain, compartments soft and compressible. · Right LE: No obvious signs of trauma. -TTP to foot, ankle, leg, knee, thigh, hip.-- Patient able to flex/extend toes, ankle, knee and hip with active and passive ROM without pain, compartments soft and compressible. · Pelvis: -TTP, -Log roll, -Heel strike     DATA:    CBC:   Lab Results   Component Value Date    WBC 7.9 09/28/2021    RBC 3.57 09/28/2021    HGB 10.7 09/28/2021    HCT 32.8 09/28/2021    MCV 91.9 09/28/2021    MCH 30.0 09/28/2021    MCHC 32.6 09/28/2021    RDW 14.0 09/28/2021     09/28/2021    MPV 8.4 09/28/2021     PT/INR:  No results found for: PROTIME, INR     C-reactive protein: 0.7 (0.0-0.4 mg/dL)    Sed rate: 22 (0-15 mm/hour)    Radiology Review:  CT left hip: Significant subcutaneous fluid collection appreciated measuring approximately 16 mm x 85 mm x 75 mm with extension into the lateral musculature of the hip. S/p pelvis ORIF. No evidence of hardware loosening or failure. Healing left posterior wall hemitransverse pelvis fracture. No other fractures or dislocations noted. IMPRESSION:  · Left hip hematoma, aseptic versus septic    PLAN:  · Plan for irrigation debridement of left hip hematoma later today  · Treatment consent  · N.p.o. now  · Ancef on-call to the OR  · Preoperative medical optimization  · Plan to be discussed with attending    All questions were sought and answered during encounter    Patient seen and examined,  Agree with above.   ROJELIO    Electronically signed by Elinor Varela DO on 9/28/2021 at 8:35 AM

## 2021-09-28 NOTE — CONSULTS
Hospital Medicine  Consult History & Physical        Reason for consult:  \"Medical management\"    Date of Service: Pt seen/examined in consultation on 9/28/2021    History Of Present Illness:    Mr. Mo Liang, a 37y.o. year old male  who  has a past medical history of Depression and Schizoaffective disorder ([facility] [address] Patient presented to the ED today for psychiatric evaluation and a wound check. He signed a left pelvis open reduction internal fixation 2 weeks ago and reports increased swelling and warmth to the site beginning approximate 1 week ago. He also endorses fevers, chest pain, and visual hallucinations-seeing dead people. CT of the hip revealed a significant subcutaneous fluid collection and orthopedic surgery was consulted. He is s/p I&D. We are asked to see/evaluate the patient for medical management. Psychiatry has also been consulted. He complains of some left hip pain. He denies any medical history outside of psychiatric history. Denies CP, SOB, fevers, or chills. Past Medical History:        Diagnosis Date    Depression     Schizoaffective disorder [facility])      Past Surgical History:        Procedure Laterality Date    EYE SURGERY  19 years ago       Medications Prior to Admission:    Prior to Admission medications    Medication Sig Start Date End Date Taking? Authorizing Provider   HYDROcodone-acetaminophen (NORCO) 5-325 MG per tablet Take 1 tablet by mouth every 6 hours as needed for Pain for up to 7 days. Intended supply: 7 days. Take lowest dose possible to manage pain 9/28/21 10/5/21 Yes Jesi Elkins,        Allergies:  Chlorpheniramine-phenylephrine, Penicillins, and Rondec-d [chlophedianol-pseudoephedrine]    Social History:      TOBACCO:   reports that he has been smoking cigars and cigarettes. He has a 6.00 pack-year smoking history. He has never used smokeless tobacco.  ETOH:   reports current alcohol use.     Family History:  Reports his father has HTN, ESRD on HD, and CAD. Problem Relation Age of Onset    Mental Illness Mother     Substance Abuse Mother     No Known Problems Father        REVIEW OF SYSTEMS:   Pertinent positives as noted in the HPI. All other systems reviewed and negative. PHYSICAL EXAM:  /63   Pulse 57   Temp 96.6 °F (35.9 °C) (Temporal)   Resp 16   Ht 6' (1.829 m)   Wt 145 lb (65.8 kg)   SpO2 98%   BMI 19.67 kg/m²   General appearance: Middle aged male in no apparent distress, appears stated age and cooperative. HEENT: Normal cephalic, atraumatic without obvious deformity. Pupils equal, round, and reactive to light. Extra ocular muscles intact. Conjunctivae/corneas clear. Neck: Supple, with full range of motion. No jugular venous distention. Trachea midline. Respiratory:  Clear to auscultation bilaterally. No apparent distress. Cardiovascular:  Regular rate and rhythm. S1, S2 without murmurs, rubs, or gallops. PV: Brisk capillary refill. +2 pedal and radial pulses bilaterally. No clubbing, cyanosis, edema of bilateral lower extremities. Abdomen: Soft, non-tender, non-distended. +BS  Musculoskeletal: No obvious deformities or erythematous or edematous joints. Skin: Normal skin color. Operative site with dressing that is CDI. Hemovac with sanguineous drainage. Neurologic:  Neurovascularly intact without any focal sensory/motor deficits.  Cranial nerves: II-XII intact, grossly non-focal.  Psychiatric: Alert and oriented, thought content appropriate, normal insight    Labs:     CBC:   Recent Labs     09/26/21 2233 09/28/21 0213   WBC 8.3 7.9   RBC 3.37* 3.57*   HGB 10.0* 10.7*   HCT 31.4* 32.8*   MCV 93.2 91.9   RDW 14.0 14.0   * 747*     BMP:   Recent Labs     09/26/21 2233 09/28/21 0213    144   K 3.6 4.0    106   CO2 30* 32*   BUN 13 14   CREATININE 0.8 0.9     LFT:  Recent Labs     09/26/21 2233 09/28/21 0213   PROT 5.7* 5.9*   ALKPHOS 69 78   ALT 15 15   AST 12 13   BILITOT <0.2 <0.2     CE:  Recent Labs     09/28/21  0213   CKTOTAL 128       PT/INR: No results for input(s): INR, APTT in the last 72 hours. BNP: No results for input(s): BNP in the last 72 hours. Hgb A1C:   Lab Results   Component Value Date    LABA1C 5.2 08/10/2021     No results found for: EAG  ESR:   Lab Results   Component Value Date    SEDRATE 22 (H) 09/28/2021     CRP:   Lab Results   Component Value Date    CRP 0.7 (H) 09/28/2021     D Dimer: No results found for: DDIMER  Folate and B12:   Lab Results   Component Value Date    [id number] 507 10/20/2020   ,   Lab Results   Component Value Date    FOLATE 11.7 10/20/2020     Lactic Acid:   Lab Results   Component Value Date    LACTA 2.1 09/28/2021     Thyroid Studies:   Lab Results   Component Value Date    TSH 0.913 06/25/2021    [id number] 7.9 06/25/2021         ASSESSMENT:  Hematoma of left hip status post left pelvis ORIF  Visual hallucinations  Schizoaffective disorder  Cocaine abuse  Anemia  Thrombocytosis    PLAN:  Orthopedic surgery attending  Psychiatry consulted  Ancef every 8, awaiting cultures  IV fluids   PT/OT    Thanks for allowing us to participate in the care of [address] [place] [address]. Our service has nothing to add to this patient's care at this time. We will sign off. Please do not hesitate to contact us if needed.    +++++++++++++++++++++++++++++++++++++++++++++++++  Wagner Du [address]  +++++++++++++++++++++++++++++++++++++++++++++++++  NOTE: This report was transcribed using voice recognition software. Every effort was made to ensure accuracy; however, inadvertent computerized transcription errors may be present.

## 2021-09-28 NOTE — ED PROVIDER NOTES
HPI:  9/28/21, Time: 1:35 AM EDT         Leonidas Dave. is a 37 y.o. male presenting to the ED for psychiatric evaluation and wound check, beginning 1 day ago. The complaint has been persistent, moderate in severity, and worsened by nothing. Patient has history of surgery to his left hip. Patient reporting increased swelling and feeling warm and left hip. Patient reporting no active fever he reports no active chest pain he reports no abdominal pain or vomiting. Patient reporting no calf pain. Patient does report having visual hallucinations. He states he sees dead people. Patient reporting no auditory hallucinations he reports no homicidal suicidal thoughts he reports no headache. He reports no fall or injury today. ROS:   Pertinent positives and negatives are stated within HPI, all other systems reviewed and are negative.  --------------------------------------------- PAST HISTORY ---------------------------------------------  Past Medical History:  has a past medical history of Depression and Schizoaffective disorder ([facility] [address] Past Surgical History:  has a past surgical history that includes eye surgery (19 years ago). Social History:  reports that he has been smoking cigars and cigarettes. He has a 6.00 pack-year smoking history. He has never used smokeless tobacco. He reports current alcohol use. He reports current drug use. Frequency: 7.00 times per week. Drugs: Cocaine and Marijuana. Family History: family history includes Mental Illness in his mother; No Known Problems in his father; Substance Abuse in his mother. The patients home medications have been reviewed.     Allergies: Chlorpheniramine-phenylephrine, Penicillins, and Rondec-d [chlophedianol-pseudoephedrine]    ---------------------------------------------------PHYSICAL EXAM--------------------------------------    Constitutional/General: Alert and oriented x3, well appearing, non toxic in NAD  Head: Normocephalic and atraumatic  Eyes: PERRL, EOMI  Mouth: Oropharynx clear, handling secretions, no trismus  Neck: Supple, full ROM, non tender to palpation in the midline, no stridor, no crepitus, no meningeal signs  Pulmonary: Lungs clear to auscultation bilaterally, no wheezes, rales, or rhonchi. Not in respiratory distress  Cardiovascular:  Regular rate. Regular rhythm. No murmurs, gallops, or rubs. 2+ distal pulses  Chest: no chest wall tenderness  Abdomen: Soft. Non tender. Non distended. +BS. No rebound, guarding, or rigidity. No pulsatile masses appreciated. Musculoskeletal: Moves all extremities x 4. Noted staples to left lateral hip noted swelling edema left hip no overt signs of erythema or cellulitis pulses are intact distally warm and well perfused, no clubbing, cyanosis, or edema. Capillary refill <3 seconds  Skin: warm and dry. No rashes. Neurologic: GCS 15, CN 2-12 grossly intact, no focal deficits, symmetric strength 5/5 in the upper and lower extremities bilaterally  Psych: Not homicidal or suicidal patient does report hallucinations.    -------------------------------------------------- RESULTS -------------------------------------------------  I have personally reviewed all laboratory and imaging results for this patient. Results are listed below.      LABS:  Results for orders placed or performed during the hospital encounter of 09/28/21   CBC auto differential   Result Value Ref Range    WBC 7.9 4.5 - 11.5 E9/L    RBC 3.57 (L) 3.80 - 5.80 E12/L    Hemoglobin 10.7 (L) 12.5 - 16.5 g/dL    Hematocrit 32.8 (L) 37.0 - 54.0 %    MCV 91.9 80.0 - 99.9 fL    MCH 30.0 26.0 - 35.0 pg    MCHC 32.6 32.0 - 34.5 %    RDW 14.0 11.5 - 15.0 fL    Platelets 338 (H) 813 - 450 E9/L    MPV 8.4 7.0 - 12.0 fL    Neutrophils % 72.9 43.0 - 80.0 %    Immature Granulocytes % 0.5 0.0 - 5.0 %    Lymphocytes % 16.9 (L) 20.0 - 42.0 %    Monocytes % 7.5 2.0 - 12.0 %    Eosinophils % 1.3 0.0 - 6.0 %    Basophils % 0.9 0.0 - 2.0 % Neutrophils Absolute 5.76 1.80 - 7.30 E9/L    Immature Granulocytes # 0.04 E9/L    Lymphocytes Absolute 1.33 (L) 1.50 - 4.00 E9/L    Monocytes Absolute 0.59 0.10 - 0.95 E9/L    Eosinophils Absolute 0.10 0.05 - 0.50 E9/L    Basophils Absolute 0.07 0.00 - 0.20 E9/L   Comprehensive Metabolic Panel   Result Value Ref Range    Sodium 144 132 - 146 mmol/L    Potassium 4.0 3.5 - 5.0 mmol/L    Chloride 106 98 - 107 mmol/L    CO2 32 (H) 22 - 29 mmol/L    Anion Gap 6 (L) 7 - 16 mmol/L    Glucose 106 (H) 74 - 99 mg/dL    BUN 14 6 - 20 mg/dL    CREATININE 0.9 0.7 - 1.2 mg/dL    GFR Non-African American >60 >=60 mL/min/1.73    GFR African American >60     Calcium 8.8 8.6 - 10.2 mg/dL    Total Protein 5.9 (L) 6.4 - 8.3 g/dL    Albumin 3.5 3.5 - 5.2 g/dL    Total Bilirubin <0.2 0.0 - 1.2 mg/dL    Alkaline Phosphatase 78 40 - 129 U/L    ALT 15 0 - 40 U/L    AST 13 0 - 39 U/L   Serum Drug Screen   Result Value Ref Range    Ethanol Lvl <10 mg/dL    Acetaminophen Level <5.0 (L) 10.0 - 53.6 mcg/mL    Salicylate, Serum <3.9 0.0 - 30.0 mg/dL    TCA Scrn NEGATIVE Cutoff:300 ng/mL   Urine Drug Screen   Result Value Ref Range    Amphetamine Screen, Urine NOT DETECTED Negative <1000 ng/mL    Barbiturate Screen, Ur NOT DETECTED Negative < 200 ng/mL    Benzodiazepine Screen, Urine NOT DETECTED Negative < 200 ng/mL    Cannabinoid Scrn, Ur NOT DETECTED Negative < 50ng/mL    Cocaine Metabolite Screen, Urine POSITIVE (A) Negative < 300 ng/mL    Opiate Scrn, Ur NOT DETECTED Negative < 300ng/mL    PCP Screen, Urine NOT DETECTED Negative < 25 ng/mL    Methadone Screen, Urine NOT DETECTED Negative <300 ng/mL    Oxycodone Urine NOT DETECTED Negative <100 ng/mL    FENTANYL SCREEN, URINE NOT DETECTED Negative <1 ng/mL    Drug Screen Comment: see below    Sedimentation Rate   Result Value Ref Range    Sed Rate 22 (H) 0 - 15 mm/Hr   C-REACTIVE PROTEIN   Result Value Ref Range    CRP 0.7 (H) 0.0 - 0.4 mg/dL   Lactic Acid, Plasma   Result Value Ref Range Lactic Acid 2.1 0.5 - 2.2 mmol/L   Urinalysis   Result Value Ref Range    Color, UA Yellow Straw/Yellow    Clarity, UA Clear Clear    Glucose, Ur Negative Negative mg/dL    Bilirubin Urine Negative Negative    Ketones, Urine Negative Negative mg/dL    Specific Gravity, UA >=1.030 1.005 - 1.030    Blood, Urine TRACE (A) Negative    pH, UA 6.0 5.0 - 9.0    Protein, UA Negative Negative mg/dL    Urobilinogen, Urine 1.0 <2.0 E.U./dL    Nitrite, Urine Negative Negative    Leukocyte Esterase, Urine Negative Negative   Microscopic Urinalysis   Result Value Ref Range    WBC, UA 0-1 0 - 5 /HPF    RBC, UA 1-3 0 - 2 /HPF    Bacteria, UA NONE SEEN None Seen /HPF   CK   Result Value Ref Range    Total  20 - 200 U/L   EKG 12 Lead   Result Value Ref Range    Ventricular Rate 82 BPM    Atrial Rate 82 BPM    P-R Interval 180 ms    QRS Duration 96 ms    Q-T Interval 368 ms    QTc Calculation (Bazett) 429 ms    P Axis 69 degrees    R Axis 83 degrees    T Axis 69 degrees       RADIOLOGY:  Interpreted by Radiologist.  CT HIP LEFT W CONTRAST   Final Result   Postoperative changes in the left hemipelvis bridging fractures of the left   acetabulum. Dominant fluid collection lateral to the left pelvis/hip predominantly within   the subcutaneous soft tissues but also with some intramuscular involvement. The fluid does not contain gas and could represent a large hematoma. Abscess   however is not excluded. This is amenable to easy percutaneous needle   aspiration if indicated. Diffuse soft tissue edema about the left pelvis and visualized thigh greatest   laterally. This could represent reactive edema or cellulitis. Minimal fluid along the myofascial fat planes of the posterior compartment of   the upper left thigh which could also be reactive or due to   infectious/inflammatory process. Tiny foci of gas posterior to the left acetabulum presumably related to   recent surgery.                EKG:  This EKG is signed and interpreted by me. Rate: 72  Rhythm: Sinus  Interpretation: non-specific EKG  Comparison: stable as compared to patient's most recent EKG        ------------------------- NURSING NOTES AND VITALS REVIEWED ---------------------------   The nursing notes within the ED encounter and vital signs as below have been reviewed by myself. BP (!) 114/52   Pulse 70   Temp 98.1 °F (36.7 °C) (Axillary)   Resp 16   Ht 6' (1.829 m)   Wt 145 lb (65.8 kg)   SpO2 98%   BMI 19.67 kg/m²   Oxygen Saturation Interpretation: Normal    The patients available past medical records and past encounters were reviewed. ------------------------------ ED COURSE/MEDICAL DECISION MAKING----------------------  Medications   0.9 % sodium chloride infusion ( IntraVENous New Bag 9/28/21 0221)   ceFAZolin (ANCEF) 2000 mg in sterile water 20 mL IV syringe (has no administration in time range)   iopamidol (ISOVUE-370) 76 % injection 70 mL (70 mLs IntraVENous Given 9/28/21 0311)   sodium chloride flush 0.9 % injection 10 mL (10 mLs IntraVENous Given 9/28/21 8054)             Medical Decision Making:   Patient presenting here because of swelling to wound site on left side of hip. Patient has no history of fever or chills. He has prior history of acetabular repair. Patient reporting no homicidal suicidal thoughts he does report ongoing hallucinations. Patient labs noted reviewed as well as CT. Orthopedics was consulted in regards to wound and will evaluate. I did read their note and patient will probably be going to OR for treatment of wound. Re-Evaluations:             Re-evaluation. Patients symptoms show no change  Patient reevaluated no acute distress. Consultations:             as well as orthopedics    Critical Care: This patient's ED course included: a personal history and physicial eaxmination    This patient has been closely monitored during their ED course. Counseling:    The emergency provider has spoken with the patient and discussed todays results, in addition to providing specific details for the plan of care and counseling regarding the diagnosis and prognosis. Questions are answered at this time and they are agreeable with the plan.       --------------------------------- IMPRESSION AND DISPOSITION ---------------------------------    IMPRESSION  1. Encounter for post surgical wound check    2. Schizophrenia, unspecified type ([facility]        DISPOSITION  Disposition: Orthopedics to evaluate as well   Patient condition is stable        NOTE: This report was transcribed using voice recognition software.  Every effort was made to ensure accuracy; however, inadvertent computerized transcription errors may be present          Stefany Osborn MD  09/28/21 8152       Stefany Osborn MD  09/28/21 1733

## 2021-09-28 NOTE — PROGRESS NOTES
Report called to [facility] on [id number] all questions answered at this time report also faxed
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
## 2021-09-28 NOTE — H&P
Department of Orthopedic Surgery  Resident Consult Note  Reason for Consult: Left hip hematoma    HISTORY OF PRESENT ILLNESS:       Patient is a 37 y.o. male with hematoma to his left hip. He is proximately 2 weeks s/p left pelvis open reduction internal fixation. Patient reports noticing significant swelling about the left hip but that started approximately 1 week ago. Denies any trauma since surgery and reports he is been weightbearing somewhat with crutches. Patient is to be toe-touch weightbearing. Admits to experiencing fevers and mild chest pain but denies chills, nausea, vomiting, or shortness of breath. Denies numbness/tingling/paresthesias. Denies any other orthopedic complaints at this time. Of note, patient has 2 charts which are been marked to merge today but will not actually merge for approximately 7 days. Past Medical History:        Diagnosis Date    Depression     Schizoaffective disorder ([facility] [address]      Past Surgical History:        Procedure Laterality Date    EYE SURGERY  19 years ago     Current Medications:   Current Facility-Administered Medications: 0.9 % sodium chloride infusion, , IntraVENous, Continuous  ceFAZolin (ANCEF) 2000 mg in sterile water 20 mL IV syringe, 2,000 mg, IntraVENous, See Admin Instructions  Allergies:  Chlorpheniramine-phenylephrine, Penicillins, and Rondec-d [chlophedianol-pseudoephedrine]    Social History:   TOBACCO:   reports that he has been smoking cigars and cigarettes. He has a 6.00 pack-year smoking history. He has never used smokeless tobacco.  ETOH:   reports current alcohol use. DRUGS:   reports current drug use. Frequency: 7.00 times per week. Drugs: Cocaine and Marijuana.   ACTIVITIES OF DAILY LIVING:    OCCUPATION:    Family History:       Problem Relation Age of Onset    Mental Illness Mother     Substance Abuse Mother     No Known Problems Father        REVIEW OF SYSTEMS:  CONSTITUTIONAL:  negative for fevers, chills  EYES:  negative for acute changes  HEENT:  negative for acute changes  RESPIRATORY:  negative for dyspnea  CARDIOVASCULAR:  negative for chest pain, palpitations  GASTROINTESTINAL:  negative for nausea, vomiting  GENITOURINARY:  negative for frequency  HEMATOLOGIC/LYMPHATIC:  negative for bleeding and petechiae  MUSCULOSKELETAL:  positive for left hip swelling  NEUROLOGICAL:  negative for head trauma or LOC  BEHAVIOR/PSYCH:  negative for increased agitation and anxiety    PHYSICAL EXAM:    VITALS:  BP (!) 114/52   Pulse 70   Temp 98.1 °F (36.7 °C) (Axillary)   Resp 16   Ht 6' (1.829 m)   Wt 145 lb (65.8 kg)   SpO2 98%   BMI 19.67 kg/m²   CONSTITUTIONAL:  awake, alert, cooperative, no apparent distress, and appears stated age  EYES: Lids and lashes normal, pupils equal, round and reactive to light, extra ocular muscles intact, sclera clear, conjunctiva normal  ENT: Normocephalic, without obvious abnormality, atraumatic, external ears without lesions, oral pharynx with moist mucus membranes  NECK: Trachea midline, skin normal  LUNGS: No increased work of breathing, good air exchange  CARDIOVASCULAR: 2+ pulses throughout and capillary Refill less than 2 seconds  ABDOMEN: soft, non-distended, non-tender and no rebound tenderness or guarding  NEUROLOGIC: Awake, alert, oriented to name, place and time. Cranial nerves II-XII are grossly intact. Motor is 5 out of 5 bilaterally. Sensory is intact.  Babinski down going    MUSCULOSKELETAL:  Left lower Extremity:  Incision healing well, staples in place  Significant subcutaneous mobile fluid collection appreciated directly beneath the incision that extends past the ends of the incision  Mild TTP about the fluid collection  No significant erythema or temperature difference compared to surrounding tissues appreciated  Compartments soft and compressible  Calf soft and non tender  +PF/DF/EHL  +2/4 DP & PT pulses, Brisk Cap refill, Toes warm and perfused  Distal sensation grossly intact to Peroneals, Sural, Saphenous, and tibial nrs    Secondary Exam:   · Bilateral UE: No obvious signs of trauma. -TTP to fingers, hand, wrist, forearm, elbow, humerus, shoulder or clavicle. -- Patient able to flex/extend fingers, wrist, elbow and shoulder with active and passive ROM without pain, compartments soft and compressible. · Right LE: No obvious signs of trauma. -TTP to foot, ankle, leg, knee, thigh, hip.-- Patient able to flex/extend toes, ankle, knee and hip with active and passive ROM without pain, compartments soft and compressible. · Pelvis: -TTP, -Log roll, -Heel strike     DATA:    CBC:   Lab Results   Component Value Date    WBC 7.9 09/28/2021    RBC 3.57 09/28/2021    HGB 10.7 09/28/2021    HCT 32.8 09/28/2021    MCV 91.9 09/28/2021    MCH 30.0 09/28/2021    MCHC 32.6 09/28/2021    RDW 14.0 09/28/2021     09/28/2021    MPV 8.4 09/28/2021     PT/INR:  No results found for: PROTIME, INR     C-reactive protein: 0.7 (0.0-0.4 mg/dL)    Sed rate: 22 (0-15 mm/hour)    Radiology Review:  CT left hip: Significant subcutaneous fluid collection appreciated measuring approximately 16 mm x 85 mm x 75 mm with extension into the lateral musculature of the hip. S/p pelvis ORIF. No evidence of hardware loosening or failure. Healing left posterior wall hemitransverse pelvis fracture. No other fractures or dislocations noted.     IMPRESSION:  · Left hip hematoma, aseptic versus septic    PLAN:  · Plan for irrigation debridement of left hip hematoma later today  · Treatment consent  · N.p.o. now  · Ancef on-call to the OR  · Preoperative medical optimization  · Plan to be discussed with attending    All questions were sought and answered during encounter    Electronically signed by Narayan Patiño DO on 9/28/2021 at 8:22 AM

## 2021-09-28 NOTE — LETTER
[address] Medicaid  CERTIFICATION OF NECESSITY  FOR TRANSPORTATION   BY WHEELCHAIR VAN     Individual Information   1. Name: Kiah Cronin. 2. [facility] Billing Number:    3. Address: [address] [facility] Provider Information   4. Provider Name:   5. [place] Medicaid Provider Number:  National Provider Identifier (NPI):      Certification  7. Criteria:  By signing this document, the practitioner certifies that two statements are true:  A. This individual must be accompanied by a mobility-related assistive device from the point of pick-up to the point of drop-off. B. Transport of this individual by standard passenger vehicle or common carrier is precluded or contraindicated. 8. Period Beginning Date: 10/01/2021   9. Length  [x] Not more than 7 day(s)  [] One Year     Additional Information Relevant to Certification   10. Comments or Explanations, If Necessary or Appropriate   R HIP HEMATOMA, FALL RISK     Certifying Practitioner Information   11. Name of Practitioner: Odilon Weinstein MD   12. [place] Medicaid Provider Number, If Applicable:  [address] Provider Identifier (NPI):      Signature Information   14. Date of Signature: 10/01/2021 15. Name of Person Signing: Izabella Salazar   16. Signature and Professional Designation: Electronically signed by MAJOR Bolivar on 10/1/2021 at 1:28 PM       [facility] [id number]  Rev. 7/2015    [address] [place] [address] Encounter Date/Time: 9/28/2021 [address] Account: [de-identified]    MRN: [id number]    Patient: Kiah Cronin. Contact Serial #: [id number]      ENCOUNTER          Patient Class: Observation Private Enc?   No Unit  BD: SEYZ 5WE 5417/5417-B   Hospital Service: Med/Surg   Encounter DX: Hematoma of right hip, i*   ADM Provider: Thu Guerra, *   Procedure:     ATT Provider: Thu Guerra, *   REF Provider:        Admission DX: Hematoma of right hip, initial encounter, Encounter for post surgical wound check, Schizophrenia, unspecified type ([facility] [address] and DX codes: S70.01XA, Z48.89, F20.9      PATIENT                 Name: Harriet Da Silva : 1980 (41 yrs)   Address: [address] [place] [address] Sex: Male   [place] [address]         Marital Status: Single   Employer: DISABLED         Faith: Sabianist   Primary Care Provider:           Primary Phone: [phone]   EMERGENCY CONTACT   Contact Name Legal Guardian? Relationship to Patient Home Phone Work Phone   1. sujata yu  2. *No Contact Specified*      Domestic Partner                      GUARANTOR            Guarantor: Raziabharat Irma Rodriguez     : 1980   Address: [address] [place] [address] Sex: Male   [place] [id number]     Relation to Patient: Self       Home Phone: [phone]   Guarantor ID: [id number]       Work Phone:     Guarantor Employer: DISABLED         Status: DISABLED      COVERAGE  PRIMARY INSURANCE   Payor: [facility] [address] Address: [address], [facility] [address] [facility]       Group Number: [id number] Type: INDEMNITY   Subscriber Name: Bridgette Vizcaino. Subscriber : 1980   Subscriber ID: [id number] Pat. Rel. to Sub: Self   SECONDARY INSURANCE   Payor:   Plan:     Payor Address:  ,           Group Number:   Insurance Type:     Subscriber Name:   Subscriber :     Subscriber ID:   Pat.  Rel. to Sub:             CSN: [id number]

## 2021-09-29 LAB
C-REACTIVE PROTEIN: 0.5 MG/DL (ref 0–0.4)
GRAM STAIN ORDERABLE: NORMAL
HCT VFR BLD CALC: 31.7 % (ref 37–54)
HEMOGLOBIN: 10.1 G/DL (ref 12.5–16.5)

## 2021-09-29 PROCEDURE — 6360000002 HC RX W HCPCS: Performed by: STUDENT IN AN ORGANIZED HEALTH CARE EDUCATION/TRAINING PROGRAM

## 2021-09-29 PROCEDURE — 97165 OT EVAL LOW COMPLEX 30 MIN: CPT

## 2021-09-29 PROCEDURE — 6360000002 HC RX W HCPCS: Performed by: PHYSICAL THERAPY ASSISTANT

## 2021-09-29 PROCEDURE — 97530 THERAPEUTIC ACTIVITIES: CPT

## 2021-09-29 PROCEDURE — 86140 C-REACTIVE PROTEIN: CPT

## 2021-09-29 PROCEDURE — 6370000000 HC RX 637 (ALT 250 FOR IP): Performed by: PHYSICAL THERAPY ASSISTANT

## 2021-09-29 PROCEDURE — 2500000003 HC RX 250 WO HCPCS: Performed by: PHYSICAL THERAPY ASSISTANT

## 2021-09-29 PROCEDURE — 85014 HEMATOCRIT: CPT

## 2021-09-29 PROCEDURE — 96374 THER/PROPH/DIAG INJ IV PUSH: CPT

## 2021-09-29 PROCEDURE — 85018 HEMOGLOBIN: CPT

## 2021-09-29 PROCEDURE — 2580000003 HC RX 258: Performed by: PHYSICAL THERAPY ASSISTANT

## 2021-09-29 PROCEDURE — 97161 PT EVAL LOW COMPLEX 20 MIN: CPT

## 2021-09-29 PROCEDURE — 36415 COLL VENOUS BLD VENIPUNCTURE: CPT

## 2021-09-29 PROCEDURE — 96372 THER/PROPH/DIAG INJ SC/IM: CPT

## 2021-09-29 PROCEDURE — G0378 HOSPITAL OBSERVATION PER HR: HCPCS

## 2021-09-29 PROCEDURE — 96376 TX/PRO/DX INJ SAME DRUG ADON: CPT

## 2021-09-29 RX ADMIN — OXYCODONE HYDROCHLORIDE 10 MG: 10 TABLET ORAL at 05:36

## 2021-09-29 RX ADMIN — CEFAZOLIN 2000 MG: 10 INJECTION, POWDER, FOR SOLUTION INTRAVENOUS at 01:37

## 2021-09-29 RX ADMIN — MORPHINE SULFATE 4 MG: 4 INJECTION, SOLUTION INTRAMUSCULAR; INTRAVENOUS at 05:53

## 2021-09-29 RX ADMIN — OXYCODONE HYDROCHLORIDE 10 MG: 10 TABLET ORAL at 16:59

## 2021-09-29 RX ADMIN — Medication 10 ML: at 09:13

## 2021-09-29 RX ADMIN — ACETAMINOPHEN 650 MG: 325 TABLET ORAL at 05:54

## 2021-09-29 RX ADMIN — OXYCODONE HYDROCHLORIDE 10 MG: 10 TABLET ORAL at 20:46

## 2021-09-29 RX ADMIN — ENOXAPARIN SODIUM 40 MG: 40 INJECTION SUBCUTANEOUS at 12:56

## 2021-09-29 RX ADMIN — MORPHINE SULFATE 4 MG: 4 INJECTION, SOLUTION INTRAMUSCULAR; INTRAVENOUS at 09:14

## 2021-09-29 RX ADMIN — OXYCODONE HYDROCHLORIDE 10 MG: 10 TABLET ORAL at 12:56

## 2021-09-29 RX ADMIN — ACETAMINOPHEN 650 MG: 325 TABLET ORAL at 01:37

## 2021-09-29 RX ADMIN — CEFAZOLIN 2000 MG: 10 INJECTION, POWDER, FOR SOLUTION INTRAVENOUS at 09:13

## 2021-09-29 ASSESSMENT — PAIN SCALES - WONG BAKER
WONGBAKER_NUMERICALRESPONSE: 0
WONGBAKER_NUMERICALRESPONSE: 0

## 2021-09-29 ASSESSMENT — PAIN DESCRIPTION - LOCATION
LOCATION: HIP

## 2021-09-29 ASSESSMENT — PAIN DESCRIPTION - ORIENTATION
ORIENTATION: LEFT

## 2021-09-29 ASSESSMENT — PAIN SCALES - GENERAL
PAINLEVEL_OUTOF10: 10
PAINLEVEL_OUTOF10: 3
PAINLEVEL_OUTOF10: 9
PAINLEVEL_OUTOF10: 7
PAINLEVEL_OUTOF10: 10
PAINLEVEL_OUTOF10: 7
PAINLEVEL_OUTOF10: 10
PAINLEVEL_OUTOF10: 10
PAINLEVEL_OUTOF10: 9
PAINLEVEL_OUTOF10: 10
PAINLEVEL_OUTOF10: 10

## 2021-09-29 ASSESSMENT — PAIN DESCRIPTION - PAIN TYPE
TYPE: SURGICAL PAIN

## 2021-09-29 ASSESSMENT — PAIN DESCRIPTION - FREQUENCY
FREQUENCY: CONTINUOUS
FREQUENCY: INTERMITTENT
FREQUENCY: CONTINUOUS
FREQUENCY: CONTINUOUS

## 2021-09-29 ASSESSMENT — PAIN DESCRIPTION - PROGRESSION
CLINICAL_PROGRESSION: NOT CHANGED
CLINICAL_PROGRESSION: NOT CHANGED

## 2021-09-29 ASSESSMENT — PAIN DESCRIPTION - DESCRIPTORS
DESCRIPTORS: DISCOMFORT
DESCRIPTORS: ACHING
DESCRIPTORS: ACHING
DESCRIPTORS: ACHING;THROBBING
DESCRIPTORS: ACHING;THROBBING
DESCRIPTORS: ACHING

## 2021-09-29 ASSESSMENT — PAIN DESCRIPTION - ONSET
ONSET: ON-GOING

## 2021-09-29 NOTE — CARE COORDINATION
9/29/2021 social work transition of care planning  Sw spoke with pt at bedside this am. Pt stated that he has no home and is not allowed at [facility]. Pt agreeable to referrals where a bed is available. [facility]-declined, [facility]-declined. Sw will expand search. Sw explained to pt that he may have to seek shelter in another [place]. Sw will provide list for homeless shelters in the area.   Electronically signed by MAJOR Macias on 9/29/2021 at 12:39 PM

## 2021-09-29 NOTE — PROGRESS NOTES
Department of Orthopedic Surgery  Resident Progress Note    Patient seen and examined. Pain in left hip. No new complaints. Denies chest pain, shortness of breath, dizziness/lightheadedness. Sleeping upon entering room this AM.    VITALS:  /78   Pulse 58   Temp 98.4 °F (36.9 °C) (Temporal)   Resp 16   Ht 6' (1.829 m)   Wt 145 lb (65.8 kg)   SpO2 90%   BMI 19.67 kg/m²     General: well-developed and well nourished and in no acute distress    MUSCULOSKELETAL:   left lower extremity:  · Dressing C/D/I  · Compartments soft and compressible  · +PF/DF/EHL  · +2/4 DP & PT pulses, Brisk Cap refill, Toes warm and perfused  · Distal sensation grossly intact to Peroneals, Sural, Saphenous, and tibial nrs    CBC:   Lab Results   Component Value Date    WBC 7.9 09/28/2021    HGB 10.7 09/28/2021    HCT 32.8 09/28/2021     09/28/2021     PT/INR:    Lab Results   Component Value Date    PROTIME 11.8 09/28/2021    INR 1.1 09/28/2021       Intraop Cultures: Pending, surgical cultures sent    ASSESSMENT  · S/P Left hip I and D - 9/29/21  · S/P Left acetabulum ORIF - 9/12/21    PLAN      · Continue physical therapy and protocol: TTWB - LLE  · 24 hour abx coverage  · PT/INR stable. · Deep venous thrombosis prophylaxis - Lovenox starting today, early mobilization  · Possibly pull drain later today. Roughly 20 CC of output. · PT/OT  · Pain Control: IV and PO  · Monitor H&H  · CM and SW consultation. · D/C Plan:  Pending placement.

## 2021-09-29 NOTE — PROGRESS NOTES
[address]  [address] [place] [address]      Date:2021                 Patient Name: Marie Soler. MRN: [id number]  : 1980  Room: [address] [place] [address]    Referring DO Andrew  Specific Provider Orders/Date: OT evaluation and treat     Evaluating OT: Sam Levi. ADALID [facility] #[id number]    Diagnosis: Hematoma of right hip, initial encounter [S70.01XA]  Encounter for post surgical wound check [Z48.89]  Schizophrenia, unspecified type ([facility] [address] [F20.9]      Surgery:   Past Surgical History:   Procedure Laterality Date    EYE SURGERY  19 years ago    HIP FRACTURE SURGERY Left 2021    IRRIGATION AND DEBRIDEMENT LEFT HIP WITH EXCISION HEMATOMA performed by Gregorio Hartman MD at [address] [place] [address]         Pertinent Medical History:  has a past medical history of Depression and Schizoaffective disorder ([facility] [address]      Precautions:  Fall Risk, TDWB to LLE, safety , poor adherence to WB, hemovac drain    Assessment of current deficits   [x] Functional mobility  [x]ADLs  [] Strength               []Cognition   [x] Functional transfers   [x] IADLs         [x] Safety Awareness   [x]Endurance   [] Fine Coordination              [x] Balance      [] Vision/perception   [x]Sensation    [x]Gross Motor Coordination  [] ROM  [] Delirium                   [] Motor Control     OT PLAN OF CARE   OT POC based on physician orders, patient diagnosis and results of clinical assessment    Frequency/Duration   2-4 days/wk for 1 week PRN   Specific OT Treatment Interventions to include:   * Instruction/training on adapted ADL techniques and AE recommendations to increase functional independence within precautions       * Training on energy conservation strategies, correct breathing pattern and techniques to improve independence/tolerance for self-care routine  * Functional transfer/mobility training/DME recommendations for increased independence, safety, and fall prevention  * Patient/Family education to increase follow through with safety techniques and functional independence  * Recommendation of environmental modifications for increased safety with functional transfers/mobility and ADLs  * Therapeutic activities to facilitate/challenge dynamic balance, stand tolerance for increased safety and independence with ADLs  * Therapeutic activities to facilitate gross/fine motor skills for increased independence with ADLs    Recommended Adaptive Equipment: ww with walker bag, AE prn, elevated commode    Home Living: Pt lives on streets since last admission with various admissions to ED for psychological issues. Equipment owned: crutches    Prior Level of Function: states he does not bath on streets, ambulated crutches  Driving: no  Occupation: n/a  Medication management: self  Leisure: not stated    Pain Level: 10/10 LLE   Cognition: A&O: 3/4; needed reminders of situation and WB Follows one step directions   Memory:  Fair+   Sequencing:  Fair-   Problem solving:  fair   Judgement/safety:  Fair-     Functional Assessment:  AM-PAC Daily Activity Raw Score: 15/24   Initial Eval Status  Date: 9/29/21 Treatment Status  Date: STGs = LTGs  Time frame: 2-4 days   Feeding Independent     Grooming Setup sitting  Mod I    UB Dressing SBA gown change  Mod I    LB Dressing Dependent due to pain  Mod I AE prn   Bathing Mod A bed level  Mod I seated   Toileting Able to use urinal mod A   Mod I elevated commode   Bed Mobility  Supine to sit: min A   Sit to supine:  Min A   Supine to sit: mod I   Sit to supine: mod I   Functional Transfers Min A sit to stand with poor ability to maintain TDWB  Mod I with crutches   Functional Mobility Mod A with crutches and constant cueing noted hemovac leaking.  Returned to bed  Mod I with crutches   Balance Sitting:     Static:  SBA    Dynamic:CGA  Standing: min A posture and/or positioning. Rehab Potential: Good - for established goals     Patient / Family Goal: decrease pain       Patient and/or family were instructed on functional diagnosis, prognosis/goals and OT plan of care. Demonstrated fair+ understanding. Eval Complexity: low    Time In: 9:05  Time Out: 9:30  Total Treatment Time: 10 min. Min Units   OT Eval Low 53893  X     OT Eval Medium 28971      OT Eval High [id number]       OT Re-Eval [id number]       Therapeutic Ex [id number]       Therapeutic Activities 09662  10  1   ADL/Self Care 18990       Orthotic Management 78922       Neuro Re-Ed 48988       Non-Billable Time          Evaluation Time includes thorough review of current medical information, gathering information on past medical history/social history and prior level of function, completion of standardized testing/informal observation of tasks, assessment of data and education on plan of care and goals. Kesha Bailey.  Lisandra 72, Melissa 70

## 2021-09-29 NOTE — PROGRESS NOTES
Physical Therapy  Physical Therapy Initial Evaluation    Name: Armando Polanco : 1980  MRN: [id number]      Date of Service: 2021    Evaluating PT:  Acacia Rizvi, PT [id number]      Room #:  7911/4332-U  Diagnosis:  Hematoma of right hip, initial encounter [S70.01XA]  Encounter for post surgical wound check [Z48.89]  Schizophrenia, unspecified type ([facility] [address] [F20.9]  PMHx/PSHx:     has a past medical history of Depression and Schizoaffective disorder ([facility] [address] has a past surgical history that includes eye surgery (19 years ago) and Hip fracture surgery (Left, 2021). · Procedure/Surgery:  S/P Left hip I and D - 21  S/P Left acetabulum ORIF - 21  Precautions:  Falls,  TTWB (toe-touch weight bearing) LLE (does not adhere to), Hemovac drain  LLE. Noted to have drainage from hematoma bandage. RN made aware. Equipment Needs: has B axillary crutches in the room,    SUBJECTIVE:    Patient states he has no home? ??.  Equipment owned: Crutches,  [facility] room. OBJECTIVE:   Initial Evaluation  Date: 21 Treatment Short Term/ Long Term   Goals   AM-PAC 6 Clicks 75/76     Was pt agreeable to Eval/treatment? yes     Does pt have pain? 10/10     Bed Mobility  Rolling: SBA   Supine to sit:   SBA   Sit to supine: SBA   Scooting: SBA   Rolling: Ind  Supine to sit: Ind  Sit to supine: Ind  Scooting: Ind   Transfers Sit to stand: Min to B axillary crutches cues to not bear weight through LLE. Poor adherence to precaution. Stand to sit: Min  Stand pivot: Min with crutches,  Sit to stand: Mod Ind  to crutches adhere to precaution. Stand to sit: Mod Ind    Stand pivot: Mod Ind  with crutches. Ambulation    10 feet with crutches Mod  due to poor adherence to WB restrictions.    100 feet with crutches    Stair negotiation: ascended and descended  NT  4 steps with Min    ROM BUE:  wfl  BLE:  wfl     Strength BUE: wfl   RLE:  5/5  LLE:   4/5  5/5   Balance Sitting EOB:  Ind  Dynamic Standing:  Min/Mod with crutches  Sitting EOB:  Ind  Dynamic Standing: Mod Ind     Patient is Alert & Oriented x person, place, time and situation and follows directions   Sensation:  Pt denies numbness and tingling to extremities  Edema:  none      Therapeutic Exercises:  Functional activity     Patient education  Pt educated regarding role of PT evaluation, need for OOB activity and weight bearing precautions for LLE    Patient response to education:   Pt verbalized understanding Pt demonstrated skill Pt requires further education in this area   yes yes Reminders     ASSESSMENT:    Conditions Requiring Skilled Therapeutic Intervention:    [x]Decreased strength     [x]Decreased ROM  [x]Decreased functional mobility  [x]Decreased balance   [x]Decreased endurance   [x]Decreased posture  []Decreased sensation  []Decreased coordination   []Decreased vision  [x]Decreased safety awareness   []Increased pain       Comments:    RN cleared patient for participation in therapy session. Patient was seen this date for PT evaluation. . Patient was agreeable to intervention. Results of the functional assessment are noted above. Upon entering the room patient was found supine in bed. Willing to work with therapy this date. Transitioned to EOB and  Sat EOB x 10 minutes to increase dynamic sitting balance and activity tolerance. STS completed to B crutches. Poor adherence to WB restrictions LLE despite tactile and verbal cues. Began ambulating with crutches. Patient then had blood drip from hemovac drain bandage. He then declined to move any more requesting return to bed. Could not be convinced otherwise. At end of session, patient in bed with  call light and phone within reach,  all lines and tubes intact, nursing notified. Patient thanked therapist for session. This patient can benefit from the continuation of skilled PT  to maximize functional level and return to PLOF.      Treatment:  Patient practiced and was instructed in the following treatment:    · Bed mobility training - pt given verbal and tactile cues to facilitate proper sequencing and safety during rolling and supine>sit as well as provided with physical assistance to complete task    · Assistive device training - pt educated on using crutches during gait. · STS and transfer training - educated on hand/foot placement, safety, and sequencing during STS and pivot transfers using assistive device  · Gait training - verbal cues for crutches and safety during 90 and 180 degree turns during gait and to maintain TTWB LLE. Education in TTWB precautions LLE. Pt's/ family goals   1. None stated    Prognosis is good  for reaching above PT goals.     Patient and or family understand(s) diagnosis, prognosis, and plan of care.  yes,     PHYSICAL THERAPY PLAN OF CARE:    PT POC is established based on physician order and patient diagnosis     Referring provider/PT Order:    09/28/21 1230  PT eval and treat      Ryan BUSTER DO Brittni       Diagnosis:  Hematoma of right hip, initial encounter [S70.01XA]  Encounter for post surgical wound check [Z48.89]  [facility], [facility] [F20.9]  Specific instructions for next treatment:  Increase ambulation distance and Stair negotiation  Current Treatment Recommendations:     [x] Strengthening to improve independence with functional mobility   [x] ROM to improve independence with functional mobility   [x] Balance Training to improve static/dynamic balance and to reduce fall risk  [x] Endurance Training to improve activity tolerance during functional mobility   [x] Transfer Training to improve safety and independence with all functional transfers   [x] Gait Training to improve gait mechanics, endurance and asses need for appropriate assistive device  [x] Stair Training in preparation for safe discharge home and/or into the community   [] Positioning to prevent skin breakdown and contractures  [x] Safety and Education Training   [] Patient/Caregiver Education   [] HEP  [] Other     PT long term treatment goals are located in above grid    Frequency of treatments: 2-5x/week x 1-2 weeks. Time in  0905  Time out  0930    Total Treatment Time  25 minutes     Evaluation Time includes thorough review of current medical information, gathering information on past medical history/social history and prior level of function, completion of standardized testing/informal observation of tasks, assessment of data and education on plan of care and goals.     CPT codes:  [x] Low Complexity PT evaluation 66426  [] Moderate Complexity PT evaluation 16895  [] High Complexity PT evaluation 60745  [] PT Re-evaluation 78385  [] Gait training 09772 - minutes  [] Manual therapy 78608 - minutes  [x] Therapeutic activities 36017 10 minutes  [] Therapeutic exercises 95390 - minutes  [] Neuromuscular reeducation 21607 - minutes     Yohana Ayers, [address] [facility]

## 2021-09-30 LAB
ANAEROBIC CULTURE: NORMAL
BODY FLUID CULTURE, STERILE: ABNORMAL
CULTURE SURGICAL: ABNORMAL
GRAM STAIN RESULT: ABNORMAL
GRAM STAIN RESULT: ABNORMAL
ORGANISM: ABNORMAL

## 2021-09-30 PROCEDURE — 6360000002 HC RX W HCPCS: Performed by: PHYSICAL THERAPY ASSISTANT

## 2021-09-30 PROCEDURE — 96376 TX/PRO/DX INJ SAME DRUG ADON: CPT

## 2021-09-30 PROCEDURE — 2580000003 HC RX 258: Performed by: PHYSICAL THERAPY ASSISTANT

## 2021-09-30 PROCEDURE — G0378 HOSPITAL OBSERVATION PER HR: HCPCS

## 2021-09-30 PROCEDURE — 6370000000 HC RX 637 (ALT 250 FOR IP): Performed by: PHYSICAL THERAPY ASSISTANT

## 2021-09-30 PROCEDURE — 6370000000 HC RX 637 (ALT 250 FOR IP): Performed by: INTERNAL MEDICINE

## 2021-09-30 PROCEDURE — 97535 SELF CARE MNGMENT TRAINING: CPT

## 2021-09-30 RX ORDER — LINEZOLID 600 MG/1
600 TABLET, FILM COATED ORAL EVERY 12 HOURS SCHEDULED
Status: DISCONTINUED | OUTPATIENT
Start: 2021-09-30 | End: 2021-10-02 | Stop reason: HOSPADM

## 2021-09-30 RX ADMIN — OXYCODONE HYDROCHLORIDE 10 MG: 10 TABLET ORAL at 12:53

## 2021-09-30 RX ADMIN — MORPHINE SULFATE 4 MG: 4 INJECTION, SOLUTION INTRAMUSCULAR; INTRAVENOUS at 15:44

## 2021-09-30 RX ADMIN — Medication 10 ML: at 09:00

## 2021-09-30 RX ADMIN — SODIUM CHLORIDE, PRESERVATIVE FREE 10 ML: 5 INJECTION INTRAVENOUS at 15:44

## 2021-09-30 RX ADMIN — Medication 10 ML: at 20:34

## 2021-09-30 RX ADMIN — OXYCODONE HYDROCHLORIDE 10 MG: 10 TABLET ORAL at 20:55

## 2021-09-30 RX ADMIN — OXYCODONE HYDROCHLORIDE 10 MG: 10 TABLET ORAL at 08:59

## 2021-09-30 RX ADMIN — OXYCODONE HYDROCHLORIDE 10 MG: 10 TABLET ORAL at 05:33

## 2021-09-30 RX ADMIN — OXYCODONE HYDROCHLORIDE 10 MG: 10 TABLET ORAL at 16:52

## 2021-09-30 RX ADMIN — LINEZOLID 600 MG: 600 TABLET, FILM COATED ORAL at 20:54

## 2021-09-30 ASSESSMENT — PAIN SCALES - WONG BAKER
WONGBAKER_NUMERICALRESPONSE: 0

## 2021-09-30 ASSESSMENT — PAIN DESCRIPTION - FREQUENCY
FREQUENCY: CONTINUOUS

## 2021-09-30 ASSESSMENT — PAIN DESCRIPTION - DESCRIPTORS
DESCRIPTORS: ACHING;CONSTANT;THROBBING
DESCRIPTORS: ACHING;THROBBING
DESCRIPTORS: ACHING;CONSTANT;TIGHTNESS

## 2021-09-30 ASSESSMENT — PAIN SCALES - GENERAL
PAINLEVEL_OUTOF10: 9
PAINLEVEL_OUTOF10: 0
PAINLEVEL_OUTOF10: 0
PAINLEVEL_OUTOF10: 8
PAINLEVEL_OUTOF10: 7
PAINLEVEL_OUTOF10: 0
PAINLEVEL_OUTOF10: 0
PAINLEVEL_OUTOF10: 10
PAINLEVEL_OUTOF10: 10
PAINLEVEL_OUTOF10: 9

## 2021-09-30 ASSESSMENT — PAIN DESCRIPTION - PAIN TYPE
TYPE: SURGICAL PAIN

## 2021-09-30 ASSESSMENT — PAIN DESCRIPTION - PROGRESSION
CLINICAL_PROGRESSION: GRADUALLY WORSENING
CLINICAL_PROGRESSION: NOT CHANGED
CLINICAL_PROGRESSION: GRADUALLY WORSENING
CLINICAL_PROGRESSION: GRADUALLY WORSENING

## 2021-09-30 ASSESSMENT — PAIN DESCRIPTION - ORIENTATION
ORIENTATION: LEFT

## 2021-09-30 ASSESSMENT — PAIN DESCRIPTION - ONSET
ONSET: GRADUAL
ONSET: GRADUAL
ONSET: ON-GOING

## 2021-09-30 ASSESSMENT — PAIN DESCRIPTION - LOCATION
LOCATION: HIP

## 2021-09-30 ASSESSMENT — PAIN - FUNCTIONAL ASSESSMENT
PAIN_FUNCTIONAL_ASSESSMENT: PREVENTS OR INTERFERES WITH MANY ACTIVE NOT PASSIVE ACTIVITIES

## 2021-09-30 NOTE — PROGRESS NOTES
Department of Orthopedic Surgery  Resident Progress Note    Patient seen and examined. Pain in left hip. No new complaints. Denies chest pain, shortness of breath, dizziness/lightheadedness. Sleeping upon entering room this AM. Nursing emptied drain, has not recorded any outputs. Per patient account, significant drainage noted. VITALS:  BP (!) 109/55   Pulse 68   Temp 97.3 °F (36.3 °C) (Temporal)   Resp 17   Ht 6' (1.829 m)   Wt 145 lb (65.8 kg)   SpO2 95%   BMI 19.67 kg/m²     General: well-developed and well nourished and in no acute distress    MUSCULOSKELETAL:   left lower extremity:  · Dressing C/D/I  · Compartments soft and compressible  · +PF/DF/EHL  · +2/4 DP & PT pulses, Brisk Cap refill, Toes warm and perfused  · Distal sensation grossly intact to Peroneals, Sural, Saphenous, and tibial nrs  · Drain in place holding suction. CBC:   Lab Results   Component Value Date    WBC 7.9 09/28/2021    HGB 10.1 09/29/2021    HCT 31.7 09/29/2021     09/28/2021     PT/INR:    Lab Results   Component Value Date    PROTIME 11.8 09/28/2021    INR 1.1 09/28/2021       Intraop Cultures: Three colonies of gram positive cocci. ASSESSMENT  · S/P Left hip I and D - 9/29/21  · S/P Left acetabulum ORIF - 9/12/21    PLAN      · Continue physical therapy and protocol: TTWB - LLE  · 24 hour abx coverage  · PT/INR stable. · Deep venous thrombosis prophylaxis - Lovenox starting today, early mobilization  · Possibly pull drain later today. Roughly 20 CC of output. · PT/OT  · Pain Control: IV and PO  · Monitor cultures. Consult ID for recs. Appreciate their input. · CM and SW consultation. · D/C Plan:  Pending placement.

## 2021-09-30 NOTE — CONSULTS
[facility] CONSULT NOTE    Reason for Consult: Left hip seroma    Requested by: Nhan Wisdom DO      Chief complaint: Left hip swelling and warmth    History Obtained From: Patient and EMR    HISTORY Nehemias              The patient is a 37 y.o. male with history of schizoaffective disorder not on medication, depression not on medication, substance abuse, left hip ORIF on 09/12/2021 prior to admission, presented on 09/28 with left hip swelling and warmth for 1 week. On admission, he was afebrile and hemodynamically stable with no leukocytosis. CT left hip showed significant fluid collection about 15.5 x 9 x 7.5 cm lateral to the left pelvis/hip within the subcutaneous soft tissues with some intramuscular involvement approximately 11.1 x 1.9 x 6.1 cm. He underwent debridement of hematoma with arthrotomy of left hip, excisional debridement of subcutaneous tissue, synovium, hematoma with drain placement on 09/28 during which superficial large tea-colored hematoma was removed and sent for cultures. Synovial fluid Gram stain and culture showed few polymorphonuclear leukocytes, no epithelial cells, few mononuclear leukocytes, no organisms, 3 colonies of Staphylococcus epidermidis (methicillin-resistant; susceptible to linezolid, cotrimoxazole, doxycycline). Cefazolin was given perioperatively. ID service was subsequently consulted for further recommendations.     Past Medical History  Past Medical History:   Diagnosis Date    Depression     Schizoaffective disorder ([facility] [address]        Current Facility-Administered Medications   Medication Dose Route Frequency Provider Last Rate Last Admin    0.9 % sodium chloride infusion   IntraVENous Continuous Maureen GOINS Brittni,  mL/hr at 09/28/21 0221 New Bag at 09/28/21 1005    ceFAZolin (ANCEF) 2000 mg in sterile water 20 mL IV syringe  2,000 mg IntraVENous See Admin Instructions Lorin Tessy L Brittni, DO   2,000 mg at 09/28/21 0928    sodium chloride flush 0.9 % injection 5-40 mL  5-40 mL IntraVENous 2 times per day Delores Mix L Brittni, DO   10 mL at 09/29/21 0913    sodium chloride flush 0.9 % injection 5-40 mL  5-40 mL IntraVENous PRN Delores Mix L Brittni, DO        0.9 % sodium chloride infusion  25 mL IntraVENous PRN Delores Mix L Brittni, DO        ondansetron (ZOFRAN-ODT) disintegrating tablet 4 mg  4 mg Oral Q8H PRN Delores Mix L Brittni, DO        Or    ondansetron (ZOFRAN) injection 4 mg  4 mg IntraVENous Q6H PRN Delores Mix L Brittni, DO        polyethylene glycol (GLYCOLAX) packet 17 g  17 g Oral Daily PRN Delores Mix L Brittni, DO        acetaminophen (TYLENOL) tablet 650 mg  650 mg Oral Q6H Naeem L Brittni, DO   650 mg at 09/29/21 0554    oxyCODONE (ROXICODONE) immediate release tablet 5 mg  5 mg Oral Q4H PRN Delores Mix L Brittni, DO        Or    oxyCODONE HCl (OXY-IR) immediate release tablet 10 mg  10 mg Oral Q4H PRN Delores Mix L Brittni, DO   10 mg at 09/30/21 0859    morphine (PF) injection 2 mg  2 mg IntraVENous Q2H PRN Delores Mix L Brittni, DO        Or    morphine sulfate (PF) injection 4 mg  4 mg IntraVENous Q2H PRN Delores Mix L Brittni, DO   4 mg at 09/29/21 0914    bisacodyl (DULCOLAX) EC tablet 5 mg  5 mg Oral Daily Delores Mix L Brittni, DO        enoxaparin (LOVENOX) injection 40 mg  40 mg SubCUTAneous Daily Trevon Ac, DO   40 mg at 09/29/21 1256       Allergies   Allergen Reactions    Chlorpheniramine-Phenylephrine Swelling    Penicillins     Rondec-D [Chlophedianol-Pseudoephedrine]        Surgical History  Past Surgical History:   Procedure Laterality Date    EYE SURGERY  19 years ago    HIP FRACTURE SURGERY Left 9/28/2021    IRRIGATION AND DEBRIDEMENT LEFT HIP WITH EXCISION HEMATOMA performed by Aurther Sacks, MD at [address] [place] [address] History     Socioeconomic History    Marital status: Single    Number of children: 1    Years of education: 12   Tobacco Use    Smoking status: Current Every Day Smoker     Packs/day: 0.25     Years: 24.00     Pack years: 6.00     Types: Cigars, Cigarettes    Smokeless tobacco: Never Used   Vaping Use    Vaping Use: Former   Substance and Sexual Activity    Alcohol use: Yes    Drug use: Yes     Frequency: 7.0 times per week     Types: Cocaine, Marijuana     Comment: crack cocaine . . las t use a couple ays ago     Family Medical History  Family History   Problem Relation Age of Onset    Mental Illness Mother     Substance Abuse Mother     No Known Problems Father        Review of Systems:  Constitutional: No fever, no chills  Eyes: No vision changes, no retroorbital pain  ENT: No hearing changes, no ear pain  Respiratory: No cough, no dyspnea  Cardiovascular: No chest pain, no palpitations  Gastrointestinal: No abdominal pain, no diarrhea  Genitourinary: No dysuria, no hematuria  Integumentary: No rash, no itching  Musculoskeletal: No muscle pain, no joint pain  Neurologic: No headache, no numbness in extremities    Physical Examination:  Vitals:    09/29/21 0745 09/29/21 1515 09/29/21 2307 09/30/21 0745   BP: (!) 100/57 (!) 110/55 (!) 109/55 (!) 104/50   Pulse: 62 70 68 63   Resp: 16 18 17 18   Temp: 98.4 °F (36.9 °C) 97.6 °F (36.4 °C) 97.3 °F (36.3 °C) 98.3 °F (36.8 °C)   TempSrc: Temporal Temporal Temporal Temporal   SpO2: 96% 96% 95% 96%   Weight:       Height:         Constitutional: Alert, not in distress  Eyes: Sclerae anicteric, no conjunctival erythema  ENT: No buccal lesion, no pharyngeal exudates  Neck: No nuchal rigidity, no cervical adenopathy  Lungs: Clear breath sounds, no crackles, no wheezes  Heart: Regular rate and rhythm, no murmurs  Abdomen: Bowel sounds present, soft, nontender  Skin: Warm and dry, no active dermatoses  Musculoskeletal: Left hip incision wound with dressing and drain in place    Labs, imaging, and medical records/notes were personally reviewed.     Assessment:  Infected (superficial) hematoma, s/p debridement of hematoma with arthrotomy of left hip, excisional debridement of subcutaneous tissue, synovium, hematoma with drain placement on 09/28     Plan:  Start linezolid 600 mg q12h to finish 10 days of therapy. Continue local wound care. Thank you for involving me in the care of Carlos Morgan. . I will continue to follow. Please do not hesitate to call for any questions or concerns.     Electronically signed by Carlie Merida MD on 9/30/2021 at 11:38 AM

## 2021-09-30 NOTE — CARE COORDINATION
9/30/2021 social work transition of care planning  Sw spoke with [employer] ([employer]),they were running benefits. Sw awaiting return call (sw attempted contact twice today). Sw followed up with pt and notified that if [address] does not accept, he may have to discharge to a homeless shelter.    Electronically signed by MAJOR Burch on 9/30/2021 at 3:56 PM

## 2021-09-30 NOTE — PROGRESS NOTES
Occupational Therapy  OT BEDSIDE TREATMENT NOTE   [address] [address] [address]  [address] [place] [address], [facility]  Patient Name: Lashonda Leavitt. MRN: [id number]  : 1980  Room: [address] [place] [address]     Referring DO Andrew  Specific Provider Orders/Date: OT evaluation and treat      Evaluating OT: Yahir Oh OTR/L #[id number]     Diagnosis: Hematoma of right hip, initial encounter [S70.01XA]  Encounter for post surgical wound check [Z48.89]  Schizophrenia, unspecified type ([facility] [address] [F20.9]       Surgery:   Past Surgical History         Past Surgical History:   Procedure Laterality Date    EYE SURGERY   19 years ago    HIP FRACTURE SURGERY Left 2021     IRRIGATION AND DEBRIDEMENT LEFT HIP WITH EXCISION HEMATOMA performed by Kuldeep Carlin MD at [address] [place] [address]        Pertinent Medical History:  has a past medical history of Depression and Schizoaffective disorder ([facility] [address]    Precautions:  Fall Risk, TDWB to LLE, safety , poor adherence to WB, hemovac drain     Assessment of current deficits   [x]? Functional mobility             [x]?ADLs           []? Strength                  []?Cognition   [x]? Functional transfers           [x]? IADLs         [x]? Safety Awareness   [x]? Endurance   []? Fine Coordination              [x]? Balance      []? Vision/perception   [x]? Sensation     [x]? Gross Motor Coordination  []? ROM           []?  Delirium                   []? Motor Control      OT PLAN OF CARE   OT POC based on physician orders, patient diagnosis and results of clinical assessment     Frequency/Duration   2-4 days/wk for 1 week PRN   Specific OT Treatment Interventions to include:   * Instruction/training on adapted ADL techniques and AE recommendations to increase functional independence within precautions       * Training on energy conservation strategies, correct breathing pattern and techniques to improve independence/tolerance for self-care routine  * Functional transfer/mobility training/DME recommendations for increased independence, safety, and fall prevention  * Patient/Family education to increase follow through with safety techniques and functional independence  * Recommendation of environmental modifications for increased safety with functional transfers/mobility and ADLs  * Therapeutic activities to facilitate/challenge dynamic balance, stand tolerance for increased safety and independence with ADLs  * Therapeutic activities to facilitate gross/fine motor skills for increased independence with ADLs     Recommended Adaptive Equipment: ww with walker bag, AE prn, elevated commode     Home Living: Pt lives on streets since last admission with various admissions to ED for psychological issues. Equipment owned: crutches     Prior Level of Function: states he does not bath on streets, ambulated crutches  Driving: no  Occupation: n/a  Medication management: self  Leisure: not stated     Pain Level: 10/10 LLE   Cognition: A&O: 3/4              Memory:  Fair+              Sequencing:  Fair-              Problem solving:  fair              Judgement/safety:  Fair-                Functional Assessment:  AM-PAC Daily Activity Raw Score: 17/24    Initial Eval Status  Date: 9/29/21 Treatment Status  Date: 9/30/21 STGs = LTGs  Time frame: 2-4 days   Feeding Independent Ind      Grooming Setup sitting Set up  Seated. Declined completing at sink.  Mod I    UB Dressing SBA gown change Set up  Seated upright in bed  Mod I    LB Dressing Dependent due to pain Min A  To don underwear seated upright in bed.  Pt declined completing EOB  Mod I AE prn   Bathing Mod A bed level Per last tx  Mod I seated   Toileting Able to use urinal mod A  Per last tx  Mod I elevated commode   Bed Mobility  Supine to sit: min A   Sit to supine:  Min A   SBA- rolling Supine to sit: mod I   Sit to supine: mod I   Functional Transfers Min A sit to stand with poor ability to maintain TDWB N/T  Mod I with crutches   Functional Mobility Mod A with crutches and constant cueing noted hemovac leaking. Returned to bed N/T  Mod I with crutches   Balance Sitting:     Static:  SBA    Dynamic:CGA  Standing: min A                                                                         Activity Tolerance Fair limited by pain , safety  Poor  Pt agreed to participate in therapy but declined OOB activity after completing bed level ADL's good   Visual/  Perceptual Glasses: no            Safety Fair-  Fair-                                 Good follow through with TDWB to LLE       Comments: Upon arrival pt supine in bed. Pt educated on techniques to increase independence and safety during ADL's and bed mobility. At end of session pt left seated upright in bed, call light within reach. · Pt has made fair progress towards set goals.      · Continue with current plan of care    Treatment Time In: 2:05            Treatment Time Out: 2:15             Treatment Charges: Mins Units   Ther Ex  59450     Manual Therapy 22805     Thera Activities 23289     ADL/Home Mgt 87500 10 1   Neuro Re-ed 20131     Group Therapy      Orthotic manage/training  63233     Non-Billable Time     Total Timed Treatment 10 69 Priti Carr

## 2021-09-30 NOTE — PLAN OF CARE
Problem: Skin Integrity:  Goal: Absence of new skin breakdown  Description: Absence of new skin breakdown  Outcome: Met This Shift     Problem: Falls - Risk of:  Goal: Will remain free from falls  Description: Will remain free from falls  Outcome: Met This Shift  Goal: Absence of physical injury  Description: Absence of physical injury  Outcome: Met This Shift     Problem: Pain:  Goal: Pain level will decrease  Description: Pain level will decrease  Outcome: Ongoing  Goal: Control of acute pain  Description: Control of acute pain  Outcome: Ongoing

## 2021-10-01 LAB — SARS-COV-2, NAAT: NOT DETECTED

## 2021-10-01 PROCEDURE — 6370000000 HC RX 637 (ALT 250 FOR IP): Performed by: INTERNAL MEDICINE

## 2021-10-01 PROCEDURE — 6370000000 HC RX 637 (ALT 250 FOR IP): Performed by: PHYSICAL THERAPY ASSISTANT

## 2021-10-01 PROCEDURE — G0378 HOSPITAL OBSERVATION PER HR: HCPCS

## 2021-10-01 PROCEDURE — 87635 SARS-COV-2 COVID-19 AMP PRB: CPT

## 2021-10-01 PROCEDURE — 2580000003 HC RX 258: Performed by: PHYSICAL THERAPY ASSISTANT

## 2021-10-01 RX ORDER — LINEZOLID 600 MG/1
600 TABLET, FILM COATED ORAL 2 TIMES DAILY
Qty: 16 TABLET | Refills: 0 | DISCHARGE
Start: 2021-10-01 | End: 2021-10-01 | Stop reason: SDUPTHER

## 2021-10-01 RX ORDER — HYDROCODONE BITARTRATE AND ACETAMINOPHEN 5; 325 MG/1; MG/1
1 TABLET ORAL EVERY 6 HOURS PRN
Qty: 28 TABLET | Refills: 0 | Status: SHIPPED | OUTPATIENT
Start: 2021-10-01 | End: 2021-10-04

## 2021-10-01 RX ORDER — LINEZOLID 600 MG/1
600 TABLET, FILM COATED ORAL 2 TIMES DAILY
Qty: 16 TABLET | Refills: 0 | Status: SHIPPED | OUTPATIENT
Start: 2021-10-01 | End: 2021-10-07

## 2021-10-01 RX ADMIN — LINEZOLID 600 MG: 600 TABLET, FILM COATED ORAL at 08:28

## 2021-10-01 RX ADMIN — ACETAMINOPHEN 650 MG: 325 TABLET ORAL at 18:10

## 2021-10-01 RX ADMIN — ACETAMINOPHEN 650 MG: 325 TABLET ORAL at 07:14

## 2021-10-01 RX ADMIN — OXYCODONE HYDROCHLORIDE 10 MG: 10 TABLET ORAL at 05:54

## 2021-10-01 RX ADMIN — OXYCODONE HYDROCHLORIDE 10 MG: 10 TABLET ORAL at 18:10

## 2021-10-01 RX ADMIN — Medication 10 ML: at 08:30

## 2021-10-01 ASSESSMENT — PAIN DESCRIPTION - ONSET
ONSET: GRADUAL
ONSET: GRADUAL

## 2021-10-01 ASSESSMENT — PAIN DESCRIPTION - ORIENTATION
ORIENTATION: LEFT
ORIENTATION: LEFT

## 2021-10-01 ASSESSMENT — PAIN DESCRIPTION - DESCRIPTORS
DESCRIPTORS: ACHING;CONSTANT;DISCOMFORT
DESCRIPTORS: ACHING;DULL

## 2021-10-01 ASSESSMENT — PAIN DESCRIPTION - PAIN TYPE
TYPE: SURGICAL PAIN
TYPE: SURGICAL PAIN

## 2021-10-01 ASSESSMENT — PAIN SCALES - GENERAL
PAINLEVEL_OUTOF10: 10
PAINLEVEL_OUTOF10: 0
PAINLEVEL_OUTOF10: 8
PAINLEVEL_OUTOF10: 10
PAINLEVEL_OUTOF10: 0
PAINLEVEL_OUTOF10: 3

## 2021-10-01 ASSESSMENT — PAIN DESCRIPTION - PROGRESSION: CLINICAL_PROGRESSION: GRADUALLY WORSENING

## 2021-10-01 ASSESSMENT — PAIN DESCRIPTION - LOCATION
LOCATION: HIP
LOCATION: HIP

## 2021-10-01 ASSESSMENT — PAIN DESCRIPTION - FREQUENCY
FREQUENCY: INTERMITTENT
FREQUENCY: CONTINUOUS

## 2021-10-01 ASSESSMENT — PAIN - FUNCTIONAL ASSESSMENT: PAIN_FUNCTIONAL_ASSESSMENT: ACTIVITIES ARE NOT PREVENTED

## 2021-10-01 NOTE — PROGRESS NOTES
[facility] PROGRESS NOTE      Chief complaint: Follow-up of left hip hematoma    The patient is a 37 y.o. male with history of schizoaffective disorder not on medication, depression not on medication, substance abuse, left hip ORIF on 09/12/2021 prior to admission, presented on 09/28 with left hip swelling and warmth for 1 week. On admission, he was afebrile and hemodynamically stable with no leukocytosis. CT left hip showed significant fluid collection about 15.5 x 9 x 7.5 cm lateral to the left pelvis/hip within the subcutaneous soft tissues with some intramuscular involvement approximately 11.1 x 1.9 x 6.1 cm. He underwent debridement of hematoma with arthrotomy of left hip, excisional debridement of subcutaneous tissue, synovium, hematoma with drain placement on 09/28 during which superficial large tea-colored hematoma was removed and sent for cultures. Synovial fluid Gram stain and culture showed few polymorphonuclear leukocytes, no epithelial cells, few mononuclear leukocytes, no organisms, 3 colonies of Staphylococcus epidermidis (methicillin-resistant; susceptible to linezolid, cotrimoxazole, doxycycline). Cefazolin was given perioperatively. Subjective: Patient was seen and examined. No chills, no abdominal pain, no diarrhea, no rash, no itching, has pain on his side. Objective:    Vitals:    10/01/21 0730   BP: (!) 92/54   Pulse: 59   Resp: 18   Temp: 98.5 °F (36.9 °C)   SpO2: 95%     Constitutional: Alert, not in distress  Respiratory: Clear breath sounds, no crackles, no wheezes  Cardiovascular: Regular rate and rhythm, no murmurs  Gastrointestinal: Bowel sounds present, soft, nontender  Skin: Warm and dry, no active dermatoses  Musculoskeletal: Left hip incision wound with dressing in place. Labs, imaging, and medical records/notes were personally reviewed.     Assessment:  Infected (superficial) hematoma, s/p debridement of hematoma with arthrotomy of left hip, excisional debridement of subcutaneous tissue, synovium, hematoma with drain placement on 09/28     Recommendations:  Continue linezolid 600 mg q12h to finish 10 days of therapy from 09/30-10/09. Continue local wound care.     Thank you for involving me in the care of [employer]. . I will sign off for now. Please do not hesitate to call for any questions, concerns, or new findings.     Electronically signed by Lucian Kyle MD on 10/1/2021 at 11:15 AM

## 2021-10-01 NOTE — DISCHARGE INSTR - COC
Continuity of Care Form    Patient Name: Ramon Ly :  1980  MRN:  [id number]    Admit date:  2021  Discharge date:  ***10/1/21    Code Status Order: Full Code   Advance Directives:     Admitting Physician:  Dina Monte MD  PCP: No primary care provider on file. Discharging Nurse: ***[facility] Unit/Room#: 7024/7409-K  Discharging Unit Phone Number: ***[phone]    Emergency Contact:   Extended Emergency Contact Information  Primary Emergency Contact: sujata yu  Mobile Phone: [phone]  Relation: Domestic Partner    Past Surgical History:  Past Surgical History:   Procedure Laterality Date    EYE SURGERY  19 years ago    HIP FRACTURE SURGERY Left 2021    IRRIGATION AND DEBRIDEMENT LEFT HIP WITH EXCISION HEMATOMA performed by Kendra Costa MD at [address] [place] [address]       Immunization History:   Immunization History   Administered Date(s) Administered    Influenza, Quadv, 6 mo and older, IM, PF (Flulaval, Fluarix) 2018       Active Problems:  Patient Active Problem List   Diagnosis Code    Schizoaffective disorder, bipolar type ([facility] [address] F25.0    Encounter for post surgical wound check Z48.89    Pneumothorax, traumatic S27. 0XXA    Multiple closed fractures of ribs of right side S22.41XA    Rib pain on right side R07.81    Multiple fractures of right lower extremity and ribs, closed, initial encounter\. right tib fracture 9 & 10 S82. 91XA, S22.41XA    Hemopneumothorax, right J94.2    Major depression single episode, in partial remission (HCC) F32.4    Polysubstance abuse ([facility] [address] F19.10    Cocaine abuse ([facility] [address] F14.10    Hematoma of right hip S70. 01XA       Isolation/Infection:   Isolation          No Isolation        Patient Infection Status     Infection Onset Added Last Indicated Last Indicated By Review Planned Expiration Resolved Resolved By    None active    Resolved    COVID-19 Rule Out 21 COVID-19 & Influenza Combo (Ordered)   21 Rule-Out Test Resulted    COVID-19 Rule Out 21 COVID-19 & Influenza Combo (Ordered)   21 Rule-Out Test Resulted    COVID-19 Rule Out 21 COVID-19 & Influenza Combo (Ordered)   21 Rule-Out Test Resulted    COVID-19 Rule Out 07/10/21 07/10/21 07/11/21 COVID-19 & Influenza Combo (Ordered)   21 Rule-Out Test Resulted    COVID-19 Rule Out 21 COVID-19 & Influenza Combo (Ordered)   21 Rule-Out Test Resulted    COVID-19 Rule Out 21 COVID-19 & Influenza Combo (Ordered)   21 Rule-Out Test Resulted    COVID-19 Rule Out 21 COVID-19 & Influenza Combo (Ordered)   21 Rule-Out Test Resulted    COVID-19 Rule Out 21 COVID-19, Rapid (Ordered)   21 Rule-Out Test Resulted    COVID-19 Rule Out 21 COVID-19, Rapid (Ordered)   21 Rule-Out Test Resulted    COVID-19 Rule Out 21 COVID-19 (Ordered)   21 Rule-Out Test Resulted    COVID-19 20 COVID-19   21     COVID-19 Rule Out 20 COVID-19 (Ordered)   20 Rule-Out Test Resulted    COVID-19 Rule Out 20 COVID-19 (Ordered)   20 Rule-Out Test Resulted    COVID-19 Rule Out 10/18/20 10/18/20 10/18/20 COVID-19 (Ordered)   10/18/20 Rule-Out Test Resulted    COVID-19 Rule Out 20 Covid-19 Ambulatory (Ordered)   20 Rule-Out Test Resulted    COVID-19 Rule Out 20 COVID-19 (Ordered)   20 Rule-Out Test Resulted    COVID-19 Rule Out 20 COVID-19 (Ordered)   20 Rule-Out Test Resulted          Nurse Assessment:  Last Vital Signs: BP (!) 92/54   Pulse 59   Temp 98.5 °F (36.9 °C) (Temporal)   Resp 18   Ht 6' (1.829 m)   Wt 145 lb (65.8 kg)   SpO2 95% BMI 19.67 kg/m²     Last documented pain score (0-10 scale): Pain Level: 10  Last Weight:   Wt Readings from Last 1 Encounters:   09/28/21 145 lb (65.8 kg)     Mental Status:  oriented and alert    IV Access:  - None    Nursing Mobility/ADLs:  Walking   Assisted  Transfer  Assisted  Bathing  Assisted  Dressing  Assisted  Toileting  Assisted VOIDS USING A URINAL INDEPENDENTLY  Feeding  Independent  Med Admin  Independent  Med Delivery   whole    Wound Care Documentation and Therapy:        Elimination:  Continence:   · Bowel: Yes  · Bladder: Yes  Urinary Catheter: None   Colostomy/Ileostomy/Ileal Conduit: No       Date of Last BM: ***    Intake/Output Summary (Last 24 hours) at 10/1/2021 0803  Last data filed at 10/1/2021 0626  Gross per 24 hour   Intake 490 ml   Output 2680 ml   Net -2190 ml     I/O last 3 completed shifts: In: 56 [P.O.:480; I.V.:10]  Out: 2680 [Urine:2650; Drains:30]    Safety Concerns: At Risk for Falls    Impairments/Disabilities:      None    Nutrition Therapy:  Current Nutrition Therapy:   - Oral Diet:  General    Routes of Feeding: Oral  Liquids: Thin Liquids  Daily Fluid Restriction: no  Last Modified Barium Swallow with Video (Video Swallowing Test): not done    Treatments at the Time of Hospital Discharge:   Respiratory Treatments: ***  Oxygen Therapy:  is not on home oxygen therapy.   Ventilator:    - No ventilator support    Rehab Therapies: Physical Therapy and Occupational Therapy  Weight Bearing Status/Restrictions: TOE TOUCH WEIGHT BEARING TO LLE  Other Medical Equipment (for information only, NOT a DME order):  {EQUIPMENT:[id number]}  Other Treatments: ***    Patient's personal belongings (please select all that are sent with patient):  {SUZAN DME Belongings:771724350}    RN SIGNATURE:  {Esignature:[id number]}    CASE MANAGEMENT/SOCIAL WORK SECTION    Inpatient Status Date: ***    Readmission Risk Assessment Score:  Readmission Risk              Risk of Unplanned Readmission:  0 Discharging to Facility/ Agency   · Name: [facility]o  · Address:  · Phone:  · Fax:    Dialysis Facility (if applicable)   · Name:  · Address:  · Dialysis Schedule:  · Phone:  · Fax:    / signature: Electronically signed by MAJOR Mckeon on 10/1/2021 at 8:03 AM      PHYSICIAN SECTION    Prognosis: {Prognosis:[id number]}    Condition at Discharge: [facility] Patient Condition:[id number]}    Rehab Potential (if transferring to Rehab): {Prognosis:8648462140}    Recommended Labs or Other Treatments After Discharge: ***    Physician Certification: I certify the above information and transfer of [employer].  is necessary for the continuing treatment of the diagnosis listed and that he requires Gerardo Marc for less 30 days.      Update Admission H&P: {CHP DME Changes in QWNKM:992449508}    PHYSICIAN SIGNATURE:  {Esignature:[id number]}Nba Guerra MD

## 2021-10-01 NOTE — CARE COORDINATION
10/1/2021 social work transition of care planning  Sw spoke with [employer]  from [address] (yesterday, they initiated pre cert yesterday. Sw requested discharge order to complete [facility]. Sw will follow. Will need neg covid result. Electronically signed by MAJOR Quijano on 10/1/2021 at 8:09 AM     Addendum: [address] can take pt today. Will need discharge order (kenneth requested earlier this am). Kenneth submitting info for [facility]  Electronically signed by MAJOR Quijano on 10/1/2021 at 10:33 AM     Addendum: Sw contacted [facility] to setup ambulette to [address] will arrange and contact floor with a time. [phone] [id number]. Await covid result.   Electronically signed by MAJOR Quijano on 10/1/2021 at 1:43 PM

## 2021-10-02 VITALS
RESPIRATION RATE: 16 BRPM | HEART RATE: 65 BPM | HEIGHT: 72 IN | BODY MASS INDEX: 19.64 KG/M2 | OXYGEN SATURATION: 98 % | TEMPERATURE: 97.9 F | SYSTOLIC BLOOD PRESSURE: 117 MMHG | WEIGHT: 145 LBS | DIASTOLIC BLOOD PRESSURE: 66 MMHG

## 2021-10-02 PROCEDURE — 6370000000 HC RX 637 (ALT 250 FOR IP): Performed by: PHYSICAL THERAPY ASSISTANT

## 2021-10-02 PROCEDURE — 6360000002 HC RX W HCPCS: Performed by: STUDENT IN AN ORGANIZED HEALTH CARE EDUCATION/TRAINING PROGRAM

## 2021-10-02 PROCEDURE — G0378 HOSPITAL OBSERVATION PER HR: HCPCS

## 2021-10-02 PROCEDURE — 96372 THER/PROPH/DIAG INJ SC/IM: CPT

## 2021-10-02 PROCEDURE — 6370000000 HC RX 637 (ALT 250 FOR IP): Performed by: INTERNAL MEDICINE

## 2021-10-02 RX ADMIN — LINEZOLID 600 MG: 600 TABLET, FILM COATED ORAL at 08:33

## 2021-10-02 RX ADMIN — OXYCODONE HYDROCHLORIDE 10 MG: 10 TABLET ORAL at 02:36

## 2021-10-02 RX ADMIN — OXYCODONE HYDROCHLORIDE 10 MG: 10 TABLET ORAL at 08:32

## 2021-10-02 RX ADMIN — ENOXAPARIN SODIUM 40 MG: 40 INJECTION SUBCUTANEOUS at 08:32

## 2021-10-02 RX ADMIN — LINEZOLID 600 MG: 600 TABLET, FILM COATED ORAL at 02:36

## 2021-10-02 ASSESSMENT — PAIN DESCRIPTION - ORIENTATION: ORIENTATION: LEFT

## 2021-10-02 ASSESSMENT — PAIN DESCRIPTION - FREQUENCY: FREQUENCY: INTERMITTENT

## 2021-10-02 ASSESSMENT — PAIN DESCRIPTION - DESCRIPTORS: DESCRIPTORS: ACHING;DISCOMFORT

## 2021-10-02 ASSESSMENT — PAIN - FUNCTIONAL ASSESSMENT: PAIN_FUNCTIONAL_ASSESSMENT: PREVENTS OR INTERFERES SOME ACTIVE ACTIVITIES AND ADLS

## 2021-10-02 ASSESSMENT — PAIN SCALES - GENERAL
PAINLEVEL_OUTOF10: 8
PAINLEVEL_OUTOF10: 8

## 2021-10-02 ASSESSMENT — PAIN DESCRIPTION - LOCATION: LOCATION: HIP

## 2021-10-02 ASSESSMENT — PAIN DESCRIPTION - ONSET: ONSET: GRADUAL

## 2021-10-02 ASSESSMENT — PAIN DESCRIPTION - PAIN TYPE: TYPE: SURGICAL PAIN

## 2021-10-02 ASSESSMENT — PAIN DESCRIPTION - PROGRESSION: CLINICAL_PROGRESSION: NOT CHANGED

## 2021-10-02 NOTE — CARE COORDINATION
10/2/2021 social work transition of care planning  Riri notified that [facility] ambulette did not transport pt last evening to [address]. Riri called [facility] this morning to schedule tranport. [facility] stated they will send a stretcher at 10 am. Riri notified charge nurse and will notify Lord Armendariz at [address].   Electronically signed by MAJOR Cason on 10/2/2021 at 7:47 AM

## 2021-10-02 NOTE — PLAN OF CARE
Problem: Skin Integrity:  Goal: Will show no infection signs and symptoms  Description: Will show no infection signs and symptoms  10/2/2021 0945 by Olinda Tinoco RN  Outcome: Completed  10/2/2021 0945 by Olinda Tinoco RN  Outcome: Met This Shift  10/2/2021 0311 by Lina Jaimes RN  Outcome: Ongoing  Goal: Absence of new skin breakdown  Description: Absence of new skin breakdown  10/2/2021 0945 by Olinda Tinoco RN  Outcome: Completed  10/2/2021 0945 by Olinda Tinoco RN  Outcome: Met This Shift  10/2/2021 0311 by Lina Jaimes RN  Outcome: Ongoing     Problem: Falls - Risk of:  Goal: Will remain free from falls  Description: Will remain free from falls  10/2/2021 0945 by Olinda Tinoco RN  Outcome: Completed  10/2/2021 0945 by Olinda Tinoco RN  Outcome: Met This Shift  10/2/2021 0311 by Lina Jaimes RN  Outcome: Met This Shift  Goal: Absence of physical injury  Description: Absence of physical injury  10/2/2021 0945 by lOinda Tinoco RN  Outcome: Completed  10/2/2021 0945 by Olinda Tinoco RN  Outcome: Met This Shift  10/2/2021 0311 by Lina Jaimes RN  Outcome: Met This Shift     Problem: Pain:  Goal: Pain level will decrease  Description: Pain level will decrease  10/2/2021 0945 by Olinda Tinoco RN  Outcome: Completed  10/2/2021 0945 by Olinda Tinoco RN  Outcome: Met This Shift  10/2/2021 0311 by Lina Jaimes RN  Outcome: Ongoing  Goal: Control of acute pain  Description: Control of acute pain  10/2/2021 0945 by Olinda Tinoco RN  Outcome: Completed  10/2/2021 0945 by Olinda Tinoco RN  Outcome: Met This Shift  10/2/2021 0311 by Lina Jaimes RN  Outcome: Ongoing  Goal: Control of chronic pain  Description: Control of chronic pain  10/2/2021 0945 by Olinda Tinoco RN  Outcome: Completed  10/2/2021 0945 by Olinda Tinoco RN  Outcome: Met This Shift  10/2/2021 0311 by Lina Jaimes RN  Outcome: Ongoing

## 2021-10-02 NOTE — PROGRESS NOTES
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 He is still at [facility] and will do it as soon as he gets back.

## 2021-10-02 NOTE — PLAN OF CARE
Problem: Falls - Risk of:  Goal: Will remain free from falls  Description: Will remain free from falls  10/2/2021 0311 by Whit Farnsworth RN  Outcome: Met This Shift  10/1/2021 1443 by Kerrie Kumar RN  Outcome: Met This Shift  Goal: Absence of physical injury  Description: Absence of physical injury  10/2/2021 0311 by Whit Farnsworth RN  Outcome: Met This Shift  10/1/2021 1443 by Kerrie Kumar RN  Outcome: Met This Shift     Problem: Skin Integrity:  Goal: Will show no infection signs and symptoms  Description: Will show no infection signs and symptoms  10/2/2021 0311 by Whit Farnsworth RN  Outcome: Ongoing  10/1/2021 1443 by Kerrie Kumar RN  Outcome: Ongoing  Goal: Absence of new skin breakdown  Description: Absence of new skin breakdown  10/2/2021 0311 by Whit Farnsworth RN  Outcome: Ongoing  10/1/2021 1443 by Kerrie Kumar RN  Outcome: Ongoing     Problem: Pain:  Goal: Pain level will decrease  Description: Pain level will decrease  10/2/2021 0311 by Whit Farnsworth RN  Outcome: Ongoing  10/1/2021 1443 by Kerrie Kumar RN  Outcome: Met This Shift  Goal: Control of acute pain  Description: Control of acute pain  10/2/2021 0311 by Whit Farnsworth RN  Outcome: Ongoing  10/1/2021 1443 by Kerrie Kumar RN  Outcome: Met This Shift  Goal: Control of chronic pain  Description: Control of chronic pain  10/2/2021 0311 by Whit Farnsworth RN  Outcome: Ongoing  10/1/2021 1443 by Kerrie Kumar RN  Outcome: Met This Shift

## 2021-10-02 NOTE — PLAN OF CARE
Problem: Skin Integrity:  Goal: Will show no infection signs and symptoms  Description: Will show no infection signs and symptoms  10/2/2021 0945 by Rosemarie Barroso RN  Outcome: Met This Shift  10/2/2021 0311 by Sailaja Roman RN  Outcome: Ongoing  Goal: Absence of new skin breakdown  Description: Absence of new skin breakdown  10/2/2021 0945 by Rosemarie Barroso RN  Outcome: Met This Shift  10/2/2021 0311 by Sailaja Roman RN  Outcome: Ongoing     Problem: Falls - Risk of:  Goal: Will remain free from falls  Description: Will remain free from falls  10/2/2021 0945 by Rosemarie Barroso RN  Outcome: Met This Shift  10/2/2021 0311 by Sailaja Roman RN  Outcome: Met This Shift  Goal: Absence of physical injury  Description: Absence of physical injury  10/2/2021 0945 by Rosemarie Barroso RN  Outcome: Met This Shift  10/2/2021 0311 by Sailaja Roman RN  Outcome: Met This Shift     Problem: Pain:  Goal: Pain level will decrease  Description: Pain level will decrease  10/2/2021 0945 by Rosemarie Barroso RN  Outcome: Met This Shift  10/2/2021 0311 by Sailaja Roman RN  Outcome: Ongoing  Goal: Control of acute pain  Description: Control of acute pain  10/2/2021 0945 by Rosemarie Barroso RN  Outcome: Met This Shift  10/2/2021 0311 by Sailaja Roman RN  Outcome: Ongoing  Goal: Control of chronic pain  Description: Control of chronic pain  10/2/2021 0945 by Rosemarie Barroso RN  Outcome: Met This Shift  10/2/2021 0311 by Sailaja Roman RN  Outcome: Ongoing

## 2021-10-02 NOTE — PROGRESS NOTES
No assessment done on patient. Patient yelling at staff at the nurses station about his pickup time being too late at night.

## 2021-10-03 LAB — ANAEROBIC CULTURE: NORMAL

## 2021-10-04 ENCOUNTER — HOSPITAL ENCOUNTER (EMERGENCY)
Age: 41
Discharge: HOME | End: 2021-10-04
Payer: COMMERCIAL

## 2021-10-04 VITALS
HEART RATE: 90 BPM | TEMPERATURE: 98 F | RESPIRATION RATE: 18 BRPM | OXYGEN SATURATION: 97 % | SYSTOLIC BLOOD PRESSURE: 120 MMHG | DIASTOLIC BLOOD PRESSURE: 70 MMHG

## 2021-10-04 DIAGNOSIS — M25.552 LEFT HIP PAIN: Primary | ICD-10-CM

## 2021-10-04 DIAGNOSIS — Z48.89 ENCOUNTER FOR POST SURGICAL WOUND CHECK: ICD-10-CM

## 2021-10-04 PROCEDURE — 6370000000 HC RX 637 (ALT 250 FOR IP): Performed by: NURSE PRACTITIONER

## 2021-10-04 PROCEDURE — 99283 EMERGENCY DEPT VISIT LOW MDM: CPT

## 2021-10-04 RX ORDER — HYDROCODONE BITARTRATE AND ACETAMINOPHEN 5; 325 MG/1; MG/1
1 TABLET ORAL EVERY 6 HOURS PRN
Qty: 5 TABLET | Refills: 0 | Status: SHIPPED | OUTPATIENT
Start: 2021-10-04 | End: 2021-10-05

## 2021-10-04 RX ORDER — HYDROCODONE BITARTRATE AND ACETAMINOPHEN 5; 325 MG/1; MG/1
1 TABLET ORAL ONCE
Status: COMPLETED | OUTPATIENT
Start: 2021-10-04 | End: 2021-10-04

## 2021-10-04 RX ADMIN — HYDROCODONE BITARTRATE AND ACETAMINOPHEN 1 TABLET: 5; 325 TABLET ORAL at 18:38

## 2021-10-04 ASSESSMENT — PAIN SCALES - GENERAL
PAINLEVEL_OUTOF10: 8
PAINLEVEL_OUTOF10: 7

## 2021-10-04 NOTE — ED PROVIDER NOTES
Independent MLP     HPI:  10/4/21,   Time: 6:11 PM EDT         Missy Marks is a 37 y.o. male presenting to the ED for left hip pain, beginning chronic ago. The complaint has been persistent, moderate in severity, and worsened by nothing. Patient arrived by EMS from [address] [place] [address] with complaints of left hip pain. He recently had surgery on 9/28 by Dr. Jose Stewart to the left hip. He developed a postop wound infection and was subsequently sent to [address] [place] [address] for antibiotics and rehab. He states that the nursing staff is withholding his pain medication and he does no longer want to be there due to them being unwilling to give him his pain medication when he ask for it. Dressing is dry and intact to the left hip. ROS:   Pertinent positives and negatives are stated within HPI, all other systems reviewed and are negative.  --------------------------------------------- PAST HISTORY ---------------------------------------------  Past Medical History:  has a past medical history of Depression and Schizoaffective disorder ([facility] Past Surgical History:  has a past surgical history that includes eye surgery (19 years ago) and Hip fracture surgery (Left, 9/28/2021). Social History:  reports that he has been smoking cigars and cigarettes. He has a 6.00 pack-year smoking history. He has never used smokeless tobacco. He reports current alcohol use. He reports current drug use. Frequency: 7.00 times per week. Drugs: Cocaine and Marijuana. Family History: family history includes Mental Illness in his mother; No Known Problems in his father; Substance Abuse in his mother. The patients home medications have been reviewed.     Allergies: Chlorpheniramine-phenylephrine, Penicillins, and Rondec-d [chlophedianol-pseudoephedrine]    -------------------------------------------------- RESULTS -------------------------------------------------  All laboratory and radiology results have been follow-up appointment. Counseling: The emergency provider has spoken with the patient and discussed todays results, in addition to providing specific details for the plan of care and counseling regarding the diagnosis and prognosis. Questions are answered at this time and they are agreeable with the plan.      --------------------------------- IMPRESSION AND DISPOSITION ---------------------------------    IMPRESSION  1. Left hip pain    2.  Encounter for post surgical wound check        DISPOSITION  Disposition: Discharge to home  Patient condition is good                 JUICE Hinton CNP  10/04/21 2026

## 2021-10-06 ENCOUNTER — HOSPITAL ENCOUNTER (EMERGENCY)
Age: 41
Discharge: HOME | End: 2021-10-06
Attending: EMERGENCY MEDICINE
Payer: COMMERCIAL

## 2021-10-06 VITALS
RESPIRATION RATE: 18 BRPM | HEART RATE: 90 BPM | TEMPERATURE: 98.4 F | OXYGEN SATURATION: 97 % | DIASTOLIC BLOOD PRESSURE: 72 MMHG | SYSTOLIC BLOOD PRESSURE: 118 MMHG

## 2021-10-06 DIAGNOSIS — F19.10 POLYSUBSTANCE ABUSE (HCC): ICD-10-CM

## 2021-10-06 DIAGNOSIS — F22 PARANOIA (HCC): ICD-10-CM

## 2021-10-06 DIAGNOSIS — Z76.89 ENCOUNTER FOR PSYCHIATRIC ASSESSMENT: Primary | ICD-10-CM

## 2021-10-06 LAB
ACETAMINOPHEN LEVEL: <5 MCG/ML (ref 10–30)
ALBUMIN SERPL-MCNC: 3.6 G/DL (ref 3.5–5.2)
ALP BLD-CCNC: 76 U/L (ref 40–129)
ALT SERPL-CCNC: 8 U/L (ref 0–40)
AMPHETAMINE SCREEN, URINE: NOT DETECTED
ANION GAP SERPL CALCULATED.3IONS-SCNC: 10 MMOL/L (ref 7–16)
AST SERPL-CCNC: 12 U/L (ref 0–39)
BARBITURATE SCREEN URINE: NOT DETECTED
BASOPHILS ABSOLUTE: 0.08 E9/L (ref 0–0.2)
BASOPHILS RELATIVE PERCENT: 1.3 % (ref 0–2)
BENZODIAZEPINE SCREEN, URINE: NOT DETECTED
BILIRUB SERPL-MCNC: 0.2 MG/DL (ref 0–1.2)
BUN BLDV-MCNC: 12 MG/DL (ref 6–20)
CALCIUM SERPL-MCNC: 9 MG/DL (ref 8.6–10.2)
CANNABINOID SCREEN URINE: POSITIVE
CHLORIDE BLD-SCNC: 105 MMOL/L (ref 98–107)
CO2: 27 MMOL/L (ref 22–29)
COCAINE METABOLITE SCREEN URINE: POSITIVE
CREAT SERPL-MCNC: 1 MG/DL (ref 0.7–1.2)
EKG ATRIAL RATE: 81 BPM
EKG P AXIS: 74 DEGREES
EKG P-R INTERVAL: 180 MS
EKG Q-T INTERVAL: 374 MS
EKG QRS DURATION: 94 MS
EKG QTC CALCULATION (BAZETT): 434 MS
EKG R AXIS: 74 DEGREES
EKG T AXIS: 63 DEGREES
EKG VENTRICULAR RATE: 81 BPM
EOSINOPHILS ABSOLUTE: 0.2 E9/L (ref 0.05–0.5)
EOSINOPHILS RELATIVE PERCENT: 3.1 % (ref 0–6)
ETHANOL: <10 MG/DL (ref 0–0.08)
FENTANYL SCREEN, URINE: NOT DETECTED
GFR AFRICAN AMERICAN: >60
GFR NON-AFRICAN AMERICAN: >60 ML/MIN/1.73
GLUCOSE BLD-MCNC: 74 MG/DL (ref 74–99)
HCT VFR BLD CALC: 33.8 % (ref 37–54)
HEMOGLOBIN: 10.7 G/DL (ref 12.5–16.5)
IMMATURE GRANULOCYTES #: 0.02 E9/L
IMMATURE GRANULOCYTES %: 0.3 % (ref 0–5)
LYMPHOCYTES ABSOLUTE: 1.27 E9/L (ref 1.5–4)
LYMPHOCYTES RELATIVE PERCENT: 19.9 % (ref 20–42)
Lab: ABNORMAL
MCH RBC QN AUTO: 29.2 PG (ref 26–35)
MCHC RBC AUTO-ENTMCNC: 31.7 % (ref 32–34.5)
MCV RBC AUTO: 92.3 FL (ref 80–99.9)
METHADONE SCREEN, URINE: NOT DETECTED
MONOCYTES ABSOLUTE: 0.65 E9/L (ref 0.1–0.95)
MONOCYTES RELATIVE PERCENT: 10.2 % (ref 2–12)
NEUTROPHILS ABSOLUTE: 4.15 E9/L (ref 1.8–7.3)
NEUTROPHILS RELATIVE PERCENT: 65.2 % (ref 43–80)
OPIATE SCREEN URINE: NOT DETECTED
OXYCODONE URINE: POSITIVE
PDW BLD-RTO: 13.9 FL (ref 11.5–15)
PHENCYCLIDINE SCREEN URINE: NOT DETECTED
PLATELET # BLD: 448 E9/L (ref 130–450)
PMV BLD AUTO: 8.6 FL (ref 7–12)
POTASSIUM SERPL-SCNC: 4.6 MMOL/L (ref 3.5–5)
RBC # BLD: 3.66 E12/L (ref 3.8–5.8)
SALICYLATE, SERUM: <0.3 MG/DL (ref 0–30)
SODIUM BLD-SCNC: 142 MMOL/L (ref 132–146)
TOTAL PROTEIN: 6.3 G/DL (ref 6.4–8.3)
TRICYCLIC ANTIDEPRESSANTS SCREEN SERUM: NEGATIVE NG/ML
WBC # BLD: 6.4 E9/L (ref 4.5–11.5)

## 2021-10-06 PROCEDURE — 80179 DRUG ASSAY SALICYLATE: CPT

## 2021-10-06 PROCEDURE — 99284 EMERGENCY DEPT VISIT MOD MDM: CPT

## 2021-10-06 PROCEDURE — 80307 DRUG TEST PRSMV CHEM ANLYZR: CPT

## 2021-10-06 PROCEDURE — 80143 DRUG ASSAY ACETAMINOPHEN: CPT

## 2021-10-06 PROCEDURE — 93005 ELECTROCARDIOGRAM TRACING: CPT | Performed by: EMERGENCY MEDICINE

## 2021-10-06 PROCEDURE — 82077 ASSAY SPEC XCP UR&BREATH IA: CPT

## 2021-10-06 PROCEDURE — 80053 COMPREHEN METABOLIC PANEL: CPT

## 2021-10-06 PROCEDURE — 93010 ELECTROCARDIOGRAM REPORT: CPT | Performed by: INTERNAL MEDICINE

## 2021-10-06 PROCEDURE — 85025 COMPLETE CBC W/AUTO DIFF WBC: CPT

## 2021-10-06 ASSESSMENT — PAIN DESCRIPTION - FREQUENCY: FREQUENCY: CONTINUOUS

## 2021-10-06 ASSESSMENT — PAIN DESCRIPTION - PROGRESSION: CLINICAL_PROGRESSION: NOT CHANGED

## 2021-10-06 ASSESSMENT — PAIN DESCRIPTION - PAIN TYPE: TYPE: ACUTE PAIN

## 2021-10-06 ASSESSMENT — PAIN SCALES - GENERAL: PAINLEVEL_OUTOF10: 4

## 2021-10-06 ASSESSMENT — PAIN DESCRIPTION - ONSET: ONSET: GRADUAL

## 2021-10-06 ASSESSMENT — PAIN DESCRIPTION - LOCATION: LOCATION: CHEST

## 2021-10-06 ASSESSMENT — PAIN DESCRIPTION - ORIENTATION: ORIENTATION: MID

## 2021-10-06 ASSESSMENT — PAIN DESCRIPTION - DESCRIPTORS: DESCRIPTORS: PRESSURE

## 2021-10-06 NOTE — ED NOTES
Emergency Department [facility] AN AFFILIATE OF [facility] Biopsychosocial Assessment Note    Chief Complaint:     Pt is a 38 yo male presenting to the ED for paranoia, patient states he is feeling paranoid, states he feels like something bad is going to happen. Patient denies SI or HI, denies any hallucinations. Pt reports that he believes that someone is out to get him and he is feeling paranoid. Pt denies SI, HI and AVH. Pt's has baseline paranoia mostly due to substance abuse even when he is med compliant. Pt is homeless and paranoia appears to be exasperated after substance use. Typically if Pt is allowed to sleep, paranoia will wear off and Pt will be ready to discharge in the morning. MSE:    Pt is calm and pleasant. Pt is oriented x 4. Mood is congruent. Speech is pressured, Pt denies SI, HI and AVH. Clinical Summary/History:     Pt reports a  hx of paranoid schizophrenia, Pt reports he is med compliant. Pt has been referred to [facility] many times in the past. Pt needs to continue to be reminded to go there when discharged from the ED. Gender  [x] Male [] Female [] Transgender  [] Other    Sexual Orientation    [x] Heterosexual [] Homosexual [] Bisexual [] Other    Suicidal Behavioral: CSSR-S Complete. [] Reports:    [] Past [] Present   [x] Denies    Homicidal/ Violent Behavior  [] Reports:   [] Past [] Present   [x] Denies     Hallucinations/Delusions   [x] Reports:   [] Denies     Substance Use/Alcohol Use/Addiction: SBIRT Screen Complete. [x] Reports:   [] Denies     Trauma History  [x] Reports:  [] Denies     Collateral Information:     No collateral information obtained at this time    Level of Care/Disposition Plan  [x] Home:  Streets  [] Outpatient Provider:   [] Crisis Unit:   [] Inpatient Psychiatric Unit:  [] Other:     PATRICIO SW reviewed chart with ED Doc, Pt does not meet inpatient criteria.  Pt will be allowed to remain in the ED until morning and then disposition will be to discharge patient and encourage patient to follow up with [facility].      FRANCISCO Reese, [facility]  10/06/21 3595

## 2021-10-06 NOTE — ED PROVIDER NOTES
HPI: Jose Mark. 37 y.o. male presents with a complaint of psychiatric evaluation. Patient presents with paranoid thoughts. States he thinks someone is out to get him and he feels paranoid. He denies visual auditory hallucinations. Denies suicidal ideation she states \"someone bad is try and get me\". Denies homicidal ideation. Denies alcohol use, when questioned about illicit drug use he replies \"maybe\". --------------------------------------------- PAST HISTORY ---------------------------------------------  Past Medical History:  has a past medical history of Depression and Schizoaffective disorder ([facility] Past Surgical History:  has a past surgical history that includes eye surgery (19 years ago) and Hip fracture surgery (Left, 9/28/2021). Social History:  reports that he has been smoking cigars and cigarettes. He has a 6.00 pack-year smoking history. He has never used smokeless tobacco. He reports current alcohol use. He reports current drug use. Frequency: 7.00 times per week. Drugs: Cocaine and Marijuana. Family History: family history includes Mental Illness in his mother; No Known Problems in his father; Substance Abuse in his mother. The patients home medications have been reviewed.     Allergies: Chlorpheniramine-phenylephrine, Penicillins, and Rondec-d [chlophedianol-pseudoephedrine]    -------------------------------------------------- RESULTS -------------------------------------------------    LABS:  Results for orders placed or performed during the hospital encounter of 10/06/21   CBC Auto Differential   Result Value Ref Range    WBC 6.4 4.5 - 11.5 E9/L    RBC 3.66 (L) 3.80 - 5.80 E12/L    Hemoglobin 10.7 (L) 12.5 - 16.5 g/dL    Hematocrit 33.8 (L) 37.0 - 54.0 %    MCV 92.3 80.0 - 99.9 fL    MCH 29.2 26.0 - 35.0 pg    MCHC 31.7 (L) 32.0 - 34.5 %    RDW 13.9 11.5 - 15.0 fL    Platelets 069 826 - 721 E9/L    MPV 8.6 7.0 - 12.0 fL    Neutrophils % 65.2 43.0 - 80.0 % Immature Granulocytes % 0.3 0.0 - 5.0 %    Lymphocytes % 19.9 (L) 20.0 - 42.0 %    Monocytes % 10.2 2.0 - 12.0 %    Eosinophils % 3.1 0.0 - 6.0 %    Basophils % 1.3 0.0 - 2.0 %    Neutrophils Absolute 4.15 1.80 - 7.30 E9/L    Immature Granulocytes # 0.02 E9/L    Lymphocytes Absolute 1.27 (L) 1.50 - 4.00 E9/L    Monocytes Absolute 0.65 0.10 - 0.95 E9/L    Eosinophils Absolute 0.20 0.05 - 0.50 E9/L    Basophils Absolute 0.08 0.00 - 0.20 E9/L   Comprehensive Metabolic Panel   Result Value Ref Range    Sodium 142 132 - 146 mmol/L    Potassium 4.6 3.5 - 5.0 mmol/L    Chloride 105 98 - 107 mmol/L    CO2 27 22 - 29 mmol/L    Anion Gap 10 7 - 16 mmol/L    Glucose 74 74 - 99 mg/dL    BUN 12 6 - 20 mg/dL    CREATININE 1.0 0.7 - 1.2 mg/dL    GFR Non-African American >60 >=60 mL/min/1.73    GFR African American >60     Calcium 9.0 8.6 - 10.2 mg/dL    Total Protein 6.3 (L) 6.4 - 8.3 g/dL    Albumin 3.6 3.5 - 5.2 g/dL    Total Bilirubin 0.2 0.0 - 1.2 mg/dL    Alkaline Phosphatase 76 40 - 129 U/L    ALT 8 0 - 40 U/L    AST 12 0 - 39 U/L   Urine Drug Screen   Result Value Ref Range    Amphetamine Screen, Urine NOT DETECTED Negative <1000 ng/mL    Barbiturate Screen, Ur NOT DETECTED Negative < 200 ng/mL    Benzodiazepine Screen, Urine NOT DETECTED Negative < 200 ng/mL    Cannabinoid Scrn, Ur POSITIVE (A) Negative < 50ng/mL    Cocaine Metabolite Screen, Urine POSITIVE (A) Negative < 300 ng/mL    Opiate Scrn, Ur NOT DETECTED Negative < 300ng/mL    PCP Screen, Urine NOT DETECTED Negative < 25 ng/mL    Methadone Screen, Urine NOT DETECTED Negative <300 ng/mL    Oxycodone Urine POSITIVE (A) Negative <100 ng/mL    FENTANYL SCREEN, URINE NOT DETECTED Negative <1 ng/mL    Drug Screen Comment: see below    Serum Drug Screen   Result Value Ref Range    Ethanol Lvl <10 mg/dL    Acetaminophen Level <5.0 (L) 10.0 - 71.9 mcg/mL    Salicylate, Serum <5.2 0.0 - 30.0 mg/dL    TCA Scrn NEGATIVE Cutoff:300 ng/mL   EKG 12 Lead   Result Value Ref Range Ventricular Rate 81 BPM    Atrial Rate 81 BPM    P-R Interval 180 ms    QRS Duration 94 ms    Q-T Interval 374 ms    QTc Calculation (Bazett) 434 ms    P Axis 74 degrees    R Axis 74 degrees    T Axis 63 degrees       RADIOLOGY:  Interpreted by Radiologist.  No orders to display       EKG: This EKG is signed and interpreted by the EP. Rate: 81  Rhythm: Sinus  Interpretation: Sinus rhythm, normal axis, PA is 180, QRS is 94, QTc is 431. Nonspecific ST changes are stable. 9/28/2021  Comparison: stable as compared to patient's most recent EKG    --------------------------------------------- PAST HISTORY ---------------------------------------------  Past Medical History:  has a past medical history of Depression and Schizoaffective disorder ([facility] Past Surgical History:  has a past surgical history that includes eye surgery (19 years ago) and Hip fracture surgery (Left, 9/28/2021). Social History:  reports that he has been smoking cigars and cigarettes. He has a 6.00 pack-year smoking history. He has never used smokeless tobacco. He reports current alcohol use. He reports current drug use. Frequency: 7.00 times per week. Drugs: Cocaine and Marijuana. Family History: family history includes Mental Illness in his mother; No Known Problems in his father; Substance Abuse in his mother. The patients home medications have been reviewed. Allergies: Chlorpheniramine-phenylephrine, Penicillins, and Rondec-d [chlophedianol-pseudoephedrine]        ROS: 10 point review of systems was performed and the pertinent positives and negatives are documented in the history of present illness. ROS negative otherwise      ------------------------- NURSING NOTES AND VITALS REVIEWED ---------------------------   The nursing notes within the ED encounter and vital signs as below have been reviewed.    /83   Pulse 88   Temp 98.4 °F (36.9 °C) (Oral)   Resp 18   SpO2 97%   Oxygen Saturation Interpretation: Normal ---------------------------------------------------PHYSICAL EXAM--------------------------------------      Constitutional/General: Alert and oriented x3,   Head: NC/AT  Eyes: PERRL, EOMI  Mouth: Oropharynx clear, handling secretions, no trismus  Neck: Supple, full ROM, non tender to palpation in the midline, no stridor, no crepitus, no meningeal signs  Pulmonary: Lungs clear to auscultation bilaterally, Not in respiratory distress  Cardiovascular:  Regular rate and rhythm, . 2+ distal pulses  Abdomen: Soft, non tender, non distended, +BS, no rebound, guarding, or rigidity. No pulsatile masses appreciated  Extremities: Moves all extremities x 4. Warm and well perfused, no clubbing, cyanosis, or edema. Capillary refill <3 seconds  Skin: warm and dry without rash  Neurologic: GCS 15, CN 2-12 grossly intact, no focal deficits,    Psych: Cooperative affect      ------------------------------------------ PROGRESS NOTES ------------------------------------------     Consultations:   Social work     Re-Evaluations:        Counseling: The emergency provider has spoken with thepatient and discussed todays results, in addition to providing specific details for the plan of care and counseling regarding the diagnosis and prognosis. Questions are answered at this time and they are agreeable with the plan.      --------------------------------- ADDITIONAL PROVIDER NOTES ---------------------------------     This patient's ED course included: a personal history and physicial eaxmination, multiple bedside re-evaluations, IV medications, cardiac monitoring, continuous pulse oximetry and complex medical decision making and emergency management    This patient has been closely monitored during their ED course.     --------------------------------- IMPRESSION AND DISPOSITION ---------------------------------    IMPRESSION  1. Encounter for psychiatric assessment    2. Paranoia ([facility]    3.  Polysubstance abuse ([facility]

## 2021-10-07 ENCOUNTER — HOSPITAL ENCOUNTER (EMERGENCY)
Age: 41
Discharge: PSYCHIATRIC OR SUBSTANCE USE FACILITY | End: 2021-10-07
Attending: EMERGENCY MEDICINE
Payer: COMMERCIAL

## 2021-10-07 ENCOUNTER — TELEPHONE (OUTPATIENT)
Dept: ORTHOPEDIC SURGERY | Age: 41
End: 2021-10-07

## 2021-10-07 VITALS
DIASTOLIC BLOOD PRESSURE: 75 MMHG | HEART RATE: 86 BPM | OXYGEN SATURATION: 98 % | RESPIRATION RATE: 16 BRPM | SYSTOLIC BLOOD PRESSURE: 126 MMHG | WEIGHT: 145 LBS | TEMPERATURE: 98.1 F | HEIGHT: 72 IN | BODY MASS INDEX: 19.64 KG/M2

## 2021-10-07 DIAGNOSIS — Z76.89 ENCOUNTER FOR PSYCHIATRIC ASSESSMENT: ICD-10-CM

## 2021-10-07 DIAGNOSIS — R10.2 PELVIC PAIN: Primary | ICD-10-CM

## 2021-10-07 DIAGNOSIS — F23 ACUTE PSYCHOSIS (HCC): Primary | ICD-10-CM

## 2021-10-07 LAB
ACETAMINOPHEN LEVEL: <5 MCG/ML (ref 10–30)
ALBUMIN SERPL-MCNC: 3.6 G/DL (ref 3.5–5.2)
ALP BLD-CCNC: 78 U/L (ref 40–129)
ALT SERPL-CCNC: 8 U/L (ref 0–40)
AMPHETAMINE SCREEN, URINE: NOT DETECTED
ANION GAP SERPL CALCULATED.3IONS-SCNC: 8 MMOL/L (ref 7–16)
AST SERPL-CCNC: 13 U/L (ref 0–39)
BARBITURATE SCREEN URINE: NOT DETECTED
BASOPHILS ABSOLUTE: 0.08 E9/L (ref 0–0.2)
BASOPHILS RELATIVE PERCENT: 1.4 % (ref 0–2)
BENZODIAZEPINE SCREEN, URINE: NOT DETECTED
BILIRUB SERPL-MCNC: 0.2 MG/DL (ref 0–1.2)
BUN BLDV-MCNC: 16 MG/DL (ref 6–20)
CALCIUM SERPL-MCNC: 8.8 MG/DL (ref 8.6–10.2)
CANNABINOID SCREEN URINE: POSITIVE
CHLORIDE BLD-SCNC: 107 MMOL/L (ref 98–107)
CO2: 28 MMOL/L (ref 22–29)
COCAINE METABOLITE SCREEN URINE: POSITIVE
CREAT SERPL-MCNC: 1.1 MG/DL (ref 0.7–1.2)
EKG ATRIAL RATE: 80 BPM
EKG P AXIS: 79 DEGREES
EKG P-R INTERVAL: 182 MS
EKG Q-T INTERVAL: 380 MS
EKG QRS DURATION: 96 MS
EKG QTC CALCULATION (BAZETT): 438 MS
EKG R AXIS: 82 DEGREES
EKG T AXIS: 70 DEGREES
EKG VENTRICULAR RATE: 80 BPM
EOSINOPHILS ABSOLUTE: 0.23 E9/L (ref 0.05–0.5)
EOSINOPHILS RELATIVE PERCENT: 4 % (ref 0–6)
ETHANOL: <10 MG/DL (ref 0–0.08)
FENTANYL SCREEN, URINE: NOT DETECTED
GFR AFRICAN AMERICAN: >60
GFR NON-AFRICAN AMERICAN: >60 ML/MIN/1.73
GLUCOSE BLD-MCNC: 108 MG/DL (ref 74–99)
HCT VFR BLD CALC: 34.2 % (ref 37–54)
HEMOGLOBIN: 10.9 G/DL (ref 12.5–16.5)
IMMATURE GRANULOCYTES #: 0.02 E9/L
IMMATURE GRANULOCYTES %: 0.3 % (ref 0–5)
INFLUENZA A: NOT DETECTED
INFLUENZA B: NOT DETECTED
LYMPHOCYTES ABSOLUTE: 1.64 E9/L (ref 1.5–4)
LYMPHOCYTES RELATIVE PERCENT: 28.7 % (ref 20–42)
Lab: ABNORMAL
MCH RBC QN AUTO: 29.7 PG (ref 26–35)
MCHC RBC AUTO-ENTMCNC: 31.9 % (ref 32–34.5)
MCV RBC AUTO: 93.2 FL (ref 80–99.9)
METHADONE SCREEN, URINE: NOT DETECTED
MONOCYTES ABSOLUTE: 0.64 E9/L (ref 0.1–0.95)
MONOCYTES RELATIVE PERCENT: 11.2 % (ref 2–12)
NEUTROPHILS ABSOLUTE: 3.11 E9/L (ref 1.8–7.3)
NEUTROPHILS RELATIVE PERCENT: 54.4 % (ref 43–80)
OPIATE SCREEN URINE: NOT DETECTED
OXYCODONE URINE: POSITIVE
PDW BLD-RTO: 14.2 FL (ref 11.5–15)
PHENCYCLIDINE SCREEN URINE: NOT DETECTED
PLATELET # BLD: 448 E9/L (ref 130–450)
PMV BLD AUTO: 9.1 FL (ref 7–12)
POTASSIUM SERPL-SCNC: 5.1 MMOL/L (ref 3.5–5)
RBC # BLD: 3.67 E12/L (ref 3.8–5.8)
SALICYLATE, SERUM: <0.3 MG/DL (ref 0–30)
SARS-COV-2 RNA, RT PCR: NOT DETECTED
SODIUM BLD-SCNC: 143 MMOL/L (ref 132–146)
TOTAL CK: 176 U/L (ref 20–200)
TOTAL PROTEIN: 6 G/DL (ref 6.4–8.3)
TRICYCLIC ANTIDEPRESSANTS SCREEN SERUM: NEGATIVE NG/ML
WBC # BLD: 5.7 E9/L (ref 4.5–11.5)

## 2021-10-07 PROCEDURE — 99285 EMERGENCY DEPT VISIT HI MDM: CPT

## 2021-10-07 PROCEDURE — 80053 COMPREHEN METABOLIC PANEL: CPT

## 2021-10-07 PROCEDURE — 87636 SARSCOV2 & INF A&B AMP PRB: CPT

## 2021-10-07 PROCEDURE — 85025 COMPLETE CBC W/AUTO DIFF WBC: CPT

## 2021-10-07 PROCEDURE — 80143 DRUG ASSAY ACETAMINOPHEN: CPT

## 2021-10-07 PROCEDURE — 36415 COLL VENOUS BLD VENIPUNCTURE: CPT

## 2021-10-07 PROCEDURE — 82550 ASSAY OF CK (CPK): CPT

## 2021-10-07 PROCEDURE — 93005 ELECTROCARDIOGRAM TRACING: CPT | Performed by: EMERGENCY MEDICINE

## 2021-10-07 PROCEDURE — 82077 ASSAY SPEC XCP UR&BREATH IA: CPT

## 2021-10-07 PROCEDURE — 93010 ELECTROCARDIOGRAM REPORT: CPT | Performed by: INTERNAL MEDICINE

## 2021-10-07 PROCEDURE — 80307 DRUG TEST PRSMV CHEM ANLYZR: CPT

## 2021-10-07 PROCEDURE — 80179 DRUG ASSAY SALICYLATE: CPT

## 2021-10-07 RX ORDER — ZIPRASIDONE MESYLATE 20 MG/ML
10 INJECTION, POWDER, LYOPHILIZED, FOR SOLUTION INTRAMUSCULAR EVERY 12 HOURS PRN
Status: DISCONTINUED | OUTPATIENT
Start: 2021-10-07 | End: 2021-10-07 | Stop reason: HOSPADM

## 2021-10-07 ASSESSMENT — PAIN DESCRIPTION - ORIENTATION: ORIENTATION: LEFT

## 2021-10-07 ASSESSMENT — PAIN DESCRIPTION - FREQUENCY: FREQUENCY: CONTINUOUS

## 2021-10-07 ASSESSMENT — PAIN DESCRIPTION - PAIN TYPE: TYPE: CHRONIC PAIN

## 2021-10-07 ASSESSMENT — PAIN DESCRIPTION - DESCRIPTORS: DESCRIPTORS: ACHING

## 2021-10-07 ASSESSMENT — PAIN DESCRIPTION - LOCATION: LOCATION: HIP

## 2021-10-07 NOTE — ED NOTES
\"Pt states does not want blood drawn until he is done eating. \" this nurse informed patient he will need to have blood work and covid swab done before we can move forward with his care.      Livingston Callow, RN  10/07/21 8544

## 2021-10-07 NOTE — ED NOTES
Emergency Department [facility] AN AFFILIATE OF [facility] Biopsychosocial Assessment Note     Chief Complaint:      Pt is a 36 yo male presenting to the ED for paranoia,states that he feels that people are after him and that people are trying to upset him. denies si/hi but afraid people are trying to hurt him and take advantage of him      Pt reports that he believes that someone is out to get him and he is feeling paranoid. Pt admits to SI plan to stop carrying for is leg, HI towards negative people and to go on a rampage, denies AVH. Pt's has baseline paranoia mostly due to substance abuse even when he is med compliant. Pt is homeless and paranoia appears to be exasperated after substance use.      Pt reports he has not been using any pain pills for his leg but when he was he believes he over used his leg and that is why it is hurting more then it was. SW did not discuss his hemorroid's.      MSE:     Pt is calm and pleasant. Pt is oriented x 4. Mood is congruent. Speech is pressured, Pt denies admits to SI and HI, denies AVH     Clinical Summary/History:      Pt reports a  hx of paranoid schizophrenia, Pt reports he gets a shot every 2 months and is due to get one now. Pt reports he went to [facility] and got registered today but did not get his shot yet. Pt last inpatient hospitalization was on 08/09/2021. Gender  [x]? Male []? Female []? Transgender  []? Other     Sexual Orientation    [x]? Heterosexual []? Homosexual []? Bisexual []? Other     Suicidal Behavioral: CSSR-S Complete. []? Reports:               []? Past []? Present   [x]? Denies     Homicidal/ Violent Behavior  []? Reports:              []? Past []? Present   [x]? Denies      Hallucinations/Delusions   [x]? Reports:   []? Denies      Substance Use/Alcohol Use/Addiction: SBIRT Screen Complete. [x]? Reports:   []? Denies      Trauma History  [x]? Reports:  []?  Denies      Collateral Information:      No collateral information obtained at this time     Level of Care/Disposition Plan  []? Home:    []? Outpatient Provider:   []? Crisis Unit:   []? Inpatient Psychiatric Unit:  []?  Other:           FRANCISCO Martinez LSW  10/07/21 8239       FRANCISCO Martinez LSW  10/07/21 1043       FRANCISCO Martinez LSW  10/07/21 2741

## 2021-10-07 NOTE — ED NOTES
Pt has been to the ER 27 times in the past 3 months, mostly all for reported psych issues. Carmen Shea was advised of this pt's frequent visits. Tam Smith Is aware as well as the population navigator. Pt needs a community plan.       MAJOR Callaway  10/07/21 1523

## 2021-10-07 NOTE — ED NOTES
Attempted to wake Pt up for urine sample, Pt still states he does not have to go yet     Elena Yao, MSW, [facility]  10/07/21 5516

## 2021-10-07 NOTE — ED PROVIDER NOTES
HPI: Gage Simone. 37 y.o. male presents with a complaint of psychiatric evaluation. Patient presents for paranoia, states he believes people are out to get him. Initially denies suicidal homicidal thoughts but then makes vague statements about \"I am to blow up on them\". Denies audiovisual hallucinations.      --------------------------------------------- PAST HISTORY ---------------------------------------------  Past Medical History:  has a past medical history of Depression and Schizoaffective disorder ([facility] [address] Past Surgical History:  has a past surgical history that includes eye surgery (19 years ago) and Hip fracture surgery (Left, 9/28/2021). Social History:  reports that he has been smoking cigars and cigarettes. He has a 6.00 pack-year smoking history. He has never used smokeless tobacco. He reports current alcohol use. He reports current drug use. Frequency: 7.00 times per week. Drugs: Cocaine and Marijuana. Family History: family history includes Mental Illness in his mother; No Known Problems in his father; Substance Abuse in his mother. The patients home medications have been reviewed.     Allergies: Chlorpheniramine-phenylephrine, Penicillins, and Rondec-d [chlophedianol-pseudoephedrine]    -------------------------------------------------- RESULTS -------------------------------------------------    LABS:  Results for orders placed or performed during the hospital encounter of 10/07/21   COVID-19 & Influenza Combo    Specimen: Nasopharyngeal Swab   Result Value Ref Range    SARS-CoV-2 RNA, RT PCR NOT DETECTED NOT DETECTED    INFLUENZA A NOT DETECTED NOT DETECTED    INFLUENZA B NOT DETECTED NOT DETECTED   CBC Auto Differential   Result Value Ref Range    WBC 5.7 4.5 - 11.5 E9/L    RBC 3.67 (L) 3.80 - 5.80 E12/L    Hemoglobin 10.9 (L) 12.5 - 16.5 g/dL    Hematocrit 34.2 (L) 37.0 - 54.0 %    MCV 93.2 80.0 - 99.9 fL    MCH 29.7 26.0 - 35.0 pg    MCHC 31.9 (L) 32.0 - 34.5 %    RDW 14.2 11.5 - 15.0 fL    Platelets 033 646 - 120 E9/L    MPV 9.1 7.0 - 12.0 fL    Neutrophils % 54.4 43.0 - 80.0 %    Immature Granulocytes % 0.3 0.0 - 5.0 %    Lymphocytes % 28.7 20.0 - 42.0 %    Monocytes % 11.2 2.0 - 12.0 %    Eosinophils % 4.0 0.0 - 6.0 %    Basophils % 1.4 0.0 - 2.0 %    Neutrophils Absolute 3.11 1.80 - 7.30 E9/L    Immature Granulocytes # 0.02 E9/L    Lymphocytes Absolute 1.64 1.50 - 4.00 E9/L    Monocytes Absolute 0.64 0.10 - 0.95 E9/L    Eosinophils Absolute 0.23 0.05 - 0.50 E9/L    Basophils Absolute 0.08 0.00 - 0.20 E9/L   Comprehensive Metabolic Panel   Result Value Ref Range    Sodium 143 132 - 146 mmol/L    Potassium 5.1 (H) 3.5 - 5.0 mmol/L    Chloride 107 98 - 107 mmol/L    CO2 28 22 - 29 mmol/L    Anion Gap 8 7 - 16 mmol/L    Glucose 108 (H) 74 - 99 mg/dL    BUN 16 6 - 20 mg/dL    CREATININE 1.1 0.7 - 1.2 mg/dL    GFR Non-African American >60 >=60 mL/min/1.73    GFR African American >60     Calcium 8.8 8.6 - 10.2 mg/dL    Total Protein 6.0 (L) 6.4 - 8.3 g/dL    Albumin 3.6 3.5 - 5.2 g/dL    Total Bilirubin 0.2 0.0 - 1.2 mg/dL    Alkaline Phosphatase 78 40 - 129 U/L    ALT 8 0 - 40 U/L    AST 13 0 - 39 U/L   Serum Drug Screen   Result Value Ref Range    Ethanol Lvl <10 mg/dL    Acetaminophen Level <5.0 (L) 10.0 - 48.7 mcg/mL    Salicylate, Serum <1.1 0.0 - 30.0 mg/dL    TCA Scrn NEGATIVE Cutoff:300 ng/mL   EKG 12 Lead   Result Value Ref Range    Ventricular Rate 80 BPM    Atrial Rate 80 BPM    P-R Interval 182 ms    QRS Duration 96 ms    Q-T Interval 380 ms    QTc Calculation (Bazett) 438 ms    P Axis 79 degrees    R Axis 82 degrees    T Axis 70 degrees       RADIOLOGY:  Interpreted by Radiologist.  No orders to display       EKG: This EKG is signed and interpreted by the EP. Rate: 80  Rhythm: Sinus  Interpretation: Sinus rhythm, normal axis, NC is 182, QRS is 9 6, QTc is 438. Nonspecific ST changes.   Accounting for lead placement this appears generally stable compared to prior EKGs  Comparison: stable as compared to patient's most recent EKG    --------------------------------------------- PAST HISTORY ---------------------------------------------  Past Medical History:  has a past medical history of Depression and Schizoaffective disorder ([facility] [address] Past Surgical History:  has a past surgical history that includes eye surgery (19 years ago) and Hip fracture surgery (Left, 9/28/2021). Social History:  reports that he has been smoking cigars and cigarettes. He has a 6.00 pack-year smoking history. He has never used smokeless tobacco. He reports current alcohol use. He reports current drug use. Frequency: 7.00 times per week. Drugs: Cocaine and Marijuana. Family History: family history includes Mental Illness in his mother; No Known Problems in his father; Substance Abuse in his mother. The patients home medications have been reviewed. Allergies: Chlorpheniramine-phenylephrine, Penicillins, and Rondec-d [chlophedianol-pseudoephedrine]        ENCOUNTER LIMITATION:    Please note that the HPI, ROS, Past History, and Physical Examination are limited due to this patients mental illness. Review of Systems:   A complete review of systems was unable to be performed secondary to the limitations noted above      ------------------------- NURSING NOTES AND VITALS REVIEWED ---------------------------   The nursing notes within the ED encounter and vital signs as below have been reviewed.    /74   Pulse 82   Temp 98.1 °F (36.7 °C) (Oral)   Resp 16   Ht 6' (1.829 m)   Wt 145 lb (65.8 kg)   SpO2 96%   BMI 19.67 kg/m²   Oxygen Saturation Interpretation: Normal        ---------------------------------------------------PHYSICAL EXAM--------------------------------------      Constitutional/General: Alert and oriented x3,   Head: NC/AT  Eyes: PERRL, EOMI  Mouth: Oropharynx clear, handling secretions, no trismus  Neck: Supple, full ROM, non tender to palpation in the midline, no stridor, no crepitus, no meningeal signs  Pulmonary: Lungs clear to auscultation bilaterally,  . Not in respiratory distress  Cardiovascular:  Regular rate and rhythm,  . 2+ distal pulses  Abdomen: Soft, non tender, non distended, +BS, no rebound, guarding, or rigidity. No pulsatile masses appreciated  Extremities: Moves all extremities x 4. Warm and well perfused,  Capillary refill <3 seconds  Skin: warm and dry without rash  Neurologic: GCS 15, CN 2-12 grossly intact, no focal deficits,    Psych: Cooperative affect, appears to occasionally be talking to himself      ------------------------------------------ PROGRESS NOTES ------------------------------------------     Consultations:   Social work     Re-Evaluations:        Counseling: The emergency provider has spoken with thepatient and discussed todays results, in addition to providing specific details for the plan of care and counseling regarding the diagnosis and prognosis. Questions are answered at this time and they are agreeable with the plan.      --------------------------------- ADDITIONAL PROVIDER NOTES ---------------------------------     This patient's ED course included: a personal history and physicial eaxmination, multiple bedside re-evaluations, IV medications, cardiac monitoring, continuous pulse oximetry and complex medical decision making and emergency management    This patient has been closely monitored during their ED course.     --------------------------------- IMPRESSION AND DISPOSITION ---------------------------------    IMPRESSION  1. Acute psychosis ([facility] [address]    2. Encounter for psychiatric assessment        DISPOSITION  Disposition: as per consultation - medically clear for admission to psychiatric facility  Patient condition is stable    [unfilled]     Please note this note was transcribed using voice recognition software.  Every effort was to ensure accuracy however inadvertent transcription errors may be present       Ashley Cortez

## 2021-10-07 NOTE — ED NOTES
PT HAS BEEN ACCEPTED TO [address] [place] [address] Road BY DR. Carin Weaver TO THE DUAL UNIT    PHYSICIANS WILL TRANSPORT BY 1400    [address] [facility]  10/07/21 4169

## 2021-10-14 ENCOUNTER — HOSPITAL ENCOUNTER (EMERGENCY)
Age: 41
Discharge: HOME | End: 2021-10-14
Payer: COMMERCIAL

## 2021-10-14 ENCOUNTER — APPOINTMENT (OUTPATIENT)
Dept: GENERAL RADIOLOGY | Age: 41
End: 2021-10-14
Payer: COMMERCIAL

## 2021-10-14 VITALS
SYSTOLIC BLOOD PRESSURE: 108 MMHG | OXYGEN SATURATION: 97 % | DIASTOLIC BLOOD PRESSURE: 69 MMHG | TEMPERATURE: 97 F | HEART RATE: 99 BPM | RESPIRATION RATE: 18 BRPM | HEIGHT: 72 IN | BODY MASS INDEX: 22.08 KG/M2 | WEIGHT: 163 LBS

## 2021-10-14 DIAGNOSIS — M25.512 LEFT SHOULDER PAIN, UNSPECIFIED CHRONICITY: Primary | ICD-10-CM

## 2021-10-14 PROCEDURE — 99283 EMERGENCY DEPT VISIT LOW MDM: CPT

## 2021-10-14 PROCEDURE — 73030 X-RAY EXAM OF SHOULDER: CPT

## 2021-10-14 ASSESSMENT — PAIN DESCRIPTION - ORIENTATION: ORIENTATION: LEFT

## 2021-10-14 ASSESSMENT — PAIN SCALES - GENERAL: PAINLEVEL_OUTOF10: 8

## 2021-10-14 ASSESSMENT — PAIN DESCRIPTION - LOCATION: LOCATION: SHOULDER

## 2021-10-14 NOTE — ED PROVIDER NOTES
[facility]  Department of Emergency Medicine   ED  Encounter Note  Admit Date/RoomTime: No admission date for patient encounter. ED Room: Room/bed info not found    NAME: [employer]. : 1980  MRN: [id number]     Chief Complaint:  Shoulder Pain (C/O Left shoulder pain stated he thinks its dislocated. )    History of Present Illness       [employer]. is a 37 y.o. old male who presents to the emergency department by ambulance, for ?? traumatic Left upper arm and shoulder pain which occured an unknown time period  prior to arrival.  Patient has no prior history of pain/injury with regards to today's visit. He is right handed. The patients tetanus status is up to date. Since onset the symptoms have been persistent. His pain is aggraveated by certain movements, reaching or raising and relieved by rest of injured area. He denies any headache, loss of consciousness, neck pain, chest pain, abdominal pain, back pain, numbness or weakness. ROS   Pertinent positives and negatives are stated within HPI, all other systems reviewed and are negative. Past Medical History:  has a past medical history of Depression and Schizoaffective disorder ([facility] [address] Surgical History:  has a past surgical history that includes eye surgery (19 years ago) and Hip fracture surgery (Left, 2021). Social History:  reports that he has been smoking cigars and cigarettes. He has a 6.00 pack-year smoking history. He has never used smokeless tobacco. He reports current alcohol use. He reports current drug use. Frequency: 7.00 times per week. Drugs: Cocaine and Marijuana. Family History: family history includes Mental Illness in his mother; No Known Problems in his father; Substance Abuse in his mother.      Allergies: Chlorpheniramine-phenylephrine, Penicillins, and Rondec-d [chlophedianol-pseudoephedrine]    Physical Exam   Oxygen Saturation Interpretation: Normal. ED Triage Vitals [10/14/21 0046]   BP Temp Temp src Pulse Resp SpO2 Height Weight   108/69 97 °F (36.1 °C) -- 99 18 97 % 6' (1.829 m) 163 lb (73.9 kg)         · Constitutional:  Alert, development consistent with age. · HEENT:  NC/NT. Airway patent. · Neck:  Normal ROM. Supple. · Left Upper Extremity(s): shoulder. Tenderness:  mild. Swelling: None. Deformity: no deformity observed/palpated. ROM: diminished range with pain. Skin:  no wounds, erythema, or swelling. Neurovascular: Motor deficit: none. Sensory deficit: none. Pulse deficit: none. Capillary refill: normal.  · Lymphatics: No lymphangitis or adenopathy noted. · Neurological:  Oriented. Motor functions intact. Lab / Imaging Results   (All laboratory and radiology results have been personally reviewed by myself)  Labs:  No results found for this visit on 10/14/21. Imaging: All Radiology results interpreted by Radiologist unless otherwise noted. XR SHOULDER LEFT (MIN 2 VIEWS)   Final Result   No acute abnormality. ED Course / Medical Decision Making   Medications - No data to display     Consult:   None    Procedure(s):  None    MDM:    Imaging was obtained based on low suspicion for fracture / bony abnormality, dislocation as per history/physical findings. Plan is subsequently for symptom control, limited use and  immobilization with appropriate outpatient follow-up. Plan of Care/Counseling:  Tim Solano reviewed today's visit with the patient in addition to providing specific details for the plan of care and counseling regarding the diagnosis and prognosis. Questions are answered at this time and are agreeable with the plan. Assessment      1. Left shoulder pain, unspecified chronicity      Plan   Discharged home.   Patient condition is good    New Medications     New Prescriptions    No medications on file     Electronically signed by CECILE Hirsch   DD: 10/14/21  **This report was transcribed using voice recognition software. Every effort was made to ensure accuracy; however, inadvertent computerized transcription errors may be present.   END OF ED PROVIDER NOTE       CECILE Soto  10/14/21 0159  ATTENDING PROVIDER ATTESTATION:     Supervising Physician, on-site, available for consultation, non-participatory in the evaluation or care of this patient       Emeka Acosta MD  10/14/21 0609

## 2021-10-17 ENCOUNTER — HOSPITAL ENCOUNTER (EMERGENCY)
Age: 41
Discharge: HOME | End: 2021-10-17
Attending: EMERGENCY MEDICINE
Payer: COMMERCIAL

## 2021-10-17 VITALS
HEART RATE: 66 BPM | TEMPERATURE: 97.6 F | DIASTOLIC BLOOD PRESSURE: 62 MMHG | RESPIRATION RATE: 16 BRPM | OXYGEN SATURATION: 99 % | SYSTOLIC BLOOD PRESSURE: 108 MMHG

## 2021-10-17 DIAGNOSIS — F25.9 SCHIZOAFFECTIVE DISORDER, UNSPECIFIED TYPE (HCC): Primary | ICD-10-CM

## 2021-10-17 LAB
ACETAMINOPHEN LEVEL: <5 MCG/ML (ref 10–30)
ALBUMIN SERPL-MCNC: 4.3 G/DL (ref 3.5–5.2)
ALP BLD-CCNC: 65 U/L (ref 40–129)
ALT SERPL-CCNC: 7 U/L (ref 0–40)
AMPHETAMINE SCREEN, URINE: NOT DETECTED
ANION GAP SERPL CALCULATED.3IONS-SCNC: 9 MMOL/L (ref 7–16)
AST SERPL-CCNC: 13 U/L (ref 0–39)
BACTERIA: NORMAL /HPF
BARBITURATE SCREEN URINE: NOT DETECTED
BASOPHILS ABSOLUTE: 0.06 E9/L (ref 0–0.2)
BASOPHILS RELATIVE PERCENT: 1.1 % (ref 0–2)
BENZODIAZEPINE SCREEN, URINE: NOT DETECTED
BILIRUB SERPL-MCNC: 0.3 MG/DL (ref 0–1.2)
BILIRUBIN URINE: ABNORMAL
BLOOD, URINE: ABNORMAL
BUN BLDV-MCNC: 15 MG/DL (ref 6–20)
CALCIUM SERPL-MCNC: 9.1 MG/DL (ref 8.6–10.2)
CANNABINOID SCREEN URINE: NOT DETECTED
CHLORIDE BLD-SCNC: 103 MMOL/L (ref 98–107)
CLARITY: CLEAR
CO2: 28 MMOL/L (ref 22–29)
COCAINE METABOLITE SCREEN URINE: POSITIVE
COLOR: YELLOW
CREAT SERPL-MCNC: 0.9 MG/DL (ref 0.7–1.2)
EOSINOPHILS ABSOLUTE: 0.27 E9/L (ref 0.05–0.5)
EOSINOPHILS RELATIVE PERCENT: 5.2 % (ref 0–6)
ETHANOL: <10 MG/DL (ref 0–0.08)
FENTANYL SCREEN, URINE: NOT DETECTED
GFR AFRICAN AMERICAN: >60
GFR NON-AFRICAN AMERICAN: >60 ML/MIN/1.73
GLUCOSE BLD-MCNC: 77 MG/DL (ref 74–99)
GLUCOSE URINE: NEGATIVE MG/DL
HCT VFR BLD CALC: 39.4 % (ref 37–54)
HEMOGLOBIN: 12.6 G/DL (ref 12.5–16.5)
IMMATURE GRANULOCYTES #: 0.01 E9/L
IMMATURE GRANULOCYTES %: 0.2 % (ref 0–5)
INFLUENZA A: NOT DETECTED
INFLUENZA B: NOT DETECTED
KETONES, URINE: ABNORMAL MG/DL
LEUKOCYTE ESTERASE, URINE: NEGATIVE
LYMPHOCYTES ABSOLUTE: 1.01 E9/L (ref 1.5–4)
LYMPHOCYTES RELATIVE PERCENT: 19.3 % (ref 20–42)
Lab: ABNORMAL
MCH RBC QN AUTO: 29.5 PG (ref 26–35)
MCHC RBC AUTO-ENTMCNC: 32 % (ref 32–34.5)
MCV RBC AUTO: 92.3 FL (ref 80–99.9)
METHADONE SCREEN, URINE: NOT DETECTED
MONOCYTES ABSOLUTE: 0.41 E9/L (ref 0.1–0.95)
MONOCYTES RELATIVE PERCENT: 7.8 % (ref 2–12)
NEUTROPHILS ABSOLUTE: 3.48 E9/L (ref 1.8–7.3)
NEUTROPHILS RELATIVE PERCENT: 66.4 % (ref 43–80)
NITRITE, URINE: NEGATIVE
OPIATE SCREEN URINE: NOT DETECTED
OXYCODONE URINE: NOT DETECTED
PDW BLD-RTO: 14.4 FL (ref 11.5–15)
PH UA: 5.5 (ref 5–9)
PHENCYCLIDINE SCREEN URINE: NOT DETECTED
PLATELET # BLD: 300 E9/L (ref 130–450)
PMV BLD AUTO: 9.4 FL (ref 7–12)
POTASSIUM SERPL-SCNC: 4 MMOL/L (ref 3.5–5)
PROTEIN UA: 30 MG/DL
RBC # BLD: 4.27 E12/L (ref 3.8–5.8)
RBC UA: NORMAL /HPF (ref 0–2)
SALICYLATE, SERUM: <0.3 MG/DL (ref 0–30)
SARS-COV-2 RNA, RT PCR: NOT DETECTED
SODIUM BLD-SCNC: 140 MMOL/L (ref 132–146)
SPECIFIC GRAVITY UA: >=1.03 (ref 1–1.03)
TOTAL PROTEIN: 6.3 G/DL (ref 6.4–8.3)
TRICYCLIC ANTIDEPRESSANTS SCREEN SERUM: NEGATIVE NG/ML
UROBILINOGEN, URINE: 0.2 E.U./DL
WBC # BLD: 5.2 E9/L (ref 4.5–11.5)
WBC UA: NORMAL /HPF (ref 0–5)

## 2021-10-17 PROCEDURE — 85025 COMPLETE CBC W/AUTO DIFF WBC: CPT

## 2021-10-17 PROCEDURE — 80307 DRUG TEST PRSMV CHEM ANLYZR: CPT

## 2021-10-17 PROCEDURE — 80179 DRUG ASSAY SALICYLATE: CPT

## 2021-10-17 PROCEDURE — 80143 DRUG ASSAY ACETAMINOPHEN: CPT

## 2021-10-17 PROCEDURE — 81001 URINALYSIS AUTO W/SCOPE: CPT

## 2021-10-17 PROCEDURE — 87636 SARSCOV2 & INF A&B AMP PRB: CPT

## 2021-10-17 PROCEDURE — 80053 COMPREHEN METABOLIC PANEL: CPT

## 2021-10-17 PROCEDURE — 82077 ASSAY SPEC XCP UR&BREATH IA: CPT

## 2021-10-17 PROCEDURE — 93005 ELECTROCARDIOGRAM TRACING: CPT | Performed by: EMERGENCY MEDICINE

## 2021-10-17 PROCEDURE — 99283 EMERGENCY DEPT VISIT LOW MDM: CPT

## 2021-10-17 NOTE — ED NOTES
Referral faxed to [facility] at [facility] [phone]. Awaiting call back re: status of referral.     FRANCISCO Dill, [facility]  10/17/21 3751    CALL FROM [facility] CR[facility] UNIT, PT ACCEPTED, RELAYED INFO TP ON-COMING ANTONIO RIVERA.         FRANCISCO Dill, [place]  10/17/21 7212

## 2021-10-17 NOTE — ED PROVIDER NOTES
Department of Emergency Medicine   ED  Provider Note  Admit Date/RoomTime: 10/17/2021  6:44 AM  ED Room: [address] [place] [address]              10/17/21  8:35 AM EDT    HPI: Azzie Bloch. 37 y.o. male presents with a complaint of paranoia and acute psychosis beginning a few days ago. Complaint has been constant and became more severe today which is what prompted the visit. Patient reports that he is having hallucinations and delusions. He reports he has a history of schizoaffective disorder. He reports that he feels like his psychosis is acting up. He denies suicidal or homicidal thoughts. He denies any other complaints. Review of Systems:   A complete review of systems was performed and pertinent positives and negatives are stated within HPI, all other systems reviewed and are negative.            --------------------------------------------- PAST HISTORY ---------------------------------------------  Past Medical History:  has a past medical history of Depression and Schizoaffective disorder ([facility] [address] Past Surgical History:  has a past surgical history that includes eye surgery (19 years ago) and Hip fracture surgery (Left, 9/28/2021). Social History:  reports that he has been smoking cigars and cigarettes. He has a 24.00 pack-year smoking history. He has never used smokeless tobacco. He reports current alcohol use. He reports current drug use. Frequency: 7.00 times per week. Drugs: Cocaine and Marijuana. Family History: family history includes Mental Illness in his mother; No Known Problems in his father; Substance Abuse in his mother. Family history is non contributory unless otherwise noted    The patients home medications have been reviewed.     Allergies: Chlorpheniramine-phenylephrine, Penicillins, and Rondec-d [chlophedianol-pseudoephedrine]    -------------------------------------------------- RESULTS -------------------------------------------------  All laboratory and imaging studies were reviewed by myself. LABS: reviewed and interpreted by me  Results for orders placed or performed during the hospital encounter of 10/17/21   CBC Auto Differential   Result Value Ref Range    WBC 5.2 4.5 - 11.5 E9/L    RBC 4.27 3.80 - 5.80 E12/L    Hemoglobin 12.6 12.5 - 16.5 g/dL    Hematocrit 39.4 37.0 - 54.0 %    MCV 92.3 80.0 - 99.9 fL    MCH 29.5 26.0 - 35.0 pg    MCHC 32.0 32.0 - 34.5 %    RDW 14.4 11.5 - 15.0 fL    Platelets 515 547 - 023 E9/L    MPV 9.4 7.0 - 12.0 fL    Neutrophils % 66.4 43.0 - 80.0 %    Immature Granulocytes % 0.2 0.0 - 5.0 %    Lymphocytes % 19.3 (L) 20.0 - 42.0 %    Monocytes % 7.8 2.0 - 12.0 %    Eosinophils % 5.2 0.0 - 6.0 %    Basophils % 1.1 0.0 - 2.0 %    Neutrophils Absolute 3.48 1.80 - 7.30 E9/L    Immature Granulocytes # 0.01 E9/L    Lymphocytes Absolute 1.01 (L) 1.50 - 4.00 E9/L    Monocytes Absolute 0.41 0.10 - 0.95 E9/L    Eosinophils Absolute 0.27 0.05 - 0.50 E9/L    Basophils Absolute 0.06 0.00 - 0.20 E9/L       RADIOLOGY:  Interpreted by Radiologist.  No orders to display         ------------------------- NURSING NOTES AND VITALS REVIEWED ---------------------------   The nursing notes within the ED encounter and vital signs as below have been reviewed.    /62   Pulse 66   Temp 97.6 °F (36.4 °C) (Oral)   Resp 16   SpO2 99%   Oxygen Saturation Interpretation: Normal            ---------------------------------------------------PHYSICAL EXAM--------------------------------------    EKG Interpretation  Interpreted by emergency department physician, Dr. Kayy Tello     10/17/21  Time: 0831    Rhythm: normal sinus   Rate: normal  Axis: normal  Conduction: right bundle branch block (incomplete)  ST Segments: no acute change  T Waves: no acute change    Clinical Impression: Sinus rhythm, no acute ischemic changes    Comparison to Old EKG  Stable from prior        Constitutional/General: Alert and oriented x3   Head: Normocephalic, atraumatic  Eyes: PERRL, EOMI  ENT:

## 2021-10-17 NOTE — ED NOTES
Pt calm and cooperative. Pt denies any harmful thoughts to himself or anyone else at this time.   He reports he does not want to harm himself or anyone denies any audio visual hallucinations at this time     Denise Ortega RN  10/17/21 6273

## 2021-10-17 NOTE — ED NOTES
Emergency Department [facility] AN AFFILIATE OF [facility] Biopsychosocial Assessment Note    Chief Complaint: Pt is a 38 yo male who presents to the ED as a walk-in. Pt reports   paranoia, experiencing A/V hallucinations and delusions. Pt has had multiple ER presentations (27 in past 90 days with same complaints of psychotic symptoms) last was 10/07/2021. Denies suicidal ideation intent or plan, denies homicidal ideation intent or plan. MSE: Pt is not alert, appears very tired, somewhat lethargic, overall cooperative and relatively pleasant. Mood neutral, affect restricted, behavior is cooperative, has been sleeping majority of stay. Thought content appears paranoid, reports A/V hallucinations. Overall mental status appears baseline for this pt. Clinical Summary/History: Pt reports open at [facility], reports dx hx: schizophrenia, paranoid type, reports he receives medication injection every other month. Hx of multiple psychiatric admissions, last was [facility] 8/9/2021. Gender  [x] Male [] Female [] Transgender  [] Other    Sexual Orientation    [x] Heterosexual [] Homosexual [] Bisexual [] Other    Suicidal Behavioral: CSSR-S Complete. [] Reports:    [x] Past [] Present   [] Denies    Homicidal/ Violent Behavior  [] Reports:   [x] Past [] Present   [] Denies     Hallucinations/Delusions   [x] Reports:   [] Denies     Substance Use/Alcohol Use/Addiction: SBIRT Screen Complete.    [x] Reports:   [] Denies     Trauma History  [] Reports:  [x] Denies     Collateral Information:       Level of Care/isposition Plan:  Pt does not meet inpatient criteria, [address] [facility] reports fax referral to [phone]    [] Home:   [] Outpatient Provider:   [] Crisis Unit:   [] Inpatient Psychiatric Unit:  [] Other:        [address], [place], [facility]  10/17/21 [address] [facility], MSW, [facility]  10/17/21 9411

## 2021-10-19 LAB
EKG ATRIAL RATE: 73 BPM
EKG P AXIS: 75 DEGREES
EKG P-R INTERVAL: 200 MS
EKG Q-T INTERVAL: 408 MS
EKG QRS DURATION: 98 MS
EKG QTC CALCULATION (BAZETT): 449 MS
EKG R AXIS: 81 DEGREES
EKG T AXIS: 71 DEGREES
EKG VENTRICULAR RATE: 73 BPM

## 2021-10-19 PROCEDURE — 93010 ELECTROCARDIOGRAM REPORT: CPT | Performed by: INTERNAL MEDICINE

## 2021-10-21 NOTE — DISCHARGE SUMMARY
Physician Discharge Summary    Patient ID:  Carlos Morgan  [id number]  37 y.o.  1980    Admit date: 9/28/2021    Discharge date and time: 10/2/2021 10:52 AM     Admitting Physician: Santos Corrigan MD     Discharge Physician: Santos Corrigan MD    Admission Diagnoses: Hematoma of right hip, initial encounter [S70.01XA]  Encounter for post surgical wound check [Z48.89]  [facility] [F20.9]    Discharge Diagnoses: s/p right 1.  Debridement of hematoma with arthrotomy of left hip. 2.  Excisional debridement of subcutaneous tissue, synovium, and  hematoma with placement of drain. Admission Condition: fair    Discharged Condition: good    Hospital Course: The patient was admitted on 9/28/2021. The patient underwent an uneventful course of right 1.  Debridement of hematoma with arthrotomy of left hip. 2.  Excisional debridement of subcutaneous tissue, synovium, and  hematoma with placement of drain. by Santos Corrigan MD on 9/28/21. The patient was subsequently taken to the PACU and the floor in stable condition. The patient was started on pain control, DVT prophylaxis, antibiotics, and physical therapy as indicated. Blood counts were followed as needed. Discharge planning was performed and tailored in regards to patient progress, therapy progress, home needs, and support. Once the patient had made appropriate gains, they were able to be discharged    Treatments: s/p right 1.  Debridement of hematoma with arthrotomy of left hip. 2.  Excisional debridement of subcutaneous tissue, synovium, and  hematoma with placement of drain.     Medications:  Medications Discontinued During This Encounter   Medication Reason    vancomycin (VANCOCIN) injection Therapy completed    meperidine (DEMEROL) injection 12.5 mg Patient Transfer    morphine (PF) injection 1 mg Patient Transfer    morphine (PF) injection 2 mg Patient Transfer    HYDROmorphone (DILAUDID) injection 0.25 mg Patient Transfer    HYDROmorphone (DILAUDID) injection 0.5 mg Patient Transfer    HYDROcodone-acetaminophen (NORCO) 5-325 MG per tablet 1 tablet Patient Transfer    HYDROcodone-acetaminophen (NORCO) 5-325 MG per tablet 2 tablet Patient Transfer    promethazine (PHENERGAN) injection 6.25 mg Patient Transfer    ceFAZolin (ANCEF) 2000 mg in sterile water 20 mL IV syringe     morphine (PF) injection 2 mg     morphine sulfate (PF) injection 4 mg     linezolid (ZYVOX) 600 MG tablet REORDER    HYDROcodone-acetaminophen (NORCO) 5-325 MG per tablet REORDER    linezolid (ZYVOX) tablet 600 mg Patient Discharge    enoxaparin (LOVENOX) injection 40 mg Patient Discharge    bisacodyl (DULCOLAX) EC tablet 5 mg Patient Discharge    oxyCODONE HCl (OXY-IR) immediate release tablet 10 mg Patient Discharge    oxyCODONE (ROXICODONE) immediate release tablet 5 mg Patient Discharge    acetaminophen (TYLENOL) tablet 650 mg Patient Discharge    polyethylene glycol (GLYCOLAX) packet 17 g Patient Discharge    ondansetron (ZOFRAN) injection 4 mg Patient Discharge    ondansetron (ZOFRAN-ODT) disintegrating tablet 4 mg Patient Discharge    0.9 % sodium chloride infusion Patient Discharge    sodium chloride flush 0.9 % injection 5-40 mL Patient Discharge    sodium chloride flush 0.9 % injection 5-40 mL Patient Discharge    0.9 % sodium chloride infusion Patient Discharge       No current facility-administered medications for this encounter.     Current Outpatient Medications:     ondansetron (ZOFRAN-ODT) 4 MG disintegrating tablet, Take 1 tablet by mouth every 8 hours as needed for Nausea or Vomiting, Disp: , Rfl:     lactulose (CHRONULAC) 10 GM/15ML solution, Take 30 mLs by mouth 3 times daily, Disp: , Rfl: 1    magnesium citrate solution, Take 296 mLs by mouth once for 1 dose, Disp: 296 mL, Rfl: 0    sennosides-docusate sodium (SENOKOT-S) 8.6-50 MG tablet, Take 2 tablets by mouth daily, Disp: , Rfl: Disposition: The patient was provided instructions to continue antibiotics, pain medication, DVT prophylaxis, Dressing changes as applicable. The patient was instructed on restrictions and activities. The patient was to follow up with Piedad Andrade MD, and to call the office for an appointment.       Signed:  Ora Kay DO  10/21/2021  2:45 PM

## 2021-11-01 LAB
FUNGUS (MYCOLOGY) CULTURE: NORMAL
FUNGUS (MYCOLOGY) CULTURE: NORMAL
FUNGUS STAIN: NORMAL
FUNGUS STAIN: NORMAL

## 2021-11-02 ENCOUNTER — HOSPITAL ENCOUNTER (EMERGENCY)
Age: 41
Discharge: HOME | End: 2021-11-02
Attending: EMERGENCY MEDICINE
Payer: COMMERCIAL

## 2021-11-02 VITALS
OXYGEN SATURATION: 97 % | DIASTOLIC BLOOD PRESSURE: 85 MMHG | HEART RATE: 82 BPM | SYSTOLIC BLOOD PRESSURE: 166 MMHG | RESPIRATION RATE: 16 BRPM | TEMPERATURE: 97.4 F

## 2021-11-02 DIAGNOSIS — R45.850 HOMICIDAL IDEATIONS: ICD-10-CM

## 2021-11-02 DIAGNOSIS — R44.3 HALLUCINATIONS: Primary | ICD-10-CM

## 2021-11-02 DIAGNOSIS — R45.851 SUICIDAL IDEATION: ICD-10-CM

## 2021-11-02 LAB
ACETAMINOPHEN LEVEL: <5 MCG/ML (ref 10–30)
ALBUMIN SERPL-MCNC: 4.3 G/DL (ref 3.5–5.2)
ALP BLD-CCNC: 52 U/L (ref 40–129)
ALT SERPL-CCNC: 11 U/L (ref 0–40)
AMPHETAMINE SCREEN, URINE: POSITIVE
ANION GAP SERPL CALCULATED.3IONS-SCNC: 12 MMOL/L (ref 7–16)
ANISOCYTOSIS: ABNORMAL
AST SERPL-CCNC: 20 U/L (ref 0–39)
ATYPICAL LYMPHOCYTE RELATIVE PERCENT: 4 % (ref 0–4)
BACTERIA: ABNORMAL /HPF
BARBITURATE SCREEN URINE: NOT DETECTED
BASOPHILS ABSOLUTE: 0 E9/L (ref 0–0.2)
BASOPHILS RELATIVE PERCENT: 0 % (ref 0–2)
BENZODIAZEPINE SCREEN, URINE: NOT DETECTED
BILIRUB SERPL-MCNC: 0.3 MG/DL (ref 0–1.2)
BILIRUBIN URINE: ABNORMAL
BLASTS RELATIVE PERCENT: 1 % (ref 0–0)
BLOOD, URINE: NEGATIVE
BUN BLDV-MCNC: 10 MG/DL (ref 6–20)
CALCIUM SERPL-MCNC: 9.3 MG/DL (ref 8.6–10.2)
CANNABINOID SCREEN URINE: POSITIVE
CHLORIDE BLD-SCNC: 99 MMOL/L (ref 98–107)
CLARITY: CLEAR
CO2: 28 MMOL/L (ref 22–29)
COCAINE METABOLITE SCREEN URINE: POSITIVE
COLOR: YELLOW
CREAT SERPL-MCNC: 1 MG/DL (ref 0.7–1.2)
EKG ATRIAL RATE: 76 BPM
EKG P AXIS: 24 DEGREES
EKG P-R INTERVAL: 132 MS
EKG Q-T INTERVAL: 376 MS
EKG QRS DURATION: 96 MS
EKG QTC CALCULATION (BAZETT): 423 MS
EKG R AXIS: -4 DEGREES
EKG T AXIS: 11 DEGREES
EKG VENTRICULAR RATE: 76 BPM
EOSINOPHILS ABSOLUTE: 0.06 E9/L (ref 0.05–0.5)
EOSINOPHILS RELATIVE PERCENT: 2 % (ref 0–6)
ETHANOL: <10 MG/DL (ref 0–0.08)
FENTANYL SCREEN, URINE: NOT DETECTED
GFR AFRICAN AMERICAN: >60
GFR NON-AFRICAN AMERICAN: >60 ML/MIN/1.73
GLUCOSE BLD-MCNC: 94 MG/DL (ref 74–99)
GLUCOSE URINE: NEGATIVE MG/DL
HCT VFR BLD CALC: 42.4 % (ref 37–54)
HEMOGLOBIN: 13.5 G/DL (ref 12.5–16.5)
KETONES, URINE: ABNORMAL MG/DL
LEUKOCYTE ESTERASE, URINE: NEGATIVE
LYMPHOCYTES ABSOLUTE: 1.27 E9/L (ref 1.5–4)
LYMPHOCYTES RELATIVE PERCENT: 37 % (ref 20–42)
Lab: ABNORMAL
MCH RBC QN AUTO: 29.3 PG (ref 26–35)
MCHC RBC AUTO-ENTMCNC: 31.8 % (ref 32–34.5)
MCV RBC AUTO: 92 FL (ref 80–99.9)
METHADONE SCREEN, URINE: NOT DETECTED
MONOCYTES ABSOLUTE: 0.34 E9/L (ref 0.1–0.95)
MONOCYTES RELATIVE PERCENT: 11 % (ref 2–12)
MUCUS: PRESENT /LPF
NEUTROPHILS ABSOLUTE: 1.4 E9/L (ref 1.8–7.3)
NEUTROPHILS RELATIVE PERCENT: 45 % (ref 43–80)
NITRITE, URINE: NEGATIVE
OPIATE SCREEN URINE: NOT DETECTED
OVALOCYTES: ABNORMAL
OXYCODONE URINE: NOT DETECTED
PDW BLD-RTO: 13.6 FL (ref 11.5–15)
PH UA: 6 (ref 5–9)
PHENCYCLIDINE SCREEN URINE: NOT DETECTED
PLATELET # BLD: 286 E9/L (ref 130–450)
PMV BLD AUTO: 10.3 FL (ref 7–12)
POIKILOCYTES: ABNORMAL
POTASSIUM SERPL-SCNC: 4.3 MMOL/L (ref 3.5–5)
PROTEIN UA: 100 MG/DL
RBC # BLD: 4.61 E12/L (ref 3.8–5.8)
RBC UA: ABNORMAL /HPF (ref 0–2)
SALICYLATE, SERUM: <0.3 MG/DL (ref 0–30)
SODIUM BLD-SCNC: 139 MMOL/L (ref 132–146)
SPECIFIC GRAVITY UA: >=1.03 (ref 1–1.03)
TOTAL PROTEIN: 7 G/DL (ref 6.4–8.3)
TRICYCLIC ANTIDEPRESSANTS SCREEN SERUM: NEGATIVE NG/ML
UROBILINOGEN, URINE: 1 E.U./DL
WBC # BLD: 3.1 E9/L (ref 4.5–11.5)
WBC UA: ABNORMAL /HPF (ref 0–5)

## 2021-11-02 PROCEDURE — 93005 ELECTROCARDIOGRAM TRACING: CPT | Performed by: NURSE PRACTITIONER

## 2021-11-02 PROCEDURE — 80179 DRUG ASSAY SALICYLATE: CPT

## 2021-11-02 PROCEDURE — 81001 URINALYSIS AUTO W/SCOPE: CPT

## 2021-11-02 PROCEDURE — 80307 DRUG TEST PRSMV CHEM ANLYZR: CPT

## 2021-11-02 PROCEDURE — 99283 EMERGENCY DEPT VISIT LOW MDM: CPT

## 2021-11-02 PROCEDURE — 80053 COMPREHEN METABOLIC PANEL: CPT

## 2021-11-02 PROCEDURE — 82077 ASSAY SPEC XCP UR&BREATH IA: CPT

## 2021-11-02 PROCEDURE — 80143 DRUG ASSAY ACETAMINOPHEN: CPT

## 2021-11-02 PROCEDURE — 85025 COMPLETE CBC W/AUTO DIFF WBC: CPT

## 2021-11-02 NOTE — ED NOTES
Patient alert and oriented x 3 resp easy skin warm and dry. No signs of distress.   Patient eating breakfast at bedside will continue to monitor      Isabella Jimenez RN  11/02/21 0169

## 2021-11-02 NOTE — ED NOTES
Emergency Department [facility] AN AFFILIATE OF [facility] Biopsychosocial Assessment Note    Chief Complaint: hearing voices thinking that he is going to die ; talking to self +SI +HI    MSE:  CALM, COOPERATIVE, ALERT, ORIENTED X'S 3, SHARED GOOD EYE CONTACT, HAS CLEAR SPEECH, RESPONDED ACCORDINGLY, THOUGHTS ARE STABLE AND LUCID, DENIES SI AND NO HI, DENIES ANY COMMANDING HALLUCINATIONS. Clinical Summary/History:   I MET WITH PT, WHO REPORTS THAT HE CAME TO THE ER BECAUSE HE IS HOMELESS. I EDUCATED PT ON REASONS TO UTILIZE THE ER. PT THEN DISPLAYED MALINGERING BEHAVIORS, ATTEMPTING TO GAIN ADMISSION; HE STATED \"I NEED TO GO IN PT BECAUSE I AM HOMELESS. CAN YOU TRY HOLMAN AND SAY THAT I AM HEARING VOICES\"  WHEN I CONFRONTED PT ABOUT HALLUCINATIONS, HE STATED \"WELL, THAT'S WHAT I NEED TO GET AN ADMISSION. PT IS NOT SUICIDAL OR HOMICIDAL. CSSR UPDATED. PT DECLINED [facility]    REACHED OUT TO Tariq Cortes, REQUESTING A COMMUNITY PLAN ONC HE GETS ESTABLISHED WITH  SERVICES. I ARRANGED FOR PT TO ATTEND [place] AS A WALK IN TOMORROW 11/3/21 11AM - 130PM    DISCUSSED WITH DR. Rosette Rodriguez    Gender  [x] Male [] Female [] Transgender  [] Other    Sexual Orientation    [x] Heterosexual [] Homosexual [] Bisexual [] Other    Suicidal Behavioral: CSSR-S Complete. [] Reports:    [] Past [] Present   [x] Denies    Homicidal/ Violent Behavior  [] Reports:   [] Past [] Present   [x] Denies     Hallucinations/Delusions   [] Reports:   [x] Denies     Substance Use/Alcohol Use/Addiction: SBIRT Screen Complete.    [x] Reports:   [] Denies     Trauma History  [] Reports:  [x] Denies     Collateral Information:   NA    Level of Care/Disposition Plan  [x] Home:   [] Outpatient Provider:   [] Crisis Unit:   [] Inpatient Psychiatric Unit:  [] Other:        MAJOR Beverly  11/02/21 1596

## 2021-11-02 NOTE — ED NOTES
Bed: MARLON  Expected date:   Expected time:   Means of arrival:   Comments:  Chair [address] [place] [address]  11/02/21 1425

## 2021-11-02 NOTE — ED PROVIDER NOTES
HPI:  11/2/21,   Time: 4:46 AM SARA Banerjee. is a 37 y.o. male presenting to the ED for psychiatric evaluation, beginning 1 day ago. The complaint has been persistent, moderate in severity, and worsened by nothing. The patient states he has been off his medications. He is unsure how long he has been off these medications. States that tonight he was having auditory hallucinations as well as suicidal thoughts and homicidal thoughts. No specific plan for his suicidal thoughts. No specific person he wants to hurt. Otherwise denies any chest pain shortness of breath. No abdominal pain. No nausea vomiting. Review of Systems:   Pertinent positives and negatives are stated within HPI, all other systems reviewed and are negative.          --------------------------------------------- PAST HISTORY ---------------------------------------------  Past Medical History:  has a past medical history of Depression and Schizoaffective disorder ([facility] [address] Past Surgical History:  has a past surgical history that includes Hip fracture surgery (Left, 9/12/2021); eye surgery (19 years ago); and Hip fracture surgery (Left, 9/28/2021). Social History:  reports that he has been smoking cigars and cigarettes. He has a 24.00 pack-year smoking history. He has never used smokeless tobacco. He reports current alcohol use. He reports current drug use. Frequency: 7.00 times per week. Drugs: Cocaine and Marijuana (Azzie Fuad). Family History: family history includes Mental Illness in his mother; No Known Problems in his father; Substance Abuse in his mother. The patients home medications have been reviewed.     Allergies: Chlorpheniramine-phenylephrine, Penicillins, and Rondec-d [chlophedianol-pseudoephedrine]        ---------------------------------------------------PHYSICAL EXAM--------------------------------------    Constitutional/General: Alert and oriented x3, well appearing, non toxic in NAD  Head: Normocephalic and atraumatic  Eyes: PERRL, EOMI, conjunctive normal, sclera non icteric  Mouth: Oropharynx clear, handling secretions, no trismus, no asymmetry of the posterior oropharynx or uvular edema  Neck: Supple, full ROM, non tender to palpation in the midline, no stridor, no crepitus, no meningeal signs  Respiratory: Lungs clear to auscultation bilaterally, no wheezes, rales, or rhonchi. Not in respiratory distress  Cardiovascular:  Regular rate. Regular rhythm. No murmurs, gallops, or rubs. 2+ distal pulses  GI:  Abdomen Soft, Non tender, Non distended. +BS. No organomegaly, no palpable masses,  No rebound, guarding, or rigidity. Musculoskeletal: Moves all extremities x 4. Warm and well perfused, no clubbing, cyanosis, or edema. Capillary refill <3 seconds  Integument: skin warm and dry. No rashes. Neurologic: GCS 15, no focal deficits  Psychiatric: Normal Affect, poor insight    -------------------------------------------------- RESULTS -------------------------------------------------  I have personally reviewed all laboratory and imaging results for this patient. Results are listed below.      LABS:  Results for orders placed or performed during the hospital encounter of 11/02/21   Urine Drug Screen   Result Value Ref Range    Amphetamine Screen, Urine POSITIVE (A) Negative <1000 ng/mL    Barbiturate Screen, Ur NOT DETECTED Negative < 200 ng/mL    Benzodiazepine Screen, Urine NOT DETECTED Negative < 200 ng/mL    Cannabinoid Scrn, Ur POSITIVE (A) Negative < 50ng/mL    Cocaine Metabolite Screen, Urine POSITIVE (A) Negative < 300 ng/mL    Opiate Scrn, Ur NOT DETECTED Negative < 300ng/mL    PCP Screen, Urine NOT DETECTED Negative < 25 ng/mL    Methadone Screen, Urine NOT DETECTED Negative <300 ng/mL    Oxycodone Urine NOT DETECTED Negative <100 ng/mL    FENTANYL SCREEN, URINE NOT DETECTED Negative <1 ng/mL    Drug Screen Comment: see below    Serum Drug Screen   Result Value Ref Range    Ethanol Lvl <10 mg/dL Acetaminophen Level <5.0 (L) 10.0 - 29.3 mcg/mL    Salicylate, Serum <1.7 0.0 - 30.0 mg/dL    TCA Scrn NEGATIVE Cutoff:300 ng/mL   CBC Auto Differential   Result Value Ref Range    WBC 3.1 (L) 4.5 - 11.5 E9/L    RBC 4.61 3.80 - 5.80 E12/L    Hemoglobin 13.5 12.5 - 16.5 g/dL    Hematocrit 42.4 37.0 - 54.0 %    MCV 92.0 80.0 - 99.9 fL    MCH 29.3 26.0 - 35.0 pg    MCHC 31.8 (L) 32.0 - 34.5 %    RDW 13.6 11.5 - 15.0 fL    Platelets 897 532 - 848 E9/L    MPV 10.3 7.0 - 12.0 fL   Comprehensive Metabolic Panel   Result Value Ref Range    Sodium 139 132 - 146 mmol/L    Potassium 4.3 3.5 - 5.0 mmol/L    Chloride 99 98 - 107 mmol/L    CO2 28 22 - 29 mmol/L    Anion Gap 12 7 - 16 mmol/L    Glucose 94 74 - 99 mg/dL    BUN 10 6 - 20 mg/dL    CREATININE 1.0 0.7 - 1.2 mg/dL    GFR Non-African American >60 >=60 mL/min/1.73    GFR African American >60     Calcium 9.3 8.6 - 10.2 mg/dL    Total Protein 7.0 6.4 - 8.3 g/dL    Albumin 4.3 3.5 - 5.2 g/dL    Total Bilirubin 0.3 0.0 - 1.2 mg/dL    Alkaline Phosphatase 52 40 - 129 U/L    ALT 11 0 - 40 U/L    AST 20 0 - 39 U/L   Urinalysis   Result Value Ref Range    Color, UA Yellow Straw/Yellow    Clarity, UA Clear Clear    Glucose, Ur Negative Negative mg/dL    Bilirubin Urine SMALL (A) Negative    Ketones, Urine TRACE (A) Negative mg/dL    Specific Gravity, UA >=1.030 1.005 - 1.030    Blood, Urine Negative Negative    pH, UA 6.0 5.0 - 9.0    Protein,  (A) Negative mg/dL    Urobilinogen, Urine 1.0 <2.0 E.U./dL    Nitrite, Urine Negative Negative    Leukocyte Esterase, Urine Negative Negative   Microscopic Urinalysis   Result Value Ref Range    Mucus, UA Present (A) None Seen /LPF    WBC, UA NONE 0 - 5 /HPF    RBC, UA 1-3 0 - 2 /HPF    Bacteria, UA NONE SEEN None Seen /HPF       RADIOLOGY:  Interpreted by Radiologist.  No orders to display       EKG: This EKG is signed and interpreted by the EP. EKG shows normal sinus rhythm 76 bpm.  Normal axis. Normal QRS.   Nonspecific ST and T wave changes in lead III. No STEMI.    ------------------------- NURSING NOTES AND VITALS REVIEWED ---------------------------   The nursing notes within the ED encounter and vital signs as below have been reviewed by myself. BP (!) 151/84   Pulse 78   Temp 97.4 °F (36.3 °C)   Resp 16   SpO2 98%   Oxygen Saturation Interpretation: Normal    The patients available past medical records and past encounters were reviewed. ------------------------------ ED COURSE/MEDICAL DECISION MAKING----------------------  Medications - No data to display      ED COURSE:       Medical Decision Making: This is a 43-year-old male presented to the ED for psychiatric evaluation. Patient underwent laboratory work-up which showed a normal CBC except for white count of 3.1. Negative serum drug screen. Patient was positive for cocaine THC and amphetamines. Chemistry negative. Urinalysis negative. Patient medically cleared.  consulted. Disposition pending  evaluation. I, Dr. Saida Gonzalez, am the primary provider for this encounter    This patient's ED course included: a personal history and physicial examination and re-evaluation prior to disposition    This patient has remained hemodynamically stable during their ED course. Re-Evaluations:             Re-evaluation. Patients symptoms show no change    Counseling: The emergency provider has spoken with the patient and discussed todays results, in addition to providing specific details for the plan of care and counseling regarding the diagnosis and prognosis. Questions are answered at this time and they are agreeable with the plan.       --------------------------------- IMPRESSION AND DISPOSITION ---------------------------------    IMPRESSION  1. Hallucinations    2. Suicidal ideation    3.  Homicidal ideations        DISPOSITION  Disposition: Per   Patient condition is stable    NOTE: This report was transcribed using voice recognition software.  Every effort was made to ensure accuracy; however, inadvertent computerized transcription errors may be present        Madiha Murphy DO  11/02/21 4636

## 2021-11-02 NOTE — ED NOTES
FIRST PROVIDER CONTACT ASSESSMENT NOTE           Department of Emergency Medicine                 First Provider Note            21  2:57 AM EDT    Date of Encounter: 2021  2:55 AM    Patient Name: Lico Carty : 1980  MRN: [id number]    Chief Complaint: Psychiatric Evaluation (+AH +SI +HI )      History of Present Illness:   Lico Carty is a 37 y.o. male who presents to the ED for psychiatric evaluation. Patient presents to the emergency department with auditory and visual hallucinations as well as suicidal and homicidal ideations. Patient reports symptoms started just prior to arrival.  Patient otherwise reports being in normal state of health. Focused Physical Exam:  VS:    ED Triage Vitals   BP Temp Temp src Pulse Resp SpO2 Height Weight   214 21 -- 21 -- --   (!) 151/84 97.4 °F (36.3 °C)  78 16 98 %          Physical Ex: Constitutional: Alert and non-toxic. Medical History:  has a past medical history of Depression and Schizoaffective disorder ([facility] [address] Surgical History:  has a past surgical history that includes Hip fracture surgery (Left, 2021); eye surgery (19 years ago); and Hip fracture surgery (Left, 2021). Social History:  reports that he has been smoking cigars and cigarettes. He has a 24.00 pack-year smoking history. He has never used smokeless tobacco. He reports current alcohol use. He reports current drug use. Frequency: 7.00 times per week. Drugs: Cocaine and Marijuana (Flip Glimpse). Family History: family history includes Mental Illness in his mother; No Known Problems in his father; Substance Abuse in his mother.     Allergies: Chlorpheniramine-phenylephrine, Penicillins, and Rondec-d [chlophedianol-pseudoephedrine]     Initial Plan of Care: Initiate Treatment-Testing, Proceed toTreatment Area When Bed Available for ED Attending/MLP to Continue Care      ---END OF FIRST PROVIDER CONTACT No

## 2021-11-04 ENCOUNTER — APPOINTMENT (OUTPATIENT)
Dept: GENERAL RADIOLOGY | Age: 41
End: 2021-11-04
Payer: COMMERCIAL

## 2021-11-04 ENCOUNTER — HOSPITAL ENCOUNTER (EMERGENCY)
Age: 41
Discharge: HOME | End: 2021-11-04
Attending: EMERGENCY MEDICINE
Payer: COMMERCIAL

## 2021-11-04 VITALS
TEMPERATURE: 99.1 F | DIASTOLIC BLOOD PRESSURE: 87 MMHG | HEART RATE: 87 BPM | RESPIRATION RATE: 18 BRPM | OXYGEN SATURATION: 95 % | SYSTOLIC BLOOD PRESSURE: 103 MMHG

## 2021-11-04 DIAGNOSIS — F39 MOOD DISORDER (HCC): Primary | ICD-10-CM

## 2021-11-04 DIAGNOSIS — M79.672 PAIN IN BOTH FEET: ICD-10-CM

## 2021-11-04 DIAGNOSIS — M19.071 OSTEOARTHRITIS OF BOTH FEET, UNSPECIFIED OSTEOARTHRITIS TYPE: ICD-10-CM

## 2021-11-04 DIAGNOSIS — M19.072 OSTEOARTHRITIS OF BOTH FEET, UNSPECIFIED OSTEOARTHRITIS TYPE: ICD-10-CM

## 2021-11-04 DIAGNOSIS — M79.671 PAIN IN BOTH FEET: ICD-10-CM

## 2021-11-04 LAB
ACETAMINOPHEN LEVEL: <5 MCG/ML (ref 10–30)
ALBUMIN SERPL-MCNC: 3.9 G/DL (ref 3.5–5.2)
ALP BLD-CCNC: 50 U/L (ref 40–129)
ALT SERPL-CCNC: 10 U/L (ref 0–40)
AMPHETAMINE SCREEN, URINE: NOT DETECTED
ANION GAP SERPL CALCULATED.3IONS-SCNC: 10 MMOL/L (ref 7–16)
ANISOCYTOSIS: ABNORMAL
AST SERPL-CCNC: 13 U/L (ref 0–39)
BACTERIA: ABNORMAL /HPF
BARBITURATE SCREEN URINE: NOT DETECTED
BASOPHILS ABSOLUTE: 0.07 E9/L (ref 0–0.2)
BASOPHILS RELATIVE PERCENT: 1.7 % (ref 0–2)
BENZODIAZEPINE SCREEN, URINE: NOT DETECTED
BILIRUB SERPL-MCNC: <0.2 MG/DL (ref 0–1.2)
BILIRUBIN URINE: ABNORMAL
BLOOD, URINE: ABNORMAL
BUN BLDV-MCNC: 12 MG/DL (ref 6–20)
CALCIUM SERPL-MCNC: 8.5 MG/DL (ref 8.6–10.2)
CANNABINOID SCREEN URINE: POSITIVE
CHLORIDE BLD-SCNC: 103 MMOL/L (ref 98–107)
CLARITY: CLEAR
CO2: 28 MMOL/L (ref 22–29)
COCAINE METABOLITE SCREEN URINE: POSITIVE
COLOR: YELLOW
CREAT SERPL-MCNC: 1 MG/DL (ref 0.7–1.2)
EKG ATRIAL RATE: 79 BPM
EKG P AXIS: 84 DEGREES
EKG P-R INTERVAL: 164 MS
EKG Q-T INTERVAL: 378 MS
EKG QRS DURATION: 96 MS
EKG QTC CALCULATION (BAZETT): 433 MS
EKG R AXIS: 86 DEGREES
EKG T AXIS: 78 DEGREES
EKG VENTRICULAR RATE: 79 BPM
EOSINOPHILS ABSOLUTE: 0.21 E9/L (ref 0.05–0.5)
EOSINOPHILS RELATIVE PERCENT: 5.2 % (ref 0–6)
ETHANOL: <10 MG/DL (ref 0–0.08)
FENTANYL SCREEN, URINE: NOT DETECTED
GFR AFRICAN AMERICAN: >60
GFR NON-AFRICAN AMERICAN: >60 ML/MIN/1.73
GLUCOSE BLD-MCNC: 89 MG/DL (ref 74–99)
GLUCOSE URINE: NEGATIVE MG/DL
HCT VFR BLD CALC: 39.2 % (ref 37–54)
HEMOGLOBIN: 12.4 G/DL (ref 12.5–16.5)
KETONES, URINE: ABNORMAL MG/DL
LEUKOCYTE ESTERASE, URINE: NEGATIVE
LYMPHOCYTES ABSOLUTE: 1.68 E9/L (ref 1.5–4)
LYMPHOCYTES RELATIVE PERCENT: 40.9 % (ref 20–42)
Lab: ABNORMAL
MCH RBC QN AUTO: 28.5 PG (ref 26–35)
MCHC RBC AUTO-ENTMCNC: 31.6 % (ref 32–34.5)
MCV RBC AUTO: 90.1 FL (ref 80–99.9)
METHADONE SCREEN, URINE: NOT DETECTED
MONOCYTES ABSOLUTE: 0.29 E9/L (ref 0.1–0.95)
MONOCYTES RELATIVE PERCENT: 7 % (ref 2–12)
MUCUS: PRESENT /LPF
NEUTROPHILS ABSOLUTE: 1.85 E9/L (ref 1.8–7.3)
NEUTROPHILS RELATIVE PERCENT: 45.2 % (ref 43–80)
NITRITE, URINE: NEGATIVE
OPIATE SCREEN URINE: NOT DETECTED
OXYCODONE URINE: NOT DETECTED
PDW BLD-RTO: 13.7 FL (ref 11.5–15)
PH UA: 5.5 (ref 5–9)
PHENCYCLIDINE SCREEN URINE: NOT DETECTED
PLATELET # BLD: 387 E9/L (ref 130–450)
PMV BLD AUTO: 9.6 FL (ref 7–12)
POTASSIUM SERPL-SCNC: 4.2 MMOL/L (ref 3.5–5)
PROTEIN UA: 30 MG/DL
RBC # BLD: 4.35 E12/L (ref 3.8–5.8)
RBC UA: ABNORMAL /HPF (ref 0–2)
SALICYLATE, SERUM: <0.3 MG/DL (ref 0–30)
SODIUM BLD-SCNC: 141 MMOL/L (ref 132–146)
SPECIFIC GRAVITY UA: >=1.03 (ref 1–1.03)
TOTAL CK: 172 U/L (ref 20–200)
TOTAL PROTEIN: 5.9 G/DL (ref 6.4–8.3)
TRICYCLIC ANTIDEPRESSANTS SCREEN SERUM: NEGATIVE NG/ML
URIC ACID, SERUM: 4.6 MG/DL (ref 3.4–7)
UROBILINOGEN, URINE: 1 E.U./DL
WBC # BLD: 4.1 E9/L (ref 4.5–11.5)
WBC UA: ABNORMAL /HPF (ref 0–5)

## 2021-11-04 PROCEDURE — 80179 DRUG ASSAY SALICYLATE: CPT

## 2021-11-04 PROCEDURE — 80307 DRUG TEST PRSMV CHEM ANLYZR: CPT

## 2021-11-04 PROCEDURE — 73630 X-RAY EXAM OF FOOT: CPT

## 2021-11-04 PROCEDURE — 93005 ELECTROCARDIOGRAM TRACING: CPT

## 2021-11-04 PROCEDURE — 80053 COMPREHEN METABOLIC PANEL: CPT

## 2021-11-04 PROCEDURE — 82077 ASSAY SPEC XCP UR&BREATH IA: CPT

## 2021-11-04 PROCEDURE — 81001 URINALYSIS AUTO W/SCOPE: CPT

## 2021-11-04 PROCEDURE — 99285 EMERGENCY DEPT VISIT HI MDM: CPT

## 2021-11-04 PROCEDURE — 82550 ASSAY OF CK (CPK): CPT

## 2021-11-04 PROCEDURE — 84550 ASSAY OF BLOOD/URIC ACID: CPT

## 2021-11-04 PROCEDURE — 80143 DRUG ASSAY ACETAMINOPHEN: CPT

## 2021-11-04 PROCEDURE — 85025 COMPLETE CBC W/AUTO DIFF WBC: CPT

## 2021-11-04 NOTE — ED NOTES
UPON DISCHARGE, PT REPORTING \"I'M READY TO GO\" RUSHING STAFF TO GET HIM  A CAN OF POP AND A FOOD BOX. PT IS AWARE THAT HE NEEDS TO ATTEND THE WALK IN CLINIC AT [place].      MAJOR Shaw  11/04/21 7995

## 2021-11-04 NOTE — ED NOTES
Pt agreeable to discharge and contracts for safety.   Escorted to waiting area via [facility] officer     Miguelito Goldberg RN  11/04/21 3167
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
## 2021-11-04 NOTE — ED NOTES
Bed: [facility]  Expected date:   Expected time:   Means of arrival:   Comments:  loc Driscoll RN  11/04/21 4971
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
## 2021-11-08 ENCOUNTER — HOSPITAL ENCOUNTER (EMERGENCY)
Age: 41
Discharge: HOME | End: 2021-11-09
Attending: EMERGENCY MEDICINE
Payer: COMMERCIAL

## 2021-11-08 DIAGNOSIS — Z76.89 ENCOUNTER FOR PSYCHIATRIC ASSESSMENT: Primary | ICD-10-CM

## 2021-11-08 DIAGNOSIS — F39 MOOD DISORDER (HCC): ICD-10-CM

## 2021-11-08 DIAGNOSIS — U07.1 COVID-19: ICD-10-CM

## 2021-11-08 PROCEDURE — 99284 EMERGENCY DEPT VISIT MOD MDM: CPT

## 2021-11-08 ASSESSMENT — PAIN DESCRIPTION - FREQUENCY: FREQUENCY: CONTINUOUS

## 2021-11-08 ASSESSMENT — PAIN SCALES - GENERAL: PAINLEVEL_OUTOF10: 5

## 2021-11-08 ASSESSMENT — PAIN DESCRIPTION - PAIN TYPE: TYPE: CHRONIC PAIN

## 2021-11-08 ASSESSMENT — PAIN DESCRIPTION - LOCATION: LOCATION: FOOT

## 2021-11-08 ASSESSMENT — PAIN DESCRIPTION - ORIENTATION: ORIENTATION: RIGHT;LEFT

## 2021-11-09 VITALS
RESPIRATION RATE: 16 BRPM | SYSTOLIC BLOOD PRESSURE: 114 MMHG | DIASTOLIC BLOOD PRESSURE: 78 MMHG | HEIGHT: 72 IN | HEART RATE: 70 BPM | TEMPERATURE: 98.4 F | WEIGHT: 160 LBS | BODY MASS INDEX: 21.67 KG/M2 | OXYGEN SATURATION: 98 %

## 2021-11-09 LAB
ACETAMINOPHEN LEVEL: <5 MCG/ML (ref 10–30)
ANION GAP SERPL CALCULATED.3IONS-SCNC: 10 MMOL/L (ref 7–16)
BASOPHILS ABSOLUTE: 0.08 E9/L (ref 0–0.2)
BASOPHILS RELATIVE PERCENT: 1.7 % (ref 0–2)
BUN BLDV-MCNC: 7 MG/DL (ref 6–20)
CALCIUM SERPL-MCNC: 8.5 MG/DL (ref 8.6–10.2)
CHLORIDE BLD-SCNC: 105 MMOL/L (ref 98–107)
CO2: 25 MMOL/L (ref 22–29)
CREAT SERPL-MCNC: 1.1 MG/DL (ref 0.7–1.2)
EOSINOPHILS ABSOLUTE: 0.47 E9/L (ref 0.05–0.5)
EOSINOPHILS RELATIVE PERCENT: 10.3 % (ref 0–6)
ETHANOL: <10 MG/DL (ref 0–0.08)
GFR AFRICAN AMERICAN: >60
GFR NON-AFRICAN AMERICAN: >60 ML/MIN/1.73
GLUCOSE BLD-MCNC: 127 MG/DL (ref 74–99)
HCT VFR BLD CALC: 35.6 % (ref 37–54)
HEMOGLOBIN: 11.5 G/DL (ref 12.5–16.5)
IMMATURE GRANULOCYTES #: 0.01 E9/L
IMMATURE GRANULOCYTES %: 0.2 % (ref 0–5)
INFLUENZA A: NOT DETECTED
INFLUENZA B: NOT DETECTED
LYMPHOCYTES ABSOLUTE: 1.37 E9/L (ref 1.5–4)
LYMPHOCYTES RELATIVE PERCENT: 29.9 % (ref 20–42)
MCH RBC QN AUTO: 29 PG (ref 26–35)
MCHC RBC AUTO-ENTMCNC: 32.3 % (ref 32–34.5)
MCV RBC AUTO: 89.9 FL (ref 80–99.9)
MONOCYTES ABSOLUTE: 0.39 E9/L (ref 0.1–0.95)
MONOCYTES RELATIVE PERCENT: 8.5 % (ref 2–12)
NEUTROPHILS ABSOLUTE: 2.26 E9/L (ref 1.8–7.3)
NEUTROPHILS RELATIVE PERCENT: 49.4 % (ref 43–80)
PDW BLD-RTO: 13.9 FL (ref 11.5–15)
PLATELET # BLD: 486 E9/L (ref 130–450)
PMV BLD AUTO: 9 FL (ref 7–12)
POTASSIUM REFLEX MAGNESIUM: 3.7 MMOL/L (ref 3.5–5)
RBC # BLD: 3.96 E12/L (ref 3.8–5.8)
SALICYLATE, SERUM: <0.3 MG/DL (ref 0–30)
SARS-COV-2 RNA, RT PCR: DETECTED
SODIUM BLD-SCNC: 140 MMOL/L (ref 132–146)
TRICYCLIC ANTIDEPRESSANTS SCREEN SERUM: NEGATIVE NG/ML
WBC # BLD: 4.6 E9/L (ref 4.5–11.5)

## 2021-11-09 PROCEDURE — 80179 DRUG ASSAY SALICYLATE: CPT

## 2021-11-09 PROCEDURE — 93005 ELECTROCARDIOGRAM TRACING: CPT | Performed by: STUDENT IN AN ORGANIZED HEALTH CARE EDUCATION/TRAINING PROGRAM

## 2021-11-09 PROCEDURE — 80307 DRUG TEST PRSMV CHEM ANLYZR: CPT

## 2021-11-09 PROCEDURE — 80143 DRUG ASSAY ACETAMINOPHEN: CPT

## 2021-11-09 PROCEDURE — 80048 BASIC METABOLIC PNL TOTAL CA: CPT

## 2021-11-09 PROCEDURE — 85025 COMPLETE CBC W/AUTO DIFF WBC: CPT

## 2021-11-09 PROCEDURE — 87636 SARSCOV2 & INF A&B AMP PRB: CPT

## 2021-11-09 PROCEDURE — 82077 ASSAY SPEC XCP UR&BREATH IA: CPT

## 2021-11-09 ASSESSMENT — PAIN DESCRIPTION - FREQUENCY: FREQUENCY: CONTINUOUS

## 2021-11-09 ASSESSMENT — ENCOUNTER SYMPTOMS
EYE DISCHARGE: 0
SORE THROAT: 0
EYE REDNESS: 0
SINUS PRESSURE: 0
NAUSEA: 0
VOMITING: 0
ABDOMINAL PAIN: 0
WHEEZING: 0
BACK PAIN: 0
SHORTNESS OF BREATH: 0
DIARRHEA: 0
EYE PAIN: 0
COUGH: 0

## 2021-11-09 ASSESSMENT — PAIN DESCRIPTION - PAIN TYPE: TYPE: CHRONIC PAIN

## 2021-11-09 ASSESSMENT — PAIN DESCRIPTION - ORIENTATION: ORIENTATION: RIGHT;LEFT

## 2021-11-09 ASSESSMENT — PAIN DESCRIPTION - LOCATION: LOCATION: FOOT

## 2021-11-09 ASSESSMENT — PAIN SCALES - GENERAL: PAINLEVEL_OUTOF10: 5

## 2021-11-09 NOTE — ED PROVIDER NOTES
The history is provided by the patient. 27-year-old male history of schizoaffective disorder presents emergency department complaint of suicidal ideations as well as paranoia. States he has been having thoughts of wanting to hurt himself with described as several crazy thoughts however he is unable to elaborate, no active plan. He also states he has been having auditory hallucinations of hearing voices saying that people want to hurt him. He otherwise has no other acute complaints denies any chest pain shortness of breath abdominal pain change of bowel bladder habits, fever chills cough congestion runny nose. The patient presents with suicidal that has been going on for days. These symptoms are moderate in severity. Symptoms are made better by nothing. Symptoms are made worse by nothing. Associated symptoms include auditory halucinations. Review of Systems   Constitutional: Negative for chills and fever. HENT: Negative for ear pain, sinus pressure and sore throat. Eyes: Negative for pain, discharge and redness. Respiratory: Negative for cough, shortness of breath and wheezing. Cardiovascular: Negative for chest pain. Gastrointestinal: Negative for abdominal pain, diarrhea, nausea and vomiting. Genitourinary: Negative for dysuria and frequency. Musculoskeletal: Negative for arthralgias and back pain. Skin: Negative for rash and wound. Neurological: Negative for weakness and headaches. Hematological: Negative for adenopathy. Psychiatric/Behavioral: Positive for hallucinations and suicidal ideas. All other systems reviewed and are negative. Physical Exam  Vitals and nursing note reviewed. Constitutional:       Appearance: Normal appearance. He is well-developed and normal weight. HENT:      Head: Normocephalic and atraumatic. Eyes:      Conjunctiva/sclera: Conjunctivae normal.   Cardiovascular:      Rate and Rhythm: Normal rate and regular rhythm.       Heart sounds: Normal heart sounds. No murmur heard. Pulmonary:      Effort: Pulmonary effort is normal. No respiratory distress. Breath sounds: Normal breath sounds. No wheezing or rales. Abdominal:      General: Bowel sounds are normal. There is no distension. Palpations: Abdomen is soft. Tenderness: There is no abdominal tenderness. There is no guarding or rebound. Musculoskeletal:         General: No tenderness or deformity. Cervical back: Normal range of motion and neck supple. Skin:     General: Skin is warm and dry. Neurological:      General: No focal deficit present. Mental Status: He is alert. Psychiatric:         Mood and Affect: Mood normal.         Thought Content: Thought content normal.          Procedures     MDM   50-year-old male presents emergency department complaint of psychiatric evaluation as well as suicidal ideation. Patient states he had several thoughts outside the hospital of wanting to hurt himself as well as hallucinating and hearing voices telling him that people are wanting to hurt him as well. He otherwise had no other acute complaints. Laboratory work-up was significant for COVID-19. He had no shortness of breath no cough or fevers. He was saturating well on room air. Patient medically cleared for psychiatric admission if needed. He still pending  consult as well at this time. Disposition pending social workers recommendations. ED Course as of 11/09/21 0547   Tue Nov 09, 2021   0235 Patient Covid positive however no complaints of cough, shortness of breath, saturating well on room air. [CB]      ED Course User Index  [CB] Leydi Caceres MD      EKG: This EKG is signed by emergency department physician.     Rate: 75  Rhythm: Sinus  Interpretation: Normal sinus rhythm normal axis no acute ST elevations or depressions  Comparison: stable as compared to patient's most recent EKG       ED Course as of 11/09/21 [facility] 09, 2021   7965 Patient Covid positive however no complaints of cough, shortness of breath, saturating well on room air. [CB]      ED Course User Index  [CB] Herminia Estes MD       --------------------------------------------- PAST HISTORY ---------------------------------------------  Past Medical History:  has a past medical history of Depression and Schizoaffective disorder ([facility] [address] Past Surgical History:  has a past surgical history that includes Hip fracture surgery (Left, 9/12/2021); eye surgery (19 years ago); and Hip fracture surgery (Left, 9/28/2021). Social History:  reports that he has been smoking cigars and cigarettes. He has a 24.00 pack-year smoking history. He has never used smokeless tobacco. He reports current alcohol use. He reports current drug use. Frequency: 7.00 times per week. Drugs: Cocaine and Marijuana ([place]). Family History: family history includes Mental Illness in his mother; No Known Problems in his father; Substance Abuse in his mother. The patients home medications have been reviewed.     Allergies: Chlorpheniramine-phenylephrine, Penicillins, and Rondec-d [chlophedianol-pseudoephedrine]    -------------------------------------------------- RESULTS -------------------------------------------------  Labs:  Results for orders placed or performed during the hospital encounter of 11/08/21   COVID-19 & Influenza Combo    Specimen: Nasopharyngeal Swab   Result Value Ref Range    SARS-CoV-2 RNA, RT PCR DETECTED (A) NOT DETECTED    INFLUENZA A NOT DETECTED NOT DETECTED    INFLUENZA B NOT DETECTED NOT DETECTED   CBC Auto Differential   Result Value Ref Range    WBC 4.6 4.5 - 11.5 E9/L    RBC 3.96 3.80 - 5.80 E12/L    Hemoglobin 11.5 (L) 12.5 - 16.5 g/dL    Hematocrit 35.6 (L) 37.0 - 54.0 %    MCV 89.9 80.0 - 99.9 fL    MCH 29.0 26.0 - 35.0 pg    MCHC 32.3 32.0 - 34.5 %    RDW 13.9 11.5 - 15.0 fL    Platelets 881 (H) 259 - 450 E9/L    MPV 9.0 7.0 - 12.0 fL    Neutrophils % 49.4 43.0 - 80.0 % the patient and discussed todays results, in addition to providing specific details for the plan of care and counseling regarding the diagnosis and prognosis. Their questions are answered at this time and they are agreeable with the plan.      --------------------------------- ADDITIONAL PROVIDER NOTES ---------------------------------  At this time the patient is without objective evidence of an acute process requiring hospitalization or inpatient management. They have remained hemodynamically stable throughout their entire ED visit and are stable for discharge with outpatient follow-up. The plan has been discussed in detail and they are aware of the specific conditions for emergent return, as well as the importance of follow-up. New Prescriptions    No medications on file       Diagnosis:  1. Encounter for psychiatric assessment    2. Mood disorder ([facility] [address]    3. COVID-19        Disposition:  Patient's disposition: Discharge pending  evaluation  Patient's condition is stable.        Joshua Jean Baptsite MD  Resident  11/09/21 9585

## 2021-11-09 NOTE — ED NOTES
Assumed care of pt at this time. Pt is sleeping on right side. Wakes to voice. Denies any needs at this time. Returns to sleep.      Lonnie Hernandez RN  11/09/21 7191
Bed: [facility]  Expected date:   Expected time:   Means of arrival:   Comments:  loc Guerra RN  11/08/21 4900
Emergency Department [facility] AN AFFILIATE OF [facility] Biopsychosocial Assessment Note    Chief Complaint: HEARING VOICES    MSE:  PT IS AT BASELINE, HEARING NON COMMANDING VOICES, ADMITS TO LYSING ABOUT SI BECAUSE HE IS HOMELESS, HAS ONGOING MANIPULATING BEHAVIORS. DENIES HI AND HAS NO COMMANDING HALLUCINATIONS. Clinical Summary/History:   PT DOWNPLAYS HIS ADDICTION AND DOES NOT WANT TO SEEK HELP - DECLINING [facility]. PT HAS BEEN ADVISED TO FOLLOW UP WITH [facility] TO BEGIN COMMUNITY SERVICES AND TO HELP WITH WITH HIS NEEDS. PT CAN ATTEND TODAY - AT THE WALK IN CLINIC. Gender  [x] Male [] Female [] Transgender  [] Other    Sexual Orientation    [x] Heterosexual [] Homosexual [] Bisexual [] Other    Suicidal Behavioral: CSSR-S Complete. [] Reports:    [] Past [] Present   [x] Denies    Homicidal/ Violent Behavior  [] Reports:   [] Past [] Present   [x] Denies     Hallucinations/Delusions   [x] Reports:   [] Denies     Substance Use/Alcohol Use/Addiction: SBIRT Screen Complete.    [x] Reports:   [] Denies     Trauma History  [] Reports:  [x] Denies     Collateral Information:   NA    Level of Care/Disposition Plan  [x] Home:   [] Outpatient Provider:   [] Crisis Unit:   [] Inpatient Psychiatric Unit:  [] Other:        MAJOR Montes  11/09/21 1141
Patient Covid positive, charge nurse Ana Lilia Desai RN notified. Per charge RN patient to go to main ED bed 18B.       Anila Dueñas RN  11/09/21 7124
Pt alert, asks for breakfast, no other needs.      Luis Alfredo James, RN  11/09/21 0061
Pt escorted by ER nurse cooperative changed, 2 bags of belongings placed in locker 27.
Pt remains asleep, easy and regular respirations.      Marko Velazquez RN  11/09/21 1038
Pt resting with eyes closed. Urinal remains at bedside, no spec obtained at this time.       Ely Hanks RN  11/09/21 0999
Updated patient on results and plan of care. Patient ambulated to main ED without difficulty. Reminded patient of need for urine specimen, urinal with patient. Report given to section nurse Braden Sanders RN.       Anila Dueñas RN  11/09/21 0029
03-Dec-2019 11:23

## 2021-11-10 ENCOUNTER — HOSPITAL ENCOUNTER (EMERGENCY)
Age: 41
Discharge: HOME | End: 2021-11-10
Attending: EMERGENCY MEDICINE
Payer: COMMERCIAL

## 2021-11-10 VITALS
RESPIRATION RATE: 16 BRPM | WEIGHT: 160 LBS | SYSTOLIC BLOOD PRESSURE: 110 MMHG | HEIGHT: 72 IN | DIASTOLIC BLOOD PRESSURE: 74 MMHG | TEMPERATURE: 96.1 F | BODY MASS INDEX: 21.67 KG/M2 | OXYGEN SATURATION: 98 % | HEART RATE: 86 BPM

## 2021-11-10 DIAGNOSIS — G89.29 CHRONIC FOOT PAIN, UNSPECIFIED LATERALITY: ICD-10-CM

## 2021-11-10 DIAGNOSIS — M79.673 CHRONIC FOOT PAIN, UNSPECIFIED LATERALITY: ICD-10-CM

## 2021-11-10 DIAGNOSIS — R44.0 AUDITORY HALLUCINATIONS: Primary | ICD-10-CM

## 2021-11-10 LAB
ACETAMINOPHEN LEVEL: <5 MCG/ML (ref 10–30)
ALBUMIN SERPL-MCNC: 3.5 G/DL (ref 3.5–5.2)
ALP BLD-CCNC: 45 U/L (ref 40–129)
ALT SERPL-CCNC: 6 U/L (ref 0–40)
ANION GAP SERPL CALCULATED.3IONS-SCNC: 9 MMOL/L (ref 7–16)
AST SERPL-CCNC: 8 U/L (ref 0–39)
BASOPHILS ABSOLUTE: 0.05 E9/L (ref 0–0.2)
BASOPHILS RELATIVE PERCENT: 1.2 % (ref 0–2)
BILIRUB SERPL-MCNC: 0.5 MG/DL (ref 0–1.2)
BUN BLDV-MCNC: 10 MG/DL (ref 6–20)
CALCIUM SERPL-MCNC: 8.6 MG/DL (ref 8.6–10.2)
CHLORIDE BLD-SCNC: 105 MMOL/L (ref 98–107)
CO2: 26 MMOL/L (ref 22–29)
CREAT SERPL-MCNC: 1 MG/DL (ref 0.7–1.2)
EKG ATRIAL RATE: 63 BPM
EKG ATRIAL RATE: 75 BPM
EKG P AXIS: 54 DEGREES
EKG P AXIS: 80 DEGREES
EKG P-R INTERVAL: 166 MS
EKG P-R INTERVAL: 184 MS
EKG Q-T INTERVAL: 376 MS
EKG Q-T INTERVAL: 386 MS
EKG QRS DURATION: 104 MS
EKG QRS DURATION: 94 MS
EKG QTC CALCULATION (BAZETT): 395 MS
EKG QTC CALCULATION (BAZETT): 419 MS
EKG R AXIS: 59 DEGREES
EKG R AXIS: 83 DEGREES
EKG T AXIS: 55 DEGREES
EKG T AXIS: 66 DEGREES
EKG VENTRICULAR RATE: 63 BPM
EKG VENTRICULAR RATE: 75 BPM
EOSINOPHILS ABSOLUTE: 0.16 E9/L (ref 0.05–0.5)
EOSINOPHILS RELATIVE PERCENT: 3.7 % (ref 0–6)
ETHANOL: <10 MG/DL (ref 0–0.08)
GFR AFRICAN AMERICAN: >60
GFR NON-AFRICAN AMERICAN: >60 ML/MIN/1.73
GLUCOSE BLD-MCNC: 86 MG/DL (ref 74–99)
HCT VFR BLD CALC: 36.4 % (ref 37–54)
HEMOGLOBIN: 11.8 G/DL (ref 12.5–16.5)
IMMATURE GRANULOCYTES #: 0.01 E9/L
IMMATURE GRANULOCYTES %: 0.2 % (ref 0–5)
LYMPHOCYTES ABSOLUTE: 1.36 E9/L (ref 1.5–4)
LYMPHOCYTES RELATIVE PERCENT: 31.9 % (ref 20–42)
MCH RBC QN AUTO: 28.9 PG (ref 26–35)
MCHC RBC AUTO-ENTMCNC: 32.4 % (ref 32–34.5)
MCV RBC AUTO: 89.2 FL (ref 80–99.9)
MONOCYTES ABSOLUTE: 0.48 E9/L (ref 0.1–0.95)
MONOCYTES RELATIVE PERCENT: 11.2 % (ref 2–12)
NEUTROPHILS ABSOLUTE: 2.21 E9/L (ref 1.8–7.3)
NEUTROPHILS RELATIVE PERCENT: 51.8 % (ref 43–80)
PDW BLD-RTO: 13.9 FL (ref 11.5–15)
PLATELET # BLD: 449 E9/L (ref 130–450)
PMV BLD AUTO: 9 FL (ref 7–12)
POTASSIUM REFLEX MAGNESIUM: 3.8 MMOL/L (ref 3.5–5)
RBC # BLD: 4.08 E12/L (ref 3.8–5.8)
SALICYLATE, SERUM: <0.3 MG/DL (ref 0–30)
SODIUM BLD-SCNC: 140 MMOL/L (ref 132–146)
TOTAL PROTEIN: 6 G/DL (ref 6.4–8.3)
TRICYCLIC ANTIDEPRESSANTS SCREEN SERUM: NEGATIVE NG/ML
WBC # BLD: 4.3 E9/L (ref 4.5–11.5)

## 2021-11-10 PROCEDURE — 80143 DRUG ASSAY ACETAMINOPHEN: CPT

## 2021-11-10 PROCEDURE — 82077 ASSAY SPEC XCP UR&BREATH IA: CPT

## 2021-11-10 PROCEDURE — 80307 DRUG TEST PRSMV CHEM ANLYZR: CPT

## 2021-11-10 PROCEDURE — 80179 DRUG ASSAY SALICYLATE: CPT

## 2021-11-10 PROCEDURE — 99284 EMERGENCY DEPT VISIT MOD MDM: CPT

## 2021-11-10 PROCEDURE — 85025 COMPLETE CBC W/AUTO DIFF WBC: CPT

## 2021-11-10 PROCEDURE — 93010 ELECTROCARDIOGRAM REPORT: CPT | Performed by: INTERNAL MEDICINE

## 2021-11-10 PROCEDURE — 80053 COMPREHEN METABOLIC PANEL: CPT

## 2021-11-10 PROCEDURE — 93005 ELECTROCARDIOGRAM TRACING: CPT | Performed by: EMERGENCY MEDICINE

## 2021-11-10 ASSESSMENT — PAIN DESCRIPTION - DESCRIPTORS: DESCRIPTORS: BURNING;CONSTANT

## 2021-11-10 ASSESSMENT — PAIN DESCRIPTION - LOCATION: LOCATION: FOOT

## 2021-11-10 ASSESSMENT — PAIN DESCRIPTION - PAIN TYPE: TYPE: CHRONIC PAIN

## 2021-11-10 ASSESSMENT — PAIN DESCRIPTION - ORIENTATION: ORIENTATION: RIGHT;LEFT

## 2021-11-10 ASSESSMENT — PAIN DESCRIPTION - FREQUENCY: FREQUENCY: CONTINUOUS

## 2021-11-10 ASSESSMENT — PAIN SCALES - GENERAL: PAINLEVEL_OUTOF10: 10

## 2021-11-10 NOTE — ED NOTES
Discharge instructions reviewed with patient. Patient verbalizes understanding. Questions and concerns addressed. Instructions signed and copy given. Patient left ED in no acute distress.         Abelardo Rosa RN  11/10/21 7213

## 2021-11-10 NOTE — ED PROVIDER NOTES
Chief Complaint   Patient presents with    Paranoid     and hearing sounds. states nothing specific at this time. patient denies SI/HI     Foot Pain     since his accident. states that it is a burning pain        HPI  Gage Mendoza is a 37 y.o. male with a PMHx significant for schizoaffective disorder, depression and polysubstance abuse who presents with chronic bilateral foot pain and hallucinations. The complaints are chronic, persistent, moderate in severity and worsened by nothing. The patient states that he has not taken any of his medications in at least 2 months for his schizophrenia. He notes that he does not currently have a psychiatrist and when he does get any of his medications he gets them here. He states that he is not suicidal or homicidal, but his hearing significant voices that are telling him people are trying to kill him. He states that he is not having any visual hallucinations. Of note, the patient has been in the ER multiple times over the last 2 weeks. Yesterday he was diagnosed with Covid. The patient denies recent trauma, fever, chills, fatigue, HA, dizziness, lightheadedness, paresthesias, generalized weakness, confusion, forgetfulness, vision changes, eye redness, congestion, rhinorrhea, sore throat, neck pain, chest pain, palpitations, LE edema, SOB, cough, wheezing, abdominal pain, nausea, vomiting, diarrhea, constipation, hematochezia, melena, dysuria, hematuria, flank pain, decreased UOP, myalgias, arthralgias, ROM issues, swelling, rashes, erythema, easy bleeding, lymphadenopathy, symptoms of anxiety and depression and SI/HI. ROS is otherwise negative. The patient is currently taking no blood thinners. Tobacco Hx:   reports that he has been smoking cigars and cigarettes. He has a 24.00 pack-year smoking history. He has never used smokeless tobacco.    Alcohol Hx:   reports current alcohol use. Illicit Drug Hx:   reports current drug use.  Frequency: 7.00 times per week. Drugs: Cocaine and Marijuana ([facility]). The history is provided by the patient. Last Tetanus (if applicable): n/a    Review of Systems:   A complete review of systems was performed and pertinent positives and negatives are stated within the HPI, all other systems reviewed and are negative.     Physical Exam:  GEN: Cooperative, well-developed, well-nourished adult in NAD; pt appears stated age  Head: Normocephalic and atraumatic; no tenderness to palpation anywhere  Eyes: PERRL, EOMI, conjunctiva clear, cornea without gross abnormality, no visual deficits noted b/l  Ears: External ear normal-appearing and non-tender b/l; no hearing deficit noted  Nose: Nares patent and free of debris; septum midline; mucosa appears normal; no drainage noted  Throat: Oral mucosa moist and pink with no lesions noted; dentition wnl; no posterior pharyngeal erythema or edema; tonsils are not swollen and there is no exudate noted; no post-nasal drip  Neck: Supple and symmetrical with midline trachea; no cervical or supraclavicular lymphadenopathy noted; thyroid not palpated, but no tenderness or masses noted in the region; normal ROM with no meningeal signs  Lungs: LCTAB with no wheezes, rhonchi or rales noted; no respiratory distress, breathing unlabored   CV: RRR, no murmurs, gallops or rubs noted on auscultation, normal S1/S2; UE and LE distal pulses intact b/l +2/4 with no cyanosis noted; no LE edema noted b/l; capillary refill < 2 seconds  ABD: Soft, non-tender, non-distended, no organomegaly, hernias or masses noted  /Rectal: no suprapubic tenderness; remainder of exam deferred  MSK: UEs and LEs without notable trauma or ROM restriction; normal strength throughout; patient reports pain to palpation of all toes and states that is not new for him  Skin: Skin on feet is dry, but there are no sores or obvious injuries, normal sensation/motor/cap refill  Neuro: A&O x3; CN II-XII appear grossly intact; no focal deficits appreciated; pt thoughtful in discussion and able to answer all questions without issue  Psych:  normal mood, normal affect, no SI/HI and no visual hallucinations; patient does report significant auditory hallucinations though he seems engaged with me and does not appear to be internally stimulated in her discussion  ---------------------------------- PAST HISTORY ---------------------------------------------  Past Medical History:  has a past medical history of Depression and Schizoaffective disorder ([facility] [address] Past Surgical History:  has a past surgical history that includes Hip fracture surgery (Left, 9/12/2021); eye surgery (19 years ago); and Hip fracture surgery (Left, 9/28/2021). Social History:  reports that he has been smoking cigars and cigarettes. He has a 24.00 pack-year smoking history. He has never used smokeless tobacco. He reports current alcohol use. He reports current drug use. Frequency: 7.00 times per week. Drugs: Cocaine and Marijuana (Gladis Shaffer). Family History: family history includes Mental Illness in his mother; No Known Problems in his father; Substance Abuse in his mother. Home Meds: Not in a hospital admission. The patients home medications have been reviewed. Allergies: Chlorpheniramine-phenylephrine, Penicillins, and Rondec-d [chlophedianol-pseudoephedrine]    ------------------------- NURSING NOTES AND VITALS REVIEWED ---------------------------  Date / Time Roomed:  11/10/2021  6:40 AM  ED Bed Assignment:  14B/14B-14    The nursing notes within the ED encounter and vital signs as below have been reviewed. /74   Pulse 86   Temp 96.1 °F (35.6 °C) (Oral)   Resp 16   Ht 6' (1.829 m)   Wt 160 lb (72.6 kg)   SpO2 98%   BMI 21.70 kg/m²   -------------------------------------------------- RESULTS / INTERVENTIONS -------------------------------------------------  All laboratory and radiology tests have been reviewed by this physician.     LABS:  Results for orders placed or performed during the hospital encounter of 11/10/21   CBC Auto Differential   Result Value Ref Range    WBC 4.3 (L) 4.5 - 11.5 E9/L    RBC 4.08 3.80 - 5.80 E12/L    Hemoglobin 11.8 (L) 12.5 - 16.5 g/dL    Hematocrit 36.4 (L) 37.0 - 54.0 %    MCV 89.2 80.0 - 99.9 fL    MCH 28.9 26.0 - 35.0 pg    MCHC 32.4 32.0 - 34.5 %    RDW 13.9 11.5 - 15.0 fL    Platelets 124 726 - 006 E9/L    MPV 9.0 7.0 - 12.0 fL    Neutrophils % 51.8 43.0 - 80.0 %    Immature Granulocytes % 0.2 0.0 - 5.0 %    Lymphocytes % 31.9 20.0 - 42.0 %    Monocytes % 11.2 2.0 - 12.0 %    Eosinophils % 3.7 0.0 - 6.0 %    Basophils % 1.2 0.0 - 2.0 %    Neutrophils Absolute 2.21 1.80 - 7.30 E9/L    Immature Granulocytes # 0.01 E9/L    Lymphocytes Absolute 1.36 (L) 1.50 - 4.00 E9/L    Monocytes Absolute 0.48 0.10 - 0.95 E9/L    Eosinophils Absolute 0.16 0.05 - 0.50 E9/L    Basophils Absolute 0.05 0.00 - 0.20 E9/L   Comprehensive Metabolic Panel w/ Reflex to MG   Result Value Ref Range    Sodium 140 132 - 146 mmol/L    Potassium reflex Magnesium 3.8 3.5 - 5.0 mmol/L    Chloride 105 98 - 107 mmol/L    CO2 26 22 - 29 mmol/L    Anion Gap 9 7 - 16 mmol/L    Glucose 86 74 - 99 mg/dL    BUN 10 6 - 20 mg/dL    CREATININE 1.0 0.7 - 1.2 mg/dL    GFR Non-African American >60 >=60 mL/min/1.73    GFR African American >60     Calcium 8.6 8.6 - 10.2 mg/dL    Total Protein 6.0 (L) 6.4 - 8.3 g/dL    Albumin 3.5 3.5 - 5.2 g/dL    Total Bilirubin 0.5 0.0 - 1.2 mg/dL    Alkaline Phosphatase 45 40 - 129 U/L    ALT 6 0 - 40 U/L    AST 8 0 - 39 U/L   Serum Drug Screen   Result Value Ref Range    Ethanol Lvl <10 mg/dL    Acetaminophen Level <5.0 (L) 10.0 - 00.0 mcg/mL    Salicylate, Serum <3.9 0.0 - 30.0 mg/dL    TCA Scrn NEGATIVE Cutoff:300 ng/mL   EKG 12 Lead   Result Value Ref Range    Ventricular Rate 63 BPM    Atrial Rate 63 BPM    P-R Interval 166 ms    QRS Duration 94 ms    Q-T Interval 386 ms    QTc Calculation (Bazett) 395 ms    P Axis 54 degrees    R Axis 59 degrees    T Axis 55 degrees       RADIOLOGY: Interpreted by Radiologist unless otherwise noted. No orders to display     EKG: As interpreted by this ER physician. Rate: 63 bpm  Rhythm: sinus  Axis: normal  ST Segments: no acute change  T-waves: no acute change  Interpretation: sinus with sinus arrhythmia  Comparison: changes compared to previous EKG    Oxygen Saturation Interpretation: normal    Meds Given:  Medications - No data to display    Procedures:  No procedures performed. --------------------------------- PROGRESS NOTES / ADDITIONAL PROVIDER NOTES ---------------------------------  Consultations:  As outlined below. ED Course:       7:25 AM: All results were discussed with the patient and I have provided specific details regarding the plan of care, diagnosis and associated prognosis. The patient tolerated the visit well and, at the time of discharge, was without objective evidence of hemodynamic instability or an acute process requiring hospitalization and inpatient management. The patient was seen by myself and the assigned attending physician, Elaine Bocanegra DO, who agreed with my assessment and plan as laid out herein. The importance of follow-up was discussed at the end of the visit and I recommended that the patient be seen by their primary care physician in 2-3 days. The patient verbalized their understanding and agreement with the plan as presented and stated their intention to follow up. Reasons to return to the ER or seek immediate evaluation by a medical provider were discussed at length and all questions were answered to the highest degree of accuracy possible based on the current information available. At this time the patient is stable and safe for discharge. MDM:   The patient presented with concerns for auditory hallucinations and ongoing foot pain. On arrival, all vital signs were within normal limits. EKG and physical exam were as documented above.   A work-up was initiated

## 2021-11-10 NOTE — ED NOTES
Emergency Department [facility] AN AFFILIATE OF [facility] Biopsychosocial Assessment Note    Chief Complaint:  Pt is a 36 yo male who presents as a walk-in, pt not on a pink slip, pt is a frequent presenter and  has had multiple presentations to the ED in recent weeks, SW checked for community plan, none in binder. Reports he is hearing voices. Vague about this, states the voices are not telling him to harm himself. When asked about SI/HI pt states: \"I can if you need me to be. \"     MSE:    Clinical Summary/History:       Gender  [] Male [] Female [] Transgender  [] Other    Sexual Orientation    [] Heterosexual [] Homosexual [] Bisexual [] Other    Suicidal Behavioral: CSSR-S Complete. [] Reports:    [] Past [] Present   [] Denies    Homicidal/ Violent Behavior  [] Reports:   [] Past [] Present   [] Denies     Hallucinations/Delusions   [] Reports:   [] Denies     Substance Use/Alcohol Use/Addiction: SBIRT Screen Complete. [] Reports:   [] Denies     Trauma History  [] Reports:  [] Denies     Collateral Information:       Level of Care/Disposition Plan: Pt does not meet in-patient criteria, recommended to follow up with Katiana Dudley to initiate services so he can obtain his medication. Pt states: \"I usually just get my shot whenever I'm in the hospital.\"  Explained this is not a reliable solution.     [] Home:   [] Outpatient Provider:   [] Crisis Unit:   [] Inpatient Psychiatric Unit:  [] Other:

## 2021-11-11 ENCOUNTER — CARE COORDINATION (OUTPATIENT)
Dept: CARE COORDINATION | Age: 41
End: 2021-11-11

## 2021-11-11 NOTE — CARE COORDINATION
Date/Time:  11/11/2021 9:29 AM  Attempted to reach patient by telephone. Unable to leave voicemail. Will attempt to reach patient again.

## 2021-11-12 ENCOUNTER — HOSPITAL ENCOUNTER (EMERGENCY)
Age: 41
Discharge: HOME | End: 2021-11-12
Attending: EMERGENCY MEDICINE
Payer: COMMERCIAL

## 2021-11-12 VITALS
SYSTOLIC BLOOD PRESSURE: 101 MMHG | BODY MASS INDEX: 21.67 KG/M2 | HEART RATE: 88 BPM | RESPIRATION RATE: 16 BRPM | HEIGHT: 72 IN | WEIGHT: 160 LBS | TEMPERATURE: 98 F | DIASTOLIC BLOOD PRESSURE: 61 MMHG | OXYGEN SATURATION: 97 %

## 2021-11-12 DIAGNOSIS — F25.9 SCHIZOAFFECTIVE DISORDER, UNSPECIFIED TYPE (HCC): Primary | ICD-10-CM

## 2021-11-12 LAB
ACETAMINOPHEN LEVEL: <5 MCG/ML (ref 10–30)
ALBUMIN SERPL-MCNC: 4.2 G/DL (ref 3.5–5.2)
ALP BLD-CCNC: 50 U/L (ref 40–129)
ALT SERPL-CCNC: 7 U/L (ref 0–40)
ANION GAP SERPL CALCULATED.3IONS-SCNC: 10 MMOL/L (ref 7–16)
AST SERPL-CCNC: 14 U/L (ref 0–39)
BASOPHILS ABSOLUTE: 0.1 E9/L (ref 0–0.2)
BASOPHILS RELATIVE PERCENT: 2.5 % (ref 0–2)
BILIRUB SERPL-MCNC: 0.4 MG/DL (ref 0–1.2)
BILIRUBIN DIRECT: <0.2 MG/DL (ref 0–0.3)
BILIRUBIN, INDIRECT: NORMAL MG/DL (ref 0–1)
BUN BLDV-MCNC: 13 MG/DL (ref 6–20)
CALCIUM SERPL-MCNC: 9.7 MG/DL (ref 8.6–10.2)
CHLORIDE BLD-SCNC: 105 MMOL/L (ref 98–107)
CO2: 30 MMOL/L (ref 22–29)
CREAT SERPL-MCNC: 1 MG/DL (ref 0.7–1.2)
EKG ATRIAL RATE: 61 BPM
EKG P AXIS: 80 DEGREES
EKG P-R INTERVAL: 184 MS
EKG Q-T INTERVAL: 414 MS
EKG QRS DURATION: 96 MS
EKG QTC CALCULATION (BAZETT): 416 MS
EKG R AXIS: 88 DEGREES
EKG T AXIS: 55 DEGREES
EKG VENTRICULAR RATE: 61 BPM
EOSINOPHILS ABSOLUTE: 0.21 E9/L (ref 0.05–0.5)
EOSINOPHILS RELATIVE PERCENT: 5.2 % (ref 0–6)
ETHANOL: <10 MG/DL (ref 0–0.08)
GFR AFRICAN AMERICAN: >60
GFR NON-AFRICAN AMERICAN: >60 ML/MIN/1.73
GLUCOSE BLD-MCNC: 81 MG/DL (ref 74–99)
HCT VFR BLD CALC: 40 % (ref 37–54)
HEMOGLOBIN: 13 G/DL (ref 12.5–16.5)
IMMATURE GRANULOCYTES #: 0.01 E9/L
IMMATURE GRANULOCYTES %: 0.2 % (ref 0–5)
INFLUENZA A: NOT DETECTED
INFLUENZA B: NOT DETECTED
LYMPHOCYTES ABSOLUTE: 1.21 E9/L (ref 1.5–4)
LYMPHOCYTES RELATIVE PERCENT: 30 % (ref 20–42)
MCH RBC QN AUTO: 29.3 PG (ref 26–35)
MCHC RBC AUTO-ENTMCNC: 32.5 % (ref 32–34.5)
MCV RBC AUTO: 90.3 FL (ref 80–99.9)
MONOCYTES ABSOLUTE: 0.41 E9/L (ref 0.1–0.95)
MONOCYTES RELATIVE PERCENT: 10.1 % (ref 2–12)
NEUTROPHILS ABSOLUTE: 2.1 E9/L (ref 1.8–7.3)
NEUTROPHILS RELATIVE PERCENT: 52 % (ref 43–80)
PDW BLD-RTO: 14.2 FL (ref 11.5–15)
PLATELET # BLD: 456 E9/L (ref 130–450)
PMV BLD AUTO: 8.9 FL (ref 7–12)
POTASSIUM REFLEX MAGNESIUM: 4.3 MMOL/L (ref 3.5–5)
RBC # BLD: 4.43 E12/L (ref 3.8–5.8)
SALICYLATE, SERUM: <0.3 MG/DL (ref 0–30)
SARS-COV-2 RNA, RT PCR: NOT DETECTED
SODIUM BLD-SCNC: 145 MMOL/L (ref 132–146)
TOTAL PROTEIN: 6.9 G/DL (ref 6.4–8.3)
TRICYCLIC ANTIDEPRESSANTS SCREEN SERUM: NEGATIVE NG/ML
WBC # BLD: 4 E9/L (ref 4.5–11.5)

## 2021-11-12 PROCEDURE — 80048 BASIC METABOLIC PNL TOTAL CA: CPT

## 2021-11-12 PROCEDURE — 80076 HEPATIC FUNCTION PANEL: CPT

## 2021-11-12 PROCEDURE — 93010 ELECTROCARDIOGRAM REPORT: CPT | Performed by: INTERNAL MEDICINE

## 2021-11-12 PROCEDURE — 87636 SARSCOV2 & INF A&B AMP PRB: CPT

## 2021-11-12 PROCEDURE — 93005 ELECTROCARDIOGRAM TRACING: CPT | Performed by: STUDENT IN AN ORGANIZED HEALTH CARE EDUCATION/TRAINING PROGRAM

## 2021-11-12 PROCEDURE — 99283 EMERGENCY DEPT VISIT LOW MDM: CPT

## 2021-11-12 PROCEDURE — 82077 ASSAY SPEC XCP UR&BREATH IA: CPT

## 2021-11-12 PROCEDURE — 80179 DRUG ASSAY SALICYLATE: CPT

## 2021-11-12 PROCEDURE — 85025 COMPLETE CBC W/AUTO DIFF WBC: CPT

## 2021-11-12 PROCEDURE — 80307 DRUG TEST PRSMV CHEM ANLYZR: CPT

## 2021-11-12 PROCEDURE — 80143 DRUG ASSAY ACETAMINOPHEN: CPT

## 2021-11-12 ASSESSMENT — ENCOUNTER SYMPTOMS
SHORTNESS OF BREATH: 0
SORE THROAT: 0
BACK PAIN: 0
WHEEZING: 0
ABDOMINAL PAIN: 0
SINUS PRESSURE: 0
EYE PAIN: 0
COUGH: 0
VOMITING: 0
EYE DISCHARGE: 0
DIARRHEA: 1
NAUSEA: 0

## 2021-11-12 ASSESSMENT — PAIN DESCRIPTION - PAIN TYPE: TYPE: ACUTE PAIN

## 2021-11-12 ASSESSMENT — PAIN DESCRIPTION - ORIENTATION: ORIENTATION: RIGHT;LEFT

## 2021-11-12 ASSESSMENT — PAIN DESCRIPTION - LOCATION: LOCATION: FOOT

## 2021-11-12 ASSESSMENT — PAIN SCALES - GENERAL: PAINLEVEL_OUTOF10: 10

## 2021-11-12 ASSESSMENT — PAIN DESCRIPTION - PROGRESSION: CLINICAL_PROGRESSION: GRADUALLY WORSENING

## 2021-11-12 ASSESSMENT — PAIN DESCRIPTION - DESCRIPTORS: DESCRIPTORS: STABBING;PINS AND NEEDLES

## 2021-11-12 ASSESSMENT — PAIN DESCRIPTION - ONSET: ONSET: GRADUAL

## 2021-11-12 NOTE — ED NOTES
Pt was unable to give a urine specimen at this time. This RN educated pt on the importance of the specimen. Urinal and specimen cup at bedside.      Reinaldo Dudley RN  11/12/21 [id number]

## 2021-11-12 NOTE — ED PROVIDER NOTES
ATTENDING PROVIDER ATTESTATION:     Alphonse Raphael presented to the emergency department for evaluation of Psychiatric Evaluation (stated he feels parnoid hearing voices people talking. Denies SI/HI + covid 2days ago. )   and was initially evaluated by the Medical Resident. See Original ED Note for H&P and ED course above. I have reviewed and discussed the case, including pertinent history (medical, surgical, family and social) and exam findings with the Medical Resident assigned to Alphonse Dillon. .  I have personally performed and/or participated in the history, exam, medical decision making, and procedures and agree with all pertinent clinical information and any additional changes or corrections are noted below in my assessment and plan. I have discussed this patient in detail with the resident, and provided the instruction and education,       I have reviewed my findings and recommendations with the assigned Medical Resident, Alphonse Dillon. and members of family present at the time of disposition. I have performed a history and physical examination of this patient and directly supervised the resident caring for this patient      History of Present Illness:    Presents to the ED for acute psychosis, beginning prior to arrival.  The complaint has been constant, severe in severity, and worsened by nothing. Depression and gets affective disorder. Here for evaluation of acute psychosis. Denies suicidal or homicidal thoughts. He says he is hearing voices. Review of Systems:   A complete review of systems was performed and pertinent positives and negatives are stated within HPI, all other systems reviewed and are negative.    --------------------------------------------- PAST HISTORY ---------------------------------------------  Past Medical History:  has a past medical history of Depression and Schizoaffective disorder ([facility] [address]     Past Surgical History:  has a past surgical history that includes Hip fracture surgery (Left, 9/12/2021); eye surgery (19 years ago); and Hip fracture surgery (Left, 9/28/2021). Social History:  reports that he has been smoking cigars and cigarettes. He has a 24.00 pack-year smoking history. He has never used smokeless tobacco. He reports current alcohol use. He reports current drug use. Frequency: 7.00 times per week. Drugs: Cocaine and Marijuana (Jon Seller). Family History: family history includes Mental Illness in his mother; No Known Problems in his father; Substance Abuse in his mother. Unless otherwise noted, family history is non contributory    The patients home medications have been reviewed. Allergies: Chlorpheniramine-phenylephrine, Penicillins, and Rondec-d [chlophedianol-pseudoephedrine]      Physical Exam:  Constitutional/General: Alert and oriented x3  Head: Normocephalic and atraumatic  Eyes: PERRL, EOMI, sclera non icteric  ENT: Oropharynx clear, handling secretions  Neck: Supple, full ROM, no stridor, no meningeal signs  Respiratory: Lungs clear to auscultation bilaterally, no wheezes, rales, or rhonchi. Not in respiratory distress  Cardiovascular:  Regular rate. Regular rhythm. No murmurs, no gallops, no rubs. 2+ distal pulses. Equal extremity pulses. GI:  Abdomen Soft, Non tender, Non distended. No rebound, guarding, or rigidity. No pulsatile masses. Musculoskeletal: Moves all extremities x 4. Warm and well perfused,  no clubbing, no cyanosis, no edema. Palpable peripheral pulses  Integument: skin warm and dry. No rashes. Neurologic: GCS 15, no focal deficits  Psychiatric: Normal Affect      I directly supervised any procedures performed by the resident and was present for the procedure including all critical portions of the procedure          I, Dr. Orlando Moreira, am the primary provider of record    My Medical Decision Making:         No SI or HI. At baseline.  Evaluated by  who recommends discharge home and follow-up as an outpatient.         1. Schizoaffective disorder, unspecified type [facility])           Clara Villanueva MD  11/12/21 [id number]

## 2021-11-12 NOTE — ED NOTES
Emergency Department [facility] AN AFFILIATE OF [facility] Biopsychosocial Assessment Note    Chief Complaint:    Psychiatric Evaluation stated he feels parnoid hearing voices people talking. Denies SI/HI + covid 2days ago     MSE:  PT AT BASELINE - DENIES SI, NO HI, NON COMMANDING HALLUCINATIONS - NON COMPLIANT WITH TX.    Clinical Summary/History:   PT CONTINUES TO COME TO THE ER BECAUSE HE IS HOMELESS. HE HAS BEEN ADVISED TO FOLLOW THROUGH WITH [facility] TO HELP GET HIM LINKED TO COMMUNITY SERVICES TO HELP HIM WITH ANY SOCIAL NEEDS. PT DOES NOT FOLLOW THROUGH. PT CAN ATTEND [facility] TODAY AS A WALK IN, JUST AS HE IS ADVISED TO DO DURING EVERY ER D/C. POPULATION NAVIGATOR IS AWARE OF THIS MATTER. PT DENIES PEER SUPPORT    Gender  [x] Male [] Female [] Transgender  [] Other    Sexual Orientation    [x] Heterosexual [] Homosexual [] Bisexual [] Other    Suicidal Behavioral: CSSR-S Complete. [] Reports:    [] Past [] Present   [x] Denies    Homicidal/ Violent Behavior  [] Reports:   [] Past [] Present   [x] Denies     Hallucinations/Delusions   [x] Reports:   [] Denies     Substance Use/Alcohol Use/Addiction: SBIRT Screen Complete.    [x] Reports:   [] Denies     Trauma History  [] Reports:  [x] Denies     Collateral Information:   NA    Level of Care/Disposition Plan  [x] Home:   [] Outpatient Provider:   [] Crisis Unit:   [] Inpatient Psychiatric Unit:  [] Other:        MAJOR Callaway  11/12/21 [id number]

## 2021-11-12 NOTE — ED PROVIDER NOTES
membranes are moist.   Eyes:      Extraocular Movements: Extraocular movements intact. Conjunctiva/sclera: Conjunctivae normal.      Pupils: Pupils are equal, round, and reactive to light. Cardiovascular:      Rate and Rhythm: Normal rate and regular rhythm. Pulses: Normal pulses. Heart sounds: Normal heart sounds. No murmur heard. Pulmonary:      Effort: Pulmonary effort is normal. No respiratory distress. Breath sounds: Normal breath sounds. No wheezing or rales. Abdominal:      General: Bowel sounds are normal.      Palpations: Abdomen is soft. Tenderness: There is no abdominal tenderness. There is no guarding or rebound. Musculoskeletal:         General: Tenderness (tender feet throughout) present. No swelling or deformity. Cervical back: Normal range of motion and neck supple. Skin:     General: Skin is warm and dry. Neurological:      Mental Status: He is alert and oriented to person, place, and time. Cranial Nerves: No cranial nerve deficit. Coordination: Coordination normal.          Procedures     MDM     ED Course as of 11/12/21 1532 Fri Nov 12, 2021   1211 Patient can be discharged per social work, [facility] [JG]      ED Course User Index  [JG] Sourav Rivas MD            ED Course as of 11/12/21 1532 Fri Nov 12, 2021   1211 Patient can be discharged per social work, will follow up with [facility]. [JG]      ED Course User Index  [JG] Sourav Rivas MD     Patient presented emergency department for psychiatric evaluation, was medically cleared, seen by behavioral health who gave him follow-up, patient is not suicidal, not homicidal, was discharged home.  --------------------------------------------- PAST HISTORY ---------------------------------------------  Past Medical History:  has a past medical history of Depression and Schizoaffective disorder ([facility] [address]     Past Surgical History:  has a past surgical history that includes Hip fracture surgery (Left, 9/12/2021); eye surgery (19 years ago); and Hip fracture surgery (Left, 9/28/2021). Social History:  reports that he has been smoking cigars and cigarettes. He has a 24.00 pack-year smoking history. He has never used smokeless tobacco. He reports current alcohol use. He reports current drug use. Frequency: 7.00 times per week. Drugs: [facility] ([facility] Family History: family history includes Mental Illness in his mother; No Known Problems in his father; Substance Abuse in his mother. The patients home medications have been reviewed.     Allergies: Chlorpheniramine-phenylephrine, Penicillins, and Rondec-d [chlophedianol-pseudoephedrine]    -------------------------------------------------- RESULTS -------------------------------------------------  Labs:  Results for orders placed or performed during the hospital encounter of 11/12/21   COVID-19 & Influenza Combo    Specimen: Nasopharyngeal Swab   Result Value Ref Range    SARS-CoV-2 RNA, RT PCR NOT DETECTED NOT DETECTED    INFLUENZA A NOT DETECTED NOT DETECTED    INFLUENZA B NOT DETECTED NOT DETECTED   CBC Auto Differential   Result Value Ref Range    WBC 4.0 (L) 4.5 - 11.5 E9/L    RBC 4.43 3.80 - 5.80 E12/L    Hemoglobin 13.0 12.5 - 16.5 g/dL    Hematocrit 40.0 37.0 - 54.0 %    MCV 90.3 80.0 - 99.9 fL    MCH 29.3 26.0 - 35.0 pg    MCHC 32.5 32.0 - 34.5 %    RDW 14.2 11.5 - 15.0 fL    Platelets 953 (H) 199 - 450 E9/L    MPV 8.9 7.0 - 12.0 fL    Neutrophils % 52.0 43.0 - 80.0 %    Immature Granulocytes % 0.2 0.0 - 5.0 %    Lymphocytes % 30.0 20.0 - 42.0 %    Monocytes % 10.1 2.0 - 12.0 %    Eosinophils % 5.2 0.0 - 6.0 %    Basophils % 2.5 (H) 0.0 - 2.0 %    Neutrophils Absolute 2.10 1.80 - 7.30 E9/L    Immature Granulocytes # 0.01 E9/L    Lymphocytes Absolute 1.21 (L) 1.50 - 4.00 E9/L    Monocytes Absolute 0.41 0.10 - 0.95 E9/L    Eosinophils Absolute 0.21 0.05 - 0.50 E9/L    Basophils Absolute 0.10 0.00 - counseling regarding the diagnosis and prognosis. Their questions are answered at this time and they are agreeable with the plan. I discussed at length with them reasons for immediate return here for re evaluation. They will followup with their primary care physician by calling their office tomorrow. --------------------------------- ADDITIONAL PROVIDER NOTES ---------------------------------  At this time the patient is without objective evidence of an acute process requiring hospitalization or inpatient management. They have remained hemodynamically stable throughout their entire ED visit and are stable for discharge with outpatient follow-up. The plan has been discussed in detail and they are aware of the specific conditions for emergent return, as well as the importance of follow-up. Discharge Medication List as of 11/12/2021  1:57 PM          Diagnosis:  1. [facility]        Disposition:  Patient's disposition: Discharge to home  Patient's condition is stable.        Heidi Blakely MD  Resident  11/12/21 7865

## 2021-11-12 NOTE — CARE COORDINATION
Date/Time:  11/12/2021 9:33 AM  Attempted to reach patient by telephone. Unable to leave voicemail. No further outreaches.

## 2021-11-16 LAB
AFB CULTURE (MYCOBACTERIA): NORMAL
AFB CULTURE (MYCOBACTERIA): NORMAL
AFB SMEAR: NORMAL
AFB SMEAR: NORMAL

## 2021-11-23 ENCOUNTER — HOSPITAL ENCOUNTER (EMERGENCY)
Age: 41
Discharge: HOME | End: 2021-11-23
Attending: EMERGENCY MEDICINE
Payer: COMMERCIAL

## 2021-11-23 VITALS
SYSTOLIC BLOOD PRESSURE: 105 MMHG | HEART RATE: 62 BPM | RESPIRATION RATE: 16 BRPM | DIASTOLIC BLOOD PRESSURE: 62 MMHG | TEMPERATURE: 98.9 F | OXYGEN SATURATION: 96 %

## 2021-11-23 DIAGNOSIS — F19.10 SUBSTANCE ABUSE (HCC): ICD-10-CM

## 2021-11-23 DIAGNOSIS — F39 MOOD DISORDER (HCC): Primary | ICD-10-CM

## 2021-11-23 LAB
ACETAMINOPHEN LEVEL: <5 MCG/ML (ref 10–30)
ALBUMIN SERPL-MCNC: 4 G/DL (ref 3.5–5.2)
ALP BLD-CCNC: 48 U/L (ref 40–129)
ALT SERPL-CCNC: 10 U/L (ref 0–40)
AMPHETAMINE SCREEN, URINE: NOT DETECTED
ANION GAP SERPL CALCULATED.3IONS-SCNC: 9 MMOL/L (ref 7–16)
AST SERPL-CCNC: 19 U/L (ref 0–39)
BARBITURATE SCREEN URINE: NOT DETECTED
BASOPHILS ABSOLUTE: 0.06 E9/L (ref 0–0.2)
BASOPHILS RELATIVE PERCENT: 0.8 % (ref 0–2)
BENZODIAZEPINE SCREEN, URINE: NOT DETECTED
BILIRUB SERPL-MCNC: 0.5 MG/DL (ref 0–1.2)
BUN BLDV-MCNC: 16 MG/DL (ref 6–20)
CALCIUM SERPL-MCNC: 8.9 MG/DL (ref 8.6–10.2)
CANNABINOID SCREEN URINE: POSITIVE
CHLORIDE BLD-SCNC: 104 MMOL/L (ref 98–107)
CO2: 28 MMOL/L (ref 22–29)
COCAINE METABOLITE SCREEN URINE: POSITIVE
CREAT SERPL-MCNC: 1 MG/DL (ref 0.7–1.2)
EKG ATRIAL RATE: 67 BPM
EKG P AXIS: 67 DEGREES
EKG P-R INTERVAL: 158 MS
EKG Q-T INTERVAL: 394 MS
EKG QRS DURATION: 94 MS
EKG QTC CALCULATION (BAZETT): 416 MS
EKG R AXIS: 82 DEGREES
EKG T AXIS: 72 DEGREES
EKG VENTRICULAR RATE: 67 BPM
EOSINOPHILS ABSOLUTE: 0.12 E9/L (ref 0.05–0.5)
EOSINOPHILS RELATIVE PERCENT: 1.6 % (ref 0–6)
ETHANOL: <10 MG/DL (ref 0–0.08)
FENTANYL SCREEN, URINE: NOT DETECTED
GFR AFRICAN AMERICAN: >60
GFR NON-AFRICAN AMERICAN: >60 ML/MIN/1.73
GLUCOSE BLD-MCNC: 77 MG/DL (ref 74–99)
HCT VFR BLD CALC: 40 % (ref 37–54)
HEMOGLOBIN: 12.6 G/DL (ref 12.5–16.5)
IMMATURE GRANULOCYTES #: 0.01 E9/L
IMMATURE GRANULOCYTES %: 0.1 % (ref 0–5)
INFLUENZA A: NOT DETECTED
INFLUENZA B: NOT DETECTED
LYMPHOCYTES ABSOLUTE: 1.36 E9/L (ref 1.5–4)
LYMPHOCYTES RELATIVE PERCENT: 18.1 % (ref 20–42)
Lab: ABNORMAL
MCH RBC QN AUTO: 29 PG (ref 26–35)
MCHC RBC AUTO-ENTMCNC: 31.5 % (ref 32–34.5)
MCV RBC AUTO: 92.2 FL (ref 80–99.9)
METHADONE SCREEN, URINE: NOT DETECTED
MONOCYTES ABSOLUTE: 0.57 E9/L (ref 0.1–0.95)
MONOCYTES RELATIVE PERCENT: 7.6 % (ref 2–12)
NEUTROPHILS ABSOLUTE: 5.39 E9/L (ref 1.8–7.3)
NEUTROPHILS RELATIVE PERCENT: 71.8 % (ref 43–80)
OPIATE SCREEN URINE: NOT DETECTED
OXYCODONE URINE: NOT DETECTED
PDW BLD-RTO: 14.7 FL (ref 11.5–15)
PHENCYCLIDINE SCREEN URINE: NOT DETECTED
PLATELET # BLD: 308 E9/L (ref 130–450)
PMV BLD AUTO: 9.6 FL (ref 7–12)
POTASSIUM REFLEX MAGNESIUM: 3.9 MMOL/L (ref 3.5–5)
RBC # BLD: 4.34 E12/L (ref 3.8–5.8)
SALICYLATE, SERUM: <0.3 MG/DL (ref 0–30)
SARS-COV-2 RNA, RT PCR: DETECTED
SODIUM BLD-SCNC: 141 MMOL/L (ref 132–146)
TOTAL PROTEIN: 6.2 G/DL (ref 6.4–8.3)
TRICYCLIC ANTIDEPRESSANTS SCREEN SERUM: NEGATIVE NG/ML
WBC # BLD: 7.5 E9/L (ref 4.5–11.5)

## 2021-11-23 PROCEDURE — 93010 ELECTROCARDIOGRAM REPORT: CPT | Performed by: INTERNAL MEDICINE

## 2021-11-23 PROCEDURE — 82077 ASSAY SPEC XCP UR&BREATH IA: CPT

## 2021-11-23 PROCEDURE — 93005 ELECTROCARDIOGRAM TRACING: CPT | Performed by: EMERGENCY MEDICINE

## 2021-11-23 PROCEDURE — 80179 DRUG ASSAY SALICYLATE: CPT

## 2021-11-23 PROCEDURE — 80143 DRUG ASSAY ACETAMINOPHEN: CPT

## 2021-11-23 PROCEDURE — 87636 SARSCOV2 & INF A&B AMP PRB: CPT

## 2021-11-23 PROCEDURE — 80307 DRUG TEST PRSMV CHEM ANLYZR: CPT

## 2021-11-23 PROCEDURE — 99283 EMERGENCY DEPT VISIT LOW MDM: CPT

## 2021-11-23 PROCEDURE — 80053 COMPREHEN METABOLIC PANEL: CPT

## 2021-11-23 PROCEDURE — 85025 COMPLETE CBC W/AUTO DIFF WBC: CPT

## 2021-11-23 NOTE — ED PROVIDER NOTES
HPI:  11/23/21, Time: 3:57 AM JAYLENE Pickard. is a 37 y.o. male presenting to the ED for psychiatric evaluation, beginning months ago. The complaint has been persistent, moderate in severity, and worsened by nothing. Patient does have a history of psychiatric history. Patient reporting having suicidal thoughts. Patient also having thoughts of hurting others. Patient does report visual hallucinations and auditory hallucinations that is ongoing since he was a teenager. Patient reporting no chest pain no difficulty breathing or abdominal pain there is no fever chills or cough. Patient reporting no weakness or dizziness. Reportedly patient has been off his medications. There is no headache or neck pain or back pain    ROS:   Pertinent positives and negatives are stated within HPI, all other systems reviewed and are negative.  --------------------------------------------- PAST HISTORY ---------------------------------------------  Past Medical History:  has a past medical history of Depression and Schizoaffective disorder ([facility] [place] [address] Past Surgical History:  has a past surgical history that includes Hip fracture surgery (Left, 9/12/2021); eye surgery (19 years ago); and Hip fracture surgery (Left, 9/28/2021). Social History:  reports that he has been smoking cigars and cigarettes. He has a 24.00 pack-year smoking history. He has never used smokeless tobacco. He reports current alcohol use. He reports current drug use. Frequency: 7.00 times per week. Drugs: Cocaine and Marijuana ([place]). Family History: family history includes Mental Illness in his mother; No Known Problems in his father; Substance Abuse in his mother. The patients home medications have been reviewed.     Allergies: Chlorpheniramine-phenylephrine, Penicillins, and Rondec-d [chlophedianol-pseudoephedrine]    ---------------------------------------------------PHYSICAL EXAM--------------------------------------    Constitutional/General: Alert and oriented x3,   Head: Normocephalic and atraumatic  Eyes: PERRL, EOMI  Mouth: Oropharynx clear, handling secretions, no trismus  Neck: Supple, full ROM, non tender to palpation in the midline, no stridor, no crepitus, no meningeal signs  Pulmonary: Lungs clear to auscultation bilaterally, no wheezes, rales, or rhonchi. Not in respiratory distress  Cardiovascular:  Regular rate. Regular rhythm. No murmurs, gallops, or rubs. 2+ distal pulses  Chest: no chest wall tenderness  Abdomen: Soft. Non tender. Non distended. +BS. No rebound, guarding, or rigidity. No pulsatile masses appreciated. Musculoskeletal: Moves all extremities x 4. Warm and well perfused, no clubbing, cyanosis, or edema. Capillary refill <3 seconds  Skin: warm and dry. No rashes. Noted wound from prior surgery. No cellulitis. Neurologic: GCS 15, CN 2-12 grossly intact, no focal deficits, symmetric strength 5/5 in the upper and lower extremities bilaterally  Psych: Suicidal ideations as well as homicidal ideation as well as hallucinations, flight of ideas    -------------------------------------------------- RESULTS -------------------------------------------------  I have personally reviewed all laboratory and imaging results for this patient. Results are listed below.      LABS:  Results for orders placed or performed during the hospital encounter of 11/23/21   CBC Auto Differential   Result Value Ref Range    WBC 7.5 4.5 - 11.5 E9/L    RBC 4.34 3.80 - 5.80 E12/L    Hemoglobin 12.6 12.5 - 16.5 g/dL    Hematocrit 40.0 37.0 - 54.0 %    MCV 92.2 80.0 - 99.9 fL    MCH 29.0 26.0 - 35.0 pg    MCHC 31.5 (L) 32.0 - 34.5 %    RDW 14.7 11.5 - 15.0 fL    Platelets 076 090 - 207 E9/L    MPV 9.6 7.0 - 12.0 fL    Neutrophils % 71.8 43.0 - 80.0 %    Immature Granulocytes % 0.1 0.0 - 5.0 %    Lymphocytes % 18.1 (L) 20.0 - 42.0 %    Monocytes % 7.6 2.0 - 12.0 %    Eosinophils % 1.6 0.0 - 6.0 %    Basophils % 0.8 0.0 - 2.0 %    Neutrophils Absolute 5.39 1.80 - 7.30 E9/L    Immature Granulocytes # 0.01 E9/L    Lymphocytes Absolute 1.36 (L) 1.50 - 4.00 E9/L    Monocytes Absolute 0.57 0.10 - 0.95 E9/L    Eosinophils Absolute 0.12 0.05 - 0.50 E9/L    Basophils Absolute 0.06 0.00 - 0.20 E9/L   Serum Drug Screen   Result Value Ref Range    Ethanol Lvl <10 mg/dL    Acetaminophen Level <5.0 (L) 10.0 - 94.5 mcg/mL    Salicylate, Serum <7.9 0.0 - 30.0 mg/dL    TCA Scrn NEGATIVE Cutoff:300 ng/mL   URINE DRUG SCREEN   Result Value Ref Range    Amphetamine Screen, Urine NOT DETECTED Negative <1000 ng/mL    Barbiturate Screen, Ur NOT DETECTED Negative < 200 ng/mL    Benzodiazepine Screen, Urine NOT DETECTED Negative < 200 ng/mL    Cannabinoid Scrn, Ur POSITIVE (A) Negative < 50ng/mL    Cocaine Metabolite Screen, Urine POSITIVE (A) Negative < 300 ng/mL    Opiate Scrn, Ur NOT DETECTED Negative < 300ng/mL    PCP Screen, Urine NOT DETECTED Negative < 25 ng/mL    Methadone Screen, Urine NOT DETECTED Negative <300 ng/mL    Oxycodone Urine NOT DETECTED Negative <100 ng/mL    FENTANYL SCREEN, URINE NOT DETECTED Negative <1 ng/mL    Drug Screen Comment: see below    Comprehensive Metabolic Panel w/ Reflex to MG   Result Value Ref Range    Sodium 141 132 - 146 mmol/L    Potassium reflex Magnesium 3.9 3.5 - 5.0 mmol/L    Chloride 104 98 - 107 mmol/L    CO2 28 22 - 29 mmol/L    Anion Gap 9 7 - 16 mmol/L    Glucose 77 74 - 99 mg/dL    BUN 16 6 - 20 mg/dL    CREATININE 1.0 0.7 - 1.2 mg/dL    GFR Non-African American >60 >=60 mL/min/1.73    GFR African American >60     Calcium 8.9 8.6 - 10.2 mg/dL    Total Protein 6.2 (L) 6.4 - 8.3 g/dL    Albumin 4.0 3.5 - 5.2 g/dL    Total Bilirubin 0.5 0.0 - 1.2 mg/dL    Alkaline Phosphatase 48 40 - 129 U/L    ALT 10 0 - 40 U/L    AST 19 0 - 39 U/L   EKG 12 Lead   Result Value Ref Range    Ventricular Rate 67 BPM    Atrial Rate 67 BPM    P-R Interval 158 ms    QRS Duration 94 ms    Q-T Interval 394 ms    QTc Calculation (Bazett) 416 ms    P Axis 67 degrees    R Axis 82 degrees    T Axis 72 degrees       RADIOLOGY:  Interpreted by Radiologist.  No orders to display         EKG: This EKG is signed and interpreted by me. Rate: 67  Rhythm: Sinus  Interpretation: no acute changes  Comparison: stable as compared to patient's most recent EKG          ------------------------- NURSING NOTES AND VITALS REVIEWED ---------------------------   The nursing notes within the ED encounter and vital signs as below have been reviewed by myself. /62   Pulse 62   Temp 98.9 °F (37.2 °C)   Resp 16   SpO2 96%   Oxygen Saturation Interpretation: Normal    The patients available past medical records and past encounters were reviewed. ------------------------------ ED COURSE/MEDICAL DECISION MAKING----------------------  Medications - No data to display          Medical Decision Making:   Presenting here because of psychiatric complaint. Patient reporting no physical complaints of chest pain or shortness of breath. Patient does have history of schizophrenia reportedly not taking his medications. Patient making nonsensical statements and having flight of ideas. Patient also reporting suicidal as well as homicidal thoughts. Patient labs are reviewed. Patient medically clear for  to evaluate     Re-Evaluations:             Re-evaluation. Patients symptoms show no change      Consultations:                 Critical Care: This patient's ED course included: a personal history and physicial eaxmination    This patient has been closely monitored during their ED course. Counseling: The emergency provider has spoken with the patient and discussed todays results, in addition to providing specific details for the plan of care and counseling regarding the diagnosis and prognosis. Questions are answered at this time and they are agreeable with the plan. --------------------------------- IMPRESSION AND DISPOSITION ---------------------------------    IMPRESSION  1. Mood disorder ([facility]    2. Substance abuse ([facility]        DISPOSITION  Disposition:  to evaluate  Patient condition is stable        NOTE: This report was transcribed using voice recognition software.  Every effort was made to ensure accuracy; however, inadvertent computerized transcription errors may be present          Marciano Iqbal MD  11/23/21 289 [facility] MD Santos  11/23/21 9103

## 2021-11-23 NOTE — ED NOTES
Emergency Department [facility] AN AFFILIATE OF [facility] Biopsychosocial Assessment Note     Chief Complaint:    Pt is a 37 y.o. male presenting to the ED for psychiatric evaluation; states that he needs his shot and he has not had one in three months.      MSE: Pt alert and oriented x 4, cooperative, mood depressed, flat, affect congruent, thought processes paranoid, normal speech pattern. Pt needs to be encouraged to cooperate, mostly asleep; speaking only with the covers over his head. Pt reported SI; denies HI, hearing commanding voices to \"get help\".     Clinical Summary/History:     Pt states that he is hearing voices. The voices are telling him that someone is out to get him and tell him \"to get help\". Pt also states he is homeless and has been using crack cocaine. Pt admits  that he has been off meds for 3 months and forgetting to get his shot. He reports that he is not connected to a mental health provider and only goes to the Er to get his \"shot\". Pt has a hx of ER visits with the same complaint. Pt admits to suicidal ideation with no plan and reports a long mental health history of Bipolar and Schizophrenia. Pt has had multiple psychiatric admissions with his last admission to [facility] unit being August 21, 2021. Pt admits to not following up with mental health services following discharge. Pt admits to suicidal ideations in the past and states he is currently suicidal but does not have a plan \"yet\". Pt was vague in his recollection of previous suicide attempts/self-harm. Pt states he is homeless and has no support system, is sleping poorly and has no appetite. Gender  [x]? Male []? Female []? Transgender  []? Other     Sexual Orientation    [x]? Heterosexual []? Homosexual []? Bisexual []? Other     Suicidal Behavioral: CSSR-S Complete. [x]? Reports:               []? Past []? Present   []? Denies     Homicidal/ Violent Behavior  [x]? Reports:              []? Past []? Present   []?  Denies    Hallucinations/Delusions   [x]? Reports:   []? Denies      Substance Use/Alcohol Use/Addiction: SBIRT Screen Complete. [x]? Reports:   []? Denies      Trauma History  []? Reports:  [x]? Denies      Collateral Information:   na     Level of Care/Disposition Plan  []? Home:   []? Outpatient Provider:   []? Crisis Unit:   [x]? Inpatient Psychiatric Unit:  []?  Other:      Alina Pagan, MSW, LSW  11/23/21 8381

## 2021-11-26 ENCOUNTER — HOSPITAL ENCOUNTER (EMERGENCY)
Age: 41
Discharge: HOME | End: 2021-11-26
Attending: EMERGENCY MEDICINE
Payer: COMMERCIAL

## 2021-11-26 VITALS
TEMPERATURE: 97.8 F | OXYGEN SATURATION: 97 % | HEIGHT: 72 IN | SYSTOLIC BLOOD PRESSURE: 145 MMHG | HEART RATE: 74 BPM | BODY MASS INDEX: 21.67 KG/M2 | WEIGHT: 160 LBS | DIASTOLIC BLOOD PRESSURE: 76 MMHG

## 2021-11-26 DIAGNOSIS — R44.3 HALLUCINATIONS: Primary | ICD-10-CM

## 2021-11-26 LAB
ACETAMINOPHEN LEVEL: <5 MCG/ML (ref 10–30)
ALBUMIN SERPL-MCNC: 3.9 G/DL (ref 3.5–5.2)
ALP BLD-CCNC: 46 U/L (ref 40–129)
ALT SERPL-CCNC: 11 U/L (ref 0–40)
ANION GAP SERPL CALCULATED.3IONS-SCNC: 7 MMOL/L (ref 7–16)
AST SERPL-CCNC: 17 U/L (ref 0–39)
BASOPHILS ABSOLUTE: 0.06 E9/L (ref 0–0.2)
BASOPHILS RELATIVE PERCENT: 1.3 % (ref 0–2)
BILIRUB SERPL-MCNC: 0.2 MG/DL (ref 0–1.2)
BILIRUBIN DIRECT: <0.2 MG/DL (ref 0–0.3)
BILIRUBIN, INDIRECT: ABNORMAL MG/DL (ref 0–1)
BUN BLDV-MCNC: 21 MG/DL (ref 6–20)
CALCIUM SERPL-MCNC: 9.3 MG/DL (ref 8.6–10.2)
CHLORIDE BLD-SCNC: 105 MMOL/L (ref 98–107)
CO2: 28 MMOL/L (ref 22–29)
CREAT SERPL-MCNC: 1.1 MG/DL (ref 0.7–1.2)
EKG ATRIAL RATE: 61 BPM
EKG P AXIS: 69 DEGREES
EKG P-R INTERVAL: 160 MS
EKG Q-T INTERVAL: 398 MS
EKG QRS DURATION: 96 MS
EKG QTC CALCULATION (BAZETT): 400 MS
EKG R AXIS: 83 DEGREES
EKG T AXIS: 70 DEGREES
EKG VENTRICULAR RATE: 61 BPM
EOSINOPHILS ABSOLUTE: 0.29 E9/L (ref 0.05–0.5)
EOSINOPHILS RELATIVE PERCENT: 6.3 % (ref 0–6)
ETHANOL: <10 MG/DL (ref 0–0.08)
GFR AFRICAN AMERICAN: >60
GFR NON-AFRICAN AMERICAN: >60 ML/MIN/1.73
GLUCOSE BLD-MCNC: 62 MG/DL (ref 74–99)
HCT VFR BLD CALC: 39.4 % (ref 37–54)
HEMOGLOBIN: 12.3 G/DL (ref 12.5–16.5)
IMMATURE GRANULOCYTES #: 0.01 E9/L
IMMATURE GRANULOCYTES %: 0.2 % (ref 0–5)
INFLUENZA A: NOT DETECTED
INFLUENZA B: NOT DETECTED
LYMPHOCYTES ABSOLUTE: 1.44 E9/L (ref 1.5–4)
LYMPHOCYTES RELATIVE PERCENT: 31.4 % (ref 20–42)
MCH RBC QN AUTO: 29.2 PG (ref 26–35)
MCHC RBC AUTO-ENTMCNC: 31.2 % (ref 32–34.5)
MCV RBC AUTO: 93.6 FL (ref 80–99.9)
MONOCYTES ABSOLUTE: 0.38 E9/L (ref 0.1–0.95)
MONOCYTES RELATIVE PERCENT: 8.3 % (ref 2–12)
NEUTROPHILS ABSOLUTE: 2.4 E9/L (ref 1.8–7.3)
NEUTROPHILS RELATIVE PERCENT: 52.5 % (ref 43–80)
PDW BLD-RTO: 14.7 FL (ref 11.5–15)
PLATELET # BLD: 276 E9/L (ref 130–450)
PMV BLD AUTO: 9.6 FL (ref 7–12)
POTASSIUM REFLEX MAGNESIUM: 4.3 MMOL/L (ref 3.5–5)
RBC # BLD: 4.21 E12/L (ref 3.8–5.8)
SALICYLATE, SERUM: <0.3 MG/DL (ref 0–30)
SARS-COV-2 RNA, RT PCR: NOT DETECTED
SODIUM BLD-SCNC: 140 MMOL/L (ref 132–146)
TOTAL PROTEIN: 6 G/DL (ref 6.4–8.3)
TRICYCLIC ANTIDEPRESSANTS SCREEN SERUM: NEGATIVE NG/ML
WBC # BLD: 4.6 E9/L (ref 4.5–11.5)

## 2021-11-26 PROCEDURE — 87636 SARSCOV2 & INF A&B AMP PRB: CPT

## 2021-11-26 PROCEDURE — 93010 ELECTROCARDIOGRAM REPORT: CPT | Performed by: INTERNAL MEDICINE

## 2021-11-26 PROCEDURE — 80307 DRUG TEST PRSMV CHEM ANLYZR: CPT

## 2021-11-26 PROCEDURE — 80179 DRUG ASSAY SALICYLATE: CPT

## 2021-11-26 PROCEDURE — 80143 DRUG ASSAY ACETAMINOPHEN: CPT

## 2021-11-26 PROCEDURE — 85025 COMPLETE CBC W/AUTO DIFF WBC: CPT

## 2021-11-26 PROCEDURE — 80076 HEPATIC FUNCTION PANEL: CPT

## 2021-11-26 PROCEDURE — 93005 ELECTROCARDIOGRAM TRACING: CPT | Performed by: STUDENT IN AN ORGANIZED HEALTH CARE EDUCATION/TRAINING PROGRAM

## 2021-11-26 PROCEDURE — 80048 BASIC METABOLIC PNL TOTAL CA: CPT

## 2021-11-26 PROCEDURE — 82077 ASSAY SPEC XCP UR&BREATH IA: CPT

## 2021-11-26 PROCEDURE — 99283 EMERGENCY DEPT VISIT LOW MDM: CPT

## 2021-11-26 ASSESSMENT — PAIN SCALES - GENERAL: PAINLEVEL_OUTOF10: 8

## 2021-11-26 ASSESSMENT — ENCOUNTER SYMPTOMS
NAUSEA: 0
SHORTNESS OF BREATH: 0
BACK PAIN: 0
SINUS PRESSURE: 0
EYE DISCHARGE: 0
WHEEZING: 0
ABDOMINAL PAIN: 0
SORE THROAT: 0
DIARRHEA: 0
EYE PAIN: 0
VOMITING: 0
COUGH: 0

## 2021-11-26 ASSESSMENT — PAIN DESCRIPTION - LOCATION: LOCATION: LEG

## 2021-11-26 ASSESSMENT — PAIN DESCRIPTION - PAIN TYPE: TYPE: CHRONIC PAIN

## 2021-11-26 NOTE — ED PROVIDER NOTES
Patient is a 72-year-old male presenting emergency department for hallucinations, states he saw a figure coming out of with a knife, is also complaining of being homeless, and states he needs a place to sleep to recover from his paranoia, no other complaints. Onset of symptoms last night, brief, improving with time, nothing made them worse, paranoia associated with hallucinations, moderate in severity, gradual in onset. He denies any alcohol or drug use, denies any suicidal homicidal ideations. Review of Systems   Constitutional: Negative for chills and fever. HENT: Negative for ear pain, sinus pressure and sore throat. Eyes: Negative for pain and discharge. Respiratory: Negative for cough, shortness of breath and wheezing. Cardiovascular: Negative for chest pain. Gastrointestinal: Negative for abdominal pain, diarrhea, nausea and vomiting. Genitourinary: Negative for dysuria and frequency. Musculoskeletal: Negative for arthralgias and back pain. Skin: Negative for rash and wound. Neurological: Negative for weakness and headaches. Hematological: Negative for adenopathy. Psychiatric/Behavioral: Positive for hallucinations. Negative for suicidal ideas. The patient is nervous/anxious. All other systems reviewed and are negative. Physical Exam  Vitals and nursing note reviewed. Constitutional:       Appearance: He is well-developed. He is ill-appearing (Disheveled appearing). HENT:      Head: Normocephalic and atraumatic. Right Ear: External ear normal.      Left Ear: External ear normal.      Nose: Nose normal.      Mouth/Throat:      Mouth: Mucous membranes are moist.   Eyes:      Extraocular Movements: Extraocular movements intact. Conjunctiva/sclera: Conjunctivae normal.      Pupils: Pupils are equal, round, and reactive to light. Cardiovascular:      Rate and Rhythm: Normal rate and regular rhythm. Heart sounds: Normal heart sounds.  No murmur heard.      Pulmonary:      Effort: Pulmonary effort is normal. No respiratory distress. Breath sounds: Normal breath sounds. No wheezing or rales. Abdominal:      General: Bowel sounds are normal.      Palpations: Abdomen is soft. Tenderness: There is no abdominal tenderness. There is no guarding or rebound. Musculoskeletal:         General: No tenderness or deformity. Cervical back: Normal range of motion and neck supple. Skin:     General: Skin is warm and dry. Neurological:      General: No focal deficit present. Mental Status: He is alert and oriented to person, place, and time. Cranial Nerves: No cranial nerve deficit. Sensory: No sensory deficit. Motor: No weakness. Procedures     [facility]     ED Course as of 11/26/21 0624 Fri Nov 26, 2021   0559 EKG: This EKG is signed by emergency department physician. Rate: 61  Rhythm: Sinus  Interpretation: non-specific EKG  Comparison: stable as compared to patient's most recent EKG      [JG]   0622 Patient is medically clear [JG]      ED Course User Index  [JG] Shlomo Beltran MD      ED Course as of 11/26/21 0624 Fri Nov 26, 2021   0559 EKG: This EKG is signed by emergency department physician. Rate: 61  Rhythm: Sinus  Interpretation: non-specific EKG  Comparison: stable as compared to patient's most recent EKG      [JG]   0622 Patient is medically clear [JG]      ED Course User Index  [JG] Shlomo Beltran MD     Patient presented emergency department for hallucinations, states he saw someone coming at him with a knife, denies any suicidal homicidal ideation, he was medically cleared. --------------------------------------------- PAST HISTORY ---------------------------------------------  Past Medical History:  has a past medical history of Depression and Schizoaffective disorder ([facility] [address]     Past Surgical History:  has a past surgical history that includes Hip fracture surgery (Left, 9/12/2021); eye surgery (19 years ago); and Hip fracture surgery (Left, 9/28/2021). Social History:  reports that he has been smoking cigars and cigarettes. He has a 24.00 pack-year smoking history. He has never used smokeless tobacco. He reports current alcohol use. He reports current drug use. Frequency: 7.00 times per week. Drugs: Cocaine and Marijuana (Kobi Casillas). Family History: family history includes Mental Illness in his mother; No Known Problems in his father; Substance Abuse in his mother. The patients home medications have been reviewed.     Allergies: Chlorpheniramine-phenylephrine, Penicillins, and Rondec-d [chlophedianol-pseudoephedrine]    -------------------------------------------------- RESULTS -------------------------------------------------    LABS:  Results for orders placed or performed during the hospital encounter of 11/26/21   COVID-19 & Influenza Combo    Specimen: Nasopharyngeal Swab   Result Value Ref Range    SARS-CoV-2 RNA, RT PCR NOT DETECTED NOT DETECTED    INFLUENZA A NOT DETECTED NOT DETECTED    INFLUENZA B NOT DETECTED NOT DETECTED   CBC Auto Differential   Result Value Ref Range    WBC 4.6 4.5 - 11.5 E9/L    RBC 4.21 3.80 - 5.80 E12/L    Hemoglobin 12.3 (L) 12.5 - 16.5 g/dL    Hematocrit 39.4 37.0 - 54.0 %    MCV 93.6 80.0 - 99.9 fL    MCH 29.2 26.0 - 35.0 pg    MCHC 31.2 (L) 32.0 - 34.5 %    RDW 14.7 11.5 - 15.0 fL    Platelets 692 078 - 744 E9/L    MPV 9.6 7.0 - 12.0 fL    Neutrophils % 52.5 43.0 - 80.0 %    Immature Granulocytes % 0.2 0.0 - 5.0 %    Lymphocytes % 31.4 20.0 - 42.0 %    Monocytes % 8.3 2.0 - 12.0 %    Eosinophils % 6.3 (H) 0.0 - 6.0 %    Basophils % 1.3 0.0 - 2.0 %    Neutrophils Absolute 2.40 1.80 - 7.30 E9/L    Immature Granulocytes # 0.01 E9/L    Lymphocytes Absolute 1.44 (L) 1.50 - 4.00 E9/L    Monocytes Absolute 0.38 0.10 - 0.95 E9/L    Eosinophils Absolute 0.29 0.05 - 0.50 E9/L    Basophils Absolute 0.06 0.00 - 0.20 J6/N   Basic Metabolic Panel w/ Reflex to MG   Result Value Ref Range    Sodium 140 132 - 146 mmol/L    Potassium reflex Magnesium 4.3 3.5 - 5.0 mmol/L    Chloride 105 98 - 107 mmol/L    CO2 28 22 - 29 mmol/L    Anion Gap 7 7 - 16 mmol/L    Glucose 62 (L) 74 - 99 mg/dL    BUN 21 (H) 6 - 20 mg/dL    CREATININE 1.1 0.7 - 1.2 mg/dL    GFR Non-African American >60 >=60 mL/min/1.73    GFR African American >60     Calcium 9.3 8.6 - 10.2 mg/dL   Hepatic Function Panel   Result Value Ref Range    Total Protein 6.0 (L) 6.4 - 8.3 g/dL    Albumin 3.9 3.5 - 5.2 g/dL    Alkaline Phosphatase 46 40 - 129 U/L    ALT 11 0 - 40 U/L    AST 17 0 - 39 U/L    Total Bilirubin 0.2 0.0 - 1.2 mg/dL    Bilirubin, Direct <0.2 0.0 - 0.3 mg/dL    Bilirubin, Indirect see below 0.0 - 1.0 mg/dL   Serum Drug Screen   Result Value Ref Range    Ethanol Lvl <10 mg/dL    Acetaminophen Level <5.0 (L) 10.0 - 68.0 mcg/mL    Salicylate, Serum <8.3 0.0 - 30.0 mg/dL    TCA Scrn NEGATIVE Cutoff:300 ng/mL   EKG 12 Lead   Result Value Ref Range    Ventricular Rate 61 BPM    Atrial Rate 61 BPM    P-R Interval 160 ms    QRS Duration 96 ms    Q-T Interval 398 ms    QTc Calculation (Bazett) 400 ms    P Axis 69 degrees    R Axis 83 degrees    T Axis 70 degrees       RADIOLOGY:  No orders to display         ------------------------- NURSING NOTES AND VITALS REVIEWED ---------------------------  Date / Time Roomed:  11/26/2021  6:21 AM  ED Bed Assignment:  [facility]    The nursing notes within the ED encounter and vital signs as below have been reviewed. Patient Vitals for the past 24 hrs:   BP Temp Temp src Pulse SpO2 Height Weight   11/26/21 0402 (!) 145/76 97.8 °F (36.6 °C) Oral 74 97 % 6' (1.829 m) 160 lb (72.6 kg)   11/26/21 0351 -- 97.8 °F (36.6 °C) Oral 92 98 % -- --       Oxygen Saturation Interpretation: Normal      --------------------------------- ADDITIONAL PROVIDER NOTES ---------------------------------  Consultations:     This patient's ED course included: a personal history and physicial examination, re-evaluation prior to disposition and multiple bedside re-evaluations    This patient has remained hemodynamically stable during their ED course. Diagnosis:  1. Hallucinations        Disposition:  Patient's disposition: Pending social work disposition  Patient's condition is stable.              Iva Mccann MD  Resident  11/26/21 4702       Iva Mccann MD  Resident  11/26/21 0514

## 2021-11-26 NOTE — ED NOTES
Pt escorted by ER nurse 4 bags of belongings placed in locker 27, got mad does not want his other bag next to him to be touch, demanding his clothes back is going to leave. Winter Langston

## 2021-11-26 NOTE — ED NOTES
Patient okay for discharge per Dr. Modesto Castañeda. Discharge instructions printed. Patient currently in restroom.       Griffin Briggs RN  11/26/21 4323

## 2021-11-26 NOTE — ED NOTES
Patient became angry when BHT attempted to secure his belongings in a locker. Patient stated \"I'm leaving then, you're not locking shit up. I got brooks in here\". Notified Dr. Carolina Ambriz that patient states he wants to leave. Per physician patient is not pink slipped and will be discharged if he wishes to leave.              Rosio Snow RN  11/26/21 2072

## 2021-11-26 NOTE — ED NOTES
Patient has all his belongings. Patient verbalized understanding of discharge instructions. Patient discharged per order. No distress noted.       Eloisa Romano RN  11/26/21 5196

## 2021-11-26 NOTE — ED NOTES
Patient was brought back to [facility]. Reviewed labs and noted glucose was 62. Asked patient if he was previously provided with food and patient stated \"No\". Asked patient the last time he ate and patient stated \"It's been awhile\". Patient currently A&OX4, respirations non-labored, skin warm/dry, no distress noted. Patient denies SI or HI. Patient states \"I'm feeling a little paranoid\". Patient reports he has auditory hallucinations stating \"The voices are saying they are after me\". Patient provided with boxed lunch and drink. Patient calm and cooperative at present. Reminded patient of need for urine specimen.       Griffin Briggs RN  11/26/21 1218

## 2021-11-26 NOTE — ED NOTES
Patient given a urine specimen container and patient verbalized understanding of need for specimen      Carlos Simon RN  11/26/21 1004

## 2021-11-26 NOTE — ED NOTES
The pt is refusing to stay and denies SI/HI. ED doc is willing to d/c the pt.      Netta Lala, [facility]  11/26/21 5531

## 2021-12-02 ENCOUNTER — HOSPITAL ENCOUNTER (EMERGENCY)
Age: 41
Discharge: HOME | End: 2021-12-02
Attending: EMERGENCY MEDICINE
Payer: COMMERCIAL

## 2021-12-02 VITALS
WEIGHT: 160 LBS | SYSTOLIC BLOOD PRESSURE: 112 MMHG | HEART RATE: 78 BPM | DIASTOLIC BLOOD PRESSURE: 66 MMHG | TEMPERATURE: 97.7 F | OXYGEN SATURATION: 98 % | RESPIRATION RATE: 18 BRPM | HEIGHT: 72 IN | BODY MASS INDEX: 21.67 KG/M2

## 2021-12-02 DIAGNOSIS — F22 PARANOIA (HCC): Primary | ICD-10-CM

## 2021-12-02 LAB
ACETAMINOPHEN LEVEL: <5 MCG/ML (ref 10–30)
ALBUMIN SERPL-MCNC: 4.1 G/DL (ref 3.5–5.2)
ALP BLD-CCNC: 47 U/L (ref 40–129)
ALT SERPL-CCNC: 11 U/L (ref 0–40)
ANION GAP SERPL CALCULATED.3IONS-SCNC: 9 MMOL/L (ref 7–16)
AST SERPL-CCNC: 16 U/L (ref 0–39)
BASOPHILS ABSOLUTE: 0.11 E9/L (ref 0–0.2)
BASOPHILS RELATIVE PERCENT: 1.4 % (ref 0–2)
BILIRUB SERPL-MCNC: 0.4 MG/DL (ref 0–1.2)
BUN BLDV-MCNC: 14 MG/DL (ref 6–20)
CALCIUM SERPL-MCNC: 8.8 MG/DL (ref 8.6–10.2)
CHLORIDE BLD-SCNC: 103 MMOL/L (ref 98–107)
CO2: 26 MMOL/L (ref 22–29)
CREAT SERPL-MCNC: 0.9 MG/DL (ref 0.7–1.2)
EKG ATRIAL RATE: 79 BPM
EKG P AXIS: 81 DEGREES
EKG P-R INTERVAL: 160 MS
EKG Q-T INTERVAL: 362 MS
EKG QRS DURATION: 92 MS
EKG QTC CALCULATION (BAZETT): 415 MS
EKG R AXIS: 83 DEGREES
EKG T AXIS: 70 DEGREES
EKG VENTRICULAR RATE: 79 BPM
EOSINOPHILS ABSOLUTE: 0.37 E9/L (ref 0.05–0.5)
EOSINOPHILS RELATIVE PERCENT: 4.9 % (ref 0–6)
ETHANOL: <10 MG/DL (ref 0–0.08)
GFR AFRICAN AMERICAN: >60
GFR NON-AFRICAN AMERICAN: >60 ML/MIN/1.73
GLUCOSE BLD-MCNC: 84 MG/DL (ref 74–99)
HCT VFR BLD CALC: 39 % (ref 37–54)
HEMOGLOBIN: 12.7 G/DL (ref 12.5–16.5)
IMMATURE GRANULOCYTES #: 0.02 E9/L
IMMATURE GRANULOCYTES %: 0.3 % (ref 0–5)
LYMPHOCYTES ABSOLUTE: 1.54 E9/L (ref 1.5–4)
LYMPHOCYTES RELATIVE PERCENT: 20.2 % (ref 20–42)
MCH RBC QN AUTO: 29.5 PG (ref 26–35)
MCHC RBC AUTO-ENTMCNC: 32.6 % (ref 32–34.5)
MCV RBC AUTO: 90.7 FL (ref 80–99.9)
MONOCYTES ABSOLUTE: 0.73 E9/L (ref 0.1–0.95)
MONOCYTES RELATIVE PERCENT: 9.6 % (ref 2–12)
NEUTROPHILS ABSOLUTE: 4.85 E9/L (ref 1.8–7.3)
NEUTROPHILS RELATIVE PERCENT: 63.6 % (ref 43–80)
PDW BLD-RTO: 14.8 FL (ref 11.5–15)
PLATELET # BLD: 344 E9/L (ref 130–450)
PMV BLD AUTO: 9.4 FL (ref 7–12)
POTASSIUM REFLEX MAGNESIUM: 4 MMOL/L (ref 3.5–5)
RBC # BLD: 4.3 E12/L (ref 3.8–5.8)
SALICYLATE, SERUM: <0.3 MG/DL (ref 0–30)
SODIUM BLD-SCNC: 138 MMOL/L (ref 132–146)
TOTAL PROTEIN: 6.3 G/DL (ref 6.4–8.3)
TRICYCLIC ANTIDEPRESSANTS SCREEN SERUM: NEGATIVE NG/ML
WBC # BLD: 7.6 E9/L (ref 4.5–11.5)

## 2021-12-02 PROCEDURE — 80179 DRUG ASSAY SALICYLATE: CPT

## 2021-12-02 PROCEDURE — 99284 EMERGENCY DEPT VISIT MOD MDM: CPT

## 2021-12-02 PROCEDURE — 82077 ASSAY SPEC XCP UR&BREATH IA: CPT

## 2021-12-02 PROCEDURE — 80053 COMPREHEN METABOLIC PANEL: CPT

## 2021-12-02 PROCEDURE — 85025 COMPLETE CBC W/AUTO DIFF WBC: CPT

## 2021-12-02 PROCEDURE — 80307 DRUG TEST PRSMV CHEM ANLYZR: CPT

## 2021-12-02 PROCEDURE — 93005 ELECTROCARDIOGRAM TRACING: CPT | Performed by: EMERGENCY MEDICINE

## 2021-12-02 PROCEDURE — 93010 ELECTROCARDIOGRAM REPORT: CPT | Performed by: INTERNAL MEDICINE

## 2021-12-02 PROCEDURE — 80143 DRUG ASSAY ACETAMINOPHEN: CPT

## 2021-12-02 ASSESSMENT — PAIN DESCRIPTION - DESCRIPTORS: DESCRIPTORS: SHOOTING;SHARP

## 2021-12-02 ASSESSMENT — PAIN DESCRIPTION - LOCATION: LOCATION: FOOT

## 2021-12-02 ASSESSMENT — PAIN DESCRIPTION - ORIENTATION: ORIENTATION: RIGHT;LEFT

## 2021-12-02 ASSESSMENT — PAIN DESCRIPTION - FREQUENCY: FREQUENCY: CONTINUOUS

## 2021-12-02 ASSESSMENT — PAIN SCALES - GENERAL: PAINLEVEL_OUTOF10: 10

## 2021-12-02 ASSESSMENT — PAIN DESCRIPTION - PAIN TYPE: TYPE: CHRONIC PAIN

## 2021-12-02 NOTE — ED NOTES
Per Dimas Eli patient is ready for discharge as arranged to go over to [facility]. Both Gustavo Bailey and I discussed case with Dr. Stacey Mcintosh as patient is a sign out from Dr. Darcy Jackson and per him, OK for discharge. Patient vital signs obtained by [facility] RN. Patient is alert and oriented. Respirations easy non-labored. Skin pink and dry. Strong radial pulse. No apparent distress. Patient denies SI and HI. He is amenable to the outpatient management plan as arranged with the .       Lyn Alvarenga, APRN - CNP  12/02/21 3619

## 2021-12-02 NOTE — ED NOTES
Emergency Department [facility] AN [facility]FFILIATE [facility] Biopsychosocial Assessment Note    Chief Complaint: patient states that someone is trying to kill him and he becomes paranoid when his intelligence is talked about. pt denies si/Hi    MSE:  CALM, COOPERATIVE, ALERT, ORIENTED X'S 3, SHARED GOOD EYE CONTACT, HAS CLEAR SPEECH, DENIES SI , NO HI AND NO HALLUCINATIONS, THOUGHTS ARE STABLE, COMMUNICATES EFFECTIVELY, GOOD INSIGHT AND JUDGEMENT TO HIS NEEDS. Clinical Summary/History:   PT PRESENTS AT BASELINE. HE IS HOMELESS. HE INITIALLY THOUGHT IT WAS 0115, BUT WHEN HE REALIZED IT WAS 1315, HE SAID \"OH, I CAN LEAVE NOW, I THOUGHT IT  IN THE MORNING\". PT UTILIZES THE ER MOSTLY AT NIGHT TO SLEEP. PT WAS ADVISED TO FOLLOW UP WITH [facility], AND WITH [facility] FOR PATH. PT DENIES SI NO HI AND NO HALLUCINATIONS. HE DOES NOT MEET IN PT CRITERIA. Gender  [x] Male [] Female [] Transgender  [] Other    Sexual Orientation    [x] Heterosexual [] Homosexual [] Bisexual [] Other    Suicidal Behavioral: CSSR-S Complete. [] Reports:    [] Past [] Present   [x] Denies    Homicidal/ Violent Behavior  [] Reports:   [] Past [] Present   [x] Denies     Hallucinations/Delusions   [] Reports:   [] Denies     Substance Use/Alcohol Use/Addiction: SBIRT Screen Complete.    [] Reports:   [x] Denies     Trauma History  [] Reports:  [x] Denies     Collateral Information:   NA    Level of Care/Disposition Plan  [] Home:   [x] Outpatient Provider: [facility]  [] Crisis Unit:   [] Inpatient Psychiatric Unit:  [] Other:        Emmy Ngo, [facility]  12/02/21 6491 Geovanna Marcelino, [facility]  12/02/21 4165

## 2021-12-02 NOTE — ED PROVIDER NOTES
HPI:  12/2/21,   Time: 4:57 AM EST Azzie Bloch. is a 37 y.o. male presenting to the ED for paranoia, beginning several weeks ago. The complaint has been persistent, moderate in severity, and worsened by nothing. The patient states he has been having paranoia and feels as if someone is trying to kill him. Denies any SI or HI. No chest pain shortness breath. No nausea vomiting. Review of Systems:   Pertinent positives and negatives are stated within HPI, all other systems reviewed and are negative.          --------------------------------------------- PAST HISTORY ---------------------------------------------  Past Medical History:  has a past medical history of Depression and Schizoaffective disorder ([facility] [address] Past Surgical History:  has a past surgical history that includes Hip fracture surgery (Left, 9/12/2021); eye surgery (19 years ago); and Hip fracture surgery (Left, 9/28/2021). Social History:  reports that he has been smoking cigars and cigarettes. He has a 24.00 pack-year smoking history. He has never used smokeless tobacco. He reports current alcohol use. He reports current drug use. Frequency: 7.00 times per week. Drugs: Cocaine and Marijuana ([employer]). Family History: family history includes Mental Illness in his mother; No Known Problems in his father; Substance Abuse in his mother. The patients home medications have been reviewed.     Allergies: Chlorpheniramine-phenylephrine, Penicillins, and Rondec-d [chlophedianol-pseudoephedrine]        ---------------------------------------------------PHYSICAL EXAM--------------------------------------    Constitutional/General: Alert and oriented x3, well appearing, non toxic in NAD  Head: Normocephalic and atraumatic  Eyes: PERRL, EOMI, conjunctive normal, sclera non icteric  Mouth: Oropharynx clear, handling secretions, no trismus, no asymmetry of the posterior oropharynx or uvular edema  Neck: Supple, full ROM, non tender to 138 132 - 146 mmol/L    Potassium reflex Magnesium 4.0 3.5 - 5.0 mmol/L    Chloride 103 98 - 107 mmol/L    CO2 26 22 - 29 mmol/L    Anion Gap 9 7 - 16 mmol/L    Glucose 84 74 - 99 mg/dL    BUN 14 6 - 20 mg/dL    CREATININE 0.9 0.7 - 1.2 mg/dL    GFR Non-African American >60 >=60 mL/min/1.73    GFR African American >60     Calcium 8.8 8.6 - 10.2 mg/dL    Total Protein 6.3 (L) 6.4 - 8.3 g/dL    Albumin 4.1 3.5 - 5.2 g/dL    Total Bilirubin 0.4 0.0 - 1.2 mg/dL    Alkaline Phosphatase 47 40 - 129 U/L    ALT 11 0 - 40 U/L    AST 16 0 - 39 U/L   Serum Drug Screen   Result Value Ref Range    Ethanol Lvl <10 mg/dL    Acetaminophen Level <5.0 (L) 10.0 - 30.7 mcg/mL    Salicylate, Serum <7.5 0.0 - 30.0 mg/dL    TCA Scrn NEGATIVE Cutoff:300 ng/mL   EKG 12 Lead   Result Value Ref Range    Ventricular Rate 79 BPM    Atrial Rate 79 BPM    P-R Interval 160 ms    QRS Duration 92 ms    Q-T Interval 362 ms    QTc Calculation (Bazett) 415 ms    P Axis 81 degrees    R Axis 83 degrees    T Axis 70 degrees       RADIOLOGY:  Interpreted by Radiologist.  No orders to display       EKG: This EKG is signed and interpreted by the EP. EKG shows normal sinus rhythm at 79 bpm.  Normal axis. Normal QRS. No STEMI.      ------------------------- NURSING NOTES AND VITALS REVIEWED ---------------------------   The nursing notes within the ED encounter and vital signs as below have been reviewed by myself. Pulse 87   Temp 98.2 °F (36.8 °C)   Resp 16   Ht 6' (1.829 m)   Wt 160 lb (72.6 kg)   SpO2 98%   BMI 21.70 kg/m²   Oxygen Saturation Interpretation: Normal    The patients available past medical records and past encounters were reviewed. ------------------------------ ED COURSE/MEDICAL DECISION MAKING----------------------  Medications - No data to display      ED COURSE:       Medical Decision Making: This is a 81-year-old male presents to the ED for paranoia. Patient undergo laboratory work-up for medical clearance.  will be consulted. Patient's laboratory work-up unremarkable. Patient medically cleared. Disposition pending  evaluation. I, Dr. Junior Lockhart, am the primary provider for this encounter    This patient's ED course included: a personal history and physicial examination and re-evaluation prior to disposition    This patient has remained hemodynamically stable during their ED course. Re-Evaluations:             Re-evaluation. Patients symptoms show no change    Counseling   The emergency provider has spoken with the patient and discussed todays results, in addition to providing specific details for the plan of care and counseling regarding the diagnosis and prognosis. Questions are answered at this time and they are agreeable with the plan.       --------------------------------- IMPRESSION AND DISPOSITION ---------------------------------    IMPRESSION  1. Paranoia ([facility] [address]        DISPOSITION  Disposition:   Patient condition is stable    NOTE: This report was transcribed using voice recognition software.  Every effort was made to ensure accuracy; however, inadvertent computerized transcription errors may be present        Edmond Yoon DO  12/02/21 4914

## 2021-12-03 ENCOUNTER — APPOINTMENT (OUTPATIENT)
Dept: GENERAL RADIOLOGY | Age: 41
End: 2021-12-03
Payer: COMMERCIAL

## 2021-12-03 VITALS
RESPIRATION RATE: 18 BRPM | OXYGEN SATURATION: 98 % | SYSTOLIC BLOOD PRESSURE: 112 MMHG | TEMPERATURE: 98.7 F | HEART RATE: 85 BPM | DIASTOLIC BLOOD PRESSURE: 83 MMHG

## 2021-12-03 PROCEDURE — 72110 X-RAY EXAM L-2 SPINE 4/>VWS: CPT

## 2021-12-03 PROCEDURE — 99283 EMERGENCY DEPT VISIT LOW MDM: CPT

## 2021-12-03 ASSESSMENT — PAIN DESCRIPTION - PAIN TYPE: TYPE: ACUTE PAIN

## 2021-12-03 ASSESSMENT — PAIN DESCRIPTION - FREQUENCY: FREQUENCY: INTERMITTENT

## 2021-12-03 ASSESSMENT — PAIN DESCRIPTION - ORIENTATION: ORIENTATION: MID;LOWER

## 2021-12-03 ASSESSMENT — PAIN SCALES - GENERAL: PAINLEVEL_OUTOF10: 10

## 2021-12-03 ASSESSMENT — PAIN DESCRIPTION - DESCRIPTORS: DESCRIPTORS: SHARP

## 2021-12-03 ASSESSMENT — PAIN DESCRIPTION - LOCATION: LOCATION: BACK

## 2021-12-04 ENCOUNTER — HOSPITAL ENCOUNTER (EMERGENCY)
Age: 41
Discharge: HOME | End: 2021-12-04
Attending: EMERGENCY MEDICINE
Payer: COMMERCIAL

## 2021-12-04 ENCOUNTER — HOSPITAL ENCOUNTER (INPATIENT)
Age: 41
LOS: 11 days | Discharge: HOME | DRG: 885 | End: 2021-12-15
Attending: EMERGENCY MEDICINE | Admitting: PSYCHIATRY & NEUROLOGY
Payer: COMMERCIAL

## 2021-12-04 DIAGNOSIS — M54.50 LUMBAR BACK PAIN: Primary | ICD-10-CM

## 2021-12-04 DIAGNOSIS — F25.9 SCHIZOAFFECTIVE DISORDER, UNSPECIFIED TYPE (HCC): Primary | ICD-10-CM

## 2021-12-04 PROBLEM — R45.851 SUICIDAL IDEATION: Status: ACTIVE | Noted: 2021-12-04

## 2021-12-04 LAB
ACETAMINOPHEN LEVEL: <5 MCG/ML (ref 10–30)
ALBUMIN SERPL-MCNC: 3.8 G/DL (ref 3.5–5.2)
ALP BLD-CCNC: 48 U/L (ref 40–129)
ALT SERPL-CCNC: 9 U/L (ref 0–40)
AMPHETAMINE SCREEN, URINE: NOT DETECTED
ANION GAP SERPL CALCULATED.3IONS-SCNC: 8 MMOL/L (ref 7–16)
AST SERPL-CCNC: 13 U/L (ref 0–39)
BARBITURATE SCREEN URINE: NOT DETECTED
BASOPHILS ABSOLUTE: 0.05 E9/L (ref 0–0.2)
BASOPHILS RELATIVE PERCENT: 0.9 % (ref 0–2)
BENZODIAZEPINE SCREEN, URINE: NOT DETECTED
BILIRUB SERPL-MCNC: 0.3 MG/DL (ref 0–1.2)
BILIRUBIN URINE: NEGATIVE
BLOOD, URINE: NEGATIVE
BUN BLDV-MCNC: 14 MG/DL (ref 6–20)
CALCIUM SERPL-MCNC: 8.6 MG/DL (ref 8.6–10.2)
CANNABINOID SCREEN URINE: NOT DETECTED
CHLORIDE BLD-SCNC: 106 MMOL/L (ref 98–107)
CLARITY: CLEAR
CO2: 25 MMOL/L (ref 22–29)
COCAINE METABOLITE SCREEN URINE: POSITIVE
COLOR: YELLOW
CREAT SERPL-MCNC: 0.9 MG/DL (ref 0.7–1.2)
EOSINOPHILS ABSOLUTE: 0.33 E9/L (ref 0.05–0.5)
EOSINOPHILS RELATIVE PERCENT: 5.9 % (ref 0–6)
ETHANOL: <10 MG/DL (ref 0–0.08)
FENTANYL SCREEN, URINE: NOT DETECTED
GFR AFRICAN AMERICAN: >60
GFR NON-AFRICAN AMERICAN: >60 ML/MIN/1.73
GLUCOSE BLD-MCNC: 91 MG/DL (ref 74–99)
GLUCOSE URINE: NEGATIVE MG/DL
HCT VFR BLD CALC: 35.9 % (ref 37–54)
HEMOGLOBIN: 11.6 G/DL (ref 12.5–16.5)
IMMATURE GRANULOCYTES #: 0.01 E9/L
IMMATURE GRANULOCYTES %: 0.2 % (ref 0–5)
INFLUENZA A: NOT DETECTED
INFLUENZA B: NOT DETECTED
KETONES, URINE: NEGATIVE MG/DL
LEUKOCYTE ESTERASE, URINE: NEGATIVE
LYMPHOCYTES ABSOLUTE: 1.04 E9/L (ref 1.5–4)
LYMPHOCYTES RELATIVE PERCENT: 18.7 % (ref 20–42)
Lab: ABNORMAL
MCH RBC QN AUTO: 29 PG (ref 26–35)
MCHC RBC AUTO-ENTMCNC: 32.3 % (ref 32–34.5)
MCV RBC AUTO: 89.8 FL (ref 80–99.9)
METHADONE SCREEN, URINE: NOT DETECTED
MONOCYTES ABSOLUTE: 0.74 E9/L (ref 0.1–0.95)
MONOCYTES RELATIVE PERCENT: 13.3 % (ref 2–12)
NEUTROPHILS ABSOLUTE: 3.4 E9/L (ref 1.8–7.3)
NEUTROPHILS RELATIVE PERCENT: 61 % (ref 43–80)
NITRITE, URINE: NEGATIVE
OPIATE SCREEN URINE: NOT DETECTED
OXYCODONE URINE: NOT DETECTED
PDW BLD-RTO: 14.9 FL (ref 11.5–15)
PH UA: 6 (ref 5–9)
PHENCYCLIDINE SCREEN URINE: NOT DETECTED
PLATELET # BLD: 312 E9/L (ref 130–450)
PMV BLD AUTO: 10 FL (ref 7–12)
POTASSIUM SERPL-SCNC: 4.2 MMOL/L (ref 3.5–5)
PROTEIN UA: NEGATIVE MG/DL
RBC # BLD: 4 E12/L (ref 3.8–5.8)
SALICYLATE, SERUM: <0.3 MG/DL (ref 0–30)
SARS-COV-2 RNA, RT PCR: NOT DETECTED
SODIUM BLD-SCNC: 139 MMOL/L (ref 132–146)
SPECIFIC GRAVITY UA: 1.01 (ref 1–1.03)
T4 TOTAL: 7.4 MCG/DL (ref 4.5–11.7)
TOTAL CK: 186 U/L (ref 20–200)
TOTAL PROTEIN: 6.2 G/DL (ref 6.4–8.3)
TRICYCLIC ANTIDEPRESSANTS SCREEN SERUM: NEGATIVE NG/ML
TSH SERPL DL<=0.05 MIU/L-ACNC: 0.39 UIU/ML (ref 0.27–4.2)
UROBILINOGEN, URINE: 0.2 E.U./DL
WBC # BLD: 5.6 E9/L (ref 4.5–11.5)

## 2021-12-04 PROCEDURE — 6360000002 HC RX W HCPCS: Performed by: EMERGENCY MEDICINE

## 2021-12-04 PROCEDURE — 93005 ELECTROCARDIOGRAM TRACING: CPT | Performed by: EMERGENCY MEDICINE

## 2021-12-04 PROCEDURE — 96372 THER/PROPH/DIAG INJ SC/IM: CPT

## 2021-12-04 PROCEDURE — 82077 ASSAY SPEC XCP UR&BREATH IA: CPT

## 2021-12-04 PROCEDURE — 36415 COLL VENOUS BLD VENIPUNCTURE: CPT

## 2021-12-04 PROCEDURE — 1240000000 HC EMOTIONAL WELLNESS R&B

## 2021-12-04 PROCEDURE — 80179 DRUG ASSAY SALICYLATE: CPT

## 2021-12-04 PROCEDURE — 85025 COMPLETE CBC W/AUTO DIFF WBC: CPT

## 2021-12-04 PROCEDURE — 81003 URINALYSIS AUTO W/O SCOPE: CPT

## 2021-12-04 PROCEDURE — 99284 EMERGENCY DEPT VISIT MOD MDM: CPT

## 2021-12-04 PROCEDURE — 84443 ASSAY THYROID STIM HORMONE: CPT

## 2021-12-04 PROCEDURE — 80143 DRUG ASSAY ACETAMINOPHEN: CPT

## 2021-12-04 PROCEDURE — 82550 ASSAY OF CK (CPK): CPT

## 2021-12-04 PROCEDURE — 84436 ASSAY OF TOTAL THYROXINE: CPT

## 2021-12-04 PROCEDURE — 80053 COMPREHEN METABOLIC PANEL: CPT

## 2021-12-04 PROCEDURE — 80307 DRUG TEST PRSMV CHEM ANLYZR: CPT

## 2021-12-04 PROCEDURE — 87636 SARSCOV2 & INF A&B AMP PRB: CPT

## 2021-12-04 RX ORDER — TRAZODONE HYDROCHLORIDE 50 MG/1
50 TABLET ORAL NIGHTLY PRN
Status: DISCONTINUED | OUTPATIENT
Start: 2021-12-04 | End: 2021-12-15 | Stop reason: HOSPADM

## 2021-12-04 RX ORDER — IBUPROFEN 800 MG/1
800 TABLET ORAL EVERY 8 HOURS PRN
Qty: 15 TABLET | Refills: 0 | Status: SHIPPED | OUTPATIENT
Start: 2021-12-04 | End: 2022-08-12 | Stop reason: ALTCHOICE

## 2021-12-04 RX ORDER — MAGNESIUM HYDROXIDE/ALUMINUM HYDROXICE/SIMETHICONE 120; 1200; 1200 MG/30ML; MG/30ML; MG/30ML
30 SUSPENSION ORAL PRN
Status: DISCONTINUED | OUTPATIENT
Start: 2021-12-04 | End: 2021-12-15 | Stop reason: HOSPADM

## 2021-12-04 RX ORDER — NICOTINE 21 MG/24HR
1 PATCH, TRANSDERMAL 24 HOURS TRANSDERMAL DAILY
Status: DISCONTINUED | OUTPATIENT
Start: 2021-12-05 | End: 2021-12-07

## 2021-12-04 RX ORDER — ACETAMINOPHEN 325 MG/1
650 TABLET ORAL EVERY 6 HOURS PRN
Status: DISCONTINUED | OUTPATIENT
Start: 2021-12-04 | End: 2021-12-15 | Stop reason: HOSPADM

## 2021-12-04 RX ORDER — HALOPERIDOL 5 MG
5 TABLET ORAL EVERY 6 HOURS PRN
Status: DISCONTINUED | OUTPATIENT
Start: 2021-12-04 | End: 2021-12-15 | Stop reason: HOSPADM

## 2021-12-04 RX ORDER — HYDROXYZINE PAMOATE 50 MG/1
50 CAPSULE ORAL 3 TIMES DAILY PRN
Status: DISCONTINUED | OUTPATIENT
Start: 2021-12-04 | End: 2021-12-15 | Stop reason: HOSPADM

## 2021-12-04 RX ORDER — KETOROLAC TROMETHAMINE 30 MG/ML
30 INJECTION, SOLUTION INTRAMUSCULAR; INTRAVENOUS ONCE
Status: COMPLETED | OUTPATIENT
Start: 2021-12-04 | End: 2021-12-04

## 2021-12-04 RX ORDER — HALOPERIDOL 5 MG/ML
5 INJECTION INTRAMUSCULAR EVERY 6 HOURS PRN
Status: DISCONTINUED | OUTPATIENT
Start: 2021-12-04 | End: 2021-12-15 | Stop reason: HOSPADM

## 2021-12-04 RX ADMIN — KETOROLAC TROMETHAMINE 30 MG: 30 INJECTION, SOLUTION INTRAMUSCULAR at 01:46

## 2021-12-04 ASSESSMENT — PAIN DESCRIPTION - LOCATION
LOCATION: BACK
LOCATION: BACK
LOCATION: RIB CAGE;BACK

## 2021-12-04 ASSESSMENT — PAIN SCALES - WONG BAKER
WONGBAKER_NUMERICALRESPONSE: 2
WONGBAKER_NUMERICALRESPONSE: 10

## 2021-12-04 ASSESSMENT — SLEEP AND FATIGUE QUESTIONNAIRES
DIFFICULTY ARISING: NO
DO YOU HAVE DIFFICULTY SLEEPING: YES
DIFFICULTY STAYING ASLEEP: YES
SLEEP PATTERN: DIFFICULTY FALLING ASLEEP;RESTLESSNESS;NIGHTMARES/TERRORS
DO YOU USE A SLEEP AID: NO
AVERAGE NUMBER OF SLEEP HOURS: 0
RESTFUL SLEEP: NO
DIFFICULTY FALLING ASLEEP: YES

## 2021-12-04 ASSESSMENT — PAIN SCALES - GENERAL
PAINLEVEL_OUTOF10: 10
PAINLEVEL_OUTOF10: 6

## 2021-12-04 ASSESSMENT — LIFESTYLE VARIABLES: HISTORY_ALCOHOL_USE: YES

## 2021-12-04 ASSESSMENT — PAIN - FUNCTIONAL ASSESSMENT: PAIN_FUNCTIONAL_ASSESSMENT: 0-10

## 2021-12-04 NOTE — ED PROVIDER NOTES
HPI:  12/4/21,   Time: 8:57 AM JAYLENE Simmons is a 37 y.o. male presenting to the ED for multiple complaints abnormal behavior being off of his medication becoming progressively more disorganized, beginning unknown time ago. The complaint has been persistent, moderate in severity, and worsened by emotional upset. Patient's history is nonlinear    ROS:   Pertinent positives and negatives are stated within HPI, all other systems reviewed and are negative.  --------------------------------------------- PAST HISTORY ---------------------------------------------  Past Medical History:  has a past medical history of Depression and Schizoaffective disorder ([facility] [address] Past Surgical History:  has a past surgical history that includes Hip fracture surgery (Left, 9/12/2021); eye surgery (19 years ago); and Hip fracture surgery (Left, 9/28/2021). Social History:  reports that he has been smoking cigars and cigarettes. He has a 24.00 pack-year smoking history. He has never used smokeless tobacco. He reports current alcohol use. He reports current drug use. Frequency: 7.00 times per week. Drugs: Cocaine and Marijuana (Arlene Conrad). Family History: family history includes Mental Illness in his mother; No Known Problems in his father; Substance Abuse in his mother. The patients home medications have been reviewed.     Allergies: Chlorpheniramine-phenylephrine, Penicillins, and Rondec-d [chlophedianol-pseudoephedrine]    -------------------------------------------------- RESULTS -------------------------------------------------  All laboratory and radiology results have been personally reviewed by myself   LABS:  Results for orders placed or performed during the hospital encounter of 12/04/21   COVID-19 & Influenza Combo    Specimen: Nasopharyngeal Swab   Result Value Ref Range    SARS-CoV-2 RNA, RT PCR NOT DETECTED NOT DETECTED    INFLUENZA A NOT DETECTED NOT DETECTED    INFLUENZA B NOT DETECTED NOT DETECTED Comprehensive Metabolic Panel   Result Value Ref Range    Sodium 139 132 - 146 mmol/L    Potassium 4.2 3.5 - 5.0 mmol/L    Chloride 106 98 - 107 mmol/L    CO2 25 22 - 29 mmol/L    Anion Gap 8 7 - 16 mmol/L    Glucose 91 74 - 99 mg/dL    BUN 14 6 - 20 mg/dL    CREATININE 0.9 0.7 - 1.2 mg/dL    GFR Non-African American >60 >=60 mL/min/1.73    GFR African American >60     Calcium 8.6 8.6 - 10.2 mg/dL    Total Protein 6.2 (L) 6.4 - 8.3 g/dL    Albumin 3.8 3.5 - 5.2 g/dL    Total Bilirubin 0.3 0.0 - 1.2 mg/dL    Alkaline Phosphatase 48 40 - 129 U/L    ALT 9 0 - 40 U/L    AST 13 0 - 39 U/L   CBC Auto Differential   Result Value Ref Range    WBC 5.6 4.5 - 11.5 E9/L    RBC 4.00 3.80 - 5.80 E12/L    Hemoglobin 11.6 (L) 12.5 - 16.5 g/dL    Hematocrit 35.9 (L) 37.0 - 54.0 %    MCV 89.8 80.0 - 99.9 fL    MCH 29.0 26.0 - 35.0 pg    MCHC 32.3 32.0 - 34.5 %    RDW 14.9 11.5 - 15.0 fL    Platelets 074 009 - 270 E9/L    MPV 10.0 7.0 - 12.0 fL    Neutrophils % 61.0 43.0 - 80.0 %    Immature Granulocytes % 0.2 0.0 - 5.0 %    Lymphocytes % 18.7 (L) 20.0 - 42.0 %    Monocytes % 13.3 (H) 2.0 - 12.0 %    Eosinophils % 5.9 0.0 - 6.0 %    Basophils % 0.9 0.0 - 2.0 %    Neutrophils Absolute 3.40 1.80 - 7.30 E9/L    Immature Granulocytes # 0.01 E9/L    Lymphocytes Absolute 1.04 (L) 1.50 - 4.00 E9/L    Monocytes Absolute 0.74 0.10 - 0.95 E9/L    Eosinophils Absolute 0.33 0.05 - 0.50 E9/L    Basophils Absolute 0.05 0.00 - 0.20 E9/L   Serum Drug Screen   Result Value Ref Range    Ethanol Lvl <10 mg/dL    Acetaminophen Level <5.0 (L) 10.0 - 04.6 mcg/mL    Salicylate, Serum <8.0 0.0 - 30.0 mg/dL    TCA Scrn NEGATIVE Cutoff:300 ng/mL   Urine Drug Screen   Result Value Ref Range    Drug Screen Comment: see below    Urinalysis   Result Value Ref Range    Color, UA Yellow Straw/Yellow    Clarity, UA Clear Clear    Glucose, Ur Negative Negative mg/dL    Bilirubin Urine Negative Negative    Ketones, Urine Negative Negative mg/dL    Specific Gravity, UA 1.010 1.005 - 1.030    Blood, Urine Negative Negative    pH, UA 6.0 5.0 - 9.0    Protein, UA Negative Negative mg/dL    Urobilinogen, Urine 0.2 <2.0 E.U./dL    Nitrite, Urine Negative Negative    Leukocyte Esterase, Urine Negative Negative   TSH without Reflex   Result Value Ref Range    TSH 0.392 0.270 - 4.200 uIU/mL   T4   Result Value Ref Range    T4, Total 7.4 4.5 - 11.7 mcg/dL       RADIOLOGY:  Interpreted by Radiologist.  No orders to display       ------------------------- NURSING NOTES AND VITALS REVIEWED ---------------------------   The nursing notes within the ED encounter and vital signs as below have been reviewed. /61   Pulse 83   Temp 98.4 °F (36.9 °C)   Resp 20   SpO2 96%   Oxygen Saturation Interpretation: Normal      ---------------------------------------------------PHYSICAL EXAM--------------------------------------      Constitutional/General: Alert and oriented x3, well appearing, non toxic in NAD  Head: NC/AT  Eyes: PERRL, EOMI  Mouth: Oropharynx clear, handling secretions, no trismus  Neck: Supple, full ROM, no meningeal signs  Pulmonary: Lungs clear to auscultation bilaterally, no wheezes, rales, or rhonchi. Not in respiratory distress  Cardiovascular:  Regular rate and rhythm, no murmurs, gallops, or rubs. 2+ distal pulses  Abdomen: Soft, non tender, non distended,   Extremities: Moves all extremities x 4. Warm and well perfused  Skin: warm and dry without rash  Neurologic: GCS 15,  Psych: Angry affect slightly agitated mood nonlinear thought no suicidal ideation no clear homicidal ideation hallucinating delusional poor judgment poor insight    EKG: This EKG is signed and interpreted by me.     Rate: 76  Rhythm: Sinus  Interpretation: no acute changes  Comparison: stable as compared to patient's most recent EKG      ------------------------------ ED COURSE/MEDICAL DECISION MAKING----------------------  Medications - No data to display      Medical Decision Making:    Patient seems more disorganized and psychotic then is typical for him. Patient would benefit from further psychiatric evaluation    Counseling: The emergency provider has spoken with the patient and discussed todays results, in addition to providing specific details for the plan of care and counseling regarding the diagnosis and prognosis. Questions are answered at this time and they are agreeable with the plan.      --------------------------------- IMPRESSION AND DISPOSITION ---------------------------------    IMPRESSION  1.  Schizoaffective disorder, unspecified type ([facility]        DISPOSITION  Disposition: Other Disposition: Per   Patient condition is stable                  Alisha Martinez MD  12/04/21 5925

## 2021-12-04 NOTE — ED PROVIDER NOTES
HPI:  12/3/21, Time: 11:21 PM JAYLENE Burger is a 37 y.o. male presenting to the ED for back pain, beginning days ago. The complaint has been persistent, moderate in severity, and worsened by movement of lower back. Patient reporting back pain that started several days ago. Patient states he was lifting bricks. He reports pain mainly upper lumbar spine he reports no bowel bladder incontinence he reports no radiation of pain down his leg. Patient reporting no fever chills he reports no abdominal pain there is no vomiting or diarrhea. Patient reporting no chest pain. Patient reporting no homicidal suicidal thoughts. ROS:   Pertinent positives and negatives are stated within HPI, all other systems reviewed and are negative.  --------------------------------------------- PAST HISTORY ---------------------------------------------  Past Medical History:  has a past medical history of Depression and Schizoaffective disorder ([facility] [address] Past Surgical History:  has a past surgical history that includes Hip fracture surgery (Left, 9/12/2021); eye surgery (19 years ago); and Hip fracture surgery (Left, 9/28/2021). Social History:  reports that he has been smoking cigars and cigarettes. He has a 24.00 pack-year smoking history. He has never used smokeless tobacco. He reports current alcohol use. He reports current drug use. Frequency: 7.00 times per week. Drugs: Cocaine and Marijuana (Marielenariivann Mell). Family History: family history includes Mental Illness in his mother; No Known Problems in his father; Substance Abuse in his mother. The patients home medications have been reviewed.     Allergies: Chlorpheniramine-phenylephrine, Penicillins, and Rondec-d [chlophedianol-pseudoephedrine]    ---------------------------------------------------PHYSICAL EXAM--------------------------------------    Constitutional/General: Alert and oriented x3, well appearing, non toxic in NAD  Head: Normocephalic and atraumatic  Eyes: PERRL, EOMI  Mouth: Oropharynx clear, handling secretions, no trismus  Neck: Supple, full ROM, non tender to palpation in the midline, no stridor, no crepitus, no meningeal signs  Pulmonary: Lungs clear to auscultation bilaterally, no wheezes, rales, or rhonchi. Not in respiratory distress  Cardiovascular:  Regular rate. Regular rhythm. No murmurs, gallops, or rubs. 2+ distal pulses  Chest: no chest wall tenderness  Abdomen: Soft. Non tender. Non distended. +BS. No rebound, guarding, or rigidity. No pulsatile masses appreciated. Musculoskeletal: Moves all extremities x 4 tender upper lumbar spine. Warm and well perfused, no clubbing, cyanosis, or edema. Capillary refill <3 seconds  Skin: warm and dry. No rashes. Neurologic: GCS 15, CN 2-12 grossly intact, no focal deficits, symmetric strength 5/5 in the upper and lower extremities bilaterally  Psych: Normal Affect    -------------------------------------------------- RESULTS -------------------------------------------------  I have personally reviewed all laboratory and imaging results for this patient. Results are listed below. LABS:  No results found for this visit on 12/03/21. RADIOLOGY:  Interpreted by Radiologist.  XR LUMBAR SPINE (MIN 4 VIEWS)   Final Result   Mild anterior wedge compression deformity L1 vertebral body otherwise   unremarkable lumbar spine. EKG Interpretation        ------------------------- NURSING NOTES AND VITALS REVIEWED ---------------------------   The nursing notes within the ED encounter and vital signs as below have been reviewed by myself. /83   Pulse 85   Temp 98.7 °F (37.1 °C) (Oral)   Resp 18   SpO2 98%   Oxygen Saturation Interpretation: Normal    The patients available past medical records and past encounters were reviewed.         ------------------------------ ED COURSE/MEDICAL DECISION MAKING----------------------  Medications   ketorolac (TORADOL) injection 30 mg (has no administration in time range)             Medical Decision Making:    Patient presenting here because of back pain. Patient reports it worsened when he was lifting bricks. Patient patient neurologically intact. Has no bowel bladder incontinence. Patient moving all extremities. Patient x-rays noted reviewed there is mild compression at L1 but has prior CT that was done was on pelvis it does show the same thing. Patient will be medicated plan will be to discharge home    Re-Evaluations:             Re-evaluation. Patients symptoms show no change    Patient made aware of findings and plan  Consultations:                 Critical Care: This patient's ED course included: a personal history and physicial eaxmination    This patient has been closely monitored during their ED course. Counseling: The emergency provider has spoken with the patient and discussed todays results, in addition to providing specific details for the plan of care and counseling regarding the diagnosis and prognosis. Questions are answered at this time and they are agreeable with the plan.       --------------------------------- IMPRESSION AND DISPOSITION ---------------------------------    IMPRESSION  1. Lumbar back pain        DISPOSITION  Disposition: Discharge to home  Patient condition is stable        NOTE: This report was transcribed using voice recognition software.  Every effort was made to ensure accuracy; however, inadvertent computerized transcription errors may be present          Deepthi Castro MD  12/03/21 4988       Deepthi Castro MD  12/04/21 8028
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
## 2021-12-05 PROBLEM — T14.90XA TRAUMA: Status: RESOLVED | Noted: 2021-09-10 | Resolved: 2021-12-05

## 2021-12-05 PROBLEM — R45.851 SUICIDAL IDEATION: Status: RESOLVED | Noted: 2021-12-04 | Resolved: 2021-12-05

## 2021-12-05 PROBLEM — F32.4 MAJOR DEPRESSION SINGLE EPISODE, IN PARTIAL REMISSION (HCC): Status: RESOLVED | Noted: 2020-01-08 | Resolved: 2021-12-05

## 2021-12-05 PROCEDURE — 1240000000 HC EMOTIONAL WELLNESS R&B

## 2021-12-05 PROCEDURE — 99222 1ST HOSP IP/OBS MODERATE 55: CPT | Performed by: NURSE PRACTITIONER

## 2021-12-05 PROCEDURE — 6370000000 HC RX 637 (ALT 250 FOR IP): Performed by: PSYCHIATRY & NEUROLOGY

## 2021-12-05 PROCEDURE — 6370000000 HC RX 637 (ALT 250 FOR IP): Performed by: NURSE PRACTITIONER

## 2021-12-05 RX ORDER — IBUPROFEN 800 MG/1
800 TABLET ORAL EVERY 8 HOURS PRN
Status: DISCONTINUED | OUTPATIENT
Start: 2021-12-05 | End: 2021-12-15 | Stop reason: HOSPADM

## 2021-12-05 RX ORDER — PALIPERIDONE 3 MG/1
3 TABLET, EXTENDED RELEASE ORAL DAILY
Status: DISPENSED | OUTPATIENT
Start: 2021-12-05 | End: 2021-12-06

## 2021-12-05 RX ORDER — PALIPERIDONE 3 MG/1
3 TABLET, EXTENDED RELEASE ORAL 2 TIMES DAILY
Status: DISCONTINUED | OUTPATIENT
Start: 2021-12-06 | End: 2021-12-11

## 2021-12-05 RX ORDER — DIVALPROEX SODIUM 250 MG/1
250 TABLET, DELAYED RELEASE ORAL EVERY 12 HOURS SCHEDULED
Status: DISCONTINUED | OUTPATIENT
Start: 2021-12-05 | End: 2021-12-11

## 2021-12-05 RX ORDER — TRAMADOL HYDROCHLORIDE 50 MG/1
50 TABLET ORAL EVERY 6 HOURS PRN
Status: ON HOLD | COMMUNITY
End: 2021-12-15 | Stop reason: HOSPADM

## 2021-12-05 RX ADMIN — IBUPROFEN 800 MG: 800 TABLET, FILM COATED ORAL at 21:23

## 2021-12-05 RX ADMIN — TRAZODONE HYDROCHLORIDE 50 MG: 50 TABLET ORAL at 21:23

## 2021-12-05 RX ADMIN — ACETAMINOPHEN 650 MG: 325 TABLET ORAL at 03:12

## 2021-12-05 RX ADMIN — IBUPROFEN 800 MG: 800 TABLET, FILM COATED ORAL at 11:51

## 2021-12-05 ASSESSMENT — PAIN SCALES - GENERAL
PAINLEVEL_OUTOF10: 8
PAINLEVEL_OUTOF10: 10
PAINLEVEL_OUTOF10: 8
PAINLEVEL_OUTOF10: 10

## 2021-12-05 ASSESSMENT — SLEEP AND FATIGUE QUESTIONNAIRES
DO YOU HAVE DIFFICULTY SLEEPING: YES
DIFFICULTY ARISING: NO
SLEEP PATTERN: DIFFICULTY FALLING ASLEEP;DISTURBED/INTERRUPTED SLEEP
RESTFUL SLEEP: NO
DIFFICULTY STAYING ASLEEP: YES
DO YOU USE A SLEEP AID: NO

## 2021-12-05 ASSESSMENT — LIFESTYLE VARIABLES: HISTORY_ALCOHOL_USE: YES

## 2021-12-05 NOTE — ED NOTES
Patient has been accepted for admission to [facility] by Dr. Mario Oliver. Patient will go to room 7309. Called admitting and notified Lizette. PATRICIO RN is aware to call nurse to nurse and put in for patient transport.      FRANCISCO Gonzalez, [place]  12/04/21 1952

## 2021-12-05 NOTE — CARE COORDINATION
Biopsychosocial Assessment Note    Social work met with patient to complete the biopsychosocial assessment and CSSR-S. Mental Status Exam: patient oriented x4. Patient presents as irritable, evasive, and has poor insight into need for treatment. Patient presents as depressed, anxious, and has fair eye contact. Patient unable to fully complete assessment due to sx. Patient denies current SI/HI/AVH    Chief Complaint: \"Pt presented into the ED for Psych evaluation. \"I have been off my medication for 4 months\". Mental Health Problem - Patient reports hearing voices telling him to do things that he does not want to do\"    Patient Report: Patient states he is hospitalized because \"I was off my meds. Back pain\". Patient admitted due to decompensation evidenced by increased SI and AH of voices telling him to do things he does not want to do. Patient has hx of inpatient hospitalization and was last at [address] [place] [address] 8/2021. Patient states he is \"scared\" to see a [facility] provider and is currently homeless. Patient admits to cocaine use, UDS +. Patient denies trauma, abuse and self injurious behaviors.      Gender  [x] Male [] Female [] Transgender  [] Other    Sexual Orientation    [x] Heterosexual [] Homosexual [] Bisexual [] Other    Suicidal Ideation  [x] Past [] Present [] Denies     Homicidal Ideation  [] Past [] Present [x] Denies     Hallucinations/Delusions (Specify type)  [x] Reports [] Denies     Substance Use/Alcohol Use/Addiction  [x] Reports [] Denies     Tobacco Use (within the last 6 months)  [x] Reports [] Denies     Trauma History  [] Reports [x] Denies     Collateral Contact (CARL signed) declined to sign CARL  Name:   Relationship:  Number:     Collateral Information:        Access to Weapons per Collateral Contact: [] Reports [] Denies       Follow up provider preference: Patient states he is \"scared\" to see a [facility] provider      Plan for discharge  Location (where do they plan on discharging to?): patient is unsure    Transportation (who will pick them up at discharge?) Patient has transportation through his insurance    Medications (will they have money for copays at discharge?): Patient states he has no money for medications.
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
## 2021-12-05 NOTE — PROGRESS NOTES
No behaviors observed during night shift, PRN Tylenol administered at 0315 for chronic back pain. No signs/symptoms of distress or discomfort noted. Patient in bed resting with eyes closed. Will continue to monitor and support.  Q 15 minute observation checks maintained

## 2021-12-05 NOTE — PLAN OF CARE
Problem: Depressive Behavior With or Without Suicide Precautions:  Goal: Able to verbalize acceptance of life and situations over which he or she has no control  Description: Able to verbalize acceptance of life and situations over which he or she has no control  Outcome: Ongoing     Problem: Depressive Behavior With or Without Suicide Precautions:  Goal: Able to verbalize and/or display a decrease in depressive symptoms  Description: Able to verbalize and/or display a decrease in depressive symptoms  Outcome: Ongoing     Problem: Depressive Behavior With or Without Suicide Precautions:  Goal: Able to verbalize support systems  Description: Able to verbalize support systems  Outcome: Ongoing     Problem: Depressive Behavior With or Without Suicide Precautions:  Goal: Ability to disclose and discuss suicidal ideas will improve  Description: Ability to disclose and discuss suicidal ideas will improve  Outcome: Ongoing

## 2021-12-05 NOTE — PROGRESS NOTES
Unable to complete med rec since [address] closed on Sundays.  Pt has only filled tramadol at [facility]

## 2021-12-05 NOTE — PLAN OF CARE
Denies SI/HI & AVH. Denies anxiety and depression. Cooperative, blunt, & disorganized. PRN Ibuprofen given for back pain. Comes to meals, no groups. Showered today. No behavioral issues & is in control of behavior. Pt refusing to take Depakote due to his hair falling out & states he will take any other medication other than Invega. Refused psych meds.   Problem: Depressive Behavior With or Without Suicide Precautions:  Goal: Able to verbalize acceptance of life and situations over which he or she has no control  Description: Able to verbalize acceptance of life and situations over which he or she has no control  Outcome: Ongoing  Goal: Able to verbalize and/or display a decrease in depressive symptoms  Description: Able to verbalize and/or display a decrease in depressive symptoms  Outcome: Ongoing  Goal: Ability to disclose and discuss suicidal ideas will improve  Description: Ability to disclose and discuss suicidal ideas will improve  Outcome: Ongoing

## 2021-12-05 NOTE — ED NOTES
Leeann Bauer accepted patient. Report called to [facility].  Transport pending     Dar Mcelroy RN  12/04/21 2018
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
## 2021-12-05 NOTE — PROGRESS NOTES
37 yr old male admitted to  with an admitting Dx of suicide. It's reported that patient was experiencing auditory hallucinations of voices telling him to hill himself. Patient is A+Ox 4, non-sensical at times, and has disorganized speech. Patient states that he is suicidal without a plan, but later states that he's not feeling suicidal. Denies current internal stimulation. Patient has been off of psych medications x 4 months. Psych Hx includes: schizophrenia, auditory hallucinations, and past suicide attempts although atient denies past suicide attempts. Medical Hx includes chronic back pain. Patient presents with a calm, pleasant demeanor. Impaired thought content. Patient has been administered food/fluids upon arrival, and has been oriented to the unit.  All forms have been explained, agreed upon, and signed by the patient

## 2021-12-05 NOTE — H&P
history: Patient goes limited history he is currently homeless has been living on the streets. .  Past Medical History:        Diagnosis Date    Depression     Schizoaffective disorder ([facility] [address]        Medications Prior to Admission:   Medications Prior to Admission: ibuprofen (IBU) 800 MG tablet, Take 1 tablet by mouth every 8 hours as needed for Pain  ondansetron (ZOFRAN-ODT) 4 MG disintegrating tablet, Take 1 tablet by mouth every 8 hours as needed for Nausea or Vomiting  lactulose (CHRONULAC) 10 GM/15ML solution, Take 30 mLs by mouth 3 times daily  magnesium citrate solution, Take 296 mLs by mouth once for 1 dose  sennosides-docusate sodium (SENOKOT-S) 8.6-50 MG tablet, Take 2 tablets by mouth daily    Past Surgical History:        Procedure Laterality Date    EYE SURGERY  19 years ago    HIP FRACTURE SURGERY Left 9/12/2021    LEFT ACETABULUM OPEN REDUCTION INTERNAL FIXATION - SYNTHES performed by Magaly Rubin MD at [address] [place] [address] Left 9/28/2021    IRRIGATION AND DEBRIDEMENT LEFT HIP WITH EXCISION HEMATOMA performed by Stone Shukla MD at [facility] [place]       Allergies:   Chlorpheniramine-phenylephrine, Penicillins, and Rondec-d [chlophedianol-pseudoephedrine]    Family History  Family History   Problem Relation Age of Onset    Mental Illness Mother     Substance Abuse Mother     No Known Problems Father              EXAMINATION:    REVIEW OF SYSTEMS:    ROS:  [x] All negative/unchanged except if checked.  Explain positive(checked items) below:  [] Constitutional  [] Eyes  [] Ear/Nose/Mouth/Throat  [] Respiratory  [] CV  [] GI  []   [] Musculoskeletal  [] Skin/Breast  [] Neurological  [] Endocrine  [] Heme/Lymph  [] Allergic/Immunologic    Explanation:     Vitals:  /64   Pulse 67   Temp 98.3 °F (36.8 °C) (Temporal)   Resp 16   Ht 6' (1.829 m)   Wt 160 lb (72.6 kg)   SpO2 97%   BMI 21.70 kg/m²      Physical Examination:   Head: x  Atraumatic: x normocephalic  Skin and Mucosa        Moist x  Dry   Pale  x Normal   Neck:  Thyroid  Palpable   x  Not palpable   venus distention   adenopathy   Chest: x Clear   Rhonchi     Wheezing   CV:  xS1   xS2    xNo murmer   Abdomen:  x  Soft    Tender    Viceromegaly   Extremities:  x No Edema     Edema     Cranial Nerves Examination:   CN II:   xPupils are reactive to light  Pupils are non reactive to light  CN III, IV, VI:  xNo eye deviation    No diplopia or ptosis   CN V:    xFacial Sensation is intact     Facial Sensation is not intact   CN IIIV:   x Hearing is normal to rubbing fingers   CN IX, X:     xNormal gag reflex and phonation   CN XI:   xShoulder shrug and neck rotation is normal  CNXII:    xTongue is midline no deviation or atrophy    Mental Status Examination:    Level of consciousness:  within normal limits   Appearance:  well-appearing  Behavior/Motor:  no abnormalities noted  Attitude toward examiner:  cooperative  Speech:  spontaneous, normal rate and normal volume   Mood: \" My mood is good. \"  Affect: Appropriate pleasant  Thought processes: Flight of ideas loose disorganized   thought content: Paranoid delusions auditory hallucinations,  Denies SI/HI intent or plan  Cognition:  oriented to person, place, and time   Concentration intact  Memory intact  Insight poor   Judgement poor   Fund of Knowledge limited      DIAGNOSIS:  Schizoaffective disorder bipolar type  Polysubstance abuse        LABS: REVIEWED TODAY:  Recent Labs     12/04/21  1016   WBC 5.6   HGB 11.6*        Recent Labs     12/04/21  1016      K 4.2      CO2 25   BUN 14   CREATININE 0.9   GLUCOSE 91     Recent Labs     12/04/21  1016   BILITOT 0.3   ALKPHOS 48   AST 13   ALT 9     Lab Results   Component Value Date    LABAMPH NOT DETECTED 12/04/2021    BARBSCNU NOT DETECTED 12/04/2021    LABBENZ NOT DETECTED 12/04/2021    LABMETH NOT DETECTED 12/04/2021    OPIATESCREENURINE NOT DETECTED 12/04/2021    PHENCYCLIDINESCREENURINE NOT DETECTED 12/04/2021    ETOH <10 12/04/2021     Lab Results   Component Value Date    TSH 0.392 12/04/2021     No results found for: LITHIUM  Lab Results   Component Value Date    VALPROATE 3 (L) 08/20/2021     Lab Results   Component Value Date    VALPROATE 3 08/20/2021         Radiology XR LUMBAR SPINE (MIN 4 VIEWS)    Result Date: 12/4/2021  EXAMINATION: XRAY VIEWS OF THE LUMBAR SPINE 12/4/2021 12:11 am COMPARISON: None. HISTORY: ORDERING SYSTEM PROVIDED HISTORY: back pain TECHNOLOGIST PROVIDED HISTORY: Reason for exam:->back pain What reading provider will be dictating this exam?->CRC FINDINGS: Mild anterior wedge compression deformity L1 vertebral body. Normal alignment. .  No evidence of fracture. Visualized sacrum is unremarkable. No significant degenerative changes. Mild anterior wedge compression deformity L1 vertebral body otherwise unremarkable lumbar spine. TREATMENT PLAN:  The patient's diagnosis, treatment plan, medication management were formulated after patient was seen directly by the attending physician and myself and all relevant documentation was reviewed. The patient was referred to outpatient/inpatient substance abuse rehabilitation programming. He was educated multiple times during the hospitalization that if he chooses to continue to use drugs or alcohol, he may potentially act out impulsively, resulting in serious harm to self or others, even though unintentional.  He was also educated that mental health treatment cannot be optimized with ongoing use of drugs. He demonstrated understanding has the capacity to understand that. Risk, benefit, side effects, possible outcomes of the medication and alternatives discussed with the patient and the patient demonstrated understanding.   The patient was also educated that the outcome of treatment will depend on the medication compliance as directed by the prescribers along with regular follow-up, compliance with the labs and other work-up, as clinically indicated. Risk Management: Based on the diagnosis and assessment biopsychosocial treatment model was presented to the patient and was given the opportunity to ask any question. The patient was agreeable to the plan and all the patient's questions were answered to the patient's satisfaction. I discussed with the patient the risk, benefit, alternative and common side effects for the proposed medication treatment. The patient is consenting to this treatment. Collateral Information:  Will obtain collateral information from the family or friends. Will obtain medical records as appropriate from out patient providers  Will consult the hospitalist for a physical exam to rule out any co-morbid physical condition. Home medication Reconciled       New Medications started during this admission :    Invega 3 mg daily we will optimize this medication with plan to provide the Invega sustain a BARROS  Depakote 250 mg twice daily  Valproate level on day 4 Depakote therapy    Prn Haldol 5mg and Vistaril 50mg q6hr for extreme agitation. Trazodone as ordered for insomnia  Vistaril as ordered for anxiety      Psychotherapy:   Encourage participation in milieu and group therapy  Individual therapy as needed      NOTE: This report was transcribed using voice recognition software. Every effort was made to ensure accuracy; however, inadvertent computerized transcription errors may be present. Behavioral Services  Medicare Certification Upon Admission    I certify that this patient's inpatient psychiatric hospital admission is medically necessary for:    [x] (1) Treatment which could reasonably be expected to improve this patient's condition,       [x] (2) Or for diagnostic study;     AND     [x](2) The inpatient psychiatric services are provided while the individual is under the care of a physician and are included in the individualized plan of care.     Estimated length of stay/service 3 - 5 days based on stability    Plan for post-hospital care follow with OP provider    Electronically signed by JUICE Romero CNP on 12/5/2021 at 12:27 PM        Electronically signed by JUICE Romero CNP on 12/5/2021 at 12:27 PM

## 2021-12-06 LAB
EKG ATRIAL RATE: 76 BPM
EKG P AXIS: 79 DEGREES
EKG P-R INTERVAL: 168 MS
EKG Q-T INTERVAL: 370 MS
EKG QRS DURATION: 92 MS
EKG QTC CALCULATION (BAZETT): 416 MS
EKG R AXIS: 87 DEGREES
EKG T AXIS: 70 DEGREES
EKG VENTRICULAR RATE: 76 BPM

## 2021-12-06 PROCEDURE — 1240000000 HC EMOTIONAL WELLNESS R&B

## 2021-12-06 PROCEDURE — 99231 SBSQ HOSP IP/OBS SF/LOW 25: CPT | Performed by: NURSE PRACTITIONER

## 2021-12-06 PROCEDURE — 93010 ELECTROCARDIOGRAM REPORT: CPT | Performed by: INTERNAL MEDICINE

## 2021-12-06 PROCEDURE — 6370000000 HC RX 637 (ALT 250 FOR IP): Performed by: NURSE PRACTITIONER

## 2021-12-06 RX ADMIN — IBUPROFEN 800 MG: 800 TABLET, FILM COATED ORAL at 09:35

## 2021-12-06 ASSESSMENT — PAIN DESCRIPTION - LOCATION: LOCATION: BACK

## 2021-12-06 ASSESSMENT — PAIN SCALES - GENERAL
PAINLEVEL_OUTOF10: 10
PAINLEVEL_OUTOF10: 0
PAINLEVEL_OUTOF10: 10

## 2021-12-06 ASSESSMENT — PAIN DESCRIPTION - PAIN TYPE: TYPE: CHRONIC PAIN

## 2021-12-06 ASSESSMENT — PAIN DESCRIPTION - DESCRIPTORS: DESCRIPTORS: ACHING

## 2021-12-06 NOTE — CARE COORDINATION
sw met with pt. Pt states his lack of support system and his family taking the side of his son's mother. He currently denied signing CARL as he stated he has nobody but his girlfriend and she is incarcerated until next month.

## 2021-12-06 NOTE — PROGRESS NOTES
BEHAVIORAL HEALTH FOLLOW-UP NOTE     12/6/2021     Patient was seen and examined in person, Chart reviewed   Patient's case discussed with staff/team    Chief Complaint: none stated    Interim History:   Patient isolative to his room, irritable, guarded evasive. Offers no conversation. Refusing medications.       Appetite:   [x] Normal/Unchanged  [] Increased  [] Decreased      Sleep:       [x] Normal/Unchanged  [] Fair       [] Poor              Energy:    [x] Normal/Unchanged  [] Increased  [] Decreased        SI [] Present  [x] Absent    HI  []Present  [x] Absent     Aggression:  [] yes  [x] no    Patient is [x] able  [] unable to CONTRACT FOR SAFETY     PAST MEDICAL/PSYCHIATRIC HISTORY:   Past Medical History:   Diagnosis Date    Depression     Schizoaffective disorder ([facility] [address]        FAMILY/SOCIAL HISTORY:  Family History   Problem Relation Age of Onset    Mental Illness Mother     Substance Abuse Mother     No Known Problems Father      Social History     Socioeconomic History    Marital status: Single     Spouse name: Not on file    Number of children: 1    Years of education: 15    Highest education level: Not on file   Occupational History     Comment: SSDI   Tobacco Use    Smoking status: Current Every Day Smoker     Packs/day: 1.00     Years: 24.00     Pack years: 24.00     Types: Cigars, Cigarettes    Smokeless tobacco: Never Used    Tobacco comment: stopped [place] while ago\"   Vaping Use    Vaping Use: Former   Substance and Sexual Activity    Alcohol use: Yes     Comment: \"I don't abuse alcohol\"    Drug use: Yes     Frequency: 7.0 times per week     Types: Cocaine, Marijuana (Weed)     Comment: denies using    Sexual activity: Yes     Partners: Female   Other Topics Concern    Not on file   Social History Narrative    ** Merged History Encounter **          Social Determinants of Health     Financial Resource Strain:     Difficulty of Paying Living Expenses: Not on file   Food Insecurity: Lab Results   Component Value Date    LABAMPH NOT DETECTED 12/04/2021    BARBSCNU NOT DETECTED 12/04/2021    LABBENZ NOT DETECTED 12/04/2021    LABMETH NOT DETECTED 12/04/2021    OPIATESCREENURINE NOT DETECTED 12/04/2021    PHENCYCLIDINESCREENURINE NOT DETECTED 12/04/2021    ETOH <10 12/04/2021     Lab Results   Component Value Date    TSH 0.392 12/04/2021     No results found for: LITHIUM  Lab Results   Component Value Date    VALPROATE 3 (L) 08/20/2021         Treatment Plan:  The patient's diagnosis, treatment plan, medication management were formulated after patient was seen directly by the attending physician and myself and all relevant documentation was reviewed. Risk, benefit, side effects, possible outcomes of the medication and alternatives discussed with the patient and the patient demonstrated understanding. The patient was also educated that the outcome of treatment will depend on the medication compliance as directed by the prescribers along with regular follow-up, compliance with the labs and other work-up, as clinically indicated. Invega 3 mg daily we will optimize this medication with plan to provide the Invega sustain a BARROS  Depakote 250 mg twice daily  Valproate level on day 4 Depakote therapy      Collateral information: Followed by social work  CD evaluation  Encourage patient to attend group and other milieu activities. Discharge planning discussed with the patient and treatment team.    PSYCHOTHERAPY/COUNSELING:  [x] Therapeutic interview  [x] Supportive  [] CBT  [] Ongoing  [] Other    [x] Patient continues to need, on a daily basis, active treatment furnished directly by or requiring the supervision of inpatient psychiatric personnel      Anticipated Length of stay:5 - 7 days based on stability       NOTE: This report was transcribed using voice recognition software.  Every effort was made to ensure accuracy; however, inadvertent computerized transcription errors may be present.     Electronically signed by JUICE Coffey CNP on 12/6/2021 at 11:27 AM

## 2021-12-06 NOTE — PLAN OF CARE
Problem: Depressive Behavior With or Without Suicide Precautions:  Goal: Ability to disclose and discuss suicidal ideas will improve  Description: Ability to disclose and discuss suicidal ideas will improve  12/5/2021 2138 by Jade Prabhakar RN  Outcome: Met This Shift     Problem: Depressive Behavior With or Without Suicide Precautions:  Goal: Absence of self-harm  Description: Absence of self-harm  Outcome: Met This Shift     Problem: Depressive Behavior With or Without Suicide Precautions:  Goal: Able to verbalize acceptance of life and situations over which he or she has no control  Description: Able to verbalize acceptance of life and situations over which he or she has no control  12/5/2021 2138 by Jade Prabhakar RN  Outcome: Not Met This Shift

## 2021-12-06 NOTE — PROGRESS NOTES
Patient has been isolative to his room so far this shift. Patient denies all and denies anxiety/depression at this time. Patient is flat, blunt, and evasive. Patient keeps his head covered the entire time this nurse is in his room, despite being asked to remove the blanket. Patient is calm and cooperative enough. Patient is encouraged to continue to work towards discharge goal by complying with medications, attending groups and to seek staff if feelings are overwhelming. Environmental rounds completed per unit policy to maintain safety of everyone on the unit. Staff will offer support and interventions as requested or required.

## 2021-12-06 NOTE — PROGRESS NOTES
Patient A&Ox3 but confused to situation. Pt denies SI, HI, anxiety and depression but is endorsing auditory hallucinations. When asked if pt could provide an example of what voices speak about pt became irritable stating, \"I can't talk about it. \" Patient is labile and easily agitated, but cooperative with a flat affect. Pt is visible on the unit for meals but otherwise is isolative to room and self. Pt is med. & meal compliant. Pt with even and unlabored breathing, no signs of acute physical distress noted. Will continue to monitor and offer support as needed.

## 2021-12-07 PROCEDURE — 1240000000 HC EMOTIONAL WELLNESS R&B

## 2021-12-07 PROCEDURE — 99231 SBSQ HOSP IP/OBS SF/LOW 25: CPT | Performed by: NURSE PRACTITIONER

## 2021-12-07 ASSESSMENT — PAIN SCALES - GENERAL
PAINLEVEL_OUTOF10: 10
PAINLEVEL_OUTOF10: 10

## 2021-12-07 ASSESSMENT — PAIN DESCRIPTION - LOCATION: LOCATION: BACK

## 2021-12-07 ASSESSMENT — PAIN DESCRIPTION - PAIN TYPE: TYPE: CHRONIC PAIN

## 2021-12-07 NOTE — PLAN OF CARE
Problem: Depressive Behavior With or Without Suicide Precautions:  Goal: Ability to disclose and discuss suicidal ideas will improve  Description: Ability to disclose and discuss suicidal ideas will improve  Outcome: Met This Shift     Problem: Altered Mood, Psychotic Behavior:  Goal: Able to verbalize reality based thinking  Description: Able to verbalize reality based thinking  Outcome: Not Met This Shift     Problem: Altered Mood, Psychotic Behavior:  Goal: Ability to interact with others will improve  Description: Ability to interact with others will improve  Outcome: Not Met This Shift

## 2021-12-07 NOTE — PLAN OF CARE
[facility]  Day 3 Interdisciplinary Treatment Plan NOTE    Review Date & Time: 12/7/21 1130    Patient was in treatment team    Estimated Length of Stay Update:  3-5 days  Estimated Discharge Date Update: 12/10/21    EDUCATION:   Learner Progress Toward Treatment Goals: Reviewed results and recommendations of this team    Method: Small group    Outcome: Needs reinforcement    PATIENT GOALS: \"not to go through again what I wnt through out there\"    PLAN/TREATMENT RECOMMENDATIONS UPDATE:Encourage patient to participate in group and continue to provide support as needed.        GOALS UPDATE:   Time frame for Short-Term Goals: 1-3 days      Miky Gong RN

## 2021-12-07 NOTE — PROGRESS NOTES
BEHAVIORAL HEALTH FOLLOW-UP NOTE     12/7/2021     Patient was seen and examined in person, Chart reviewed   Patient's case discussed with staff/team    Chief Complaint: none stated    Interim History:   Patient is mostly isolative to his room, irritable, guarded evasive. Offers little conversation. Continues to refuse medications. Is observed in the day room this morning, he is misinterpreting. He has poor insight into his need for treatment and the consequences of his actions and behaviors.      Appetite:   [x] Normal/Unchanged  [] Increased  [] Decreased      Sleep:       [x] Normal/Unchanged  [] Fair       [] Poor              Energy:    [x] Normal/Unchanged  [] Increased  [] Decreased        SI [] Present  [x] Absent    HI  []Present  [x] Absent     Aggression:  [] yes  [x] no    Patient is [x] able  [] unable to CONTRACT FOR SAFETY     PAST MEDICAL/PSYCHIATRIC HISTORY:   Past Medical History:   Diagnosis Date    Depression     Schizoaffective disorder ([facility] [address]        FAMILY/SOCIAL HISTORY:  Family History   Problem Relation Age of Onset    Mental Illness Mother     Substance Abuse Mother     No Known Problems Father      Social History     Socioeconomic History    Marital status: Single     Spouse name: Not on file    Number of children: 1    Years of education: 15    Highest education level: Not on file   Occupational History     Comment: SSDI   Tobacco Use    Smoking status: Current Every Day Smoker     Packs/day: 1.00     Years: 24.00     Pack years: 24.00     Types: Cigars, Cigarettes    Smokeless tobacco: Never Used    Tobacco comment: stopped [place] while ago\"   Vaping Use    Vaping Use: Former   Substance and Sexual Activity    Alcohol use: Yes     Comment: \"I don't abuse alcohol\"    Drug use: Yes     Frequency: 7.0 times per week     Types: Cocaine, Marijuana (Weed)     Comment: denies using    Sexual activity: Yes     Partners: Female   Other Topics Concern    Not on file   Social History Narrative    ** Merged History Encounter **          Social Determinants of Health     Financial Resource Strain:     Difficulty of Paying Living Expenses: Not on file   Food Insecurity:     Worried About Running Out of Food in the Last Year: Not on file    Jamila of Food in the Last Year: Not on file   Transportation Needs:     Lack of Transportation (Medical): Not on file    Lack of Transportation (Non-Medical): Not on file   Physical Activity:     Days of Exercise per Week: Not on file    Minutes of Exercise per Session: Not on file   Stress:     Feeling of Stress : Not on file   Social Connections:     Frequency of Communication with Friends and Family: Not on file    Frequency of Social Gatherings with Friends and Family: Not on file    Attends [employer] Services: Not on file    Active Member of [address] [place] [address] [employer] or Organizations: Not on file    Attends Club or Organization Meetings: Not on file    Marital Status: Not on file   Intimate Partner Violence:     Fear of Current or Ex-Partner: Not on file    Emotionally Abused: Not on file    Physically Abused: Not on file    Sexually Abused: Not on file   Housing Stability:     Unable to Pay for Housing in the Last Year: Not on file    Number of [place] in the Last Year: Not on file    Unstable Housing in the Last Year: Not on file           ROS:  [x] All negative/unchanged except if checked.  Explain positive(checked items) below:  [] Constitutional  [] Eyes  [] Ear/Nose/Mouth/Throat  [] Respiratory  [] CV  [] GI  []   [] Musculoskeletal  [] Skin/Breast  [] Neurological  [] Endocrine  [] Heme/Lymph  [] Allergic/Immunologic    Explanation:     MEDICATIONS:    Current Facility-Administered Medications:     nicotine polacrilex (NICORETTE) gum 4 mg, 4 mg, Oral, Q2H PRN, Nicole Smoker, APRN - CNP    ibuprofen (ADVIL;MOTRIN) tablet 800 mg, 800 mg, Oral, Q8H PRN, Nicole Smoker, APRN - CNP, 800 mg at 12/06/21 0935    paliperidone (INVEGA) extended release tablet 3 mg, 3 mg, Oral, BID, Silver Embs, APRN - CNP    divalproex (DEPAKOTE) DR tablet 250 mg, 250 mg, Oral, 2 times per day, Silver Embs, APRN - CNP    acetaminophen (TYLENOL) tablet 650 mg, 650 mg, Oral, Q6H PRN, Violeta Blas MD, 650 mg at 12/05/21 3753    magnesium hydroxide (MILK OF MAGNESIA) 400 MG/5ML suspension 30 mL, 30 mL, Oral, Daily PRN, Violeta Blas MD    aluminum & magnesium hydroxide-simethicone (MAALOX) 200-200-20 MG/5ML suspension 30 mL, 30 mL, Oral, PRN, Violeta Blas MD    hydrOXYzine (VISTARIL) capsule 50 mg, 50 mg, Oral, TID PRN, Violeta Blas MD    haloperidol (HALDOL) tablet 5 mg, 5 mg, Oral, Q6H PRN **OR** haloperidol lactate (HALDOL) injection 5 mg, 5 mg, IntraMUSCular, Q6H PRN, Violeta Blas MD    traZODone (DESYREL) tablet 50 mg, 50 mg, Oral, Nightly PRN, Violeta Blas MD, 50 mg at 12/05/21 2128      Examination:  BP (!) 110/50   Pulse 70   Temp 98.6 °F (37 °C) (Temporal)   Resp 16   Ht 6' (1.829 m)   Wt 160 lb (72.6 kg)   SpO2 97%   BMI 21.70 kg/m²   Gait - steady  Medication side effects(SE): none     Mental Status Examination:    Level of consciousness:  within normal limits   Appearance:  fair grooming and fair hygiene  Behavior/Motor:  no abnormalities noted  Attitude toward examiner:  cooperative and attentive  Speech:  normal rate and normal volume   Mood: irritable  Affect:  blunted  Thought processes:  illogical   Thought content: devoid of AVH, however guarded irritable and paranoid  Cognition:  oriented to person, place, and time   Concentration poor  Insight poor   Judgement poor     ASSESSMENT:   Patient symptoms are:  [] Well controlled  [] Improving  [] Worsening  [x] No change      Diagnosis:   Principal Problem:    Schizoaffective disorder, bipolar type ([facility]  Active Problems:    Cocaine abuse ([facility]  Resolved Problems:    Suicidal ideation      LABS:    No results for input(s): WBC, HGB, PLT in the last 72 hours.   No transcribed using voice recognition software. Every effort was made to ensure accuracy; however, inadvertent computerized transcription errors may be present.     Electronically signed by JUICE Guzman CNP on 12/7/2021 at 10:56 AM

## 2021-12-07 NOTE — GROUP NOTE
Group Therapy Note    Date: 12/7/2021    Group Start Time: 1000  Group End Time: 1030  Group Topic: Psychoeducation    [facility]    Vijaya Lees, CTRS        Group Therapy Note      Number of participants: 7  Type of group: Psychoeducation  Mode of intervention: Education, Support, Socialization, Exploration, Clarifying, and Problem-solving  Topic: Healthy Boundaries  Objective: Pt will identify 1 way to implement healthy boundaries in recovery. Patient's Goal:  \"Communicate more clearly with the women in my life\"     Notes:  Pt interactive during group ut at times can get off topic. Able to be redirected. Status After Intervention:  Unchanged    Participation Level:  Active Listener and Interactive    Participation Quality: Attentive and Sharing      Speech:  normal and loud      Thought Process/Content: Perseverating      Affective Functioning: Congruent      Mood: irritable      Level of consciousness:  Alert, Oriented x4 and Attentive      Response to Learning: Progressing to goal      Endings: None Reported    Modes of Intervention: Education, Support, Socialization, Exploration, Clarifying and Problem-solving

## 2021-12-07 NOTE — PLAN OF CARE
Patient A&Ox3 but confused to situation. Pt denies SI, HI, but endorses both auditory and visual hallucinations. Pt refuses to provide examples of the content of hallucinations. Pt rates anxiety and depression 10/10. Patient is irritable, hostile and uncooperative with an unstable affect. Pt is intermittently visible on the unit, attends select groups and is isolative to self. Pt refuses meds & meal compliant. Pt with even and unlabored breathing, no signs of acute physical distress noted. Will continue to monitor and offer support as needed.     Problem: Depressive Behavior With or Without Suicide Precautions:  Goal: Able to verbalize acceptance of life and situations over which he or she has no control  Description: Able to verbalize acceptance of life and situations over which he or she has no control  Outcome: Ongoing     Problem: Depressive Behavior With or Without Suicide Precautions:  Goal: Absence of self-harm  Description: Absence of self-harm  Outcome: Met This Shift     Problem: Depressive Behavior With or Without Suicide Precautions:  Goal: Participates in care planning  Description: Participates in care planning  Outcome: Ongoing     Problem: Altered Mood, Psychotic Behavior:  Goal: Ability to interact with others will improve  Description: Ability to interact with others will improve  12/7/2021 1337 by Elvia Joshua RN  Outcome: Ongoing     Problem: Altered Mood, Psychotic Behavior:  Goal: Compliance with prescribed medication regimen will improve  Description: Compliance with prescribed medication regimen will improve  Outcome: Not Met This Shift

## 2021-12-08 PROCEDURE — 99231 SBSQ HOSP IP/OBS SF/LOW 25: CPT | Performed by: NURSE PRACTITIONER

## 2021-12-08 PROCEDURE — 1240000000 HC EMOTIONAL WELLNESS R&B

## 2021-12-08 ASSESSMENT — PAIN SCALES - WONG BAKER: WONGBAKER_NUMERICALRESPONSE: 0

## 2021-12-08 ASSESSMENT — PAIN SCALES - GENERAL: PAINLEVEL_OUTOF10: 0

## 2021-12-08 NOTE — PLAN OF CARE
Patient A&Ox2 but confused to time and situation. Pt denies SI, HI, and hallucinations. Pt rates anxiety and depression 10/10. Patient is labile and easily agitated when requests are unable to be met. Pt with a blunted affect. Pt is visible on the unit for meals but isolative to room and self. Pt is refusing med & meal compliant. Pt with even and unlabored breathing, no signs of acute physical distress noted. Will continue to monitor and offer support as needed.     Problem: Depressive Behavior With or Without Suicide Precautions:  Goal: Able to verbalize acceptance of life and situations over which he or she has no control  Description: Able to verbalize acceptance of life and situations over which he or she has no control  Outcome: Ongoing     Problem: Depressive Behavior With or Without Suicide Precautions:  Goal: Absence of self-harm  Description: Absence of self-harm  Outcome: Met This Shift     Problem: Depressive Behavior With or Without Suicide Precautions:  Goal: Participates in care planning  Description: Participates in care planning  Outcome: Ongoing

## 2021-12-08 NOTE — PROGRESS NOTES
BEHAVIORAL HEALTH FOLLOW-UP NOTE     12/8/2021     Patient was seen and examined in person, Chart reviewed   Patient's case discussed with staff/team    Chief Complaint: none stated    Interim History:   Patient is mostly isolative to his room, irritable, guarded evasive. Offers little conversation. Continues to refuse medications. Is observed in the day room this morning, he is misinterpreting. He has poor insight into his need for treatment and the consequences of his actions and behaviors. Continues to refuse medications.      Appetite:   [x] Normal/Unchanged  [] Increased  [] Decreased      Sleep:       [x] Normal/Unchanged  [] Fair       [] Poor              Energy:    [x] Normal/Unchanged  [] Increased  [] Decreased        SI [] Present  [x] Absent    HI  []Present  [x] Absent     Aggression:  [] yes  [x] no    Patient is [x] able  [] unable to CONTRACT FOR SAFETY     PAST MEDICAL/PSYCHIATRIC HISTORY:   Past Medical History:   Diagnosis Date    Depression     Schizoaffective disorder ([facility] [address]        FAMILY/SOCIAL HISTORY:  Family History   Problem Relation Age of Onset    Mental Illness Mother     Substance Abuse Mother     No Known Problems Father      Social History     Socioeconomic History    Marital status: Single     Spouse name: Not on file    Number of children: 1    Years of education: 15    Highest education level: Not on file   Occupational History     Comment: SSDI   Tobacco Use    Smoking status: Current Every Day Smoker     Packs/day: 1.00     Years: 24.00     Pack years: 24.00     Types: Cigars, Cigarettes    Smokeless tobacco: Never Used    Tobacco comment: stopped [place] while ago\"   Vaping Use    Vaping Use: Former   Substance and Sexual Activity    Alcohol use: Yes     Comment: \"I don't abuse alcohol\"    Drug use: Yes     Frequency: 7.0 times per week     Types: Cocaine, Marijuana (Weed)     Comment: denies using    Sexual activity: Yes     Partners: Female   Other Topics Concern  Not on file   Social History Narrative    ** Merged History Encounter **          Social Determinants of Health     Financial Resource Strain:     Difficulty of Paying Living Expenses: Not on file   Food Insecurity:     Worried About Running Out of Food in the Last Year: Not on file    Jamila of Food in the Last Year: Not on file   Transportation Needs:     Lack of Transportation (Medical): Not on file    Lack of Transportation (Non-Medical): Not on file   Physical Activity:     Days of Exercise per Week: Not on file    Minutes of Exercise per Session: Not on file   Stress:     Feeling of Stress : Not on file   Social Connections:     Frequency of Communication with Friends and Family: Not on file    Frequency of Social Gatherings with Friends and Family: Not on file    Attends [employer] Services: Not on file    Active Member of [address] [place] [address] [employer] or Organizations: Not on file    Attends Club or Organization Meetings: Not on file    Marital Status: Not on file   Intimate Partner Violence:     Fear of Current or Ex-Partner: Not on file    Emotionally Abused: Not on file    Physically Abused: Not on file    Sexually Abused: Not on file   Housing Stability:     Unable to Pay for Housing in the Last Year: Not on file    Number of [place] in the Last Year: Not on file    Unstable Housing in the Last Year: Not on file           ROS:  [x] All negative/unchanged except if checked.  Explain positive(checked items) below:  [] Constitutional  [] Eyes  [] Ear/Nose/Mouth/Throat  [] Respiratory  [] CV  [] GI  []   [] Musculoskeletal  [] Skin/Breast  [] Neurological  [] Endocrine  [] Heme/Lymph  [] Allergic/Immunologic    Explanation:     MEDICATIONS:    Current Facility-Administered Medications:     nicotine polacrilex (NICORETTE) gum 4 mg, 4 mg, Oral, Q2H PRN, Randall Cunningham APRN - CNP    ibuprofen (ADVIL;MOTRIN) tablet 800 mg, 800 mg, Oral, Q8H PRN, Randall Cunningham APRN - CNP, 800 mg at 12/06/21 0499   paliperidone (INVEGA) extended release tablet 3 mg, 3 mg, Oral, BID, JUICE Chow - CNP    divalproex (DEPAKOTE) DR tablet 250 mg, 250 mg, Oral, 2 times per day, JUICE Chow - CNP    acetaminophen (TYLENOL) tablet 650 mg, 650 mg, Oral, Q6H PRN, Sloan Agustin MD, 650 mg at 12/05/21 3055    magnesium hydroxide (MILK OF MAGNESIA) 400 MG/5ML suspension 30 mL, 30 mL, Oral, Daily PRN, Sloan Agustin MD    aluminum & magnesium hydroxide-simethicone (MAALOX) 200-200-20 MG/5ML suspension 30 mL, 30 mL, Oral, PRN, Sloan Agustin MD    hydrOXYzine (VISTARIL) capsule 50 mg, 50 mg, Oral, TID PRN, Sloan Agustin MD    haloperidol (HALDOL) tablet 5 mg, 5 mg, Oral, Q6H PRN **OR** haloperidol lactate (HALDOL) injection 5 mg, 5 mg, IntraMUSCular, Q6H PRN, Sloan Agustin MD    traZODone (DESYREL) tablet 50 mg, 50 mg, Oral, Nightly PRN, Sloan Agustin MD, 50 mg at 12/05/21 2123      Examination:  BP 99/60   Pulse 64   Temp 98.4 °F (36.9 °C) (Temporal)   Resp 16   Ht 6' (1.829 m)   Wt 160 lb (72.6 kg)   SpO2 98%   BMI 21.70 kg/m²   Gait - steady  Medication side effects(SE): none     Mental Status Examination:    Level of consciousness:  within normal limits   Appearance:  fair grooming and fair hygiene  Behavior/Motor:  no abnormalities noted  Attitude toward examiner:  cooperative and attentive  Speech:  normal rate and normal volume   Mood: irritable  Affect:  blunted  Thought processes:  illogical   Thought content: devoid of AVH, however guarded irritable and paranoid  Cognition:  oriented to person, place, and time   Concentration poor  Insight poor   Judgement poor     ASSESSMENT:   Patient symptoms are:  [] Well controlled  [] Improving  [] Worsening  [x] No change      Diagnosis:   Principal Problem:    Schizoaffective disorder, bipolar type ([facility]  Active Problems:    Cocaine abuse ([address]  Resolved Problems:    Suicidal ideation      LABS:    No results for input(s): WBC, HGB, PLT in the last 72 hours. No results for input(s): NA, K, CL, CO2, BUN, CREATININE, GLUCOSE in the last 72 hours. No results for input(s): BILITOT, ALKPHOS, AST, ALT in the last 72 hours. Lab Results   Component Value Date    LABAMPH NOT DETECTED 12/04/2021    BARBSCNU NOT DETECTED 12/04/2021    LABBENZ NOT DETECTED 12/04/2021    LABMETH NOT DETECTED 12/04/2021    OPIATESCREENURINE NOT DETECTED 12/04/2021    PHENCYCLIDINESCREENURINE NOT DETECTED 12/04/2021    ETOH <10 12/04/2021     Lab Results   Component Value Date    TSH 0.392 12/04/2021     No results found for: LITHIUM  Lab Results   Component Value Date    VALPROATE 3 (L) 08/20/2021         Treatment Plan:  The patient's diagnosis, treatment plan, medication management were formulated after patient was seen directly by the attending physician and myself and all relevant documentation was reviewed. Risk, benefit, side effects, possible outcomes of the medication and alternatives discussed with the patient and the patient demonstrated understanding. The patient was also educated that the outcome of treatment will depend on the medication compliance as directed by the prescribers along with regular follow-up, compliance with the labs and other work-up, as clinically indicated. Invega 3 mg daily we will optimize this medication with plan to provide the Invega sustain a BARROS  Depakote 250 mg twice daily  Valproate level on day 4 Depakote therapy      Collateral information: Followed by social work  CD evaluation  Encourage patient to attend group and other milieu activities.   Discharge planning discussed with the patient and treatment team.    PSYCHOTHERAPY/COUNSELING:  [x] Therapeutic interview  [x] Supportive  [] CBT  [] Ongoing  [] Other    [x] Patient continues to need, on a daily basis, active treatment furnished directly by or requiring the supervision of inpatient psychiatric personnel      Anticipated Length of stay:5 - 7 days based on stability       NOTE: This report was transcribed using voice recognition software. Every effort was made to ensure accuracy; however, inadvertent computerized transcription errors may be present.     Electronically signed by JUICE Valentine CNP on 12/8/2021 at 11:50 AM

## 2021-12-08 NOTE — PROGRESS NOTES
Patient has been isolative to his room for most of this shift. Patient did come out for snack but went back into his room. Patient denies SI/HI but still endorses AVH but refuses to tell this nurse what they are telling him or what he is seeing. Patient discusses how he has to [facility] his way through the [place]. \"  Patient refused all medications and began yelling/screaming at this nurse and the LPN regarding how \"I don't take those medications. They make my hair fall out. I told them to stop ordering those for me. All I want is my shot I get every 2 months from [facility]. \"  Patient denies depression and states that his anxiety is \"bad. \"  Patient is encouraged to continue to work towards discharge goal by complying with medications, attending groups and to seek staff if feelings are overwhelming. Environmental rounds completed per unit policy to maintain safety of everyone on the unit. Staff will offer support and interventions as requested or required.

## 2021-12-08 NOTE — CARE COORDINATION
Estimated discharge date per treatment team: 12/9 or 12/10 depending on pt's medication compliance. Pt may be moved to the other unit (7S) if he is not complaint.

## 2021-12-08 NOTE — PLAN OF CARE
Problem: Depressive Behavior With or Without Suicide Precautions:  Goal: Able to verbalize and/or display a decrease in depressive symptoms  Description: Able to verbalize and/or display a decrease in depressive symptoms  Outcome: Met This Shift     Problem: Depressive Behavior With or Without Suicide Precautions:  Goal: Ability to disclose and discuss suicidal ideas will improve  Description: Ability to disclose and discuss suicidal ideas will improve  Outcome: Met This Shift     Problem: Depressive Behavior With or Without Suicide Precautions:  Goal: Absence of self-harm  Description: Absence of self-harm  12/7/2021 2245 by Anthony Last RN  Outcome: Met This Shift     Problem: Altered Mood, Psychotic Behavior:  Goal: Able to verbalize reality based thinking  Description: Able to verbalize reality based thinking  Outcome: Not Met This Shift     Problem: Altered Mood, Psychotic Behavior:  Goal: Ability to interact with others will improve  Description: Ability to interact with others will improve  12/7/2021 2245 by Anthony Last RN  Outcome: Not Met This Shift

## 2021-12-09 PROCEDURE — 99231 SBSQ HOSP IP/OBS SF/LOW 25: CPT | Performed by: NURSE PRACTITIONER

## 2021-12-09 PROCEDURE — 6370000000 HC RX 637 (ALT 250 FOR IP): Performed by: PSYCHIATRY & NEUROLOGY

## 2021-12-09 PROCEDURE — 1240000000 HC EMOTIONAL WELLNESS R&B

## 2021-12-09 RX ADMIN — TRAZODONE HYDROCHLORIDE 50 MG: 50 TABLET ORAL at 21:07

## 2021-12-09 ASSESSMENT — PAIN SCALES - GENERAL
PAINLEVEL_OUTOF10: 0
PAINLEVEL_OUTOF10: 5
PAINLEVEL_OUTOF10: 0

## 2021-12-09 ASSESSMENT — PAIN DESCRIPTION - PAIN TYPE: TYPE: CHRONIC PAIN

## 2021-12-09 ASSESSMENT — PAIN DESCRIPTION - LOCATION: LOCATION: BACK

## 2021-12-09 NOTE — BH NOTE
Pt is stable and more relaxed at this time. Pt is resting in room. Pt denies suicidal or homicidal ideations. Pt denies hallucinations. No other behavioral concerns at this time. Will follow and monitor.

## 2021-12-09 NOTE — BH NOTE
Pt continues to ask for his clothing. After several minutes,  It was seen that another staff member gave pt his clothing requested. Pt is now seen wearing a light jacket and additionally has a shirt tied across pt head. No strings are seen with the jacket at this time. Will monitor and follow.

## 2021-12-09 NOTE — CARE COORDINATION
Sw met with this pt for a check in. Pt continues to be irritable and evasive. Pt is not med-compliant, and does not attend this 's groups despite being encouraged to do so. Pt currently denying SI/ HI/ hallucinations/ delusions. Pt is to be transferred to 7S this afternoon. Sw will continue to assist as needed.

## 2021-12-09 NOTE — PLAN OF CARE
Problem: Depressive Behavior With or Without Suicide Precautions:  Goal: Ability to disclose and discuss suicidal ideas will improve  Description: Ability to disclose and discuss suicidal ideas will improve  Outcome: Met This Shift     Problem: Depressive Behavior With or Without Suicide Precautions:  Goal: Absence of self-harm  Description: Absence of self-harm  12/8/2021 2147 by Yuly Aparicio RN  Outcome: Met This Shift     Problem: Depressive Behavior With or Without Suicide Precautions:  Goal: Able to verbalize acceptance of life and situations over which he or she has no control  Description: Able to verbalize acceptance of life and situations over which he or she has no control  12/8/2021 2147 by Yuly Aparicio RN  Outcome: Ongoing     Problem: Depressive Behavior With or Without Suicide Precautions:  Goal: Able to verbalize and/or display a decrease in depressive symptoms  Description: Able to verbalize and/or display a decrease in depressive symptoms  Outcome: Ongoing    Patient denies SI/HI and hallucinations. Patient is pleasant and cooperative but can become irritable at times. Patient is evasive and blunt during assessment. Patient refused prescribed medications. Will continue to offer support and comfort to patient.

## 2021-12-09 NOTE — PROGRESS NOTES
BEHAVIORAL HEALTH FOLLOW-UP NOTE     12/9/2021     Patient was seen and examined in person, Chart reviewed   Patient's case discussed with staff/team    Chief Complaint: none stated    Interim History:   Patient is mostly isolative to his room, irritable, guarded evasive. Offers little conversation he is nonsensical and disorganized. Continues to refuse medications. Is observed in the day room this morning, he is misinterpreting. He has poor insight into his need for treatment and the consequences of his actions and behaviors. Continues to refuse medications.      Appetite:   [x] Normal/Unchanged  [] Increased  [] Decreased      Sleep:       [x] Normal/Unchanged  [] Fair       [] Poor              Energy:    [x] Normal/Unchanged  [] Increased  [] Decreased        SI [] Present  [x] Absent    HI  []Present  [x] Absent     Aggression:  [] yes  [x] no    Patient is [x] able  [] unable to CONTRACT FOR SAFETY     PAST MEDICAL/PSYCHIATRIC HISTORY:   Past Medical History:   Diagnosis Date    Depression     Schizoaffective disorder ([facility] [address]        FAMILY/SOCIAL HISTORY:  Family History   Problem Relation Age of Onset    Mental Illness Mother     Substance Abuse Mother     No Known Problems Father      Social History     Socioeconomic History    Marital status: Single     Spouse name: Not on file    Number of children: 1    Years of education: 15    Highest education level: Not on file   Occupational History     Comment: SSDI   Tobacco Use    Smoking status: Current Every Day Smoker     Packs/day: 1.00     Years: 24.00     Pack years: 24.00     Types: Cigars, Cigarettes    Smokeless tobacco: Never Used    Tobacco comment: stopped [place] while ago\"   Vaping Use    Vaping Use: Former   Substance and Sexual Activity    Alcohol use: Yes     Comment: \"I don't abuse alcohol\"    Drug use: Yes     Frequency: 7.0 times per week     Types: Cocaine, Marijuana (Weed)     Comment: denies using    Sexual activity: Yes Partners: Female   Other Topics Concern    Not on file   Social History Narrative    ** Merged History Encounter **          Social Determinants of Health     Financial Resource Strain:     Difficulty of Paying Living Expenses: Not on file   Food Insecurity:     Worried About Running Out of Food in the Last Year: Not on file    Jamila of Food in the Last Year: Not on file   Transportation Needs:     Lack of Transportation (Medical): Not on file    Lack of Transportation (Non-Medical): Not on file   Physical Activity:     Days of Exercise per Week: Not on file    Minutes of Exercise per Session: Not on file   Stress:     Feeling of Stress : Not on file   Social Connections:     Frequency of Communication with Friends and Family: Not on file    Frequency of Social Gatherings with Friends and Family: Not on file    Attends Church Services: Not on file    Active Member of [address] [place] [address] [email or web address] or Organizations: Not on file    Attends Club or Organization Meetings: Not on file    Marital Status: Not on file   Intimate Partner Violence:     Fear of Current or Ex-Partner: Not on file    Emotionally Abused: Not on file    Physically Abused: Not on file    Sexually Abused: Not on file   Housing Stability:     Unable to Pay for Housing in the Last Year: Not on file    Number of [place] in the Last Year: Not on file    Unstable Housing in the Last Year: Not on file           ROS:  [x] All negative/unchanged except if checked.  Explain positive(checked items) below:  [] Constitutional  [] Eyes  [] Ear/Nose/Mouth/Throat  [] Respiratory  [] CV  [] GI  []   [] Musculoskeletal  [] Skin/Breast  [] Neurological  [] Endocrine  [] Heme/Lymph  [] Allergic/Immunologic    Explanation:     MEDICATIONS:    Current Facility-Administered Medications:     nicotine polacrilex (NICORETTE) gum 4 mg, 4 mg, Oral, Q2H PRN, Charmayne Sheer, APRN - CNP    ibuprofen (ADVIL;MOTRIN) tablet 800 mg, 800 mg, Oral, Q8H PRN, Charmayne Sheer, JUICE - CNP, 800 mg at 12/06/21 0935    paliperidone (INVEGA) extended release tablet 3 mg, 3 mg, Oral, BID, JUICE Osullivan CNP    divalproex (DEPAKOTE) DR tablet 250 mg, 250 mg, Oral, 2 times per day, JUICE Osullivan CNP    acetaminophen (TYLENOL) tablet 650 mg, 650 mg, Oral, Q6H PRN, Renay Cheadle, MD, 650 mg at 12/05/21 6934    magnesium hydroxide (MILK OF MAGNESIA) 400 MG/5ML suspension 30 mL, 30 mL, Oral, Daily PRN, Renay Cheadle, MD    aluminum & magnesium hydroxide-simethicone (MAALOX) 200-200-20 MG/5ML suspension 30 mL, 30 mL, Oral, PRN, Renay Cheadle, MD    hydrOXYzine (VISTARIL) capsule 50 mg, 50 mg, Oral, TID PRN, Renay Cheadle, MD    haloperidol (HALDOL) tablet 5 mg, 5 mg, Oral, Q6H PRN **OR** haloperidol lactate (HALDOL) injection 5 mg, 5 mg, IntraMUSCular, Q6H PRN, Renay Cheadle, MD    traZODone (DESYREL) tablet 50 mg, 50 mg, Oral, Nightly PRN, Renay Cheadle, MD, 50 mg at 12/05/21 2123      Examination:  BP (!) 107/52   Pulse 72   Temp 99.4 °F (37.4 °C) (Temporal)   Resp 16   Ht 6' (1.829 m)   Wt 160 lb (72.6 kg)   SpO2 97%   BMI 21.70 kg/m²   Gait - steady  Medication side effects(SE): none     Mental Status Examination:    Level of consciousness:  within normal limits   Appearance:  fair grooming and fair hygiene  Behavior/Motor:  no abnormalities noted  Attitude toward examiner:  cooperative and attentive  Speech:  normal rate and normal volume   Mood: irritable  Affect:  blunted  Thought processes:  illogical   Thought content: devoid of AVH, however guarded irritable and paranoid  Cognition:  oriented to person, place, and time   Concentration poor  Insight poor   Judgement poor     ASSESSMENT:   Patient symptoms are:  [] Well controlled  [] Improving  [] Worsening  [x] No change      Diagnosis:   Principal Problem:    Schizoaffective disorder, bipolar type ([address]  Active Problems:    Coc[facility] abuse ([facility]  Resolved Problems:    Suicidal ideation      LABS:    No results for input(s): WBC, HGB, PLT in the last 72 hours. No results for input(s): NA, K, CL, CO2, BUN, CREATININE, GLUCOSE in the last 72 hours. No results for input(s): BILITOT, ALKPHOS, AST, ALT in the last 72 hours. Lab Results   Component Value Date    LABAMPH NOT DETECTED 12/04/2021    BARBSCNU NOT DETECTED 12/04/2021    LABBENZ NOT DETECTED 12/04/2021    LABMETH NOT DETECTED 12/04/2021    OPIATESCREENURINE NOT DETECTED 12/04/2021    PHENCYCLIDINESCREENURINE NOT DETECTED 12/04/2021    ETOH <10 12/04/2021     Lab Results   Component Value Date    TSH 0.392 12/04/2021     No results found for: LITHIUM  Lab Results   Component Value Date    VALPROATE 3 (L) 08/20/2021         Treatment Plan:  The patient's diagnosis, treatment plan, medication management were formulated after patient was seen directly by the attending physician and myself and all relevant documentation was reviewed. Risk, benefit, side effects, possible outcomes of the medication and alternatives discussed with the patient and the patient demonstrated understanding. The patient was also educated that the outcome of treatment will depend on the medication compliance as directed by the prescribers along with regular follow-up, compliance with the labs and other work-up, as clinically indicated. Invega 3 mg daily we will optimize this medication with plan to provide the Invega sustain a BARROS  Depakote 250 mg twice daily  Valproate level on day 4 Depakote therapy      Collateral information: Followed by social work  CD evaluation  Encourage patient to attend group and other milieu activities.   Discharge planning discussed with the patient and treatment team.    PSYCHOTHERAPY/COUNSELING:  [x] Therapeutic interview  [x] Supportive  [] CBT  [] Ongoing  [] Other    [x] Patient continues to need, on a daily basis, active treatment furnished directly by or requiring the supervision of inpatient psychiatric personnel      Anticipated Length of stay:5 - 7 days based on stability       NOTE: This report was transcribed using voice recognition software. Every effort was made to ensure accuracy; however, inadvertent computerized transcription errors may be present.     Electronically signed by JUICE Brock CNP on 12/9/2021 at 11:37 AM

## 2021-12-09 NOTE — BH NOTE
Pt has arrived on unit. Pt appears to have underlying irritability and labile affect. Pt appears to have some underlying internal stimulations. Pt is self ambulatory, does not appear to be in immediate distress at this time. Pt denies suicidal or homicidal ideations. Pt denies hallucinations. Pt has been asking multiple times for his clothing that was with him. Pt did have his own shirt on and then hospital attire. This RN advised pt advised to that this concern would be looked into when caught up. Will monitor and follow.

## 2021-12-10 PROCEDURE — 1240000000 HC EMOTIONAL WELLNESS R&B

## 2021-12-10 PROCEDURE — 99232 SBSQ HOSP IP/OBS MODERATE 35: CPT | Performed by: NURSE PRACTITIONER

## 2021-12-10 PROCEDURE — 6370000000 HC RX 637 (ALT 250 FOR IP): Performed by: NURSE PRACTITIONER

## 2021-12-10 RX ADMIN — IBUPROFEN 800 MG: 800 TABLET, FILM COATED ORAL at 09:35

## 2021-12-10 ASSESSMENT — PAIN SCALES - GENERAL
PAINLEVEL_OUTOF10: 10
PAINLEVEL_OUTOF10: 0

## 2021-12-10 NOTE — PLAN OF CARE
Pt  Denies SI/HI or AVH at this time, states he does hear voices at times but denies at present time. Pt  Is guarded and suspicious, appears to be having internal stimuli. Pt isolates to room, Rates depression and anxiety 4/10. Pt remains safe and will continue to monitor closely.

## 2021-12-10 NOTE — PROGRESS NOTES
BEHAVIORAL HEALTH FOLLOW-UP NOTE     12/10/2021     Patient was seen and examined in person, Chart reviewed   Patient's case discussed with staff/team    Chief Complaint: \"Im ready to go\"    Interim History:   Pt seen in his room. He is isolative not attending groups or socializing is otherwise states he is ready to go and is agreeable to go to the crisis unit on discharge. Denies auditory visual hallucinations denies SI/HI intent or plan no overt or covert signs psychosis.   He has no insight or judgment he refuses medications he remains guarded and isolative to his room          Appetite:   [x] Normal/Unchanged  [] Increased  [] Decreased      Sleep:       [x] Normal/Unchanged  [] Fair       [] Poor              Energy:    [x] Normal/Unchanged  [] Increased  [] Decreased        SI [] Present  [x] Absent    HI  []Present  [x] Absent     Aggression:  [] yes  [x] no    Patient is [x] able  [] unable to CONTRACT FOR SAFETY     PAST MEDICAL/PSYCHIATRIC HISTORY:   Past Medical History:   Diagnosis Date    Depression     Schizoaffective disorder ([facility] [address]        FAMILY/SOCIAL HISTORY:  Family History   Problem Relation Age of Onset    Mental Illness Mother     Substance Abuse Mother     No Known Problems Father      Social History     Socioeconomic History    Marital status: Single     Spouse name: Not on file    Number of children: 1    Years of education: 15    Highest education level: Not on file   Occupational History     Comment: SSDI   Tobacco Use    Smoking status: Current Every Day Smoker     Packs/day: 1.00     Years: 24.00     Pack years: 24.00     Types: Cigars, Cigarettes    Smokeless tobacco: Never Used    Tobacco comment: stopped [place] while ago\"   Vaping Use    Vaping Use: Former   Substance and Sexual Activity    Alcohol use: Yes     Comment: \"I don't abuse alcohol\"    Drug use: Yes     Frequency: 7.0 times per week     Types: Cocaine, Marijuana (Weed)     Comment: denies using    Sexual activity: Yes     Partners: Female   Other Topics Concern    Not on file   Social History Narrative    ** Merged History Encounter **          Social Determinants of Health     Financial Resource Strain:     Difficulty of Paying Living Expenses: Not on file   Food Insecurity:     Worried About Running Out of Food in the Last Year: Not on file    Jamila of Food in the Last Year: Not on file   Transportation Needs:     Lack of Transportation (Medical): Not on file    Lack of Transportation (Non-Medical): Not on file   Physical Activity:     Days of Exercise per Week: Not on file    Minutes of Exercise per Session: Not on file   Stress:     Feeling of Stress : Not on file   Social Connections:     Frequency of Communication with Friends and Family: Not on file    Frequency of Social Gatherings with Friends and Family: Not on file    Attends [employer] Services: Not on file    Active Member of [address] [place] [address] [employer] or Organizations: Not on file    Attends Club or Organization Meetings: Not on file    Marital Status: Not on file   Intimate Partner Violence:     Fear of Current or Ex-Partner: Not on file    Emotionally Abused: Not on file    Physically Abused: Not on file    Sexually Abused: Not on file   Housing Stability:     Unable to Pay for Housing in the Last Year: Not on file    Number of [place] in the Last Year: Not on file    Unstable Housing in the Last Year: Not on file           ROS:  [x] All negative/unchanged except if checked.  Explain positive(checked items) below:  [] Constitutional  [] Eyes  [] Ear/Nose/Mouth/Throat  [] Respiratory  [] CV  [] GI  []   [] Musculoskeletal  [] Skin/Breast  [] Neurological  [] Endocrine  [] Heme/Lymph  [] Allergic/Immunologic    Explanation:     MEDICATIONS:    Current Facility-Administered Medications:     nicotine polacrilex (NICORETTE) gum 4 mg, 4 mg, Oral, Q2H PRN, Gwinda Loop, APRN - CNP    ibuprofen (ADVIL;MOTRIN) tablet 800 mg, 800 mg, Oral, Q8H PRN, JUICE Chow CNP, 800 mg at 12/10/21 0935    paliperidone (INVEGA) extended release tablet 3 mg, 3 mg, Oral, BID, JUICE Chow CNP    divalproex (DEPAKOTE) DR tablet 250 mg, 250 mg, Oral, 2 times per day, JUICE Chow CNP    acetaminophen (TYLENOL) tablet 650 mg, 650 mg, Oral, Q6H PRN, Sloan Agustin MD, 650 mg at 12/05/21 1530    magnesium hydroxide (MILK OF MAGNESIA) 400 MG/5ML suspension 30 mL, 30 mL, Oral, Daily PRN, Sloan Agustin MD    aluminum & magnesium hydroxide-simethicone (MAALOX) 200-200-20 MG/5ML suspension 30 mL, 30 mL, Oral, PRN, Sloan Agustin MD    hydrOXYzine (VISTARIL) capsule 50 mg, 50 mg, Oral, TID PRN, Sloan Agustin MD    haloperidol (HALDOL) tablet 5 mg, 5 mg, Oral, Q6H PRN **OR** haloperidol lactate (HALDOL) injection 5 mg, 5 mg, IntraMUSCular, Q6H PRN, Sloan Agustin MD    traZODone (DESYREL) tablet 50 mg, 50 mg, Oral, Nightly PRN, Sloan Agustin MD, 50 mg at 12/09/21 2107      Examination:  BP (!) 100/49   Pulse 92   Temp 96.9 °F (36.1 °C) (Oral)   Resp 14   Ht 6' (1.829 m)   Wt 160 lb (72.6 kg)   SpO2 96%   BMI 21.70 kg/m²   Gait - steady  Medication side effects(SE): none     Mental Status Examination:    Level of consciousness:  within normal limits   Appearance:  fair grooming and fair hygiene  Behavior/Motor:  no abnormalities noted  Attitude toward examiner:  cooperative and attentive  Speech:  normal rate and normal volume   Mood: irritable  Affect:  blunted  Thought processes:  illogical   Thought content: devoid of AVH, however guarded irritable and paranoid  Cognition:  oriented to person, place, and time   Concentration poor  Insight poor   Judgement poor     ASSESSMENT:   Patient symptoms are:  [] Well controlled  [] Improving  [] Worsening  [x] No change      Diagnosis:   Principal Problem:    Schizoaffective disorder, bipolar type ([facility] [address]  Active Problems:    [facility] ([facility]  Resolved Problems:    Suicidal ideation      LABS:    No results for input(s): WBC, HGB, PLT in the last 72 hours. No results for input(s): NA, K, CL, CO2, BUN, CREATININE, GLUCOSE in the last 72 hours. No results for input(s): BILITOT, ALKPHOS, AST, ALT in the last 72 hours. Lab Results   Component Value Date    LABAMPH NOT DETECTED 12/04/2021    BARBSCNU NOT DETECTED 12/04/2021    LABBENZ NOT DETECTED 12/04/2021    LABMETH NOT DETECTED 12/04/2021    OPIATESCREENURINE NOT DETECTED 12/04/2021    PHENCYCLIDINESCREENURINE NOT DETECTED 12/04/2021    ETOH <10 12/04/2021     Lab Results   Component Value Date    TSH 0.392 12/04/2021     No results found for: LITHIUM  Lab Results   Component Value Date    VALPROATE 3 (L) 08/20/2021         Treatment Plan:  The patient's diagnosis, treatment plan, medication management were formulated after patient was seen directly by the attending physician and myself and all relevant documentation was reviewed. Risk, benefit, side effects, possible outcomes of the medication and alternatives discussed with the patient and the patient demonstrated understanding. The patient was also educated that the outcome of treatment will depend on the medication compliance as directed by the prescribers along with regular follow-up, compliance with the labs and other work-up, as clinically indicated. Invega 3 mg daily we will optimize this medication with plan to provide the Invega sustain a BARROS  Depakote 250 mg twice daily  Valproate level on day 4 Depakote therapy      Collateral information: Followed by social work  CD evaluation  Encourage patient to attend group and other milieu activities.   Discharge planning discussed with the patient and treatment team.    PSYCHOTHERAPY/COUNSELING:  [x] Therapeutic interview  [x] Supportive  [] CBT  [] Ongoing  [] Other    [x] Patient continues to need, on a daily basis, active treatment furnished directly by or requiring the supervision of inpatient psychiatric personnel      Anticipated Length of stay:5 - 7 days based on stability       NOTE: This report was transcribed using voice recognition software. Every effort was made to ensure accuracy; however, inadvertent computerized transcription errors may be present.     Electronically signed by JUICE Hayes CNP on 16/45/4098 at 9:56 AM

## 2021-12-10 NOTE — BH NOTE
Refused scheduled morning Depakote and Invega at this time. Did take PRN Ibuprofen, sore legs and back.

## 2021-12-10 NOTE — PLAN OF CARE
Patient denies suicidal ideation, homicidal ideations and AVH. Patient denies anxiety and depression. Patient appears flat, anxious and suspicious. Presents calm and cooperative during assessment. Patient is out on the unit but does not appear to be social with peers. Patient took Trazodone at [facility] but refusing psych medications. No complaints or concerns verbalized at this time. No unit problems reported. Will continue to observe and support.     Problem: Depressive Behavior With or Without Suicide Precautions:  Goal: Able to verbalize acceptance of life and situations over which he or she has no control  Description: Able to verbalize acceptance of life and situations over which he or she has no control  Outcome: Ongoing     Problem: Depressive Behavior With or Without Suicide Precautions:  Goal: Able to verbalize and/or display a decrease in depressive symptoms  Description: Able to verbalize and/or display a decrease in depressive symptoms  Outcome: Ongoing     Problem: Depressive Behavior With or Without Suicide Precautions:  Goal: Ability to disclose and discuss suicidal ideas will improve  Description: Ability to disclose and discuss suicidal ideas will improve  Outcome: Ongoing     Problem: Depressive Behavior With or Without Suicide Precautions:  Goal: Able to verbalize support systems  Description: Able to verbalize support systems  Outcome: Ongoing     Problem: Depressive Behavior With or Without Suicide Precautions:  Goal: Absence of self-harm  Description: Absence of self-harm  Outcome: Ongoing

## 2021-12-10 NOTE — PLAN OF CARE
treatment and following staff's direction  Outcome: Ongoing     Problem: Altered Mood, Psychotic Behavior:  Goal: Able to verbalize decrease in frequency and intensity of hallucinations  Description: Able to verbalize decrease in frequency and intensity of hallucinations  Outcome: Ongoing     Problem: Altered Mood, Psychotic Behavior:  Goal: Able to verbalize reality based thinking  Description: Able to verbalize reality based thinking  Outcome: Ongoing     Problem: Altered Mood, Psychotic Behavior:  Goal: Ability to achieve adequate nutritional intake will improve  Description: Ability to achieve adequate nutritional intake will improve  Outcome: Ongoing     Problem: Altered Mood, Psychotic Behavior:  Goal: Ability to interact with others will improve  Description: Ability to interact with others will improve  Outcome: Ongoing     Problem: Altered Mood, Psychotic Behavior:  Goal: Patient specific goal  Description: Patient specific goal  Outcome: Ongoing

## 2021-12-11 PROCEDURE — 99232 SBSQ HOSP IP/OBS MODERATE 35: CPT | Performed by: NURSE PRACTITIONER

## 2021-12-11 PROCEDURE — 1240000000 HC EMOTIONAL WELLNESS R&B

## 2021-12-11 PROCEDURE — 6370000000 HC RX 637 (ALT 250 FOR IP): Performed by: NURSE PRACTITIONER

## 2021-12-11 PROCEDURE — 6370000000 HC RX 637 (ALT 250 FOR IP): Performed by: PSYCHIATRY & NEUROLOGY

## 2021-12-11 RX ORDER — ARIPIPRAZOLE 5 MG/1
5 TABLET ORAL DAILY
Status: DISCONTINUED | OUTPATIENT
Start: 2021-12-11 | End: 2021-12-13

## 2021-12-11 RX ORDER — ARIPIPRAZOLE 5 MG/1
5 TABLET ORAL DAILY
Status: DISCONTINUED | OUTPATIENT
Start: 2021-12-11 | End: 2021-12-11

## 2021-12-11 RX ADMIN — TRAZODONE HYDROCHLORIDE 50 MG: 50 TABLET ORAL at 20:59

## 2021-12-11 RX ADMIN — ARIPIPRAZOLE 5 MG: 5 TABLET ORAL at 14:11

## 2021-12-11 RX ADMIN — NICOTINE POLACRILEX 4 MG: 2 GUM, CHEWING BUCCAL at 20:58

## 2021-12-11 ASSESSMENT — PAIN SCALES - GENERAL
PAINLEVEL_OUTOF10: 0
PAINLEVEL_OUTOF10: 0

## 2021-12-11 ASSESSMENT — PAIN SCALES - WONG BAKER: WONGBAKER_NUMERICALRESPONSE: 0

## 2021-12-11 NOTE — PROGRESS NOTES
BEHAVIORAL HEALTH FOLLOW-UP NOTE     12/11/2021     Patient was seen and examined in person, Chart reviewed   Patient's case discussed with staff/team    Chief Complaint: \"I do not take [place] only take Abilify\"    Interim History:   Patient seen in his room he was later observed up on the unit talking to unseen others. He is very focused on only having Abilify he states he does not take [place].   He has limited insight and judgment he denies SI/HI intent or plan denies auditory visualizations clearly internally stimulated observed talking unseen others irritable isolate to his room guarded and paranoid      Appetite:   [x] Normal/Unchanged  [] Increased  [] Decreased      Sleep:       [x] Normal/Unchanged  [] Fair       [] Poor              Energy:    [x] Normal/Unchanged  [] Increased  [] Decreased        SI [] Present  [x] Absent    HI  []Present  [x] Absent     Aggression:  [] yes  [x] no    Patient is [x] able  [] unable to CONTRACT FOR SAFETY     PAST MEDICAL/PSYCHIATRIC HISTORY:   Past Medical History:   Diagnosis Date    Depression     Schizoaffective disorder ([facility]        FAMILY/SOCIAL HISTORY:  Family History   Problem Relation Age of Onset    Mental Illness Mother     Substance Abuse Mother     No Known Problems Father      Social History     Socioeconomic History    Marital status: Single     Spouse name: Not on file    Number of children: 1    Years of education: 15    Highest education level: Not on file   Occupational History     Comment: SSDI   Tobacco Use    Smoking status: Current Every Day Smoker     Packs/day: 1.00     Years: 24.00     Pack years: 24.00     Types: Cigars, Cigarettes    Smokeless tobacco: Never Used    Tobacco comment: stopped [place] while ago\"   Vaping Use    Vaping Use: Former   Substance and Sexual Activity    Alcohol use: Yes     Comment: \"I don't abuse alcohol\"    Drug use: Yes     Frequency: 7.0 times per week     Types: Cocaine, Marijuana (Weed)     Comment: denies using    Sexual activity: Yes     Partners: Female   Other Topics Concern    Not on file   Social History Narrative    ** Merged History Encounter **          Social Determinants of Health     Financial Resource Strain:     Difficulty of Paying Living Expenses: Not on file   Food Insecurity:     Worried About Running Out of Food in the Last Year: Not on file    Jamila of Food in the Last Year: Not on file   Transportation Needs:     Lack of Transportation (Medical): Not on file    Lack of Transportation (Non-Medical): Not on file   Physical Activity:     Days of Exercise per Week: Not on file    Minutes of Exercise per Session: Not on file   Stress:     Feeling of Stress : Not on file   Social Connections:     Frequency of Communication with Friends and Family: Not on file    Frequency of Social Gatherings with Friends and Family: Not on file    Attends Protestant Services: Not on file    Active Member of [address] [place] [address] [employer] or Organizations: Not on file    Attends Club or Organization Meetings: Not on file    Marital Status: Not on file   Intimate Partner Violence:     Fear of Current or Ex-Partner: Not on file    Emotionally Abused: Not on file    Physically Abused: Not on file    Sexually Abused: Not on file   Housing Stability:     Unable to Pay for Housing in the Last Year: Not on file    Number of [place] in the Last Year: Not on file    Unstable Housing in the Last Year: Not on file           ROS:  [x] All negative/unchanged except if checked.  Explain positive(checked items) below:  [] Constitutional  [] Eyes  [] Ear/Nose/Mouth/Throat  [] Respiratory  [] CV  [] GI  []   [] Musculoskeletal  [] Skin/Breast  [] Neurological  [] Endocrine  [] Heme/Lymph  [] Allergic/Immunologic    Explanation:     MEDICATIONS:    Current Facility-Administered Medications:     ARIPiprazole (ABILIFY) tablet 5 mg, 5 mg, Oral, Daily, Sophie Hampton, APRN - CNP    nicotine polacrilex (NICORETTE) gum 4 mg, 4 mg, Oral, Q2H PRN, Gwinda Loop, APRN - CNP    ibuprofen (ADVIL;MOTRIN) tablet 800 mg, 800 mg, Oral, Q8H PRN, Gwinda Loop, APRN - CNP, 800 mg at 12/10/21 0935    acetaminophen (TYLENOL) tablet 650 mg, 650 mg, Oral, Q6H PRN, Quyen Dempsey MD, 650 mg at 12/05/21 7176    magnesium hydroxide (MILK OF MAGNESIA) 400 MG/5ML suspension 30 mL, 30 mL, Oral, Daily PRN, Quyen Dempsey MD    aluminum & magnesium hydroxide-simethicone (MAALOX) 200-200-20 MG/5ML suspension 30 mL, 30 mL, Oral, PRN, Quyen Dempsey MD    hydrOXYzine (VISTARIL) capsule 50 mg, 50 mg, Oral, TID PRN, Quyen Dempsey MD    haloperidol (HALDOL) tablet 5 mg, 5 mg, Oral, Q6H PRN **OR** haloperidol lactate (HALDOL) injection 5 mg, 5 mg, IntraMUSCular, Q6H PRN, Quyen Dempsey MD    traZODone (DESYREL) tablet 50 mg, 50 mg, Oral, Nightly PRN, Quyen Dempsey MD, 50 mg at 12/09/21 2107      Examination:  BP (!) 116/58   Pulse 68   Temp 98.2 °F (36.8 °C)   Resp 14   Ht 6' (1.829 m)   Wt 155 lb (70.3 kg)   SpO2 96%   BMI 21.02 kg/m²   Gait - steady  Medication side effects(SE): none     Mental Status Examination:    Level of consciousness:  within normal limits   Appearance:  fair grooming and fair hygiene  Behavior/Motor:  no abnormalities noted  Attitude toward examiner:  cooperative and attentive  Speech:  normal rate and normal volume   Mood: irritable  Affect:  blunted  Thought processes:  illogical   Thought content: devoid of AVH, however guarded irritable and paranoid  Cognition:  oriented to person, place, and time   Concentration poor  Insight poor   Judgement poor     ASSESSMENT:   Patient symptoms are:  [] Well controlled  [] Improving  [] Worsening  [x] No change      Diagnosis:   Principal Problem:    Schizoaffective disorder, bipolar type ([facility]  Active Problems:    Cocaine abuse ([facility]  Resolved Problems:    Suicidal ideation      LABS:    No results for input(s): WBC, HGB, PLT in the last 72 hours.   No results for input(s): NA, K, CL, CO2, BUN, CREATININE, GLUCOSE in the last 72 hours. No results for input(s): BILITOT, ALKPHOS, AST, ALT in the last 72 hours. Lab Results   Component Value Date    LABAMPH NOT DETECTED 12/04/2021    BARBSCNU NOT DETECTED 12/04/2021    LABBENZ NOT DETECTED 12/04/2021    LABMETH NOT DETECTED 12/04/2021    OPIATESCREENURINE NOT DETECTED 12/04/2021    PHENCYCLIDINESCREENURINE NOT DETECTED 12/04/2021    ETOH <10 12/04/2021     Lab Results   Component Value Date    TSH 0.392 12/04/2021     No results found for: LITHIUM  Lab Results   Component Value Date    VALPROATE 3 (L) 08/20/2021         Treatment Plan:  The patient's diagnosis, treatment plan, medication management were formulated after patient was seen directly by the attending physician and myself and all relevant documentation was reviewed. Risk, benefit, side effects, possible outcomes of the medication and alternatives discussed with the patient and the patient demonstrated understanding. The patient was also educated that the outcome of treatment will depend on the medication compliance as directed by the prescribers along with regular follow-up, compliance with the labs and other work-up, as clinically indicated. Patient states only take Abilify states he lost close injection when he was here in August 2021. He states he will only take an [place] injection he will not take Ronold Chu. We will discontinue Invega and Depakote  We will start Abilify 5 mg daily and plan for the Aristada injection    Collateral information: Followed by social work  CD evaluation  Encourage patient to attend group and other milieu activities.   Discharge planning discussed with the patient and treatment team.    PSYCHOTHERAPY/COUNSELING:  [x] Therapeutic interview  [x] Supportive  [] CBT  [] Ongoing  [] Other    [x] Patient continues to need, on a daily basis, active treatment furnished directly by or requiring the supervision of inpatient psychiatric personnel      Anticipated Length of stay:5 - 7 days based on stability       NOTE: This report was transcribed using voice recognition software. Every effort was made to ensure accuracy; however, inadvertent computerized transcription errors may be present.     Electronically signed by JUICE Hook CNP on 91/18/2761 at 12:49 PM

## 2021-12-11 NOTE — PLAN OF CARE
Problem: Pain:  Goal: Pain level will decrease  Description: Pain level will decrease  12/11/2021 1803 by Casey Ny RN  Outcome: Met This Shift     Problem: Pain:  Goal: Control of acute pain  Description: Control of acute pain  Outcome: Met This Shift     Problem: Pain:  Goal: Control of chronic pain  Description: Control of chronic pain  Outcome: Met This Shift     Problem: Depressive Behavior With or Without Suicide Precautions:  Goal: Able to verbalize acceptance of life and situations over which he or she has no control  Description: Able to verbalize acceptance of life and situations over which he or she has no control  12/11/2021 1803 by Casey Ny RN  Outcome: Ongoing     Problem: Depressive Behavior With or Without Suicide Precautions:  Goal: Able to verbalize and/or display a decrease in depressive symptoms  Description: Able to verbalize and/or display a decrease in depressive symptoms  Outcome: Ongoing     Problem: Depressive Behavior With or Without Suicide Precautions:  Goal: Ability to disclose and discuss suicidal ideas will improve  Description: Ability to disclose and discuss suicidal ideas will improve  Outcome: Met This Shift     Problem: Depressive Behavior With or Without Suicide Precautions:  Goal: Able to verbalize support systems  Description: Able to verbalize support systems  Outcome: Not Met This Shift     Problem: Depressive Behavior With or Without Suicide Precautions:  Goal: Absence of self-harm  Description: Absence of self-harm  Outcome: Met This Shift     Problem: Depressive Behavior With or Without Suicide Precautions:  Goal: Patient specific goal  Description: Patient specific goal  Outcome: Not Met This Shift     Problem: Depressive Behavior With or Without Suicide Precautions:  Goal: Participates in care planning  Description: Participates in care planning  Outcome: Ongoing     Problem: Altered Mood, Psychotic Behavior:  Goal: Able to demonstrate trust by eating, participating in treatment and following staff's direction  Description: Able to demonstrate trust by eating, participating in treatment and following staff's direction  Outcome: Ongoing     Problem: Altered Mood, Psychotic Behavior:  Goal: Able to demonstrate trust by eating, participating in treatment and following staff's direction  Description: Able to demonstrate trust by eating, participating in treatment and following staff's direction  Outcome: Ongoing     Problem: Altered Mood, Psychotic Behavior:  Goal: Able to verbalize decrease in frequency and intensity of hallucinations  Description: Able to verbalize decrease in frequency and intensity of hallucinations  Outcome: Ongoing     Problem: Altered Mood, Psychotic Behavior:  Goal: Able to verbalize reality based thinking  Description: Able to verbalize reality based thinking  Outcome: Ongoing     Problem: Altered Mood, Psychotic Behavior:  Goal: Absence of self-harm  Description: Absence of self-harm  Outcome: Met This Shift     Problem: Altered Mood, Psychotic Behavior:  Goal: Ability to achieve adequate nutritional intake will improve  Description: Ability to achieve adequate nutritional intake will improve  Outcome: Met This Shift     Problem: Altered Mood, Psychotic Behavior:  Goal: Ability to interact with others will improve  Description: Ability to interact with others will improve  Outcome: Not Met This Shift     Problem: Altered Mood, Psychotic Behavior:  Goal: Compliance with prescribed medication regimen will improve  Description: Compliance with prescribed medication regimen will improve  Outcome: Ongoing     Problem: Altered Mood, Psychotic Behavior:  Goal: Patient specific goal  Description: Patient specific goal  Outcome: Not Met This Shift

## 2021-12-11 NOTE — PROGRESS NOTES
Pt resting in room. Denies SI, HI, and AVH @ this time. Pt states his enemies were having the SI, not him. States they were trying \"to make me feel some kind of way and I wasn't having it\". Pt denies being robbed as well. Pt refused his HS meds of Trileptal and Depakote. Pt states he \"hetes taking pills\". \"I take the shot\". Pt is referring to the Abilify shot(662mg), ast given here  And was due on 9/11/21 for the next in which he has missed. Pt states the Depakote makes his hair fall out. This was an issue the last time the pt was here. P is willing to take the Abilify shot and would also like the flu  Shot and , if possible, the Covid vaccine. Will advise next shift. Pt misinterprets and had an argument with tech over using the shower.  Will continue to monitor

## 2021-12-11 NOTE — GROUP NOTE
Group Therapy Note    Date: 12/11/2021    Group Start Time: 1100  Group End Time: 1351  Group Topic: Cognitive Skills    [facility]    FRANCISCO Pressley, [facility]        Group Therapy Note    Attendees: 17         Patient's Goal:  Pt will be able to identify ways to bring happiness into their lives     Notes:  Pt participated in group and made connections. Status After Intervention:  Improved    Participation Level:  Active Listener and Interactive    Participation Quality: Appropriate and Attentive      Speech:  normal      Thought Process/Content: Delusional      Affective Functioning: Exaggerated      Mood: anxious      Level of consciousness:  Alert and Oriented x4      Response to Learning: Able to retain information      Endings: None Reported    Modes of Intervention: Education, Support, Socialization, Exploration, Clarifying and Problem-solving      Discipline Responsible: /Counselor      Signature:  FRANCISCO Pressley, [facility]

## 2021-12-11 NOTE — PLAN OF CARE
Pt denies suicidal or homicidal ideations. Pt denies hallucinations. Pt is without distress, pt has a flat/ avoidant affect. Pt has poverty of speech. Pt does not report a goal during first morning assessment by this RN. Will follow and monitor.

## 2021-12-12 PROCEDURE — 99232 SBSQ HOSP IP/OBS MODERATE 35: CPT | Performed by: NURSE PRACTITIONER

## 2021-12-12 PROCEDURE — 6370000000 HC RX 637 (ALT 250 FOR IP): Performed by: NURSE PRACTITIONER

## 2021-12-12 PROCEDURE — 1240000000 HC EMOTIONAL WELLNESS R&B

## 2021-12-12 RX ADMIN — ARIPIPRAZOLE 5 MG: 5 TABLET ORAL at 09:19

## 2021-12-12 ASSESSMENT — PAIN SCALES - GENERAL: PAINLEVEL_OUTOF10: 0

## 2021-12-12 NOTE — PLAN OF CARE
Pt went to bed late because he was drawing. Pt resting in bed apparently asleep with easy even respirations at HS q 15 min electronic rounding. Pt checked the hallway clock for the time several trips. Polite and cooperative.

## 2021-12-12 NOTE — GROUP NOTE
Group Therapy Note    Date: 12/12/2021    Group Start Time: 1000  Group End Time: 4584  Group Topic: Psychoeducation    [facility] [address]        Group Therapy Note    Attendees: 20           Notes:  Minimally engaged during leisure education discussion. Reluctant to share and relate experiences with peers. Social with select peers.       Status After Intervention:  Unchanged    Participation Level: Minimal    Participation Quality: Inappropriate      Speech:  pressured and loud      Thought Process/Content: Perseverating      Affective Functioning: Constricted/Restricted      Mood: elevated and irritable      Level of consciousness:  Alert      Response to Learning: Resistant      Endings: None Reported    Modes of Intervention: Education, Support, Socialization and Exploration      Discipline Responsible: Psychoeducational Specialist      Signature:  Birgit Chandler, [address]

## 2021-12-12 NOTE — PROGRESS NOTES
Denies suicidal ideation, denies homicidal ideation, and denies hallucinations, he is preoccupied. He is irritable and easily agitated \"arguing with unseen others\", loud at times. Not social on unit.

## 2021-12-12 NOTE — PROGRESS NOTES
BEHAVIORAL HEALTH FOLLOW-UP NOTE     12/12/2021     Patient was seen and examined in person, Chart reviewed   Patient's case discussed with staff/team    Chief Complaint: \"I do not take [place] only take Abilify\"    Interim History:   Patient seen in his room per staff he has been observed up on the unit talking to unseen others. Is irritable not offer much conversation today. Arlen Murri[place]   He has limited insight and judgment he denies SI/HI intent or plan denies auditory visualizations clearly internally stimulated observed talking unseen others irritable isolate to his room guarded and paranoid      Appetite:   [x] Normal/Unchanged  [] Increased  [] Decreased      Sleep:       [x] Normal/Unchanged  [] Fair       [] Poor              Energy:    [x] Normal/Unchanged  [] Increased  [] Decreased        SI [] Present  [x] Absent    HI  []Present  [x] Absent     Aggression:  [] yes  [x] no    Patient is [x] able  [] unable to CONTRACT FOR SAFETY     PAST MEDICAL/PSYCHIATRIC HISTORY:   Past Medical History:   Diagnosis Date    Depression     Schizoaffective disorder ([facility]        FAMILY/SOCIAL HISTORY:  Family History   Problem Relation Age of Onset    Mental Illness Mother     Substance Abuse Mother     No Known Problems Father      Social History     Socioeconomic History    Marital status: Single     Spouse name: Not on file    Number of children: 1    Years of education: 15    Highest education level: Not on file   Occupational History     Comment: SSDI   Tobacco Use    Smoking status: Current Every Day Smoker     Packs/day: 1.00     Years: 24.00     Pack years: 24.00     Types: Cigars, Cigarettes    Smokeless tobacco: Never Used    Tobacco comment: stopped [place] while ago\"   Vaping Use    Vaping Use: Former   Substance and Sexual Activity    Alcohol use: Yes     Comment: \"I don't abuse alcohol\"    Drug use: Yes     Frequency: 7.0 times per week     Types: Cocaine, Marijuana (Weed)     Comment: denies using    Sexual Oral, Q2H PRN, Piero Lick, APRN - CNP, 4 mg at 12/11/21 2058    ibuprofen (ADVIL;MOTRIN) tablet 800 mg, 800 mg, Oral, Q8H PRN, Piero Lick, APRN - CNP, 800 mg at 12/10/21 0935    acetaminophen (TYLENOL) tablet 650 mg, 650 mg, Oral, Q6H PRN, Ledy Winslow MD, 650 mg at 12/05/21 3250    magnesium hydroxide (MILK OF MAGNESIA) 400 MG/5ML suspension 30 mL, 30 mL, Oral, Daily PRN, Ledy Winslow MD    aluminum & magnesium hydroxide-simethicone (MAALOX) 200-200-20 MG/5ML suspension 30 mL, 30 mL, Oral, PRN, Ledy Winslow MD    hydrOXYzine (VISTARIL) capsule 50 mg, 50 mg, Oral, TID PRN, Ledy Winslow MD    haloperidol (HALDOL) tablet 5 mg, 5 mg, Oral, Q6H PRN **OR** haloperidol lactate (HALDOL) injection 5 mg, 5 mg, IntraMUSCular, Q6H PRN, Ledy Winslow MD    traZODone (DESYREL) tablet 50 mg, 50 mg, Oral, Nightly PRN, Ledy Winslow MD, 50 mg at 12/11/21 2059      Examination:  BP (!) 92/48   Pulse 58   Temp 98.4 °F (36.9 °C)   Resp 14   Ht 6' (1.829 m)   Wt 155 lb (70.3 kg)   SpO2 98%   BMI 21.02 kg/m²   Gait - steady  Medication side effects(SE): none     Mental Status Examination:    Level of consciousness:  within normal limits   Appearance:  fair grooming and fair hygiene  Behavior/Motor:  no abnormalities noted  Attitude toward examiner:  cooperative and attentive  Speech:  normal rate and normal volume   Mood: irritable  Affect:  blunted  Thought processes:  illogical   Thought content: devoid of AVH, however guarded irritable and paranoid  Cognition:  oriented to person, place, and time   Concentration poor  Insight poor   Judgement poor     ASSESSMENT:   Patient symptoms are:  [] Well controlled  [] Improving  [] Worsening  [x] No change      Diagnosis:   Principal Problem:    Schizoaffective disorder, bipolar type ([facility]  Active Problems:    Cocaine abuse ([address]  Resolved Problems:    Suicidal ideation      LABS:    No results for input(s): WBC, HGB, PLT in the last 72 hours. No results for input(s): NA, K, CL, CO2, BUN, CREATININE, GLUCOSE in the last 72 hours. No results for input(s): BILITOT, ALKPHOS, AST, ALT in the last 72 hours. Lab Results   Component Value Date    LABAMPH NOT DETECTED 12/04/2021    BARBSCNU NOT DETECTED 12/04/2021    LABBENZ NOT DETECTED 12/04/2021    LABMETH NOT DETECTED 12/04/2021    OPIATESCREENURINE NOT DETECTED 12/04/2021    PHENCYCLIDINESCREENURINE NOT DETECTED 12/04/2021    ETOH <10 12/04/2021     Lab Results   Component Value Date    TSH 0.392 12/04/2021     No results found for: LITHIUM  Lab Results   Component Value Date    VALPROATE 3 (L) 08/20/2021         Treatment Plan:  The patient's diagnosis, treatment plan, medication management were formulated after patient was seen directly by the attending physician and myself and all relevant documentation was reviewed. Risk, benefit, side effects, possible outcomes of the medication and alternatives discussed with the patient and the patient demonstrated understanding. The patient was also educated that the outcome of treatment will depend on the medication compliance as directed by the prescribers along with regular follow-up, compliance with the labs and other work-up, as clinically indicated. Patient states only take Abilify states he lost close injection when he was here in August 2021. He states he will only take an [place] injection he will not take [place]. We will discontinue Invega and Depakote  We will start Abilify 5 mg daily and plan for the Arist[place] injection    Collateral information: Followed by social work  CD evaluation  Encourage patient to attend group and other milieu activities.   Discharge planning discussed with the patient and treatment team.    PSYCHOTHERAPY/COUNSELING:  [x] Therapeutic interview  [x] Supportive  [] CBT  [] Ongoing  [] Other    [x] Patient continues to need, on a daily basis, active treatment furnished directly by or requiring the supervision of inpatient psychiatric personnel            Behavioral Services  Medicare Certification Upon Admission    I certify that this patient's inpatient psychiatric hospital admission is medically necessary for:    [x] (1) Treatment which could reasonably be expected to improve this patient's condition,       [] (2) Or for diagnostic study;     AND     [x](2) The inpatient psychiatric services are provided while the individual is under the care of a physician and are included in the individualized plan of care. Estimated length of stay/service 3-5 days based on stability    Plan for post-hospital care outpatient psychiatric and counseling services    Electronically signed by JUICE Bacon CNP on 68/00/0149 at 11:51 AM           NOTE: This report was transcribed using voice recognition software. Every effort was made to ensure accuracy; however, inadvertent computerized transcription errors may be present.     Electronically signed by JUICE Bacon CNP on 30/22/1111 at 11:51 AM

## 2021-12-13 VITALS
WEIGHT: 155 LBS | TEMPERATURE: 98.1 F | DIASTOLIC BLOOD PRESSURE: 51 MMHG | HEIGHT: 72 IN | BODY MASS INDEX: 20.99 KG/M2 | OXYGEN SATURATION: 98 % | HEART RATE: 65 BPM | SYSTOLIC BLOOD PRESSURE: 106 MMHG | RESPIRATION RATE: 15 BRPM

## 2021-12-13 PROCEDURE — 6360000002 HC RX W HCPCS: Performed by: PSYCHIATRY & NEUROLOGY

## 2021-12-13 PROCEDURE — 1240000000 HC EMOTIONAL WELLNESS R&B

## 2021-12-13 PROCEDURE — 99232 SBSQ HOSP IP/OBS MODERATE 35: CPT | Performed by: NURSE PRACTITIONER

## 2021-12-13 PROCEDURE — 6370000000 HC RX 637 (ALT 250 FOR IP): Performed by: NURSE PRACTITIONER

## 2021-12-13 RX ORDER — LORAZEPAM 2 MG/ML
1 INJECTION INTRAMUSCULAR ONCE
Status: COMPLETED | OUTPATIENT
Start: 2021-12-13 | End: 2021-12-13

## 2021-12-13 RX ORDER — DIPHENHYDRAMINE HYDROCHLORIDE 50 MG/ML
25 INJECTION INTRAMUSCULAR; INTRAVENOUS EVERY 6 HOURS PRN
Status: DISCONTINUED | OUTPATIENT
Start: 2021-12-13 | End: 2021-12-14

## 2021-12-13 RX ORDER — ARIPIPRAZOLE 10 MG/1
10 TABLET ORAL DAILY
Status: DISCONTINUED | OUTPATIENT
Start: 2021-12-14 | End: 2021-12-15 | Stop reason: HOSPADM

## 2021-12-13 RX ADMIN — DIPHENHYDRAMINE HYDROCHLORIDE 25 MG: 50 INJECTION, SOLUTION INTRAMUSCULAR; INTRAVENOUS at 22:48

## 2021-12-13 RX ADMIN — LORAZEPAM 1 MG: 2 INJECTION INTRAMUSCULAR; INTRAVENOUS at 22:49

## 2021-12-13 RX ADMIN — HALOPERIDOL LACTATE 5 MG: 5 INJECTION, SOLUTION INTRAMUSCULAR at 22:48

## 2021-12-13 RX ADMIN — ARIPIPRAZOLE 5 MG: 5 TABLET ORAL at 10:02

## 2021-12-13 NOTE — PLAN OF CARE
Problem: Pain:  Goal: Pain level will decrease  Description: Pain level will decrease  Outcome: Ongoing     Problem: Pain:  Goal: Control of chronic pain  Description: Control of chronic pain  Outcome: Ongoing     Problem: Depressive Behavior With or Without Suicide Precautions:  Goal: Able to verbalize acceptance of life and situations over which he or she has no control  Description: Able to verbalize acceptance of life and situations over which he or she has no control  Outcome: Ongoing     Problem: Depressive Behavior With or Without Suicide Precautions:  Goal: Ability to disclose and discuss suicidal ideas will improve  Description: Ability to disclose and discuss suicidal ideas will improve  Outcome: Ongoing     Paranoid and guarded isolating in his room. Pt acting on internal stimuli and speaking to unseen others. Pt denies suicidal or homicidal ideations and no dangerous behavior is noted.

## 2021-12-13 NOTE — CARE COORDINATION
Sw spoke with pt to see if he would sign an CARL for collateral. Pt denied to sign CARL but stated that he is ready to leave. Pt stated that he wants to go to the rescue mission at [facility] and that he will walk there. SW called the rescue mission [phone] and [facility] to determine if pt is able to DC to the mission at [facility]. Pt then approached SW stating that he was asleep during this encounter and he wanted to know what was discussed. Pt then stated that he does not want to go to the rescue mission but he wants to go to [facility] at [place]. UPDATE: Pt approached SW and was very upset that he was told he will be DC today and he is not leaving. Pt was screaming at  and was explained that there is no discharge order in and he will not be DC today. Pt stated that he is ready to go and he wants to leave and he got his shot today so that he can leave. Pt has no insight or judgement and is unable to control his anger.  SW informed pt that SW talked to him to make sure he has a good DC plan and pt is upset that this was discussed when he is not able to leave the hospital.

## 2021-12-13 NOTE — PROGRESS NOTES
BEHAVIORAL HEALTH FOLLOW-UP NOTE     12/13/2021     Patient was seen and examined in person, Chart reviewed   Patient's case discussed with staff/team    Chief Complaint: \"I'll take the Abilify shot\"    Interim History:   Patient seen in his room. Per staff patient has been up on the unit responding to unseen others paranoid and guarded he does not attend groups or socialize with peers. He is agreeable to the Abilify injection.   He denies SI/HI intent or plan limit insight and judgment is agreeable stepped on the crisis unit when stable    Appetite:   [x] Normal/Unchanged  [] Increased  [] Decreased      Sleep:       [x] Normal/Unchanged  [] Fair       [] Poor              Energy:    [x] Normal/Unchanged  [] Increased  [] Decreased        SI [] Present  [x] Absent    HI  []Present  [x] Absent     Aggression:  [] yes  [x] no    Patient is [x] able  [] unable to CONTRACT FOR SAFETY     PAST MEDICAL/PSYCHIATRIC HISTORY:   Past Medical History:   Diagnosis Date    Depression     Schizoaffective disorder ([facility]        FAMILY/SOCIAL HISTORY:  Family History   Problem Relation Age of Onset    Mental Illness Mother     Substance Abuse Mother     No Known Problems Father      Social History     Socioeconomic History    Marital status: Single     Spouse name: Not on file    Number of children: 1    Years of education: 15    Highest education level: Not on file   Occupational History     Comment: SSDI   Tobacco Use    Smoking status: Current Every Day Smoker     Packs/day: 1.00     Years: 24.00     Pack years: 24.00     Types: Cigars, Cigarettes    Smokeless tobacco: Never Used    Tobacco comment: stopped [place] while ago\"   Vaping Use    Vaping Use: Former   Substance and Sexual Activity    Alcohol use: Yes     Comment: \"I don't abuse alcohol\"    Drug use: Yes     Frequency: 7.0 times per week     Types: Cocaine, Marijuana (Weed)     Comment: denies using    Sexual activity: Yes     Partners: Female   Other B ELIESER HamptonN - CNP    nicotine polacrilex (NICORETTE) gum 4 mg, 4 mg, Oral, Q2H PRN, Celia Pires APRN - CNP, 4 mg at 12/11/21 2058    ibuprofen (ADVIL;MOTRIN) tablet 800 mg, 800 mg, Oral, Q8H PRN, Celia Pires APRN - CNP, 800 mg at 12/10/21 0935    acetaminophen (TYLENOL) tablet 650 mg, 650 mg, Oral, Q6H PRN, Rashi Johnston MD, 650 mg at 12/05/21 7775    magnesium hydroxide (MILK OF MAGNESIA) 400 MG/5ML suspension 30 mL, 30 mL, Oral, Daily PRN, Rashi Johnston MD    aluminum & magnesium hydroxide-simethicone (MAALOX) 200-200-20 MG/5ML suspension 30 mL, 30 mL, Oral, PRN, Rashi Johnston MD    hydrOXYzine (VISTARIL) capsule 50 mg, 50 mg, Oral, TID PRN, Rashi Johnston MD    haloperidol (HALDOL) tablet 5 mg, 5 mg, Oral, Q6H PRN **OR** haloperidol lactate (HALDOL) injection 5 mg, 5 mg, IntraMUSCular, Q6H PRN, Rashi Johnston MD    traZODone (DESYREL) tablet 50 mg, 50 mg, Oral, Nightly PRN, Rashi Johnston MD, 50 mg at 12/11/21 2059      Examination:  BP (!) 106/51   Pulse 65   Temp 98.1 °F (36.7 °C)   Resp 15   Ht 6' (1.829 m)   Wt 155 lb (70.3 kg)   SpO2 98%   BMI 21.02 kg/m²   Gait - steady  Medication side effects(SE): none     Mental Status Examination:    Level of consciousness:  within normal limits   Appearance:  fair grooming and fair hygiene  Behavior/Motor:  no abnormalities noted  Attitude toward examiner:  cooperative and attentive  Speech:  normal rate and normal volume   Mood: irritable  Affect:  blunted  Thought processes:  illogical   Thought content: devoid of AVH, however guarded irritable and paranoid  Cognition:  oriented to person, place, and time   Concentration poor  Insight poor   Judgement poor     ASSESSMENT:   Patient symptoms are:  [] Well controlled  [] Improving  [] Worsening  [x] No change      Diagnosis:   Principal Problem:    Schizoaffective disorder, bipolar type ([facility]  Active Problems:    Cocaine abuse ([facility]  Resolved Problems:    Suicidal ideation      LABS:    No results for input(s): WBC, HGB, PLT in the last 72 hours. No results for input(s): NA, K, CL, CO2, BUN, CREATININE, GLUCOSE in the last 72 hours. No results for input(s): BILITOT, ALKPHOS, AST, ALT in the last 72 hours. Lab Results   Component Value Date    LABAMPH NOT DETECTED 12/04/2021    BARBSCNU NOT DETECTED 12/04/2021    LABBENZ NOT DETECTED 12/04/2021    LABMETH NOT DETECTED 12/04/2021    OPIATESCREENURINE NOT DETECTED 12/04/2021    PHENCYCLIDINESCREENURINE NOT DETECTED 12/04/2021    ETOH <10 12/04/2021     Lab Results   Component Value Date    TSH 0.392 12/04/2021     No results found for: LITHIUM  Lab Results   Component Value Date    VALPROATE 3 (L) 08/20/2021         Treatment Plan:  The patient's diagnosis, treatment plan, medication management were formulated after patient was seen directly by the attending physician and myself and all relevant documentation was reviewed. Risk, benefit, side effects, possible outcomes of the medication and alternatives discussed with the patient and the patient demonstrated understanding. The patient was also educated that the outcome of treatment will depend on the medication compliance as directed by the prescribers along with regular follow-up, compliance with the labs and other work-up, as clinically indicated. Patient states only take Abilify states he lost close injection when he was here in August 2021. He states he will only take an [place] injection he will not take [place]. We will discontinue Invega and Depakote  We will start Abilify 5 mg daily and plan for the Aristada injection    Collateral information: Followed by social work  CD evaluation  Encourage patient to attend group and other milieu activities.   Discharge planning discussed with the patient and treatment team.    PSYCHOTHERAPY/COUNSELING:  [x] Therapeutic interview  [x] Supportive  [] CBT  [] Ongoing  [] Other    [x] Patient continues to need, on a daily basis, active treatment furnished directly by or requiring the supervision of inpatient psychiatric personnel            Behavioral Services  Medicare Certification Upon Admission    I certify that this patient's inpatient psychiatric hospital admission is medically necessary for:    [x] (1) Treatment which could reasonably be expected to improve this patient's condition,       [] (2) Or for diagnostic study;     AND     [x](2) The inpatient psychiatric services are provided while the individual is under the care of a physician and are included in the individualized plan of care. Estimated length of stay/service 3-5 days based on stability    Plan for post-hospital care outpatient psychiatric and counseling services    Electronically signed by JUICE Dorado CNP on 39/98/4014 at 12:15 PM           NOTE: This report was transcribed using voice recognition software. Every effort was made to ensure accuracy; however, inadvertent computerized transcription errors may be present.     Electronically signed by JUICE Dorado CNP on 53/87/9010 at 12:15 PM

## 2021-12-14 PROCEDURE — 6360000002 HC RX W HCPCS: Performed by: PSYCHIATRY & NEUROLOGY

## 2021-12-14 PROCEDURE — 6360000002 HC RX W HCPCS: Performed by: NURSE PRACTITIONER

## 2021-12-14 PROCEDURE — 1240000000 HC EMOTIONAL WELLNESS R&B

## 2021-12-14 PROCEDURE — 99232 SBSQ HOSP IP/OBS MODERATE 35: CPT | Performed by: NURSE PRACTITIONER

## 2021-12-14 RX ORDER — LORAZEPAM 2 MG/ML
2 INJECTION INTRAMUSCULAR EVERY 6 HOURS PRN
Status: DISCONTINUED | OUTPATIENT
Start: 2021-12-14 | End: 2021-12-15 | Stop reason: HOSPADM

## 2021-12-14 RX ORDER — DIPHENHYDRAMINE HYDROCHLORIDE 50 MG/ML
50 INJECTION INTRAMUSCULAR; INTRAVENOUS EVERY 6 HOURS PRN
Status: DISCONTINUED | OUTPATIENT
Start: 2021-12-14 | End: 2021-12-15 | Stop reason: HOSPADM

## 2021-12-14 RX ADMIN — LORAZEPAM 2 MG: 2 INJECTION INTRAMUSCULAR; INTRAVENOUS at 10:26

## 2021-12-14 RX ADMIN — HALOPERIDOL LACTATE 5 MG: 5 INJECTION, SOLUTION INTRAMUSCULAR at 10:27

## 2021-12-14 RX ADMIN — DIPHENHYDRAMINE HYDROCHLORIDE 50 MG: 50 INJECTION, SOLUTION INTRAMUSCULAR; INTRAVENOUS at 10:28

## 2021-12-14 NOTE — PROGRESS NOTES
[facility] FOLLOW-UP NOTE     12/14/2021     Patient was seen and examined in person, Chart reviewed   Patient's case discussed with staff/team    Chief Complaint: Threatening    Interim History: Patient is required multiple stat IM injections for agitation and threatening behavior. According to chart patient was screaming threats appear stating \"all messed you up. \" He was unable to redirected very boisterous required [facility] police to be called to the unit and stat IM injections to be given last night. This morning he was very threatening towards me standing outside the room I was charting in threatening to hurt me again required [facility] police to be called to the unit required stat IM injections he has no insight or judgment he is impulsive labile easily agitated demonstrates significant harm to others at this time. He is only agreeable to taking the Abilify he is demanding discharge.     Appetite:   [x] Normal/Unchanged  [] Increased  [] Decreased      Sleep:       [x] Normal/Unchanged  [] Fair       [] Poor              Energy:    [x] Normal/Unchanged  [] Increased  [] Decreased        SI [] Present  [x] Absent    HI  []Present  [x] Absent     Aggression:  [] yes  [x] no    Patient is [x] able  [] unable to CONTRACT FOR SAFETY     PAST MEDICAL/PSYCHIATRIC HISTORY:   Past Medical History:   Diagnosis Date    Depression     Schizoaffective disorder ([facility]        FAMILY/SOCIAL HISTORY:  Family History   Problem Relation Age of Onset    Mental Illness Mother     Substance Abuse Mother     No Known Problems Father      Social History     Socioeconomic History    Marital status: Single     Spouse name: Not on file    Number of children: 1    Years of education: 15    Highest education level: Not on file   Occupational History     Comment: SSDI   Tobacco Use    Smoking status: Current Every Day Smoker     Packs/day: 1.00     Years: 24.00     Pack years: 24.00     Types: Cigars, Cigarettes    Smokeless tobacco: Never Used    Tobacco comment: stopped [place] while ago\"   Vaping Use    Vaping Use: Former   Substance and Sexual Activity    Alcohol use: Yes     Comment: \"I don't abuse alcohol\"    Drug use: Yes     Frequency: 7.0 times per week     Types: Cocaine, Marijuana (Weed)     Comment: denies using    Sexual activity: Yes     Partners: Female   Other Topics Concern    Not on file   Social History Narrative    ** Merged History Encounter **          Social Determinants of Health     Financial Resource Strain:     Difficulty of Paying Living Expenses: Not on file   Food Insecurity:     Worried About Running Out of Food in the Last Year: Not on file    Jamila of Food in the Last Year: Not on file   Transportation Needs:     Lack of Transportation (Medical): Not on file    Lack of Transportation (Non-Medical): Not on file   Physical Activity:     Days of Exercise per Week: Not on file    Minutes of Exercise per Session: Not on file   Stress:     Feeling of Stress : Not on file   Social Connections:     Frequency of Communication with Friends and Family: Not on file    Frequency of Social Gatherings with Friends and Family: Not on file    Attends Mandaen Services: Not on file    Active Member of [address] [place] [address] [employer] or Organizations: Not on file    Attends Club or Organization Meetings: Not on file    Marital Status: Not on file   Intimate Partner Violence:     Fear of Current or Ex-Partner: Not on file    Emotionally Abused: Not on file    Physically Abused: Not on file    Sexually Abused: Not on file   Housing Stability:     Unable to Pay for Housing in the Last Year: Not on file    Number of [place] in the Last Year: Not on file    Unstable Housing in the Last Year: Not on file           ROS:  [x] All negative/unchanged except if checked.  Explain positive(checked items) below:  [] Constitutional  [] Eyes  [] Ear/Nose/Mouth/Throat  [] Respiratory  [] CV  [] GI  []   [] Musculoskeletal  [] Skin/Breast  [] Neurological  [] Endocrine  [] Heme/Lymph  [] Allergic/Immunologic    Explanation:     MEDICATIONS:    Current Facility-Administered Medications:     ARIPiprazole lauroxil (ARISTADA) injection 662 mg, 662 mg, IntraMUSCular, Q30 Days, JUICE Vaz CNP, 882 mg at 12/13/21 1423    ARIPiprazole (ABILIFY) tablet 10 mg, 10 mg, Oral, Daily, JUICE Vaz CNP    diphenhydrAMINE (BENADRYL) injection 25 mg, 25 mg, IntraMUSCular, Q6H PRN, Nasir Bedoya MD, 25 mg at 12/13/21 2248    nicotine polacrilex (NICORETTE) gum 4 mg, 4 mg, Oral, Q2H PRN, JUICE Romero CNP, 4 mg at 12/11/21 2058    ibuprofen (ADVIL;MOTRIN) tablet 800 mg, 800 mg, Oral, Q8H PRN, JUICE Romero CNP, 800 mg at 12/10/21 0935    acetaminophen (TYLENOL) tablet 650 mg, 650 mg, Oral, Q6H PRN, Nasir Bedoya MD, 650 mg at 12/05/21 0444    magnesium hydroxide (MILK OF MAGNESIA) 400 MG/5ML suspension 30 mL, 30 mL, Oral, Daily PRN, Nasir Bedoya MD    aluminum & magnesium hydroxide-simethicone (MAALOX) 200-200-20 MG/5ML suspension 30 mL, 30 mL, Oral, PRN, Nasir Bedoya MD    hydrOXYzine (VISTARIL) capsule 50 mg, 50 mg, Oral, TID PRN, Nasir Bedoya MD    haloperidol (HALDOL) tablet 5 mg, 5 mg, Oral, Q6H PRN **OR** haloperidol lactate (HALDOL) injection 5 mg, 5 mg, IntraMUSCular, Q6H PRN, Nasir Bedoya MD, 5 mg at 12/13/21 2248    traZODone (DESYREL) tablet 50 mg, 50 mg, Oral, Nightly PRN, Nasir Bedoya MD, 50 mg at 12/11/21 2059      Examination:  BP (!) 106/51   Pulse 65   Temp 98.1 °F (36.7 °C)   Resp 15   Ht 6' (1.829 m)   Wt 155 lb (70.3 kg)   SpO2 98%   BMI 21.02 kg/m²   Gait - steady  Medication side effects(SE): none     Mental Status Examination:    Level of consciousness:  within normal limits   Appearance:  fair grooming and fair hygiene  Behavior/Motor:  no abnormalities noted  Attitude toward examiner:  cooperative and attentive  Speech:  normal rate and normal volume   Mood: irritable  Affect:  blunted  Thought processes:  illogical   Thought content: devoid of AVH, however guarded irritable and paranoid  Cognition:  oriented to person, place, and time   Concentration poor  Insight poor   Judgement poor     ASSESSMENT:   Patient symptoms are:  [] Well controlled  [] Improving  [] Worsening  [x] No change      Diagnosis:   Principal Problem:    Schizoaffective disorder, bipolar type ([facility]  Active Problems:    Cocaine abuse ([facility]  Resolved Problems:    Suicidal ideation      LABS:    No results for input(s): WBC, HGB, PLT in the last 72 hours. No results for input(s): NA, K, CL, CO2, BUN, CREATININE, GLUCOSE in the last 72 hours. No results for input(s): BILITOT, ALKPHOS, AST, ALT in the last 72 hours. Lab Results   Component Value Date    LABAMPH NOT DETECTED 12/04/2021    BARBSCNU NOT DETECTED 12/04/2021    LABBENZ NOT DETECTED 12/04/2021    LABMETH NOT DETECTED 12/04/2021    OPIATESCREENURINE NOT DETECTED 12/04/2021    PHENCYCLIDINESCREENURINE NOT DETECTED 12/04/2021    ETOH <10 12/04/2021     Lab Results   Component Value Date    TSH 0.392 12/04/2021     No results found for: LITHIUM  Lab Results   Component Value Date    VALPROATE 3 (L) 08/20/2021         Treatment Plan:  The patient's diagnosis, treatment plan, medication management were formulated after patient was seen directly by the attending physician and myself and all relevant documentation was reviewed. Risk, benefit, side effects, possible outcomes of the medication and alternatives discussed with the patient and the patient demonstrated understanding. The patient was also educated that the outcome of treatment will depend on the medication compliance as directed by the prescribers along with regular follow-up, compliance with the labs and other work-up, as clinically indicated. Patient states only take Abilify states he lost close injection when he was here in August 2021.   He states he will only take an [place] injection he will not take [place]. We will discontinue Invega and Depakote  Abilify 10 mg daily  Aristada 662 mg IM every 30 days given on 12/13/2021    Collateral information: Followed by social work  CD evaluation  Encourage patient to attend group and other milieu activities. Discharge planning discussed with the patient and treatment team.    PSYCHOTHERAPY/COUNSELING:  [x] Therapeutic interview  [x] Supportive  [] CBT  [] Ongoing  [] Other    [x] Patient continues to need, on a daily basis, active treatment furnished directly by or requiring the supervision of inpatient psychiatric personnel            Behavioral Services  Medicare Certification Upon Admission    I certify that this patient's inpatient psychiatric hospital admission is medically necessary for:    [x] (1) Treatment which could reasonably be expected to improve this patient's condition,       [] (2) Or for diagnostic study;     AND     [x](2) The inpatient psychiatric services are provided while the individual is under the care of a physician and are included in the individualized plan of care. Estimated length of stay/service 3-5 days based on stability    Plan for post-hospital care outpatient psychiatric and counseling services    Electronically signed by JUICE Beauchamp CNP on 46/39/1228 at 9:13 AM           NOTE: This report was transcribed using voice recognition software. Every effort was made to ensure accuracy; however, inadvertent computerized transcription errors may be present.     Electronically signed by JUCIE Beauchamp CNP on 84/07/6142 at 9:13 AM

## 2021-12-14 NOTE — PROGRESS NOTES
Pt standing by television screaming threats at peer stating he would \"mess you up. \" Pt verbalized homicidal threats toward pt. Pt angry and irritable. Unable to be redirected. Boisterous. Peer removed from situation. Fanta TADEO called. Dr. Rayo Vitale notified. See new orders. Pt has flight of ideas. Labile. Pt was screaming about medication then calmly spoke about his gf. Pt verbalized \"I will get her in the middle of the night. I promise you. \" PRNs given. Will continue to monitor.

## 2021-12-14 NOTE — PLAN OF CARE
Problem: Pain:  Goal: Pain level will decrease  Description: Pain level will decrease  Outcome: Ongoing  Goal: Control of acute pain  Description: Control of acute pain  Outcome: Ongoing  Goal: Control of chronic pain  Description: Control of chronic pain  Outcome: Ongoing     Problem: Depressive Behavior With or Without Suicide Precautions:  Goal: Able to verbalize acceptance of life and situations over which he or she has no control  Description: Able to verbalize acceptance of life and situations over which he or she has no control  Outcome: Ongoing  Goal: Able to verbalize and/or display a decrease in depressive symptoms  Description: Able to verbalize and/or display a decrease in depressive symptoms  Outcome: Ongoing  Goal: Ability to disclose and discuss suicidal ideas will improve  Description: Ability to disclose and discuss suicidal ideas will improve  Outcome: Ongoing  Goal: Able to verbalize support systems  Description: Able to verbalize support systems  Outcome: Ongoing  Goal: Absence of self-harm  Description: Absence of self-harm  Outcome: Ongoing  Goal: Patient specific goal  Description: Patient specific goal  Outcome: Ongoing  Goal: Participates in care planning  Description: Participates in care planning  Outcome: Ongoing     Problem: Altered Mood, Psychotic Behavior:  Goal: Able to demonstrate trust by eating, participating in treatment and following staff's direction  Description: Able to demonstrate trust by eating, participating in treatment and following staff's direction  Outcome: Ongoing  Goal: Able to verbalize decrease in frequency and intensity of hallucinations  Description: Able to verbalize decrease in frequency and intensity of hallucinations  Outcome: Ongoing  Goal: Able to verbalize reality based thinking  Description: Able to verbalize reality based thinking  Outcome: Ongoing  Goal: Absence of self-harm  Description: Absence of self-harm  Outcome: Ongoing  Goal: Ability to achieve adequate nutritional intake will improve  Description: Ability to achieve adequate nutritional intake will improve  Outcome: Ongoing  Goal: Ability to interact with others will improve  Description: Ability to interact with others will improve  Outcome: Ongoing  Goal: Compliance with prescribed medication regimen will improve  Description: Compliance with prescribed medication regimen will improve  Outcome: Ongoing  Goal: Patient specific goal  Description: Patient specific goal  Outcome: Ongoing     Patient irratible upon awakening. Patient states he wont be taking any medications until he is discharged. Patient is irritable, threatening staff members to \"fuck them all up\". Police called for stand by assist while giving medications. Patient signs 3 day letter. Patient calms down after staying to self in room for a little while. Denies si/hi/avh.

## 2021-12-14 NOTE — BH NOTE
Patient refuses morning abilify. Patient states he already had \"the shot\". nurse informs patient he will need to start taking both. Patient then proceeds to state he'll only take medications if he's discharged. Nurse informed patient that he would need to take medication before but patient continues to argue.

## 2021-12-14 NOTE — PLAN OF CARE
Problem: Depressive Behavior With or Without Suicide Precautions:  Goal: Able to verbalize acceptance of life and situations over which he or she has no control  Description: Able to verbalize acceptance of life and situations over which he or she has no control  Outcome: Ongoing  Goal: Able to verbalize and/or display a decrease in depressive symptoms  Description: Able to verbalize and/or display a decrease in depressive symptoms  Outcome: Ongoing  Goal: Ability to disclose and discuss suicidal ideas will improve  Description: Ability to disclose and discuss suicidal ideas will improve  Outcome: Met This Shift     Pt denies suicidal ideations, homicidal ideations and hallucinations. Impulsive. Irritable. Intrusive. Will continue to monitor.

## 2021-12-15 PROBLEM — F60.2 ANTISOCIAL PERSONALITY DISORDER (HCC): Status: ACTIVE | Noted: 2021-12-15

## 2021-12-15 PROCEDURE — 99239 HOSP IP/OBS DSCHRG MGMT >30: CPT | Performed by: NURSE PRACTITIONER

## 2021-12-15 RX ORDER — ARIPIPRAZOLE 10 MG/1
10 TABLET ORAL DAILY
Qty: 30 TABLET | Refills: 0 | Status: SHIPPED | OUTPATIENT
Start: 2021-12-15 | End: 2022-01-14

## 2021-12-15 ASSESSMENT — PAIN SCALES - GENERAL: PAINLEVEL_OUTOF10: 0

## 2021-12-15 NOTE — PROGRESS NOTES
CLINICAL PHARMACY NOTE: MEDS TO BEDS    Total # of Prescriptions Filled: 1   The following medications were delivered to the patient:  · ABILIFY 10 MG    Additional Documentation:  DELIVERED TO PIPER @ 11:20 AM
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
## 2021-12-15 NOTE — BH NOTE
Patient denies thoughts to harm self or others, denies hallucinations. Patient attends groups .  Appetite good, behavior in control

## 2021-12-15 NOTE — CARE COORDINATION
Per Tc Ledbetter NP, pt can discharge to [facility], to [facility]. Maribel Jeffers RN aware. Pt denies SI, HI, hallucinations, reported that he has friends and family in [facility] that he can stay with if neccessary. Appointments scheduled at [facility], pt reported that he will use the [facility] for transportation to appointments. Pt reported that he will ride the bus to the [facility], denied any discharge concerns at this time.     FRANCISCO Fitzpatrick, [place] [address] Work Supervisor

## 2021-12-15 NOTE — BH NOTE
Out of room off and on awake in bed currently guarded tense at times impulsive  emotional support given

## 2021-12-15 NOTE — PLAN OF CARE
Problem: Pain:  Goal: Pain level will decrease  Description: Pain level will decrease  Outcome: Completed  Goal: Control of acute pain  Description: Control of acute pain  Outcome: Completed  Goal: Control of chronic pain  Description: Control of chronic pain  Outcome: Completed     Problem: Depressive Behavior With or Without Suicide Precautions:  Goal: Able to verbalize acceptance of life and situations over which he or she has no control  Description: Able to verbalize acceptance of life and situations over which he or she has no control  Outcome: Completed  Goal: Able to verbalize and/or display a decrease in depressive symptoms  Description: Able to verbalize and/or display a decrease in depressive symptoms  Outcome: Completed  Goal: Ability to disclose and discuss suicidal ideas will improve  Description: Ability to disclose and discuss suicidal ideas will improve  Outcome: Completed  Goal: Able to verbalize support systems  Description: Able to verbalize support systems  Outcome: Completed  Goal: Absence of self-harm  Description: Absence of self-harm  Outcome: Completed  Goal: Patient specific goal  Description: Patient specific goal  Outcome: Completed  Goal: Participates in care planning  Description: Participates in care planning  Outcome: Completed

## 2021-12-15 NOTE — CARE COORDINATION
Pt is homeless, has a lack of support, is very discharge focused and agitated at times. The social work team called the following places:    [facility] ([phone]): No beds  [facility] ([phone]): On the restriction list until February 2022  [facility] ([phone]) : No beds  [facility] ([phone]): Did not answer, no voicemail available  Pt's mother's apartment complex ([phone]): Left a voicemail  [facility] ([phone]): Not accepting admissions until after 4pm    SW explained to patient that the only option is to wait until 4pm for the [facility] to open. Pt verbalized understanding, however requested to \"catch the bus now\" so he can get in line for a bed at 4pm. SW asked patient what he will do if the [facility] does not accept him, pt stated \"I have a lot of friends and family in [facility], I will stay with one of them\". ANTONIO discussed with Camila Dang NP. She will call SW Supervisor back.     FRANCISCO Lopez, [place] [address] Work Supervisor

## 2021-12-15 NOTE — BH NOTE
[facility]  Discharge Note    Pt discharged with followings belongings:   Dental Appliances: None  Vision - Corrective Lenses: None  Hearing Aid: None  Jewelry: None  Body Piercings Removed: No  Clothing: Footwear, Sweater, Shirt, Pants, Jacket / coat (Coat)  Were All Patient Medications Collected?: Not Applicable  Other Valuables: None   Valuables sent home withpatient or returned to patient. Patient education on aftercare instructions: yes  Information faxed to n/a by n/a  at 4:05 PM .Patient verbalize understanding of AVS:  yes.     Status EXAM upon discharge:  Status and Exam  Normal: Yes  Facial Expression: Flat  Affect: Appropriate  Level of Consciousness: Alert  Mood:Normal: Yes  Mood: Suspicious  Motor Activity:Normal: Yes  Motor Activity: Decreased  Interview Behavior: Cooperative  Preception: [place] to Person, [place] to Time, [place] to Place, [place] to Situation  Attention:Normal: Yes  Attention: Distractible  Thought Processes: Circumstantial  Thought Content:Normal: Yes  Thought Content: Delusions  Hallucinations: None  Delusions: No  Delusions: Persecution  Memory:Normal: Yes  Memory: Poor Recent  Insight and Judgment: Yes  Insight and Judgment: Poor Judgment, Poor Insight  Present Suicidal Ideation: No  Present Homicidal Ideation: No      Metabolic Screening:    Lab Results   Component Value Date    LABA1C 5.2 08/10/2021       Lab Results   Component Value Date    CHOL 113 10/20/2020    CHOL 120 10/27/2019     Lab Results   Component Value Date    TRIG 45 10/20/2020    TRIG 52 10/27/2019     Lab Results   Component Value Date    HDL 39 08/10/2021    HDL 50 10/20/2020    HDL 42 10/27/2019     No components found for: [facility]  Lab Results   Component Value Date    LABVLDL 8 08/10/2021    LABVLDL 10 10/27/2019       Nini Brown RN

## 2021-12-15 NOTE — BH NOTE
Patient is verbally abusive to staff, refusing to allow social work or nurse to work on discharge, accusing staff of \"being racist\". Patient impatient and believes he is \"being held hostage\".  Speech loud and pressured, Emotional support given, slightly calmer after 1:1

## 2021-12-16 NOTE — CARE COORDINATION
In order to ensure appropriate transition and discharge planning is in place, the following documents have been transmitted to [facility], as the new outpatient provider:     The d/c diagnosis was transmitted to the next care provider   The reason for hospitalization was transmitted to the next care provider   The d/c medications (dosage and indication) were transmitted to the next care provider    The continuing care plan was transmitted to the next care provider

## 2021-12-17 PROCEDURE — 99284 EMERGENCY DEPT VISIT MOD MDM: CPT

## 2021-12-18 ENCOUNTER — HOSPITAL ENCOUNTER (EMERGENCY)
Age: 41
Discharge: HOME | End: 2021-12-18
Attending: EMERGENCY MEDICINE
Payer: COMMERCIAL

## 2021-12-18 VITALS
RESPIRATION RATE: 16 BRPM | DIASTOLIC BLOOD PRESSURE: 76 MMHG | HEIGHT: 72 IN | HEART RATE: 79 BPM | OXYGEN SATURATION: 98 % | WEIGHT: 155 LBS | BODY MASS INDEX: 20.99 KG/M2 | TEMPERATURE: 97.8 F | SYSTOLIC BLOOD PRESSURE: 132 MMHG

## 2021-12-18 DIAGNOSIS — F39 MOOD DISORDER (HCC): Primary | ICD-10-CM

## 2021-12-18 LAB
ACETAMINOPHEN LEVEL: <5 MCG/ML (ref 10–30)
ALBUMIN SERPL-MCNC: 4 G/DL (ref 3.5–5.2)
ALP BLD-CCNC: 52 U/L (ref 40–129)
ALT SERPL-CCNC: 14 U/L (ref 0–40)
AMPHETAMINE SCREEN, URINE: NOT DETECTED
ANION GAP SERPL CALCULATED.3IONS-SCNC: 10 MMOL/L (ref 7–16)
AST SERPL-CCNC: 44 U/L (ref 0–39)
BACTERIA: ABNORMAL /HPF
BARBITURATE SCREEN URINE: NOT DETECTED
BASOPHILS ABSOLUTE: 0.09 E9/L (ref 0–0.2)
BASOPHILS RELATIVE PERCENT: 1.8 % (ref 0–2)
BENZODIAZEPINE SCREEN, URINE: NOT DETECTED
BILIRUB SERPL-MCNC: 0.3 MG/DL (ref 0–1.2)
BILIRUBIN URINE: ABNORMAL
BLOOD, URINE: NEGATIVE
BUN BLDV-MCNC: 15 MG/DL (ref 6–20)
CALCIUM SERPL-MCNC: 9 MG/DL (ref 8.6–10.2)
CANNABINOID SCREEN URINE: NOT DETECTED
CHLORIDE BLD-SCNC: 105 MMOL/L (ref 98–107)
CLARITY: CLEAR
CO2: 28 MMOL/L (ref 22–29)
COCAINE METABOLITE SCREEN URINE: POSITIVE
COLOR: YELLOW
CREAT SERPL-MCNC: 0.9 MG/DL (ref 0.7–1.2)
EOSINOPHILS ABSOLUTE: 0.19 E9/L (ref 0.05–0.5)
EOSINOPHILS RELATIVE PERCENT: 3.8 % (ref 0–6)
ETHANOL: <10 MG/DL (ref 0–0.08)
FENTANYL SCREEN, URINE: NOT DETECTED
GFR AFRICAN AMERICAN: >60
GFR NON-AFRICAN AMERICAN: >60 ML/MIN/1.73
GLUCOSE BLD-MCNC: 72 MG/DL (ref 74–99)
GLUCOSE URINE: NEGATIVE MG/DL
HCT VFR BLD CALC: 39.8 % (ref 37–54)
HEMOGLOBIN: 12.8 G/DL (ref 12.5–16.5)
IMMATURE GRANULOCYTES #: 0.02 E9/L
IMMATURE GRANULOCYTES %: 0.4 % (ref 0–5)
INFLUENZA A: NOT DETECTED
INFLUENZA B: NOT DETECTED
KETONES, URINE: NEGATIVE MG/DL
LEUKOCYTE ESTERASE, URINE: NEGATIVE
LYMPHOCYTES ABSOLUTE: 1.66 E9/L (ref 1.5–4)
LYMPHOCYTES RELATIVE PERCENT: 33 % (ref 20–42)
Lab: ABNORMAL
MCH RBC QN AUTO: 28.7 PG (ref 26–35)
MCHC RBC AUTO-ENTMCNC: 32.2 % (ref 32–34.5)
MCV RBC AUTO: 89.2 FL (ref 80–99.9)
METHADONE SCREEN, URINE: NOT DETECTED
MONOCYTES ABSOLUTE: 0.51 E9/L (ref 0.1–0.95)
MONOCYTES RELATIVE PERCENT: 10.1 % (ref 2–12)
NEUTROPHILS ABSOLUTE: 2.56 E9/L (ref 1.8–7.3)
NEUTROPHILS RELATIVE PERCENT: 50.9 % (ref 43–80)
NITRITE, URINE: NEGATIVE
OPIATE SCREEN URINE: NOT DETECTED
OXYCODONE URINE: NOT DETECTED
PDW BLD-RTO: 15.1 FL (ref 11.5–15)
PH UA: 5.5 (ref 5–9)
PHENCYCLIDINE SCREEN URINE: NOT DETECTED
PLATELET # BLD: 369 E9/L (ref 130–450)
PMV BLD AUTO: 9.6 FL (ref 7–12)
POTASSIUM SERPL-SCNC: 3.5 MMOL/L (ref 3.5–5)
PROTEIN UA: NEGATIVE MG/DL
RBC # BLD: 4.46 E12/L (ref 3.8–5.8)
RBC UA: ABNORMAL /HPF (ref 0–2)
SALICYLATE, SERUM: <0.3 MG/DL (ref 0–30)
SARS-COV-2 RNA, RT PCR: NOT DETECTED
SODIUM BLD-SCNC: 143 MMOL/L (ref 132–146)
SPECIFIC GRAVITY UA: >=1.03 (ref 1–1.03)
TOTAL PROTEIN: 6.7 G/DL (ref 6.4–8.3)
TRICYCLIC ANTIDEPRESSANTS SCREEN SERUM: NEGATIVE NG/ML
UROBILINOGEN, URINE: 0.2 E.U./DL
WBC # BLD: 5 E9/L (ref 4.5–11.5)
WBC UA: ABNORMAL /HPF (ref 0–5)

## 2021-12-18 PROCEDURE — 93005 ELECTROCARDIOGRAM TRACING: CPT | Performed by: PHYSICIAN ASSISTANT

## 2021-12-18 PROCEDURE — 80143 DRUG ASSAY ACETAMINOPHEN: CPT

## 2021-12-18 PROCEDURE — 87636 SARSCOV2 & INF A&B AMP PRB: CPT

## 2021-12-18 PROCEDURE — 80053 COMPREHEN METABOLIC PANEL: CPT

## 2021-12-18 PROCEDURE — 80179 DRUG ASSAY SALICYLATE: CPT

## 2021-12-18 PROCEDURE — 82077 ASSAY SPEC XCP UR&BREATH IA: CPT

## 2021-12-18 PROCEDURE — 85025 COMPLETE CBC W/AUTO DIFF WBC: CPT

## 2021-12-18 PROCEDURE — 80307 DRUG TEST PRSMV CHEM ANLYZR: CPT

## 2021-12-18 PROCEDURE — 81001 URINALYSIS AUTO W/SCOPE: CPT

## 2021-12-18 NOTE — ED NOTES
Department of Emergency Medicine  FIRST PROVIDER TRIAGE NOTE             Independent MLP           12/18/21  12:05 AM EST    Date of Encounter: 12/18/21   MRN: [id number]      HPI: Gallego Shock. is a 37 y.o. male who presents to the ED for Psychiatric Evaluation (Increased paranoia today off all psych medications since 12/4/21; denies SI/HI; needs social work consult)     Is a 61-year-old male that is presenting the emergency department screening very paranoid and homeless. Patient states he is off all his psych meds. Patient was seen here on December 4 and admitted. Patient states he needs a social work consult due to being homeless. ROS: Negative for Suicidal ideation or Homicidal Ideation. PE: Gen Appearance/Constitutional: alert  HEENT: NC/NT. PERRLA,  Airway patent. Neck: supple     Initial Plan of Care: All treatment areas with department are currently occupied.  Plan to order/Initiate the following while awaiting opening in ED: labs, EKG and Social Work Eval.    Initial Plan of Care: Initiate Treatment-Testing, Proceed toTreatment Area When Bed Available for ED Attending/MLP to Continue Care    Electronically signed by Tone Larsen PA-C   DD: 12/18/21         Tone Larsen PA-C  12/18/21 0006    ATTENDING PROVIDER ATTESTATION:     Supervising Physician, on-site, available for consultation, non-participatory in the evaluation or care of this patient       Choco Sandra MD  12/18/21 8850

## 2021-12-18 NOTE — ED NOTES
Pt's main focus is food, he said \"I need to go\", reporting that he needs to get breakfast and needs to make it for lunch. Pt is behavioral, very sarcastic to staff and dismissive when trying to provide help to his needs. He is complaining about the breakfast and any other help provided. Police at bedside to help with a safe discharge.      MAJOR Ordaz  12/18/21 0938

## 2021-12-18 NOTE — ED NOTES
Emergency Department [facility] AN AFFILIATE OF [facility] Biopsychosocial Assessment Note    Chief Complaint: Increased paranoia today off all psych medications since 21; denies SI/HI; needs social work consult    Clinical Summary/History:   Pt is malingering. He is homeless and he continues to aduse the ER and he does not follow through with any services that are advised to him. Pt was discharged from psych services 12/15/21 and was on medications while in pt. Pt was discharged with medications or plan for medications. Pt denies SI and no HI. Pt needs to follow up with out pt services. Pt admits to using cocaine and admits that his hallucinations worsen when using - denied PEER support services. MSE: Pt's behavior is controlled, he is not internally stimulated, he is not a harm to himself or others at this time, thoughts are stable and lucid, answering questions accordingly, able to stay on task and focused, no commanding hallucinations. Gender  [x] Male [] Female [] Transgender  [] Other    Sexual Orientation    [x] Heterosexual [] Homosexual [] Bisexual [] Other    Suicidal Behavioral: CSSR-S Complete. [] Reports:    [] Past [] Present   [x] Denies    Homicidal/ Violent Behavior  [] Reports:   [] Past [] Present   [x] Denies     Violence Risk Screenin. Have you ever engaged in a physical fight? []  Yes [x]  No  2. Have you ever had an order of protection taken out against you? []  Yes [x]  No  3. Have you ever been arrested due to violence? []  Yes [x]  No  4. Have you ever been cruel to animals? []  Yes [x]  No    If any of the above questions are affirmative, please complete these questions:  1. Have you ever thought about hurting someone? []  No  []  Yes (Ask the questions listed below)   When?  Did you follow through with the thoughts? []  No  []  Yes - What happened? 2.  Have you ever threatened anyone? []  No  []  Yes (Ask the questions listed below)   When and what happened?     Have you ever threatened someone with a gun, knife or other weapon? []  No  []  Yes - When and what happened? 3. Have you ever physically hurt someone? []  No  []  Yes (Ask the questions listed below)   When and what happened?  How many times have you physically hurt someone in the past?    Hallucinations/Delusions   [x] Reports: non commanding  [] Denies     Substance Use/Alcohol Use/Addiction: SBIRT Screen Complete.    [x] Reports:   [] Denies     Trauma History  [] Reports:  [x] Denies     Collateral Information:   Na    Level of Care/Disposition Plan  [] Home:   [x] Outpatient Provider:   [] Crisis Unit:   [] Inpatient Psychiatric Unit:  [] Other:        MAJOR Morrison  12/18/21 4406

## 2021-12-18 NOTE — ED PROVIDER NOTES
HPI:  12/18/21, Time: 12:26 AM JAYLENE Liang is a 37 y.o. male presenting to the ED for psychiatric evaluation, beginning 1 day ago. The complaint has been persistent, moderate in severity, and worsened by nothing. Patient reporting having suicidal as well as thoughts of hurting others. Patient reporting no chest pain no difficulty breathing no abdominal pain. Patient reporting no fever or chills. Patient also reports that he is currently homeless. Patient reports also feeling more paranoid. Patient reporting no fall or injury he reports no new pain. Does have chronic hip pain from prior history of hip fracture    ROS:   Pertinent positives and negatives are stated within HPI, all other systems reviewed and are negative.  --------------------------------------------- PAST HISTORY ---------------------------------------------  Past Medical History:  has a past medical history of Depression and Schizoaffective disorder ([facility] [address] Past Surgical History:  has a past surgical history that includes Hip fracture surgery (Left, 9/12/2021); eye surgery (19 years ago); and Hip fracture surgery (Left, 9/28/2021). Social History:  reports that he has been smoking cigars and cigarettes. He has a 24.00 pack-year smoking history. He has never used smokeless tobacco. He reports current alcohol use of about 2.0 standard drinks of alcohol per week. He reports current drug use. Frequency: 7.00 times per week. Drugs: Cocaine and Marijuana (Cady Selam). Family History: family history includes Mental Illness in his mother; No Known Problems in his father; Substance Abuse in his mother. The patients home medications have been reviewed.     Allergies: Chlorpheniramine-phenylephrine, Penicillins, and Rondec-d [chlophedianol-pseudoephedrine]    ---------------------------------------------------PHYSICAL EXAM--------------------------------------    Constitutional/General: Alert and oriented x3, well appearing, non toxic in NAD  Head: Normocephalic and atraumatic  Eyes: PERRL, EOMI  Mouth: Oropharynx clear, handling secretions, no trismus  Neck: Supple, full ROM, non tender to palpation in the midline, no stridor, no crepitus, no meningeal signs  Pulmonary: Lungs clear to auscultation bilaterally, no wheezes, rales, or rhonchi. Not in respiratory distress  Cardiovascular:  Regular rate. Regular rhythm. No murmurs, gallops, or rubs. 2+ distal pulses  Chest: no chest wall tenderness  Abdomen: Soft. Non tender. Non distended. +BS. No rebound, guarding, or rigidity. No pulsatile masses appreciated. Musculoskeletal: Moves all extremities x 4. Warm and well perfused, no clubbing, cyanosis, or edema. Capillary refill <3 seconds  Skin: warm and dry. No rashes. Neurologic: GCS 15, CN 2-12 grossly intact, no focal deficits, symmetric strength 5/5 in the upper and lower extremities bilaterally  Psych: Affect flat depressed suicidal thoughts as well as homicidal thoughts does report hallucinations    -------------------------------------------------- RESULTS -------------------------------------------------  I have personally reviewed all laboratory and imaging results for this patient. Results are listed below.      LABS:  Results for orders placed or performed during the hospital encounter of 12/18/21   COVID-19 & Influenza Combo    Specimen: Nasopharyngeal Swab   Result Value Ref Range    SARS-CoV-2 RNA, RT PCR NOT DETECTED NOT DETECTED    INFLUENZA A NOT DETECTED NOT DETECTED    INFLUENZA B NOT DETECTED NOT DETECTED   Comprehensive Metabolic Panel   Result Value Ref Range    Sodium 143 132 - 146 mmol/L    Potassium 3.5 3.5 - 5.0 mmol/L    Chloride 105 98 - 107 mmol/L    CO2 28 22 - 29 mmol/L    Anion Gap 10 7 - 16 mmol/L    Glucose 72 (L) 74 - 99 mg/dL    BUN 15 6 - 20 mg/dL    CREATININE 0.9 0.7 - 1.2 mg/dL    GFR Non-African American >60 >=60 mL/min/1.73    GFR African American >60     Calcium 9.0 8.6 - 10.2 mg/dL    Total Protein 6.7 6.4 - 8.3 g/dL    Albumin 4.0 3.5 - 5.2 g/dL    Total Bilirubin 0.3 0.0 - 1.2 mg/dL    Alkaline Phosphatase 52 40 - 129 U/L    ALT 14 0 - 40 U/L    AST 44 (H) 0 - 39 U/L   CBC Auto Differential   Result Value Ref Range    WBC 5.0 4.5 - 11.5 E9/L    RBC 4.46 3.80 - 5.80 E12/L    Hemoglobin 12.8 12.5 - 16.5 g/dL    Hematocrit 39.8 37.0 - 54.0 %    MCV 89.2 80.0 - 99.9 fL    MCH 28.7 26.0 - 35.0 pg    MCHC 32.2 32.0 - 34.5 %    RDW 15.1 (H) 11.5 - 15.0 fL    Platelets 098 083 - 136 E9/L    MPV 9.6 7.0 - 12.0 fL    Neutrophils % 50.9 43.0 - 80.0 %    Immature Granulocytes % 0.4 0.0 - 5.0 %    Lymphocytes % 33.0 20.0 - 42.0 %    Monocytes % 10.1 2.0 - 12.0 %    Eosinophils % 3.8 0.0 - 6.0 %    Basophils % 1.8 0.0 - 2.0 %    Neutrophils Absolute 2.56 1.80 - 7.30 E9/L    Immature Granulocytes # 0.02 E9/L    Lymphocytes Absolute 1.66 1.50 - 4.00 E9/L    Monocytes Absolute 0.51 0.10 - 0.95 E9/L    Eosinophils Absolute 0.19 0.05 - 0.50 E9/L    Basophils Absolute 0.09 0.00 - 0.20 E9/L   Serum Drug Screen   Result Value Ref Range    Ethanol Lvl <10 mg/dL    Acetaminophen Level <5.0 (L) 10.0 - 84.1 mcg/mL    Salicylate, Serum <8.2 0.0 - 30.0 mg/dL    TCA Scrn NEGATIVE Cutoff:300 ng/mL   Urine Drug Screen   Result Value Ref Range    Amphetamine Screen, Urine NOT DETECTED Negative <1000 ng/mL    Barbiturate Screen, Ur NOT DETECTED Negative < 200 ng/mL    Benzodiazepine Screen, Urine NOT DETECTED Negative < 200 ng/mL    Cannabinoid Scrn, Ur NOT DETECTED Negative < 50ng/mL    Cocaine Metabolite Screen, Urine POSITIVE (A) Negative < 300 ng/mL    Opiate Scrn, Ur NOT DETECTED Negative < 300ng/mL    PCP Screen, Urine NOT DETECTED Negative < 25 ng/mL    Methadone Screen, Urine NOT DETECTED Negative <300 ng/mL    Oxycodone Urine NOT DETECTED Negative <100 ng/mL    FENTANYL SCREEN, URINE NOT DETECTED Negative <1 ng/mL    Drug Screen Comment: see below    Urinalysis with Microscopic   Result Value Ref Range    Color, UA Yellow Straw/Yellow    Clarity, UA Clear Clear    Glucose, Ur Negative Negative mg/dL    Bilirubin Urine SMALL (A) Negative    Ketones, Urine Negative Negative mg/dL    Specific Gravity, UA >=1.030 1.005 - 1.030    Blood, Urine Negative Negative    pH, UA 5.5 5.0 - 9.0    Protein, UA Negative Negative mg/dL    Urobilinogen, Urine 0.2 <2.0 E.U./dL    Nitrite, Urine Negative Negative    Leukocyte Esterase, Urine Negative Negative    WBC, UA 0-1 0 - 5 /HPF    RBC, UA 0-1 0 - 2 /HPF    Bacteria, UA NONE SEEN None Seen /HPF   EKG 12 Lead   Result Value Ref Range    Ventricular Rate 60 BPM    Atrial Rate 60 BPM    P-R Interval 158 ms    QRS Duration 106 ms    Q-T Interval 402 ms    QTc Calculation (Bazett) 402 ms    P Axis 52 degrees    R Axis 82 degrees    T Axis 65 degrees       RADIOLOGY:  Interpreted by Radiologist.  No orders to display         EKG Interpretation      ------------------------- NURSING NOTES AND VITALS REVIEWED ---------------------------   The nursing notes within the ED encounter and vital signs as below have been reviewed by myself. Pulse 68   Temp 98 °F (36.7 °C) (Oral)   Resp 20   Ht 6' (1.829 m)   Wt 155 lb (70.3 kg)   SpO2 96%   BMI 21.02 kg/m²   Oxygen Saturation Interpretation: Normal    The patients available past medical records and past encounters were reviewed. ------------------------------ ED COURSE/MEDICAL DECISION MAKING----------------------  Medications - No data to display          Medical Decision Making:    Patient presenting here because of having suicidal homicidal thoughts. Patient does have known history of schizophrenia. Patient labs are reviewed. Patient will be eval by . Patient is medically clear    Re-Evaluations:             Re-evaluation. Patients symptoms show no change  Patient reevaluated and made aware of findings. Patient was eval by .   Patient made aware need for follow-up he is to return if symptoms worsen or persist.    Consultations:                 Critical Care: This patient's ED course included: a personal history and physicial eaxmination    This patient has been closely monitored during their ED course. Counseling: The emergency provider has spoken with the patient and discussed todays results, in addition to providing specific details for the plan of care and counseling regarding the diagnosis and prognosis. Questions are answered at this time and they are agreeable with the plan.       --------------------------------- IMPRESSION AND DISPOSITION ---------------------------------    IMPRESSION  1. Mood disorder ([facility] [address]        DISPOSITION  Disposition:  to evaluate to be discharge  Patient condition is stable        NOTE: This report was transcribed using voice recognition software.  Every effort was made to ensure accuracy; however, inadvertent computerized transcription errors may be present          Monserrat Aragon MD  12/18/21 0028       Monserrat Aragon MD  12/18/21 6005 [facility] Jody Lugo MD  12/18/21 7687

## 2021-12-19 LAB
EKG ATRIAL RATE: 60 BPM
EKG P AXIS: 52 DEGREES
EKG P-R INTERVAL: 158 MS
EKG Q-T INTERVAL: 402 MS
EKG QRS DURATION: 106 MS
EKG QTC CALCULATION (BAZETT): 402 MS
EKG R AXIS: 82 DEGREES
EKG T AXIS: 65 DEGREES
EKG VENTRICULAR RATE: 60 BPM

## 2021-12-20 ENCOUNTER — HOSPITAL ENCOUNTER (EMERGENCY)
Age: 41
Discharge: HOME | End: 2021-12-21
Attending: EMERGENCY MEDICINE
Payer: COMMERCIAL

## 2021-12-20 VITALS
SYSTOLIC BLOOD PRESSURE: 123 MMHG | OXYGEN SATURATION: 97 % | TEMPERATURE: 98.5 F | DIASTOLIC BLOOD PRESSURE: 72 MMHG | RESPIRATION RATE: 16 BRPM | HEART RATE: 85 BPM

## 2021-12-20 DIAGNOSIS — F39 MOOD DISORDER (HCC): Primary | ICD-10-CM

## 2021-12-20 PROCEDURE — 99283 EMERGENCY DEPT VISIT LOW MDM: CPT

## 2021-12-21 NOTE — ED NOTES
Pt alert oriented skin warm dry resp easy pt denies suicidal ideations states feels better wants to leave pt states came here for some sleep and to eat pt has good eye contact semi flat affect is cooperative and in control at this time discharge instructions given to pt verbalized understanding ambulates with steady gait     Kiersten Humphrey RN  12/21/21 0024

## 2021-12-21 NOTE — ED NOTES
Bed: [facility]  Expected date:   Expected time:   Means of arrival:   Comments:  triage     Danielle Garcia RN  12/20/21 2838

## 2021-12-22 ENCOUNTER — HOSPITAL ENCOUNTER (EMERGENCY)
Age: 41
Discharge: PSYCHIATRIC OR SUBSTANCE USE FACILITY | End: 2021-12-23
Attending: STUDENT IN AN ORGANIZED HEALTH CARE EDUCATION/TRAINING PROGRAM
Payer: COMMERCIAL

## 2021-12-22 DIAGNOSIS — F22 PARANOIA (HCC): Primary | ICD-10-CM

## 2021-12-22 LAB
ACETAMINOPHEN LEVEL: <5 MCG/ML (ref 10–30)
ALBUMIN SERPL-MCNC: 4.3 G/DL (ref 3.5–5.2)
ALP BLD-CCNC: 53 U/L (ref 40–129)
ALT SERPL-CCNC: 12 U/L (ref 0–40)
AMMONIA: 36 UMOL/L (ref 16–60)
AMPHETAMINE SCREEN, URINE: NOT DETECTED
ANION GAP SERPL CALCULATED.3IONS-SCNC: 10 MMOL/L (ref 7–16)
AST SERPL-CCNC: 17 U/L (ref 0–39)
BARBITURATE SCREEN URINE: NOT DETECTED
BASOPHILS ABSOLUTE: 0.07 E9/L (ref 0–0.2)
BASOPHILS RELATIVE PERCENT: 2.1 % (ref 0–2)
BENZODIAZEPINE SCREEN, URINE: NOT DETECTED
BILIRUB SERPL-MCNC: 0.4 MG/DL (ref 0–1.2)
BUN BLDV-MCNC: 12 MG/DL (ref 6–20)
CALCIUM SERPL-MCNC: 9.2 MG/DL (ref 8.6–10.2)
CANNABINOID SCREEN URINE: NOT DETECTED
CHLORIDE BLD-SCNC: 101 MMOL/L (ref 98–107)
CO2: 27 MMOL/L (ref 22–29)
COCAINE METABOLITE SCREEN URINE: POSITIVE
CREAT SERPL-MCNC: 0.9 MG/DL (ref 0.7–1.2)
EOSINOPHILS ABSOLUTE: 0.15 E9/L (ref 0.05–0.5)
EOSINOPHILS RELATIVE PERCENT: 4.4 % (ref 0–6)
ETHANOL: <10 MG/DL (ref 0–0.08)
FENTANYL SCREEN, URINE: NOT DETECTED
GFR AFRICAN AMERICAN: >60
GFR NON-AFRICAN AMERICAN: >60 ML/MIN/1.73
GLUCOSE BLD-MCNC: 98 MG/DL (ref 74–99)
HCT VFR BLD CALC: 42 % (ref 37–54)
HEMOGLOBIN: 13.6 G/DL (ref 12.5–16.5)
IMMATURE GRANULOCYTES #: 0 E9/L
IMMATURE GRANULOCYTES %: 0 % (ref 0–5)
INFLUENZA A: NOT DETECTED
INFLUENZA B: NOT DETECTED
LYMPHOCYTES ABSOLUTE: 1.14 E9/L (ref 1.5–4)
LYMPHOCYTES RELATIVE PERCENT: 33.4 % (ref 20–42)
Lab: ABNORMAL
MCH RBC QN AUTO: 29.8 PG (ref 26–35)
MCHC RBC AUTO-ENTMCNC: 32.4 % (ref 32–34.5)
MCV RBC AUTO: 91.9 FL (ref 80–99.9)
METHADONE SCREEN, URINE: NOT DETECTED
MONOCYTES ABSOLUTE: 0.4 E9/L (ref 0.1–0.95)
MONOCYTES RELATIVE PERCENT: 11.7 % (ref 2–12)
NEUTROPHILS ABSOLUTE: 1.65 E9/L (ref 1.8–7.3)
NEUTROPHILS RELATIVE PERCENT: 48.4 % (ref 43–80)
OPIATE SCREEN URINE: NOT DETECTED
OXYCODONE URINE: NOT DETECTED
PDW BLD-RTO: 14.6 FL (ref 11.5–15)
PHENCYCLIDINE SCREEN URINE: NOT DETECTED
PLATELET # BLD: 393 E9/L (ref 130–450)
PMV BLD AUTO: 9.7 FL (ref 7–12)
POTASSIUM SERPL-SCNC: 3.8 MMOL/L (ref 3.5–5)
RBC # BLD: 4.57 E12/L (ref 3.8–5.8)
SALICYLATE, SERUM: <0.3 MG/DL (ref 0–30)
SARS-COV-2 RNA, RT PCR: NOT DETECTED
SODIUM BLD-SCNC: 138 MMOL/L (ref 132–146)
TOTAL PROTEIN: 7.1 G/DL (ref 6.4–8.3)
TRICYCLIC ANTIDEPRESSANTS SCREEN SERUM: NEGATIVE NG/ML
WBC # BLD: 3.4 E9/L (ref 4.5–11.5)

## 2021-12-22 PROCEDURE — 85025 COMPLETE CBC W/AUTO DIFF WBC: CPT

## 2021-12-22 PROCEDURE — 82077 ASSAY SPEC XCP UR&BREATH IA: CPT

## 2021-12-22 PROCEDURE — 80307 DRUG TEST PRSMV CHEM ANLYZR: CPT

## 2021-12-22 PROCEDURE — 87636 SARSCOV2 & INF A&B AMP PRB: CPT

## 2021-12-22 PROCEDURE — 80053 COMPREHEN METABOLIC PANEL: CPT

## 2021-12-22 PROCEDURE — 80179 DRUG ASSAY SALICYLATE: CPT

## 2021-12-22 PROCEDURE — 99284 EMERGENCY DEPT VISIT MOD MDM: CPT

## 2021-12-22 PROCEDURE — 82140 ASSAY OF AMMONIA: CPT

## 2021-12-22 PROCEDURE — 80143 DRUG ASSAY ACETAMINOPHEN: CPT

## 2021-12-22 PROCEDURE — 93005 ELECTROCARDIOGRAM TRACING: CPT | Performed by: STUDENT IN AN ORGANIZED HEALTH CARE EDUCATION/TRAINING PROGRAM

## 2021-12-22 NOTE — ED PROVIDER NOTES
Department of Emergency Medicine   ED  Provider Note  Admit Date/RoomTime: 12/22/2021  1:03 PM  ED Room: [address] [place] [address]          History of Present Illness:  12/22/21, Time: 2:03 PM EST  Chief Complaint   Patient presents with   Germania Dorado. is a 37 y.o. male presenting to the ED for paranoia. He sometimes has command hallucinations and delusions but he is not having any at present. He states he was previously on monthly injections but is unsure what medications they were. He states that he has been off all of his medication. He has no suicidal or homicidal ideation per his report. Denies any organic complaints at this time. Nothing makes it better or worse they are constant duration. Review of Systems:  Review of systems obtained and negative unless stated otherwise above in the HPI.    --------------------------------------------- PAST HISTORY ---------------------------------------------  Past Medical History:  has a past medical history of Depression and Schizoaffective disorder ([facility] [address] Past Surgical History:  has a past surgical history that includes Hip fracture surgery (Left, 9/12/2021); eye surgery (19 years ago); and Hip fracture surgery (Left, 9/28/2021). Social History:  reports that he has been smoking cigars and cigarettes. He has a 24.00 pack-year smoking history. He has never used smokeless tobacco. He reports current alcohol use of about 2.0 standard drinks of alcohol per week. He reports current drug use. Frequency: 7.00 times per week. Drugs: Cocaine and Marijuana (Audelia Elliott). Family History: family history includes Mental Illness in his mother; No Known Problems in his father; Substance Abuse in his mother. . Unless otherwise noted, family history is non contributory    The patients home medications have been reviewed.     Allergies: Chlorpheniramine-phenylephrine, Penicillins, and Rondec-d [chlophedianol-pseudoephedrine]    I have reviewed the past medical history, past surgical history, social history, and family history    ---------------------------------------------------PHYSICAL EXAM--------------------------------------    Constitutional: Appears in no distress  Head: Normocephalic  Eyes: No conjunctial injection  ENT: Moist mucous membranes  Neck: Trachea midline  Respiratory: Nonlabored respirations, no tachypnea  Cardiovascular: Regular rate. Regular rhythm. No murmurs, no gallops, no rubs. Gastrointestinal: Nonsistendended abdomen. Musculoskeletal: Moves all extremities  Skin: No diaphoresis on visualized skin  Neurologic: Alert, symmetric facies, no aphasia  Psychiatric: Patient is tangential, disheveled appearing, he is reading a box of crackers like it is a book, he is nonlinear at this time, denies suicidal homicidal ideation has no plan for either, no command hallucinations, he is delusional.    -------------------------------------------------- RESULTS -------------------------------------------------  I have personally reviewed all laboratory and imaging results for this patient. Results are listed below.      LABS: (Lab results interpreted by me)  Results for orders placed or performed during the hospital encounter of 12/22/21   COVID-19 & Influenza Combo    Specimen: Nasopharyngeal Swab   Result Value Ref Range    SARS-CoV-2 RNA, RT PCR NOT DETECTED NOT DETECTED    INFLUENZA A NOT DETECTED NOT DETECTED    INFLUENZA B NOT DETECTED NOT DETECTED   CBC Auto Differential   Result Value Ref Range    WBC 3.4 (L) 4.5 - 11.5 E9/L    RBC 4.57 3.80 - 5.80 E12/L    Hemoglobin 13.6 12.5 - 16.5 g/dL    Hematocrit 42.0 37.0 - 54.0 %    MCV 91.9 80.0 - 99.9 fL    MCH 29.8 26.0 - 35.0 pg    MCHC 32.4 32.0 - 34.5 %    RDW 14.6 11.5 - 15.0 fL    Platelets 615 535 - 747 E9/L    MPV 9.7 7.0 - 12.0 fL    Neutrophils % 48.4 43.0 - 80.0 %    Immature Granulocytes % 0.0 0.0 - 5.0 %    Lymphocytes % 33.4 20.0 - 42.0 %    Monocytes % 11.7 2.0 - 12.0 %    Eosinophils % 4.4 0.0 - 6.0 %    Basophils % 2.1 (H) 0.0 - 2.0 %    Neutrophils Absolute 1.65 (L) 1.80 - 7.30 E9/L    Immature Granulocytes # 0.00 E9/L    Lymphocytes Absolute 1.14 (L) 1.50 - 4.00 E9/L    Monocytes Absolute 0.40 0.10 - 0.95 E9/L    Eosinophils Absolute 0.15 0.05 - 0.50 E9/L    Basophils Absolute 0.07 0.00 - 0.20 E9/L   Serum Drug Screen   Result Value Ref Range    Ethanol Lvl <10 mg/dL    Acetaminophen Level <5.0 (L) 10.0 - 98.2 mcg/mL    Salicylate, Serum <5.7 0.0 - 30.0 mg/dL    TCA Scrn NEGATIVE Cutoff:300 ng/mL   ,       RADIOLOGY:  Interpreted by Radiologist unless otherwise specified  No orders to display         ------------------------- NURSING NOTES AND VITALS REVIEWED ---------------------------   The nursing notes within the ED encounter and vital signs as below have been reviewed by myself  /64   Pulse 60   Temp 97.7 °F (36.5 °C)   Resp 18   SpO2 98%     Oxygen Saturation Interpretation: Normal    The patients available past medical records and past encounters were reviewed. ------------------------------ ED COURSE/MEDICAL DECISION MAKING----------------------  Medications - No data to display     Re-Evaluations: This patient's ED course included:a personal history and physicial examination and re-evaluation prior to disposition    This patient has remained hemodynamically stable during their ED course. Consultations:  None    Medical Decision Making:   Patient presents as a consultation for psychiatric evaluation. He is acutely psychotic. He is well-appearing and without any organic evidence of his psychiatric dysfunction. EKG:  EKG is interpreted myself as ventricular rate of 57 beats (.  Before QRS complex castration is within normal is a QT/QTc is normal.  Axis is normal.  No ST segment elevation or depression. No T wave inversions.   This is interpreted myself as borderline sinus bradycardia 57 no ischemia or infarct is evident    Labs reviewed patient is cleared from the medical standpoint for further psychiatric evaluation. I did fill out a pink slip on this patient as he is acutely decompensated, talking to a cracker box he is disheveled and unable to function on his own. Counseling: The emergency provider has spoken with the patient and discussed todays results, in addition to providing specific details for the plan of care and counseling regarding the diagnosis and prognosis. Questions are answered at this time and they are agreeable with the plan.       --------------------------------- IMPRESSION AND DISPOSITION ---------------------------------    IMPRESSION  1. Paranoia ([facility] [address]        DISPOSITION  Disposition: Cleared for psychiatric evaluation  Patient condition is stable    I, Dr. Bev Zelaya, am the primary provider of record    Bev Zelaya DO  Emergency Medicine    NOTE: This report was transcribed using voice recognition software.  Every effort was made to ensure accuracy; however, inadvertent computerized transcription errors may be present          Jordin Boss, DO  12/22/21 1000 LITO Troy, DO  12/23/21 1028

## 2021-12-22 NOTE — ED NOTES
It is noted in note of 12/22/2021 14:55 that [facility] SW reported pt was pink slipped, no pink slip can be found. Discussed with Dr Jeny Tang who reports he is uncomfortable with level of psychosis he observed in pt and is not comfortable with d/c to follow up with out-patient, feels pt presents a danger to himself due to severely compromised mental status. he completed pink slip. Pt will be referred to access center for placement. [address] [place] [address] [place], [place]  12/22/21 2185    Per [facility] nurse Judith Haji, pt has not yet provided urine sample, this must be complete before pt can be evaluated for in-pt. Admission. Sample obtained and sent to lab. Awaiting results.         [address] [place] [address] [place], [place]  12/22/21 0144

## 2021-12-22 NOTE — ED NOTES
Emergency Department [facility] AN AFFILIATE OF [facility] Biopsychosocial Assessment Note    Chief Complaint: Pt is a 38 y/o male presenting to the ED with hallucinations; pt appears to be at baseline. MSE: Pt alert and oriented x 4, mood calm, affect congruent, pt reporting auditory hallucinations; admits this is baseline; good eye contact; disheveled and poorly groomed; reports paranoia today and off all psych medications since 12/15/21; Pt denies SI/HI    Clinical Summary/History:     Pt has a long history of visits to this ED and was just discharged from [facility] unit on December 15, 2021. Pt states he has not been medication compliant and \"I don't even know where my meds are\". Pt was rambling about being hot and \"blistered by the sun\"; states he [place] was [place] and is experiencing a shock to the system now that it's cold. Pt was sarcastic and uncooperative to most assessment questions. Pt has a mental health history of Bipolar Disorder One and Schizoaffective Disorder. Pt admits that he is not following up with mental health services and complains that Tati Zhang will no longer see him due to non-compliance. Pt denies SI/HI and states that his plan is \"to live\". Pt denies AOD use. Pt does not appear to be a harm to himself or others at this time. Pt states that he is still homeless. Pt  continues to abuse the ER and he does not follow through with any services that are advised to him. Per pt chart, pt was discharged from [facility] in the care of [facility] services. SW instructed pt to go to [facility] for follow up mental health. Gender  [x] Male [] Female [] Transgender  [] Other    Sexual Orientation    [x] Heterosexual [] Homosexual [] Bisexual [] Other    Suicidal Behavioral: CSSR-S Complete. [] Reports:    [] Past [] Present   [x] Denies    Homicidal/ Violent Behavior  [] Reports:   [] Past [] Present   [x] Denies     Violence Risk Screenin.        Have you ever engaged in a

## 2021-12-22 NOTE — CARE COORDINATION
Pt is well known to the [facility] staff. Per [facility] ANTONIO, pt is pink slipped.  Supervisor spoke with Mariaa Arce NP, who requested that patient is referred to the [facility]. [facility] aware.     FRANCISCO Villalobos, Cecelia Jacobs Work Supervisor

## 2021-12-23 VITALS
HEART RATE: 70 BPM | OXYGEN SATURATION: 96 % | DIASTOLIC BLOOD PRESSURE: 70 MMHG | SYSTOLIC BLOOD PRESSURE: 110 MMHG | TEMPERATURE: 98 F | RESPIRATION RATE: 18 BRPM

## 2021-12-23 LAB
BACTERIA: ABNORMAL /HPF
BILIRUBIN URINE: NEGATIVE
BLOOD, URINE: NEGATIVE
CLARITY: CLEAR
COLOR: YELLOW
EKG ATRIAL RATE: 57 BPM
EKG P AXIS: 66 DEGREES
EKG P-R INTERVAL: 192 MS
EKG Q-T INTERVAL: 408 MS
EKG QRS DURATION: 104 MS
EKG QTC CALCULATION (BAZETT): 397 MS
EKG R AXIS: 78 DEGREES
EKG T AXIS: 65 DEGREES
EKG VENTRICULAR RATE: 57 BPM
GLUCOSE URINE: NEGATIVE MG/DL
KETONES, URINE: ABNORMAL MG/DL
LEUKOCYTE ESTERASE, URINE: ABNORMAL
MUCUS: PRESENT /LPF
NITRITE, URINE: NEGATIVE
PH UA: 6.5 (ref 5–9)
PROTEIN UA: NEGATIVE MG/DL
RBC UA: ABNORMAL /HPF (ref 0–2)
SPECIFIC GRAVITY UA: 1.02 (ref 1–1.03)
UROBILINOGEN, URINE: 1 E.U./DL
WBC UA: ABNORMAL /HPF (ref 0–5)

## 2021-12-23 PROCEDURE — 81001 URINALYSIS AUTO W/SCOPE: CPT

## 2021-12-23 PROCEDURE — 93010 ELECTROCARDIOGRAM REPORT: CPT | Performed by: INTERNAL MEDICINE

## 2021-12-23 NOTE — ED NOTES
SW woke Pt up and prompted him to provide us with a urine sample. Pt was agreeable. Pt was given a box lunch and drink.       FRANCISCO Castro, [place]  12/23/21 0002

## 2021-12-23 NOTE — ED NOTES
Patient continues to sleep, no distress noted. Report given to Andre Colón RN.        Tonia Johnson RN  12/23/21 2540

## 2021-12-23 NOTE — ED NOTES
PATRICIO ALVAREZ reached out to [address] [place], [place] [address] at [phone] and spoke to [facility] and completed a referral to find inpatient admission.      FRANCISCO Al, [place]  12/23/21 2165

## 2021-12-23 NOTE — ED NOTES
Spoke to Waldo from [facility] and Pt has been accepted to [facility] by Dr. Jessica Garcia - Pt will go into intake and then be assigned a bed. Pt Pink Slip is to be faxed to [phone]. [facility] is to be called to [phone]. Awaiting for transportation to be arranged with an ETA. FRANCISCO Castro, [place]  12/23/21 8637    ETA physician 3-4 hours.       FRANCISCO Castro, [place]  12/23/21 9379

## 2021-12-23 NOTE — ED NOTES
Reviewed labs  -  will need urinalysis completed before being able to refer to [facility]. Informed PATRICIO nurse Brianne Vazquez of need.       [address], [place], [place]  12/22/21 2014

## 2021-12-23 NOTE — ED NOTES
Received report from [facility].        Tonia Johnson RN  12/22/21 7338

## 2021-12-23 NOTE — ED NOTES
Ambulance and transfer pack completed and ready for transport.       FRANCISCO Aguirre, [place]  12/23/21 1771

## 2022-01-01 ENCOUNTER — HOSPITAL ENCOUNTER (EMERGENCY)
Age: 42
Discharge: HOME | End: 2022-01-01
Attending: EMERGENCY MEDICINE
Payer: COMMERCIAL

## 2022-01-01 VITALS
HEART RATE: 90 BPM | TEMPERATURE: 98.2 F | OXYGEN SATURATION: 97 % | DIASTOLIC BLOOD PRESSURE: 84 MMHG | SYSTOLIC BLOOD PRESSURE: 111 MMHG | RESPIRATION RATE: 16 BRPM

## 2022-01-01 DIAGNOSIS — G47.00 INSOMNIA, UNSPECIFIED TYPE: Primary | ICD-10-CM

## 2022-01-01 PROCEDURE — 99283 EMERGENCY DEPT VISIT LOW MDM: CPT

## 2022-01-01 NOTE — ED PROVIDER NOTES
ATTENDING PROVIDER ATTESTATION:     Liamkisha Aurea presented to the emergency department for evaluation of Mental Health Problem (carolyn been off medications for 2 weeks and has not slept in 2 days.)   and was initially evaluated by the Medical Resident. See Original ED Note for H&P and ED course above. I have reviewed and discussed the case, including pertinent history (medical, surgical, family and social) and exam findings with the Medical Resident assigned to Chelsea Villar .  I have personally performed and/or participated in the history, exam, medical decision making, and procedures and agree with all pertinent clinical information and any additional changes or corrections are noted below in my assessment and plan. I have discussed this patient in detail with the resident, and provided the instruction and education,       I have reviewed my findings and recommendations with the assigned Medical Resident, Chelsea Villar and members of family present at the time of disposition. I have performed a history and physical examination of this patient and directly supervised the resident caring for this patient    I directly supervised any procedures performed by the resident and was present for the procedure including all critical portions of the procedure          Department of Emergency Medicine   ED  Provider Note  Admit Date/RoomTime: 1/1/2022  8:09 AM  ED Room: [address] [place] [address]              1/1/22  8:28 AM EST    HPI: Chelsea Doyle. 37 y.o. male presents with a complaint of insomnia beginning 2 days ago. Complaint has been constant and became more severe today which is what prompted the visit. Reports he is having a hard time sleeping. Says it has been 2 days. Says he isn't sleeping well as he is homeless. Denies other complaints. No SI or HI. He denies other complaints. He says he intermittently takes his medication as well. Again, he denies Suicidal ideation. Denies Homicidal ideation. Denies drugs or alcohol. He is awake and alert and oriented on arrival.       Review of Systems:   A complete review of systems was performed and pertinent positives and negatives are stated within HPI, all other systems reviewed and are negative.            --------------------------------------------- PAST HISTORY ---------------------------------------------  Past Medical History:  has a past medical history of Depression and Schizoaffective disorder ([facility] [address] Past Surgical History:  has a past surgical history that includes Hip fracture surgery (Left, 9/12/2021); eye surgery (19 years ago); and Hip fracture surgery (Left, 9/28/2021). Social History:  reports that he has been smoking cigars and cigarettes. He has a 24.00 pack-year smoking history. He has never used smokeless tobacco. He reports current alcohol use of about 2.0 standard drinks of alcohol per week. He reports current drug use. Frequency: 7.00 times per week. Drugs: Cocaine and Marijuana (Gabriel Bame). Family History: family history includes Mental Illness in his mother; No Known Problems in his father; Substance Abuse in his mother. Family history is non contributory unless otherwise noted    The patients home medications have been reviewed. Allergies: Chlorpheniramine-phenylephrine, Penicillins, and Rondec-d [chlophedianol-pseudoephedrine]    -------------------------------------------------- RESULTS -------------------------------------------------  All laboratory and imaging studies were reviewed by myself. LABS: reviewed and interpreted by me  No results found for this visit on 01/01/22. RADIOLOGY:  Interpreted by Radiologist.  No orders to display            ------------------------- NURSING NOTES AND VITALS REVIEWED ---------------------------   The nursing notes within the ED encounter and vital signs as below have been reviewed.    /84   Pulse 90   Temp 98.2 °F (36.8 °C)   Resp 16   SpO2 97%   Oxygen Saturation Interpretation: Normal            ---------------------------------------------------PHYSICAL EXAM--------------------------------------      Constitutional/General: Alert and oriented x3   Head: Normocephalic, atraumatic  Eyes: PERRL, EOMI  ENT: Oropharynx clear, handling secretions, no trismus  Neck: Supple, full ROM, no meningeal signs  Pulmonary: Lungs clear to auscultation bilaterally, no wheezes, rales, or rhonchi. Not in respiratory distress  Cardiovascular:  Regular rate and rhythm, no murmurs, gallops, or rubs. 2+ distal pulses  Abdomen: Soft, non tender, non distended, +BS, no rebound, guarding, or rigidity. No pulsatile masses appreciated  Extremities: Moves all extremities x 4. Warm and well perfused  Skin: warm and dry without rash  Neurologic: GCS 15, no focal deficits  Psych: normal Affect      ------------------------------------------ PROGRESS NOTES ------------------------------------------     Medical decision making:  Insomnia. Says he is homeless. No SI or HI. He denies any other complaints. He says he is fine otherwise but is not sleeping well. No indication for admission. Not psychotic. No indication for involuntarily hold. Not a danger to himself or others. No signs of an emergency medical condition at this time. Consultations:   Behavioral Health    Re-Evaluations:        Counseling: The emergency provider has spoken with thepatient and discussed todays results, in addition to providing specific details for the plan of care and counseling regarding the diagnosis and prognosis. Questions are answered at this time and they are agreeable with the plan.         --------------------------------- IMPRESSION AND DISPOSITION ---------------------------------    IMPRESSION  1.  Insomnia, unspecified type        DISPOSITION  Disposition: as per consultant  Patient condition is stable           Yfn Ybarra MD  01/01/22 1189

## 2022-01-01 NOTE — ED NOTES
Pt is extremely rude and ignorant to staff - yelled at the nurse \"shut the fuck up\" while giving him his d/c forms. Pt is aware that he should follow up with ASHUTOSH.        MAJOR Samuel  01/01/22 1046

## 2022-01-01 NOTE — ED NOTES
Emergency Department [facility] AN AFFILIATE OF [facility] Biopsychosocial Assessment Note     Chief Complaint: Off meds/Homeless and needs rest    MSE:  Overall at at baseline, calm, behavior controlled at this time, speech is clear, shared good eye contact, able to follow directions. Clinical Summary/History:   Pt is homeless, not suicidal, not homicidal, no hallucinations, presenting at baseline. He stated \"I am not insane\". He is able to complete a conversation, is very sarcastic and disrespectful with all staff, demending, completely behavioral.    Pt needs to follow up with Carmelita Conway. Gender  [x] Male [] Female [] Transgender  [] Other    Sexual Orientation    [x] Heterosexual [] Homosexual [] Bisexual [] Other    Suicidal Behavioral: CSSR-S Complete. [] Reports:    [] Past [] Present   [x] Denies    Homicidal/ Violent Behavior  [] Reports:   [] Past [] Present   [x] Denies     Violence Risk Screenin. Have you ever engaged in a physical fight? [x]  Yes []  No  2. Have you ever had an order of protection taken out against you? []  Yes [x]  No  3. Have you ever been arrested due to violence? [x]  Yes []  No  4. Have you ever been cruel to animals? []  Yes [x]  No    If any of the above questions are affirmative, please complete these questions:  1. Have you ever thought about hurting someone? []  No  [x]  Yes (Ask the questions listed below)   When?  Did you follow through with the thoughts? []  No  []  Yes - What happened? 2.  Have you ever threatened anyone? []  No  [x]  Yes (Ask the questions listed below)   When and what happened?  Have you ever threatened someone with a gun, knife or other weapon? []  No  []  Yes - When and what happened? 3. Have you ever physically hurt someone? []  No  [x]  Yes (Ask the questions listed below)   When and what happened?     How many times have you physically hurt someone in the past?    Hallucinations/Delusions   [] Reports:   [x] Denies     Substance

## 2022-01-04 ENCOUNTER — HOSPITAL ENCOUNTER (EMERGENCY)
Age: 42
Discharge: HOME | End: 2022-01-04
Attending: EMERGENCY MEDICINE
Payer: COMMERCIAL

## 2022-01-04 VITALS
HEART RATE: 90 BPM | RESPIRATION RATE: 20 BRPM | TEMPERATURE: 98 F | OXYGEN SATURATION: 97 % | DIASTOLIC BLOOD PRESSURE: 96 MMHG | SYSTOLIC BLOOD PRESSURE: 160 MMHG

## 2022-01-04 DIAGNOSIS — F19.10 SUBSTANCE ABUSE (HCC): Primary | ICD-10-CM

## 2022-01-04 DIAGNOSIS — F20.9 SCHIZOPHRENIA, UNSPECIFIED TYPE (HCC): ICD-10-CM

## 2022-01-04 LAB
ACETAMINOPHEN LEVEL: <5 MCG/ML (ref 10–30)
ALBUMIN SERPL-MCNC: 4.5 G/DL (ref 3.5–5.2)
ALP BLD-CCNC: 59 U/L (ref 40–129)
ALT SERPL-CCNC: 12 U/L (ref 0–40)
AMPHETAMINE SCREEN, URINE: NOT DETECTED
ANION GAP SERPL CALCULATED.3IONS-SCNC: 12 MMOL/L (ref 7–16)
AST SERPL-CCNC: 14 U/L (ref 0–39)
BARBITURATE SCREEN URINE: NOT DETECTED
BASOPHILS ABSOLUTE: 0.06 E9/L (ref 0–0.2)
BASOPHILS RELATIVE PERCENT: 0.9 % (ref 0–2)
BENZODIAZEPINE SCREEN, URINE: NOT DETECTED
BILIRUB SERPL-MCNC: 0.4 MG/DL (ref 0–1.2)
BUN BLDV-MCNC: 15 MG/DL (ref 6–20)
CALCIUM SERPL-MCNC: 9.6 MG/DL (ref 8.6–10.2)
CANNABINOID SCREEN URINE: NOT DETECTED
CHLORIDE BLD-SCNC: 103 MMOL/L (ref 98–107)
CO2: 28 MMOL/L (ref 22–29)
COCAINE METABOLITE SCREEN URINE: POSITIVE
CREAT SERPL-MCNC: 1 MG/DL (ref 0.7–1.2)
EOSINOPHILS ABSOLUTE: 0.11 E9/L (ref 0.05–0.5)
EOSINOPHILS RELATIVE PERCENT: 1.7 % (ref 0–6)
ETHANOL: <10 MG/DL (ref 0–0.08)
FENTANYL SCREEN, URINE: NOT DETECTED
GFR AFRICAN AMERICAN: >60
GFR NON-AFRICAN AMERICAN: >60 ML/MIN/1.73
GLUCOSE BLD-MCNC: 95 MG/DL (ref 74–99)
HCT VFR BLD CALC: 42.3 % (ref 37–54)
HEMOGLOBIN: 13.7 G/DL (ref 12.5–16.5)
IMMATURE GRANULOCYTES #: 0.01 E9/L
IMMATURE GRANULOCYTES %: 0.2 % (ref 0–5)
INFLUENZA A: NOT DETECTED
INFLUENZA B: NOT DETECTED
LYMPHOCYTES ABSOLUTE: 1.51 E9/L (ref 1.5–4)
LYMPHOCYTES RELATIVE PERCENT: 23.4 % (ref 20–42)
Lab: ABNORMAL
MCH RBC QN AUTO: 29.3 PG (ref 26–35)
MCHC RBC AUTO-ENTMCNC: 32.4 % (ref 32–34.5)
MCV RBC AUTO: 90.6 FL (ref 80–99.9)
METHADONE SCREEN, URINE: NOT DETECTED
MONOCYTES ABSOLUTE: 0.56 E9/L (ref 0.1–0.95)
MONOCYTES RELATIVE PERCENT: 8.7 % (ref 2–12)
NEUTROPHILS ABSOLUTE: 4.2 E9/L (ref 1.8–7.3)
NEUTROPHILS RELATIVE PERCENT: 65.1 % (ref 43–80)
OPIATE SCREEN URINE: NOT DETECTED
OXYCODONE URINE: NOT DETECTED
PDW BLD-RTO: 15.1 FL (ref 11.5–15)
PHENCYCLIDINE SCREEN URINE: NOT DETECTED
PLATELET # BLD: 292 E9/L (ref 130–450)
PMV BLD AUTO: 10.2 FL (ref 7–12)
POTASSIUM SERPL-SCNC: 3.7 MMOL/L (ref 3.5–5)
RBC # BLD: 4.67 E12/L (ref 3.8–5.8)
SALICYLATE, SERUM: <0.3 MG/DL (ref 0–30)
SARS-COV-2 RNA, RT PCR: NOT DETECTED
SODIUM BLD-SCNC: 143 MMOL/L (ref 132–146)
TOTAL PROTEIN: 7.5 G/DL (ref 6.4–8.3)
TRICYCLIC ANTIDEPRESSANTS SCREEN SERUM: NEGATIVE NG/ML
WBC # BLD: 6.5 E9/L (ref 4.5–11.5)

## 2022-01-04 PROCEDURE — 87636 SARSCOV2 & INF A&B AMP PRB: CPT

## 2022-01-04 PROCEDURE — 99283 EMERGENCY DEPT VISIT LOW MDM: CPT

## 2022-01-04 PROCEDURE — 96372 THER/PROPH/DIAG INJ SC/IM: CPT

## 2022-01-04 PROCEDURE — 80053 COMPREHEN METABOLIC PANEL: CPT

## 2022-01-04 PROCEDURE — 6360000002 HC RX W HCPCS

## 2022-01-04 PROCEDURE — 80307 DRUG TEST PRSMV CHEM ANLYZR: CPT

## 2022-01-04 PROCEDURE — 80179 DRUG ASSAY SALICYLATE: CPT

## 2022-01-04 PROCEDURE — 82077 ASSAY SPEC XCP UR&BREATH IA: CPT

## 2022-01-04 PROCEDURE — 80143 DRUG ASSAY ACETAMINOPHEN: CPT

## 2022-01-04 PROCEDURE — 85025 COMPLETE CBC W/AUTO DIFF WBC: CPT

## 2022-01-04 RX ORDER — LORAZEPAM 2 MG/ML
INJECTION INTRAMUSCULAR
Status: COMPLETED
Start: 2022-01-04 | End: 2022-01-04

## 2022-01-04 RX ORDER — LORAZEPAM 2 MG/ML
2 INJECTION INTRAMUSCULAR ONCE
Status: COMPLETED | OUTPATIENT
Start: 2022-01-04 | End: 2022-01-04

## 2022-01-04 RX ORDER — ZIPRASIDONE MESYLATE 20 MG/ML
INJECTION, POWDER, LYOPHILIZED, FOR SOLUTION INTRAMUSCULAR
Status: COMPLETED
Start: 2022-01-04 | End: 2022-01-04

## 2022-01-04 RX ORDER — ZIPRASIDONE MESYLATE 20 MG/ML
10 INJECTION, POWDER, LYOPHILIZED, FOR SOLUTION INTRAMUSCULAR ONCE
Status: COMPLETED | OUTPATIENT
Start: 2022-01-04 | End: 2022-01-04

## 2022-01-04 RX ADMIN — LORAZEPAM 2 MG: 2 INJECTION INTRAMUSCULAR at 01:47

## 2022-01-04 RX ADMIN — ZIPRASIDONE MESYLATE 10 MG: 20 INJECTION, POWDER, LYOPHILIZED, FOR SOLUTION INTRAMUSCULAR at 01:47

## 2022-01-04 RX ADMIN — LORAZEPAM 2 MG: 2 INJECTION INTRAMUSCULAR; INTRAVENOUS at 01:47

## 2022-01-04 NOTE — ED NOTES
[facility] CAME TO TALK WITH PT AND IS LOOKING FOR [address] [place] [address] Matthews, MAJOR  01/04/22 1013

## 2022-01-04 NOTE — CARE COORDINATION
Peer Recovery Support Note    Name: Chriss Lowry. Date: 1/4/2022    Chief Complaint   Patient presents with    Drug / Alcohol Assessment     wants to go to rehab        Peer Support met with patient. [x] Support and education provided  [] Resources provided   [] Treatment referral:   [] Other:   [] Patient declined peer recovery services     Referred By: Vickie Raymundo. Notes: Waiting for [facility] to call back. Called and there are no open beds until Thursday.     SignedJaimie Gaspar, 1/4/2022

## 2022-01-04 NOTE — ED NOTES
Patient was medicated due to his behaviors shortly after arrival. Patient was requesting to go to rehab. [facility] SW unable to assess at this time due to patient sleeping and will not stay awake to answer questions. Patient will be assessed when he is alert and awake.      FRANCISCO Olson, [facility]  01/04/22 0354

## 2022-01-04 NOTE — ED NOTES
PATRICIO Sw left message for Peer Recovery x [phone] [facility], MSW, MAJOR  01/04/22 8626

## 2022-01-04 NOTE — ED NOTES
Pt escorted by ER nurse- too loud yelling- talking unpleasant words, [employer] standby , finally changed 2 bags of belongings placed in locker 29.

## 2022-01-04 NOTE — ED NOTES
Emergency Department [facility] AN AFFILIATE [facility] Biopsychosocial Assessment Note    Chief Complaint:   Drug / Alcohol Assessment wants to go to rehab      MSE:  Calm, cooperative, alert, oriented x's 3, shared good eye contact, has clear speech, thoughts are stable, no SI, no HI and no hallucinations - he is at baseline     Clinical Summary/History:   Pt reporting that he wants to go to rehab. Denies SI, no HI and no hallucinations    [facility] called to assess for needs    Gender  [x] Male [] Female [] Transgender  [] Other    Sexual Orientation    [x] Heterosexual [] Homosexual [] Bisexual [] Other    Suicidal Behavioral: CSSR-S Complete. [] Reports:    [] Past [] Present   [x] Denies    Homicidal/ Violent Behavior  [] Reports:   [] Past [] Present   [x] Denies     Violence Risk Screenin. Have you ever engaged in a physical fight? []  Yes [x]  No  2. Have you ever had an order of protection taken out against you? []  Yes [x]  No  3. Have you ever been arrested due to violence? []  Yes [x]  No  4. Have you ever been cruel to animals? []  Yes [x]  No    If any of the above questions are affirmative, please complete these questions:  1. Have you ever thought about hurting someone? []  No  []  Yes (Ask the questions listed below)   When?  Did you follow through with the thoughts? []  No  []  Yes - What happened? 2.  Have you ever threatened anyone? []  No  []  Yes (Ask the questions listed below)   When and what happened?  Have you ever threatened someone with a gun, knife or other weapon? []  No  [x]  Yes - When and what happened? 3. Have you ever physically hurt someone? []  No  []  Yes (Ask the questions listed below)   When and what happened?  How many times have you physically hurt someone in the past?    Hallucinations/Delusions   [] Reports:   [x] Denies     Substance Use/Alcohol Use/Addiction: SBIRT Screen Complete.    [x] Reports:   [] Denies     Trauma History  [] Reports:  [x] Denies Collateral Information:   Na    Level of Care/Disposition Plan  [] Home:   [] Outpatient Provider:   [] Crisis Unit:   [] Inpatient Psychiatric Unit:  [] Other:        MAJOR Bland  01/04/22 8562

## 2022-01-04 NOTE — ED NOTES
CHANGE OF PLAN FOR THE TIME BEING - PEER MAY HAVE FOUND A PLACE FOR TODAY - AWAITING CALL BACK     Alyssa Warner  01/04/22 [address] [facility]

## 2022-01-04 NOTE — ED NOTES
As per ususal with this pt, [facility] police was contacted prior to being discharged due to his hx of being problamatic and aggressive with staff. Upon pt being discharged, he became angry with staff, as per his baseline, blaming staff about taking his belongings. He started to yell and curse at staff, aggressively approached the social work desk and the did get in the nurses face, said \"fuck you bitch\" and then proceeded to swings at her face and did make contact. Pt was escorted out by [employer].           MAJOR Villa  01/04/22 7437

## 2022-01-04 NOTE — ED NOTES
Patient escorted into [facility] AN AFFILIATE OF [facility] by [facility] Stanislaw SAPP. Patient yelling different things and jumping topics. Unable to be verbally redirected, unwilling to change or allow this RN to obtain labs.       Abby Holliday RN  01/04/22 9622

## 2022-01-04 NOTE — ED NOTES
PEER was unable to place pt for AOD today. [facility] spoke with pt to provide education on how to follow up with an open bed on his own. Discussed with Dr. Aline La.      Waldo Valenzuela, MAJOR  01/04/22 4488

## 2022-01-04 NOTE — ED NOTES
Attempted to wake patient up and assess. Patient still  not able to stay awake to answer questions.     Patient will be assessed by next  unless patient wakes up before next shift starts     FRANCISCO Arthur, [place]  01/04/22 0928

## 2022-01-04 NOTE — ED PROVIDER NOTES
HPI:  1/4/22, Time: 1:03 AM JAYLENE Verde. is a 37 y.o. male presenting to the ED for history of substance abuse, beginning days ago. The complaint has been persistent, moderate in severity, and worsened by nothing. Patient presenting here because reportedly wants help with his substance abuse. Patient does admit to using cocaine. Patient reporting having suicidal thoughts. He denies any actual homicidal thoughts he does not report any hallucinations. Patient does have underlying history of schizophrenia. Patient reporting no abdominal pain no vomit no diarrhea reports no cough. Patient reporting no chest pain. ROS:   Pertinent positives and negatives are stated within HPI, all other systems reviewed and are negative.  --------------------------------------------- PAST HISTORY ---------------------------------------------  Past Medical History:  has a past medical history of Depression and Schizoaffective disorder ([facility] [address] Past Surgical History:  has a past surgical history that includes Hip fracture surgery (Left, 9/12/2021); eye surgery (19 years ago); and Hip fracture surgery (Left, 9/28/2021). Social History:  reports that he has been smoking cigars and cigarettes. He has a 24.00 pack-year smoking history. He has never used smokeless tobacco. He reports current alcohol use of about 2.0 standard drinks of alcohol per week. He reports current drug use. Frequency: 7.00 times per week. Drugs: Cocaine and Marijuana (Savi Late). Family History: family history includes Mental Illness in his mother; No Known Problems in his father; Substance Abuse in his mother. The patients home medications have been reviewed.     Allergies: Chlorpheniramine-phenylephrine, Penicillins, and Rondec-d [chlophedianol-pseudoephedrine]    ---------------------------------------------------PHYSICAL EXAM--------------------------------------    Constitutional/General: Alert and oriented x3, anxious and agitated  Head: Normocephalic and atraumatic  Eyes: PERRL, EOMI  Mouth: Oropharynx clear, handling secretions, no trismus  Neck: Supple, full ROM, non tender to palpation in the midline, no stridor, no crepitus, no meningeal signs  Pulmonary: Lungs clear to auscultation bilaterally, no wheezes, rales, or rhonchi. Not in respiratory distress  Cardiovascular:  Regular rate. Regular rhythm. No murmurs, gallops, or rubs. 2+ distal pulses  Chest: no chest wall tenderness  Abdomen: Soft. Non tender. Non distended. +BS. No rebound, guarding, or rigidity. No pulsatile masses appreciated. Musculoskeletal: Moves all extremities x 4. Warm and well perfused, no clubbing, cyanosis, or edema. Capillary refill <3 seconds  Skin: warm and dry. No rashes. Neurologic: GCS 15, CN 2-12 grossly intact, no focal deficits, symmetric strength 5/5 in the upper and lower extremities bilaterally  Psych: Anxious agitated, patient reporting suicidal thoughts denies homicidal thoughts does not report hallucinations.    -------------------------------------------------- RESULTS -------------------------------------------------  I have personally reviewed all laboratory and imaging results for this patient. Results are listed below.      LABS:  Results for orders placed or performed during the hospital encounter of 01/04/22   CBC auto differential   Result Value Ref Range    WBC 6.5 4.5 - 11.5 E9/L    RBC 4.67 3.80 - 5.80 E12/L    Hemoglobin 13.7 12.5 - 16.5 g/dL    Hematocrit 42.3 37.0 - 54.0 %    MCV 90.6 80.0 - 99.9 fL    MCH 29.3 26.0 - 35.0 pg    MCHC 32.4 32.0 - 34.5 %    RDW 15.1 (H) 11.5 - 15.0 fL    Platelets 133 531 - 902 E9/L    MPV 10.2 7.0 - 12.0 fL    Neutrophils % 65.1 43.0 - 80.0 %    Immature Granulocytes % 0.2 0.0 - 5.0 %    Lymphocytes % 23.4 20.0 - 42.0 %    Monocytes % 8.7 2.0 - 12.0 %    Eosinophils % 1.7 0.0 - 6.0 %    Basophils % 0.9 0.0 - 2.0 %    Neutrophils Absolute 4.20 1.80 - 7.30 E9/L    Immature Granulocytes # 0.01 E9/L    Lymphocytes Absolute 1.51 1.50 - 4.00 E9/L    Monocytes Absolute 0.56 0.10 - 0.95 E9/L    Eosinophils Absolute 0.11 0.05 - 0.50 E9/L    Basophils Absolute 0.06 0.00 - 0.20 E9/L   Comprehensive Metabolic Panel   Result Value Ref Range    Sodium 143 132 - 146 mmol/L    Potassium 3.7 3.5 - 5.0 mmol/L    Chloride 103 98 - 107 mmol/L    CO2 28 22 - 29 mmol/L    Anion Gap 12 7 - 16 mmol/L    Glucose 95 74 - 99 mg/dL    BUN 15 6 - 20 mg/dL    CREATININE 1.0 0.7 - 1.2 mg/dL    GFR Non-African American >60 >=60 mL/min/1.73    GFR African American >60     Calcium 9.6 8.6 - 10.2 mg/dL    Total Protein 7.5 6.4 - 8.3 g/dL    Albumin 4.5 3.5 - 5.2 g/dL    Total Bilirubin 0.4 0.0 - 1.2 mg/dL    Alkaline Phosphatase 59 40 - 129 U/L    ALT 12 0 - 40 U/L    AST 14 0 - 39 U/L   Serum Drug Screen   Result Value Ref Range    Ethanol Lvl <10 mg/dL    Acetaminophen Level <5.0 (L) 10.0 - 83.8 mcg/mL    Salicylate, Serum <4.7 0.0 - 30.0 mg/dL    TCA Scrn NEGATIVE Cutoff:300 ng/mL   Urine Drug Screen   Result Value Ref Range    Amphetamine Screen, Urine NOT DETECTED Negative <1000 ng/mL    Barbiturate Screen, Ur NOT DETECTED Negative < 200 ng/mL    Benzodiazepine Screen, Urine NOT DETECTED Negative < 200 ng/mL    Cannabinoid Scrn, Ur NOT DETECTED Negative < 50ng/mL    Cocaine Metabolite Screen, Urine POSITIVE (A) Negative < 300 ng/mL    Opiate Scrn, Ur NOT DETECTED Negative < 300ng/mL    PCP Screen, Urine NOT DETECTED Negative < 25 ng/mL    Methadone Screen, Urine NOT DETECTED Negative <300 ng/mL    Oxycodone Urine NOT DETECTED Negative <100 ng/mL    FENTANYL SCREEN, URINE NOT DETECTED Negative <1 ng/mL    Drug Screen Comment: see below        RADIOLOGY:  Interpreted by Radiologist.  No orders to display               ------------------------- NURSING NOTES AND VITALS REVIEWED ---------------------------   The nursing notes within the ED encounter and vital signs as below have been reviewed by myself.   BP (!) 167/102   Pulse 88 Temp 98 °F (36.7 °C) (Oral)   Resp 22   SpO2 97%   Oxygen Saturation Interpretation: Normal    The patients available past medical records and past encounters were reviewed. ------------------------------ ED COURSE/MEDICAL DECISION MAKING----------------------  Medications   LORazepam (ATIVAN) injection 2 mg (2 mg IntraMUSCular Given 1/4/22 0147)   ziprasidone (GEODON) injection 10 mg (10 mg IntraMUSCular Given 1/4/22 0147)             Medical Decision Making:    Patient presenting here because of substance abuse. Patient also has underlying history of schizophrenia. Patient appears to be anxious agitated here in the emergency department. Patient making nonsensical statements here in the emergency department. Patient labs noted reviewed drug screens positive for cocaine. Patient neurologically intact. Patient was medicated due to his agitation. Plan will be for  to evaluate    Re-Evaluations:             Re-evaluation. Patients symptoms show no change      Consultations:                 Critical Care: This patient's ED course included: a personal history and physicial eaxmination    This patient has been closely monitored during their ED course. Counseling: The emergency provider has spoken with the patient and discussed todays results, in addition to providing specific details for the plan of care and counseling regarding the diagnosis and prognosis. Questions are answered at this time and they are agreeable with the plan.       --------------------------------- IMPRESSION AND DISPOSITION ---------------------------------    IMPRESSION  1. Substance abuse ([facility]    2. Schizophrenia, unspecified type ([facility]        DISPOSITION  Disposition:  to evaluate  Patient condition is stable        NOTE: This report was transcribed using voice recognition software.  Every effort was made to ensure accuracy; however, inadvertent computerized transcription errors may be present          Pina Mejia MD  01/04/22 6771       Pina Mejia MD  01/04/22 7174

## 2022-02-08 NOTE — PROGRESS NOTES
BEHAVIORAL HEALTH FOLLOW-UP NOTE     4/25/2021     Patient was seen and examined in person, Chart reviewed   Patient's case discussed with staff/team    Chief Complaint:   Interim History:   Patient is seen in his room this morning. He offers little conversation, however is polite and appropriate despite notable irritability. Appears paranoid and internally stimulated. Though he denies all. He states he just wants to go to [address] He denies SI/HI intent or plan he denies any auditory or visual hallucinations. Remains isolative to his room, is not engaged in milieu activity. No behavioral disturbances on the unit. States he is eating well sleeping, he has low energy low motivation. He is refusing his depakote.      Appetite:   [x] Normal/Unchanged  [] Increased  [] Decreased      Sleep:       [x] Normal/Unchanged  [] Fair       [] Poor              Energy:    [x] Normal/Unchanged  [] Increased  [] Decreased        SI [] Present  [x] Absent    HI  []Present  [x] Absent     Aggression:  [] yes  [x] no    Patient is [x] able  [] unable to CONTRACT FOR SAFETY     PAST MEDICAL/PSYCHIATRIC HISTORY:   Past Medical History:   Diagnosis Date    Depression     Schizoaffective disorder ([facility] [address]        FAMILY/SOCIAL HISTORY:  Family History   Problem Relation Age of Onset    Mental Illness Mother     Substance Abuse Mother     No Known Problems Father      Social History     Socioeconomic History    Marital status: Single     Spouse name: Not on file    Number of children: 1    Years of education: 15    Highest education level: Not on file   Occupational History     Comment: SSDI   Social Needs    Financial resource strain: Not on file    Food insecurity     Worry: Not on file     Inability: Not on file   Yulex needs     Medical: Not on file     Non-medical: Not on file   Tobacco Use    Smoking status: Current Every Day Smoker     Packs/day: 0.50     Years: 24.00     Pack years: 12.00     Types: Cigars, Cigarettes    Smokeless tobacco: Never Used   Substance and Sexual Activity    Alcohol use: No    Drug use: Yes     Types: Marijuana, Cocaine     Comment: \"NOT OFTEN\"    Sexual activity: Yes     Partners: Female   Lifestyle    Physical activity     Days per week: Not on file     Minutes per session: Not on file    Stress: Not on file   Relationships    Social connections     Talks on phone: Not on file     Gets together: Not on file     Attends Sikhism service: Not on file     Active member of club or organization: Not on file     Attends meetings of clubs or organizations: Not on file     Relationship status: Not on file    Intimate partner violence     Fear of current or ex partner: Not on file     Emotionally abused: Not on file     Physically abused: Not on file     Forced sexual activity: Not on file   Other Topics Concern    Not on file   Social History Narrative    Not on file           ROS:  [x] All negative/unchanged except if checked.  Explain positive(checked items) below:  [] Constitutional  [] Eyes  [] Ear/Nose/Mouth/Throat  [] Respiratory  [] CV  [] GI  []   [] Musculoskeletal  [] Skin/Breast  [] Neurological  [] Endocrine  [] Heme/Lymph  [] Allergic/Immunologic    Explanation:     MEDICATIONS:    Current Facility-Administered Medications:     paliperidone (INVEGA) extended release tablet 6 mg, 6 mg, Oral, Daily, JUICE Gonzalez CNP, 6 mg at 04/25/21 0827    divalproex (DEPAKOTE) DR tablet 250 mg, 250 mg, Oral, 2 times per day, JUICE Martinez - CNP, 352 mg at 04/23/21 2209    [START ON 4/30/2021] paliperidone palmitate ER (INVEGA SUSTENNA) IM injection 156 mg, 156 mg, Intramuscular, Once, JUICE Vaz CNP    paliperidone (INVEGA) extended release tablet 3 mg, 3 mg, Oral, Nightly, JUICE Vaz - CNP, 3 mg at 04/24/21 2140    acetaminophen (TYLENOL) tablet 650 mg, 650 mg, Oral, Q6H PRN, Tyra Vega MD    magnesium hydroxide (MILK OF No results for input(s): BILITOT, ALKPHOS, AST, ALT in the last 72 hours. Lab Results   Component Value Date    LABAMPH NOT DETECTED 04/22/2021    BARBSCNU NOT DETECTED 04/22/2021    LABBENZ NOT DETECTED 04/22/2021    LABMETH NOT DETECTED 04/22/2021    OPIATESCREENURINE NOT DETECTED 04/22/2021    PHENCYCLIDINESCREENURINE NOT DETECTED 04/22/2021    ETOH <10 04/22/2021     Lab Results   Component Value Date    TSH 0.462 01/16/2021     No results found for: LITHIUM  Lab Results   Component Value Date    VALPROATE 3 (L) 04/29/2020           Treatment Plan:  The patient's diagnosis, treatment plan, medication management were formulated after patient was seen directly by the attending physician and myself and all relevant documentation was reviewed. Reviewed current Medications with the patient. Risk, benefit, side effects, possible outcomes of the medication and alternatives discussed with the patient and the patient demonstrated understanding. The patient was also educated that the outcome of treatment will depend on the medication compliance as directed by the prescribers along with regular follow-up, compliance with the labs and other work-up, as clinically indicated. Continue inVega 6 mg daily and 6 mg at bedtime for psychosis      Collateral information: Followed by social work  CD evaluation  Encourage patient to attend group and other milieu activities.   Discharge planning discussed with the patient and treatment team.    PSYCHOTHERAPY/COUNSELING:  [x] Therapeutic interview  [x] Supportive  [] CBT  [] Ongoing  [] Other    [x] Patient continues to need, on a daily basis, active treatment furnished directly by or requiring the supervision of inpatient psychiatric personnel      Anticipated Length of stay: 5 to 10 days based on stability            Electronically signed by JUICE Chacon CNP on 4/25/2021 at 12:35 PM Detail Level: Zone Additional Note: Informational handout given regarding diagnosis. Include Location In Plan?: Yes Hide Include Location In Plan Question?: No

## 2022-05-27 NOTE — ED NOTES
Pt given discharge papers and script for ibuprofen. Pt stated \"This shit don't work I need something else\" Pt notified that he would just get the ibuprofen. Pt continuously yelling saying it wont work.   Police called to escort patient out       Russel Ortega RN  08/09/20 2180
Pt noted to be eating during triage. Advised pt that he should avoid PO intake at this time if he thinks he has a lung injury. Pt states \"I didn't know.   I like to eat\"     Jamshid Tubbs RN  08/09/20 4501
done

## 2022-06-17 NOTE — PROGRESS NOTES
Patient Information  Name: Kiran Carlos  : 2015  MRN: [id number]     Referring Physician:  Dr. Worthington   Primary Care Physician:  Dr. Jennifer Dougherty MD   Date of Visit: 2022      Subjective:       Kiran Carlos is a 6 y.o. male who presents for   Chief Complaint   Patient presents with    Warts     Wart on left cheek. Patient's mother states it bleeds when accidentally bothered and it has grown in size.     HPI  Patient with new complaint of lesion(s)  Location: left cheek  Duration: years  Symptoms: growing in size  Relieving factors/Previous treatments: none    Patient was last seen:Visit date not found     Prior notes by myself reviewed.   Clinical documentation obtained by nursing staff reviewed.    Review of Systems   Skin: Negative for itching and rash.        Objective:    Physical Exam   Constitutional: He appears well-developed and well-nourished. No distress.   Neurological: He is alert and oriented to person, place, and time. He is not disoriented.   Psychiatric: He has a normal mood and affect.   Skin:   Areas Examined (abnormalities noted in diagram):   Head / Face Inspection Performed              Diagram Legend     Erythematous scaling macule/papule c/w actinic keratosis       Vascular papule c/w angioma      Pigmented verrucoid papule/plaque c/w seborrheic keratosis      Yellow umbilicated papule c/w sebaceous hyperplasia      Irregularly shaped tan macule c/w lentigo     1-2 mm smooth white papules consistent with Milia      Movable subcutaneous cyst with punctum c/w epidermal inclusion cyst      Subcutaneous movable cyst c/w pilar cyst      Firm pink to brown papule c/w dermatofibroma      Pedunculated fleshy papule(s) c/w skin tag(s)      Evenly pigmented macule c/w junctional nevus     Mildly variegated pigmented, slightly irregular-bordered macule c/w mildly atypical nevus      Flesh colored to evenly pigmented papule c/w intradermal nevus       Pink pearly papule/plaque  Jesi Deleon at [facility] confirmed last BARROS doses    10/21/2020 - Invega Sustenna 234mg    10/28/2020 - Anay Anderson Sustenna 156mg    Following q4 weeks - Borden 117mg c/w basal cell carcinoma      Erythematous hyperkeratotic cursted plaque c/w SCC      Surgical scar with no sign of skin cancer recurrence      Open and closed comedones      Inflammatory papules and pustules      Verrucoid papule consistent consistent with wart     Erythematous eczematous patches and plaques     Dystrophic onycholytic nail with subungual debris c/w onychomycosis     Umbilicated papule    Erythematous-base heme-crusted tan verrucoid plaque consistent with inflamed seborrheic keratosis     Erythematous Silvery Scaling Plaque c/w Psoriasis     See annotation      No images are attached to the encounter or orders placed in the encounter.    [] Data reviewed  [] Independent review of test  [] Management discussed with another provider    Assessment / Plan:        Verruca vulgaris  -     Cryosurgery procedure note:    Verbal consent from the patient is obtained including, but not limited to, risk of hypopigmentation/hyperpigmentation, scar, recurrence of lesion. Liquid nitrogen cryosurgery is applied to 1 verruca, as detailed in the physical exam, to produce a freeze injury. 2 consecutive freeze thaw cycles are applied to each verruca. The patient is aware that blisters (possibly blood blisters) may form.               LOS NUMBER AND COMPLEXITY OF PROBLEMS    COMPLEXITY OF DATA RISK TOTAL TIME (m)   13344  01573 [] 1 self-limited or minor problem [] Minimal to none [] No treatment recommended or patient to monitor 15-29  10-19   86094  42149 Low  [] 2 or > self limited or minor problems  [] 1 stable chronic illness  [] 1 acute, uncomplicated illness or injury Limited (2)  [] Prior external notes from each unique source  [] Review result of each unique test  [] Order each unique test []  Low  OTC medications, minor skin biopsy 30-44 20-29   12333  65669 Moderate  []  1 or > chronic illness with progression, exacerbation or SE of treatment  []  2 or more stable chronic illnesses  []  1 acute illness with  systemic symptoms  []  1 acute complicated injury  []  1 undiagnosed new problem with uncertain prognosis Moderate (1/3 below)  []  3 or more data items        *Now includes assessment requiring independent historian  []  Independent interpretation of a test  []  Discuss management/test with another provider Moderate  []  Prescription drug mgmt  []  Minor surgery with risk discussed  []  Mgmt limited by social determinates 45-59  30-39   63697  19511 High  []  1 or more chronic illness with severe exacerbation, progression or SE of treatment  []  1 acute or chronic illness/injury that poses a threat to life or bodily function Extensive (2/3 below)  []  3 or more data items        *Now includes assessment requiring independent historian.  []  Independent interpretation of a test  []  Discuss management/test with another provider High  []  Major surgery with risk discussed  []  Drug therapy requiring intensive monitoring for toxicity  []  Hospitalization  []  Decision for DNR 60-74  40-54      No follow-ups on file.    Sangeeta Quiroz MD, FAAD  [facility]

## 2022-06-19 ENCOUNTER — HOSPITAL ENCOUNTER (EMERGENCY)
Age: 42
Discharge: HOME | End: 2022-06-19
Attending: EMERGENCY MEDICINE
Payer: COMMERCIAL

## 2022-06-19 VITALS
HEIGHT: 72 IN | OXYGEN SATURATION: 100 % | HEART RATE: 53 BPM | WEIGHT: 170 LBS | TEMPERATURE: 97.9 F | BODY MASS INDEX: 23.03 KG/M2 | DIASTOLIC BLOOD PRESSURE: 69 MMHG | SYSTOLIC BLOOD PRESSURE: 115 MMHG | RESPIRATION RATE: 17 BRPM

## 2022-06-19 DIAGNOSIS — Z76.89 ENCOUNTER FOR PSYCHIATRIC ASSESSMENT: Primary | ICD-10-CM

## 2022-06-19 DIAGNOSIS — Z53.29 REFUSAL OF CARE BY PATIENT: ICD-10-CM

## 2022-06-19 LAB
ACETAMINOPHEN LEVEL: <5 MCG/ML (ref 10–30)
ALBUMIN SERPL-MCNC: 3.7 G/DL (ref 3.5–5.2)
ALP BLD-CCNC: 44 U/L (ref 40–129)
ALT SERPL-CCNC: 8 U/L (ref 0–40)
AMPHETAMINE SCREEN, URINE: NOT DETECTED
ANION GAP SERPL CALCULATED.3IONS-SCNC: 7 MMOL/L (ref 7–16)
AST SERPL-CCNC: 14 U/L (ref 0–39)
BACTERIA: NORMAL /HPF
BARBITURATE SCREEN URINE: NOT DETECTED
BASOPHILS ABSOLUTE: 0.06 E9/L (ref 0–0.2)
BASOPHILS RELATIVE PERCENT: 1.4 % (ref 0–2)
BENZODIAZEPINE SCREEN, URINE: NOT DETECTED
BILIRUB SERPL-MCNC: 0.6 MG/DL (ref 0–1.2)
BILIRUBIN URINE: NEGATIVE
BLOOD, URINE: ABNORMAL
BUN BLDV-MCNC: 10 MG/DL (ref 6–20)
CALCIUM SERPL-MCNC: 8.4 MG/DL (ref 8.6–10.2)
CANNABINOID SCREEN URINE: POSITIVE
CHLORIDE BLD-SCNC: 107 MMOL/L (ref 98–107)
CLARITY: CLEAR
CO2: 28 MMOL/L (ref 22–29)
COCAINE METABOLITE SCREEN URINE: POSITIVE
COLOR: YELLOW
CREAT SERPL-MCNC: 0.7 MG/DL (ref 0.7–1.2)
EOSINOPHILS ABSOLUTE: 0.2 E9/L (ref 0.05–0.5)
EOSINOPHILS RELATIVE PERCENT: 4.6 % (ref 0–6)
ETHANOL: <10 MG/DL (ref 0–0.08)
FENTANYL SCREEN, URINE: NOT DETECTED
GFR AFRICAN AMERICAN: >60
GFR NON-AFRICAN AMERICAN: >60 ML/MIN/1.73
GLUCOSE BLD-MCNC: 86 MG/DL (ref 74–99)
GLUCOSE URINE: NEGATIVE MG/DL
HCT VFR BLD CALC: 39.5 % (ref 37–54)
HEMOGLOBIN: 12.7 G/DL (ref 12.5–16.5)
IMMATURE GRANULOCYTES #: 0.01 E9/L
IMMATURE GRANULOCYTES %: 0.2 % (ref 0–5)
INFLUENZA A: NOT DETECTED
INFLUENZA B: NOT DETECTED
KETONES, URINE: NEGATIVE MG/DL
LEUKOCYTE ESTERASE, URINE: ABNORMAL
LYMPHOCYTES ABSOLUTE: 1.32 E9/L (ref 1.5–4)
LYMPHOCYTES RELATIVE PERCENT: 30.4 % (ref 20–42)
Lab: ABNORMAL
MCH RBC QN AUTO: 30.8 PG (ref 26–35)
MCHC RBC AUTO-ENTMCNC: 32.2 % (ref 32–34.5)
MCV RBC AUTO: 95.9 FL (ref 80–99.9)
METHADONE SCREEN, URINE: NOT DETECTED
MONOCYTES ABSOLUTE: 0.44 E9/L (ref 0.1–0.95)
MONOCYTES RELATIVE PERCENT: 10.1 % (ref 2–12)
NEUTROPHILS ABSOLUTE: 2.31 E9/L (ref 1.8–7.3)
NEUTROPHILS RELATIVE PERCENT: 53.3 % (ref 43–80)
NITRITE, URINE: NEGATIVE
OPIATE SCREEN URINE: NOT DETECTED
OXYCODONE URINE: NOT DETECTED
PDW BLD-RTO: 13.8 FL (ref 11.5–15)
PH UA: 6 (ref 5–9)
PHENCYCLIDINE SCREEN URINE: NOT DETECTED
PLATELET # BLD: 271 E9/L (ref 130–450)
PMV BLD AUTO: 9.6 FL (ref 7–12)
POTASSIUM SERPL-SCNC: 3.9 MMOL/L (ref 3.5–5)
PROTEIN UA: NEGATIVE MG/DL
RBC # BLD: 4.12 E12/L (ref 3.8–5.8)
RBC UA: NORMAL /HPF (ref 0–2)
SALICYLATE, SERUM: <0.3 MG/DL (ref 0–30)
SARS-COV-2 RNA, RT PCR: NOT DETECTED
SODIUM BLD-SCNC: 142 MMOL/L (ref 132–146)
SPECIFIC GRAVITY UA: 1.02 (ref 1–1.03)
TOTAL PROTEIN: 6.3 G/DL (ref 6.4–8.3)
TRICYCLIC ANTIDEPRESSANTS SCREEN SERUM: NEGATIVE NG/ML
UROBILINOGEN, URINE: 4 E.U./DL
WBC # BLD: 4.3 E9/L (ref 4.5–11.5)
WBC UA: NORMAL /HPF (ref 0–5)

## 2022-06-19 PROCEDURE — 80143 DRUG ASSAY ACETAMINOPHEN: CPT

## 2022-06-19 PROCEDURE — 80307 DRUG TEST PRSMV CHEM ANLYZR: CPT

## 2022-06-19 PROCEDURE — 82077 ASSAY SPEC XCP UR&BREATH IA: CPT

## 2022-06-19 PROCEDURE — 87636 SARSCOV2 & INF A&B AMP PRB: CPT

## 2022-06-19 PROCEDURE — 99284 EMERGENCY DEPT VISIT MOD MDM: CPT

## 2022-06-19 PROCEDURE — 80053 COMPREHEN METABOLIC PANEL: CPT

## 2022-06-19 PROCEDURE — 85025 COMPLETE CBC W/AUTO DIFF WBC: CPT

## 2022-06-19 PROCEDURE — 93005 ELECTROCARDIOGRAM TRACING: CPT | Performed by: EMERGENCY MEDICINE

## 2022-06-19 PROCEDURE — 81001 URINALYSIS AUTO W/SCOPE: CPT

## 2022-06-19 PROCEDURE — 80179 DRUG ASSAY SALICYLATE: CPT

## 2022-06-19 NOTE — ED NOTES
Pt states that he is court ordered to crisis unit however he left. Pt requesting to go back. Informed of plan of care. Also informed Hemant Barnes of this entry.      Rocky Last RN  06/19/22 5678

## 2022-06-19 NOTE — ED NOTES
Still refusing care not speaking, looked at this RN and then went back to sleep     Perla Brown RN  06/19/22 1024

## 2022-06-19 NOTE — ED NOTES
Pt agreeable to discharge back to the crsis unit and contracts for safety pt escorted out to [facility] to be transported by valentina Meng RN  06/19/22 [address], [facility]  06/19/22 2285

## 2022-06-19 NOTE — ED NOTES
Patient was now seen in HDD I asked the patient to please move to HD and he is refusing to speak at this time     Enriqueta Mays, RN  06/19/22 5298
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
## 2022-06-19 NOTE — ED PROVIDER NOTES
HPI: Carin Sena. 37 y.o. male presents with a complaint of psychiatric evaluation. Reported triage she was released from [facility] 5 days ago was instructed to stay in a shelter however he left after a day. Reported in triage that he sometimes thinks of hurting himself. When directly questioned he specifically denies suicidal or homicidal ideation. Shaking his head yes and no but refusing to answer many questions.      --------------------------------------------- PAST HISTORY ---------------------------------------------  Past Medical History:  has a past medical history of Depression and Schizoaffective disorder ([facility] [address] Past Surgical History:  has a past surgical history that includes Hip fracture surgery (Left, 9/12/2021); eye surgery (19 years ago); and Hip fracture surgery (Left, 9/28/2021). Social History:  reports that he has been smoking cigars and cigarettes. He has a 24.00 pack-year smoking history. He has never used smokeless tobacco. He reports current alcohol use of about 2.0 standard drinks of alcohol per week. He reports current drug use. Frequency: 7.00 times per week. Drugs: Cocaine and Marijuana ([place] [place]). Family History: family history includes Mental Illness in his mother; No Known Problems in his father; Substance Abuse in his mother. The patients home medications have been reviewed.     Allergies: Chlorpheniramine-phenylephrine, Penicillins, and Rondec-d [chlophedianol-pseudoephedrine]    -------------------------------------------------- RESULTS -------------------------------------------------    LABS:  Results for orders placed or performed during the hospital encounter of 06/19/22   COVID-19 & Influenza Combo    Specimen: Nasopharyngeal Swab   Result Value Ref Range    SARS-CoV-2 RNA, RT PCR NOT DETECTED NOT DETECTED    INFLUENZA A NOT DETECTED NOT DETECTED    INFLUENZA B NOT DETECTED NOT DETECTED   CBC with Auto Differential   Result Value Ref Range    WBC 4.3 (L) 4.5 - 11.5 E9/L    RBC 4.12 3.80 - 5.80 E12/L    Hemoglobin 12.7 12.5 - 16.5 g/dL    Hematocrit 39.5 37.0 - 54.0 %    MCV 95.9 80.0 - 99.9 fL    MCH 30.8 26.0 - 35.0 pg    MCHC 32.2 32.0 - 34.5 %    RDW 13.8 11.5 - 15.0 fL    Platelets 974 884 - 934 E9/L    MPV 9.6 7.0 - 12.0 fL    Neutrophils % 53.3 43.0 - 80.0 %    Immature Granulocytes % 0.2 0.0 - 5.0 %    Lymphocytes % 30.4 20.0 - 42.0 %    Monocytes % 10.1 2.0 - 12.0 %    Eosinophils % 4.6 0.0 - 6.0 %    Basophils % 1.4 0.0 - 2.0 %    Neutrophils Absolute 2.31 1.80 - 7.30 E9/L    Immature Granulocytes # 0.01 E9/L    Lymphocytes Absolute 1.32 (L) 1.50 - 4.00 E9/L    Monocytes Absolute 0.44 0.10 - 0.95 E9/L    Eosinophils Absolute 0.20 0.05 - 0.50 E9/L    Basophils Absolute 0.06 0.00 - 0.20 E9/L   Comprehensive Metabolic Panel   Result Value Ref Range    Sodium 142 132 - 146 mmol/L    Potassium 3.9 3.5 - 5.0 mmol/L    Chloride 107 98 - 107 mmol/L    CO2 28 22 - 29 mmol/L    Anion Gap 7 7 - 16 mmol/L    Glucose 86 74 - 99 mg/dL    BUN 10 6 - 20 mg/dL    CREATININE 0.7 0.7 - 1.2 mg/dL    GFR Non-African American >60 >=60 mL/min/1.73    GFR African American >60     Calcium 8.4 (L) 8.6 - 10.2 mg/dL    Total Protein 6.3 (L) 6.4 - 8.3 g/dL    Albumin 3.7 3.5 - 5.2 g/dL    Total Bilirubin 0.6 0.0 - 1.2 mg/dL    Alkaline Phosphatase 44 40 - 129 U/L    ALT 8 0 - 40 U/L    AST 14 0 - 39 U/L   Urine Drug Screen   Result Value Ref Range    Amphetamine Screen, Urine NOT DETECTED Negative <1000 ng/mL    Barbiturate Screen, Ur NOT DETECTED Negative < 200 ng/mL    Benzodiazepine Screen, Urine NOT DETECTED Negative < 200 ng/mL    Cannabinoid Scrn, Ur POSITIVE (A) Negative < 50ng/mL    Cocaine Metabolite Screen, Urine POSITIVE (A) Negative < 300 ng/mL    Opiate Scrn, Ur NOT DETECTED Negative < 300ng/mL    PCP Screen, Urine NOT DETECTED Negative < 25 ng/mL    Methadone Screen, Urine NOT DETECTED Negative <300 ng/mL    Oxycodone Urine NOT DETECTED Negative <100 ng/mL    FENTANYL SCREEN, URINE NOT DETECTED Negative <1 ng/mL    Drug Screen Comment: see below    Urinalysis   Result Value Ref Range    Color, UA Yellow Straw/Yellow    Clarity, UA Clear Clear    Glucose, Ur Negative Negative mg/dL    Bilirubin Urine Negative Negative    Ketones, Urine Negative Negative mg/dL    Specific Gravity, UA 1.025 1.005 - 1.030    Blood, Urine SMALL (A) Negative    pH, UA 6.0 5.0 - 9.0    Protein, UA Negative Negative mg/dL    Urobilinogen, Urine 4.0 (A) <2.0 E.U./dL    Nitrite, Urine Negative Negative    Leukocyte Esterase, Urine SMALL (A) Negative   Serum Drug Screen   Result Value Ref Range    Ethanol Lvl <10 mg/dL    Acetaminophen Level <5.0 (L) 10.0 - 63.2 mcg/mL    Salicylate, Serum <2.9 0.0 - 30.0 mg/dL    TCA Scrn NEGATIVE Cutoff:300 ng/mL   Microscopic Urinalysis   Result Value Ref Range    WBC, UA 1-3 0 - 5 /HPF    RBC, UA NONE 0 - 2 /HPF    Bacteria, UA NONE SEEN None Seen /HPF   EKG 12 Lead   Result Value Ref Range    Ventricular Rate 49 BPM    Atrial Rate 49 BPM    P-R Interval 254 ms    QRS Duration 102 ms    Q-T Interval 460 ms    QTc Calculation (Bazett) 415 ms    P Axis 72 degrees    R Axis 76 degrees    T Axis 65 degrees       RADIOLOGY:  Interpreted by Radiologist.  No orders to display        --------------------------------------------- PAST HISTORY ---------------------------------------------  Past Medical History:  has a past medical history of Depression and Schizoaffective disorder ([facility] [address] Past Surgical History:  has a past surgical history that includes Hip fracture surgery (Left, 9/12/2021); eye surgery (19 years ago); and Hip fracture surgery (Left, 9/28/2021). Social History:  reports that he has been smoking cigars and cigarettes. He has a 24.00 pack-year smoking history. He has never used smokeless tobacco. He reports current alcohol use of about 2.0 standard drinks of alcohol per week. He reports current drug use. Frequency: 7.00 times per week.  Drugs: Cocaine and Marijuana [place]). Family History: family history includes Mental Illness in his mother; No Known Problems in his father; Substance Abuse in his mother. The patients home medications have been reviewed. Allergies: Chlorpheniramine-phenylephrine, Penicillins, and Rondec-d [chlophedianol-pseudoephedrine]      ENCOUNTER LIMITATION:    Please note that the HPI, ROS, Past History, and Physical Examination are limited due to this patients mental illness. Review of Systems:   A complete review of systems was unable to be performed secondary to the limitations noted above      ------------------------- NURSING NOTES AND VITALS REVIEWED ---------------------------   The nursing notes within the ED encounter and vital signs as below have been reviewed. /69   Pulse 53   Temp 97.9 °F (36.6 °C) (Oral)   Resp 17   Ht 6' (1.829 m)   Wt 170 lb (77.1 kg)   SpO2 100%   BMI 23.06 kg/m²   Oxygen Saturation Interpretation: Normal        ---------------------------------------------------PHYSICAL EXAM--------------------------------------      Constitutional/General: Alert and but refusing to answer orientation questions   head: NC/AT  Eyes: PERRL, EOMI  Mouth: Oropharynx clear, handling secretions, no trismus  Neck: Supple, full ROM, non tender to palpation in the midline, no stridor, no crepitus, no meningeal signs  Pulmonary: Lungs clear to auscultation bilaterally,  Not in respiratory distress  Cardiovascular:  Regular rate and rhythm,2+ distal pulses  Abdomen: Soft, non tender, non distended, +BS, no rebound, guarding, or rigidity. No pulsatile masses appreciated  Extremities: Moves all extremities x 4. Warm and well perfused, no clubbing, cyanosis, or edema.  Capillary refill <3 seconds  Skin: warm and dry without rash  Neurologic: There is no obvious focal deficits, he is ambulatory will occasionally answer questions but  uncooperative  Psych: Uncooperative affect      ------------------------------------------ PROGRESS NOTES ------------------------------------------     Consultations:   Social work     Re-Evaluations:        Counseling: The emergency provider has spoken with thepatient and discussed todays results, in addition to providing specific details for the plan of care and counseling regarding the diagnosis and prognosis. Questions are answered at this time and they are agreeable with the plan.      --------------------------------- ADDITIONAL PROVIDER NOTES ---------------------------------     This patient's ED course included: a personal history and physicial eaxmination, multiple bedside re-evaluations, IV medications, cardiac monitoring, continuous pulse oximetry and complex medical decision making and emergency management    This patient has been closely monitored during their ED course.     --------------------------------- IMPRESSION AND DISPOSITION ---------------------------------    IMPRESSION  1. Encounter for psychiatric assessment    2. Refusal of care by patient        DISPOSITION  Disposition: as per consultation - medically clear for admission to psychiatric facility  Patient condition is stable    [unfilled]     Please note this note was transcribed using voice recognition software.  Every effort was to ensure accuracy however inadvertent transcription errors may be present        Grupo Ojeda DO  06/21/22 2039

## 2022-06-19 NOTE — ED NOTES
Lis Fofana on patient no distress but still refusing care at this time     Kalina Durand, MARY  06/19/22 0690

## 2022-06-19 NOTE — ED NOTES
Patient in no distress at this time chest rises and falls, patient continues to ignore staff     Jos Ramos RN  06/19/22 0302

## 2022-06-20 LAB
EKG ATRIAL RATE: 49 BPM
EKG P AXIS: 72 DEGREES
EKG P-R INTERVAL: 254 MS
EKG Q-T INTERVAL: 460 MS
EKG QRS DURATION: 102 MS
EKG QTC CALCULATION (BAZETT): 415 MS
EKG R AXIS: 76 DEGREES
EKG T AXIS: 65 DEGREES
EKG VENTRICULAR RATE: 49 BPM

## 2022-06-25 ENCOUNTER — HOSPITAL ENCOUNTER (EMERGENCY)
Age: 42
Discharge: LEFT BEFORE DISCHARGE | End: 2022-06-25
Payer: COMMERCIAL

## 2022-06-25 VITALS
TEMPERATURE: 98.8 F | SYSTOLIC BLOOD PRESSURE: 111 MMHG | OXYGEN SATURATION: 99 % | RESPIRATION RATE: 16 BRPM | DIASTOLIC BLOOD PRESSURE: 72 MMHG | HEART RATE: 47 BPM

## 2022-06-25 LAB
ACETAMINOPHEN LEVEL: <5 MCG/ML (ref 10–30)
ALBUMIN SERPL-MCNC: 4.2 G/DL (ref 3.5–5.2)
ALP BLD-CCNC: 49 U/L (ref 40–129)
ALT SERPL-CCNC: 11 U/L (ref 0–40)
AMPHETAMINE SCREEN, URINE: NOT DETECTED
ANION GAP SERPL CALCULATED.3IONS-SCNC: 12 MMOL/L (ref 7–16)
AST SERPL-CCNC: 19 U/L (ref 0–39)
BACTERIA: NORMAL /HPF
BARBITURATE SCREEN URINE: NOT DETECTED
BASOPHILS ABSOLUTE: 0.07 E9/L (ref 0–0.2)
BASOPHILS RELATIVE PERCENT: 1.2 % (ref 0–2)
BENZODIAZEPINE SCREEN, URINE: NOT DETECTED
BILIRUB SERPL-MCNC: 0.3 MG/DL (ref 0–1.2)
BILIRUBIN URINE: NEGATIVE
BLOOD, URINE: NORMAL
BUN BLDV-MCNC: 21 MG/DL (ref 6–20)
CALCIUM SERPL-MCNC: 9 MG/DL (ref 8.6–10.2)
CANNABINOID SCREEN URINE: POSITIVE
CHLORIDE BLD-SCNC: 104 MMOL/L (ref 98–107)
CLARITY: CLEAR
CO2: 25 MMOL/L (ref 22–29)
COCAINE METABOLITE SCREEN URINE: POSITIVE
COLOR: YELLOW
CREAT SERPL-MCNC: 0.7 MG/DL (ref 0.7–1.2)
EOSINOPHILS ABSOLUTE: 0.11 E9/L (ref 0.05–0.5)
EOSINOPHILS RELATIVE PERCENT: 2 % (ref 0–6)
ETHANOL: <10 MG/DL (ref 0–0.08)
FENTANYL SCREEN, URINE: NOT DETECTED
GFR AFRICAN AMERICAN: >60
GFR NON-AFRICAN AMERICAN: >60 ML/MIN/1.73
GLUCOSE BLD-MCNC: 74 MG/DL (ref 74–99)
GLUCOSE URINE: NEGATIVE MG/DL
HCT VFR BLD CALC: 42.2 % (ref 37–54)
HEMOGLOBIN: 13.1 G/DL (ref 12.5–16.5)
IMMATURE GRANULOCYTES #: 0.02 E9/L
IMMATURE GRANULOCYTES %: 0.4 % (ref 0–5)
INFLUENZA A: NOT DETECTED
INFLUENZA B: NOT DETECTED
KETONES, URINE: NEGATIVE MG/DL
LEUKOCYTE ESTERASE, URINE: NEGATIVE
LYMPHOCYTES ABSOLUTE: 1.51 E9/L (ref 1.5–4)
LYMPHOCYTES RELATIVE PERCENT: 26.8 % (ref 20–42)
Lab: ABNORMAL
MCH RBC QN AUTO: 30.6 PG (ref 26–35)
MCHC RBC AUTO-ENTMCNC: 31 % (ref 32–34.5)
MCV RBC AUTO: 98.6 FL (ref 80–99.9)
METHADONE SCREEN, URINE: NOT DETECTED
MONOCYTES ABSOLUTE: 0.41 E9/L (ref 0.1–0.95)
MONOCYTES RELATIVE PERCENT: 7.3 % (ref 2–12)
NEUTROPHILS ABSOLUTE: 3.52 E9/L (ref 1.8–7.3)
NEUTROPHILS RELATIVE PERCENT: 62.3 % (ref 43–80)
NITRITE, URINE: NEGATIVE
OPIATE SCREEN URINE: NOT DETECTED
OXYCODONE URINE: NOT DETECTED
PDW BLD-RTO: 14.3 FL (ref 11.5–15)
PH UA: 5.5 (ref 5–9)
PHENCYCLIDINE SCREEN URINE: NOT DETECTED
PLATELET # BLD: 329 E9/L (ref 130–450)
PMV BLD AUTO: 9.2 FL (ref 7–12)
POTASSIUM SERPL-SCNC: 5.3 MMOL/L (ref 3.5–5)
PROTEIN UA: NEGATIVE MG/DL
RBC # BLD: 4.28 E12/L (ref 3.8–5.8)
RBC UA: NORMAL /HPF (ref 0–2)
SALICYLATE, SERUM: <0.3 MG/DL (ref 0–30)
SARS-COV-2 RNA, RT PCR: NOT DETECTED
SODIUM BLD-SCNC: 141 MMOL/L (ref 132–146)
SPECIFIC GRAVITY UA: >=1.03 (ref 1–1.03)
TOTAL PROTEIN: 6.9 G/DL (ref 6.4–8.3)
TRICYCLIC ANTIDEPRESSANTS SCREEN SERUM: NEGATIVE NG/ML
UROBILINOGEN, URINE: 0.2 E.U./DL
WBC # BLD: 5.6 E9/L (ref 4.5–11.5)
WBC UA: NORMAL /HPF (ref 0–5)

## 2022-06-25 PROCEDURE — 80053 COMPREHEN METABOLIC PANEL: CPT

## 2022-06-25 PROCEDURE — 85025 COMPLETE CBC W/AUTO DIFF WBC: CPT

## 2022-06-25 PROCEDURE — 82077 ASSAY SPEC XCP UR&BREATH IA: CPT

## 2022-06-25 PROCEDURE — 80307 DRUG TEST PRSMV CHEM ANLYZR: CPT

## 2022-06-25 PROCEDURE — 93005 ELECTROCARDIOGRAM TRACING: CPT | Performed by: PHYSICIAN ASSISTANT

## 2022-06-25 PROCEDURE — 80143 DRUG ASSAY ACETAMINOPHEN: CPT

## 2022-06-25 PROCEDURE — 99284 EMERGENCY DEPT VISIT MOD MDM: CPT

## 2022-06-25 PROCEDURE — 80179 DRUG ASSAY SALICYLATE: CPT

## 2022-06-25 PROCEDURE — 36415 COLL VENOUS BLD VENIPUNCTURE: CPT

## 2022-06-25 PROCEDURE — 81001 URINALYSIS AUTO W/SCOPE: CPT

## 2022-06-25 PROCEDURE — 87636 SARSCOV2 & INF A&B AMP PRB: CPT

## 2022-06-25 NOTE — ED NOTES
Department of Emergency Medicine  FIRST PROVIDER ELOPEMENT NOTE                 [facility]          6/25/22  5:22 PM EDT    MRN: [id number]    HPI: Yoan Booth is a 37 y.o. male who presents to the ED with the following complaint: Homeless (pt to ED stating he is [facility] at the [facility] but left to see what his ex girlfriend has been up to. pt stating he battles with depression and came here because \"he needs to be stronger for the ladies. \")      (Please refer to [facility] NOTE for pertinent ROS and PE documentation)  Labs:  Results for orders placed or performed during the hospital encounter of 06/25/22   COVID-19 & Influenza Combo    Specimen: Nasopharyngeal Swab   Result Value Ref Range    SARS-CoV-2 RNA, RT PCR NOT DETECTED NOT DETECTED    INFLUENZA A NOT DETECTED NOT DETECTED    INFLUENZA B NOT DETECTED NOT DETECTED   CBC with Auto Differential   Result Value Ref Range    WBC 5.6 4.5 - 11.5 E9/L    RBC 4.28 3.80 - 5.80 E12/L    Hemoglobin 13.1 12.5 - 16.5 g/dL    Hematocrit 42.2 37.0 - 54.0 %    MCV 98.6 80.0 - 99.9 fL    MCH 30.6 26.0 - 35.0 pg    MCHC 31.0 (L) 32.0 - 34.5 %    RDW 14.3 11.5 - 15.0 fL    Platelets 054 476 - 202 E9/L    MPV 9.2 7.0 - 12.0 fL    Neutrophils % 62.3 43.0 - 80.0 %    Immature Granulocytes % 0.4 0.0 - 5.0 %    Lymphocytes % 26.8 20.0 - 42.0 %    Monocytes % 7.3 2.0 - 12.0 %    Eosinophils % 2.0 0.0 - 6.0 %    Basophils % 1.2 0.0 - 2.0 %    Neutrophils Absolute 3.52 1.80 - 7.30 E9/L    Immature Granulocytes # 0.02 E9/L    Lymphocytes Absolute 1.51 1.50 - 4.00 E9/L    Monocytes Absolute 0.41 0.10 - 0.95 E9/L    Eosinophils Absolute 0.11 0.05 - 0.50 E9/L    Basophils Absolute 0.07 0.00 - 0.20 E9/L   Comprehensive Metabolic Panel   Result Value Ref Range    Sodium 141 132 - 146 mmol/L    Potassium 5.3 (H) 3.5 - 5.0 mmol/L    Chloride 104 98 - 107 mmol/L    CO2 25 22 - 29 mmol/L    Anion Gap 12 7 - 16 mmol/L    Glucose 74 74 - 99 mg/dL BUN 21 (H) 6 - 20 mg/dL    CREATININE 0.7 0.7 - 1.2 mg/dL    GFR Non-African American >60 >=60 mL/min/1.73    GFR African American >60     Calcium 9.0 8.6 - 10.2 mg/dL    Total Protein 6.9 6.4 - 8.3 g/dL    Albumin 4.2 3.5 - 5.2 g/dL    Total Bilirubin 0.3 0.0 - 1.2 mg/dL    Alkaline Phosphatase 49 40 - 129 U/L    ALT 11 0 - 40 U/L    AST 19 0 - 39 U/L   Urinalysis   Result Value Ref Range    Color, UA Yellow Straw/Yellow    Clarity, UA Clear Clear    Glucose, Ur Negative Negative mg/dL    Bilirubin Urine Negative Negative    Ketones, Urine Negative Negative mg/dL    Specific Gravity, UA >=1.030 1.005 - 1.030    Blood, Urine TRACE-INTACT Negative    pH, UA 5.5 5.0 - 9.0    Protein, UA Negative Negative mg/dL    Urobilinogen, Urine 0.2 <2.0 E.U./dL    Nitrite, Urine Negative Negative    Leukocyte Esterase, Urine Negative Negative   Serum Drug Screen   Result Value Ref Range    Ethanol Lvl <10 mg/dL    Acetaminophen Level <5.0 (L) 10.0 - 98.0 mcg/mL    Salicylate, Serum <7.9 0.0 - 30.0 mg/dL    TCA Scrn NEGATIVE Cutoff:300 ng/mL   Urine Drug Screen   Result Value Ref Range    Amphetamine Screen, Urine NOT DETECTED Negative <1000 ng/mL    Barbiturate Screen, Ur NOT DETECTED Negative < 200 ng/mL    Benzodiazepine Screen, Urine NOT DETECTED Negative < 200 ng/mL    Cannabinoid Scrn, Ur POSITIVE (A) Negative < 50ng/mL    Cocaine Metabolite Screen, Urine POSITIVE (A) Negative < 300 ng/mL    Opiate Scrn, Ur NOT DETECTED Negative < 300ng/mL    PCP Screen, Urine NOT DETECTED Negative < 25 ng/mL    Methadone Screen, Urine NOT DETECTED Negative <300 ng/mL    Oxycodone Urine NOT DETECTED Negative <100 ng/mL    FENTANYL SCREEN, URINE NOT DETECTED Negative <1 ng/mL    Drug Screen Comment: see below    Microscopic Urinalysis   Result Value Ref Range    WBC, UA NONE 0 - 5 /HPF    RBC, UA 0-1 0 - 2 /HPF    Bacteria, UA NONE SEEN None Seen /HPF   EKG 12 Lead   Result Value Ref Range    Ventricular Rate 47 BPM    Atrial Rate 47 BPM P-R Interval 204 ms    QRS Duration 98 ms    Q-T Interval 472 ms    QTc Calculation (Bazett) 417 ms    P Axis 86 degrees    R Axis 87 degrees    T Axis 87 degrees     Imaging: All Radiology results interpreted by Radiologist unless otherwise noted. No orders to display     ED Course    Medications - No data to display     -------------------------  Disposition    Patient ELOPED from the department prior to completion of evaluation after triage. Results of triage orders that were completed at time of elopement as indicated above were reviewed.     Diagnosis at Time of Elopement: (Based on presenting complaint/available test results):   depression    Electronically signed by CECILE Simon   DD: 6/25/22       Nathen Ganser, PA  06/25/22 2233

## 2022-06-25 NOTE — ED NOTES
Department of Emergency Medicine  FIRST PROVIDER TRIAGE NOTE             [facility]           6/25/22  10:32 AM EDT    Date of Encounter: 6/25/22   MRN: [id number]      HPI: Alessandra Dias is a 37 y.o. male who presents to the ED for Homeless (pt to ED stating he is supposed to be at the [facility] in center but left to see what his ex girlfriend has been up to. pt stating he battles with depression and came here because \"he needs to be stronger for the ladies. \")   denies SI/HI    ROS: Negative for cp, sob or abd pain. PE: Gen Appearance/Constitutional: alert  HEENT: NC/NT. PERRLA,  Airway patent. Neck: supple     Initial Plan of Care: All treatment areas with department are currently occupied.  Plan to order/Initiate the following while awaiting opening in ED: labs and Social Work Eval.  Initiate Treatment-Testing, Proceed toTreatment Area When [employer] Available for ED Attending/MLP to [employer] signed by CECILE Lewis   DD: 6/25/22         CECILE Diaz  06/25/22 7549

## 2022-06-27 LAB
EKG ATRIAL RATE: 47 BPM
EKG P AXIS: 86 DEGREES
EKG P-R INTERVAL: 204 MS
EKG Q-T INTERVAL: 472 MS
EKG QRS DURATION: 98 MS
EKG QTC CALCULATION (BAZETT): 417 MS
EKG R AXIS: 87 DEGREES
EKG T AXIS: 87 DEGREES
EKG VENTRICULAR RATE: 47 BPM

## 2022-06-29 ENCOUNTER — HOSPITAL ENCOUNTER (EMERGENCY)
Age: 42
Discharge: HOME | End: 2022-06-29
Attending: EMERGENCY MEDICINE
Payer: COMMERCIAL

## 2022-06-29 VITALS
TEMPERATURE: 98.1 F | RESPIRATION RATE: 20 BRPM | HEART RATE: 69 BPM | OXYGEN SATURATION: 99 % | HEIGHT: 72 IN | WEIGHT: 170 LBS | SYSTOLIC BLOOD PRESSURE: 107 MMHG | DIASTOLIC BLOOD PRESSURE: 68 MMHG | BODY MASS INDEX: 23.03 KG/M2

## 2022-06-29 DIAGNOSIS — F22 PARANOID BEHAVIOR (HCC): Primary | ICD-10-CM

## 2022-06-29 LAB
ACETAMINOPHEN LEVEL: <5 MCG/ML (ref 10–30)
ALBUMIN SERPL-MCNC: 4.1 G/DL (ref 3.5–5.2)
ALP BLD-CCNC: 48 U/L (ref 40–129)
ALT SERPL-CCNC: 10 U/L (ref 0–40)
AMPHETAMINE SCREEN, URINE: NOT DETECTED
ANION GAP SERPL CALCULATED.3IONS-SCNC: 11 MMOL/L (ref 7–16)
AST SERPL-CCNC: 16 U/L (ref 0–39)
BACTERIA: ABNORMAL /HPF
BARBITURATE SCREEN URINE: NOT DETECTED
BASOPHILS ABSOLUTE: 0.09 E9/L (ref 0–0.2)
BASOPHILS RELATIVE PERCENT: 1.8 % (ref 0–2)
BENZODIAZEPINE SCREEN, URINE: NOT DETECTED
BILIRUB SERPL-MCNC: 0.3 MG/DL (ref 0–1.2)
BILIRUBIN URINE: NEGATIVE
BLOOD, URINE: NORMAL
BUN BLDV-MCNC: 10 MG/DL (ref 6–20)
CALCIUM SERPL-MCNC: 8.8 MG/DL (ref 8.6–10.2)
CANNABINOID SCREEN URINE: POSITIVE
CHLORIDE BLD-SCNC: 105 MMOL/L (ref 98–107)
CLARITY: CLEAR
CO2: 28 MMOL/L (ref 22–29)
COCAINE METABOLITE SCREEN URINE: POSITIVE
COLOR: YELLOW
CREAT SERPL-MCNC: 0.9 MG/DL (ref 0.7–1.2)
EKG ATRIAL RATE: 59 BPM
EKG P AXIS: 77 DEGREES
EKG P-R INTERVAL: 198 MS
EKG Q-T INTERVAL: 418 MS
EKG QRS DURATION: 98 MS
EKG QTC CALCULATION (BAZETT): 413 MS
EKG R AXIS: 74 DEGREES
EKG T AXIS: 65 DEGREES
EKG VENTRICULAR RATE: 59 BPM
EOSINOPHILS ABSOLUTE: 0.08 E9/L (ref 0.05–0.5)
EOSINOPHILS RELATIVE PERCENT: 1.6 % (ref 0–6)
EPITHELIAL CELLS, UA: ABNORMAL /HPF
ETHANOL: <10 MG/DL (ref 0–0.08)
FENTANYL SCREEN, URINE: NOT DETECTED
GFR AFRICAN AMERICAN: >60
GFR NON-AFRICAN AMERICAN: >60 ML/MIN/1.73
GLUCOSE BLD-MCNC: 82 MG/DL (ref 74–99)
GLUCOSE URINE: NEGATIVE MG/DL
HCT VFR BLD CALC: 41.1 % (ref 37–54)
HEMOGLOBIN: 12.8 G/DL (ref 12.5–16.5)
IMMATURE GRANULOCYTES #: 0.01 E9/L
IMMATURE GRANULOCYTES %: 0.2 % (ref 0–5)
INFLUENZA A: NOT DETECTED
INFLUENZA B: NOT DETECTED
KETONES, URINE: NEGATIVE MG/DL
LEUKOCYTE ESTERASE, URINE: NEGATIVE
LYMPHOCYTES ABSOLUTE: 1.4 E9/L (ref 1.5–4)
LYMPHOCYTES RELATIVE PERCENT: 28.7 % (ref 20–42)
Lab: ABNORMAL
MCH RBC QN AUTO: 30.4 PG (ref 26–35)
MCHC RBC AUTO-ENTMCNC: 31.1 % (ref 32–34.5)
MCV RBC AUTO: 97.6 FL (ref 80–99.9)
METHADONE SCREEN, URINE: NOT DETECTED
MONOCYTES ABSOLUTE: 0.54 E9/L (ref 0.1–0.95)
MONOCYTES RELATIVE PERCENT: 11.1 % (ref 2–12)
NEUTROPHILS ABSOLUTE: 2.75 E9/L (ref 1.8–7.3)
NEUTROPHILS RELATIVE PERCENT: 56.6 % (ref 43–80)
NITRITE, URINE: NEGATIVE
OPIATE SCREEN URINE: NOT DETECTED
OXYCODONE URINE: NOT DETECTED
PDW BLD-RTO: 14.1 FL (ref 11.5–15)
PH UA: 5.5 (ref 5–9)
PHENCYCLIDINE SCREEN URINE: NOT DETECTED
PLATELET # BLD: 319 E9/L (ref 130–450)
PMV BLD AUTO: 9.2 FL (ref 7–12)
POTASSIUM REFLEX MAGNESIUM: 4.4 MMOL/L (ref 3.5–5)
PROTEIN UA: NEGATIVE MG/DL
RBC # BLD: 4.21 E12/L (ref 3.8–5.8)
RBC UA: ABNORMAL /HPF (ref 0–2)
SALICYLATE, SERUM: <0.3 MG/DL (ref 0–30)
SARS-COV-2 RNA, RT PCR: NOT DETECTED
SODIUM BLD-SCNC: 144 MMOL/L (ref 132–146)
SPECIFIC GRAVITY UA: 1.02 (ref 1–1.03)
TOTAL CK: 447 U/L (ref 20–200)
TOTAL PROTEIN: 6.7 G/DL (ref 6.4–8.3)
TRICYCLIC ANTIDEPRESSANTS SCREEN SERUM: NEGATIVE NG/ML
UROBILINOGEN, URINE: 0.2 E.U./DL
WBC # BLD: 4.9 E9/L (ref 4.5–11.5)
WBC UA: ABNORMAL /HPF (ref 0–5)

## 2022-06-29 PROCEDURE — 85025 COMPLETE CBC W/AUTO DIFF WBC: CPT

## 2022-06-29 PROCEDURE — 80143 DRUG ASSAY ACETAMINOPHEN: CPT

## 2022-06-29 PROCEDURE — 82550 ASSAY OF CK (CPK): CPT

## 2022-06-29 PROCEDURE — 80179 DRUG ASSAY SALICYLATE: CPT

## 2022-06-29 PROCEDURE — 93005 ELECTROCARDIOGRAM TRACING: CPT | Performed by: EMERGENCY MEDICINE

## 2022-06-29 PROCEDURE — 80307 DRUG TEST PRSMV CHEM ANLYZR: CPT

## 2022-06-29 PROCEDURE — 99284 EMERGENCY DEPT VISIT MOD MDM: CPT

## 2022-06-29 PROCEDURE — 36415 COLL VENOUS BLD VENIPUNCTURE: CPT

## 2022-06-29 PROCEDURE — 87636 SARSCOV2 & INF A&B AMP PRB: CPT

## 2022-06-29 PROCEDURE — 80053 COMPREHEN METABOLIC PANEL: CPT

## 2022-06-29 PROCEDURE — 81001 URINALYSIS AUTO W/SCOPE: CPT

## 2022-06-29 PROCEDURE — 82077 ASSAY SPEC XCP UR&BREATH IA: CPT

## 2022-06-29 ASSESSMENT — LIFESTYLE VARIABLES: HOW OFTEN DO YOU HAVE A DRINK CONTAINING ALCOHOL: NEVER

## 2022-06-29 ASSESSMENT — PAIN - FUNCTIONAL ASSESSMENT: PAIN_FUNCTIONAL_ASSESSMENT: NONE - DENIES PAIN

## 2022-06-29 NOTE — ED NOTES
Behavioral Health Crisis Assessment    Chief Complaint:  \"I just needed to sleep\"      Mental Status Exam: calm, cooperative, alert, oriented x's 3, shared fair eye contact, has clear speech, behavior controlled, dismissive attitude, denies Si, no Hi and no hallucinations. Legal Status  [x] Voluntary:  [] Involuntary, Issued by:    Gender  [x] Male [] Female [] Transgender  [] Other    Sexual Orientation    [x] Heterosexual [] Homosexual [] Bisexual [] Other    Brief Clinical Summary:  Pt reporting that he was released from senior living and does not have his meds. Pt said that he is \"supp[facility] be at [facility], but I walked out\". Pt refused to answer any other questions. Pt asked to be discharged. We discussed following up with [facility] and [facility] Educated pt to homeless shelter information. Collateral Information: none    Risk Factors:  Mental Health Diagnosis  Substance Use  Limited family/friend support  Lack of housing  Lack of essential needs  Lack of self-care  Several inpatient psychiatric admissions  Trouble with the law  reports - Off prescribed Psych meds  Financial stressors  Poor communication skills    Protective Factors: Outpatient provider  Initiated this ER visit  Help-seeking behavior  No access to weapons     Suicidal Ideations:   [] Reports:    [] Past [] Present   [x] Denies    Suicide Attempts:  [] Reports:   [x] Denies    C-SSRS Screening Completed by RN: Current Suicide Risk:  [x] No Risk [] Low [] Moderate [] High    Homicidal Ideations  [] Reports:   [] Past [] Present   [x] Denies     Self Injurious/Self Mutilation Behaviors:   [] Reports:    [] Past [] Present   [x] Denies    Hallucinations/Delusions   [] Reports:   [x] Denies     Substance Use/Alcohol Use/Addiction:   [x] Reports:   [] Denies   [x] SBIRT Screen Complete.      Current or Past Substance Abuse Treatment  [] Yes, When and Where:  [] No    Current or Past Mental Health Treatment:  [x] Yes, When and Where: [facility]\"  [] No    Legal Issues:  [x]  Yes (Specify)  DV - AGGRESSIVE HX  []  No    Access to Weapons:  []  Yes (Specify)  [x]  No    Trauma History  [] Reports:  [x] Denies     Living Situation:  HOMELESS    Employment:  NO JOB    Education Level: SOME HIGH SCHOOL    Violence Risk Screenin. Have you ever thought about hurting someone? []  No  [x]  Yes (Ask the questions listed below)   When?  Did you follow through with the thoughts? [] No     [x] Yes- When and what happened? [facility]  2. Have you ever threatened anyone? []  No  [x]  Yes (Ask the questions listed below)   When and what happened?  Have you ever threatened someone with a gun, knife or other weapon? []  No  [x]  Yes - When and what happened? [facility] OR ESCORTED OFF THE PROPERTY  2. Have you ever had an order of protection taken out against you? []  Yes [x]  No  3. Have you ever been arrested due to violence? [x]  Yes []  No  4. Have you ever been cruel to animals?  []  Yes [x]  No    After consideration of C-SSRS screening results, C-SSRS assessments, and this professional's assessment the patient's overall suicide risk assessed to be:  [x] No Risk  [] Low   [] Moderate   [] High     [x] Discussed current suicide risk, protective and risk factors with RN and ED Physician     Disposition   [] Home:   [] Outpatient Provider:   [] Crisis Unit:   [] Inpatient Psychiatric Unit:  [x] Other: HOMELESS                  MAJOR Alvarado  22 1110

## 2022-06-29 NOTE — ED NOTES
Patient denied SI/Hi at first but when Dr. Candy Hurd to talk to him he voiced SI without a plan.  Belonging collected and 3 bags placed in [facility] OF [place] [id number] [address] [place] Killian RN  06/29/22 6595

## 2022-06-29 NOTE — ED NOTES
Discontinue 1:1 sitter per Dr. Josefina Valencia 15 mins instead     Gretel Mckeon RN  06/29/22 9781

## 2022-06-29 NOTE — ED PROVIDER NOTES
HPI:  6/29/22,   Time: 5:06 AM EDT       Carla Webster is a 37 y.o. male presenting to the ED for paranoid, beginning unk ago. The complaint has been persistent, moderate in severity, and worsened by stress. Well known, sig psych hx, paranoid, won't tell when started, won't elaborate, states just paranoid over people doing things. States has si/hi, but wont tell me if he has plan. Denies hallucinations. Bib private vehicle    Review of Systems:   Pertinent positives and negatives are stated within HPI, all other systems reviewed and are negative.          --------------------------------------------- PAST HISTORY ---------------------------------------------  Past Medical History:  has a past medical history of Depression and Schizoaffective disorder ([facility] [address] Past Surgical History:  has a past surgical history that includes Hip fracture surgery (Left, 9/12/2021); eye surgery (19 years ago); and Hip fracture surgery (Left, 9/28/2021). Social History:  reports that he has been smoking cigars and cigarettes. He has a 24.00 pack-year smoking history. He has never used smokeless tobacco. He reports current alcohol use of about 2.0 standard drinks of alcohol per week. He reports current drug use. Frequency: 7.00 times per week. Drugs: Cocaine and Marijuana (Sai Trivedi). Family History: family history includes Mental Illness in his mother; No Known Problems in his father; Substance Abuse in his mother. The patients home medications have been reviewed.     Allergies: Chlorpheniramine-phenylephrine, Penicillins, and Rondec-d [chlophedianol-pseudoephedrine]        ---------------------------------------------------PHYSICAL EXAM--------------------------------------    Constitutional/General: Alert and oriented x3, well appearing, non toxic in NAD  Head: Normocephalic and atraumatic  Eyes: PERRL, EOMI, conjunctive normal, sclera non icteric  Mouth: Oropharynx clear, handling secretions,  Neck: Supple, full ROM, Respiratory: . Not in respiratory distress  Cardiovascular:  . 2+ distal pulses  Chest: No chest wall tenderness  GI:  Abdomen Soft, Non tender, Non distended. Musculoskeletal: Moves all extremities x 4. Warm and well perfused,   Integument: skin warm and dry. No rashes. Lymphatic: no lymphadenopathy noted  Neurologic: GCS 15, no focal deficits,   Psychiatric: colorful Affect, flight of ideas    -------------------------------------------------- RESULTS -------------------------------------------------  I have personally reviewed all laboratory and imaging results for this patient. Results are listed below.      LABS:  Results for orders placed or performed during the hospital encounter of 06/29/22   COVID-19 & Influenza Combo    Specimen: Nasopharyngeal Swab   Result Value Ref Range    SARS-CoV-2 RNA, RT PCR NOT DETECTED NOT DETECTED    INFLUENZA A NOT DETECTED NOT DETECTED    INFLUENZA B NOT DETECTED NOT DETECTED   CBC with Auto Differential   Result Value Ref Range    WBC 4.9 4.5 - 11.5 E9/L    RBC 4.21 3.80 - 5.80 E12/L    Hemoglobin 12.8 12.5 - 16.5 g/dL    Hematocrit 41.1 37.0 - 54.0 %    MCV 97.6 80.0 - 99.9 fL    MCH 30.4 26.0 - 35.0 pg    MCHC 31.1 (L) 32.0 - 34.5 %    RDW 14.1 11.5 - 15.0 fL    Platelets 223 959 - 058 E9/L    MPV 9.2 7.0 - 12.0 fL    Neutrophils % 56.6 43.0 - 80.0 %    Immature Granulocytes % 0.2 0.0 - 5.0 %    Lymphocytes % 28.7 20.0 - 42.0 %    Monocytes % 11.1 2.0 - 12.0 %    Eosinophils % 1.6 0.0 - 6.0 %    Basophils % 1.8 0.0 - 2.0 %    Neutrophils Absolute 2.75 1.80 - 7.30 E9/L    Immature Granulocytes # 0.01 E9/L    Lymphocytes Absolute 1.40 (L) 1.50 - 4.00 E9/L    Monocytes Absolute 0.54 0.10 - 0.95 E9/L    Eosinophils Absolute 0.08 0.05 - 0.50 E9/L    Basophils Absolute 0.09 0.00 - 0.20 E9/L   Comprehensive Metabolic Panel w/ Reflex to MG   Result Value Ref Range    Sodium 144 132 - 146 mmol/L    Potassium reflex Magnesium 4.4 3.5 - 5.0 mmol/L    Chloride 105 98 - 107 mmol/L    CO2 28 22 - 29 mmol/L    Anion Gap 11 7 - 16 mmol/L    Glucose 82 74 - 99 mg/dL    BUN 10 6 - 20 mg/dL    CREATININE 0.9 0.7 - 1.2 mg/dL    GFR Non-African American >60 >=60 mL/min/1.73    GFR African American >60     Calcium 8.8 8.6 - 10.2 mg/dL    Total Protein 6.7 6.4 - 8.3 g/dL    Albumin 4.1 3.5 - 5.2 g/dL    Total Bilirubin 0.3 0.0 - 1.2 mg/dL    Alkaline Phosphatase 48 40 - 129 U/L    ALT 10 0 - 40 U/L    AST 16 0 - 39 U/L   Urinalysis   Result Value Ref Range    Color, UA Yellow Straw/Yellow    Clarity, UA Clear Clear    Glucose, Ur Negative Negative mg/dL    Bilirubin Urine Negative Negative    Ketones, Urine Negative Negative mg/dL    Specific Gravity, UA 1.020 1.005 - 1.030    Blood, Urine TRACE-INTACT Negative    pH, UA 5.5 5.0 - 9.0    Protein, UA Negative Negative mg/dL    Urobilinogen, Urine 0.2 <2.0 E.U./dL    Nitrite, Urine Negative Negative    Leukocyte Esterase, Urine Negative Negative   Urine Drug Screen   Result Value Ref Range    Drug Screen Comment: see below    Microscopic Urinalysis   Result Value Ref Range    WBC, UA 1-3 0 - 5 /HPF    RBC, UA 0-1 0 - 2 /HPF    Epithelial Cells, UA RARE /HPF    Bacteria, UA RARE (A) None Seen /HPF       RADIOLOGY:  Interpreted by Radiologist.  No orders to display       EKG: This EKG is signed and interpreted by the EP. Time: 501  Rate: 60  Rhythm: Sinus  Interpretation: non-specific EKG  Comparison: None      ------------------------- NURSING NOTES AND VITALS REVIEWED ---------------------------   The nursing notes within the ED encounter and vital signs as below have been reviewed by myself. /64   Pulse 75   Temp 97.6 °F (36.4 °C)   Resp 16   Ht 6' (1.829 m)   Wt 170 lb (77.1 kg)   SpO2 100%   BMI 23.06 kg/m²   Oxygen Saturation Interpretation: Normal    The patients available past medical records and past encounters were reviewed.         ------------------------------ ED COURSE/MEDICAL DECISION MAKING----------------------  Medications - No data to display      ED COURSE:       Medical Decision Making:    Pt medically clear, dispo per social work      This patient's ED course included: a personal history and physicial examination    This patient has remained hemodynamically stable during their ED course. Re-Evaluations:             Re-evaluation. Patients symptoms show no change            Consultations:             Social work    Critical Care:         Counseling: The emergency provider has spoken with the patient and discussed todays results, in addition to providing specific details for the plan of care and counseling regarding the diagnosis and prognosis. Questions are answered at this time and they are agreeable with the plan.       --------------------------------- IMPRESSION AND DISPOSITION ---------------------------------    IMPRESSION  1. Paranoid behavior ([facility] [address]        DISPOSITION  Disposition: per social work  Patient condition is stable    NOTE: This report was transcribed using voice recognition software.  Every effort was made to ensure accuracy; however, inadvertent computerized transcription errors may be present        Lars Felty, MD  06/29/22 5572

## 2022-06-29 NOTE — ED NOTES
Denies SI/HI. Discharge instructions reviewed with patient he declined to sign them. Ready for discharge patient given his clothes and he went to bathroom to change for departure from the ED.      Vielka Fournier RN  06/29/22 5904

## 2022-07-01 ENCOUNTER — HOSPITAL ENCOUNTER (EMERGENCY)
Age: 42
Discharge: HOME | End: 2022-07-01
Attending: EMERGENCY MEDICINE
Payer: COMMERCIAL

## 2022-07-01 VITALS
OXYGEN SATURATION: 90 % | SYSTOLIC BLOOD PRESSURE: 99 MMHG | WEIGHT: 170 LBS | RESPIRATION RATE: 16 BRPM | BODY MASS INDEX: 23.06 KG/M2 | TEMPERATURE: 98.2 F | HEART RATE: 82 BPM | DIASTOLIC BLOOD PRESSURE: 63 MMHG

## 2022-07-01 DIAGNOSIS — T50.904A DRUG OVERDOSE, UNDETERMINED INTENT, INITIAL ENCOUNTER: Primary | ICD-10-CM

## 2022-07-01 LAB
CHP ED QC CHECK: NORMAL
GLUCOSE BLD-MCNC: 121 MG/DL
METER GLUCOSE: 121 MG/DL (ref 74–99)

## 2022-07-01 PROCEDURE — 82962 GLUCOSE BLOOD TEST: CPT

## 2022-07-01 PROCEDURE — 99283 EMERGENCY DEPT VISIT LOW MDM: CPT

## 2022-07-01 ASSESSMENT — ENCOUNTER SYMPTOMS
EYE DISCHARGE: 0
NAUSEA: 0
WHEEZING: 0
SINUS PRESSURE: 0
EYE REDNESS: 0
BACK PAIN: 0
COUGH: 0
SHORTNESS OF BREATH: 0
DIARRHEA: 0
EYE PAIN: 0
VOMITING: 0
ABDOMINAL PAIN: 0
SORE THROAT: 0

## 2022-07-01 NOTE — ED PROVIDER NOTES
The history is provided by the patient and medical records. 43-year-old male presents emergency department complaint of drug overdose per EMS. EMS states he was found apneic with a pulse requiring 4 mg of intranasal Narcan. Happened approximately less than 1 hour ago. Symptoms moderate in severity nothing made them better nothing makes them worse. No other associated symptoms. Patient did respond to the Narcan. He otherwise has no other acute complaints at this time. Denies any pain at this time. States he is never had fentanyl before. He is not sure what happened. Denies any chest pain abdominal pain denies any suicidal homicidal ideations. Review of Systems   Constitutional: Negative for chills and fever. HENT: Negative for ear pain, sinus pressure and sore throat. Eyes: Negative for pain, discharge and redness. Respiratory: Negative for cough, shortness of breath and wheezing. Cardiovascular: Negative for chest pain. Gastrointestinal: Negative for abdominal pain, diarrhea, nausea and vomiting. Genitourinary: Negative for dysuria and frequency. Musculoskeletal: Negative for arthralgias and back pain. Skin: Negative for rash and wound. Neurological: Negative for weakness and headaches. Hematological: Negative for adenopathy. All other systems reviewed and are negative. Physical Exam  Vitals and nursing note reviewed. Constitutional:       Appearance: He is well-developed. HENT:      Head: Normocephalic and atraumatic. Eyes:      Conjunctiva/sclera: Conjunctivae normal.   Cardiovascular:      Rate and Rhythm: Normal rate and regular rhythm. Heart sounds: Normal heart sounds. No murmur heard. Pulmonary:      Effort: Pulmonary effort is normal. No respiratory distress. Breath sounds: Normal breath sounds. No wheezing or rales. Abdominal:      General: Bowel sounds are normal.      Palpations: Abdomen is soft. Tenderness:  There is no abdominal tenderness. There is no guarding or rebound. Musculoskeletal:         General: No tenderness or deformity. Cervical back: Normal range of motion and neck supple. Skin:     General: Skin is warm and dry. Neurological:      General: No focal deficit present. Mental Status: He is alert and oriented to person, place, and time. Psychiatric:         Mood and Affect: Mood normal.         Thought Content: Thought content normal.          Procedures     MDM     81-year-old male present emerged department complaint of drug overdose. Patient reportedly got 4 mg of intranasal Narcan prior to arrival.  Was alert and oriented here in the emergency department. He had no complaints of suicidal homicidal ideations. Observed for 2 hours with stable vital signs and mental status. Safe to be discharged home at this time. --------------------------------------------- PAST HISTORY ---------------------------------------------  Past Medical History:  has a past medical history of Depression and Schizoaffective disorder ([facility] [address] Past Surgical History:  has a past surgical history that includes Hip fracture surgery (Left, 9/12/2021); eye surgery (19 years ago); and Hip fracture surgery (Left, 9/28/2021). Social History:  reports that he has been smoking cigars and cigarettes. He has a 24.00 pack-year smoking history. He has never used smokeless tobacco. He reports current alcohol use of about 2.0 standard drinks of alcohol per week. He reports current drug use. Frequency: 7.00 times per week. Drugs: Cocaine and Marijuana (Ariadna Croft). Family History: family history includes Mental Illness in his mother; No Known Problems in his father; Substance Abuse in his mother. The patients home medications have been reviewed.     Allergies: Chlorpheniramine-phenylephrine, Penicillins, and Rondec-d [chlophedianol-pseudoephedrine]    -------------------------------------------------- RESULTS -------------------------------------------------  Labs:  Results for orders placed or performed during the hospital encounter of 07/01/22   POCT Glucose   Result Value Ref Range    Glucose 121 mg/dL    QC OK? ok    POCT Glucose   Result Value Ref Range    Meter Glucose 121 (H) 74 - 99 mg/dL       Radiology:  No orders to display       ------------------------- NURSING NOTES AND VITALS REVIEWED ---------------------------  Date / Time Roomed:  7/1/2022 12:47 PM  [facility]    The nursing notes within the ED encounter and vital signs as below have been reviewed. BP 99/63   Pulse 82   Temp 98.2 °F (36.8 °C)   Resp 16   Wt 170 lb (77.1 kg)   SpO2 90%   BMI 23.06 kg/m²   Oxygen Saturation Interpretation: Normal      ------------------------------------------ PROGRESS NOTES ------------------------------------------  3:12 PM EDT  I have spoken with the patient and discussed todays results, in addition to providing specific details for the plan of care and counseling regarding the diagnosis and prognosis. Their questions are answered at this time and they are agreeable with the plan. I discussed at length with them reasons for immediate return here for re evaluation. They will followup with their primary care physician by calling their office on Monday.      --------------------------------- ADDITIONAL PROVIDER NOTES ---------------------------------  At this time the patient is without objective evidence of an acute process requiring hospitalization or inpatient management. They have remained hemodynamically stable throughout their entire ED visit and are stable for discharge with outpatient follow-up. The plan has been discussed in detail and they are aware of the specific conditions for emergent return, as well as the importance of follow-up. New Prescriptions    No medications on file       Diagnosis:  1.  Drug overdose, undetermined intent, initial encounter

## 2022-07-05 ENCOUNTER — HOSPITAL ENCOUNTER (EMERGENCY)
Age: 42
Discharge: HOME | End: 2022-07-05
Attending: EMERGENCY MEDICINE
Payer: COMMERCIAL

## 2022-07-05 VITALS
OXYGEN SATURATION: 100 % | DIASTOLIC BLOOD PRESSURE: 74 MMHG | HEART RATE: 67 BPM | TEMPERATURE: 97.8 F | RESPIRATION RATE: 16 BRPM | SYSTOLIC BLOOD PRESSURE: 113 MMHG

## 2022-07-05 DIAGNOSIS — F23 ACUTE PSYCHOSIS (HCC): Primary | ICD-10-CM

## 2022-07-05 LAB
ACETAMINOPHEN LEVEL: <5 MCG/ML (ref 10–30)
ALBUMIN SERPL-MCNC: 3.6 G/DL (ref 3.5–5.2)
ALP BLD-CCNC: 52 U/L (ref 40–129)
ALT SERPL-CCNC: 11 U/L (ref 0–40)
AMPHETAMINE SCREEN, URINE: NOT DETECTED
ANION GAP SERPL CALCULATED.3IONS-SCNC: 10 MMOL/L (ref 7–16)
AST SERPL-CCNC: 16 U/L (ref 0–39)
BACTERIA: ABNORMAL /HPF
BARBITURATE SCREEN URINE: NOT DETECTED
BASOPHILS ABSOLUTE: 0.08 E9/L (ref 0–0.2)
BASOPHILS RELATIVE PERCENT: 1.8 % (ref 0–2)
BENZODIAZEPINE SCREEN, URINE: NOT DETECTED
BILIRUB SERPL-MCNC: <0.2 MG/DL (ref 0–1.2)
BILIRUBIN URINE: ABNORMAL
BLOOD, URINE: NEGATIVE
BUN BLDV-MCNC: 11 MG/DL (ref 6–20)
CALCIUM SERPL-MCNC: 8.6 MG/DL (ref 8.6–10.2)
CANNABINOID SCREEN URINE: POSITIVE
CHLORIDE BLD-SCNC: 110 MMOL/L (ref 98–107)
CLARITY: CLEAR
CO2: 24 MMOL/L (ref 22–29)
COCAINE METABOLITE SCREEN URINE: POSITIVE
COLOR: YELLOW
CREAT SERPL-MCNC: 0.8 MG/DL (ref 0.7–1.2)
EKG ATRIAL RATE: 45 BPM
EKG P AXIS: 53 DEGREES
EKG P-R INTERVAL: 188 MS
EKG Q-T INTERVAL: 444 MS
EKG QRS DURATION: 104 MS
EKG QTC CALCULATION (BAZETT): 384 MS
EKG R AXIS: 83 DEGREES
EKG T AXIS: 70 DEGREES
EKG VENTRICULAR RATE: 45 BPM
EOSINOPHILS ABSOLUTE: 0.19 E9/L (ref 0.05–0.5)
EOSINOPHILS RELATIVE PERCENT: 4.2 % (ref 0–6)
ETHANOL: <10 MG/DL (ref 0–0.08)
FENTANYL SCREEN, URINE: POSITIVE
GFR AFRICAN AMERICAN: >60
GFR NON-AFRICAN AMERICAN: >60 ML/MIN/1.73
GLUCOSE BLD-MCNC: 89 MG/DL (ref 74–99)
GLUCOSE URINE: NEGATIVE MG/DL
HCT VFR BLD CALC: 39.3 % (ref 37–54)
HEMOGLOBIN: 12.3 G/DL (ref 12.5–16.5)
IMMATURE GRANULOCYTES #: 0.01 E9/L
IMMATURE GRANULOCYTES %: 0.2 % (ref 0–5)
INFLUENZA A: NOT DETECTED
INFLUENZA B: NOT DETECTED
KETONES, URINE: NEGATIVE MG/DL
LEUKOCYTE ESTERASE, URINE: NEGATIVE
LYMPHOCYTES ABSOLUTE: 1.49 E9/L (ref 1.5–4)
LYMPHOCYTES RELATIVE PERCENT: 32.6 % (ref 20–42)
Lab: ABNORMAL
MCH RBC QN AUTO: 30.4 PG (ref 26–35)
MCHC RBC AUTO-ENTMCNC: 31.3 % (ref 32–34.5)
MCV RBC AUTO: 97.3 FL (ref 80–99.9)
METHADONE SCREEN, URINE: NOT DETECTED
MONOCYTES ABSOLUTE: 0.47 E9/L (ref 0.1–0.95)
MONOCYTES RELATIVE PERCENT: 10.3 % (ref 2–12)
NEUTROPHILS ABSOLUTE: 2.33 E9/L (ref 1.8–7.3)
NEUTROPHILS RELATIVE PERCENT: 50.9 % (ref 43–80)
NITRITE, URINE: NEGATIVE
OPIATE SCREEN URINE: POSITIVE
OXYCODONE URINE: NOT DETECTED
PDW BLD-RTO: 14.1 FL (ref 11.5–15)
PH UA: 5.5 (ref 5–9)
PHENCYCLIDINE SCREEN URINE: NOT DETECTED
PLATELET # BLD: 293 E9/L (ref 130–450)
PMV BLD AUTO: 10.1 FL (ref 7–12)
POTASSIUM REFLEX MAGNESIUM: 3.8 MMOL/L (ref 3.5–5)
PROTEIN UA: 30 MG/DL
RBC # BLD: 4.04 E12/L (ref 3.8–5.8)
RBC UA: ABNORMAL /HPF (ref 0–2)
SALICYLATE, SERUM: <0.3 MG/DL (ref 0–30)
SARS-COV-2 RNA, RT PCR: NOT DETECTED
SODIUM BLD-SCNC: 144 MMOL/L (ref 132–146)
SPECIFIC GRAVITY UA: >=1.03 (ref 1–1.03)
TOTAL PROTEIN: 6.3 G/DL (ref 6.4–8.3)
TRICYCLIC ANTIDEPRESSANTS SCREEN SERUM: -105 NG/ML
UROBILINOGEN, URINE: 1 E.U./DL
WBC # BLD: 4.6 E9/L (ref 4.5–11.5)
WBC UA: ABNORMAL /HPF (ref 0–5)

## 2022-07-05 PROCEDURE — 93005 ELECTROCARDIOGRAM TRACING: CPT | Performed by: PHYSICIAN ASSISTANT

## 2022-07-05 PROCEDURE — 80307 DRUG TEST PRSMV CHEM ANLYZR: CPT

## 2022-07-05 PROCEDURE — 80179 DRUG ASSAY SALICYLATE: CPT

## 2022-07-05 PROCEDURE — 82077 ASSAY SPEC XCP UR&BREATH IA: CPT

## 2022-07-05 PROCEDURE — 80143 DRUG ASSAY ACETAMINOPHEN: CPT

## 2022-07-05 PROCEDURE — 85025 COMPLETE CBC W/AUTO DIFF WBC: CPT

## 2022-07-05 PROCEDURE — 87636 SARSCOV2 & INF A&B AMP PRB: CPT

## 2022-07-05 PROCEDURE — 80053 COMPREHEN METABOLIC PANEL: CPT

## 2022-07-05 PROCEDURE — 81001 URINALYSIS AUTO W/SCOPE: CPT

## 2022-07-05 PROCEDURE — 99284 EMERGENCY DEPT VISIT MOD MDM: CPT

## 2022-07-05 ASSESSMENT — LIFESTYLE VARIABLES: HOW OFTEN DO YOU HAVE A DRINK CONTAINING ALCOHOL: NEVER

## 2022-07-05 ASSESSMENT — PAIN - FUNCTIONAL ASSESSMENT: PAIN_FUNCTIONAL_ASSESSMENT: NONE - DENIES PAIN

## 2022-07-05 NOTE — ED NOTES
Denies SI/HI. Discharge instructions reviewed with patient who is agreeable to go to CCU.   Awaiting taxi for transport to [facility] Todd Sheikh RN  07/05/22 0389

## 2022-07-05 NOTE — ED NOTES
Behavioral Health Crisis Assessment    Chief Complaint:  hallucinations    Mental Status Exam:  Behavior controlled, dismissive, denies SI, no HI and no commanding hallucinations, reports mood to be depressed, fair insight and judgement, calm, cooperative, alert, oriented x's 3. Legal Status  [] Voluntary:  [x] Involuntary, Issued by:    Gender  [x] Male [] Female [] Transgender  [] Other    Sexual Orientation    [x] Heterosexual [] Homosexual [] Bisexual [] Other    Brief Clinical Summary:  Pt initially refused to talk to me and said that he only wanted to speak to a doctor. Pt is very sarcastic, demeanor is dismissive, behavioral, presents at baseline, brought in all of his belongings in bags. I went in with Dr. Marli Melissa. Pt has an ongoing struggle with drug addiction and poor decision making. He rarely follows through with any plan arranged to assist him with maintaining stability. Pt complains of depression, but adamantly denies SI, HI and no commanding hallucination. Collateral information:  I called [facility] and spoke to [facility] at length. Pt's behavior is the same at [facility] as well. Per Jolynn Martinez, during pt's recent admission there, pt's medication list was sent in from [place] and a [facility] worker spent time ordering his meds, but pt left the unit before the medication arrived. All of pt's medications are at [facility]. Dr. Marli Melissa agreed to plan of dispo. Risk Factors:    Mental Health Diagnosis  Substance Use  Limited family/friend support  Lack of housing  Lack of essential needs  Lack of self-care  Trouble with the law  Off prescribed Psych meds  Poor communication skills    Protective Factors:   Outpatient provider  Initiated this ER visit  Help-seeking behavior  No access to weapons     Suicidal Ideations:   [] Reports:    [] Past [] Present   [x] Denies    Suicide Attempts:  [] Reports:   [x] Denies    C-SSRS Screening Completed by RN: Current Suicide Risk:  [x] No Risk [] Low [] Moderate [] High    Homicidal Ideations  [] Reports:   [] Past [] Present   [x] Denies     Self Injurious/Self Mutilation Behaviors:   [] Reports:    [] Past [] Present   [x] Denies    Hallucinations/Delusions   [x] Reports: non-commanding  [] Denies     Substance Use/Alcohol Use/Addiction:   [x] Reports:   [] Denies   [x] SBIRT Screen Complete. Current or Past Substance Abuse Treatment  [] Yes, When and Where:  [x] No    Current or Past Mental Health Treatment:  [x] Yes, When and Where:[facility]  [] No    Legal Issues:  [x]  Yes (Specify)  Recently jailed  []  No    Access to Weapons:  []  Yes (Specify)  [x]  No    Trauma History  [] Reports:  [x] Denies     Living Situation: homeless    Employment: no job    Education Level: unsure    Violence Risk Screenin. Have you ever thought about hurting someone? []  No  [x]  Yes (Ask the questions listed below)   When?  Did you follow through with the thoughts? [] No     [x] Yes- When and what happened? Jailed  2. Have you ever threatened anyone? []  No  [x]  Yes (Ask the questions listed below)   When and what happened?  Have you ever threatened someone with a gun, knife or other weapon? []  No  [x]  Yes - When and what happened? Jailed  2. Have you ever had an order of protection taken out against you? []  Yes [x]  No  3. Have you ever been arrested due to violence? [x]  Yes []  No  4. Have you ever been cruel to animals?  []  Yes [x]  No    After consideration of C-SSRS screening results, C-SSRS assessments, and this professional's assessment the patient's overall suicide risk assessed to be:  [x] No Risk  [] Low   [] Moderate   [] High     [x] Discussed current suicide risk, protective and risk factors with RN and ED Physician     Disposition   [] Home:   [] Outpatient Provider:   [x] Crisis Unit:   [] Inpatient Psychiatric Unit:  [] Other:                     Reema Chow, [facility]  [address], [facility]  [phone]

## 2022-07-05 NOTE — ED PROVIDER NOTES
Department of Emergency Medicine   ED  Provider Note  Admit Date/RoomTime: 7/5/2022  7:58 AM  ED Room: [address] [place] [address]              7/5/22  8:42 AM EDT    HPI: Carla Morales. 37 y.o. male presents with a complaint of hallucinations, delusions and paranoia beginning several days ago. Complaint has been constant and became more severe today which is what prompted the visit. Worsening hallucinations and delusions and worsening paranoia for the last few days. Patient has a history of schizoaffective disorder. He has not taken his medication. He says he wants help to get the voices to stop. He thinks he may need to go to the crisis unit. He denies fevers or chills. No chest pain or shortness of breath. No suicidal or homicidal thoughts. He says he usually takes Zyprexa and Prozac. Review of Systems:   A complete review of systems was performed and pertinent positives and negatives are stated within HPI, all other systems reviewed and are negative.            --------------------------------------------- PAST HISTORY ---------------------------------------------  Past Medical History:  has a past medical history of Depression and Schizoaffective disorder ([facility] [address] Past Surgical History:  has a past surgical history that includes Hip fracture surgery (Left, 9/12/2021); eye surgery (19 years ago); and Hip fracture surgery (Left, 9/28/2021). Social History:  reports that he has been smoking cigars and cigarettes. He has a 24.00 pack-year smoking history. He has never used smokeless tobacco. He reports current alcohol use of about 2.0 standard drinks of alcohol per week. He reports current drug use. Frequency: 7.00 times per week. Drugs: Cocaine and Marijuana (Sai Farooq). Family History: family history includes Mental Illness in his mother; No Known Problems in his father; Substance Abuse in his mother.    Family history is non contributory unless otherwise noted    The patients home medications have been reviewed. Allergies: Chlorpheniramine-phenylephrine, Penicillins, and Rondec-d [chlophedianol-pseudoephedrine]    -------------------------------------------------- RESULTS -------------------------------------------------  All laboratory and imaging studies were reviewed by myself.     LABS: reviewed and interpreted by me  Results for orders placed or performed during the hospital encounter of 07/05/22   COVID-19 & Influenza Combo    Specimen: Nasopharyngeal Swab   Result Value Ref Range    SARS-CoV-2 RNA, RT PCR NOT DETECTED NOT DETECTED    INFLUENZA A NOT DETECTED NOT DETECTED    INFLUENZA B NOT DETECTED NOT DETECTED   CBC with Auto Differential   Result Value Ref Range    WBC 4.6 4.5 - 11.5 E9/L    RBC 4.04 3.80 - 5.80 E12/L    Hemoglobin 12.3 (L) 12.5 - 16.5 g/dL    Hematocrit 39.3 37.0 - 54.0 %    MCV 97.3 80.0 - 99.9 fL    MCH 30.4 26.0 - 35.0 pg    MCHC 31.3 (L) 32.0 - 34.5 %    RDW 14.1 11.5 - 15.0 fL    Platelets 472 599 - 694 E9/L    MPV 10.1 7.0 - 12.0 fL    Neutrophils % 50.9 43.0 - 80.0 %    Immature Granulocytes % 0.2 0.0 - 5.0 %    Lymphocytes % 32.6 20.0 - 42.0 %    Monocytes % 10.3 2.0 - 12.0 %    Eosinophils % 4.2 0.0 - 6.0 %    Basophils % 1.8 0.0 - 2.0 %    Neutrophils Absolute 2.33 1.80 - 7.30 E9/L    Immature Granulocytes # 0.01 E9/L    Lymphocytes Absolute 1.49 (L) 1.50 - 4.00 E9/L    Monocytes Absolute 0.47 0.10 - 0.95 E9/L    Eosinophils Absolute 0.19 0.05 - 0.50 E9/L    Basophils Absolute 0.08 0.00 - 0.20 E9/L   Comprehensive Metabolic Panel w/ Reflex to MG   Result Value Ref Range    Sodium 144 132 - 146 mmol/L    Potassium reflex Magnesium 3.8 3.5 - 5.0 mmol/L    Chloride 110 (H) 98 - 107 mmol/L    CO2 24 22 - 29 mmol/L    Anion Gap 10 7 - 16 mmol/L    Glucose 89 74 - 99 mg/dL    BUN 11 6 - 20 mg/dL    CREATININE 0.8 0.7 - 1.2 mg/dL    GFR Non-African American >60 >=60 mL/min/1.73    GFR African American >60     Calcium 8.6 8.6 - 10.2 mg/dL    Total Protein 6.3 (L) 6.4 - 8.3 g/dL Albumin 3.6 3.5 - 5.2 g/dL    Total Bilirubin <0.2 0.0 - 1.2 mg/dL    Alkaline Phosphatase 52 40 - 129 U/L    ALT 11 0 - 40 U/L    AST 16 0 - 39 U/L   Urinalysis with Microscopic   Result Value Ref Range    Color, UA Yellow Straw/Yellow    Clarity, UA Clear Clear    Glucose, Ur Negative Negative mg/dL    Bilirubin Urine SMALL (A) Negative    Ketones, Urine Negative Negative mg/dL    Specific Gravity, UA >=1.030 1.005 - 1.030    Blood, Urine Negative Negative    pH, UA 5.5 5.0 - 9.0    Protein, UA 30 (A) Negative mg/dL    Urobilinogen, Urine 1.0 <2.0 E.U./dL    Nitrite, Urine Negative Negative    Leukocyte Esterase, Urine Negative Negative    WBC, UA NONE 0 - 5 /HPF    RBC, UA NONE 0 - 2 /HPF    Bacteria, UA NONE SEEN None Seen /HPF   Serum Drug Screen   Result Value Ref Range    Ethanol Lvl <10 mg/dL    Acetaminophen Level <5.0 (L) 10.0 - 74.5 mcg/mL    Salicylate, Serum <1.4 0.0 - 30.0 mg/dL   URINE DRUG SCREEN   Result Value Ref Range    Drug Screen Comment: see below    EKG 12 Lead   Result Value Ref Range    Ventricular Rate 45 BPM    Atrial Rate 45 BPM    P-R Interval 188 ms    QRS Duration 104 ms    Q-T Interval 444 ms    QTc Calculation (Bazett) 384 ms    P Axis 53 degrees    R Axis 83 degrees    T Axis 70 degrees       RADIOLOGY:  Interpreted by Radiologist.  No orders to display       EKG Interpretation  Interpreted by emergency department physician, Dr. Zackary Carrillo     Rhythm: normal sinus   Rate: normal  Axis: normal  Conduction: normal  ST Segments: no acute change  T Waves: no acute change    Clinical Impression: Sinus rhythm, no acute ischemic changes    Comparison to Old EKG  Stable from prior        ------------------------- NURSING NOTES AND VITALS REVIEWED ---------------------------   The nursing notes within the ED encounter and vital signs as below have been reviewed.    BP (!) 126/32   Pulse 69   Temp 97.8 °F (36.6 °C)   Resp 18   SpO2 97%   Oxygen Saturation Interpretation: Normal ---------------------------------------------------PHYSICAL EXAM--------------------------------------      Constitutional/General: Alert and oriented x3   Head: Normocephalic, atraumatic  Eyes: PERRL, EOMI  ENT: Oropharynx clear, handling secretions, no trismus  Neck: Supple, full ROM, no meningeal signs  Pulmonary: Lungs clear to auscultation bilaterally, no wheezes, rales, or rhonchi. Not in respiratory distress  Cardiovascular:  Regular rate and rhythm, no murmurs, gallops, or rubs. 2+ distal pulses  Abdomen: Soft, non tender, non distended, +BS, no rebound, guarding, or rigidity. No pulsatile masses appreciated  Extremities: Moves all extremities x 4. Warm and well perfused, no clubbing, cyanosis, or edema. Capillary refill <3 seconds  Skin: warm and dry without rash  Neurologic: GCS 15, no focal deficits  Psych: Flight of ideas, pressured speech, paranoia        ------------------------------------------ PROGRESS NOTES ------------------------------------------     Medical decision making:  Patient is medically stable for mental health evaluation    Consultations:   Behavioral Health    Re-Evaluations:        Counseling: The emergency provider has spoken with thepatient and discussed todays results, in addition to providing specific details for the plan of care and counseling regarding the diagnosis and prognosis. Questions are answered at this time and they are agreeable with the plan.         --------------------------------- IMPRESSION AND DISPOSITION ---------------------------------    IMPRESSION  1.  Acute psychosis ([facility] [address]        DISPOSITION  Disposition: as per consultant  Patient condition is stable           Celsa Lamb MD  07/05/22 [address] MD  07/05/22 1020

## 2022-07-11 ENCOUNTER — HOSPITAL ENCOUNTER (EMERGENCY)
Age: 42
Discharge: LEFT BEFORE DISCHARGE | End: 2022-07-11
Attending: EMERGENCY MEDICINE
Payer: COMMERCIAL

## 2022-07-11 VITALS
SYSTOLIC BLOOD PRESSURE: 112 MMHG | HEART RATE: 72 BPM | OXYGEN SATURATION: 97 % | BODY MASS INDEX: 23.03 KG/M2 | DIASTOLIC BLOOD PRESSURE: 66 MMHG | TEMPERATURE: 97.7 F | HEIGHT: 72 IN | WEIGHT: 170 LBS | RESPIRATION RATE: 18 BRPM

## 2022-07-11 DIAGNOSIS — F23 ACUTE PSYCHOSIS (HCC): Primary | ICD-10-CM

## 2022-07-11 LAB
ACETAMINOPHEN LEVEL: <5 MCG/ML (ref 10–30)
ALBUMIN SERPL-MCNC: 4.1 G/DL (ref 3.5–5.2)
ALP BLD-CCNC: 44 U/L (ref 40–129)
ALT SERPL-CCNC: 9 U/L (ref 0–40)
AMPHETAMINE SCREEN, URINE: NOT DETECTED
ANION GAP SERPL CALCULATED.3IONS-SCNC: 8 MMOL/L (ref 7–16)
AST SERPL-CCNC: 17 U/L (ref 0–39)
BARBITURATE SCREEN URINE: NOT DETECTED
BASOPHILS ABSOLUTE: 0.06 E9/L (ref 0–0.2)
BASOPHILS RELATIVE PERCENT: 1.3 % (ref 0–2)
BENZODIAZEPINE SCREEN, URINE: NOT DETECTED
BILIRUB SERPL-MCNC: 0.3 MG/DL (ref 0–1.2)
BUN BLDV-MCNC: 18 MG/DL (ref 6–20)
CALCIUM SERPL-MCNC: 9 MG/DL (ref 8.6–10.2)
CANNABINOID SCREEN URINE: POSITIVE
CHLORIDE BLD-SCNC: 106 MMOL/L (ref 98–107)
CO2: 30 MMOL/L (ref 22–29)
COCAINE METABOLITE SCREEN URINE: POSITIVE
CREAT SERPL-MCNC: 0.9 MG/DL (ref 0.7–1.2)
EKG ATRIAL RATE: 50 BPM
EKG P AXIS: 79 DEGREES
EKG P-R INTERVAL: 212 MS
EKG Q-T INTERVAL: 426 MS
EKG QRS DURATION: 100 MS
EKG QTC CALCULATION (BAZETT): 388 MS
EKG R AXIS: 84 DEGREES
EKG T AXIS: 69 DEGREES
EKG VENTRICULAR RATE: 50 BPM
EOSINOPHILS ABSOLUTE: 0.11 E9/L (ref 0.05–0.5)
EOSINOPHILS RELATIVE PERCENT: 2.3 % (ref 0–6)
ETHANOL: <10 MG/DL (ref 0–0.08)
FENTANYL SCREEN, URINE: NOT DETECTED
GFR AFRICAN AMERICAN: >60
GFR NON-AFRICAN AMERICAN: >60 ML/MIN/1.73
GLUCOSE BLD-MCNC: 89 MG/DL (ref 74–99)
HCT VFR BLD CALC: 41.2 % (ref 37–54)
HEMOGLOBIN: 13 G/DL (ref 12.5–16.5)
IMMATURE GRANULOCYTES #: 0.01 E9/L
IMMATURE GRANULOCYTES %: 0.2 % (ref 0–5)
INFLUENZA A: NOT DETECTED
INFLUENZA B: NOT DETECTED
LYMPHOCYTES ABSOLUTE: 1.99 E9/L (ref 1.5–4)
LYMPHOCYTES RELATIVE PERCENT: 42.4 % (ref 20–42)
Lab: ABNORMAL
MCH RBC QN AUTO: 30.6 PG (ref 26–35)
MCHC RBC AUTO-ENTMCNC: 31.6 % (ref 32–34.5)
MCV RBC AUTO: 96.9 FL (ref 80–99.9)
METHADONE SCREEN, URINE: NOT DETECTED
MONOCYTES ABSOLUTE: 0.44 E9/L (ref 0.1–0.95)
MONOCYTES RELATIVE PERCENT: 9.4 % (ref 2–12)
NEUTROPHILS ABSOLUTE: 2.08 E9/L (ref 1.8–7.3)
NEUTROPHILS RELATIVE PERCENT: 44.4 % (ref 43–80)
OPIATE SCREEN URINE: NOT DETECTED
OXYCODONE URINE: NOT DETECTED
PDW BLD-RTO: 13.6 FL (ref 11.5–15)
PHENCYCLIDINE SCREEN URINE: NOT DETECTED
PLATELET # BLD: 295 E9/L (ref 130–450)
PMV BLD AUTO: 9.8 FL (ref 7–12)
POTASSIUM REFLEX MAGNESIUM: 4.1 MMOL/L (ref 3.5–5)
RBC # BLD: 4.25 E12/L (ref 3.8–5.8)
SALICYLATE, SERUM: <0.3 MG/DL (ref 0–30)
SARS-COV-2 RNA, RT PCR: NOT DETECTED
SODIUM BLD-SCNC: 144 MMOL/L (ref 132–146)
TOTAL PROTEIN: 6.6 G/DL (ref 6.4–8.3)
TRICYCLIC ANTIDEPRESSANTS SCREEN SERUM: NEGATIVE NG/ML
WBC # BLD: 4.7 E9/L (ref 4.5–11.5)

## 2022-07-11 PROCEDURE — 80179 DRUG ASSAY SALICYLATE: CPT

## 2022-07-11 PROCEDURE — 80143 DRUG ASSAY ACETAMINOPHEN: CPT

## 2022-07-11 PROCEDURE — 99284 EMERGENCY DEPT VISIT MOD MDM: CPT

## 2022-07-11 PROCEDURE — 80053 COMPREHEN METABOLIC PANEL: CPT

## 2022-07-11 PROCEDURE — 93005 ELECTROCARDIOGRAM TRACING: CPT | Performed by: EMERGENCY MEDICINE

## 2022-07-11 PROCEDURE — 82077 ASSAY SPEC XCP UR&BREATH IA: CPT

## 2022-07-11 PROCEDURE — 85025 COMPLETE CBC W/AUTO DIFF WBC: CPT

## 2022-07-11 PROCEDURE — 80307 DRUG TEST PRSMV CHEM ANLYZR: CPT

## 2022-07-11 PROCEDURE — 87636 SARSCOV2 & INF A&B AMP PRB: CPT

## 2022-07-11 ASSESSMENT — PAIN - FUNCTIONAL ASSESSMENT: PAIN_FUNCTIONAL_ASSESSMENT: NONE - DENIES PAIN

## 2022-07-11 NOTE — ED NOTES
Behavioral Health Crisis Assessment      Chief Complaint:  Pt is a 36 yo male who presents to the ED as a walk-in, pt not on a pink slip, reporting he has been off his prescribed psych meds over a month, experiencing A/V hallucinations of dead people talking with him and him seeing them  Denies suicidal ideation intent or plan, denies homicidal ideation intent or plan. Mental Status Exam: Not alert, sleepy, oriented x4, mood stable, affect flat, eye contact poor, behavior cooperative, appropriate, no sign of agitation, reports A/V hallucinations, denies suicidal ideation intent ro plan, denies homicidal ideation intent or plan. Legal Status  [x] Voluntary:  [] Involuntary, Issued by:    Gender  [x] Male [] Female [] Transgender  [] Other    Sexual Orientation    [x] Heterosexual [] Homosexual [] Bisexual [] Other    Brief Clinical Summary: Pt reports hx of treating with [facility], unsure of his current status with them, hx of in-patient psych admissions, [facility] 12/4/[facility], hx of Abilify, dx: schizoaffective disorder    Collateral Information: None    Risk Factors:  Hx of mental health diagnosis: schizoaffective disorder  Non compliance w/ prescribed medications and treatment  Limited support network  Hx of previous psych admission    Protective Factors:  Initiated this ER visit  Steady income  Safe and stable housing  Access to essential needs    Suicidal Ideations:   [] Reports:    [] Past [] Present   [x] Denies    Suicide Attempts:  [] Reports:   [x] Denies    C-SSRS Screening Completed by RN: Current Suicide Risk:  [] No Risk [] Low [] Moderate [] High    Homicidal Ideations  [] Reports:   [] Past [] Present   [x] Denies     Self Injurious/Self Mutilation Behaviors:   [] Reports:    [] Past [] Present   [x] Denies    Hallucinations/Delusions   [x] Reports:  A/V hallucinations  [] Denies     Substance Use/Alcohol Use/Addiction:   [x] Reports: Cocaine use  [] Denies   [] SBIRT Screen Complete. Current or Past Substance Abuse Treatment  [x] Yes, When and Where: [facility]  [] No    Current or Past Mental Health Treatment:  [x] Yes, When and Where: [address] 2021  [] No    Legal Issues:  []  Yes (Specify)  []  No    Access to Weapons:  []  Yes (Specify)  [x]  No    Trauma History  [] Reports:  [x] Denies     Living Situation: Reports lives with friends    Employment: Not employed    Education Level: High School    Violence Risk Screenin. Have you ever thought about hurting someone? [x]  No  []  Yes (Ask the questions listed below)   When?  Did you follow through with the thoughts? [] No     [] Yes- When and what happened? 2.  Have you ever threatened anyone? [x]  No  []  Yes (Ask the questions listed below)   When and what happened?  Have you ever threatened someone with a gun, knife or other weapon? [x]  No  []  Yes - When and what happened? 2. Have you ever had an order of protection taken out against you? []  Yes [x]  No  3. Have you ever been arrested due to violence? []  Yes [x]  No  4. Have you ever been cruel to animals?  []  Yes [x]  No    After consideration of C-SSRS screening results, C-SSRS assessments, and this professional's assessment the patient's overall suicide risk assessed to be:  [] No Risk  [x] Low   [] Moderate   [] High     [x] Discussed current suicide risk, protective and risk factors with RN and ED Physician     Disposition   [x] Home:   [] Outpatient Provider:   [] Crisis Unit:   [] Inpatient Psychiatric Unit:  [] Other:                     FRANCISCO Amaya, MAJOR  [phone]

## 2022-07-11 NOTE — ED PROVIDER NOTES
HPI:  7/11/22,   Time: 5:05 AM EDT       Dorothy Rodriguez is a 37 y.o. male presenting to the ED for psychiatric evaluation , beginning 1 day ago. The complaint has been persistent, moderate in severity, and worsened by nothing. Patient presents emergency department for psychiatric evaluation. The patient has been off his meds for about a month. Patient having auditory and visual hallucinations. No SI or HI. No chest pain shortness of breath. No abdominal pain. Review of Systems:   Pertinent positives and negatives are stated within HPI, all other systems reviewed and are negative.          --------------------------------------------- PAST HISTORY ---------------------------------------------  Past Medical History:  has a past medical history of Depression and Schizoaffective disorder ([facility] [address] Past Surgical History:  has a past surgical history that includes Hip fracture surgery (Left, 9/12/2021); eye surgery (19 years ago); and Hip fracture surgery (Left, 9/28/2021). Social History:  reports that he has been smoking cigars and cigarettes. He has a 24.00 pack-year smoking history. He has never used smokeless tobacco. He reports current alcohol use of about 2.0 standard drinks of alcohol per week. He reports current drug use. Frequency: 7.00 times per week. Drugs: Cocaine and Marijuana (Gladis Shaffer). Family History: family history includes Mental Illness in his mother; No Known Problems in his father; Substance Abuse in his mother. The patients home medications have been reviewed.     Allergies: Chlorpheniramine-phenylephrine, Penicillins, and Rondec-d [chlophedianol-pseudoephedrine]        ---------------------------------------------------PHYSICAL EXAM--------------------------------------    Constitutional/General: Alert and oriented x3, well appearing, non toxic in NAD  Head: Normocephalic and atraumatic  Eyes: PERRL, EOMI, conjunctive normal, sclera non icteric  Mouth: Oropharynx clear, handling secretions, no trismus, no asymmetry of the posterior oropharynx or uvular edema  Neck: Supple, full ROM, non tender to palpation in the midline, no stridor, no crepitus, no meningeal signs  Respiratory: Lungs clear to auscultation bilaterally, no wheezes, rales, or rhonchi. Not in respiratory distress  Cardiovascular:  Regular rate. Regular rhythm. No murmurs, gallops, or rubs. 2+ distal pulses  GI:  Abdomen Soft, Non tender, Non distended. +BS. No organomegaly, no palpable masses,  No rebound, guarding, or rigidity. Musculoskeletal: Moves all extremities x 4. Warm and well perfused, no clubbing, cyanosis, or edema. Capillary refill <3 seconds  Integument: skin warm and dry. No rashes. Neurologic: GCS 15, no focal deficits, symmetric strength 5/5 in the upper and lower extremities bilaterally  Psychiatric: Delusional, poor insight, poor judgment    -------------------------------------------------- RESULTS -------------------------------------------------  I have personally reviewed all laboratory and imaging results for this patient. Results are listed below.      LABS:  Results for orders placed or performed during the hospital encounter of 07/11/22   COVID-19 & Influenza Combo    Specimen: Nasopharyngeal Swab   Result Value Ref Range    SARS-CoV-2 RNA, RT PCR NOT DETECTED NOT DETECTED    INFLUENZA A NOT DETECTED NOT DETECTED    INFLUENZA B NOT DETECTED NOT DETECTED   CBC with Auto Differential   Result Value Ref Range    WBC 4.7 4.5 - 11.5 E9/L    RBC 4.25 3.80 - 5.80 E12/L    Hemoglobin 13.0 12.5 - 16.5 g/dL    Hematocrit 41.2 37.0 - 54.0 %    MCV 96.9 80.0 - 99.9 fL    MCH 30.6 26.0 - 35.0 pg    MCHC 31.6 (L) 32.0 - 34.5 %    RDW 13.6 11.5 - 15.0 fL    Platelets 946 305 - 213 E9/L    MPV 9.8 7.0 - 12.0 fL    Neutrophils % 44.4 43.0 - 80.0 %    Immature Granulocytes % 0.2 0.0 - 5.0 %    Lymphocytes % 42.4 (H) 20.0 - 42.0 %    Monocytes % 9.4 2.0 - 12.0 %    Eosinophils % 2.3 0.0 - 6.0 % Basophils % 1.3 0.0 - 2.0 %    Neutrophils Absolute 2.08 1.80 - 7.30 E9/L    Immature Granulocytes # 0.01 E9/L    Lymphocytes Absolute 1.99 1.50 - 4.00 E9/L    Monocytes Absolute 0.44 0.10 - 0.95 E9/L    Eosinophils Absolute 0.11 0.05 - 0.50 E9/L    Basophils Absolute 0.06 0.00 - 0.20 E9/L   Comprehensive Metabolic Panel w/ Reflex to MG   Result Value Ref Range    Sodium 144 132 - 146 mmol/L    Potassium reflex Magnesium 4.1 3.5 - 5.0 mmol/L    Chloride 106 98 - 107 mmol/L    CO2 30 (H) 22 - 29 mmol/L    Anion Gap 8 7 - 16 mmol/L    Glucose 89 74 - 99 mg/dL    BUN 18 6 - 20 mg/dL    CREATININE 0.9 0.7 - 1.2 mg/dL    GFR Non-African American >60 >=60 mL/min/1.73    GFR African American >60     Calcium 9.0 8.6 - 10.2 mg/dL    Total Protein 6.6 6.4 - 8.3 g/dL    Albumin 4.1 3.5 - 5.2 g/dL    Total Bilirubin 0.3 0.0 - 1.2 mg/dL    Alkaline Phosphatase 44 40 - 129 U/L    ALT 9 0 - 40 U/L    AST 17 0 - 39 U/L   URINE DRUG SCREEN   Result Value Ref Range    Amphetamine Screen, Urine NOT DETECTED Negative <1000 ng/mL    Barbiturate Screen, Ur NOT DETECTED Negative < 200 ng/mL    Benzodiazepine Screen, Urine NOT DETECTED Negative < 200 ng/mL    Cannabinoid Scrn, Ur POSITIVE (A) Negative < 50ng/mL    Cocaine Metabolite Screen, Urine POSITIVE (A) Negative < 300 ng/mL    Opiate Scrn, Ur NOT DETECTED Negative < 300ng/mL    PCP Screen, Urine NOT DETECTED Negative < 25 ng/mL    Methadone Screen, Urine NOT DETECTED Negative <300 ng/mL    Oxycodone Urine NOT DETECTED Negative <100 ng/mL    FENTANYL SCREEN, URINE NOT DETECTED Negative <1 ng/mL    Drug Screen Comment: see below    Serum Drug Screen   Result Value Ref Range    Ethanol Lvl <10 mg/dL    Acetaminophen Level <5.0 (L) 10.0 - 58.1 mcg/mL    Salicylate, Serum <0.7 0.0 - 30.0 mg/dL    TCA Scrn NEGATIVE Cutoff:300 ng/mL   EKG 12 Lead   Result Value Ref Range    Ventricular Rate 50 BPM    Atrial Rate 50 BPM    P-R Interval 212 ms    QRS Duration 100 ms    Q-T Interval 426 ms QTc Calculation (Bazett) 388 ms    P Axis 79 degrees    R Axis 84 degrees    T Axis 69 degrees       RADIOLOGY:  Interpreted by Radiologist.  No orders to display       EKG: This EKG is signed and interpreted by the EP. EKG shows sinus bradycardia 50 bpm.  First-degree AV block. Normal axis. No STEMI.    ------------------------- NURSING NOTES AND VITALS REVIEWED ---------------------------   The nursing notes within the ED encounter and vital signs as below have been reviewed by myself. /66   Pulse 72   Temp 97.7 °F (36.5 °C) (Oral)   Resp 18   Ht 6' (1.829 m)   Wt 170 lb (77.1 kg)   SpO2 97%   BMI 23.06 kg/m²   Oxygen Saturation Interpretation: Normal    The patients available past medical records and past encounters were reviewed. ------------------------------ ED COURSE/MEDICAL DECISION MAKING----------------------  Medications - No data to display      ED COURSE:       Medical Decision Making: This is a 28-year-old male who presented to the ED for psychiatric evaluation. Patient undergo laboratory work-up. Laboratory work-up unremarkable. Patient positive for cocaine and THC. Patient medically cleared.  consulted. Disposition pending  evaluation. I, Dr. Lisbeth Kirkpatrick, am the primary provider for this encounter    This patient's ED course included: a personal history and physicial examination and re-evaluation prior to disposition    This patient has remained hemodynamically stable during their ED course. Re-Evaluations:             Re-evaluation. Patients symptoms show no change    Counseling: The emergency provider has spoken with the patient and discussed todays results, in addition to providing specific details for the plan of care and counseling regarding the diagnosis and prognosis.   Questions are answered at this time and they are agreeable with the plan.       --------------------------------- IMPRESSION AND DISPOSITION ---------------------------------    IMPRESSION  1. Acute psychosis ([facility] [address]        DISPOSITION  Disposition: Per   Patient condition is stable    NOTE: This report was transcribed using voice recognition software.  Every effort was made to ensure accuracy; however, inadvertent computerized transcription errors may be present        Flaquita Hernandez DO  07/11/22 2032

## 2022-07-11 NOTE — CARE COORDINATION
Patient was released from [facility] in June 2022 after being found incompetent to stand trial and un-restorable by the [employer]. The patient was placed at the [facility] where Ganesh was actively working with patient to assist with placement/housing. Patient had qualified for [employer] funds granted by the [facility] to help support any of his needs in the community (housing, clothing, utility bills, medications, necessities). Once patient received the funding (Up to $8k) he immediately left the [facility] on 6/22/22 and has been homeless since. He continues to be non-compliant with outpatient mental health treatment. He has a hx of treating with [facility] in the past, however due to his non-compliance a Vincent is unable to be established regarding patient's typical behaviors, symptomology, chronic substance use, homelessness, etc. A diversion plan cannot be created at this time. There is no known restriction from the [facility], however patient has a significant hx of leaving immediately upon arrival therefore screening for this level of care should be done with careful consideration. Navigator spoke with [facility]SU Director Wanda Dent who reports patient was just recently admitted to [facility] [address] on 7/5/22 but immediately left on 7/6/22.     Electronically signed by FRANCISCO Shannon, MAJOR on 7/11/2022 at 12:20 PM

## 2022-07-11 NOTE — ED NOTES
Pt denies SI/HI, states he \"just needed to sleep\".  Pt belongings brought to him from clifford Manrique RN  07/11/22 7805

## 2022-07-11 NOTE — ED NOTES
All patient belongings removed from pt, labeled with pt optios (2 bags) and secured in locker #28 in the [facility].      Saul Oswald LPN  73/43/64 [id number]

## 2022-07-13 ENCOUNTER — HOSPITAL ENCOUNTER (EMERGENCY)
Age: 42
Discharge: HOME | End: 2022-07-13
Payer: COMMERCIAL

## 2022-07-13 VITALS
RESPIRATION RATE: 20 BRPM | BODY MASS INDEX: 20.76 KG/M2 | SYSTOLIC BLOOD PRESSURE: 113 MMHG | TEMPERATURE: 97.9 F | HEART RATE: 76 BPM | HEIGHT: 70 IN | OXYGEN SATURATION: 96 % | DIASTOLIC BLOOD PRESSURE: 78 MMHG | WEIGHT: 145 LBS

## 2022-07-13 DIAGNOSIS — Z59.00 HOMELESS: Primary | ICD-10-CM

## 2022-07-13 PROCEDURE — 99281 EMR DPT VST MAYX REQ PHY/QHP: CPT

## 2022-07-13 SDOH — ECONOMIC STABILITY - HOUSING INSECURITY: HOMELESSNESS UNSPECIFIED: Z59.00

## 2022-07-13 NOTE — ED PROVIDER NOTES
Independent MLP  HPI:  7/13/22, Time: 12:37 AM EDT         Earle Das is a 37 y.o. male presenting to the ED for requesting a place to sleep, patient is homelss beginning  This evening . The complaint has been persistent, mild in severity, and worsened by nothing. Patient coming in with request of sleeping in the lobby due to being homeless states he has been stressed no suicidal or homicidal ideations no recent illness    Review of Systems:   A complete review of systems was performed and pertinent positives and negatives are stated within HPI, all other systems reviewed and are negative.          --------------------------------------------- PAST HISTORY ---------------------------------------------  Past Medical History:  has a past medical history of Depression and Schizoaffective disorder ([facility] [address] Past Surgical History:  has a past surgical history that includes Hip fracture surgery (Left, 9/12/2021); eye surgery (19 years ago); and Hip fracture surgery (Left, 9/28/2021). Social History:  reports that he has been smoking cigars and cigarettes. He has a 24.00 pack-year smoking history. He has never used smokeless tobacco. He reports current alcohol use of about 2.0 standard drinks of alcohol per week. He reports current drug use. Frequency: 7.00 times per week. Drugs: Cocaine and Marijuana (Ferna Amen). Family History: family history includes Mental Illness in his mother; No Known Problems in his father; Substance Abuse in his mother. The patients home medications have been reviewed. Allergies: Chlorpheniramine-phenylephrine, Penicillins, and Rondec-d [chlophedianol-pseudoephedrine]    -------------------------------------------------- RESULTS -------------------------------------------------  All laboratory and radiology results have been personally reviewed by myself   LABS:  No results found for this visit on 07/13/22.     RADIOLOGY:  Interpreted by Radiologist.  No orders to display ------------------------- NURSING NOTES AND VITALS REVIEWED ---------------------------   The nursing notes within the ED encounter and vital signs as below have been reviewed. /78   Pulse 76   Temp 97.9 °F (36.6 °C)   Resp 20   Ht 5' 10\" (1.778 m)   Wt 145 lb (65.8 kg)   SpO2 96%   BMI 20.81 kg/m²   Oxygen Saturation Interpretation: Normal      ---------------------------------------------------PHYSICAL EXAM--------------------------------------      Constitutional/General: Alert and oriented x3, well appearing, non toxic in NAD  Head: Normocephalic and atraumatic  Eyes: PERRL, EOMI  Mouth: Oropharynx clear, handling secretions, no trismus  Neck: Supple, full ROM,   Pulmonary: Lungs clear to auscultation bilaterally, no wheezes, rales, or rhonchi. Not in respiratory distress  Cardiovascular:  Regular rate and rhythm, no murmurs, gallops, or rubs. 2+ distal pulses  Abdomen: Soft, non tender, non distended,   Extremities: Moves all extremities x 4. Warm and well perfused  Skin: warm and dry without rash  Neurologic: GCS 15,  Psych: Normal Affect      ------------------------------ ED COURSE/MEDICAL DECISION MAKING----------------------  Medications - No data to display      ED COURSE:       Medical Decision Making:    Patient with request to sleep in the lobby is presently homeless no suicidal homicidal ideation    Counseling: The emergency provider has spoken with the patient and discussed todays results, in addition to providing specific details for the plan of care and counseling regarding the diagnosis and prognosis. Questions are answered at this time and they are agreeable with the plan.      --------------------------------- IMPRESSION AND DISPOSITION ---------------------------------    IMPRESSION  1. Homeless        DISPOSITION  Disposition: Discharge  Patient condition is good      NOTE: This report was transcribed using voice recognition software.  Every effort was made to ensure accuracy; however, inadvertent computerized transcription errors may be present     Marine Colonma  07/13/22 7233

## 2022-07-13 NOTE — ED NOTES
Department of Emergency Medicine  FIRST PROVIDER TRIAGE NOTE             Independent MLP           7/13/22  12:34 AM EDT    Date of Encounter: 7/13/22   MRN: [id number]      HPI: Rosa Maria Banerjee. is a 37 y.o. male who presents to the ED for No chief complaint on file. stressed over past situations     ROS: Negative for fever or cough. PE: Gen Appearance/Constitutional: alert  CV: regular rate  Pulm: CTA bilat     Initial Plan of Care: All treatment areas with department are currently occupied.  Plan to order/Initiate the following while awaiting opening in ED:  Monitor   Initiate Treatment-Testing, Proceed toTreatment Area When Bed Available for ED Attending/MLP to Continue Care    Electronically signed by CECILE Toussaint   DD: 7/13/22       CECILE Toussaint  07/13/22 0289

## 2022-07-14 ENCOUNTER — HOSPITAL ENCOUNTER (EMERGENCY)
Age: 42
Discharge: LEFT BEFORE DISCHARGE | End: 2022-07-14
Attending: STUDENT IN AN ORGANIZED HEALTH CARE EDUCATION/TRAINING PROGRAM
Payer: COMMERCIAL

## 2022-07-14 VITALS
OXYGEN SATURATION: 99 % | SYSTOLIC BLOOD PRESSURE: 109 MMHG | TEMPERATURE: 97.8 F | HEART RATE: 75 BPM | DIASTOLIC BLOOD PRESSURE: 66 MMHG

## 2022-07-14 LAB
ACETAMINOPHEN LEVEL: <5 MCG/ML (ref 10–30)
ALBUMIN SERPL-MCNC: 4.3 G/DL (ref 3.5–5.2)
ALP BLD-CCNC: 49 U/L (ref 40–129)
ALT SERPL-CCNC: 11 U/L (ref 0–40)
AMPHETAMINE SCREEN, URINE: NOT DETECTED
ANION GAP SERPL CALCULATED.3IONS-SCNC: 10 MMOL/L (ref 7–16)
AST SERPL-CCNC: 22 U/L (ref 0–39)
BACTERIA: ABNORMAL /HPF
BARBITURATE SCREEN URINE: NOT DETECTED
BASOPHILS ABSOLUTE: 0.05 E9/L (ref 0–0.2)
BASOPHILS RELATIVE PERCENT: 1.7 % (ref 0–2)
BENZODIAZEPINE SCREEN, URINE: NOT DETECTED
BILIRUB SERPL-MCNC: 0.3 MG/DL (ref 0–1.2)
BILIRUBIN URINE: ABNORMAL
BLOOD, URINE: NEGATIVE
BUN BLDV-MCNC: 15 MG/DL (ref 6–20)
CALCIUM SERPL-MCNC: 9.1 MG/DL (ref 8.6–10.2)
CANNABINOID SCREEN URINE: POSITIVE
CHLORIDE BLD-SCNC: 105 MMOL/L (ref 98–107)
CLARITY: CLEAR
CO2: 28 MMOL/L (ref 22–29)
COCAINE METABOLITE SCREEN URINE: POSITIVE
COLOR: YELLOW
CREAT SERPL-MCNC: 0.9 MG/DL (ref 0.7–1.2)
EKG ATRIAL RATE: 51 BPM
EKG P AXIS: 69 DEGREES
EKG P-R INTERVAL: 186 MS
EKG Q-T INTERVAL: 408 MS
EKG QRS DURATION: 100 MS
EKG QTC CALCULATION (BAZETT): 376 MS
EKG R AXIS: 93 DEGREES
EKG T AXIS: 77 DEGREES
EKG VENTRICULAR RATE: 51 BPM
EOSINOPHILS ABSOLUTE: 0.02 E9/L (ref 0.05–0.5)
EOSINOPHILS RELATIVE PERCENT: 0.7 % (ref 0–6)
ETHANOL: <10 MG/DL (ref 0–0.08)
FENTANYL SCREEN, URINE: NOT DETECTED
GFR AFRICAN AMERICAN: >60
GFR NON-AFRICAN AMERICAN: >60 ML/MIN/1.73
GLUCOSE BLD-MCNC: 81 MG/DL (ref 74–99)
GLUCOSE URINE: NEGATIVE MG/DL
HCT VFR BLD CALC: 42.4 % (ref 37–54)
HEMOGLOBIN: 13.3 G/DL (ref 12.5–16.5)
HYALINE CASTS: ABNORMAL /LPF (ref 0–2)
IMMATURE GRANULOCYTES #: 0.01 E9/L
IMMATURE GRANULOCYTES %: 0.3 % (ref 0–5)
KETONES, URINE: NEGATIVE MG/DL
LEUKOCYTE ESTERASE, URINE: NEGATIVE
LYMPHOCYTES ABSOLUTE: 0.82 E9/L (ref 1.5–4)
LYMPHOCYTES RELATIVE PERCENT: 27.3 % (ref 20–42)
Lab: ABNORMAL
MCH RBC QN AUTO: 30.2 PG (ref 26–35)
MCHC RBC AUTO-ENTMCNC: 31.4 % (ref 32–34.5)
MCV RBC AUTO: 96.4 FL (ref 80–99.9)
METHADONE SCREEN, URINE: NOT DETECTED
MONOCYTES ABSOLUTE: 0.38 E9/L (ref 0.1–0.95)
MONOCYTES RELATIVE PERCENT: 12.7 % (ref 2–12)
MUCUS: PRESENT /LPF
NEUTROPHILS ABSOLUTE: 1.72 E9/L (ref 1.8–7.3)
NEUTROPHILS RELATIVE PERCENT: 57.3 % (ref 43–80)
NITRITE, URINE: NEGATIVE
OPIATE SCREEN URINE: NOT DETECTED
OXYCODONE URINE: NOT DETECTED
PDW BLD-RTO: 13.7 FL (ref 11.5–15)
PH UA: 6 (ref 5–9)
PHENCYCLIDINE SCREEN URINE: NOT DETECTED
PLATELET # BLD: 177 E9/L (ref 130–450)
PMV BLD AUTO: 11.5 FL (ref 7–12)
POTASSIUM SERPL-SCNC: 4.1 MMOL/L (ref 3.5–5)
PROTEIN UA: 30 MG/DL
RBC # BLD: 4.4 E12/L (ref 3.8–5.8)
RBC UA: ABNORMAL /HPF (ref 0–2)
SALICYLATE, SERUM: <0.3 MG/DL (ref 0–30)
SODIUM BLD-SCNC: 143 MMOL/L (ref 132–146)
SPECIFIC GRAVITY UA: >=1.03 (ref 1–1.03)
TOTAL CK: 418 U/L (ref 20–200)
TOTAL PROTEIN: 7.2 G/DL (ref 6.4–8.3)
TRICYCLIC ANTIDEPRESSANTS SCREEN SERUM: NEGATIVE NG/ML
TROPONIN, HIGH SENSITIVITY: 8 NG/L (ref 0–11)
UROBILINOGEN, URINE: 1 E.U./DL
WBC # BLD: 3 E9/L (ref 4.5–11.5)
WBC UA: ABNORMAL /HPF (ref 0–5)

## 2022-07-14 PROCEDURE — 80307 DRUG TEST PRSMV CHEM ANLYZR: CPT

## 2022-07-14 PROCEDURE — 99284 EMERGENCY DEPT VISIT MOD MDM: CPT

## 2022-07-14 PROCEDURE — 93005 ELECTROCARDIOGRAM TRACING: CPT | Performed by: NURSE PRACTITIONER

## 2022-07-14 PROCEDURE — 84484 ASSAY OF TROPONIN QUANT: CPT

## 2022-07-14 PROCEDURE — 82550 ASSAY OF CK (CPK): CPT

## 2022-07-14 PROCEDURE — 80053 COMPREHEN METABOLIC PANEL: CPT

## 2022-07-14 PROCEDURE — 80179 DRUG ASSAY SALICYLATE: CPT

## 2022-07-14 PROCEDURE — 80143 DRUG ASSAY ACETAMINOPHEN: CPT

## 2022-07-14 PROCEDURE — 85025 COMPLETE CBC W/AUTO DIFF WBC: CPT

## 2022-07-14 PROCEDURE — 9990000010 HC NO CHARGE VISIT

## 2022-07-14 PROCEDURE — 82077 ASSAY SPEC XCP UR&BREATH IA: CPT

## 2022-07-14 PROCEDURE — 81001 URINALYSIS AUTO W/SCOPE: CPT

## 2022-07-14 NOTE — ED PROVIDER NOTES
Patient was called come back to his room.   Patient was found to have eloped from emergency department I did not see this patient or evaluate this patient      Michial Eisenmenger, MD  07/14/22 1717

## 2022-07-14 NOTE — ED NOTES
Department of Emergency Medicine  FIRST PROVIDER TRIAGE NOTE             Independent MLP           7/14/22  1:19 AM EDT    Date of Encounter: 7/14/22   MRN: [id number]      HPI: Madan Palma. is a 37 y.o. male who presents to the ED for Homeless (\"wondering if i could sleep in the lobby tonight\" - SI/-HI)  Patient presents emergency department for being homeless, denies being suicidal homicidal.  [employer] in to be evaluated by  for placement    ROS: Negative for cp or sob. PE: Gen Appearance/Constitutional: alert  HEENT: NC/NT. PERRLA,  Airway patent. Initial Plan of Care: All treatment areas with department are currently occupied.  Plan to order/Initiate the following while awaiting opening in ED: labs, EKG and Social Work Eval.  Initiate Treatment-Testing, Proceed toTreatment Area When [employer] Available for ED Attending/MLP to Continue Care    Electronically signed by JUICE Luna CNP   DD: 7/14/22         JUICE Luna CNP  07/14/22 0120  ATTENDING PROVIDER ATTESTATION:     Supervising Physician, on-site, available for consultation, non-participatory in the evaluation or care of this patient       Murray Ricardo MD  07/14/22 9437

## 2022-07-15 ENCOUNTER — HOSPITAL ENCOUNTER (EMERGENCY)
Age: 42
Discharge: HOME | End: 2022-07-15
Attending: EMERGENCY MEDICINE
Payer: COMMERCIAL

## 2022-07-15 VITALS
RESPIRATION RATE: 18 BRPM | TEMPERATURE: 97.7 F | BODY MASS INDEX: 23.03 KG/M2 | SYSTOLIC BLOOD PRESSURE: 104 MMHG | DIASTOLIC BLOOD PRESSURE: 70 MMHG | HEIGHT: 72 IN | WEIGHT: 170 LBS | OXYGEN SATURATION: 99 % | HEART RATE: 82 BPM

## 2022-07-15 VITALS
DIASTOLIC BLOOD PRESSURE: 84 MMHG | RESPIRATION RATE: 18 BRPM | SYSTOLIC BLOOD PRESSURE: 128 MMHG | TEMPERATURE: 98.2 F | HEART RATE: 73 BPM | OXYGEN SATURATION: 96 %

## 2022-07-15 DIAGNOSIS — F25.9 SCHIZOAFFECTIVE DISORDER, UNSPECIFIED TYPE (HCC): Primary | ICD-10-CM

## 2022-07-15 DIAGNOSIS — Z59.00 HOMELESSNESS: ICD-10-CM

## 2022-07-15 LAB
ACETAMINOPHEN LEVEL: <5 MCG/ML (ref 10–30)
ALBUMIN SERPL-MCNC: 3.8 G/DL (ref 3.5–5.2)
ALP BLD-CCNC: 44 U/L (ref 40–129)
ALT SERPL-CCNC: 7 U/L (ref 0–40)
ANION GAP SERPL CALCULATED.3IONS-SCNC: 12 MMOL/L (ref 7–16)
AST SERPL-CCNC: 16 U/L (ref 0–39)
BASOPHILS ABSOLUTE: 0.06 E9/L (ref 0–0.2)
BASOPHILS RELATIVE PERCENT: 2.1 % (ref 0–2)
BILIRUB SERPL-MCNC: 0.2 MG/DL (ref 0–1.2)
BUN BLDV-MCNC: 23 MG/DL (ref 6–20)
CALCIUM SERPL-MCNC: 8.5 MG/DL (ref 8.6–10.2)
CHLORIDE BLD-SCNC: 110 MMOL/L (ref 98–107)
CO2: 22 MMOL/L (ref 22–29)
CREAT SERPL-MCNC: 1 MG/DL (ref 0.7–1.2)
EKG ATRIAL RATE: 49 BPM
EKG P AXIS: 63 DEGREES
EKG P-R INTERVAL: 180 MS
EKG Q-T INTERVAL: 424 MS
EKG QRS DURATION: 96 MS
EKG QTC CALCULATION (BAZETT): 383 MS
EKG R AXIS: 95 DEGREES
EKG T AXIS: 78 DEGREES
EKG VENTRICULAR RATE: 49 BPM
EOSINOPHILS ABSOLUTE: 0.07 E9/L (ref 0.05–0.5)
EOSINOPHILS RELATIVE PERCENT: 2.5 % (ref 0–6)
ETHANOL: <10 MG/DL (ref 0–0.08)
GFR AFRICAN AMERICAN: >60
GFR NON-AFRICAN AMERICAN: >60 ML/MIN/1.73
GLUCOSE BLD-MCNC: 91 MG/DL (ref 74–99)
HCT VFR BLD CALC: 41.4 % (ref 37–54)
HEMOGLOBIN: 12.9 G/DL (ref 12.5–16.5)
IMMATURE GRANULOCYTES #: 0 E9/L
IMMATURE GRANULOCYTES %: 0 % (ref 0–5)
INFLUENZA A: NOT DETECTED
INFLUENZA B: NOT DETECTED
LYMPHOCYTES ABSOLUTE: 1.02 E9/L (ref 1.5–4)
LYMPHOCYTES RELATIVE PERCENT: 35.9 % (ref 20–42)
MCH RBC QN AUTO: 30 PG (ref 26–35)
MCHC RBC AUTO-ENTMCNC: 31.2 % (ref 32–34.5)
MCV RBC AUTO: 96.3 FL (ref 80–99.9)
MONOCYTES ABSOLUTE: 0.51 E9/L (ref 0.1–0.95)
MONOCYTES RELATIVE PERCENT: 18 % (ref 2–12)
NEUTROPHILS ABSOLUTE: 1.18 E9/L (ref 1.8–7.3)
NEUTROPHILS RELATIVE PERCENT: 41.5 % (ref 43–80)
PDW BLD-RTO: 13.9 FL (ref 11.5–15)
PLATELET # BLD: 257 E9/L (ref 130–450)
PMV BLD AUTO: 9.8 FL (ref 7–12)
POTASSIUM SERPL-SCNC: 4.3 MMOL/L (ref 3.5–5)
RBC # BLD: 4.3 E12/L (ref 3.8–5.8)
SALICYLATE, SERUM: <0.3 MG/DL (ref 0–30)
SARS-COV-2 RNA, RT PCR: DETECTED
SODIUM BLD-SCNC: 144 MMOL/L (ref 132–146)
TOTAL PROTEIN: 6.6 G/DL (ref 6.4–8.3)
TRICYCLIC ANTIDEPRESSANTS SCREEN SERUM: NEGATIVE NG/ML
WBC # BLD: 2.8 E9/L (ref 4.5–11.5)

## 2022-07-15 PROCEDURE — 93005 ELECTROCARDIOGRAM TRACING: CPT | Performed by: PHYSICIAN ASSISTANT

## 2022-07-15 PROCEDURE — 80053 COMPREHEN METABOLIC PANEL: CPT

## 2022-07-15 PROCEDURE — 99284 EMERGENCY DEPT VISIT MOD MDM: CPT

## 2022-07-15 PROCEDURE — 85025 COMPLETE CBC W/AUTO DIFF WBC: CPT

## 2022-07-15 PROCEDURE — 87636 SARSCOV2 & INF A&B AMP PRB: CPT

## 2022-07-15 PROCEDURE — 80307 DRUG TEST PRSMV CHEM ANLYZR: CPT

## 2022-07-15 PROCEDURE — 80143 DRUG ASSAY ACETAMINOPHEN: CPT

## 2022-07-15 PROCEDURE — 80179 DRUG ASSAY SALICYLATE: CPT

## 2022-07-15 PROCEDURE — 82077 ASSAY SPEC XCP UR&BREATH IA: CPT

## 2022-07-15 PROCEDURE — 99282 EMERGENCY DEPT VISIT SF MDM: CPT

## 2022-07-15 SDOH — ECONOMIC STABILITY - HOUSING INSECURITY: HOMELESSNESS UNSPECIFIED: Z59.00

## 2022-07-15 ASSESSMENT — PAIN - FUNCTIONAL ASSESSMENT
PAIN_FUNCTIONAL_ASSESSMENT: NONE - DENIES PAIN
PAIN_FUNCTIONAL_ASSESSMENT: NONE - DENIES PAIN

## 2022-07-15 NOTE — ED NOTES
Patient sitting in waiting area, with eyes closed, vitals retaken, patient updated on labs, diagnostic testing and room status. Another warm blanket provided to patient no s/sx of distress, zero complaints of pain or discomfort at this time. Will continue to monitor.      Hawk Camarena [facility]  74/29/07 9554

## 2022-07-15 NOTE — ED NOTES
Discharge and follow up instructions discussed with patient and all questions/concerns addressed. Patient left ED in stable condition.         Todd Cabello RN  07/15/22 1300
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
## 2022-07-15 NOTE — ED PROVIDER NOTES
Department of Emergency Medicine   ED  Provider Note  Admit Date/RoomTime: 7/15/2022 11:51 AM  ED Room: [facility]ARRON LUCAS              7/15/22  12:12 PM EDT    HPI: Isela Jansen. 37 y.o. male presents with a complaint of being homeless beginning prior to arrival. Complaint has been constant and became more severe today which is what prompted the visit. Patient reports he has a history of schizoaffective disorder, he reports that he is homeless and looking for a place to stay. He denies suicidal homicidal thoughts. He says he is at his baseline for his mental health. He denies any medical complaints. No chest pain or shortness of breath. No fevers or chills. He says he is looking to get back to one of the local homeless shelters. Review of Systems:   A complete review of systems was performed and pertinent positives and negatives are stated within HPI, all other systems reviewed and are negative.            --------------------------------------------- PAST HISTORY ---------------------------------------------  Past Medical History:  has a past medical history of Depression and Schizoaffective disorder ([facility] [address] Past Surgical History:  has a past surgical history that includes Hip fracture surgery (Left, 9/12/2021); eye surgery (19 years ago); and Hip fracture surgery (Left, 9/28/2021). Social History:  reports that he has been smoking cigars and cigarettes. He has a 24.00 pack-year smoking history. He has never used smokeless tobacco. He reports current alcohol use of about 2.0 standard drinks per week. He reports current drug use. Frequency: 7.00 times per week. Drugs: Cocaine and Marijuana (Carliss Stair). Family History: family history includes Mental Illness in his mother; No Known Problems in his father; Substance Abuse in his mother. Family history is non contributory unless otherwise noted    The patients home medications have been reviewed.     Allergies: Chlorpheniramine-phenylephrine, Penicillins, and Rondec-d [chlophedianol-pseudoephedrine]    -------------------------------------------------- RESULTS -------------------------------------------------  All laboratory and imaging studies were reviewed by myself.     LABS: reviewed and interpreted by me  Results for orders placed or performed during the hospital encounter of 07/15/22   COVID-19 & Influenza Combo    Specimen: Nasopharyngeal Swab   Result Value Ref Range    SARS-CoV-2 RNA, RT PCR DETECTED (A) NOT DETECTED    INFLUENZA A NOT DETECTED NOT DETECTED    INFLUENZA B NOT DETECTED NOT DETECTED   Comprehensive Metabolic Panel   Result Value Ref Range    Sodium 144 132 - 146 mmol/L    Potassium 4.3 3.5 - 5.0 mmol/L    Chloride 110 (H) 98 - 107 mmol/L    CO2 22 22 - 29 mmol/L    Anion Gap 12 7 - 16 mmol/L    Glucose 91 74 - 99 mg/dL    BUN 23 (H) 6 - 20 mg/dL    CREATININE 1.0 0.7 - 1.2 mg/dL    GFR Non-African American >60 >=60 mL/min/1.73    GFR African American >60     Calcium 8.5 (L) 8.6 - 10.2 mg/dL    Total Protein 6.6 6.4 - 8.3 g/dL    Albumin 3.8 3.5 - 5.2 g/dL    Total Bilirubin 0.2 0.0 - 1.2 mg/dL    Alkaline Phosphatase 44 40 - 129 U/L    ALT 7 0 - 40 U/L    AST 16 0 - 39 U/L   CBC with Auto Differential   Result Value Ref Range    WBC 2.8 (L) 4.5 - 11.5 E9/L    RBC 4.30 3.80 - 5.80 E12/L    Hemoglobin 12.9 12.5 - 16.5 g/dL    Hematocrit 41.4 37.0 - 54.0 %    MCV 96.3 80.0 - 99.9 fL    MCH 30.0 26.0 - 35.0 pg    MCHC 31.2 (L) 32.0 - 34.5 %    RDW 13.9 11.5 - 15.0 fL    Platelets 113 894 - 689 E9/L    MPV 9.8 7.0 - 12.0 fL    Neutrophils % 41.5 (L) 43.0 - 80.0 %    Immature Granulocytes % 0.0 0.0 - 5.0 %    Lymphocytes % 35.9 20.0 - 42.0 %    Monocytes % 18.0 (H) 2.0 - 12.0 %    Eosinophils % 2.5 0.0 - 6.0 %    Basophils % 2.1 (H) 0.0 - 2.0 %    Neutrophils Absolute 1.18 (L) 1.80 - 7.30 E9/L    Immature Granulocytes # 0.00 E9/L    Lymphocytes Absolute 1.02 (L) 1.50 - 4.00 E9/L    Monocytes Absolute 0.51 0.10 - 0.95 E9/L    Eosinophils Absolute 0.07 0.05 - 0.50 E9/L    Basophils Absolute 0.06 0.00 - 0.20 E9/L   Serum Drug Screen   Result Value Ref Range    Ethanol Lvl <10 mg/dL    Acetaminophen Level <5.0 (L) 10.0 - 82.7 mcg/mL    Salicylate, Serum <6.9 0.0 - 30.0 mg/dL    TCA Scrn NEGATIVE Cutoff:300 ng/mL   EKG 12 Lead   Result Value Ref Range    Ventricular Rate 49 BPM    Atrial Rate 49 BPM    P-R Interval 180 ms    QRS Duration 96 ms    Q-T Interval 424 ms    QTc Calculation (Bazett) 383 ms    P Axis 63 degrees    R Axis 95 degrees    T Axis 78 degrees       RADIOLOGY:  Interpreted by Radiologist.  No orders to display     EKG Interpretation  Interpreted by emergency department physician, Dr. Moe Self     8/12/22  Time: 9023    Rhythm: sinus bradycardia  Rate: bradycardia  Axis: normal  Conduction: normal  ST Segments: no acute change  T Waves: no acute change    Clinical Impression: Sinus bradycardia, no acute ischemic changes  Comparison to Old EKG  stable    ------------------------- NURSING NOTES AND VITALS REVIEWED ---------------------------   The nursing notes within the ED encounter and vital signs as below have been reviewed. /70   Pulse 82   Temp 97.7 °F (36.5 °C) (Oral)   Resp 18   Ht 6' (1.829 m)   Wt 170 lb (77.1 kg)   SpO2 99%   BMI 23.06 kg/m²   Oxygen Saturation Interpretation: Normal        ---------------------------------------------------PHYSICAL EXAM--------------------------------------    Constitutional/General: Alert and oriented x3   Head: Normocephalic, atraumatic  Eyes: PERRL, EOMI  ENT: Oropharynx clear, handling secretions, no trismus  Neck: Supple, full ROM, no meningeal signs  Pulmonary: Lungs clear to auscultation bilaterally, no wheezes, rales, or rhonchi. Not in respiratory distress  Cardiovascular:  Regular rate and rhythm, no murmurs, gallops, or rubs. 2+ distal pulses  Abdomen: Soft, non tender, non distended, +BS, no rebound, guarding, or rigidity.  No pulsatile masses appreciated  Extremities: Moves all extremities x 4. Warm and well perfused, no clubbing, cyanosis, or edema. Capillary refill <3 seconds  Skin: warm and dry without rash  Neurologic: GCS 15, no focal deficits  Psych: normal Affect      ------------------------------------------ PROGRESS NOTES ------------------------------------------     Medical decision making:  No SI or HI. At mental health baseline. No psychosis. He denies complaints. Resources given for homeless shelters    Consultations:   Behavioral Health    Re-Evaluations:        Counseling: The emergency provider has spoken with thepatient and discussed todays results, in addition to providing specific details for the plan of care and counseling regarding the diagnosis and prognosis. Questions are answered at this time and they are agreeable with the plan.         --------------------------------- IMPRESSION AND DISPOSITION ---------------------------------    IMPRESSION  1. [facility]    2.  Homelessness        DISPOSITION  Disposition: discharge home  Patient condition is stable           Alverto Tucker MD  07/15/22 1218       Alverto Tucker MD  08/12/22 5485

## 2022-07-15 NOTE — ED NOTES
Department of Emergency Medicine  FIRST PROVIDER TRIAGE NOTE             Independent MLP           7/15/22  9:02 AM EDT    Date of Encounter: 7/15/22   MRN: [id number]      HPI: Lindsay Sánchez is a 37 y.o. male who presents to the ED for Psychiatric Evaluation (Patient states that he is off his medication. Denies si or hi at this time. )    Patient states that he discharged from residential to go to [place] and after being there for a few months he needed some air therefore wanted to step out and after he stepped out he now needs to be cleared before going back to [place]. Patient states he likes where he is out of [place] and would like to return. Patient has no suicidal or homicidal ideations at this time. Patient is off of his medications at this time    ROS: Negative for cp, sob, abd pain, back pain, fever, or cough. PE: Gen Appearance/Constitutional: alert  HEENT: NC/NT. PERRLA,  Airway patent. Neck: supple     Initial Plan of Care: All treatment areas with department are currently occupied.  Plan to order/Initiate the following while awaiting opening in ED: labs, EKG, and Social Work Eval.  Initiate Treatment-Testing, Proceed toTreatment Area When [facility] Available for ED Attending/MLP to [employer] signed by Aries Rosado PA-C   DD: 7/15/22       Aries Rosado PA-C  07/15/22 9660

## 2022-07-15 NOTE — ED NOTES
Department of Emergency Medicine  FIRST PROVIDER TRIAGE NOTE             Independent MLP           7/15/22  3:35 AM EDT    Date of Encounter: 7/15/22   MRN: [id number]      HPI: Russel Salinas. is a 37 y.o. male who presents to the ED for Psychiatric Evaluation (Patient states that he is off his medication. Denies si or hi at this time. )     Patient presents wanting to be evaluated by . Patient was actually just seen here yesterday. He already had labs completed but left before  can evaluate him. Patient reports that he is tired of being on the streets and states he has not been on any of his meds and wants to be evaluated by  for possible admission. I suicidal or homicidal ideations. ROS: Negative for cp or sob. PE: Gen Appearance/Constitutional: alert  HEENT: NC/NT. PERRLA,  Airway patent. Initial Plan of Care: All treatment areas with department are currently occupied.  Plan to order/Initiate the following while awaiting opening in ED: Social Work Eval.  Initiate Treatment-Testing, Proceed toTreatment Area When [facility] Available for ED Attending/MLP to Continue Care    Electronically signed by JUICE Lui CNP   DD: 7/15/22       JUICE Lui CNP  07/15/22 7739  ATTENDING PROVIDER ATTESTATION:     Supervising Physician, on-site, available for consultation, non-participatory in the evaluation or care of this patient       Xu Wynn MD  07/15/22 8400

## 2022-07-16 ENCOUNTER — HOSPITAL ENCOUNTER (EMERGENCY)
Age: 42
Discharge: HOME | End: 2022-07-16
Attending: EMERGENCY MEDICINE
Payer: COMMERCIAL

## 2022-07-16 DIAGNOSIS — U07.1 COVID-19: Primary | ICD-10-CM

## 2022-07-16 NOTE — ED NOTES
Patient states he is here to be evaluated for admission to [address].    Patient placed in hospital clothes & belongings stowed in locker [address] Jono [address], RN  07/16/22 1134       [address] Vikash Foster RN  07/16/22 114

## 2022-07-16 NOTE — ED PROVIDER NOTES
HPI:  7/16/22,   Time: 11:53 AM EDT         Kiah Haley is a 37 y.o. male presenting to the ED for admission to [address] [place] [address], beginning 1 day ago. The complaint has been persistent, mild in severity, and worsened by nothing. Patient was seen yesterday and evaluated and had a COVID-positive test he denies having any symptoms whatsoever for COVID. Patient states he has had all his shots for COVID and cannot possibly have COVID and does not believe me    ROS:   Pertinent positives and negatives are stated within HPI, all other systems reviewed and are negative.  --------------------------------------------- PAST HISTORY ---------------------------------------------  Past Medical History:  has a past medical history of Depression and Schizoaffective disorder ([address] Past Surgical History:  has a past surgical history that includes Hip fracture surgery (Left, 9/12/2021); eye surgery (19 years ago); and Hip fracture surgery (Left, 9/28/2021). Social History:  reports that he has been smoking cigars and cigarettes. He has a 24.00 pack-year smoking history. He has never used smokeless tobacco. He reports current alcohol use of about 2.0 standard drinks per week. He reports current drug use. Frequency: 7.00 times per week. Drugs: Cocaine and Marijuana ([facility]). Family History: family history includes Mental Illness in his mother; No Known Problems in his father; Substance Abuse in his mother. The patients home medications have been reviewed. Allergies: Chlorpheniramine-phenylephrine, Penicillins, and Rondec-d [chlophedianol-pseudoephedrine]    -------------------------------------------------- RESULTS -------------------------------------------------  All laboratory and radiology results have been personally reviewed by myself   LABS:  No results found for this visit on 07/16/22.     RADIOLOGY:  Interpreted by Radiologist.  No orders to display       ------------------------- NURSING NOTES AND VITALS REVIEWED ---------------------------   The nursing notes within the ED encounter and vital signs as below have been reviewed. /84   Pulse 73   Temp 98.2 °F (36.8 °C) (Oral)   Resp 18   SpO2 96%   Oxygen Saturation Interpretation: Normal      ---------------------------------------------------PHYSICAL EXAM--------------------------------------      Constitutional/General: Alert and oriented x3, well appearing, non toxic in NAD  Head: NC/AT  Eyes: PERRL, EOMI  Mouth: Oropharynx clear, handling secretions, no trismus  Neck: Supple, full ROM, no meningeal signs  Pulmonary: Lungs clear to auscultation bilaterally, no wheezes, rales, or rhonchi. Not in respiratory distress  Cardiovascular:  Regular rate and rhythm, no murmurs, gallops, or rubs. 2+ distal pulses  Abdomen: Soft, non tender, non distended,   Extremities: Moves all extremities x 4. Warm and well perfused  Skin: warm and dry without rash  Neurologic: GCS 15,  Psych: Normal Affect      ------------------------------ ED COURSE/MEDICAL DECISION MAKING----------------------  Medications - No data to display      Medical Decision Making:    Patient asked if I could assist him with a ride to [place] and I informed him I cannot order a taxi for an individual who has Jane. Counseling: The emergency provider has spoken with the patient and discussed todays results, in addition to providing specific details for the plan of care and counseling regarding the diagnosis and prognosis.   Questions are answered at this time and they are agreeable with the plan.      --------------------------------- IMPRESSION AND DISPOSITION ---------------------------------    IMPRESSION  1. COVID-19        DISPOSITION  Disposition: Discharge to home  Patient condition is stable                 Gay Schwartz MD  07/16/22 6633

## 2022-07-17 LAB
ACETAMINOPHEN LEVEL: <5 MCG/ML (ref 10–30)
ALBUMIN SERPL-MCNC: 4 G/DL (ref 3.5–5.2)
ALP BLD-CCNC: 51 U/L (ref 40–129)
ALT SERPL-CCNC: 9 U/L (ref 0–40)
AMPHETAMINE SCREEN, URINE: NOT DETECTED
ANION GAP SERPL CALCULATED.3IONS-SCNC: 10 MMOL/L (ref 7–16)
AST SERPL-CCNC: 17 U/L (ref 0–39)
BACTERIA: ABNORMAL /HPF
BARBITURATE SCREEN URINE: NOT DETECTED
BASOPHILS ABSOLUTE: 0.06 E9/L (ref 0–0.2)
BASOPHILS RELATIVE PERCENT: 1.1 % (ref 0–2)
BENZODIAZEPINE SCREEN, URINE: NOT DETECTED
BILIRUB SERPL-MCNC: 0.3 MG/DL (ref 0–1.2)
BILIRUBIN URINE: NEGATIVE
BLOOD, URINE: NEGATIVE
BUN BLDV-MCNC: 19 MG/DL (ref 6–20)
CALCIUM SERPL-MCNC: 8.8 MG/DL (ref 8.6–10.2)
CANNABINOID SCREEN URINE: POSITIVE
CHLORIDE BLD-SCNC: 104 MMOL/L (ref 98–107)
CLARITY: CLEAR
CO2: 25 MMOL/L (ref 22–29)
COCAINE METABOLITE SCREEN URINE: POSITIVE
COLOR: YELLOW
CREAT SERPL-MCNC: 1 MG/DL (ref 0.7–1.2)
EOSINOPHILS ABSOLUTE: 0.04 E9/L (ref 0.05–0.5)
EOSINOPHILS RELATIVE PERCENT: 0.7 % (ref 0–6)
ETHANOL: 18 MG/DL (ref 0–0.08)
FENTANYL SCREEN, URINE: NOT DETECTED
GFR AFRICAN AMERICAN: >60
GFR NON-AFRICAN AMERICAN: >60 ML/MIN/1.73
GLUCOSE BLD-MCNC: 93 MG/DL (ref 74–99)
GLUCOSE URINE: NEGATIVE MG/DL
HCT VFR BLD CALC: 41.8 % (ref 37–54)
HEMOGLOBIN: 13.5 G/DL (ref 12.5–16.5)
IMMATURE GRANULOCYTES #: 0.02 E9/L
IMMATURE GRANULOCYTES %: 0.4 % (ref 0–5)
KETONES, URINE: ABNORMAL MG/DL
LEUKOCYTE ESTERASE, URINE: NEGATIVE
LYMPHOCYTES ABSOLUTE: 1.41 E9/L (ref 1.5–4)
LYMPHOCYTES RELATIVE PERCENT: 26.1 % (ref 20–42)
Lab: ABNORMAL
MCH RBC QN AUTO: 30.3 PG (ref 26–35)
MCHC RBC AUTO-ENTMCNC: 32.3 % (ref 32–34.5)
MCV RBC AUTO: 93.9 FL (ref 80–99.9)
METHADONE SCREEN, URINE: NOT DETECTED
MONOCYTES ABSOLUTE: 0.42 E9/L (ref 0.1–0.95)
MONOCYTES RELATIVE PERCENT: 7.8 % (ref 2–12)
MUCUS: PRESENT /LPF
NEUTROPHILS ABSOLUTE: 3.46 E9/L (ref 1.8–7.3)
NEUTROPHILS RELATIVE PERCENT: 63.9 % (ref 43–80)
NITRITE, URINE: NEGATIVE
OPIATE SCREEN URINE: NOT DETECTED
OXYCODONE URINE: NOT DETECTED
PDW BLD-RTO: 13.4 FL (ref 11.5–15)
PH UA: 6.5 (ref 5–9)
PHENCYCLIDINE SCREEN URINE: NOT DETECTED
PLATELET # BLD: 262 E9/L (ref 130–450)
PMV BLD AUTO: 10.3 FL (ref 7–12)
POTASSIUM REFLEX MAGNESIUM: 4.4 MMOL/L (ref 3.5–5)
PROTEIN UA: NEGATIVE MG/DL
RBC # BLD: 4.45 E12/L (ref 3.8–5.8)
RBC UA: ABNORMAL /HPF (ref 0–2)
SALICYLATE, SERUM: <0.3 MG/DL (ref 0–30)
SODIUM BLD-SCNC: 139 MMOL/L (ref 132–146)
SPECIFIC GRAVITY UA: 1.02 (ref 1–1.03)
TOTAL PROTEIN: 7.1 G/DL (ref 6.4–8.3)
TRICYCLIC ANTIDEPRESSANTS SCREEN SERUM: NEGATIVE NG/ML
UROBILINOGEN, URINE: 2 E.U./DL
WBC # BLD: 5.4 E9/L (ref 4.5–11.5)
WBC UA: ABNORMAL /HPF (ref 0–5)

## 2022-07-17 PROCEDURE — 99284 EMERGENCY DEPT VISIT MOD MDM: CPT

## 2022-07-17 PROCEDURE — 81001 URINALYSIS AUTO W/SCOPE: CPT

## 2022-07-17 PROCEDURE — 93005 ELECTROCARDIOGRAM TRACING: CPT | Performed by: PHYSICIAN ASSISTANT

## 2022-07-17 PROCEDURE — 80179 DRUG ASSAY SALICYLATE: CPT

## 2022-07-17 PROCEDURE — 85025 COMPLETE CBC W/AUTO DIFF WBC: CPT

## 2022-07-17 PROCEDURE — 80143 DRUG ASSAY ACETAMINOPHEN: CPT

## 2022-07-17 PROCEDURE — 80307 DRUG TEST PRSMV CHEM ANLYZR: CPT

## 2022-07-17 PROCEDURE — 82077 ASSAY SPEC XCP UR&BREATH IA: CPT

## 2022-07-17 PROCEDURE — 36415 COLL VENOUS BLD VENIPUNCTURE: CPT

## 2022-07-17 PROCEDURE — 80053 COMPREHEN METABOLIC PANEL: CPT

## 2022-07-17 NOTE — ED NOTES
I called [facility] - pt cannot be admitted to their facility due to a conflict of interest, but can be referred to [facility], [facility]  07/17/22 1941

## 2022-07-17 NOTE — ED NOTES
I CALLED CHARGE NURSE WHO ADVISED THAT PT IS REFUSING TO PROVIDE A URINE.       MAJOR Deluna  07/17/22 4990

## 2022-07-17 NOTE — ED NOTES
Behavioral Health Crisis Assessment    Chief Complaint:  homeless    Mental Status Exam:  baseline behaviors, cooperative, somewhat anxious, denies SI, no HI and no hallucinations. Legal Status  [x] Voluntary:  [] Involuntary, Issued by:    Gender  [] Male [x] Female [] Transgender  [] Other    Sexual Orientation    [x] Heterosexual [] Homosexual [] Bisexual [] Other    Brief Clinical Summary:  Pt presenting to the ER reporting that he needs medical clearance to return back to [facility]. Pt said that he feels anxious and in need of crisis support, is homeless and has poor decision making skills. Pt denies SI, no HI and no hallucinations. Collateral Information:   Population navigator is involved with this pt's ongoing community involvement. Per Albino Snellen,  He continues to be non-compliant with outpatient mental health treatment. He has a hx of treating with [facility] and [facility] in the past, however due to his non-compliance a [place] [facility] is unable to be established regarding patient's typical behaviors, symptomology, chronic substance use, homelessness, etc. A diversion plan cannot be created at this time.      Risk Factors:  Homelessness  No established MH provider  Non compliant with services  Drug abuse  Alcohol abuse  Mental Health Diagnosis  Limited family/friend support  Access to lethal means (firearms)  Trouble with the law  Off prescribed Psych meds  Financial stressors  Poor communication skills    Protective Factors:  Initiated this ER visit  Help-seeking behavior  Supportive spouse  Goals & Hope for the future  No access to weapons     Suicidal Ideations:   [] Reports:    [] Past [] Present   [x] Denies    Suicide Attempts:  [] Reports:   [x] Denies    C-SSRS Screening Completed by RN: Current Suicide Risk:  [x] No Risk [] Low [] Moderate [] High    Homicidal Ideations  [] Reports:   [] Past [] Present   [x] Denies     Self Injurious/Self Mutilation Behaviors:   [] Reports:    [] Past [] Present   [x] Denies    Hallucinations/Delusions   [] Reports:   [x] Denies     Substance Use/Alcohol Use/Addiction:   [x] Reports:   [] Denies   [x] SBIRT Screen Complete. Current or Past Substance Abuse Treatment  [] Yes, When and Where:  [x] No    Current or Past Mental Health Treatment:  [] Yes, When and Where:  [x] No    Legal Issues:  []  Yes (Specify)  [x]  No    Access to Weapons:  []  Yes (Specify)  [x]  No    Trauma History  [] Reports:  [x] Denies     Living Situation: homeless    Employment: no job    Education Level: unknown    Violence Risk Screening:        Have you ever thought about hurting someone? []  No  [x]  Yes (Ask the questions listed below)   When? Did you follow through with the thoughts? [] No     [x] Yes- When and what happened? jailed  2. Have you ever threatened anyone? []  No  [x]  Yes (Ask the questions listed below)   When and what happened? Have you ever threatened someone with a gun, knife or other weapon? []  No  [x]  Yes - When and what happened? jailed  2. Have you ever had an order of protection taken out against you? [x]  Yes []  No  3. Have you ever been arrested due to violence? [x]  Yes []  No  4. Have you ever been cruel to animals?  []  Yes [x]  No    After consideration of C-SSRS screening results, C-SSRS assessments, and this professional's assessment the patient's overall suicide risk assessed to be:  [x] No Risk  [] Low   [] Moderate   [] High     [x] Discussed current suicide risk, protective and risk factors with RN and ED Physician     Disposition   [] Home:   [] Outpatient Provider:   [x] Crisis Unit: per pt's request  [] Inpatient Psychiatric Unit:  [x] Other: MAJOR Egan  07/17/22 0138

## 2022-07-18 ENCOUNTER — HOSPITAL ENCOUNTER (EMERGENCY)
Age: 42
Discharge: HOME | End: 2022-07-18
Attending: EMERGENCY MEDICINE
Payer: COMMERCIAL

## 2022-07-18 VITALS
DIASTOLIC BLOOD PRESSURE: 78 MMHG | RESPIRATION RATE: 18 BRPM | HEART RATE: 62 BPM | OXYGEN SATURATION: 97 % | TEMPERATURE: 98.5 F | SYSTOLIC BLOOD PRESSURE: 119 MMHG

## 2022-07-18 DIAGNOSIS — K04.7 DENTAL ABSCESS: ICD-10-CM

## 2022-07-18 DIAGNOSIS — Z59.00 HOMELESSNESS: Primary | ICD-10-CM

## 2022-07-18 LAB
EKG ATRIAL RATE: 47 BPM
EKG P AXIS: 64 DEGREES
EKG P-R INTERVAL: 184 MS
EKG Q-T INTERVAL: 408 MS
EKG QRS DURATION: 100 MS
EKG QTC CALCULATION (BAZETT): 361 MS
EKG R AXIS: 85 DEGREES
EKG T AXIS: 65 DEGREES
EKG VENTRICULAR RATE: 47 BPM

## 2022-07-18 PROCEDURE — 6370000000 HC RX 637 (ALT 250 FOR IP): Performed by: PHYSICIAN ASSISTANT

## 2022-07-18 RX ORDER — CLINDAMYCIN HYDROCHLORIDE 300 MG/1
300 CAPSULE ORAL 3 TIMES DAILY
Qty: 21 CAPSULE | Refills: 0 | Status: SHIPPED | OUTPATIENT
Start: 2022-07-18 | End: 2022-07-18

## 2022-07-18 RX ORDER — IBUPROFEN 800 MG/1
800 TABLET ORAL ONCE
Status: COMPLETED | OUTPATIENT
Start: 2022-07-18 | End: 2022-07-18

## 2022-07-18 RX ORDER — IBUPROFEN 600 MG/1
600 TABLET ORAL 3 TIMES DAILY PRN
Qty: 30 TABLET | Refills: 0 | Status: SHIPPED | OUTPATIENT
Start: 2022-07-18 | End: 2022-07-18

## 2022-07-18 RX ORDER — CLINDAMYCIN HYDROCHLORIDE 150 MG/1
300 CAPSULE ORAL ONCE
Status: COMPLETED | OUTPATIENT
Start: 2022-07-18 | End: 2022-07-18

## 2022-07-18 RX ORDER — CLINDAMYCIN HYDROCHLORIDE 300 MG/1
300 CAPSULE ORAL 3 TIMES DAILY
Qty: 21 CAPSULE | Refills: 0 | Status: SHIPPED | OUTPATIENT
Start: 2022-07-18 | End: 2022-07-25

## 2022-07-18 RX ORDER — IBUPROFEN 600 MG/1
600 TABLET ORAL 3 TIMES DAILY PRN
Qty: 30 TABLET | Refills: 0 | Status: SHIPPED | OUTPATIENT
Start: 2022-07-18 | End: 2022-08-12 | Stop reason: ALTCHOICE

## 2022-07-18 RX ADMIN — IBUPROFEN 800 MG: 800 TABLET, FILM COATED ORAL at 00:46

## 2022-07-18 RX ADMIN — CLINDAMYCIN HYDROCHLORIDE 300 MG: 150 CAPSULE ORAL at 00:46

## 2022-07-18 SDOH — ECONOMIC STABILITY - HOUSING INSECURITY: HOMELESSNESS UNSPECIFIED: Z59.00

## 2022-07-18 ASSESSMENT — ENCOUNTER SYMPTOMS
NAUSEA: 0
CHEST TIGHTNESS: 0
CONSTIPATION: 0
DIARRHEA: 0
ABDOMINAL PAIN: 0
COLOR CHANGE: 0
COUGH: 0
SHORTNESS OF BREATH: 0
VOMITING: 0
BACK PAIN: 0

## 2022-07-18 ASSESSMENT — PAIN DESCRIPTION - ORIENTATION: ORIENTATION: RIGHT

## 2022-07-18 ASSESSMENT — PAIN DESCRIPTION - DESCRIPTORS: DESCRIPTORS: THROBBING;SHARP

## 2022-07-18 ASSESSMENT — PAIN DESCRIPTION - LOCATION: LOCATION: JAW

## 2022-07-18 ASSESSMENT — PAIN SCALES - GENERAL: PAINLEVEL_OUTOF10: 8

## 2022-07-18 NOTE — ED NOTES
Per [facility] PAC patient is to follow up at 0800 at the dental clinic as a walk in. Pt was explained the above.       Afshan Womack RN  07/18/22 4064

## 2022-07-18 NOTE — ED PROVIDER NOTES
ED Attending shared visit  CC: No        Department of Emergency Medicine   ED  Provider Note  Admit Date/RoomTime: 7/18/2022 12:32 AM  ED Room: 31/31  HPI:  7/18/22, Time: 12:42 AM EDT      25-year-old homeless male with a history of schizoaffective disorder and depression presenting to the emergency department requesting medical clearance to go back to [place]. Patient was discharged earlier in the day after being diagnosed with COVID and supposed to go back to [place]. Patient states that [facility] would not accept him because he did not have any papers showing medical clearance. Patient states he Claudia Doty been in the streets\" all day. He is returning now for medical clearance. Patient currently denies any drug use, chest pain, shortness of breath, SI or HI, visual or auditory hallucinations. The history is provided by the patient. No  was used. REVIEW OF SYSTEMS:  Review of Systems   Constitutional:  Negative for activity change, chills, fatigue and fever. Respiratory:  Negative for cough, chest tightness and shortness of breath. Cardiovascular:  Negative for chest pain, palpitations and leg swelling. Gastrointestinal:  Negative for abdominal pain, constipation, diarrhea, nausea and vomiting. Genitourinary:  Negative for dysuria, flank pain, frequency and hematuria. Musculoskeletal:  Negative for back pain, neck pain and neck stiffness. Skin:  Negative for color change, pallor and rash. Neurological:  Negative for dizziness, light-headedness and headaches. Psychiatric/Behavioral:  Negative for agitation, behavioral problems and confusion. Pertinent positives and negatives are stated within HPI, all other systems reviewed and are negative.      --------------------------------------------- PAST HISTORY ---------------------------------------------  Past Medical History:  has a past medical history of Depression and Schizoaffective disorder ([facility]     Past Result Value Ref Range    Sodium 139 132 - 146 mmol/L    Potassium reflex Magnesium 4.4 3.5 - 5.0 mmol/L    Chloride 104 98 - 107 mmol/L    CO2 25 22 - 29 mmol/L    Anion Gap 10 7 - 16 mmol/L    Glucose 93 74 - 99 mg/dL    BUN 19 6 - 20 mg/dL    CREATININE 1.0 0.7 - 1.2 mg/dL    GFR Non-African American >60 >=60 mL/min/1.73    GFR African American >60     Calcium 8.8 8.6 - 10.2 mg/dL    Total Protein 7.1 6.4 - 8.3 g/dL    Albumin 4.0 3.5 - 5.2 g/dL    Total Bilirubin 0.3 0.0 - 1.2 mg/dL    Alkaline Phosphatase 51 40 - 129 U/L    ALT 9 0 - 40 U/L    AST 17 0 - 39 U/L   Urinalysis with Microscopic   Result Value Ref Range    Color, UA Yellow Straw/Yellow    Clarity, UA Clear Clear    Glucose, Ur Negative Negative mg/dL    Bilirubin Urine Negative Negative    Ketones, Urine TRACE (A) Negative mg/dL    Specific Gravity, UA 1.020 1.005 - 1.030    Blood, Urine Negative Negative    pH, UA 6.5 5.0 - 9.0    Protein, UA Negative Negative mg/dL    Urobilinogen, Urine 2.0 (A) <2.0 E.U./dL    Nitrite, Urine Negative Negative    Leukocyte Esterase, Urine Negative Negative    Mucus, UA Present (A) None Seen /LPF    WBC, UA 0-1 0 - 5 /HPF    RBC, UA 1-3 0 - 2 /HPF    Bacteria, UA FEW (A) None Seen /HPF   URINE DRUG SCREEN   Result Value Ref Range    Amphetamine Screen, Urine NOT DETECTED Negative <1000 ng/mL    Barbiturate Screen, Ur NOT DETECTED Negative < 200 ng/mL    Benzodiazepine Screen, Urine NOT DETECTED Negative < 200 ng/mL    Cannabinoid Scrn, Ur POSITIVE (A) Negative < 50ng/mL    Cocaine Metabolite Screen, Urine POSITIVE (A) Negative < 300 ng/mL    Opiate Scrn, Ur NOT DETECTED Negative < 300ng/mL    PCP Screen, Urine NOT DETECTED Negative < 25 ng/mL    Methadone Screen, Urine NOT DETECTED Negative <300 ng/mL    Oxycodone Urine NOT DETECTED Negative <100 ng/mL    FENTANYL SCREEN, URINE NOT DETECTED Negative <1 ng/mL    Drug Screen Comment: see below    Serum Drug Screen   Result Value Ref Range    Ethanol Lvl 18 mg/dL Acetaminophen Level <5.0 (L) 10.0 - 49.1 mcg/mL    Salicylate, Serum <9.6 0.0 - 30.0 mg/dL    TCA Scrn NEGATIVE Cutoff:300 ng/mL   EKG 12 Lead   Result Value Ref Range    Ventricular Rate 47 BPM    Atrial Rate 47 BPM    P-R Interval 184 ms    QRS Duration 100 ms    Q-T Interval 408 ms    QTc Calculation (Bazett) 361 ms    P Axis 64 degrees    R Axis 85 degrees    T Axis 65 degrees       RADIOLOGY:  Interpreted by Radiologist.  No orders to display       ------------------------- NURSING NOTES AND VITALS REVIEWED ---------------------------   The nursing notes within the ED encounter and vital signs as below have been reviewed. /76   Pulse 67   Temp 98.5 °F (36.9 °C)   Resp 18   SpO2 98%   Oxygen Saturation Interpretation: Normal      ---------------------------------------------------PHYSICAL EXAM--------------------------------------    Physical Exam  Vitals and nursing note reviewed. Constitutional:       General: He is not in acute distress. Appearance: Normal appearance. He is well-developed. He is not ill-appearing. HENT:      Head: Normocephalic and atraumatic. Mouth/Throat:      Mouth: Mucous membranes are moist.      Comments: Overall poor dentition. 2nd RT bicuspid absent with decay at the gingiva. No periapical abscess. Large amount of edema and mass palpated over RT maxilla. Eyes:      Extraocular Movements: Extraocular movements intact. Conjunctiva/sclera: Conjunctivae normal.      Pupils: Pupils are equal, round, and reactive to light. Cardiovascular:      Rate and Rhythm: Normal rate and regular rhythm. Heart sounds: Normal heart sounds. No murmur heard. Pulmonary:      Effort: Pulmonary effort is normal. No respiratory distress. Breath sounds: Normal breath sounds. Musculoskeletal:         General: No swelling, tenderness or deformity. Normal range of motion. Cervical back: Normal range of motion and neck supple. No rigidity.    Skin:     General: Skin is warm and dry. Capillary Refill: Capillary refill takes less than 2 seconds. Findings: No erythema. Neurological:      General: No focal deficit present. Mental Status: He is alert and oriented to person, place, and time. Mental status is at baseline. Coordination: Coordination normal.   Psychiatric:         Mood and Affect: Mood normal.         Behavior: Behavior normal.         Thought Content: Thought content normal.            ------------------------------ ED COURSE/MEDICAL DECISION MAKING----------------------  Medications   clindamycin (CLEOCIN) capsule 300 mg (300 mg Oral Given 7/18/22 0046)   ibuprofen (ADVIL;MOTRIN) tablet 800 mg (800 mg Oral Given 7/18/22 0046)         ED COURSE:     No orders to display       Procedures:  Procedures     Medical Decision Making:   MDM  41-year-old male who is well-known to our emergency department due to being homeless and frequently needing placement. Patient initially checked in 24 hours ago stating that he needed to be medically cleared to go back to [place] although he was just discharged 24 hours prior to that with clearance but never went to [place]. On arrival patient had no complaints at all. He signed the waiting room for nearly 24 hours because he would not provide a urine sample and he cannot be placed without a urine drug screen. Patient found to be positive for cocaine and marijuana. Labs otherwise unremarkable. Social work evaluated the patient and attempted placement for the patient. He was declined at [place] [address] because of a conflict of interest and there were no beds available at [facility]. Patient was advised to follow-up with [facility] in the morning. When patient went to be discharged from the waiting room, he had notable moderate edema to the right side of his face and was complaining of right-sided dental pain. He states that it developed while he was sleeping in the waiting room over the last 24 hours.   Patient did not notify anyone of this. He was still found to be afebrile hemodynamically stable but did appear to have a moderate right-sided dental abscess with facial edema. Patient brought back to the emergency department and given ibuprofen and clindamycin. Dental resident was contacted due to concern for poor follow-up. The patient will be discharged from the emergency department at 8 AM to go directly to the dental clinic to have the abscess drained. He is given a prescription for ibuprofen and clindamycin. Patient agreeable to the plan. Counseling: The emergency provider has spoken with the patient and discussed todays results, in addition to providing specific details for the plan of care and counseling regarding the diagnosis and prognosis. Questions are answered at this time and they are agreeable with the plan.      --------------------------------- IMPRESSION AND DISPOSITION ---------------------------------    IMPRESSION  1. Homelessness    2. Dental abscess        DISPOSITION  Disposition: Discharge to dental clinic with [facility] after. Patient condition is good      Electronically signed by Nakia Vidales PA-C   DD: 7/18/22  **This report was transcribed using voice recognition software. Every effort was made to ensure accuracy; however, inadvertent computerized transcription errors may be present.   END OF ED PROVIDER NOTE         Nakia Vidales PA-C  07/18/22 9753

## 2022-07-18 NOTE — DISCHARGE INSTRUCTIONS
FOLLOW UP WITH [facility] DURING WALK IN HOURS 11AM -1PM    [facility]  Location: [address]  Phone number: [phone]  Fax number: [phone]       Help Hotline 553 480-1853 or 7-357.103.2638 or 211   24 hour crisis line for the following [place] [address] [address]   [email or web address]. [place]    PEER WARM LINE: [phone]  Monday through Friday 4pm to 8pm and Saturday 1pm-3pm   You can call during these times to talk with a peer support person as needed

## 2022-07-18 NOTE — ED NOTES
LABS RESULTED AND FAXED TO [facility]    AWAITING [address], [facility]  07/17/22 2203

## 2022-07-18 NOTE — ED NOTES
Re-explained to patient-pt is go go straight to dental clinic from ED. Aware of location of dental clinic. Re-explained to patient after dental clinic pat is to go to [facility]. Pt is able to repeat back instruction. Pt was provided two RX ATB and Advil. Pt is aware to take Rx's to [facility]. Pt discharged.       Donetta Koyanagi, RN  07/18/22 2142

## 2022-07-25 ENCOUNTER — HOSPITAL ENCOUNTER (EMERGENCY)
Age: 42
Discharge: LEFT BEFORE DISCHARGE | End: 2022-07-25
Attending: EMERGENCY MEDICINE
Payer: COMMERCIAL

## 2022-07-25 VITALS
HEIGHT: 71 IN | OXYGEN SATURATION: 96 % | BODY MASS INDEX: 23.8 KG/M2 | SYSTOLIC BLOOD PRESSURE: 111 MMHG | TEMPERATURE: 97.9 F | DIASTOLIC BLOOD PRESSURE: 80 MMHG | RESPIRATION RATE: 16 BRPM | WEIGHT: 170 LBS | HEART RATE: 85 BPM

## 2022-07-25 VITALS
HEIGHT: 72 IN | RESPIRATION RATE: 16 BRPM | TEMPERATURE: 97.7 F | DIASTOLIC BLOOD PRESSURE: 64 MMHG | SYSTOLIC BLOOD PRESSURE: 101 MMHG | OXYGEN SATURATION: 99 % | HEART RATE: 65 BPM | BODY MASS INDEX: 23.03 KG/M2 | WEIGHT: 170 LBS

## 2022-07-25 DIAGNOSIS — R45.851 SUICIDAL IDEATION: Primary | ICD-10-CM

## 2022-07-25 DIAGNOSIS — F25.9 SCHIZOAFFECTIVE DISORDER, UNSPECIFIED TYPE (HCC): ICD-10-CM

## 2022-07-25 LAB
ACETAMINOPHEN LEVEL: <5 MCG/ML (ref 10–30)
ALBUMIN SERPL-MCNC: 3.6 G/DL (ref 3.5–5.2)
ALP BLD-CCNC: 47 U/L (ref 40–129)
ALT SERPL-CCNC: 10 U/L (ref 0–40)
ANION GAP SERPL CALCULATED.3IONS-SCNC: 8 MMOL/L (ref 7–16)
AST SERPL-CCNC: 15 U/L (ref 0–39)
BASOPHILS ABSOLUTE: 0.07 E9/L (ref 0–0.2)
BASOPHILS RELATIVE PERCENT: 1.2 % (ref 0–2)
BILIRUB SERPL-MCNC: <0.2 MG/DL (ref 0–1.2)
BUN BLDV-MCNC: 13 MG/DL (ref 6–20)
CALCIUM SERPL-MCNC: 8.6 MG/DL (ref 8.6–10.2)
CHLORIDE BLD-SCNC: 109 MMOL/L (ref 98–107)
CO2: 26 MMOL/L (ref 22–29)
CREAT SERPL-MCNC: 0.9 MG/DL (ref 0.7–1.2)
EOSINOPHILS ABSOLUTE: 0.18 E9/L (ref 0.05–0.5)
EOSINOPHILS RELATIVE PERCENT: 3.1 % (ref 0–6)
ETHANOL: <10 MG/DL (ref 0–0.08)
GFR AFRICAN AMERICAN: >60
GFR NON-AFRICAN AMERICAN: >60 ML/MIN/1.73
GLUCOSE BLD-MCNC: 91 MG/DL (ref 74–99)
HCT VFR BLD CALC: 38.4 % (ref 37–54)
HEMOGLOBIN: 12.3 G/DL (ref 12.5–16.5)
IMMATURE GRANULOCYTES #: 0.02 E9/L
IMMATURE GRANULOCYTES %: 0.3 % (ref 0–5)
LYMPHOCYTES ABSOLUTE: 1.79 E9/L (ref 1.5–4)
LYMPHOCYTES RELATIVE PERCENT: 30.5 % (ref 20–42)
MCH RBC QN AUTO: 30.1 PG (ref 26–35)
MCHC RBC AUTO-ENTMCNC: 32 % (ref 32–34.5)
MCV RBC AUTO: 93.9 FL (ref 80–99.9)
MONOCYTES ABSOLUTE: 0.54 E9/L (ref 0.1–0.95)
MONOCYTES RELATIVE PERCENT: 9.2 % (ref 2–12)
NEUTROPHILS ABSOLUTE: 3.27 E9/L (ref 1.8–7.3)
NEUTROPHILS RELATIVE PERCENT: 55.7 % (ref 43–80)
PDW BLD-RTO: 13 FL (ref 11.5–15)
PLATELET # BLD: 406 E9/L (ref 130–450)
PMV BLD AUTO: 9.5 FL (ref 7–12)
POTASSIUM REFLEX MAGNESIUM: 3.8 MMOL/L (ref 3.5–5)
RBC # BLD: 4.09 E12/L (ref 3.8–5.8)
SALICYLATE, SERUM: <0.3 MG/DL (ref 0–30)
SODIUM BLD-SCNC: 143 MMOL/L (ref 132–146)
TOTAL CK: 413 U/L (ref 20–200)
TOTAL PROTEIN: 6.3 G/DL (ref 6.4–8.3)
TRICYCLIC ANTIDEPRESSANTS SCREEN SERUM: NEGATIVE NG/ML
WBC # BLD: 5.9 E9/L (ref 4.5–11.5)

## 2022-07-25 PROCEDURE — 85025 COMPLETE CBC W/AUTO DIFF WBC: CPT

## 2022-07-25 PROCEDURE — 82550 ASSAY OF CK (CPK): CPT

## 2022-07-25 PROCEDURE — 87636 SARSCOV2 & INF A&B AMP PRB: CPT

## 2022-07-25 PROCEDURE — 80179 DRUG ASSAY SALICYLATE: CPT

## 2022-07-25 PROCEDURE — 80053 COMPREHEN METABOLIC PANEL: CPT

## 2022-07-25 PROCEDURE — 82077 ASSAY SPEC XCP UR&BREATH IA: CPT

## 2022-07-25 PROCEDURE — 93005 ELECTROCARDIOGRAM TRACING: CPT | Performed by: PHYSICIAN ASSISTANT

## 2022-07-25 PROCEDURE — 99284 EMERGENCY DEPT VISIT MOD MDM: CPT

## 2022-07-25 PROCEDURE — 80143 DRUG ASSAY ACETAMINOPHEN: CPT

## 2022-07-25 PROCEDURE — 80307 DRUG TEST PRSMV CHEM ANLYZR: CPT

## 2022-07-25 PROCEDURE — 99281 EMR DPT VST MAYX REQ PHY/QHP: CPT

## 2022-07-25 ASSESSMENT — ENCOUNTER SYMPTOMS
NAUSEA: 0
SHORTNESS OF BREATH: 0
VOMITING: 0
SORE THROAT: 0
BACK PAIN: 0
EYE REDNESS: 0
SINUS PRESSURE: 0
CONSTIPATION: 0
SINUS PAIN: 0
EYE PAIN: 0
ABDOMINAL PAIN: 0
DIARRHEA: 0
CHEST TIGHTNESS: 0
COUGH: 0

## 2022-07-25 ASSESSMENT — LIFESTYLE VARIABLES
HOW OFTEN DO YOU HAVE A DRINK CONTAINING ALCOHOL: MONTHLY OR LESS
HOW MANY STANDARD DRINKS CONTAINING ALCOHOL DO YOU HAVE ON A TYPICAL DAY: 1 OR 2
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
## 2022-07-26 ENCOUNTER — HOSPITAL ENCOUNTER (EMERGENCY)
Age: 42
Discharge: HOME | End: 2022-07-26
Attending: EMERGENCY MEDICINE
Payer: COMMERCIAL

## 2022-07-26 DIAGNOSIS — F25.9 SCHIZOAFFECTIVE DISORDER, UNSPECIFIED TYPE (HCC): Primary | ICD-10-CM

## 2022-07-26 LAB
EKG ATRIAL RATE: 53 BPM
EKG P AXIS: 50 DEGREES
EKG P-R INTERVAL: 180 MS
EKG Q-T INTERVAL: 406 MS
EKG QRS DURATION: 98 MS
EKG QTC CALCULATION (BAZETT): 380 MS
EKG R AXIS: 95 DEGREES
EKG T AXIS: 66 DEGREES
EKG VENTRICULAR RATE: 53 BPM
INFLUENZA A: NOT DETECTED
INFLUENZA B: NOT DETECTED
SARS-COV-2 RNA, RT PCR: NOT DETECTED

## 2022-07-26 ASSESSMENT — ENCOUNTER SYMPTOMS
COUGH: 0
COLOR CHANGE: 0
CONSTIPATION: 0
VOMITING: 0
WHEEZING: 0
SHORTNESS OF BREATH: 0
ABDOMINAL PAIN: 0
DIARRHEA: 0
RECTAL PAIN: 0
NAUSEA: 0
BACK PAIN: 0
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
## 2022-07-26 NOTE — ED NOTES
Per [place], [place], patient not pink slipped at this time. Patient cooperative and waiting in [facility].       Dianna Farrar RN  07/25/22 7342

## 2022-07-26 NOTE — ED NOTES
Patient eating breakfast. Updated on need for urine sample states he is unable to urinate at this time      Harley Arevalo RN  07/26/22 3107

## 2022-07-26 NOTE — DISCHARGE INSTRUCTIONS
WALK-IN HOURS AT [facility] ARE 11AM-130PM      [facility]  [address] [facility] ON Monday AND Tuesday 4-6    SHOWERS ON Wednesday [phone]        Help Hotline 53-21045036 or 9-446.182.6173 or 211   24 hour crisis line for the [address] Helen Menard    PEER WARM LINE: [phone]  Monday through Friday 4pm to 8pm and Saturday 1pm-3pm   You can call during these times to talk with a peer support person as needed
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
## 2022-07-27 ENCOUNTER — HOSPITAL ENCOUNTER (EMERGENCY)
Age: 42
Discharge: HOME | End: 2022-07-27
Attending: EMERGENCY MEDICINE
Payer: COMMERCIAL

## 2022-07-27 VITALS
SYSTOLIC BLOOD PRESSURE: 110 MMHG | DIASTOLIC BLOOD PRESSURE: 67 MMHG | TEMPERATURE: 98.2 F | HEIGHT: 71 IN | HEART RATE: 72 BPM | WEIGHT: 170 LBS | BODY MASS INDEX: 23.8 KG/M2 | RESPIRATION RATE: 14 BRPM | OXYGEN SATURATION: 98 %

## 2022-07-27 DIAGNOSIS — F22 PARANOIA (HCC): ICD-10-CM

## 2022-07-27 DIAGNOSIS — F14.10 COCAINE ABUSE (HCC): ICD-10-CM

## 2022-07-27 DIAGNOSIS — Z59.00 HOMELESSNESS: Primary | ICD-10-CM

## 2022-07-27 LAB
ACETAMINOPHEN LEVEL: <5 MCG/ML (ref 10–30)
ALBUMIN SERPL-MCNC: 3.8 G/DL (ref 3.5–5.2)
ALP BLD-CCNC: 48 U/L (ref 40–129)
ALT SERPL-CCNC: 11 U/L (ref 0–40)
AMPHETAMINE SCREEN, URINE: NOT DETECTED
ANION GAP SERPL CALCULATED.3IONS-SCNC: 14 MMOL/L (ref 7–16)
AST SERPL-CCNC: 18 U/L (ref 0–39)
BACTERIA: ABNORMAL /HPF
BARBITURATE SCREEN URINE: NOT DETECTED
BASOPHILS ABSOLUTE: 0.07 E9/L (ref 0–0.2)
BASOPHILS RELATIVE PERCENT: 1.1 % (ref 0–2)
BENZODIAZEPINE SCREEN, URINE: NOT DETECTED
BILIRUB SERPL-MCNC: 0.2 MG/DL (ref 0–1.2)
BILIRUBIN URINE: ABNORMAL
BLOOD, URINE: ABNORMAL
BUN BLDV-MCNC: 11 MG/DL (ref 6–20)
CALCIUM SERPL-MCNC: 8.9 MG/DL (ref 8.6–10.2)
CANNABINOID SCREEN URINE: POSITIVE
CHLORIDE BLD-SCNC: 106 MMOL/L (ref 98–107)
CLARITY: CLEAR
CO2: 25 MMOL/L (ref 22–29)
COCAINE METABOLITE SCREEN URINE: POSITIVE
COLOR: YELLOW
CREAT SERPL-MCNC: 0.9 MG/DL (ref 0.7–1.2)
EKG ATRIAL RATE: 60 BPM
EKG P AXIS: 51 DEGREES
EKG P-R INTERVAL: 180 MS
EKG Q-T INTERVAL: 400 MS
EKG QRS DURATION: 104 MS
EKG QTC CALCULATION (BAZETT): 400 MS
EKG R AXIS: 76 DEGREES
EKG T AXIS: 54 DEGREES
EKG VENTRICULAR RATE: 60 BPM
EOSINOPHILS ABSOLUTE: 0.11 E9/L (ref 0.05–0.5)
EOSINOPHILS RELATIVE PERCENT: 1.7 % (ref 0–6)
EPITHELIAL CELLS, UA: ABNORMAL /HPF
ETHANOL: <10 MG/DL (ref 0–0.08)
FENTANYL SCREEN, URINE: NOT DETECTED
GFR AFRICAN AMERICAN: >60
GFR NON-AFRICAN AMERICAN: >60 ML/MIN/1.73
GLUCOSE BLD-MCNC: 111 MG/DL (ref 74–99)
GLUCOSE URINE: NEGATIVE MG/DL
HCT VFR BLD CALC: 39.3 % (ref 37–54)
HEMOGLOBIN: 12.6 G/DL (ref 12.5–16.5)
IMMATURE GRANULOCYTES #: 0.03 E9/L
IMMATURE GRANULOCYTES %: 0.5 % (ref 0–5)
KETONES, URINE: ABNORMAL MG/DL
LEUKOCYTE ESTERASE, URINE: NEGATIVE
LYMPHOCYTES ABSOLUTE: 1.76 E9/L (ref 1.5–4)
LYMPHOCYTES RELATIVE PERCENT: 27.8 % (ref 20–42)
Lab: ABNORMAL
MCH RBC QN AUTO: 29.8 PG (ref 26–35)
MCHC RBC AUTO-ENTMCNC: 32.1 % (ref 32–34.5)
MCV RBC AUTO: 92.9 FL (ref 80–99.9)
METHADONE SCREEN, URINE: NOT DETECTED
MONOCYTES ABSOLUTE: 0.52 E9/L (ref 0.1–0.95)
MONOCYTES RELATIVE PERCENT: 8.2 % (ref 2–12)
MUCUS: PRESENT /LPF
NEUTROPHILS ABSOLUTE: 3.85 E9/L (ref 1.8–7.3)
NEUTROPHILS RELATIVE PERCENT: 60.7 % (ref 43–80)
NITRITE, URINE: NEGATIVE
OPIATE SCREEN URINE: NOT DETECTED
OXYCODONE URINE: NOT DETECTED
PDW BLD-RTO: 13.2 FL (ref 11.5–15)
PH UA: 5.5 (ref 5–9)
PHENCYCLIDINE SCREEN URINE: NOT DETECTED
PLATELET # BLD: 439 E9/L (ref 130–450)
PMV BLD AUTO: 9.5 FL (ref 7–12)
POTASSIUM SERPL-SCNC: 3.4 MMOL/L (ref 3.5–5)
PROTEIN UA: NEGATIVE MG/DL
RBC # BLD: 4.23 E12/L (ref 3.8–5.8)
RBC UA: ABNORMAL /HPF (ref 0–2)
SALICYLATE, SERUM: <0.3 MG/DL (ref 0–30)
SODIUM BLD-SCNC: 145 MMOL/L (ref 132–146)
SPECIFIC GRAVITY UA: >=1.03 (ref 1–1.03)
TOTAL CK: 539 U/L (ref 20–200)
TOTAL PROTEIN: 6.7 G/DL (ref 6.4–8.3)
TRICYCLIC ANTIDEPRESSANTS SCREEN SERUM: NEGATIVE NG/ML
TROPONIN, HIGH SENSITIVITY: 8 NG/L (ref 0–11)
UROBILINOGEN, URINE: 0.2 E.U./DL
WBC # BLD: 6.3 E9/L (ref 4.5–11.5)
WBC UA: ABNORMAL /HPF (ref 0–5)

## 2022-07-27 PROCEDURE — 80179 DRUG ASSAY SALICYLATE: CPT

## 2022-07-27 PROCEDURE — 85025 COMPLETE CBC W/AUTO DIFF WBC: CPT

## 2022-07-27 PROCEDURE — 99284 EMERGENCY DEPT VISIT MOD MDM: CPT

## 2022-07-27 PROCEDURE — 80307 DRUG TEST PRSMV CHEM ANLYZR: CPT

## 2022-07-27 PROCEDURE — 80143 DRUG ASSAY ACETAMINOPHEN: CPT

## 2022-07-27 PROCEDURE — 93005 ELECTROCARDIOGRAM TRACING: CPT | Performed by: NURSE PRACTITIONER

## 2022-07-27 PROCEDURE — 84484 ASSAY OF TROPONIN QUANT: CPT

## 2022-07-27 PROCEDURE — 80053 COMPREHEN METABOLIC PANEL: CPT

## 2022-07-27 PROCEDURE — 82550 ASSAY OF CK (CPK): CPT

## 2022-07-27 PROCEDURE — 81001 URINALYSIS AUTO W/SCOPE: CPT

## 2022-07-27 PROCEDURE — 82077 ASSAY SPEC XCP UR&BREATH IA: CPT

## 2022-07-27 SDOH — ECONOMIC STABILITY - HOUSING INSECURITY: HOMELESSNESS UNSPECIFIED: Z59.00

## 2022-07-27 ASSESSMENT — PAIN DESCRIPTION - FREQUENCY: FREQUENCY: CONTINUOUS

## 2022-07-27 ASSESSMENT — PAIN SCALES - GENERAL: PAINLEVEL_OUTOF10: 8

## 2022-07-27 ASSESSMENT — LIFESTYLE VARIABLES
HOW MANY STANDARD DRINKS CONTAINING ALCOHOL DO YOU HAVE ON A TYPICAL DAY: 3 OR 4
HOW OFTEN DO YOU HAVE A DRINK CONTAINING ALCOHOL: 2-3 TIMES A WEEK

## 2022-07-27 ASSESSMENT — PAIN DESCRIPTION - ORIENTATION: ORIENTATION: LEFT;RIGHT

## 2022-07-27 ASSESSMENT — PAIN - FUNCTIONAL ASSESSMENT
PAIN_FUNCTIONAL_ASSESSMENT: ACTIVITIES ARE NOT PREVENTED
PAIN_FUNCTIONAL_ASSESSMENT: 0-10

## 2022-07-27 ASSESSMENT — PAIN DESCRIPTION - DESCRIPTORS: DESCRIPTORS: ACHING;CRAMPING;TENDER;SORE

## 2022-07-27 ASSESSMENT — PAIN DESCRIPTION - LOCATION: LOCATION: FOOT

## 2022-07-27 ASSESSMENT — PAIN DESCRIPTION - PAIN TYPE: TYPE: ACUTE PAIN
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
## 2022-07-27 NOTE — ED PROVIDER NOTES
Department of Emergency Medicine   ED  Provider Note  Admit Date/RoomTime: 7/27/2022  3:39 AM  ED [facility]          History of Present Illness:  7/27/22, Time: 3:53 AM EDT  Chief Complaint   Patient presents with    Paranoid     Audio/visual hallucinations                  Mo Liang is a 37 y.o. male presenting to the ED for paranoia and homelessness, beginning ongoing worse tonight. The complaint has been persistent, moderate in severity, and worsened by drug abuse. Patient presents for paranoia anxiety and homelessness. Presents frequently to the emergency department. Ongoing cocaine use, states used twice on 7/26, states afterwards he felt very anxious and paranoid and did not feel safe to be out on the streets. He specifically denies suicidal homicidal ideation. States he just felt unsafe being on the street. Not interested in speaking with  at this time. Requesting to eat Jell-O. Review of Systems:   Pertinent positives and negatives are stated within HPI, all other systems reviewed and are negative.        --------------------------------------------- PAST HISTORY ---------------------------------------------  Past Medical History:  has a past medical history of Depression and Schizoaffective disorder ([facility] [address] Past Surgical History:  has a past surgical history that includes Hip fracture surgery (Left, 9/12/2021); eye surgery (19 years ago); and Hip fracture surgery (Left, 9/28/2021). Social History:  reports that he has been smoking cigarettes. He has a 12.00 pack-year smoking history. He has never used smokeless tobacco. He reports current alcohol use of about 2.0 standard drinks per week. He reports current drug use. Frequency: 7.00 times per week. Drugs: Cocaine and Marijuana (Cady Selam). Family History: family history includes Mental Illness in his mother; No Known Problems in his father; Substance Abuse in his mother. . Unless otherwise noted, family history Granulocytes % 0.5 0.0 - 5.0 %    Lymphocytes % 27.8 20.0 - 42.0 %    Monocytes % 8.2 2.0 - 12.0 %    Eosinophils % 1.7 0.0 - 6.0 %    Basophils % 1.1 0.0 - 2.0 %    Neutrophils Absolute 3.85 1.80 - 7.30 E9/L    Immature Granulocytes # 0.03 E9/L    Lymphocytes Absolute 1.76 1.50 - 4.00 E9/L    Monocytes Absolute 0.52 0.10 - 0.95 E9/L    Eosinophils Absolute 0.11 0.05 - 0.50 E9/L    Basophils Absolute 0.07 0.00 - 0.20 E9/L   Comprehensive Metabolic Panel   Result Value Ref Range    Sodium 145 132 - 146 mmol/L    Potassium 3.4 (L) 3.5 - 5.0 mmol/L    Chloride 106 98 - 107 mmol/L    CO2 25 22 - 29 mmol/L    Anion Gap 14 7 - 16 mmol/L    Glucose 111 (H) 74 - 99 mg/dL    BUN 11 6 - 20 mg/dL    Creatinine 0.9 0.7 - 1.2 mg/dL    GFR Non-African American >60 >=60 mL/min/1.73    GFR African American >60     Calcium 8.9 8.6 - 10.2 mg/dL    Total Protein 6.7 6.4 - 8.3 g/dL    Albumin 3.8 3.5 - 5.2 g/dL    Total Bilirubin 0.2 0.0 - 1.2 mg/dL    Alkaline Phosphatase 48 40 - 129 U/L    ALT 11 0 - 40 U/L    AST 18 0 - 39 U/L   Urine Drug Screen   Result Value Ref Range    Amphetamine Screen, Urine NOT DETECTED Negative <1000 ng/mL    Barbiturate Screen, Ur NOT DETECTED Negative < 200 ng/mL    Benzodiazepine Screen, Urine NOT DETECTED Negative < 200 ng/mL    Cannabinoid Scrn, Ur POSITIVE (A) Negative < 50ng/mL    Cocaine Metabolite Screen, Urine POSITIVE (A) Negative < 300 ng/mL    Opiate Scrn, Ur NOT DETECTED Negative < 300ng/mL    PCP Screen, Urine NOT DETECTED Negative < 25 ng/mL    Methadone Screen, Urine NOT DETECTED Negative <300 ng/mL    Oxycodone Urine NOT DETECTED Negative <100 ng/mL    FENTANYL SCREEN, URINE NOT DETECTED Negative <1 ng/mL    Drug Screen Comment: see below    Serum Drug Screen   Result Value Ref Range    Ethanol Lvl <10 mg/dL    Acetaminophen Level <5.0 (L) 10.0 - 66.5 mcg/mL    Salicylate, Serum <0.5 0.0 - 30.0 mg/dL    TCA Scrn NEGATIVE Cutoff:300 ng/mL   Urinalysis   Result Value Ref Range    Color, UA Yellow Straw/Yellow    Clarity, UA Clear Clear    Glucose, Ur Negative Negative mg/dL    Bilirubin Urine SMALL (A) Negative    Ketones, Urine TRACE (A) Negative mg/dL    Specific Gravity, UA >=1.030 1.005 - 1.030    Blood, Urine TRACE-INTACT Negative    pH, UA 5.5 5.0 - 9.0    Protein, UA Negative Negative mg/dL    Urobilinogen, Urine 0.2 <2.0 E.U./dL    Nitrite, Urine Negative Negative    Leukocyte Esterase, Urine Negative Negative   Microscopic Urinalysis   Result Value Ref Range    Mucus, UA Present (A) None Seen /LPF    WBC, UA 0-1 0 - 5 /HPF    RBC, UA 1-3 0 - 2 /HPF    Epithelial Cells, UA RARE /HPF    Bacteria, UA FEW (A) None Seen /HPF   ,       RADIOLOGY:  Interpreted by Radiologist unless otherwise specified  No orders to display                   ------------------------- NURSING NOTES AND VITALS REVIEWED ---------------------------   The nursing notes within the ED encounter and vital signs as below have been reviewed by myself  /67   Pulse 72   Temp 98.2 °F (36.8 °C) (Oral)   Resp 14   Ht 5' 11\" (1.803 m)   Wt 170 lb (77.1 kg)   SpO2 98%   BMI 23.71 kg/m²     Oxygen Saturation Interpretation: Normal         The patients available past medical records and past encounters were reviewed. ------------------------------ ED COURSE/MEDICAL DECISION MAKING----------------------  Medications - No data to display                 Medical Decision Making:     I, Dr. Anastasiia Bonilla am the primary provider of record    Work-up appears to be at baseline. Patient was observed in the department. States he is not interested in rehab or speaking with . He is medically clear, he request discharge. Re-Evaluations:        Re-evaluation. Patients symptoms are improving  Repeat physical examination is improved -states he feels well, tolerated p.o.   Requesting discharge        This patient's ED course included: a personal history and physicial examination, re-evaluation prior to disposition, and complex medical decision making and emergency management    This patient has been closely monitored during their ED course. Counseling: The emergency provider has spoken with the patient and discussed todays results, in addition to providing specific details for the plan of care and counseling regarding the diagnosis and prognosis. Questions are answered at this time and they are agreeable with the plan.       --------------------------------- IMPRESSION AND DISPOSITION ---------------------------------    IMPRESSION  1. Homelessness    2. [facility] ([facility]    3. Paranoia ([facility]        DISPOSITION  Disposition: Discharge to home  Patient condition is stable        NOTE: This report was transcribed using voice recognition software. Every effort was made to ensure accuracy; however, inadvertent computerized transcription errors may be present       Augustus Schlatter, DO  07/27/22 0537    EKG: This EKG is signed and interpreted by me.     Rate: 66  Rhythm: Sinus  Interpretation: Sinus rhythm with premature ventricular complexes or fusion complexes  Septal infarct , age undetermined  Abnormal ECG     Comparison: stable as compared to patient's most recent EKG         Augustus Schlatter, DO  08/16/22 2785

## 2022-07-27 NOTE — DISCHARGE INSTRUCTIONS
[facility]     For Residents of [facility] Y[place]ir, [place] and [employer] Help-Hotline FIRST [phone]    For a complete list of treatment centers in your area please visit:   [place] website at   [email or web address]    * Alcoholics Anonymous [phone]                *Narcotics Anonymous [phone], [place], [address] [facility]  www.Hello Agent/  *Residents of:  [facility] NETWORK [facility] Only for [employer]. ALL other [employer] on [facility] Accepted. *Payments Accepted: Medicaid or Self-pay ONLY for [employer]. Private Insurance accepted for Outpatient Programs   *Services Offered: Outpatient Behavioral Health & Chemical Dependency. [facility] [phone]  [address] [facility], [address] [place] [address] 210   *Payments Accepted: Medicaid, self-pay  [employer].      Noom [phone]  [facility]. [address]  www.[employer]. [employer]/   *Residents of:  hi5 Only   *Payments Accepted: Medicaid or [address] Offered: Outpatient Behavioral Health & Chemical Dependency. [facility] [phone]  or [phone], [address]  www. [employer]/   *Residents of:  [facility] Franck   *Payments Accepted: Self-Pay and [employer]   *Services Offered: Detox & Outpatient Substance Abuse Programs. [address] [facility] [place] [phone]) [phone]. [phone], [place], [address] [place]  www.[email or web address]/  (MUST CALL DAILY FOR BED AVAILABILITY)   *Residents of:  [address] Residents   *Payments Accepted: Medicaid or Self-Pay   *Services Offered: Detox & Outpatient Substance Abuse Programs.       [address] [facility] [phone] 1025 [facility] NEAR [address]  www.[employer]Link. [employer]/  *Residents of:  [address] [facility] 54 Residents   *Payments Accepted:  Self-Pay, [employer], Some Medicaid for age <20 Years. *Services Offered: [facility] [phone] or [phone]  www. [facility].org/     *Residents of:  [facility]   *Payments Accepted: [employer], [facility] or Self-Pay. *Services Offered: Following Detox, Outpatient Substance Abuse and [place]  [address] [facility] [address]    [facility], [address] [facility]   [place], [address]    [address]   4 [facility]    2[address] [facility] [place], [address]   [place], [address]      [address] [facility] [address] [phone] [address] [facility] [address], [place], [address] [place] [address]  www. Discourse/  *Residents of:  All [facility]   *Payments Accepted: [facility]. *Services Offered: Intensive Outpatient Substance Abuse and [facility] [phone]  [facility], [address]  www. [facility]. org/  *Residents of:  [facility]. *Payments Accepted: Private Insurance, [facility] and Self-Pay/Sliding Scale. *Services Offered: Substance Abuse Outpatient Programs. [address] [facility],[address] [phone]  [address] [facility], [address]  www.[facility]. org/  *Residents of:  [facility]. *Payments Accepted: Private Insurance, [facility] and Self-Pay. *Services Offered: Substance Abuse Outpatient Programs. Dottie Mo [phone]  [address] [place], [place] [address], [address]  www.[email or web address]/  *Residents of:  [address] [facility] 54 Residents. *Payments Accepted: Private Insurance, [place] and Self-pay. *Services Offered: Substance Abuse Outpatient Programs. Bj Santos [phone]  www. BidModo.org/  [facility] Office:    Adult Office:    Children's Office:  [phone] [address]. [facility] CARE [place], [address]    [address] [facility], [address]               [facility], [address]  *Residents of:  [facility] FOR BEHAV[facility]AL [facility] ONLY for Self-Pay. All Counties accepted with [employer], Medicare & Medicaid   *Payments Accepted: Medicare, Medicaid, Private Insurance and Self-Pay  *Services Offered: [address] for Counseling. [facility] / [facility] [phone] 4775 [facility], [address] [facility]  www. [facility]. org/  *Payments Accepted: Sliding Scale for Fees. *Services Offered: Call for Available Services. [employer] [phone] [address], [address]  www. CareXtend/  (MUST CALL DAILY FOR BED AVAILABILITY)  *Residents of:  [facility]. *Payments Accepted: Medicaid and Self-Pay. *Services Offered: Detox & Substance Abuse Outpatient Programs.

## 2022-07-30 ENCOUNTER — HOSPITAL ENCOUNTER (EMERGENCY)
Age: 42
Discharge: HOME | End: 2022-07-31
Attending: STUDENT IN AN ORGANIZED HEALTH CARE EDUCATION/TRAINING PROGRAM
Payer: COMMERCIAL

## 2022-07-30 DIAGNOSIS — F22 PARANOID (HCC): Primary | ICD-10-CM

## 2022-07-30 DIAGNOSIS — Z59.00 HOMELESS: ICD-10-CM

## 2022-07-30 PROCEDURE — 99284 EMERGENCY DEPT VISIT MOD MDM: CPT

## 2022-07-30 SDOH — ECONOMIC STABILITY - HOUSING INSECURITY: HOMELESSNESS UNSPECIFIED: Z59.00

## 2022-07-31 VITALS
HEIGHT: 72 IN | HEART RATE: 64 BPM | DIASTOLIC BLOOD PRESSURE: 60 MMHG | SYSTOLIC BLOOD PRESSURE: 127 MMHG | RESPIRATION RATE: 16 BRPM | BODY MASS INDEX: 20.32 KG/M2 | WEIGHT: 150 LBS | OXYGEN SATURATION: 97 % | TEMPERATURE: 98.2 F

## 2022-07-31 LAB
INFLUENZA A: NOT DETECTED
INFLUENZA B: NOT DETECTED
SARS-COV-2 RNA, RT PCR: NOT DETECTED

## 2022-07-31 PROCEDURE — 87636 SARSCOV2 & INF A&B AMP PRB: CPT

## 2022-07-31 PROCEDURE — 93005 ELECTROCARDIOGRAM TRACING: CPT | Performed by: PHYSICIAN ASSISTANT

## 2022-07-31 RX ORDER — ACETAMINOPHEN 325 MG/1
650 TABLET ORAL EVERY 6 HOURS PRN
Status: DISCONTINUED | OUTPATIENT
Start: 2022-07-31 | End: 2022-07-31 | Stop reason: HOSPADM

## 2022-07-31 ASSESSMENT — PAIN - FUNCTIONAL ASSESSMENT: PAIN_FUNCTIONAL_ASSESSMENT: NONE - DENIES PAIN

## 2022-07-31 ASSESSMENT — PATIENT HEALTH QUESTIONNAIRE - PHQ9: SUM OF ALL RESPONSES TO PHQ QUESTIONS 1-9: 7

## 2022-07-31 NOTE — ED NOTES
Behavioral Health Crisis Assessment      Chief Complaint: Pt is a [address] yo male who presents as a walk-in, pt not on a pink slip, pt presents to ED quite frequently, this admission reports he is experiencing paranoia, reports recent break up with girl friend, \"I wanted a place to be safe for the night. \"  Denies suicidal ideation intent or plan, denies homicidal ideation intent or plan, denies acute psychotic sym[toms. Mental Status Exam: Alert, oriented x4, mood reported as somewhat depressed, affect is restricted but within normal limits, eye contact good, speech normal in rate flow and volume, tangential at times, behavior is cooperative, appropriate, no signs of agitation,     Legal Status  [x] Voluntary:  [] Involuntary, Issued by:    Gender  [x] Male [] Female [] Transgender  [] Other    Sexual Orientation    [x] Heterosexual [] Homosexual [] Bisexual [] Other    Brief Clinical Summary: Pt reports he is not open for psychiatric or counseling services in community, hx with [employer], long hx of non-compliance with medication, hx of psych in-patient admissions, last at [facility] was 12/4/2021, dx:Schizoaffective disorder, poly substance abuse.      Collateral Information: None    Risk Factors:  Mental health diagnosis: Schizoaffective Disorder  Meds non-compliance  Homelessness  Substance abuse  Lack of stable, safe housing  Lack of access to essential needs  Recent break-up    Protective Factors:  Initiated this ER visit  Help-seeking behaviors  Steady income  No access to weapons Goals & hopes for the future    Suicidal Ideations:   [x] Reports:    [x] Past [] Present   [] Denies    Suicide Attempts:  [] Reports:   [x] Denies    C-SSRS Screening Completed by RN: Current Suicide Risk:  [] No Risk [] Low [] Moderate [] High    Homicidal Ideations  [] Reports:   [] Past [] Present   [x] Denies     Self Injurious/Self Mutilation Behaviors:   [] Reports:    [] Past [] Present   [x] Denies    Hallucinations/Delusions   [x] Reports: Yazidism, paranoid  [] Denies     Substance Use/Alcohol Use/Addiction:   [x] Reports: Crack, cannabis, meth  [] Denies   [] SBIRT Screen Complete. Current or Past Substance Abuse Treatment  [x] Yes, When and Where: [place]  [] No    Current or Past Mental Health Treatment:  [x] Yes, When and Where: [facility] 12/4/20[facility]  [] No    Legal Issues:  []  Yes (Specify)  [x]  No    Access to Weapons:  []  Yes (Specify)  [x]  No    Trauma History  [] Reports:  [x] Denies     Living Situation: Homeless    Employment: None    Education Level: Reports some college    Violence Risk Screening:        Have you ever thought about hurting someone? [x]  No  []  Yes (Ask the questions listed below)   When? Did you follow through with the thoughts? [] No     [] Yes- When and what happened? 2.  Have you ever threatened anyone? [x]  No  []  Yes (Ask the questions listed below)   When and what happened? Have you ever threatened someone with a gun, knife or other weapon? []  No  []  Yes - When and what happened? 2. Have you ever had an order of protection taken out against you? []  Yes [x]  No  3. Have you ever been arrested due to violence? []  Yes [x]  No  4. Have you ever been cruel to animals?  []  Yes [x]  No    After consideration of C-SSRS screening results, C-SSRS assessments, and this professional's assessment the patient's overall suicide risk assessed to be:  [] No Risk  [x] Low   [] Moderate   [] High     [x] Discussed current suicide risk, protective and risk factors with RN and ED Physician     Disposition   [x] Home: Discharge to a [facility] house for the night per pt request  [] Outpatient Provider:   [] Crisis Unit:   [] Inpatient Psychiatric Unit:  [] Other:                    FRANCISCO Briones, MAJOR  07/31/22 2559

## 2022-07-31 NOTE — ED NOTES
Department of Emergency Medicine  FIRST PROVIDER TRIAGE NOTE             Independent MLP           7/30/22  10:57 PM EDT    Date of Encounter: 7/30/22   MRN: [id number]      HPI: Dasha Maldonado. is a 37 y.o. male who presents to the ED for No chief complaint on file. Patient is a 81-year-old presenting talking and tangentiality stating that he is not suicidal or homicidal that he just is homeless. Patient is not compliant with taking his medication. Patient states that he is breaking up with his girlfriend and just feels paranoid. Patient states is worse at night. Patient states he does need a place to stay    ROS: Negative for cp, sob, abd pain, back pain, fever, cough, or vomiting. PE: Gen Appearance/Constitutional: alert  HEENT: NC/NT. PERRLA,  Airway patent. Neck: supple     Initial Plan of Care: All treatment areas with department are currently occupied. Plan to order/Initiate the following while awaiting opening in ED: labs, EKG, and imaging studies.   Initiate Treatment-Testing, Proceed toTreatment Area When Bed Available for ED Attending/MLP to Continue Care    Electronically signed by Siddharth Taylor PA-C   DD: 7/30/22       Siddharth Taylor PA-C  07/30/22 8117    ATTENDING PROVIDER ATTESTATION:     Supervising Physician, on-site, available for consultation, non-participatory in the evaluation or care of this patient         Sofia Mosley MD  07/31/22 3662

## 2022-07-31 NOTE — ED PROVIDER NOTES
Department of Emergency Medicine    ED  Provider Note - Sign out / Oncoming Provider   Admit Date/RoomTime: 7/30/2022 10:59 PM  1000AM EDT      I received this patient at sign out    I have discussed the patient's initial exam, treatment and plan of care with the out going physician. I have introduced my self to the patient / family and have answered their questions to this point. I have examined the patient myself and reviewed ordered tests / medications and  reviewed any available results to this point. If a resident is involved in the Emergency Department care, I have discussed my findings and plan with them as well. MDM:     Patient boarded in the department, presents frequently due to homelessness cocaine abuse and paranoia. He has rested, specifically denies suicidal homicidal ideation. Declined blood work last night, states he would like to talk to a .  was consulted    Time: 1100  Re-evaluation. Patients symptoms are improving  Repeat physical examination is improved -specifically denies suicidal and homicidal ideation. States he would like to speak with a  at this time. Time: 1200  Re-evaluation. Patients symptoms show no change  Repeat physical examination is not changed - has evaluated the patient, discussed outpatient follow-up. Patient is comfortable requesting discharge home     --------------------------------- IMPRESSION AND DISPOSITION ---------------------------------    IMPRESSION  1. Paranoid ([address]    2.  Homeless        DISPOSITION  Disposition: Discharge to home  Patient condition is stable          Chey Chu DO  07/31/22 2007

## 2022-07-31 NOTE — ED PROVIDER NOTES
ED Attending shared visit  CC: Rae Coffey 476  Department of Emergency Medicine   ED  Encounter Note  Admit Date/RoomTime: 2022 10:59 PM  ED Room: 01/01    NAME: Isela Jansen. : 1980  MRN: [id number]     Chief Complaint:  Paranoid (Patient states he's feeling paranoid, and will feel better in the morning. Denies SI/HI at this time. Patient rambling, states he is going through a break up and also homeless at this time. )    History of Present Illness       Isela Heredia is a 37 y.o. old male who presents to the emergency department by private vehicle, for being homeless as well as being paranoid which began several week(s) prior to arrival.  He has a prior history of  schizoaffective disorder bipolar type and antisocial personality  of which the problem is long-standing. His reported drug use history: Occasional alcohol, marijuana, and cocaine. Patient has no pain or radiation of any pain. Patient states nothing seems to make anything better or worse. He  has previously been in counseling. Patient is very noncompliant with his medication. There has been no history of recent trauma. He denies suicidal ideation and denies homicidal ideation. Quality:      Hopelessness:   No.     Terror:   No.     Confusion:   No.     Hallucinations:   None. Able to care for self: Yes. Able to control self: Yes. ROS   Pertinent positives and negatives are stated within HPI, all other systems reviewed and are negative. Past Medical History:  has a past medical history of Depression and Schizoaffective disorder ([facility] [address] Surgical History:  has a past surgical history that includes Hip fracture surgery (Left, 2021); eye surgery (19 years ago); and Hip fracture surgery (Left, 2021). Social History:  reports that he has been smoking cigarettes. He has a 12.00 pack-year smoking history.  He has never used smokeless tobacco. He reports current alcohol use of about 2.0 standard drinks per week. He reports current drug use. Frequency: 7.00 times per week. Drugs: Cocaine and Marijuana (George Pert). Family History: family history includes Mental Illness in his mother; No Known Problems in his father; Substance Abuse in his mother. Allergies: Chlorpheniramine-phenylephrine, Penicillins, and Rondec-d [chlophedianol-pseudoephedrine]    Physical Exam   Oxygen Saturation Interpretation: Normal.        ED Triage Vitals   BP Temp Temp src Heart Rate Resp SpO2 Height Weight   07/30/22 2258 07/30/22 2213 -- 07/30/22 2213 07/30/22 2213 07/30/22 2213 07/30/22 2258 07/30/22 2258   (!) 116/58 98 °F (36.7 °C)  83 18 97 % 6' (1.829 m) 150 lb (68 kg)         General Appearance:  well-appearing. Constitutional:   Level of Consciousness: Awake and alert. ETOH: no.          Distress: none. Cooperativeness: cooperative. Eyes:  PERRL, EOMI, no discharge or conjunctival injection. Ears:  External ears without lesions. Throat:  Pharynx without injection, exudate, or tonsillar hypertrophy. Airway patient. Neck:  Normal ROM. Supple. Respiratory:  Clear to auscultation and breath sounds equal.  CV:  Regular rate and rhythm, normal heart sounds, without pathological murmurs, ectopy, gallops, or rubs. GI:  Abdomen Soft, nontender, good bowel sounds. No firm or pulsatile mass. Back:  No costovertebral tenderness. Integument:  Normal turgor. Warm, dry, without visible rash, unless noted elsewhere. Lymphatics: No lymphangitis or adenopathy noted. Neurological:  Oriented. Motor functions intact. Psychiatric:        Thought Process:       Coherent:  yes. Delusions / Paranoia: paranoid. Flight of ideas:  no.         Rambling conversation:  no. Affect: calm. Suicidal ideation:  no suicidal ideation. Homicidal ideation:  no homicidal ideation. Perceptions:  denies any perceptual disturbance present. Insight: above average. Judgement: above average. Lab / Imaging Results   (All laboratory and radiology results have been personally reviewed by myself)  Labs:  Results for orders placed or performed during the hospital encounter of 07/30/22   COVID-19 & Influenza Combo    Specimen: Nasopharyngeal Swab   Result Value Ref Range    SARS-CoV-2 RNA, RT PCR NOT DETECTED NOT DETECTED    INFLUENZA A NOT DETECTED NOT DETECTED    INFLUENZA B NOT DETECTED NOT DETECTED   EKG 12 Lead   Result Value Ref Range    Ventricular Rate 49 BPM    Atrial Rate 49 BPM    P-R Interval 200 ms    QRS Duration 98 ms    Q-T Interval 408 ms    QTc Calculation (Bazett) 368 ms    P Axis 57 degrees    R Axis 69 degrees    T Axis 57 degrees     Imaging: All Radiology results interpreted by Radiologist unless otherwise noted. No orders to display     EKG #1:  Interpreted by emergency department attending physician unless otherwise noted. 7/31/22  Time: 0:47    Rhythm: sinus bradycardia  Rate: 40-50  Axis: normal  Conduction: normal  ST Segments: nonspecific changes  T Waves: non specific changes    Clinical Impression: no acute changes  Comparison to Prior tracings: There are no significant changes when compared to prior tracings. ED Course / Medical Decision Making   Medications - No data to display    Consult(s):   IP CONSULT TO SOCIAL WORK    Procedure(s):   None    MDM:   Patient is a 78-year-old that is presenting due to being homeless, due to his girlfriend recently breaking up with him and being paranoid. Patient had a full evaluation has no suicidal homicidal ideations. Patient states that he is here because \"I need somewhere to stay. \"  Social work consulted. All lab work except EKG was refused by patient. Patient is not suicidal or homicidal at this time therefore social work will see and evaluate patient to determine next steps.     Plan of Care/Counseling:  Pedro Luis Miller PA-C reviewed today's visit with the patient in addition to providing specific details for the plan of care and counseling regarding the diagnosis and prognosis. Questions are answered at this time and are agreeable with the plan. Assessment      1. Paranoid ([address]    2. Homeless      Plan   Discharge per social work discretion  Patient condition is stable    New Medications     New Prescriptions    No medications on file     Electronically signed by Ben Garcia PA-C   DD: 7/30/22  **This report was transcribed using voice recognition software. Every effort was made to ensure accuracy; however, inadvertent computerized transcription errors may be present.   END OF ED PROVIDER NOTE       Ben Garcia PA-C  07/31/22 0823

## 2022-07-31 NOTE — ED NOTES
Dr. Yaquelin Pruitt aware pt refusing lab and spoke with social work who is going to see pt.       Avi Sagastume RN  07/31/22 2652

## 2022-07-31 NOTE — DISCHARGE INSTRUCTIONS
[facility] [id number] [address] [facility] [address] [place], [address] [place] [address]  [email or web address]  *Residents of:  [address] [facility] [address] Residents   *Payments Accepted:  Self-Pay, [employer], Some Medicaid for age <20 Years. *Services Offered: [facility] [phone] or [phone]  [email or web address]     *Residents of:  All [facility]   *Payments Accepted: [employer], [facility] or Self-Pay. *Services Offered: Following Detox, Outpatient Substance Abuse and [place]  [address] [facility]    [facility], [address] [facility]   [place], [address]    [address]   [address] [facility]    [address] [place], [address]   [place], [address]      [address] [facility] [address] [phone] [address] [facility] [address], [place], [address] [place] [address]  [email or web address]  *Residents of:  All [facility]   *Payments Accepted: [address] [facility]. *Services Offered: Intensive Outpatient Substance Abuse and [facility] [phone]  [facility], [address]  www. [email or web address]. [email or web address]/  *Residents of:  [facility]. *Payments Accepted: Private Insurance, [facility] and Self-Pay/Sliding Scale. *Services Offered: Substance Abuse Outpatient Programs. [address] [facility] [phone]  [address] [facility], [address]  [email or web address]. [email or web address]/  *Residents of:  [facility]. *Payments Accepted: Private Insurance, [facility] and Self-Pay. *Services Offered: Substance Abuse Outpatient Programs. [place]ai Mo [phone]  [address] [place], [place] [address], [address]  [email or web address]  *Residents of:  [address] [facility] [address] Residents. *Payments Accepted: Private Insurance, [place] and Self-pay. *Services Offered: Substance Abuse Outpatient Programs. [employer] [phone]  www. [email or web address]/  [facility] Office:    Adult Office:    Children's Office:  [phone]. [place], [address]    [place], [address]               [place], [address] [facility]  *Residents of:  [facility] for Self-Pay. All [facility] accepted with [employer], Medicare & Medicaid   *Payments Accepted: Medicare, Medicaid, Private Insurance and Self-Pay  *Services Offered: [address] for Counseling. [facility] [phone]  [address] [facility], [address] [facility]  www. [email or web address] [place] [email or web address]. org/  *Payments Accepted: Sliding Scale for Fees. *Services Offered: Call for Available Services. [employer] [phone] [place] [address], [place], [address]  [email or web address]/  (MUST CALL DAILY FOR BED AVAILABILITY)  *Residents of:  [facility]. *Payments Accepted: Medicaid and Self-Pay. *Services Offered: Detox & Substance Abuse Outpatient Programs.

## 2022-07-31 NOTE — ED NOTES
Pt tolerating lunch tray-dr. Saverio Ganser spoke with pt who wants to speak with social work. Pt not pink slipped and waiting on social work. Aaox4. Denies suicidal/homocidal ideations.      Juan Jose Rios RN  07/31/22 5726

## 2022-07-31 NOTE — ED NOTES
Pt refusing blood work and vitals at this time-dr. Kavin jordan.       Chantale Alaniz RN  07/31/22 9853

## 2022-08-01 LAB
EKG ATRIAL RATE: 49 BPM
EKG P AXIS: 57 DEGREES
EKG P-R INTERVAL: 200 MS
EKG Q-T INTERVAL: 408 MS
EKG QRS DURATION: 98 MS
EKG QTC CALCULATION (BAZETT): 368 MS
EKG R AXIS: 69 DEGREES
EKG T AXIS: 57 DEGREES
EKG VENTRICULAR RATE: 49 BPM

## 2022-08-02 ENCOUNTER — HOSPITAL ENCOUNTER (EMERGENCY)
Age: 42
Discharge: HOME | End: 2022-08-02
Attending: EMERGENCY MEDICINE
Payer: COMMERCIAL

## 2022-08-02 VITALS
TEMPERATURE: 98.2 F | RESPIRATION RATE: 17 BRPM | SYSTOLIC BLOOD PRESSURE: 113 MMHG | BODY MASS INDEX: 20.32 KG/M2 | DIASTOLIC BLOOD PRESSURE: 68 MMHG | WEIGHT: 150 LBS | OXYGEN SATURATION: 98 % | HEART RATE: 89 BPM | HEIGHT: 72 IN

## 2022-08-02 DIAGNOSIS — F22 PARANOID (HCC): Primary | ICD-10-CM

## 2022-08-02 DIAGNOSIS — Z59.00 HOMELESS: ICD-10-CM

## 2022-08-02 PROCEDURE — 99282 EMERGENCY DEPT VISIT SF MDM: CPT

## 2022-08-02 PROCEDURE — 99284 EMERGENCY DEPT VISIT MOD MDM: CPT

## 2022-08-02 SDOH — ECONOMIC STABILITY - HOUSING INSECURITY: HOMELESSNESS UNSPECIFIED: Z59.00

## 2022-08-02 ASSESSMENT — ENCOUNTER SYMPTOMS
SHORTNESS OF BREATH: 0
COLOR CHANGE: 0
CHEST TIGHTNESS: 0
BACK PAIN: 0
COUGH: 0

## 2022-08-02 ASSESSMENT — PAIN - FUNCTIONAL ASSESSMENT: PAIN_FUNCTIONAL_ASSESSMENT: NONE - DENIES PAIN

## 2022-08-02 NOTE — DISCHARGE INSTRUCTIONS
Follow up with [facility]  Location: [address], [place] [address]  Phone number: [phone]  Fax number: [phone]           Attend the walk in hours at [address] [facility] [phone] or [phone] or 211   24 hour crisis line for the [address]. Maebelle Dancer    PEER WARM LINE: [phone]  Monday through Friday 4pm to 8pm and Saturday 1pm-3pm   You can call during these times to talk with a peer support person as needed

## 2022-08-02 NOTE — ED NOTES
Patient refusing to sign discharge instructions or vital signs this morning. He is aware that his appointment is [facility] for [facility]. He reports he came to the ED for medication management. SW explained that that is what his appointment at [facility] is for. Patient is homeless and is aware of public transportation available to get to his appointment. No SI/HI.        Ruma Tanner RN  08/02/22 3123
Patients belongings taken and secured. Patient given hospital gown and warm blanket.  Patient resting at this time      Chiquis Wood RN  08/02/22 3697
Living Situation: homeless    Employment: no job    Education Level:  some HS    Violence Risk Screening:        Have you ever thought about hurting someone? []  No  [x]  Yes (Ask the questions listed below)   When? Did you follow through with the thoughts? [] No     [x] Yes- When and what happened? jailed  2. Have you ever threatened anyone? []  No  [x]  Yes (Ask the questions listed below)   When and what happened? Have you ever threatened someone with a gun, knife or other weapon? []  No  [x]  Yes - When and what happened? jailed  2. Have you ever had an order of protection taken out against you? []  Yes [x]  No  3. Have you ever been arrested due to violence? [x]  Yes []  No  4. Have you ever been cruel to animals?  []  Yes [x]  No    After consideration of C-SSRS screening results, C-SSRS assessments, and this professional's assessment the patient's overall suicide risk assessed to be:  [x] No Risk  [] Low   [] Moderate   [] High     [x] Discussed current suicide risk, protective and risk factors with RN and ED Physician     Disposition   [] Home:   [] Outpatient Provider:   [] Crisis Unit:   [] Inpatient Psychiatric Unit:  [x] Other: homeless                   Alyssa Callaway  08/02/22 0096

## 2022-08-02 NOTE — ED PROVIDER NOTES
ATTENDING PROVIDER ATTESTATION:     Harriet Da Silva presented to the emergency department for evaluation of Hallucinations (Pt arrives to ED w/ \"hearing voices. \" Pt sts father's girlfriend is stalking him. Pt reports \"angry thoughts. \" -suicidal ideation -homicidal thoughts. Pt reports using \"a very little drug\" today. )    I have reviewed and discussed the case, including pertinent history (medical, surgical, family and social) and exam findings with the Midlevel and the Nurse assigned to Harriet Urlich. .  I have personally performed and/or participated in the history, exam, medical decision making, and procedures and agree with all pertinent clinical information. I have personally performed a substantive portion of the encounter      I have reviewed my findings and recommendations with Harriet Da Silva and members of family present at the time of disposition. My findings/plan: The primary encounter diagnosis was Paranoid ([facility]). A diagnosis of Homeless was also pertinent to this visit. I, Dr. Lula Kurtz, am the primary provider of this record. New Prescriptions    No medications on file     Vel Gutierrez DO      ED Attending shared visit  CC: No          Department of Emergency Medicine   ED  Provider Note  Admit Date/RoomTime: 2022  2:49 AM  ED Room: [address] [place] [address]  HPI:  22, Time: 2:58 AM EDT      Lon Robertson is a 52-year-old male with a history of paranoid schizophrenia and depression presenting to the emergency department with reports that he is having paranoid thoughts. Patient has been here several times in the last week for the same. Pt is homeless and has not followed up with [facility] as directed to several times. He has not been taking his medications. He states he has been having paranoid thoughts that his dad's girlfriend is stalking him since his dad .   Patient states these thoughts make him angry but he has no thoughts of wanting to hurt himself or hurt anyone else. Patient does admit to his usual marijuana and cocaine use but cannot tell me when he last used. Patient denies any visual or auditory hallucinations, suicidal thoughts or plans, homicidal thoughts, chest pain, shortness of breath, fevers or chills, dysuria, hematuria, abdominal pain. The history is provided by the patient. No  was used. REVIEW OF SYSTEMS:  Review of Systems   Constitutional:  Negative for activity change, chills, fatigue and fever. Respiratory:  Negative for cough, chest tightness and shortness of breath. Cardiovascular:  Negative for chest pain, palpitations and leg swelling. Genitourinary:  Negative for dysuria, flank pain, frequency and hematuria. Musculoskeletal:  Negative for arthralgias, back pain, neck pain and neck stiffness. Skin:  Negative for color change, pallor and rash. Neurological:  Negative for dizziness, light-headedness and headaches. Psychiatric/Behavioral:  Negative for agitation, behavioral problems, confusion, self-injury and suicidal ideas. The patient is not nervous/anxious. Paranoid thoughts. Pertinent positives and negatives are stated within HPI, all other systems reviewed and are negative.      --------------------------------------------- PAST HISTORY ---------------------------------------------  Past Medical History:  has a past medical history of Depression and Schizoaffective disorder ([facility] Past Surgical History:  has a past surgical history that includes Hip fracture surgery (Left, 9/12/2021); eye surgery (19 years ago); and Hip fracture surgery (Left, 9/28/2021). Social History:  reports that he has been smoking cigarettes. He has a 12.00 pack-year smoking history. He has never used smokeless tobacco. He reports current alcohol use of about 2.0 standard drinks per week. He reports current drug use. Frequency: 7.00 times per week. Drugs: Cocaine and Marijuana (Kobi Casillas).     Family History: family history includes Mental Illness in his mother; No Known Problems in his father; Substance Abuse in his mother. The patients home medications have been reviewed. Allergies: Chlorpheniramine-phenylephrine, Penicillins, and Rondec-d [chlophedianol-pseudoephedrine]    -------------------------------------------------- RESULTS -------------------------------------------------  All laboratory and radiology results have been personally reviewed by myself   LABS:  No results found for this visit on 08/02/22. RADIOLOGY:  Interpreted by Radiologist.  No orders to display       ------------------------- NURSING NOTES AND VITALS REVIEWED ---------------------------   The nursing notes within the ED encounter and vital signs as below have been reviewed. /68   Pulse 89   Temp 98.2 °F (36.8 °C) (Oral)   Resp 17   Ht 6' (1.829 m)   Wt 150 lb (68 kg)   SpO2 98%   BMI 20.34 kg/m²   Oxygen Saturation Interpretation: Normal      ---------------------------------------------------PHYSICAL EXAM--------------------------------------    Physical Exam  Vitals and nursing note reviewed. Constitutional:       General: He is not in acute distress. Appearance: Normal appearance. He is well-developed. HENT:      Head: Normocephalic and atraumatic. Mouth/Throat:      Mouth: Mucous membranes are moist.   Cardiovascular:      Rate and Rhythm: Normal rate and regular rhythm. Heart sounds: Normal heart sounds. No murmur heard. Pulmonary:      Effort: Pulmonary effort is normal. No respiratory distress. Breath sounds: Normal breath sounds. Musculoskeletal:         General: No swelling, tenderness or deformity. Normal range of motion. Cervical back: Normal range of motion and neck supple. No rigidity. Skin:     General: Skin is warm and dry. Capillary Refill: Capillary refill takes less than 2 seconds. Findings: No erythema.    Neurological:      General: No focal deficit present. Mental Status: He is alert and oriented to person, place, and time. Mental status is at baseline. Coordination: Coordination normal.   Psychiatric:         Mood and Affect: Mood normal.         Behavior: Behavior normal.         Thought Content: Thought content normal.         Judgment: Judgment normal.          ------------------------------ ED COURSE/MEDICAL DECISION MAKING----------------------  Medications - No data to display      ED COURSE:     No orders to display         Procedures:  Procedures     Medical Decision Making:   MDM  52-year-old male with a history of paranoid schizophrenia, polysubstance abuse and medication noncompliance presenting to the ED with paranoid thoughts. Patient has been here several times in the last week for the same. He adamantly denies any SI or HI at this time. He does have a history of drug abuse and chronically uses marijuana and cocaine. He cannot tell me his last use. Patient is afebrile and hemodynamically stable in the ED and seems to have a normal thought process other than the paranoid thoughts he states he has been having. He is noncompliant with his medications and has not followed up with Carmine Eason as directed. Patient has been medically cleared in the last 5 days. Social work to evaluate patient and dispo pending. Disposition per     Counseling: The emergency provider has spoken with the patient and discussed todays results, in addition to providing specific details for the plan of care and counseling regarding the diagnosis and prognosis. Questions are answered at this time and they are agreeable with the plan.      --------------------------------- IMPRESSION AND DISPOSITION ---------------------------------    IMPRESSION  1. Paranoid ([address]    2. Homeless        DISPOSITION  Disposition:  This physician Per   Patient condition is stable      Electronically signed by Wil Wolf PA-C   DD: 8/2/22  **This report was transcribed using voice recognition software. Every effort was made to ensure accuracy; however, inadvertent computerized transcription errors may be present.   END OF ED PROVIDER NOTE          Han Guo DO  08/02/22 1983

## 2022-08-03 ENCOUNTER — HOSPITAL ENCOUNTER (EMERGENCY)
Age: 42
Discharge: HOME | End: 2022-08-03
Attending: EMERGENCY MEDICINE
Payer: COMMERCIAL

## 2022-08-03 VITALS
OXYGEN SATURATION: 98 % | HEART RATE: 77 BPM | RESPIRATION RATE: 18 BRPM | SYSTOLIC BLOOD PRESSURE: 122 MMHG | BODY MASS INDEX: 20.32 KG/M2 | TEMPERATURE: 97.4 F | WEIGHT: 150 LBS | DIASTOLIC BLOOD PRESSURE: 75 MMHG | HEIGHT: 72 IN

## 2022-08-03 DIAGNOSIS — F22 PARANOIA (HCC): Primary | ICD-10-CM

## 2022-08-03 DIAGNOSIS — F19.10 SUBSTANCE ABUSE (HCC): ICD-10-CM

## 2022-08-03 LAB
ACETAMINOPHEN LEVEL: <5 MCG/ML (ref 10–30)
ALBUMIN SERPL-MCNC: 3.8 G/DL (ref 3.5–5.2)
ALP BLD-CCNC: 47 U/L (ref 40–129)
ALT SERPL-CCNC: 6 U/L (ref 0–40)
AMPHETAMINE SCREEN, URINE: NOT DETECTED
ANION GAP SERPL CALCULATED.3IONS-SCNC: 14 MMOL/L (ref 7–16)
AST SERPL-CCNC: 16 U/L (ref 0–39)
BACTERIA: ABNORMAL /HPF
BARBITURATE SCREEN URINE: NOT DETECTED
BASOPHILS ABSOLUTE: 0.06 E9/L (ref 0–0.2)
BASOPHILS RELATIVE PERCENT: 1 % (ref 0–2)
BENZODIAZEPINE SCREEN, URINE: NOT DETECTED
BILIRUB SERPL-MCNC: 0.2 MG/DL (ref 0–1.2)
BILIRUBIN URINE: ABNORMAL
BLOOD, URINE: NEGATIVE
BUN BLDV-MCNC: 12 MG/DL (ref 6–20)
CALCIUM SERPL-MCNC: 8.7 MG/DL (ref 8.6–10.2)
CANNABINOID SCREEN URINE: POSITIVE
CHLORIDE BLD-SCNC: 106 MMOL/L (ref 98–107)
CLARITY: CLEAR
CO2: 23 MMOL/L (ref 22–29)
COCAINE METABOLITE SCREEN URINE: POSITIVE
COLOR: YELLOW
CREAT SERPL-MCNC: 1 MG/DL (ref 0.7–1.2)
EKG ATRIAL RATE: 63 BPM
EKG P AXIS: 69 DEGREES
EKG P-R INTERVAL: 172 MS
EKG Q-T INTERVAL: 382 MS
EKG QRS DURATION: 94 MS
EKG QTC CALCULATION (BAZETT): 390 MS
EKG R AXIS: 79 DEGREES
EKG T AXIS: 66 DEGREES
EKG VENTRICULAR RATE: 63 BPM
EOSINOPHILS ABSOLUTE: 0.15 E9/L (ref 0.05–0.5)
EOSINOPHILS RELATIVE PERCENT: 2.4 % (ref 0–6)
EPITHELIAL CELLS, UA: ABNORMAL /HPF
ETHANOL: <10 MG/DL (ref 0–0.08)
FENTANYL SCREEN, URINE: NOT DETECTED
GFR AFRICAN AMERICAN: >60
GFR NON-AFRICAN AMERICAN: >60 ML/MIN/1.73
GLUCOSE BLD-MCNC: 118 MG/DL (ref 74–99)
GLUCOSE URINE: NEGATIVE MG/DL
HCT VFR BLD CALC: 39.7 % (ref 37–54)
HEMOGLOBIN: 13 G/DL (ref 12.5–16.5)
IMMATURE GRANULOCYTES #: 0.03 E9/L
IMMATURE GRANULOCYTES %: 0.5 % (ref 0–5)
KETONES, URINE: ABNORMAL MG/DL
LEUKOCYTE ESTERASE, URINE: NEGATIVE
LYMPHOCYTES ABSOLUTE: 1.79 E9/L (ref 1.5–4)
LYMPHOCYTES RELATIVE PERCENT: 28.7 % (ref 20–42)
Lab: ABNORMAL
MCH RBC QN AUTO: 30.1 PG (ref 26–35)
MCHC RBC AUTO-ENTMCNC: 32.7 % (ref 32–34.5)
MCV RBC AUTO: 91.9 FL (ref 80–99.9)
METHADONE SCREEN, URINE: NOT DETECTED
MONOCYTES ABSOLUTE: 0.4 E9/L (ref 0.1–0.95)
MONOCYTES RELATIVE PERCENT: 6.4 % (ref 2–12)
NEUTROPHILS ABSOLUTE: 3.8 E9/L (ref 1.8–7.3)
NEUTROPHILS RELATIVE PERCENT: 61 % (ref 43–80)
NITRITE, URINE: NEGATIVE
OPIATE SCREEN URINE: NOT DETECTED
OXYCODONE URINE: NOT DETECTED
PDW BLD-RTO: 13.2 FL (ref 11.5–15)
PH UA: 5.5 (ref 5–9)
PHENCYCLIDINE SCREEN URINE: NOT DETECTED
PLATELET # BLD: 408 E9/L (ref 130–450)
PMV BLD AUTO: 9.9 FL (ref 7–12)
POTASSIUM REFLEX MAGNESIUM: 3.9 MMOL/L (ref 3.5–5)
PROTEIN UA: ABNORMAL MG/DL
RBC # BLD: 4.32 E12/L (ref 3.8–5.8)
RBC UA: ABNORMAL /HPF (ref 0–2)
SALICYLATE, SERUM: <0.3 MG/DL (ref 0–30)
SODIUM BLD-SCNC: 143 MMOL/L (ref 132–146)
SPECIFIC GRAVITY UA: >=1.03 (ref 1–1.03)
TOTAL CK: 441 U/L (ref 20–200)
TOTAL PROTEIN: 6.4 G/DL (ref 6.4–8.3)
TRICYCLIC ANTIDEPRESSANTS SCREEN SERUM: NEGATIVE NG/ML
UROBILINOGEN, URINE: 1 E.U./DL
WBC # BLD: 6.2 E9/L (ref 4.5–11.5)
WBC UA: ABNORMAL /HPF (ref 0–5)

## 2022-08-03 PROCEDURE — 80179 DRUG ASSAY SALICYLATE: CPT

## 2022-08-03 PROCEDURE — 80143 DRUG ASSAY ACETAMINOPHEN: CPT

## 2022-08-03 PROCEDURE — 80053 COMPREHEN METABOLIC PANEL: CPT

## 2022-08-03 PROCEDURE — 80307 DRUG TEST PRSMV CHEM ANLYZR: CPT

## 2022-08-03 PROCEDURE — 82550 ASSAY OF CK (CPK): CPT

## 2022-08-03 PROCEDURE — 85025 COMPLETE CBC W/AUTO DIFF WBC: CPT

## 2022-08-03 PROCEDURE — 81001 URINALYSIS AUTO W/SCOPE: CPT

## 2022-08-03 PROCEDURE — 93005 ELECTROCARDIOGRAM TRACING: CPT | Performed by: PHYSICIAN ASSISTANT

## 2022-08-03 PROCEDURE — 82077 ASSAY SPEC XCP UR&BREATH IA: CPT

## 2022-08-03 NOTE — ED PROVIDER NOTES
HPI:  8/3/22, Time: 3:46 AM EDT         Dorina Willson. is a 37 y.o. male presenting to the ED for feeling paranoid, beginning ongoing problem ago. The complaint has been persistent, mild in severity, and worsened by nothing. Patient presenting here because of feeling paranoid. Patient has been here multiple times for the same complaint. Patient reporting no physical plaints of chest pain shortness of breath or abdominal pain. He reports no homicidal suicidal thoughts. Patient reporting no fever chills. He does occasionally report auditory visual hallucinations. ROS:   Pertinent positives and negatives are stated within HPI, all other systems reviewed and are negative.  --------------------------------------------- PAST HISTORY ---------------------------------------------  Past Medical History:  has a past medical history of Depression and Schizoaffective disorder ([facility] Past Surgical History:  has a past surgical history that includes Hip fracture surgery (Left, 9/12/2021); eye surgery (19 years ago); and Hip fracture surgery (Left, 9/28/2021). Social History:  reports that he has been smoking cigarettes. He has a 12.00 pack-year smoking history. He has never used smokeless tobacco. He reports current alcohol use of about 2.0 standard drinks per week. He reports current drug use. Frequency: 7.00 times per week. Drugs: Cocaine and Marijuana ([facility]). Family History: family history includes Mental Illness in his mother; No Known Problems in his father; Substance Abuse in his mother. The patients home medications have been reviewed.     Allergies: Chlorpheniramine-phenylephrine, Penicillins, and Rondec-d [chlophedianol-pseudoephedrine]    ---------------------------------------------------PHYSICAL EXAM--------------------------------------    Constitutional/General: Alert and oriented x3, well appearing, non toxic in NAD  Head: Normocephalic and atraumatic  Eyes: PERRL, EOMI  Mouth: Oropharynx clear, handling secretions, no trismus  Neck: Supple, full ROM, non tender to palpation in the midline, no stridor, no crepitus, no meningeal signs  Pulmonary: Lungs clear to auscultation bilaterally, no wheezes, rales, or rhonchi. Not in respiratory distress  Cardiovascular:  Regular rate. Regular rhythm. No murmurs, gallops, or rubs. 2+ distal pulses  Chest: no chest wall tenderness  Abdomen: Soft. Non tender. Non distended. +BS. No rebound, guarding, or rigidity. No pulsatile masses appreciated. Musculoskeletal: Moves all extremities x 4. Warm and well perfused, no clubbing, cyanosis, or edema. Capillary refill <3 seconds  Skin: warm and dry. No rashes. Neurologic: GCS 15, CN 2-12 grossly intact, no focal deficits, symmetric strength 5/5 in the upper and lower extremities bilaterally  Psych: Denies homicidal suicidal thoughts does report auditory and visual hallucinations at times    -------------------------------------------------- RESULTS -------------------------------------------------  I have personally reviewed all laboratory and imaging results for this patient. Results are listed below.      LABS:  Results for orders placed or performed during the hospital encounter of 08/03/22   CBC with Auto Differential   Result Value Ref Range    WBC 6.2 4.5 - 11.5 E9/L    RBC 4.32 3.80 - 5.80 E12/L    Hemoglobin 13.0 12.5 - 16.5 g/dL    Hematocrit 39.7 37.0 - 54.0 %    MCV 91.9 80.0 - 99.9 fL    MCH 30.1 26.0 - 35.0 pg    MCHC 32.7 32.0 - 34.5 %    RDW 13.2 11.5 - 15.0 fL    Platelets 792 452 - 333 E9/L    MPV 9.9 7.0 - 12.0 fL    Neutrophils % 61.0 43.0 - 80.0 %    Immature Granulocytes % 0.5 0.0 - 5.0 %    Lymphocytes % 28.7 20.0 - 42.0 %    Monocytes % 6.4 2.0 - 12.0 %    Eosinophils % 2.4 0.0 - 6.0 %    Basophils % 1.0 0.0 - 2.0 %    Neutrophils Absolute 3.80 1.80 - 7.30 E9/L    Immature Granulocytes # 0.03 E9/L    Lymphocytes Absolute 1.79 1.50 - 4.00 E9/L    Monocytes Absolute 0.40 0.10 - 0.95 Negative < 300ng/mL    PCP Screen, Urine NOT DETECTED Negative < 25 ng/mL    Methadone Screen, Urine NOT DETECTED Negative <300 ng/mL    Oxycodone Urine NOT DETECTED Negative <100 ng/mL    FENTANYL SCREEN, URINE NOT DETECTED Negative <1 ng/mL    Drug Screen Comment: see below    CK   Result Value Ref Range    Total  (H) 20 - 200 U/L   EKG 12 Lead   Result Value Ref Range    Ventricular Rate 63 BPM    Atrial Rate 63 BPM    P-R Interval 172 ms    QRS Duration 94 ms    Q-T Interval 382 ms    QTc Calculation (Bazett) 390 ms    P Axis 69 degrees    R Axis 79 degrees    T Axis 66 degrees       RADIOLOGY:  Interpreted by Radiologist.  No orders to display       EKG: This EKG is signed and interpreted by me. Rate: 63  Rhythm: Sinus  Interpretation: no acute changes  Comparison: stable as compared to patient's most recent EKG      ------------------------- NURSING NOTES AND VITALS REVIEWED ---------------------------   The nursing notes within the ED encounter and vital signs as below have been reviewed by myself. /73   Pulse 81   Temp 97.4 °F (36.3 °C) (Oral)   Resp 16   Ht 6' (1.829 m)   Wt 150 lb (68 kg)   SpO2 97%   BMI 20.34 kg/m²   Oxygen Saturation Interpretation: Normal    The patients available past medical records and past encounters were reviewed. ------------------------------ ED COURSE/MEDICAL DECISION MAKING----------------------  Medications - No data to display          Medical Decision Making:   Patient presenting here because of psychiatric evaluation. Patient reporting that he feels paranoid. An ongoing issue. Patient reports has not been taking his medications. Patient reporting no physical plaints of chest pain shortness of breath or abdominal pain. Patient labs reviewed. EKG reviewed plan will be for  to evaluate     Re-Evaluations:             Re-evaluation.   Patients symptoms show no change      Consultations:                 Critical Care:         This patient's ED course included: a personal history and physicial eaxmination    This patient has been closely monitored during their ED course. Counseling: The emergency provider has spoken with the patient and discussed todays results, in addition to providing specific details for the plan of care and counseling regarding the diagnosis and prognosis. Questions are answered at this time and they are agreeable with the plan.       --------------------------------- IMPRESSION AND DISPOSITION ---------------------------------    IMPRESSION  1. [facility]anoia ([facility]    2. Substance abuse ([facility]        DISPOSITION  Disposition:  to evaluate  Patient condition is stable        NOTE: This report was transcribed using voice recognition software.  Every effort was made to ensure accuracy; however, inadvertent computerized transcription errors may be present          Ilya Gaona MD  08/03/22 Tamra Kelly MD  08/03/22 3516

## 2022-08-03 NOTE — ED NOTES
Behavioral Health Crisis Assessment      Chief Complaint: paranoid    Mental Status Exam: Again, pt is presents at baseline with paranoid thoughts, behavior controlled, reports to hearing non-commanding voices, denies SI, no HI - behavior is controlled - more open to following direction on this visit    Legal Status  [] Voluntary:  [x] Involuntary, Issued by:    Gender  [x] Male [] Female [] Transgender  [] Other    Sexual Orientation    [x] Heterosexual [] Homosexual [] Bisexual [] Other    Brief Clinical Summary:  Pt admits to not being employed, unsure why he said that at triage. Pt admits to not following up with [facility] as advised yesterday, \"because I didn't have what they need at intake\". I advised that I can call down and advised them that we are requesting an intake. Pt denies SI, no HI and no commanding hallucinations. Pt is aware that he needs to attend walk in services at [facility] today at 11am - he is advised of this each time he is discharged. Pt is homeless and aware of the areas resources who can assist.    We discussed misuses of the ER, as he comes on a daily basis and usually disrespectful and disruptive to the ER staff and other pt's. Today pt was decent, not a behavior issue. Pt abuses drugs and denies PEER services. Offered IOP and pt declined to day but is open to information - placed in d/c forms. Pt is not pink slipped. Discussed d/c with Dr. Shan Hayden. Collateral Information:   Per many previous notes:  Pt has been reported to Yanely Hanks, who is aware of his multiple ER visits and his manipulating behaviors.     Risk Factors:  Disruptive behaviors and uncooperative with following up for treatment  Substance Use - declined PEER  Limited family/friend support  Lack of housing  Lack of essential needs  Lack of self-care  Has no out pt provider  Has no community plan    Protective Factors:  Initiated this ER visit  [address] for the future  No access to weapons     Suicidal Ideations:   [] Reports:    [] Past [] Present   [x] Denies    Suicide Attempts:  [] Reports:   [x] Denies    C-SSRS Screening Completed by RN: Current Suicide Risk:  [x] No Risk [] Low [] Moderate [] High    Homicidal Ideations  [] Reports:   [] Past [] Present   [x] Denies     Self Injurious/Self Mutilation Behaviors:   [] Reports:    [] Past [] Present   [x] Denies    Hallucinations/Delusions   [x] Reports: non-commanding  [] Denies     Substance Use/Alcohol Use/Addiction:   [] Reports:   [x] Denies   [x] SBIRT Screen Complete. Current or Past Substance Abuse Treatment  [] Yes, When and Where:  [x] No    Current or Past Mental Health Treatment:  [] Yes, When and Where:  [x] No    Legal Issues:  [x]  Yes (Specify)  assault  []  No    Access to Weapons:  []  Yes (Specify)  [x]  No    Trauma History  [] Reports:  [x] Denies     Living Situation:  homeless    Employment: no job    Education Level:  some HS    Violence Risk Screening:        Have you ever thought about hurting someone? []  No  [x]  Yes (Ask the questions listed below)   When? Did you follow through with the thoughts? [] No     [x] Yes- When and what happened? jailed  2. Have you ever threatened anyone? []  No  [x]  Yes (Ask the questions listed below)   When and what happened? Have you ever threatened someone with a gun, knife or other weapon? []  No  [x]  Yes - When and what happened? jailed  2. Have you ever had an order of protection taken out against you? []  Yes [x]  No  3. Have you ever been arrested due to violence? [x]  Yes []  No  4. Have you ever been cruel to animals?  []  Yes [x]  No      After consideration of C-SSRS screening results, C-SSRS assessments, and this professional's assessment the patient's overall suicide risk assessed to be:  [x] No Risk  [] Low   [] Moderate   [] High     [x] Discussed current suicide risk, protective and risk factors with RN and ED Physician     Disposition [] Home:   [] Outpatient Provider:   [] Crisis Unit:   [] Inpatient Psychiatric Unit:  [x] Other: homeless                   Ramiro Price, [facility]  08/03/22 [address], [facility]  08/03/22 7023

## 2022-08-03 NOTE — DISCHARGE INSTRUCTIONS
FOLLOW UP WITH MAYUR BARRETT AT [facility]  Location: [address], [place], [address]  Phone number: [phone]  Fax number: [phone] [facility] SERVICES - IF Renetta Momin CALL [facility]  Intensive Outpatient treatment- Group therapy 3 to 4 days a week   Monday, Tuesday, Thursday and Friday 8am-12:15pm   9 to 12 hours a week  Wednesday- intake appointments available 9am to 2pm     [address] [facility]  Before arriving to program please go to the registration office on the 1st floor  Once registration is complete take elevator B to the 7th floor  Check in at the intake office on [address]   If you are unable to attend on the date listed above please contact the department to reschedule your intake. Call as needed with any additional questions related to discharge instructions        Help Hotline 11-2562513743 or [phone] or 211   24 hour crisis line for the [address]. Андрей Garcia    PEER WARM LINE: [phone]  Monday through Friday 4pm to 8pm and Saturday 1pm-3pm   You can call during these times to talk with a peer support person as needed

## 2022-08-03 NOTE — ED NOTES
Patient sent to the waiting room with a urine specimen container and patient verbalized understanding of need for specimen      Saskia Driscoll RN  08/03/22 8580

## 2022-08-04 ENCOUNTER — HOSPITAL ENCOUNTER (EMERGENCY)
Age: 42
Discharge: HOME | End: 2022-08-04
Attending: EMERGENCY MEDICINE
Payer: COMMERCIAL

## 2022-08-04 ENCOUNTER — HOSPITAL ENCOUNTER (EMERGENCY)
Age: 42
Discharge: ANOTHER HOSPITAL | End: 2022-08-04
Payer: COMMERCIAL

## 2022-08-04 VITALS
DIASTOLIC BLOOD PRESSURE: 74 MMHG | SYSTOLIC BLOOD PRESSURE: 104 MMHG | TEMPERATURE: 97.9 F | OXYGEN SATURATION: 97 % | HEART RATE: 84 BPM

## 2022-08-04 VITALS
TEMPERATURE: 98.4 F | HEART RATE: 87 BPM | OXYGEN SATURATION: 97 % | BODY MASS INDEX: 20.34 KG/M2 | RESPIRATION RATE: 17 BRPM | DIASTOLIC BLOOD PRESSURE: 77 MMHG | SYSTOLIC BLOOD PRESSURE: 139 MMHG | WEIGHT: 150 LBS

## 2022-08-04 DIAGNOSIS — F20.9 SCHIZOPHRENIA, UNSPECIFIED TYPE (HCC): Primary | ICD-10-CM

## 2022-08-04 DIAGNOSIS — H57.13 PAIN AROUND BOTH EYES: Primary | ICD-10-CM

## 2022-08-04 PROCEDURE — 87491 CHLMYD TRACH DNA AMP PROBE: CPT

## 2022-08-04 PROCEDURE — 99281 EMR DPT VST MAYX REQ PHY/QHP: CPT

## 2022-08-04 PROCEDURE — 99284 EMERGENCY DEPT VISIT MOD MDM: CPT

## 2022-08-04 PROCEDURE — 87591 N.GONORRHOEAE DNA AMP PROB: CPT

## 2022-08-04 NOTE — ED PROVIDER NOTES
[facility]  Department of Emergency Medicine   ED  Encounter Note  Admit Date/RoomTime: No admission date for patient encounter. ED Room: Room/bed info not found    NAME: Gary Company. : 1980  MRN: [id number]     Chief Complaint:  Eye Pain (Bilat eye pain, states \"They hurt all the time. I just don't say nothtin about it bc I ain't no sucker for love\")    History of Present Illness        Gary Company. is a 37 y.o. old male presenting to the emergency department by private vehicle, for bilateral eye pain \"for a while\". Patient states that it has been ongoing for years, states that he had bilateral eye surgery. Unsure of what for. States it was several years ago. He does not currently follow w/an eye doctor, but would like to f/u w/[facility]. Denies any recent injury to his eye. No fevers or chills. No headaches or visual changes. Denies any loss of his vision. Nothing makes it better or worse. The pain is not worse, or different in any way today. He denies any drainage from the eyes. ROS   Pertinent positives and negatives are stated within HPI, all other systems reviewed and are negative. Past Medical History:  has a past medical history of Depression and Schizoaffective disorder ([facility] Surgical History:  has a past surgical history that includes Hip fracture surgery (Left, 2021); eye surgery (19 years ago); and Hip fracture surgery (Left, 2021). Social History:  reports that he has been smoking cigarettes. He has a 12.00 pack-year smoking history. He has never used smokeless tobacco. He reports current alcohol use of about 2.0 standard drinks per week. He reports current drug use. Frequency: 7.00 times per week. Drugs: Cocaine and Marijuana ([facility]). Family History: family history includes Mental Illness in his mother; No Known Problems in his father; Substance Abuse in his mother.      Allergies: Chlorpheniramine-phenylephrine, Penicillins, and Rondec-d [chlophedianol-pseudoephedrine]    Physical Exam   Oxygen Saturation Interpretation: Normal.        ED Triage Vitals   BP Temp Temp Source Heart Rate Resp SpO2 Height Weight   08/04/22 0808 08/04/22 0750 08/04/22 0750 08/04/22 0750 08/04/22 0805 08/04/22 0750 -- 08/04/22 0805   139/77 98.4 °F (36.9 °C) Oral 87 17 97 %  150 lb (68 kg)       Physical Exam  Constitutional:  Alert, development consistent with age. HENT:  NC/NT. Airway patent. Neck:  Normal ROM. Supple. Eyes:         Pupils: equal, round, reactive to light and accommodation. Eyelids: Bilateral upper and lower Swelling/redness:  no swelling or erythema. Conjunctiva: Bilateral no abnormal findings. Sclera: Bilateral normal appearing. Cornea: Bilateral normal and no abnormalities were observed. EOM:  Intact Bilaterally. Integument:  No rashes, erythema present, unless noted elsewhere. Lymphatics: No lymphangitis or adenopathy noted. Neurological:  Oriented. Motor functions intact. Lab / Imaging Results   (All laboratory and radiology results have been personally reviewed by myself)  Labs:  No results found for this visit on 08/04/22. Imaging: All Radiology results interpreted by Radiologist unless otherwise noted. No orders to display       ED Course / Medical Decision Making   Medications - No data to display         Consult(s):   None    Procedure(s):  no procedures performed    MDM:   patient here for b/l eye pain that he has had for years. Patient denies any recent trauma or injury. No loss of vision or visual changes. He has no headaches, vomiting, fevers or chills. There is no focal abnormality to his eyes, no conjunctival injection, no sign of orbital or periorbital cellulitis. Based on history and longstanding pain for years, no testing done at this time. Based on history, does not sound like acute glaucoma.   Patient [facility], advised to call them today and schedule an appointment. Encouraged return to the ER for any worsening symptoms but otherwise to follow-up as directed    Plan of Care/Counseling:  CECILE Earl reviewed today's visit with the patient in addition to providing specific details for the plan of care and counseling regarding the diagnosis and prognosis. Questions are answered at this time and are agreeable with the plan. Assessment      1. Pain around both eyes      Plan   Discharged home. Patient condition is good    New Medications     New Prescriptions    No medications on file     Electronically signed by CECILE Earl   DD: 8/4/22  **This report was transcribed using voice recognition software. Every effort was made to ensure accuracy; however, inadvertent computerized transcription errors may be present.   END OF ED PROVIDER NOTE       CECILE Earl  08/04/22 0822

## 2022-08-05 ENCOUNTER — HOSPITAL ENCOUNTER (EMERGENCY)
Age: 42
Discharge: HOME | End: 2022-08-05
Payer: COMMERCIAL

## 2022-08-05 VITALS
RESPIRATION RATE: 16 BRPM | BODY MASS INDEX: 20.32 KG/M2 | HEIGHT: 72 IN | WEIGHT: 150 LBS | DIASTOLIC BLOOD PRESSURE: 55 MMHG | TEMPERATURE: 97.7 F | SYSTOLIC BLOOD PRESSURE: 113 MMHG | OXYGEN SATURATION: 98 % | HEART RATE: 61 BPM

## 2022-08-05 DIAGNOSIS — Z20.2 POTENTIAL EXPOSURE TO STD: Primary | ICD-10-CM

## 2022-08-05 LAB
BACTERIA: ABNORMAL /HPF
BILIRUBIN URINE: ABNORMAL
BLOOD, URINE: NEGATIVE
CLARITY: CLEAR
COLOR: YELLOW
GLUCOSE URINE: NEGATIVE MG/DL
KETONES, URINE: ABNORMAL MG/DL
LEUKOCYTE ESTERASE, URINE: NEGATIVE
MUCUS: PRESENT /LPF
NITRITE, URINE: NEGATIVE
PH UA: 6 (ref 5–9)
PROTEIN UA: ABNORMAL MG/DL
RBC UA: ABNORMAL /HPF (ref 0–2)
SPECIFIC GRAVITY UA: 1.02 (ref 1–1.03)
UROBILINOGEN, URINE: 1 E.U./DL
WBC UA: ABNORMAL /HPF (ref 0–5)

## 2022-08-05 PROCEDURE — 81001 URINALYSIS AUTO W/SCOPE: CPT

## 2022-08-05 PROCEDURE — 99283 EMERGENCY DEPT VISIT LOW MDM: CPT

## 2022-08-05 PROCEDURE — 6370000000 HC RX 637 (ALT 250 FOR IP): Performed by: NURSE PRACTITIONER

## 2022-08-05 RX ORDER — AZITHROMYCIN 250 MG/1
1000 TABLET, FILM COATED ORAL ONCE
Status: COMPLETED | OUTPATIENT
Start: 2022-08-05 | End: 2022-08-05

## 2022-08-05 RX ADMIN — AZITHROMYCIN 1000 MG: 250 TABLET, FILM COATED ORAL at 02:18

## 2022-08-05 NOTE — ED NOTES
Patient A&OX4, respirations non-labored, no distress noted. Patient calm and cooperative at present. Patient denies SI, HI, or any hallucinations at present. Patient states he feels safe to be discharged. Patient discharged per order.       Gifty Montague RN  08/04/22 9379

## 2022-08-05 NOTE — ED PROVIDER NOTES
Independent   HPI:  8/5/22, Time: 1:58 AM EDT         Missy Leyva. is a 37 y.o. male presenting to the ED for burning sensation from his penis. Patient admits to having unprotected sex with a female. States that he did not know this female was  and reports shortly afterwards he began to have burning sensation with urination. He denies any actual penile discharge as well as no unusual vesicle formation. Patient reports that he does have burning each time he urinates. No blood noted either. He also denies any abdominal pain as well as no noted back or flank pain. Patient also without fevers. Patient with symptoms mild in severity and persistent. Review of Systems:   A complete review of systems was performed and pertinent positives and negatives are stated within HPI, all other systems reviewed and are negative.          --------------------------------------------- PAST HISTORY ---------------------------------------------  Past Medical History:  has a past medical history of Depression and Schizoaffective disorder ([facility] Past Surgical History:  has a past surgical history that includes Hip fracture surgery (Left, 9/12/2021); eye surgery (19 years ago); and Hip fracture surgery (Left, 9/28/2021). Social History:  reports that he has been smoking cigarettes. He has a 12.00 pack-year smoking history. He has never used smokeless tobacco. He reports current alcohol use of about 2.0 standard drinks per week. He reports current drug use. Frequency: 7.00 times per week. Drugs: Cocaine and Marijuana ([employer] Family History: family history includes Mental Illness in his mother; No Known Problems in his father; Substance Abuse in his mother. The patients home medications have been reviewed.     Allergies: Chlorpheniramine-phenylephrine, Penicillins, and Rondec-d [chlophedianol-pseudoephedrine]    -------------------------------------------------- RESULTS -------------------------------------------------  All laboratory and radiology results have been personally reviewed by myself   LABS:  Results for orders placed or performed during the hospital encounter of 08/05/22   C.trachomatis N.gonorrhoeae DNA, Urine    Specimen: Urine   Result Value Ref Range    Source Urine    Urinalysis   Result Value Ref Range    Color, UA Yellow Straw/Yellow    Clarity, UA Clear Clear    Glucose, Ur Negative Negative mg/dL    Bilirubin Urine SMALL (A) Negative    Ketones, Urine TRACE (A) Negative mg/dL    Specific Gravity, UA 1.025 1.005 - 1.030    Blood, Urine Negative Negative    pH, UA 6.0 5.0 - 9.0    Protein, UA TRACE Negative mg/dL    Urobilinogen, Urine 1.0 <2.0 E.U./dL    Nitrite, Urine Negative Negative    Leukocyte Esterase, Urine Negative Negative   Microscopic Urinalysis   Result Value Ref Range    Mucus, UA Present (A) None Seen /LPF    WBC, UA 0-1 0 - 5 /HPF    RBC, UA 1-3 0 - 2 /HPF    Bacteria, UA RARE (A) None Seen /HPF       RADIOLOGY:  Interpreted by Radiologist.  No orders to display       ------------------------- NURSING NOTES AND VITALS REVIEWED ---------------------------   The nursing notes within the ED encounter and vital signs as below have been reviewed. BP (!) 113/55   Pulse 61   Temp 97.7 °F (36.5 °C) (Oral)   Resp 16   Ht 6' (1.829 m)   Wt 150 lb (68 kg)   SpO2 98%   BMI 20.34 kg/m²   Oxygen Saturation Interpretation: Normal      ---------------------------------------------------PHYSICAL EXAM--------------------------------------      Constitutional/General: Alert and oriented x3, well appearing, non toxic in NAD, patient overall nontoxic  Head: Normocephalic and atraumatic  Eyes: PERRL, EOMI  Mouth: Oropharynx clear, handling secretions, no trismus  Neck: Supple, full ROM,   Pulmonary: Lungs clear to auscultation bilaterally, no wheezes, rales, or rhonchi.  Not in respiratory distress  Cardiovascular:  Regular rate and rhythm, no murmurs, gallops, or rubs. 2+ distal pulses  Abdomen: Soft, non tender, non distended, no point tenderness, negative for CVA tenderness. Extremities: Moves all extremities x 4. Warm and well perfused  Skin: warm and dry without rash  Neurologic: GCS 15,  Psych: Normal Affect      ------------------------------ ED COURSE/MEDICAL DECISION MAKING----------------------  Medications   azithromycin (ZITHROMAX) tablet 1,000 mg (1,000 mg Oral Given 8/5/22 0218)         ED COURSE:       Medical Decision Making: Plan be for urinalysis will also obtain urine for GC. Urinalysis resulted negative for any infectious component. Plan be for discharge home, patient was educated follow-up with gonorrhea and Chlamydia results he can utilize [facility] or follow-up with his primary care doctor. Also educated to avoid sexual intercourse for the next 2 weeks and the importance of wearing a condom. Patient was made aware if he is positive he must inform his sexual partners for them to be medicated as well. Patient overall nontoxic, neurovascular intact. Patient expressed understanding. And patient safely discharged home. Counseling: The emergency provider has spoken with the patient and discussed todays results, in addition to providing specific details for the plan of care and counseling regarding the diagnosis and prognosis. Questions are answered at this time and they are agreeable with the plan.      --------------------------------- IMPRESSION AND DISPOSITION ---------------------------------    IMPRESSION  1. Potential exposure to STD        DISPOSITION  Disposition: Discharge to home  Patient condition is good      NOTE: This report was transcribed using voice recognition software.  Every effort was made to ensure accuracy; however, inadvertent computerized transcription errors may be present      JUICE Paniagua CNP  08/05/22 [address], APRN - CNP  08/05/22 0253    ATTENDING PROVIDER ATTESTATION: Supervising Physician, on-site, available for consultation, non-participatory in the evaluation or care of this patient         Aletha Bergman MD  08/05/22 7044

## 2022-08-05 NOTE — ED PROVIDER NOTES
History: family history includes Mental Illness in his mother; No Known Problems in his father; Substance Abuse in his mother. Family history is non contributory unless otherwise noted    The patients home medications have been reviewed. Allergies: Chlorpheniramine-phenylephrine, Penicillins, and Rondec-d [chlophedianol-pseudoephedrine]    -------------------------------------------------- RESULTS -------------------------------------------------  All laboratory and imaging studies were reviewed by myself. LABS: reviewed and interpreted by me  Results for orders placed or performed during the hospital encounter of 08/04/22   C.trachomatis N.gonorrhoeae DNA, Urine    Specimen: Urine   Result Value Ref Range    Source Urine        RADIOLOGY:  Interpreted by Radiologist.  No orders to display           ------------------------- NURSING NOTES AND VITALS REVIEWED ---------------------------   The nursing notes within the ED encounter and vital signs as below have been reviewed. /74   Pulse 84   Temp 97.9 °F (36.6 °C) (Oral)   SpO2 97%   Oxygen Saturation Interpretation: Normal            ---------------------------------------------------PHYSICAL EXAM--------------------------------------      Constitutional/General: Alert and oriented x3   Head: Normocephalic, atraumatic  Eyes: PERRL, EOMI  ENT: Oropharynx clear, handling secretions, no trismus  Neck: Supple, full ROM, no meningeal signs  Pulmonary: Lungs clear to auscultation bilaterally, no wheezes, rales, or rhonchi. Not in respiratory distress  Cardiovascular:  Regular rate and rhythm, no murmurs, gallops, or rubs. 2+ distal pulses  Abdomen: Soft, non tender, non distended, +BS, no rebound, guarding, or rigidity. No pulsatile masses appreciated  Extremities: Moves all extremities x 4. Warm and well perfused, no clubbing, cyanosis, or edema.  Capillary refill <3 seconds  Skin: warm and dry without rash  Neurologic: GCS 15, no focal deficits  Psych: normal Affect      ------------------------------------------ PROGRESS NOTES ------------------------------------------     Medical decision making:  Denies SI or HI  Denies other complaints  Just wants to talk today  No indication for admission    Consultations:   Behavioral Health    Re-Evaluations:        Counseling: The emergency provider has spoken with thepatient and discussed todays results, in addition to providing specific details for the plan of care and counseling regarding the diagnosis and prognosis. Questions are answered at this time and they are agreeable with the plan.         --------------------------------- IMPRESSION AND DISPOSITION ---------------------------------    IMPRESSION  1.  Schizophrenia, unspecified type ([facility]        DISPOSITION  Disposition: as per consultant  Patient condition is stable            Griffin Segovia MD  08/04/22 5972

## 2022-08-05 NOTE — DISCHARGE INSTRUCTIONS
Follow-up with gonorrhea and Chlamydia results. If you are positive you must inform your sexual partner. No sexual intercourse for 2 weeks  Wear a condom at all times.

## 2022-08-05 NOTE — ED NOTES
Department of Emergency Medicine  FIRST PROVIDER TRIAGE NOTE             Independent MLP           22  9:45 PM EDT    Date of Encounter: 22   MRN: [id number]      HPI: Madan Palma. is a 37 y.o. male who presents to the ED for Psychiatric Evaluation (Depression -SI -HI)  Patient presents to the emergency department with worsening depression. Patient reports that his babys Neville  less then a month ago, and the  was recently. He is upset because the family members came up to him asking why he was not at the .  They do have a son together. Areli Hobson. Patient upset because the family approached him and he states that he just heard on the street and they never came and told him about anything. Denies any SI or HI    Patient also reports concerns for STD states he had sexual intercourse with a female and did not know that she had a boyfriend and reports now he is having burning with urination. ROS: Negative for cp or sob. PE: Gen Appearance/Constitutional: alert  HEENT: NC/NT. PERRLA,  Airway patent. Initial Plan of Care: All treatment areas with department are currently occupied.  Plan to order/Initiate the following while awaiting opening in ED: labs, EKG, and Social Work Eval.  Initiate Treatment-Testing, Proceed toTreatment Area When [employer] Available for ED Attending/MLP to Quentin Tracey signed by JUICE Luna CNP   DD: 22       JUICE Luna CNP  22       JUICE Luna CNP  22 214       JUICE Luna CNP  22 215    ATTENDING PROVIDER ATTESTATION:     Supervising Physician, on-site, available for consultation, non-participatory in the evaluation or care of this patient         Murray Ricardo MD  22 0001

## 2022-08-05 NOTE — ED NOTES
Patient okay for discharge per Dr. Shmuel Melgar. All orders to be discontinued per physician.       Italo Enciso RN  08/04/22 7847

## 2022-08-07 ENCOUNTER — HOSPITAL ENCOUNTER (EMERGENCY)
Age: 42
Discharge: HOME | End: 2022-08-08
Attending: EMERGENCY MEDICINE
Payer: COMMERCIAL

## 2022-08-07 VITALS
RESPIRATION RATE: 24 BRPM | OXYGEN SATURATION: 95 % | SYSTOLIC BLOOD PRESSURE: 119 MMHG | DIASTOLIC BLOOD PRESSURE: 75 MMHG | TEMPERATURE: 96.9 F | HEART RATE: 80 BPM

## 2022-08-07 DIAGNOSIS — F39 MOOD DISORDER (HCC): Primary | ICD-10-CM

## 2022-08-07 LAB
ACETAMINOPHEN LEVEL: <5 MCG/ML (ref 10–30)
ALBUMIN SERPL-MCNC: 3.6 G/DL (ref 3.5–5.2)
ALP BLD-CCNC: 47 U/L (ref 40–129)
ALT SERPL-CCNC: <5 U/L (ref 0–40)
ANION GAP SERPL CALCULATED.3IONS-SCNC: 9 MMOL/L (ref 7–16)
AST SERPL-CCNC: 15 U/L (ref 0–39)
BASOPHILS ABSOLUTE: 0.07 E9/L (ref 0–0.2)
BASOPHILS RELATIVE PERCENT: 2 % (ref 0–2)
BILIRUB SERPL-MCNC: 0.3 MG/DL (ref 0–1.2)
BUN BLDV-MCNC: 14 MG/DL (ref 6–20)
CALCIUM SERPL-MCNC: 8.7 MG/DL (ref 8.6–10.2)
CHLORIDE BLD-SCNC: 109 MMOL/L (ref 98–107)
CO2: 23 MMOL/L (ref 22–29)
CREAT SERPL-MCNC: 1 MG/DL (ref 0.7–1.2)
EOSINOPHILS ABSOLUTE: 0.08 E9/L (ref 0.05–0.5)
EOSINOPHILS RELATIVE PERCENT: 2.3 % (ref 0–6)
ETHANOL: <10 MG/DL (ref 0–0.08)
GFR AFRICAN AMERICAN: >60
GFR NON-AFRICAN AMERICAN: >60 ML/MIN/1.73
GLUCOSE BLD-MCNC: 142 MG/DL (ref 74–99)
HCT VFR BLD CALC: 38.2 % (ref 37–54)
HEMOGLOBIN: 12.5 G/DL (ref 12.5–16.5)
IMMATURE GRANULOCYTES #: 0.01 E9/L
IMMATURE GRANULOCYTES %: 0.3 % (ref 0–5)
LYMPHOCYTES ABSOLUTE: 1.52 E9/L (ref 1.5–4)
LYMPHOCYTES RELATIVE PERCENT: 43.6 % (ref 20–42)
MCH RBC QN AUTO: 30.4 PG (ref 26–35)
MCHC RBC AUTO-ENTMCNC: 32.7 % (ref 32–34.5)
MCV RBC AUTO: 92.9 FL (ref 80–99.9)
MONOCYTES ABSOLUTE: 0.34 E9/L (ref 0.1–0.95)
MONOCYTES RELATIVE PERCENT: 9.7 % (ref 2–12)
NEUTROPHILS ABSOLUTE: 1.47 E9/L (ref 1.8–7.3)
NEUTROPHILS RELATIVE PERCENT: 42.1 % (ref 43–80)
PDW BLD-RTO: 13.2 FL (ref 11.5–15)
PLATELET # BLD: 325 E9/L (ref 130–450)
PMV BLD AUTO: 9.5 FL (ref 7–12)
POTASSIUM REFLEX MAGNESIUM: 3.3 MMOL/L (ref 3.5–5)
RBC # BLD: 4.11 E12/L (ref 3.8–5.8)
SALICYLATE, SERUM: <0.3 MG/DL (ref 0–30)
SODIUM BLD-SCNC: 141 MMOL/L (ref 132–146)
TOTAL PROTEIN: 6.2 G/DL (ref 6.4–8.3)
TRICYCLIC ANTIDEPRESSANTS SCREEN SERUM: NEGATIVE NG/ML
WBC # BLD: 3.5 E9/L (ref 4.5–11.5)

## 2022-08-07 PROCEDURE — 83735 ASSAY OF MAGNESIUM: CPT

## 2022-08-07 PROCEDURE — 80179 DRUG ASSAY SALICYLATE: CPT

## 2022-08-07 PROCEDURE — 93005 ELECTROCARDIOGRAM TRACING: CPT | Performed by: EMERGENCY MEDICINE

## 2022-08-07 PROCEDURE — 82077 ASSAY SPEC XCP UR&BREATH IA: CPT

## 2022-08-07 PROCEDURE — 80307 DRUG TEST PRSMV CHEM ANLYZR: CPT

## 2022-08-07 PROCEDURE — 80143 DRUG ASSAY ACETAMINOPHEN: CPT

## 2022-08-07 PROCEDURE — 85025 COMPLETE CBC W/AUTO DIFF WBC: CPT

## 2022-08-07 PROCEDURE — 99284 EMERGENCY DEPT VISIT MOD MDM: CPT

## 2022-08-07 PROCEDURE — 80053 COMPREHEN METABOLIC PANEL: CPT

## 2022-08-08 ENCOUNTER — HOSPITAL ENCOUNTER (EMERGENCY)
Age: 42
Discharge: HOME | End: 2022-08-08

## 2022-08-08 VITALS — HEART RATE: 90 BPM | OXYGEN SATURATION: 97 %

## 2022-08-08 LAB
AMPHETAMINE SCREEN, URINE: NOT DETECTED
BARBITURATE SCREEN URINE: NOT DETECTED
BENZODIAZEPINE SCREEN, URINE: NOT DETECTED
BILIRUBIN URINE: NEGATIVE
BLOOD, URINE: NEGATIVE
C. TRACHOMATIS DNA ,URINE: NEGATIVE
CANNABINOID SCREEN URINE: POSITIVE
CLARITY: CLEAR
COCAINE METABOLITE SCREEN URINE: POSITIVE
COLOR: YELLOW
EKG ATRIAL RATE: 71 BPM
EKG P AXIS: 78 DEGREES
EKG P-R INTERVAL: 220 MS
EKG Q-T INTERVAL: 398 MS
EKG QRS DURATION: 104 MS
EKG QTC CALCULATION (BAZETT): 432 MS
EKG R AXIS: 84 DEGREES
EKG T AXIS: 75 DEGREES
EKG VENTRICULAR RATE: 71 BPM
FENTANYL SCREEN, URINE: NOT DETECTED
GLUCOSE URINE: NEGATIVE MG/DL
KETONES, URINE: NEGATIVE MG/DL
LEUKOCYTE ESTERASE, URINE: NEGATIVE
Lab: ABNORMAL
MAGNESIUM: 2.1 MG/DL (ref 1.6–2.6)
METHADONE SCREEN, URINE: NOT DETECTED
N. GONORRHOEAE DNA, URINE: NEGATIVE
NITRITE, URINE: NEGATIVE
OPIATE SCREEN URINE: NOT DETECTED
OXYCODONE URINE: NOT DETECTED
PH UA: 6 (ref 5–9)
PHENCYCLIDINE SCREEN URINE: NOT DETECTED
PROTEIN UA: NEGATIVE MG/DL
SOURCE: NORMAL
SPECIFIC GRAVITY UA: >=1.03 (ref 1–1.03)
UROBILINOGEN, URINE: 0.2 E.U./DL

## 2022-08-08 PROCEDURE — 80307 DRUG TEST PRSMV CHEM ANLYZR: CPT

## 2022-08-08 PROCEDURE — 81003 URINALYSIS AUTO W/O SCOPE: CPT

## 2022-08-08 NOTE — ED NOTES
Department of Emergency Medicine  FIRST PROVIDER TRIAGE NOTE             Independent MLP           8/7/22  9:31 PM EDT    Date of Encounter: 8/7/22   MRN: [id number]      HPI: Claudell Gent. is a 37 y.o. male who presents to the ED for No chief complaint on file. Patient with escalated behavior here in triage, patient walked in here screaming saying he needs to be admitted for 1 days tired of us leaving him in the waiting room, patient with rambling speech. Escalated behavior. Denies suicidal or homicidal ideations. ROS: Negative for cp or sob. PE: Gen Appearance/Constitutional: alert  HEENT: NC/NT. PERRLA,  Airway patent. Initial Plan of Care: All treatment areas with department are currently occupied.  Plan to order/Initiate the following while awaiting opening in ED: Social Work Eval.  Initiate Treatment-Testing, Proceed toTreatment Area When [employer] Available for ED Attending/MLP to [employer] signed by JUICE Mock CNP   DD: 8/7/22       JUICE Mock CNP  08/07/22 0601

## 2022-08-08 NOTE — ED NOTES
Pt stated he still does not pee when prompted for urine sample. Pt reluctantly agreed to try to pee in half an hour.        Mulugeta Ellis RN  08/07/22 1977

## 2022-08-08 NOTE — ED PROVIDER NOTES
HPI:  8/7/22,   Time: 9:57 PM EDT       Dorothy Rodriguez is a 37 y.o. male presenting to the ED for angry behavior, beginning unk ago. The complaint has been persistent, moderate in severity, and worsened by nothing. Well known, paranoid schizophrenic, says angry at people over money, brief thoughts of hurting them, no plan. No si. No cp/sob/abd pain/n/v/d/cough/congestion    Review of Systems:   Pertinent positives and negatives are stated within HPI, all other systems reviewed and are negative.          --------------------------------------------- PAST HISTORY ---------------------------------------------  Past Medical History:  has a past medical history of Depression and Schizoaffective disorder ([facility] Past Surgical History:  has a past surgical history that includes Hip fracture surgery (Left, 9/12/2021); eye surgery (19 years ago); and Hip fracture surgery (Left, 9/28/2021). Social History:  reports that he has been smoking cigarettes. He has a 12.00 pack-year smoking history. He has never used smokeless tobacco. He reports current alcohol use of about 2.0 standard drinks per week. He reports current drug use. Frequency: 7.00 times per week. Drugs: Cocaine and Marijuana (Gladis Shaffer). Family History: family history includes Mental Illness in his mother; No Known Problems in his father; Substance Abuse in his mother. The patients home medications have been reviewed.     Allergies: Chlorpheniramine-phenylephrine, Penicillins, and Rondec-d [chlophedianol-pseudoephedrine]        ---------------------------------------------------PHYSICAL EXAM--------------------------------------    Constitutional/General: Alert and oriented x3, well appearing, non toxic in NAD  Head: Normocephalic and atraumatic  Eyes: PERRL, EOMI, conjunctive normal, sclera non icteric  Mouth: Oropharynx clear, handling secretions,   Neck: Supple, full ROM, s  Respiratory:  Not in respiratory distress  Cardiovascular:  . 2+ distal pulses  Chest: No chest wall tenderness  GI:  Abdomen Soft, Non tender, Non distended. Musculoskeletal: Moves all extremities x 4. Warm and well perfused,   Integument: skin warm and dry. No rashes. Lymphatic: no lymphadenopathy noted  Neurologic: GCS 15, no focal deficits, y  Psychiatric: colorful Affect    -------------------------------------------------- RESULTS -------------------------------------------------  I have personally reviewed all laboratory and imaging results for this patient. Results are listed below.      LABS:  Results for orders placed or performed during the hospital encounter of 08/07/22   CBC with Auto Differential   Result Value Ref Range    WBC 3.5 (L) 4.5 - 11.5 E9/L    RBC 4.11 3.80 - 5.80 E12/L    Hemoglobin 12.5 12.5 - 16.5 g/dL    Hematocrit 38.2 37.0 - 54.0 %    MCV 92.9 80.0 - 99.9 fL    MCH 30.4 26.0 - 35.0 pg    MCHC 32.7 32.0 - 34.5 %    RDW 13.2 11.5 - 15.0 fL    Platelets 798 281 - 223 E9/L    MPV 9.5 7.0 - 12.0 fL    Neutrophils % 42.1 (L) 43.0 - 80.0 %    Immature Granulocytes % 0.3 0.0 - 5.0 %    Lymphocytes % 43.6 (H) 20.0 - 42.0 %    Monocytes % 9.7 2.0 - 12.0 %    Eosinophils % 2.3 0.0 - 6.0 %    Basophils % 2.0 0.0 - 2.0 %    Neutrophils Absolute 1.47 (L) 1.80 - 7.30 E9/L    Immature Granulocytes # 0.01 E9/L    Lymphocytes Absolute 1.52 1.50 - 4.00 E9/L    Monocytes Absolute 0.34 0.10 - 0.95 E9/L    Eosinophils Absolute 0.08 0.05 - 0.50 E9/L    Basophils Absolute 0.07 0.00 - 0.20 E9/L   Comprehensive Metabolic Panel w/ Reflex to MG   Result Value Ref Range    Sodium 141 132 - 146 mmol/L    Potassium reflex Magnesium 3.3 (L) 3.5 - 5.0 mmol/L    Chloride 109 (H) 98 - 107 mmol/L    CO2 23 22 - 29 mmol/L    Anion Gap 9 7 - 16 mmol/L    Glucose 142 (H) 74 - 99 mg/dL    BUN 14 6 - 20 mg/dL    Creatinine 1.0 0.7 - 1.2 mg/dL    GFR Non-African American >60 >=60 mL/min/1.73    GFR African American >60     Calcium 8.7 8.6 - 10.2 mg/dL    Total Protein 6.2 (L) 6.4 - 8.3 g/dL Albumin 3.6 3.5 - 5.2 g/dL    Total Bilirubin 0.3 0.0 - 1.2 mg/dL    Alkaline Phosphatase 47 40 - 129 U/L    ALT <5 0 - 40 U/L    AST 15 0 - 39 U/L   Serum Drug Screen   Result Value Ref Range    Ethanol Lvl <10 mg/dL    Acetaminophen Level <5.0 (L) 10.0 - 04.1 mcg/mL    Salicylate, Serum <7.8 0.0 - 30.0 mg/dL    TCA Scrn NEGATIVE Cutoff:300 ng/mL   Magnesium   Result Value Ref Range    Magnesium 2.1 1.6 - 2.6 mg/dL   EKG 12 Lead   Result Value Ref Range    Ventricular Rate 71 BPM    Atrial Rate 71 BPM    P-R Interval 220 ms    QRS Duration 104 ms    Q-T Interval 398 ms    QTc Calculation (Bazett) 432 ms    P Axis 78 degrees    R Axis 84 degrees    T Axis 75 degrees       RADIOLOGY:  Interpreted by Radiologist.  No orders to display       EKG: This EKG is signed and interpreted by the EP. Time: 2310  Rate: 70  Rhythm: Sinus  Interpretation: non-specific EKG  Comparison: None      ------------------------- NURSING NOTES AND VITALS REVIEWED ---------------------------   The nursing notes within the ED encounter and vital signs as below have been reviewed by myself. /75   Pulse 80   Temp 96.9 °F (36.1 °C)   Resp 24   SpO2 95%   Oxygen Saturation Interpretation: Normal    The patients available past medical records and past encounters were reviewed. ------------------------------ ED COURSE/MEDICAL DECISION MAKING----------------------  Medications - No data to display      ED COURSE:       Medical Decision Making:    Well known, med cleared, dispo per social work      This patient's ED course included: a personal history and physicial examination    This patient has remained hemodynamically stable during their ED course. Re-Evaluations:             Re-evaluation. Patients symptoms show no change      Consultations:             Social work    Critical Care:         Counseling:    The emergency provider has spoken with the patient and discussed todays results, in addition to providing specific details for the plan of care and counseling regarding the diagnosis and prognosis. Questions are answered at this time and they are agreeable with the plan.       --------------------------------- IMPRESSION AND DISPOSITION ---------------------------------    IMPRESSION  1. Mood disorder ([facility]        DISPOSITION  Disposition: Discharge to home  Patient condition is stable    NOTE: This report was transcribed using voice recognition software.  Every effort was made to ensure accuracy; however, inadvertent computerized transcription errors may be present        Ford Hatchet, MD  08/08/22 6421

## 2022-08-08 NOTE — ED NOTES
Behavioral Health Crisis Assessment      Chief Complaint: Patient reports he is here because he does not trust his friends. Mental Status Exam: Patient is calm, oriented x 4 and alert, Affect is flat, mood is calm, sleepy, Speech is mumbled/soft, hygiene is unkept, patient talks with his eyes closed, patient appears to be at his normal baseline. Patient reports vague SI \"like usual\" no plan, denies HI, admits to hallucinations off and on. Patient reports good sleep and appetite. Legal Status  [x] Voluntary:  [] Involuntary, Issued by:    Gender  [x] Male [] Female [] Transgender  [] Other    Sexual Orientation    [x] Heterosexual [] Homosexual [] Bisexual [] Other    Brief Clinical Summary: Patient is a 37 male presenting to the ED for the 6th time this month. Patient reports he does not trust his friends. [facility]IO asked patient how the hospital can help him with that and patient could not answer the question and informed SW that she was asking the wrong questions. SW asked patient what questions she should be asking and patient informed that he was not allowed to return to the  [facility] after he left for a few days and now they will not give his belongings back. [facility]IO called the [facility] and spoke with Kierra and was informed that patient was there a few weeks ago and they took his belongings outside to him and he refused to take his belongs. Kierra informed they no longer have his belongings. [facility] AN[facility]IO informed patient of this as well as the hospital is unable to do anything about that. Patient asked if he could stay a few more hours to see what he was going to do next. SW informed patient the doctor would have to make that decision. Patient has a mental health hx of schizoaffective d/o and depression, patient is not med compliant and is not connected with outpatient mental health services.  [facility] ANTONIO spoke with patient regarding his increased visit to the ED since his release from custodial, SW informed patient he needs to get connected with outpatient services again and request a  who could help him find housing so that he does not have to come to the ED to sleep almost every night. Patient was agreeable to this but does not want to go back to [employer] or [employer] gave a list of referrals for patient to follow up with. Collateral Information: No collateral information obtained at this time    Risk Factors:    Mental health dx: schizoaffective d/o and depression  Substance use  Lack of housing  Lack of essential needs  Lack of self care  Has no out pt provider  Has no community plan    Protective Factors:    Initiated this ED visit  Help seeking behavior  Goals and hope for the future  No access to weapons    Suicidal Ideations:   [x] Reports: Baseline, vague   [x] Past [x] Present   [] Denies    Suicide Attempts:  [] Reports:   [x] Denies    C-SSRS Screening Completed by RN: Current Suicide Risk:  [] No Risk [] Low [] Moderate [] High  Not completed by RN    Homicidal Ideations  [] Reports:   [] Past [] Present   [x] Denies     Self Injurious/Self Mutilation Behaviors:   [] Reports:    [] Past [] Present   [x] Denies    Hallucinations/Delusions   [x] Reports: Baseline  [] Denies     Substance Use/Alcohol Use/Addiction:   [x] Reports:   [] Denies   [x] SBIRT Screen Complete. Current or Past Substance Abuse Treatment  [] Yes, When and Where:  [x] No    Current or Past Mental Health Treatment:  [x] Yes, When and Where: Was connected with [facility] in the past  [] No    Legal Issues:  [x]  Yes spent 6 months in intermediate, just released on July 15th.  []  No    Access to Weapons:  []  Yes (Specify)  [x]  No    Trauma History  [] Reports:  [x] Denies     Living Situation: Chronically Homeless    Employment: Not employed    Education Level:  Unknown    Violence Risk Screening:        Have you ever thought about hurting someone? [x]  No  []  Yes (Ask the questions listed below)   When? Did you follow through with the thoughts? [] No     [] Yes- When and what happened? 2.  Have you ever threatened anyone? [x]  No  []  Yes (Ask the questions listed below)   When and what happened? Have you ever threatened someone with a gun, knife or other weapon? [x]  No  []  Yes - When and what happened? 2. Have you ever had an order of protection taken out against you? []  Yes [x]  No  3. Have you ever been arrested due to violence? []  Yes [x]  No  4. Have you ever been cruel to animals? []  Yes [x]  No    After consideration of C-SSRS screening results, C-SSRS assessments, and this professional's assessment the patient's overall suicide risk assessed to be:  [] No Risk  [x] Low   [] Moderate   [] High     [x] Discussed current suicide risk, protective and risk factors with RN and ED Physician     Disposition   [] Home:   [] Outpatient Provider:   [] Crisis Unit:   [] Inpatient Psychiatric Unit:  [x] Other: Homeless    PATRICIO SW reviewed chart with ED Doc, Patient does not meet inpatient criteria. Patient will be given follow up referrals to get reconnected with outpatient services.                    Kyle Jeffries, MSW, LSW  08/08/22 0300

## 2022-08-08 NOTE — DISCHARGE INSTRUCTIONS
Please follow up with one of the following agencies for outpatient services. Please request case management services as well. [facility]    For Residents of [address] [phone]    For a complete list of treatment centers in your area please visit:   [employer] website at   [email or web address]    * Alcoholics Anonymous [phone]                *Narcotics Anonymous [phone]    [employer] [phone]  [place] [address], [facility], [address] [facility]  [email or web address]  *Residents of:  [facility] Only for [employer]. ALL other [employer] on [facility] Accepted. *Payments Accepted: Medicaid or Self-pay ONLY for [employer]. Private Insurance accepted for Outpatient Programs   *Services Offered: Outpatient Behavioral Health & Chemical Dependency. [employer] [phone]  [facility]. [address]  [email or web address]   *Residents of:  [employer] Only   *Payments Accepted: Medicaid or [address] Offered: Outpatient Behavioral Health & Chemical Dependency. [facility] [phone] or [phone]  www. [email or web address]     *Residents of:  [facility]   *Payments Accepted: [employer], [facility] or Self-Pay. *Services Offered: Following Detox, Outpatient Substance Abuse and [place]  [address] [place] [address],[address]    [place], [address] [address]   [facility], [address]    [address] [place] [address] [facility]    [address]    [facility], [address]   [facility], [address]      [address] [phone] [address] [facility] [address], [place] [address]  [email or web address]  *Residents of:  All [place]rez   *Payments Accepted: [facility]. *Services Offered: Intensive Outpatient Substance Abuse and [place] [phone]  [facility], [address]  www. [email or web address] org/  *Residents of:  [facility]. *Payments Accepted: Private Insurance, [place] and Self-Pay/Sliding Scale. *Services Offered: Substance Abuse Outpatient Programs. Anamika Long [place]pe [phone]  [address] [facility], [address] [facility]  [email or web address]  *Residents of:  All [place]. *Payments Accepted: Private Insurance, [place] and Self-pay. *Services Offered: Substance Abuse Outpatient Programs. [employer] [phone]  www. [email or web address]/  [facility] Office:    Adult Office:    Children's Office:  [phone] 1687 [address]. [place], [address]    [place], [facility] [address]               [place], [address] [facility]  *Residents of:  [facility] FOR [facility]AL [facility] ONLY for Self-Pay. All Counties accepted with [employer], Medicare & Medicaid   *Payments Accepted: Medicare, Medicaid, Private Insurance and Self-Pay  *Services Offered: [address] for Counseling. [facility] / [employer] [phone] [address] [facility], [address] [facility]  www. [email or web address] org/  *Payments Accepted: Sliding Scale for Fees. *Services Offered: Call for Available Services.

## 2022-08-10 ENCOUNTER — HOSPITAL ENCOUNTER (EMERGENCY)
Age: 42
Discharge: HOME | End: 2022-08-11
Attending: EMERGENCY MEDICINE
Payer: COMMERCIAL

## 2022-08-10 DIAGNOSIS — F19.10 SUBSTANCE ABUSE (HCC): ICD-10-CM

## 2022-08-10 DIAGNOSIS — Z76.89 ENCOUNTER FOR PSYCHIATRIC ASSESSMENT: Primary | ICD-10-CM

## 2022-08-10 LAB
ACETAMINOPHEN LEVEL: <5 MCG/ML (ref 10–30)
ALBUMIN SERPL-MCNC: 3.2 G/DL (ref 3.5–5.2)
ALP BLD-CCNC: 40 U/L (ref 40–129)
ALT SERPL-CCNC: 9 U/L (ref 0–40)
AMPHETAMINE SCREEN, URINE: NOT DETECTED
ANION GAP SERPL CALCULATED.3IONS-SCNC: 9 MMOL/L (ref 7–16)
AST SERPL-CCNC: 17 U/L (ref 0–39)
BARBITURATE SCREEN URINE: NOT DETECTED
BASOPHILS ABSOLUTE: 0.09 E9/L (ref 0–0.2)
BASOPHILS RELATIVE PERCENT: 2.6 % (ref 0–2)
BENZODIAZEPINE SCREEN, URINE: NOT DETECTED
BILIRUB SERPL-MCNC: 0.2 MG/DL (ref 0–1.2)
BUN BLDV-MCNC: 9 MG/DL (ref 6–20)
CALCIUM SERPL-MCNC: 8.2 MG/DL (ref 8.6–10.2)
CANNABINOID SCREEN URINE: NOT DETECTED
CHLORIDE BLD-SCNC: 106 MMOL/L (ref 98–107)
CO2: 22 MMOL/L (ref 22–29)
COCAINE METABOLITE SCREEN URINE: POSITIVE
CREAT SERPL-MCNC: 0.9 MG/DL (ref 0.7–1.2)
EOSINOPHILS ABSOLUTE: 0.14 E9/L (ref 0.05–0.5)
EOSINOPHILS RELATIVE PERCENT: 4.1 % (ref 0–6)
ETHANOL: <10 MG/DL (ref 0–0.08)
FENTANYL SCREEN, URINE: NOT DETECTED
GFR AFRICAN AMERICAN: >60
GFR NON-AFRICAN AMERICAN: >60 ML/MIN/1.73
GLUCOSE BLD-MCNC: 80 MG/DL (ref 74–99)
HCT VFR BLD CALC: 39.2 % (ref 37–54)
HEMOGLOBIN: 12.3 G/DL (ref 12.5–16.5)
IMMATURE GRANULOCYTES #: 0 E9/L
IMMATURE GRANULOCYTES %: 0 % (ref 0–5)
LYMPHOCYTES ABSOLUTE: 1.5 E9/L (ref 1.5–4)
LYMPHOCYTES RELATIVE PERCENT: 44.1 % (ref 20–42)
Lab: ABNORMAL
MCH RBC QN AUTO: 30.1 PG (ref 26–35)
MCHC RBC AUTO-ENTMCNC: 31.4 % (ref 32–34.5)
MCV RBC AUTO: 95.8 FL (ref 80–99.9)
METHADONE SCREEN, URINE: NOT DETECTED
MONOCYTES ABSOLUTE: 0.29 E9/L (ref 0.1–0.95)
MONOCYTES RELATIVE PERCENT: 8.5 % (ref 2–12)
NEUTROPHILS ABSOLUTE: 1.38 E9/L (ref 1.8–7.3)
NEUTROPHILS RELATIVE PERCENT: 40.7 % (ref 43–80)
OPIATE SCREEN URINE: NOT DETECTED
OXYCODONE URINE: NOT DETECTED
PDW BLD-RTO: 13.2 FL (ref 11.5–15)
PHENCYCLIDINE SCREEN URINE: NOT DETECTED
PLATELET # BLD: 272 E9/L (ref 130–450)
PMV BLD AUTO: 9.8 FL (ref 7–12)
POTASSIUM SERPL-SCNC: 3.7 MMOL/L (ref 3.5–5)
RBC # BLD: 4.09 E12/L (ref 3.8–5.8)
SALICYLATE, SERUM: <0.3 MG/DL (ref 0–30)
SODIUM BLD-SCNC: 137 MMOL/L (ref 132–146)
TOTAL PROTEIN: 5.8 G/DL (ref 6.4–8.3)
TRICYCLIC ANTIDEPRESSANTS SCREEN SERUM: NEGATIVE NG/ML
WBC # BLD: 3.4 E9/L (ref 4.5–11.5)

## 2022-08-10 PROCEDURE — 80179 DRUG ASSAY SALICYLATE: CPT

## 2022-08-10 PROCEDURE — 93005 ELECTROCARDIOGRAM TRACING: CPT | Performed by: NURSE PRACTITIONER

## 2022-08-10 PROCEDURE — 80143 DRUG ASSAY ACETAMINOPHEN: CPT

## 2022-08-10 PROCEDURE — 80053 COMPREHEN METABOLIC PANEL: CPT

## 2022-08-10 PROCEDURE — 82077 ASSAY SPEC XCP UR&BREATH IA: CPT

## 2022-08-10 PROCEDURE — 80307 DRUG TEST PRSMV CHEM ANLYZR: CPT

## 2022-08-10 PROCEDURE — 85025 COMPLETE CBC W/AUTO DIFF WBC: CPT

## 2022-08-10 PROCEDURE — 99284 EMERGENCY DEPT VISIT MOD MDM: CPT

## 2022-08-10 ASSESSMENT — PAIN - FUNCTIONAL ASSESSMENT: PAIN_FUNCTIONAL_ASSESSMENT: NONE - DENIES PAIN

## 2022-08-11 VITALS
OXYGEN SATURATION: 99 % | SYSTOLIC BLOOD PRESSURE: 110 MMHG | HEIGHT: 72 IN | RESPIRATION RATE: 16 BRPM | BODY MASS INDEX: 21.26 KG/M2 | DIASTOLIC BLOOD PRESSURE: 69 MMHG | HEART RATE: 72 BPM | WEIGHT: 157 LBS | TEMPERATURE: 98 F

## 2022-08-11 LAB
EKG ATRIAL RATE: 51 BPM
EKG P AXIS: 67 DEGREES
EKG P-R INTERVAL: 200 MS
EKG Q-T INTERVAL: 414 MS
EKG QRS DURATION: 100 MS
EKG QTC CALCULATION (BAZETT): 381 MS
EKG R AXIS: 82 DEGREES
EKG T AXIS: 71 DEGREES
EKG VENTRICULAR RATE: 51 BPM

## 2022-08-11 NOTE — PROGRESS NOTES
I did go back to evaluate patient with  to make sure that patient was not having any suicidal or homicidal ideations.   Patient plan was discussed with patient to follow-up with [facility] between 11 AM and 1 PM today patient understands patient like to eat his breakfast and then is comfortable with plan of going home does not have any plan for SI and does not appear to have SI or HI on exam

## 2022-08-11 NOTE — ED NOTES
Behavioral Health Crisis Assessment    Chief Complaint: depression    Mental Status Exam: baseline behavior, \"paranoid\" thoughts, behavior controlled, reports to hearing non-commanding voices, denies SI, no HI - behavior is controlled, alert, oriented x's 3, shared fair eye contact. Pt was able to to have a lucid conversation with me. Legal Status  [] Voluntary:  [x] Involuntary, Issued by:    Gender  [x] Male [] Female [] Transgender  [] Other    Sexual Orientation    [x] Heterosexual [] Homosexual [] Bisexual [] Other    Brief Clinical Summary:    Pt denies SI and no HI. Pt denies hearing voices at this time. Pt stated \"I am stressed and I needed someone to talk to\". I asked him if he followed up with [facility] as he was advised in the past several times, and he said \"no one ever told me I needed to go there\". I advised pt that we have this conversation each time that he comes to the ER. Again, I educated pt on the services that [facility] has to offer and encouraged him to follow up with them to establish Mh services within the community. Pt agreed.     Collateral Information:  none    Risk Factors:  Disruptive behaviors and uncooperative with following up for treatment  Substance Use - declined PEER  Limited family/friend support  Lack of housing  Lack of essential needs  Lack of self-care  Has no out pt provider  Has no community plan     Protective Factors:  Initiated this ER visit  [address] for the future  No access to weapons    Suicidal Ideations:   [] Reports:    [] Past [] Present   [x] Denies    Suicide Attempts:  [] Reports:   [x] Denies    C-SSRS Screening Completed by RN: Current Suicide Risk:  [x] No Risk [] Low [] Moderate [] High    Homicidal Ideations  [] Reports:   [] Past [] Present   [x] Denies     Self Injurious/Self Mutilation Behaviors:   [] Reports:    [] Past [] Present   [x] Denies    Hallucinations/Delusions   [] Reports:   [x] Denies     Substance Use/Alcohol Use/Addiction:   [x] Reports:   [] Denies   [x] SBIRT Screen Complete. Current or Past Substance Abuse Treatment  [] Yes, When and Where:  [x] No    Current or Past Mental Health Treatment:  [] Yes, When and Where:  [x] No    Legal Issues:  [x]  Yes (Specify)  []  No    Access to Weapons:  []  Yes (Specify)  [x]  No    Trauma History  [] Reports:  [x] Denies     Living Situation:  homeless    Employment: no job    Education Level:  some HS    Violence Risk Screening:        Have you ever thought about hurting someone? []  No  [x]  Yes (Ask the questions listed below)   When? Did you follow through with the thoughts? [] No     [x] Yes- When and what happened? jailed  2. Have you ever threatened anyone? []  No  [x]  Yes (Ask the questions listed below)   When and what happened? Have you ever threatened someone with a gun, knife or other weapon? []  No  [x]  Yes - When and what happened? jailed  2. Have you ever had an order of protection taken out against you? []  Yes [x]  No  3. Have you ever been arrested due to violence? [x]  Yes []  No  4. Have you ever been cruel to animals?  []  Yes [x]  No    After consideration of C-SSRS screening results, C-SSRS assessments, and this professional's assessment the patient's overall suicide risk assessed to be:  [x] No Risk  [] Low   [] Moderate   [] High     [x] Discussed current suicide risk, protective and risk factors with RN and ED Physician     Disposition   [x] Home:   [] Outpatient Provider:   [] Crisis Unit:   [] Inpatient Psychiatric Unit:  [] Other:                    MAJOR Edward  08/11/22 0430

## 2022-08-11 NOTE — DISCHARGE INSTRUCTIONS
[address]  Location: [address], [place], [address]  Phone number: [phone]  Fax number: [phone]       Please reach out to one of the following community providers for treatment and support. Help Hotline: 877 694 215 or [address] [place] [address] and Recovery Board [phone]  [address] and [facility] Board [phone]  If you are in need of help and experiencing any barriers to care please contact help hotline 24 hours a day and/or your local board of mental health and recovery during normal business hours. Detox program inpatient:   [facility]okes[facility] Stinson [phone]  [facility] Bar[facility] [phone]  [facility] [phone]  [facility] [phone]  [facility] [phone]  [place] services hotline [phone]    Outpatient programs  [address]  outpatient treatment [phone] ext. [facility] [phone]  [facility] [phone]  [facility] services:   Fernanda Aschoff [place] [phone]  [address] [phone]  [address]  [phone]  [facility] [phone]   [facility] services [phone]  [facility] [place] [phone]    For additional resource support please feel free to contact the behavioral access line at [phone] or the Intensive outpatient department at [phone].

## 2022-08-11 NOTE — ED NOTES
I met with pt, along side Dr. Maverick Coronel and [facility]FFILI[facility] OF [facility] nurse. Pt agreed to follow up with [facility] through their walk in services. I explained that he needs to have a [place] [phone] provider so that he can get further help within the community. Pt agreed to eat breakfast and will proceed to walk in services through [facility]. Pt remains stable - expressed no SI, no HI and no hallucinations.         Alyce Dodge, MAJOR  08/11/22 0464

## 2022-08-11 NOTE — ED NOTES
SW attempted to complete assessment and refused to answer any questions and demanded a turkey sandwitch, pop, jello and pudding. SW informed Pt breakfast will be ordered soon. Pt fell back asleep.       FRANCISCO Austin, [place]  08/11/22 7020

## 2022-08-11 NOTE — ED NOTES
Department of Emergency Medicine  FIRST PROVIDER TRIAGE NOTE             Independent MLP           8/10/22  8:56 PM EDT    Date of Encounter: 8/10/22   MRN: [id number]      HPI: Delmy Miguel is a [address] y.o[address] male who presents to the ED for Depression and Paranoid (States depression and paranoid. Denies visual/auditory hallucinations. Denies SI/HI. Rambling in triage. Denies other symptoms. )       ROS: Negative for cp or sob. PE: Gen Appearance/Constitutional: alert  HEENT: NC/NT. PERRLA,  Airway patent. Initial Plan of Care: All treatment areas with department are currently occupied.  Plan to order/Initiate the following while awaiting opening in ED: labs, EKG, and Social Work Eval.  Initiate Treatment-Testing, Proceed toTreatment Area When [facility] Available for ED Attending/MLP to [employer] signed by JUICE Ramon CNP   DD: 8/10/22       JUICE Ramon CNP  08/10/22 2056    ATTENDING PROVIDER ATTESTATION:     Supervising Physician, on-site, available for consultation, non-participatory in the evaluation or care of this patient         Adrian Davis MD  08/10/22 7916

## 2022-08-11 NOTE — ED NOTES
Patient agreeable to discharge and to go to [facility] today between 11 and 1 PM to establish [address] treatment. Refused AM vital signs Eating breakfast prior to leaving [facility].      Heather Vasquez RN  08/11/22 2007

## 2022-08-11 NOTE — ED PROVIDER NOTES
HPI:  8/10/22, Time: 9:16 PM EDT         Leonidas Dave. is a 37 y.o. male presenting to the ED for psychiatric evaluation, beginning days ago. The complaint has been persistent, moderate in severity, and worsened by nothing. Patient reporting feeling depressed. Patient does have history of schizophrenia. Patient reports that he is well-known throughout the [place] of [place] and. He is also well-known at [facility]. Patient reporting he is feeling very pressure due to the fact that he is so well-known. Patient reporting feeling depressed but denies any homicidal or suicidal thoughts. Patient reporting no fever chills he reports no cough. Patient reporting no headache. ROS:   Pertinent positives and negatives are stated within HPI, all other systems reviewed and are negative.  --------------------------------------------- PAST HISTORY ---------------------------------------------  Past Medical History:  has a past medical history of Depression and Schizoaffective disorder ([facility] [address] Past Surgical History:  has a past surgical history that includes Hip fracture surgery (Left, 9/12/2021); eye surgery (19 years ago); and Hip fracture surgery (Left, 9/28/2021). Social History:  reports that he has been smoking cigarettes. He has a 12.00 pack-year smoking history. He has never used smokeless tobacco. He reports current alcohol use of about 2.0 standard drinks per week. He reports current drug use. Frequency: 7.00 times per week. Drugs: Cocaine and Marijuana ([place]). Family History: family history includes Mental Illness in his mother; No Known Problems in his father; Substance Abuse in his mother. The patients home medications have been reviewed.     Allergies: Chlorpheniramine-phenylephrine, Penicillins, and Rondec-d [chlophedianol-pseudoephedrine]    ---------------------------------------------------PHYSICAL EXAM--------------------------------------    Constitutional/General: Alert and oriented x3, well appearing, non toxic in NAD  Head: Normocephalic and atraumatic  Eyes: PERRL, EOMI  Mouth: Oropharynx clear, handling secretions, no trismus  Neck: Supple, full ROM, non tender to palpation in the midline, no stridor, no crepitus, no meningeal signs  Pulmonary: Lungs clear to auscultation bilaterally, no wheezes, rales, or rhonchi. Not in respiratory distress  Cardiovascular:  Regular rate. Regular rhythm. No murmurs, gallops, or rubs. 2+ distal pulses  Chest: no chest wall tenderness  Abdomen: Soft. Non tender. Non distended. +BS. No rebound, guarding, or rigidity. No pulsatile masses appreciated. Musculoskeletal: Moves all extremities x 4. Warm and well perfused, no clubbing, cyanosis, or edema. Capillary refill <3 seconds  Skin: warm and dry. No rashes. Neurologic: GCS 15, CN 2-12 grossly intact, no focal deficits, symmetric strength 5/5 in the upper and lower extremities bilaterally  Psych: Denies homicidal suicidal thoughts patient delusional    -------------------------------------------------- RESULTS -------------------------------------------------  I have personally reviewed all laboratory and imaging results for this patient. Results are listed below.      LABS:  Results for orders placed or performed during the hospital encounter of 08/10/22   Urine Drug Screen   Result Value Ref Range    Amphetamine Screen, Urine NOT DETECTED Negative <1000 ng/mL    Barbiturate Screen, Ur NOT DETECTED Negative < 200 ng/mL    Benzodiazepine Screen, Urine NOT DETECTED Negative < 200 ng/mL    Cannabinoid Scrn, Ur NOT DETECTED Negative < 50ng/mL    Cocaine Metabolite Screen, Urine POSITIVE (A) Negative < 300 ng/mL    Opiate Scrn, Ur NOT DETECTED Negative < 300ng/mL    PCP Screen, Urine NOT DETECTED Negative < 25 ng/mL    Methadone Screen, Urine NOT DETECTED Negative <300 ng/mL    Oxycodone Urine NOT DETECTED Negative <100 ng/mL    FENTANYL SCREEN, URINE NOT DETECTED Negative <1 ng/mL    Drug Screen Comment: see below    Serum Drug Screen   Result Value Ref Range    Ethanol Lvl <10 mg/dL    Acetaminophen Level <5.0 (L) 10.0 - 76.2 mcg/mL    Salicylate, Serum <1.9 0.0 - 30.0 mg/dL    TCA Scrn NEGATIVE Cutoff:300 ng/mL   CBC with Auto Differential   Result Value Ref Range    WBC 3.4 (L) 4.5 - 11.5 E9/L    RBC 4.09 3.80 - 5.80 E12/L    Hemoglobin 12.3 (L) 12.5 - 16.5 g/dL    Hematocrit 39.2 37.0 - 54.0 %    MCV 95.8 80.0 - 99.9 fL    MCH 30.1 26.0 - 35.0 pg    MCHC 31.4 (L) 32.0 - 34.5 %    RDW 13.2 11.5 - 15.0 fL    Platelets 334 056 - 941 E9/L    MPV 9.8 7.0 - 12.0 fL    Neutrophils % 40.7 (L) 43.0 - 80.0 %    Immature Granulocytes % 0.0 0.0 - 5.0 %    Lymphocytes % 44.1 (H) 20.0 - 42.0 %    Monocytes % 8.5 2.0 - 12.0 %    Eosinophils % 4.1 0.0 - 6.0 %    Basophils % 2.6 (H) 0.0 - 2.0 %    Neutrophils Absolute 1.38 (L) 1.80 - 7.30 E9/L    Immature Granulocytes # 0.00 E9/L    Lymphocytes Absolute 1.50 1.50 - 4.00 E9/L    Monocytes Absolute 0.29 0.10 - 0.95 E9/L    Eosinophils Absolute 0.14 0.05 - 0.50 E9/L    Basophils Absolute 0.09 0.00 - 0.20 E9/L   Comprehensive Metabolic Panel   Result Value Ref Range    Sodium 137 132 - 146 mmol/L    Potassium 3.7 3.5 - 5.0 mmol/L    Chloride 106 98 - 107 mmol/L    CO2 22 22 - 29 mmol/L    Anion Gap 9 7 - 16 mmol/L    Glucose 80 74 - 99 mg/dL    BUN 9 6 - 20 mg/dL    Creatinine 0.9 0.7 - 1.2 mg/dL    GFR Non-African American >60 >=60 mL/min/1.73    GFR African American >60     Calcium 8.2 (L) 8.6 - 10.2 mg/dL    Total Protein 5.8 (L) 6.4 - 8.3 g/dL    Albumin 3.2 (L) 3.5 - 5.2 g/dL    Total Bilirubin 0.2 0.0 - 1.2 mg/dL    Alkaline Phosphatase 40 40 - 129 U/L    ALT 9 0 - 40 U/L    AST 17 0 - 39 U/L   EKG 12 Lead   Result Value Ref Range    Ventricular Rate 51 BPM    Atrial Rate 51 BPM    P-R Interval 200 ms    QRS Duration 100 ms    Q-T Interval 414 ms    QTc Calculation (Bazett) 381 ms    P Axis 67 degrees    R Axis 82 degrees    T Axis 71 degrees RADIOLOGY:  Interpreted by Radiologist.  No orders to display       EKG: This EKG is signed and interpreted by me. Rate: 51  Rhythm: Sinus  Interpretation: non-specific EKG  Comparison: stable as compared to patient's most recent EKG        ------------------------- NURSING NOTES AND VITALS REVIEWED ---------------------------   The nursing notes within the ED encounter and vital signs as below have been reviewed by myself. BP (!) 110/54   Pulse 77   Temp 98 °F (36.7 °C)   Resp 16   Ht 6' (1.829 m)   Wt 157 lb (71.2 kg)   SpO2 98%   BMI 21.29 kg/m²   Oxygen Saturation Interpretation: Normal    The patients available past medical records and past encounters were reviewed. ------------------------------ ED COURSE/MEDICAL DECISION MAKING----------------------  Medications - No data to display          Medical Decision Making:      Patient presenting here because of psychiatric evaluation. Patient delusional.  Patient reporting also feeling depressed but not homicidal or suicidal.  Patient has been here for same complaint in the past.  Labs noted reviewed. Patient medically clear for  to evaluate  Re-Evaluations:             Re-evaluation. Patients symptoms show no change      Consultations:                Critical Care: This patient's ED course included: a personal history and physicial eaxmination    This patient has been closely monitored during their ED course. Counseling: The emergency provider has spoken with the patient and discussed todays results, in addition to providing specific details for the plan of care and counseling regarding the diagnosis and prognosis. Questions are answered at this time and they are agreeable with the plan.       --------------------------------- IMPRESSION AND DISPOSITION ---------------------------------    IMPRESSION  1. Encounter for psychiatric assessment    2.  Substance abuse ([facility] DISPOSITION  Disposition:  to assist with disposition  Patient condition is stable        NOTE: This report was transcribed using voice recognition software.  Every effort was made to ensure accuracy; however, inadvertent computerized transcription errors may be present          Rocky Ferguson MD  08/11/22 7429

## 2022-08-12 ENCOUNTER — HOSPITAL ENCOUNTER (EMERGENCY)
Age: 42
Discharge: HOME | End: 2022-08-12
Attending: EMERGENCY MEDICINE

## 2022-08-12 ENCOUNTER — HOSPITAL ENCOUNTER (EMERGENCY)
Age: 42
Discharge: HOME | End: 2022-08-12
Payer: COMMERCIAL

## 2022-08-12 VITALS
HEART RATE: 69 BPM | OXYGEN SATURATION: 96 % | TEMPERATURE: 98.1 F | SYSTOLIC BLOOD PRESSURE: 119 MMHG | RESPIRATION RATE: 18 BRPM | DIASTOLIC BLOOD PRESSURE: 78 MMHG

## 2022-08-12 VITALS
RESPIRATION RATE: 18 BRPM | TEMPERATURE: 98.2 F | OXYGEN SATURATION: 98 % | HEART RATE: 80 BPM | DIASTOLIC BLOOD PRESSURE: 71 MMHG | SYSTOLIC BLOOD PRESSURE: 120 MMHG

## 2022-08-12 DIAGNOSIS — F39 MOOD DISORDER (HCC): Primary | ICD-10-CM

## 2022-08-12 DIAGNOSIS — Z71.1 CONCERN ABOUT STD IN MALE WITHOUT DIAGNOSIS: Primary | ICD-10-CM

## 2022-08-12 PROCEDURE — 87591 N.GONORRHOEAE DNA AMP PROB: CPT

## 2022-08-12 PROCEDURE — 99283 EMERGENCY DEPT VISIT LOW MDM: CPT

## 2022-08-12 PROCEDURE — 87491 CHLMYD TRACH DNA AMP PROBE: CPT

## 2022-08-12 ASSESSMENT — PAIN - FUNCTIONAL ASSESSMENT
PAIN_FUNCTIONAL_ASSESSMENT: NONE - DENIES PAIN
PAIN_FUNCTIONAL_ASSESSMENT: NONE - DENIES PAIN

## 2022-08-12 ASSESSMENT — LIFESTYLE VARIABLES: HOW OFTEN DO YOU HAVE A DRINK CONTAINING ALCOHOL: NEVER

## 2022-08-12 NOTE — ED PROVIDER NOTES
HPI:  8/12/22,   Time: 2:14 AM EDT       Alphonse Dillon. is a 37 y.o. male presenting to the ED for mood issues, beginning months ago. The complaint has been persistent, mild in severity, and worsened by nothing. Hx same, multi visits for same, last seen here 1 day ago. Pt homeless. Doesn't elicit si/hi. Pt difficult historian. Bib private vehilce    Review of Systems:   Pertinent positives and negatives are stated within HPI, all other systems reviewed and are negative.          --------------------------------------------- PAST HISTORY ---------------------------------------------  Past Medical History:  has a past medical history of Depression and Schizoaffective disorder ([facility] [address] Past Surgical History:  has a past surgical history that includes Hip fracture surgery (Left, 9/12/2021); eye surgery (19 years ago); and Hip fracture surgery (Left, 9/28/2021). Social History:  reports that he has been smoking cigarettes. He has a 12.00 pack-year smoking history. He has never used smokeless tobacco. He reports current alcohol use of about 2.0 standard drinks per week. He reports current drug use. Frequency: 7.00 times per week. Drugs: Cocaine and Marijuana ([employer]). Family History: family history includes Mental Illness in his mother; No Known Problems in his father; Substance Abuse in his mother. The patients home medications have been reviewed.     Allergies: Chlorpheniramine-phenylephrine, Penicillins, and Rondec-d [chlophedianol-pseudoephedrine]        ---------------------------------------------------PHYSICAL EXAM--------------------------------------    Constitutional/General: Alert and oriented x3, unkempt  Head: Normocephalic and atraumatic  Eyes: PERRL, EOMI, conjunctive normal, sclera non icteric  Mouth: Oropharynx clear, handling secretions,   Neck: Supple, full ROM,   Respiratory:  Not in respiratory distress  Cardiovascular:   2+ distal pulses  Chest: No chest wall tenderness  GI: Abdomen Soft, Non tender, Non distended. .   Musculoskeletal: Moves all extremities x 4. Warm and well perfused,   Integument: skin warm and dry. No rashes. Lymphatic: no lymphadenopathy noted  Neurologic: GCS 15, no focal deficits, s  Psychiatric: colorful Affect    -------------------------------------------------- RESULTS -------------------------------------------------  I have personally reviewed all laboratory and imaging results for this patient. Results are listed below. LABS:  No results found for this visit on 08/12/22. RADIOLOGY:  Interpreted by Radiologist.  No orders to display       EKG: This EKG is signed and interpreted by the EP. Time:   Rate:   Rhythm:   Interpretation:   Comparison:       ------------------------- NURSING NOTES AND VITALS REVIEWED ---------------------------   The nursing notes within the ED encounter and vital signs as below have been reviewed by myself. /78   Pulse 69   Temp 98.1 °F (36.7 °C)   Resp 18   SpO2 96%   Oxygen Saturation Interpretation: Normal    The patients available past medical records and past encounters were reviewed. ------------------------------ ED COURSE/MEDICAL DECISION MAKING----------------------  Medications - No data to display      ED COURSE:       Medical Decision Making:    No si/hi, here for secondary gain, recurrent visits. Pt requested to leave after getting food. Dc       This patient's ED course included: a personal history and physicial examination    This patient has remained hemodynamically stable during their ED course. Re-Evaluations:             Re-evaluation. Patients symptoms show no change          Counseling: The emergency provider has spoken with the patient and discussed todays results, in addition to providing specific details for the plan of care and counseling regarding the diagnosis and prognosis. Questions are answered at this time and they are agreeable with the plan. --------------------------------- IMPRESSION AND DISPOSITION ---------------------------------    IMPRESSION  1. [facility] disorder ([facility]        DISPOSITION  Disposition: Discharge to home  Patient condition is stable    NOTE: This report was transcribed using voice recognition software.  Every effort was made to ensure accuracy; however, inadvertent computerized transcription errors may be present        Rebecca Donnelly MD  08/12/22 8221

## 2022-08-12 NOTE — ED NOTES
Reviewed AVS with patient. Patient had a bag with baby clothes and blanket and wall paper roll. Threw in trash and stated that he didn't want them.       Girish Adjutant, MARY  08/12/22 1180

## 2022-08-12 NOTE — Clinical Note
Kamar Wilson was seen and treated in our emergency department on 8/12/2022. He may return to work on 08/13/2022. If you have any questions or concerns, please don't hesitate to call.       Charles Sullivan PA-C

## 2022-08-13 NOTE — ED PROVIDER NOTES
[address]  Department of Emergency Medicine   ED  Encounter Note  Admit Date/RoomTime: 2022 10:07 PM  ED Room: [place] [address]/    NAME: Yoselin Burns : 1980  MRN: [id number]     Chief Complaint:  Exposure to STD (Stated he was exposed to std 1 week ago. )    History of Present Illness      Yoselin Burns is a 37 y.o. old male who presents to the emergency department by private vehicle, for possible exposure to Chlamydia and Gonorrhea with no present symptoms, which occured several day(s) prior to arrival.  Since exposure/onset there has been no developing symptoms. He denies any genital discharge, genital rash, genital ulcers, scrotal mass, scrotal pain, fever, abdominal pain, back pain, dysuria, urinary frequency, or hematuria. His prior STD history: No prior history of sexually transmitted disease. ROS   Pertinent positives and negatives are stated within HPI, all other systems reviewed and are negative. Past Medical History:  has a past medical history of Depression and Schizoaffective disorder ([facility] [address] Surgical History:  has a past surgical history that includes Hip fracture surgery (Left, 2021); eye surgery (19 years ago); and Hip fracture surgery (Left, 2021). Social History:  reports that he has been smoking cigarettes. He has a 12.00 pack-year smoking history. He has never used smokeless tobacco. He reports current alcohol use of about 2.0 standard drinks per week. He reports current drug use. Frequency: 7.00 times per week. Drugs: Cocaine and Marijuana ([facility]). Family History: family history includes Mental Illness in his mother; No Known Problems in his father; Substance Abuse in his mother.      Allergies: Chlorpheniramine-phenylephrine, Penicillins, and Rondec-d [chlophedianol-pseudoephedrine]    Physical Exam   Oxygen Saturation Interpretation: Normal.        ED Triage Vitals   BP Temp Temp src Heart Rate Resp SpO2 Height Weight   08/12/22 2204 08/12/22 2142 -- 08/12/22 2142 08/12/22 2142 08/12/22 2142 -- --   120/71 98.2 °F (36.8 °C)  80 18 98 %           Constitutional:  Alert, development consistent with age. HEENT:  NC/NT. Airway patent  Neck:  Normal ROM. Supple. Respiratory:  Lungs Clear to auscultation and breath sounds equal.  CV:  Regular rate and rhythm, normal heart sounds, without pathological murmurs, ectopy, gallops, or rubs. GI:  AbdomenSoft, nontender, good bowel sounds. No firm or pulsatile mass. : Genital / Rectal examination deferred or declined by patient  Integument:  Normal turgor. Warm, dry, without visible rash, unless noted elsewhere. Lymphatics: No lymphangitis or adenopathy noted. Neurological:  Orientation age-appropriate. Motor functions intact. Lab / Imaging Results   (All laboratory and radiology results have been personally reviewed by myself)  Labs:  Results for orders placed or performed during the hospital encounter of 08/12/22   C.trachomatis N.gonorrhoeae DNA, Urine    Specimen: Urine   Result Value Ref Range    Source Urine      Imaging: All Radiology results interpreted by Radiologist unless otherwise noted. No orders to display     ED Course / Medical Decision Making   Medications - No data to display     Consult(s):   None    Procedure(s):   None    Medical Decision Making:   Patient presents to the emergency department requesting to be tested for gonorrhea and chlamydia. He denies any symptoms. Vital signs are normal.  Patient very well-appearing appropriate for discharge and outpatient follow-up with the [place] [facility] since he does not have a primary care provider. Patient understands that we will not receive his results for 2 to 3 days. He should not have intercourse until he receives his results. Return for any new or worsening symptoms.     Plan of Care/Counseling:  Terrance Benavidez PA-C reviewed today's visit with the patient in addition to providing specific details for the plan of care and counseling regarding the diagnosis and prognosis. Questions are answered at this time and are agreeable with the plan. Assessment     1. Concern about STD in male without diagnosis      Plan   Discharged home. Patient condition is good    New Medications     Current Discharge Medication List        Electronically signed by Shara Alonso PA-C   DD: 8/12/22  **This report was transcribed using voice recognition software. Every effort was made to ensure accuracy; however, inadvertent computerized transcription errors may be present.   END OF ED PROVIDER NOTE        Shara Alonso PA-C  08/12/22 2220  ATTENDING PROVIDER ATTESTATION:     Supervising Physician, on-site, available for consultation, non-participatory in the evaluation or care of this patient       Ilya Gaona MD  08/12/22 8569

## 2022-08-16 ENCOUNTER — HOSPITAL ENCOUNTER (EMERGENCY)
Age: 42
Discharge: HOME | End: 2022-08-16
Attending: STUDENT IN AN ORGANIZED HEALTH CARE EDUCATION/TRAINING PROGRAM
Payer: COMMERCIAL

## 2022-08-16 ENCOUNTER — HOSPITAL ENCOUNTER (EMERGENCY)
Age: 42
Discharge: LEFT BEFORE DISCHARGE | End: 2022-08-16

## 2022-08-16 VITALS
WEIGHT: 157 LBS | BODY MASS INDEX: 19.52 KG/M2 | TEMPERATURE: 98.3 F | HEIGHT: 75 IN | HEART RATE: 66 BPM | RESPIRATION RATE: 18 BRPM | SYSTOLIC BLOOD PRESSURE: 113 MMHG | OXYGEN SATURATION: 99 % | DIASTOLIC BLOOD PRESSURE: 73 MMHG

## 2022-08-16 DIAGNOSIS — F39 MOOD DISORDER (HCC): Primary | ICD-10-CM

## 2022-08-16 DIAGNOSIS — Z59.00 HOMELESSNESS: ICD-10-CM

## 2022-08-16 DIAGNOSIS — E86.0 DEHYDRATION: ICD-10-CM

## 2022-08-16 DIAGNOSIS — E87.5 HYPERKALEMIA: ICD-10-CM

## 2022-08-16 LAB
ACETAMINOPHEN LEVEL: <5 MCG/ML (ref 10–30)
ALBUMIN SERPL-MCNC: 4 G/DL (ref 3.5–5.2)
ALP BLD-CCNC: 47 U/L (ref 40–129)
ALT SERPL-CCNC: 11 U/L (ref 0–40)
AMPHETAMINE SCREEN, URINE: NOT DETECTED
ANION GAP SERPL CALCULATED.3IONS-SCNC: 7 MMOL/L (ref 7–16)
AST SERPL-CCNC: 24 U/L (ref 0–39)
BACTERIA: ABNORMAL /HPF
BARBITURATE SCREEN URINE: NOT DETECTED
BASOPHILS ABSOLUTE: 0.09 E9/L (ref 0–0.2)
BASOPHILS RELATIVE PERCENT: 1.4 % (ref 0–2)
BENZODIAZEPINE SCREEN, URINE: NOT DETECTED
BILIRUB SERPL-MCNC: 0.3 MG/DL (ref 0–1.2)
BILIRUBIN URINE: ABNORMAL
BLOOD, URINE: NEGATIVE
BUN BLDV-MCNC: 13 MG/DL (ref 6–20)
C. TRACHOMATIS DNA ,URINE: NEGATIVE
CALCIUM SERPL-MCNC: 9 MG/DL (ref 8.6–10.2)
CANNABINOID SCREEN URINE: NOT DETECTED
CHLORIDE BLD-SCNC: 112 MMOL/L (ref 98–107)
CLARITY: CLEAR
CO2: 28 MMOL/L (ref 22–29)
COCAINE METABOLITE SCREEN URINE: POSITIVE
COLOR: YELLOW
CREAT SERPL-MCNC: 0.9 MG/DL (ref 0.7–1.2)
EKG ATRIAL RATE: 40 BPM
EKG P AXIS: 54 DEGREES
EKG P-R INTERVAL: 190 MS
EKG Q-T INTERVAL: 460 MS
EKG QRS DURATION: 104 MS
EKG QTC CALCULATION (BAZETT): 374 MS
EKG R AXIS: 99 DEGREES
EKG T AXIS: 61 DEGREES
EKG VENTRICULAR RATE: 40 BPM
EOSINOPHILS ABSOLUTE: 0.21 E9/L (ref 0.05–0.5)
EOSINOPHILS RELATIVE PERCENT: 3.3 % (ref 0–6)
ETHANOL: <10 MG/DL (ref 0–0.08)
FENTANYL SCREEN, URINE: NOT DETECTED
GFR AFRICAN AMERICAN: >60
GFR NON-AFRICAN AMERICAN: >60 ML/MIN/1.73
GLUCOSE BLD-MCNC: 84 MG/DL (ref 74–99)
GLUCOSE URINE: NEGATIVE MG/DL
HCT VFR BLD CALC: 44.5 % (ref 37–54)
HEMOGLOBIN: 13.7 G/DL (ref 12.5–16.5)
IMMATURE GRANULOCYTES #: 0.01 E9/L
IMMATURE GRANULOCYTES %: 0.2 % (ref 0–5)
INFLUENZA A: NOT DETECTED
INFLUENZA B: NOT DETECTED
KETONES, URINE: NEGATIVE MG/DL
LEUKOCYTE ESTERASE, URINE: NEGATIVE
LYMPHOCYTES ABSOLUTE: 1.61 E9/L (ref 1.5–4)
LYMPHOCYTES RELATIVE PERCENT: 25.4 % (ref 20–42)
Lab: ABNORMAL
MCH RBC QN AUTO: 29.9 PG (ref 26–35)
MCHC RBC AUTO-ENTMCNC: 30.8 % (ref 32–34.5)
MCV RBC AUTO: 97.2 FL (ref 80–99.9)
METHADONE SCREEN, URINE: NOT DETECTED
MONOCYTES ABSOLUTE: 0.55 E9/L (ref 0.1–0.95)
MONOCYTES RELATIVE PERCENT: 8.7 % (ref 2–12)
MUCUS: PRESENT /LPF
N. GONORRHOEAE DNA, URINE: NEGATIVE
NEUTROPHILS ABSOLUTE: 3.86 E9/L (ref 1.8–7.3)
NEUTROPHILS RELATIVE PERCENT: 61 % (ref 43–80)
NITRITE, URINE: NEGATIVE
OPIATE SCREEN URINE: NOT DETECTED
OXYCODONE URINE: NOT DETECTED
PDW BLD-RTO: 13.7 FL (ref 11.5–15)
PH UA: 6 (ref 5–9)
PHENCYCLIDINE SCREEN URINE: NOT DETECTED
PLATELET # BLD: 274 E9/L (ref 130–450)
PMV BLD AUTO: 9.8 FL (ref 7–12)
POTASSIUM REFLEX MAGNESIUM: 5.6 MMOL/L (ref 3.5–5)
PROTEIN UA: ABNORMAL MG/DL
RBC # BLD: 4.58 E12/L (ref 3.8–5.8)
RBC UA: ABNORMAL /HPF (ref 0–2)
SALICYLATE, SERUM: <0.3 MG/DL (ref 0–30)
SARS-COV-2 RNA, RT PCR: NOT DETECTED
SODIUM BLD-SCNC: 147 MMOL/L (ref 132–146)
SOURCE: NORMAL
SPECIFIC GRAVITY UA: >=1.03 (ref 1–1.03)
TOTAL PROTEIN: 6.7 G/DL (ref 6.4–8.3)
TRICYCLIC ANTIDEPRESSANTS SCREEN SERUM: NEGATIVE NG/ML
UROBILINOGEN, URINE: 0.2 E.U./DL
WBC # BLD: 6.3 E9/L (ref 4.5–11.5)
WBC UA: ABNORMAL /HPF (ref 0–5)

## 2022-08-16 PROCEDURE — 80179 DRUG ASSAY SALICYLATE: CPT

## 2022-08-16 PROCEDURE — 80143 DRUG ASSAY ACETAMINOPHEN: CPT

## 2022-08-16 PROCEDURE — 87636 SARSCOV2 & INF A&B AMP PRB: CPT

## 2022-08-16 PROCEDURE — 99284 EMERGENCY DEPT VISIT MOD MDM: CPT

## 2022-08-16 PROCEDURE — 82077 ASSAY SPEC XCP UR&BREATH IA: CPT

## 2022-08-16 PROCEDURE — 80053 COMPREHEN METABOLIC PANEL: CPT

## 2022-08-16 PROCEDURE — 81001 URINALYSIS AUTO W/SCOPE: CPT

## 2022-08-16 PROCEDURE — 36415 COLL VENOUS BLD VENIPUNCTURE: CPT

## 2022-08-16 PROCEDURE — 93005 ELECTROCARDIOGRAM TRACING: CPT | Performed by: STUDENT IN AN ORGANIZED HEALTH CARE EDUCATION/TRAINING PROGRAM

## 2022-08-16 PROCEDURE — 80307 DRUG TEST PRSMV CHEM ANLYZR: CPT

## 2022-08-16 PROCEDURE — 85025 COMPLETE CBC W/AUTO DIFF WBC: CPT

## 2022-08-16 SDOH — ECONOMIC STABILITY - HOUSING INSECURITY: HOMELESSNESS UNSPECIFIED: Z59.00

## 2022-08-16 NOTE — ED NOTES
PATRICIO SW talked to pt to see what his needs are. Pt reports he is not suicidal and not homicidal. Pt stated he needed a place to sleep and now is ready to go. Pt states he needs to \" dads ashes today. \" Pt denies hallucinations, thoughts appear stable. Pt encouraged to follow up with [facility] for mental health services.       Justina [place], [place]  08/16/22 1170

## 2022-08-16 NOTE — ED PROVIDER NOTES
interpreted by me)  Results for orders placed or performed during the hospital encounter of 08/16/22   COVID-19 & Influenza Combo    Specimen: Nasopharyngeal Swab   Result Value Ref Range    SARS-CoV-2 RNA, RT PCR NOT DETECTED NOT DETECTED    INFLUENZA A NOT DETECTED NOT DETECTED    INFLUENZA B NOT DETECTED NOT DETECTED   CBC with Auto Differential   Result Value Ref Range    WBC 6.3 4.5 - 11.5 E9/L    RBC 4.58 3.80 - 5.80 E12/L    Hemoglobin 13.7 12.5 - 16.5 g/dL    Hematocrit 44.5 37.0 - 54.0 %    MCV 97.2 80.0 - 99.9 fL    MCH 29.9 26.0 - 35.0 pg    MCHC 30.8 (L) 32.0 - 34.5 %    RDW 13.7 11.5 - 15.0 fL    Platelets 680 717 - 287 E9/L    MPV 9.8 7.0 - 12.0 fL    Neutrophils % 61.0 43.0 - 80.0 %    Immature Granulocytes % 0.2 0.0 - 5.0 %    Lymphocytes % 25.4 20.0 - 42.0 %    Monocytes % 8.7 2.0 - 12.0 %    Eosinophils % 3.3 0.0 - 6.0 %    Basophils % 1.4 0.0 - 2.0 %    Neutrophils Absolute 3.86 1.80 - 7.30 E9/L    Immature Granulocytes # 0.01 E9/L    Lymphocytes Absolute 1.61 1.50 - 4.00 E9/L    Monocytes Absolute 0.55 0.10 - 0.95 E9/L    Eosinophils Absolute 0.21 0.05 - 0.50 E9/L    Basophils Absolute 0.09 0.00 - 0.20 E9/L   Comprehensive Metabolic Panel w/ Reflex to MG   Result Value Ref Range    Sodium 147 (H) 132 - 146 mmol/L    Potassium reflex Magnesium 5.6 (H) 3.5 - 5.0 mmol/L    Chloride 112 (H) 98 - 107 mmol/L    CO2 28 22 - 29 mmol/L    Anion Gap 7 7 - 16 mmol/L    Glucose 84 74 - 99 mg/dL    BUN 13 6 - 20 mg/dL    Creatinine 0.9 0.7 - 1.2 mg/dL    GFR Non-African American >60 >=60 mL/min/1.73    GFR African American >60     Calcium 9.0 8.6 - 10.2 mg/dL    Total Protein 6.7 6.4 - 8.3 g/dL    Albumin 4.0 3.5 - 5.2 g/dL    Total Bilirubin 0.3 0.0 - 1.2 mg/dL    Alkaline Phosphatase 47 40 - 129 U/L    ALT 11 0 - 40 U/L    AST 24 0 - 39 U/L   Urinalysis with Microscopic   Result Value Ref Range    Color, UA Yellow Straw/Yellow    Clarity, UA Clear Clear    Glucose, Ur Negative Negative mg/dL    Bilirubin Urine SMALL (A) Negative    Ketones, Urine Negative Negative mg/dL    Specific Gravity, UA >=1.030 1.005 - 1.030    Blood, Urine Negative Negative    pH, UA 6.0 5.0 - 9.0    Protein, UA TRACE Negative mg/dL    Urobilinogen, Urine 0.2 <2.0 E.U./dL    Nitrite, Urine Negative Negative    Leukocyte Esterase, Urine Negative Negative    Mucus, UA Present (A) None Seen /LPF    WBC, UA 0-1 0 - 5 /HPF    RBC, UA 0-1 0 - 2 /HPF    Bacteria, UA RARE (A) None Seen /HPF   URINE DRUG SCREEN   Result Value Ref Range    Amphetamine Screen, Urine NOT DETECTED Negative <1000 ng/mL    Barbiturate Screen, Ur NOT DETECTED Negative < 200 ng/mL    Benzodiazepine Screen, Urine NOT DETECTED Negative < 200 ng/mL    Cannabinoid Scrn, Ur NOT DETECTED Negative < 50ng/mL    Cocaine Metabolite Screen, Urine POSITIVE (A) Negative < 300 ng/mL    Opiate Scrn, Ur NOT DETECTED Negative < 300ng/mL    PCP Screen, Urine NOT DETECTED Negative < 25 ng/mL    Methadone Screen, Urine NOT DETECTED Negative <300 ng/mL    Oxycodone Urine NOT DETECTED Negative <100 ng/mL    FENTANYL SCREEN, URINE NOT DETECTED Negative <1 ng/mL    Drug Screen Comment: see below    Serum Drug Screen   Result Value Ref Range    Ethanol Lvl <10 mg/dL    Acetaminophen Level <5.0 (L) 10.0 - 12.8 mcg/mL    Salicylate, Serum <0.7 0.0 - 30.0 mg/dL    TCA Scrn NEGATIVE Cutoff:300 ng/mL   EKG 12 Lead   Result Value Ref Range    Ventricular Rate 40 BPM    Atrial Rate 40 BPM    P-R Interval 190 ms    QRS Duration 104 ms    Q-T Interval 460 ms    QTc Calculation (Bazett) 374 ms    P Axis 54 degrees    R Axis 99 degrees    T Axis 61 degrees   ,       RADIOLOGY:  Interpreted by Radiologist unless otherwise specified  No orders to display         EKG Interpretation  Interpreted by emergency department physician, Dr. Kristel Mack     EKG: This EKG is signed and interpreted by me.     Rate: 40  Rhythm: Sinus  Interpretation: Normal sinus rhythm, right  axis, no acute st elevations or depressions, incomplete RBBB, intervals within normal limits, qtc is 374  Comparison: stable as compared to patient's most recent EKG         ------------------------- NURSING NOTES AND VITALS REVIEWED ---------------------------   The nursing notes within the ED encounter and vital signs as below have been reviewed by myself  /73   Pulse 66   Temp 98.3 °F (36.8 °C)   Resp 18   Ht 6' 3\" (1.905 m)   Wt 157 lb (71.2 kg)   SpO2 99%   BMI 19.62 kg/m²     Oxygen Saturation Interpretation: Normal    The patients available past medical records and past encounters were reviewed. ------------------------------ ED COURSE/MEDICAL DECISION MAKING----------------------  Medications - No data to display        I, Dr. Simon Hartman, am the primary provider of record    Medical Decision Making:   The patient is a 80-year-old male who presents to the emergency department complaining of homelessness. Patient is hemodynamically stable, nontoxic, and in no acute distress. Patient stated that he was suicidal when I initially asked him, but then he states he never told me that and that he thought I asked him if he was paranoid he states that he does have paranoid symptoms at his baseline and he was actually never suicidal and never had a plan. He states that he just wants to go home because he needs to go  his father's ashes. He was found to be dehydrated and slightly hyperkalemic with a potassium of 5.6. Social work to evaluate the patient and he denied any suicidal thoughts or plan to her as well. Patient states that he does have a ride to come and pick him up. I recommended him to get IV fluids and repeat BMP but he refused. He signed out [address] I asked him multiple times if he was suicidal and he said that he was not upon multiple reassessments. This patient has chosen to leave against medical advice.   I, Dr. Simon Hartman, the Emergency Physician has personally explained to them that choosing to do so may result in permanent bodily harm or death. Discussed at length that without further evaluation and monitoring there may be unforeseen circumstances and deterioration causing permanent bodily harm or death as a result of their choice. They are alert, oriented, and have the capacity and are competent at this time to make this decision. They state that they are aware of the serious risks as explained, but they continue to wish to leave against medical advice. In light of their decision to leave against medical advice, follow-up has been arranged and they are aware of the importance of following up as instructed. They have been advised that they should return to the ED immediately if they change their mind at any time, or if their condition begins to change or worsen. Oxygen Saturation Interpretation: 99 % on room air. Re-Evaluations:  ED Course as of 08/16/22 1244   Tue Aug 16, 2022   0818 I was called up to evaluate the patient by nursing staff. Patient states that he is actually not suicidal and does not have any plan to overdose. He states that he was sleeping and I woke him up and he was confused and with suicidal meant. He felt this meant that he was agitated and he feels agitated at times but is definitely not suicidal.  He states that he never meant that he was suicidal and just came here because he is homeless and needed somewhere to sleep and to get some food. Social work did go and evaluate the patient as well. Since the patient is not suicidal and does not have a plan he will be able to leave. [KG]      ED Course User Index  [KG] Yady Rojas,              This patient's ED course included: a personal history and physicial examination, re-evaluation prior to disposition, and complex medical decision making and emergency management    This patient has remained hemodynamically stable during their ED course. Consultations:  Spoke with social work. Discussed case. They will provide consultation. Counseling: The emergency provider has spoken with the patient and discussed todays results, in addition to providing specific details for the plan of care and counseling regarding the diagnosis and prognosis. Questions are answered at this time and they are agreeable with the plan.       --------------------------------- IMPRESSION AND DISPOSITION ---------------------------------    IMPRESSION  1. Mood disorder ([facility] [address]    2. Homelessness    3. Dehydration    4. Hyperkalemia        DISPOSITION  Disposition: Other Disposition: Left AMA  Patient condition is stable        NOTE: This report was transcribed using voice recognition software.  Every effort was made to ensure accuracy; however, inadvertent computerized transcription errors may be present       Ming Hernandez DO  08/16/22 1244

## 2022-08-18 ENCOUNTER — HOSPITAL ENCOUNTER (EMERGENCY)
Age: 42
Discharge: HOME | End: 2022-08-19
Attending: EMERGENCY MEDICINE
Payer: COMMERCIAL

## 2022-08-18 DIAGNOSIS — F32.A DEPRESSION, UNSPECIFIED DEPRESSION TYPE: Primary | ICD-10-CM

## 2022-08-18 LAB
ACETAMINOPHEN LEVEL: <5 MCG/ML (ref 10–30)
ALBUMIN SERPL-MCNC: 3.9 G/DL (ref 3.5–5.2)
ALP BLD-CCNC: 58 U/L (ref 40–129)
ALT SERPL-CCNC: 11 U/L (ref 0–40)
AMPHETAMINE SCREEN, URINE: NOT DETECTED
ANION GAP SERPL CALCULATED.3IONS-SCNC: 11 MMOL/L (ref 7–16)
AST SERPL-CCNC: 16 U/L (ref 0–39)
BARBITURATE SCREEN URINE: NOT DETECTED
BASOPHILS ABSOLUTE: 0.07 E9/L (ref 0–0.2)
BASOPHILS RELATIVE PERCENT: 1.2 % (ref 0–2)
BENZODIAZEPINE SCREEN, URINE: NOT DETECTED
BILIRUB SERPL-MCNC: <0.2 MG/DL (ref 0–1.2)
BILIRUBIN URINE: NEGATIVE
BLOOD, URINE: NEGATIVE
BUN BLDV-MCNC: 16 MG/DL (ref 6–20)
CALCIUM SERPL-MCNC: 8.5 MG/DL (ref 8.6–10.2)
CANNABINOID SCREEN URINE: POSITIVE
CHLORIDE BLD-SCNC: 109 MMOL/L (ref 98–107)
CLARITY: CLEAR
CO2: 25 MMOL/L (ref 22–29)
COCAINE METABOLITE SCREEN URINE: POSITIVE
COLOR: YELLOW
CREAT SERPL-MCNC: 0.9 MG/DL (ref 0.7–1.2)
EOSINOPHILS ABSOLUTE: 0.24 E9/L (ref 0.05–0.5)
EOSINOPHILS RELATIVE PERCENT: 4.1 % (ref 0–6)
ETHANOL: <10 MG/DL (ref 0–0.08)
FENTANYL SCREEN, URINE: NOT DETECTED
GFR AFRICAN AMERICAN: >60
GFR NON-AFRICAN AMERICAN: >60 ML/MIN/1.73
GLUCOSE BLD-MCNC: 93 MG/DL (ref 74–99)
GLUCOSE URINE: NEGATIVE MG/DL
HCT VFR BLD CALC: 39.1 % (ref 37–54)
HEMOGLOBIN: 12.8 G/DL (ref 12.5–16.5)
IMMATURE GRANULOCYTES #: 0.01 E9/L
IMMATURE GRANULOCYTES %: 0.2 % (ref 0–5)
KETONES, URINE: ABNORMAL MG/DL
LEUKOCYTE ESTERASE, URINE: NEGATIVE
LYMPHOCYTES ABSOLUTE: 1.8 E9/L (ref 1.5–4)
LYMPHOCYTES RELATIVE PERCENT: 30.9 % (ref 20–42)
Lab: ABNORMAL
MCH RBC QN AUTO: 29.8 PG (ref 26–35)
MCHC RBC AUTO-ENTMCNC: 32.7 % (ref 32–34.5)
MCV RBC AUTO: 90.9 FL (ref 80–99.9)
METHADONE SCREEN, URINE: NOT DETECTED
MONOCYTES ABSOLUTE: 0.53 E9/L (ref 0.1–0.95)
MONOCYTES RELATIVE PERCENT: 9.1 % (ref 2–12)
NEUTROPHILS ABSOLUTE: 3.18 E9/L (ref 1.8–7.3)
NEUTROPHILS RELATIVE PERCENT: 54.5 % (ref 43–80)
NITRITE, URINE: NEGATIVE
OPIATE SCREEN URINE: NOT DETECTED
OXYCODONE URINE: NOT DETECTED
PDW BLD-RTO: 13.5 FL (ref 11.5–15)
PH UA: 8 (ref 5–9)
PHENCYCLIDINE SCREEN URINE: NOT DETECTED
PLATELET # BLD: 258 E9/L (ref 130–450)
PMV BLD AUTO: 9.7 FL (ref 7–12)
POTASSIUM SERPL-SCNC: 4.2 MMOL/L (ref 3.5–5)
PROTEIN UA: NEGATIVE MG/DL
RBC # BLD: 4.3 E12/L (ref 3.8–5.8)
SALICYLATE, SERUM: <0.3 MG/DL (ref 0–30)
SODIUM BLD-SCNC: 145 MMOL/L (ref 132–146)
SPECIFIC GRAVITY UA: 1.01 (ref 1–1.03)
TOTAL PROTEIN: 6.3 G/DL (ref 6.4–8.3)
TRICYCLIC ANTIDEPRESSANTS SCREEN SERUM: NEGATIVE NG/ML
UROBILINOGEN, URINE: 4 E.U./DL
WBC # BLD: 5.8 E9/L (ref 4.5–11.5)

## 2022-08-18 PROCEDURE — 80143 DRUG ASSAY ACETAMINOPHEN: CPT

## 2022-08-18 PROCEDURE — 93005 ELECTROCARDIOGRAM TRACING: CPT | Performed by: NURSE PRACTITIONER

## 2022-08-18 PROCEDURE — 80053 COMPREHEN METABOLIC PANEL: CPT

## 2022-08-18 PROCEDURE — 81003 URINALYSIS AUTO W/O SCOPE: CPT

## 2022-08-18 PROCEDURE — 85025 COMPLETE CBC W/AUTO DIFF WBC: CPT

## 2022-08-18 PROCEDURE — 82077 ASSAY SPEC XCP UR&BREATH IA: CPT

## 2022-08-18 PROCEDURE — 80307 DRUG TEST PRSMV CHEM ANLYZR: CPT

## 2022-08-18 PROCEDURE — 99285 EMERGENCY DEPT VISIT HI MDM: CPT

## 2022-08-18 PROCEDURE — 80179 DRUG ASSAY SALICYLATE: CPT

## 2022-08-18 ASSESSMENT — PAIN - FUNCTIONAL ASSESSMENT: PAIN_FUNCTIONAL_ASSESSMENT: NONE - DENIES PAIN

## 2022-08-19 VITALS
WEIGHT: 160 LBS | RESPIRATION RATE: 18 BRPM | HEART RATE: 65 BPM | DIASTOLIC BLOOD PRESSURE: 62 MMHG | SYSTOLIC BLOOD PRESSURE: 104 MMHG | BODY MASS INDEX: 21.67 KG/M2 | OXYGEN SATURATION: 98 % | TEMPERATURE: 98.6 F | HEIGHT: 72 IN

## 2022-08-19 LAB
EKG ATRIAL RATE: 60 BPM
EKG P AXIS: -75 DEGREES
EKG P-R INTERVAL: 172 MS
EKG Q-T INTERVAL: 384 MS
EKG QRS DURATION: 92 MS
EKG QTC CALCULATION (BAZETT): 384 MS
EKG R AXIS: -56 DEGREES
EKG T AXIS: -43 DEGREES
EKG VENTRICULAR RATE: 60 BPM

## 2022-08-19 ASSESSMENT — PAIN DESCRIPTION - LOCATION: LOCATION: LEG

## 2022-08-19 ASSESSMENT — PAIN SCALES - GENERAL: PAINLEVEL_OUTOF10: 10

## 2022-08-19 NOTE — ED NOTES
Department of Emergency Medicine  FIRST PROVIDER TRIAGE NOTE             [facility]           22  8:44 PM EDT    Date of Encounter: 22   MRN: [id number]      HPI: Claudell Gent. is a 40 y.o. male who presents to the ED for Depression (Pt states he is depressed because his sons mother passed away. Pt states he is SI since loosing his son. Pt states \" im ready to stop all the madness\". )  Patient now reporting that he is suicidal since losing his son. Patient states he is ready to stop all the meds. Reports he is disowned by his family. And the mother of his child . .  Plan is by using drugs    ROS: Negative for cp or sob. PE: Gen Appearance/Constitutional: alert  HEENT: NC/NT. PERRLA,  Airway patent. Initial Plan of Care: All treatment areas with department are currently occupied. Plan to order/Initiate the following while awaiting opening in ED: labs, EKG, imaging studies, and COVID-19 testing.   Initiate Treatment-Testing, Proceed toTreatment Area When Bed Available for ED Attending/MLP to Continue Care    Electronically signed by JUICE Mock CNP   DD: 22       JUICE Mock CNP  22 [id number]

## 2022-08-19 NOTE — ED NOTES
Behavioral Health Crisis Assessment      Chief Complaint:  Pt reported he is here due to being depressed. Pt reports that today is his birthday and he's never been alone before on his birthday. Pt admits to SI with no plan or intent. Pt does report to stressors of recently picking up his fathers ashes from his aunt and his sons's mother passing away last month from a drug overdose. Mental Status Exam:  Pt is somewhat alert, oriented x 3, easily agitated but cooperative to a certain extent, behavior under control, guarded, fair historian, limited communication, yes/no answers, flat affect, depressed mood, poor eye contact, speech mumbled, poor hygiene. Pt denies to having any A/V hallucinations at this time. Pt does report to feeling paranoid about \"getting sober\". Pt appears to be at baseline. Legal Status  [x] Voluntary:  [] Involuntary, Issued by:    Gender  [x] Male [] Female [] Transgender  [] Other    Sexual Orientation    [x] Heterosexual [] Homosexual [] Bisexual [] Other    Brief Clinical Summary:  Pt is a 38 yo male who presented into the ED as a walk-in after feeling depressed. Pt reports to  that today ([birth year]) being his birthday and that he has never spend it alone before. Pt does admit to SI with no plan or intent. Pt does report to stressors of recently picking up his fathers ashes from his aunt and his sons's mother passing away last month from a drug overdose. Pt denies to any suicide attempts, self injurious behaviors or HI. Pt has presented into the ED on 8/2, 8/3, 8/4, 8/5, 8/7, 8/8, 8/10, 8/12, 8/16 and today 8/18. [address] Navigator is aware of multiple ER visits. Pt has been reminded and educated on [facility] PSYCHI[facility]IC walk-in hours from 11-1:30. Pt also was reminded the importance of following up with resources/referrals to establish consistent MH treatment.      Collateral Information:   No collateral information obtained at this time    Risk Factors:  MH diagnoses   Non-compliant with psych medications  Substance use  Homelessness  Lack of essential needs   Non-compliant with outpatient treatment  Lack of self care  No PCP   No access to weapons  Limited support  Several [address] hospitalizations  Poor communication  Hx of malingering, manipulation and disruptive behaviors     Protective Factors:  Initiated this ER visit  No access to weapons  Steady income - SSI and own payee   Utilizes the [address] for transportation     Suicidal Ideations:   [x] Reports:    [x] Past [] Present   [] Denies    Suicide Attempts:  [] Reports:   [x] Denies    C-SSRS Screening Completed by RN: Current Suicide Risk:  [x] No Risk [] Low [] Moderate [] High    Homicidal Ideations  [] Reports:   [] Past [] Present   [x] Denies     Self Injurious/Self Mutilation Behaviors:   [] Reports:    [] Past [] Present   [x] Denies    Hallucinations/Delusions   [x] Reports:   [] Denies     Substance Use/Alcohol Use/Addiction:   [x] Reports: Pt has a hx of using crack, cannabis, meth. Pt admit to using crack 2 days ago - unknown amount. Pt denies to any alcohol use. [] Denies   [x] SBIRT Screen Complete. Current or Past Substance Abuse Treatment  [x] Yes, When and Where: Pt reports to a hx of going to detox/rehab in the past. Pt unable to provide specific details at this time on where/when.   [] No    Current or Past Mental Health Treatment:  [x] Yes, When and Where: Pt reports to being diagnosed with Bipolar and not following up with treatment. Pt denies to taking any proscribed medications at this time. Pt does have a hx of several MH hospitalizations with his last admission being on 12/4/2021 at [facility].    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 []  No  [x]  Yes (Ask the questions listed below)   When? Unable to provide details    Did you follow through with the thoughts? [] No      [x] Yes- When and what happened? Unable to provide details   2. Have you ever threatened anyone? []  No  [x]  Yes (Ask the questions listed below)   When and what happened? Unable to provide details but did senior living time per chart hx   Have you ever threatened someone with a gun, knife or other weapon? []  No  [x]  Yes - When and what happened? Did senior living time   2. Have you ever had an order of protection taken out against you? []  Yes [x]  No  3. Have you ever been arrested due to violence? [x]  Yes []  No  4. Have you ever been cruel to animals? []  Yes [x]  No    After consideration of C-SSRS screening results, C-SSRS assessments, and this professional's assessment the patient's overall suicide risk assessed to be:  [x] No Risk  [] Low   [] Moderate   [] High     [x] Discussed current suicide risk, protective and risk factors with RN and ED Physician     Disposition   [] Home:   [] Outpatient Provider:   [] Crisis Unit:   [] Inpatient Psychiatric Unit:  [x] Other:     Peer support is to be consulted to further discuss about detox/rehab treatment options.         FRANCISCO [place]ru[place], [place]  08/19/22 4438

## 2022-08-19 NOTE — CARE COORDINATION
Peer Recovery Support Note       Name:Joce Rodriguez    Date: 8/19/2022         Chief Complaint   Patient presents with    Depression     Pt states he is depressed because his sons mother passed away. Pt states he is SI since loosing his son. Pt states \" im ready to stop all the madness\". Peer Support met with patient. [] Support and education provided  [] Resources provided   [x] Treatment referral:   [] Other:   [] Patient declined peer recovery services      Referred By: Jessica Cook     Notes:  I spoke with patient about treatment and he stated he was willing to go further away. I called the following facilities  [place]: [facility] [place]: 1 week wait or more  [place]: 1 week wait or more    While waiting for a call back from [place] the patient was discharged. I left a list of treatment centers with the patient.

## 2022-08-19 NOTE — ED PROVIDER NOTES
HPI:  8/18/22,   Time: 9:04 PM EDT       Lindsay Sánchez is a 40 y.o. male presenting to the ED for depression, beginning several days ago. The complaint has been persistent, moderate in severity, and worsened by nothing. The patient states that his son's mother passed away recently. He states he has had increasing depression and not feeling very well. States he has had some suicidal ideations. No specific plan. No HI. No abdominal pain. No nausea vomiting. No chest pain shortness of breath. Review of Systems:   Pertinent positives and negatives are stated within HPI, all other systems reviewed and are negative.          --------------------------------------------- PAST HISTORY ---------------------------------------------  Past Medical History:  has a past medical history of Depression and Schizoaffective disorder ([facility] [address] Past Surgical History:  has a past surgical history that includes Hip fracture surgery (Left, 9/12/2021); eye surgery (19 years ago); and Hip fracture surgery (Left, 9/28/2021). Social History:  reports that he has been smoking cigarettes. He has a 12.00 pack-year smoking history. He has never used smokeless tobacco. He reports current alcohol use of about 2.0 standard drinks per week. He reports current drug use. Frequency: 7.00 times per week. Drugs: Cocaine and Marijuana (Anya Madeline). Family History: family history includes Mental Illness in his mother; No Known Problems in his father; Substance Abuse in his mother. The patients home medications have been reviewed.     Allergies: Chlorpheniramine-phenylephrine, Penicillins, and Rondec-d [chlophedianol-pseudoephedrine]        ---------------------------------------------------PHYSICAL EXAM--------------------------------------    Constitutional/General: Alert and oriented x3, well appearing, non toxic in NAD  Head: Normocephalic and atraumatic  Eyes: PERRL, EOMI, conjunctive normal, sclera non icteric  Mouth: Oropharynx clear, handling secretions, no trismus, no asymmetry of the posterior oropharynx or uvular edema  Neck: Supple, full ROM, non tender to palpation in the midline, no stridor, no crepitus, no meningeal signs  Respiratory: Lungs clear to auscultation bilaterally, no wheezes, rales, or rhonchi. Not in respiratory distress  Cardiovascular:  Regular rate. Regular rhythm. No murmurs, gallops, or rubs. 2+ distal pulses  GI:  Abdomen Soft, Non tender, Non distended. +BS. No organomegaly, no palpable masses,  No rebound, guarding, or rigidity. Musculoskeletal: Moves all extremities x 4. Warm and well perfused, no clubbing, cyanosis, or edema. Capillary refill <3 seconds  Integument: skin warm and dry. No rashes. Neurologic: GCS 15, no focal deficits, symmetric strength 5/5 in the upper and lower extremities bilaterally  Psychiatric: Normal Affect    -------------------------------------------------- RESULTS -------------------------------------------------  I have personally reviewed all laboratory and imaging results for this patient. Results are listed below.      LABS:  Results for orders placed or performed during the hospital encounter of 08/18/22   Urine Drug Screen   Result Value Ref Range    Drug Screen Comment: see below    Serum Drug Screen   Result Value Ref Range    Ethanol Lvl <10 mg/dL    Acetaminophen Level <5.0 (L) 10.0 - 80.0 mcg/mL    Salicylate, Serum <8.1 0.0 - 30.0 mg/dL    TCA Scrn NEGATIVE Cutoff:300 ng/mL   CBC with Auto Differential   Result Value Ref Range    WBC 5.8 4.5 - 11.5 E9/L    RBC 4.30 3.80 - 5.80 E12/L    Hemoglobin 12.8 12.5 - 16.5 g/dL    Hematocrit 39.1 37.0 - 54.0 %    MCV 90.9 80.0 - 99.9 fL    MCH 29.8 26.0 - 35.0 pg    MCHC 32.7 32.0 - 34.5 %    RDW 13.5 11.5 - 15.0 fL    Platelets 217 947 - 269 E9/L    MPV 9.7 7.0 - 12.0 fL    Neutrophils % 54.5 43.0 - 80.0 %    Immature Granulocytes % 0.2 0.0 - 5.0 %    Lymphocytes % 30.9 20.0 - 42.0 %    Monocytes % 9.1 2.0 - 12.0 % Eosinophils % 4.1 0.0 - 6.0 %    Basophils % 1.2 0.0 - 2.0 %    Neutrophils Absolute 3.18 1.80 - 7.30 E9/L    Immature Granulocytes # 0.01 E9/L    Lymphocytes Absolute 1.80 1.50 - 4.00 E9/L    Monocytes Absolute 0.53 0.10 - 0.95 E9/L    Eosinophils Absolute 0.24 0.05 - 0.50 E9/L    Basophils Absolute 0.07 0.00 - 0.20 E9/L   Comprehensive Metabolic Panel   Result Value Ref Range    Sodium 145 132 - 146 mmol/L    Potassium 4.2 3.5 - 5.0 mmol/L    Chloride 109 (H) 98 - 107 mmol/L    CO2 25 22 - 29 mmol/L    Anion Gap 11 7 - 16 mmol/L    Glucose 93 74 - 99 mg/dL    BUN 16 6 - 20 mg/dL    Creatinine 0.9 0.7 - 1.2 mg/dL    GFR Non-African American >60 >=60 mL/min/1.73    GFR African American >60     Calcium 8.5 (L) 8.6 - 10.2 mg/dL    Total Protein 6.3 (L) 6.4 - 8.3 g/dL    Albumin 3.9 3.5 - 5.2 g/dL    Total Bilirubin <0.2 0.0 - 1.2 mg/dL    Alkaline Phosphatase 58 40 - 129 U/L    ALT 11 0 - 40 U/L    AST 16 0 - 39 U/L   Urinalysis   Result Value Ref Range    Color, UA Yellow Straw/Yellow    Clarity, UA Clear Clear    Glucose, Ur Negative Negative mg/dL    Bilirubin Urine Negative Negative    Ketones, Urine TRACE (A) Negative mg/dL    Specific Gravity, UA 1.010 1.005 - 1.030    Blood, Urine Negative Negative    pH, UA 8.0 5.0 - 9.0    Protein, UA Negative Negative mg/dL    Urobilinogen, Urine 4.0 (A) <2.0 E.U./dL    Nitrite, Urine Negative Negative    Leukocyte Esterase, Urine Negative Negative   EKG 12 Lead   Result Value Ref Range    Ventricular Rate 60 BPM    Atrial Rate 60 BPM    P-R Interval 172 ms    QRS Duration 92 ms    Q-T Interval 384 ms    QTc Calculation (Bazett) 384 ms    P Axis -75 degrees    R Axis -56 degrees    T Axis -43 degrees       RADIOLOGY:  Interpreted by Radiologist.  No orders to display       EKG: This EKG is signed and interpreted by the EP. EKG shows sinus bradycardia at 60 bpm.  Left axis deviation. Nonspecific ST-T wave changes inferiorly as well as anteriorly.   No STEMI.    ------------------------- NURSING NOTES AND VITALS REVIEWED ---------------------------   The nursing notes within the ED encounter and vital signs as below have been reviewed by myself. BP (!) 168/151   Pulse 80   Temp 98.6 °F (37 °C)   Resp 18   SpO2 98%   Oxygen Saturation Interpretation: Normal    The patients available past medical records and past encounters were reviewed. ------------------------------ ED COURSE/MEDICAL DECISION MAKING----------------------  Medications - No data to display      ED COURSE:       Medical Decision Making: This is a 21-year-old male presents to the ED for psychiatric evaluation. Patient underwent laboratory work-up which showed a normal CBC. Normal serum drug screen. Chemistry showed a chloride of 109 protein at 6.3 and a calcium 8.5. Urinalysis shows no signs of infection. EKG showed nonspecific findings. Patient medically cleared.  consulted. Disposition pending  evaluation. I, Dr. Thien Cheng, am the primary provider for this encounter    This patient's ED course included: a personal history and physicial examination and re-evaluation prior to disposition    This patient has remained hemodynamically stable during their ED course. Re-Evaluations:             Re-evaluation. Patients symptoms show no change    Counseling: The emergency provider has spoken with the patient and discussed todays results, in addition to providing specific details for the plan of care and counseling regarding the diagnosis and prognosis. Questions are answered at this time and they are agreeable with the plan.       --------------------------------- IMPRESSION AND DISPOSITION ---------------------------------    IMPRESSION  1. Depression, unspecified depression type        DISPOSITION  Disposition: Per   Patient condition is stable    NOTE: This report was transcribed using voice recognition software.  Every effort was made to ensure accuracy; however, inadvertent computerized transcription errors may be present        Edmond Drafts, DO  08/18/22 2158       Edmond Drafts, DO  08/18/22 2158

## 2022-08-19 NOTE — ED NOTES
Pt changed into hospital gown and pants. Belongings x3 in [facility] AN [facility] OF [place] [id number]. $9 placed in front pocket of book bag.      Bam Sow RN  08/18/22 2120       Bam Sow RN  08/18/22 2133

## 2022-08-20 ENCOUNTER — HOSPITAL ENCOUNTER (EMERGENCY)
Age: 42
Discharge: LEFT BEFORE DISCHARGE | End: 2022-08-20
Attending: EMERGENCY MEDICINE
Payer: COMMERCIAL

## 2022-08-20 VITALS
SYSTOLIC BLOOD PRESSURE: 109 MMHG | TEMPERATURE: 98.7 F | OXYGEN SATURATION: 96 % | HEART RATE: 65 BPM | RESPIRATION RATE: 16 BRPM | DIASTOLIC BLOOD PRESSURE: 66 MMHG

## 2022-08-20 DIAGNOSIS — R41.89 ALTERED THOUGHT PROCESSES: Primary | ICD-10-CM

## 2022-08-20 PROCEDURE — 99281 EMR DPT VST MAYX REQ PHY/QHP: CPT

## 2022-08-20 ASSESSMENT — ENCOUNTER SYMPTOMS
SINUS PAIN: 0
VOMITING: 0
EYE REDNESS: 0
DIARRHEA: 0
ABDOMINAL PAIN: 0
SHORTNESS OF BREATH: 0
EYE PAIN: 0
BACK PAIN: 0
COUGH: 0
WHEEZING: 0
SORE THROAT: 0
NAUSEA: 0

## 2022-08-20 NOTE — ED PROVIDER NOTES
Chief Complaint   Patient presents with    Other     Pt to ED stating this will be his 3rd day sober. Pt states he needs supervision to avoid temptation to use drugs. -SI, -HI. 52-year-old male with past medical history of substance abuse and psychiatric disorders presenting today with concern that he needs supervision to stay sober. He has not used cocaine in 3 days and is worried that people outside of the hospital will bring him down and he will be tempted to use drugs again. He thinks he is paranoid that people are continuously trying to get him to use drugs. Nothing is made this better or worse, not associated with suicidal or homicidal ideations. He has been seen in the hospital for this multiple times. No other acute complaints today. Review of Systems   Constitutional:  Negative for chills and fever. HENT:  Negative for ear pain, sinus pain and sore throat. Eyes:  Negative for pain and redness. Respiratory:  Negative for cough, shortness of breath and wheezing. Cardiovascular:  Negative for chest pain. Gastrointestinal:  Negative for abdominal pain, diarrhea, nausea and vomiting. Genitourinary:  Negative for dysuria and flank pain. Musculoskeletal:  Negative for back pain and neck pain. Skin:  Negative for rash. Neurological:  Negative for seizures and headaches. Hematological:  Negative for adenopathy. Psychiatric/Behavioral:  The patient is nervous/anxious. All other systems reviewed and are negative. Physical Exam  Vitals and nursing note reviewed. Constitutional:       General: He is not in acute distress. Appearance: He is well-developed. HENT:      Head: Normocephalic and atraumatic. Right Ear: External ear normal.      Left Ear: External ear normal.      Mouth/Throat:      Mouth: Mucous membranes are moist.      Pharynx: Oropharynx is clear. Eyes:      Pupils: Pupils are equal, round, and reactive to light.    Cardiovascular:      Rate and 2.0 standard drinks per week. He reports current drug use. Frequency: 7.00 times per week. Drugs: Cocaine and Marijuana ([place]). Family History: family history includes Mental Illness in his mother; No Known Problems in his father; Substance Abuse in his mother. The patients home medications have been reviewed. Allergies: Chlorpheniramine-phenylephrine, Penicillins, and Rondec-d [chlophedianol-pseudoephedrine]    -------------------------------------------------- RESULTS -------------------------------------------------  Labs:  No results found for this visit on 08/20/22. Radiology:  No orders to display       ------------------------- NURSING NOTES AND VITALS REVIEWED ---------------------------  Date / Time Roomed:  8/20/2022  5:22 AM  ED Bed Assignment:  [id number]    The nursing notes within the ED encounter and vital signs as below have been reviewed. /66   Pulse 65   Temp 98.7 °F (37.1 °C) (Oral)   Resp 16   SpO2 96%   Oxygen Saturation Interpretation: Normal      ------------------------------------------ PROGRESS NOTES ------------------------------------------  I have spoken with the patient and discussed todays results, in addition to providing specific details for the plan of care and counseling regarding the diagnosis and prognosis. Their questions are answered at this time and they are agreeable with the plan. I discussed at length with them reasons for immediate return here for re evaluation. They will followup with primary care by calling their office tomorrow. --------------------------------- ADDITIONAL PROVIDER NOTES ---------------------------------  At this time the patient is without objective evidence of an acute process requiring hospitalization or inpatient management. They have remained hemodynamically stable throughout their entire ED visit and are stable for discharge with outpatient follow-up.      The plan has been discussed in detail and they are aware of the specific conditions for emergent return, as well as the importance of follow-up. New Prescriptions    No medications on file       Diagnosis:  1. Altered thought processes        Disposition:  Patient's disposition: medically cleared for psychiatric evaluation  Patient's condition is stable.             Tavo Montiel DO  Resident  08/20/22 2063

## 2022-08-20 NOTE — ED NOTES
Pt stated it was daylight and he was leaving and did not need help at this time.      Randall Frye RN  08/20/22 8902

## 2022-08-24 ENCOUNTER — HOSPITAL ENCOUNTER (EMERGENCY)
Age: 42
Discharge: HOME | End: 2022-08-24
Attending: EMERGENCY MEDICINE
Payer: COMMERCIAL

## 2022-08-24 VITALS — HEART RATE: 66 BPM | TEMPERATURE: 97.5 F | RESPIRATION RATE: 20 BRPM | OXYGEN SATURATION: 99 %

## 2022-08-24 DIAGNOSIS — R11.2 NON-INTRACTABLE VOMITING WITH NAUSEA, UNSPECIFIED VOMITING TYPE: Primary | ICD-10-CM

## 2022-08-24 LAB
ALBUMIN SERPL-MCNC: 3.6 G/DL (ref 3.5–5.2)
ALP BLD-CCNC: 51 U/L (ref 40–129)
ALT SERPL-CCNC: 11 U/L (ref 0–40)
ANION GAP SERPL CALCULATED.3IONS-SCNC: 8 MMOL/L (ref 7–16)
AST SERPL-CCNC: 19 U/L (ref 0–39)
BASOPHILS ABSOLUTE: 0.05 E9/L (ref 0–0.2)
BASOPHILS RELATIVE PERCENT: 1 % (ref 0–2)
BILIRUB SERPL-MCNC: <0.2 MG/DL (ref 0–1.2)
BUN BLDV-MCNC: 17 MG/DL (ref 6–20)
CALCIUM SERPL-MCNC: 8.5 MG/DL (ref 8.6–10.2)
CHLORIDE BLD-SCNC: 107 MMOL/L (ref 98–107)
CO2: 29 MMOL/L (ref 22–29)
CREAT SERPL-MCNC: 1.2 MG/DL (ref 0.7–1.2)
EOSINOPHILS ABSOLUTE: 0.28 E9/L (ref 0.05–0.5)
EOSINOPHILS RELATIVE PERCENT: 5.8 % (ref 0–6)
GFR AFRICAN AMERICAN: >60
GFR NON-AFRICAN AMERICAN: >60 ML/MIN/1.73
GLUCOSE BLD-MCNC: 91 MG/DL (ref 74–99)
HCT VFR BLD CALC: 40.3 % (ref 37–54)
HEMOGLOBIN: 12.8 G/DL (ref 12.5–16.5)
IMMATURE GRANULOCYTES #: 0.01 E9/L
IMMATURE GRANULOCYTES %: 0.2 % (ref 0–5)
LACTIC ACID, SEPSIS: 1.2 MMOL/L (ref 0.5–1.9)
LIPASE: 26 U/L (ref 13–60)
LYMPHOCYTES ABSOLUTE: 1.45 E9/L (ref 1.5–4)
LYMPHOCYTES RELATIVE PERCENT: 30.3 % (ref 20–42)
MCH RBC QN AUTO: 30.3 PG (ref 26–35)
MCHC RBC AUTO-ENTMCNC: 31.8 % (ref 32–34.5)
MCV RBC AUTO: 95.3 FL (ref 80–99.9)
MONOCYTES ABSOLUTE: 0.42 E9/L (ref 0.1–0.95)
MONOCYTES RELATIVE PERCENT: 8.8 % (ref 2–12)
NEUTROPHILS ABSOLUTE: 2.58 E9/L (ref 1.8–7.3)
NEUTROPHILS RELATIVE PERCENT: 53.9 % (ref 43–80)
PDW BLD-RTO: 13.2 FL (ref 11.5–15)
PLATELET # BLD: 257 E9/L (ref 130–450)
PMV BLD AUTO: 9.7 FL (ref 7–12)
POTASSIUM REFLEX MAGNESIUM: 4 MMOL/L (ref 3.5–5)
RBC # BLD: 4.23 E12/L (ref 3.8–5.8)
SODIUM BLD-SCNC: 144 MMOL/L (ref 132–146)
TOTAL PROTEIN: 5.9 G/DL (ref 6.4–8.3)
WBC # BLD: 4.8 E9/L (ref 4.5–11.5)

## 2022-08-24 PROCEDURE — 85025 COMPLETE CBC W/AUTO DIFF WBC: CPT

## 2022-08-24 PROCEDURE — 83605 ASSAY OF LACTIC ACID: CPT

## 2022-08-24 PROCEDURE — 96372 THER/PROPH/DIAG INJ SC/IM: CPT

## 2022-08-24 PROCEDURE — 99284 EMERGENCY DEPT VISIT MOD MDM: CPT

## 2022-08-24 PROCEDURE — 6360000002 HC RX W HCPCS: Performed by: EMERGENCY MEDICINE

## 2022-08-24 PROCEDURE — 80053 COMPREHEN METABOLIC PANEL: CPT

## 2022-08-24 PROCEDURE — 83690 ASSAY OF LIPASE: CPT

## 2022-08-24 RX ORDER — DROPERIDOL 2.5 MG/ML
5 INJECTION, SOLUTION INTRAMUSCULAR; INTRAVENOUS ONCE
Status: COMPLETED | OUTPATIENT
Start: 2022-08-24 | End: 2022-08-24

## 2022-08-24 RX ADMIN — DROPERIDOL 5 MG: 2.5 INJECTION, SOLUTION INTRAMUSCULAR; INTRAVENOUS at 06:46

## 2022-08-24 ASSESSMENT — PAIN - FUNCTIONAL ASSESSMENT: PAIN_FUNCTIONAL_ASSESSMENT: 0-10

## 2022-08-24 ASSESSMENT — PAIN SCALES - GENERAL
PAINLEVEL_OUTOF10: 0
PAINLEVEL_OUTOF10: 10

## 2022-08-24 ASSESSMENT — PAIN DESCRIPTION - DESCRIPTORS: DESCRIPTORS: CRAMPING

## 2022-08-24 ASSESSMENT — PAIN DESCRIPTION - LOCATION: LOCATION: ABDOMEN

## 2022-08-24 NOTE — ED NOTES
Department of Emergency Medicine  FIRST PROVIDER TRIAGE NOTE             Independent MLP           8/24/22  3:42 AM EDT    Date of Encounter: 8/24/22   MRN: [id number]      HPI: Kiah Haley is a 40 y.o. male who presents to the ED for No chief complaint on file. Pt reports he is feeling paranoid and having abdominal pain. Denies SI/HI. ROS: Negative for cp or sob. PE: Gen Appearance/Constitutional: alert  Musculoskeletal: moves all extremities x 4     Initial Plan of Care: All treatment areas with department are currently occupied. Plan to order/Initiate the following while awaiting opening in ED: labs.   Initiate Treatment-Testing, Proceed toTreatment Area When Bed Available for ED Attending/MLP to Continue Care    Electronically signed by Chidi Navarrete PA-C   DD: 8/24/22       Chidi Navarrete PA-C  08/24/22 8760

## 2022-08-24 NOTE — ED PROVIDER NOTES
Department of Emergency Medicine   ED  Provider Note  Admit Date/RoomTime: 8/24/2022  6:06 AM  ED Room: [facility]          History of Present Illness:  8/24/22, Time: 7:19 AM EDT  Chief Complaint   Patient presents with    Abdominal Pain     C/O stomach upset after eating about an hour ago. Feeling paranoid                 Loise Floor. is a 40 y.o. male presenting to the ED for nausea and vomiting, beginning prior to arrival.  The complaint has been intermittent, mild in severity, and worsened by nothing. Patient reports that he ate some steak and rice and now feels sick. He reports he vomited twice. Nonbloody, nonbilious emesis. He reports he had a brief episode of abdominal pain prior to vomiting. This is since resolved. He denies fevers or chills. He denies chest pain or shortness of breath. No suicidal homicidal thoughts. He has not vomited here. He says he has some mild nausea still. He thinks the steak was bad as he is homeless. He does not believe that it was appropriately cooked. Review of Systems:   A complete review of systems was performed and pertinent positives and negatives are stated within HPI, all other systems reviewed and are negative.        --------------------------------------------- PAST HISTORY ---------------------------------------------  Past Medical History:  has a past medical history of Depression and Schizoaffective disorder ([facility] [address] Past Surgical History:  has a past surgical history that includes Hip fracture surgery (Left, 9/12/2021); eye surgery (19 years ago); and Hip fracture surgery (Left, 9/28/2021). Social History:  reports that he has been smoking cigarettes. He has a 12.00 pack-year smoking history. He has never used smokeless tobacco. He reports current alcohol use of about 2.0 standard drinks per week. He reports current drug use. Frequency: 7.00 times per week. Drugs: Cocaine and Marijuana (Sharee Paddock).     Family History: family history includes reflex Magnesium 4.0 3.5 - 5.0 mmol/L    Chloride 107 98 - 107 mmol/L    CO2 29 22 - 29 mmol/L    Anion Gap 8 7 - 16 mmol/L    Glucose 91 74 - 99 mg/dL    BUN 17 6 - 20 mg/dL    Creatinine 1.2 0.7 - 1.2 mg/dL    GFR Non-African American >60 >=60 mL/min/1.73    GFR African American >60     Calcium 8.5 (L) 8.6 - 10.2 mg/dL    Total Protein 5.9 (L) 6.4 - 8.3 g/dL    Albumin 3.6 3.5 - 5.2 g/dL    Total Bilirubin <0.2 0.0 - 1.2 mg/dL    Alkaline Phosphatase 51 40 - 129 U/L    ALT 11 0 - 40 U/L    AST 19 0 - 39 U/L   CBC with Auto Differential   Result Value Ref Range    WBC 4.8 4.5 - 11.5 E9/L    RBC 4.23 3.80 - 5.80 E12/L    Hemoglobin 12.8 12.5 - 16.5 g/dL    Hematocrit 40.3 37.0 - 54.0 %    MCV 95.3 80.0 - 99.9 fL    MCH 30.3 26.0 - 35.0 pg    MCHC 31.8 (L) 32.0 - 34.5 %    RDW 13.2 11.5 - 15.0 fL    Platelets 428 643 - 730 E9/L    MPV 9.7 7.0 - 12.0 fL    Neutrophils % 53.9 43.0 - 80.0 %    Immature Granulocytes % 0.2 0.0 - 5.0 %    Lymphocytes % 30.3 20.0 - 42.0 %    Monocytes % 8.8 2.0 - 12.0 %    Eosinophils % 5.8 0.0 - 6.0 %    Basophils % 1.0 0.0 - 2.0 %    Neutrophils Absolute 2.58 1.80 - 7.30 E9/L    Immature Granulocytes # 0.01 E9/L    Lymphocytes Absolute 1.45 (L) 1.50 - 4.00 E9/L    Monocytes Absolute 0.42 0.10 - 0.95 E9/L    Eosinophils Absolute 0.28 0.05 - 0.50 E9/L    Basophils Absolute 0.05 0.00 - 0.20 E9/L   Lipase   Result Value Ref Range    Lipase 26 13 - 60 U/L   Lactate, Sepsis   Result Value Ref Range    Lactic Acid, Sepsis 1.2 0.5 - 1.9 mmol/L   ,       RADIOLOGY:  Interpreted by Radiologist unless otherwise specified  No orders to display           ------------------------- NURSING NOTES AND VITALS REVIEWED ---------------------------   The nursing notes within the ED encounter and vital signs as below have been reviewed by myself  Pulse 66   Temp 97.5 °F (36.4 °C) (Oral)   Resp 20   SpO2 99%     Oxygen Saturation Interpretation: Normal    The patients available past medical records and past encounters were reviewed. ------------------------------ ED COURSE/MEDICAL DECISION MAKING----------------------  Medications   droperidol (INAPSINE) injection 5 mg (5 mg IntraMUSCular Given 8/24/22 0646)              IDr. Ami, am the primary provider of record    Medical Decision Making:   No abdominal pain here. Labs reassuring. Droperidol given for nausea. Symptoms improved. No indication for admission. No suicidal or homicidal thoughts. Oxygen Saturation Interpretation: 99 % on RA. Re-Evaluations:       improving      This patient's ED course included: a personal history and physicial examination, re-evaluation prior to disposition, and complex medical decision making and emergency management    This patient has remained hemodynamically stable during their ED course. Counseling: The emergency provider has spoken with the patient and discussed todays results, in addition to providing specific details for the plan of care and counseling regarding the diagnosis and prognosis. Questions are answered at this time and they are agreeable with the plan.       --------------------------------- IMPRESSION AND DISPOSITION ---------------------------------    IMPRESSION  1. Non-intractable vomiting with nausea, unspecified vomiting type        DISPOSITION  Disposition: Discharge  Patient condition is good        NOTE: This report was transcribed using voice recognition software.  Every effort was made to ensure accuracy; however, inadvertent computerized transcription errors may be present        Alverto Tucker MD  08/24/22 8708

## 2022-08-25 ENCOUNTER — HOSPITAL ENCOUNTER (EMERGENCY)
Age: 42
Discharge: LEFT BEFORE DISCHARGE | End: 2022-08-26

## 2022-08-25 VITALS
DIASTOLIC BLOOD PRESSURE: 71 MMHG | SYSTOLIC BLOOD PRESSURE: 120 MMHG | TEMPERATURE: 98.2 F | HEART RATE: 102 BPM | OXYGEN SATURATION: 97 % | RESPIRATION RATE: 18 BRPM

## 2022-08-25 PROCEDURE — 4500000002 HC ER NO CHARGE

## 2022-08-26 NOTE — ED NOTES
Department of Emergency Medicine  FIRST PROVIDER TRIAGE NOTE             Independent MLP           8/25/22  10:37 PM EDT    Date of Encounter: 8/25/22   MRN: [id number]      HPI: Loise Floor. is a 40 y.o. male who presents to the ED for Paranoid     Pt presents to ED with paranoid thoughts. Pt reports he \"wants to go medical.\" Denies SI/HI. ROS: Negative for Suicidal ideation or Homicidal Ideation. PE: Gen Appearance/Constitutional: alert  Musculoskeletal: moves all extremities x 4     Initial Plan of Care: All treatment areas with department are currently occupied. Plan to order/Initiate the following while awaiting opening in ED: deferred.   Initiate Treatment-Testing, Proceed toTreatment Area When Bed Available for ED Attending/MLP to Continue Care    Electronically signed by Karma Guzman PA-C   DD: 8/25/22       Karma Guzman PA-C  08/25/22 4240

## 2022-08-28 ENCOUNTER — HOSPITAL ENCOUNTER (EMERGENCY)
Age: 42
Discharge: HOME | End: 2022-08-28
Attending: EMERGENCY MEDICINE
Payer: COMMERCIAL

## 2022-08-28 VITALS
BODY MASS INDEX: 23.03 KG/M2 | TEMPERATURE: 98.4 F | RESPIRATION RATE: 18 BRPM | WEIGHT: 170 LBS | HEART RATE: 84 BPM | SYSTOLIC BLOOD PRESSURE: 123 MMHG | DIASTOLIC BLOOD PRESSURE: 80 MMHG | OXYGEN SATURATION: 98 % | HEIGHT: 72 IN

## 2022-08-28 DIAGNOSIS — F41.1 ANXIETY STATE: ICD-10-CM

## 2022-08-28 DIAGNOSIS — G47.9 SLEEP DISTURBANCE: Primary | ICD-10-CM

## 2022-08-28 DIAGNOSIS — Z91.14 NON COMPLIANCE W MEDICATION REGIMEN: ICD-10-CM

## 2022-08-28 PROCEDURE — 99283 EMERGENCY DEPT VISIT LOW MDM: CPT

## 2022-08-28 PROCEDURE — 6370000000 HC RX 637 (ALT 250 FOR IP): Performed by: STUDENT IN AN ORGANIZED HEALTH CARE EDUCATION/TRAINING PROGRAM

## 2022-08-28 RX ORDER — HYDROXYZINE PAMOATE 50 MG/1
50 CAPSULE ORAL ONCE
Status: COMPLETED | OUTPATIENT
Start: 2022-08-28 | End: 2022-08-28

## 2022-08-28 RX ADMIN — HYDROXYZINE PAMOATE 50 MG: 50 CAPSULE ORAL at 06:36

## 2022-08-28 ASSESSMENT — PAIN SCALES - GENERAL: PAINLEVEL_OUTOF10: 7

## 2022-08-28 ASSESSMENT — ENCOUNTER SYMPTOMS
NAUSEA: 0
DIARRHEA: 0
COUGH: 0
ABDOMINAL PAIN: 0
SHORTNESS OF BREATH: 0
VOMITING: 0
RHINORRHEA: 0

## 2022-08-28 ASSESSMENT — PAIN - FUNCTIONAL ASSESSMENT: PAIN_FUNCTIONAL_ASSESSMENT: 0-10

## 2022-08-28 NOTE — ED NOTES
Calm and cooperative. States unable to sleep recently. Denies SI, HI or hallucinations.      Chano Wright, MARY  08/28/22 3021

## 2022-08-28 NOTE — ED PROVIDER NOTES
Patient is a 52-year-old male with a history of substance abuse, paranoia, schizophrenia, medication noncompliance who presents to the emergency department with complaint of feeling tired. Patient states that he is homeless, denies any chest pain or shortness of breath. Patient states that he feels tired and has been off of his medications for \"a week\". Patient denies hallucinations. Patient denies SI or HI. Patient states that he would like a dose of his medications or something to sleep and for his anxiety. Stating he used cocaine yesterday. Denies any heroin use. Patient states that also he is having some paranoia. Patient denies any trauma. Patient denies any urinary or GI symptoms. Review of Systems   Constitutional:  Positive for fatigue. Negative for appetite change, chills and fever. HENT:  Negative for congestion and rhinorrhea. Eyes:  Negative for visual disturbance. Respiratory:  Negative for cough and shortness of breath. Cardiovascular:  Negative for chest pain and leg swelling. Gastrointestinal:  Negative for abdominal pain, diarrhea, nausea and vomiting. Endocrine: Negative for polyuria. Genitourinary:  Negative for dysuria and hematuria. Musculoskeletal:  Negative for arthralgias and myalgias. Skin:  Negative for rash. Allergic/Immunologic: Negative for immunocompromised state. Neurological:  Negative for dizziness, syncope, weakness, light-headedness, numbness and headaches. Psychiatric/Behavioral:  Positive for sleep disturbance. Negative for confusion, hallucinations, self-injury and suicidal ideas. The patient is not nervous/anxious. Paranoia      Physical Exam  Vitals and nursing note reviewed. Constitutional:       General: He is awake. He is not in acute distress. Appearance: He is normal weight. He is not ill-appearing. HENT:      Head: Normocephalic and atraumatic.       Mouth/Throat:      Mouth: Mucous membranes are moist. Pharynx: Oropharynx is clear. Eyes:      Extraocular Movements: Extraocular movements intact. Pupils: Pupils are equal, round, and reactive to light. Cardiovascular:      Rate and Rhythm: Normal rate and regular rhythm. Pulses: Normal pulses. Heart sounds: Normal heart sounds. Pulmonary:      Effort: Pulmonary effort is normal.      Breath sounds: Normal breath sounds. Abdominal:      Palpations: Abdomen is soft. Musculoskeletal:         General: Normal range of motion. Cervical back: Normal range of motion and neck supple. Skin:     General: Skin is warm and dry. Neurological:      General: No focal deficit present. Mental Status: He is alert and oriented to person, place, and time. Psychiatric:         Mood and Affect: Affect is flat. Speech: Speech normal.         Behavior: Behavior is withdrawn. Behavior is cooperative. Thought Content: Thought content does not include homicidal or suicidal ideation. Thought content does not include homicidal or suicidal plan. MDM  Number of Diagnoses or Management Options  Anxiety state  Cocaine abuse ([facility] [address]  Non compliance w medication regimen  Diagnosis management comments: Patient is a 28-year-old male with a history of schizophrenia and homelessness presents to the emergency department for difficulty sleeping. Patient states has been off his meds and using cocaine. patient presents awake, alert. Vital signs are noted. Physical exam is unremarkable. Patient with no significant complaints other than unable to sleep. Patient is requesting [place] for his sleep. Patient does not have SI or HI, patient is oriented. Patient does not appear to have any episode of psychosis. Patient has plan to return to [place] for housing. Patient was given Vistaril and monitored in the emergency department. Patient was asymptomatic on reevaluation. Patient was comfortable and able to be discharged to follow-up outpatient. Return instructions were given.       --------------------------------------------- PAST HISTORY ---------------------------------------------  Past Medical History:  has a past medical history of Depression and Schizoaffective disorder ([facility] [address] Past Surgical History:  has a past surgical history that includes Hip fracture surgery (Left, 9/12/2021); eye surgery (19 years ago); and Hip fracture surgery (Left, 9/28/2021). Social History:  reports that he has been smoking cigarettes. He has a 12.00 pack-year smoking history. He has never used smokeless tobacco. He reports current alcohol use of about 2.0 standard drinks per week. He reports current drug use. Frequency: 7.00 times per week. Drugs: Cocaine and Marijuana ([employer]). Family History: family history includes Mental Illness in his mother; No Known Problems in his father; Substance Abuse in his mother. The patients home medications have been reviewed. Allergies: Chlorpheniramine-phenylephrine, Penicillins, and Rondec-d [chlophedianol-pseudoephedrine]    -------------------------------------------------- RESULTS -------------------------------------------------  Labs:  No results found for this visit on 08/28/22. Radiology:  No orders to display     ------------------------- NURSING NOTES AND VITALS REVIEWED ---------------------------  Date / Time Roomed:  8/28/2022  5:46 AM  ED Bed Assignment:  [facility]    The nursing notes within the ED encounter and vital signs as below have been reviewed.    /80   Pulse 84   Temp 98.4 °F (36.9 °C) (Oral)   Resp 18   Ht 6' (1.829 m)   Wt 170 lb (77.1 kg)   SpO2 98%   BMI 23.06 kg/m²   Oxygen Saturation Interpretation: Normal    ------------------------------------------ PROGRESS NOTES ------------------------------------------  6:34 AM EDT  I have spoken with the patient and discussed todays results, in addition to providing specific details for the plan of care and counseling regarding the diagnosis and prognosis. Their questions are answered at this time and they are agreeable with the plan. I discussed at length with them reasons for immediate return here for re evaluation. They will followup with their primary care physician by calling their office on Monday.    --------------------------------- ADDITIONAL PROVIDER NOTES ---------------------------------  At this time the patient is without objective evidence of an acute process requiring hospitalization or inpatient management. They have remained hemodynamically stable throughout their entire ED visit and are stable for discharge with outpatient follow-up. The plan has been discussed in detail and they are aware of the specific conditions for emergent return, as well as the importance of follow-up. Discharge Medication List as of 8/28/2022  7:21 AM        Diagnosis:  1. Sleep disturbance    2. Non compliance w medication regimen    3. Anxiety state      Disposition:  Patient's disposition: Discharge to home  Patient's condition is stable. 8/28/22, 6:34 AM EDT.     This note is prepared by Kyleigh Grullon MD -PGY- 3               Kyleigh Grullon MD  Resident  08/28/22 8573

## 2022-08-28 NOTE — ED NOTES
Patient refused to sign/take discharge instructions when escorted out of ED by police.      Marilin Brooks RN  08/28/22 4441

## 2022-09-01 ENCOUNTER — HOSPITAL ENCOUNTER (EMERGENCY)
Age: 42
Discharge: HOME | End: 2022-09-01

## 2022-09-01 VITALS
SYSTOLIC BLOOD PRESSURE: 119 MMHG | OXYGEN SATURATION: 97 % | WEIGHT: 170 LBS | DIASTOLIC BLOOD PRESSURE: 63 MMHG | RESPIRATION RATE: 23 BRPM | BODY MASS INDEX: 21.82 KG/M2 | HEART RATE: 61 BPM | HEIGHT: 74 IN

## 2022-09-01 VITALS — OXYGEN SATURATION: 98 % | HEART RATE: 66 BPM | TEMPERATURE: 97.7 F

## 2022-09-01 PROCEDURE — 99284 EMERGENCY DEPT VISIT MOD MDM: CPT

## 2022-09-01 ASSESSMENT — PAIN - FUNCTIONAL ASSESSMENT: PAIN_FUNCTIONAL_ASSESSMENT: NONE - DENIES PAIN

## 2022-09-01 NOTE — ED NOTES
Called for patient in waiting room to triage patient not in [address] [facility] bathroom and outside.       Leona Juárez RN  09/01/22 8079

## 2022-09-01 NOTE — ED NOTES
Called for patient in waiting room to triage patient not in [address] [facility], checked bathroom and outside.       Karel Oswald RN  09/01/22 0947

## 2022-09-02 ENCOUNTER — HOSPITAL ENCOUNTER (EMERGENCY)
Age: 42
Discharge: HOME | End: 2022-09-02

## 2022-09-02 ENCOUNTER — HOSPITAL ENCOUNTER (EMERGENCY)
Age: 42
Discharge: HOME | End: 2022-09-02
Payer: COMMERCIAL

## 2022-09-02 VITALS
TEMPERATURE: 97.6 F | HEART RATE: 80 BPM | OXYGEN SATURATION: 96 % | DIASTOLIC BLOOD PRESSURE: 81 MMHG | SYSTOLIC BLOOD PRESSURE: 126 MMHG | RESPIRATION RATE: 18 BRPM

## 2022-09-02 DIAGNOSIS — F39 MOOD DISORDER (HCC): Primary | ICD-10-CM

## 2022-09-02 DIAGNOSIS — K08.89 PAIN, DENTAL: Primary | ICD-10-CM

## 2022-09-02 LAB
ACETAMINOPHEN LEVEL: <5 MCG/ML (ref 10–30)
ALBUMIN SERPL-MCNC: 3.7 G/DL (ref 3.5–5.2)
ALP BLD-CCNC: 45 U/L (ref 40–129)
ALT SERPL-CCNC: 11 U/L (ref 0–40)
AMPHETAMINE SCREEN, URINE: NOT DETECTED
ANION GAP SERPL CALCULATED.3IONS-SCNC: 7 MMOL/L (ref 7–16)
AST SERPL-CCNC: 20 U/L (ref 0–39)
BARBITURATE SCREEN URINE: NOT DETECTED
BASOPHILS ABSOLUTE: 0.07 E9/L (ref 0–0.2)
BASOPHILS RELATIVE PERCENT: 1.3 % (ref 0–2)
BENZODIAZEPINE SCREEN, URINE: NOT DETECTED
BILIRUB SERPL-MCNC: 0.2 MG/DL (ref 0–1.2)
BUN BLDV-MCNC: 13 MG/DL (ref 6–20)
CALCIUM SERPL-MCNC: 8.6 MG/DL (ref 8.6–10.2)
CANNABINOID SCREEN URINE: NOT DETECTED
CHLORIDE BLD-SCNC: 108 MMOL/L (ref 98–107)
CO2: 28 MMOL/L (ref 22–29)
COCAINE METABOLITE SCREEN URINE: POSITIVE
CREAT SERPL-MCNC: 1 MG/DL (ref 0.7–1.2)
EKG ATRIAL RATE: 52 BPM
EKG P AXIS: 74 DEGREES
EKG P-R INTERVAL: 186 MS
EKG Q-T INTERVAL: 424 MS
EKG QRS DURATION: 94 MS
EKG QTC CALCULATION (BAZETT): 394 MS
EKG R AXIS: 83 DEGREES
EKG T AXIS: 70 DEGREES
EKG VENTRICULAR RATE: 52 BPM
EOSINOPHILS ABSOLUTE: 0.14 E9/L (ref 0.05–0.5)
EOSINOPHILS RELATIVE PERCENT: 2.6 % (ref 0–6)
ETHANOL: <10 MG/DL (ref 0–0.08)
FENTANYL SCREEN, URINE: NOT DETECTED
GFR AFRICAN AMERICAN: >60
GFR NON-AFRICAN AMERICAN: >60 ML/MIN/1.73
GLUCOSE BLD-MCNC: 75 MG/DL (ref 74–99)
HCT VFR BLD CALC: 39.7 % (ref 37–54)
HEMOGLOBIN: 12.9 G/DL (ref 12.5–16.5)
IMMATURE GRANULOCYTES #: 0.01 E9/L
IMMATURE GRANULOCYTES %: 0.2 % (ref 0–5)
LYMPHOCYTES ABSOLUTE: 2.07 E9/L (ref 1.5–4)
LYMPHOCYTES RELATIVE PERCENT: 38.1 % (ref 20–42)
Lab: ABNORMAL
MCH RBC QN AUTO: 30.1 PG (ref 26–35)
MCHC RBC AUTO-ENTMCNC: 32.5 % (ref 32–34.5)
MCV RBC AUTO: 92.5 FL (ref 80–99.9)
METHADONE SCREEN, URINE: NOT DETECTED
MONOCYTES ABSOLUTE: 0.5 E9/L (ref 0.1–0.95)
MONOCYTES RELATIVE PERCENT: 9.2 % (ref 2–12)
NEUTROPHILS ABSOLUTE: 2.64 E9/L (ref 1.8–7.3)
NEUTROPHILS RELATIVE PERCENT: 48.6 % (ref 43–80)
OPIATE SCREEN URINE: NOT DETECTED
OXYCODONE URINE: NOT DETECTED
PDW BLD-RTO: 13.4 FL (ref 11.5–15)
PHENCYCLIDINE SCREEN URINE: NOT DETECTED
PLATELET # BLD: 315 E9/L (ref 130–450)
PMV BLD AUTO: 9.4 FL (ref 7–12)
POTASSIUM SERPL-SCNC: 4.4 MMOL/L (ref 3.5–5)
RBC # BLD: 4.29 E12/L (ref 3.8–5.8)
SALICYLATE, SERUM: <0.3 MG/DL (ref 0–30)
SODIUM BLD-SCNC: 143 MMOL/L (ref 132–146)
TOTAL CK: 536 U/L (ref 20–200)
TOTAL PROTEIN: 6.1 G/DL (ref 6.4–8.3)
TRICYCLIC ANTIDEPRESSANTS SCREEN SERUM: NEGATIVE NG/ML
TROPONIN, HIGH SENSITIVITY: 10 NG/L (ref 0–11)
WBC # BLD: 5.4 E9/L (ref 4.5–11.5)

## 2022-09-02 PROCEDURE — 82550 ASSAY OF CK (CPK): CPT

## 2022-09-02 PROCEDURE — 85025 COMPLETE CBC W/AUTO DIFF WBC: CPT

## 2022-09-02 PROCEDURE — 80143 DRUG ASSAY ACETAMINOPHEN: CPT

## 2022-09-02 PROCEDURE — 80307 DRUG TEST PRSMV CHEM ANLYZR: CPT

## 2022-09-02 PROCEDURE — 80053 COMPREHEN METABOLIC PANEL: CPT

## 2022-09-02 PROCEDURE — 93005 ELECTROCARDIOGRAM TRACING: CPT | Performed by: NURSE PRACTITIONER

## 2022-09-02 PROCEDURE — 80179 DRUG ASSAY SALICYLATE: CPT

## 2022-09-02 PROCEDURE — 84484 ASSAY OF TROPONIN QUANT: CPT

## 2022-09-02 PROCEDURE — 82077 ASSAY SPEC XCP UR&BREATH IA: CPT

## 2022-09-02 PROCEDURE — 36415 COLL VENOUS BLD VENIPUNCTURE: CPT

## 2022-09-02 NOTE — ED PROVIDER NOTES
Department of Emergency Medicine  ED Provider Note  Admit Date: 9/2/2022  [facility]       9/2/22  12:20 AM EDT    HPI: Nilson Root. 40 y.o. male presents with a complaint of paranoid behavior and hallucinations beginning days ago. Complaint has been constant and became more severe today which is what prompted the visit. Denies suicidal ideation. Denies homicidal ideation. Not applicable specific plan. Patient presents to the emergency department for a psychiatric Evaluation (Patient states that he is paranoid and is hallucinating. Patient is delusional and continues to rambles about Macanese speaking males and insurance fraud. )  Patient presents to the emergency department with rapid speech pattern, rambling, patient appears paranoid. Difficult to follow at times. Denies suicidal or homicidal ideations. Patient also agitated, repeatedly making statements about insurance fraud and quickly changing subjects and cutting off triage staff when attempting to ask questions. Patient with rapid speech pattern, avoidant eye contact. Patient denies any suicidal or homicidal ideations. Patient will with symptoms moderate in severity and persistent          Review of Systems:   Pertinent positives and negatives are stated within HPI, all other systems reviewed and are negative.      --------------------------------------------- PAST HISTORY ---------------------------------------------  Past Medical History:  has a past medical history of Depression and Schizoaffective disorder ([facility] [address] Past Surgical History:  has a past surgical history that includes Hip fracture surgery (Left, 9/12/2021); eye surgery (19 years ago); and Hip fracture surgery (Left, 9/28/2021). Social History:  reports that he has been smoking cigarettes. He has a 12.00 pack-year smoking history. He has never used smokeless tobacco. He reports current alcohol use of about 2.0 standard drinks per week.  He reports current drug use. Frequency: 7.00 times per week. Drugs: Cocaine and Marijuana ([employer]). Family History: family history includes Mental Illness in his mother; No Known Problems in his father; Substance Abuse in his mother. The patients home medications have been reviewed. Allergies: Chlorpheniramine-phenylephrine, Penicillins, and Rondec-d [chlophedianol-pseudoephedrine]    -------------------------------------------------- RESULTS -------------------------------------------------  All laboratory and imaging studies were reviewed by myself.     LABS:  Results for orders placed or performed during the hospital encounter of 09/02/22   Urine Drug Screen   Result Value Ref Range    Amphetamine Screen, Urine NOT DETECTED Negative <1000 ng/mL    Barbiturate Screen, Ur NOT DETECTED Negative < 200 ng/mL    Benzodiazepine Screen, Urine NOT DETECTED Negative < 200 ng/mL    Cannabinoid Scrn, Ur NOT DETECTED Negative < 50ng/mL    Cocaine Metabolite Screen, Urine POSITIVE (A) Negative < 300 ng/mL    Opiate Scrn, Ur NOT DETECTED Negative < 300ng/mL    PCP Screen, Urine NOT DETECTED Negative < 25 ng/mL    Methadone Screen, Urine NOT DETECTED Negative <300 ng/mL    Oxycodone Urine NOT DETECTED Negative <100 ng/mL    FENTANYL SCREEN, URINE NOT DETECTED Negative <1 ng/mL    Drug Screen Comment: see below    Serum Drug Screen   Result Value Ref Range    Ethanol Lvl <10 mg/dL    Acetaminophen Level <5.0 (L) 10.0 - 66.5 mcg/mL    Salicylate, Serum <9.9 0.0 - 30.0 mg/dL    TCA Scrn NEGATIVE Cutoff:300 ng/mL   Troponin   Result Value Ref Range    Troponin, High Sensitivity 10 0 - 11 ng/L   CK   Result Value Ref Range    Total  (H) 20 - 200 U/L   CBC with Auto Differential   Result Value Ref Range    WBC 5.4 4.5 - 11.5 E9/L    RBC 4.29 3.80 - 5.80 E12/L    Hemoglobin 12.9 12.5 - 16.5 g/dL    Hematocrit 39.7 37.0 - 54.0 %    MCV 92.5 80.0 - 99.9 fL    MCH 30.1 26.0 - 35.0 pg    MCHC 32.5 32.0 - 34.5 %    RDW 13.4 11.5 - 15.0 fL    Platelets 530 645 - 352 E9/L    MPV 9.4 7.0 - 12.0 fL    Neutrophils % 48.6 43.0 - 80.0 %    Immature Granulocytes % 0.2 0.0 - 5.0 %    Lymphocytes % 38.1 20.0 - 42.0 %    Monocytes % 9.2 2.0 - 12.0 %    Eosinophils % 2.6 0.0 - 6.0 %    Basophils % 1.3 0.0 - 2.0 %    Neutrophils Absolute 2.64 1.80 - 7.30 E9/L    Immature Granulocytes # 0.01 E9/L    Lymphocytes Absolute 2.07 1.50 - 4.00 E9/L    Monocytes Absolute 0.50 0.10 - 0.95 E9/L    Eosinophils Absolute 0.14 0.05 - 0.50 E9/L    Basophils Absolute 0.07 0.00 - 0.20 E9/L   Comprehensive Metabolic Panel   Result Value Ref Range    Sodium 143 132 - 146 mmol/L    Potassium 4.4 3.5 - 5.0 mmol/L    Chloride 108 (H) 98 - 107 mmol/L    CO2 28 22 - 29 mmol/L    Anion Gap 7 7 - 16 mmol/L    Glucose 75 74 - 99 mg/dL    BUN 13 6 - 20 mg/dL    Creatinine 1.0 0.7 - 1.2 mg/dL    GFR Non-African American >60 >=60 mL/min/1.73    GFR African American >60     Calcium 8.6 8.6 - 10.2 mg/dL    Total Protein 6.1 (L) 6.4 - 8.3 g/dL    Albumin 3.7 3.5 - 5.2 g/dL    Total Bilirubin 0.2 0.0 - 1.2 mg/dL    Alkaline Phosphatase 45 40 - 129 U/L    ALT 11 0 - 40 U/L    AST 20 0 - 39 U/L   EKG 12 Lead   Result Value Ref Range    Ventricular Rate 52 BPM    Atrial Rate 52 BPM    P-R Interval 186 ms    QRS Duration 94 ms    Q-T Interval 424 ms    QTc Calculation (Bazett) 394 ms    P Axis 74 degrees    R Axis 83 degrees    T Axis 70 degrees       RADIOLOGY:  Interpreted by Radiologist.  No orders to display             ------------------------- NURSING NOTES AND VITALS REVIEWED ---------------------------   The nursing notes within the ED encounter and vital signs as below have been reviewed.    /63   Pulse 61   Resp 23   Ht 6' 2\" (1.88 m)   Wt 170 lb (77.1 kg)   SpO2 97%   BMI 21.83 kg/m²   Oxygen Saturation Interpretation: Normal            ---------------------------------------------------PHYSICAL EXAM--------------------------------------      Constitutional/General: Alert and oriented x3,   Head: Normocephalic, atraumatic  Eyes: PERRL, EOMI  Mouth: Oropharynx clear, handling secretions, no trismus  Neck: Supple, full ROM, non tender to palpation in the midline, no stridor, no crepitus, no meningeal signs  Pulmonary: Lungs clear to auscultation bilaterally, no wheezes, rales, or rhonchi. Not in respiratory distress  Cardiovascular:  Regular rate and rhythm, no murmurs, gallops, or rubs. 2+ distal pulses  Abdomen: Soft, non tender, non distended, +BS, no rebound, guarding, or rigidity. No pulsatile masses appreciated  Extremities: Moves all extremities x 4. Warm and well perfused, no clubbing, cyanosis, or edema. Capillary refill <3 seconds  Skin: warm and dry without rash  Neurologic: GCS 15, CN 2-12 grossly intact, no focal deficits, symmetric strength 5/5 in the upper and lower extremities bilaterally  Psych: Agitated and hyper affect,  Patient presents to the emergency department for a psychiatric Evaluation (Patient states that he is paranoid and is hallucinating. Patient is delusional and continues to rambles about Telugu speaking males and insurance fraud. )  Patient presents to the emergency department with rapid speech pattern, rambling, patient appears paranoid. Difficult to follow at times. Denies suicidal or homicidal ideations. Patient also agitated, repeatedly making statements about insurance fraud and quickly changing subjects and cutting off triage staff when attempting to ask questions. Patient with rapid speech pattern, avoidant eye contact. Patient denies any suicidal or homicidal ideations. Patient will with symptoms moderate in severity and persistent      ------------------------------------------ PROGRESS NOTES ------------------------------------------     Medical decision making: Plan be for labs, will consult . Twelve-lead EKG interpreted showing heart rate of 52, sinus bradycardia with sinus arrhythmia.   No acute ST elevation or depression noted.  Normal axis deviation. Patient had escalating behavior appeared manic rambling speech and appeared agitated. Plan for work-up, patient then placed in hallway and if patient now calm. We will still have  evaluate patient. Labs resulted urine drug screen positive for cocaine, serum drug screen negative troponin negative, CK slightly elevated at 536, CBC negative, chemistry panel negative. Patient medically cleared. Awaiting  evaluation. Upon patient being brought back to [facility] patient immediately with behavior change. No agitated or escalating behavior, no hallucinations or paranoia behavior/actions noted patient immediately became quiet, curled up on the bed with the blankets and has been sleeping since. We will have him be evaluated by . Anticipate discharge home. Consultations:   Social work     Re-Evaluations:        Counseling: The emergency provider has spoken with thepatient and discussed todays results, in addition to providing specific details for the plan of care and counseling regarding the diagnosis and prognosis. Questions are answered at this time and they are agreeable with the plan.         --------------------------------- IMPRESSION AND DISPOSITION ---------------------------------    IMPRESSION  1.  Mood disorder ([facility] [address]        DISPOSITION  Disposition: as per consultation   Patient condition is stable            JUICE Robert - CNP  09/02/22 0246

## 2022-09-02 NOTE — ED NOTES
Items locked up in [facility] locker [address] [facility], [address] [facility]  09/02/22 9667

## 2022-09-02 NOTE — FLOWSHEET NOTE
Patient states that he is paranoid and is hallucinating. Patient is delusional and continues to rambles about Serbian speaking males and insurance fraud.

## 2022-09-02 NOTE — ED NOTES
[facility] SW attempted to assess patient. Patient is sleeping. SW attempted to wake patient up. Patient unable to stay awake long enough to answer any questions at this time. [facility] SW spoke with Kavita Tinajero NP who informed that patient was disruptive in the waiting room and behaviors were increasing so she had him moved to an pat bed where patient immediately laid down and went to sleep. [facility] SW will attempt to assess again toward the end of shift.      FRANCISCO Martinez, Gagan Vasquez  09/02/22 6408

## 2022-09-02 NOTE — DISCHARGE INSTRUCTIONS
Follow-up with the dental clinic it is open Monday through Friday at 8 AM.  Take Motrin or Tylenol for pain relief

## 2022-09-02 NOTE — ED NOTES
Department of Emergency Medicine  FIRST PROVIDER TRIAGE NOTE             Independent MLP           9/1/22  11:49 PM EDT    Date of Encounter: 9/1/22   MRN: [id number]      HPI: Eyal Greenberg is a 40 y.o. male who presents to the ED for Psychiatric Evaluation (Patient states that he is paranoid and is hallucinating. Patient is delusional and continues to rambles about Tajik speaking males and insurance fraud. )  Patient presents to the emergency department with rapid speech pattern, rambling, patient appears paranoid. Denies suicidal or homicidal ideations. Patient also agitated    ROS: Negative for cp or sob. PE: Gen Appearance/Constitutional: alert  HEENT: NC/NT. PERRLA,  Airway patent. Initial Plan of Care: All treatment areas with department are currently occupied.  Plan to order/Initiate the following while awaiting opening in ED: labs, EKG, and Social Work Eval.  Initiate Treatment-Testing, Proceed toTreatment Area When [employer] Available for ED Attending/MLP to [employer] signed by JUICE Berman CNP   DD: 9/1/22       JUICE Berman CNP  09/01/22 2814

## 2022-09-02 NOTE — DISCHARGE INSTRUCTIONS
Follow up with:    [facility]  Location: [place] [address], [facility] [place], Hasmukh Fishman  Phone number: [phone]  Fax number: [phone]       [address]   [address] [facility], [address] [facility]  Phone: [phone]  Fax: [phone]  Please be sure to bring a copy of your discharge paperwork from the hospital, your ID, and insurance card(s) to the appointment. You can expect to be at the agency for two hours, which will include completing paperwork and meeting with a clinician for the initial appointment. If you have questions, or need to reschedule this appointment, please call [phone], and we look forward to working with you. Please reach out to one of the following community providers for treatment and support. Help Hotline: [phone] or [address] [place] [address] and Recovery Board [phone]  [address] and [address] [facility] [phone]  If you are in need of help and experiencing any barriers to care please contact help [facility] 24 hours a day and/or your local board of mental health and recovery during normal business hours. Detox program inpatient:   Roberterick Constantine [phone]  Gera Ross [phone]  [facility] [phone]  [facility] [phone]  Van Castro [phone]  [place] services hotline [phone]    Outpatient programs  [address] outpatient treatment [phone] ext.  [facility] [phone]  [facility] [phone]  VA services:   Kierra Cooper  [facility] [address] [place] [address] [phone]  [place] [phone]   [facility] [phone]  The counseling center of [facility] [phone]    For additional resource support please feel free to contact the behavioral access line at [phone] or the Intensive outpatient department at [phone].

## 2022-09-02 NOTE — ED NOTES
Behavioral Health Crisis Assessment    Chief Complaint:  \"I have not slept in days\"    Mental Status Exam: pt presenting at baseline, reporting paranoia, behavior controlled, denies SI, no HI - behavior is controlled, alert, oriented x's 3, shared fair eye contact, mood is stable, thoughts are at baseline. Legal Status  [x] Voluntary:  [] Involuntary, Issued by:    Gender  [x] Male [] Female [] Transgender  [] Other    Sexual Orientation    [x] Heterosexual [] Homosexual [] Bisexual [] Other    Brief Clinical Summary:  I met with pt, who said that she has not slept in a few days. He reports that he has medical concerns that he cannot take care of because his medical information has been stolen by a \"\". He said that he needs to go make a report with the welfare office so he can get medical attention. I asked pt what his medical concerns are and he said that he is bi-polar. I advised that he can get help with his [address] at [facility], as he has been advised to follow up with several times. Pt then said that he is from the [facility] and rather follow up in that area, therefore we discussed [facility]. Pt then said \"Ok then give me my paperwork so I can go\". I asked pt if he wants help with his drug use and he said that he does not use drugs. Pt is positive for cocaine. Pt denies SI, no HI and at this time no hallucinations - outside of thinking that someone may have stolen his medical information.      Collateral Information: none    Risk Factors:  Disruptive behaviors and uncooperative with following up for treatment  Substance Use - declined PEER  Limited family/friend support  Lack of housing  Lack of essential needs  Lack of self-care  Has no out pt provider  Has no community plan    Protective Factors:  Initiated this ER visit  [address] [facility] for the future  No access to weapons    Suicidal Ideations:   [] Reports:    [] Past [] Present   [x] Denies    Suicide Attempts:  [] Reports:   [x] Denies    C-SSRS Unit:  [] Other:                    Chel Cox, [facility]  09/02/22 2465

## 2022-09-10 ENCOUNTER — HOSPITAL ENCOUNTER (EMERGENCY)
Age: 42
Discharge: HOME | End: 2022-09-11
Attending: EMERGENCY MEDICINE
Payer: COMMERCIAL

## 2022-09-10 VITALS
HEART RATE: 85 BPM | DIASTOLIC BLOOD PRESSURE: 79 MMHG | OXYGEN SATURATION: 100 % | RESPIRATION RATE: 16 BRPM | TEMPERATURE: 98.3 F | SYSTOLIC BLOOD PRESSURE: 120 MMHG

## 2022-09-10 DIAGNOSIS — Z59.00 HOMELESS: Primary | ICD-10-CM

## 2022-09-10 PROCEDURE — 99282 EMERGENCY DEPT VISIT SF MDM: CPT

## 2022-09-10 SDOH — ECONOMIC STABILITY - HOUSING INSECURITY: HOMELESSNESS UNSPECIFIED: Z59.00

## 2022-09-10 ASSESSMENT — PAIN - FUNCTIONAL ASSESSMENT: PAIN_FUNCTIONAL_ASSESSMENT: NONE - DENIES PAIN

## 2022-09-11 ASSESSMENT — ENCOUNTER SYMPTOMS
CHEST TIGHTNESS: 0
SHORTNESS OF BREATH: 0
COUGH: 0
COLOR CHANGE: 0

## 2022-09-11 NOTE — ED PROVIDER NOTES
[facility]        Department of Emergency Medicine   ED  Provider Note  Admit Date/RoomTime: 9/10/2022  9:55 PM  ED Room: [place] [address]  HPI:  9/11/22, Time: 2:30 AM EDT      Timing fevers is a 59-year-old male with a history of bipolar schizoaffective disorder and homelessness presenting to the emergency department with no real complaint. Patient initially reported he is upset that people are talking about him. Patient would not elaborate. He states he has released more records in our [facility]. Patient states \"I am paranoid people are talking about me. \"  Patient has a history of paranoia. He has frequent emergency department visits for the same. Patient denies any suicidal or homicidal ideation, chest pain, shortness of breath, headache or dizziness. The history is provided by the patient. No  was used. REVIEW OF SYSTEMS:  Review of Systems   Constitutional:  Negative for activity change, chills, fatigue and fever. Respiratory:  Negative for cough, chest tightness and shortness of breath. Cardiovascular:  Negative for chest pain, palpitations and leg swelling. Musculoskeletal:  Negative for myalgias, neck pain and neck stiffness. Skin:  Negative for color change, pallor and rash. Neurological:  Negative for dizziness, weakness, light-headedness and headaches. Psychiatric/Behavioral:  Negative for agitation, behavioral problems and confusion. Pertinent positives and negatives are stated within HPI, all other systems reviewed and are negative.      --------------------------------------------- PAST HISTORY ---------------------------------------------  Past Medical History:  has a past medical history of Depression and Schizoaffective disorder ([facility] [address] Past Surgical History:  has a past surgical history that includes Hip fracture surgery (Left, 9/12/2021); eye surgery (19 years ago); and Hip fracture surgery (Left, 9/28/2021).     Social History:  reports that he has been smoking cigarettes. He has a 12.00 pack-year smoking history. He has never used smokeless tobacco. He reports current alcohol use of about 2.0 standard drinks per week. He reports current drug use. Frequency: 7.00 times per week. Drugs: Cocaine and Marijuana (George Pert). Family History: family history includes Mental Illness in his mother; No Known Problems in his father; Substance Abuse in his mother. The patients home medications have been reviewed. Allergies: Chlorpheniramine-phenylephrine, Motrin [ibuprofen], Penicillins, Rondec-d [chlophedianol-pseudoephedrine], and Tylenol [acetaminophen]    -------------------------------------------------- RESULTS -------------------------------------------------  All laboratory and radiology results have been personally reviewed by myself   LABS:  No results found for this visit on 09/10/22. RADIOLOGY:  Interpreted by Radiologist.  No orders to display       ------------------------- NURSING NOTES AND VITALS REVIEWED ---------------------------   The nursing notes within the ED encounter and vital signs as below have been reviewed. /79   Pulse 85   Temp 98.3 °F (36.8 °C) (Oral)   Resp 16   SpO2 100%   Oxygen Saturation Interpretation: Normal      ---------------------------------------------------PHYSICAL EXAM--------------------------------------    Physical Exam  Vitals and nursing note reviewed. Constitutional:       General: He is not in acute distress. Appearance: Normal appearance. He is well-developed. He is not ill-appearing. HENT:      Head: Normocephalic and atraumatic. Mouth/Throat:      Mouth: Mucous membranes are moist.   Cardiovascular:      Rate and Rhythm: Normal rate and regular rhythm. Heart sounds: Normal heart sounds. No murmur heard. Pulmonary:      Effort: Pulmonary effort is normal. No respiratory distress. Breath sounds: Normal breath sounds.    Musculoskeletal:         General: No swelling, tenderness or deformity. Normal range of motion. Cervical back: Normal range of motion and neck supple. No rigidity. Skin:     General: Skin is warm and dry. Capillary Refill: Capillary refill takes less than 2 seconds. Findings: No erythema. Neurological:      General: No focal deficit present. Mental Status: He is alert and oriented to person, place, and time. Mental status is at baseline. Coordination: Coordination normal.   Psychiatric:         Mood and Affect: Mood normal.      Comments: Flat affect. Flight of ideas. Behavior is baseline for the patient.            ------------------------------ ED COURSE/MEDICAL DECISION MAKING----------------------  Medications - No data to display      ED COURSE:     No orders to display         Procedures:  Procedures     Medical Decision Making:   MDM  26-year-old male presenting to the ED with no real complaint. He states he is upset the people are talking about him. Patient has a history of paranoia. He has no SI or HI at this time. Patient asked if he is looking for somewhere to sleep because he is homeless and did ignore the question. He went back out to the waiting room and slept comfortably. Patient has no real medical emergency at this time. He is noncompliant with psychiatric medications. He has frequent emergency department visits without any complaints other than needing somewhere to sleep and eat. Patient discharged in stable condition. Counseling: The emergency provider has spoken with the patient and discussed todays results, in addition to providing specific details for the plan of care and counseling regarding the diagnosis and prognosis. Questions are answered at this time and they are agreeable with the plan.      --------------------------------- IMPRESSION AND DISPOSITION ---------------------------------    IMPRESSION  1.  Homeless        DISPOSITION  Disposition: Discharge   Patient condition is stable      Electronically signed by Felipa Malone PA-C   DD: 9/11/22  **This report was transcribed using voice recognition software. Every effort was made to ensure accuracy; however, inadvertent computerized transcription errors may be present.   END OF ED PROVIDER NOTE         Felipa Malone PA-C  09/11/22 0236

## 2022-09-11 NOTE — ED NOTES
Department of Emergency Medicine  FIRST PROVIDER TRIAGE NOTE             [facility]           9/10/22  9:26 PM EDT    Date of Encounter: 9/10/22   MRN: [id number]      HPI: James Gleason. is a 40 y.o. male who presents to the ED for Homeless      And paranoid. ROS: Negative for Suicidal ideation or Homicidal Ideation. PE: Gen Appearance/Constitutional: alert  Musculoskeletal: moves all extremities x 4     Initial Plan of Care: All treatment areas with department are currently occupied. Plan to order/Initiate the following while awaiting opening in ED: deferred.   Initiate Treatment-Testing, Proceed toTreatment Area When Bed Available for ED Attending/MLP to Continue Care    Electronically signed by Reji Navarrete PA-C   DD: 9/10/22       Reji Navarrete PA-C  09/10/22 7710

## 2022-09-17 ENCOUNTER — HOSPITAL ENCOUNTER (EMERGENCY)
Age: 42
Discharge: HOME | End: 2022-09-17

## 2022-09-24 ENCOUNTER — HOSPITAL ENCOUNTER (EMERGENCY)
Age: 42
Discharge: HOME | End: 2022-09-24
Attending: EMERGENCY MEDICINE
Payer: COMMERCIAL

## 2022-09-24 DIAGNOSIS — Z59.00 HOMELESSNESS: Primary | ICD-10-CM

## 2022-09-24 PROCEDURE — 99282 EMERGENCY DEPT VISIT SF MDM: CPT

## 2022-09-24 SDOH — ECONOMIC STABILITY - HOUSING INSECURITY: HOMELESSNESS UNSPECIFIED: Z59.00

## 2022-09-24 NOTE — ED PROVIDER NOTES
EXAM--------------------------------------    Constitutional/General: Alert and oriented x3, well appearing, non toxic in NAD  Head: Normocephalic and atraumatic  Eyes: PERRL, EOMI, conjunctive normal, sclera non icteric  Mouth: Oropharynx clear, handling secretions, no trismus, no asymmetry of the posterior oropharynx or uvular edema  Neck: Supple, full ROM, non tender to palpation in the midline, no stridor, no crepitus, no meningeal signs  Respiratory: Lungs clear to auscultation bilaterally, no wheezes, rales, or rhonchi. Not in respiratory distress  Cardiovascular:  Regular rate. Regular rhythm. No murmurs, gallops, or rubs. 2+ distal pulses  GI:  Abdomen Soft, Non tender, Non distended. +BS. No organomegaly, no palpable masses,  No rebound, guarding, or rigidity. Musculoskeletal: Moves all extremities x 4. Warm and well perfused, no clubbing, cyanosis, or edema. Capillary refill <3 seconds  Integument: skin warm and dry. No rashes. Neurologic: GCS 15, no focal deficits,   Psychiatric: Normal Affect    -------------------------------------------------- RESULTS -------------------------------------------------  I have personally reviewed all laboratory and imaging results for this patient. Results are listed below. LABS:  No results found for this visit on 09/24/22. RADIOLOGY:  Interpreted by Radiologist.  No orders to display         ------------------------- NURSING NOTES AND VITALS REVIEWED ---------------------------   The nursing notes within the ED encounter and vital signs as below have been reviewed by myself. There were no vitals taken for this visit. Oxygen Saturation Interpretation: Normal    The patients available past medical records and past encounters were reviewed. ------------------------------ ED COURSE/MEDICAL DECISION MAKING----------------------  Medications - No data to display      ED COURSE:       Medical Decision Making:     This is a 81-year-old male presented to the ED for evaluation. Upon arrival to the ED the patient's vital signs are stable. Patient has no SI HI or hallucinations. Patient states that he is homeless and just wants to rest here. Patient appears nontoxic and stable for outpatient follow-up. Patient given education on homeless centers in the area. Return precautions given. I, Dr. Sigmund Dubin, am the primary provider for this encounter    This patient's ED course included: a personal history and physicial examination and re-evaluation prior to disposition    This patient has remained hemodynamically stable during their ED course. Re-Evaluations:             Re-evaluation. Patients symptoms are improving    Counseling: The emergency provider has spoken with the patient and discussed todays results, in addition to providing specific details for the plan of care and counseling regarding the diagnosis and prognosis. Questions are answered at this time and they are agreeable with the plan.       --------------------------------- IMPRESSION AND DISPOSITION ---------------------------------    IMPRESSION  1. Homelessness        DISPOSITION  Disposition: Discharge to home  Patient condition is stable    NOTE: This report was transcribed using voice recognition software.  Every effort was made to ensure accuracy; however, inadvertent computerized transcription errors may be present        Magalie Mcclain,   09/24/22 [address] [facility] , DO  09/24/22 0038

## 2022-09-25 ENCOUNTER — HOSPITAL ENCOUNTER (EMERGENCY)
Age: 42
Discharge: HOME | End: 2022-09-26
Attending: EMERGENCY MEDICINE
Payer: COMMERCIAL

## 2022-09-25 DIAGNOSIS — F19.10 POLYSUBSTANCE ABUSE (HCC): Primary | ICD-10-CM

## 2022-09-25 LAB
INFLUENZA A: NOT DETECTED
INFLUENZA B: NOT DETECTED
SARS-COV-2 RNA, RT PCR: NOT DETECTED

## 2022-09-25 PROCEDURE — 93005 ELECTROCARDIOGRAM TRACING: CPT | Performed by: PHYSICIAN ASSISTANT

## 2022-09-25 PROCEDURE — 87636 SARSCOV2 & INF A&B AMP PRB: CPT

## 2022-09-25 PROCEDURE — 99284 EMERGENCY DEPT VISIT MOD MDM: CPT

## 2022-09-25 ASSESSMENT — LIFESTYLE VARIABLES: HOW OFTEN DO YOU HAVE A DRINK CONTAINING ALCOHOL: NEVER

## 2022-09-26 VITALS
HEART RATE: 74 BPM | HEIGHT: 72 IN | TEMPERATURE: 97.9 F | OXYGEN SATURATION: 97 % | BODY MASS INDEX: 23.03 KG/M2 | WEIGHT: 170 LBS | RESPIRATION RATE: 18 BRPM | SYSTOLIC BLOOD PRESSURE: 126 MMHG | DIASTOLIC BLOOD PRESSURE: 78 MMHG

## 2022-09-26 LAB
EKG ATRIAL RATE: 61 BPM
EKG P AXIS: 74 DEGREES
EKG P-R INTERVAL: 188 MS
EKG Q-T INTERVAL: 382 MS
EKG QRS DURATION: 94 MS
EKG QTC CALCULATION (BAZETT): 384 MS
EKG R AXIS: 83 DEGREES
EKG T AXIS: 64 DEGREES
EKG VENTRICULAR RATE: 61 BPM

## 2022-09-26 NOTE — CARE COORDINATION
Peer Recovery Support Note    Name: Antonia Foster. Date: 9/26/2022    Chief Complaint   Patient presents with    Other     Patient wants help with entering rehab. Peer Support met with patient. [x] Support and education provided  [] Resources provided   [] Treatment referral:   [x] Other:   [] Patient declined peer recovery services     Referred By: Taylor ALVAREZ    Notes:     Patient appeared resting upon approach but got increasingly agitated as peer began to speak. Patient was belligerent to peer for asking him the same question twice as peer could not hear with face covering and low tone of voice. Peer attempted to get patient into treatment but patient was insistent on the specific area of [place] which took time. Once peer found placement patient had been medically cleared and discharged due to inappropriate actions and refusal to wait for peer or to obtain a bed in a treatment facility. Resources and Peer Card was given to patient during first interaction.     Signed: Jeremías Carter, 9/26/2022

## 2022-09-26 NOTE — ED NOTES
Department of Emergency Medicine  FIRST PROVIDER TRIAGE NOTE             [facility]           9/25/22  8:52 PM EDT    Date of Encounter: 9/25/22   MRN: [id number]      HPI: Gage Nichols. is a 40 y.o. male who presents to the ED for Other (Patient wants help with entering rehab.)     ROS: patient unwilling to answer. PE: Gen Appearance/Constitutional: alert     Initial Plan of Care: All treatment areas with department are currently occupied. Plan to order/Initiate the following while awaiting opening in ED: labs and EKG.   Initiate Treatment-Testing, Proceed toTreatment Area When Bed Available for ED Attending/MLP to Continue Care    Electronically signed by Charles Sullivan PA-C   DD: 9/25/22       Charles Sullivan PA-C  09/25/22 2053

## 2022-09-26 NOTE — ED NOTES
No SI or HI  He no longer wants to wait for PEER support for rehab options     Piper Fitzpatrick MD  09/26/22 2120
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
## 2022-09-26 NOTE — ED NOTES
SW attempted to complete assessment from Pt. Pt confirms to wanting to go to rehab/detox treatment. Pt denies to SI/HI, A/V hallucinations. Pt refused to elaborate on recent drug/alcohol use. Pt was explained how important it is to obtain the necessary blood work, urine, EKG to further obtain placement. Pt understood and was agreeable. SW will consult Peer support in the AM for further assistance.       Julia Lemos, MSW, [place]  09/26/22 4618
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
## 2022-09-26 NOTE — ED NOTES
Pt is discharged per Dr. Ketan Garcia. Pt belongings given back to pt.       Adriana Wells RN  09/26/22 7213

## 2022-09-27 ENCOUNTER — APPOINTMENT (OUTPATIENT)
Dept: GENERAL RADIOLOGY | Age: 42
End: 2022-09-27
Payer: COMMERCIAL

## 2022-09-27 VITALS
BODY MASS INDEX: 23.8 KG/M2 | SYSTOLIC BLOOD PRESSURE: 130 MMHG | HEART RATE: 98 BPM | WEIGHT: 170 LBS | DIASTOLIC BLOOD PRESSURE: 99 MMHG | TEMPERATURE: 98 F | OXYGEN SATURATION: 97 % | RESPIRATION RATE: 18 BRPM | HEIGHT: 71 IN

## 2022-09-27 PROCEDURE — 73090 X-RAY EXAM OF FOREARM: CPT

## 2022-09-27 PROCEDURE — 99283 EMERGENCY DEPT VISIT LOW MDM: CPT

## 2022-09-27 PROCEDURE — 73060 X-RAY EXAM OF HUMERUS: CPT

## 2022-09-27 PROCEDURE — 73562 X-RAY EXAM OF KNEE 3: CPT

## 2022-09-28 ENCOUNTER — HOSPITAL ENCOUNTER (EMERGENCY)
Age: 42
Discharge: HOME | End: 2022-09-28
Payer: COMMERCIAL

## 2022-09-28 DIAGNOSIS — T07.XXXA MULTIPLE CONTUSIONS: ICD-10-CM
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
## 2022-10-07 ENCOUNTER — HOSPITAL ENCOUNTER (EMERGENCY)
Age: 42
Discharge: HOME | End: 2022-10-07
Attending: EMERGENCY MEDICINE
Payer: COMMERCIAL

## 2022-10-07 VITALS — OXYGEN SATURATION: 100 % | HEART RATE: 68 BPM | TEMPERATURE: 98 F

## 2022-10-07 DIAGNOSIS — F32.A DEPRESSION, UNSPECIFIED DEPRESSION TYPE: Primary | ICD-10-CM

## 2022-10-07 PROCEDURE — 99282 EMERGENCY DEPT VISIT SF MDM: CPT

## 2022-10-07 NOTE — ED PROVIDER NOTES
HPI:  10/9/22, Time: 1:19 AM EDT         Linnea Rodriguez is a 40 y.o. male presenting to the ED for depression. Patient states he is depressed. Denies suicidal or homicidal or ideation. He states he just wants to talk to someone. Denies hallucinations. Denies any other symptoms or complaints. Review of Systems:   A complete review of systems was performed and pertinent positives and negatives are stated within HPI, all other systems reviewed and are negative.          --------------------------------------------- PAST HISTORY ---------------------------------------------  Past Medical History:  has a past medical history of Depression and Schizoaffective disorder ([facility] Past Surgical History:  has a past surgical history that includes Hip fracture surgery (Left, 9/12/2021); eye surgery (19 years ago); and Hip fracture surgery (Left, 9/28/2021). Social History:  reports that he has been smoking cigarettes. He has a 12.00 pack-year smoking history. He has never used smokeless tobacco. He reports current alcohol use of about 2.0 standard drinks per week. He reports current drug use. Frequency: 7.00 times per week. Drugs: Cocaine and Marijuana (Tianna Shaw). Family History: family history includes Mental Illness in his mother; No Known Problems in his father; Substance Abuse in his mother. The patients home medications have been reviewed. Allergies: Chlorpheniramine-phenylephrine, Motrin [ibuprofen], Penicillins, Rondec-d [chlophedianol-pseudoephedrine], and Tylenol [acetaminophen]    -------------------------------------------------- RESULTS -------------------------------------------------  All laboratory and radiology results have been personally reviewed by myself   LABS:  No results found for this visit on 10/07/22.     RADIOLOGY:  Interpreted by Radiologist.  No orders to display       ------------------------- NURSING NOTES AND VITALS REVIEWED ---------------------------   The nursing notes within the ED encounter and vital signs as below have been reviewed. Pulse 68   Temp 98 °F (36.7 °C)   SpO2 100%   Oxygen Saturation Interpretation: Normal      ---------------------------------------------------PHYSICAL EXAM--------------------------------------      Constitutional/General: Alert and oriented x3, well appearing, non toxic in NAD  Head: Normocephalic and atraumatic  Eyes: PERRL, EOMI  Mouth: Oropharynx clear, handling secretions, no trismus  Neck: Supple, full ROM,   Pulmonary: Lungs clear to auscultation bilaterally, no wheezes, rales, or rhonchi. Not in respiratory distress  Cardiovascular:  Regular rate and rhythm, no murmurs, gallops, or rubs. 2+ distal pulses  Abdomen: Soft, non tender, non distended,   Extremities: Moves all extremities x 4. Warm and well perfused  Skin: warm and dry without rash  Neurologic: GCS 15,  Psych: Normal Affect      ------------------------------ ED COURSE/MEDICAL DECISION MAKING----------------------  Medications - No data to display      ED COURSE:       Medical Decision Making:    Shortly after arrival, patient states he feels better. Once again, not suicidal or homicidal.  No hallucinations. Patient was discharged. Counseling: The emergency provider has spoken with the patient and discussed todays results, in addition to providing specific details for the plan of care and counseling regarding the diagnosis and prognosis. Questions are answered at this time and they are agreeable with the plan.      --------------------------------- IMPRESSION AND DISPOSITION ---------------------------------    IMPRESSION  1. Depression, unspecified depression type        DISPOSITION  Disposition: Discharge to home  Patient condition is stable      NOTE: This report was transcribed using voice recognition software.  Every effort was made to ensure accuracy; however, inadvertent computerized transcription errors may be present       Carlene Martins MD  10/09/22 1850 [facility]
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
## 2022-10-13 PROCEDURE — 99282 EMERGENCY DEPT VISIT SF MDM: CPT

## 2022-10-14 ENCOUNTER — HOSPITAL ENCOUNTER (EMERGENCY)
Age: 42
Discharge: HOME | End: 2022-10-14
Payer: COMMERCIAL

## 2022-10-14 ENCOUNTER — HOSPITAL ENCOUNTER (EMERGENCY)
Age: 42
Discharge: LEFT BEFORE DISCHARGE | End: 2022-10-14

## 2022-10-14 VITALS — OXYGEN SATURATION: 99 % | HEART RATE: 71 BPM

## 2022-10-14 VITALS
RESPIRATION RATE: 18 BRPM | BODY MASS INDEX: 21 KG/M2 | WEIGHT: 150 LBS | SYSTOLIC BLOOD PRESSURE: 118 MMHG | HEIGHT: 71 IN | TEMPERATURE: 97.8 F | DIASTOLIC BLOOD PRESSURE: 61 MMHG | OXYGEN SATURATION: 98 % | HEART RATE: 64 BPM

## 2022-10-14 DIAGNOSIS — Z59.00 HOMELESS: ICD-10-CM

## 2022-10-14 DIAGNOSIS — F19.10 SUBSTANCE ABUSE (HCC): Primary | ICD-10-CM

## 2022-10-14 SDOH — ECONOMIC STABILITY - HOUSING INSECURITY: HOMELESSNESS UNSPECIFIED: Z59.00

## 2022-10-14 ASSESSMENT — PAIN - FUNCTIONAL ASSESSMENT
PAIN_FUNCTIONAL_ASSESSMENT: NONE - DENIES PAIN
PAIN_FUNCTIONAL_ASSESSMENT: NONE - DENIES PAIN

## 2022-10-14 ASSESSMENT — LIFESTYLE VARIABLES
HOW OFTEN DO YOU HAVE A DRINK CONTAINING ALCOHOL: NEVER
HOW MANY STANDARD DRINKS CONTAINING ALCOHOL DO YOU HAVE ON A TYPICAL DAY: PATIENT DOES NOT DRINK

## 2022-10-14 NOTE — ED NOTES
Called for patient to triage. No Answer. Staff stated they saw him walk out.      Elsie Ramos RN  10/14/22 1927

## 2022-10-14 NOTE — ED PROVIDER NOTES
independent  HPI:  10/14/22, Time: 12:48 AM EDT         Jeff Simpson. is a 40 y.o. male presenting to the ED for homeless and drug addiction. Patient initially presented to the emergency department with originally wanting the sandwich and something to drink and then requesting a bed to lay down in. Patient then informed nursing after being in the emergency department waiting room for several minutes that he needed to talk to someone about getting drug rehab. Patient is denying any suicidal or homicidal ideations as well as no noted hallucinations. Patient reports that he does use crack cocaine. Patient otherwise denies chest pain, shortness of breath, abdominal pain as well as no noted nausea, vomiting or diarrhea. Patient reports that he used earlier in the day. Patient denies any other drug use. Patient requesting this provider call his dad's drug rehab facility so he can go visit him there. Patient with symptoms mild in severity and persistent. Review of Systems:   A complete review of systems was performed and pertinent positives and negatives are stated within HPI, all other systems reviewed and are negative.          --------------------------------------------- PAST HISTORY ---------------------------------------------  Past Medical History:  has a past medical history of Depression and Schizoaffective disorder ([facility] Past Surgical History:  has a past surgical history that includes Hip fracture surgery (Left, 9/12/2021); eye surgery (19 years ago); and Hip fracture surgery (Left, 9/28/2021). Social History:  reports that he has been smoking cigarettes. He has a 12.00 pack-year smoking history. He has never used smokeless tobacco. He reports current alcohol use of about 2.0 standard drinks per week. He reports current drug use. Frequency: 7.00 times per week. Drugs: Cocaine and Marijuana ([place] James).     Family History: family history includes Mental Illness in his mother; No Known Problems in his father; Substance Abuse in his mother. The patients home medications have been reviewed. Allergies: Chlorpheniramine-phenylephrine, Motrin [ibuprofen], Penicillins, and Rondec-d [chlophedianol-pseudoephedrine]    -------------------------------------------------- RESULTS -------------------------------------------------  All laboratory and radiology results have been personally reviewed by myself   LABS:  No results found for this visit on 10/13/22. RADIOLOGY:  Interpreted by Radiologist.  No orders to display       ------------------------- NURSING NOTES AND VITALS REVIEWED ---------------------------   The nursing notes within the ED encounter and vital signs as below have been reviewed. /61   Pulse 64   Temp 97.8 °F (36.6 °C) (Oral)   Resp 18   Ht 5' 11\" (1.803 m)   Wt 150 lb (68 kg)   SpO2 98%   BMI 20.92 kg/m²   Oxygen Saturation Interpretation: Normal      ---------------------------------------------------PHYSICAL EXAM--------------------------------------      Constitutional/General: Alert and oriented x3, overall nontoxic head: Normocephalic and atraumatic  Eyes: PERRL, EOMI  Mouth: Oropharynx clear, handling secretions, no trismus  Neck: Supple, full ROM,   Pulmonary: Lungs clear to auscultation bilaterally, no wheezes, rales, or rhonchi. Not in respiratory distress  Cardiovascular:  Regular rate and rhythm, no murmurs, gallops, or rubs. 2+ distal pulses  Abdomen: Soft, non tender, non distended,   Extremities: Moves all extremities x 4. Warm and well perfused  Skin: warm and dry without rash  Neurologic: GCS 15,  Psych: Normal Affect denies suicidal or homicidal ideations as well as no noted hallucinations.      ------------------------------ ED COURSE/MEDICAL DECISION MAKING----------------------  Medications - No data to display      ED COURSE:       Medical Decision Making: Plan will be for evaluation.   Patient was asked multiple times by nursing staff as well as myself if he is suicidal or homicidal which he stated multiple times no as well as no noted hallucinations. Patient requesting a bed to sleep and stating that he is homeless and needed something to eat as well. I did make patient aware that I will provide him with resource information for crack cocaine use but there is not a specific rehab center that only deals with crack cocaine use and normally they will not go to rehab for that drug. He immediately became upset after being informed that he cannot get a bed in the back to sleep then but we will gladly feed him and provide him with blankets so he can lay down here in the chair section or in the waiting room. Patient then screaming racial slurs at nursing staff as well as this provider screaming at us that he is homeless and that we are not doing anything to help him. I did make him aware that we do not have any beds available and there is no indication for him to be seen by the . We did attempt multiple times to make him comfortable and he was fed by nursing staff. Plan will be for discharge   Patient will be provided with resource information. Patient was encouraged to follow-up with his counselor and psychiatrist.  Patient expressed understanding patient will be discharged       Counseling: The emergency provider has spoken with the patient and discussed todays results, in addition to providing specific details for the plan of care and counseling regarding the diagnosis and prognosis. Questions are answered at this time and they are agreeable with the plan.      --------------------------------- IMPRESSION AND DISPOSITION ---------------------------------    IMPRESSION  1. Substance abuse ([facility]    2. Homeless        DISPOSITION  Disposition: Discharge to home  Patient condition is good      NOTE: This report was transcribed using voice recognition software.  Every effort was made to ensure accuracy; however, inadvertent computerized transcription errors may be present      Ferdinand Barroso, JUICE - CNP  10/14/22 2923

## 2022-10-15 NOTE — ED NOTES
Called and looked for patient in [facility] again with no answer     Laura Scott RN  10/14/22 2000

## 2022-10-16 ENCOUNTER — HOSPITAL ENCOUNTER (EMERGENCY)
Age: 42
Discharge: HOME | End: 2022-10-16
Attending: EMERGENCY MEDICINE
Payer: COMMERCIAL

## 2022-10-16 VITALS
DIASTOLIC BLOOD PRESSURE: 89 MMHG | SYSTOLIC BLOOD PRESSURE: 131 MMHG | RESPIRATION RATE: 17 BRPM | HEART RATE: 76 BPM | OXYGEN SATURATION: 96 % | TEMPERATURE: 97.4 F

## 2022-10-16 DIAGNOSIS — F22 PARANOIA (HCC): Primary | ICD-10-CM

## 2022-10-16 LAB
ACETAMINOPHEN LEVEL: <5 MCG/ML (ref 10–30)
ALBUMIN SERPL-MCNC: 3.9 G/DL (ref 3.5–5.2)
ALP BLD-CCNC: 48 U/L (ref 40–129)
ALT SERPL-CCNC: 15 U/L (ref 0–40)
ANION GAP SERPL CALCULATED.3IONS-SCNC: 11 MMOL/L (ref 7–16)
AST SERPL-CCNC: 22 U/L (ref 0–39)
BASOPHILS ABSOLUTE: 0.1 E9/L (ref 0–0.2)
BASOPHILS RELATIVE PERCENT: 1.9 % (ref 0–2)
BILIRUB SERPL-MCNC: 0.2 MG/DL (ref 0–1.2)
BUN BLDV-MCNC: 13 MG/DL (ref 6–20)
CALCIUM SERPL-MCNC: 8.9 MG/DL (ref 8.6–10.2)
CHLORIDE BLD-SCNC: 104 MMOL/L (ref 98–107)
CO2: 26 MMOL/L (ref 22–29)
CREAT SERPL-MCNC: 1 MG/DL (ref 0.7–1.2)
EOSINOPHILS ABSOLUTE: 0.16 E9/L (ref 0.05–0.5)
EOSINOPHILS RELATIVE PERCENT: 3 % (ref 0–6)
ETHANOL: <10 MG/DL (ref 0–0.08)
GFR AFRICAN AMERICAN: >60
GFR NON-AFRICAN AMERICAN: >60 ML/MIN/1.73
GLUCOSE BLD-MCNC: 89 MG/DL (ref 74–99)
HCT VFR BLD CALC: 42.6 % (ref 37–54)
HEMOGLOBIN: 13.9 G/DL (ref 12.5–16.5)
IMMATURE GRANULOCYTES #: 0.01 E9/L
IMMATURE GRANULOCYTES %: 0.2 % (ref 0–5)
LYMPHOCYTES ABSOLUTE: 2.04 E9/L (ref 1.5–4)
LYMPHOCYTES RELATIVE PERCENT: 38.2 % (ref 20–42)
MCH RBC QN AUTO: 30 PG (ref 26–35)
MCHC RBC AUTO-ENTMCNC: 32.6 % (ref 32–34.5)
MCV RBC AUTO: 92 FL (ref 80–99.9)
MONOCYTES ABSOLUTE: 0.34 E9/L (ref 0.1–0.95)
MONOCYTES RELATIVE PERCENT: 6.4 % (ref 2–12)
NEUTROPHILS ABSOLUTE: 2.69 E9/L (ref 1.8–7.3)
NEUTROPHILS RELATIVE PERCENT: 50.3 % (ref 43–80)
PDW BLD-RTO: 14.6 FL (ref 11.5–15)
PLATELET # BLD: 379 E9/L (ref 130–450)
PMV BLD AUTO: 9.6 FL (ref 7–12)
POTASSIUM SERPL-SCNC: 4 MMOL/L (ref 3.5–5)
RBC # BLD: 4.63 E12/L (ref 3.8–5.8)
SALICYLATE, SERUM: <0.3 MG/DL (ref 0–30)
SODIUM BLD-SCNC: 141 MMOL/L (ref 132–146)
TOTAL PROTEIN: 6.4 G/DL (ref 6.4–8.3)
TRICYCLIC ANTIDEPRESSANTS SCREEN SERUM: NEGATIVE NG/ML
WBC # BLD: 5.3 E9/L (ref 4.5–11.5)

## 2022-10-16 PROCEDURE — 80179 DRUG ASSAY SALICYLATE: CPT

## 2022-10-16 PROCEDURE — 80143 DRUG ASSAY ACETAMINOPHEN: CPT

## 2022-10-16 PROCEDURE — 80053 COMPREHEN METABOLIC PANEL: CPT

## 2022-10-16 PROCEDURE — 99283 EMERGENCY DEPT VISIT LOW MDM: CPT

## 2022-10-16 PROCEDURE — 80307 DRUG TEST PRSMV CHEM ANLYZR: CPT

## 2022-10-16 PROCEDURE — 85025 COMPLETE CBC W/AUTO DIFF WBC: CPT

## 2022-10-16 PROCEDURE — 82077 ASSAY SPEC XCP UR&BREATH IA: CPT

## 2022-10-16 NOTE — ED NOTES
Pt refusing [facility] pt states we can get everything we need with blood tests     Patrick Keller RN  10/16/22 0107

## 2022-10-16 NOTE — ED PROVIDER NOTES
HPI:  10/16/22, Time: 1:00 AM EDT         Cassandra Carlos. is a 40 y.o. male presenting to the ED for paranoia beginning days ago. The complaint has been persistent, moderate in severity, and worsened by nothing. Patient reporting people do not appreciate respect him. Patient reporting no thoughts of harming himself or others. Patient reporting no fever no chills no cough no chest pain or headache. Patient reporting that he has no place to stay and is cold outside. Patient reporting no abdominal pain no vomiting no diarrhea no headache. Patient reporting no weakness or dizziness. ROS:   Pertinent positives and negatives are stated within HPI, all other systems reviewed and are negative.  --------------------------------------------- PAST HISTORY ---------------------------------------------  Past Medical History:  has a past medical history of Depression and Schizoaffective disorder ([address] Past Surgical History:  has a past surgical history that includes Hip fracture surgery (Left, 9/12/2021); eye surgery (19 years ago); and Hip fracture surgery (Left, 9/28/2021). Social History:  reports that he has been smoking cigarettes. He has a 12.00 pack-year smoking history. He has never used smokeless tobacco. He reports current alcohol use of about 2.0 standard drinks per week. He reports current drug use. Frequency: 7.00 times per week. Drugs: Cocaine and Marijuana ([place]). Family History: family history includes Mental Illness in his mother; No Known Problems in his father; Substance Abuse in his mother. The patients home medications have been reviewed.     Allergies: Chlorpheniramine-phenylephrine, Motrin [ibuprofen], Penicillins, and Rondec-d [chlophedianol-pseudoephedrine]    ---------------------------------------------------PHYSICAL EXAM--------------------------------------    Constitutional/General: Alert and oriented x3, well appearing, non toxic in NAD  Head: Normocephalic and atraumatic  Eyes: PERRL, EOMI  Mouth: Oropharynx clear, handling secretions, no trismus  Neck: Supple, full ROM, non tender to palpation in the midline, no stridor, no crepitus, no meningeal signs  Pulmonary: Lungs clear to auscultation bilaterally, no wheezes, rales, or rhonchi. Not in respiratory distress  Cardiovascular:  Regular rate. Regular rhythm. No murmurs, gallops, or rubs. 2+ distal pulses  Chest: no chest wall tenderness  Abdomen: Soft. Non tender. Non distended. +BS. No rebound, guarding, or rigidity. No pulsatile masses appreciated. Musculoskeletal: Moves all extremities x 4. Warm and well perfused, no clubbing, cyanosis, or edema. Capillary refill <3 seconds  Skin: warm and dry. No rashes. Neurologic: GCS 15, CN 2-12 grossly intact, no focal deficits, symmetric strength 5/5 in the upper and lower extremities bilaterally  Psych: Denies homicidal suicidal thoughts or hallucinations    -------------------------------------------------- RESULTS -------------------------------------------------  I have personally reviewed all laboratory and imaging results for this patient. Results are listed below.      LABS:  Results for orders placed or performed during the hospital encounter of 10/16/22   CBC with Auto Differential   Result Value Ref Range    WBC 5.3 4.5 - 11.5 E9/L    RBC 4.63 3.80 - 5.80 E12/L    Hemoglobin 13.9 12.5 - 16.5 g/dL    Hematocrit 42.6 37.0 - 54.0 %    MCV 92.0 80.0 - 99.9 fL    MCH 30.0 26.0 - 35.0 pg    MCHC 32.6 32.0 - 34.5 %    RDW 14.6 11.5 - 15.0 fL    Platelets 140 168 - 004 E9/L    MPV 9.6 7.0 - 12.0 fL    Neutrophils % 50.3 43.0 - 80.0 %    Immature Granulocytes % 0.2 0.0 - 5.0 %    Lymphocytes % 38.2 20.0 - 42.0 %    Monocytes % 6.4 2.0 - 12.0 %    Eosinophils % 3.0 0.0 - 6.0 %    Basophils % 1.9 0.0 - 2.0 %    Neutrophils Absolute 2.69 1.80 - 7.30 E9/L    Immature Granulocytes # 0.01 E9/L    Lymphocytes Absolute 2.04 1.50 - 4.00 E9/L    Monocytes Absolute 0.34 0.10 - 0.95 E9/L    Eosinophils Absolute 0.16 0.05 - 0.50 E9/L    Basophils Absolute 0.10 0.00 - 0.20 E9/L   Comprehensive Metabolic Panel   Result Value Ref Range    Sodium 141 132 - 146 mmol/L    Potassium 4.0 3.5 - 5.0 mmol/L    Chloride 104 98 - 107 mmol/L    CO2 26 22 - 29 mmol/L    Anion Gap 11 7 - 16 mmol/L    Glucose 89 74 - 99 mg/dL    BUN 13 6 - 20 mg/dL    Creatinine 1.0 0.7 - 1.2 mg/dL    GFR Non-African American >60 >=60 mL/min/1.73    GFR African American >60     Calcium 8.9 8.6 - 10.2 mg/dL    Total Protein 6.4 6.4 - 8.3 g/dL    Albumin 3.9 3.5 - 5.2 g/dL    Total Bilirubin 0.2 0.0 - 1.2 mg/dL    Alkaline Phosphatase 48 40 - 129 U/L    ALT 15 0 - 40 U/L    AST 22 0 - 39 U/L   Serum Drug Screen   Result Value Ref Range    Ethanol Lvl <10 mg/dL    Acetaminophen Level <5.0 (L) 10.0 - 95.3 mcg/mL    Salicylate, Serum <1.1 0.0 - 30.0 mg/dL    TCA Scrn NEGATIVE Cutoff:300 ng/mL       RADIOLOGY:  Interpreted by Radiologist.  No orders to display             ------------------------- NURSING NOTES AND VITALS REVIEWED ---------------------------   The nursing notes within the ED encounter and vital signs as below have been reviewed by myself. /89   Pulse 76   Temp 97.4 °F (36.3 °C) (Oral)   Resp 17   SpO2 96%   Oxygen Saturation Interpretation: Normal    The patients available past medical records and past encounters were reviewed. ------------------------------ ED COURSE/MEDICAL DECISION MAKING----------------------  Medications - No data to display          Medical Decision Making:    Presenting here because of feeling paranoid. Patient reporting no homicidal suicidal thoughts. Patient does have underlying schizophrenia. Patient reporting no physical planes of chest pain shortness of breath or abdominal pain. Patient reports that he just wants a place to stay to rest.  Patient labs noted reviewed. patient declined urinalysis. Patient observed here in the emergency room.   Patient no acute distress. Patient will be discharged. He is to follow-up with his family doctor and psychiatrist.    Re-Evaluations:           Reevaluated no acute distress. Patient resting comfortably. Patient made aware of findings and plan. Patient will be discharged      Consultations:                 Critical Care: This patient's ED course included: a personal history and physicial eaxmination    This patient has been closely monitored during their ED course. Counseling: The emergency provider has spoken with the patient and discussed todays results, in addition to providing specific details for the plan of care and counseling regarding the diagnosis and prognosis. Questions are answered at this time and they are agreeable with the plan.       --------------------------------- IMPRESSION AND DISPOSITION ---------------------------------    IMPRESSION  1. Paranoia ([address]        DISPOSITION  Disposition: Discharge  Patient condition is stable        NOTE: This report was transcribed using voice recognition software.  Every effort was made to ensure accuracy; however, inadvertent computerized transcription errors may be present          Lorenzo Ramsay MD  10/16/22 5921

## 2022-10-16 NOTE — ED NOTES
Patient given food and drink and sent back out to waiting area. Resting comfortably.       Cisco Puga RN  10/16/22 3111

## 2022-10-19 ENCOUNTER — HOSPITAL ENCOUNTER (EMERGENCY)
Age: 42
Discharge: LEFT BEFORE DISCHARGE | End: 2022-10-19

## 2022-10-19 VITALS
SYSTOLIC BLOOD PRESSURE: 148 MMHG | HEART RATE: 71 BPM | TEMPERATURE: 98.5 F | RESPIRATION RATE: 17 BRPM | OXYGEN SATURATION: 99 % | DIASTOLIC BLOOD PRESSURE: 78 MMHG

## 2022-10-19 NOTE — ED PROVIDER NOTES
Department of Emergency Medicine  FIRST PROVIDER TRIAGE NOTE             Independent MLP           10/19/22  7:33 PM EDT    Date of Encounter: 10/19/22   MRN: [id number]      HPI: Gera Swann is a 40 y.o. male who presents to the ED for Homeless (Pt to ED stating ) and Paranoid (Pt to ED stating he is homeless and paranoid that someone could have heard him say his social security number yesterday. -SI, -HI. )       ROS: Negative for Suicidal ideation or Homicidal Ideation. PE: Gen Appearance/Constitutional: alert  Musculoskeletal: moves all extremities x 4     Initial Plan of Care: All treatment areas with department are currently occupied. Plan to order/Initiate the following while awaiting opening in ED: deferred .   Initiate Treatment-Testing, Proceed toTreatment Area When Bed Available for ED Attending/MLP to Continue Care    Electronically signed by Garcia Whitaker PA-C   DD: 10/19/22       Garcia Whitaker PA-C  10/19/22 8553  ATTENDING PROVIDER ATTESTATION:     Supervising Physician, on-site, available for consultation, non-participatory in the evaluation or care of this patient       Manuel Whiteside MD  10/20/22 4439

## 2022-10-20 NOTE — ED NOTES
Department of Emergency Medicine  FIRST PROVIDER TRIAGE NOTE             Independent MLP           10/19/22  7:33 PM EDT    Date of Encounter: 10/19/22   MRN: [id number]      HPI: Vu Hampton. is a 40 y.o. male who presents to the ED for Homeless (Pt to ED stating ) and Paranoid (Pt to ED stating he is homeless and paranoid that someone could have heard him say his social security number yesterday. -SI, -HI. )       ROS: Negative for Suicidal ideation or Homicidal Ideation. PE: Gen Appearance/Constitutional: alert  Musculoskeletal: moves all extremities x 4     Initial Plan of Care: All treatment areas with department are currently occupied. Plan to order/Initiate the following while awaiting opening in ED: deferred .   Initiate Treatment-Testing, Proceed toTreatment Area When Bed Available for ED Attending/MLP to Continue Care    Electronically signed by Mario Perry PA-C   DD: 10/19/22       Mario Perry PA-C  10/19/22 916 Shira Stevens PA-C  10/20/22 0148  ATTENDING PROVIDER ATTESTATION:     Supervising Physician, on-site, available for consultation, non-participatory in the evaluation or care of this patient         Shell Fraire MD  10/20/22 0206

## 2022-10-21 PROCEDURE — 99282 EMERGENCY DEPT VISIT SF MDM: CPT

## 2022-10-22 ENCOUNTER — HOSPITAL ENCOUNTER (EMERGENCY)
Age: 42
Discharge: HOME | End: 2022-10-22
Payer: COMMERCIAL

## 2022-10-22 VITALS
OXYGEN SATURATION: 99 % | HEART RATE: 78 BPM | HEIGHT: 71 IN | SYSTOLIC BLOOD PRESSURE: 121 MMHG | RESPIRATION RATE: 12 BRPM | BODY MASS INDEX: 21 KG/M2 | TEMPERATURE: 98.4 F | DIASTOLIC BLOOD PRESSURE: 92 MMHG | WEIGHT: 150 LBS

## 2022-10-22 DIAGNOSIS — Z59.00 HOMELESS: Primary | ICD-10-CM

## 2022-10-22 SDOH — ECONOMIC STABILITY - HOUSING INSECURITY: HOMELESSNESS UNSPECIFIED: Z59.00

## 2022-10-22 NOTE — ED NOTES
Department of Emergency Medicine  FIRST PROVIDER TRIAGE NOTE             Independent MLP           10/22/22  1:54 AM EDT    Date of Encounter: 10/22/22   MRN: [id number]      HPI: Sonal Ross. is a 40 y.o. male who presents to the ED for Other ((Homeless))  Presenting to the emergency department because he is homeless, states that he needed a sandwich and some place to sleep tonight. He is denying suicidal or homicidal ideations, no hallucinations. ROS: Negative for cp or sob. PE: Gen Appearance/Constitutional: alert  HEENT: NC/NT. PERRLA,  Airway patent. Initial Plan of Care: All treatment areas with department are currently occupied. Plan to order/Initiate the following while awaiting opening in ED: .   Initiate Treatment-Testing, Proceed toTreatment Area When Bed Available for ED Attending/MLP to Continue Care    Electronically signed by JUICE Guzman CNP   DD: 10/22/22       JUICE Guzman CNP  10/22/22 0155
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
## 2022-10-23 ENCOUNTER — HOSPITAL ENCOUNTER (EMERGENCY)
Age: 42
Discharge: LEFT BEFORE DISCHARGE | End: 2022-10-23
Attending: EMERGENCY MEDICINE
Payer: COMMERCIAL

## 2022-10-23 VITALS
SYSTOLIC BLOOD PRESSURE: 144 MMHG | OXYGEN SATURATION: 96 % | TEMPERATURE: 98.2 F | RESPIRATION RATE: 17 BRPM | HEART RATE: 70 BPM | DIASTOLIC BLOOD PRESSURE: 106 MMHG

## 2022-10-23 DIAGNOSIS — Z59.00 HOMELESSNESS: Primary | ICD-10-CM

## 2022-10-23 PROCEDURE — 99281 EMR DPT VST MAYX REQ PHY/QHP: CPT

## 2022-10-23 SDOH — ECONOMIC STABILITY - HOUSING INSECURITY: HOMELESSNESS UNSPECIFIED: Z59.00
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
## 2022-10-24 ENCOUNTER — HOSPITAL ENCOUNTER (EMERGENCY)
Age: 42
Discharge: LEFT BEFORE DISCHARGE | End: 2022-10-25

## 2022-10-24 VITALS
SYSTOLIC BLOOD PRESSURE: 121 MMHG | TEMPERATURE: 98.1 F | RESPIRATION RATE: 18 BRPM | HEART RATE: 82 BPM | OXYGEN SATURATION: 97 % | DIASTOLIC BLOOD PRESSURE: 73 MMHG

## 2022-10-24 ASSESSMENT — PAIN - FUNCTIONAL ASSESSMENT: PAIN_FUNCTIONAL_ASSESSMENT: NONE - DENIES PAIN

## 2022-10-24 NOTE — ED NOTES
Department of Emergency Medicine  FIRST PROVIDER TRIAGE NOTE             Independent MLP           10/24/22  7:19 PM EDT    Date of Encounter: 10/24/22   MRN: [id number]      HPI: Amanda Nielsen is a 40 y.o. male who presents to the ED for Paranoid (Stressed because he made a complaint with the  and feels as though people are against him and he is nervous. )     Pt denies SI or HI     ROS: Negative for cp or sob. PE: Gen Appearance/Constitutional: alert  Musculoskeletal: moves all extremities x 4     Initial Plan of Care: All treatment areas with department are currently occupied. Plan to order/Initiate the following while awaiting opening in ED: deferred.   Initiate Treatment-Testing, Proceed toTreatment Area When Bed Available for ED Attending/MLP to Continue Care    Electronically signed by Gregory Morales PA-C   DD: 10/24/22       Gregory Morales PA-C  10/24/22 7040

## 2022-10-25 NOTE — PROGRESS NOTES
2 calls made for pt with no response. Pt was not in the restroom, diagnostic testing, or outside.  Triage made aware

## 2022-11-06 ENCOUNTER — HOSPITAL ENCOUNTER (EMERGENCY)
Age: 42
Discharge: HOME | End: 2022-11-07

## 2022-11-06 VITALS
BODY MASS INDEX: 20.92 KG/M2 | WEIGHT: 150 LBS | TEMPERATURE: 98.3 F | HEART RATE: 72 BPM | DIASTOLIC BLOOD PRESSURE: 87 MMHG | OXYGEN SATURATION: 98 % | RESPIRATION RATE: 17 BRPM | SYSTOLIC BLOOD PRESSURE: 126 MMHG

## 2022-11-06 DIAGNOSIS — Z59.00 HOMELESS: Primary | ICD-10-CM

## 2022-11-06 PROCEDURE — 4500000002 HC ER NO CHARGE

## 2022-11-06 SDOH — ECONOMIC STABILITY - HOUSING INSECURITY: HOMELESSNESS UNSPECIFIED: Z59.00

## 2022-11-07 NOTE — ED NOTES
Was seen in triage earlier in the evening  Patient eloped from the ED     Nabil Bryson MD  11/07/22 7269

## 2022-11-08 ENCOUNTER — APPOINTMENT (OUTPATIENT)
Dept: GENERAL RADIOLOGY | Age: 42
End: 2022-11-08
Payer: COMMERCIAL

## 2022-11-08 ENCOUNTER — HOSPITAL ENCOUNTER (EMERGENCY)
Age: 42
Discharge: HOME | End: 2022-11-08
Payer: COMMERCIAL

## 2022-11-08 VITALS
DIASTOLIC BLOOD PRESSURE: 78 MMHG | HEART RATE: 81 BPM | SYSTOLIC BLOOD PRESSURE: 116 MMHG | RESPIRATION RATE: 18 BRPM | OXYGEN SATURATION: 97 % | TEMPERATURE: 98.3 F

## 2022-11-08 PROCEDURE — 73120 X-RAY EXAM OF HAND: CPT

## 2022-11-08 PROCEDURE — 99283 EMERGENCY DEPT VISIT LOW MDM: CPT

## 2022-11-08 PROCEDURE — 99281 EMR DPT VST MAYX REQ PHY/QHP: CPT

## 2022-11-08 PROCEDURE — 73110 X-RAY EXAM OF WRIST: CPT

## 2022-11-08 RX ORDER — OXYCODONE HYDROCHLORIDE AND ACETAMINOPHEN 5; 325 MG/1; MG/1
1 TABLET ORAL ONCE
Status: COMPLETED | OUTPATIENT
Start: 2022-11-09 | End: 2022-11-09

## 2022-11-08 ASSESSMENT — PAIN DESCRIPTION - DESCRIPTORS: DESCRIPTORS: ACHING

## 2022-11-08 ASSESSMENT — LIFESTYLE VARIABLES
HOW MANY STANDARD DRINKS CONTAINING ALCOHOL DO YOU HAVE ON A TYPICAL DAY: PATIENT DOES NOT DRINK
HOW OFTEN DO YOU HAVE A DRINK CONTAINING ALCOHOL: NEVER

## 2022-11-08 ASSESSMENT — PAIN DESCRIPTION - FREQUENCY: FREQUENCY: CONTINUOUS

## 2022-11-08 ASSESSMENT — PAIN DESCRIPTION - LOCATION: LOCATION: WRIST

## 2022-11-08 ASSESSMENT — PAIN DESCRIPTION - ORIENTATION: ORIENTATION: LEFT

## 2022-11-08 ASSESSMENT — PAIN DESCRIPTION - PAIN TYPE: TYPE: ACUTE PAIN

## 2022-11-08 ASSESSMENT — PAIN - FUNCTIONAL ASSESSMENT: PAIN_FUNCTIONAL_ASSESSMENT: 0-10

## 2022-11-08 ASSESSMENT — PAIN SCALES - GENERAL: PAINLEVEL_OUTOF10: 10

## 2022-11-08 NOTE — ED NOTES
Department of Emergency Medicine  FIRST PROVIDER TRIAGE NOTE             Independent MLP           11/8/22  2:04 PM EST    Date of Encounter: 11/8/22   MRN: [id number]      HPI: Ivette Sandoval is a 40 y.o. male who presents to the ED for Constipation (Pt sts he ate a lot of cheese and now he can't have a BM. Pt reports having BM in lobby bathroom while waiting to be triaged. Pt sts he feels better, but BM was painful. Pt reports large BM. Pt has no other complaints at this time. )   Had large BM in [address] [place] [address] before triage. Said he pooed a large amount. ROS: Negative for cp, sob, or abd pain. PE: Gen Appearance/Constitutional: alert  HEENT: NC/NT. PERRLA,  Airway patent. Initial Plan of Care: All treatment areas with department are currently occupied. Plan to order/Initiate the following while awaiting opening in ED: imaging studies.   Initiate Treatment-Testing, Proceed toTreatment Area When Bed Available for ED Attending/MLP to Continue Care    Electronically signed by CECILE Noble   DD: 11/8/22       CECILE Carrasquillo  11/08/22 9283

## 2022-11-09 ENCOUNTER — HOSPITAL ENCOUNTER (EMERGENCY)
Age: 42
Discharge: HOME | End: 2022-11-09
Payer: COMMERCIAL

## 2022-11-09 ENCOUNTER — APPOINTMENT (OUTPATIENT)
Dept: GENERAL RADIOLOGY | Age: 42
End: 2022-11-09
Payer: COMMERCIAL

## 2022-11-09 VITALS
HEART RATE: 69 BPM | TEMPERATURE: 98.6 F | OXYGEN SATURATION: 99 % | DIASTOLIC BLOOD PRESSURE: 71 MMHG | RESPIRATION RATE: 18 BRPM | SYSTOLIC BLOOD PRESSURE: 132 MMHG

## 2022-11-09 VITALS — RESPIRATION RATE: 22 BRPM | HEART RATE: 62 BPM | OXYGEN SATURATION: 95 %
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
## 2022-11-09 DIAGNOSIS — S52.602A CLOSED FRACTURE OF DISTAL END OF LEFT ULNA, UNSPECIFIED FRACTURE MORPHOLOGY, INITIAL ENCOUNTER: Primary | ICD-10-CM

## 2022-11-09 PROCEDURE — 6370000000 HC RX 637 (ALT 250 FOR IP): Performed by: NURSE PRACTITIONER

## 2022-11-09 PROCEDURE — 25560 CLTX RDL&ULN SHFT FX WO MNPJ: CPT

## 2022-11-09 PROCEDURE — 73090 X-RAY EXAM OF FOREARM: CPT

## 2022-11-09 PROCEDURE — 99283 EMERGENCY DEPT VISIT LOW MDM: CPT

## 2022-11-09 PROCEDURE — 29105 APPLICATION LONG ARM SPLINT: CPT

## 2022-11-09 RX ORDER — OXYCODONE HYDROCHLORIDE AND ACETAMINOPHEN 5; 325 MG/1; MG/1
1 TABLET ORAL EVERY 6 HOURS PRN
Qty: 8 TABLET | Refills: 0 | Status: SHIPPED | OUTPATIENT
Start: 2022-11-09 | End: 2022-11-12

## 2022-11-09 RX ORDER — ACETAMINOPHEN 325 MG/1
650 TABLET ORAL EVERY 6 HOURS PRN
Qty: 120 TABLET | Refills: 3 | Status: SHIPPED | OUTPATIENT
Start: 2022-11-09

## 2022-11-09 RX ORDER — ACETAMINOPHEN 500 MG
1000 TABLET ORAL ONCE
Status: COMPLETED | OUTPATIENT
Start: 2022-11-09 | End: 2022-11-09

## 2022-11-09 RX ADMIN — ACETAMINOPHEN 1000 MG: 500 TABLET ORAL at 22:27

## 2022-11-09 RX ADMIN — OXYCODONE AND ACETAMINOPHEN 1 TABLET: 5; 325 TABLET ORAL at 01:07
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
## 2022-11-09 NOTE — PROGRESS NOTES
CLINICAL PHARMACY NOTE: MEDS TO BEDS    Total # of Prescriptions Filled: 2   The following medications were delivered to the patient:  Acetaminophen 325 mg  Oxycodone/apap 5-325  Picked up in the pharmacy    Additional Documentation:

## 2022-11-09 NOTE — CONSULTS
Father        REVIEW OF SYSTEMS:   Skin: no acute changes  Eyes: no acute changes  Ears/Nose/Throat: no acute changes  Respiratory: No increased work of breathing, no coughing  Cardiovascular: Brisk capillary refill bilaterally, well perfused extremities  Gastrointestinal: no acute changes  Neurologic: no acute changes  MUSCULOSKELETAL:  positive for  pain      PHYSICAL EXAM:    VITALS:  /69   Pulse 78   Temp 98.6 °F (37 °C) (Axillary)   Resp 20   SpO2 100%   Constitutional: Oriented to person, place, and time; Answer questions appropriately  HENT: Head: Atraumatic. Eyes: EOMI  Neck: Trachea midline  Cardiovascular: Brisk capillary refill to all extremities, extremities well perfused  Pulmonary/Chest: No increased work of breathing, no cough  Abdominal: Non-distended  Neurologic:  Awake, alert and oriented in three planes. No gross deficits   MUSCULOSKELETAL:  Left upper Extremity:  Superficial abrasion about the ulnar aspect of the forearm about the level of the fracture. There is no active drainage and is not deep to the dermis. Deformity about the distal forearm  +TTP about the distal ulna  Nontender to palpation about the distal radius, elbow, shoulder, hand  compartments soft and compressible  +AIN/PIN/Ulnar nerve function intact grossly  +Radial pulse, Brisk Cap refill, hand warm and perfused  During my examination he noted subjective paresthesias to his pinky finger only without sensory changes in the remainder of his hand. However shortly after he denies any paresthesias to his hand or digits. Secondary Exam:   rightUE: No obvious signs of trauma. -TTP to fingers, hand, wrist, forearm, elbow, humerus, shoulder or clavicle. -- Patient able to flex/extend fingers, wrist, elbow and shoulder with active and passive ROM without pain, +2/4 Radial pulse, compartments soft and compressible. bilateralLE: No obvious signs of trauma.    -TTP to foot, ankle, leg, knee, thigh, hip.-- Patient able to flex/extend toes, ankle, knee and hip with active and passive ROM without pain,+2/4 DP & PT pulses, compartments soft and compressible. Pelvis: -TTP, -Log roll, -Heel strike         DATA:    CBC:   Lab Results   Component Value Date/Time    WBC 5.3 10/16/2022 01:06 AM    RBC 4.63 10/16/2022 01:06 AM    HGB 13.9 10/16/2022 01:06 AM    HCT 42.6 10/16/2022 01:06 AM    MCV 92.0 10/16/2022 01:06 AM    MCH 30.0 10/16/2022 01:06 AM    MCHC 32.6 10/16/2022 01:06 AM    RDW 14.6 10/16/2022 01:06 AM     10/16/2022 01:06 AM    MPV 9.6 10/16/2022 01:06 AM     PT/INR:    Lab Results   Component Value Date/Time    PROTIME 11.8 09/28/2021 01:10 PM    INR 1.1 09/28/2021 01:10 PM       Radiology Review:  X-ray right wrist demonstrates distal third ulnar shaft fracture. Fracture short oblique in nature and displaced, apex radial and dorsal.  There is proximally 75% apposition of the fracture fragments. No other fracture dislocations noted. X-ray right radius and ulna demonstrate the above fracture. No other fracture dislocations noted about the elbow or distal humerus. IMPRESSION:  Closed, left distal third ulnar shaft fracture    PLAN:  Nonweightbearing left upper extremity  Sling for comfort and support  A well-padded sugar-tong splint was applied  Elevate and ice to reduce swelling and pain  No acute orthopedic intervention planned at this time. Ok to discharge from orthopedic standpoint. Orthopedics will follow the patient peripherally for the remainder of their inpatient stay. Please call with questions or concerns.   Follow-up with Dr. Jefry Saenz in office in 2 weeks  Discuss with attending

## 2022-11-10 ENCOUNTER — HOSPITAL ENCOUNTER (EMERGENCY)
Age: 42
Discharge: HOME | End: 2022-11-10
Attending: EMERGENCY MEDICINE
Payer: COMMERCIAL

## 2022-11-10 ENCOUNTER — HOSPITAL ENCOUNTER (EMERGENCY)
Age: 42
Discharge: HOME | End: 2022-11-10
Payer: COMMERCIAL

## 2022-11-10 VITALS
DIASTOLIC BLOOD PRESSURE: 87 MMHG | HEIGHT: 72 IN | HEART RATE: 86 BPM | WEIGHT: 170 LBS | OXYGEN SATURATION: 99 % | BODY MASS INDEX: 23.03 KG/M2 | TEMPERATURE: 98.2 F | SYSTOLIC BLOOD PRESSURE: 142 MMHG | RESPIRATION RATE: 18 BRPM

## 2022-11-10 DIAGNOSIS — F20.9 SCHIZOPHRENIA, UNSPECIFIED TYPE (HCC): Primary | ICD-10-CM

## 2022-11-10 DIAGNOSIS — M79.642 LEFT HAND PAIN: Primary | ICD-10-CM

## 2022-11-10 PROCEDURE — 6360000002 HC RX W HCPCS: Performed by: PHYSICIAN ASSISTANT

## 2022-11-10 PROCEDURE — 96372 THER/PROPH/DIAG INJ SC/IM: CPT

## 2022-11-10 PROCEDURE — 99284 EMERGENCY DEPT VISIT MOD MDM: CPT

## 2022-11-10 RX ORDER — KETOROLAC TROMETHAMINE 30 MG/ML
30 INJECTION, SOLUTION INTRAMUSCULAR; INTRAVENOUS ONCE
Status: COMPLETED | OUTPATIENT
Start: 2022-11-10 | End: 2022-11-10

## 2022-11-10 RX ADMIN — KETOROLAC TROMETHAMINE 30 MG: 30 INJECTION, SOLUTION INTRAMUSCULAR; INTRAVENOUS at 15:47

## 2022-11-10 ASSESSMENT — PAIN DESCRIPTION - DESCRIPTORS: DESCRIPTORS: ACHING

## 2022-11-10 ASSESSMENT — PAIN SCALES - GENERAL
PAINLEVEL_OUTOF10: 10
PAINLEVEL_OUTOF10: 10

## 2022-11-10 ASSESSMENT — PAIN - FUNCTIONAL ASSESSMENT: PAIN_FUNCTIONAL_ASSESSMENT: 0-10

## 2022-11-10 ASSESSMENT — PAIN DESCRIPTION - LOCATION: LOCATION: HAND

## 2022-11-10 ASSESSMENT — PAIN DESCRIPTION - ORIENTATION: ORIENTATION: LEFT

## 2022-11-10 NOTE — ED NOTES
Department of Emergency Medicine  FIRST PROVIDER TRIAGE NOTE             Independent MLP           11/9/22  10:25 PM EST    Date of Encounter: 11/9/22   MRN: [id number]      HPI: Irineo Trevino. is a 40 y.o. male who presents to the ED for Depression (Pt rambling at pivot desk. Not sure why he is here. He is speaking on depression and talking to an imaginary person?)   Patient talking in circles , refusing to answer questions,  will only speak to male nurses and provider, the patient then clearly stated I am just here for food and to sit in the lobby, denies SI, HI     ROS: Negative for cp or sob. PE: Gen Appearance/Constitutional: alert  HEENT: NC/NT. PERRLA,  Airway patent. Initial Plan of Care: All treatment areas with department are currently occupied. Plan to order/Initiate the following while awaiting opening in ED: .   Initiate Treatment-Testing, Proceed toTreatment Area When Bed Available for ED Attending/MLP to Continue Care    Electronically signed by JUICE Manuel CNP   DD: 11/9/22       JUICE Manuel CNP  11/09/22 2379    ATTENDING PROVIDER ATTESTATION:     Supervising Physician, on-site, available for consultation, non-participatory in the evaluation or care of this patient       Charis Farley MD  11/09/22 3223

## 2022-11-10 NOTE — ED NOTES
Went to lobby to discharge pt, but pt had notified staff he left     Ashley Washington [facility]  11/10/22 7905

## 2022-11-10 NOTE — DISCHARGE INSTRUCTIONS
Please call Dr. Vasiliy Gardner which is listed above for the care of your hand. Please alternate Tylenol and ibuprofen for your discomfort.   Please use ice 20 minutes on 20 minutes off

## 2022-11-10 NOTE — ED PROVIDER NOTES
HPI:  11/9/22, Time: 10:25 PM JAYLENE Cody is a 40 y.o. male presenting to the ED for history of left hand pain and wrist pain, beginning 1 day ago. The complaint has been persistent, mild in severity, and worsened by nothing. Patient presenting here because of wrist pain hand pain he was seen here yesterday he had injured it he has a broken ulna. Patient reporting no thoughts of harming self or others he does report some occasions of hallucinations. Patient reporting no fever no chills no chest pain he reports no fall or injury today. Patient reporting no headache. ROS:   Pertinent positives and negatives are stated within HPI, all other systems reviewed and are negative.  --------------------------------------------- PAST HISTORY ---------------------------------------------  Past Medical History:  has a past medical history of Depression and Schizoaffective disorder ([facility] [address] Past Surgical History:  has a past surgical history that includes Hip fracture surgery (Left, 9/12/2021); eye surgery (19 years ago); and Hip fracture surgery (Left, 9/28/2021). Social History:  reports that he has been smoking cigarettes. He has a 12.00 pack-year smoking history. He has never used smokeless tobacco. He reports current alcohol use of about 2.0 standard drinks per week. He reports current drug use. Frequency: 7.00 times per week. Drugs: Cocaine and Marijuana ([facility] [place]). Family History: family history includes Mental Illness in his mother; No Known Problems in his father; Substance Abuse in his mother. The patients home medications have been reviewed.     Allergies: Chlorpheniramine-phenylephrine, Motrin [ibuprofen], Penicillins, and Rondec-d [chlophedianol-pseudoephedrine]    ---------------------------------------------------PHYSICAL EXAM--------------------------------------    Constitutional/General: Alert and oriented x3, at times not making nonsensical statement  Head: Normocephalic and atraumatic  Eyes: PERRL, EOMI  Mouth: Oropharynx clear, handling secretions, no trismus  Neck: Supple, full ROM, non tender to palpation in the midline, no stridor, no crepitus, no meningeal signs  Pulmonary: Lungs clear to auscultation bilaterally, no wheezes, rales, or rhonchi. Not in respiratory distress  Cardiovascular:  Regular rate. Regular rhythm. No murmurs, gallops, or rubs. 2+ distal pulses  Chest: no chest wall tenderness  Abdomen: Soft. Non tender. Non distended. +BS. No rebound, guarding, or rigidity. No pulsatile masses appreciated. Musculoskeletal: Moves all extremities x 4. Warm and well perfused, no clubbing, cyanosis, or edema. Capillary refill <3 seconds noted splint to left wrist  Skin: warm and dry. No rashes. Neurologic: GCS 15, CN 2-12 grossly intact, no focal deficits, symmetric strength 5/5 in the upper and lower extremities bilaterally  Psych: Denies homicidal suicidal thoughts at times making nonsensical statements. Krishna Hilton -------------------------------------------------- RESULTS -------------------------------------------------  I have personally reviewed all laboratory and imaging results for this patient. Results are listed below. LABS:  No results found for this visit on 11/10/22. RADIOLOGY:  Interpreted by Radiologist.  No orders to display           ------------------------- NURSING NOTES AND VITALS REVIEWED ---------------------------   The nursing notes within the ED encounter and vital signs as below have been reviewed by myself. Pulse 62   Resp 22   SpO2 95%   Oxygen Saturation Interpretation: Normal    The patients available past medical records and past encounters were reviewed.         ------------------------------ ED COURSE/MEDICAL DECISION MAKING----------------------  Medications   acetaminophen (TYLENOL) tablet 1,000 mg (1,000 mg Oral Given 11/9/22 2227)             Medical Decision Making:   Patient presenting here because of history of injury to hand/wrist.  He was seen here yesterday. Patient does have history of schizophrenia. Patient reporting no homicidal suicidal thoughts he reports he has no place to stay either. Patient reporting no physical planes of chest pain shortness of breath or abdominal pain he is awake and he is neurologically intact. Patient had labs done recently. Patient vital signs are stable. Patient observed here in the emergency department. Patient in no acute distress. Patient will be discharged       Re-Evaluations:             Re-evaluation. Patients symptoms show no change      Consultations:                 Critical Care: This patient's ED course included: a personal history and physicial eaxmination    This patient has been closely monitored during their ED course. Counseling: The emergency provider has spoken with the patient and discussed todays results, in addition to providing specific details for the plan of care and counseling regarding the diagnosis and prognosis. Questions are answered at this time and they are agreeable with the plan.       --------------------------------- IMPRESSION AND DISPOSITION ---------------------------------    IMPRESSION  1. [facility]        DISPOSITION  Disposition: Discharge to home  Patient condition is stable        NOTE: This report was transcribed using voice recognition software.  Every effort was made to ensure accuracy; however, inadvertent computerized transcription errors may be present          Damon Aparicio MD  11/10/22 3461

## 2022-11-10 NOTE — ED NOTES
Patient remains in the lobby sleeping, patient admits he is homeless      [facility]  11/10/22 0157

## 2022-11-10 NOTE — ED NOTES
After medicating pt for pain his demeanor changed and pt started saying that we didn't make his dr appt for him and tat was \"Racial biased\" Pt refused to take discharge papers and was talking about not going to college.      Radha Ceron RN  11/10/22 9289

## 2022-11-10 NOTE — ED PROVIDER NOTES
[facility]  Department of Emergency Medicine   ED  Encounter Note  Admit Date/RoomTime: 11/10/2022  3:38 PM  ED Room: [place] [address]  NAME: Mg Perez. : 1980  MRN: [id number]     Chief Complaint:  Hand Pain (Pt has cast to left hand and having increased pain. Aaox4. )    HISTORY OF PRESENT ILLNESS        Mg Louise is a 40 y.o. male who presents to the ED by private vehicle for medication refill. Patient was seen yesterday due to an ulnar fracture. Patient states that he took his pain pills every 2 hours because the pain was so bad and is here for more. Patient has no suicidal homicidal ideations. Patient states he did not take any of the Tylenol. Patient states he has never wanted to harm himself or others. Patient has only pain located where his fracture site is has not done any ice. Patient denies any radiation of any pain. Patient denies any new injury. Patient denies any head trauma. Patient tetanus is up-to-date. Patient also requesting that his discharge instructions be reprinted so he knows what doctor he needs to follow-up with. Patient denying any headaches, blurry vision, chest pain, shortness of breath, fever, chills, nausea, vomiting, severe abdominal pain, change in bowel or bladder movements. ROS   Pertinent positives and negatives are stated within HPI, all other systems reviewed and are negative. Past Medical History:  has a past medical history of Depression and Schizoaffective disorder ([facility] [address] Surgical History:  has a past surgical history that includes Hip fracture surgery (Left, 2021); eye surgery (19 years ago); and Hip fracture surgery (Left, 2021). Social History:  reports that he has been smoking cigarettes. He has a 12.00 pack-year smoking history. He has never used smokeless tobacco. He reports current alcohol use of about 2.0 standard drinks per week. He reports current drug use.  Frequency: 7.00 times per week. Drugs: Cocaine and Marijuana (Nicholas Moots). Family History: family history includes Mental Illness in his mother; No Known Problems in his father; Substance Abuse in his mother. Allergies: Chlorpheniramine-phenylephrine, Motrin [ibuprofen], Penicillins, and Rondec-d [chlophedianol-pseudoephedrine]    PHYSICAL EXAM   Oxygen Saturation Interpretation: Normal on room air analysis. ED Triage Vitals [11/10/22 1512]   BP Temp Temp Source Heart Rate Resp SpO2 Height Weight   (!) 142/87 98.2 °F (36.8 °C) Oral 86 18 99 % 6' (1.829 m) 170 lb (77.1 kg)         Physical Exam  Constitutional/General: Alert and oriented x3, well appearing, non toxic  HEENT:  NC/NT. PERRLA,  Airway patent. Neck: Supple, full ROM, non tender to palpation in the midline, no stridor, no crepitus, no meningeal signs  Respiratory: Lungs clear to auscultation bilaterally, no wheezes, rales, or rhonchi. Not in respiratory distress  CV:  Regular rate. Regular rhythm. No murmurs, gallops, or rubs. 2+ distal pulses  Chest: No chest wall tenderness  GI:  Abdomen Soft, Non tender, Non distended. +BS. No rebound, guarding, or rigidity. No pulsatile masses. Musculoskeletal: Moves all extremities x 4. Warm and well perfused, no clubbing, cyanosis, or edema. Capillary refill <3 seconds. Splint in place  Integument: skin warm and dry. No rashes. Lymphatic: no lymphadenopathy noted  Neurologic: GCS 15, no focal deficits, symmetric strength 5/5 in the upper and lower extremities bilaterally  Psychiatric: Normal Affect    Lab / Imaging Results   (All laboratory and radiology results have been personally reviewed by myself)  Labs:  No results found for this visit on 11/10/22. Imaging: All Radiology results interpreted by Radiologist unless otherwise noted.   No orders to display       ED Course / Medical Decision Making     Medications   ketorolac (TORADOL) injection 30 mg (has no administration in time range) Re-examination:  11/10/22       Time: 1534 patient was educated I will not refill his pain prescription. Patient was educated that he has taken all of his medication and that he must use Tylenol treating with ibuprofen. Offered Toradol shot and patient agreed. Consult(s):   None    Procedure(s):   None    MDM:   Patient is a 50-year-old male presenting with pain medication refill. Patient states that he took all of his medications since last night. Patient states that he has pain in the area and did not \"read the directions right. \"  Patient denies any suicidal homicidal ideations. Patient states he has no desire to harm himself or others. Patient states that he would like something for the pain while here. Patient was given a Toradol injection. Patient was advised no more pain medication will be given due to him taking it incorrectly. Patient was reprinted his discharge instructions and told he needs to follow-up with orthopedic. Patient is neurovascular and sensory intact and cast is fitted right. No evidence of compartment syndrome. Patient is vitally stable at this time and has been reregistered. Patient will follow up accordingly. Patient was explicitly instructed on specific signs and symptoms on which to return to the emergency room for. Patient was instructed to return to the ER for any new or worsening symptoms. Additional discharge instructions were given verbally. All questions were answered. Patient is comfortable and agreeable with discharge plan. Patient in no acute distress and non-toxic in appearance. Plan of Care/Counseling:  Radu rBiseno PA-C reviewed today's visit with the patient in addition to providing specific details for the plan of care and counseling regarding the diagnosis and prognosis. Questions are answered at this time and are agreeable with the plan. ASSESSMENT     1. Left hand pain      PLAN   Discharged home.   Patient condition is stable    New Medications     New Prescriptions    No medications on file     Electronically signed by Les Gregory PA-C   DD: 11/10/22  **This report was transcribed using voice recognition software. Every effort was made to ensure accuracy; however, inadvertent computerized transcription errors may be present.   END OF ED PROVIDER NOTE       Les Gregory PA-C  11/10/22 1546

## 2022-11-12 ENCOUNTER — HOSPITAL ENCOUNTER (EMERGENCY)
Age: 42
Discharge: HOME | End: 2022-11-12

## 2022-11-12 VITALS
SYSTOLIC BLOOD PRESSURE: 136 MMHG | TEMPERATURE: 97.5 F | HEART RATE: 72 BPM | OXYGEN SATURATION: 98 % | RESPIRATION RATE: 16 BRPM | DIASTOLIC BLOOD PRESSURE: 82 MMHG

## 2022-11-12 DIAGNOSIS — M79.672 PAIN IN BOTH FEET: Primary | ICD-10-CM

## 2022-11-12 DIAGNOSIS — M79.671 PAIN IN BOTH FEET: Primary | ICD-10-CM

## 2022-11-12 PROCEDURE — 4500000002 HC ER NO CHARGE

## 2022-11-12 ASSESSMENT — PAIN - FUNCTIONAL ASSESSMENT: PAIN_FUNCTIONAL_ASSESSMENT: NONE - DENIES PAIN

## 2022-11-12 NOTE — ED NOTES
Department of Emergency Medicine  FIRST PROVIDER TRIAGE NOTE             Independent MLP           11/12/22  4:43 PM EST    Date of Encounter: 11/12/22   MRN: [id number]      HPI: Sonal Kay is a 40 y.o. male who presents to the ED for Foot Pain (Bilateral foot pain, states he thinks he has frost bite. )  Patient presents with complaints of bilateral foot pain states that he believes he has frostbite when I attempted to question the patient regarding his history he became very argumentative defensive and making racial slurs that I was being racist towards him he subsequently got up and excused himself from the triage area and then became loud and belligerent in the waiting room the hospital police were made aware of the situation and the behavior. ROS: Negative for cp or sob. PE: Gen Appearance/Constitutional: alert  CV: regular rate     Initial Plan of Care: All treatment areas with department are currently occupied. Plan to order/Initiate the following while awaiting opening in ED: .   Initiate Treatment-Testing, Proceed toTreatment Area When Bed Available for ED Attending/MLP to Continue Care    Electronically signed by JUICE Dominguez CNP   DD: 11/12/22       JUICE Hagan CNP  11/12/22 7756

## 2022-11-16 PROCEDURE — 99283 EMERGENCY DEPT VISIT LOW MDM: CPT

## 2022-11-16 ASSESSMENT — PAIN DESCRIPTION - ORIENTATION: ORIENTATION: LEFT

## 2022-11-16 ASSESSMENT — PAIN SCALES - GENERAL: PAINLEVEL_OUTOF10: 10

## 2022-11-16 ASSESSMENT — PAIN DESCRIPTION - LOCATION: LOCATION: ARM

## 2022-11-16 ASSESSMENT — PAIN - FUNCTIONAL ASSESSMENT: PAIN_FUNCTIONAL_ASSESSMENT: 0-10

## 2022-11-17 ENCOUNTER — APPOINTMENT (OUTPATIENT)
Dept: GENERAL RADIOLOGY | Age: 42
End: 2022-11-17
Payer: COMMERCIAL

## 2022-11-17 ENCOUNTER — HOSPITAL ENCOUNTER (EMERGENCY)
Age: 42
Discharge: HOME | End: 2022-11-17
Attending: EMERGENCY MEDICINE
Payer: COMMERCIAL

## 2022-11-17 VITALS
DIASTOLIC BLOOD PRESSURE: 78 MMHG | RESPIRATION RATE: 18 BRPM | WEIGHT: 170 LBS | HEIGHT: 72 IN | HEART RATE: 78 BPM | BODY MASS INDEX: 23.03 KG/M2 | TEMPERATURE: 97.6 F | SYSTOLIC BLOOD PRESSURE: 122 MMHG | OXYGEN SATURATION: 99 %

## 2022-11-17 DIAGNOSIS — S52.202A CLOSED FRACTURE OF SHAFT OF LEFT ULNA, UNSPECIFIED FRACTURE MORPHOLOGY, INITIAL ENCOUNTER: ICD-10-CM

## 2022-11-17 DIAGNOSIS — F32.A DEPRESSION, UNSPECIFIED DEPRESSION TYPE: Primary | ICD-10-CM

## 2022-11-17 PROCEDURE — 73110 X-RAY EXAM OF WRIST: CPT

## 2022-11-17 PROCEDURE — 29125 APPL SHORT ARM SPLINT STATIC: CPT

## 2022-11-17 NOTE — ED NOTES
Department of Emergency Medicine  FIRST PROVIDER TRIAGE NOTE             Independent MLP           22  11:55 PM EST    Date of Encounter: 22   MRN: [id number]      HPI: Linda Naik is a 40 y.o. male who presents to the ED for Depression (His friend just ; left arm swelling and pain he cut his cast off said it wasn't tight enough; he just needs a dog)       ROS: Negative for Suicidal ideation or Homicidal Ideation. PE: Gen Appearance/Constitutional: alert  Musculoskeletal: moves all extremities x 4     Initial Plan of Care: All treatment areas with department are currently occupied. Plan to order/Initiate the following while awaiting opening in ED: .   Initiate Treatment-Testing, Proceed toTreatment Area When Bed Available for ED Attending/MLP to Continue Care    Electronically signed by Luis Manuel Perera PA-C   DD: 22       Luis Manuel Perera PA-C  22 [phone]

## 2022-11-17 NOTE — ED PROVIDER NOTES
HPI:  22,   Time: 3:26 AM JAYLENE Keys. is a 40 y.o. male presenting to the ED for depression and arm pain, beginning 1 day ago. The complaint has been persistent, mild in severity, and worsened by nothing. The patient presents emergency department for depression. He states that his friend just  and he has been feeling down. No SI HI or hallucinations. He states he also recently broke his left arm but he took his cast off because he felt as if it was not tight enough. He states he has had increasing pain to his left arm as well. No numbness or tingling. No focal deficits. No chest pain shortness of breath. Review of Systems:   Pertinent positives and negatives are stated within HPI, all other systems reviewed and are negative.          --------------------------------------------- PAST HISTORY ---------------------------------------------  Past Medical History:  has a past medical history of Depression and Schizoaffective disorder ([facility] [address] Past Surgical History:  has a past surgical history that includes Hip fracture surgery (Left, 2021); eye surgery (19 years ago); and Hip fracture surgery (Left, 2021). Social History:  reports that he has been smoking cigarettes. He has a 12.00 pack-year smoking history. He has never used smokeless tobacco. He reports current alcohol use of about 2.0 standard drinks per week. He reports current drug use. Frequency: 7.00 times per week. Drugs: Cocaine and Marijuana ([facility] ). Family History: family history includes Mental Illness in his mother; No Known Problems in his father; Substance Abuse in his mother. The patients home medications have been reviewed.     Allergies: Chlorpheniramine-phenylephrine, Motrin [ibuprofen], Penicillins, and Rondec-d [chlophedianol-pseudoephedrine]        ---------------------------------------------------PHYSICAL EXAM--------------------------------------    Constitutional/General: Alert and oriented x3, well appearing, non toxic in NAD  Head: Normocephalic and atraumatic  Eyes: PERRL, EOMI, conjunctive normal, sclera non icteric  Mouth: Oropharynx clear, handling secretions, no trismus, no asymmetry of the posterior oropharynx or uvular edema  Neck: Supple, full ROM, non tender to palpation in the midline, no stridor, no crepitus, no meningeal signs  Respiratory: Lungs clear to auscultation bilaterally, no wheezes, rales, or rhonchi. Not in respiratory distress  Cardiovascular:  Regular rate. Regular rhythm. No murmurs, gallops, or rubs. 2+ distal pulses  GI:  Abdomen Soft, Non tender, Non distended. +BS. No organomegaly, no palpable masses,  No rebound, guarding, or rigidity. Musculoskeletal: Moves all extremities x 4. Warm and well perfused, no clubbing, cyanosis, or edema. Capillary refill <3 seconds. Swelling noted to left forearm. 2+ pulses sensation and muscle strength intact distally. Compartments soft. Integument: skin warm and dry. No rashes. Neurologic: GCS 15, no focal deficits,   Psychiatric: Normal Affect    -------------------------------------------------- RESULTS -------------------------------------------------  I have personally reviewed all laboratory and imaging results for this patient. Results are listed below. LABS:  No results found for this visit on 11/17/22. RADIOLOGY:  Interpreted by Radiologist.  XR WRIST LEFT (MIN 3 VIEWS)   Final Result   No significant interval change in distal ulnar fracture.               ------------------------- NURSING NOTES AND VITALS REVIEWED ---------------------------   The nursing notes within the ED encounter and vital signs as below have been reviewed by myself. /78   Pulse 78   Temp 97.6 °F (36.4 °C) (Oral)   Resp 18   Ht 6' (1.829 m)   Wt 170 lb (77.1 kg)   SpO2 99%   BMI 23.06 kg/m²   Oxygen Saturation Interpretation: Normal    The patients available past medical records and past encounters were reviewed. ------------------------------ ED COURSE/MEDICAL DECISION MAKING----------------------  Medications - No data to display      PROCEDURE NOTE  11/17/22           SPLINT  APPLICATION  Risks, benefits and alternatives (for applicable procedures below) described. Performed By: EM Attending Physician. Indication:  fracture of ulna. Procedure:   A short  left arm sugar-tong splint was applied by me. The patient tolerated the procedure well. ED COURSE:       Medical Decision Making: This is a 58-year-old male presented to the ED for depression and arm pain. Patient's repeat x-ray shows an ulnar fracture. No significant change in displacement. Sugar-tong splint replaced. Patient neurovascular intact after splint placement. Patient has no SI HI or hallucinations. Patient stable for outpatient follow-up. Return precautions given. Patient agrees with plan. I, Dr. Kassie Mak, am the primary provider for this encounter    This patient's ED course included: a personal history and physicial examination and re-evaluation prior to disposition    This patient has remained hemodynamically stable during their ED course. Re-Evaluations:             Re-evaluation. Patients symptoms are improving      Counseling: The emergency provider has spoken with the patient and discussed todays results, in addition to providing specific details for the plan of care and counseling regarding the diagnosis and prognosis. Questions are answered at this time and they are agreeable with the plan.       --------------------------------- IMPRESSION AND DISPOSITION ---------------------------------    IMPRESSION  1. Depression, unspecified depression type    2. Closed fracture of shaft of left ulna, unspecified fracture morphology, initial encounter        DISPOSITION  Disposition: Discharge to home  Patient condition is stable    NOTE: This report was transcribed using voice recognition software.  Every effort was made to ensure accuracy; however, inadvertent computerized transcription errors may be present        Kristen Murphy DO  11/17/22 0238

## 2022-12-10 ENCOUNTER — HOSPITAL ENCOUNTER (EMERGENCY)
Age: 42
Discharge: HOME | End: 2022-12-10
Payer: MEDICAID

## 2022-12-10 VITALS
RESPIRATION RATE: 16 BRPM | SYSTOLIC BLOOD PRESSURE: 136 MMHG | TEMPERATURE: 96.3 F | HEART RATE: 70 BPM | OXYGEN SATURATION: 95 % | DIASTOLIC BLOOD PRESSURE: 95 MMHG

## 2022-12-10 DIAGNOSIS — M79.602 LEFT ARM PAIN: Primary | ICD-10-CM

## 2022-12-10 PROCEDURE — 6370000000 HC RX 637 (ALT 250 FOR IP): Performed by: PHYSICIAN ASSISTANT

## 2022-12-10 PROCEDURE — 99283 EMERGENCY DEPT VISIT LOW MDM: CPT

## 2022-12-10 RX ORDER — ACETAMINOPHEN 500 MG
1000 TABLET ORAL ONCE
Status: COMPLETED | OUTPATIENT
Start: 2022-12-10 | End: 2022-12-10

## 2022-12-10 RX ADMIN — ACETAMINOPHEN 1000 MG: 500 TABLET ORAL at 14:22

## 2022-12-10 NOTE — ED PROVIDER NOTES
[address]  Department of Emergency Medicine   ED  Encounter Note  Admit Date/RoomTime: 12/10/2022  2:20 PM  ED Room: [place] [address]    NAME: Ivette Sandoval : 1980  MRN: [id number]     Chief Complaint:  Arm Pain (States he broke his left forearm over a month ago and denies re injury, states he just would like something for pain, cast still intact )    History of Present Illness      Ivette Sandoval is a 40 y.o. old male presents to the emergency department via by private vehicle requesting medication(s) as result of running out of pain medication(s). Patient states he has a left forearm fracture that he has been dealing with for some time. Patient denies any new injury or falls. Patient states that he is here for pain medication. Patient not taking anything to make this better or worse. Patient denies any falls or trauma. Patient denies any radiation of any pain. Patient has not followed up accordingly. Patient's cast is still present. ROS   Pertinent positives and negatives are stated within HPI, all other systems reviewed and are negative. Past Medical History:  has a past medical history of Depression and Schizoaffective disorder ([facility] [address] Surgical History:  has a past surgical history that includes Hip fracture surgery (Left, 2021); eye surgery (19 years ago); and Hip fracture surgery (Left, 2021). Social History:  reports that he has been smoking cigarettes. He has a 12.00 pack-year smoking history. He has never used smokeless tobacco. He reports current alcohol use of about 2.0 standard drinks per week. He reports current drug use. Frequency: 7.00 times per week. Drugs: Cocaine and Marijuana ([place] [facility]). Family History: family history includes Mental Illness in his mother; No Known Problems in his father; Substance Abuse in his mother.      Allergies: Chlorpheniramine-phenylephrine, Motrin [ibuprofen], Penicillins, and Rondec-d [chlophedianol-pseudoephedrine]    Physical Exam   Oxygen Saturation Interpretation: Normal.        ED Triage Vitals [12/10/22 1409]   BP Temp Temp src Heart Rate Resp SpO2 Height Weight   -- (!) 96.3 °F (35.7 °C) -- 70 -- 99 % -- --         Constitutional:  Alert, development consistent with age. HEENT:  NC/NT. Airway patent. Neck:  Normal ROM. Supple. Respiratory:  Clear to auscultation and breath sounds equal.    CV: Regular rate and rhythm, normal heart sounds, without pathological murmurs, ectopy, gallops, or rubs. GI:  Abdomen Soft, nontender, good bowel sounds. No firm or pulsatile mass. Extremity: Patient's left forearm was examined soft compartments, good radial pulse. Patient has good  strength. Neurovascular and sensory intact. Good cap refill. No evidence of any new swelling or injury  Integument:  No rashes, erythema present. Lymphatics: No lymphangitis or adenopathy noted. Neurological:  Oriented. Motor functions intact. Lab / Imaging Results   (All laboratory and radiology results have been personally reviewed by myself)  Labs:  No results found for this visit on 12/10/22. Imaging: All Radiology results interpreted by Radiologist unless otherwise noted. No orders to display       ED Course / Medical Decision Making     Medications   acetaminophen (TYLENOL) tablet 1,000 mg (1,000 mg Oral Given 12/10/22 1422)        Consults:   None    Counseling/MDM:   Patient is a 29-year-old presenting for pain medication. Patient has injury of a left forearm fracture that was done over a month ago. Patient is here for pain medication. Patient denies any new falls or trauma. Patient had no lab work ordered no new imaging ordered. I personally examined the patient myself and he has soft compartments. No evidence of compartment syndrome. Patient has equal  strength in upper extremity. Neurovascular and sensory intact with a good cap refill and good radial pulse.   Patient was given a gram of Tylenol. Patient will be discharged home and told to follow-up with his orthopedic as well as [facility]. Patient voiced understanding and agreed with the plan of management. Ciera Longo PA-C  have spoken with the patient and discussed todays emergency visit, in addition to providing specific details for the plan of care and counseling regarding the diagnosis and prognosis. He was counseled on the role of the emergency department regarding prescribing medications for chronic conditions including Narcotic and other controlled substances. Based on the presenting complaint and nature of illness, the requested medications will not be provided today in prescription form and he is instructed to contact their prescribing provider for any/all supplemental medications as soon as possible. Questions are answered at this time and is agreeable with the plan. Patient was explicitly instructed on specific signs and symptoms on which to return to the emergency room for. Patient was instructed to return to the ER for any new or worsening symptoms. Additional discharge instructions were given verbally. All questions were answered. Patient is comfortable and agreeable with discharge plan. Patient in no acute distress and non-toxic in appearance. Assessment      1. Left arm pain      Plan   Discharged home. Patient condition is stable    New Medications     New Prescriptions    No medications on file     Electronically signed by Ciera Longo PA-C   DD: 12/10/22  **This report was transcribed using voice recognition software. Every effort was made to ensure accuracy; however, inadvertent computerized transcription errors may be present.   END OF ED PROVIDER NOTE      Ciera Longo PA-C  12/10/22 1423

## 2022-12-11 ENCOUNTER — HOSPITAL ENCOUNTER (EMERGENCY)
Age: 42
Discharge: HOME | End: 2022-12-11
Attending: EMERGENCY MEDICINE
Payer: MEDICAID

## 2022-12-11 ENCOUNTER — HOSPITAL ENCOUNTER (EMERGENCY)
Age: 42
Discharge: HOME | End: 2022-12-11
Payer: MEDICAID

## 2022-12-11 VITALS
WEIGHT: 190 LBS | OXYGEN SATURATION: 98 % | SYSTOLIC BLOOD PRESSURE: 119 MMHG | BODY MASS INDEX: 25.77 KG/M2 | HEART RATE: 61 BPM | RESPIRATION RATE: 17 BRPM | TEMPERATURE: 98.1 F | DIASTOLIC BLOOD PRESSURE: 76 MMHG

## 2022-12-11 VITALS
HEIGHT: 72 IN | TEMPERATURE: 98.1 F | WEIGHT: 160 LBS | RESPIRATION RATE: 16 BRPM | HEART RATE: 98 BPM | OXYGEN SATURATION: 99 % | DIASTOLIC BLOOD PRESSURE: 105 MMHG | BODY MASS INDEX: 21.67 KG/M2 | SYSTOLIC BLOOD PRESSURE: 142 MMHG

## 2022-12-11 DIAGNOSIS — F20.9 SCHIZOPHRENIA, UNSPECIFIED TYPE (HCC): Primary | ICD-10-CM

## 2022-12-11 DIAGNOSIS — L73.9 FOLLICULITIS: Primary | ICD-10-CM

## 2022-12-11 DIAGNOSIS — Z59.00 HOMELESSNESS: ICD-10-CM

## 2022-12-11 PROCEDURE — 6370000000 HC RX 637 (ALT 250 FOR IP): Performed by: PHYSICIAN ASSISTANT

## 2022-12-11 PROCEDURE — 99283 EMERGENCY DEPT VISIT LOW MDM: CPT

## 2022-12-11 PROCEDURE — 99282 EMERGENCY DEPT VISIT SF MDM: CPT

## 2022-12-11 RX ORDER — IBUPROFEN 800 MG/1
800 TABLET ORAL ONCE
Status: COMPLETED | OUTPATIENT
Start: 2022-12-11 | End: 2022-12-11

## 2022-12-11 RX ORDER — BACITRACIN ZINC 500 [USP'U]/G
OINTMENT TOPICAL ONCE
Status: COMPLETED | OUTPATIENT
Start: 2022-12-11 | End: 2022-12-11

## 2022-12-11 RX ADMIN — IBUPROFEN 800 MG: 800 TABLET, FILM COATED ORAL at 19:57

## 2022-12-11 RX ADMIN — BACITRACIN ZINC: 500 OINTMENT TOPICAL at 19:57

## 2022-12-11 SDOH — ECONOMIC STABILITY - HOUSING INSECURITY: HOMELESSNESS UNSPECIFIED: Z59.00

## 2022-12-11 ASSESSMENT — ENCOUNTER SYMPTOMS
EYE REDNESS: 0
SHORTNESS OF BREATH: 0
NAUSEA: 0
DIARRHEA: 0
WHEEZING: 0
SINUS PAIN: 0
SORE THROAT: 0
CHEST TIGHTNESS: 0
ABDOMINAL PAIN: 0
COUGH: 0
SHORTNESS OF BREATH: 0
COUGH: 0
EYE PAIN: 0
COLOR CHANGE: 0
VOMITING: 0
BACK PAIN: 0

## 2022-12-11 ASSESSMENT — PAIN DESCRIPTION - LOCATION: LOCATION: LEG

## 2022-12-11 ASSESSMENT — PAIN DESCRIPTION - ORIENTATION: ORIENTATION: RIGHT

## 2022-12-11 ASSESSMENT — PAIN SCALES - GENERAL: PAINLEVEL_OUTOF10: 10

## 2022-12-11 ASSESSMENT — PAIN - FUNCTIONAL ASSESSMENT: PAIN_FUNCTIONAL_ASSESSMENT: 0-10

## 2022-12-11 ASSESSMENT — PAIN DESCRIPTION - DESCRIPTORS: DESCRIPTORS: ACHING

## 2022-12-11 NOTE — ED PROVIDER NOTES
ATTENDING PROVIDER ATTESTATION:     Kira Toribio presented to the emergency department for evaluation of Homeless (Pt to ED stating he was at [facility] and did not like that staff was going through his bag. Pt states he left and needs somewhere to stay because he felt uncomfortable. -SI, -HI. )   and was initially evaluated by the Medical Resident. See Original ED Note for H&P and ED course above. I have reviewed and discussed the case, including pertinent history (medical, surgical, family and social) and exam findings with the Medical Resident assigned to Kira Toribio .  I have personally performed and/or participated in the history, exam, medical decision making, and procedures and agree with all pertinent clinical information and any additional changes or corrections are noted below in my assessment and plan. I have discussed this patient in detail with the resident, and provided the instruction and education,       I have reviewed my findings and recommendations with the assigned Medical Resident, Kira Toribio and members of family present at the time of disposition. I have performed a history and physical examination of this patient and directly supervised the resident caring for this patient      History of Present Illness:    Presents to the ED for homelessness, beginning prior to arrival .  The complaint has been constant, moderate in severity, and worsened by nothing. Homeless. He denies complaints. Says it was raining so he wanted to come inside. He denies any complaints. No chest pain or sob. No SI or HI. Hx of schizophrenia but denies hallucinations and denies SI or HI.       Review of Systems:   A complete review of systems was performed and pertinent positives and negatives are stated within HPI, all other systems reviewed and are negative.    --------------------------------------------- PAST HISTORY ---------------------------------------------  Past Medical History:  has a past medical history of Depression and Schizoaffective disorder ([facility] [address] Past Surgical History:  has a past surgical history that includes Hip fracture surgery (Left, 9/12/2021); eye surgery (19 years ago); and Hip fracture surgery (Left, 9/28/2021). Social History:  reports that he has been smoking cigarettes. He has a 12.00 pack-year smoking history. He has never used smokeless tobacco. He reports current alcohol use of about 2.0 standard drinks per week. He reports current drug use. Frequency: 7.00 times per week. Drugs: Cocaine and Marijuana ([employer]). Family History: family history includes Mental Illness in his mother; No Known Problems in his father; Substance Abuse in his mother. Unless otherwise noted, family history is non contributory    The patients home medications have been reviewed. Allergies: Chlorpheniramine-phenylephrine, Motrin [ibuprofen], Penicillins, and Rondec-d [chlophedianol-pseudoephedrine]    Physical Exam:  Constitutional/General: Alert and oriented x3  Head: Normocephalic and atraumatic  Eyes: PERRL, EOMI, sclera non icteric  ENT: Oropharynx clear, handling secretions  Neck: Supple, full ROM, no stridor, no meningeal signs  Respiratory: Lungs clear to auscultation bilaterally, no wheezes, rales, or rhonchi. Not in respiratory distress  Cardiovascular:  Regular rate. Regular rhythm. No murmurs, no gallops, no rubs. 2+ distal pulses. Equal extremity pulses. Musculoskeletal: Moves all extremities x 4. Warm and well perfused,  no clubbing, no cyanosis, no edema. Palpable peripheral pulses  Integument: skin warm and dry. No rashes. Neurologic: GCS 15, no focal deficits  Psychiatric: Normal Affect      I directly supervised any procedures performed by the resident and was present for the procedure including all critical portions of the procedure    Oxygen Saturation Interpretation: 98 % on RA.        I, Dr. Sissy Delarosa, am the primary provider of record      Medical Decision Making: Homeless  No SI or HI     Name and Route of medications administered in the ED:  Medications - No data to display       1. [facility] ([facility]    2.  Homelessness            Ozzy Jeffers MD  12/11/22 0957

## 2022-12-11 NOTE — ED PROVIDER NOTES
Chief Complaint   Patient presents with    Homeless     Pt to ED stating he was at [facility] and did not like that staff was going through his bag. Pt states he left and needs somewhere to stay because he felt uncomfortable. -SI, -HI. 72-year-old male with past medical history of schizophrenia presenting today with concern for needing somewhere else to stay. He states he left the facility he was staying in because he felt as though someone was going through his belongings. He has not been having any suicidal or homicidal ideations. Denies hallucinations. He does note that he is worried about his health, nothing is made it better, nothing makes it worse, it is not associated with any physical ailments. He has not been sick at all recently, denies chest pain, shortness of breath, abdominal pain, no other acute complaints. Review of Systems   Constitutional:  Negative for chills and fever. HENT:  Negative for ear pain, sinus pain and sore throat. Eyes:  Negative for pain and redness. Respiratory:  Negative for cough, shortness of breath and wheezing. Cardiovascular:  Negative for chest pain. Gastrointestinal:  Negative for abdominal pain, diarrhea, nausea and vomiting. Genitourinary:  Negative for dysuria and flank pain. Musculoskeletal:  Negative for back pain and neck pain. Skin:  Negative for rash. Neurological:  Negative for seizures and headaches. Hematological:  Negative for adenopathy. Psychiatric/Behavioral:  The patient is nervous/anxious. All other systems reviewed and are negative. Physical Exam  Vitals and nursing note reviewed. Constitutional:       General: He is not in acute distress. Appearance: He is well-developed. He is not toxic-appearing. HENT:      Head: Normocephalic and atraumatic. Eyes:      Pupils: Pupils are equal, round, and reactive to light. Cardiovascular:      Rate and Rhythm: Normal rate and regular rhythm.       Heart sounds: Normal heart sounds. No murmur heard. Pulmonary:      Effort: Pulmonary effort is normal.      Breath sounds: Normal breath sounds. Abdominal:      General: Bowel sounds are normal.      Palpations: Abdomen is soft. Tenderness: There is no abdominal tenderness. There is no guarding or rebound. Musculoskeletal:      Cervical back: Normal range of motion and neck supple. Right lower leg: No edema. Left lower leg: No edema. Skin:     General: Skin is warm and dry. Coloration: Skin is not pale. Neurological:      Mental Status: He is alert and oriented to person, place, and time. Cranial Nerves: No cranial nerve deficit. Motor: No weakness. Procedures       MDM  Number of Diagnoses or Management Options  Schizophrenia, unspecified type [facility])  Diagnosis management comments: 50-year-old male with past medical history of schizophrenia presents today with concern for needing somewhere else to stay. On arrival to emergency department he is hemodynamically stable. Physical exam and history reassuring, patient is not have any complaints, he does have somewhere to stay he just does not like it. He notes that he has been taking this medication. Encouraged further outpatient follow-up and he verbalized understanding of the plan.                    --------------------------------------------- PAST HISTORY ---------------------------------------------  Past Medical History:  has a past medical history of Depression and Schizoaffective disorder ([facility] [address] Past Surgical History:  has a past surgical history that includes Hip fracture surgery (Left, 9/12/2021); eye surgery (19 years ago); and Hip fracture surgery (Left, 9/28/2021). Social History:  reports that he has been smoking cigarettes. He has a 12.00 pack-year smoking history. He has never used smokeless tobacco. He reports current alcohol use of about 2.0 standard drinks per week. He reports current drug use.  Frequency: 7.00 times per week. Drugs: Cocaine and Marijuana ([facility]). Family History: family history includes Mental Illness in his mother; No Known Problems in his father; Substance Abuse in his mother. The patients home medications have been reviewed. Allergies: Chlorpheniramine-phenylephrine, Motrin [ibuprofen], Penicillins, and Rondec-d [chlophedianol-pseudoephedrine]    -------------------------------------------------- RESULTS -------------------------------------------------  Labs:  No results found for this visit on 12/11/22. Radiology:  No orders to display       ------------------------- NURSING NOTES AND VITALS REVIEWED ---------------------------  Date / Time Roomed:  12/11/2022  5:35 AM  ED Bed Assignment:  [id number]    The nursing notes within the ED encounter and vital signs as below have been reviewed. /76   Pulse 61   Temp 98.1 °F (36.7 °C)   Resp 17   Wt 190 lb (86.2 kg)   SpO2 98%   BMI 25.77 kg/m²   Oxygen Saturation Interpretation: Normal      ------------------------------------------ PROGRESS NOTES ------------------------------------------  I have spoken with the patient and discussed todays results, in addition to providing specific details for the plan of care and counseling regarding the diagnosis and prognosis. Their questions are answered at this time and they are agreeable with the plan. I discussed at length with them reasons for immediate return here for re evaluation. They will followup with primary care by calling their office tomorrow. --------------------------------- ADDITIONAL PROVIDER NOTES ---------------------------------  At this time the patient is without objective evidence of an acute process requiring hospitalization or inpatient management. They have remained hemodynamically stable throughout their entire ED visit and are stable for discharge with outpatient follow-up.      The plan has been discussed in detail and they are aware of the specific conditions for emergent return, as well as the importance of follow-up. New Prescriptions    No medications on file       Diagnosis:  1. Schizophrenia, unspecified type ([facility] [address]        Disposition:  Patient's disposition: Discharge to group home  Patient's condition is stable.          Warren Berger DO  Resident  12/11/22 1028

## 2022-12-12 NOTE — ED NOTES
Pt provided with sandwich and ginger ale from department stock. Pain medication and ointment administered, see MAR. Pt refused printed AVS and ambulates to ED lobby with steady gait upon discharge.       Helena Donnelly RN  12/11/22 3465

## 2022-12-12 NOTE — ED PROVIDER NOTES
[facility]        Department of Emergency Medicine   ED  Provider Note  Admit Date/RoomTime: 12/11/2022  7:43 PM  ED Room: GAUDENCIO/GAUDENCIO  HPI:  12/11/22, Time: 8:10 PM EST      The patient is a 81-year-old male presenting the emergency department with right upper thigh pain that started about 25 minutes ago. Patient states he was sitting on a crate and stood up and went to walk and noticed that he started having some pain in his inner thigh. The pain does not radiate. He does not recall any injury. The pain is both at rest and when walking. He is not take anything for the pain. He denies any numbness or tingling, swelling, fevers or chills, pain radiating down the leg, calf pain. The history is provided by the patient. No  was used. REVIEW OF SYSTEMS:  Review of Systems   Constitutional:  Negative for activity change, chills, fatigue and fever. Respiratory:  Negative for cough, chest tightness and shortness of breath. Cardiovascular:  Negative for chest pain, palpitations and leg swelling. Musculoskeletal:  Negative for arthralgias, joint swelling, myalgias, neck pain and neck stiffness. RT leg pain. Skin:  Negative for color change, pallor, rash and wound. Neurological:  Negative for dizziness, weakness, light-headedness and headaches. Psychiatric/Behavioral:  Negative for agitation, behavioral problems and confusion. Pertinent positives and negatives are stated within HPI, all other systems reviewed and are negative.      --------------------------------------------- PAST HISTORY ---------------------------------------------  Past Medical History:  has a past medical history of Depression and Schizoaffective disorder ([facility] [address] Past Surgical History:  has a past surgical history that includes Hip fracture surgery (Left, 9/12/2021); eye surgery (19 years ago); and Hip fracture surgery (Left, 9/28/2021).     Social History:  reports that he has been smoking cigarettes. He has a 12.00 pack-year smoking history. He has never used smokeless tobacco. He reports current alcohol use of about 2.0 standard drinks per week. He reports current drug use. Frequency: 7.00 times per week. Drugs: Cocaine and Marijuana (Earna Howe). Family History: family history includes Mental Illness in his mother; No Known Problems in his father; Substance Abuse in his mother. The patients home medications have been reviewed. Allergies: Chlorpheniramine-phenylephrine, Motrin [ibuprofen], Penicillins, and Rondec-d [chlophedianol-pseudoephedrine]    -------------------------------------------------- RESULTS -------------------------------------------------  All laboratory and radiology results have been personally reviewed by myself   LABS:  No results found for this visit on 12/11/22. RADIOLOGY:  Interpreted by Radiologist.  No orders to display       ------------------------- NURSING NOTES AND VITALS REVIEWED ---------------------------   The nursing notes within the ED encounter and vital signs as below have been reviewed. BP (!) 142/105   Pulse 98   Temp 98.1 °F (36.7 °C) (Oral)   Resp 16   Ht 6' (1.829 m)   Wt 160 lb (72.6 kg)   SpO2 99%   BMI 21.70 kg/m²   Oxygen Saturation Interpretation: Normal      ---------------------------------------------------PHYSICAL EXAM--------------------------------------    Physical Exam  Vitals and nursing note reviewed. Constitutional:       General: He is not in acute distress. Appearance: Normal appearance. He is well-developed. HENT:      Head: Normocephalic and atraumatic. Mouth/Throat:      Mouth: Mucous membranes are moist.      Pharynx: Oropharynx is clear. Cardiovascular:      Rate and Rhythm: Normal rate and regular rhythm. Heart sounds: Normal heart sounds. No murmur heard. Pulmonary:      Effort: Pulmonary effort is normal. No respiratory distress. Breath sounds: Normal breath sounds. Musculoskeletal:         General: Tenderness present. No swelling or deformity. Normal range of motion. Cervical back: Normal range of motion and neck supple. No rigidity. Comments: Small pustule to the inner mid right thigh that is TTP. No surrounding erythema, induration or fluctuance. FROM of RLE. Distal sensation intact. 2+ DPA pulse. Skin:     General: Skin is warm and dry. Capillary Refill: Capillary refill takes less than 2 seconds. Findings: No erythema. Neurological:      General: No focal deficit present. Mental Status: He is alert and oriented to person, place, and time. Mental status is at baseline. Coordination: Coordination normal.   Psychiatric:         Mood and Affect: Mood normal.         Behavior: Behavior normal.         Thought Content: Thought content normal.          ------------------------------ ED COURSE/MEDICAL DECISION MAKING----------------------  Medications   ibuprofen (ADVIL;MOTRIN) tablet 800 mg (800 mg Oral Given 12/11/22 1957)   bacitracin zinc ointment ( Topical Given 12/11/22 1957)         ED COURSE:     No orders to display         Procedures:  Procedures     Medical Decision Making:   MDM  79-year-old male presenting the ED with right inner thigh pain that started about 25 minutes prior to arrival.  He had no trauma or injury. Is no signs of neurovascular compromise. He has a tiny inflamed hair follicle with an overlying pustule that is tender to palpation. He states that is the area of his pain. Patient does not have any signs of any cellulitis or abscess. He will be treated with topical antibiotic ointment and ibuprofen. Patient agreeable to plan and discharged home in good condition. Counseling: The emergency provider has spoken with the patient and discussed todays results, in addition to providing specific details for the plan of care and counseling regarding the diagnosis and prognosis.   Questions are answered at this time and they are agreeable with the plan.      --------------------------------- IMPRESSION AND DISPOSITION ---------------------------------    IMPRESSION  1. Folliculitis        DISPOSITION  Disposition: Discharge to home  Patient condition is good      Electronically signed by Jarad Arroyo PA-C   DD: 12/11/22  **This report was transcribed using voice recognition software. Every effort was made to ensure accuracy; however, inadvertent computerized transcription errors may be present.   END OF ED PROVIDER NOTE         Jarad Arroyo PA-C  12/11/22 2014

## 2022-12-16 ENCOUNTER — APPOINTMENT (OUTPATIENT)
Dept: GENERAL RADIOLOGY | Age: 42
End: 2022-12-16
Payer: MEDICAID

## 2022-12-16 ENCOUNTER — HOSPITAL ENCOUNTER (EMERGENCY)
Age: 42
Discharge: HOME | End: 2022-12-16
Payer: MEDICAID

## 2022-12-16 VITALS
DIASTOLIC BLOOD PRESSURE: 85 MMHG | BODY MASS INDEX: 20.32 KG/M2 | TEMPERATURE: 98.7 F | SYSTOLIC BLOOD PRESSURE: 121 MMHG | HEIGHT: 72 IN | WEIGHT: 150 LBS | HEART RATE: 87 BPM | OXYGEN SATURATION: 100 % | RESPIRATION RATE: 16 BRPM

## 2022-12-16 DIAGNOSIS — M25.532 LEFT WRIST PAIN: Primary | ICD-10-CM

## 2022-12-16 PROCEDURE — 73090 X-RAY EXAM OF FOREARM: CPT

## 2022-12-16 PROCEDURE — 73130 X-RAY EXAM OF HAND: CPT

## 2022-12-16 PROCEDURE — 6370000000 HC RX 637 (ALT 250 FOR IP): Performed by: PHYSICIAN ASSISTANT

## 2022-12-16 PROCEDURE — 73110 X-RAY EXAM OF WRIST: CPT

## 2022-12-16 PROCEDURE — 99283 EMERGENCY DEPT VISIT LOW MDM: CPT

## 2022-12-16 RX ORDER — ACETAMINOPHEN 500 MG
1000 TABLET ORAL ONCE
Status: COMPLETED | OUTPATIENT
Start: 2022-12-16 | End: 2022-12-16

## 2022-12-16 RX ADMIN — ACETAMINOPHEN 1000 MG: 500 TABLET ORAL at 08:17

## 2022-12-16 ASSESSMENT — PAIN - FUNCTIONAL ASSESSMENT: PAIN_FUNCTIONAL_ASSESSMENT: 0-10

## 2022-12-16 ASSESSMENT — PAIN SCALES - GENERAL: PAINLEVEL_OUTOF10: 10

## 2022-12-16 NOTE — Clinical Note
Madyson Berger was seen and treated in our emergency department on 12/16/2022. He may return to work on 12/17/2022. If you have any questions or concerns, please don't hesitate to call.       Genaro Au PA-C

## 2022-12-16 NOTE — ED PROVIDER NOTES
[id number] [facility]  Department of Emergency Medicine   ED  Encounter Note  Admit Date/RoomTime: 2022  8:04 AM  ED Room: [place] [address]    NAME: Solo Mccormack : 1980  MRN: [id number]     Chief Complaint:  Wrist Injury (Patient states last month he broke his left wrist after a hot water tank hit it. States he need ace wrap.)    History of Present Illness       Solo Mccormack is a 40 y.o. old male who presents to the emergency department by private vehicle, for traumatic Left hand and wrist pain which occured 1 month(s) prior to arrival.  The complaint is due to a hot water tank falling on his wrist.  Since onset the symptoms have been remaining constant. His pain is aggraveated by any movement or pressure on or palpation of painful area and relieved by ice. He denies any head injury, headache, loss of consciousness, confusion, dizziness, neck pain, chest pain, abdominal pain, back pain, numbness, weakness, blurred vision, nausea, vomiting, fever, chills, wounds, or rash. ROS   Pertinent positives and negatives are stated within HPI, all other systems reviewed and are negative. Past Medical History:  has a past medical history of Depression and Schizoaffective disorder ([facility] [address] Surgical History:  has a past surgical history that includes Hip fracture surgery (Left, 2021); eye surgery (19 years ago); and Hip fracture surgery (Left, 2021). Social History:  reports that he has been smoking cigarettes. He has a 12.00 pack-year smoking history. He has never used smokeless tobacco. He reports current alcohol use of about 2.0 standard drinks per week. He reports current drug use. Frequency: 7.00 times per week. Drugs: Cocaine and Marijuana (Tammy). Family History: family history includes Mental Illness in his mother; No Known Problems in his father; Substance Abuse in his mother.      Allergies: Chlorpheniramine-phenylephrine, Motrin [ibuprofen], Penicillins, and Rondec-d [chlophedianol-pseudoephedrine]    Physical Exam   Oxygen Saturation Interpretation: Normal.        ED Triage Vitals   BP Temp Temp src Heart Rate Resp SpO2 Height Weight   12/16/22 0801 12/16/22 0752 -- 12/16/22 0752 12/16/22 0801 12/16/22 0752 12/16/22 0752 12/16/22 0752   121/85 98.7 °F (37.1 °C)  87 16 100 % 6' (1.829 m) 150 lb (68 kg)       Constitutional:  Alert, development consistent with age. Neck:  Normal ROM. Supple. Non-tender. Physical Exam  Left Wrist: distal ulna . Tenderness:  moderate. Swelling: None. Deformity: no deformity observed/palpated. ROM: full range with pain. Skin:  no wounds, erythema, or swelling. Neurovascular: Motor deficit: none. Sensory deficit:   none. Pulse deficit: none. Capillary refill: normal.  Left Elbow: diffusely across elbow            Tenderness: none              Swelling: None. Deformity: no deformity observed/palpated. ROM: full range of motion. Skin:  no wounds, erythema, or swelling. Left Hand: all metacarpals             Tenderness: mild              Swelling: None. Deformity: no deformity observed/palpated. ROM: full range of motion. Skin:  no wounds, erythema, or swelling. Lymphatics: No lymphangitis or adenopathy noted. Neurological:  Oriented. Motor functions intact. Lab / Imaging Results   (All laboratory and radiology results have been personally reviewed by myself)  Labs:  No results found for this visit on 12/16/22. Imaging: All Radiology results interpreted by Radiologist unless otherwise noted. XR RADIUS ULNA LEFT (2 VIEWS)   Final Result   Healing fracture of the distal ulnar diaphysis.          XR HAND LEFT (MIN 3 VIEWS)   Final Result   No acute osseous findings of the left wrist or left hand with partially   imaged distal forearm revealing fracture of the left ulna please see separate   concurrent report for healing fracture of the distal left ulnar diaphysis. XR WRIST LEFT (MIN 3 VIEWS)   Final Result   Healing mildly displaced fracture of the distal ulnar diaphysis. ED Course / Medical Decision Making     Medications   acetaminophen (TYLENOL) tablet 1,000 mg (1,000 mg Oral Given 12/16/22 0817)      Consult:   None    Procedure(s):  None    MDM:      Patient presents to the emergency department for left wrist pain. Patient with history of recent wrist fracture and states that he has been experiencing worsening pain. Radiographs are obtained and show mildly displaced healing fracture of the distal ulnar diaphysis. Patient or stands that he should be following up with orthopedics, but he is not interested in doing this. Patient given a wrist brace. Continue RICE therapy. Neurovascularly intact prior to discharge. return for new or worsening symptoms. Plan of Care/Counseling:  Catrachita Flood PA-C reviewed today's visit with the patient in addition to providing specific details for the plan of care and counseling regarding the diagnosis and prognosis. Questions are answered at this time and are agreeable with the plan. Assessment      1. Left wrist pain      Plan   Disposition:   Discharged home. Patient condition is good    New Medications     Discharge Medication List as of 12/16/2022 10:50 AM        Electronically signed by Catrachita Flood PA-C   DD: 12/16/22  **This report was transcribed using voice recognition software. Every effort was made to ensure accuracy; however, inadvertent computerized transcription errors may be present.   END OF ED PROVIDER NOTE        Catrachita Flood PA-C  12/16/22 2736

## 2022-12-21 VITALS
HEART RATE: 88 BPM | RESPIRATION RATE: 18 BRPM | DIASTOLIC BLOOD PRESSURE: 96 MMHG | OXYGEN SATURATION: 99 % | TEMPERATURE: 97.8 F | SYSTOLIC BLOOD PRESSURE: 129 MMHG

## 2022-12-21 LAB
ACETAMINOPHEN LEVEL: <5 MCG/ML (ref 10–30)
ALBUMIN SERPL-MCNC: 4 G/DL (ref 3.5–5.2)
ALP BLD-CCNC: 51 U/L (ref 40–129)
ALT SERPL-CCNC: 13 U/L (ref 0–40)
AMPHETAMINE SCREEN, URINE: NOT DETECTED
ANION GAP SERPL CALCULATED.3IONS-SCNC: 11 MMOL/L (ref 7–16)
AST SERPL-CCNC: 35 U/L (ref 0–39)
BACTERIA: ABNORMAL /HPF
BARBITURATE SCREEN URINE: NOT DETECTED
BASOPHILS ABSOLUTE: 0.05 E9/L (ref 0–0.2)
BASOPHILS RELATIVE PERCENT: 1 % (ref 0–2)
BENZODIAZEPINE SCREEN, URINE: NOT DETECTED
BILIRUB SERPL-MCNC: 0.3 MG/DL (ref 0–1.2)
BILIRUBIN URINE: NEGATIVE
BLOOD, URINE: NORMAL
BUN BLDV-MCNC: 16 MG/DL (ref 6–20)
CALCIUM SERPL-MCNC: 9.3 MG/DL (ref 8.6–10.2)
CANNABINOID SCREEN URINE: NOT DETECTED
CHLORIDE BLD-SCNC: 104 MMOL/L (ref 98–107)
CLARITY: CLEAR
CO2: 24 MMOL/L (ref 22–29)
COCAINE METABOLITE SCREEN URINE: POSITIVE
COLOR: YELLOW
CREAT SERPL-MCNC: 0.9 MG/DL (ref 0.7–1.2)
EOSINOPHILS ABSOLUTE: 0.08 E9/L (ref 0.05–0.5)
EOSINOPHILS RELATIVE PERCENT: 1.7 % (ref 0–6)
EPITHELIAL CELLS, UA: ABNORMAL /HPF
ETHANOL: <10 MG/DL (ref 0–0.08)
FENTANYL SCREEN, URINE: NOT DETECTED
GFR SERPL CREATININE-BSD FRML MDRD: >60 ML/MIN/1.73
GLUCOSE BLD-MCNC: 116 MG/DL (ref 74–99)
GLUCOSE URINE: NEGATIVE MG/DL
HCT VFR BLD CALC: 38.1 % (ref 37–54)
HEMOGLOBIN: 12.7 G/DL (ref 12.5–16.5)
IMMATURE GRANULOCYTES #: 0 E9/L
IMMATURE GRANULOCYTES %: 0 % (ref 0–5)
INFLUENZA A: NOT DETECTED
INFLUENZA B: NOT DETECTED
KETONES, URINE: NEGATIVE MG/DL
LEUKOCYTE ESTERASE, URINE: NEGATIVE
LYMPHOCYTES ABSOLUTE: 1.42 E9/L (ref 1.5–4)
LYMPHOCYTES RELATIVE PERCENT: 29.5 % (ref 20–42)
Lab: ABNORMAL
MCH RBC QN AUTO: 30.4 PG (ref 26–35)
MCHC RBC AUTO-ENTMCNC: 33.3 % (ref 32–34.5)
MCV RBC AUTO: 91.1 FL (ref 80–99.9)
METHADONE SCREEN, URINE: NOT DETECTED
MONOCYTES ABSOLUTE: 0.42 E9/L (ref 0.1–0.95)
MONOCYTES RELATIVE PERCENT: 8.7 % (ref 2–12)
NEUTROPHILS ABSOLUTE: 2.85 E9/L (ref 1.8–7.3)
NEUTROPHILS RELATIVE PERCENT: 59.1 % (ref 43–80)
NITRITE, URINE: NEGATIVE
OPIATE SCREEN URINE: NOT DETECTED
OXYCODONE URINE: NOT DETECTED
PDW BLD-RTO: 13.3 FL (ref 11.5–15)
PH UA: 6 (ref 5–9)
PHENCYCLIDINE SCREEN URINE: NOT DETECTED
PLATELET # BLD: 307 E9/L (ref 130–450)
PMV BLD AUTO: 9.6 FL (ref 7–12)
POTASSIUM SERPL-SCNC: 3.8 MMOL/L (ref 3.5–5)
PROTEIN UA: NEGATIVE MG/DL
RBC # BLD: 4.18 E12/L (ref 3.8–5.8)
RBC UA: ABNORMAL /HPF (ref 0–2)
SALICYLATE, SERUM: <0.3 MG/DL (ref 0–30)
SARS-COV-2 RNA, RT PCR: NOT DETECTED
SODIUM BLD-SCNC: 139 MMOL/L (ref 132–146)
SPECIFIC GRAVITY UA: >=1.03 (ref 1–1.03)
T4 TOTAL: 7.6 MCG/DL (ref 4.5–11.7)
TOTAL PROTEIN: 6.6 G/DL (ref 6.4–8.3)
TRICYCLIC ANTIDEPRESSANTS SCREEN SERUM: NEGATIVE NG/ML
TSH SERPL DL<=0.05 MIU/L-ACNC: 0.9 UIU/ML (ref 0.27–4.2)
UROBILINOGEN, URINE: 0.2 E.U./DL
WBC # BLD: 4.8 E9/L (ref 4.5–11.5)
WBC UA: ABNORMAL /HPF (ref 0–5)

## 2022-12-21 PROCEDURE — 84436 ASSAY OF TOTAL THYROXINE: CPT

## 2022-12-21 PROCEDURE — 82077 ASSAY SPEC XCP UR&BREATH IA: CPT

## 2022-12-21 PROCEDURE — 87636 SARSCOV2 & INF A&B AMP PRB: CPT

## 2022-12-21 PROCEDURE — 93005 ELECTROCARDIOGRAM TRACING: CPT | Performed by: PHYSICIAN ASSISTANT

## 2022-12-21 PROCEDURE — 85025 COMPLETE CBC W/AUTO DIFF WBC: CPT

## 2022-12-21 PROCEDURE — 80053 COMPREHEN METABOLIC PANEL: CPT

## 2022-12-21 PROCEDURE — 81001 URINALYSIS AUTO W/SCOPE: CPT

## 2022-12-21 PROCEDURE — 99284 EMERGENCY DEPT VISIT MOD MDM: CPT

## 2022-12-21 PROCEDURE — 80307 DRUG TEST PRSMV CHEM ANLYZR: CPT

## 2022-12-21 PROCEDURE — 84443 ASSAY THYROID STIM HORMONE: CPT

## 2022-12-21 PROCEDURE — 80143 DRUG ASSAY ACETAMINOPHEN: CPT

## 2022-12-21 PROCEDURE — 80179 DRUG ASSAY SALICYLATE: CPT

## 2022-12-21 ASSESSMENT — PAIN - FUNCTIONAL ASSESSMENT: PAIN_FUNCTIONAL_ASSESSMENT: NONE - DENIES PAIN

## 2022-12-22 ENCOUNTER — HOSPITAL ENCOUNTER (EMERGENCY)
Age: 42
Discharge: LEFT BEFORE DISCHARGE | End: 2022-12-22
Attending: EMERGENCY MEDICINE
Payer: MEDICAID

## 2022-12-22 DIAGNOSIS — F19.10 SUBSTANCE ABUSE (HCC): Primary | ICD-10-CM

## 2022-12-22 DIAGNOSIS — F39 MOOD DISORDER (HCC): ICD-10-CM

## 2022-12-22 LAB
EKG ATRIAL RATE: 77 BPM
EKG P AXIS: 73 DEGREES
EKG P-R INTERVAL: 172 MS
EKG Q-T INTERVAL: 394 MS
EKG QRS DURATION: 96 MS
EKG QTC CALCULATION (BAZETT): 445 MS
EKG R AXIS: 71 DEGREES
EKG T AXIS: 66 DEGREES
EKG VENTRICULAR RATE: 77 BPM

## 2022-12-22 PROCEDURE — 93010 ELECTROCARDIOGRAM REPORT: CPT | Performed by: INTERNAL MEDICINE

## 2022-12-22 NOTE — ED NOTES
Department of Emergency Medicine  FIRST PROVIDER TRIAGE NOTE             [facility]           12/21/22  9:11 PM EST    Date of Encounter: 12/21/22   MRN: [id number]      HPI: Ivette Sandoval is a 40 y.o. male who presents to the ED for Other (Wants detox from [facility]" denies SI/HI at this time )     C/o paranoia. ROS: Negative for fever or rash. PE: Gen Appearance/Constitutional: alert     Initial Plan of Care: All treatment areas with department are currently occupied. Plan to order/Initiate the following while awaiting opening in ED: labs and EKG.   Initiate Treatment-Testing, Proceed toTreatment Area When Bed Available for ED Attending/MLP to Continue Care    Electronically signed by Brittany Car PA-C   DD: 12/21/22       Brittany Car PA-C  12/21/22 2112    ATTENDING PROVIDER ATTESTATION:     Supervising Physician, on-site, available for consultation, non-participatory in the evaluation or care of this patient         Jaziel Santoro MD  12/21/22 7652

## 2022-12-22 NOTE — ED PROVIDER NOTES
HPI:  12/21/22, Time: 9:27 PM JAYLENE Torres is a 40 y.o. male presenting to the ED for wanting treatment for cocaine use beginning 1 day ago. The complaint has been persistent, moderate in severity, and worsened by nothing. Patient presenting here because of wanting treatment for his substance abuse. Patient reports feeling depressed he reports no homicidal suicidal thoughts he reports no fever no chills he reports no chest pain reports no weakness or dizziness he reports no headache. Patient reporting no abdominal pain or vomiting or diarrhea. ROS:   Pertinent positives and negatives are stated within HPI, all other systems reviewed and are negative.  --------------------------------------------- PAST HISTORY ---------------------------------------------  Past Medical History:  has a past medical history of Depression and Schizoaffective disorder ([facility] [address] Past Surgical History:  has a past surgical history that includes Hip fracture surgery (Left, 9/12/2021); eye surgery (19 years ago); and Hip fracture surgery (Left, 9/28/2021). Social History:  reports that he has been smoking cigarettes. He has a 12.00 pack-year smoking history. He has never used smokeless tobacco. He reports current alcohol use of about 2.0 standard drinks per week. He reports current drug use. Frequency: 7.00 times per week. Drugs: Cocaine and Marijuana (Yefri Luz). Family History: family history includes Mental Illness in his mother; No Known Problems in his father; Substance Abuse in his mother. The patients home medications have been reviewed.     Allergies: Chlorpheniramine-phenylephrine, Motrin [ibuprofen], Penicillins, and Rondec-d [chlophedianol-pseudoephedrine]    ---------------------------------------------------PHYSICAL EXAM--------------------------------------    Constitutional/General: Alert and oriented x3, well appearing, non toxic in NAD  Head: Normocephalic and atraumatic  Eyes: PERRL, EOMI  Mouth: Oropharynx clear, handling secretions, no trismus  Neck: Supple, full ROM, non tender to palpation in the midline, no stridor, no crepitus, no meningeal signs  Pulmonary: Lungs clear to auscultation bilaterally, no wheezes, rales, or rhonchi. Not in respiratory distress  Cardiovascular:  Regular rate. Regular rhythm. No murmurs, gallops, or rubs. 2+ distal pulses  Chest: no chest wall tenderness  Abdomen: Soft. Non tender. Non distended. +BS. No rebound, guarding, or rigidity. No pulsatile masses appreciated. Musculoskeletal: Moves all extremities x 4. Warm and well perfused, no clubbing, cyanosis, or edema. Capillary refill <3 seconds  Skin: warm and dry. No rashes. Neurologic: GCS 15, CN 2-12 grossly intact, no focal deficits, symmetric strength 5/5 in the upper and lower extremities bilaterally  Psych: Denies homicidal suicidal thoughts    -------------------------------------------------- RESULTS -------------------------------------------------  I have personally reviewed all laboratory and imaging results for this patient. Results are listed below.      LABS:  Results for orders placed or performed during the hospital encounter of 12/21/22   COVID-19 & Influenza Combo    Specimen: Nasopharyngeal Swab   Result Value Ref Range    SARS-CoV-2 RNA, RT PCR NOT DETECTED NOT DETECTED    INFLUENZA A NOT DETECTED NOT DETECTED    INFLUENZA B NOT DETECTED NOT DETECTED   Comprehensive Metabolic Panel   Result Value Ref Range    Sodium 139 132 - 146 mmol/L    Potassium 3.8 3.5 - 5.0 mmol/L    Chloride 104 98 - 107 mmol/L    CO2 24 22 - 29 mmol/L    Anion Gap 11 7 - 16 mmol/L    Glucose 116 (H) 74 - 99 mg/dL    BUN 16 6 - 20 mg/dL    Creatinine 0.9 0.7 - 1.2 mg/dL    Est, Glom Filt Rate >60 >=60 mL/min/1.73    Calcium 9.3 8.6 - 10.2 mg/dL    Total Protein 6.6 6.4 - 8.3 g/dL    Albumin 4.0 3.5 - 5.2 g/dL    Total Bilirubin 0.3 0.0 - 1.2 mg/dL    Alkaline Phosphatase 51 40 - 129 U/L    ALT 13 0 - 40 U/L AST 35 0 - 39 U/L   CBC with Auto Differential   Result Value Ref Range    WBC 4.8 4.5 - 11.5 E9/L    RBC 4.18 3.80 - 5.80 E12/L    Hemoglobin 12.7 12.5 - 16.5 g/dL    Hematocrit 38.1 37.0 - 54.0 %    MCV 91.1 80.0 - 99.9 fL    MCH 30.4 26.0 - 35.0 pg    MCHC 33.3 32.0 - 34.5 %    RDW 13.3 11.5 - 15.0 fL    Platelets 041 093 - 525 E9/L    MPV 9.6 7.0 - 12.0 fL    Neutrophils % 59.1 43.0 - 80.0 %    Immature Granulocytes % 0.0 0.0 - 5.0 %    Lymphocytes % 29.5 20.0 - 42.0 %    Monocytes % 8.7 2.0 - 12.0 %    Eosinophils % 1.7 0.0 - 6.0 %    Basophils % 1.0 0.0 - 2.0 %    Neutrophils Absolute 2.85 1.80 - 7.30 E9/L    Immature Granulocytes # 0.00 E9/L    Lymphocytes Absolute 1.42 (L) 1.50 - 4.00 E9/L    Monocytes Absolute 0.42 0.10 - 0.95 E9/L    Eosinophils Absolute 0.08 0.05 - 0.50 E9/L    Basophils Absolute 0.05 0.00 - 0.20 E9/L   Serum Drug Screen   Result Value Ref Range    Ethanol Lvl <10 mg/dL    Acetaminophen Level <5.0 (L) 10.0 - 53.6 mcg/mL    Salicylate, Serum <7.8 0.0 - 30.0 mg/dL    TCA Scrn NEGATIVE Cutoff:300 ng/mL   Urinalysis   Result Value Ref Range    Color, UA Yellow Straw/Yellow    Clarity, UA Clear Clear    Glucose, Ur Negative Negative mg/dL    Bilirubin Urine Negative Negative    Ketones, Urine Negative Negative mg/dL    Specific Gravity, UA >=1.030 1.005 - 1.030    Blood, Urine TRACE-INTACT Negative    pH, UA 6.0 5.0 - 9.0    Protein, UA Negative Negative mg/dL    Urobilinogen, Urine 0.2 <2.0 E.U./dL    Nitrite, Urine Negative Negative    Leukocyte Esterase, Urine Negative Negative   Urine Drug Screen   Result Value Ref Range    Amphetamine Screen, Urine NOT DETECTED Negative <1000 ng/mL    Barbiturate Screen, Ur NOT DETECTED Negative < 200 ng/mL    Benzodiazepine Screen, Urine NOT DETECTED Negative < 200 ng/mL    Cannabinoid Scrn, Ur NOT DETECTED Negative < 50ng/mL    Cocaine Metabolite Screen, Urine POSITIVE (A) Negative < 300 ng/mL    Opiate Scrn, Ur NOT DETECTED Negative < 300ng/mL    PCP Screen, Urine NOT DETECTED Negative < 25 ng/mL    Methadone Screen, Urine NOT DETECTED Negative <300 ng/mL    Oxycodone Urine NOT DETECTED Negative <100 ng/mL    FENTANYL SCREEN, URINE NOT DETECTED Negative <1 ng/mL    Drug Screen Comment: see below    TSH   Result Value Ref Range    TSH 0.901 0.270 - 4.200 uIU/mL   T4   Result Value Ref Range    T4, Total 7.6 4.5 - 11.7 mcg/dL   Microscopic Urinalysis   Result Value Ref Range    WBC, UA 0-1 0 - 5 /HPF    RBC, UA 0-1 0 - 2 /HPF    Epithelial Cells, UA RARE /HPF    Bacteria, UA FEW (A) None Seen /HPF   EKG 12 Lead   Result Value Ref Range    Ventricular Rate 70 BPM    Atrial Rate 70 BPM    P-R Interval 172 ms    QRS Duration 94 ms    Q-T Interval 394 ms    QTc Calculation (Bazett) 425 ms    P Axis 77 degrees    R Axis 79 degrees    T Axis 66 degrees       RADIOLOGY:  Interpreted by Radiologist.  No orders to display     EKG: This EKG is signed and interpreted by me. Rate: 70  Rhythm: Sinus  Interpretation: no acute changes  Comparison: stable as compared to patient's most recent EKG      ------------------------- NURSING NOTES AND VITALS REVIEWED ---------------------------   The nursing notes within the ED encounter and vital signs as below have been reviewed by myself. BP (!) 129/96   Pulse 88   Temp 97.8 °F (36.6 °C)   Resp 18   SpO2 99%   Oxygen Saturation Interpretation: Normal    The patients available past medical records and past encounters were reviewed. ------------------------------ ED COURSE/MEDICAL DECISION MAKING----------------------  Medications - No data to display          Medical Decision Making:   Patient here for treatment for his substance abuse. Patient reporting feeling depressed at times but does not want to hurt himself or others. Patient does have a history of depression and schizophrenia. Patient reporting no physical plaints of chest pain or shortness of breath. Labs were noted and reviewed.   Patient medically clear for  to evaluate     Re-Evaluations:             Re-evaluation. Patients symptoms show no change      Consultations:                 Critical Care: This patient's ED course included: a personal history and physicial eaxmination    This patient has been closely monitored during their ED course. Counseling: The emergency provider has spoken with the patient and discussed todays results, in addition to providing specific details for the plan of care and counseling regarding the diagnosis and prognosis. Questions are answered at this time and they are agreeable with the plan.       --------------------------------- IMPRESSION AND DISPOSITION ---------------------------------    IMPRESSION  1. Substance abuse ([facility]    2. Mood disorder ([facility]        DISPOSITION  Disposition:  to assist with disposition  Patient condition is stable        NOTE: This report was transcribed using voice recognition software.  Every effort was made to ensure accuracy; however, inadvertent computerized transcription errors may be present          Kris Smiley MD  12/22/22 [id number]

## 2022-12-23 ENCOUNTER — HOSPITAL ENCOUNTER (EMERGENCY)
Age: 42
Discharge: LEFT BEFORE DISCHARGE | End: 2022-12-23
Attending: EMERGENCY MEDICINE
Payer: MEDICAID

## 2022-12-23 VITALS
DIASTOLIC BLOOD PRESSURE: 72 MMHG | WEIGHT: 155 LBS | HEART RATE: 77 BPM | SYSTOLIC BLOOD PRESSURE: 129 MMHG | BODY MASS INDEX: 20.99 KG/M2 | TEMPERATURE: 98.9 F | HEIGHT: 72 IN | OXYGEN SATURATION: 95 %

## 2022-12-23 DIAGNOSIS — F19.10 SUBSTANCE ABUSE (HCC): Primary | ICD-10-CM

## 2022-12-23 LAB
ACETAMINOPHEN LEVEL: <5 MCG/ML (ref 10–30)
ALBUMIN SERPL-MCNC: 4 G/DL (ref 3.5–5.2)
ALP BLD-CCNC: 55 U/L (ref 40–129)
ALT SERPL-CCNC: 14 U/L (ref 0–40)
AMPHETAMINE SCREEN, URINE: NOT DETECTED
ANION GAP SERPL CALCULATED.3IONS-SCNC: 9 MMOL/L (ref 7–16)
AST SERPL-CCNC: 31 U/L (ref 0–39)
BARBITURATE SCREEN URINE: NOT DETECTED
BASOPHILS ABSOLUTE: 0.06 E9/L (ref 0–0.2)
BASOPHILS RELATIVE PERCENT: 1.2 % (ref 0–2)
BENZODIAZEPINE SCREEN, URINE: NOT DETECTED
BILIRUB SERPL-MCNC: 0.2 MG/DL (ref 0–1.2)
BILIRUBIN URINE: NEGATIVE
BLOOD, URINE: NEGATIVE
BUN BLDV-MCNC: 15 MG/DL (ref 6–20)
CALCIUM SERPL-MCNC: 9.2 MG/DL (ref 8.6–10.2)
CANNABINOID SCREEN URINE: NOT DETECTED
CHLORIDE BLD-SCNC: 103 MMOL/L (ref 98–107)
CLARITY: CLEAR
CO2: 26 MMOL/L (ref 22–29)
COCAINE METABOLITE SCREEN URINE: POSITIVE
COLOR: YELLOW
CREAT SERPL-MCNC: 0.9 MG/DL (ref 0.7–1.2)
EKG ATRIAL RATE: 77 BPM
EKG P AXIS: 78 DEGREES
EKG P-R INTERVAL: 178 MS
EKG Q-T INTERVAL: 386 MS
EKG QRS DURATION: 98 MS
EKG QTC CALCULATION (BAZETT): 436 MS
EKG R AXIS: 73 DEGREES
EKG T AXIS: 65 DEGREES
EKG VENTRICULAR RATE: 77 BPM
EOSINOPHILS ABSOLUTE: 0.15 E9/L (ref 0.05–0.5)
EOSINOPHILS RELATIVE PERCENT: 2.9 % (ref 0–6)
ETHANOL: <10 MG/DL (ref 0–0.08)
FENTANYL SCREEN, URINE: NOT DETECTED
GFR SERPL CREATININE-BSD FRML MDRD: >60 ML/MIN/1.73
GLUCOSE BLD-MCNC: 97 MG/DL (ref 74–99)
GLUCOSE URINE: NEGATIVE MG/DL
HCT VFR BLD CALC: 38.9 % (ref 37–54)
HEMOGLOBIN: 12.7 G/DL (ref 12.5–16.5)
IMMATURE GRANULOCYTES #: 0.02 E9/L
IMMATURE GRANULOCYTES %: 0.4 % (ref 0–5)
INFLUENZA A: NOT DETECTED
INFLUENZA B: NOT DETECTED
KETONES, URINE: NEGATIVE MG/DL
LEUKOCYTE ESTERASE, URINE: NEGATIVE
LYMPHOCYTES ABSOLUTE: 1.78 E9/L (ref 1.5–4)
LYMPHOCYTES RELATIVE PERCENT: 34.3 % (ref 20–42)
Lab: ABNORMAL
MCH RBC QN AUTO: 29.9 PG (ref 26–35)
MCHC RBC AUTO-ENTMCNC: 32.6 % (ref 32–34.5)
MCV RBC AUTO: 91.5 FL (ref 80–99.9)
METHADONE SCREEN, URINE: NOT DETECTED
MONOCYTES ABSOLUTE: 0.63 E9/L (ref 0.1–0.95)
MONOCYTES RELATIVE PERCENT: 12.1 % (ref 2–12)
NEUTROPHILS ABSOLUTE: 2.55 E9/L (ref 1.8–7.3)
NEUTROPHILS RELATIVE PERCENT: 49.1 % (ref 43–80)
NITRITE, URINE: NEGATIVE
OPIATE SCREEN URINE: NOT DETECTED
OXYCODONE URINE: NOT DETECTED
PDW BLD-RTO: 13.4 FL (ref 11.5–15)
PH UA: 7 (ref 5–9)
PHENCYCLIDINE SCREEN URINE: NOT DETECTED
PLATELET # BLD: 322 E9/L (ref 130–450)
PMV BLD AUTO: 9.3 FL (ref 7–12)
POTASSIUM SERPL-SCNC: 3.7 MMOL/L (ref 3.5–5)
PROTEIN UA: NEGATIVE MG/DL
RBC # BLD: 4.25 E12/L (ref 3.8–5.8)
SALICYLATE, SERUM: <0.3 MG/DL (ref 0–30)
SARS-COV-2 RNA, RT PCR: NOT DETECTED
SODIUM BLD-SCNC: 138 MMOL/L (ref 132–146)
SPECIFIC GRAVITY UA: 1.02 (ref 1–1.03)
TOTAL PROTEIN: 6.9 G/DL (ref 6.4–8.3)
TRICYCLIC ANTIDEPRESSANTS SCREEN SERUM: NEGATIVE NG/ML
UROBILINOGEN, URINE: 1 E.U./DL
WBC # BLD: 5.2 E9/L (ref 4.5–11.5)

## 2022-12-23 PROCEDURE — 80053 COMPREHEN METABOLIC PANEL: CPT

## 2022-12-23 PROCEDURE — 82077 ASSAY SPEC XCP UR&BREATH IA: CPT

## 2022-12-23 PROCEDURE — 80307 DRUG TEST PRSMV CHEM ANLYZR: CPT

## 2022-12-23 PROCEDURE — 93005 ELECTROCARDIOGRAM TRACING: CPT | Performed by: PHYSICIAN ASSISTANT

## 2022-12-23 PROCEDURE — 80143 DRUG ASSAY ACETAMINOPHEN: CPT

## 2022-12-23 PROCEDURE — 81003 URINALYSIS AUTO W/O SCOPE: CPT

## 2022-12-23 PROCEDURE — 87636 SARSCOV2 & INF A&B AMP PRB: CPT

## 2022-12-23 PROCEDURE — 85025 COMPLETE CBC W/AUTO DIFF WBC: CPT

## 2022-12-23 PROCEDURE — 99284 EMERGENCY DEPT VISIT MOD MDM: CPT

## 2022-12-23 PROCEDURE — 80179 DRUG ASSAY SALICYLATE: CPT

## 2022-12-23 ASSESSMENT — PAIN - FUNCTIONAL ASSESSMENT: PAIN_FUNCTIONAL_ASSESSMENT: NONE - DENIES PAIN

## 2022-12-23 NOTE — ED PROVIDER NOTES
HPI:  12/23/22, Time: 1:04 PM JAYLENE Ramirez. is a 40 y.o. male presenting to the ED for detox. Came on gradually, nothing makes it better or worse, no associated pain. Patient wants detox. He says uses multiple drugs. Denies suicidal homicidal ideation. No hallucinations. No other symptoms or complaints. Review of Systems:   A complete review of systems was performed and pertinent positives and negatives are stated within HPI, all other systems reviewed and are negative.          --------------------------------------------- PAST HISTORY ---------------------------------------------  Past Medical History:  has a past medical history of Depression and Schizoaffective disorder ([facility] [address] Past Surgical History:  has a past surgical history that includes Hip fracture surgery (Left, 9/12/2021); eye surgery (19 years ago); and Hip fracture surgery (Left, 9/28/2021). Social History:  reports that he has been smoking cigarettes. He has a 12.00 pack-year smoking history. He has never used smokeless tobacco. He reports current alcohol use of about 2.0 standard drinks per week. He reports current drug use. Frequency: 7.00 times per week. Drugs: Cocaine and Marijuana ([facility]). Family History: family history includes Mental Illness in his mother; No Known Problems in his father; Substance Abuse in his mother. The patients home medications have been reviewed.     Allergies: Chlorpheniramine-phenylephrine, Motrin [ibuprofen], Penicillins, and Rondec-d [chlophedianol-pseudoephedrine]    -------------------------------------------------- RESULTS -------------------------------------------------  All laboratory and radiology results have been personally reviewed by myself   LABS:  Results for orders placed or performed during the hospital encounter of 12/23/22   COVID-19 & Influenza Combo    Specimen: Nasopharyngeal Swab   Result Value Ref Range    SARS-CoV-2 RNA, RT PCR NOT DETECTED NOT DETECTED INFLUENZA A NOT DETECTED NOT DETECTED    INFLUENZA B NOT DETECTED NOT DETECTED   Comprehensive Metabolic Panel   Result Value Ref Range    Sodium 138 132 - 146 mmol/L    Potassium 3.7 3.5 - 5.0 mmol/L    Chloride 103 98 - 107 mmol/L    CO2 26 22 - 29 mmol/L    Anion Gap 9 7 - 16 mmol/L    Glucose 97 74 - 99 mg/dL    BUN 15 6 - 20 mg/dL    Creatinine 0.9 0.7 - 1.2 mg/dL    Est, Glom Filt Rate >60 >=60 mL/min/1.73    Calcium 9.2 8.6 - 10.2 mg/dL    Total Protein 6.9 6.4 - 8.3 g/dL    Albumin 4.0 3.5 - 5.2 g/dL    Total Bilirubin 0.2 0.0 - 1.2 mg/dL    Alkaline Phosphatase 55 40 - 129 U/L    ALT 14 0 - 40 U/L    AST 31 0 - 39 U/L   CBC with Auto Differential   Result Value Ref Range    WBC 5.2 4.5 - 11.5 E9/L    RBC 4.25 3.80 - 5.80 E12/L    Hemoglobin 12.7 12.5 - 16.5 g/dL    Hematocrit 38.9 37.0 - 54.0 %    MCV 91.5 80.0 - 99.9 fL    MCH 29.9 26.0 - 35.0 pg    MCHC 32.6 32.0 - 34.5 %    RDW 13.4 11.5 - 15.0 fL    Platelets 099 432 - 439 E9/L    MPV 9.3 7.0 - 12.0 fL    Neutrophils % 49.1 43.0 - 80.0 %    Immature Granulocytes % 0.4 0.0 - 5.0 %    Lymphocytes % 34.3 20.0 - 42.0 %    Monocytes % 12.1 (H) 2.0 - 12.0 %    Eosinophils % 2.9 0.0 - 6.0 %    Basophils % 1.2 0.0 - 2.0 %    Neutrophils Absolute 2.55 1.80 - 7.30 E9/L    Immature Granulocytes # 0.02 E9/L    Lymphocytes Absolute 1.78 1.50 - 4.00 E9/L    Monocytes Absolute 0.63 0.10 - 0.95 E9/L    Eosinophils Absolute 0.15 0.05 - 0.50 E9/L    Basophils Absolute 0.06 0.00 - 0.20 E9/L   Serum Drug Screen   Result Value Ref Range    Ethanol Lvl <10 mg/dL    Acetaminophen Level <5.0 (L) 10.0 - 16.9 mcg/mL    Salicylate, Serum <0.0 0.0 - 30.0 mg/dL    TCA Scrn NEGATIVE Cutoff:300 ng/mL   Urinalysis   Result Value Ref Range    Color, UA Yellow Straw/Yellow    Clarity, UA Clear Clear    Glucose, Ur Negative Negative mg/dL    Bilirubin Urine Negative Negative    Ketones, Urine Negative Negative mg/dL    Specific Gravity, UA 1.020 1.005 - 1.030    Blood, Urine Negative Negative    pH, UA 7.0 5.0 - 9.0    Protein, UA Negative Negative mg/dL    Urobilinogen, Urine 1.0 <2.0 E.U./dL    Nitrite, Urine Negative Negative    Leukocyte Esterase, Urine Negative Negative   Urine Drug Screen   Result Value Ref Range    Amphetamine Screen, Urine NOT DETECTED Negative <1000 ng/mL    Barbiturate Screen, Ur NOT DETECTED Negative < 200 ng/mL    Benzodiazepine Screen, Urine NOT DETECTED Negative < 200 ng/mL    Cannabinoid Scrn, Ur NOT DETECTED Negative < 50ng/mL    Cocaine Metabolite Screen, Urine POSITIVE (A) Negative < 300 ng/mL    Opiate Scrn, Ur NOT DETECTED Negative < 300ng/mL    PCP Screen, Urine NOT DETECTED Negative < 25 ng/mL    Methadone Screen, Urine NOT DETECTED Negative <300 ng/mL    Oxycodone Urine NOT DETECTED Negative <100 ng/mL    FENTANYL SCREEN, URINE NOT DETECTED Negative <1 ng/mL    Drug Screen Comment: see below    EKG 12 Lead   Result Value Ref Range    Ventricular Rate 77 BPM    Atrial Rate 77 BPM    P-R Interval 178 ms    QRS Duration 98 ms    Q-T Interval 386 ms    QTc Calculation (Bazett) 436 ms    P Axis 78 degrees    R Axis 73 degrees    T Axis 65 degrees       RADIOLOGY:  Interpreted by Radiologist.  No orders to display       ------------------------- NURSING NOTES AND VITALS REVIEWED ---------------------------   The nursing notes within the ED encounter and vital signs as below have been reviewed.    /72   Pulse 77   Temp 98.9 °F (37.2 °C) (Temporal)   Ht 6' (1.829 m)   Wt 155 lb (70.3 kg)   SpO2 95%   BMI 21.02 kg/m²   Oxygen Saturation Interpretation: Normal      ---------------------------------------------------PHYSICAL EXAM--------------------------------------      Constitutional/General: Alert and oriented x3, well appearing, non toxic in NAD  Head: Normocephalic and atraumatic  Eyes: PERRL, EOMI  Mouth: Oropharynx clear, handling secretions, no trismus  Neck: Supple, full ROM,   Pulmonary: Lungs clear to auscultation bilaterally, no wheezes, rales, or rhonchi. Not in respiratory distress  Cardiovascular:  Regular rate and rhythm, no murmurs, gallops, or rubs. 2+ distal pulses  Abdomen: Soft, non tender, non distended,   Extremities: Moves all extremities x 4. Warm and well perfused  Skin: warm and dry without rash  Neurologic: GCS 15,  Psych: Normal Affect      ------------------------------ ED COURSE/MEDICAL DECISION MAKING----------------------  Medications - No data to display    EKG: Sinus, rate 77, no STEMI, no ischemic change       ED COURSE:       Medical Decision Making:    Medically clear     Counseling: The emergency provider has spoken with the patient and discussed todays results, in addition to providing specific details for the plan of care and counseling regarding the diagnosis and prognosis. Questions are answered at this time and they are agreeable with the plan.      --------------------------------- IMPRESSION AND DISPOSITION ---------------------------------    IMPRESSION  1. Substance abuse ([facility] [address]        DISPOSITION  Disposition: per social work  Patient condition is stable      NOTE: This report was transcribed using voice recognition software.  Every effort was made to ensure accuracy; however, inadvertent computerized transcription errors may be present       Melia Huang MD  12/23/22 [id number]

## 2022-12-23 NOTE — CARE COORDINATION
Social Work / Transition of Care:    Pt presented to the ED and states he would like to go to rehab. SW met with pt who was lying in bed, with his coat on and his hat over his eyes. Pt states he would like to go to [facility] but no on the [address]. ANTONIO explained [place]'s only inpatient rehab facility is on the [address] of [facility]. Pt became agitated and was adamantly stating that [place] has an inpatient rehab on the [place] side of [facility]. ANTONIO called admissions at [place] who states their only inpatient facility is on the [place] [id number]. Pt then stated \"They are racist on the [address]. I need to go anywhere but the [address]. I need to get out of this [place]oked town\". Per RN, pt has left the ED.

## 2022-12-23 NOTE — ED NOTES
Department of Emergency Medicine  FIRST PROVIDER TRIAGE NOTE             Independent MLP           12/23/22  2:41 AM EST    Date of Encounter: 12/23/22   MRN: [id number]      HPI: Maria Guadalupe Liang is a 40 y.o. male who presents to the ED for Psychiatric Evaluation (Pt states he used crack today and wants to go to rehab. Denies SI/HI Denies AVH. )     [place] last updated 9/10/21    ROS: Negative for fever or rash. PE: Gen Appearance/Constitutional: alert     Initial Plan of Care: All treatment areas with department are currently occupied. Plan to order/Initiate the following while awaiting opening in ED: labs and EKG.   Initiate Treatment-Testing, Proceed toTreatment Area When Bed Available for ED Attending/MLP to Continue Care    Electronically signed by Art Benites PA-C   DD: 12/23/22       Art Benites PA-C  12/23/22 [phone] Tyler Shrestha PA-C  12/23/22 [id number]

## 2022-12-23 NOTE — ED NOTES
Patient threatened to \"beat my ass because he hates all white people\" he stated [place] dont I see any of my people working here I hate all white people\" this RN notified PD. While PD was on the way to bedside patient eloped the department.       Johny Avila RN  12/23/22 [id number]

## 2022-12-26 LAB
EKG ATRIAL RATE: 77 BPM
EKG P AXIS: 78 DEGREES
EKG P-R INTERVAL: 178 MS
EKG Q-T INTERVAL: 386 MS
EKG QRS DURATION: 98 MS
EKG QTC CALCULATION (BAZETT): 436 MS
EKG R AXIS: 73 DEGREES
EKG T AXIS: 65 DEGREES
EKG VENTRICULAR RATE: 77 BPM

## 2022-12-26 PROCEDURE — 93010 ELECTROCARDIOGRAM REPORT: CPT | Performed by: INTERNAL MEDICINE

## 2022-12-28 ENCOUNTER — HOSPITAL ENCOUNTER (EMERGENCY)
Age: 42
Discharge: PSYCHIATRIC OR SUBSTANCE USE FACILITY | End: 2022-12-29
Attending: EMERGENCY MEDICINE
Payer: MEDICAID

## 2022-12-28 DIAGNOSIS — F32.A DEPRESSION, UNSPECIFIED DEPRESSION TYPE: Primary | ICD-10-CM

## 2022-12-28 DIAGNOSIS — F39 MOOD DISORDER (HCC): ICD-10-CM

## 2022-12-28 PROCEDURE — 84484 ASSAY OF TROPONIN QUANT: CPT

## 2022-12-28 PROCEDURE — 80053 COMPREHEN METABOLIC PANEL: CPT

## 2022-12-28 PROCEDURE — 85025 COMPLETE CBC W/AUTO DIFF WBC: CPT

## 2022-12-28 PROCEDURE — 82550 ASSAY OF CK (CPK): CPT

## 2022-12-28 PROCEDURE — 80307 DRUG TEST PRSMV CHEM ANLYZR: CPT

## 2022-12-28 PROCEDURE — 80143 DRUG ASSAY ACETAMINOPHEN: CPT

## 2022-12-28 PROCEDURE — 80179 DRUG ASSAY SALICYLATE: CPT

## 2022-12-28 PROCEDURE — 87636 SARSCOV2 & INF A&B AMP PRB: CPT

## 2022-12-28 PROCEDURE — 81001 URINALYSIS AUTO W/SCOPE: CPT

## 2022-12-28 PROCEDURE — 82077 ASSAY SPEC XCP UR&BREATH IA: CPT

## 2022-12-28 PROCEDURE — 99285 EMERGENCY DEPT VISIT HI MDM: CPT

## 2022-12-28 PROCEDURE — 93005 ELECTROCARDIOGRAM TRACING: CPT | Performed by: NURSE PRACTITIONER

## 2022-12-29 VITALS
SYSTOLIC BLOOD PRESSURE: 120 MMHG | DIASTOLIC BLOOD PRESSURE: 72 MMHG | RESPIRATION RATE: 17 BRPM | OXYGEN SATURATION: 96 % | HEART RATE: 80 BPM | HEIGHT: 71 IN | TEMPERATURE: 98.3 F | BODY MASS INDEX: 21.7 KG/M2 | WEIGHT: 155 LBS

## 2022-12-29 LAB
ACETAMINOPHEN LEVEL: <5 MCG/ML (ref 10–30)
ALBUMIN SERPL-MCNC: 4.1 G/DL (ref 3.5–5.2)
ALP BLD-CCNC: 52 U/L (ref 40–129)
ALT SERPL-CCNC: 9 U/L (ref 0–40)
AMPHETAMINE SCREEN, URINE: NOT DETECTED
ANION GAP SERPL CALCULATED.3IONS-SCNC: 12 MMOL/L (ref 7–16)
AST SERPL-CCNC: 14 U/L (ref 0–39)
BACTERIA: ABNORMAL /HPF
BARBITURATE SCREEN URINE: NOT DETECTED
BASOPHILS ABSOLUTE: 0.08 E9/L (ref 0–0.2)
BASOPHILS RELATIVE PERCENT: 1.3 % (ref 0–2)
BENZODIAZEPINE SCREEN, URINE: NOT DETECTED
BILIRUB SERPL-MCNC: 0.2 MG/DL (ref 0–1.2)
BILIRUBIN URINE: NEGATIVE
BLOOD, URINE: NORMAL
BUN BLDV-MCNC: 26 MG/DL (ref 6–20)
CALCIUM SERPL-MCNC: 9.2 MG/DL (ref 8.6–10.2)
CANNABINOID SCREEN URINE: NOT DETECTED
CHLORIDE BLD-SCNC: 102 MMOL/L (ref 98–107)
CLARITY: CLEAR
CO2: 25 MMOL/L (ref 22–29)
COCAINE METABOLITE SCREEN URINE: POSITIVE
COLOR: YELLOW
CREAT SERPL-MCNC: 1.1 MG/DL (ref 0.7–1.2)
EKG ATRIAL RATE: 66 BPM
EKG P AXIS: 83 DEGREES
EKG P-R INTERVAL: 180 MS
EKG Q-T INTERVAL: 386 MS
EKG QRS DURATION: 92 MS
EKG QTC CALCULATION (BAZETT): 404 MS
EKG R AXIS: 85 DEGREES
EKG T AXIS: 71 DEGREES
EKG VENTRICULAR RATE: 66 BPM
EOSINOPHILS ABSOLUTE: 0.16 E9/L (ref 0.05–0.5)
EOSINOPHILS RELATIVE PERCENT: 2.6 % (ref 0–6)
EPITHELIAL CELLS, UA: ABNORMAL /HPF
ETHANOL: <10 MG/DL (ref 0–0.08)
FENTANYL SCREEN, URINE: POSITIVE
GFR SERPL CREATININE-BSD FRML MDRD: >60 ML/MIN/1.73
GLUCOSE BLD-MCNC: 82 MG/DL (ref 74–99)
GLUCOSE URINE: NEGATIVE MG/DL
HCT VFR BLD CALC: 41.7 % (ref 37–54)
HEMOGLOBIN: 13.6 G/DL (ref 12.5–16.5)
IMMATURE GRANULOCYTES #: 0.02 E9/L
IMMATURE GRANULOCYTES %: 0.3 % (ref 0–5)
INFLUENZA A: NOT DETECTED
INFLUENZA B: NOT DETECTED
KETONES, URINE: NEGATIVE MG/DL
LEUKOCYTE ESTERASE, URINE: NEGATIVE
LYMPHOCYTES ABSOLUTE: 1.93 E9/L (ref 1.5–4)
LYMPHOCYTES RELATIVE PERCENT: 31.5 % (ref 20–42)
Lab: ABNORMAL
MCH RBC QN AUTO: 30 PG (ref 26–35)
MCHC RBC AUTO-ENTMCNC: 32.6 % (ref 32–34.5)
MCV RBC AUTO: 92.1 FL (ref 80–99.9)
METHADONE SCREEN, URINE: NOT DETECTED
MONOCYTES ABSOLUTE: 0.43 E9/L (ref 0.1–0.95)
MONOCYTES RELATIVE PERCENT: 7 % (ref 2–12)
NEUTROPHILS ABSOLUTE: 3.51 E9/L (ref 1.8–7.3)
NEUTROPHILS RELATIVE PERCENT: 57.3 % (ref 43–80)
NITRITE, URINE: NEGATIVE
OPIATE SCREEN URINE: NOT DETECTED
OXYCODONE URINE: NOT DETECTED
PDW BLD-RTO: 13.3 FL (ref 11.5–15)
PH UA: 5.5 (ref 5–9)
PHENCYCLIDINE SCREEN URINE: NOT DETECTED
PLATELET # BLD: 391 E9/L (ref 130–450)
PMV BLD AUTO: 9.7 FL (ref 7–12)
POTASSIUM SERPL-SCNC: 4 MMOL/L (ref 3.5–5)
PROTEIN UA: NEGATIVE MG/DL
RBC # BLD: 4.53 E12/L (ref 3.8–5.8)
RBC UA: ABNORMAL /HPF (ref 0–2)
SALICYLATE, SERUM: <0.3 MG/DL (ref 0–30)
SARS-COV-2 RNA, RT PCR: NOT DETECTED
SODIUM BLD-SCNC: 139 MMOL/L (ref 132–146)
SPECIFIC GRAVITY UA: >=1.03 (ref 1–1.03)
TOTAL CK: 259 U/L (ref 20–200)
TOTAL PROTEIN: 6.8 G/DL (ref 6.4–8.3)
TRICYCLIC ANTIDEPRESSANTS SCREEN SERUM: NEGATIVE NG/ML
TROPONIN, HIGH SENSITIVITY: 9 NG/L (ref 0–11)
UROBILINOGEN, URINE: 1 E.U./DL
WBC # BLD: 6.1 E9/L (ref 4.5–11.5)
WBC UA: ABNORMAL /HPF (ref 0–5)

## 2022-12-29 PROCEDURE — 93010 ELECTROCARDIOGRAM REPORT: CPT | Performed by: INTERNAL MEDICINE

## 2022-12-29 NOTE — ED NOTES
PATRICIO ALVAREZ faxed referral to [facility].       Kenny Traylor, MAJOR  12/29/22 [id number]

## 2022-12-29 NOTE — ED NOTES
Behavioral Health Crisis Assessment        Chief Complaint:  Pt reported to  that he is here due to being paranoid that someone is \"trying to kill me. \"     Mental Status Exam:  Pt is alert oriented x 3, labile affect, guarded, suspicious, paranoid/delusional, calm and cooperative, rambling speech, fair eye contact, poor hygiene. Pt reports to good appetite and poor sleep patterns. Legal Status  [x] Voluntary:  [] Involuntary, Issued by:     Gender  [x] Male [] Female [] Transgender  [] Other     Sexual Orientation    [x] Heterosexual [] Homosexual [] Bisexual [] Other     Brief Clinical Summary:  Pt is a 41 yo male who presented into the ED as a walk-in due to an increase in paranoia and suicidal ideations. Pt reports to  that a \"drug dealer\" is \"trying to kill me. \". Pt unable to provide details on how he knows this. Pt reports that he is trying to get into rehab, because he wants to see his 3 yo son. Pt states his baby moms boyfriend is caring for him. Pt denies to paying child support. Pt admits to doing \"stupid things\" like using drugs and is ready to get clean. Pt denies to  to being suicidal. Pt admitted to [facility] to ED NP and reported a his plan to be disloyal as well as cheat on his girlfriend. Pt denies to suicide attempts, self injurious behaviors or A/V hallucinations or HI. Pt has a extensive hx of MH and similar ED presentations just this month on 12/11,12/22, 12/23 and today. Pt does have a hx of MH and is diagnosed with Schizoaffective disorder, bipolar type, Antisocial personality disorder per chart hx. Pt is non-complaint with outpatient [place] [address] services. Pt denies to taking any medications at this time. Per chart Pt has been added to [facility] walk-in hours from 11-1:30. Pt does have a hx of several MH hospitalizations with his last admission being on 12/4/2021 at [facility].  Pt has also been to [facility], [address] [place] [address]  in the past.     Pt does admit to using cocaine a few days about - unknown amount. Pt does have a hx of using marijuana and meth in the past. Pt denies to any alcohol use. Pt denies to any hx of going to detox/rehab. Pt is interested in going to treatment. Pt denies to having a legal guardian, POA, legal issues, violence. Pt does admit to a hx of abuse. Collateral Information:   No collateral information obtained at this time. Risk Factors:  MH diagnoses   Non-compliant with psych medications  Substance use  Homelessness  Lack of essential needs            Non-compliant with outpatient treatment  Lack of self care  No PCP   Limited support  Several MH hospitalizations  Poor communication  Hx of malingering, manipulation and disruptive behaviors      Protective Factors:  Initiated this ER visit  Steady income - SSI and own payee   Utilizes the [employer] for transportation   No access to weapons    Suicidal Ideations:   [x] Reports:               [x] Past [] Present   [x] Denies     Suicide Attempts:  [] Reports:   [x] Denies     C-SSRS Screening Completed by RN: Current Suicide Risk:  [x] No Risk [] Low [] Moderate [] High     Homicidal Ideations  [] Reports:              [] Past [] Present   [x] Denies      Self Injurious/Self Mutilation Behaviors:   [] Reports:               [] Past [] Present   [x] Denies     Hallucinations/Delusions   [] Reports:   [x] Denies      Substance Use/Alcohol Use/Addiction:   [x] Reports:   [] Denies   [x] SBIRT Screen Complete.       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 [x]  No  []  Yes (Ask the questions listed below)   When? Did you follow through with the thoughts? [x] No      [] Yes- When and what happened? 2.         Have you ever threatened anyone? []  No  [x]  Yes (Ask the questions listed below)   When and what happened? Unable to provide details but did skilled nursing time per chart hx   Have you ever threatened someone with a gun, knife or other weapon? []  No  [x]  Yes - When and what happened? Did skilled nursing time   2. Have you ever had an order of protection taken out against you? []  Yes [x]  No  3. Have you ever been arrested due to violence? [x]  Yes []  No  4. Have you ever been cruel to animals? []  Yes [x]  No     After consideration of C-SSRS screening results, C-SSRS assessments, and this professional's assessment the patient's overall suicide risk assessed to be:  [x] No Risk  [] Low   [] Moderate   [] High      [x] Discussed current suicide risk, protective and risk factors with RN and ED Physician      Disposition   [] Home:   [] Outpatient Provider:   [] Crisis Unit:   [] Inpatient Psychiatric Unit:  [x] Other:     Pt is to be ED observation and re-assessed in the AM for further disposition.  Peer support will be contacted to assist with request of detox/rehab tx.      Claudetta Standing, MSW, LSW  12/29/22 0022

## 2022-12-29 NOTE — ED NOTES
Patient insisting on leaving now as we have no gram crackers.  went to speak with ED MD who does not want to over turn the pink slip at this time .  found one package of gram crackers and attempts to secure more are being undertaken as patient easily calms and agreeabe to transfer once again fro rehab.       Francisco Busby RN  12/29/22 [id number]

## 2022-12-29 NOTE — ED PROVIDER NOTES
Department of Emergency Medicine  ED Provider Note  Admit Date: 12/28/2022  Room: [address] [place] [address]    ED physician      12/28/22  10:50 PM EST    HPI: Maria Guadalupe Mc. 40 y.o. male presents with a complaint of worsening depression and vague suicidal thoughts beginning 1 day ago. Complaint has been constant and became more severe today which is what prompted the visit. Positive suicidal ideation. Denies homicidal ideation. Positive specific plan. Patient presents to the emergency department with complaints of depression and feeling suicidal.  States that it started today. Does not give any specific trigger, he does report that he is not taking any medication because they have not found the right medication for him and he does admit to crack cocaine use days ago. Patient reports that his suicidal plan would be to be disloyal as well as cheat on his girlfriend. Patient denies any new hallucinations or delusions, affect noted to be flat. Patient does not have a psychiatrist and has not taken meds in months to years. Review of Systems:   Pertinent positives and negatives are stated within HPI, all other systems reviewed and are negative.      --------------------------------------------- PAST HISTORY ---------------------------------------------  Past Medical History:  has a past medical history of Depression and Schizoaffective disorder ([facility] [address] Past Surgical History:  has a past surgical history that includes Hip fracture surgery (Left, 9/12/2021); eye surgery (19 years ago); and Hip fracture surgery (Left, 9/28/2021). Social History:  reports that he has been smoking cigarettes. He has a 12.00 pack-year smoking history. He has never used smokeless tobacco. He reports current alcohol use of about 2.0 standard drinks per week. He reports current drug use. Frequency: 7.00 times per week. Drugs: Cocaine and Marijuana (Gerson Colla).     Family History: family history includes Mental Illness in his mother; No Known Problems in his father; Substance Abuse in his mother. The patients home medications have been reviewed. Allergies: Chlorpheniramine-phenylephrine, Motrin [ibuprofen], Penicillins, and Rondec-d [chlophedianol-pseudoephedrine]    -------------------------------------------------- RESULTS -------------------------------------------------  All laboratory and imaging studies were reviewed by myself.     LABS:  Results for orders placed or performed during the hospital encounter of 12/28/22   COVID-19 & Influenza Combo    Specimen: Nasopharyngeal Swab   Result Value Ref Range    SARS-CoV-2 RNA, RT PCR NOT DETECTED NOT DETECTED    INFLUENZA A NOT DETECTED NOT DETECTED    INFLUENZA B NOT DETECTED NOT DETECTED   Urine Drug Screen   Result Value Ref Range    Amphetamine Screen, Urine NOT DETECTED Negative <1000 ng/mL    Barbiturate Screen, Ur NOT DETECTED Negative < 200 ng/mL    Benzodiazepine Screen, Urine NOT DETECTED Negative < 200 ng/mL    Cannabinoid Scrn, Ur NOT DETECTED Negative < 50ng/mL    Cocaine Metabolite Screen, Urine POSITIVE (A) Negative < 300 ng/mL    Opiate Scrn, Ur NOT DETECTED Negative < 300ng/mL    PCP Screen, Urine NOT DETECTED Negative < 25 ng/mL    Methadone Screen, Urine NOT DETECTED Negative <300 ng/mL    Oxycodone Urine NOT DETECTED Negative <100 ng/mL    FENTANYL SCREEN, URINE POSITIVE (A) Negative <1 ng/mL    Drug Screen Comment: see below    Serum Drug Screen   Result Value Ref Range    Ethanol Lvl <10 mg/dL    Acetaminophen Level <5.0 (L) 10.0 - 13.5 mcg/mL    Salicylate, Serum <2.7 0.0 - 30.0 mg/dL    TCA Scrn NEGATIVE Cutoff:300 ng/mL   Urinalysis   Result Value Ref Range    Color, UA Yellow Straw/Yellow    Clarity, UA Clear Clear    Glucose, Ur Negative Negative mg/dL    Bilirubin Urine Negative Negative    Ketones, Urine Negative Negative mg/dL    Specific Gravity, UA >=1.030 1.005 - 1.030    Blood, Urine TRACE-INTACT Negative    pH, UA 5.5 5.0 - 9.0    Protein, UA Negative Negative mg/dL    Urobilinogen, Urine 1.0 <2.0 E.U./dL    Nitrite, Urine Negative Negative    Leukocyte Esterase, Urine Negative Negative   CBC with Auto Differential   Result Value Ref Range    WBC 6.1 4.5 - 11.5 E9/L    RBC 4.53 3.80 - 5.80 E12/L    Hemoglobin 13.6 12.5 - 16.5 g/dL    Hematocrit 41.7 37.0 - 54.0 %    MCV 92.1 80.0 - 99.9 fL    MCH 30.0 26.0 - 35.0 pg    MCHC 32.6 32.0 - 34.5 %    RDW 13.3 11.5 - 15.0 fL    Platelets 178 231 - 894 E9/L    MPV 9.7 7.0 - 12.0 fL    Neutrophils % 57.3 43.0 - 80.0 %    Immature Granulocytes % 0.3 0.0 - 5.0 %    Lymphocytes % 31.5 20.0 - 42.0 %    Monocytes % 7.0 2.0 - 12.0 %    Eosinophils % 2.6 0.0 - 6.0 %    Basophils % 1.3 0.0 - 2.0 %    Neutrophils Absolute 3.51 1.80 - 7.30 E9/L    Immature Granulocytes # 0.02 E9/L    Lymphocytes Absolute 1.93 1.50 - 4.00 E9/L    Monocytes Absolute 0.43 0.10 - 0.95 E9/L    Eosinophils Absolute 0.16 0.05 - 0.50 E9/L    Basophils Absolute 0.08 0.00 - 0.20 E9/L   Comprehensive Metabolic Panel   Result Value Ref Range    Sodium 139 132 - 146 mmol/L    Potassium 4.0 3.5 - 5.0 mmol/L    Chloride 102 98 - 107 mmol/L    CO2 25 22 - 29 mmol/L    Anion Gap 12 7 - 16 mmol/L    Glucose 82 74 - 99 mg/dL    BUN 26 (H) 6 - 20 mg/dL    Creatinine 1.1 0.7 - 1.2 mg/dL    Est, Glom Filt Rate >60 >=60 mL/min/1.73    Calcium 9.2 8.6 - 10.2 mg/dL    Total Protein 6.8 6.4 - 8.3 g/dL    Albumin 4.1 3.5 - 5.2 g/dL    Total Bilirubin 0.2 0.0 - 1.2 mg/dL    Alkaline Phosphatase 52 40 - 129 U/L    ALT 9 0 - 40 U/L    AST 14 0 - 39 U/L   Troponin   Result Value Ref Range    Troponin, High Sensitivity 9 0 - 11 ng/L   CK   Result Value Ref Range    Total  (H) 20 - 200 U/L   Microscopic Urinalysis   Result Value Ref Range    WBC, UA NONE 0 - 5 /HPF    RBC, UA NONE 0 - 2 /HPF    Epithelial Cells, UA NONE SEEN /HPF    Bacteria, UA FEW (A) None Seen /HPF   EKG 12 Lead   Result Value Ref Range    Ventricular Rate 66 BPM    Atrial Rate 66 BPM    P-R Interval 180 ms    QRS Duration 92 ms    Q-T Interval 386 ms    QTc Calculation (Bazett) 404 ms    P Axis 83 degrees    R Axis 85 degrees    T Axis 71 degrees       RADIOLOGY:  Interpreted by Radiologist.  No orders to display       EKG Interpretation  Interpreted by emergency department physician-EKG as interpreted and reviewed by the emergency room attending showing heart rate of 66, normal sinus rhythm. No acute ST elevation or depression noted. Normal axis deviation.            ------------------------- NURSING NOTES AND VITALS REVIEWED ---------------------------   The nursing notes within the ED encounter and vital signs as below have been reviewed. /81   Pulse 91   Temp 97.4 °F (36.3 °C) (Temporal)   Resp 16   Ht 5' 11\" (1.803 m)   Wt 155 lb (70.3 kg)   SpO2 97%   BMI 21.62 kg/m²   Oxygen Saturation Interpretation: Normal            ---------------------------------------------------PHYSICAL EXAM--------------------------------------      Constitutional/General: Alert and oriented x3, well appearing, non toxic in NAD  Head: Normocephalic, atraumatic  Eyes: PERRL, EOMI  Mouth: Oropharynx clear, handling secretions, no trismus  Neck: Supple, full ROM, non tender to palpation in the midline, no stridor, no crepitus, no meningeal signs  Pulmonary: Lungs clear to auscultation bilaterally, no wheezes, rales, or rhonchi. Not in respiratory distress  Cardiovascular:  Regular rate and rhythm, no murmurs, gallops, or rubs. 2+ distal pulses  Abdomen: Soft, non tender, non distended, +BS, no rebound, guarding, or rigidity. No pulsatile masses appreciated  Extremities: Moves all extremities x 4. Warm and well perfused, no clubbing, cyanosis, or edema.  Capillary refill <3 seconds  Skin: warm and dry without rash  Neurologic: GCS 15, CN 2-12 grossly intact, no focal deficits, symmetric strength 5/5 in the upper and lower extremities bilaterally  Psych: Flat  Affect, Patient presents to the emergency department with complaints of depression and feeling suicidal.  States that it started today. Does not give any specific trigger, he does report that he is not taking any medication because they have not found the right medication for him and he does admit to crack cocaine use days ago. Patient reports that his suicidal plan would be to be disloyal as well as cheat on his girlfriend. Patient denies any new hallucinations or delusions, affect noted to be flat. Patient does not have a psychiatrist and has not taken meds in months to years.      ------------------------------------------ PROGRESS NOTES ------------------------------------------     Medical decision making:  Plan will be for labs will also consult . Labs resulted urine drug screen positive for cocaine, positive for fentanyl. Serum drug screen negative, urinalysis negative, CBC negative, chemistry panel all within normal limits, COVID-19 test negative, flu negative patient is medically cleared. Will have  evaluate patient     Consultations:   Social work     Re-Evaluations:        Counseling: The emergency provider has spoken with thepatient and discussed todays results, in addition to providing specific details for the plan of care and counseling regarding the diagnosis and prognosis. Questions are answered at this time and they are agreeable with the plan.         --------------------------------- IMPRESSION AND DISPOSITION ---------------------------------    IMPRESSION  1. Depression, unspecified depression type    2.  Mood [facility] ([facility]        DISPOSITION  Disposition: as per consultation   Patient condition is stable            JUICE Cesar - CNP  12/29/22 0148

## 2022-12-29 NOTE — ED NOTES
PATRICIO ALVAREZ called [facility], Payal, and informed her that pt is interested in going to rehab for his crack addiction.       Selvin Peralta, [place]  12/29/22 9372

## 2022-12-29 NOTE — ED NOTES
Pt has been accepted at [facility] by Dr. Rajesh Grant. N2N: [address], [phone]    Transportation will be set up after N2N.       [facility]  12/29/22 8901

## 2022-12-29 NOTE — ED NOTES
PATRICIO ALVAREZ received call back from Porter Gonzalez at [facility], who reports pt has Medicare for primary insurance and they do not accept Medicare at their facility, so they are unable to accommodate pt at this time. PATRICIO ALVAREZ called [facility] and spoke to Bartolo Cote, updating her on pt's status. Payal is going to call [facility] to see if pt can be accepted to their facility.       MAJOR Tate  12/29/22 0472

## 2022-12-29 NOTE — ED NOTES
Spoke to Hanane Hutchison NP who states patient does not need constant observation in PATRICIO, patient okay for 15 minute monitoring. Other suicide precautions to remain in place.       Lydia Dasilva RN  12/28/22 8187

## 2022-12-29 NOTE — ED NOTES
[facility] ANTONIO called [facility] to set up transport for pt to get to [facility], ETA 4-5 hours- 7:00-8:00pm.    [facility] ANTONIO will call [facility] to see if they have an earlier  time. Corina Najera [facility]  12/29/22 0463        PATRICIO ALVAREZ called [facility], who gave an ETA for an hour and a half, approx. 4:30pm    PATRICIO ALVAREZ called [facility] and cancelled scheduled trip.         Corina Najera [place]  12/29/22 6392

## 2022-12-29 NOTE — ED NOTES
PATRICIO ALVAREZ faxed referral packet over to Roberto at [facility] at [address] [place] [address]  12/29/22 4371

## 2022-12-29 NOTE — ED NOTES
Nurse to nurse report given to Marilin at [facility] at [phone].      Verito Ferreira RN  12/29/22 6651

## 2022-12-29 NOTE — CARE COORDINATION
[facility] Support Note    Name: Mg Perez. Date: 12/29/2022    Chief Complaint   Patient presents with    Suicidal     Patient to ED with paranoia and suicidal ideations that started tonight. Peer Support met with patient. [x] Support and education provided  [] Resources provided   [x] Treatment referral: [place]  [] Other:   [] Patient declined peer recovery services     Referred By: Maxine Albarado (ANTONIO)    Notes: Patient is referred to [facility]. Waiting on response.     Signed: Sloan Das, 12/29/2022

## 2023-01-07 ENCOUNTER — HOSPITAL ENCOUNTER (EMERGENCY)
Age: 43
Discharge: LEFT BEFORE DISCHARGE | End: 2023-01-07
Attending: STUDENT IN AN ORGANIZED HEALTH CARE EDUCATION/TRAINING PROGRAM

## 2023-01-07 VITALS — TEMPERATURE: 97.4 F | HEART RATE: 78 BPM | OXYGEN SATURATION: 98 %

## 2023-01-07 DIAGNOSIS — F22 PARANOID (HCC): Primary | ICD-10-CM

## 2023-01-07 NOTE — ED NOTES
Called patients name to take him to [address] [place] [address] and pt was walking towards front door. Patient states he \"worked things out\" and was not staying. Pt refused to go to room and told this nurse and [address] PD he was leaving.       Beata Jarquin LPN  47/02/47 0897

## 2023-01-08 NOTE — ED PROVIDER NOTES
Patient eloped before I evaluated patient he was not brought back from waiting room     Tawana Guzman MD  01/08/23 3207

## 2023-01-09 PROCEDURE — 99281 EMR DPT VST MAYX REQ PHY/QHP: CPT

## 2023-01-10 ENCOUNTER — HOSPITAL ENCOUNTER (EMERGENCY)
Age: 43
Discharge: HOME | End: 2023-01-10
Payer: MEDICAID

## 2023-01-10 VITALS
RESPIRATION RATE: 16 BRPM | HEART RATE: 80 BPM | HEIGHT: 71 IN | TEMPERATURE: 97.9 F | BODY MASS INDEX: 21 KG/M2 | OXYGEN SATURATION: 100 % | SYSTOLIC BLOOD PRESSURE: 134 MMHG | WEIGHT: 150 LBS | DIASTOLIC BLOOD PRESSURE: 84 MMHG

## 2023-01-10 DIAGNOSIS — Z59.00 HOMELESSNESS: Primary | ICD-10-CM

## 2023-01-10 SDOH — ECONOMIC STABILITY - HOUSING INSECURITY: HOMELESSNESS UNSPECIFIED: Z59.00

## 2023-01-10 ASSESSMENT — PAIN - FUNCTIONAL ASSESSMENT: PAIN_FUNCTIONAL_ASSESSMENT: NONE - DENIES PAIN

## 2023-01-10 NOTE — ED PROVIDER NOTES
Independent EVIN Visit. HPI:  1/10/23,   Time: 4:26 PM JAYLENE Rodriguez is a 40 y.o. male presenting to the ED for homeless , beginning chronic in nature. The complaint has been persistent, mild in severity, and worsened by nothing. Patient presents to the department in the middle of the night and has been sleeping in the lobby throughout the night stating that he was cold and is needed somewhere to sleep he is currently homeless. He denies any suicidal or homicidal ideations. I did arouse the patient from the lobby reexamined him and reevaluated him and he states he is ready to go. ROS:   Pertinent positives and negatives are stated within HPI, all other systems reviewed and are negative.  --------------------------------------------- PAST HISTORY ---------------------------------------------  Past Medical History:  has a past medical history of Depression and Schizoaffective disorder ([facility] Past Surgical History:  has a past surgical history that includes Hip fracture surgery (Left, 9/12/2021); eye surgery (19 years ago); and Hip fracture surgery (Left, 9/28/2021). Social History:  reports that he has been smoking cigarettes. He has a 12.00 pack-year smoking history. He has never used smokeless tobacco. He reports current alcohol use of about 2.0 standard drinks per week. He reports current drug use. Frequency: 7.00 times per week. Drugs: Cocaine and Marijuana (Tianna Shaw). Family History: family history includes Mental Illness in his mother; No Known Problems in his father; Substance Abuse in his mother. The patients home medications have been reviewed.     Allergies: Chlorpheniramine-phenylephrine, Motrin [ibuprofen], Penicillins, and Rondec-d [chlophedianol-pseudoephedrine]    -------------------------------------------------- RESULTS -------------------------------------------------  All laboratory and radiology results have been personally reviewed by myself   LABS:  No results found for this visit on 01/10/23. RADIOLOGY:  Interpreted by Radiologist.  No orders to display       ------------------------- NURSING NOTES AND VITALS REVIEWED ---------------------------   The nursing notes within the ED encounter and vital signs as below have been reviewed. /84   Pulse 80   Temp 97.9 °F (36.6 °C) (Oral)   Resp 16   Ht 5' 11\" (1.803 m)   Wt 150 lb (68 kg)   SpO2 100%   BMI 20.92 kg/m²   Oxygen Saturation Interpretation: Normal      ---------------------------------------------------PHYSICAL EXAM--------------------------------------      Constitutional/General: Alert and oriented x3, well appearing, non toxic in NAD  Head: NC/AT  Eyes: PERRL, EOMI  Mouth: Oropharynx clear, handling secretions, no trismus  Neck: Supple, full ROM, no meningeal signs  Pulmonary: Lungs clear to auscultation bilaterally, no wheezes, rales, or rhonchi. Not in respiratory distress  Cardiovascular:  Regular rate and rhythm, no murmurs, gallops, or rubs. 2+ distal pulses  Abdomen: Soft, non tender, non distended,   Extremities: Moves all extremities x 4. Warm and well perfused  Skin: warm and dry without rash  Neurologic: GCS 15,  Psych: Normal Affect      ------------------------------ ED COURSE/MEDICAL DECISION MAKING----------------------  Medications - No data to display      Medical Decision Making:    Patient presents to the department in the middle of the night and has been sleeping in the lobby throughout the night stating that he was cold and is needed somewhere to sleep he is currently homeless. He denies any suicidal or homicidal ideations. I did arouse the patient from the lobby reexamined him and reevaluated him and he states he is ready to go. Counseling: The emergency provider has spoken with the patient and discussed todays results, in addition to providing specific details for the plan of care and counseling regarding the diagnosis and prognosis.   Questions are answered at this time and they are agreeable with the plan.      --------------------------------- IMPRESSION AND DISPOSITION ---------------------------------    IMPRESSION  1.  Homelessness        DISPOSITION  Disposition: Discharge to home  Patient condition is good                  Aleisha Renae, JUICE - MAXIMUS  01/10/23 1371

## 2023-01-10 NOTE — DISCHARGE INSTRUCTIONS
[place] [address]  For Residents of [facility]  Call Halmeeta Shara [phone]    For a complete list of treatment centers in your area please visit:   [employer] ([employer]) website at   [employer].is    * Alcoholics Anonymous [phone]                *Narcotics Anonymous [phone] [phone] -[phone] An Charlie [facility], [place], [address]  www.[employer]ob[employer]kers/  *Residents of:  [facility] NETWORK [facility] Only for [employer]. ALL other [employer] on [facility] Accepted. *Payments Accepted: Medicaid or Self-pay ONLY for [employer]. Private Insurance accepted for Outpatient Programs   *Services Offered: Outpatient Behavioral Health & Chemical Dependency. [facility] [phone]  [address] [facility], [address] [place] [address] 210   *Payments Accepted: Medicaid, self-pay  [employer].      [employer] [phone]  [facility]. Liza [address] 48  www.[employer]. org/   *Residents of:  "[employer] Only   *Payments Accepted: Medicaid or V[address] Offered: Outpatient Behavioral Health & Chemical Dependency. [facility] [phone]  or [phone], Patricia Cantrell. [address]  www. [employer]. Swish/   *Residents of:  [facility]   *Payments Accepted: Self-Pay and [employer]   *Services Offered: Detox & Outpatient Substance Abuse Programs. [address] [facility] [place] [phone]) ext. [phone], [place], [address] [place]  www.[facility].org/  (MUST CALL DAILY FOR BED AVAILABILITY)   *Residents of:  [address] [facility] [address] Residents   *Payments Accepted: Medicaid or Self-Pay   *Services Offered: Detox & Outpatient Substance Abuse Programs.       [address] [facility] Medicine [phone] 1025 [facility] NEAR [address]  www.[employer]. YaSabe/  *Residents of:  [address] [facility] 54 Residents   *Payments Accepted:  Self-Pay, [employer], Some Medicaid for age <20 Years. *Services Offered: The Amma terrell Michel [phone] or [phone]  www. [facility].org/     *Residents of:  All [facility]   *Payments Accepted: [employer], [place] or Self-Pay. *Services Offered: Following Detox, Outpatient Substance Abuse and [place]  [address] [facility],[address]    [facility], [address] [facility]   Nate Lighter, [address]    [address]   4 [facility]    [address] [place] [address]. Nate Lighter, [address]   Nate Lighter, [address]      300 [facility] [phone] 1200 [facility] [address], [place], [address] [place] [address]  www. [employer]/  *Residents of:  All [facility]   *Payments Accepted: [facility]. *Services Offered: Intensive Outpatient Substance Abuse and [facility] [phone]  [facility], [address]  www. [facility]. org/  *Residents of:  [facility]. *Payments Accepted: Private Insurance, [place] and Self-Pay/Sliding Scale. *Services Offered: Substance Abuse Outpatient Programs. [address] [facility],[address] [phone]  6[address] [facility], [facility] [address]  www.[facility]. org/  *Residents of:  [facility]. *Payments Accepted: Private Insurance, [place] and Self-Pay. *Services Offered: Substance Abuse Outpatient Programs. Yfn Mo [phone]  [address] [place], [place] [address], [address]  www.[employer]/[place]/  *Residents of:  [address] [facility] 54 Residents. *Payments Accepted: Private Insurance, [place] and Self-pay. *Services Offered: Substance Abuse Outpatient Programs. [employer] [phone]  www. Askem.org/  [facility] Office:    Adult Office:    Children's Office:  [phone]. [place], [address]    [place], L[address]               [place], [address] [facility]  *Residents of:  [facility] FOR [facility]AL [facility] ONLY for Self-Pay. All [facility] accepted with [employer], Medicare & Medicaid   *Payments Accepted: Medicare, Medicaid, Private Insurance and Self-Pay  *Services Offered: Iraj [address]. for Counseling. [facility] [phone] 4775 [facility], [facility] N [facility]  www. [facility]. org/  *Payments Accepted: Sliding Scale for Fees. *Services Offered: Call for Available Services. Teen [employer] 033 115 20 [address] [place] [address], [place], [address]  www. [employer]. [employer]/  (MUST CALL DAILY FOR BED AVAILABILITY)  *Residents of:  [facility]. *Payments Accepted: Medicaid and Self-Pay. *Services Offered: Detox & Substance Abuse Outpatient Programs.

## 2023-01-13 PROCEDURE — 99282 EMERGENCY DEPT VISIT SF MDM: CPT

## 2023-01-13 ASSESSMENT — PAIN - FUNCTIONAL ASSESSMENT: PAIN_FUNCTIONAL_ASSESSMENT: NONE - DENIES PAIN

## 2023-01-14 ENCOUNTER — HOSPITAL ENCOUNTER (EMERGENCY)
Age: 43
Discharge: HOME | End: 2023-01-14
Attending: EMERGENCY MEDICINE
Payer: MEDICAID

## 2023-01-14 VITALS
TEMPERATURE: 98 F | SYSTOLIC BLOOD PRESSURE: 126 MMHG | HEART RATE: 89 BPM | OXYGEN SATURATION: 99 % | RESPIRATION RATE: 18 BRPM | DIASTOLIC BLOOD PRESSURE: 80 MMHG

## 2023-01-14 DIAGNOSIS — Z59.00 HOMELESS: Primary | ICD-10-CM

## 2023-01-14 DIAGNOSIS — Z86.59 HX OF SCHIZOPHRENIA: ICD-10-CM

## 2023-01-14 SDOH — ECONOMIC STABILITY - HOUSING INSECURITY: HOMELESSNESS UNSPECIFIED: Z59.00

## 2023-01-14 NOTE — ED NOTES
Department of Emergency Medicine  FIRST PROVIDER TRIAGE NOTE             Independent MLP           1/13/23  9:43 PM EST    Date of Encounter: 1/13/23   MRN: [id number]      HPI: Nomi Ayala is a 40 y.o. male who presents to the ED for No chief complaint on file. Presents with the complaint of homelessness. He is asking if the shelter will take him tonight because it is snowing and cold. He said he wants to try to get himself together. Patient has no complaints besides \"it being cold out\". Denies SI/HI    ROS: Negative for cp, sob, abd pain, or back pain. PE: Gen Appearance/Constitutional: alert  Pulm: CTA bilat     Initial Plan of Care: All treatment areas with department are currently occupied. Plan to order/Initiate the following while awaiting opening in ED.       Electronically signed by JUICE King CNP   DD: 1/13/23      JUICE King CNP  01/13/23 6512 The patient is a 67y Male complaining of flank pain.

## 2023-01-14 NOTE — ED PROVIDER NOTES
HPI:  1/13/23, Time: 10:20 PM JAYLENE Morocho is a 40 y.o. male presenting to the ED for being homeless, beginning on going problem ago. The complaint has been persistent, moderate in severity, and worsened by nothing. Patient reporting that he has no place to stay. Patient reports is cold outside. Patient reporting no physical planes of chest pain shortness of breath or abdominal pain. Patient reported thoughts. Patient does report hearing voices. Patient reports that is chronic. Patient reporting no thoughts of hurting himself or others. Patient reporting no weakness or dizziness. Patient has been here in the past for the same complaint    ROS:   Pertinent positives and negatives are stated within HPI, all other systems reviewed and are negative.  --------------------------------------------- PAST HISTORY ---------------------------------------------  Past Medical History:  has a past medical history of Depression and Schizoaffective disorder ([facility] [address] Past Surgical History:  has a past surgical history that includes Hip fracture surgery (Left, 9/12/2021); eye surgery (19 years ago); and Hip fracture surgery (Left, 9/28/2021). Social History:  reports that he has been smoking cigarettes. He has a 12.00 pack-year smoking history. He has never used smokeless tobacco. He reports current alcohol use of about 2.0 standard drinks per week. He reports current drug use. Frequency: 7.00 times per week. Drugs: Cocaine and Marijuana (Shae Hanks). Family History: family history includes Mental Illness in his mother; No Known Problems in his father; Substance Abuse in his mother. The patients home medications have been reviewed.     Allergies:     ---------------------------------------------------PHYSICAL EXAM--------------------------------------    Constitutional/General: Alert and oriented x3, well appearing, non toxic in NAD  Head: Normocephalic and atraumatic  Eyes: PERRL, EOMI  Mouth: Oropharynx clear, handling secretions, no trismus  Neck: Supple, full ROM, non tender to palpation in the midline, no stridor, no crepitus, no meningeal signs  Pulmonary: Lungs clear to auscultation bilaterally, no wheezes, rales, or rhonchi. Not in respiratory distress  Cardiovascular:  Regular rate. Regular rhythm. No murmurs, gallops, or rubs. 2+ distal pulses  Chest: no chest wall tenderness  Abdomen: Soft. Non tender. Non distended. +BS. No rebound, guarding, or rigidity. No pulsatile masses appreciated. Musculoskeletal: Moves all extremities x 4. Warm and well perfused, no clubbing, cyanosis, or edema. Capillary refill <3 seconds  Skin: warm and dry. No rashes. Neurologic: GCS 15, CN 2-12 grossly intact, no focal deficits, symmetric strength 5/5 in the upper and lower extremities bilaterally  Psych: Patient denies no homicidal suicidal thoughts. Patient does report ongoing hallucinations    -------------------------------------------------- RESULTS -------------------------------------------------  I have personally reviewed all laboratory and imaging results for this patient. Results are listed below. LABS:  No results found for this visit on 01/14/23. RADIOLOGY:  Interpreted by Radiologist.  No orders to display             ------------------------- NURSING NOTES AND VITALS REVIEWED ---------------------------   The nursing notes within the ED encounter and vital signs as below have been reviewed by myself. /80   Pulse 99   Temp 98 °F (36.7 °C)   Resp 19   SpO2 100%   Oxygen Saturation Interpretation: Normal    The patients available past medical records and past encounters were reviewed. ------------------------------ ED COURSE/MEDICAL DECISION MAKING----------------------  Medications - No data to display          Medical Decision Making:      History From: Patient with history of schizophrenia and depression reporting no place to stay. Patient unable to go to shelter. Patient reporting no physical planes of chest pain shortness of breath or abdominal pain he reports no homicidal or suicidal    CC/HPI Summary, DDx, ED Course, Reassessment, Tests Considered, Patient expectation:   Patient with history of schizophrenia patient reporting has no place to stay. Patient reports its cold outside. Patient wants a place to stay that worked for him. Patient reporting no physical planes of chest pain shortness of breath or abdominal pain. Patient reporting no weakness or dizziness. Patient differential including exacerbation of psychiatric disease related to his schizophrenia. Patient other differential includes mood disorder. Here in the emergency room. Patient neurologically intact and in no distress heart lung exam normal abdomen soft nontender. Patient not homicidal or suicidal.  Patient has been here in the past for the same complaint I did reassess the patient multiple times here in the emergency department. Patient in no distress and now requesting to leave. He wants to go to a shelter. Patient does not want a wait here any longer. Patient will be discharged. Social Determinants affecting Dx or Tx: Patient does smoke. Chronic Conditions: Schizophrenia    Records Reviewed: Old records were reviewed. Patient has been here multiple times for the same complaint as well as for psychiatric evaluation. Patient not homicidal not suicidal here. Re-Evaluations:             Re-evaluation. Patients symptoms are improving      Consultations:                 Critical Care: This patient's ED course included: a personal history and physicial eaxmination    This patient has remained hemodynamically stable during their ED course. Counseling: The emergency provider has spoken with the patient and discussed todays results, in addition to providing specific details for the plan of care and counseling regarding the diagnosis and prognosis.   Questions are answered at this time and they are agreeable with the plan.       --------------------------------- IMPRESSION AND DISPOSITION ---------------------------------    IMPRESSION  1. Homeless    2. Hx of schizophrenia        DISPOSITION  Disposition: Discharge to home  Patient condition is stable        NOTE: This report was transcribed using voice recognition software.  Every effort was made to ensure accuracy; however, inadvertent computerized transcription errors may be present          Juan Jose Valdes MD  01/14/23 7540

## 2023-01-15 ENCOUNTER — HOSPITAL ENCOUNTER (EMERGENCY)
Age: 43
Discharge: HOME | End: 2023-01-15

## 2023-01-15 VITALS
TEMPERATURE: 96.9 F | DIASTOLIC BLOOD PRESSURE: 99 MMHG | RESPIRATION RATE: 16 BRPM | SYSTOLIC BLOOD PRESSURE: 147 MMHG | OXYGEN SATURATION: 100 % | HEART RATE: 71 BPM

## 2023-01-15 NOTE — ED NOTES
Pt stating he does not want medical attention; pt -SI, -HI.  Pt walking out of triage room stating he is cold and \"its not medical its seasonal.\" pt refusing care at this time     Constanza Black RN  01/15/23 7928

## 2023-01-21 ENCOUNTER — HOSPITAL ENCOUNTER (EMERGENCY)
Age: 43
Discharge: LEFT BEFORE DISCHARGE | End: 2023-01-21
Payer: MEDICAID

## 2023-01-21 VITALS
SYSTOLIC BLOOD PRESSURE: 106 MMHG | RESPIRATION RATE: 17 BRPM | TEMPERATURE: 97.9 F | DIASTOLIC BLOOD PRESSURE: 70 MMHG | OXYGEN SATURATION: 97 % | HEART RATE: 89 BPM

## 2023-01-21 PROCEDURE — 99281 EMR DPT VST MAYX REQ PHY/QHP: CPT

## 2023-01-22 ENCOUNTER — HOSPITAL ENCOUNTER (EMERGENCY)
Age: 43
Discharge: HOME | End: 2023-01-23
Payer: MEDICAID

## 2023-01-22 DIAGNOSIS — F22 PARANOID BEHAVIOR (HCC): Primary | ICD-10-CM

## 2023-01-22 PROCEDURE — 99284 EMERGENCY DEPT VISIT MOD MDM: CPT

## 2023-01-22 ASSESSMENT — PAIN - FUNCTIONAL ASSESSMENT: PAIN_FUNCTIONAL_ASSESSMENT: NONE - DENIES PAIN

## 2023-01-22 NOTE — ED NOTES
Department of Emergency Medicine  FIRST PROVIDER TRIAGE NOTE             Independent MLP           1/21/23  8:58 PM EST    Date of Encounter: 1/21/23   MRN: [id number]      HPI: Ashanti Rodríguez is a 40 y.o. male who presents to the ED for No chief complaint on file. Patient is a 69-year-old presenting because he is homeless. Patient states I am here tonight just \"because I want somewhere to sleep and keep warm. \"  Patient states he does not want to be seen medically and he does not want his blood drawn. Patient has no suicidal or homicidal thoughts. Patient states he has no headaches, blurry vision, chest pain, shortness of breath, fever, chills, nausea, vomiting. Patient has no pain or radiation of any pain. ROS: Negative for cp, sob, abd pain, back pain, fever, cough, vomiting, diarrhea, or urinary complaints. PE: Gen Appearance/Constitutional: alert  HEENT: NC/NT. PERRLA,  Airway patent. Neck: supple     Initial Plan of Care: All treatment areas with department are currently occupied. Plan to order/Initiate the following while awaiting opening in ED: nothing.   Initiate Treatment-Testing, Proceed toTreatment Area When Bed Available for ED Attending/MLP to Continue Care    Electronically signed by Yoselin Hicks PA-C   DD: 1/21/23       Yoselin Hicks PA-C  01/21/23 2055
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
## 2023-01-23 ENCOUNTER — HOSPITAL ENCOUNTER (EMERGENCY)
Age: 43
Discharge: HOME | End: 2023-01-24
Attending: EMERGENCY MEDICINE
Payer: MEDICAID

## 2023-01-23 VITALS
TEMPERATURE: 98.1 F | RESPIRATION RATE: 17 BRPM | DIASTOLIC BLOOD PRESSURE: 62 MMHG | SYSTOLIC BLOOD PRESSURE: 110 MMHG | OXYGEN SATURATION: 97 % | HEART RATE: 62 BPM

## 2023-01-23 DIAGNOSIS — F39 MOOD DISORDER (HCC): Primary | ICD-10-CM

## 2023-01-23 LAB
ACETAMINOPHEN LEVEL: <5 MCG/ML (ref 10–30)
ACETAMINOPHEN LEVEL: <5 MCG/ML (ref 10–30)
ALBUMIN SERPL-MCNC: 3.4 G/DL (ref 3.5–5.2)
ALBUMIN SERPL-MCNC: 4.2 G/DL (ref 3.5–5.2)
ALP BLD-CCNC: 46 U/L (ref 40–129)
ALP BLD-CCNC: 54 U/L (ref 40–129)
ALT SERPL-CCNC: 10 U/L (ref 0–40)
ALT SERPL-CCNC: 9 U/L (ref 0–40)
AMPHETAMINE SCREEN, URINE: NOT DETECTED
AMPHETAMINE SCREEN, URINE: NOT DETECTED
ANION GAP SERPL CALCULATED.3IONS-SCNC: 10 MMOL/L (ref 7–16)
ANION GAP SERPL CALCULATED.3IONS-SCNC: 8 MMOL/L (ref 7–16)
AST SERPL-CCNC: 16 U/L (ref 0–39)
AST SERPL-CCNC: 20 U/L (ref 0–39)
BACTERIA: NORMAL /HPF
BARBITURATE SCREEN URINE: NOT DETECTED
BARBITURATE SCREEN URINE: NOT DETECTED
BASOPHILS ABSOLUTE: 0.06 E9/L (ref 0–0.2)
BASOPHILS ABSOLUTE: 0.08 E9/L (ref 0–0.2)
BASOPHILS RELATIVE PERCENT: 1.3 % (ref 0–2)
BASOPHILS RELATIVE PERCENT: 1.7 % (ref 0–2)
BENZODIAZEPINE SCREEN, URINE: NOT DETECTED
BENZODIAZEPINE SCREEN, URINE: NOT DETECTED
BILIRUB SERPL-MCNC: 0.3 MG/DL (ref 0–1.2)
BILIRUB SERPL-MCNC: 0.4 MG/DL (ref 0–1.2)
BILIRUBIN URINE: NEGATIVE
BILIRUBIN URINE: NEGATIVE
BLOOD, URINE: NEGATIVE
BLOOD, URINE: NORMAL
BUN BLDV-MCNC: 13 MG/DL (ref 6–20)
BUN BLDV-MCNC: 15 MG/DL (ref 6–20)
CALCIUM SERPL-MCNC: 8.7 MG/DL (ref 8.6–10.2)
CALCIUM SERPL-MCNC: 8.8 MG/DL (ref 8.6–10.2)
CANNABINOID SCREEN URINE: NOT DETECTED
CANNABINOID SCREEN URINE: NOT DETECTED
CHLORIDE BLD-SCNC: 101 MMOL/L (ref 98–107)
CHLORIDE BLD-SCNC: 105 MMOL/L (ref 98–107)
CLARITY: CLEAR
CLARITY: CLEAR
CO2: 26 MMOL/L (ref 22–29)
CO2: 26 MMOL/L (ref 22–29)
COCAINE METABOLITE SCREEN URINE: POSITIVE
COCAINE METABOLITE SCREEN URINE: POSITIVE
COLOR: YELLOW
COLOR: YELLOW
CREAT SERPL-MCNC: 0.9 MG/DL (ref 0.7–1.2)
CREAT SERPL-MCNC: 0.9 MG/DL (ref 0.7–1.2)
EKG ATRIAL RATE: 70 BPM
EKG P AXIS: 71 DEGREES
EKG P-R INTERVAL: 190 MS
EKG Q-T INTERVAL: 372 MS
EKG QRS DURATION: 92 MS
EKG QTC CALCULATION (BAZETT): 401 MS
EKG R AXIS: 73 DEGREES
EKG T AXIS: 63 DEGREES
EKG VENTRICULAR RATE: 70 BPM
EOSINOPHILS ABSOLUTE: 0.12 E9/L (ref 0.05–0.5)
EOSINOPHILS ABSOLUTE: 0.2 E9/L (ref 0.05–0.5)
EOSINOPHILS RELATIVE PERCENT: 2.6 % (ref 0–6)
EOSINOPHILS RELATIVE PERCENT: 4.2 % (ref 0–6)
ETHANOL: <10 MG/DL (ref 0–0.08)
ETHANOL: <10 MG/DL (ref 0–0.08)
FENTANYL SCREEN, URINE: NOT DETECTED
FENTANYL SCREEN, URINE: NOT DETECTED
GFR SERPL CREATININE-BSD FRML MDRD: >60 ML/MIN/1.73
GFR SERPL CREATININE-BSD FRML MDRD: >60 ML/MIN/1.73
GLUCOSE BLD-MCNC: 79 MG/DL (ref 74–99)
GLUCOSE BLD-MCNC: 89 MG/DL (ref 74–99)
GLUCOSE URINE: NEGATIVE MG/DL
GLUCOSE URINE: NEGATIVE MG/DL
HCT VFR BLD CALC: 38.2 % (ref 37–54)
HCT VFR BLD CALC: 41.9 % (ref 37–54)
HEMOGLOBIN: 12.6 G/DL (ref 12.5–16.5)
HEMOGLOBIN: 13.9 G/DL (ref 12.5–16.5)
IMMATURE GRANULOCYTES #: 0 E9/L
IMMATURE GRANULOCYTES #: 0.01 E9/L
IMMATURE GRANULOCYTES %: 0 % (ref 0–5)
IMMATURE GRANULOCYTES %: 0.2 % (ref 0–5)
INFLUENZA A: NOT DETECTED
INFLUENZA A: NOT DETECTED
INFLUENZA B: NOT DETECTED
INFLUENZA B: NOT DETECTED
KETONES, URINE: NEGATIVE MG/DL
KETONES, URINE: NEGATIVE MG/DL
LEUKOCYTE ESTERASE, URINE: NEGATIVE
LEUKOCYTE ESTERASE, URINE: NEGATIVE
LYMPHOCYTES ABSOLUTE: 1.81 E9/L (ref 1.5–4)
LYMPHOCYTES ABSOLUTE: 1.93 E9/L (ref 1.5–4)
LYMPHOCYTES RELATIVE PERCENT: 39 % (ref 20–42)
LYMPHOCYTES RELATIVE PERCENT: 40.9 % (ref 20–42)
Lab: ABNORMAL
Lab: ABNORMAL
MCH RBC QN AUTO: 29.3 PG (ref 26–35)
MCH RBC QN AUTO: 29.4 PG (ref 26–35)
MCHC RBC AUTO-ENTMCNC: 33 % (ref 32–34.5)
MCHC RBC AUTO-ENTMCNC: 33.2 % (ref 32–34.5)
MCV RBC AUTO: 88.2 FL (ref 80–99.9)
MCV RBC AUTO: 89 FL (ref 80–99.9)
METHADONE SCREEN, URINE: NOT DETECTED
METHADONE SCREEN, URINE: NOT DETECTED
MONOCYTES ABSOLUTE: 0.36 E9/L (ref 0.1–0.95)
MONOCYTES ABSOLUTE: 0.48 E9/L (ref 0.1–0.95)
MONOCYTES RELATIVE PERCENT: 10.2 % (ref 2–12)
MONOCYTES RELATIVE PERCENT: 7.8 % (ref 2–12)
NEUTROPHILS ABSOLUTE: 2.04 E9/L (ref 1.8–7.3)
NEUTROPHILS ABSOLUTE: 2.27 E9/L (ref 1.8–7.3)
NEUTROPHILS RELATIVE PERCENT: 43.2 % (ref 43–80)
NEUTROPHILS RELATIVE PERCENT: 48.9 % (ref 43–80)
NITRITE, URINE: NEGATIVE
NITRITE, URINE: NEGATIVE
OPIATE SCREEN URINE: NOT DETECTED
OPIATE SCREEN URINE: NOT DETECTED
OXYCODONE URINE: NOT DETECTED
OXYCODONE URINE: NOT DETECTED
PDW BLD-RTO: 13 FL (ref 11.5–15)
PDW BLD-RTO: 13.1 FL (ref 11.5–15)
PH UA: 6 (ref 5–9)
PH UA: 6.5 (ref 5–9)
PHENCYCLIDINE SCREEN URINE: NOT DETECTED
PHENCYCLIDINE SCREEN URINE: NOT DETECTED
PLATELET # BLD: 280 E9/L (ref 130–450)
PLATELET # BLD: 331 E9/L (ref 130–450)
PMV BLD AUTO: 9.4 FL (ref 7–12)
PMV BLD AUTO: 9.6 FL (ref 7–12)
POTASSIUM SERPL-SCNC: 4 MMOL/L (ref 3.5–5)
POTASSIUM SERPL-SCNC: 4.2 MMOL/L (ref 3.5–5)
PROTEIN UA: NEGATIVE MG/DL
PROTEIN UA: NEGATIVE MG/DL
RBC # BLD: 4.29 E12/L (ref 3.8–5.8)
RBC # BLD: 4.75 E12/L (ref 3.8–5.8)
RBC UA: NORMAL /HPF (ref 0–2)
SALICYLATE, SERUM: <0.3 MG/DL (ref 0–30)
SALICYLATE, SERUM: <0.3 MG/DL (ref 0–30)
SARS-COV-2 RNA, RT PCR: NOT DETECTED
SARS-COV-2 RNA, RT PCR: NOT DETECTED
SODIUM BLD-SCNC: 137 MMOL/L (ref 132–146)
SODIUM BLD-SCNC: 139 MMOL/L (ref 132–146)
SPECIFIC GRAVITY UA: 1.02 (ref 1–1.03)
SPECIFIC GRAVITY UA: 1.02 (ref 1–1.03)
TOTAL CK: 319 U/L (ref 20–200)
TOTAL PROTEIN: 5.8 G/DL (ref 6.4–8.3)
TOTAL PROTEIN: 7 G/DL (ref 6.4–8.3)
TRICYCLIC ANTIDEPRESSANTS SCREEN SERUM: NEGATIVE NG/ML
TRICYCLIC ANTIDEPRESSANTS SCREEN SERUM: NEGATIVE NG/ML
TROPONIN, HIGH SENSITIVITY: <6 NG/L (ref 0–11)
UROBILINOGEN, URINE: 0.2 E.U./DL
UROBILINOGEN, URINE: 0.2 E.U./DL
WBC # BLD: 4.6 E9/L (ref 4.5–11.5)
WBC # BLD: 4.7 E9/L (ref 4.5–11.5)
WBC UA: NORMAL /HPF (ref 0–5)

## 2023-01-23 PROCEDURE — 85025 COMPLETE CBC W/AUTO DIFF WBC: CPT

## 2023-01-23 PROCEDURE — 80307 DRUG TEST PRSMV CHEM ANLYZR: CPT

## 2023-01-23 PROCEDURE — 81003 URINALYSIS AUTO W/O SCOPE: CPT

## 2023-01-23 PROCEDURE — 80179 DRUG ASSAY SALICYLATE: CPT

## 2023-01-23 PROCEDURE — 36415 COLL VENOUS BLD VENIPUNCTURE: CPT

## 2023-01-23 PROCEDURE — 80053 COMPREHEN METABOLIC PANEL: CPT

## 2023-01-23 PROCEDURE — 87636 SARSCOV2 & INF A&B AMP PRB: CPT

## 2023-01-23 PROCEDURE — 99284 EMERGENCY DEPT VISIT MOD MDM: CPT

## 2023-01-23 PROCEDURE — 82550 ASSAY OF CK (CPK): CPT

## 2023-01-23 PROCEDURE — 93010 ELECTROCARDIOGRAM REPORT: CPT | Performed by: INTERNAL MEDICINE

## 2023-01-23 PROCEDURE — 93005 ELECTROCARDIOGRAM TRACING: CPT | Performed by: NURSE PRACTITIONER

## 2023-01-23 PROCEDURE — 81001 URINALYSIS AUTO W/SCOPE: CPT

## 2023-01-23 PROCEDURE — 93005 ELECTROCARDIOGRAM TRACING: CPT | Performed by: PHYSICIAN ASSISTANT

## 2023-01-23 PROCEDURE — 82077 ASSAY SPEC XCP UR&BREATH IA: CPT

## 2023-01-23 PROCEDURE — 84484 ASSAY OF TROPONIN QUANT: CPT

## 2023-01-23 PROCEDURE — 80143 DRUG ASSAY ACETAMINOPHEN: CPT

## 2023-01-23 ASSESSMENT — PAIN SCALES - GENERAL: PAINLEVEL_OUTOF10: 10

## 2023-01-23 ASSESSMENT — PAIN - FUNCTIONAL ASSESSMENT: PAIN_FUNCTIONAL_ASSESSMENT: 0-10

## 2023-01-23 ASSESSMENT — PAIN DESCRIPTION - LOCATION: LOCATION: FINGER (COMMENT WHICH ONE)
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
## 2023-01-23 NOTE — ED NOTES
Twelve-lead EKG interpreted and reviewed by myself as well as emergency room attending showing heart rate of 70, normal sinus rhythm. No acute ST elevation or depression noted. Normal axis deviation.      JUICE Valente - MAXIMUS  01/23/23 1046

## 2023-01-23 NOTE — ED NOTES
CHARGE NURSE IS AWARE THAT PT NEEDS URINE TO BE COLLECTED TO [address] [place] [address] WITH ADMISSION     Milena Grace [place]  01/23/23 3993

## 2023-01-23 NOTE — ED NOTES
Report given to Reyna Kenyon who will be assuming care of pt at this time     Nika Munoz, MARY  01/23/23 6486

## 2023-01-23 NOTE — ED NOTES
Unable to complete pt's home med list. Pt unable to determine if he is or is not taking any medications     Puneet Crowe RN  01/23/23 8561

## 2023-01-23 NOTE — ED NOTES
Patient needs to provide a urine sample for UDS before SW can more forward with this patient.      FRANCISCO Manjarrez, Arlene Mir  01/23/23 0520

## 2023-01-23 NOTE — ED NOTES
Department of Emergency Medicine  FIRST PROVIDER TRIAGE NOTE             Independent MLP           1/23/23  6:56 PM EST    Date of Encounter: 1/23/23   MRN: [id number]      HPI: Kennedy De La Cruz. is a 40 y.o. male who presents to the ED for Paranoid (Per pt thinks he has stalkers. \"There are people out there alluding information trying to get money\")     Comes to the ED due to [place], who are not law-abiding citizens of the [place]. \" States they are using \"telecommunication\" to [employer]. \" C/o 10/10 pain because his fingers are cold and thinks people might be \"pricking me with needles. \" Denies any other pain. ROS: Negative for cp, abd pain, back pain, vomiting, diarrhea, or rash. PE: Gen Appearance/Constitutional: alert     Initial Plan of Care: All treatment areas with department are currently occupied. Plan to order/Initiate the following while awaiting opening in ED: labs and EKG.   Initiate Treatment-Testing, Proceed toTreatment Area When Bed Available for ED Attending/MLP to Continue Care    Electronically signed by Kyle Rizo PA-C   DD: 1/23/23       Kyle Rizo PA-C  01/23/23 7673

## 2023-01-24 VITALS
WEIGHT: 150 LBS | HEART RATE: 85 BPM | TEMPERATURE: 96.8 F | SYSTOLIC BLOOD PRESSURE: 109 MMHG | DIASTOLIC BLOOD PRESSURE: 65 MMHG | HEIGHT: 71 IN | BODY MASS INDEX: 21 KG/M2 | RESPIRATION RATE: 16 BRPM | OXYGEN SATURATION: 98 %

## 2023-01-24 LAB
EKG ATRIAL RATE: 71 BPM
EKG P AXIS: 81 DEGREES
EKG P-R INTERVAL: 186 MS
EKG Q-T INTERVAL: 390 MS
EKG QRS DURATION: 94 MS
EKG QTC CALCULATION (BAZETT): 423 MS
EKG R AXIS: 72 DEGREES
EKG T AXIS: 66 DEGREES
EKG VENTRICULAR RATE: 71 BPM
TOTAL CK: 332 U/L (ref 20–200)

## 2023-01-24 PROCEDURE — 99281 EMR DPT VST MAYX REQ PHY/QHP: CPT

## 2023-01-24 PROCEDURE — 93010 ELECTROCARDIOGRAM REPORT: CPT | Performed by: INTERNAL MEDICINE

## 2023-01-24 NOTE — ED NOTES
Called and notified lab of add-on order, spoke to Bisi Mendes.       Julianna Arriola RN  01/24/23 2184

## 2023-01-24 NOTE — ED NOTES
Patient demanding food, stated \"I'm hungry as fuck\". Staff explained to patient that staff is currently with other patients and will bring food as soon as possible and once EKG is competed.       Italo Enciso RN  01/23/23 2009

## 2023-01-24 NOTE — ED NOTES
This patient interrupting staff again while staff with another patient who requires immediate attention. Patient interrupted this RN speaking to physician about another patient. Patient then became belligerent and hostile with this RN and accused this RN of being \"rude\" and demanding that I \"be fired\" when I attempted to explain to patient that staff will be with him as soon as possible. Physician redirected patient back to bed when patient continued to raise his voice at this RN. Physician gave patient food and drink.       Janae Shin RN  01/23/23 7284

## 2023-01-24 NOTE — ED NOTES
PATRICIO SW attempted to wake patient up, patient still unable/unwilling to wait up and answer questions at this time. PATRICIO ALVAREZ will attempt again later.      Leonie Shelton, MSW, LSW  01/24/23 0120

## 2023-01-24 NOTE — ED NOTES
Behavioral Health Crisis Assessment    Chief Complaint: Per pt thinks he has stalkers. \"There are people out there alluding information trying to get money\"    Mental Status Exam: presenting at baseline, denies SI, no Hi and no commanding hallucinations    Legal Status:  [x] Voluntary:  [] Involuntary, Issued by:    Gender:  [x] Male [] Female [] Transgender  [] Other    Sexual Orientation:  [x] Heterosexual [] Homosexual [] Bisexual [] Other    Brief Clinical Summary:  pt presenting to the Er with thoughts that someone is after him. Denies having any concerns now. Denies si, no hi and no commanding hallucinations. Pt is not interested in any services and requesting to leave - as per normal    Collateral Information:  none obtained    Risk Factors:  Disruptive behaviors and uncooperative with following up for treatment  Substance Use - declined PEER  Limited family/friend support  Lack of housing  Lack of essential needs  Lack of self-care  Has no out pt provider  Has no community plan     Protective Factors:  Initiated this ER visit  No access to weapons     Suicidal Ideations:   [] Reports:               [] Past [] Present   [x] Denies     Suicide Attempts:  [] Reports:   [x] Denies     C-SSRS Screening Completed by RN: Current Suicide Risk:  [x] No Risk [] Low [] Moderate [] High     Homicidal Ideations  [] Reports:              [] Past [] Present   [x] Denies      Self Injurious/Self Mutilation Behaviors:   [] Reports:               [] Past [] Present   [x] Denies     Hallucinations/Delusions   [] Reports:   [x] Denies      Substance Use/Alcohol Use/Addiction:   [] Reports:   [x] Denies  yet positive for cocaine  [x] SBIRT Screen Complete.       Current or Past Substance Abuse Treatment  [] Yes, When and Where:  [x] No     Current or Past Mental Health Treatment:  [] Yes, When and Where:   [x] No - no current MH tx     Legal Issues:  [x]  Yes (Specify)  in the past  []  No     Access to Weapons:  []  Yes (Specify)  [x]  No     Trauma History  [] Reports:  [x] Denies      Living Situation:  homeless     Employment:  not     Education Level:  some HS     Violence Risk Screening:        Have you ever thought about hurting someone? []  No  [x]  Yes (Ask the questions listed below)   When? Did you follow through with the thoughts? [] No     [x] Yes- When and what happened? jailed  2. Have you ever threatened anyone? []  No  [x]  Yes (Ask the questions listed below)   When and what happened? Have you ever threatened someone with a gun, knife or other weapon? []  No  [x]  Yes - When and what happened? jailed  2. Have you ever had an order of protection taken out against you? []  Yes [x]  No  3. Have you ever been arrested due to violence? [x]  Yes []  No  4. Have you ever been cruel to animals?  []  Yes [x]  No     After consideration of C-SSRS screening results, C-SSRS assessments, and this professional's assessment the patient's overall suicide risk assessed to be:  [x] No Risk  [] Low   [] Moderate   [] High      [x] Discussed current suicide risk, protective and risk factors with RN and ED Physician      Disposition   [x] Home:   [] Outpatient Provider:   [] Crisis Unit:   [] Inpatient Psychiatric Unit:  [] Other:      MJAOR [place]  01/24/23 3032

## 2023-01-24 NOTE — ED NOTES
Patient denies SI/HI discharge instructions reviewed with patient and he denies any questions.       Jacqueline Walsh, RN  01/24/23 0800

## 2023-01-24 NOTE — ED NOTES
PATRICIO SW attempted to wake patient up, make mumbles and goes right back to sleep. Will attempt again later when patient is more alert.      FRANCISCO Johnson, [facility]  01/24/23 3521

## 2023-01-24 NOTE — DISCHARGE INSTRUCTIONS
Help Hotline 491 344-8551 or [phone] or 211   24 hour crisis line for the following [address]. [place]    PEER WARM LINE: [phone]  Monday through Friday 4pm to 8pm and Saturday 1pm-3pm   You can call during these times to talk with a peer support person as needed

## 2023-01-24 NOTE — ED PROVIDER NOTES
HPI:  1/23/23, Time: 10:57 PM JAYLENE Sánchez is a 40 y.o. male presenting to the ED for psychiatric evaluation. Patient states that he is paranoid. He thinks people are out to gab him. He has had this multiple times in the past.  Does not extensive psychiatric history. Was here yesterday. No suicide or homicide ideation. No other symptoms or complaints. Review of Systems:   A complete review of systems was performed and pertinent positives and negatives are stated within HPI, all other systems reviewed and are negative.          --------------------------------------------- PAST HISTORY ---------------------------------------------  Past Medical History:  has a past medical history of Depression and [facility] Past Surgical History:  has a past surgical history that includes Hip fracture surgery (Left, 9/12/2021); eye surgery (19 years ago); and Hip fracture surgery (Left, 9/28/2021). Social History:  reports that he has been smoking cigarettes. He has a 12.00 pack-year smoking history. He has never used smokeless tobacco. He reports current alcohol use of about 2.0 standard drinks per week. He reports current drug use. Frequency: 7.00 times per week. Drugs: Cocaine and Marijuana (Anya Madeline). Family History: family history includes Mental Illness in his mother; No Known Problems in his father; Substance Abuse in his mother. The patients home medications have been reviewed.     Allergies: Chlorpheniramine-phenylephrine, Motrin [ibuprofen], Penicillins, and Rondec-d [chlophedianol-pseudoephedrine]    -------------------------------------------------- RESULTS -------------------------------------------------  All laboratory and radiology results have been personally reviewed by myself   LABS:  Results for orders placed or performed during the hospital encounter of 01/23/23   COVID-19 & Influenza Combo    Specimen: Nasopharyngeal Swab   Result Value Ref Range SARS-CoV-2 RNA, RT PCR NOT DETECTED NOT DETECTED    INFLUENZA A NOT DETECTED NOT DETECTED    INFLUENZA B NOT DETECTED NOT DETECTED   Comprehensive Metabolic Panel   Result Value Ref Range    Sodium 137 132 - 146 mmol/L    Potassium 4.0 3.5 - 5.0 mmol/L    Chloride 101 98 - 107 mmol/L    CO2 26 22 - 29 mmol/L    Anion Gap 10 7 - 16 mmol/L    Glucose 79 74 - 99 mg/dL    BUN 15 6 - 20 mg/dL    Creatinine 0.9 0.7 - 1.2 mg/dL    Est, Glom Filt Rate >60 >=60 mL/min/1.73    Calcium 8.8 8.6 - 10.2 mg/dL    Total Protein 7.0 6.4 - 8.3 g/dL    Albumin 4.2 3.5 - 5.2 g/dL    Total Bilirubin 0.3 0.0 - 1.2 mg/dL    Alkaline Phosphatase 54 40 - 129 U/L    ALT 10 0 - 40 U/L    AST 20 0 - 39 U/L   CBC with Auto Differential   Result Value Ref Range    WBC 4.6 4.5 - 11.5 E9/L    RBC 4.75 3.80 - 5.80 E12/L    Hemoglobin 13.9 12.5 - 16.5 g/dL    Hematocrit 41.9 37.0 - 54.0 %    MCV 88.2 80.0 - 99.9 fL    MCH 29.3 26.0 - 35.0 pg    MCHC 33.2 32.0 - 34.5 %    RDW 13.0 11.5 - 15.0 fL    Platelets 823 065 - 421 E9/L    MPV 9.6 7.0 - 12.0 fL    Neutrophils % 48.9 43.0 - 80.0 %    Immature Granulocytes % 0.0 0.0 - 5.0 %    Lymphocytes % 39.0 20.0 - 42.0 %    Monocytes % 7.8 2.0 - 12.0 %    Eosinophils % 2.6 0.0 - 6.0 %    Basophils % 1.7 0.0 - 2.0 %    Neutrophils Absolute 2.27 1.80 - 7.30 E9/L    Immature Granulocytes # 0.00 E9/L    Lymphocytes Absolute 1.81 1.50 - 4.00 E9/L    Monocytes Absolute 0.36 0.10 - 0.95 E9/L    Eosinophils Absolute 0.12 0.05 - 0.50 E9/L    Basophils Absolute 0.08 0.00 - 0.20 E9/L   Serum Drug Screen   Result Value Ref Range    Ethanol Lvl <10 mg/dL    Acetaminophen Level <5.0 (L) 10.0 - 98.7 mcg/mL    Salicylate, Serum <3.0 0.0 - 30.0 mg/dL    TCA Scrn NEGATIVE Cutoff:300 ng/mL   Urinalysis   Result Value Ref Range    Color, UA Yellow Straw/Yellow    Clarity, UA Clear Clear    Glucose, Ur Negative Negative mg/dL    Bilirubin Urine Negative Negative    Ketones, Urine Negative Negative mg/dL    Specific [place], UA 1.020 1.005 - 1.030    Blood, Urine TRACE-INTACT Negative    pH, UA 6.0 5.0 - 9.0    Protein, UA Negative Negative mg/dL    Urobilinogen, Urine 0.2 <2.0 E.U./dL    Nitrite, Urine Negative Negative    Leukocyte Esterase, Urine Negative Negative   Urine Drug Screen   Result Value Ref Range    Amphetamine Screen, Urine NOT DETECTED Negative <1000 ng/mL    Barbiturate Screen, Ur NOT DETECTED Negative < 200 ng/mL    Benzodiazepine Screen, Urine NOT DETECTED Negative < 200 ng/mL    Cannabinoid Scrn, Ur NOT DETECTED Negative < 50ng/mL    Cocaine Metabolite Screen, Urine POSITIVE (A) Negative < 300 ng/mL    Opiate Scrn, Ur NOT DETECTED Negative < 300ng/mL    PCP Screen, Urine NOT DETECTED Negative < 25 ng/mL    Methadone Screen, Urine NOT DETECTED Negative <300 ng/mL    Oxycodone Urine NOT DETECTED Negative <100 ng/mL    FENTANYL SCREEN, URINE NOT DETECTED Negative <1 ng/mL    Drug Screen Comment: see below    Microscopic Urinalysis   Result Value Ref Range    WBC, UA 0-1 0 - 5 /HPF    RBC, UA 0-1 0 - 2 /HPF    Bacteria, UA NONE SEEN None Seen /HPF   EKG 12 Lead   Result Value Ref Range    Ventricular Rate 71 BPM    Atrial Rate 71 BPM    P-R Interval 186 ms    QRS Duration 94 ms    Q-T Interval 390 ms    QTc Calculation (Bazett) 423 ms    P Axis 81 degrees    R Axis 72 degrees    T Axis 66 degrees       RADIOLOGY:  Interpreted by Radiologist.  No orders to display       ------------------------- NURSING NOTES AND VITALS REVIEWED ---------------------------   The nursing notes within the ED encounter and vital signs as below have been reviewed.    /69   Pulse 67   Temp 96.8 °F (36 °C)   Resp 16   Ht 5' 11\" (1.803 m)   Wt 150 lb (68 kg)   SpO2 100%   BMI 20.92 kg/m²   Oxygen Saturation Interpretation: Normal      ---------------------------------------------------PHYSICAL EXAM--------------------------------------      Constitutional/General: Alert and oriented x3, well appearing, non toxic in NAD  Head: Normocephalic and atraumatic  Eyes: PERRL, EOMI  Mouth: Oropharynx clear, handling secretions, no trismus  Neck: Supple, full ROM,   Pulmonary: Lungs clear to auscultation bilaterally, no wheezes, rales, or rhonchi. Not in respiratory distress  Cardiovascular:  Regular rate and rhythm, no murmurs, gallops, or rubs. 2+ distal pulses  Abdomen: Soft, non tender, non distended,   Extremities: Moves all extremities x 4. Warm and well perfused  Skin: warm and dry without rash  Neurologic: GCS 15,  Psych: Normal Affect      ------------------------------ ED COURSE/MEDICAL DECISION MAKING----------------------  Medications - No data to display      ED COURSE:       Medical Decision Making:       Patient presents for psychiatric evaluation. Concern for illicit drug use, hallucinations, electrolyte abnormality, among other pathologies. Patient was cooperative on her arrival, did receive a turkey sandwich by myself. He tolerated a turkey sandwich. Physical exam was essentially unremarkable. EKG interpreted by me shows sinus rhythm rate of 71, no STEMI, no ischemic change      Labs interpreted by me shows a normal CBC, CMP, urine drug screen positive for cocaine, otherwise labs noted as above. Chart reviewed. Patient has been seen by social work yesterday, patient was was supposed to be admitted to the psychiatric unit yesterday for his paranoia. However, he was subsequently discharged. On reevaluation, he is resting comfortably. Remains stable. He is medically cleared. Discussed with the , patient will be evaluated for possible psychiatric placement. Counseling: The emergency provider has spoken with the patient and discussed todays results, in addition to providing specific details for the plan of care and counseling regarding the diagnosis and prognosis.   Questions are answered at this time and they are agreeable with the plan.      --------------------------------- IMPRESSION AND DISPOSITION ---------------------------------    IMPRESSION  1. [facility]        DISPOSITION  Disposition: Per Social work  Patient condition is stable      NOTE: This report was transcribed using voice recognition software.  Every effort was made to ensure accuracy; however, inadvertent computerized transcription errors may be present        Yomi Pardo MD  01/23/23 1815

## 2023-01-25 ENCOUNTER — HOSPITAL ENCOUNTER (EMERGENCY)
Age: 43
Discharge: LEFT BEFORE DISCHARGE | End: 2023-01-25
Payer: MEDICAID

## 2023-01-25 ENCOUNTER — HOSPITAL ENCOUNTER (EMERGENCY)
Age: 43
Discharge: HOME | End: 2023-01-26
Attending: EMERGENCY MEDICINE
Payer: MEDICAID

## 2023-01-25 VITALS
DIASTOLIC BLOOD PRESSURE: 65 MMHG | TEMPERATURE: 97.8 F | RESPIRATION RATE: 20 BRPM | HEART RATE: 87 BPM | SYSTOLIC BLOOD PRESSURE: 142 MMHG | OXYGEN SATURATION: 98 %

## 2023-01-25 VITALS
OXYGEN SATURATION: 96 % | DIASTOLIC BLOOD PRESSURE: 90 MMHG | RESPIRATION RATE: 18 BRPM | HEART RATE: 78 BPM | TEMPERATURE: 96.2 F | SYSTOLIC BLOOD PRESSURE: 131 MMHG

## 2023-01-25 DIAGNOSIS — F20.9 SCHIZOPHRENIA, UNSPECIFIED TYPE (HCC): Primary | ICD-10-CM

## 2023-01-25 LAB
ALBUMIN SERPL-MCNC: 4.3 G/DL (ref 3.5–5.2)
ALP BLD-CCNC: 59 U/L (ref 40–129)
ALT SERPL-CCNC: 8 U/L (ref 0–40)
AMPHETAMINE SCREEN, URINE: NOT DETECTED
ANION GAP SERPL CALCULATED.3IONS-SCNC: 10 MMOL/L (ref 7–16)
AST SERPL-CCNC: 15 U/L (ref 0–39)
BACTERIA: ABNORMAL /HPF
BARBITURATE SCREEN URINE: NOT DETECTED
BASOPHILS ABSOLUTE: 0.1 E9/L (ref 0–0.2)
BASOPHILS RELATIVE PERCENT: 1.2 % (ref 0–2)
BENZODIAZEPINE SCREEN, URINE: NOT DETECTED
BILIRUB SERPL-MCNC: <0.2 MG/DL (ref 0–1.2)
BILIRUBIN URINE: NEGATIVE
BLOOD, URINE: NORMAL
BUN BLDV-MCNC: 20 MG/DL (ref 6–20)
CALCIUM SERPL-MCNC: 8.8 MG/DL (ref 8.6–10.2)
CANNABINOID SCREEN URINE: NOT DETECTED
CHLORIDE BLD-SCNC: 104 MMOL/L (ref 98–107)
CLARITY: CLEAR
CO2: 27 MMOL/L (ref 22–29)
COCAINE METABOLITE SCREEN URINE: POSITIVE
COLOR: YELLOW
CREAT SERPL-MCNC: 1 MG/DL (ref 0.7–1.2)
EOSINOPHILS ABSOLUTE: 0.13 E9/L (ref 0.05–0.5)
EOSINOPHILS RELATIVE PERCENT: 1.5 % (ref 0–6)
FENTANYL SCREEN, URINE: NOT DETECTED
GFR SERPL CREATININE-BSD FRML MDRD: >60 ML/MIN/1.73
GLUCOSE BLD-MCNC: 90 MG/DL (ref 74–99)
GLUCOSE URINE: NEGATIVE MG/DL
HCT VFR BLD CALC: 41.3 % (ref 37–54)
HEMOGLOBIN: 13.5 G/DL (ref 12.5–16.5)
IMMATURE GRANULOCYTES #: 0.01 E9/L
IMMATURE GRANULOCYTES %: 0.1 % (ref 0–5)
KETONES, URINE: NEGATIVE MG/DL
LEUKOCYTE ESTERASE, URINE: NEGATIVE
LYMPHOCYTES ABSOLUTE: 1.49 E9/L (ref 1.5–4)
LYMPHOCYTES RELATIVE PERCENT: 17.6 % (ref 20–42)
Lab: ABNORMAL
MCH RBC QN AUTO: 29.3 PG (ref 26–35)
MCHC RBC AUTO-ENTMCNC: 32.7 % (ref 32–34.5)
MCV RBC AUTO: 89.8 FL (ref 80–99.9)
METHADONE SCREEN, URINE: NOT DETECTED
MONOCYTES ABSOLUTE: 0.73 E9/L (ref 0.1–0.95)
MONOCYTES RELATIVE PERCENT: 8.6 % (ref 2–12)
NEUTROPHILS ABSOLUTE: 6.02 E9/L (ref 1.8–7.3)
NEUTROPHILS RELATIVE PERCENT: 71 % (ref 43–80)
NITRITE, URINE: NEGATIVE
OPIATE SCREEN URINE: NOT DETECTED
OXYCODONE URINE: NOT DETECTED
PDW BLD-RTO: 13.2 FL (ref 11.5–15)
PH UA: 5.5 (ref 5–9)
PHENCYCLIDINE SCREEN URINE: NOT DETECTED
PLATELET # BLD: 306 E9/L (ref 130–450)
PMV BLD AUTO: 9.5 FL (ref 7–12)
POTASSIUM SERPL-SCNC: 4.5 MMOL/L (ref 3.5–5)
PROTEIN UA: NEGATIVE MG/DL
RBC # BLD: 4.6 E12/L (ref 3.8–5.8)
RBC UA: ABNORMAL /HPF (ref 0–2)
SODIUM BLD-SCNC: 141 MMOL/L (ref 132–146)
SPECIFIC GRAVITY UA: >=1.03 (ref 1–1.03)
TOTAL CK: <7 U/L (ref 20–200)
TOTAL PROTEIN: 7.2 G/DL (ref 6.4–8.3)
TROPONIN, HIGH SENSITIVITY: 7 NG/L (ref 0–11)
UROBILINOGEN, URINE: 0.2 E.U./DL
WBC # BLD: 8.5 E9/L (ref 4.5–11.5)
WBC UA: ABNORMAL /HPF (ref 0–5)

## 2023-01-25 PROCEDURE — 36415 COLL VENOUS BLD VENIPUNCTURE: CPT

## 2023-01-25 PROCEDURE — 93005 ELECTROCARDIOGRAM TRACING: CPT | Performed by: NURSE PRACTITIONER

## 2023-01-25 PROCEDURE — 80143 DRUG ASSAY ACETAMINOPHEN: CPT

## 2023-01-25 PROCEDURE — 84484 ASSAY OF TROPONIN QUANT: CPT

## 2023-01-25 PROCEDURE — 85025 COMPLETE CBC W/AUTO DIFF WBC: CPT

## 2023-01-25 PROCEDURE — 80053 COMPREHEN METABOLIC PANEL: CPT

## 2023-01-25 PROCEDURE — 80179 DRUG ASSAY SALICYLATE: CPT

## 2023-01-25 PROCEDURE — 82550 ASSAY OF CK (CPK): CPT

## 2023-01-25 PROCEDURE — 81001 URINALYSIS AUTO W/SCOPE: CPT

## 2023-01-25 PROCEDURE — 82077 ASSAY SPEC XCP UR&BREATH IA: CPT

## 2023-01-25 PROCEDURE — 80307 DRUG TEST PRSMV CHEM ANLYZR: CPT

## 2023-01-25 PROCEDURE — 99284 EMERGENCY DEPT VISIT MOD MDM: CPT

## 2023-01-25 ASSESSMENT — LIFESTYLE VARIABLES
HOW MANY STANDARD DRINKS CONTAINING ALCOHOL DO YOU HAVE ON A TYPICAL DAY: PATIENT DOES NOT DRINK
HOW OFTEN DO YOU HAVE A DRINK CONTAINING ALCOHOL: NEVER
HOW OFTEN DO YOU HAVE A DRINK CONTAINING ALCOHOL: NEVER
HOW MANY STANDARD DRINKS CONTAINING ALCOHOL DO YOU HAVE ON A TYPICAL DAY: PATIENT DOES NOT DRINK

## 2023-01-25 ASSESSMENT — PAIN - FUNCTIONAL ASSESSMENT: PAIN_FUNCTIONAL_ASSESSMENT: NONE - DENIES PAIN

## 2023-01-25 NOTE — ED PROVIDER NOTES
Department of Emergency Medicine  ED Provider Note  Admit Date: 1/25/2023  Room: [address] [place] [address]      ED physician    1/25/23  6:50 PM EST    HPI: Azzie Bloch. 40 y.o. male presents with a complaint of feeling suicidal and worsening paranoia beginning weeks to months ago. Complaint has been constant and became more severe today which is what prompted the visit. Positive suicidal ideation. Denies homicidal ideation. Vague specific plan. Patient presents back to the emergency department with now feeling suicidal.  Patient has had multiple emergency room visits over the past week. He reports feeling paranoid, talking and rambling sentence structure. He reports that he is fearful also for his life because of the neighborhoods that he walks and people are stealing from him and making him go into houses that he does not feel safe in. Patient reports that all of this is making him feel suicidal, he had vague plans that did not make sense including eating too much sugar. Patient is denying any recent crack cocaine use. Patient is noncompliant with meds and noncompliant with follow-up. He denies any homicidal thoughts. Review of Systems:   Pertinent positives and negatives are stated within HPI, all other systems reviewed and are negative.      --------------------------------------------- PAST HISTORY ---------------------------------------------  Past Medical History:  has a past medical history of Depression and Schizoaffective disorder ([facility] [address] [facility] Past Surgical History:  has a past surgical history that includes Hip fracture surgery (Left, 9/12/2021); eye surgery (19 years ago); and Hip fracture surgery (Left, 9/28/2021). Social History:  reports that he has been smoking cigarettes. He has a 12.00 pack-year smoking history. He has never used smokeless tobacco. He reports current alcohol use of about 2.0 standard drinks per week. He reports current drug use. Frequency: 7.00 times per week. Drugs: Cocaine and Marijuana ([place]). Family History: family history includes Mental Illness in his mother; No Known Problems in his father; Substance Abuse in his mother. The patients home medications have been reviewed. Allergies: Chlorpheniramine-phenylephrine, Motrin [ibuprofen], Penicillins, and Rondec-d [chlophedianol-pseudoephedrine]    -------------------------------------------------- RESULTS -------------------------------------------------  All laboratory and imaging studies were reviewed by myself.     LABS:  Results for orders placed or performed during the hospital encounter of 01/25/23   Urine Drug Screen   Result Value Ref Range    Amphetamine Screen, Urine NOT DETECTED Negative <1000 ng/mL    Barbiturate Screen, Ur NOT DETECTED Negative < 200 ng/mL    Benzodiazepine Screen, Urine NOT DETECTED Negative < 200 ng/mL    Cannabinoid Scrn, Ur NOT DETECTED Negative < 50ng/mL    Cocaine Metabolite Screen, Urine POSITIVE (A) Negative < 300 ng/mL    Opiate Scrn, Ur NOT DETECTED Negative < 300ng/mL    PCP Screen, Urine NOT DETECTED Negative < 25 ng/mL    Methadone Screen, Urine NOT DETECTED Negative <300 ng/mL    Oxycodone Urine NOT DETECTED Negative <100 ng/mL    FENTANYL SCREEN, URINE NOT DETECTED Negative <1 ng/mL    Drug Screen Comment: see below    Serum Drug Screen   Result Value Ref Range    Ethanol Lvl <10 mg/dL    Acetaminophen Level <5.0 (L) 10.0 - 44.1 mcg/mL    Salicylate, Serum <7.9 0.0 - 30.0 mg/dL   CBC with Auto Differential   Result Value Ref Range    WBC 8.5 4.5 - 11.5 E9/L    RBC 4.60 3.80 - 5.80 E12/L    Hemoglobin 13.5 12.5 - 16.5 g/dL    Hematocrit 41.3 37.0 - 54.0 %    MCV 89.8 80.0 - 99.9 fL    MCH 29.3 26.0 - 35.0 pg    MCHC 32.7 32.0 - 34.5 %    RDW 13.2 11.5 - 15.0 fL    Platelets 358 402 - 377 E9/L    MPV 9.5 7.0 - 12.0 fL    Neutrophils % 71.0 43.0 - 80.0 %    Immature Granulocytes % 0.1 0.0 - 5.0 %    Lymphocytes % 17.6 (L) 20.0 - 42.0 %    Monocytes % 8.6 2.0 - 12.0 % Eosinophils % 1.5 0.0 - 6.0 %    Basophils % 1.2 0.0 - 2.0 %    Neutrophils Absolute 6.02 1.80 - 7.30 E9/L    Immature Granulocytes # 0.01 E9/L    Lymphocytes Absolute 1.49 (L) 1.50 - 4.00 E9/L    Monocytes Absolute 0.73 0.10 - 0.95 E9/L    Eosinophils Absolute 0.13 0.05 - 0.50 E9/L    Basophils Absolute 0.10 0.00 - 0.20 E9/L   Comprehensive Metabolic Panel   Result Value Ref Range    Sodium 141 132 - 146 mmol/L    Potassium 4.5 3.5 - 5.0 mmol/L    Chloride 104 98 - 107 mmol/L    CO2 27 22 - 29 mmol/L    Anion Gap 10 7 - 16 mmol/L    Glucose 90 74 - 99 mg/dL    BUN 20 6 - 20 mg/dL    Creatinine 1.0 0.7 - 1.2 mg/dL    Est, Glom Filt Rate >60 >=60 mL/min/1.73    Calcium 8.8 8.6 - 10.2 mg/dL    Total Protein 7.2 6.4 - 8.3 g/dL    Albumin 4.3 3.5 - 5.2 g/dL    Total Bilirubin <0.2 0.0 - 1.2 mg/dL    Alkaline Phosphatase 59 40 - 129 U/L    ALT 8 0 - 40 U/L    AST 15 0 - 39 U/L   Urinalysis   Result Value Ref Range    Color, UA Yellow Straw/Yellow    Clarity, UA Clear Clear    Glucose, Ur Negative Negative mg/dL    Bilirubin Urine Negative Negative    Ketones, Urine Negative Negative mg/dL    Specific Gravity, UA >=1.030 1.005 - 1.030    Blood, Urine TRACE-INTACT Negative    pH, UA 5.5 5.0 - 9.0    Protein, UA Negative Negative mg/dL    Urobilinogen, Urine 0.2 <2.0 E.U./dL    Nitrite, Urine Negative Negative    Leukocyte Esterase, Urine Negative Negative   Microscopic Urinalysis   Result Value Ref Range    WBC, UA 0-1 0 - 5 /HPF    RBC, UA 0-1 0 - 2 /HPF    Bacteria, UA RARE (A) None Seen /HPF   CK   Result Value Ref Range    Total CK <7 (L) 20 - 200 U/L   Troponin   Result Value Ref Range    Troponin, High Sensitivity 7 0 - 11 ng/L   EKG 12 Lead   Result Value Ref Range    Ventricular Rate 62 BPM    Atrial Rate 62 BPM    P-R Interval 170 ms    QRS Duration 96 ms    Q-T Interval 380 ms    QTc Calculation (Bazett) 385 ms    P Axis 2 degrees    R Axis -12 degrees    T Axis 2 degrees       RADIOLOGY:  Interpreted by Radiologist.  No orders to display               ------------------------- NURSING NOTES AND VITALS REVIEWED ---------------------------   The nursing notes within the ED encounter and vital signs as below have been reviewed. BP (!) 131/90   Pulse 78   Temp (!) 96.2 °F (35.7 °C) (Temporal)   Resp 18   SpO2 96%   Oxygen Saturation Interpretation: Normal            ---------------------------------------------------PHYSICAL EXAM--------------------------------------      Constitutional/General: Alert and oriented x3, unkept  Head: Normocephalic, atraumatic  Eyes: PERRL, EOMI  Mouth: Oropharynx clear, handling secretions, no trismus  Neck: Supple, full ROM, non tender to palpation in the midline, no stridor, no crepitus, no meningeal signs  Pulmonary: Lungs clear to auscultation bilaterally, no wheezes, rales, or rhonchi. Not in respiratory distress  Cardiovascular:  Regular rate and rhythm, no murmurs, gallops, or rubs. 2+ distal pulses  Abdomen: Soft, non tender, non distended, +BS, no rebound, guarding, or rigidity. No pulsatile masses appreciated  Extremities: Moves all extremities x 4. Warm and well perfused, no clubbing, cyanosis, or edema. Capillary refill <3 seconds  Skin: warm and dry without rash  Neurologic: GCS 15, CN 2-12 grossly intact, no focal deficits, symmetric strength 5/5 in the upper and lower extremities bilaterally  Psych: Rambling affect,   HPI: [employer]. 40 y.o. male presents with a complaint of feeling suicidal and worsening paranoia beginning weeks to months ago. Complaint has been constant and became more severe today which is what prompted the visit. Positive suicidal ideation. Denies homicidal ideation. Vague specific plan. Patient presents back to the emergency department with now feeling suicidal.  Patient has had multiple emergency room visits over the past week. He reports feeling paranoid, talking and rambling sentence structure.   He reports that he is fearful also for his life because of the neighborhoods that he walks and people are stealing from him and making him go into houses that he does not feel safe in. Patient reports that all of this is making him feel suicidal, he had vague plans that did not make sense including eating too much sugar. Patient is denying any recent crack cocaine use. Patient is noncompliant with meds and noncompliant with follow-up. He denies any homicidal thoughts.      ------------------------------------------ PROGRESS NOTES ------------------------------------------     Medical decision making:  HPI: Mario Rashid. 40 y.o. male presents with a complaint of feeling suicidal and worsening paranoia beginning weeks to months ago. Complaint has been constant and became more severe today which is what prompted the visit. Positive suicidal ideation. Denies homicidal ideation. Vague specific plan. Patient presents back to the emergency department with now feeling suicidal.  Patient has had multiple emergency room visits over the past week. He reports feeling paranoid, talking and rambling sentence structure. He reports that he is fearful also for his life because of the neighborhoods that he walks and people are stealing from him and making him go into houses that he does not feel safe in. Patient reports that all of this is making him feel suicidal, he had vague plans that did not make sense including eating too much sugar. Patient is denying any recent crack cocaine use. Patient is noncompliant with meds and noncompliant with follow-up. He denies any homicidal thoughts. Differential diagnosis includes exacerbation of schizophrenia versus bipolar disease versus homelessness. Plan will be to obtain labs as well as EKG and will consult . Labs resulted serum drug screen negative, CBC all within normal limits, urinalysis completely negative.   Twelve-lead EKG as interpreted and reviewed by the emergency room attending showing a heart rate of 62, normal sinus rhythm. No acute ST elevation or depression noted. This was compared to EKG from January 23, 2023, urine drug screen positive for cocaine, will add on CK level and troponin. Chemistry panel all within normal limits, urinalysis completely negative. Troponin negative, CK negative. Patient is medically cleared. Awaiting  evaluation. Consultations:   Social work     Re-Evaluations:        Counseling: The emergency provider has spoken with thepatient and discussed todays results, in addition to providing specific details for the plan of care and counseling regarding the diagnosis and prognosis. Questions are answered at this time and they are agreeable with the plan. History from : Patient and Medical records     Limitations to history : None    Chronic Conditions: has a past medical history of Depression and Schizoaffective disorder ([facility] CONSULTS:     Discussion with Other Profesionals : Emergency room attending and     Social Determinants :Homeless, and drug abuse, reports that he has been smoking cigarettes. He has a 12.00 pack-year smoking history. He has never used smokeless tobacco. He reports current alcohol use of about 2.0 standard drinks per week. He reports current drug use. Frequency: 7.00 times per week. Drugs: Cocaine and Marijuana ([place]). Records Reviewed : Source patient and Inpatient Notes epic medical records        Disposition Considerations (Tests not ordered but considered, Shared Decision Making, Pt Expectation of Test or Tx.):   Appropriate for outpatient management yes and Evaluation by myself and discharge recommended. I am the Primary Clinician of Record.       --------------------------------- IMPRESSION AND DISPOSITION ---------------------------------    IMPRESSION  1.  Schizophrenia, unspecified type ([facility]        DISPOSITION  Disposition: as per consultation   Patient condition is stable            Gelacio Gale, JUICE - CNP  01/25/23 0512
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
## 2023-01-25 NOTE — ED NOTES
Department of Emergency Medicine  FIRST PROVIDER TRIAGE NOTE             Independent MLP           1/25/23  6:28 PM EST    Date of Encounter: 1/25/23   MRN: [id number]      HPI: Francie Serna is a 40 y.o. male who presents to the ED for Psychiatric Evaluation (Patient states he feels paranoid and suicidal. He states people steal from him. )  Patient is now presenting stating that he is feeling suicidal, states still very paranoid, rambling, talking about walking into neighborhoods and getting harmed. No plan. ROS: Negative for cp or sob. PE: Gen Appearance/Constitutional: alert  HEENT: NC/NT. PERRLA,  Airway patent. Initial Plan of Care: All treatment areas with department are currently occupied.  Plan to order/Initiate the following while awaiting opening in ED: labs, EKG, and Social Work Eval.  Initiate Treatment-Testing, Proceed toTreatment Area When [employer] Available for ED Attending/MLP to [employer] signed by JUICE Reid CNP   DD: 1/25/23       JUICE Reid CNP  01/25/23 6028

## 2023-01-26 LAB
ACETAMINOPHEN LEVEL: <5 MCG/ML (ref 10–30)
EKG ATRIAL RATE: 62 BPM
EKG P AXIS: 2 DEGREES
EKG P-R INTERVAL: 170 MS
EKG Q-T INTERVAL: 380 MS
EKG QRS DURATION: 96 MS
EKG QTC CALCULATION (BAZETT): 385 MS
EKG R AXIS: -12 DEGREES
EKG T AXIS: 2 DEGREES
EKG VENTRICULAR RATE: 62 BPM
ETHANOL: <10 MG/DL (ref 0–0.08)
SALICYLATE, SERUM: <0.3 MG/DL (ref 0–30)
TRICYCLIC ANTIDEPRESSANTS SCREEN SERUM: NEGATIVE NG/ML

## 2023-01-26 PROCEDURE — 93010 ELECTROCARDIOGRAM REPORT: CPT | Performed by: INTERNAL MEDICINE

## 2023-01-26 ASSESSMENT — PAIN - FUNCTIONAL ASSESSMENT: PAIN_FUNCTIONAL_ASSESSMENT: NONE - DENIES PAIN
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
## 2023-01-26 NOTE — ED NOTES
Patient woke up and stated he was ready to leave. Patient reports he is no longer suicidal. Patient is not pink slipped. Patient appears to be at his baseline. PATRICIO SW spoke with ED Doc who is agreeable to discharge at this time.      FRANCISCO Contreras, [place]  01/26/23 9886

## 2023-01-27 ENCOUNTER — HOSPITAL ENCOUNTER (EMERGENCY)
Age: 43
Discharge: HOME | End: 2023-01-27
Payer: MEDICAID

## 2023-01-27 ENCOUNTER — APPOINTMENT (OUTPATIENT)
Dept: GENERAL RADIOLOGY | Age: 43
End: 2023-01-27
Payer: MEDICAID

## 2023-01-27 VITALS
OXYGEN SATURATION: 97 % | SYSTOLIC BLOOD PRESSURE: 123 MMHG | RESPIRATION RATE: 18 BRPM | HEART RATE: 82 BPM | TEMPERATURE: 97.6 F | DIASTOLIC BLOOD PRESSURE: 73 MMHG

## 2023-01-27 DIAGNOSIS — J06.9 UPPER RESPIRATORY TRACT INFECTION, UNSPECIFIED TYPE: ICD-10-CM

## 2023-01-27 DIAGNOSIS — R05.1 ACUTE COUGH: Primary | ICD-10-CM

## 2023-01-27 LAB
INFLUENZA A: NOT DETECTED
INFLUENZA B: NOT DETECTED
SARS-COV-2 RNA, RT PCR: NOT DETECTED

## 2023-01-27 PROCEDURE — 99284 EMERGENCY DEPT VISIT MOD MDM: CPT

## 2023-01-27 PROCEDURE — 87636 SARSCOV2 & INF A&B AMP PRB: CPT

## 2023-01-27 PROCEDURE — 71045 X-RAY EXAM CHEST 1 VIEW: CPT

## 2023-01-27 RX ORDER — FLUTICASONE PROPIONATE 50 MCG
2 SPRAY, SUSPENSION (ML) NASAL DAILY
Qty: 16 G | Refills: 0 | Status: SHIPPED | OUTPATIENT
Start: 2023-01-27

## 2023-01-27 RX ORDER — BENZONATATE 100 MG/1
100 CAPSULE ORAL 3 TIMES DAILY PRN
Qty: 21 CAPSULE | Refills: 0 | Status: SHIPPED | OUTPATIENT
Start: 2023-01-27 | End: 2023-02-03

## 2023-01-28 NOTE — ED PROVIDER NOTES
Independent  HPI:  1/27/23, Time: 8:16 PM JAYLENE Serna is a 40 y.o. male presenting to the ED for cough and upper respiratory symptoms. Patient presents to the emergency department states that he got on the bus today and started coughing. Unaware if he was around anybody ill. Patient reports that he also has a runny nose. Patient states that his cough is moist and at times bringing up  clear-colored sputum. He denies any chest pain, shortness of breath or any abdominal pain as well as no noted nausea, vomiting or diarrhea or any fevers. Patient did express still having the same paranoia thoughts but at baseline. He denies any suicidal or homicidal ideations. Patient able to easily converse without any bizarre or nonsensical conversation. Patient otherwise normal state of health and at baseline. Review of Systems:   A complete review of systems was performed and pertinent positives and negatives are stated within HPI, all other systems reviewed and are negative.          --------------------------------------------- PAST HISTORY ---------------------------------------------  Past Medical History:  has a past medical history of Depression and Schizoaffective disorder ([facility] [address] Past Surgical History:  has a past surgical history that includes Hip fracture surgery (Left, 9/12/2021); eye surgery (19 years ago); and Hip fracture surgery (Left, 9/28/2021). Social History:  reports that he has been smoking cigarettes. He has a 12.00 pack-year smoking history. He has never used smokeless tobacco. He reports current alcohol use of about 2.0 standard drinks per week. He reports current drug use. Frequency: 7.00 times per week. Drugs: Cocaine and Marijuana (Jerone Orris). Family History: family history includes Mental Illness in his mother; No Known Problems in his father; Substance Abuse in his mother. The patients home medications have been reviewed.     Allergies: Chlorpheniramine-phenylephrine, Motrin [ibuprofen], Penicillins, and Rondec-d [chlophedianol-pseudoephedrine]    -------------------------------------------------- RESULTS -------------------------------------------------  All laboratory and radiology results have been personally reviewed by myself   LABS:  Results for orders placed or performed during the hospital encounter of 01/27/23   COVID-19 & Influenza Combo    Specimen: Nasopharyngeal Swab   Result Value Ref Range    SARS-CoV-2 RNA, RT PCR NOT DETECTED NOT DETECTED    INFLUENZA A NOT DETECTED NOT DETECTED    INFLUENZA B NOT DETECTED NOT DETECTED       RADIOLOGY:  Interpreted by Radiologist.  XR CHEST PORTABLE   Final Result   No acute process. ------------------------- NURSING NOTES AND VITALS REVIEWED ---------------------------   The nursing notes within the ED encounter and vital signs as below have been reviewed. /73   Pulse 82   Temp 97.6 °F (36.4 °C)   Resp 18   SpO2 97%   Oxygen Saturation Interpretation: Normal      ---------------------------------------------------PHYSICAL EXAM--------------------------------------      Constitutional/General: Alert and oriented x3, mildly uncomfortable  Head: Normocephalic and atraumatic  Eyes: PERRL, EOMI  Mouth: Oropharynx clear, handling secretions, no trismus, mild erythema and swelling noted to inferior nasal turbinates. Neck: Supple, full ROM,   Pulmonary: Lungs clear to auscultation bilaterally, no wheezes, rales, or rhonchi. Not in respiratory distress, lungs clear throughout. Cardiovascular:  Regular rate and rhythm, no murmurs, gallops, or rubs. 2+ distal pulses  Abdomen: Soft, non tender, non distended,   Extremities: Moves all extremities x 4. Warm and well perfused  Skin: warm and dry without rash  Neurologic: GCS 15,  Psych: Normal Affect for patient. Denies suicidal or homicidal ideations, able to focus and stay on track. No unusual delusions no noted hallucinations. Conversation clear and coherent      ------------------------------ ED COURSE/MEDICAL DECISION MAKING----------------------  Medications - No data to display      ED COURSE:       Medical Decision Making:    Mario Rashid. is a 40 y.o. male presenting to the ED for cough and upper respiratory symptoms. Patient presents to the emergency department states that he got on the bus today and started coughing. Unaware if he was around anybody ill. Patient reports that he also has a runny nose. Patient states that his cough is moist and at times bringing up  clear-colored sputum. He denies any chest pain, shortness of breath or any abdominal pain as well as no noted nausea, vomiting or diarrhea or any fevers. Patient did express still having the same paranoia thoughts but at baseline. He denies any suicidal or homicidal ideations. Patient able to easily converse without any bizarre or nonsensical conversation. Normal Affect for patient. Denies suicidal or homicidal ideations, able to focus and stay on track. No unusual delusions no noted  Patient otherwise normal state of health and at baseline. Differential diagnosis includes upper respiratory infection versus pneumonia versus influenza versus COVID-19. Plan will be for COVID test, flu test, chest x-ray. Chest x-ray showing no acute process. COVID and flu both negative. Patient was brought back into triage and reevaluated. He was made aware of all results. Patient is neurovascular and hemodynamically intact. He will be discharged home with [facility] as well as Flonase nasal spray. Patient provided with warm blankets and tray. He was encouraged to stay out of the cold weather and rain and to seek shelter and follow-up with psychiatry as previously outlined during his other numerous visits. Patient appropriate with speech and thought process. He is actually very pleasant. Patient expressed understanding.   Patient discharged       Counseling: The emergency provider has spoken with the patient and discussed todays results, in addition to providing specific details for the plan of care and counseling regarding the diagnosis and prognosis. Questions are answered at this time and they are agreeable with the plan. History from : Patient and Medical records     Limitations to history : None    Chronic Conditions: has a past medical history of Depression and Schizoaffective disorder ([address] CONSULTS:    Discussion with Other Profesionals : None    Social Determinants : reports that he has been smoking cigarettes. He has a 12.00 pack-year smoking history. He has never used smokeless tobacco. He reports current alcohol use of about 2.0 standard drinks per week. He reports current drug use. Frequency: 7.00 times per week. Drugs: Cocaine and Marijuana (Louis Darling). Records Reviewed : Source patient and Inpatient Notes epic medical records        Disposition Considerations (Tests not ordered but considered, Shared Decision Making, Pt Expectation of Test or Tx.):   Appropriate for outpatient management yes and Evaluation by myself and discharge recommended. I am the Primary Clinician of Record.    --------------------------------- IMPRESSION AND DISPOSITION ---------------------------------    IMPRESSION  1. Acute cough    2. Upper respiratory tract infection, unspecified type        DISPOSITION  Disposition: Discharge to home  Patient condition is good      NOTE: This report was transcribed using voice recognition software.  Every effort was made to ensure accuracy; however, inadvertent computerized transcription errors may be present      JUICE Bagely CNP  01/28/23 0401       JUICE Bagley CNP  01/28/23 0402

## 2023-01-31 ENCOUNTER — HOSPITAL ENCOUNTER (EMERGENCY)
Age: 43
Discharge: HOME | End: 2023-02-01
Attending: EMERGENCY MEDICINE
Payer: MEDICAID

## 2023-01-31 VITALS
WEIGHT: 150 LBS | SYSTOLIC BLOOD PRESSURE: 126 MMHG | TEMPERATURE: 97.2 F | DIASTOLIC BLOOD PRESSURE: 74 MMHG | HEART RATE: 85 BPM | RESPIRATION RATE: 18 BRPM | BODY MASS INDEX: 21 KG/M2 | OXYGEN SATURATION: 97 % | HEIGHT: 71 IN

## 2023-01-31 DIAGNOSIS — F39 MOOD DISORDER (HCC): Primary | ICD-10-CM

## 2023-01-31 PROCEDURE — 99282 EMERGENCY DEPT VISIT SF MDM: CPT

## 2023-01-31 ASSESSMENT — PAIN - FUNCTIONAL ASSESSMENT: PAIN_FUNCTIONAL_ASSESSMENT: NONE - DENIES PAIN

## 2023-02-01 VITALS
HEART RATE: 69 BPM | WEIGHT: 155 LBS | HEIGHT: 72 IN | RESPIRATION RATE: 20 BRPM | SYSTOLIC BLOOD PRESSURE: 119 MMHG | OXYGEN SATURATION: 96 % | TEMPERATURE: 98.1 F | DIASTOLIC BLOOD PRESSURE: 73 MMHG | BODY MASS INDEX: 20.99 KG/M2

## 2023-02-01 LAB
ALBUMIN SERPL-MCNC: 3.9 G/DL (ref 3.5–5.2)
ALP BLD-CCNC: 52 U/L (ref 40–129)
ALT SERPL-CCNC: 10 U/L (ref 0–40)
ANION GAP SERPL CALCULATED.3IONS-SCNC: 6 MMOL/L (ref 7–16)
AST SERPL-CCNC: 19 U/L (ref 0–39)
BASOPHILS ABSOLUTE: 0.08 E9/L (ref 0–0.2)
BASOPHILS RELATIVE PERCENT: 1.6 % (ref 0–2)
BILIRUB SERPL-MCNC: <0.2 MG/DL (ref 0–1.2)
BUN BLDV-MCNC: 14 MG/DL (ref 6–20)
CALCIUM SERPL-MCNC: 8.7 MG/DL (ref 8.6–10.2)
CHLORIDE BLD-SCNC: 104 MMOL/L (ref 98–107)
CO2: 29 MMOL/L (ref 22–29)
CREAT SERPL-MCNC: 0.9 MG/DL (ref 0.7–1.2)
EOSINOPHILS ABSOLUTE: 0.16 E9/L (ref 0.05–0.5)
EOSINOPHILS RELATIVE PERCENT: 3.1 % (ref 0–6)
GFR SERPL CREATININE-BSD FRML MDRD: >60 ML/MIN/1.73
GLUCOSE BLD-MCNC: 83 MG/DL (ref 74–99)
HCT VFR BLD CALC: 40 % (ref 37–54)
HEMOGLOBIN: 12.9 G/DL (ref 12.5–16.5)
IMMATURE GRANULOCYTES #: 0.01 E9/L
IMMATURE GRANULOCYTES %: 0.2 % (ref 0–5)
LYMPHOCYTES ABSOLUTE: 2.22 E9/L (ref 1.5–4)
LYMPHOCYTES RELATIVE PERCENT: 43.4 % (ref 20–42)
MCH RBC QN AUTO: 29.9 PG (ref 26–35)
MCHC RBC AUTO-ENTMCNC: 32.3 % (ref 32–34.5)
MCV RBC AUTO: 92.8 FL (ref 80–99.9)
MONOCYTES ABSOLUTE: 0.41 E9/L (ref 0.1–0.95)
MONOCYTES RELATIVE PERCENT: 8 % (ref 2–12)
NEUTROPHILS ABSOLUTE: 2.24 E9/L (ref 1.8–7.3)
NEUTROPHILS RELATIVE PERCENT: 43.7 % (ref 43–80)
PDW BLD-RTO: 12.7 FL (ref 11.5–15)
PLATELET # BLD: 355 E9/L (ref 130–450)
PMV BLD AUTO: 9.3 FL (ref 7–12)
POTASSIUM REFLEX MAGNESIUM: 3.8 MMOL/L (ref 3.5–5)
RBC # BLD: 4.31 E12/L (ref 3.8–5.8)
SODIUM BLD-SCNC: 139 MMOL/L (ref 132–146)
TOTAL PROTEIN: 6.7 G/DL (ref 6.4–8.3)
WBC # BLD: 5.1 E9/L (ref 4.5–11.5)

## 2023-02-01 PROCEDURE — 93005 ELECTROCARDIOGRAM TRACING: CPT | Performed by: NURSE PRACTITIONER

## 2023-02-01 PROCEDURE — 80053 COMPREHEN METABOLIC PANEL: CPT

## 2023-02-01 PROCEDURE — 99284 EMERGENCY DEPT VISIT MOD MDM: CPT

## 2023-02-01 PROCEDURE — 36415 COLL VENOUS BLD VENIPUNCTURE: CPT

## 2023-02-01 PROCEDURE — 85025 COMPLETE CBC W/AUTO DIFF WBC: CPT

## 2023-02-01 PROCEDURE — 80307 DRUG TEST PRSMV CHEM ANLYZR: CPT

## 2023-02-01 ASSESSMENT — PAIN - FUNCTIONAL ASSESSMENT: PAIN_FUNCTIONAL_ASSESSMENT: NONE - DENIES PAIN

## 2023-02-01 NOTE — ED PROVIDER NOTES
HPI:  1/31/23, Time: 11:23 PM JAYLENE Nash. is a 40 y.o. male presenting to the ED for psychiatric evaluation. Patient is paranoid. He thinks it was out to get him. Does have an extensive psychiatric history. Denies any suicidal or homicidal ideation. Denies any hallucinations. Denies any other symptoms or complaints. Review of Systems:   A complete review of systems was performed and pertinent positives and negatives are stated within HPI, all other systems reviewed and are negative.          --------------------------------------------- PAST HISTORY ---------------------------------------------  Past Medical History:  has a past medical history of Depression and Schizoaffective disorder ([facility] [address] Past Surgical History:  has a past surgical history that includes Hip fracture surgery (Left, 9/12/2021); eye surgery (19 years ago); and Hip fracture surgery (Left, 9/28/2021). Social History:  reports that he has been smoking cigarettes. He has a 12.00 pack-year smoking history. He has never used smokeless tobacco. He reports current alcohol use of about 2.0 standard drinks per week. He reports current drug use. Frequency: 7.00 times per week. Drugs: Cocaine and Marijuana ([place]). Family History: family history includes Mental Illness in his mother; No Known Problems in his father; Substance Abuse in his mother. The patients home medications have been reviewed. Allergies: Chlorpheniramine-phenylephrine, Motrin [ibuprofen], Penicillins, and Rondec-d [chlophedianol-pseudoephedrine]    -------------------------------------------------- RESULTS -------------------------------------------------  All laboratory and radiology results have been personally reviewed by myself   LABS:  No results found for this visit on 01/31/23.     RADIOLOGY:  Interpreted by Radiologist.  No orders to display       ------------------------- NURSING NOTES AND VITALS REVIEWED ---------------------------   The nursing notes within the ED encounter and vital signs as below have been reviewed. /74   Pulse 85   Temp 97.2 °F (36.2 °C) (Temporal)   Resp 18   Ht 5' 11\" (1.803 m)   Wt 150 lb (68 kg)   SpO2 97%   BMI 20.92 kg/m²   Oxygen Saturation Interpretation: Normal      ---------------------------------------------------PHYSICAL EXAM--------------------------------------      Constitutional/General: Alert and oriented x3, well appearing, non toxic in NAD  Head: Normocephalic and atraumatic  Eyes: PERRL, EOMI  Mouth: Oropharynx clear, handling secretions, no trismus  Neck: Supple, full ROM,   Pulmonary: Lungs clear to auscultation bilaterally, no wheezes, rales, or rhonchi. Not in respiratory distress  Cardiovascular:  Regular rate and rhythm, no murmurs, gallops, or rubs. 2+ distal pulses  Abdomen: Soft, non tender, non distended,   Extremities: Moves all extremities x 4. Warm and well perfused  Skin: warm and dry without rash  Neurologic: GCS 15,  Psych: Normal Affect      ------------------------------ ED COURSE/MEDICAL DECISION MAKING----------------------  Medications - No data to display      ED COURSE:       Medical Decision Making:       Patient presents for paranoia. Was awake and alert, no signs of distress. Hemodynamically stable. Concern for illicit drug use, medical noncompliance, psychosis, among other pathologies. Required no medications. Labs interpreted by me shows a normal CBC and CMP, patient is cocaine positive. Serum drug screen is negative. Chart reviewed. Patient was seen by social work on 1/26/2023. He was observed for several hours in the department. He was deemed medically clear. He was not admitted to a psychiatric unit. Patient is medically cleared. Social work to evaluate, they are consulted once again. Counseling:    The emergency provider has spoken with the patient and discussed todays results, in addition to providing specific details for the plan of care and counseling regarding the diagnosis and prognosis. Questions are answered at this time and they are agreeable with the plan.      --------------------------------- IMPRESSION AND DISPOSITION ---------------------------------    IMPRESSION  1. Mood disorder ([facility]        DISPOSITION  Disposition: Per social work  Patient condition is stable      NOTE: This report was transcribed using voice recognition software.  Every effort was made to ensure accuracy; however, inadvertent computerized transcription errors may be present        Korey Negro MD  01/31/23 [id number]

## 2023-02-02 ENCOUNTER — HOSPITAL ENCOUNTER (EMERGENCY)
Age: 43
Discharge: LEFT BEFORE DISCHARGE | End: 2023-02-02
Attending: EMERGENCY MEDICINE
Payer: MEDICAID

## 2023-02-02 VITALS
OXYGEN SATURATION: 100 % | HEART RATE: 89 BPM | TEMPERATURE: 97.2 F | RESPIRATION RATE: 16 BRPM | SYSTOLIC BLOOD PRESSURE: 130 MMHG | DIASTOLIC BLOOD PRESSURE: 86 MMHG

## 2023-02-02 DIAGNOSIS — F20.9 SCHIZOPHRENIA, UNSPECIFIED TYPE (HCC): Primary | ICD-10-CM

## 2023-02-02 LAB
AMPHETAMINE SCREEN, URINE: NOT DETECTED
BARBITURATE SCREEN URINE: NOT DETECTED
BENZODIAZEPINE SCREEN, URINE: NOT DETECTED
CANNABINOID SCREEN URINE: NOT DETECTED
COCAINE METABOLITE SCREEN URINE: POSITIVE
EKG ATRIAL RATE: 53 BPM
EKG P AXIS: 80 DEGREES
EKG P-R INTERVAL: 192 MS
EKG Q-T INTERVAL: 400 MS
EKG QRS DURATION: 98 MS
EKG QTC CALCULATION (BAZETT): 375 MS
EKG R AXIS: 80 DEGREES
EKG T AXIS: 67 DEGREES
EKG VENTRICULAR RATE: 53 BPM
FENTANYL SCREEN, URINE: NOT DETECTED
Lab: ABNORMAL
METHADONE SCREEN, URINE: NOT DETECTED
OPIATE SCREEN URINE: NOT DETECTED
OXYCODONE URINE: NOT DETECTED
PHENCYCLIDINE SCREEN URINE: NOT DETECTED

## 2023-02-02 PROCEDURE — 99282 EMERGENCY DEPT VISIT SF MDM: CPT

## 2023-02-02 PROCEDURE — 93010 ELECTROCARDIOGRAM REPORT: CPT | Performed by: INTERNAL MEDICINE

## 2023-02-02 ASSESSMENT — PAIN - FUNCTIONAL ASSESSMENT: PAIN_FUNCTIONAL_ASSESSMENT: NONE - DENIES PAIN

## 2023-02-02 NOTE — ED NOTES
Pt brought back to a bed in ER, and became upset and stated \"I don't want to go back to a room, I'm a \" Pt walked out of ER     Erica Ray RN  02/02/23 [id number]

## 2023-02-02 NOTE — ED PROVIDER NOTES
HPI:  2/1/23, Time: 9:35 PM JAYLENE Abad. is a 40 y.o. male presenting to the ED for psychiatric evaluation feeling paranoid, beginning ongoing for years ago. The complaint has been persistent, mild in severity, and worsened by nothing. Patient reporting feeling paranoid. Patient reporting feeling people are out to harm him. Patient reporting no thoughts of hurting himself or others. Patient does report ongoing auditory visual hallucinations. Patient reports God is talking to him. Patient reporting no chest pain or abdominal pain he reports no vomiting he reports no fever he reports no productive cough. Patient does admit to using cocaine several days ago. Patient does smoke. He does socially drink. He reports no leg pain or swelling. Reports no weakness he reports no fall or injury. ROS:   Pertinent positives and negatives are stated within HPI, all other systems reviewed and are negative.  --------------------------------------------- PAST HISTORY ---------------------------------------------  Past Medical History:  has a past medical history of Depression and Schizoaffective disorder ([facility] [address] Past Surgical History:  has a past surgical history that includes Hip fracture surgery (Left, 9/12/2021); eye surgery (19 years ago); and Hip fracture surgery (Left, 9/28/2021). Social History:  reports that he has been smoking cigarettes. He has a 12.00 pack-year smoking history. He has never used smokeless tobacco. He reports current alcohol use of about 2.0 standard drinks per week. He reports current drug use. Frequency: 7.00 times per week. Drugs: Cocaine and Marijuana (May Candle). Family History: family history includes Mental Illness in his mother; No Known Problems in his father; Substance Abuse in his mother. The patients home medications have been reviewed.     Allergies: Chlorpheniramine-phenylephrine, Motrin [ibuprofen], Penicillins, and Rondec-d [chlophedianol-pseudoephedrine]    ---------------------------------------------------PHYSICAL EXAM--------------------------------------    Constitutional/General: Alert and oriented x3, well appearing, non toxic in NAD  Head: Normocephalic and atraumatic  Eyes: PERRL, EOMI  Mouth: Oropharynx clear, handling secretions, no trismus  Neck: Supple, full ROM, non tender to palpation in the midline, no stridor, no crepitus, no meningeal signs  Pulmonary: Lungs clear to auscultation bilaterally, no wheezes, rales, or rhonchi. Not in respiratory distress  Cardiovascular:  Regular rate. Regular rhythm. No murmurs, gallops, or rubs. 2+ distal pulses  Chest: no chest wall tenderness  Abdomen: Soft. Non tender. Non distended. +BS. No rebound, guarding, or rigidity. No pulsatile masses appreciated. Musculoskeletal: Moves all extremities x 4. Warm and well perfused, no clubbing, cyanosis, or edema. Capillary refill <3 seconds  Skin: warm and dry. No rashes. Neurologic: GCS 15, CN 2-12 grossly intact, no focal deficits, symmetric strength 5/5 in the upper and lower extremities bilaterally  Psych: Paranoid not homicidal or suicidal does report ongoing auditory visual hallucinations.    -------------------------------------------------- RESULTS -------------------------------------------------  I have personally reviewed all laboratory and imaging results for this patient. Results are listed below.      LABS:  Results for orders placed or performed during the hospital encounter of 02/02/23   CBC with Auto Differential   Result Value Ref Range    WBC 5.1 4.5 - 11.5 E9/L    RBC 4.31 3.80 - 5.80 E12/L    Hemoglobin 12.9 12.5 - 16.5 g/dL    Hematocrit 40.0 37.0 - 54.0 %    MCV 92.8 80.0 - 99.9 fL    MCH 29.9 26.0 - 35.0 pg    MCHC 32.3 32.0 - 34.5 %    RDW 12.7 11.5 - 15.0 fL    Platelets 527 474 - 981 E9/L    MPV 9.3 7.0 - 12.0 fL    Neutrophils % 43.7 43.0 - 80.0 %    Immature Granulocytes % 0.2 0.0 - 5.0 %    Lymphocytes % 43. 4 (H) 20.0 - 42.0 %    Monocytes % 8.0 2.0 - 12.0 %    Eosinophils % 3.1 0.0 - 6.0 %    Basophils % 1.6 0.0 - 2.0 %    Neutrophils Absolute 2.24 1.80 - 7.30 E9/L    Immature Granulocytes # 0.01 E9/L    Lymphocytes Absolute 2.22 1.50 - 4.00 E9/L    Monocytes Absolute 0.41 0.10 - 0.95 E9/L    Eosinophils Absolute 0.16 0.05 - 0.50 E9/L    Basophils Absolute 0.08 0.00 - 0.20 E9/L   Comprehensive Metabolic Panel w/ Reflex to MG   Result Value Ref Range    Sodium 139 132 - 146 mmol/L    Potassium reflex Magnesium 3.8 3.5 - 5.0 mmol/L    Chloride 104 98 - 107 mmol/L    CO2 29 22 - 29 mmol/L    Anion Gap 6 (L) 7 - 16 mmol/L    Glucose 83 74 - 99 mg/dL    BUN 14 6 - 20 mg/dL    Creatinine 0.9 0.7 - 1.2 mg/dL    Est, Glom Filt Rate >60 >=60 mL/min/1.73    Calcium 8.7 8.6 - 10.2 mg/dL    Total Protein 6.7 6.4 - 8.3 g/dL    Albumin 3.9 3.5 - 5.2 g/dL    Total Bilirubin <0.2 0.0 - 1.2 mg/dL    Alkaline Phosphatase 52 40 - 129 U/L    ALT 10 0 - 40 U/L    AST 19 0 - 39 U/L   URINE DRUG SCREEN   Result Value Ref Range    Amphetamine Screen, Urine NOT DETECTED Negative <1000 ng/mL    Barbiturate Screen, Ur NOT DETECTED Negative < 200 ng/mL    Benzodiazepine Screen, Urine NOT DETECTED Negative < 200 ng/mL    Cannabinoid Scrn, Ur NOT DETECTED Negative < 50ng/mL    Cocaine Metabolite Screen, Urine POSITIVE (A) Negative < 300 ng/mL    Opiate Scrn, Ur NOT DETECTED Negative < 300ng/mL    PCP Screen, Urine NOT DETECTED Negative < 25 ng/mL    Methadone Screen, Urine NOT DETECTED Negative <300 ng/mL    Oxycodone Urine NOT DETECTED Negative <100 ng/mL    FENTANYL SCREEN, URINE NOT DETECTED Negative <1 ng/mL    Drug Screen Comment: see below    EKG 12 Lead   Result Value Ref Range    Ventricular Rate 53 BPM    Atrial Rate 53 BPM    P-R Interval 192 ms    QRS Duration 98 ms    Q-T Interval 400 ms    QTc Calculation (Bazett) 375 ms    P Axis 80 degrees    R Axis 80 degrees    T Axis 67 degrees       RADIOLOGY:  Interpreted by Radiologist.  No orders to display         EKG: This EKG is signed and interpreted by me. Rate: 53  Rhythm: Sinus  Interpretation: no acute changes  Comparison: stable as compared to patient's most recent EKG 1/25/23      ------------------------- NURSING NOTES AND VITALS REVIEWED ---------------------------   The nursing notes within the ED encounter and vital signs as below have been reviewed by myself. /73   Pulse 69   Temp 98.1 °F (36.7 °C) (Oral)   Resp 20   Ht 6' (1.829 m)   Wt 155 lb (70.3 kg)   SpO2 96%   BMI 21.02 kg/m²   Oxygen Saturation Interpretation: Normal    The patients available past medical records and past encounters were reviewed. ------------------------------ ED COURSE/MEDICAL DECISION MAKING----------------------  Medications - No data to display          Medical Decision Making:      History From: Patient with history of depression and schizophrenia. Patient presenting here because of feeling paranoid. Patient reports an ongoing issue. Patient reporting hallucinations he reports no homicidal suicidal thoughts. Patient reporting no physical planes of chest pains or difficulty breathing. CC/HPI Summary, DDx, ED Course, Reassessment, Tests Considered, Patient expectation:   Patient with underlying history of schizophrenia history of substance abuse presenting here because of increased paranoia. Patient reporting no homicidal suicidal thoughts. Patient reporting hallucinations. Patient reporting no chest pain or difficulty breathing. Patient reporting no vomiting. Patient is awake alert orient x3 heart lung exam normal abdomen soft nontender. Patient neurologically intact. Patient does report feeling paranoid he reports hallucinations but no homicidal suicidal thoughts. Patient differential includes exacerbation of schizophrenia as well as mood disorder as well as depression as well as anxiety.     EKG is ordered to have documentation of patient's current rhythm, and to rule out any obvious acute cardiac illnesses such as ACS. Additionally, QT interval may be of use in decision making regarding any medications administered here in the ED. CBC is ordered to evaluate for any signs of infection or inflammation by obtaining a WBC count, or any signs of acute anemia by interpreting hemoglobin. CMP was ordered to evaluate for any electrolyte imbalances, kidney function, or any elevations in anion gap. Patient was reevaluated brought to room. Patient was placed in the hallway. Patient was to be eval by . Patient reportedly eloped from the emergency department. Social Determinants affecting Dx or Tx: Patient does smoke and drink. He also reports cocaine use    Chronic Conditions: Schizophrenia    Records Reviewed: She was seen here yesterday for the same complaint and was treated and released. Re-Evaluations:             Re-evaluation. Patients symptoms show no change      Consultations:                 Critical Care: This patient's ED course included: a personal history and physicial eaxmination    This patient has been closely monitored during their ED course. Counseling: The emergency provider has spoken with the patient and discussed todays results, in addition to providing specific details for the plan of care and counseling regarding the diagnosis and prognosis. Questions are answered at this time and they are agreeable with the plan.       --------------------------------- IMPRESSION AND DISPOSITION ---------------------------------    IMPRESSION  1. Schizophrenia, unspecified type ([facility]        DISPOSITION  Disposition: Patient eloped from the emergency department. NOTE: This report was transcribed using voice recognition software.  Every effort was made to ensure accuracy; however, inadvertent computerized transcription errors may be present          Houston Trinh MD  02/02/23 [id number]

## 2023-02-02 NOTE — ED TRIAGE NOTES
Department of Emergency Medicine  FIRST PROVIDER TRIAGE NOTE             Independent MLP           2/1/23  9:12 PM EST    Date of Encounter: 2/1/23   MRN: [id number]      HPI: Josue Batres. is a 40 y.o. male who presents to the ED for Paranoid (\"Very nervous and threatened. \")       ROS: Negative for Suicidal ideation or Homicidal Ideation. PE: Gen Appearance/Constitutional: alert  CV: regular rate     Initial Plan of Care: All treatment areas with department are currently occupied. Plan to order/Initiate the following while awaiting opening in ED: labs and EKG.   Initiate Treatment-Testing, Proceed toTreatment Area When Bed Available for ED Attending/MLP to Continue Care    Electronically signed by JUICE Adkins CNP   DD: 2/1/23

## 2023-02-03 ENCOUNTER — HOSPITAL ENCOUNTER (EMERGENCY)
Age: 43
Discharge: HOME | End: 2023-02-03
Attending: EMERGENCY MEDICINE
Payer: MEDICAID

## 2023-02-03 DIAGNOSIS — F20.9 SCHIZOPHRENIA, UNSPECIFIED TYPE (HCC): ICD-10-CM

## 2023-02-03 DIAGNOSIS — Z59.00 HOMELESS: Primary | ICD-10-CM

## 2023-02-03 SDOH — ECONOMIC STABILITY - HOUSING INSECURITY: HOMELESSNESS UNSPECIFIED: Z59.00

## 2023-02-03 NOTE — ED PROVIDER NOTES
HPI:  2/2/23, Time: 11:20 PM JAYLENE Swanson. is a 40 y.o. male presenting to the ED for history of schizophrenia feeling paranoid and having no place to stay, beginning ongoing issue ago. The complaint has been persistent, moderate in severity, and worsened by nothing. Patient here multiple times for same complaint. Patient reporting feeling paranoid. Patient does have history of schizophrenia. Patient reporting no homicidal suicidal thoughts he reports no fever no chills no cough he reports no abdominal pain. Patient reporting no headache he reports no trauma he does smoke he does use drugs. Patient reporting no headache he reports no neck or back pain. Patient does report ongoing auditory hallucinations. ROS:   Pertinent positives and negatives are stated within HPI, all other systems reviewed and are negative.  --------------------------------------------- PAST HISTORY ---------------------------------------------  Past Medical History:  has a past medical history of Depression and Schizoaffective disorder ([facility] [address] Past Surgical History:  has a past surgical history that includes Hip fracture surgery (Left, 9/12/2021); eye surgery (19 years ago); and Hip fracture surgery (Left, 9/28/2021). Social History:  reports that he has been smoking cigarettes. He has a 12.00 pack-year smoking history. He has never used smokeless tobacco. He reports current alcohol use of about 2.0 standard drinks per week. He reports current drug use. Frequency: 7.00 times per week. Drugs: Cocaine and Marijuana (Merryl Layman). Family History: family history includes Mental Illness in his mother; No Known Problems in his father; Substance Abuse in his mother. The patients home medications have been reviewed.     Allergies: Chlorpheniramine-phenylephrine, Motrin [ibuprofen], Penicillins, and Rondec-d [chlophedianol-pseudoephedrine]    ---------------------------------------------------PHYSICAL EXAM--------------------------------------    Constitutional/General: Alert and oriented x3, well appearing, non toxic in NAD  Head: Normocephalic and atraumatic  Eyes: PERRL, EOMI  Mouth: Oropharynx clear, handling secretions, no trismus  Neck: Supple, full ROM, non tender to palpation in the midline, no stridor, no crepitus, no meningeal signs  Pulmonary: Lungs clear to auscultation bilaterally, no wheezes, rales, or rhonchi. Not in respiratory distress  Cardiovascular:  Regular rate. Regular rhythm. No murmurs, gallops, or rubs. 2+ distal pulses  Chest: no chest wall tenderness  Abdomen: Soft. Non tender. Non distended. +BS. No rebound, guarding, or rigidity. No pulsatile masses appreciated. Musculoskeletal: Moves all extremities x 4. Warm and well perfused, no clubbing, cyanosis, or edema. Capillary refill <3 seconds  Skin: warm and dry. No rashes. Neurologic: GCS 15, CN 2-12 grossly intact, no focal deficits, symmetric strength 5/5 in the upper and lower extremities bilaterally  Psych: Not homicidal not suicidal does report hallucinations    -------------------------------------------------- RESULTS -------------------------------------------------  I have personally reviewed all laboratory and imaging results for this patient. Results are listed below. LABS:  No results found for this visit on 02/03/23. RADIOLOGY:  Interpreted by Radiologist.  No orders to display             ------------------------- NURSING NOTES AND VITALS REVIEWED ---------------------------   The nursing notes within the ED encounter and vital signs as below have been reviewed by myself. /86   Pulse 89   Temp 97.2 °F (36.2 °C) (Oral)   Resp 16   SpO2 100%   Oxygen Saturation Interpretation: Normal    The patients available past medical records and past encounters were reviewed.         ------------------------------ ED COURSE/MEDICAL DECISION MAKING----------------------  Medications - No data to display          Medical Decision Making:      History From: Patient with underlying history of schizophrenia and being homeless presenting here because of feeling paranoid. Patient reporting having hallucinations. Patient reporting no thoughts of harming himself or others. Patient reporting no chest pain or difficulty breathing. Patient does admit to smoking as well as drug use. Patient reporting no headache he reports no trauma or injury reports no syncopal event. CC/HPI Summary, DDx, ED Course, Reassessment, Tests Considered, Patient expectation:   Patient with underlying history of schizophrenia presenting here because of feeling more paranoid as well as reports that he is homeless. Patient has been here multiple times for the same complaint. Patient reporting no physical planes of chest pain shortness of breath or abdominal pain. He reports no homicidal suicidal thoughts. Patient reporting no fever chills or cough. Patient here is awake and was initially sleeping in the waiting room. Patient oriented x3 he is heart and lung exam are normal abdomen is soft he is neurologically intact he does report hallucinations he reports no homicidal suicidal thoughts. Differential includes exacerbation of schizophrenia as well as mood disorder. Patient has had multiple lab draws as well as EKGs. Patient will be observed here in the hospital.  Patient's vital signs have been stable here in the emergency department. Patient multiple times and leaving in the a.m. Patient will be discharged in the morning. Patient again not homicidal suicidal he is at his baseline. Social Determinants affecting Dx or Tx: Patient does smoke and drink and history of substance abuse    Chronic Conditions: History of schizophrenia    Records Reviewed: I saw the patient yesterday please see old records I did review with him that he had blood work at that time as well as an EKG at that time.         Re-Evaluations: Re-evaluation. Patients symptoms show no change      Consultations:                 Critical Care: This patient's ED course included: a personal history and physicial eaxmination    This patient has been closely monitored during their ED course. Counseling: The emergency provider has spoken with the patient and discussed todays results, in addition to providing specific details for the plan of care and counseling regarding the diagnosis and prognosis. Questions are answered at this time and they are agreeable with the plan.       --------------------------------- IMPRESSION AND DISPOSITION ---------------------------------    IMPRESSION  1. Homeless    2. [facility]        DISPOSITION  Disposition: Discharge   Patient condition is stable        NOTE: This report was transcribed using voice recognition software.  Every effort was made to ensure accuracy; however, inadvertent computerized transcription errors may be present          Melina Griffith MD  02/03/23 2144

## 2023-02-03 NOTE — ED TRIAGE NOTES
Department of Emergency Medicine  FIRST PROVIDER TRIAGE NOTE             Independent MLP           2/2/23  8:10 PM EST    Date of Encounter: 2/2/23   MRN: [id number]      HPI: Ivette Sandoval is a 40 y.o. male who presents to the ED for Homeless (Homeless stated that its cold outside and needs a place to stay.)       ROS: Negative for cp or sob. PE: Gen Appearance/Constitutional: alert  Pulm: resp unlabored     Initial Plan of Care: All treatment areas with department are currently occupied. Plan to order/Initiate the following while awaiting opening in ED: labs.   Initiate Treatment-Testing, Proceed toTreatment Area When Bed Available for ED Attending/MLP to Continue Care    Electronically signed by JUICE Ceron CNP   DD: 2/2/23

## 2023-02-13 PROCEDURE — 99282 EMERGENCY DEPT VISIT SF MDM: CPT

## 2023-02-14 ENCOUNTER — HOSPITAL ENCOUNTER (EMERGENCY)
Age: 43
Discharge: HOME | End: 2023-02-14
Payer: MEDICAID

## 2023-02-14 VITALS
HEIGHT: 72 IN | HEART RATE: 71 BPM | DIASTOLIC BLOOD PRESSURE: 64 MMHG | SYSTOLIC BLOOD PRESSURE: 145 MMHG | TEMPERATURE: 98.2 F | BODY MASS INDEX: 21.02 KG/M2 | OXYGEN SATURATION: 99 % | RESPIRATION RATE: 17 BRPM

## 2023-02-14 DIAGNOSIS — F39 MOOD DISORDER (HCC): Primary | ICD-10-CM

## 2023-02-14 NOTE — ED NOTES
Department of Emergency Medicine  FIRST PROVIDER TRIAGE NOTE             Independent MLP           2/13/23  8:05 PM EST    Date of Encounter: 2/13/23   MRN: [id number]      HPI: Nilson Root. is a 40 y.o. male who presents to the ED for Other (Pt states he needs to see the  because he's not allowed at the rescue mission. )  Patient presents to the emergency department with wanting to speak with . Patient reports that he is not allowed at the rescue mission and because he is homeless he needs some place to stay. He would like to speak with . Patient with baseline hallucinations, denies any other new concerns. Patient denies any suicidal homicidal ideations. ROS: Negative for cp or sob. PE: Gen Appearance/Constitutional: alert  HEENT: NC/NT. PERRLA,  Airway patent. Initial Plan of Care: All treatment areas with department are currently occupied.  Plan to order/Initiate the following while awaiting opening in ED: Social Work Eval.  Initiate Treatment-Testing, Proceed toTreatment Area When [employer] Available for ED Attending/MLP to [employer] signed by JUICE Wynn CNP   DD: 2/13/23         JUICE Wynn CNP  02/13/23 2006

## 2023-02-14 NOTE — ED PROVIDER NOTES
independent  HPI:  2/14/23, Time: 1:28 AM JAYLENE Soler. is a 40 y.o. male presenting to the ED for for being homeless. 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 Patient would like to talk to the . He denies any suicidal homicidal ideations denies any new hallucinations. Patient is pleasant in conversation. Patient would like some food and a blanket. Patient otherwise reports normal state health. No no chest pain no shortness of breath no abdominal pain well is no noted nausea, vomiting or diarrhea    Review of Systems:   A complete review of systems was performed and pertinent positives and negatives are stated within HPI, all other systems reviewed and are negative.          --------------------------------------------- PAST HISTORY ---------------------------------------------  Past Medical History:  has a past medical history of Depression and Schizoaffective disorder ([facility] [address] Past Surgical History:  has a past surgical history that includes Hip fracture surgery (Left, 9/12/2021); eye surgery (19 years ago); and Hip fracture surgery (Left, 9/28/2021). Social History:  reports that he has been smoking cigarettes. He has a 12.00 pack-year smoking history. He has never used smokeless tobacco. He reports current alcohol use of about 2.0 standard drinks per week. He reports current drug use. Frequency: 7.00 times per week. Drugs: Cocaine and Marijuana (Ariadna Croft). Family History: family history includes Mental Illness in his mother; No Known Problems in his father; Substance Abuse in his mother. The patients home medications have been reviewed.     Allergies: Chlorpheniramine-phenylephrine, Motrin [ibuprofen], Penicillins, and Rondec-d [chlophedianol-pseudoephedrine]    -------------------------------------------------- RESULTS -------------------------------------------------  All laboratory and radiology results have been personally reviewed by myself   LABS:  No results found for this visit on 02/13/23. RADIOLOGY:  Interpreted by Radiologist.  No orders to display       ------------------------- NURSING NOTES AND VITALS REVIEWED ---------------------------   The nursing notes within the ED encounter and vital signs as below have been reviewed. /71   Pulse 85   Temp 98.2 °F (36.8 °C) (Temporal)   Resp 18   Ht 6' (1.829 m)   SpO2 100%   BMI 21.02 kg/m²   Oxygen Saturation Interpretation: Normal      ---------------------------------------------------PHYSICAL EXAM--------------------------------------      Constitutional/General: Alert and oriented x3,   Head: Normocephalic and atraumatic  Eyes: PERRL, EOMI  Mouth: Oropharynx clear, handling secretions, no trismus  Neck: Supple, full ROM,   Pulmonary: Lungs clear to auscultation bilaterally, no wheezes, rales, or rhonchi. Not in respiratory distress  Cardiovascular:  Regular rate and rhythm, no murmurs, gallops, or rubs. 2+ distal pulses  Abdomen: Soft, non tender, non distended,   Extremities: Moves all extremities x 4. Warm and well perfused  Skin: warm and dry without rash  Neurologic: GCS 15, cranial nerves II through XII grossly intact. No acute neurovascular deficit noted. Speech clear and coherent strength strong and equal bilaterally. Psych: Normal Affect, denies suicidal homicidal ideations.      ------------------------------ ED COURSE/MEDICAL DECISION MAKING----------------------  Medications - No data to display      ED COURSE:       Medical Decision Making: Yoselin Burns is a 40 y.o. male presenting to the ED for for being homeless. 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   Patient would like to talk to the social worker. He denies any suicidal homicidal ideations denies any new hallucinations. Patient is pleasant in conversation. Patient would like some food and a blanket. Patient otherwise reports normal state health. No no chest pain no shortness of breath no abdominal pain well is no noted nausea, vomiting or diarrhea. Patient was observed here in the emergency department. Patient feels comfortable being discharged, but he would like to eat something which I gladly provided him with 2 sandwiches and 2 pops. Patient reports that he will follow-up with [facility]. Patient has been seen multiple times by  and has declined assistance from them as well as assistance from [facility]. Patient was brought in to a warm environment today for comfort and safety. Patient agreeable and would like to be discharged no longer wants to wait to speak with . Patient was encouraged to follow-up with [facility] he does have prescriptions that he does need to get filled and he was encouraged to fill those. Patient otherwise nontoxic. Patient is pleasant and laughing and joking with staff and wishing everyone happy Haddad's Day. Patient will be discharged. History from : Patient and Medical records     Limitations to history : None    Chronic Conditions: Schizophrenia, depression    CONSULTS: [facility] psychiatric services    Discussion with Other Profesionals : None    Social Determinants :Homeless, reports that he has been smoking cigarettes. He has a 12.00 pack-year smoking history. He has never used smokeless tobacco. He reports current alcohol use of about 2.0 standard drinks per week. He reports current drug use. Frequency: 7.00 times per week. Drugs: Cocaine and Marijuana (Shad Coins).   Noncompliance    Records Reviewed : Source patient and Inpatient Notes epic medical records        Disposition Considerations (Tests not ordered but considered, Shared Decision Making, Pt Expectation of Test or Tx.): Appropriate for outpatient management yes and Evaluation by myself and discharge recommended. I am the Primary Clinician of Record. Counseling: The emergency provider has spoken with the patient and discussed todays results, in addition to providing specific details for the plan of care and counseling regarding the diagnosis and prognosis. Questions are answered at this time and they are agreeable with the plan.      --------------------------------- IMPRESSION AND DISPOSITION ---------------------------------    IMPRESSION  1. Mood disorder ([facility] [address]        DISPOSITION  Disposition: Discharge   Patient condition is good      NOTE: This report was transcribed using voice recognition software.  Every effort was made to ensure accuracy; however, inadvertent computerized transcription errors may be present      JUICE Mcginnis - CNP  02/14/23 0166

## 2023-02-16 PROCEDURE — 99281 EMR DPT VST MAYX REQ PHY/QHP: CPT

## 2023-02-17 ENCOUNTER — HOSPITAL ENCOUNTER (EMERGENCY)
Age: 43
Discharge: HOME | End: 2023-02-17
Payer: MEDICAID

## 2023-02-17 ENCOUNTER — HOSPITAL ENCOUNTER (EMERGENCY)
Age: 43
Discharge: HOME | End: 2023-02-17
Attending: EMERGENCY MEDICINE
Payer: MEDICAID

## 2023-02-17 VITALS
OXYGEN SATURATION: 97 % | TEMPERATURE: 98.7 F | RESPIRATION RATE: 19 BRPM | HEART RATE: 66 BPM | DIASTOLIC BLOOD PRESSURE: 82 MMHG | SYSTOLIC BLOOD PRESSURE: 121 MMHG

## 2023-02-17 VITALS
SYSTOLIC BLOOD PRESSURE: 110 MMHG | TEMPERATURE: 97.9 F | OXYGEN SATURATION: 98 % | RESPIRATION RATE: 16 BRPM | HEART RATE: 67 BPM | DIASTOLIC BLOOD PRESSURE: 60 MMHG

## 2023-02-17 DIAGNOSIS — Z59.00 HOMELESS: Primary | ICD-10-CM

## 2023-02-17 DIAGNOSIS — F20.9 SCHIZOPHRENIA, UNSPECIFIED TYPE (HCC): ICD-10-CM

## 2023-02-17 DIAGNOSIS — F39 MOOD DISORDER (HCC): Primary | ICD-10-CM

## 2023-02-17 PROCEDURE — 99282 EMERGENCY DEPT VISIT SF MDM: CPT

## 2023-02-17 SDOH — ECONOMIC STABILITY - HOUSING INSECURITY: HOMELESSNESS UNSPECIFIED: Z59.00

## 2023-02-17 NOTE — ED NOTES
Pt got up from his bed to go to the restroom, pt never returned to his bed. Checked surrounding area and was unable to locate patient. Pt believed to have eloped provider notified.       Ashley Reyes RN  02/17/23 [id number]

## 2023-02-17 NOTE — ED PROVIDER NOTES
HPI:  2/16/23, Time: 10:40 PM JAYLENE Root. is a 40 y.o. male presenting to the ED for feeling depressed, beginning 1 hour ago. The complaint has been persistent, moderate in severity, and worsened by nothing. Patient presenting here because of feeling depressed. Patient reports it stems from a death in early 46's of a family member. Patient reporting no homicidal thoughts he reports no suicidal thoughts he reports no fever no chills no chest pain he reports no active hallucinations. Patient does report history of schizophrenia. Patient reports he has not been taking his medications he reports no weakness or dizziness he reports no fall or injury. Patient reporting no headache. ROS:   Pertinent positives and negatives are stated within HPI, all other systems reviewed and are negative.  --------------------------------------------- PAST HISTORY ---------------------------------------------  Past Medical History:  has a past medical history of Depression and Schizoaffective disorder ([facility] [address] Past Surgical History:  has a past surgical history that includes Hip fracture surgery (Left, 9/12/2021); eye surgery (19 years ago); and Hip fracture surgery (Left, 9/28/2021). Social History:  reports that he has been smoking cigarettes. He has a 12.00 pack-year smoking history. He has never used smokeless tobacco. He reports current alcohol use of about 2.0 standard drinks per week. He reports current drug use. Frequency: 7.00 times per week. Drugs: Cocaine and Marijuana (Jetty Shell). Family History: family history includes Mental Illness in his mother; No Known Problems in his father; Substance Abuse in his mother. The patients home medications have been reviewed.     Allergies: Chlorpheniramine-phenylephrine, Motrin [ibuprofen], Penicillins, and Rondec-d [chlophedianol-pseudoephedrine]    ---------------------------------------------------PHYSICAL EXAM--------------------------------------    Constitutional/General: Alert and oriented x3, well appearing, non toxic in NAD  Head: Normocephalic and atraumatic  Eyes: PERRL, EOMI  Mouth: Oropharynx clear, handling secretions, no trismus  Neck: Supple, full ROM, non tender to palpation in the midline, no stridor, no crepitus, no meningeal signs  Pulmonary: Lungs clear to auscultation bilaterally, no wheezes, rales, or rhonchi. Not in respiratory distress  Cardiovascular:  Regular rate. Regular rhythm. No murmurs, gallops, or rubs. 2+ distal pulses  Chest: no chest wall tenderness  Abdomen: Soft. Non tender. Non distended. +BS. No rebound, guarding, or rigidity. No pulsatile masses appreciated. Musculoskeletal: Moves all extremities x 4. Warm and well perfused, no clubbing, cyanosis, or edema. Capillary refill <3 seconds  Skin: warm and dry. No rashes. Neurologic: GCS 15, CN 2-12 grossly intact, no focal deficits, symmetric strength 5/5 in the upper and lower extremities bilaterally  Psych: Affect flat depressed not homicidal or suicidal denies hallucination    -------------------------------------------------- RESULTS -------------------------------------------------  I have personally reviewed all laboratory and imaging results for this patient. Results are listed below. LABS:  No results found for this visit on 02/17/23. RADIOLOGY:  Interpreted by Radiologist.  No orders to display           ------------------------- NURSING NOTES AND VITALS REVIEWED ---------------------------   The nursing notes within the ED encounter and vital signs as below have been reviewed by myself. /60   Pulse 67   Temp 97.9 °F (36.6 °C)   Resp 16   SpO2 98%   Oxygen Saturation Interpretation: Normal    The patients available past medical records and past encounters were reviewed.         ------------------------------ ED COURSE/MEDICAL DECISION MAKING----------------------  Medications - No data to display          Medical Decision Making:      History From: Patient with history of schizophrenia depression presenting here because of feeling depressed. Patient reporting no thoughts of harming himself or others he reports no physical plaints of chest pain shortness of breath or abdominal pain he reports no weakness or dizziness or headache. Patient reporting no vomiting or diarrhea. CC/HPI Summary, DDx, ED Course, Reassessment, Tests Considered, Patient expectation:   With underlying history of schizophrenia and depression patient reports feeling depressed for years. Patient reports symptoms worsened about an hour ago. Patient reporting no thoughts of harming himself or others he reports no hallucinations reports no chest pain or difficulty breathing or abdominal pain. Patient reporting no fall or injury. Patient awake alert oriented x3 affect flat depressed denies homicidal suicidal thoughts. Patient heart and lung exam normal abdomen soft nontender he is neurologically intact. Patient differential includes mood disorder as well as suicidal ideation as well as schizophrenia      Patient has been here multiple times Labs were withheld. Patient was to be eval by . Patient eloped prior to evaluation. Social Determinants affecting Dx or Tx: Patient smokes cigars denies alcohol use    Chronic Conditions: Schizophrenia    Records Reviewed: Patient records reviewed he has been here multiple times since January 31 he has been seen here for mood disorder on the 31st as well as February 2 for schizophrenia on 3 February was seen for homelessness he was also seen here on the 14th for mood disorder        Re-Evaluations:             Re-evaluation. Patients symptoms show no change      Consultations:                 Critical Care:          This patient's ED course included: a personal history and physicial eaxmination    This patient has been closely monitored during their ED course. Counseling: The emergency provider has spoken with the patient and discussed todays results, in addition to providing specific details for the plan of care and counseling regarding the diagnosis and prognosis. Questions are answered at this time and they are agreeable with the plan.       --------------------------------- IMPRESSION AND DISPOSITION ---------------------------------    IMPRESSION  1. [facility]        DISPOSITION  Disposition: Patient eloped from the emergency department          NOTE: This report was transcribed using voice recognition software.  Every effort was made to ensure accuracy; however, inadvertent computerized transcription errors may be present          Dallas Bustamante MD  02/17/23 [id number]

## 2023-02-18 NOTE — ED PROVIDER NOTES
independent  HPI:  2/17/23, Time: 8:48 PM JAYLENE Soler. is a 40 y.o. male presenting to the ED for being homeless. Patient presents to the emergency department with complaints of being homeless. Patient reports that it is very cold outside and he would like to get warm here in the emergency department. Patient denies needing to see a  denies any suicidal or homicidal ideations as well as no new or worsening hallucinations. Patient did have orange juice with him as well as boxes of zingers/cupcakes. Patient denies needing any food but would like to stay in the waiting room until he warms up. Patient reports otherwise normal state of health denies any chest pain, shortness of breath or abdominal pain as well as no noted fevers no nausea no vomiting no diarrhea    Review of Systems:   A complete review of systems was performed and pertinent positives and negatives are stated within HPI, all other systems reviewed and are negative.          --------------------------------------------- PAST HISTORY ---------------------------------------------  Past Medical History:  has a past medical history of Depression and Schizoaffective disorder ([facility] [address] Past Surgical History:  has a past surgical history that includes Hip fracture surgery (Left, 9/12/2021); eye surgery (19 years ago); and Hip fracture surgery (Left, 9/28/2021). Social History:  reports that he has been smoking cigarettes. He has a 12.00 pack-year smoking history. He has never used smokeless tobacco. He reports current alcohol use of about 2.0 standard drinks per week. He reports current drug use. Frequency: 7.00 times per week. Drugs: Cocaine and Marijuana (Ariadna Croft). Family History: family history includes Mental Illness in his mother; No Known Problems in his father; Substance Abuse in his mother. The patients home medications have been reviewed.     Allergies: Chlorpheniramine-phenylephrine, Motrin [ibuprofen], Penicillins, and Rondec-d [chlophedianol-pseudoephedrine]    -------------------------------------------------- RESULTS -------------------------------------------------  All laboratory and radiology results have been personally reviewed by myself   LABS:  No results found for this visit on 02/17/23. RADIOLOGY:  Interpreted by Radiologist.  No orders to display       ------------------------- NURSING NOTES AND VITALS REVIEWED ---------------------------   The nursing notes within the ED encounter and vital signs as below have been reviewed. /82   Pulse 66   Temp 98.7 °F (37.1 °C)   Resp 19   SpO2 97%   Oxygen Saturation Interpretation: Normal      ---------------------------------------------------PHYSICAL EXAM--------------------------------------      Constitutional/General: Alert and oriented x3, pleasant in conversation  Head: Normocephalic and atraumatic  Eyes: PERRL, EOMI  Mouth: Oropharynx clear, handling secretions, no trismus  Neck: Supple, full ROM,   Pulmonary: Lungs clear to auscultation bilaterally, no wheezes, rales, or rhonchi. Not in respiratory distress  Cardiovascular:  Regular rate and rhythm, no murmurs, gallops, or rubs. 2+ distal pulses  Abdomen: Soft, non tender, non distended,   Extremities: Moves all extremities x 4. Warm and well perfused  Skin: warm and dry without rash  Neurologic: GCS 15,  Psych: Normal Affect, denies suicidal or homicidal ideations, no hallucinations.    ------------------------------ ED COURSE/MEDICAL DECISION MAKING----------------------  Medications - No data to display      ED COURSE:       Medical Decision Making:    Dorina Miller is a 40 y.o. male presenting to the ED for being homeless. Patient presents to the emergency department with complaints of being homeless. Patient reports that it is very cold outside and he would like to get warm here in the emergency department.   Patient denies needing to see a  denies any suicidal or homicidal ideations as well as no new or worsening hallucinations. Patient did have orange juice with him as well as boxes of zingers/cupcakes. Patient denies needing any food but would like to stay in the waiting room until he warms up. Patient reports otherwise normal state of health denies any chest pain, shortness of breath or abdominal pain as well as no noted fevers no nausea no vomiting no diarrhea. Differential diagnosis includes homelessness versus schizophrenia versus psychosis versus hallucinations. .  Previous emergency room visit records reviewed patient has been seen multiple times here in the emergency department for homelessness as well as his psychiatric concerns. Patient does not follow-up with [facility] or [facility] he has been provided numerous referrals but is noncompliant with follow-up. Patient also is not allowed in the rescue mission and  has had multiple attempts to find placement without success and patient also reluctant. Plan will be for safe place for patient to stay well it is cold out and then patient made aware he can gladly leave whenever he needs to. Patient thankful. Patient sitting in waiting room. Patient will be discharged in morning. Patient expressed understanding. Patient will be safely discharged       History from : Patient and Medical records     Limitations to history : None    Chronic Conditions: has a past medical history of Depression and Schizoaffective disorder ([facility] [address] CONSULTS: Encouraged to follow-up with [facility] or [facility]. Discussion with Other Profesionals : None    Social Determinants : Homeless,  reports that he has been smoking cigarettes. He has a 12.00 pack-year smoking history. He has never used smokeless tobacco. He reports current alcohol use of about 2.0 standard drinks per week. He reports current drug use. Frequency: 7.00 times per week. Drugs: Cocaine and Marijuana (Kendy Neighbor).     Records Reviewed : Source patient and Inpatient Notes epic medical records        Disposition Considerations (Tests not ordered but considered, Shared Decision Making, Pt Expectation of Test or Tx.):   Appropriate for outpatient management yes and Evaluation by myself and discharge recommended. I am the Primary Clinician of Record. Counseling: The emergency provider has spoken with the patient and discussed todays results, in addition to providing specific details for the plan of care and counseling regarding the diagnosis and prognosis. Questions are answered at this time and they are agreeable with the plan.      --------------------------------- IMPRESSION AND DISPOSITION ---------------------------------    IMPRESSION  1. Homeless        DISPOSITION  Disposition: Discharge  Patient condition is stable      NOTE: This report was transcribed using voice recognition software.  Every effort was made to ensure accuracy; however, inadvertent computerized transcription errors may be present      JUICE Escalona - CNP  02/18/23 0144

## 2023-02-21 ENCOUNTER — HOSPITAL ENCOUNTER (EMERGENCY)
Age: 43
Discharge: HOME | End: 2023-02-22
Attending: STUDENT IN AN ORGANIZED HEALTH CARE EDUCATION/TRAINING PROGRAM
Payer: MEDICAID

## 2023-02-21 VITALS
WEIGHT: 150 LBS | HEART RATE: 58 BPM | RESPIRATION RATE: 18 BRPM | HEIGHT: 72 IN | TEMPERATURE: 97.5 F | DIASTOLIC BLOOD PRESSURE: 63 MMHG | BODY MASS INDEX: 20.32 KG/M2 | OXYGEN SATURATION: 95 % | SYSTOLIC BLOOD PRESSURE: 106 MMHG

## 2023-02-21 DIAGNOSIS — F22 PARANOIA (HCC): Primary | ICD-10-CM

## 2023-02-21 LAB
ACETAMINOPHEN LEVEL: <5 MCG/ML (ref 10–30)
ALBUMIN SERPL-MCNC: 3.7 G/DL (ref 3.5–5.2)
ALP BLD-CCNC: 47 U/L (ref 40–129)
ALT SERPL-CCNC: 8 U/L (ref 0–40)
AMPHETAMINE SCREEN, URINE: NOT DETECTED
ANION GAP SERPL CALCULATED.3IONS-SCNC: 6 MMOL/L (ref 7–16)
AST SERPL-CCNC: 13 U/L (ref 0–39)
BACTERIA: ABNORMAL /HPF
BARBITURATE SCREEN URINE: NOT DETECTED
BASOPHILS ABSOLUTE: 0.08 E9/L (ref 0–0.2)
BASOPHILS RELATIVE PERCENT: 1.9 % (ref 0–2)
BENZODIAZEPINE SCREEN, URINE: NOT DETECTED
BILIRUB SERPL-MCNC: 0.2 MG/DL (ref 0–1.2)
BILIRUBIN URINE: NEGATIVE
BLOOD, URINE: NEGATIVE
BUN BLDV-MCNC: 19 MG/DL (ref 6–20)
CALCIUM SERPL-MCNC: 8.4 MG/DL (ref 8.6–10.2)
CANNABINOID SCREEN URINE: NOT DETECTED
CHLORIDE BLD-SCNC: 106 MMOL/L (ref 98–107)
CLARITY: CLEAR
CO2: 27 MMOL/L (ref 22–29)
COCAINE METABOLITE SCREEN URINE: POSITIVE
COLOR: YELLOW
CREAT SERPL-MCNC: 0.9 MG/DL (ref 0.7–1.2)
EOSINOPHILS ABSOLUTE: 0.12 E9/L (ref 0.05–0.5)
EOSINOPHILS RELATIVE PERCENT: 2.8 % (ref 0–6)
ETHANOL: <10 MG/DL (ref 0–0.08)
FENTANYL SCREEN, URINE: NOT DETECTED
GFR SERPL CREATININE-BSD FRML MDRD: >60 ML/MIN/1.73
GLUCOSE BLD-MCNC: 89 MG/DL (ref 74–99)
GLUCOSE URINE: NEGATIVE MG/DL
HCT VFR BLD CALC: 38.8 % (ref 37–54)
HEMOGLOBIN: 12.7 G/DL (ref 12.5–16.5)
IMMATURE GRANULOCYTES #: 0 E9/L
IMMATURE GRANULOCYTES %: 0 % (ref 0–5)
INFLUENZA A: NOT DETECTED
INFLUENZA B: NOT DETECTED
KETONES, URINE: NEGATIVE MG/DL
LEUKOCYTE ESTERASE, URINE: NEGATIVE
LYMPHOCYTES ABSOLUTE: 1.91 E9/L (ref 1.5–4)
LYMPHOCYTES RELATIVE PERCENT: 44.6 % (ref 20–42)
Lab: ABNORMAL
MCH RBC QN AUTO: 29.9 PG (ref 26–35)
MCHC RBC AUTO-ENTMCNC: 32.7 % (ref 32–34.5)
MCV RBC AUTO: 91.3 FL (ref 80–99.9)
METHADONE SCREEN, URINE: NOT DETECTED
MONOCYTES ABSOLUTE: 0.38 E9/L (ref 0.1–0.95)
MONOCYTES RELATIVE PERCENT: 8.9 % (ref 2–12)
NEUTROPHILS ABSOLUTE: 1.79 E9/L (ref 1.8–7.3)
NEUTROPHILS RELATIVE PERCENT: 41.8 % (ref 43–80)
NITRITE, URINE: NEGATIVE
OPIATE SCREEN URINE: NOT DETECTED
OXYCODONE URINE: NOT DETECTED
PDW BLD-RTO: 13.2 FL (ref 11.5–15)
PH UA: 7 (ref 5–9)
PHENCYCLIDINE SCREEN URINE: NOT DETECTED
PLATELET # BLD: 322 E9/L (ref 130–450)
PMV BLD AUTO: 9.7 FL (ref 7–12)
POTASSIUM REFLEX MAGNESIUM: 3.8 MMOL/L (ref 3.5–5)
PROTEIN UA: NEGATIVE MG/DL
RBC # BLD: 4.25 E12/L (ref 3.8–5.8)
RBC UA: ABNORMAL /HPF (ref 0–2)
SALICYLATE, SERUM: <0.3 MG/DL (ref 0–30)
SARS-COV-2 RNA, RT PCR: NOT DETECTED
SODIUM BLD-SCNC: 139 MMOL/L (ref 132–146)
SPECIFIC GRAVITY UA: 1.02 (ref 1–1.03)
TOTAL PROTEIN: 6 G/DL (ref 6.4–8.3)
TRICYCLIC ANTIDEPRESSANTS SCREEN SERUM: NEGATIVE NG/ML
UROBILINOGEN, URINE: 2 E.U./DL
WBC # BLD: 4.3 E9/L (ref 4.5–11.5)
WBC UA: ABNORMAL /HPF (ref 0–5)

## 2023-02-21 PROCEDURE — 80307 DRUG TEST PRSMV CHEM ANLYZR: CPT

## 2023-02-21 PROCEDURE — 87636 SARSCOV2 & INF A&B AMP PRB: CPT

## 2023-02-21 PROCEDURE — 80053 COMPREHEN METABOLIC PANEL: CPT

## 2023-02-21 PROCEDURE — 99284 EMERGENCY DEPT VISIT MOD MDM: CPT

## 2023-02-21 PROCEDURE — 80179 DRUG ASSAY SALICYLATE: CPT

## 2023-02-21 PROCEDURE — 81001 URINALYSIS AUTO W/SCOPE: CPT

## 2023-02-21 PROCEDURE — 85025 COMPLETE CBC W/AUTO DIFF WBC: CPT

## 2023-02-21 PROCEDURE — 82077 ASSAY SPEC XCP UR&BREATH IA: CPT

## 2023-02-21 PROCEDURE — 80143 DRUG ASSAY ACETAMINOPHEN: CPT

## 2023-02-21 PROCEDURE — 93005 ELECTROCARDIOGRAM TRACING: CPT | Performed by: STUDENT IN AN ORGANIZED HEALTH CARE EDUCATION/TRAINING PROGRAM

## 2023-02-21 ASSESSMENT — ENCOUNTER SYMPTOMS
CHEST TIGHTNESS: 0
SHORTNESS OF BREATH: 0
VOMITING: 0
ABDOMINAL DISTENTION: 0
NAUSEA: 0
ABDOMINAL PAIN: 0
BACK PAIN: 0
PHOTOPHOBIA: 0
DIARRHEA: 0
COUGH: 0

## 2023-02-21 ASSESSMENT — LIFESTYLE VARIABLES: HOW OFTEN DO YOU HAVE A DRINK CONTAINING ALCOHOL: NEVER

## 2023-02-21 ASSESSMENT — PAIN - FUNCTIONAL ASSESSMENT: PAIN_FUNCTIONAL_ASSESSMENT: NONE - DENIES PAIN

## 2023-02-22 ENCOUNTER — HOSPITAL ENCOUNTER (EMERGENCY)
Age: 43
Discharge: LEFT BEFORE DISCHARGE | End: 2023-02-23
Attending: EMERGENCY MEDICINE
Payer: MEDICAID

## 2023-02-22 VITALS
DIASTOLIC BLOOD PRESSURE: 71 MMHG | OXYGEN SATURATION: 98 % | SYSTOLIC BLOOD PRESSURE: 100 MMHG | RESPIRATION RATE: 16 BRPM | HEART RATE: 83 BPM | TEMPERATURE: 97.5 F

## 2023-02-22 DIAGNOSIS — Z59.00 HOMELESSNESS: ICD-10-CM

## 2023-02-22 DIAGNOSIS — Z76.89 ENCOUNTER FOR PSYCHIATRIC ASSESSMENT: Primary | ICD-10-CM

## 2023-02-22 LAB
ACETAMINOPHEN LEVEL: <5 MCG/ML (ref 10–30)
AMPHETAMINE SCREEN, URINE: NOT DETECTED
ANION GAP SERPL CALCULATED.3IONS-SCNC: 8 MMOL/L (ref 7–16)
BARBITURATE SCREEN URINE: NOT DETECTED
BASOPHILS ABSOLUTE: 0.07 E9/L (ref 0–0.2)
BASOPHILS RELATIVE PERCENT: 1.7 % (ref 0–2)
BENZODIAZEPINE SCREEN, URINE: NOT DETECTED
BUN BLDV-MCNC: 17 MG/DL (ref 6–20)
CALCIUM SERPL-MCNC: 8.2 MG/DL (ref 8.6–10.2)
CANNABINOID SCREEN URINE: NOT DETECTED
CHLORIDE BLD-SCNC: 105 MMOL/L (ref 98–107)
CO2: 27 MMOL/L (ref 22–29)
COCAINE METABOLITE SCREEN URINE: POSITIVE
CREAT SERPL-MCNC: 1 MG/DL (ref 0.7–1.2)
EKG ATRIAL RATE: 54 BPM
EKG P AXIS: 76 DEGREES
EKG P-R INTERVAL: 186 MS
EKG Q-T INTERVAL: 410 MS
EKG QRS DURATION: 98 MS
EKG QTC CALCULATION (BAZETT): 388 MS
EKG R AXIS: 79 DEGREES
EKG T AXIS: 68 DEGREES
EKG VENTRICULAR RATE: 54 BPM
EOSINOPHILS ABSOLUTE: 0.14 E9/L (ref 0.05–0.5)
EOSINOPHILS RELATIVE PERCENT: 3.4 % (ref 0–6)
ETHANOL: <10 MG/DL (ref 0–0.08)
FENTANYL SCREEN, URINE: NOT DETECTED
GFR SERPL CREATININE-BSD FRML MDRD: >60 ML/MIN/1.73
GLUCOSE BLD-MCNC: 101 MG/DL (ref 74–99)
HCT VFR BLD CALC: 39.6 % (ref 37–54)
HEMOGLOBIN: 12.8 G/DL (ref 12.5–16.5)
IMMATURE GRANULOCYTES #: 0.01 E9/L
IMMATURE GRANULOCYTES %: 0.2 % (ref 0–5)
INFLUENZA A: NOT DETECTED
INFLUENZA B: NOT DETECTED
LYMPHOCYTES ABSOLUTE: 1.92 E9/L (ref 1.5–4)
LYMPHOCYTES RELATIVE PERCENT: 47.3 % (ref 20–42)
Lab: ABNORMAL
MCH RBC QN AUTO: 29.8 PG (ref 26–35)
MCHC RBC AUTO-ENTMCNC: 32.3 % (ref 32–34.5)
MCV RBC AUTO: 92.3 FL (ref 80–99.9)
METHADONE SCREEN, URINE: NOT DETECTED
MONOCYTES ABSOLUTE: 0.47 E9/L (ref 0.1–0.95)
MONOCYTES RELATIVE PERCENT: 11.6 % (ref 2–12)
NEUTROPHILS ABSOLUTE: 1.45 E9/L (ref 1.8–7.3)
NEUTROPHILS RELATIVE PERCENT: 35.8 % (ref 43–80)
OPIATE SCREEN URINE: NOT DETECTED
OXYCODONE URINE: NOT DETECTED
PDW BLD-RTO: 13.2 FL (ref 11.5–15)
PHENCYCLIDINE SCREEN URINE: NOT DETECTED
PLATELET # BLD: 317 E9/L (ref 130–450)
PMV BLD AUTO: 9.6 FL (ref 7–12)
POTASSIUM SERPL-SCNC: 4 MMOL/L (ref 3.5–5)
RBC # BLD: 4.29 E12/L (ref 3.8–5.8)
SALICYLATE, SERUM: <0.3 MG/DL (ref 0–30)
SARS-COV-2 RNA, RT PCR: NOT DETECTED
SODIUM BLD-SCNC: 140 MMOL/L (ref 132–146)
TRICYCLIC ANTIDEPRESSANTS SCREEN SERUM: NEGATIVE NG/ML
WBC # BLD: 4.1 E9/L (ref 4.5–11.5)

## 2023-02-22 PROCEDURE — 80048 BASIC METABOLIC PNL TOTAL CA: CPT

## 2023-02-22 PROCEDURE — 93010 ELECTROCARDIOGRAM REPORT: CPT | Performed by: INTERNAL MEDICINE

## 2023-02-22 PROCEDURE — 99284 EMERGENCY DEPT VISIT MOD MDM: CPT

## 2023-02-22 PROCEDURE — 87636 SARSCOV2 & INF A&B AMP PRB: CPT

## 2023-02-22 PROCEDURE — 82077 ASSAY SPEC XCP UR&BREATH IA: CPT

## 2023-02-22 PROCEDURE — 80179 DRUG ASSAY SALICYLATE: CPT

## 2023-02-22 PROCEDURE — 80143 DRUG ASSAY ACETAMINOPHEN: CPT

## 2023-02-22 PROCEDURE — 80307 DRUG TEST PRSMV CHEM ANLYZR: CPT

## 2023-02-22 PROCEDURE — 93005 ELECTROCARDIOGRAM TRACING: CPT | Performed by: NURSE PRACTITIONER

## 2023-02-22 PROCEDURE — 85025 COMPLETE CBC W/AUTO DIFF WBC: CPT

## 2023-02-22 SDOH — ECONOMIC STABILITY - HOUSING INSECURITY: HOMELESSNESS UNSPECIFIED: Z59.00

## 2023-02-22 NOTE — ED PROVIDER NOTES
Yarely Alvarez is a 71-year-old male presented to emergency department due to concern for being homeless. Patient states that he was upset recently because the mother of his child was asking him for money. Patient denies SI/HI. Patient stated he felt depressed but is at his baseline. Currently he feels he does not want to speak with . He is well known to the ED and is homeless. Patient is requesting food. The history is provided by the patient and medical records. Review of Systems   Constitutional:  Negative for chills, diaphoresis, fatigue and fever. Eyes:  Negative for photophobia and visual disturbance. Respiratory:  Negative for cough, chest tightness and shortness of breath. Cardiovascular:  Negative for chest pain, palpitations and leg swelling. Gastrointestinal:  Negative for abdominal distention, abdominal pain, diarrhea, nausea and vomiting. Genitourinary:  Negative for dysuria. Musculoskeletal:  Negative for back pain, neck pain and neck stiffness. Skin:  Negative for pallor and rash. Neurological:  Negative for headaches. Psychiatric/Behavioral:  Negative for agitation, behavioral problems, confusion, hallucinations, sleep disturbance and suicidal ideas. The patient is not nervous/anxious. Physical Exam  Vitals and nursing note reviewed. Constitutional:       General: He is not in acute distress. Appearance: Normal appearance. He is not ill-appearing. HENT:      Head: Normocephalic and atraumatic. Eyes:      General: No scleral icterus. Conjunctiva/sclera: Conjunctivae normal.      Pupils: Pupils are equal, round, and reactive to light. Cardiovascular:      Rate and Rhythm: Normal rate and regular rhythm. Pulmonary:      Effort: Pulmonary effort is normal.      Breath sounds: Normal breath sounds. Abdominal:      General: Bowel sounds are normal. There is no distension. Palpations: Abdomen is soft. Tenderness:  There is no abdominal tenderness. There is no guarding or rebound. Musculoskeletal:      Cervical back: Normal range of motion and neck supple. No rigidity. No muscular tenderness. Right lower leg: No edema. Left lower leg: No edema. Skin:     General: Skin is warm and dry. Capillary Refill: Capillary refill takes less than 2 seconds. Coloration: Skin is not pale. Findings: No erythema or rash. Neurological:      General: No focal deficit present. Mental Status: He is alert and oriented to person, place, and time. Cranial Nerves: No cranial nerve deficit. Sensory: No sensory deficit. Motor: No weakness. Coordination: Coordination normal.      Gait: Gait normal.   Psychiatric:         Attention and Perception: Attention normal.         Mood and Affect: Mood normal.         Speech: Speech normal.         Behavior: Behavior is cooperative. Thought Content: Thought content is not paranoid. Thought content does not include homicidal or suicidal ideation. Procedures     MDM  Number of Diagnoses or Management Options  Paranoia ([facility] [address]  Diagnosis management comments: Lennox Kohl is a 40year old male who presented to ED with concern for homelessness. Patient is awake alert and oriented patient continues to deny SI or HI however repeat evaluation patient did state that he was having hallucinations patient stated that he was seeing visions of his mother. Patient denies chest pain or shortness of breath. Lab work is ordered and interpreted unremarkable for acute process   Patient was stable. Case was discussed with social work plan for likely discharge after evaluation  Patient is currently medically cleared  he is not experiencing suicidal homicidal ideations a repeat evaluation    Differential diagnosis includes not limited to depression, paranoia, SI  Social determinants of health include patient does not have a PCP.   Patient also has a significant psychiatric history has had multiple admissions and multiple emergency department visits           ED Course as of 02/21/23 2348 Tue Feb 21, 2023 2211 EKG: This EKG is signed and interpreted by me. Rate: 54  Rhythm: Sinus  Interpretation: non-specific EKG, no St elevation  Comparison: was normal   [SS]      ED Course User Index  [SS] Clay Allan MD         --------------------------------------------- PAST HISTORY ---------------------------------------------  Past Medical History:  has a past medical history of Depression and Schizoaffective disorder ([facility] [address] Past Surgical History:  has a past surgical history that includes Hip fracture surgery (Left, 9/12/2021); eye surgery (19 years ago); and Hip fracture surgery (Left, 9/28/2021). Social History:  reports that he has been smoking cigarettes. He has a 12.00 pack-year smoking history. He has never used smokeless tobacco. He reports that he does not currently use alcohol. He reports current drug use. Frequency: 7.00 times per week. Drugs: Cocaine and Marijuana (Kenia Bath). Family History: family history includes Mental Illness in his mother; No Known Problems in his father; Substance Abuse in his mother. The patients home medications have been reviewed.     Allergies: Chlorpheniramine-phenylephrine, Motrin [ibuprofen], Penicillins, and Rondec-d [chlophedianol-pseudoephedrine]    -------------------------------------------------- RESULTS -------------------------------------------------    LABS:  Results for orders placed or performed during the hospital encounter of 02/21/23   COVID-19 & Influenza Combo    Specimen: Nasopharyngeal Swab   Result Value Ref Range    SARS-CoV-2 RNA, RT PCR NOT DETECTED NOT DETECTED    INFLUENZA A NOT DETECTED NOT DETECTED    INFLUENZA B NOT DETECTED NOT DETECTED   CBC with Auto Differential   Result Value Ref Range    WBC 4.3 (L) 4.5 - 11.5 E9/L    RBC 4.25 3.80 - 5.80 E12/L    Hemoglobin 12.7 12.5 - 16.5 g/dL    Hematocrit 38.8 37.0 - 54.0 %    MCV 91.3 80.0 - 99.9 fL    MCH 29.9 26.0 - 35.0 pg    MCHC 32.7 32.0 - 34.5 %    RDW 13.2 11.5 - 15.0 fL    Platelets 388 023 - 583 E9/L    MPV 9.7 7.0 - 12.0 fL    Neutrophils % 41.8 (L) 43.0 - 80.0 %    Immature Granulocytes % 0.0 0.0 - 5.0 %    Lymphocytes % 44.6 (H) 20.0 - 42.0 %    Monocytes % 8.9 2.0 - 12.0 %    Eosinophils % 2.8 0.0 - 6.0 %    Basophils % 1.9 0.0 - 2.0 %    Neutrophils Absolute 1.79 (L) 1.80 - 7.30 E9/L    Immature Granulocytes # 0.00 E9/L    Lymphocytes Absolute 1.91 1.50 - 4.00 E9/L    Monocytes Absolute 0.38 0.10 - 0.95 E9/L    Eosinophils Absolute 0.12 0.05 - 0.50 E9/L    Basophils Absolute 0.08 0.00 - 0.20 E9/L   Comprehensive Metabolic Panel w/ Reflex to MG   Result Value Ref Range    Sodium 139 132 - 146 mmol/L    Potassium reflex Magnesium 3.8 3.5 - 5.0 mmol/L    Chloride 106 98 - 107 mmol/L    CO2 27 22 - 29 mmol/L    Anion Gap 6 (L) 7 - 16 mmol/L    Glucose 89 74 - 99 mg/dL    BUN 19 6 - 20 mg/dL    Creatinine 0.9 0.7 - 1.2 mg/dL    Est, Glom Filt Rate >60 >=60 mL/min/1.73    Calcium 8.4 (L) 8.6 - 10.2 mg/dL    Total Protein 6.0 (L) 6.4 - 8.3 g/dL    Albumin 3.7 3.5 - 5.2 g/dL    Total Bilirubin 0.2 0.0 - 1.2 mg/dL    Alkaline Phosphatase 47 40 - 129 U/L    ALT 8 0 - 40 U/L    AST 13 0 - 39 U/L   Urinalysis with Microscopic   Result Value Ref Range    Color, UA Yellow Straw/Yellow    Clarity, UA Clear Clear    Glucose, Ur Negative Negative mg/dL    Bilirubin Urine Negative Negative    Ketones, Urine Negative Negative mg/dL    Specific Gravity, UA 1.020 1.005 - 1.030    Blood, Urine Negative Negative    pH, UA 7.0 5.0 - 9.0    Protein, UA Negative Negative mg/dL    Urobilinogen, Urine 2.0 (A) <2.0 E.U./dL    Nitrite, Urine Negative Negative    Leukocyte Esterase, Urine Negative Negative    WBC, UA NONE 0 - 5 /HPF    RBC, UA 0-1 0 - 2 /HPF    Bacteria, UA RARE (A) None Seen /HPF   Urine Drug Screen   Result Value Ref Range    Amphetamine Screen, Urine NOT DETECTED Negative <1000 ng/mL    Barbiturate Screen, Ur NOT DETECTED Negative < 200 ng/mL    Benzodiazepine Screen, Urine NOT DETECTED Negative < 200 ng/mL    Cannabinoid Scrn, Ur NOT DETECTED Negative < 50ng/mL    Cocaine Metabolite Screen, Urine POSITIVE (A) Negative < 300 ng/mL    Opiate Scrn, Ur NOT DETECTED Negative < 300ng/mL    PCP Screen, Urine NOT DETECTED Negative < 25 ng/mL    Methadone Screen, Urine NOT DETECTED Negative <300 ng/mL    Oxycodone Urine NOT DETECTED Negative <100 ng/mL    FENTANYL SCREEN, URINE NOT DETECTED Negative <1 ng/mL    Drug Screen Comment: see below    Serum Drug Screen   Result Value Ref Range    Ethanol Lvl <10 mg/dL    Acetaminophen Level <5.0 (L) 10.0 - 31.1 mcg/mL    Salicylate, Serum <7.9 0.0 - 30.0 mg/dL    TCA Scrn NEGATIVE Cutoff:300 ng/mL   EKG 12 Lead   Result Value Ref Range    Ventricular Rate 54 BPM    Atrial Rate 54 BPM    P-R Interval 186 ms    QRS Duration 98 ms    Q-T Interval 410 ms    QTc Calculation (Bazett) 388 ms    P Axis 76 degrees    R Axis 79 degrees    T Axis 68 degrees       RADIOLOGY:  No orders to display           ------------------------- NURSING NOTES AND VITALS REVIEWED ---------------------------  Date / Time Roomed:  2/21/2023  9:23 PM  ED Bed Assignment:  30/[facility]30    The nursing notes within the ED encounter and vital signs as below have been reviewed.      Patient Vitals for the past 24 hrs:   BP Temp Temp src Pulse Resp SpO2 Height Weight   02/21/23 2137 106/63 97.5 °F (36.4 °C) Oral 58 18 95 % 6' (1.829 m) 150 lb (68 kg)   02/21/23 2033 -- -- -- 68 -- 97 % -- --       Oxygen Saturation Interpretation: Normal    ------------------------------------------ PROGRESS NOTES ------------------------------------------  Re-evaluation(s):  Time: 11pm  Patients symptoms show no change  Repeat physical examination is not changed    Counseling:  I have spoken with the patient and discussed todays results, in addition to providing specific details for the plan of care and counseling regarding the diagnosis and prognosis. Their questions are answered at this time and they are agreeable with the plan of admission.    --------------------------------- ADDITIONAL PROVIDER NOTES ---------------------------------  Consultations:  Spoke with social work. Discussed case. They will admit the patient. This patient's ED course included: a personal history and physicial examination and re-evaluation prior to disposition    This patient has remained hemodynamically stable during their ED course. Diagnosis:  1. Paranoia ([facility] [address]        Disposition:  Patient's disposition: medically cleared for dispo per social work  Patient's condition is stable.             Kings Porter MD  02/21/23 Nasir Law 1313, MD  02/21/23 [id number]

## 2023-02-22 NOTE — ED NOTES
Pt getting dressed now for discharge. Pt calm and cooperative. No signs of distress noted.      Jero Cope RN  02/22/23 [id number]
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
## 2023-02-23 ENCOUNTER — HOSPITAL ENCOUNTER (EMERGENCY)
Age: 43
Discharge: HOME | End: 2023-02-24
Payer: MEDICAID

## 2023-02-23 LAB
EKG ATRIAL RATE: 55 BPM
EKG P AXIS: 77 DEGREES
EKG P-R INTERVAL: 170 MS
EKG Q-T INTERVAL: 398 MS
EKG QRS DURATION: 98 MS
EKG QTC CALCULATION (BAZETT): 380 MS
EKG R AXIS: 84 DEGREES
EKG T AXIS: 76 DEGREES
EKG VENTRICULAR RATE: 55 BPM

## 2023-02-23 PROCEDURE — 99281 EMR DPT VST MAYX REQ PHY/QHP: CPT

## 2023-02-23 PROCEDURE — 93010 ELECTROCARDIOGRAM REPORT: CPT | Performed by: INTERNAL MEDICINE

## 2023-02-23 ASSESSMENT — PAIN - FUNCTIONAL ASSESSMENT: PAIN_FUNCTIONAL_ASSESSMENT: NONE - DENIES PAIN

## 2023-02-23 NOTE — ED PROVIDER NOTES
[facility] ENCOUNTER        Pt Name: Irineo David MRN: [id number]  Birthdate 1980  Date of evaluation: 2/22/2023  Provider: Marisol Urbina DO  PCP: No primary care provider on file. Note Started: 10:22 PM EST 2/22/23    CHIEF COMPLAINT       Chief Complaint   Patient presents with    Psychiatric Evaluation     Paranoid \"I am trying to stay out of trouble\" \" I am 3 days clean and want to stay that way\" \"I am homeless and its cold outside\" -SI but \" people want to harm me\" \"my symptoms started in 2019 at homecoming\"        HISTORY OF PRESENT ILLNESS: 1 or more Elements   History From: Patient        Irineo David is a 40 y.o. male who presents to the emergency department for evaluation. The patient states that he is 3 days clean and wants to stay clean. He states that tonight he is homeless and that it is cold outside. He states that he did not want to start reusing therefore he came to the ED to be evaluated. No SI or HI. No hallucinations. No chest pain shortness of breath. No nausea vomiting diarrhea    Nursing Notes were all reviewed and agreed with or any disagreements were addressed in the HPI. REVIEW OF EXTERNAL NOTE :           REVIEW OF SYSTEMS :           Positives and Pertinent negatives as per HPI.      SURGICAL HISTORY     Past Surgical History:   Procedure Laterality Date    EYE SURGERY  19 years ago    HIP FRACTURE SURGERY Left 9/12/2021    LEFT ACETABULUM OPEN REDUCTION INTERNAL FIXATION - SYNTHES performed by Reina Pelaez MD at [address] Left 9/28/2021    IRRIGATION AND DEBRIDEMENT LEFT HIP WITH EXCISION HEMATOMA performed by Shamir Thacker MD at [facility]       Previous Medications    ACETAMINOPHEN (AMINOFEN) 325 MG TABLET    Take 2 tablets by mouth every 6 hours as needed for Pain    ARIPIPRAZOLE (ABILIFY) 10 MG TABLET Take 1 tablet by mouth daily    FLUTICASONE (FLONASE) 50 MCG/ACT NASAL SPRAY    2 sprays by Each Nostril route daily    LACTULOSE (CHRONULAC) 10 GM/15ML SOLUTION    Take 30 mLs by mouth 3 times daily    MAGNESIUM CITRATE SOLUTION    Take 296 mLs by mouth once for 1 dose    NICOTINE POLACRILEX (NICORETTE) 4 MG GUM    Take 1 each by mouth every 2 hours as needed for Smoking cessation    ONDANSETRON (ZOFRAN-ODT) 4 MG DISINTEGRATING TABLET    Take 1 tablet by mouth every 8 hours as needed for Nausea or Vomiting    SENNOSIDES-DOCUSATE SODIUM (SENOKOT-S) 8.6-50 MG TABLET    Take 2 tablets by mouth daily       ALLERGIES     Chlorpheniramine-phenylephrine, Motrin [ibuprofen], Penicillins, and Rondec-d [chlophedianol-pseudoephedrine]    FAMILYHISTORY       Family History   Problem Relation Age of Onset    Mental Illness Mother     Substance Abuse Mother     No Known Problems Father         SOCIAL HISTORY       Social History     Tobacco Use    Smoking status: Every Day     Packs/day: 0.50     Years: 24.00     Pack years: 12.00     Types: Cigarettes    Smokeless tobacco: Never    Tobacco comments:     stopped [place] while ago\"   Vaping Use    Vaping Use: Former   Substance Use Topics    Alcohol use: Not Currently     Comment: Daily    Drug use: Yes     Frequency: 7.0 times per week     Types: Cocaine, Marijuana (Weed)     Comment: Cocaine occassionally;  Marijuana wkly       SCREENINGS        [place] Coma Scale  Eye Opening: Spontaneous  Best Verbal Response: Oriented  Best Motor Response: Obeys commands  [place] Coma Scale Score: 15                CIWA Assessment  BP: 100/71  Heart Rate: 83           PHYSICAL EXAM  1 or more Elements     ED Triage Vitals   BP Temp Temp src Heart Rate Resp SpO2 Height Weight   02/22/23 2053 02/22/23 2040 -- 02/22/23 2040 02/22/23 2053 02/22/23 2040 -- --   100/71 97.5 °F (36.4 °C)  83 16 98 %              Constitutional/General: Alert and oriented x3  Head: Normocephalic and atraumatic  Eyes: PERRL, EOMI, conjunctiva normal, sclera non icteric  ENT:  Oropharynx clear, handling secretions, no trismus, no asymmetry of the posterior oropharynx or uvular edema  Neck: Supple, full ROM, no stridor, no meningeal signs  Respiratory: Lungs clear to auscultation bilaterally, no wheezes, rales, or rhonchi. Not in respiratory distress  Cardiovascular:  Regular rate. Regular rhythm. No murmurs, no gallops, no rubs. 2+ distal pulses. Equal extremity pulses. Chest: No chest wall tenderness  GI:  Abdomen Soft, Non tender, Non distended. No rebound, guarding, or rigidity. No pulsatile masses. Musculoskeletal: Moves all extremities x 4. Warm and well perfused, no clubbing, no cyanosis, no edema. Capillary refill <3 seconds  Integument: skin warm and dry. No rashes. Neurologic: GCS 15, no focal deficits, symmetric strength 5/5 in the upper and lower extremities bilaterally  Psychiatric: Normal Affect            DIAGNOSTIC RESULTS   LABS:    Labs Reviewed   BASIC METABOLIC PANEL - Abnormal; Notable for the following components:       Result Value    Glucose 101 (*)     Calcium 8.2 (*)     All other components within normal limits   CBC WITH AUTO DIFFERENTIAL - Abnormal; Notable for the following components:    WBC 4.1 (*)     Neutrophils % 35.8 (*)     Lymphocytes % 47.3 (*)     Neutrophils Absolute 1.45 (*)     All other components within normal limits   SERUM DRUG SCREEN - Abnormal; Notable for the following components:    Acetaminophen Level <5.0 (*)     All other components within normal limits   COVID-19 & INFLUENZA COMBO   URINE DRUG SCREEN       As interpreted by me, the above displayed labs are abnormal. All other labs obtained during this visit were within normal range or not returned as of this dictation. EKG Interpretation  Interpreted by emergency department physician, Tc Rosenthal DO    EKG shows sinus rhythm. Normal axis. Normal QRS no STEMI.     RADIOLOGY:   Non-plain film images such as CT, Ultrasound and MRI are read by the radiologist. Plain radiographic images are visualized and preliminarily interpreted by the ED Provider with the below findings:        Interpretation per the Radiologist below, if available at the time of this note:    No orders to display     No results found. No results found. PROCEDURES   Unless otherwise noted below, none          CRITICAL CARE TIME (.cct)       PAST MEDICAL HISTORY/Chronic Conditions Affecting Care      has a past medical history of Depression and Schizoaffective disorder ([facility] [address] EMERGENCY DEPARTMENT COURSE    Vitals:    Vitals:    02/22/23 2040 02/22/23 2053   BP:  100/71   Pulse: 83    Resp:  16   Temp: 97.5 °F (36.4 °C)    SpO2: 98%        Patient was given the following medications:  Medications - No data to display        Is this patient to be included in the SEP-1 Core Measure due to severe sepsis or septic shock? No Exclusion criteria - the patient is NOT to be included for SEP-1 Core Measure due to: Infection is not suspected        Medical Decision Making/Differential Diagnosis:    CC/HPI Summary, Social Determinants of health, Records Reviewed, DDx, testing done/not done, ED Course, Reassessment, disposition considerations/shared decision making with patient, consults, disposition:            Medical Decision Making  Amount and/or Complexity of Data Reviewed  Labs: ordered. ECG/medicine tests: ordered. This is a 50-year-old male presented to the ED for evaluation. Upon arrival to the ED patient's vital signs are within normal limits. Lamination patient is calm cooperative on the. No acute distress. Regular rate and rhythm. Clear to auscultation bilaterally. Differential diagnosis includes but is not limited to mood disorder, substance abuse, electrolyte allergy.   Patient undergo CBC BMP serum drug screen urine drug screen COVID and flu test.  Laboratory work-up was unremarkable except for white count of 4.1 glucose of 101 calcium 8.2. Serum drug screen negative. EKG showed no ischemic findings. Patient is medically cleared. Social work consulted. Disposition pending  evaluation due to patient's homelessness. CONSULTS: (Who and What was discussed)  IP CONSULT TO SOCIAL WORK        I am the Primary Clinician of Record. FINAL IMPRESSION      1. Encounter for psychiatric assessment    2. Homelessness          DISPOSITION/PLAN     DISPOSITION Ed Observation 02/22/2023 10:22:03 PM      PATIENT REFERRED TO:  No follow-up provider specified.     DISCHARGE MEDICATIONS:  New Prescriptions    No medications on file       DISCONTINUED MEDICATIONS:  Discontinued Medications    No medications on file              (Please note that portions of this note were completed with a voice recognition program.  Efforts were made to edit the dictations but occasionally words are mis-transcribed.)    Robert Armas DO (electronically signed)           Robert Armsa DO  02/22/23 5659

## 2023-02-23 NOTE — ED NOTES
Department of Emergency Medicine  FIRST PROVIDER TRIAGE NOTE             Independent MLP           2/22/23  8:53 PM EST    Date of Encounter: 2/22/23   MRN: [id number]      HPI: Vu Gutierrez is a 40 y.o. male who presents to the ED for Psychiatric Evaluation (Paranoid \"I am trying to stay out of trouble\" \" I am 3 days clean and want to stay that way\" \"I am homeless and its cold outside\" -SI but \" people want to harm me\" \"my symptoms started in 2019 at homecoming\" )     Patient states he feels that people are going to hurt him. He is 3 days clean. He states that he is not suicidal and does not follow-up with anyone for psychiatry. He states his been ongoing since 2019 but has increasingly worse. Denies any homicidal ideations    ROS: Negative for Suicidal ideation or Homicidal Ideation. PE: Gen Appearance/Constitutional: alert  Musculoskeletal: moves all extremities x 4     Initial Plan of Care: All treatment areas with department are currently occupied. Plan to order/Initiate the following while awaiting opening in ED: labs and EKG.   Initiate Treatment-Testing, Proceed toTreatment Area When Bed Available for ED Attending/MLP to Continue Care    Electronically signed by JUICE Sinclair CNP   DD: 2/22/23       JUICE Sinclair CNP  02/22/23 2054

## 2023-02-24 VITALS
RESPIRATION RATE: 18 BRPM | HEIGHT: 71 IN | TEMPERATURE: 97 F | DIASTOLIC BLOOD PRESSURE: 80 MMHG | SYSTOLIC BLOOD PRESSURE: 132 MMHG | WEIGHT: 150 LBS | HEART RATE: 80 BPM | OXYGEN SATURATION: 99 % | BODY MASS INDEX: 21 KG/M2

## 2023-02-24 VITALS
RESPIRATION RATE: 16 BRPM | OXYGEN SATURATION: 97 % | HEART RATE: 57 BPM | SYSTOLIC BLOOD PRESSURE: 121 MMHG | DIASTOLIC BLOOD PRESSURE: 79 MMHG | TEMPERATURE: 97.7 F

## 2023-02-24 PROCEDURE — 99283 EMERGENCY DEPT VISIT LOW MDM: CPT

## 2023-02-24 NOTE — ED TRIAGE NOTES
Department of Emergency Medicine  FIRST PROVIDER TRIAGE NOTE             Independent MLP           2/23/23  9:28 PM EST    Date of Encounter: 2/23/23   MRN: [id number]      HPI: Gillermo Felty. is a 40 y.o. male who presents to the ED for homeless, denies si, hi.  Denies pain    ROS: Negative for cp, sob, Suicidal ideation, or Homicidal Ideation. PE: Gen Appearance/Constitutional: alert  HEENT: NC/NT. PERRLA,  Airway patent. CV: regular rate  Pulm: CTA bilat     Initial Plan of Care: All treatment areas with department are currently occupied.  Plan to order/Initiate the following while awaiting opening in ED: pending exam.  Initiate Treatment-Testing, Proceed toTreatment Area When Bed Available for ED Attending/MLP to Continue Care    Electronically signed by JUICE Islas CNP   DD: 2/23/23

## 2023-02-25 ENCOUNTER — HOSPITAL ENCOUNTER (EMERGENCY)
Age: 43
Discharge: LEFT BEFORE DISCHARGE | End: 2023-02-25
Attending: EMERGENCY MEDICINE
Payer: MEDICAID

## 2023-02-25 ENCOUNTER — HOSPITAL ENCOUNTER (EMERGENCY)
Age: 43
Discharge: HOME | End: 2023-02-26
Payer: MEDICAID

## 2023-02-25 VITALS
SYSTOLIC BLOOD PRESSURE: 122 MMHG | HEART RATE: 68 BPM | TEMPERATURE: 97.8 F | OXYGEN SATURATION: 99 % | RESPIRATION RATE: 17 BRPM | DIASTOLIC BLOOD PRESSURE: 53 MMHG

## 2023-02-25 DIAGNOSIS — Z59.00 HOMELESS: Primary | ICD-10-CM

## 2023-02-25 DIAGNOSIS — F39 MOOD DISORDER (HCC): ICD-10-CM

## 2023-02-25 DIAGNOSIS — Z59.00 HOMELESSNESS: Primary | ICD-10-CM

## 2023-02-25 LAB
EKG ATRIAL RATE: 52 BPM
EKG P AXIS: 74 DEGREES
EKG P-R INTERVAL: 214 MS
EKG Q-T INTERVAL: 404 MS
EKG QRS DURATION: 106 MS
EKG QTC CALCULATION (BAZETT): 375 MS
EKG R AXIS: 83 DEGREES
EKG T AXIS: 69 DEGREES
EKG VENTRICULAR RATE: 52 BPM

## 2023-02-25 PROCEDURE — 93005 ELECTROCARDIOGRAM TRACING: CPT | Performed by: NURSE PRACTITIONER

## 2023-02-25 PROCEDURE — 99282 EMERGENCY DEPT VISIT SF MDM: CPT

## 2023-02-25 SDOH — ECONOMIC STABILITY - HOUSING INSECURITY: HOMELESSNESS UNSPECIFIED: Z59.00

## 2023-02-25 NOTE — ED PROVIDER NOTES
HPI:  2/25/23, Time: 5:52 AM JAYLENE Torres is a 40 y.o. male presenting to the ED for psychiatric evaluation. Patient states he is suicidal.  He also states he is homeless. He says I do not want to kill myself but he is suicidal.  Does have extensive psychiatric history. He actually voices no suicidal or homicidal ideation to me, he voiced to nursing staff initially. He states he is just cold and does not want to be outside. No other symptoms or complaints. Review of Systems:   A complete review of systems was performed and pertinent positives and negatives are stated within HPI, all other systems reviewed and are negative.          --------------------------------------------- PAST HISTORY ---------------------------------------------  Past Medical History:  has a past medical history of Depression and Schizoaffective disorder ([facility] [address] Past Surgical History:  has a past surgical history that includes Hip fracture surgery (Left, 9/12/2021); eye surgery (19 years ago); and Hip fracture surgery (Left, 9/28/2021). Social History:  reports that he has been smoking cigarettes. He has a 12.00 pack-year smoking history. He has never used smokeless tobacco. He reports that he does not currently use alcohol. He reports current drug use. Frequency: 7.00 times per week. Drugs: Cocaine and Marijuana (Yefri Luz). Family History: family history includes Mental Illness in his mother; No Known Problems in his father; Substance Abuse in his mother. The patients home medications have been reviewed.     Allergies: Chlorpheniramine-phenylephrine, Motrin [ibuprofen], Penicillins, and Rondec-d [chlophedianol-pseudoephedrine]    -------------------------------------------------- RESULTS -------------------------------------------------  All laboratory and radiology results have been personally reviewed by myself   LABS:  Results for orders placed or performed during the hospital encounter of 02/25/23 EKG 12 Lead   Result Value Ref Range    Ventricular Rate 52 BPM    Atrial Rate 52 BPM    P-R Interval 214 ms    QRS Duration 106 ms    Q-T Interval 404 ms    QTc Calculation (Bazett) 375 ms    P Axis 74 degrees    R Axis 83 degrees    T Axis 69 degrees       RADIOLOGY:  Interpreted by Radiologist.  No orders to display       ------------------------- NURSING NOTES AND VITALS REVIEWED ---------------------------   The nursing notes within the ED encounter and vital signs as below have been reviewed. /79   Pulse 57   Temp 97.7 °F (36.5 °C) (Temporal)   Resp 16   SpO2 97%   Oxygen Saturation Interpretation: Normal      ---------------------------------------------------PHYSICAL EXAM--------------------------------------      Constitutional/General: Alert and oriented x3, well appearing, non toxic in NAD  Head: Normocephalic and atraumatic  Eyes: PERRL, EOMI  Mouth: Oropharynx clear, handling secretions, no trismus  Neck: Supple, full ROM,   Pulmonary: Lungs clear to auscultation bilaterally, no wheezes, rales, or rhonchi. Not in respiratory distress  Cardiovascular:  Regular rate and rhythm, no murmurs, gallops, or rubs. 2+ distal pulses  Abdomen: Soft, non tender, non distended,   Extremities: Moves all extremities x 4. Warm and well perfused  Skin: warm and dry without rash  Neurologic: GCS 15,  Psych: Normal Affect      ------------------------------ ED COURSE/MEDICAL DECISION MAKING----------------------  Medications - No data to display      ED COURSE:       Medical Decision Making:      Patient presents for psychiatric evaluation. Concern for homelessness, psychosis, suicidal ideation, among other pathologies. He was hemodynamically stable, afebrile, overall well-appearing. EKG interpreted me shows sinus rhythm, rate 52, no STEMI, no ischemic change    Chart reviewed. He has been seen 19 times for psychiatric complaint to the emergency department this year.   Multiple work-ups which out all unremarkable labs. In fact, he had normal labs on 2/22/2023. He was positive for cocaine, no other findings. Did not feel the need to repeat labs once again. Patient is medically cleared. Social work to evaluate. Counseling: The emergency provider has spoken with the patient and discussed todays results, in addition to providing specific details for the plan of care and counseling regarding the diagnosis and prognosis. Questions are answered at this time and they are agreeable with the plan.      --------------------------------- IMPRESSION AND DISPOSITION ---------------------------------    IMPRESSION  1. Homelessness    2. Mood disorder ([facility] [address]        DISPOSITION  Disposition: Other Disposition: Eloped  Patient condition is stable      NOTE: This report was transcribed using voice recognition software.  Every effort was made to ensure accuracy; however, inadvertent computerized transcription errors may be present        Ruma Putnam MD  02/25/23 0212

## 2023-02-25 NOTE — ED NOTES
Behavioral Health Crisis Assessment    Chief Complaint: \"I just need a ride\"    Mental Status Exam: baseline - alert and oriented x's 3, shared fair eye contact, mumbled speech to himself - denies SI, no Hi and no commanding hallucinations. Legal Status:  [x] Voluntary:  [] Involuntary, Issued by:    Gender:  [] Male [] Female [] Transgender  [] Other    Sexual Orientation:  [] Heterosexual [] Homosexual [] Bisexual [] Other    Brief Clinical Summary:  Pt presenting with the same malingering behaviors as per usual.  Pt is homeless and abuses drugs. Today is pt's 20th visit to the ER. I attempted to assess pt, who was sleeping in the chairs. He woke up and left his coat over his head and asked \"can you help me with a bus ticket to [place]? \". I advised that we do not have resources for bus tickets at this time. Pt said \"then I don't need anything\". Pt denies SI and no HI and he left the ER. Pt declined PEER.     Collateral Information:  none obtained     Risk Factors:  Disruptive behaviors and uncooperative with following up for treatment  Substance Use - declined PEER again  Limited family/friend support  Lack of housing  Lack of essential needs  Lack of self-care  Has no out pt provider  Has no community plan     Protective Factors:  Initiated this ER visit  No access to weapons  Population navigator is working on behavior plan    Suicidal Ideations:   [] Reports:               [] Past [] Present   [x] Denies     Suicide Attempts:  [] Reports:   [x] Denies     C-SSRS Screening Completed by RN: Current Suicide Risk:  [x] No Risk [] Low [] Moderate [] High     Homicidal Ideations  [] Reports:              [] Past [] Present   [x] Denies      Self Injurious/Self Mutilation Behaviors:   [] Reports:               [] Past [] Present   [x] Denies     Hallucinations/Delusions   [] Reports:   [x] Denies      Substance Use/Alcohol Use/Addiction:   [] Reports:   [x] Denies  yet positive for cocaine  [x] SBIRT Screen Complete. Current or Past Substance Abuse Treatment  [] Yes, When and Where:  [x] No     Current or Past Mental Health Treatment:  [] Yes, When and Where:   [x] No - no current MH tx     Legal Issues:  [x]  Yes (Specify)  in the past  []  No     Access to Weapons:  []  Yes (Specify)  [x]  No     Trauma History  [] Reports:  [x] Denies      Living Situation:  homeless     Employment:  not     Education Level:  some HS     Violence Risk Screening:        Have you ever thought about hurting someone? []  No  [x]  Yes (Ask the questions listed below)   When? Did you follow through with the thoughts? [] No     [x] Yes- When and what happened? jailed  2. Have you ever threatened anyone? []  No  [x]  Yes (Ask the questions listed below)   When and what happened? Have you ever threatened someone with a gun, knife or other weapon? []  No  [x]  Yes - When and what happened? jailed  2. Have you ever had an order of protection taken out against you? []  Yes [x]  No  3. Have you ever been arrested due to violence? [x]  Yes []  No  4. Have you ever been cruel to animals?  []  Yes [x]  No     After consideration of C-SSRS screening results, C-SSRS assessments, and this professional's assessment the patient's overall suicide risk assessed to be:  [x] No Risk  [] Low   [] Moderate   [] High      [x] Discussed current suicide risk, protective and risk factors with RN and ED Physician      Disposition   [x] Home: reports that he stays with friend - but he is homeless  [] Outpatient Provider:   [] Crisis Unit:   [] Inpatient Psychiatric Unit:  [] Other:                  MAJOR Alvarez  02/25/23 1240 [address]  02/25/23 [facility]

## 2023-02-25 NOTE — ED NOTES
Department of Emergency Medicine  FIRST PROVIDER TRIAGE NOTE             Independent MLP           2/24/23  11:58 PM EST    Date of Encounter: 2/24/23   MRN: [id number]      HPI: Nhan Buchanan is a 40 y.o. male who presents to the ED for Psychiatric Evaluation (Pt to ED stating he has been suicidal \"since he lost his girlfriend a few months ago. \" Pt denying plan, pt states he is suicidal but \"I dont want to kill myself. \"Pt rambling in triage. -HI)  Patient presents emergency department with wanting help, states he is also feeling suicidal, but then reported I do not want to kill myself., loss of his girlfriend, Denies hallucinations,  Talking in Circles    ROS: Negative for cp or sob. PE: Gen Appearance/Constitutional: alert  HEENT: NC/NT. PERRLA,  Airway patent. Initial Plan of Care: All treatment areas with department are currently occupied.  Plan to order/Initiate the following while awaiting opening in ED: Social Work Eval.  Initiate Treatment-Testing, Proceed toTreatment Area When [employer] Available for ED Attending/MLP to Continue Care    Electronically signed by JUICE Price CNP   DD: 2/24/23       JUICE Price CNP  02/25/23 0001       JUICE Price CNP  02/25/23 0008

## 2023-02-25 NOTE — ED NOTES
I updated Dr. Arreola Hao and [facility]  02/25/23 0405

## 2023-02-26 PROCEDURE — 99282 EMERGENCY DEPT VISIT SF MDM: CPT

## 2023-02-26 ASSESSMENT — PAIN - FUNCTIONAL ASSESSMENT: PAIN_FUNCTIONAL_ASSESSMENT: NONE - DENIES PAIN

## 2023-02-27 ENCOUNTER — HOSPITAL ENCOUNTER (EMERGENCY)
Age: 43
Discharge: HOME | End: 2023-02-27
Payer: COMMERCIAL

## 2023-02-27 ENCOUNTER — HOSPITAL ENCOUNTER (EMERGENCY)
Age: 43
Discharge: HOME | End: 2023-02-27
Attending: EMERGENCY MEDICINE
Payer: COMMERCIAL

## 2023-02-27 VITALS
HEART RATE: 74 BPM | DIASTOLIC BLOOD PRESSURE: 79 MMHG | SYSTOLIC BLOOD PRESSURE: 138 MMHG | TEMPERATURE: 97.3 F | OXYGEN SATURATION: 97 %

## 2023-02-27 VITALS
HEART RATE: 72 BPM | TEMPERATURE: 98 F | DIASTOLIC BLOOD PRESSURE: 67 MMHG | SYSTOLIC BLOOD PRESSURE: 124 MMHG | OXYGEN SATURATION: 100 % | RESPIRATION RATE: 16 BRPM

## 2023-02-27 DIAGNOSIS — Z59.00 HOMELESS: ICD-10-CM

## 2023-02-27 DIAGNOSIS — R44.0 AUDITORY HALLUCINATION: Primary | ICD-10-CM

## 2023-02-27 DIAGNOSIS — Z59.00 HOMELESS: Primary | ICD-10-CM

## 2023-02-27 PROCEDURE — 93010 ELECTROCARDIOGRAM REPORT: CPT | Performed by: INTERNAL MEDICINE

## 2023-02-27 PROCEDURE — 99281 EMR DPT VST MAYX REQ PHY/QHP: CPT

## 2023-02-27 SDOH — ECONOMIC STABILITY - HOUSING INSECURITY: HOMELESSNESS UNSPECIFIED: Z59.00

## 2023-02-27 NOTE — ED TRIAGE NOTES
Department of Emergency Medicine  FIRST PROVIDER TRIAGE NOTE             Independent MLP           2/26/23  7:16 PM EST    Date of Encounter: 2/26/23   MRN: [id number]      HPI: Joce Rodriguez Jr. is a 42 y.o. male who presents to the ED for homeless - denies si, hi.    ROS: Negative for Suicidal ideation or Homicidal Ideation.    PE: Gen Appearance/Constitutional: alert  HEENT: NC/NT. PERRLA,  Airway patent.     Initial Plan of Care: All treatment areas with department are currently occupied. Plan to order/Initiate the following while awaiting opening in ED: pending exam.  Initiate Treatment-Testing, Proceed toTreatment Area When Bed Available for ED Attending/MLP to Continue Care    Electronically signed by JUICE Pace CNP   DD: 2/26/23

## 2023-02-27 NOTE — ED NOTES
Pt sleeping quietly with no complaints at this time.       FRANCISCO Donaldson, [facility]  02/27/23 0773

## 2023-02-27 NOTE — ED NOTES
Pt requesting to be woke up at [facility] because he has somewhere he needs to be.       FRANCISCO [place], [place]  02/27/23 6762

## 2023-02-27 NOTE — ED PROVIDER NOTES
HPI:  2/26/23, Time: 10:32 PM JAYLENE Mcgraw. is a 40 y.o. male presenting to the ED for psychiatric evaluation reports he is cold and needs a place to stay, beginning days ago. The complaint has been persistent, moderate in severity, and worsened by nothing. Patient with history of schizophrenia and depression presenting here because of needing a place to stay. Patient reporting that he has been having ongoing auditory hallucinations. Patient reports that he is his own best friend. Patient reporting no chest pain no difficulty breathing no abdominal pain. Patient reporting no homicidal suicidal thoughts he reports no fever no chills. Patient reporting no weakness or dizziness he reports no headache. Patient reporting no vomiting or diarrhea. ROS:   Pertinent positives and negatives are stated within HPI, all other systems reviewed and are negative.  --------------------------------------------- PAST HISTORY ---------------------------------------------  Past Medical History:  has a past medical history of Depression and Schizoaffective disorder ([facility] [address] Past Surgical History:  has a past surgical history that includes Hip fracture surgery (Left, 9/12/2021); eye surgery (19 years ago); and Hip fracture surgery (Left, 9/28/2021). Social History:  reports that he has been smoking cigarettes. He has a 12.00 pack-year smoking history. He has never used smokeless tobacco. He reports that he does not currently use alcohol. He reports current drug use. Frequency: 7.00 times per week. Drugs: Cocaine and Marijuana ([facility] [employer]). Family History: family history includes Mental Illness in his mother; No Known Problems in his father; Substance Abuse in his mother. The patients home medications have been reviewed.     Allergies: Chlorpheniramine-phenylephrine, Motrin [ibuprofen], Penicillins, and Rondec-d [chlophedianol-pseudoephedrine]    ---------------------------------------------------PHYSICAL EXAM--------------------------------------    Constitutional/General: Alert and oriented x3,   Head: Normocephalic and atraumatic  Eyes: PERRL, EOMI  Mouth: Oropharynx clear, handling secretions, no trismus  Neck: Supple, full ROM, non tender to palpation in the midline, no stridor, no crepitus, no meningeal signs  Pulmonary: Lungs clear to auscultation bilaterally, no wheezes, rales, or rhonchi. Not in respiratory distress  Cardiovascular:  Regular rate. Regular rhythm. No murmurs, gallops, or rubs. 2+ distal pulses  Chest: no chest wall tenderness  Abdomen: Soft. Non tender. Non distended. +BS. No rebound, guarding, or rigidity. No pulsatile masses appreciated. Musculoskeletal: Moves all extremities x 4. Warm and well perfused, no clubbing, cyanosis, or edema. Capillary refill <3 seconds  Skin: warm and dry. No rashes. Neurologic: GCS 15, CN 2-12 grossly intact, no focal deficits, symmetric strength 5/5 in the upper and lower extremities bilaterally  Psych: Denies homicidal suicidal thoughts. Patient does report ongoing hallucinations.    -------------------------------------------------- RESULTS -------------------------------------------------  I have personally reviewed all laboratory and imaging results for this patient. Results are listed below. LABS:  No results found for this visit on 02/27/23. RADIOLOGY:  Interpreted by Radiologist.  No orders to display           ------------------------- NURSING NOTES AND VITALS REVIEWED ---------------------------   The nursing notes within the ED encounter and vital signs as below have been reviewed by myself. /67   Pulse 72   Temp 98 °F (36.7 °C) (Axillary) Comment (Src): pt refused oral  Resp 16   SpO2 100%   Oxygen Saturation Interpretation: Normal    The patients available past medical records and past encounters were reviewed.         ------------------------------ ED COURSE/MEDICAL DECISION MAKING----------------------  Medications - No data to display          Medical Decision Making:      History From: Patient presenting here because of reportedly not having a place to stay states he feels cold. Patient reporting ongoing auditory hallucinations. Patient reporting no fever no chills no chest pain or trouble breathing. Patient reporting no abdominal pain he reports no homicidal suicidal thoughts. Patient was seen here the day before for similar complaint. Patient was treated and released. CC/HPI Summary, DDx, ED Course, Reassessment, Tests Considered, Patient expectation:   Patient with underlying history of depression history of schizophrenia presenting here because of ongoing auditory hallucinations as well as reporting that he has no place to stay. Patient reports he did not want to go outside due to the fact that he is cold. Patient reporting no chest pain no difficulty breathing or cough he reports no abdominal pain or vomiting or or diarrhea. Patient reporting no homicidal suicidal thoughts. Patient here is awake alert and x3 heart lung exam normal abdomen soft nontender patient has normal gait he is neurologically intact again denies wanting to harm himself or others does report ongoing auditory hallucinations. Patient reports he does smoke he last used cocaine 2 days ago. Patient will be observed here in the emergency department. Labs will be obtained to event the patient has been seen here multiple times for the same complaint. Patient eval by . Patient was reassessed having no thoughts of harming himself or others. Patient would like to be discharged. Patient reports that he has a job at [employer] and wants to go to his job. Social Determinants affecting Dx or Tx: Patient reports he does smoke he does use cocaine last used 2 days ago he does not drink.     Chronic Conditions: History of depression history of schizophrenia    Records Reviewed: Patient seen here yesterday for similar complaint patient was diagnosed with being homeless he had also been seen for psychiatric evaluation on 22 February as well. Re-Evaluations:             Patient reevaluated unchanged. Patient having no complaints of any pain. Patient awake alert oriented not homicidal or suicidal.  Patient would like to be discharged he states he has to go back to work at the [employer]      Consultations:                 Critical Care: This patient's ED course included: a personal history and physicial eaxmination    This patient has been closely monitored during their ED course. Counseling: The emergency provider has spoken with the patient and discussed todays results, in addition to providing specific details for the plan of care and counseling regarding the diagnosis and prognosis. Questions are answered at this time and they are agreeable with the plan.       --------------------------------- IMPRESSION AND DISPOSITION ---------------------------------    IMPRESSION  1. Auditory hallucination    2. Homeless        DISPOSITION  Disposition: Discharge  Patient condition is stable        NOTE: This report was transcribed using voice recognition software.  Every effort was made to ensure accuracy; however, inadvertent computerized transcription errors may be present          Jonathan Palmer MD  02/27/23 9472

## 2023-02-27 NOTE — ED NOTES
Pt arrived into the unit calm and cooperative. Pt immediately asking for food/drinks and to go to bed because he is cold and tired. SW consulted due to homelessness. Pt states to  he is here due to \"just wanting to lay down. \" Pt denies to SI/HI and no A/V hallucinations to . Pt does have chronic auditory hallucinations/paranoia. Pt appears to be at baseline behavior. SW will continue to monitor and re-assess in the AM for disposition.       FRANCISCO Cramer, [facility]  02/27/23 6575

## 2023-02-27 NOTE — ED NOTES
SW woke Pt up to reassess him. Pt is eager to be D/C to go to work at [employer]. SW confirmed Pt is speaking about [id number]. 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 Pt denies to SI/HI, A/V hallucinations. ED physician aware.       FRANCISCO Donaldson, [place]  02/27/23 [id number]

## 2023-02-28 NOTE — ED PROVIDER NOTES
independent  HPI:  2/27/23, Time: 7:21 PM JAYLENE Avitia is a 40 y.o. male presenting to the ED for being homeless. Patient presents to the emergency department with being homeless and wanting a warm place to be at this time due to the cold weather and rain. Patient denies any suicidal or homicidal ideations, hallucinations or at baseline for patient patient reports that he is wet and cold and would like a blanket and some towels to dry off and also requesting something to eat. Patient reports that he will be quiet in the waiting room. Patient otherwise denies any chest pain, shortness of breath or abdominal pain as well as no noted nausea, vomiting or diarrhea. Patient is talkative. Patient otherwise reports normal state of health. Review of Systems:   A complete review of systems was performed and pertinent positives and negatives are stated within HPI, all other systems reviewed and are negative.          --------------------------------------------- PAST HISTORY ---------------------------------------------  Past Medical History:  has a past medical history of Depression and Schizoaffective disorder ([facility] [address] Past Surgical History:  has a past surgical history that includes Hip fracture surgery (Left, 9/12/2021); eye surgery (19 years ago); and Hip fracture surgery (Left, 9/28/2021). Social History:  reports that he has been smoking cigarettes. He has a 12.00 pack-year smoking history. He has never used smokeless tobacco. He reports that he does not currently use alcohol. He reports current drug use. Frequency: 7.00 times per week. Drugs: Cocaine and Marijuana ([facility]). Family History: family history includes Mental Illness in his mother; No Known Problems in his father; Substance Abuse in his mother. The patients home medications have been reviewed.     Allergies: Chlorpheniramine-phenylephrine, Motrin [ibuprofen], Penicillins, and Rondec-d [chlophedianol-pseudoephedrine]    -------------------------------------------------- RESULTS -------------------------------------------------  All laboratory and radiology results have been personally reviewed by myself   LABS:  No results found for this visit on 02/27/23. RADIOLOGY:  Interpreted by Radiologist.  No orders to display       ------------------------- NURSING NOTES AND VITALS REVIEWED ---------------------------   The nursing notes within the ED encounter and vital signs as below have been reviewed. /79   Pulse 74   Temp 97.3 °F (36.3 °C)   SpO2 97%   Oxygen Saturation Interpretation: Normal      ---------------------------------------------------PHYSICAL EXAM--------------------------------------      Constitutional/General: Alert and oriented x3  Head: Normocephalic and atraumatic  Eyes: PERRL, EOMI  Mouth: Oropharynx clear, handling secretions, no trismus  Neck: Supple, full ROM,   Pulmonary: Lungs clear to auscultation bilaterally, no wheezes, rales, or rhonchi. Not in respiratory distress  Cardiovascular:  Regular rate and rhythm, no murmurs, gallops, or rubs. 2+ distal pulses  Abdomen: Soft, non tender, non distended,   Extremities: Moves all extremities x 4. Warm and well perfused  Skin: warm and dry without rash  Neurologic: GCS 15,  Psych: Normal Affect, baseline hallucinations, denies any suicidal or homicidal ideations.      ------------------------------ ED COURSE/MEDICAL DECISION MAKING----------------------  Medications - No data to display      ED COURSE:       Medical Decision Making:  Phyllistine Kawasaki. is a 40 y.o. male presenting to the ED for being homeless. Patient presents to the emergency department with being homeless and wanting a warm place to be at this time due to the cold weather and rain.   Patient denies any suicidal or homicidal ideations, hallucinations or at baseline for patient patient reports that he is wet and cold and would like a blanket and some towels to dry off and also requesting something to eat. Patient reports that he will be quiet in the waiting room. Patient otherwise denies any chest pain, shortness of breath or abdominal pain as well as no noted nausea, vomiting or diarrhea. Patient is talkative. Patient otherwise reports normal state of health. Differential diagnosis includes homelessness versus exacerbation of schizophrenia versus exacerbation of bipolar disorder. Plan will be to allow patient to stay here in the waiting room. Patient will have needs addressed and plan will be adjusted if there are any acute changes otherwise patient reports understanding and will be discharged when ready. History from : Patient and Medical records     Limitations to history : None    Chronic Conditions: Schizophrenia    CONSULTS: Encouraged to follow-up outpatient with [facility]    Discussion with Other Profesionals : None    Social Determinants : Noncompliance, drug abuse    Records Reviewed : Source patient and Inpatient Notes epic medical records        Disposition Considerations (Tests not ordered but considered, Shared Decision Making, Pt Expectation of Test or Tx.):   Appropriate for outpatient management yes and Evaluation by myself and discharge recommended. I am the Primary Clinician of Record. Counseling: The emergency provider has spoken with the patient and discussed todays results, in addition to providing specific details for the plan of care and counseling regarding the diagnosis and prognosis. Questions are answered at this time and they are agreeable with the plan.      --------------------------------- IMPRESSION AND DISPOSITION ---------------------------------    IMPRESSION  1. Homeless        DISPOSITION  Disposition: Discharge   Patient condition is stable      NOTE: This report was transcribed using voice recognition software.  Every effort was made to ensure accuracy; however, inadvertent computerized transcription errors may be present      JUICE Cerrato - CNP  02/27/23 2103

## 2023-03-02 VITALS
HEART RATE: 83 BPM | OXYGEN SATURATION: 100 % | BODY MASS INDEX: 20.92 KG/M2 | SYSTOLIC BLOOD PRESSURE: 132 MMHG | WEIGHT: 150 LBS | DIASTOLIC BLOOD PRESSURE: 91 MMHG | TEMPERATURE: 98.3 F | RESPIRATION RATE: 16 BRPM

## 2023-03-02 PROCEDURE — 99281 EMR DPT VST MAYX REQ PHY/QHP: CPT

## 2023-03-02 ASSESSMENT — PAIN - FUNCTIONAL ASSESSMENT: PAIN_FUNCTIONAL_ASSESSMENT: NONE - DENIES PAIN

## 2023-03-03 ENCOUNTER — HOSPITAL ENCOUNTER (EMERGENCY)
Age: 43
Discharge: HOME HEALTH | End: 2023-03-04
Payer: COMMERCIAL

## 2023-03-03 ENCOUNTER — HOSPITAL ENCOUNTER (EMERGENCY)
Age: 43
Discharge: HOME | End: 2023-03-03
Attending: EMERGENCY MEDICINE
Payer: COMMERCIAL

## 2023-03-03 VITALS
SYSTOLIC BLOOD PRESSURE: 129 MMHG | TEMPERATURE: 97.5 F | DIASTOLIC BLOOD PRESSURE: 65 MMHG | RESPIRATION RATE: 21 BRPM | OXYGEN SATURATION: 97 % | HEART RATE: 72 BPM

## 2023-03-03 DIAGNOSIS — F20.9 SCHIZOPHRENIA, UNSPECIFIED TYPE (HCC): Primary | ICD-10-CM

## 2023-03-03 DIAGNOSIS — F32.A DEPRESSION, UNSPECIFIED DEPRESSION TYPE: Primary | ICD-10-CM

## 2023-03-03 PROCEDURE — 99282 EMERGENCY DEPT VISIT SF MDM: CPT

## 2023-03-03 NOTE — ED PROVIDER NOTES
HPI:  3/3/23,   Time: 1:47 AM JAYLENE Vieira is a 40 y.o. male presenting to the ED for being homeless, beginning 1 day ago. The complaint has been persistent, mild in severity, and worsened by nothing. Patient reportedly presenting here because having no place to stay reports it is cold outside and wanting something to eat. Patient also reports ongoing auditory hallucinations. Patient does have history of depression as well as schizophrenia. Patient reporting of his complaints of chest pain shortness of breath or abdominal pain he reports no fever chills or cough. Patient has been here before for the same complaints. Patient reporting no headache he reports no fall or injury. ROS:   Pertinent positives and negatives are stated within HPI, all other systems reviewed and are negative.  --------------------------------------------- PAST HISTORY ---------------------------------------------  Past Medical History:  has a past medical history of Depression and Schizoaffective disorder ([facility] [address] Past Surgical History:  has a past surgical history that includes Hip fracture surgery (Left, 9/12/2021); eye surgery (19 years ago); and Hip fracture surgery (Left, 9/28/2021). Social History:  reports that he has been smoking cigarettes. He has a 12.00 pack-year smoking history. He has never used smokeless tobacco. He reports that he does not currently use alcohol. He reports current drug use. Frequency: 7.00 times per week. Drugs: Cocaine and Marijuana (Jack Armendariz). Family History: family history includes Mental Illness in his mother; No Known Problems in his father; Substance Abuse in his mother. The patients home medications have been reviewed.     Allergies: Chlorpheniramine-phenylephrine, Motrin [ibuprofen], Penicillins, and Rondec-d [chlophedianol-pseudoephedrine]    -------------------------------------------------- RESULTS -------------------------------------------------  All laboratory and radiology results have been personally reviewed by myself   LABS:  No results found for this visit on 03/02/23. RADIOLOGY:  Interpreted by Radiologist.  No orders to display       ------------------------- NURSING NOTES AND VITALS REVIEWED ---------------------------   The nursing notes within the ED encounter and vital signs as below have been reviewed. BP (!) 132/91   Pulse 83   Temp 98.3 °F (36.8 °C)   Resp 16   Wt 150 lb (68 kg)   SpO2 100%   BMI 20.92 kg/m²   Oxygen Saturation Interpretation: Normal      ---------------------------------------------------PHYSICAL EXAM--------------------------------------      Constitutional/General: Alert and oriented x3, well appearing, non toxic in NAD  Head: NC/AT  Eyes: PERRL, EOMI  Mouth: Oropharynx clear, handling secretions, no trismus  Neck: Supple, full ROM, no meningeal signs  Pulmonary: Lungs clear to auscultation bilaterally, no wheezes, rales, or rhonchi. Not in respiratory distress  Cardiovascular:  Regular rate and rhythm, no murmurs, gallops, or rubs. 2+ distal pulses  Abdomen: Soft, non tender, non distended,   Extremities: Moves all extremities x 4. Warm and well perfused  Skin: warm and dry without rash  Neurologic: GCS 15, normal strength all extremities  Psych: Not homicidal or suicidal.  Patient does report auditory hallucinations      ------------------------------ ED COURSE/MEDICAL DECISION MAKING----------------------  Medications - No data to display      Medical Decision Making:    Patient with underlying history of schizophrenia as well as depression presenting here because of needing a place to stay. Patient does report he is homeless. Patient reports also ongoing auditory hallucinations. Patient reporting no complaints of homicidal suicidal thoughts he reports no visual hallucinations he reports no complaints of any chest pain or difficulty breathing.   Patient is awake alert oriented x3 here in the emergency department he is neurologically intact his gait is steady and normal vital signs are stable. Patient heart and lung exam normal.  Patient has been here multiple times for the same complaint and has had multiple lab draws and drug screens. Patient does have history of substance abuse as well as cocaine use. Patient having no chest pain his vital signs are stable. From my standpoint patient is medically clear plan will be to discharge. Counseling: The emergency provider has spoken with the patient and discussed todays results, in addition to providing specific details for the plan of care and counseling regarding the diagnosis and prognosis. Questions are answered at this time and they are agreeable with the plan.      --------------------------------- IMPRESSION AND DISPOSITION ---------------------------------    IMPRESSION  1.  Schizophrenia, unspecified type ([facility]        DISPOSITION  Disposition: Discharge to home  Patient condition is stable                 Marsha Archibald MD  03/03/23 1259

## 2023-03-04 VITALS
HEART RATE: 69 BPM | OXYGEN SATURATION: 96 % | DIASTOLIC BLOOD PRESSURE: 66 MMHG | RESPIRATION RATE: 18 BRPM | SYSTOLIC BLOOD PRESSURE: 108 MMHG

## 2023-03-04 PROCEDURE — 99283 EMERGENCY DEPT VISIT LOW MDM: CPT

## 2023-03-04 PROCEDURE — 80307 DRUG TEST PRSMV CHEM ANLYZR: CPT

## 2023-03-04 ASSESSMENT — PAIN - FUNCTIONAL ASSESSMENT: PAIN_FUNCTIONAL_ASSESSMENT: NONE - DENIES PAIN

## 2023-03-04 NOTE — ED PROVIDER NOTES
independent  HPI:  3/3/23, Time: 9:34 PM EST Claudell Gent. is a 40 y.o. male presenting to the ED for feeling depressed as well as being homeless. Patient presents to the emergency department, states that he is depressed because he is homeless. He denies any suicidal or homicidal ideations. He is requesting to stay in the emergency room waiting room due to it being rainy and cold outside. Patient requesting if he can stay here well it rains until the morning. Patient denies suicidal or homicidal ideations denies needing to speak with a . He reports otherwise normal state of health. Patient pleasant in conversation. Patient homeless. Review of Systems:   A complete review of systems was performed and pertinent positives and negatives are stated within HPI, all other systems reviewed and are negative.          --------------------------------------------- PAST HISTORY ---------------------------------------------  Past Medical History:  has a past medical history of Depression and Schizoaffective disorder ([facility] [address] Past Surgical History:  has a past surgical history that includes Hip fracture surgery (Left, 9/12/2021); eye surgery (19 years ago); and Hip fracture surgery (Left, 9/28/2021). Social History:  reports that he has been smoking cigarettes. He has a 12.00 pack-year smoking history. He has never used smokeless tobacco. He reports that he does not currently use alcohol. He reports current drug use. Frequency: 7.00 times per week. Drugs: Cocaine and Marijuana (Norrine Creed). Family History: family history includes Mental Illness in his mother; No Known Problems in his father; Substance Abuse in his mother. The patients home medications have been reviewed.     Allergies: Chlorpheniramine-phenylephrine, Motrin [ibuprofen], Penicillins, and Rondec-d [chlophedianol-pseudoephedrine]    -------------------------------------------------- RESULTS -------------------------------------------------  All laboratory and radiology results have been personally reviewed by myself   LABS:  No results found for this visit on 03/03/23. RADIOLOGY:  Interpreted by Radiologist.  No orders to display       ------------------------- NURSING NOTES AND VITALS REVIEWED ---------------------------   The nursing notes within the ED encounter and vital signs as below have been reviewed. /65   Pulse 72   Temp 97.5 °F (36.4 °C) (Temporal)   Resp 21   SpO2 97%   Oxygen Saturation Interpretation: Normal      ---------------------------------------------------PHYSICAL EXAM--------------------------------------      Constitutional/General: Alert and oriented x3,   Head: Normocephalic and atraumatic  Eyes: PERRL, EOMI  Mouth: Oropharynx clear, handling secretions, no trismus  Neck: Supple, full ROM,   Pulmonary: Lungs clear to auscultation bilaterally, no wheezes, rales, or rhonchi. Not in respiratory distress  Cardiovascular:  Regular rate and rhythm, no murmurs, gallops, or rubs. 2+ distal pulses  Abdomen: Soft, non tender, non distended,   Extremities: Moves all extremities x 4. Warm and well perfused  Skin: warm and dry without rash  Neurologic: GCS 15,  Psych: Normal Affect, pleasant in conversation, denies suicidal or homicidal ideations. Does report feeling depressed but states it is because he is homeless.      ------------------------------ ED COURSE/MEDICAL DECISION MAKING----------------------  Medications - No data to display      ED COURSE:       Medical Decision Making:    Gunjan Soriano is a 40 y.o. male presenting to the ED for feeling depressed as well as being homeless. Patient presents to the emergency department, states that he is depressed because he is homeless. He denies any suicidal or homicidal ideations. He is requesting to stay in the emergency room waiting room due to it being rainy and cold outside.   Patient requesting if he can stay here well it rains until the morning. Patient denies suicidal or homicidal ideations denies needing to speak with a . He reports otherwise normal state of health. Patient pleasant in conversation. Patient homeless. Differential diagnosis includes homelessness, bipolar disease, paranoia, depression. Plan will be to carefully observe patient. He was provided with blanket and food and warmth at this time. Patient will be discharged when ready. Patient denies need to speak with the  he denies feeling suicidal or homicidal and does report having hallucinations but states that they are at his baseline. Patient pleasant in conversation. Plan be for discharge. History from : Patient and Medical records     Limitations to history : None    Chronic Conditions: Depression, schizoaffective disorder    CONSULTS: Encouraged follow-up with [facility]    Discussion with Other Profesionals : None    Social Determinants : Drug abuse    Records Reviewed : Source patient and Inpatient Notes epic medical records        Disposition Considerations (Tests not ordered but considered, Shared Decision Making, Pt Expectation of Test or Tx.):   Appropriate for outpatient management yes and Evaluation by myself and discharge recommended. I am the Primary Clinician of Record. Counseling: The emergency provider has spoken with the patient and discussed todays results, in addition to providing specific details for the plan of care and counseling regarding the diagnosis and prognosis. Questions are answered at this time and they are agreeable with the plan.      --------------------------------- IMPRESSION AND DISPOSITION ---------------------------------    IMPRESSION  1. Depression, unspecified depression type        DISPOSITION  Disposition: Discharge   Patient condition is stable      NOTE: This report was transcribed using voice recognition software.  Every effort was made to ensure accuracy; however, inadvertent computerized transcription errors may be present      JUICE Bernal - CNP  03/04/23 0543

## 2023-03-05 ENCOUNTER — HOSPITAL ENCOUNTER (EMERGENCY)
Age: 43
Discharge: HOME | End: 2023-03-05
Payer: COMMERCIAL

## 2023-03-05 ENCOUNTER — HOSPITAL ENCOUNTER (EMERGENCY)
Age: 43
Discharge: HOME | End: 2023-03-06
Payer: MEDICAID

## 2023-03-05 VITALS
HEART RATE: 78 BPM | BODY MASS INDEX: 21 KG/M2 | HEIGHT: 71 IN | WEIGHT: 150 LBS | SYSTOLIC BLOOD PRESSURE: 114 MMHG | RESPIRATION RATE: 16 BRPM | DIASTOLIC BLOOD PRESSURE: 57 MMHG | TEMPERATURE: 98.6 F | OXYGEN SATURATION: 97 %

## 2023-03-05 DIAGNOSIS — F20.9 SCHIZOPHRENIA, UNSPECIFIED TYPE (HCC): Primary | ICD-10-CM

## 2023-03-05 DIAGNOSIS — F14.10 COCAINE ABUSE (HCC): ICD-10-CM

## 2023-03-05 DIAGNOSIS — Z59.00 HOMELESS: Primary | ICD-10-CM

## 2023-03-05 LAB
ACETAMINOPHEN LEVEL: <5 MCG/ML (ref 10–30)
ALBUMIN SERPL-MCNC: 3.9 G/DL (ref 3.5–5.2)
ALP BLD-CCNC: 51 U/L (ref 40–129)
ALT SERPL-CCNC: 10 U/L (ref 0–40)
AMPHETAMINE SCREEN, URINE: NOT DETECTED
AMPHETAMINE SCREEN, URINE: NOT DETECTED
ANION GAP SERPL CALCULATED.3IONS-SCNC: 8 MMOL/L (ref 7–16)
AST SERPL-CCNC: 14 U/L (ref 0–39)
BARBITURATE SCREEN URINE: NOT DETECTED
BARBITURATE SCREEN URINE: NOT DETECTED
BASOPHILS ABSOLUTE: 0.09 E9/L (ref 0–0.2)
BASOPHILS RELATIVE PERCENT: 2.3 % (ref 0–2)
BENZODIAZEPINE SCREEN, URINE: NOT DETECTED
BENZODIAZEPINE SCREEN, URINE: NOT DETECTED
BILIRUB SERPL-MCNC: 0.2 MG/DL (ref 0–1.2)
BUN BLDV-MCNC: 13 MG/DL (ref 6–20)
CALCIUM SERPL-MCNC: 8.8 MG/DL (ref 8.6–10.2)
CANNABINOID SCREEN URINE: NOT DETECTED
CANNABINOID SCREEN URINE: NOT DETECTED
CHLORIDE BLD-SCNC: 109 MMOL/L (ref 98–107)
CO2: 27 MMOL/L (ref 22–29)
COCAINE METABOLITE SCREEN URINE: POSITIVE
COCAINE METABOLITE SCREEN URINE: POSITIVE
CREAT SERPL-MCNC: 1 MG/DL (ref 0.7–1.2)
EOSINOPHILS ABSOLUTE: 0.15 E9/L (ref 0.05–0.5)
EOSINOPHILS RELATIVE PERCENT: 3.8 % (ref 0–6)
ETHANOL: <10 MG/DL (ref 0–0.08)
FENTANYL SCREEN, URINE: NOT DETECTED
FENTANYL SCREEN, URINE: NOT DETECTED
GFR SERPL CREATININE-BSD FRML MDRD: >60 ML/MIN/1.73
GLUCOSE BLD-MCNC: 92 MG/DL (ref 74–99)
HCT VFR BLD CALC: 39.5 % (ref 37–54)
HEMOGLOBIN: 12.9 G/DL (ref 12.5–16.5)
IMMATURE GRANULOCYTES #: 0.01 E9/L
IMMATURE GRANULOCYTES %: 0.3 % (ref 0–5)
LYMPHOCYTES ABSOLUTE: 1.78 E9/L (ref 1.5–4)
LYMPHOCYTES RELATIVE PERCENT: 44.7 % (ref 20–42)
Lab: ABNORMAL
Lab: ABNORMAL
MCH RBC QN AUTO: 29.2 PG (ref 26–35)
MCHC RBC AUTO-ENTMCNC: 32.7 % (ref 32–34.5)
MCV RBC AUTO: 89.4 FL (ref 80–99.9)
METHADONE SCREEN, URINE: NOT DETECTED
METHADONE SCREEN, URINE: NOT DETECTED
MONOCYTES ABSOLUTE: 0.34 E9/L (ref 0.1–0.95)
MONOCYTES RELATIVE PERCENT: 8.5 % (ref 2–12)
NEUTROPHILS ABSOLUTE: 1.61 E9/L (ref 1.8–7.3)
NEUTROPHILS RELATIVE PERCENT: 40.4 % (ref 43–80)
OPIATE SCREEN URINE: NOT DETECTED
OPIATE SCREEN URINE: NOT DETECTED
OXYCODONE URINE: NOT DETECTED
OXYCODONE URINE: NOT DETECTED
PDW BLD-RTO: 13.5 FL (ref 11.5–15)
PHENCYCLIDINE SCREEN URINE: NOT DETECTED
PHENCYCLIDINE SCREEN URINE: NOT DETECTED
PLATELET # BLD: 270 E9/L (ref 130–450)
PMV BLD AUTO: 9.8 FL (ref 7–12)
POTASSIUM SERPL-SCNC: 4.6 MMOL/L (ref 3.5–5)
RBC # BLD: 4.42 E12/L (ref 3.8–5.8)
SALICYLATE, SERUM: <0.3 MG/DL (ref 0–30)
SODIUM BLD-SCNC: 144 MMOL/L (ref 132–146)
TOTAL CK: 320 U/L (ref 20–200)
TOTAL PROTEIN: 6.5 G/DL (ref 6.4–8.3)
TRICYCLIC ANTIDEPRESSANTS SCREEN SERUM: NEGATIVE NG/ML
TROPONIN, HIGH SENSITIVITY: 10 NG/L (ref 0–11)
WBC # BLD: 4 E9/L (ref 4.5–11.5)

## 2023-03-05 PROCEDURE — 80179 DRUG ASSAY SALICYLATE: CPT

## 2023-03-05 PROCEDURE — 80143 DRUG ASSAY ACETAMINOPHEN: CPT

## 2023-03-05 PROCEDURE — 82077 ASSAY SPEC XCP UR&BREATH IA: CPT

## 2023-03-05 PROCEDURE — 82550 ASSAY OF CK (CPK): CPT

## 2023-03-05 PROCEDURE — 80307 DRUG TEST PRSMV CHEM ANLYZR: CPT

## 2023-03-05 PROCEDURE — 93005 ELECTROCARDIOGRAM TRACING: CPT | Performed by: NURSE PRACTITIONER

## 2023-03-05 PROCEDURE — 80053 COMPREHEN METABOLIC PANEL: CPT

## 2023-03-05 PROCEDURE — 85025 COMPLETE CBC W/AUTO DIFF WBC: CPT

## 2023-03-05 PROCEDURE — 84484 ASSAY OF TROPONIN QUANT: CPT

## 2023-03-05 PROCEDURE — 99284 EMERGENCY DEPT VISIT MOD MDM: CPT

## 2023-03-05 SDOH — ECONOMIC STABILITY - HOUSING INSECURITY: HOMELESSNESS UNSPECIFIED: Z59.00

## 2023-03-05 ASSESSMENT — PAIN - FUNCTIONAL ASSESSMENT: PAIN_FUNCTIONAL_ASSESSMENT: NONE - DENIES PAIN

## 2023-03-05 NOTE — ED PROVIDER NOTES
independent  HPI:  3/4/23, Time: 11:48 PM JAYLENE Saldivar. is a 40 y.o. male presenting to the ED for  urine drug screen. Patient presents to the emergency department wanting a urine drug screen. States he has been clean from cocaine for the last 3 days and he is interested to see if cocaine will show up on his drug screen. Patient denies any recent drug use states he is try to quit. But then patient also reports that he want to see if what he had in the past was really still in his system. Patient also is homeless. Patient otherwise denies any hallucinations denies any suicidal or homicidal ideations. Patient requesting drug screen. Review of Systems:   A complete review of systems was performed and pertinent positives and negatives are stated within HPI, all other systems reviewed and are negative.          --------------------------------------------- PAST HISTORY ---------------------------------------------  Past Medical History:  has a past medical history of Depression and Schizoaffective disorder ([facility] [address] Past Surgical History:  has a past surgical history that includes Hip fracture surgery (Left, 9/12/2021); eye surgery (19 years ago); and Hip fracture surgery (Left, 9/28/2021). Social History:  reports that he has been smoking cigarettes. He has a 12.00 pack-year smoking history. He has never used smokeless tobacco. He reports that he does not currently use alcohol. He reports current drug use. Frequency: 7.00 times per week. Drugs: Cocaine and Marijuana (Cady Selam). Family History: family history includes Mental Illness in his mother; No Known Problems in his father; Substance Abuse in his mother. The patients home medications have been reviewed.     Allergies: Chlorpheniramine-phenylephrine, Motrin [ibuprofen], Penicillins, and Rondec-d [chlophedianol-pseudoephedrine]    -------------------------------------------------- RESULTS -------------------------------------------------  All laboratory and radiology results have been personally reviewed by myself   LABS:  Results for orders placed or performed during the hospital encounter of 03/05/23   URINE DRUG SCREEN   Result Value Ref Range    Amphetamine Screen, Urine NOT DETECTED Negative <1000 ng/mL    Barbiturate Screen, Ur NOT DETECTED Negative < 200 ng/mL    Benzodiazepine Screen, Urine NOT DETECTED Negative < 200 ng/mL    Cannabinoid Scrn, Ur NOT DETECTED Negative < 50ng/mL    Cocaine Metabolite Screen, Urine POSITIVE (A) Negative < 300 ng/mL    Opiate Scrn, Ur NOT DETECTED Negative < 300ng/mL    PCP Screen, Urine NOT DETECTED Negative < 25 ng/mL    Methadone Screen, Urine NOT DETECTED Negative <300 ng/mL    Oxycodone Urine NOT DETECTED Negative <100 ng/mL    FENTANYL SCREEN, URINE NOT DETECTED Negative <1 ng/mL    Drug Screen Comment: see below        RADIOLOGY:  Interpreted by Radiologist.  No orders to display       ------------------------- NURSING NOTES AND VITALS REVIEWED ---------------------------   The nursing notes within the ED encounter and vital signs as below have been reviewed. /66   Pulse 69   Resp 18   SpO2 96%   Oxygen Saturation Interpretation: Normal      ---------------------------------------------------PHYSICAL EXAM--------------------------------------      Constitutional/General: Alert and oriented x3,   Head: Normocephalic and atraumatic  Eyes: PERRL, EOMI  Mouth: Oropharynx clear, handling secretions, no trismus  Neck: Supple, full ROM,   Pulmonary: Lungs clear to auscultation bilaterally, no wheezes, rales, or rhonchi. Not in respiratory distress  Cardiovascular:  Regular rate and rhythm, no murmurs, gallops, or rubs. 2+ distal pulses  Abdomen: Soft, non tender, non distended,   Extremities: Moves all extremities x 4. Warm and well perfused  Skin: warm and dry without rash  Neurologic: GCS 15,  Psych: Normal Affect lab baseline behavior for patient. No suicidal homicidal ideations no noted hallucinations.      ------------------------------ ED COURSE/MEDICAL DECISION MAKING----------------------  Medications - No data to display      ED COURSE:       Medical Decision Making:  Delonte Irving is a 40 y.o. male presenting to the ED for  urine drug screen. Patient presents to the emergency department wanting a urine drug screen. States he has been clean from cocaine for the last 3 days and he is interested to see if cocaine will show up on his drug screen. Patient denies any recent drug use states he is try to quit. But then patient also reports that he want to see if what he had in the past was really still in his system. Patient also is homeless. Patient otherwise denies any hallucinations denies any suicidal or homicidal ideations. Patient requesting drug screen. Differential diagnosis includes homelessness versus schizophrenia versus substance abuse disorder. Patient in the past has been positive for cocaine. I did review previous records patient is going to the emergency department almost nightly and will stay here to get out of the cold. Patient is noncompliant with close follow-up as well as any follow-up including behavioral health as well as issues. Patient will have urine drug screen. Urine drug screen resulted is still positive for cocaine. I want to make patient aware of findings and to probably discharge him. Nursing reports that he walked back outside. Patient did not return. Patient eloped before receiving discharge papers. Counseling: The emergency provider has spoken with the patient and discussed todays results, in addition to providing specific details for the plan of care and counseling regarding the diagnosis and prognosis. Questions are answered at this time and they are agreeable with the plan.       History from : Patient and Medical records     Limitations to history : None    Chronic Conditions:Positive for psychiatric history as well as drug abuse history    CONSULTS:     Discussion with Other Profesionals : None    Social Determinants : Psychiatric history, drug abuse history    Records Reviewed : Source patient and Inpatient Notes epic medical records        Disposition Considerations (Tests not ordered but considered, Shared Decision Making, Pt Expectation of Test or Tx.):   Appropriate for outpatient management yes and Evaluation by myself and discharge recommended. I am the Primary Clinician of Record.    --------------------------------- IMPRESSION AND DISPOSITION ---------------------------------    IMPRESSION  1. Homeless    2. Cocaine abuse ([facility] [address]        DISPOSITION  Disposition: Discharge, patient left before receiving instructions and results  Patient condition is stable      NOTE: This report was transcribed using voice recognition software.  Every effort was made to ensure accuracy; however, inadvertent computerized transcription errors may be present      JUICE Christian CNP  03/05/23 0320       JUICE Christian CNP  03/05/23 0320       JUICE Christian CNP  03/05/23 0321

## 2023-03-05 NOTE — ED NOTES
Pt walked outside, this nurse asking pt if he was leaving / or smoking. Pt did not respond or answer this nurse. This urse did notify Rn in triage.      Himanshu Levine, KRUNAL  [id number]

## 2023-03-06 VITALS
TEMPERATURE: 98 F | SYSTOLIC BLOOD PRESSURE: 102 MMHG | RESPIRATION RATE: 18 BRPM | OXYGEN SATURATION: 100 % | HEART RATE: 86 BPM | DIASTOLIC BLOOD PRESSURE: 85 MMHG

## 2023-03-06 LAB
ACETAMINOPHEN LEVEL: <5 MCG/ML (ref 10–30)
AMPHETAMINE SCREEN, URINE: NOT DETECTED
ANION GAP SERPL CALCULATED.3IONS-SCNC: 7 MMOL/L (ref 7–16)
BARBITURATE SCREEN URINE: NOT DETECTED
BASOPHILS ABSOLUTE: 0.09 E9/L (ref 0–0.2)
BASOPHILS RELATIVE PERCENT: 1.6 % (ref 0–2)
BENZODIAZEPINE SCREEN, URINE: NOT DETECTED
BILIRUBIN URINE: NEGATIVE
BLOOD, URINE: NEGATIVE
BUN BLDV-MCNC: 12 MG/DL (ref 6–20)
CALCIUM SERPL-MCNC: 8.8 MG/DL (ref 8.6–10.2)
CANNABINOID SCREEN URINE: NOT DETECTED
CHLORIDE BLD-SCNC: 104 MMOL/L (ref 98–107)
CLARITY: CLEAR
CO2: 30 MMOL/L (ref 22–29)
COCAINE METABOLITE SCREEN URINE: POSITIVE
COLOR: YELLOW
CREAT SERPL-MCNC: 1 MG/DL (ref 0.7–1.2)
EKG ATRIAL RATE: 74 BPM
EKG P AXIS: 75 DEGREES
EKG P-R INTERVAL: 178 MS
EKG Q-T INTERVAL: 368 MS
EKG QRS DURATION: 104 MS
EKG QTC CALCULATION (BAZETT): 408 MS
EKG R AXIS: 80 DEGREES
EKG T AXIS: 71 DEGREES
EKG VENTRICULAR RATE: 74 BPM
EOSINOPHILS ABSOLUTE: 0.03 E9/L (ref 0.05–0.5)
EOSINOPHILS RELATIVE PERCENT: 0.5 % (ref 0–6)
ETHANOL: <10 MG/DL (ref 0–0.08)
FENTANYL SCREEN, URINE: NOT DETECTED
GFR SERPL CREATININE-BSD FRML MDRD: >60 ML/MIN/1.73
GLUCOSE BLD-MCNC: 82 MG/DL (ref 74–99)
GLUCOSE URINE: NEGATIVE MG/DL
HCT VFR BLD CALC: 42.4 % (ref 37–54)
HEMOGLOBIN: 13.6 G/DL (ref 12.5–16.5)
IMMATURE GRANULOCYTES #: 0.02 E9/L
IMMATURE GRANULOCYTES %: 0.4 % (ref 0–5)
KETONES, URINE: NEGATIVE MG/DL
LEUKOCYTE ESTERASE, URINE: NEGATIVE
LYMPHOCYTES ABSOLUTE: 1.82 E9/L (ref 1.5–4)
LYMPHOCYTES RELATIVE PERCENT: 32.2 % (ref 20–42)
Lab: ABNORMAL
MCH RBC QN AUTO: 29.9 PG (ref 26–35)
MCHC RBC AUTO-ENTMCNC: 32.1 % (ref 32–34.5)
MCV RBC AUTO: 93.2 FL (ref 80–99.9)
METHADONE SCREEN, URINE: NOT DETECTED
MONOCYTES ABSOLUTE: 0.39 E9/L (ref 0.1–0.95)
MONOCYTES RELATIVE PERCENT: 6.9 % (ref 2–12)
NEUTROPHILS ABSOLUTE: 3.31 E9/L (ref 1.8–7.3)
NEUTROPHILS RELATIVE PERCENT: 58.4 % (ref 43–80)
NITRITE, URINE: NEGATIVE
OPIATE SCREEN URINE: NOT DETECTED
OXYCODONE URINE: NOT DETECTED
PDW BLD-RTO: 13.3 FL (ref 11.5–15)
PH UA: 6.5 (ref 5–9)
PHENCYCLIDINE SCREEN URINE: NOT DETECTED
PLATELET # BLD: 277 E9/L (ref 130–450)
PMV BLD AUTO: 10 FL (ref 7–12)
POTASSIUM SERPL-SCNC: 4.1 MMOL/L (ref 3.5–5)
PROTEIN UA: NEGATIVE MG/DL
RBC # BLD: 4.55 E12/L (ref 3.8–5.8)
SALICYLATE, SERUM: <0.3 MG/DL (ref 0–30)
SODIUM BLD-SCNC: 141 MMOL/L (ref 132–146)
SPECIFIC GRAVITY UA: 1.02 (ref 1–1.03)
TRICYCLIC ANTIDEPRESSANTS SCREEN SERUM: NEGATIVE NG/ML
UROBILINOGEN, URINE: 1 E.U./DL
WBC # BLD: 5.7 E9/L (ref 4.5–11.5)

## 2023-03-06 PROCEDURE — 80307 DRUG TEST PRSMV CHEM ANLYZR: CPT

## 2023-03-06 PROCEDURE — 99284 EMERGENCY DEPT VISIT MOD MDM: CPT

## 2023-03-06 PROCEDURE — 80143 DRUG ASSAY ACETAMINOPHEN: CPT

## 2023-03-06 PROCEDURE — 81003 URINALYSIS AUTO W/O SCOPE: CPT

## 2023-03-06 PROCEDURE — 80048 BASIC METABOLIC PNL TOTAL CA: CPT

## 2023-03-06 PROCEDURE — 80179 DRUG ASSAY SALICYLATE: CPT

## 2023-03-06 PROCEDURE — 85025 COMPLETE CBC W/AUTO DIFF WBC: CPT

## 2023-03-06 PROCEDURE — 93010 ELECTROCARDIOGRAM REPORT: CPT | Performed by: INTERNAL MEDICINE

## 2023-03-06 PROCEDURE — 93005 ELECTROCARDIOGRAM TRACING: CPT | Performed by: NURSE PRACTITIONER

## 2023-03-06 PROCEDURE — 82077 ASSAY SPEC XCP UR&BREATH IA: CPT

## 2023-03-06 ASSESSMENT — LIFESTYLE VARIABLES
HOW OFTEN DO YOU HAVE A DRINK CONTAINING ALCOHOL: NEVER
HOW MANY STANDARD DRINKS CONTAINING ALCOHOL DO YOU HAVE ON A TYPICAL DAY: PATIENT DOES NOT DRINK
HOW OFTEN DO YOU HAVE A DRINK CONTAINING ALCOHOL: NEVER

## 2023-03-06 ASSESSMENT — PAIN - FUNCTIONAL ASSESSMENT: PAIN_FUNCTIONAL_ASSESSMENT: NONE - DENIES PAIN

## 2023-03-06 NOTE — ED NOTES
[facility] attempted to assess patient. Patient immediately wanted to know his test results. Shannen Hercules CNP was close by and gave them to this SW to show patient. Patient immediately began to argue with this SW that they were not his, we were only after his money and he can not believe that this SW would actually stand before him and continue to lie to his face. [facility] had explained to patient that his UDS showed him positive for cocaine. Patient reports he has not used in the last 4 days. [facility] explained that it should have been out of him by day 3 but if he was a heavy user for an extended period of time it may take up to a week or more for it to come out of his system. Patient was not happy and agreeable with with this information and he began to be argumentative. [facility] did manage to ask patient if he was SI or HI and patient said no and then became very upset that I asked him those questions because that was not why he came in here. Patient went on and on about this hospital not help him when all he wants to do is go out in the waiting room and get a warm blanket and get some sleep. Shannen Hercules CNP is agreeable to this and patient was discharged back to the waiting room. Unable to complete a full assessment at this time for this patient do the above.      Ita Galeano, FRANCISCO, DERECKW  03/06/23 0157

## 2023-03-06 NOTE — ED PROVIDER NOTES
[facility]  ED  Encounter Note  Admit Date/RoomTime: 3/5/2023  7:12 PM  ED Room: CHAIR01/   Department of Emergency Medicine  ED Provider Note  Admit Date: 3/5/2023  Room: CHAIR01/      independent    3/5/23  7:12 PM EST    HPI: Jose Simmons 40 y.o. male presents with a complaint of wanting blood work and appears to be delusional with component of hallucinations questionable if this is his baseline versus attention seeking versus worsening psychiatric concerns. Jose Simmons is a 40 y.o. male who presents to the ED for Other (Patient states that he wants a blood test for anything, his lineage, if he is a water baby, if he has any weird conditions. He is very delusional, not making sense.)  Patient was seen here yesterday by the same provider yesterday he was requesting a urine drug screen to make sure that the cocaine was out of his system. Patient reports today wanting blood test now when we are questioning what blood tests he wanted he originally stated that he wanted blood test for drugs I did make him aware that we did the urine drug screen which he left before receiving those results and then after repeatedly asking for blood test for drugs he then started stating he wanted a blood test for just anything he just wants his blood work drawn and then he started talking about his lineage and to see if he was a water baby and if he is the product of his father. Patient talking in circles. Patient denies suicidal homicidal ideations. Will have  evaluate patient             Review of Systems:   Pertinent positives and negatives are stated within HPI, all other systems reviewed and are negative.      --------------------------------------------- PAST HISTORY ---------------------------------------------  Past Medical History:  has a past medical history of Depression and Schizoaffective disorder ([facility] [address]     Past Surgical History:  has a past surgical history that includes Hip fracture surgery (Left, 9/12/2021); eye surgery (19 years ago); and Hip fracture surgery (Left, 9/28/2021). Social History:  reports that he has been smoking cigarettes. He has a 12.00 pack-year smoking history. He has never used smokeless tobacco. He reports that he does not currently use alcohol. He reports current drug use. Frequency: 7.00 times per week. Drugs: Cocaine and Marijuana (Jon Seller). Family History: family history includes Mental Illness in his mother; No Known Problems in his father; Substance Abuse in his mother. The patients home medications have been reviewed. Allergies: Chlorpheniramine-phenylephrine, Motrin [ibuprofen], Penicillins, and Rondec-d [chlophedianol-pseudoephedrine]    -------------------------------------------------- RESULTS -------------------------------------------------  All laboratory and imaging studies were reviewed by myself.     LABS:  Results for orders placed or performed during the hospital encounter of 03/05/23   CBC with Auto Differential   Result Value Ref Range    WBC 4.0 (L) 4.5 - 11.5 E9/L    RBC 4.42 3.80 - 5.80 E12/L    Hemoglobin 12.9 12.5 - 16.5 g/dL    Hematocrit 39.5 37.0 - 54.0 %    MCV 89.4 80.0 - 99.9 fL    MCH 29.2 26.0 - 35.0 pg    MCHC 32.7 32.0 - 34.5 %    RDW 13.5 11.5 - 15.0 fL    Platelets 888 348 - 978 E9/L    MPV 9.8 7.0 - 12.0 fL    Neutrophils % 40.4 (L) 43.0 - 80.0 %    Immature Granulocytes % 0.3 0.0 - 5.0 %    Lymphocytes % 44.7 (H) 20.0 - 42.0 %    Monocytes % 8.5 2.0 - 12.0 %    Eosinophils % 3.8 0.0 - 6.0 %    Basophils % 2.3 (H) 0.0 - 2.0 %    Neutrophils Absolute 1.61 (L) 1.80 - 7.30 E9/L    Immature Granulocytes # 0.01 E9/L    Lymphocytes Absolute 1.78 1.50 - 4.00 E9/L    Monocytes Absolute 0.34 0.10 - 0.95 E9/L    Eosinophils Absolute 0.15 0.05 - 0.50 E9/L    Basophils Absolute 0.09 0.00 - 0.20 E9/L   Comprehensive Metabolic Panel   Result Value Ref Range    Sodium 144 132 - 146 mmol/L    Potassium 4.6 3.5 - 5.0 mmol/L    Chloride 109 (H) 98 - 107 mmol/L    CO2 27 22 - 29 mmol/L    Anion Gap 8 7 - 16 mmol/L    Glucose 92 74 - 99 mg/dL    BUN 13 6 - 20 mg/dL    Creatinine 1.0 0.7 - 1.2 mg/dL    Est, Glom Filt Rate >60 >=60 mL/min/1.73    Calcium 8.8 8.6 - 10.2 mg/dL    Total Protein 6.5 6.4 - 8.3 g/dL    Albumin 3.9 3.5 - 5.2 g/dL    Total Bilirubin 0.2 0.0 - 1.2 mg/dL    Alkaline Phosphatase 51 40 - 129 U/L    ALT 10 0 - 40 U/L    AST 14 0 - 39 U/L   CK   Result Value Ref Range    Total  (H) 20 - 200 U/L   Troponin   Result Value Ref Range    Troponin, High Sensitivity 10 0 - 11 ng/L   Serum Drug Screen   Result Value Ref Range    Ethanol Lvl <10 mg/dL    Acetaminophen Level <5.0 (L) 10.0 - 15.6 mcg/mL    Salicylate, Serum <8.8 0.0 - 30.0 mg/dL    TCA Scrn NEGATIVE Cutoff:300 ng/mL   Urine Drug Screen   Result Value Ref Range    Amphetamine Screen, Urine NOT DETECTED Negative <1000 ng/mL    Barbiturate Screen, Ur NOT DETECTED Negative < 200 ng/mL    Benzodiazepine Screen, Urine NOT DETECTED Negative < 200 ng/mL    Cannabinoid Scrn, Ur NOT DETECTED Negative < 50ng/mL    Cocaine Metabolite Screen, Urine POSITIVE (A) Negative < 300 ng/mL    Opiate Scrn, Ur NOT DETECTED Negative < 300ng/mL    PCP Screen, Urine NOT DETECTED Negative < 25 ng/mL    Methadone Screen, Urine NOT DETECTED Negative <300 ng/mL    Oxycodone Urine NOT DETECTED Negative <100 ng/mL    FENTANYL SCREEN, URINE NOT DETECTED Negative <1 ng/mL    Drug Screen Comment: see below    EKG 12 Lead   Result Value Ref Range    Ventricular Rate 74 BPM    Atrial Rate 74 BPM    P-R Interval 178 ms    QRS Duration 104 ms    Q-T Interval 368 ms    QTc Calculation (Bazett) 408 ms    P Axis 75 degrees    R Axis 80 degrees    T Axis 71 degrees       RADIOLOGY:  Interpreted by Radiologist.  No orders to display       EKG Interpretation  Interpreted by emergency department physician-twelve-lead EKG interpreted showing heart rate 74, normal sinus rhythm. No acute ST elevation or depression noted. Normal axis deviation. This was compared to most recent EKG from the 25th, 2023 which is unchanged. ------------------------- NURSING NOTES AND VITALS REVIEWED ---------------------------   The nursing notes within the ED encounter and vital signs as below have been reviewed. BP (!) 114/57   Pulse 78   Temp 98.6 °F (37 °C) (Temporal)   Resp 16   Ht 5' 11\" (1.803 m)   Wt 150 lb (68 kg)   SpO2 97%   BMI 20.92 kg/m²   Oxygen Saturation Interpretation: Normal            ---------------------------------------------------PHYSICAL EXAM--------------------------------------      Constitutional/General: Alert and oriented x3, well appearing, non toxic in NAD  Head: Normocephalic, atraumatic  Eyes: PERRL, EOMI  Mouth: Oropharynx clear, handling secretions, no trismus  Neck: Supple, full ROM, non tender to palpation in the midline, no stridor, no crepitus, no meningeal signs  Pulmonary: Lungs clear to auscultation bilaterally, no wheezes, rales, or rhonchi. Not in respiratory distress  Cardiovascular:  Regular rate and rhythm, no murmurs, gallops, or rubs. 2+ distal pulses  Abdomen: Soft, non tender, non distended, +BS, no rebound, guarding, or rigidity. No pulsatile masses appreciated  Extremities: Moves all extremities x 4. Warm and well perfused, no clubbing, cyanosis, or edema.  Capillary refill <3 seconds  Skin: warm and dry without rash  Neurologic: GCS 15, CN 2-12 grossly intact, no focal deficits, symmetric strength 5/5 in the upper and lower extremities bilaterally  Psych: hyper and delusional Affect      ------------------------------------------ PROGRESS NOTES ------------------------------------------     Medical decision making: Lindsay García. 40 y.o. male presents with a complaint of wanting blood work and appears to be delusional with component of hallucinations questionable if this is his baseline versus attention seeking versus worsening psychiatric concerns. Jose Simmons is a 40 y.o. male who presents to the ED for Other (Patient states that he wants a blood test for anything, his lineage, if he is a water baby, if he has any weird conditions. He is very delusional, not making sense.)  Patient was seen here yesterday by the same provider yesterday he was requesting a urine drug screen to make sure that the cocaine was out of his system. Patient reports today wanting blood test now when we are questioning what blood tests he wanted he originally stated that he wanted blood test for drugs I did make him aware that we did the urine drug screen which he left before receiving those results and then after repeatedly asking for blood test for drugs he then started stating he wanted a blood test for just anything he just wants his blood work drawn and then he started talking about his lineage and to see if he was a water baby and if he is the product of his father. Patient talking in circles. Patient denies suicidal homicidal ideations. Will have  evaluate patient. Patient will be evaluated by . Medically cleared awaiting her evaluation no labs or EKG ordered at this time will await further evaluation awaiting       Labs were ordered, serum drug 2 negative troponin negative, CK3 20, chemistry panel unremarkable, CBC negative, urine drug screen positive for cocaine. Patient is medically cleared. Awaiting  evaluation. History from : Patient and Medical records     Limitations to history : None    Chronic Conditions: has a past medical history of Depression and Schizoaffective disorder ([facility] [address]     CONSULTS:     Discussion with Other Profesionals : None    Social Determinants : NON Compliance  and Drug abuse     Records Reviewed : Source patient and Inpatient Notes epic medical records        Disposition Considerations (Tests not ordered but considered, Shared Decision Making, Pt Expectation of Test or Tx.):   Appropriate for outpatient management yes and Evaluation by myself and discharge recommended. I am the Primary Clinician of Record. Consultations:   Social work     Re-Evaluations:        Counseling: The emergency provider has spoken with thepatient and discussed todays results, in addition to providing specific details for the plan of care and counseling regarding the diagnosis and prognosis. Questions are answered at this time and they are agreeable with the plan.         --------------------------------- IMPRESSION AND DISPOSITION ---------------------------------    IMPRESSION  1. Hallucinations    2.  [facility]        DISPOSITION  Disposition: as per consultation   Patient condition is stable            JUICE Christian - CNP  03/05/23 666 JUICE Santoyo - [facility]  03/05/23 [id number]

## 2023-03-06 NOTE — ED NOTES
Department of Emergency Medicine  FIRST PROVIDER TRIAGE NOTE             Independent MLP           3/5/23  7:04 PM EST    Date of Encounter: 3/5/23   MRN: [id number]      HPI: Lindsay Sánchez is a 40 y.o. male who presents to the ED for Other (Patient states that he wants a blood test for anything, his lineage, if he is a water baby, if he has any weird conditions. He is very delusional, not making sense.)  Patient was seen here yesterday by the same provider yesterday he was requesting a urine drug screen to make sure that the cocaine was out of his system. Patient reports today wanting blood test now when we are questioning what blood tests he wanted he originally stated that he wanted blood test for drugs I did make him aware that we did the urine drug screen which he left before receiving those results and then after repeatedly asking for blood test for drugs he then started stating he wanted a blood test for just anything he just wants his blood work drawn and then he started talking about his lineage and to see if he was a water baby and if he is the product of his father. Patient talking in circles. Patient denies suicidal homicidal ideations. Will have  evaluate patient    ROS: Negative for cp or sob. PE: Gen Appearance/Constitutional: alert  HEENT: NC/NT. PERRLA,  Airway patent. Initial Plan of Care: All treatment areas with department are currently occupied. Plan to order/Initiate the following while awaiting opening in ED: .   Initiate Treatment-Testing, Proceed toTreatment Area When Bed Available for ED Attending/PREMA to Continue Care    Electronically signed by JUICE Araya CNP   DD: 3/5/23       JUICE Araya CNP  03/05/23 [address], APRN - CNP  03/05/23 [id number]

## 2023-03-07 ENCOUNTER — HOSPITAL ENCOUNTER (EMERGENCY)
Age: 43
Discharge: HOME | End: 2023-03-08
Attending: EMERGENCY MEDICINE
Payer: MEDICAID

## 2023-03-07 ENCOUNTER — HOSPITAL ENCOUNTER (EMERGENCY)
Age: 43
Discharge: LEFT BEFORE DISCHARGE | End: 2023-03-07
Attending: EMERGENCY MEDICINE
Payer: MEDICAID

## 2023-03-07 DIAGNOSIS — F19.10 SUBSTANCE ABUSE (HCC): ICD-10-CM

## 2023-03-07 DIAGNOSIS — Z59.00 HOMELESS: Primary | ICD-10-CM

## 2023-03-07 DIAGNOSIS — Z86.59 HISTORY OF SCHIZOPHRENIA: ICD-10-CM

## 2023-03-07 DIAGNOSIS — F20.9 SCHIZOPHRENIA, UNSPECIFIED TYPE (HCC): Primary | ICD-10-CM

## 2023-03-07 LAB
EKG ATRIAL RATE: 45 BPM
EKG P AXIS: 66 DEGREES
EKG P-R INTERVAL: 194 MS
EKG Q-T INTERVAL: 438 MS
EKG QRS DURATION: 98 MS
EKG QTC CALCULATION (BAZETT): 378 MS
EKG R AXIS: 76 DEGREES
EKG T AXIS: 67 DEGREES
EKG VENTRICULAR RATE: 45 BPM

## 2023-03-07 PROCEDURE — 93010 ELECTROCARDIOGRAM REPORT: CPT | Performed by: INTERNAL MEDICINE

## 2023-03-07 PROCEDURE — 99282 EMERGENCY DEPT VISIT SF MDM: CPT

## 2023-03-07 SDOH — ECONOMIC STABILITY - HOUSING INSECURITY: HOMELESSNESS UNSPECIFIED: Z59.00

## 2023-03-07 ASSESSMENT — PAIN - FUNCTIONAL ASSESSMENT
PAIN_FUNCTIONAL_ASSESSMENT: NONE - DENIES PAIN
PAIN_FUNCTIONAL_ASSESSMENT: NONE - DENIES PAIN

## 2023-03-07 NOTE — ED PROVIDER NOTES
HPI:  3/7/23, Time: 1:05 AM JAYLENE Rashid. is a 40 y.o. male presenting to the ED for psychiatric evaluation, beginning days ago. The complaint has been persistent, moderate in severity, and worsened by nothing. Patient with history of underlying schizophrenia presenting here because of wanting to talk to  he states he does not feel \"safe \". Patient reporting no physical complaints of chest pain shortness of breath or abdominal pain or vomiting he reports no auditory visual hallucinations although he does have a history of auditory hallucinations. Patient reporting no homicidal suicidal thoughts he reports no fever chills or cough he reports no chest pain. ROS:   Pertinent positives and negatives are stated within HPI, all other systems reviewed and are negative.  --------------------------------------------- PAST HISTORY ---------------------------------------------  Past Medical History:  has a past medical history of Depression and Schizoaffective disorder ([facility] [address] Past Surgical History:  has a past surgical history that includes Hip fracture surgery (Left, 9/12/2021); eye surgery (19 years ago); and Hip fracture surgery (Left, 9/28/2021). Social History:  reports that he has been smoking cigarettes. He has a 12.00 pack-year smoking history. He has never used smokeless tobacco. He reports that he does not currently use alcohol. He reports current drug use. Frequency: 7.00 times per week. Drugs: Cocaine and Marijuana (Arlis Blunt). Family History: family history includes Mental Illness in his mother; No Known Problems in his father; Substance Abuse in his mother. The patients home medications have been reviewed.     Allergies: Chlorpheniramine-phenylephrine, Motrin [ibuprofen], Penicillins, and Rondec-d [chlophedianol-pseudoephedrine]    ---------------------------------------------------PHYSICAL EXAM--------------------------------------    Constitutional/General: Alert and oriented x3,   Head: Normocephalic and atraumatic  Eyes: PERRL, EOMI  Mouth: Oropharynx clear, handling secretions, no trismus  Neck: Supple, full ROM, non tender to palpation in the midline, no stridor, no crepitus, no meningeal signs  Pulmonary: Lungs clear to auscultation bilaterally, no wheezes, rales, or rhonchi. Not in respiratory distress  Cardiovascular:  Regular rate. Regular rhythm. No murmurs, gallops, or rubs. 2+ distal pulses  Chest: no chest wall tenderness  Abdomen: Soft. Non tender. Non distended. +BS. No rebound, guarding, or rigidity. No pulsatile masses appreciated. Musculoskeletal: Moves all extremities x 4. Warm and well perfused, no clubbing, cyanosis, or edema. Capillary refill <3 seconds  Skin: warm and dry. No rashes. Neurologic: GCS 15, CN 2-12 grossly intact, no focal deficits, symmetric strength 5/5 in the upper and lower extremities bilaterally  Psych: Denies homicidal suicidal thoughts does report history of auditory hallucinations. Patient appears to be paranoid at times does have flight of ideas    -------------------------------------------------- RESULTS -------------------------------------------------  I have personally reviewed all laboratory and imaging results for this patient. Results are listed below.      LABS:  Results for orders placed or performed during the hospital encounter of 03/06/23   URINE DRUG SCREEN   Result Value Ref Range    Amphetamine Screen, Urine NOT DETECTED Negative <1000 ng/mL    Barbiturate Screen, Ur NOT DETECTED Negative < 200 ng/mL    Benzodiazepine Screen, Urine NOT DETECTED Negative < 200 ng/mL    Cannabinoid Scrn, Ur NOT DETECTED Negative < 50ng/mL    Cocaine Metabolite Screen, Urine POSITIVE (A) Negative < 300 ng/mL    Opiate Scrn, Ur NOT DETECTED Negative < 300ng/mL    PCP Screen, Urine NOT DETECTED Negative < 25 ng/mL    Methadone Screen, Urine NOT DETECTED Negative <300 ng/mL    Oxycodone Urine NOT DETECTED Negative <100 ng/mL FENTANYL SCREEN, URINE NOT DETECTED Negative <1 ng/mL    Drug Screen Comment: see below    Serum Drug Screen   Result Value Ref Range    Ethanol Lvl <10 mg/dL    Acetaminophen Level <5.0 (L) 10.0 - 17.6 mcg/mL    Salicylate, Serum <0.1 0.0 - 30.0 mg/dL    TCA Scrn NEGATIVE Cutoff:300 ng/mL   CBC with Auto Differential   Result Value Ref Range    WBC 5.7 4.5 - 11.5 E9/L    RBC 4.55 3.80 - 5.80 E12/L    Hemoglobin 13.6 12.5 - 16.5 g/dL    Hematocrit 42.4 37.0 - 54.0 %    MCV 93.2 80.0 - 99.9 fL    MCH 29.9 26.0 - 35.0 pg    MCHC 32.1 32.0 - 34.5 %    RDW 13.3 11.5 - 15.0 fL    Platelets 069 445 - 230 E9/L    MPV 10.0 7.0 - 12.0 fL    Neutrophils % 58.4 43.0 - 80.0 %    Immature Granulocytes % 0.4 0.0 - 5.0 %    Lymphocytes % 32.2 20.0 - 42.0 %    Monocytes % 6.9 2.0 - 12.0 %    Eosinophils % 0.5 0.0 - 6.0 %    Basophils % 1.6 0.0 - 2.0 %    Neutrophils Absolute 3.31 1.80 - 7.30 E9/L    Immature Granulocytes # 0.02 E9/L    Lymphocytes Absolute 1.82 1.50 - 4.00 E9/L    Monocytes Absolute 0.39 0.10 - 0.95 E9/L    Eosinophils Absolute 0.03 (L) 0.05 - 0.50 E9/L    Basophils Absolute 0.09 0.00 - 0.20 E9/L   BMP   Result Value Ref Range    Sodium 141 132 - 146 mmol/L    Potassium 4.1 3.5 - 5.0 mmol/L    Chloride 104 98 - 107 mmol/L    CO2 30 (H) 22 - 29 mmol/L    Anion Gap 7 7 - 16 mmol/L    Glucose 82 74 - 99 mg/dL    BUN 12 6 - 20 mg/dL    Creatinine 1.0 0.7 - 1.2 mg/dL    Est, Glom Filt Rate >60 >=60 mL/min/1.73    Calcium 8.8 8.6 - 10.2 mg/dL   Urinalysis   Result Value Ref Range    Color, UA Yellow Straw/Yellow    Clarity, UA Clear Clear    Glucose, Ur Negative Negative mg/dL    Bilirubin Urine Negative Negative    Ketones, Urine Negative Negative mg/dL    Specific Gravity, UA 1.020 1.005 - 1.030    Blood, Urine Negative Negative    pH, UA 6.5 5.0 - 9.0    Protein, UA Negative Negative mg/dL    Urobilinogen, Urine 1.0 <2.0 E.U./dL    Nitrite, Urine Negative Negative    Leukocyte Esterase, Urine Negative Negative   EKG 12 Lead   Result Value Ref Range    Ventricular Rate 45 BPM    Atrial Rate 45 BPM    P-R Interval 194 ms    QRS Duration 98 ms    Q-T Interval 438 ms    QTc Calculation (Bazett) 378 ms    P Axis 66 degrees    R Axis 76 degrees    T Axis 67 degrees       RADIOLOGY:  Interpreted by Radiologist.  No orders to display     EKG: This EKG is signed and interpreted by me. Rate: 45  Rhythm: Sinus  Interpretation: no acute changes  Comparison: stable as compared to patient's most recent EKG 2/25/23        ------------------------- NURSING NOTES AND VITALS REVIEWED ---------------------------   The nursing notes within the ED encounter and vital signs as below have been reviewed by myself. /85   Pulse 86   Temp 98 °F (36.7 °C)   Resp 18   SpO2 100%   Oxygen Saturation Interpretation: Normal    The patients available past medical records and past encounters were reviewed. ------------------------------ ED COURSE/MEDICAL DECISION MAKING----------------------  Medications - No data to display          Medical Decision Making:      History From: Patient with history of underlying schizophrenia presenting here because of psychiatric evaluation. Patient reporting he does not feel safe. Patient reporting no homicidal suicidal thoughts he reports no active hallucinations. Patient reporting no fever no chills no cough he reports no abdominal pain or vomiting. CC/HPI Summary, DDx, ED Course, Reassessment, Tests Considered, Patient expectation:   Patient with history of schizophrenia presenting here because of \"not feeling safe \". Patient reporting no homicidal suicidal thoughts he reports no active hallucinations. Patient reporting no fever no chills no cough he reports no headache he reports no weakness or dizziness he reports no cough. Patient here is awake alert oriented mildly anxious heart lung exam normal abdomen soft nontender he is neurologically intact. Patient gait is steady.   Patient differential includes schizophrenia as well as mood disorder as well as paranoia as well as electrolyte imbalance  Patient's labs noted and CBC within normal limits electrolytes within normal limits drug screen is positive for cocaine. Patient vital signs stable. Patient medically clear for  to evaluate  I was notified by nursing staff patient eloped from the waiting room. Social Determinants affecting Dx or Tx: Patient does smoke. Patient does report history of substance abuse cocaine as well as marijuana    Chronic Conditions: Schizophrenia and depression    Records Reviewed: Patient was seen here yesterday on March 5  For psychiatric evaluation. Patient was also seen here multiple times. For anywhere from depression schizophrenia as well as being homeless. Re-Evaluations:             Re-evaluation. Patients symptoms show no change      Consultations:             Social work    Critical Care: This patient's ED course included: a personal history and physicial eaxmination    This patient has been closely monitored during their ED course. Counseling: The emergency provider has spoken with the patient and discussed todays results, in addition to providing specific details for the plan of care and counseling regarding the diagnosis and prognosis. Questions are answered at this time and they are agreeable with the plan.       --------------------------------- IMPRESSION AND DISPOSITION ---------------------------------    IMPRESSION  1. Schizophrenia, unspecified type ([facility]    2. Substance abuse ([facility]        DISPOSITION  Disposition: Patient was to be eval by . Patient eloped from the waiting room  Patient condition is stable        NOTE: This report was transcribed using voice recognition software.  Every effort was made to ensure accuracy; however, inadvertent computerized transcription errors may be present          Chema Mir MD  03/07/23 [id number]       Pedro Carl Lyn Marlow MD  03/07/23 [id number] Taj Troy MD  03/07/23 [id number]

## 2023-03-08 ENCOUNTER — HOSPITAL ENCOUNTER (EMERGENCY)
Age: 43
Discharge: HOME | End: 2023-03-09
Payer: MEDICARE

## 2023-03-08 VITALS
BODY MASS INDEX: 21 KG/M2 | DIASTOLIC BLOOD PRESSURE: 86 MMHG | SYSTOLIC BLOOD PRESSURE: 127 MMHG | RESPIRATION RATE: 14 BRPM | TEMPERATURE: 98 F | HEIGHT: 71 IN | OXYGEN SATURATION: 100 % | WEIGHT: 150 LBS | HEART RATE: 72 BPM

## 2023-03-08 PROCEDURE — 4500000002 HC ER NO CHARGE

## 2023-03-08 PROCEDURE — 99281 EMR DPT VST MAYX REQ PHY/QHP: CPT

## 2023-03-08 ASSESSMENT — PAIN - FUNCTIONAL ASSESSMENT: PAIN_FUNCTIONAL_ASSESSMENT: NONE - DENIES PAIN

## 2023-03-08 NOTE — ED PROVIDER NOTES
HPI:  3/7/23, Time: 8:26 PM JAYLENE Irving is a 40 y.o. male presenting to the ED for history of being homeless, beginning days ago. The complaint has been persistent, mild in severity, and worsened by nothing. Presenting here because of reportedly having no place to stay. Patient reporting no complaints of any chest pain or difficulty breathing or abdominal pain there is no fever chills there is no cough. Patient reporting history of schizophrenia does report auditory hallucinations. Patient reporting no homicidal suicidal thoughts. Patient reporting no fever no chills no cough he reports no headache. ROS:   Pertinent positives and negatives are stated within HPI, all other systems reviewed and are negative.  --------------------------------------------- PAST HISTORY ---------------------------------------------  Past Medical History:  has a past medical history of Depression and Schizoaffective disorder ([facility] [address] Past Surgical History:  has a past surgical history that includes Hip fracture surgery (Left, 9/12/2021); eye surgery (19 years ago); and Hip fracture surgery (Left, 9/28/2021). Social History:  reports that he has been smoking cigarettes. He has a 12.00 pack-year smoking history. He has never used smokeless tobacco. He reports that he does not currently use alcohol. He reports current drug use. Frequency: 7.00 times per week. Drugs: Cocaine and Marijuana ([place]). Family History: family history includes Mental Illness in his mother; No Known Problems in his father; Substance Abuse in his mother. The patients home medications have been reviewed.     Allergies: Chlorpheniramine-phenylephrine, Motrin [ibuprofen], Penicillins, and Rondec-d [chlophedianol-pseudoephedrine]    ---------------------------------------------------PHYSICAL EXAM--------------------------------------    Constitutional/General: Alert and oriented x3,   Head: Normocephalic and atraumatic  Eyes: PERRL, EOMI  Mouth: Oropharynx clear, handling secretions, no trismus  Neck: Supple, full ROM, non tender to palpation in the midline, no stridor, no crepitus, no meningeal signs  Pulmonary: Lungs clear to auscultation bilaterally, no wheezes, rales, or rhonchi. Not in respiratory distress  Cardiovascular:  Regular rate. Regular rhythm. No murmurs, gallops, or rubs. 2+ distal pulses  Chest: no chest wall tenderness  Abdomen: Soft. Non tender. Non distended. +BS. No rebound, guarding, or rigidity. No pulsatile masses appreciated. Musculoskeletal: Moves all extremities x 4. Warm and well perfused, no clubbing, cyanosis, or edema. Capillary refill <3 seconds  Skin: warm and dry. No rashes. Neurologic: GCS 15, CN 2-12 grossly intact, no focal deficits, symmetric strength 5/5 in the upper and lower extremities bilaterally  Psych: Denies any homicidal suicidal thoughts patient reporting ongoing auditory hallucinations. Does have flight of ideas    -------------------------------------------------- RESULTS -------------------------------------------------  I have personally reviewed all laboratory and imaging results for this patient. Results are listed below. LABS:  No results found for this visit on 03/07/23. RADIOLOGY:  Interpreted by Radiologist.  No orders to display           ------------------------- NURSING NOTES AND VITALS REVIEWED ---------------------------   The nursing notes within the ED encounter and vital signs as below have been reviewed by myself. /77   Pulse 64   Temp 98.2 °F (36.8 °C) (Oral)   Resp 20   Ht 5' 11\" (1.803 m)   Wt 150 lb (68 kg)   SpO2 96%   BMI 20.92 kg/m²   Oxygen Saturation Interpretation: Normal    The patients available past medical records and past encounters were reviewed.         ------------------------------ ED COURSE/MEDICAL DECISION MAKING----------------------  Medications - No data to display          Medical Decision Making:      History From: Patient with history of schizophrenia presenting here because of reportedly having no place to stay. Patient reporting no fever no chills no cough no chest pain or abdominal pain. Patient reporting no homicidal suicidal thoughts. Patient does report ongoing auditory hallucinations. Patient was seen here yesterday for similar complaint. CC/HPI Summary, DDx, ED Course, Reassessment, Tests Considered, Patient expectation:     Patient with history of schizophrenia presenting here because of reportedly having no place to stay. Patient has been here multiple times for the same complaint. Patient reporting no chest pain no difficulty breathing reports no abdominal pain or vomiting. Patient reporting no fever no chills no cough he reports no headache. Patient reporting no weakness. Patient reporting no homicidal suicidal thoughts. He does report ongoing auditory hallucinations. Patient awake alert patient heart and lung exam normal abdomen soft nontender. Patient neurologically intact. Patient reporting auditory hallucinations. Patient reporting no homicidal suicidal thoughts. Gait is steady and normal he has normal strength  Patient differential includes schizophrenia exacerbation as well as electrolyte imbalance. Patient was seen here yesterday for same complaint. Patient's drug screens are noted he was positive for cocaine. Patient is at his baseline and reporting no homicidal suicidal thoughts. Patient plan will be to discharge. Social Determinants affecting Dx or Tx: Patient does smoke history of substance abuse    Chronic Conditions: Has history of schizophrenia    Records Reviewed: Patient was seen here yesterday for same complaint. I actually saw the patient myself patient labs were obtained as well as EKG. Re-Evaluations:             Re-evaluation. Patients symptoms show no change      Consultations:                 Critical Care:          This patient's ED course included: a personal history and physicial eaxmination    This patient has been closely monitored during their ED course.    Counseling:   The emergency provider has spoken with the patient and discussed today’s results, in addition to providing specific details for the plan of care and counseling regarding the diagnosis and prognosis.  Questions are answered at this time and they are agreeable with the plan.       --------------------------------- IMPRESSION AND DISPOSITION ---------------------------------    IMPRESSION  1. Homeless    2. History of schizophrenia        DISPOSITION  Disposition: Discharge  Patient condition is stable        NOTE: This report was transcribed using voice recognition software. Every effort was made to ensure accuracy; however, inadvertent computerized transcription errors may be present          Nabil Garner MD  03/07/23 2041       Nabil Garner MD  03/07/23 2041

## 2023-03-09 VITALS
HEIGHT: 71 IN | DIASTOLIC BLOOD PRESSURE: 80 MMHG | BODY MASS INDEX: 21 KG/M2 | HEART RATE: 72 BPM | TEMPERATURE: 98 F | RESPIRATION RATE: 18 BRPM | OXYGEN SATURATION: 99 % | WEIGHT: 150 LBS | SYSTOLIC BLOOD PRESSURE: 130 MMHG

## 2023-03-09 LAB
ALBUMIN SERPL-MCNC: 4.2 G/DL (ref 3.5–5.2)
ALP BLD-CCNC: 48 U/L (ref 40–129)
ALT SERPL-CCNC: 9 U/L (ref 0–40)
ANION GAP SERPL CALCULATED.3IONS-SCNC: 8 MMOL/L (ref 7–16)
AST SERPL-CCNC: 17 U/L (ref 0–39)
BASOPHILS ABSOLUTE: 0.09 E9/L (ref 0–0.2)
BASOPHILS RELATIVE PERCENT: 1.8 % (ref 0–2)
BILIRUB SERPL-MCNC: 0.4 MG/DL (ref 0–1.2)
BILIRUBIN URINE: NEGATIVE
BLOOD, URINE: NEGATIVE
BUN BLDV-MCNC: 13 MG/DL (ref 6–20)
CALCIUM SERPL-MCNC: 9 MG/DL (ref 8.6–10.2)
CHLORIDE BLD-SCNC: 106 MMOL/L (ref 98–107)
CLARITY: CLEAR
CO2: 28 MMOL/L (ref 22–29)
COLOR: YELLOW
CREAT SERPL-MCNC: 1 MG/DL (ref 0.7–1.2)
EOSINOPHILS ABSOLUTE: 0.09 E9/L (ref 0.05–0.5)
EOSINOPHILS RELATIVE PERCENT: 1.8 % (ref 0–6)
GFR SERPL CREATININE-BSD FRML MDRD: >60 ML/MIN/1.73
GLUCOSE BLD-MCNC: 82 MG/DL (ref 74–99)
GLUCOSE URINE: NEGATIVE MG/DL
HCT VFR BLD CALC: 41.6 % (ref 37–54)
HEMOGLOBIN: 13.6 G/DL (ref 12.5–16.5)
IMMATURE GRANULOCYTES #: 0.01 E9/L
IMMATURE GRANULOCYTES %: 0.2 % (ref 0–5)
KETONES, URINE: NEGATIVE MG/DL
LACTIC ACID: 0.9 MMOL/L (ref 0.5–2.2)
LEUKOCYTE ESTERASE, URINE: NEGATIVE
LIPASE: 19 U/L (ref 13–60)
LYMPHOCYTES ABSOLUTE: 2.35 E9/L (ref 1.5–4)
LYMPHOCYTES RELATIVE PERCENT: 45.9 % (ref 20–42)
MCH RBC QN AUTO: 29.7 PG (ref 26–35)
MCHC RBC AUTO-ENTMCNC: 32.7 % (ref 32–34.5)
MCV RBC AUTO: 90.8 FL (ref 80–99.9)
MONOCYTES ABSOLUTE: 0.32 E9/L (ref 0.1–0.95)
MONOCYTES RELATIVE PERCENT: 6.3 % (ref 2–12)
NEUTROPHILS ABSOLUTE: 2.26 E9/L (ref 1.8–7.3)
NEUTROPHILS RELATIVE PERCENT: 44 % (ref 43–80)
NITRITE, URINE: NEGATIVE
PDW BLD-RTO: 13.2 FL (ref 11.5–15)
PH UA: 7 (ref 5–9)
PLATELET # BLD: 282 E9/L (ref 130–450)
PMV BLD AUTO: 10 FL (ref 7–12)
POTASSIUM SERPL-SCNC: 4.7 MMOL/L (ref 3.5–5)
PROTEIN UA: NEGATIVE MG/DL
RBC # BLD: 4.58 E12/L (ref 3.8–5.8)
SODIUM BLD-SCNC: 142 MMOL/L (ref 132–146)
SPECIFIC GRAVITY UA: 1.02 (ref 1–1.03)
TOTAL PROTEIN: 6.7 G/DL (ref 6.4–8.3)
UROBILINOGEN, URINE: 2 E.U./DL
WBC # BLD: 5.1 E9/L (ref 4.5–11.5)

## 2023-03-09 PROCEDURE — 81003 URINALYSIS AUTO W/O SCOPE: CPT

## 2023-03-09 PROCEDURE — 99283 EMERGENCY DEPT VISIT LOW MDM: CPT

## 2023-03-09 PROCEDURE — 85025 COMPLETE CBC W/AUTO DIFF WBC: CPT

## 2023-03-09 PROCEDURE — 83605 ASSAY OF LACTIC ACID: CPT

## 2023-03-09 PROCEDURE — 80053 COMPREHEN METABOLIC PANEL: CPT

## 2023-03-09 PROCEDURE — 6370000000 HC RX 637 (ALT 250 FOR IP): Performed by: NURSE PRACTITIONER

## 2023-03-09 PROCEDURE — 83690 ASSAY OF LIPASE: CPT

## 2023-03-09 RX ORDER — ONDANSETRON 4 MG/1
TABLET, ORALLY DISINTEGRATING ORAL
Status: COMPLETED
Start: 2023-03-09 | End: 2023-03-09

## 2023-03-09 RX ORDER — ONDANSETRON 4 MG/1
4 TABLET, ORALLY DISINTEGRATING ORAL ONCE
Status: COMPLETED | OUTPATIENT
Start: 2023-03-09 | End: 2023-03-09

## 2023-03-09 RX ADMIN — ONDANSETRON 4 MG: 4 TABLET, ORALLY DISINTEGRATING ORAL at 21:06

## 2023-03-09 NOTE — ED NOTES
Department of Emergency Medicine  FIRST PROVIDER TRIAGE NOTE             Independent MLP           3/8/23  8:52 PM EST    Date of Encounter: 3/8/23   MRN: [id number]      HPI: Delmy Miguel is a 40 y.o. male who presents to the ED for Paranoid (States went to [employer] this morning and felt threatened at [employer]. +paranoid. Denies drug/ETOH use. Rambling in triage. -SI/HI.)       ROS: Negative for cp or sob. PE: Gen Appearance/Constitutional: alert  HEENT: NC/NT. PERRLA,  Airway patent. Initial Plan of Care: All treatment areas with department are currently occupied. Plan to order/Initiate the following while awaiting opening in ED: .   Initiate Treatment-Testing, Proceed toTreatment Area When Bed Available for ED Attending/MLP to Continue Care    Electronically signed by JUICE Ramon CNP   DD: 3/8/23       JUICE Ramon CNP  03/08/23 2053

## 2023-03-09 NOTE — ED NOTES
This nurse did a roll call of waiting room to determine patient presence, was not to be found     Pina Garcia RN  03/09/23 0945

## 2023-03-10 ENCOUNTER — HOSPITAL ENCOUNTER (EMERGENCY)
Age: 43
Discharge: HOME | End: 2023-03-10
Payer: MEDICARE

## 2023-03-10 ENCOUNTER — HOSPITAL ENCOUNTER (EMERGENCY)
Age: 43
Discharge: HOME | End: 2023-03-10
Attending: EMERGENCY MEDICINE

## 2023-03-10 VITALS
RESPIRATION RATE: 20 BRPM | HEART RATE: 86 BPM | OXYGEN SATURATION: 98 % | TEMPERATURE: 98.2 F | DIASTOLIC BLOOD PRESSURE: 74 MMHG | SYSTOLIC BLOOD PRESSURE: 135 MMHG

## 2023-03-10 VITALS — HEART RATE: 60 BPM | OXYGEN SATURATION: 99 % | RESPIRATION RATE: 16 BRPM | TEMPERATURE: 98.2 F

## 2023-03-10 DIAGNOSIS — R10.84 GENERALIZED ABDOMINAL PAIN: Primary | ICD-10-CM

## 2023-03-10 DIAGNOSIS — Z59.00 HOMELESS: Primary | ICD-10-CM

## 2023-03-10 DIAGNOSIS — R19.7 DIARRHEA, UNSPECIFIED TYPE: ICD-10-CM

## 2023-03-10 SDOH — ECONOMIC STABILITY - HOUSING INSECURITY: HOMELESSNESS UNSPECIFIED: Z59.00

## 2023-03-10 ASSESSMENT — PAIN - FUNCTIONAL ASSESSMENT
PAIN_FUNCTIONAL_ASSESSMENT: NONE - DENIES PAIN
PAIN_FUNCTIONAL_ASSESSMENT: NONE - DENIES PAIN

## 2023-03-10 NOTE — ED PROVIDER NOTES
independent  HPI:  3/10/23, Time: 12:19 AM JAYLENE Das is a 40 y.o. male presenting to the ED for diarrhea. Patient presents to the emergency department states that he began to have diarrhea after eating mashed potatoes today. Patient denies any fevers or chills as well as no noted back or flank pain. Also denies any abdominal pain. Patient also without fevers. Denies take anything over-the-counter for symptom relief. Denies noticing any blood in his stool. Review of Systems:   A complete review of systems was performed and pertinent positives and negatives are stated within HPI, all other systems reviewed and are negative.          --------------------------------------------- PAST HISTORY ---------------------------------------------  Past Medical History:  has a past medical history of Depression and Schizoaffective disorder ([facility] [address] Past Surgical History:  has a past surgical history that includes Hip fracture surgery (Left, 9/12/2021); eye surgery (19 years ago); and Hip fracture surgery (Left, 9/28/2021). Social History:  reports that he has been smoking cigarettes. He has a 12.00 pack-year smoking history. He has never used smokeless tobacco. He reports that he does not currently use alcohol. He reports current drug use. Frequency: 7.00 times per week. Drugs: Cocaine and Marijuana (Ferna Amen). Family History: family history includes Mental Illness in his mother; No Known Problems in his father; Substance Abuse in his mother. The patients home medications have been reviewed.     Allergies: Chlorpheniramine-phenylephrine, Motrin [ibuprofen], Penicillins, and Rondec-d [chlophedianol-pseudoephedrine]    -------------------------------------------------- RESULTS -------------------------------------------------  All laboratory and radiology results have been personally reviewed by myself   LABS:  Results for orders placed or performed during the hospital encounter of 03/10/23 CBC with Auto Differential   Result Value Ref Range    WBC 5.1 4.5 - 11.5 E9/L    RBC 4.58 3.80 - 5.80 E12/L    Hemoglobin 13.6 12.5 - 16.5 g/dL    Hematocrit 41.6 37.0 - 54.0 %    MCV 90.8 80.0 - 99.9 fL    MCH 29.7 26.0 - 35.0 pg    MCHC 32.7 32.0 - 34.5 %    RDW 13.2 11.5 - 15.0 fL    Platelets 239 535 - 082 E9/L    MPV 10.0 7.0 - 12.0 fL    Neutrophils % 44.0 43.0 - 80.0 %    Immature Granulocytes % 0.2 0.0 - 5.0 %    Lymphocytes % 45.9 (H) 20.0 - 42.0 %    Monocytes % 6.3 2.0 - 12.0 %    Eosinophils % 1.8 0.0 - 6.0 %    Basophils % 1.8 0.0 - 2.0 %    Neutrophils Absolute 2.26 1.80 - 7.30 E9/L    Immature Granulocytes # 0.01 E9/L    Lymphocytes Absolute 2.35 1.50 - 4.00 E9/L    Monocytes Absolute 0.32 0.10 - 0.95 E9/L    Eosinophils Absolute 0.09 0.05 - 0.50 E9/L    Basophils Absolute 0.09 0.00 - 0.20 E9/L   Comprehensive Metabolic Panel   Result Value Ref Range    Sodium 142 132 - 146 mmol/L    Potassium 4.7 3.5 - 5.0 mmol/L    Chloride 106 98 - 107 mmol/L    CO2 28 22 - 29 mmol/L    Anion Gap 8 7 - 16 mmol/L    Glucose 82 74 - 99 mg/dL    BUN 13 6 - 20 mg/dL    Creatinine 1.0 0.7 - 1.2 mg/dL    Est, Glom Filt Rate >60 >=60 mL/min/1.73    Calcium 9.0 8.6 - 10.2 mg/dL    Total Protein 6.7 6.4 - 8.3 g/dL    Albumin 4.2 3.5 - 5.2 g/dL    Total Bilirubin 0.4 0.0 - 1.2 mg/dL    Alkaline Phosphatase 48 40 - 129 U/L    ALT 9 0 - 40 U/L    AST 17 0 - 39 U/L   Lactic Acid   Result Value Ref Range    Lactic Acid 0.9 0.5 - 2.2 mmol/L   Lipase   Result Value Ref Range    Lipase 19 13 - 60 U/L   Urinalysis   Result Value Ref Range    Color, UA Yellow Straw/Yellow    Clarity, UA Clear Clear    Glucose, Ur Negative Negative mg/dL    Bilirubin Urine Negative Negative    Ketones, Urine Negative Negative mg/dL    Specific Gravity, UA 1.020 1.005 - 1.030    Blood, Urine Negative Negative    pH, UA 7.0 5.0 - 9.0    Protein, UA Negative Negative mg/dL    Urobilinogen, Urine 2.0 (A) <2.0 E.U./dL    Nitrite, Urine Negative Negative Leukocyte Esterase, Urine Negative Negative       RADIOLOGY:  Interpreted by Radiologist.  No orders to display       ------------------------- NURSING NOTES AND VITALS REVIEWED ---------------------------   The nursing notes within the ED encounter and vital signs as below have been reviewed. Pulse 65   Temp 97.5 °F (36.4 °C) (Temporal)   Resp 19   SpO2 100%   Oxygen Saturation Interpretation: Normal      ---------------------------------------------------PHYSICAL EXAM--------------------------------------      Constitutional/General: Alert and oriented x3, mildly uncomfortable   Head: Normocephalic and atraumatic  Eyes: PERRL, EOMI  Mouth: Oropharynx clear, handling secretions, no trismus  Neck: Supple, full ROM,   Pulmonary: Lungs clear to auscultation bilaterally, no wheezes, rales, or rhonchi. Not in respiratory distress  Cardiovascular:  Regular rate and rhythm, no murmurs, gallops, or rubs. 2+ distal pulses  Abdomen: Soft, non tender, non distended, no point tenderness. Extremities: Moves all extremities x 4. Warm and well perfused  Skin: warm and dry without rash  Neurologic: GCS 15,  Psych: Normal Affect      ------------------------------ ED COURSE/MEDICAL DECISION MAKING----------------------  Medications   ondansetron (ZOFRAN-ODT) disintegrating tablet 4 mg (4 mg Oral Given 3/9/23 2106)   ondansetron (ZOFRAN-ODT) 4 MG disintegrating tablet (  Return to [facility] 3/9/23 2214)         ED COURSE:       Medical Decision Making:  Alphonse Raphael is a 40 y.o. male presenting to the ED for diarrhea. Patient presents to the emergency department states that he began to have diarrhea after eating mashed potatoes today. Patient denies any fevers or chills as well as no noted back or flank pain. Also denies any abdominal pain. Patient also without fevers. Denies take anything over-the-counter for symptom relief. Denies noticing any blood in his stool.   Differential diagnosis includes gastroenteritis versus colitis versus GERD versus food poisoning. Plan will be to provide patient with Zofran 4 mg ODT tablet will also obtain labs and evaluate. Labs were resulted lactic acid level negative, CBC negative, chemistry panel unremarkable lipase is negative, urinalysis negative. Patient was provided with a p.o. challenge of ginger ale as well as orange Jell-O. Patient without any further episodes of diarrhea. No indication to obtain imaging. Plan will be for discharge. Patient made aware of good follow-up. Patient will be safely discharged. History from : Patient and Medical records     Limitations to history : None    Chronic Conditions: Schizophrenia and drug abuse    CONSULTS: Primary care. Discussion with Other Profesionals : None    Social Determinants : None    Records Reviewed : Source patient and Inpatient Notes epic medical records        Disposition Considerations (Tests not ordered but considered, Shared Decision Making, Pt Expectation of Test or Tx.):   Appropriate for outpatient management yes and Evaluation by myself and discharge recommended. I am the Primary Clinician of Record. Counseling: The emergency provider has spoken with the patient and discussed todays results, in addition to providing specific details for the plan of care and counseling regarding the diagnosis and prognosis. Questions are answered at this time and they are agreeable with the plan.      --------------------------------- IMPRESSION AND DISPOSITION ---------------------------------    IMPRESSION  1. Generalized abdominal pain    2. Diarrhea, unspecified type        DISPOSITION  Disposition: Discharge to home  Patient condition is stable      NOTE: This report was transcribed using voice recognition software.  Every effort was made to ensure accuracy; however, inadvertent computerized transcription errors may be present      JUICE Nunez - CNP  03/10/23 0424

## 2023-03-10 NOTE — ED TRIAGE NOTES
Department of Emergency Medicine  FIRST PROVIDER TRIAGE NOTE             Independent MLP           3/10/23  6:50 PM EST    Date of Encounter: 3/10/23   MRN: [id number]      HPI: James Gleason. is a 40 y.o. male who presents to the ED for Depression (Patient states that he feels depressed with his living conditions he's trying to get clean)  Patient reports that he is here because it is cold outside. Patient also reports concerns about his investments. ROS: Negative for cp, sob, abd pain, back pain, fever, cough, vomiting, diarrhea, urinary complaints, rash, headache, or dizziness. PE: Gen Appearance/Constitutional: alert  CV: regular rate  Pulm: CTA bilat  GI: soft and NT     Initial Plan of Care: All treatment areas with department are currently occupied. Plan to order/Initiate the following while awaiting opening in ED: Social Work Eval and other specialist consultation.   Initiate Treatment-Testing, Proceed toTreatment Area When Bed Available for ED Attending/MLP to Continue Care    Electronically signed by CECILE Schmid   DD: 3/10/23

## 2023-03-11 ENCOUNTER — HOSPITAL ENCOUNTER (EMERGENCY)
Age: 43
Discharge: HOME | End: 2023-03-11
Attending: EMERGENCY MEDICINE
Payer: MEDICARE

## 2023-03-11 VITALS
OXYGEN SATURATION: 96 % | HEART RATE: 66 BPM | DIASTOLIC BLOOD PRESSURE: 71 MMHG | RESPIRATION RATE: 20 BRPM | TEMPERATURE: 98 F | HEIGHT: 71 IN | SYSTOLIC BLOOD PRESSURE: 118 MMHG | BODY MASS INDEX: 22.26 KG/M2 | WEIGHT: 159 LBS

## 2023-03-11 DIAGNOSIS — F20.9 SCHIZOPHRENIA, UNSPECIFIED TYPE (HCC): Primary | ICD-10-CM

## 2023-03-11 PROCEDURE — 99282 EMERGENCY DEPT VISIT SF MDM: CPT

## 2023-03-11 NOTE — ED PROVIDER NOTES
HPI:  3/10/23, Time: 7:41 PM JAYLENE Mendoza is a 40 y.o. male presenting to the ED for feeling depressed, beginning days ago. The complaint has been persistent, mild in severity, and worsened by nothing. Patient reporting feeling depressed reports no homicidal suicidal thoughts. Patient reporting he feels depressed because he has no place to stay. Patient reporting no complaints of chest pain shortness of breath or abdominal pain there is no vomiting or diarrhea there is no fever chills or cough there is no headache he reports no weakness or dizziness he reports no active auditory visual hallucinations. Patient reporting no fall or injury. ROS:   Pertinent positives and negatives are stated within HPI, all other systems reviewed and are negative.  --------------------------------------------- PAST HISTORY ---------------------------------------------  Past Medical History:  has a past medical history of Depression and Schizoaffective disorder ([facility] [address] Past Surgical History:  has a past surgical history that includes Hip fracture surgery (Left, 9/12/2021); eye surgery (19 years ago); and Hip fracture surgery (Left, 9/28/2021). Social History:  reports that he has been smoking cigarettes. He has a 12.00 pack-year smoking history. He has never used smokeless tobacco. He reports that he does not currently use alcohol. He reports current drug use. Frequency: 7.00 times per week. Drugs: Cocaine and Marijuana ([place]). Family History: family history includes Mental Illness in his mother; No Known Problems in his father; Substance Abuse in his mother. The patients home medications have been reviewed.     Allergies: Chlorpheniramine-phenylephrine, Motrin [ibuprofen], Penicillins, and Rondec-d [chlophedianol-pseudoephedrine]    ---------------------------------------------------PHYSICAL EXAM--------------------------------------    Constitutional/General: Alert and oriented x3, well appearing, non toxic in NAD  Head: Normocephalic and atraumatic  Eyes: PERRL, EOMI  Mouth: Oropharynx clear, handling secretions, no trismus  Neck: Supple, full ROM, non tender to palpation in the midline, no stridor, no crepitus, no meningeal signs  Pulmonary: Lungs clear to auscultation bilaterally, no wheezes, rales, or rhonchi. Not in respiratory distress  Cardiovascular:  Regular rate. Regular rhythm. No murmurs, gallops, or rubs. 2+ distal pulses  Chest: no chest wall tenderness  Abdomen: Soft. Non tender. Non distended. +BS. No rebound, guarding, or rigidity. No pulsatile masses appreciated. Musculoskeletal: Moves all extremities x 4. Warm and well perfused, no clubbing, cyanosis, or edema. Capillary refill <3 seconds  Skin: warm and dry. No rashes. Neurologic: GCS 15, CN 2-12 grossly intact, no focal deficits, symmetric strength 5/5 in the upper and lower extremities bilaterally  Psych: Denies homicidal suicidal thoughts currently denies any hallucinations    -------------------------------------------------- RESULTS -------------------------------------------------  I have personally reviewed all laboratory and imaging results for this patient. Results are listed below. LABS:  No results found for this visit on 03/10/23. RADIOLOGY:  Interpreted by Radiologist.  No orders to display           ------------------------- NURSING NOTES AND VITALS REVIEWED ---------------------------   The nursing notes within the ED encounter and vital signs as below have been reviewed by myself. /74   Pulse 86   Temp 98.2 °F (36.8 °C)   Resp 20   SpO2 98%   Oxygen Saturation Interpretation: Normal    The patients available past medical records and past encounters were reviewed.         ------------------------------ ED COURSE/MEDICAL DECISION MAKING----------------------  Medications - No data to display          Medical Decision Making:      History From: Patient with underlying history of schizophrenia. Patient reports the reason why he is here and what he told me was that he was homeless and had no place to stay. Patient reporting feeling slightly depressed he reports no thoughts of harming himself or others he reports no fever chills or cough or chest pain he reports no headache. He reports no active hallucinations    CC/HPI Summary, DDx, ED Course, Reassessment, Tests Considered, Patient expectation:   Patient presenting here because of feeling depressed because of no place to stay. Patient does have history of underlying schizophrenia. Patient reporting no thoughts of harming self or others. Patient reporting no active hallucinations. Patient reporting no fever chills or cough he reports no chest pain or difficulty breathing reports no abdominal pain or vomiting he reports no diarrhea. He reports no fall or injury. Patient is awake alert oriented heart and lung exam normal abdomen is soft nontender he is neurologically intact again denying homicidal suicidal thoughts. Patient does report he is homeless. Patient has been here multiple times for the same complaint. He has had multiple lab draws as well as EKGs. Patient does have a history of substance abuse. Social Determinants affecting Dx or Tx: Smokes and does report substance abuse    Chronic Conditions: Schizophrenia    Records Reviewed: Only seen the day before as well as on 7 March as well as multiple times in the month of March and February for the same complaints. Patient drug screen usually is positive for cocaine        Re-Evaluations:             Re-evaluation. Patients symptoms show no change      Consultations:                 Critical Care: This patient's ED course included: a personal history and physicial eaxmination    This patient has remained hemodynamically stable during their ED course. Counseling:    The emergency provider has spoken with the patient and discussed todays results, in addition to providing specific details for the plan of care and counseling regarding the diagnosis and prognosis. Questions are answered at this time and they are agreeable with the plan.       --------------------------------- IMPRESSION AND DISPOSITION ---------------------------------    IMPRESSION  1. Homeless        DISPOSITION  Disposition: Discharge to home  Patient condition is stable        NOTE: This report was transcribed using voice recognition software.  Every effort was made to ensure accuracy; however, inadvertent computerized transcription errors may be present         Marciano Iqbal MD  03/10/23 3606       Marciano Iqbal MD  03/10/23 8319

## 2023-03-11 NOTE — ED PROVIDER NOTES
[address] [facility] ENCOUNTER        Pt Name: Mario Nicole MRN: [id number]  Birthdate 1980  Date of evaluation: 3/11/2023  Provider: Francois Madrigal MD  PCP: No primary care provider on file. Note Started: 6:42 PM EST 3/11/23    CHIEF COMPLAINT       No chief complaint on file. HISTORY OF PRESENT ILLNESS: 1 or more Elements        Limitations to history : None    Mario Nicole is a 40 y.o. male who presents for paranoia. Patient reports that he is paranoid because he says people are spreading rumors that he is camejo. He says that it is messing with his mind. He denies suicidal or homicidal thoughts. Denies any medical complaints. The patient has been seen here numerous times for his schizophrenia, he is at his baseline and frankly he is better than his baseline. He denies any other complaints. Nursing Notes were all reviewed and agreed with or any disagreements were addressed in the HPI. REVIEW OF EXTERNAL NOTE :        note from March 6, 2023    Controlled Substance Monitoring:    Acute and Chronic Pain Monitoring:   No flowsheet data found. REVIEW OF SYSTEMS :      Positives and Pertinent negatives as per HPI.      SURGICAL HISTORY     Past Surgical History:   Procedure Laterality Date    EYE SURGERY  19 years ago    HIP FRACTURE SURGERY Left 9/12/2021    LEFT ACETABULUM OPEN REDUCTION INTERNAL FIXATION - SYNTHES performed by Marceil Landau, MD at [address] [facility] Left 9/28/2021    IRRIGATION AND DEBRIDEMENT LEFT HIP WITH EXCISION HEMATOMA performed by Cordell Velasquez MD at [address] [facility]       Previous Medications    ACETAMINOPHEN (AMINOFEN) 325 MG TABLET    Take 2 tablets by mouth every 6 hours as needed for Pain    ARIPIPRAZOLE (ABILIFY) 10 MG TABLET    Take 1 tablet by mouth daily    FLUTICASONE (FLONASE) 50 MCG/ACT NASAL SPRAY    2 sprays by Each Nostril route daily    LACTULOSE (CHRONULAC) 10 GM/15ML SOLUTION    Take 30 mLs by mouth 3 times daily    MAGNESIUM CITRATE SOLUTION    Take 296 mLs by mouth once for 1 dose    NICOTINE POLACRILEX (NICORETTE) 4 MG GUM    Take 1 each by mouth every 2 hours as needed for Smoking cessation    ONDANSETRON (ZOFRAN-ODT) 4 MG DISINTEGRATING TABLET    Take 1 tablet by mouth every 8 hours as needed for Nausea or Vomiting    SENNOSIDES-DOCUSATE SODIUM (SENOKOT-S) 8.6-50 MG TABLET    Take 2 tablets by mouth daily       ALLERGIES     Chlorpheniramine-phenylephrine, Motrin [ibuprofen], Penicillins, and Rondec-d [chlophedianol-pseudoephedrine]    FAMILYHISTORY       Family History   Problem Relation Age of Onset    Mental Illness Mother     Substance Abuse Mother     No Known Problems Father         SOCIAL HISTORY       Social History     Tobacco Use    Smoking status: Every Day     Packs/day: 0.50     Years: 24.00     Pack years: 12.00     Types: Cigarettes    Smokeless tobacco: Never    Tobacco comments:     stopped [place] while ago\"   Vaping Use    Vaping Use: Former   Substance Use Topics    Alcohol use: Not Currently     Comment: Daily    Drug use: Yes     Frequency: 7.0 times per week     Types: Cocaine, Marijuana (Weed)     Comment: Cocaine occassionally; Marijuana wkly       SCREENINGS                         CIWA Assessment  Heart Rate: 70           PHYSICAL EXAM  1 or more Elements     ED Triage Vitals [03/11/23 1811]   BP Temp Temp src Heart Rate Resp SpO2 Height Weight   -- 98.2 °F (36.8 °C) -- 70 -- 98 % -- --       Constitutional/General: Alert and oriented x3  Head: Normocephalic and atraumatic  Eyes: PERRL, EOMI, conjunctiva normal, sclera non icteric  ENT:  Oropharynx clear, handling secretions  Neck: Supple, full ROM, no stridor, no meningeal signs  Respiratory: Lungs clear to auscultation bilaterally, no wheezes, rales, or rhonchi. Not in respiratory distress  Cardiovascular:  Regular rate. Regular rhythm.  No murmurs, no gallops, no rubs. 2+ distal pulses. Equal extremity pulses. Chest: No chest wall tenderness  GI:  Abdomen Soft, Non tender,  Musculoskeletal: Moves all extremities x 4. Warm and well perfused, no clubbing, no cyanosis, no edema. Capillary refill <3 seconds  Integument: skin warm and dry. No rashes. Neurologic: GCS 15, no focal deficits  Psychiatric: normal affect. No suicidal or homicidal ideation. DIAGNOSTIC RESULTS   LABS: Interpreted by Maura Elias MD    Labs Reviewed - No data to display    As interpreted by me, the above displayed labs are abnormal. All other labs obtained during this visit were within normal range or not returned as of this dictation. RADIOLOGY:   Unless a wet read is documented in the ED course or MDM, non-plain film images such as CT, Ultrasound and MRI are read by the radiologist unless otherwise specified in the medical decision-making. Plain radiographic images are preliminarily interpreted by this ED Provider with the findings documented in the ED Course with official radiology read to follow. Interpretation per the Radiologist below, if available at the time of this note:    No orders to display     No results found. No results found. PAST MEDICAL HISTORY/Chronic Conditions Affecting Care      has a past medical history of Depression and Schizoaffective disorder ([facility] [address]      EMERGENCY DEPARTMENT COURSE    Vitals:    Vitals:    03/11/23 1811   Pulse: 70   Temp: 98.2 °F (36.8 °C)   SpO2: 98%       Patient was given the following medications:  Medications - No data to display             Medical Decision Making/Differential Diagnosis:    CC/HPI Summary, Social Determinants of health, Records Reviewed, DDx, testing done/not done, ED Course, Reassessment, disposition considerations/shared decision making with patient, consults, disposition:             Medical Decision Making  Sleep Differential Diagnosis includes but not limited to: worsening underlying psychosis, intoxication, drug or alcohol abuse, lab abnormalities. Has baseline schizophrenia, I have seen the patient myself many times, and he is at his baseline. Other today. He denies suicidal or homicidal thoughts. He denies drug or alcohol abuse. He is not under the influence. He is not in distress. There is no indication for admission. He has had multiple laboratory testings done recently so they do not need repeated today. Considered hospitalization but since he has at or better than baseline there is no indication for this. Problems Addressed:  Schizophrenia, unspecified type [facility]): chronic illness or injury    Amount and/or Complexity of Data Reviewed  External Data Reviewed: notes. CONSULTS:   None         PROCEDURES   Unless otherwise noted below, none       CRITICAL CARE TIME (.cct)            I am the Primary Clinician of Record. FINAL IMPRESSION      1. Schizophrenia, unspecified type [facility])          DISPOSITION/PLAN     DISPOSITION Discharge - Pending Orders Complete 03/11/2023 06:42:54 PM      PATIENT REFERRED TO:  No follow-up provider specified.     DISCHARGE MEDICATIONS:  New Prescriptions    No medications on file       DISCONTINUED MEDICATIONS:  Discontinued Medications    No medications on file              (Please note that portions of this note were completed with a voice recognition program.  Efforts were made to edit the dictations but occasionally words are mis-transcribed.)    Sharee Mcintyre MD (electronically signed)            Piper Fitzpatrick MD  03/11/23 3369

## 2023-03-12 ENCOUNTER — HOSPITAL ENCOUNTER (EMERGENCY)
Age: 43
Discharge: HOME | End: 2023-03-12
Attending: STUDENT IN AN ORGANIZED HEALTH CARE EDUCATION/TRAINING PROGRAM
Payer: MEDICARE

## 2023-03-12 VITALS
HEART RATE: 68 BPM | DIASTOLIC BLOOD PRESSURE: 89 MMHG | OXYGEN SATURATION: 96 % | SYSTOLIC BLOOD PRESSURE: 133 MMHG | TEMPERATURE: 98.4 F | RESPIRATION RATE: 18 BRPM

## 2023-03-12 DIAGNOSIS — Z59.00 HOMELESSNESS: ICD-10-CM

## 2023-03-12 DIAGNOSIS — F20.9 SCHIZOPHRENIA, UNSPECIFIED TYPE (HCC): Primary | ICD-10-CM

## 2023-03-12 PROCEDURE — 99282 EMERGENCY DEPT VISIT SF MDM: CPT

## 2023-03-12 SDOH — ECONOMIC STABILITY - HOUSING INSECURITY: HOMELESSNESS UNSPECIFIED: Z59.00

## 2023-03-12 NOTE — ED PROVIDER NOTES
[address] [facility] ENCOUNTER        Pt Name: Linda Naik MRN: [id number]  Birthdate 1980  Date of evaluation: 3/12/2023  Provider: Milena Velazquez DO  PCP: No primary care provider on file. Note Started: 7:32 PM EDT 3/12/23    CHIEF COMPLAINT       Chief Complaint   Patient presents with    Homeless     Denies pain, states no problems at this time. -SI -HI. Pt talking about money at time of triage and states his pockets feel empty       HISTORY OF PRESENT ILLNESS: 1 or more Elements   History From: patient    Limitations to history : None    Linda Naik is a 40 y.o. male with a history of schizophrenia who presents to the emergency department for paranoia and homelessness. He states that he is paranoid because he thinks that he was robbed and he was talking about money and how his pockets feel empty. He states that he thinks that someone robbed him and took all of his money. The patient denies any suicidal or homicidal ideations. He does not have any acute medical complaints at this time. He has been seen numerous times for schizophrenia and is at his baseline. He is always paranoid and hearing voices and thinks people are out to get him at his baseline due to his history of schizophrenia. He denies all other complaints at this time. Nursing Notes were all reviewed and agreed with or any disagreements were addressed in the HPI. REVIEW OF SYSTEMS :      Review of Systems    Positives and Pertinent negatives as per HPI.      SURGICAL HISTORY     Past Surgical History:   Procedure Laterality Date    EYE SURGERY  19 years ago    HIP FRACTURE SURGERY Left 9/12/2021    LEFT ACETABULUM OPEN REDUCTION INTERNAL FIXATION - SYNTHES performed by Sabrina Cross MD at [address] [facility] Left 9/28/2021    IRRIGATION AND DEBRIDEMENT LEFT HIP WITH EXCISION HEMATOMA performed by Imelda Lozano MD at [facility] [address]       Discharge Medication List as of 3/12/2023  7:47 PM        CONTINUE these medications which have NOT CHANGED    Details   fluticasone (FLONASE) 50 MCG/ACT nasal spray 2 sprays by Each Nostril route daily, Disp-16 g, R-0Print      acetaminophen (AMINOFEN) 325 MG tablet Take 2 tablets by mouth every 6 hours as needed for Pain, Disp-120 tablet, R-3Print      ARIPiprazole (ABILIFY) 10 MG tablet Take 1 tablet by mouth daily, Disp-30 tablet, R-0Normal      nicotine polacrilex (NICORETTE) 4 MG gum Take 1 each by mouth every 2 hours as needed for Smoking cessation, Disp-110 each, R-3NO PRINT      ondansetron (ZOFRAN-ODT) 4 MG disintegrating tablet Take 1 tablet by mouth every 8 hours as needed for Nausea or VomitingDC to SNF      lactulose (CHRONULAC) 10 GM/15ML solution Take 30 mLs by mouth 3 times daily, R-1DC to SNF      magnesium citrate solution Take 296 mLs by mouth once for 1 dose, Disp-296 mL, R-0DC to SNF      sennosides-docusate sodium (SENOKOT-S) 8.6-50 MG tablet Take 2 tablets by mouth dailyDC to SNF             ALLERGIES     Chlorpheniramine-phenylephrine, Motrin [ibuprofen], Penicillins, and Rondec-d [chlophedianol-pseudoephedrine]    FAMILYHISTORY       Family History   Problem Relation Age of Onset    Mental Illness Mother     Substance Abuse Mother     No Known Problems Father         SOCIAL HISTORY       Social History     Tobacco Use    Smoking status: Every Day     Packs/day: 0.50     Years: 24.00     Pack years: 12.00     Types: Cigarettes    Smokeless tobacco: Never    Tobacco comments:     stopped [place] while ago\"   Vaping Use    Vaping Use: Former   Substance Use Topics    Alcohol use: Not Currently     Comment: Daily    Drug use: Yes     Frequency: 7.0 times per week     Types: Cocaine, Marijuana (Weed)     Comment: Cocaine occassionally;  Marijuana wkly       SCREENINGS        [place] Coma Scale  Eye Opening: Spontaneous  Best Verbal Response: Oriented  Best Motor Response: Obeys commands  [place] Coma Scale Score: 15                CIWA Assessment  BP: 133/89  Heart Rate: 68           PHYSICAL EXAM  1 or more Elements     ED Triage Vitals   BP Temp Temp Source Heart Rate Resp SpO2 Height Weight   03/12/23 1928 03/12/23 1917 03/12/23 1917 03/12/23 1917 03/12/23 1928 03/12/23 1917 -- --   133/89 98.4 °F (36.9 °C) Temporal 68 18 96 %         Physical Exam      Constitutional/General: Alert and oriented x3  Head: Normocephalic and atraumatic  Eyes:  EOMI, conjunctiva normal, sclera non icteric  ENT:  Oropharynx clear, handling secretions, no trismus, no asymmetry of the posterior oropharynx or uvular edema  Neck: Supple, full ROM, no stridor, no meningeal signs  Respiratory: Lungs clear to auscultation bilaterally, no wheezes, rales, or rhonchi. Not in respiratory distress  Cardiovascular:  Regular rate. Regular rhythm. No murmurs, no gallops, no rubs. 2+ distal pulses. Equal extremity pulses. Chest: No chest wall tenderness  GI:  Abdomen Soft, Non tender, Non distended. +BS. No rebound, guarding, or rigidity. No pulsatile masses. Musculoskeletal: Moves all extremities x 4. Warm and well perfused, no clubbing, no cyanosis, no edema. Capillary refill <3 seconds  Integument: skin warm and dry. No rashes. Neurologic: GCS 15, no focal deficits, symmetric strength 5/5 in the upper and lower extremities bilaterally  Psychiatric: Normal affect. Denies suicidal or homicidal ideations. He is paranoid at baseline. DIAGNOSTIC RESULTS   LABS:    Labs Reviewed - No data to display    As interpreted by me, the above displayed labs are abnormal. All other labs obtained during this visit were within normal range or not returned as of this dictation.             RADIOLOGY:   Non-plain film images such as CT, Ultrasound and MRI are read by the radiologist.     Interpretation per the Radiologist below, if available at the time of this note:    No orders to display     No results found. No results found. PROCEDURES   Unless otherwise noted below, none     Procedures    CRITICAL CARE TIME (.cct)   0    PAST MEDICAL HISTORY/Chronic Conditions Affecting Care      has a past medical history of Depression and Schizoaffective disorder ([facility] [address] EMERGENCY DEPARTMENT COURSE    Vitals:    Vitals:    03/12/23 1917 03/12/23 1928   BP:  133/89   Pulse: 68    Resp:  18   Temp: 98.4 °F (36.9 °C)    TempSrc: Temporal    SpO2: 96%        Patient was given the following medications:  Medications - No data to display          Medical Decision Making/Differential Diagnosis:    CC/HPI Summary, Social Determinants of health, Records Reviewed, DDx, testing done/not done, ED Course, Reassessment, disposition considerations/shared decision making with patient, consults, disposition:        The patient is a 81-year-old male presents to the emergency department complaining of homelessness and paranoia. Patient is hemodynamically stable, nontoxic, and in no acute distress. He is not suicidal or homicidal.  There are social detriments to his health as he is homeless and has a lot of mental health issues that he does not follow-up for take his medications for. Prior records reviewed and he was actually seen in the emergency department yesterday. He was last admitted for schizoaffective disorder December 4, 2021. This chart was reviewed. Differential diagnosis includes but is not limited to schizophrenia versus paranoia versus hallucinations versus homelessness. He is in no acute distress. There is no indication for admission at this time. I have taken care of of him in the past and he appears to be at his baseline. No testing was ordered. Patient was discharged in stable condition. CONSULTS: (Who and What was discussed)  None        I am the Primary Clinician of Record. FINAL IMPRESSION      1. Schizophrenia, unspecified type ([facility] [address]    2.  Homelessness          DISPOSITION/PLAN DISPOSITION Decision To Discharge 03/12/2023 07:37:59 PM      PATIENT REFERRED TO:  Follow up with your doctor or call [phone] for a family doctor. Follow up with your doctor or call [phone] for a family doctor.   Schedule an appointment as soon as possible for a visit       DISCHARGE MEDICATIONS:  Discharge Medication List as of 3/12/2023  7:47 PM          DISCONTINUED MEDICATIONS:  Discharge Medication List as of 3/12/2023  7:47 PM                 (Please note that portions of this note were completed with a voice recognition program.  Efforts were made to edit the dictations but occasionally words are mis-transcribed.)    Tab Oliveira DO (electronically signed)           Tab Oliveira DO  03/12/23 9770

## 2023-03-25 ENCOUNTER — HOSPITAL ENCOUNTER (EMERGENCY)
Age: 43
Discharge: HOME | End: 2023-03-25
Attending: EMERGENCY MEDICINE
Payer: MEDICARE

## 2023-03-25 VITALS
SYSTOLIC BLOOD PRESSURE: 124 MMHG | WEIGHT: 159 LBS | TEMPERATURE: 97.8 F | OXYGEN SATURATION: 99 % | HEART RATE: 67 BPM | DIASTOLIC BLOOD PRESSURE: 79 MMHG | RESPIRATION RATE: 17 BRPM | BODY MASS INDEX: 22.18 KG/M2

## 2023-03-25 DIAGNOSIS — Z86.59 HISTORY OF SCHIZOPHRENIA: Primary | ICD-10-CM

## 2023-03-25 PROCEDURE — 99281 EMR DPT VST MAYX REQ PHY/QHP: CPT

## 2023-03-25 NOTE — ED PROVIDER NOTES
HPI:  3/25/23, Time: 4:00 AM EDT         Juan Avitia is a 40 y.o. male presenting to the ED for history of being homeless as well as has history of schizophrenia, beginning days ago. The complaint has been persistent, moderate in severity, and worsened by nothing. Patient reports that he has no place to stay. It is raining. Patient does have history of schizophrenia he does have history of hallucinations he reports no homicidal suicidal thoughts he reports no fever no chills no chest pain he does report some eye pain he reports no drainage. Patient reporting no fall or injury. Patient reporting no abdominal pain. ROS:   Pertinent positives and negatives are stated within HPI, all other systems reviewed and are negative.  --------------------------------------------- PAST HISTORY ---------------------------------------------  Past Medical History:  has a past medical history of Depression and Schizoaffective disorder ([facility] [address] Past Surgical History:  has a past surgical history that includes Hip fracture surgery (Left, 9/12/2021); eye surgery (19 years ago); and Hip fracture surgery (Left, 9/28/2021). Social History:  reports that he has been smoking cigarettes. He has a 12.00 pack-year smoking history. He has never used smokeless tobacco. He reports that he does not currently use alcohol. He reports current drug use. Frequency: 7.00 times per week. Drugs: Cocaine and Marijuana (Simon Ou). Family History: family history includes Mental Illness in his mother; No Known Problems in his father; Substance Abuse in his mother. The patients home medications have been reviewed.     Allergies: Chlorpheniramine-phenylephrine, Motrin [ibuprofen], Penicillins, and Rondec-d [chlophedianol-pseudoephedrine]    ---------------------------------------------------PHYSICAL EXAM--------------------------------------    Constitutional/General: Alert and oriented x3,   Head: Normocephalic and atraumatic  Eyes:

## 2023-03-27 ENCOUNTER — APPOINTMENT (OUTPATIENT)
Dept: GENERAL RADIOLOGY | Age: 43
End: 2023-03-27
Payer: MEDICARE

## 2023-03-27 ENCOUNTER — HOSPITAL ENCOUNTER (EMERGENCY)
Age: 43
Discharge: HOME | End: 2023-03-27
Payer: MEDICARE

## 2023-03-27 VITALS
HEART RATE: 77 BPM | RESPIRATION RATE: 18 BRPM | WEIGHT: 159 LBS | OXYGEN SATURATION: 99 % | TEMPERATURE: 96.7 F | BODY MASS INDEX: 22.18 KG/M2

## 2023-03-27 DIAGNOSIS — M71.40 CALCIFIC BURSITIS: ICD-10-CM

## 2023-03-27 DIAGNOSIS — M79.602 LEFT ARM PAIN: Primary | ICD-10-CM

## 2023-03-27 DIAGNOSIS — M25.552 LEFT HIP PAIN: ICD-10-CM

## 2023-03-27 PROCEDURE — 73502 X-RAY EXAM HIP UNI 2-3 VIEWS: CPT

## 2023-03-27 PROCEDURE — 99283 EMERGENCY DEPT VISIT LOW MDM: CPT

## 2023-03-27 PROCEDURE — 6370000000 HC RX 637 (ALT 250 FOR IP): Performed by: NURSE PRACTITIONER

## 2023-03-27 PROCEDURE — 73090 X-RAY EXAM OF FOREARM: CPT

## 2023-03-27 RX ORDER — IBUPROFEN 800 MG/1
800 TABLET ORAL ONCE
Status: COMPLETED | OUTPATIENT
Start: 2023-03-27 | End: 2023-03-27

## 2023-03-27 RX ORDER — IBUPROFEN 800 MG/1
800 TABLET ORAL EVERY 8 HOURS PRN
Qty: 8 TABLET | Refills: 0 | Status: SHIPPED | OUTPATIENT
Start: 2023-03-27 | End: 2023-04-03

## 2023-03-27 RX ADMIN — IBUPROFEN 800 MG: 800 TABLET, FILM COATED ORAL at 21:22

## 2023-03-27 ASSESSMENT — PAIN SCALES - GENERAL: PAINLEVEL_OUTOF10: 6

## 2023-03-27 ASSESSMENT — PAIN - FUNCTIONAL ASSESSMENT: PAIN_FUNCTIONAL_ASSESSMENT: 0-10

## 2023-03-27 NOTE — ED PROVIDER NOTES
independent  HPI:  3/27/23, Time: 7:21 PM EDT         Dorothy Rodriguez is a 40 y.o. male presenting to the ED for an x-ray. Patient presents to the emergency department with wanting an x-ray of his left arm and left hip. States that he broke his left arm sometime ago and want to make sure that it is healing correctly. Patient does have psychiatric history. He denies any new injury or trauma. Denies any new falls. He also denies any unusual unilateral weakness as well as no unusual numbness, tingling or paresthesia. Patient observed to be able to ambulate easily and independently. There is no areas of rash hives warmth or redness noted to the left hip joint or the left forearm. Patient denies take anything for pain and at this time initially denied anything for pain here in the emergency department. Review of Systems:   A complete review of systems was performed and pertinent positives and negatives are stated within HPI, all other systems reviewed and are negative.          --------------------------------------------- PAST HISTORY ---------------------------------------------  Past Medical History:  has a past medical history of Depression and Schizoaffective disorder ([facility] [address] Past Surgical History:  has a past surgical history that includes Hip fracture surgery (Left, 9/12/2021); eye surgery (19 years ago); and Hip fracture surgery (Left, 9/28/2021). Social History:  reports that he has been smoking cigarettes. He has a 12.00 pack-year smoking history. He has never used smokeless tobacco. He reports that he does not currently use alcohol. He reports current drug use. Frequency: 7.00 times per week. Drugs: Cocaine and Marijuana (Gladis Shaffer). Family History: family history includes Mental Illness in his mother; No Known Problems in his father; Substance Abuse in his mother. The patients home medications have been reviewed.     Allergies: Chlorpheniramine-phenylephrine, Motrin [ibuprofen],

## 2023-03-29 ENCOUNTER — APPOINTMENT (OUTPATIENT)
Dept: GENERAL RADIOLOGY | Age: 43
End: 2023-03-29
Payer: MEDICARE

## 2023-03-29 ENCOUNTER — HOSPITAL ENCOUNTER (EMERGENCY)
Age: 43
Discharge: HOME | End: 2023-03-29
Payer: MEDICARE

## 2023-03-29 VITALS
RESPIRATION RATE: 18 BRPM | WEIGHT: 159 LBS | BODY MASS INDEX: 22.18 KG/M2 | HEART RATE: 73 BPM | DIASTOLIC BLOOD PRESSURE: 78 MMHG | OXYGEN SATURATION: 100 % | SYSTOLIC BLOOD PRESSURE: 121 MMHG | TEMPERATURE: 97.5 F

## 2023-03-29 DIAGNOSIS — R07.81 RIB PAIN ON LEFT SIDE: Primary | ICD-10-CM

## 2023-03-29 PROCEDURE — 71101 X-RAY EXAM UNILAT RIBS/CHEST: CPT

## 2023-03-29 PROCEDURE — 99283 EMERGENCY DEPT VISIT LOW MDM: CPT

## 2023-03-29 ASSESSMENT — PAIN SCALES - GENERAL: PAINLEVEL_OUTOF10: 10

## 2023-03-29 ASSESSMENT — PAIN - FUNCTIONAL ASSESSMENT: PAIN_FUNCTIONAL_ASSESSMENT: 0-10

## 2023-03-30 NOTE — ED PROVIDER NOTES
COURSE/MEDICAL DECISION MAKING----------------------  Medications - No data to display      ED COURSE:       Medical Decision Making:    Francie Serna is a 40 y.o. male presenting to the ED for evaluation of left-sided rib pain. Patient states he has a history of broken ribs that punctured his lung. Upon evaluation of patient chart it is noted on 1/14/2020 patient did have consult for chest tube sutures to be removed that per patient was placed for punctured lung from broken rib. Patient denies reinjuring chest including no assault, fall, or trauma to area. Patient complaining of 8 out of 10 pain that does not radiate. Pain is intermittent and has been ongoing since 2020. Patient denies any alleviating or exacerbating factors. Denies any cough, fevers, chills. Patient denies any chest pain, abdominal pain or shortness of breath. Differential diagnosis includes rib contusion vs right-sided chest wall pain vs chest wall muscle strain. Plan of care includes X-ray ribs left include chest.  Results are as follows no acute abnormality of the ribs. No acute process in the lungs. No evidence of acute fracture of the ribs. No focal rib abnormality. No evidence of acute focal process in the lungs. No evidence of consolidation or pulmonary edema. No pleural effusion or pneumothorax. Cardiomediastinal silhouette demonstrate no acute abnormality. Patient reevaluated and given results. Pt informed of follow up instructions including follow up with PCP doctor in 1-3 days along with strict return precautions. Pt verbalized understanding of discharge instructions. Pt stable for discharge. Counseling: The emergency provider has spoken with the patient and discussed todays results, in addition to providing specific details for the plan of care and counseling regarding the diagnosis and prognosis. Questions are answered at this time and they are agreeable with the plan.       History from : Patient and

## 2023-03-30 NOTE — ED NOTES
NP with pt, explaining results. Pt stated \"do I get pain pills? I don't want that then. What crack head is out here taking my pain pills? \" Pt departed super track area.      Oralia Green RN  03/29/23 2131

## 2023-05-21 ENCOUNTER — HOSPITAL ENCOUNTER (EMERGENCY)
Age: 43
Discharge: HOME | End: 2023-05-22
Attending: EMERGENCY MEDICINE
Payer: COMMERCIAL

## 2023-05-21 DIAGNOSIS — F20.9 SCHIZOPHRENIA, UNSPECIFIED TYPE (HCC): Primary | ICD-10-CM

## 2023-05-21 DIAGNOSIS — M79.673 PAIN OF FOOT, UNSPECIFIED LATERALITY: ICD-10-CM

## 2023-05-21 LAB
ALBUMIN SERPL-MCNC: 3.5 G/DL (ref 3.5–5.2)
ALP SERPL-CCNC: 42 U/L (ref 40–129)
ALT SERPL-CCNC: 8 U/L (ref 0–40)
AMPHET UR QL SCN: NOT DETECTED
ANION GAP SERPL CALCULATED.3IONS-SCNC: 9 MMOL/L (ref 7–16)
APAP SERPL-MCNC: <5 MCG/ML (ref 10–30)
AST SERPL-CCNC: 19 U/L (ref 0–39)
BARBITURATES UR QL SCN: NOT DETECTED
BASOPHILS # BLD: 0.05 E9/L (ref 0–0.2)
BASOPHILS NFR BLD: 1.4 % (ref 0–2)
BENZODIAZ UR QL SCN: NOT DETECTED
BILIRUB SERPL-MCNC: 0.4 MG/DL (ref 0–1.2)
BILIRUB UR QL STRIP: ABNORMAL
BUN SERPL-MCNC: 15 MG/DL (ref 6–20)
CALCIUM SERPL-MCNC: 8.5 MG/DL (ref 8.6–10.2)
CANNABINOIDS UR QL SCN: NOT DETECTED
CHLORIDE SERPL-SCNC: 107 MMOL/L (ref 98–107)
CK SERPL-CCNC: 671 U/L (ref 20–200)
CLARITY UR: CLEAR
CO2 SERPL-SCNC: 27 MMOL/L (ref 22–29)
COCAINE UR QL SCN: POSITIVE
COLOR UR: YELLOW
CREAT SERPL-MCNC: 0.9 MG/DL (ref 0.7–1.2)
DRUG SCREEN COMMENT UR-IMP: ABNORMAL
EOSINOPHIL # BLD: 0.13 E9/L (ref 0.05–0.5)
EOSINOPHIL NFR BLD: 3.6 % (ref 0–6)
ERYTHROCYTE [DISTWIDTH] IN BLOOD BY AUTOMATED COUNT: 14.5 FL (ref 11.5–15)
ETHANOLAMINE SERPL-MCNC: <10 MG/DL (ref 0–0.08)
FENTANYL SCREEN, URINE: NOT DETECTED
FLUAV RNA RESP QL NAA+PROBE: NOT DETECTED
FLUBV RNA RESP QL NAA+PROBE: NOT DETECTED
GLUCOSE SERPL-MCNC: 121 MG/DL (ref 74–99)
GLUCOSE UR STRIP-MCNC: NEGATIVE MG/DL
HCT VFR BLD AUTO: 35.5 % (ref 37–54)
HGB BLD-MCNC: 11.5 G/DL (ref 12.5–16.5)
HGB UR QL STRIP: NEGATIVE
IMM GRANULOCYTES # BLD: 0.01 E9/L
IMM GRANULOCYTES NFR BLD: 0.3 % (ref 0–5)
KETONES UR STRIP-MCNC: NEGATIVE MG/DL
LEUKOCYTE ESTERASE UR QL STRIP: NEGATIVE
LYMPHOCYTES # BLD: 1.35 E9/L (ref 1.5–4)
LYMPHOCYTES NFR BLD: 37.7 % (ref 20–42)
MCH RBC QN AUTO: 29.7 PG (ref 26–35)
MCHC RBC AUTO-ENTMCNC: 32.4 % (ref 32–34.5)
MCV RBC AUTO: 91.7 FL (ref 80–99.9)
METHADONE UR QL SCN: NOT DETECTED
MONOCYTES # BLD: 0.45 E9/L (ref 0.1–0.95)
MONOCYTES NFR BLD: 12.6 % (ref 2–12)
NEUTROPHILS # BLD: 1.59 E9/L (ref 1.8–7.3)
NEUTS SEG NFR BLD: 44.4 % (ref 43–80)
NITRITE UR QL STRIP: NEGATIVE
OPIATES UR QL SCN: NOT DETECTED
OXYCODONE URINE: NOT DETECTED
PCP UR QL SCN: NOT DETECTED
PH UR STRIP: 6 [PH] (ref 5–9)
PLATELET # BLD AUTO: 259 E9/L (ref 130–450)
PMV BLD AUTO: 9.9 FL (ref 7–12)
POTASSIUM SERPL-SCNC: 3.8 MMOL/L (ref 3.5–5)
PROT SERPL-MCNC: 5.6 G/DL (ref 6.4–8.3)
PROT UR STRIP-MCNC: NEGATIVE MG/DL
RBC # BLD AUTO: 3.87 E12/L (ref 3.8–5.8)
SALICYLATES SERPL-MCNC: <0.3 MG/DL (ref 0–30)
SARS-COV-2 RNA RESP QL NAA+PROBE: NOT DETECTED
SODIUM SERPL-SCNC: 143 MMOL/L (ref 132–146)
SP GR UR STRIP: >=1.03 (ref 1–1.03)
TRICYCLIC ANTIDEPRESSANTS SCREEN SERUM: NEGATIVE NG/ML
UROBILINOGEN UR STRIP-ACNC: 1 E.U./DL
WBC # BLD: 3.6 E9/L (ref 4.5–11.5)

## 2023-05-21 PROCEDURE — 85025 COMPLETE CBC W/AUTO DIFF WBC: CPT

## 2023-05-21 PROCEDURE — 80307 DRUG TEST PRSMV CHEM ANLYZR: CPT

## 2023-05-21 PROCEDURE — 93005 ELECTROCARDIOGRAM TRACING: CPT | Performed by: NURSE PRACTITIONER

## 2023-05-21 PROCEDURE — 99284 EMERGENCY DEPT VISIT MOD MDM: CPT

## 2023-05-21 PROCEDURE — 81003 URINALYSIS AUTO W/O SCOPE: CPT

## 2023-05-21 PROCEDURE — 80179 DRUG ASSAY SALICYLATE: CPT

## 2023-05-21 PROCEDURE — 80053 COMPREHEN METABOLIC PANEL: CPT

## 2023-05-21 PROCEDURE — 87636 SARSCOV2 & INF A&B AMP PRB: CPT

## 2023-05-21 PROCEDURE — 36415 COLL VENOUS BLD VENIPUNCTURE: CPT

## 2023-05-21 PROCEDURE — 82550 ASSAY OF CK (CPK): CPT

## 2023-05-21 PROCEDURE — 2580000003 HC RX 258: Performed by: EMERGENCY MEDICINE

## 2023-05-21 PROCEDURE — 80143 DRUG ASSAY ACETAMINOPHEN: CPT

## 2023-05-21 PROCEDURE — 82077 ASSAY SPEC XCP UR&BREATH IA: CPT

## 2023-05-21 RX ORDER — 0.9 % SODIUM CHLORIDE 0.9 %
1000 INTRAVENOUS SOLUTION INTRAVENOUS ONCE
Status: COMPLETED | OUTPATIENT
Start: 2023-05-21 | End: 2023-05-22

## 2023-05-21 RX ADMIN — SODIUM CHLORIDE 1000 ML: 9 INJECTION, SOLUTION INTRAVENOUS at 22:30

## 2023-05-22 VITALS
SYSTOLIC BLOOD PRESSURE: 105 MMHG | TEMPERATURE: 97.3 F | OXYGEN SATURATION: 97 % | HEART RATE: 63 BPM | RESPIRATION RATE: 18 BRPM | DIASTOLIC BLOOD PRESSURE: 76 MMHG

## 2023-05-22 LAB
CK SERPL-CCNC: 554 U/L (ref 20–200)
EKG ATRIAL RATE: 73 BPM
EKG P AXIS: 80 DEGREES
EKG P-R INTERVAL: 180 MS
EKG Q-T INTERVAL: 370 MS
EKG QRS DURATION: 96 MS
EKG QTC CALCULATION (BAZETT): 407 MS
EKG R AXIS: 84 DEGREES
EKG T AXIS: 74 DEGREES
EKG VENTRICULAR RATE: 73 BPM

## 2023-05-22 PROCEDURE — 93010 ELECTROCARDIOGRAM REPORT: CPT | Performed by: INTERNAL MEDICINE

## 2023-05-22 PROCEDURE — 82550 ASSAY OF CK (CPK): CPT

## 2023-05-22 RX ORDER — ACETAMINOPHEN 500 MG
1000 TABLET ORAL ONCE
Status: DISCONTINUED | OUTPATIENT
Start: 2023-05-22 | End: 2023-05-22 | Stop reason: HOSPADM

## 2023-05-22 ASSESSMENT — PAIN SCALES - GENERAL
PAINLEVEL_OUTOF10: 10
PAINLEVEL_OUTOF10: 10

## 2023-05-22 ASSESSMENT — LIFESTYLE VARIABLES: HOW OFTEN DO YOU HAVE A DRINK CONTAINING ALCOHOL: NEVER

## 2023-05-22 NOTE — ED NOTES
Patient has been marked ready for discharge by physician. Patient okay for discharge per  Cheryl. Discharge instructions printed. T removed all patient's belongings from locker and returned items to patent.       Yoavnny Horner RN  05/22/23 5066

## 2023-05-22 NOTE — ED NOTES
Behavioral Health Crisis Assessment      Chief Complaint: \" I don't know why I am here\"    Mental Status Exam: Patient is semi alert and oriented x 4, flat affect, mood appears cooperative, speech is clear, hygiene is unkept, no eye contact, head is covered with blanket which is normal for  patient, Thought process is normal, patient denies SI, HI and hallucinations. Patient appears to have good sleep, has been sleeping since prior to this SW shift starting. Legal Status:  [x] Voluntary:  [] Involuntary, Issued by:    Gender:  [x] Male [] Female [] Transgender  [] Other    Sexual Orientation:  [x] Heterosexual [] Homosexual [] Bisexual [] Other    Brief Clinical Summary: Patient is a 41 yo male presenting to the ED as a walk in with a long hx of being homeless, initially reporting leg pain and then apparently patient was having flights of ideas and was very manic. Patient is well known to the ED and this . Patient does have a habit of appearing manic and in psychosis when he needs a place to sleep for the night. When patient is allowed to get some sleep he then is able to answer questions, does not appear manic and is usually able to discharge. Patient is denying SI, HI and hallucinations. Patient reports his only compliant is leg pain. Patient has a mental health hx of schizoaffective d/o and depression. Patient reports he is not taking any medications nor is he connected with outpatient mental health services. Patient denies any mental health complaints at this time.      Collateral Information: No collateral information obtained at this time    Risk Factors:    MH diagnoses   Non-compliant with psych medications  Substance use  Homelessness  Lack of essential needs            Non-compliant with outpatient treatment  Lack of self care  No PCP   Limited support  Several MH hospitalizations  Poor communication  Hx of malingering, manipulation and disruptive behaviors     Protective Factors:    Initiated this

## 2023-05-22 NOTE — ED NOTES
Patient currently responds to voice, respirations non-labored, skin warm/dry, no distress noted. Patient A&OX4 when awake. Patient calm and cooperative at present. Patient denies SI, HI, or any hallucinations at present. Patient c/o ongoing left leg pain.       Lo Hinds RN  05/22/23 [address] Luna Rowland RN  05/22/23 1600

## 2023-05-27 ENCOUNTER — HOSPITAL ENCOUNTER (EMERGENCY)
Age: 43
Discharge: HOME | End: 2023-05-27
Payer: COMMERCIAL

## 2023-05-27 ENCOUNTER — HOSPITAL ENCOUNTER (EMERGENCY)
Age: 43
Discharge: HOME | End: 2023-05-27

## 2023-05-27 VITALS — TEMPERATURE: 97.8 F | OXYGEN SATURATION: 97 % | RESPIRATION RATE: 16 BRPM | HEART RATE: 73 BPM

## 2023-05-27 VITALS — OXYGEN SATURATION: 100 % | HEART RATE: 85 BPM | TEMPERATURE: 97.7 F

## 2023-05-27 PROCEDURE — 99281 EMR DPT VST MAYX REQ PHY/QHP: CPT

## 2023-05-27 NOTE — ED NOTES
Department of Emergency Medicine  FIRST PROVIDER TRIAGE NOTE             Independent MLP           5/27/23  6:59 PM EDT    Date of Encounter: 5/27/23   MRN: [id number]      HPI: Annabella Montana. is a 40 y.o. male who presents to the ED for Paranoid (States he feels paranoid due [place]. \" Denies SI/HI)     Pt presenting feeling paranoid due to hanging out on the [address] [place] side. Denies si or hi    ROS: Negative for Suicidal ideation or Homicidal Ideation. PE: Gen Appearance/Constitutional: alert  HEENT: NC/NT. PERRLA,  Airway patent. Initial Plan of Care: All treatment areas with department are currently occupied. Plan to order/Initiate the following while awaiting opening in ED: none.   Initiate Treatment-Testing, Proceed toTreatment Area When Bed Available for ED Attending/MLP to Continue Care    Electronically signed by Sanya Perkins PA-C   DD: 5/27/23      Sanya Perkins PA-C  05/27/23 9662

## 2023-05-30 ENCOUNTER — HOSPITAL ENCOUNTER (EMERGENCY)
Age: 43
Discharge: LEFT BEFORE DISCHARGE | End: 2023-05-30
Payer: COMMERCIAL

## 2023-05-30 VITALS
SYSTOLIC BLOOD PRESSURE: 126 MMHG | TEMPERATURE: 98.1 F | RESPIRATION RATE: 19 BRPM | BODY MASS INDEX: 22.18 KG/M2 | DIASTOLIC BLOOD PRESSURE: 86 MMHG | OXYGEN SATURATION: 97 % | HEART RATE: 104 BPM | WEIGHT: 159 LBS

## 2023-05-30 PROCEDURE — 99281 EMR DPT VST MAYX REQ PHY/QHP: CPT

## 2023-05-30 ASSESSMENT — PAIN - FUNCTIONAL ASSESSMENT: PAIN_FUNCTIONAL_ASSESSMENT: NONE - DENIES PAIN

## 2023-05-30 ASSESSMENT — LIFESTYLE VARIABLES: HOW OFTEN DO YOU HAVE A DRINK CONTAINING ALCOHOL: NEVER

## 2023-05-30 NOTE — ED NOTES
Department of Emergency Medicine  FIRST PROVIDER TRIAGE NOTE             Independent MLP           5/30/23  12:33 AM EDT    Date of Encounter: 5/30/23   MRN: [id number]      HPI: Jumana Gonzales is a 40 y.o. male who presents to the ED for Paranoid (Came into ER paranoid ramble speech flight of ideas. No HI/SI)       ROS: Negative for cp or sob. PE: Gen Appearance/Constitutional: alert  HEENT: NC/NT. PERRLA,  Airway patent. Initial Plan of Care: All treatment areas with department are currently occupied.  Plan to order/Initiate the following while awaiting opening in ED: Social Work Eval.  Initiate Treatment-Testing, Proceed toTreatment Area When [employer] Available for ED Attending/MLP to Continue Care    Electronically signed by JUICE Wilkes CNP   DD: 5/30/23       JUICE Wilkes CNP  05/30/23 0033  ATTENDING PROVIDER ATTESTATION:     Supervising Physician, on-site, available for consultation, non-participatory in the evaluation or care of this patient       Anum Perrin MD  05/30/23 0293

## 2023-06-04 ENCOUNTER — HOSPITAL ENCOUNTER (EMERGENCY)
Age: 43
Discharge: LEFT BEFORE DISCHARGE | End: 2023-06-04
Payer: COMMERCIAL

## 2023-06-04 VITALS — HEART RATE: 73 BPM | RESPIRATION RATE: 20 BRPM | TEMPERATURE: 97.1 F | OXYGEN SATURATION: 96 %

## 2023-06-04 PROCEDURE — 99281 EMR DPT VST MAYX REQ PHY/QHP: CPT

## 2023-06-20 ENCOUNTER — HOSPITAL ENCOUNTER (EMERGENCY)
Age: 43
Discharge: LEFT BEFORE DISCHARGE | End: 2023-06-20

## 2023-06-20 ENCOUNTER — HOSPITAL ENCOUNTER (EMERGENCY)
Age: 43
Discharge: LEFT BEFORE DISCHARGE | End: 2023-06-21
Attending: EMERGENCY MEDICINE
Payer: MEDICAID

## 2023-06-20 VITALS — HEART RATE: 90 BPM | OXYGEN SATURATION: 96 % | TEMPERATURE: 96.7 F

## 2023-06-20 DIAGNOSIS — F20.9 SCHIZOPHRENIA, UNSPECIFIED TYPE (HCC): ICD-10-CM

## 2023-06-20 DIAGNOSIS — F39 MOOD DISORDER (HCC): Primary | ICD-10-CM

## 2023-06-20 PROCEDURE — 99281 EMR DPT VST MAYX REQ PHY/QHP: CPT

## 2023-06-20 ASSESSMENT — PAIN - FUNCTIONAL ASSESSMENT: PAIN_FUNCTIONAL_ASSESSMENT: NONE - DENIES PAIN

## 2023-06-21 VITALS
OXYGEN SATURATION: 95 % | TEMPERATURE: 97.5 F | RESPIRATION RATE: 18 BRPM | BODY MASS INDEX: 21 KG/M2 | SYSTOLIC BLOOD PRESSURE: 116 MMHG | HEART RATE: 72 BPM | WEIGHT: 150 LBS | HEIGHT: 71 IN | DIASTOLIC BLOOD PRESSURE: 73 MMHG

## 2023-06-21 ASSESSMENT — PAIN - FUNCTIONAL ASSESSMENT: PAIN_FUNCTIONAL_ASSESSMENT: NONE - DENIES PAIN

## 2023-06-21 NOTE — ED PROVIDER NOTES
physicial eaxmination    This patient has been closely monitored during their ED course. Counseling: The emergency provider has spoken with the patient and discussed todays results, in addition to providing specific details for the plan of care and counseling regarding the diagnosis and prognosis. Questions are answered at this time and they are agreeable with the plan.       --------------------------------- IMPRESSION AND DISPOSITION ---------------------------------    IMPRESSION  1. Mood disorder ([facility]    2. [facility]        DISPOSITION  Disposition: Patient eloped        NOTE: This report was transcribed using voice recognition software.  Every effort was made to ensure accuracy; however, inadvertent computerized transcription errors may be present          Brian Robertson MD  06/21/23 3708

## 2023-06-21 NOTE — ED NOTES
This nurse rounding in er waiting room, pt asleep in chair, awakens easily. Denies pain vital signs rechecked at this time. Pt aware still waiting for er room/bed to become available. Pt voiced no other needs at this time.      Cody Yu LPN  15/65/08 4268

## 2023-06-23 ENCOUNTER — HOSPITAL ENCOUNTER (EMERGENCY)
Age: 43
Discharge: HOME | End: 2023-06-23
Attending: STUDENT IN AN ORGANIZED HEALTH CARE EDUCATION/TRAINING PROGRAM
Payer: MEDICAID

## 2023-06-23 VITALS
HEART RATE: 88 BPM | RESPIRATION RATE: 18 BRPM | OXYGEN SATURATION: 98 % | TEMPERATURE: 97.6 F | DIASTOLIC BLOOD PRESSURE: 74 MMHG | SYSTOLIC BLOOD PRESSURE: 135 MMHG

## 2023-06-23 DIAGNOSIS — Z59.00 HOMELESSNESS: Primary | ICD-10-CM

## 2023-06-23 PROCEDURE — 99282 EMERGENCY DEPT VISIT SF MDM: CPT

## 2023-06-23 SDOH — ECONOMIC STABILITY - HOUSING INSECURITY: HOMELESSNESS UNSPECIFIED: Z59.00

## 2023-06-23 NOTE — ED PROVIDER NOTES
Vitals:    06/23/23 1405 06/23/23 1415   BP:  135/74   Pulse: 88    Resp:  18   Temp: 97.6 °F (36.4 °C)    SpO2: 98%        Patient was given the following medications:  Medications - No data to display            Medical Decision Making/Differential Diagnosis:    CC/HPI Summary, Social Determinants of health, Records Reviewed, DDx, testing done/not done, ED Course, Reassessment, disposition considerations/shared decision making with patient, consults, disposition:        The patient is a 50-year-old male presents emergency department for homelessness. Patient is hemodynamically stable, nontoxic, and in no acute distress. He is not currently suicidal or homicidal.  He is paranoid hallucinating which is his baseline. There are social detriments to his health as he does not take his medications as prescribed and is currently homeless. I did provide him with the rescue mission's address and phone number and recommended him to try to go there for a place to stay. Prior records reviewed and patient has been in the emergency department multiple times a week for similar complaints. His last admission was 12-4-2021 for schizoaffective disorder. Differential diagnosis includes but is not limited to homelessness versus anxiety versus depression versus paranoia versus schizophrenia. Recommended him to go to the rescue mission and to follow-up with a family doctor. He again, he is currently not suicidal or homicidal and has no plans to harm himself or anyone else. He was discharged in stable condition with appropriate resources. Strict return precautions were given. CONSULTS: (Who and What was discussed)  None        I am the Primary Clinician of Record. FINAL IMPRESSION      1. Homelessness          DISPOSITION/PLAN     DISPOSITION        PATIENT REFERRED TO:  No follow-up provider specified.     DISCHARGE MEDICATIONS:  New Prescriptions    No medications on file       DISCONTINUED

## 2023-06-23 NOTE — DISCHARGE INSTRUCTIONS
[facility]  [address] [address] [facility] · [phone]

## 2023-06-28 PROCEDURE — 99281 EMR DPT VST MAYX REQ PHY/QHP: CPT

## 2023-06-29 ENCOUNTER — HOSPITAL ENCOUNTER (EMERGENCY)
Age: 43
Discharge: LEFT BEFORE DISCHARGE | End: 2023-06-29
Payer: MEDICAID

## 2023-06-29 VITALS
DIASTOLIC BLOOD PRESSURE: 44 MMHG | RESPIRATION RATE: 19 BRPM | TEMPERATURE: 97.9 F | HEART RATE: 72 BPM | SYSTOLIC BLOOD PRESSURE: 84 MMHG | OXYGEN SATURATION: 98 %

## 2023-06-30 ENCOUNTER — HOSPITAL ENCOUNTER (EMERGENCY)
Age: 43
Discharge: LEFT BEFORE DISCHARGE | End: 2023-07-01
Attending: EMERGENCY MEDICINE
Payer: MEDICAID

## 2023-06-30 VITALS
TEMPERATURE: 97.8 F | DIASTOLIC BLOOD PRESSURE: 102 MMHG | HEIGHT: 71 IN | WEIGHT: 150 LBS | SYSTOLIC BLOOD PRESSURE: 154 MMHG | RESPIRATION RATE: 18 BRPM | BODY MASS INDEX: 21 KG/M2 | HEART RATE: 74 BPM | OXYGEN SATURATION: 97 %

## 2023-06-30 DIAGNOSIS — F41.1 ANXIETY STATE: Primary | ICD-10-CM

## 2023-06-30 PROCEDURE — 99281 EMR DPT VST MAYX REQ PHY/QHP: CPT

## 2023-06-30 ASSESSMENT — PAIN - FUNCTIONAL ASSESSMENT: PAIN_FUNCTIONAL_ASSESSMENT: NONE - DENIES PAIN

## 2023-06-30 ASSESSMENT — LIFESTYLE VARIABLES
HOW OFTEN DO YOU HAVE A DRINK CONTAINING ALCOHOL: 2-4 TIMES A MONTH
HOW MANY STANDARD DRINKS CONTAINING ALCOHOL DO YOU HAVE ON A TYPICAL DAY: 1 OR 2

## 2023-07-07 ENCOUNTER — HOSPITAL ENCOUNTER (EMERGENCY)
Age: 43
Discharge: LEFT BEFORE DISCHARGE | End: 2023-07-07
Attending: EMERGENCY MEDICINE
Payer: MEDICAID

## 2023-07-07 VITALS
OXYGEN SATURATION: 100 % | SYSTOLIC BLOOD PRESSURE: 130 MMHG | RESPIRATION RATE: 19 BRPM | TEMPERATURE: 97.8 F | HEART RATE: 74 BPM | DIASTOLIC BLOOD PRESSURE: 83 MMHG

## 2023-07-07 DIAGNOSIS — F20.9 SCHIZOPHRENIA, UNSPECIFIED TYPE (HCC): Primary | ICD-10-CM

## 2023-07-07 PROCEDURE — 99281 EMR DPT VST MAYX REQ PHY/QHP: CPT

## 2023-07-09 ENCOUNTER — HOSPITAL ENCOUNTER (EMERGENCY)
Age: 43
Discharge: LEFT BEFORE DISCHARGE | End: 2023-07-09
Attending: EMERGENCY MEDICINE
Payer: MEDICAID

## 2023-07-09 VITALS
RESPIRATION RATE: 20 BRPM | OXYGEN SATURATION: 98 % | BODY MASS INDEX: 21 KG/M2 | WEIGHT: 150 LBS | TEMPERATURE: 98.6 F | HEART RATE: 88 BPM | HEIGHT: 71 IN | DIASTOLIC BLOOD PRESSURE: 82 MMHG | SYSTOLIC BLOOD PRESSURE: 132 MMHG

## 2023-07-09 DIAGNOSIS — F20.9 SCHIZOPHRENIA, UNSPECIFIED TYPE (HCC): Primary | ICD-10-CM

## 2023-07-09 PROCEDURE — 99281 EMR DPT VST MAYX REQ PHY/QHP: CPT

## 2023-07-09 ASSESSMENT — PAIN - FUNCTIONAL ASSESSMENT: PAIN_FUNCTIONAL_ASSESSMENT: NONE - DENIES PAIN

## 2023-07-19 ENCOUNTER — HOSPITAL ENCOUNTER (EMERGENCY)
Age: 43
Discharge: HOME | End: 2023-07-19
Payer: COMMERCIAL

## 2023-07-19 VITALS
OXYGEN SATURATION: 98 % | SYSTOLIC BLOOD PRESSURE: 123 MMHG | DIASTOLIC BLOOD PRESSURE: 75 MMHG | TEMPERATURE: 97.8 F | RESPIRATION RATE: 18 BRPM | HEART RATE: 87 BPM

## 2023-07-19 DIAGNOSIS — S39.012A STRAIN OF LUMBAR REGION, INITIAL ENCOUNTER: Primary | ICD-10-CM

## 2023-07-19 PROCEDURE — 99281 EMR DPT VST MAYX REQ PHY/QHP: CPT

## 2023-07-19 PROCEDURE — 6370000000 HC RX 637 (ALT 250 FOR IP): Performed by: NURSE PRACTITIONER

## 2023-07-19 RX ORDER — IBUPROFEN 800 MG/1
800 TABLET ORAL ONCE
Status: COMPLETED | OUTPATIENT
Start: 2023-07-19 | End: 2023-07-19

## 2023-07-19 RX ADMIN — IBUPROFEN 800 MG: 800 TABLET, FILM COATED ORAL at 14:24

## 2023-07-19 ASSESSMENT — PAIN - FUNCTIONAL ASSESSMENT: PAIN_FUNCTIONAL_ASSESSMENT: 0-10

## 2023-07-19 ASSESSMENT — PAIN SCALES - GENERAL: PAINLEVEL_OUTOF10: 10

## 2023-07-21 ENCOUNTER — HOSPITAL ENCOUNTER (EMERGENCY)
Age: 43
Discharge: LEFT BEFORE DISCHARGE | End: 2023-07-21

## 2023-07-21 VITALS — TEMPERATURE: 98 F | HEART RATE: 70 BPM | OXYGEN SATURATION: 98 %

## 2023-07-21 NOTE — PROGRESS NOTES
Attempted to call the patient x 1 from waiting- no answer and patient not observed in waiting room- Will reattempt

## 2023-07-22 ENCOUNTER — HOSPITAL ENCOUNTER (EMERGENCY)
Age: 43
Discharge: HOME | End: 2023-07-23

## 2023-07-23 VITALS
RESPIRATION RATE: 20 BRPM | SYSTOLIC BLOOD PRESSURE: 120 MMHG | HEART RATE: 71 BPM | DIASTOLIC BLOOD PRESSURE: 78 MMHG | OXYGEN SATURATION: 98 % | TEMPERATURE: 97 F

## 2023-07-23 ASSESSMENT — LIFESTYLE VARIABLES: HOW OFTEN DO YOU HAVE A DRINK CONTAINING ALCOHOL: NEVER

## 2023-07-23 NOTE — ED TRIAGE NOTES
Department of Emergency Medicine  FIRST PROVIDER TRIAGE NOTE             Independent MLP           7/23/23  12:06 AM EDT    Date of Encounter: 7/23/23   MRN: [id number]      HPI: Alejandra Vasquez. is a 40 y.o. male who presents to the ED for homeless    ROS: Negative for cp or sob. PE: Gen Appearance/Constitutional: alert  HEENT: NC/NT. PERRLA,  Airway patent. Initial Plan of Care: All treatment areas with department are currently occupied.  Plan to order/Initiate the following while awaiting opening in ED: pending exam.  Initiate Treatment-Testing, Proceed toTreatment Area When Bed Available for ED Attending/MLP to Continue Care    Electronically signed by JUICE Saucedo CNP   DD: 7/23/23

## 2023-07-25 ENCOUNTER — HOSPITAL ENCOUNTER (EMERGENCY)
Age: 43
Discharge: LEFT BEFORE DISCHARGE | End: 2023-07-25

## 2023-07-25 VITALS
BODY MASS INDEX: 21 KG/M2 | HEIGHT: 71 IN | SYSTOLIC BLOOD PRESSURE: 112 MMHG | TEMPERATURE: 97.5 F | WEIGHT: 150 LBS | RESPIRATION RATE: 20 BRPM | DIASTOLIC BLOOD PRESSURE: 78 MMHG | HEART RATE: 65 BPM | OXYGEN SATURATION: 98 %

## 2023-07-25 ASSESSMENT — PAIN - FUNCTIONAL ASSESSMENT: PAIN_FUNCTIONAL_ASSESSMENT: NONE - DENIES PAIN

## 2023-07-25 NOTE — ED NOTES
Department of Emergency Medicine  FIRST PROVIDER TRIAGE NOTE             Independent MLP           7/25/23  2:05 AM EDT    Date of Encounter: 7/25/23   MRN: [id number]      HPI: Carlita Shoemaker is a 40 y.o. male who presents to the ED for Homeless (-SI/-HI. \"I wanted to see the pictures here, its safety\". )       ROS: Negative for Suicidal ideation or Homicidal Ideation. PE: Gen Appearance/Constitutional: alert  HEENT: NC/NT. PERRLA,  Airway patent. Initial Plan of Care: All treatment areas with department are currently occupied. Plan to order/Initiate the following while awaiting opening in ED: none.   Initiate Treatment-Testing, Proceed toTreatment Area When Bed Available for ED Attending/MLP to Continue Care    Electronically signed by Babatunde Burns PA-C   DD: 7/25/23      Babatunde Burns PA-C  07/25/23 5383

## 2023-07-26 ENCOUNTER — HOSPITAL ENCOUNTER (EMERGENCY)
Age: 43
Discharge: LEFT BEFORE DISCHARGE | End: 2023-07-26
Payer: COMMERCIAL

## 2023-07-26 VITALS
SYSTOLIC BLOOD PRESSURE: 115 MMHG | HEART RATE: 80 BPM | TEMPERATURE: 98.7 F | RESPIRATION RATE: 16 BRPM | DIASTOLIC BLOOD PRESSURE: 72 MMHG | OXYGEN SATURATION: 97 %

## 2023-07-26 PROCEDURE — 99281 EMR DPT VST MAYX REQ PHY/QHP: CPT

## 2023-07-26 ASSESSMENT — PAIN - FUNCTIONAL ASSESSMENT: PAIN_FUNCTIONAL_ASSESSMENT: NONE - DENIES PAIN

## 2023-07-27 ENCOUNTER — HOSPITAL ENCOUNTER (EMERGENCY)
Age: 43
Discharge: LEFT BEFORE DISCHARGE | End: 2023-07-27

## 2023-07-27 VITALS — OXYGEN SATURATION: 98 % | TEMPERATURE: 97.8 F | HEART RATE: 77 BPM

## 2023-07-29 ENCOUNTER — HOSPITAL ENCOUNTER (EMERGENCY)
Age: 43
Discharge: HOME | End: 2023-07-29
Attending: EMERGENCY MEDICINE
Payer: COMMERCIAL

## 2023-07-29 ENCOUNTER — HOSPITAL ENCOUNTER (EMERGENCY)
Age: 43
Discharge: LEFT BEFORE DISCHARGE | End: 2023-07-29

## 2023-07-29 VITALS
SYSTOLIC BLOOD PRESSURE: 142 MMHG | RESPIRATION RATE: 20 BRPM | OXYGEN SATURATION: 98 % | TEMPERATURE: 98.1 F | HEART RATE: 76 BPM | DIASTOLIC BLOOD PRESSURE: 80 MMHG

## 2023-07-29 VITALS
RESPIRATION RATE: 17 BRPM | TEMPERATURE: 97.8 F | SYSTOLIC BLOOD PRESSURE: 109 MMHG | OXYGEN SATURATION: 98 % | DIASTOLIC BLOOD PRESSURE: 81 MMHG | HEART RATE: 71 BPM

## 2023-07-29 DIAGNOSIS — F22 PARANOIA (HCC): Primary | ICD-10-CM

## 2023-07-29 PROCEDURE — 99282 EMERGENCY DEPT VISIT SF MDM: CPT

## 2023-07-29 ASSESSMENT — PAIN - FUNCTIONAL ASSESSMENT
PAIN_FUNCTIONAL_ASSESSMENT: NONE - DENIES PAIN
PAIN_FUNCTIONAL_ASSESSMENT: NONE - DENIES PAIN

## 2023-07-29 ASSESSMENT — LIFESTYLE VARIABLES: HOW OFTEN DO YOU HAVE A DRINK CONTAINING ALCOHOL: NEVER

## 2023-07-29 NOTE — ED NOTES
Department of Emergency Medicine  FIRST PROVIDER TRIAGE NOTE             Independent MLP           7/29/23  2:23 AM EDT    Date of Encounter: 7/29/23   MRN: [id number]      HPI: Carlita Shoemaker is a 40 y.o. male who presents to the ED for Homeless (-SI/HI, here for a place to stay)       ROS: Negative for cp or sob. PE: Gen Appearance/Constitutional: alert  HEENT: NC/NT. PERRLA,  Airway patent. Initial Plan of Care: All treatment areas with department are currently occupied. Plan to order/Initiate the following while awaiting opening in ED: .   Initiate Treatment-Testing, Proceed toTreatment Area When Bed Available for ED Attending/MLP to Continue Care    Electronically signed by JUICE Alex CNP   DD: 7/29/23       JUICE Alex CNP  07/29/23 8836  ATTENDING PROVIDER ATTESTATION:     Supervising Physician, on-site, available for consultation, non-participatory in the evaluation or care of this patient       Pedro Hernandez MD  07/29/23 9771

## 2023-07-30 NOTE — ED PROVIDER NOTES
ED PROVIDER NOTE    Chief Complaint   Patient presents with    Paranoid     Became paranoid and came to ER denies SI/HI. HPI:  7/29/23,   Time: 10:10 PM EDT       Joss Patrick is a 40 y.o. male presenting to the ED for paranoia. Improving since arriving to ED. Patient states that he saw someone downtown at a concert that is ongoing there which triggered paranoia for him but he is now feeling better. Not taking home meds that are prescribed to him. Not following up with behavioral health or primary care. No SI/HI/AVH at this time. No drug/etoh use. No recent illness or injury.     Chart review: hx of depression, schizoaffective disorder      Review of Systems:     Review of Systems  Pertinent positives and negatives as stated in HPI     --------------------------------------------- PAST HISTORY ---------------------------------------------  Past Medical History:   Past Medical History:   Diagnosis Date    Depression     Schizoaffective disorder ([address])        Past Surgical History:   Past Surgical History:   Procedure Laterality Date    EYE SURGERY  19 years ago    HIP FRACTURE SURGERY Left 9/12/2021    LEFT ACETABULUM OPEN REDUCTION INTERNAL FIXATION - SYNTHES performed by Linda Thomas MD at [facility] Left 9/28/2021    IRRIGATION AND DEBRIDEMENT LEFT HIP WITH EXCISION HEMATOMA performed by Tomi Silva MD at [address] History:   Social History     Socioeconomic History    Marital status: Single    Number of children: 1    Years of education: 12   Occupational History     Comment: SSDI   Tobacco Use    Smoking status: Every Day     Packs/day: 0.50     Years: 24.00     Pack years: 12.00     Types: Cigarettes    Smokeless tobacco: Never    Tobacco comments:     stopped [place] while ago\"   Vaping Use    Vaping Use: Former   Substance and Sexual Activity    Alcohol use: Not Currently     Comment: Daily    Drug use: Yes     Frequency: 7.0 times per week     Types:

## 2023-07-30 NOTE — ED NOTES
Patient washed self up in bathroom and was given food and drink.  Stated that he felt better     Dany Smith RN  07/29/23 0540

## 2023-08-01 ENCOUNTER — HOSPITAL ENCOUNTER (EMERGENCY)
Age: 43
Discharge: HOME | End: 2023-08-02
Payer: COMMERCIAL

## 2023-08-01 VITALS
SYSTOLIC BLOOD PRESSURE: 126 MMHG | OXYGEN SATURATION: 98 % | HEART RATE: 90 BPM | DIASTOLIC BLOOD PRESSURE: 86 MMHG | TEMPERATURE: 97.8 F

## 2023-08-01 PROCEDURE — 99281 EMR DPT VST MAYX REQ PHY/QHP: CPT

## 2023-08-02 VITALS
WEIGHT: 150 LBS | BODY MASS INDEX: 21 KG/M2 | HEIGHT: 71 IN | TEMPERATURE: 97 F | RESPIRATION RATE: 18 BRPM | DIASTOLIC BLOOD PRESSURE: 80 MMHG | OXYGEN SATURATION: 98 % | SYSTOLIC BLOOD PRESSURE: 125 MMHG | HEART RATE: 70 BPM

## 2023-08-02 PROCEDURE — 99282 EMERGENCY DEPT VISIT SF MDM: CPT

## 2023-08-02 ASSESSMENT — PAIN - FUNCTIONAL ASSESSMENT: PAIN_FUNCTIONAL_ASSESSMENT: NONE - DENIES PAIN

## 2023-08-02 NOTE — ED NOTES
Department of Emergency Medicine  FIRST PROVIDER TRIAGE NOTE             Independent MLP           8/1/23  10:43 PM EDT    Date of Encounter: 8/1/23   MRN: [id number]      HPI: Nabil Montalvo is a 40 y.o. male who presents to the ED for Other (Felt like a woman was stalking him. Pt denies hallucinations, denies SI, and denies HI) and Homeless (Pt wants help to get out of [place] )  Per patient he felt like a woman was stalking him and following him and he got scared. He states that he is trying to get out of [facility]. He denies any SI or HI.    ROS: Negative for cp, sob, Suicidal ideation, or Homicidal Ideation. PE: Gen Appearance/Constitutional: alert  CV: regular rate     Initial Plan of Care: All treatment areas with department are currently occupied. Plan to order/Initiate the following while awaiting opening in ED: .   Initiate Treatment-Testing, Proceed toTreatment Area When Bed Available for ED Attending/MLP to Continue Care    Electronically signed by JUICE Rosales CNP   DD: 8/1/23       JUICE Rosales CNP  08/01/23 6627  ATTENDING PROVIDER ATTESTATION:     Supervising Physician, on-site, available for consultation, non-participatory in the evaluation or care of this patient       Mila Bailey MD  08/01/23 9253 detailed exam

## 2023-08-02 NOTE — ED NOTES
Pt cannot be found in ER and was seen leaving ED a couple hours ago     Michele Brasher, RN  08/02/23 7989

## 2023-08-03 ENCOUNTER — HOSPITAL ENCOUNTER (EMERGENCY)
Age: 43
Discharge: HOME | End: 2023-08-03
Payer: COMMERCIAL

## 2023-08-03 DIAGNOSIS — F20.9 SCHIZOPHRENIA, UNSPECIFIED TYPE (HCC): Primary | ICD-10-CM

## 2023-08-03 DIAGNOSIS — F22 PARANOIA (HCC): ICD-10-CM

## 2023-08-03 NOTE — ED PROVIDER NOTES
Independent EVIN Visit. [facility]  Department of Emergency Medicine   ED  Encounter Note  Admit Date/RoomTime: No admission date for patient encounter. ED Room: Room/bed info not found    NAME: [employer]. : 1980  MRN: [id number]     Chief Complaint:  Other (states \"came to hide because the michael's are chasing him\" denies SI/HI)    History of Present Illness       [employer]. is a 40 y.o. old male who presents to the emergency department by private vehicle, for homelessness and paranoia which began 1 day(s) prior to arrival.  He has a prior history of  schizophrenia  of which the problem is long-standing. He  has previously been in counseling. There has been no history of recent trauma. He denies suicidal ideation  and denies homicidal ideation. Patient states that he came to the hospital because he thought he was being followed by somebody, and he stated that he wanted to come here and sleep. Patient requesting to stay in the emergency department waiting room to that he can sleep where he feels safe. He denies any visual auditory hallucinations. ROS   Pertinent positives and negatives are stated within HPI, all other systems reviewed and are negative. Past Medical History:  has a past medical history of Depression and Schizoaffective disorder ([address] Surgical History:  has a past surgical history that includes Hip fracture surgery (Left, 2021); eye surgery (19 years ago); and Hip fracture surgery (Left, 2021). Social History:  reports that he has been smoking cigarettes. He has a 12.00 pack-year smoking history. He has never used smokeless tobacco. He reports that he does not currently use alcohol. He reports current drug use. Frequency: 7.00 times per week. Drugs: Cocaine and Marijuana ([facility]). Family History: family history includes Mental Illness in his mother; No Known Problems in his father; Substance Abuse in his mother. during their ED course. Assessment      1. Schizophrenia, unspecified type ([address])    2. Paranoia ([address])      Plan   Discharged home. Patient condition is stable    New Medications     New Prescriptions    No medications on file     Electronically signed by JUICE Clemons CNP   DD: 8/3/23  **This report was transcribed using voice recognition software. Every effort was made to ensure accuracy; however, inadvertent computerized transcription errors may be present.   END OF ED PROVIDER NOTE      JUICE Clemons CNP  08/03/23 9118

## 2023-08-03 NOTE — ED NOTES
Department of Emergency Medicine  FIRST PROVIDER TRIAGE NOTE             Independent MLP           8/2/23  9:49 PM EDT    Date of Encounter: 8/2/23   MRN: [id number]      HPI: Nan Almaraz. is a 40 y.o. male who presents to the ED for Other (states \"came to hide because the michael's are chasing him\" denies SI/HI)  Patient stated that he thought someone was following him to the ER today. He denies SI or HI. Denies chest pain or shortness of breath. ROS: Negative for cp, sob, Suicidal ideation, or Homicidal Ideation. PE: Gen Appearance/Constitutional: alert  CV: regular rate     Initial Plan of Care: All treatment areas with department are currently occupied. Plan to order/Initiate the following while awaiting opening in ED: .   Initiate Treatment-Testing, Proceed toTreatment Area When Bed Available for ED Attending/MLP to Continue Care    Electronically signed by JUICE Cramer CNP   DD: 8/2/23       JUICE Cramer CNP  08/02/23 1037

## 2023-08-04 ENCOUNTER — HOSPITAL ENCOUNTER (EMERGENCY)
Age: 43
Discharge: HOME | End: 2023-08-04
Attending: EMERGENCY MEDICINE
Payer: COMMERCIAL

## 2023-08-04 VITALS
HEART RATE: 86 BPM | TEMPERATURE: 97.6 F | BODY MASS INDEX: 21 KG/M2 | RESPIRATION RATE: 16 BRPM | HEIGHT: 71 IN | WEIGHT: 150 LBS | OXYGEN SATURATION: 96 %

## 2023-08-04 DIAGNOSIS — Z59.00 HOMELESS: Primary | ICD-10-CM

## 2023-08-04 DIAGNOSIS — Z86.59 HISTORY OF SCHIZOPHRENIA: ICD-10-CM

## 2023-08-04 PROCEDURE — 99282 EMERGENCY DEPT VISIT SF MDM: CPT

## 2023-08-04 SDOH — ECONOMIC STABILITY - HOUSING INSECURITY: HOMELESSNESS UNSPECIFIED: Z59.00

## 2023-08-04 ASSESSMENT — PAIN - FUNCTIONAL ASSESSMENT: PAIN_FUNCTIONAL_ASSESSMENT: NONE - DENIES PAIN

## 2023-08-04 ASSESSMENT — LIFESTYLE VARIABLES: HOW OFTEN DO YOU HAVE A DRINK CONTAINING ALCOHOL: NEVER

## 2023-08-04 NOTE — ED NOTES
Department of Emergency Medicine  FIRST PROVIDER TRIAGE NOTE             Independent MLP           8/4/23  12:20 AM EDT    Date of Encounter: 8/4/23   MRN: [id number]      HPI: Joss Patrick is a 40 y.o. male who presents to the ED for Homeless (PT states \"it was hot outside and I wanted to come in and get a ginger ale. \")   Patient stated that it was hot outside so he wanted to come into the ER to get cool and ginger ale. Patient denies suicidal or homicidal ideations. ROS: Negative for cp or sob. PE: Gen Appearance/Constitutional: alert  CV: regular rate     Initial Plan of Care: All treatment areas with department are currently occupied. Plan to order/Initiate the following while awaiting opening in ED: .   Initiate Treatment-Testing, Proceed toTreatment Area When Bed Available for ED Attending/PREMA to Continue Care    Electronically signed by JUICE Mata CNP   DD: 8/4/23       JUICE Mata CNP  08/04/23 0021  ATTENDING PROVIDER ATTESTATION:     Supervising Physician, on-site, available for consultation, non-participatory in the evaluation or care of this patient       Shamir Hooks MD  08/04/23 4901 JUICE Emerson CNP  08/04/23 0037

## 2023-08-06 ENCOUNTER — HOSPITAL ENCOUNTER (EMERGENCY)
Age: 43
Discharge: HOME | End: 2023-08-06
Payer: COMMERCIAL

## 2023-08-06 VITALS
RESPIRATION RATE: 18 BRPM | TEMPERATURE: 97.1 F | HEART RATE: 89 BPM | SYSTOLIC BLOOD PRESSURE: 110 MMHG | OXYGEN SATURATION: 97 % | DIASTOLIC BLOOD PRESSURE: 75 MMHG

## 2023-08-06 DIAGNOSIS — F22 PARANOID (HCC): Primary | ICD-10-CM

## 2023-08-06 DIAGNOSIS — Z59.00 HOMELESS: ICD-10-CM

## 2023-08-06 PROCEDURE — 99281 EMR DPT VST MAYX REQ PHY/QHP: CPT

## 2023-08-06 SDOH — ECONOMIC STABILITY - HOUSING INSECURITY: HOMELESSNESS UNSPECIFIED: Z59.00

## 2023-08-06 ASSESSMENT — PAIN - FUNCTIONAL ASSESSMENT: PAIN_FUNCTIONAL_ASSESSMENT: NONE - DENIES PAIN

## 2023-08-07 ENCOUNTER — HOSPITAL ENCOUNTER (EMERGENCY)
Age: 43
Discharge: HOME | End: 2023-08-07
Attending: STUDENT IN AN ORGANIZED HEALTH CARE EDUCATION/TRAINING PROGRAM
Payer: COMMERCIAL

## 2023-08-07 VITALS
SYSTOLIC BLOOD PRESSURE: 134 MMHG | RESPIRATION RATE: 16 BRPM | OXYGEN SATURATION: 96 % | TEMPERATURE: 97.8 F | DIASTOLIC BLOOD PRESSURE: 74 MMHG | HEART RATE: 80 BPM

## 2023-08-07 DIAGNOSIS — Z59.00 HOMELESSNESS: Primary | ICD-10-CM

## 2023-08-07 DIAGNOSIS — Z86.59 HISTORY OF SCHIZOAFFECTIVE DISORDER: ICD-10-CM

## 2023-08-07 PROCEDURE — 99282 EMERGENCY DEPT VISIT SF MDM: CPT

## 2023-08-07 SDOH — ECONOMIC STABILITY - HOUSING INSECURITY: HOMELESSNESS UNSPECIFIED: Z59.00

## 2023-08-07 ASSESSMENT — PAIN - FUNCTIONAL ASSESSMENT
PAIN_FUNCTIONAL_ASSESSMENT: NONE - DENIES PAIN
PAIN_FUNCTIONAL_ASSESSMENT: NONE - DENIES PAIN

## 2023-08-08 VITALS
TEMPERATURE: 97.5 F | OXYGEN SATURATION: 95 % | RESPIRATION RATE: 20 BRPM | BODY MASS INDEX: 21 KG/M2 | SYSTOLIC BLOOD PRESSURE: 126 MMHG | HEART RATE: 75 BPM | HEIGHT: 71 IN | DIASTOLIC BLOOD PRESSURE: 61 MMHG | WEIGHT: 150 LBS

## 2023-08-08 PROCEDURE — 99281 EMR DPT VST MAYX REQ PHY/QHP: CPT

## 2023-08-08 ASSESSMENT — PAIN - FUNCTIONAL ASSESSMENT: PAIN_FUNCTIONAL_ASSESSMENT: NONE - DENIES PAIN

## 2023-08-08 NOTE — ED TRIAGE NOTES
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 Patient is calm, cooperative and kind, vitals stable, nad.

## 2023-08-09 ENCOUNTER — HOSPITAL ENCOUNTER (EMERGENCY)
Age: 43
Discharge: HOME HEALTH | End: 2023-08-09
Payer: COMMERCIAL

## 2023-08-09 DIAGNOSIS — Z59.00 HOMELESS: Primary | ICD-10-CM

## 2023-08-09 DIAGNOSIS — F39 MOOD DISORDER (HCC): ICD-10-CM

## 2023-08-09 SDOH — ECONOMIC STABILITY - HOUSING INSECURITY: HOMELESSNESS UNSPECIFIED: Z59.00

## 2023-08-10 ENCOUNTER — HOSPITAL ENCOUNTER (EMERGENCY)
Age: 43
Discharge: HOME | End: 2023-08-10
Attending: EMERGENCY MEDICINE
Payer: COMMERCIAL

## 2023-08-10 VITALS
RESPIRATION RATE: 20 BRPM | DIASTOLIC BLOOD PRESSURE: 81 MMHG | SYSTOLIC BLOOD PRESSURE: 133 MMHG | TEMPERATURE: 97 F | OXYGEN SATURATION: 98 % | HEART RATE: 74 BPM

## 2023-08-10 DIAGNOSIS — F22 PARANOID (HCC): Primary | ICD-10-CM

## 2023-08-10 PROCEDURE — 99282 EMERGENCY DEPT VISIT SF MDM: CPT

## 2023-08-10 ASSESSMENT — PAIN - FUNCTIONAL ASSESSMENT: PAIN_FUNCTIONAL_ASSESSMENT: NONE - DENIES PAIN

## 2023-08-10 NOTE — ED NOTES
Pt in bathroom when Er Dr came out to talk to him, Er Dr will try again later. Pt came out aware to stay in waiting room to talk with Dr , pt walked outside  at this time, pt aware that er Dr was coming back out .      Jen Flores, KRUNAL  83/28/09 0504

## 2023-08-10 NOTE — ED NOTES
Department of Emergency Medicine  FIRST PROVIDER TRIAGE NOTE             Independent MLP           8/10/23  3:00 AM EDT    Date of Encounter: 8/10/23   MRN: [id number]      HPI: Gabrielel Hendrickson. is a 40 y.o. male who presents to the ED for Paranoid (Pt states he came in today to the ER for paranoia. Pt rambling on about society and telephones. Pt Aox4. Pt denies SI and denies HI. Pt denies hearing voices. Pt states he is homeless and is talking about the current rain downpour outside. )     ROS: Negative for Suicidal ideation or Homicidal Ideation. PE: Gen Appearance/Constitutional: alert     Initial Plan of Care: All treatment areas with department are currently occupied.   Proceed toTreatment Area When Bed Available for ED Attending/MLP to Continue Care    Electronically signed by Vernell Moses PA-C   DD: 8/10/23       Vernell Moses PA-C  08/10/23 0301

## 2023-08-10 NOTE — ED NOTES
Pt returned back to er waiting room. Pt aware to stay close . Er Dr here  now visiting / speaking with pt.      Kandy Rodriguez, QUENTINN  70/15/50 9931

## 2023-08-13 ENCOUNTER — HOSPITAL ENCOUNTER (EMERGENCY)
Age: 43
Discharge: HOME | End: 2023-08-13
Attending: EMERGENCY MEDICINE
Payer: COMMERCIAL

## 2023-08-13 VITALS
SYSTOLIC BLOOD PRESSURE: 120 MMHG | OXYGEN SATURATION: 98 % | BODY MASS INDEX: 20.32 KG/M2 | DIASTOLIC BLOOD PRESSURE: 80 MMHG | HEART RATE: 68 BPM | RESPIRATION RATE: 18 BRPM | WEIGHT: 150 LBS | TEMPERATURE: 97.5 F | HEIGHT: 72 IN

## 2023-08-13 DIAGNOSIS — Z86.59 HISTORY OF SCHIZOPHRENIA: ICD-10-CM

## 2023-08-13 DIAGNOSIS — Z59.00 HOMELESS: Primary | ICD-10-CM

## 2023-08-13 PROCEDURE — 99282 EMERGENCY DEPT VISIT SF MDM: CPT

## 2023-08-13 SDOH — ECONOMIC STABILITY - HOUSING INSECURITY: HOMELESSNESS UNSPECIFIED: Z59.00

## 2023-08-13 ASSESSMENT — PAIN - FUNCTIONAL ASSESSMENT: PAIN_FUNCTIONAL_ASSESSMENT: NONE - DENIES PAIN

## 2023-08-13 NOTE — ED TRIAGE NOTES
FIRST PROVIDER CONTACT ASSESSMENT NOTE      Department of Emergency Medicine   Admit Date: No admission date for patient encounter. Chief Complaint: No chief complaint on file.       History of Present Illness:    Melissa Menendez is a 40 y.o. male who presents to the ED for   Homeless  Scared for humanity and people  People don't know their worth          -----------------END OF FIRST PROVIDER CONTACT ASSESSMENT NOTE--------------  Electronically signed by CECILE Valentin   DD: 8/13/23

## 2023-08-14 ENCOUNTER — HOSPITAL ENCOUNTER (EMERGENCY)
Age: 43
Discharge: HOME | End: 2023-08-14
Payer: COMMERCIAL

## 2023-08-14 VITALS
DIASTOLIC BLOOD PRESSURE: 84 MMHG | HEART RATE: 68 BPM | TEMPERATURE: 98.5 F | RESPIRATION RATE: 16 BRPM | OXYGEN SATURATION: 98 % | SYSTOLIC BLOOD PRESSURE: 121 MMHG

## 2023-08-14 DIAGNOSIS — S02.5XXA CLOSED FRACTURE OF TOOTH, INITIAL ENCOUNTER: ICD-10-CM

## 2023-08-14 DIAGNOSIS — Z51.89 VISIT FOR WOUND CHECK: Primary | ICD-10-CM

## 2023-08-14 PROCEDURE — 6370000000 HC RX 637 (ALT 250 FOR IP): Performed by: NURSE PRACTITIONER

## 2023-08-14 PROCEDURE — 99283 EMERGENCY DEPT VISIT LOW MDM: CPT

## 2023-08-14 RX ORDER — BACITRACIN ZINC 500 [USP'U]/G
OINTMENT TOPICAL ONCE
Status: COMPLETED | OUTPATIENT
Start: 2023-08-14 | End: 2023-08-14

## 2023-08-14 RX ADMIN — BACITRACIN ZINC: 500 OINTMENT TOPICAL at 19:41

## 2023-08-14 NOTE — ED PROVIDER NOTES
to the right lateral malleolus as well as having a chipped tooth. Patient presents emergency department states that he has a scab to his right ankle. He denies any pain associated with that. He reports that he has been putting some lotion on it but wanted it looked at. He also reports that he chipped his tooth after eating onion rings. Patient does not have a dentist.  He denies any associated pain with this and denies any unusual gum swelling or fevers. Patient also reports not wanting an antibiotic if he does not need it states he has no pain in its really been there for some time but just wanted someone to take a look at it. Patient otherwise normal state of health. Patient pleasant in conversation. Plan will be for wound care. Will rule out abrasion versus infected laceration versus abscess. On exam area is superficial and actually almost completely healed only a small scab remains. No induration no fluctuance no drainage no surrounding erythema no streaking. Wound care provided and bacitracin ointment placed. Patient educated on daily wound care, signs symptoms of infection and good follow-up care. Patient will be discharged with the bacitracin ointment. Patient also does not have any concerning mouth injuries and reports that the fractured tooth to the right upper molar has been there for some time. And reports that he chipped his tooth by eating onion rings unclear when exactly that occurred he cannot name a specific date. But does not have any concerning abscess formation. Floor mouth is soft. Negative for cardiac reference no drooling. Plan will be for close follow-up he was educated follow-up with the dental clinic, patient otherwise nontoxic, neurovascular intact. Patient expressed understanding. Patient will be safely discharged home. Was educated on good follow-up as well as strict return precautions. Patient expressed understanding patient discharged home.        History from :

## 2023-08-14 NOTE — DISCHARGE INSTRUCTIONS
Cleanse wound with Dial soap and water, apply bacitracin ointment. Do not use peroxide or alcohol. Follow-up with primary care doctor as needed for wound recheck. Follow-up with dentist as well.

## 2023-08-15 ENCOUNTER — HOSPITAL ENCOUNTER (EMERGENCY)
Age: 43
Discharge: HOME | End: 2023-08-15
Payer: COMMERCIAL

## 2023-08-15 VITALS
TEMPERATURE: 97.5 F | RESPIRATION RATE: 16 BRPM | SYSTOLIC BLOOD PRESSURE: 114 MMHG | DIASTOLIC BLOOD PRESSURE: 67 MMHG | OXYGEN SATURATION: 97 % | HEART RATE: 67 BPM

## 2023-08-15 DIAGNOSIS — Z59.00 HOMELESS: Primary | ICD-10-CM

## 2023-08-15 DIAGNOSIS — F20.9 SCHIZOPHRENIA, UNSPECIFIED TYPE (HCC): ICD-10-CM

## 2023-08-15 PROCEDURE — 99281 EMR DPT VST MAYX REQ PHY/QHP: CPT

## 2023-08-15 PROCEDURE — 99282 EMERGENCY DEPT VISIT SF MDM: CPT

## 2023-08-15 SDOH — ECONOMIC STABILITY - HOUSING INSECURITY: HOMELESSNESS UNSPECIFIED: Z59.00

## 2023-08-16 ENCOUNTER — HOSPITAL ENCOUNTER (EMERGENCY)
Age: 43
Discharge: HOME | End: 2023-08-16
Attending: EMERGENCY MEDICINE

## 2023-08-16 ENCOUNTER — HOSPITAL ENCOUNTER (EMERGENCY)
Age: 43
Discharge: LEFT BEFORE DISCHARGE | End: 2023-08-16
Attending: EMERGENCY MEDICINE
Payer: COMMERCIAL

## 2023-08-16 VITALS
SYSTOLIC BLOOD PRESSURE: 113 MMHG | TEMPERATURE: 98.8 F | DIASTOLIC BLOOD PRESSURE: 72 MMHG | WEIGHT: 150 LBS | HEIGHT: 70 IN | HEART RATE: 80 BPM | OXYGEN SATURATION: 98 % | BODY MASS INDEX: 21.47 KG/M2

## 2023-08-16 VITALS — TEMPERATURE: 97.5 F | OXYGEN SATURATION: 99 % | HEART RATE: 73 BPM

## 2023-08-16 DIAGNOSIS — F20.9 SCHIZOPHRENIA, UNSPECIFIED TYPE (HCC): Primary | ICD-10-CM

## 2023-08-17 ENCOUNTER — HOSPITAL ENCOUNTER (EMERGENCY)
Age: 43
Discharge: HOME | End: 2023-08-18
Payer: COMMERCIAL

## 2023-08-17 VITALS
HEART RATE: 69 BPM | HEIGHT: 70 IN | RESPIRATION RATE: 14 BRPM | TEMPERATURE: 97.8 F | DIASTOLIC BLOOD PRESSURE: 79 MMHG | WEIGHT: 150 LBS | SYSTOLIC BLOOD PRESSURE: 111 MMHG | BODY MASS INDEX: 21.47 KG/M2 | OXYGEN SATURATION: 99 %

## 2023-08-17 DIAGNOSIS — Z59.00 HOMELESSNESS: Primary | ICD-10-CM

## 2023-08-17 PROCEDURE — 99281 EMR DPT VST MAYX REQ PHY/QHP: CPT

## 2023-08-17 SDOH — ECONOMIC STABILITY - HOUSING INSECURITY: HOMELESSNESS UNSPECIFIED: Z59.00

## 2023-08-17 ASSESSMENT — LIFESTYLE VARIABLES: HOW OFTEN DO YOU HAVE A DRINK CONTAINING ALCOHOL: NEVER

## 2023-08-17 ASSESSMENT — PAIN - FUNCTIONAL ASSESSMENT: PAIN_FUNCTIONAL_ASSESSMENT: NONE - DENIES PAIN

## 2023-08-17 NOTE — ED PROVIDER NOTES
presenting to the ED for paranoid, beginning 1 day ago. The complaint has been persistent, moderate in severity, and worsened by nothing. Patient reporting feeling paranoid people stealing things from him. Patient reporting no chest pain no difficulty breathing or abdominal patient reporting no nausea or vomiting there is no diarrhea. Patient reporting no headache he reports no fall or injury. Patient reporting no thoughts of hurting others or himself. He does report ongoing auditory hallucinations  CC/HPI Summary, DDx, ED Course, Reassessment, Tests Considered, Patient expectation:          Carlita Shoemaker is a 40 y.o. male history of depression and schizophrenia presenting to the ED for paranoid, beginning 1 day ago. The complaint has been persistent, moderate in severity, and worsened by nothing. Patient reporting feeling paranoid people stealing things from him. Patient reporting no chest pain no difficulty breathing or abdominal patient reporting no nausea or vomiting there is no diarrhea. Patient reporting no headache he reports no fall or injury. Patient reporting no thoughts of hurting others or himself. He does report ongoing auditory hallucinations  Patient is awake alert oriented x3. Patient anxious appearing has flight of ideas heart exam normal lungs are clear abdomen soft nontender. Patient neurologically intact gait is steady and normal.  Patient reporting no homicidal suicidal thoughts does report hallucinations. Patient differential includes mood disorder as well as exacerbation of schizophrenia as well as electrolyte disturbance. Patient was to be evaluated further. Patient reportedly eloped from the waiting room. Patient was nowhere to be found. Social Determinants affecting Dx or Tx: Patient does smoke history of substance abuse.     Chronic Conditions: History of schizophrenia    Records Reviewed:   Patient was seen yesterday for same complaint he also eloped at that

## 2023-08-18 ENCOUNTER — HOSPITAL ENCOUNTER (EMERGENCY)
Age: 43
Discharge: HOME | End: 2023-08-18
Payer: COMMERCIAL

## 2023-08-18 VITALS
RESPIRATION RATE: 18 BRPM | SYSTOLIC BLOOD PRESSURE: 133 MMHG | DIASTOLIC BLOOD PRESSURE: 50 MMHG | OXYGEN SATURATION: 98 % | HEART RATE: 73 BPM | TEMPERATURE: 98.1 F

## 2023-08-18 DIAGNOSIS — F39 MOOD DISORDER (HCC): Primary | ICD-10-CM

## 2023-08-18 PROCEDURE — 99282 EMERGENCY DEPT VISIT SF MDM: CPT

## 2023-08-18 ASSESSMENT — PAIN - FUNCTIONAL ASSESSMENT: PAIN_FUNCTIONAL_ASSESSMENT: NONE - DENIES PAIN

## 2023-08-19 VITALS
RESPIRATION RATE: 20 BRPM | DIASTOLIC BLOOD PRESSURE: 88 MMHG | BODY MASS INDEX: 21.52 KG/M2 | OXYGEN SATURATION: 97 % | HEART RATE: 78 BPM | HEIGHT: 70 IN | TEMPERATURE: 98 F | SYSTOLIC BLOOD PRESSURE: 130 MMHG

## 2023-08-19 PROCEDURE — 99282 EMERGENCY DEPT VISIT SF MDM: CPT

## 2023-08-19 ASSESSMENT — PAIN - FUNCTIONAL ASSESSMENT: PAIN_FUNCTIONAL_ASSESSMENT: NONE - DENIES PAIN

## 2023-08-20 ENCOUNTER — HOSPITAL ENCOUNTER (EMERGENCY)
Age: 43
Discharge: HOME | End: 2023-08-20
Payer: COMMERCIAL

## 2023-08-20 ENCOUNTER — HOSPITAL ENCOUNTER (EMERGENCY)
Age: 43
Discharge: LEFT BEFORE DISCHARGE | End: 2023-08-20

## 2023-08-20 VITALS
TEMPERATURE: 97.9 F | HEART RATE: 69 BPM | SYSTOLIC BLOOD PRESSURE: 121 MMHG | HEIGHT: 70 IN | OXYGEN SATURATION: 98 % | BODY MASS INDEX: 21.47 KG/M2 | WEIGHT: 150 LBS | DIASTOLIC BLOOD PRESSURE: 72 MMHG

## 2023-08-20 DIAGNOSIS — Z59.00 HOMELESS: Primary | ICD-10-CM

## 2023-08-20 SDOH — ECONOMIC STABILITY - HOUSING INSECURITY: HOMELESSNESS UNSPECIFIED: Z59.00

## 2023-08-20 ASSESSMENT — PAIN DESCRIPTION - DESCRIPTORS: DESCRIPTORS: SORE

## 2023-08-20 ASSESSMENT — LIFESTYLE VARIABLES: HOW OFTEN DO YOU HAVE A DRINK CONTAINING ALCOHOL: NEVER

## 2023-08-20 ASSESSMENT — PAIN DESCRIPTION - ORIENTATION: ORIENTATION: RIGHT;LEFT

## 2023-08-20 ASSESSMENT — PAIN DESCRIPTION - LOCATION: LOCATION: FOOT

## 2023-08-20 ASSESSMENT — PAIN - FUNCTIONAL ASSESSMENT: PAIN_FUNCTIONAL_ASSESSMENT: 0-10

## 2023-08-20 ASSESSMENT — PAIN SCALES - GENERAL: PAINLEVEL_OUTOF10: 7

## 2023-08-20 NOTE — ED PROVIDER NOTES
independent  HPI:  8/20/23, Time: 12:39 AM EDT         Renetta Pinzon. is a 44 y.o. male presenting to the ED for homelessness. Patient presents to the emergency department with wanting to stay inside for shelter. Patient initially reported concerns about the government. Patient routinely will inform pivot/triage of his initial intent about concerns that include hallucinations which at baseline he does have hallucinations but then after triage behavior will change and admit that he is here for shelter, food or wanting to get all cleaned up. Patient pleasant in conversation. .  Behavior calm. He denies any suicidal or homicidal ideations he reports that he does not take any medications admits to not following up with anyone. Patient talking to nursing staff at his recent birthday. Patient otherwise denies any complaints, no noted chest pain or shortness of breath no abdominal pain, no noted nausea, vomiting or diarrhea. No fevers reported. Denies drug or alcohol use. Review of Systems:   A complete review of systems was performed and pertinent positives and negatives are stated within HPI, all other systems reviewed and are negative.          --------------------------------------------- PAST HISTORY ---------------------------------------------  Past Medical History:  has a past medical history of Depression and Schizoaffective disorder ([address]). Past Surgical History:  has a past surgical history that includes Hip fracture surgery (Left, 9/12/2021); eye surgery (19 years ago); and Hip fracture surgery (Left, 9/28/2021). Social History:  reports that he has been smoking cigarettes. He has a 12.00 pack-year smoking history. He has never used smokeless tobacco. He reports that he does not currently use alcohol. He reports current drug use. Frequency: 7.00 times per week. Drugs: Cocaine and Marijuana ([place]).     Family History: family history includes Mental Illness in his mother; No Known Problems in

## 2023-08-21 ENCOUNTER — HOSPITAL ENCOUNTER (EMERGENCY)
Age: 43
Discharge: LEFT BEFORE DISCHARGE | End: 2023-08-21
Payer: COMMERCIAL

## 2023-08-21 VITALS
SYSTOLIC BLOOD PRESSURE: 110 MMHG | OXYGEN SATURATION: 98 % | TEMPERATURE: 97 F | RESPIRATION RATE: 16 BRPM | DIASTOLIC BLOOD PRESSURE: 75 MMHG | HEART RATE: 64 BPM

## 2023-08-21 VITALS
DIASTOLIC BLOOD PRESSURE: 67 MMHG | SYSTOLIC BLOOD PRESSURE: 121 MMHG | RESPIRATION RATE: 18 BRPM | HEIGHT: 70 IN | OXYGEN SATURATION: 100 % | BODY MASS INDEX: 21.47 KG/M2 | HEART RATE: 87 BPM | TEMPERATURE: 97.6 F | WEIGHT: 150 LBS

## 2023-08-21 DIAGNOSIS — Z59.00 HOMELESS: Primary | ICD-10-CM

## 2023-08-21 PROCEDURE — 99282 EMERGENCY DEPT VISIT SF MDM: CPT

## 2023-08-21 PROCEDURE — 99281 EMR DPT VST MAYX REQ PHY/QHP: CPT

## 2023-08-21 SDOH — ECONOMIC STABILITY - HOUSING INSECURITY: HOMELESSNESS UNSPECIFIED: Z59.00

## 2023-08-21 ASSESSMENT — PAIN DESCRIPTION - PAIN TYPE: TYPE: ACUTE PAIN

## 2023-08-21 ASSESSMENT — PAIN SCALES - GENERAL: PAINLEVEL_OUTOF10: 10

## 2023-08-21 ASSESSMENT — PAIN DESCRIPTION - LOCATION: LOCATION: HAND

## 2023-08-21 ASSESSMENT — PAIN - FUNCTIONAL ASSESSMENT
PAIN_FUNCTIONAL_ASSESSMENT: NONE - DENIES PAIN
PAIN_FUNCTIONAL_ASSESSMENT: 0-10

## 2023-08-21 ASSESSMENT — PAIN DESCRIPTION - DESCRIPTORS: DESCRIPTORS: ACHING

## 2023-08-21 ASSESSMENT — LIFESTYLE VARIABLES: HOW OFTEN DO YOU HAVE A DRINK CONTAINING ALCOHOL: NEVER

## 2023-08-21 ASSESSMENT — PAIN DESCRIPTION - ORIENTATION: ORIENTATION: RIGHT

## 2023-08-21 NOTE — ED NOTES
Patient observed eloping while yelling/cursing at RN on his way out of department. Steady gait.      Ion Palma RN  08/21/23 4377
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
## 2023-08-23 ENCOUNTER — HOSPITAL ENCOUNTER (EMERGENCY)
Age: 43
Discharge: HOME | End: 2023-08-23
Attending: STUDENT IN AN ORGANIZED HEALTH CARE EDUCATION/TRAINING PROGRAM
Payer: COMMERCIAL

## 2023-08-23 VITALS — HEART RATE: 64 BPM | OXYGEN SATURATION: 99 % | TEMPERATURE: 97.4 F

## 2023-08-23 DIAGNOSIS — Z59.00 HOMELESSNESS: ICD-10-CM

## 2023-08-23 DIAGNOSIS — F20.9 SCHIZOPHRENIA, UNSPECIFIED TYPE (HCC): Primary | ICD-10-CM

## 2023-08-23 PROCEDURE — 99282 EMERGENCY DEPT VISIT SF MDM: CPT

## 2023-08-23 SDOH — ECONOMIC STABILITY - HOUSING INSECURITY: HOMELESSNESS UNSPECIFIED: Z59.00

## 2023-08-23 NOTE — ED PROVIDER NOTES
[address] [facility] [address] ENCOUNTER        Pt Name: Laura Andrews. MRN: [id number]  Birthdate 1980  Date of evaluation: 8/23/2023  Provider: Rusty Mccollum DO  PCP: No primary care provider on file. Note Started: 7:30 PM EDT 8/23/23    CHIEF COMPLAINT       No chief complaint on file. HISTORY OF PRESENT ILLNESS: 1 or more Elements   History From: patient    Limitations to history : None    Laura Barragan is a 44 y.o. male with a history of schizophrenia who presents emergency department for paranoia. Patient states that it was raining and he had nowhere to go. He states that he also felt that people stole his identity and was paranoid about this. He has been feeling like this for a very long time and this is his baseline with his history of schizophrenia. He denies any suicidal or homicidal ideations. He is hearing voices and seeing things which is his baseline and normal for him with his history of schizophrenia. He does not have any other acute complaints or concerns at this time. Nursing Notes were all reviewed and agreed with or any disagreements were addressed in the HPI. REVIEW OF SYSTEMS :      Review of Systems    Positives and Pertinent negatives as per HPI.      SURGICAL HISTORY     Past Surgical History:   Procedure Laterality Date    EYE SURGERY  19 years ago    HIP FRACTURE SURGERY Left 9/12/2021    LEFT ACETABULUM OPEN REDUCTION INTERNAL FIXATION - SYNTHES performed by Devorah Camara MD at [address] [place] [address] Left 9/28/2021    IRRIGATION AND DEBRIDEMENT LEFT HIP WITH EXCISION HEMATOMA performed by Kelle Veronica MD at [facility]       Discharge Medication List as of 8/23/2023  8:06 PM        CONTINUE these medications which have NOT CHANGED    Details   ibuprofen (IBU) 800 MG tablet Take 1 tablet by mouth every 8 hours as needed for Pain, Disp-8

## 2023-08-25 ENCOUNTER — HOSPITAL ENCOUNTER (EMERGENCY)
Age: 43
Discharge: HOME | End: 2023-08-25
Payer: COMMERCIAL

## 2023-08-25 VITALS
OXYGEN SATURATION: 98 % | BODY MASS INDEX: 21.52 KG/M2 | HEART RATE: 72 BPM | DIASTOLIC BLOOD PRESSURE: 82 MMHG | WEIGHT: 150 LBS | SYSTOLIC BLOOD PRESSURE: 160 MMHG | TEMPERATURE: 97.8 F | RESPIRATION RATE: 20 BRPM

## 2023-08-25 DIAGNOSIS — Z59.00 HOMELESS: Primary | ICD-10-CM

## 2023-08-25 DIAGNOSIS — F39 MOOD DISORDER (HCC): ICD-10-CM

## 2023-08-25 PROCEDURE — 99282 EMERGENCY DEPT VISIT SF MDM: CPT

## 2023-08-25 SDOH — ECONOMIC STABILITY - HOUSING INSECURITY: HOMELESSNESS UNSPECIFIED: Z59.00

## 2023-08-25 ASSESSMENT — PAIN - FUNCTIONAL ASSESSMENT: PAIN_FUNCTIONAL_ASSESSMENT: NONE - DENIES PAIN

## 2023-08-27 ENCOUNTER — HOSPITAL ENCOUNTER (EMERGENCY)
Age: 43
Discharge: LEFT BEFORE DISCHARGE | End: 2023-08-27

## 2023-08-27 VITALS — OXYGEN SATURATION: 100 % | HEART RATE: 55 BPM | TEMPERATURE: 98.1 F

## 2023-08-27 NOTE — ED NOTES
This RN went to call patient back for triage and patient could not be found at this time.       Jami Sifuentes RN  08/27/23 1145

## 2023-08-28 ENCOUNTER — APPOINTMENT (OUTPATIENT)
Dept: GENERAL RADIOLOGY | Age: 43
End: 2023-08-28
Payer: COMMERCIAL

## 2023-08-28 ENCOUNTER — HOSPITAL ENCOUNTER (EMERGENCY)
Age: 43
Discharge: HOME | End: 2023-08-28
Payer: COMMERCIAL

## 2023-08-28 VITALS
HEART RATE: 73 BPM | TEMPERATURE: 97.6 F | OXYGEN SATURATION: 100 % | DIASTOLIC BLOOD PRESSURE: 84 MMHG | RESPIRATION RATE: 16 BRPM | SYSTOLIC BLOOD PRESSURE: 104 MMHG

## 2023-08-28 DIAGNOSIS — M25.552 LEFT HIP PAIN: ICD-10-CM

## 2023-08-28 DIAGNOSIS — Z59.00 HOMELESS: Primary | ICD-10-CM

## 2023-08-28 DIAGNOSIS — M25.512 ACUTE PAIN OF LEFT SHOULDER: ICD-10-CM

## 2023-08-28 PROCEDURE — 73502 X-RAY EXAM HIP UNI 2-3 VIEWS: CPT

## 2023-08-28 PROCEDURE — 73030 X-RAY EXAM OF SHOULDER: CPT

## 2023-08-28 PROCEDURE — 99283 EMERGENCY DEPT VISIT LOW MDM: CPT

## 2023-08-28 SDOH — ECONOMIC STABILITY - HOUSING INSECURITY: HOMELESSNESS UNSPECIFIED: Z59.00

## 2023-08-29 NOTE — ED PROVIDER NOTES
[facility]  ED  Encounter Note  Admit Date/RoomTime: 2023  8:16 PM  ED Room: [place] [address]  NAME: Jono Robles. : 1980  MRN: [id number]  PCP: No primary care provider on file. CHIEF COMPLAINT     Leg Pain (Patient c/o left sided leg and rib pain. States he was sprinting in his new shoes yesterday and that is when the pain started.  )    HISTORY OF PRESENT Porter Novak is a 44 y.o. male who presents to the ED by private vehicle for left hip and shoulder pain after sprinting in new shoes. Patient denies fall. Patient also states he is homeless and wants a place to sleep. Pt denies auditory or visual hallucinations. Denies SI or HI. Patient states his symptoms are mild in severity and describes as aching. Patient states pain is worse with movement. Patient reports the pain improving with rest.  Denies fever/chills, headache, vision change, dizziness, chest pain, dyspnea, abdominal pain, NVD, numbness/weakness. REVIEW OF SYSTEMS     Pertinent positives and negatives are stated within HPI, all other systems reviewed and are negative. Past Medical History:  has a past medical history of Depression and Schizoaffective disorder ([address]). Surgical History:  has a past surgical history that includes Hip fracture surgery (Left, 2021); eye surgery (19 years ago); and Hip fracture surgery (Left, 2021). Social History:  reports that he has been smoking cigarettes. He has a 12.00 pack-year smoking history. He has never used smokeless tobacco. He reports that he does not currently use alcohol. He reports current drug use. Frequency: 7.00 times per week. Drugs: Cocaine and Marijuana (Yael Sella). Family History: family history includes Mental Illness in his mother; No Known Problems in his father; Substance Abuse in his mother.    Allergies: Chlorpheniramine-phenylephrine, Motrin [ibuprofen],

## 2023-08-31 ENCOUNTER — HOSPITAL ENCOUNTER (EMERGENCY)
Age: 43
Discharge: LEFT BEFORE DISCHARGE | End: 2023-08-31
Payer: COMMERCIAL

## 2023-08-31 VITALS
SYSTOLIC BLOOD PRESSURE: 128 MMHG | OXYGEN SATURATION: 98 % | DIASTOLIC BLOOD PRESSURE: 65 MMHG | TEMPERATURE: 96.8 F | HEART RATE: 67 BPM

## 2023-08-31 PROCEDURE — 99281 EMR DPT VST MAYX REQ PHY/QHP: CPT

## 2023-08-31 NOTE — ED NOTES
Estefany simpson, estefany stated all he wanted was a cookie.       Michele Brasher RN  08/31/23 8869

## 2023-09-04 ENCOUNTER — HOSPITAL ENCOUNTER (EMERGENCY)
Age: 43
Discharge: HOME | End: 2023-09-04
Payer: COMMERCIAL

## 2023-09-04 VITALS
SYSTOLIC BLOOD PRESSURE: 129 MMHG | HEART RATE: 75 BPM | OXYGEN SATURATION: 100 % | TEMPERATURE: 96.7 F | DIASTOLIC BLOOD PRESSURE: 86 MMHG | RESPIRATION RATE: 16 BRPM

## 2023-09-04 DIAGNOSIS — L30.9 DERMATITIS: Primary | ICD-10-CM

## 2023-09-04 PROCEDURE — 99282 EMERGENCY DEPT VISIT SF MDM: CPT

## 2023-09-04 ASSESSMENT — PAIN - FUNCTIONAL ASSESSMENT: PAIN_FUNCTIONAL_ASSESSMENT: NONE - DENIES PAIN

## 2023-09-11 ENCOUNTER — HOSPITAL ENCOUNTER (EMERGENCY)
Age: 43
Discharge: LEFT BEFORE DISCHARGE | End: 2023-09-11

## 2023-09-11 VITALS — HEART RATE: 98 BPM | OXYGEN SATURATION: 98 % | TEMPERATURE: 98.1 F

## 2023-09-11 NOTE — ED NOTES
At [facility], pt reported he didn't want to be seen after being finding out there was no food. Pt left.      Elizabeth Self RN  09/11/23 8298

## 2023-10-17 NOTE — ED NOTES
Department of Emergency Medicine  FIRST PROVIDER TRIAGE NOTE             Independent [facility]           3/6/23  8:21 PM EST    Date of Encounter: 3/6/23   MRN: [id number]      HPI: Delmy Miguel is a 40 y.o. male who presents to the ED for Psychiatric Evaluation (-SI, -HI,  -hallucinations, \"I dont feel safe, wants to talk to \" no pain.)     States he doesn't feel safe and feel people are doing drugs around him. Requesting to speak with a . Denies any visual or auditory hallucination    ROS: Negative for cp, sob, dizziness, Suicidal ideation, or Homicidal Ideation. PE: Gen Appearance/Constitutional: alert  Musculoskeletal: moves all extremities x 4     Initial Plan of Care: All treatment areas with department are currently occupied.  Plan to order/Initiate the following while awaiting opening in ED: labs, EKG, and Social Work Eval.  Initiate Treatment-Testing, Proceed toTreatment Area When [employer] Available for ED Attending/MLP to [employer] signed by JUICE Patterson CNP   DD: 3/6/23       JUICE Patterson CNP  03/06/23 2023 JULIO C GLASER     No chief complaint on file.      Subjective:  Julio C presents with delayed healing nail avulsion site of left hallux. ***      . Denies nausea, fever, vomiting, chills, or flu-like symptoms. Review of past medical history with no changes noted.    Past Medical History:  No past medical history on file.    Past Surgical History:  No past surgical history on file.    Family History:  No family history on file.    Medications:  Current Outpatient Medications   Medication Sig Dispense Refill   • azithromycin (ZITHROMAX) 250 MG tablet Take 2 the first day, then 1 daily on days 2-5.     • albuterol 108 (90 Base) MCG/ACT inhaler INHALE 2 PUFFS BY MOUTH EVERY 6 HOURS AS NEEDED FOR WHEEZING     • fluticasone-salmeterol 100-50 MCG/ACT inhaler Inhale 1 puff into the lungs in the morning and 1 puff in the evening.     • promethazine-dextromethorphan (PROMETHAZINE-DM) 6.25-15 MG/5ML syrup Take 5 mLs by mouth 4 times daily as needed for Cough. 118 mL 0     No current facility-administered medications for this visit.       Allergies:  has No Known Allergies.    Social History:   reports that he has never smoked. He has never used smokeless tobacco. He reports that he does not drink alcohol and does not use drugs.   reports no history of alcohol use.   reports no history of drug use.   reports that he has never smoked. He has never used smokeless tobacco.    ROS:  General:  Patient denies fever/chills/nausea/vomiting/generalized weakness or fatigue.  HEENT:  Patient denies headache, dizziness, vision problems, hearing loss/deficit.  Cardiovascular:  Patient denies chest discomfort, fainting  Respiratory:  Patient denies shortness of breath, cough, wheezing  GI:  Patient denies diarrhea/constipation, acid reflux  :  Patient denies hematuria, nocturia, polyuria, dysuria  Integumentary:  Patient denies lesions, rashes on other parts of the body other than chief complaint  Musculoskeletal:  Patient denies pain,  swelling, stiffness of significant other than chief complaint.   Neurological:  Patient denies chronic headache, seizures of significant other than chief complaint.  Psychiatric:  Patient denies feeling anxious, chemical dependency other than chief complaint.    EXAM:  The patient is alert and oriented and in no apparent distress with a normal mood and affect. The patient is cooperative with the examination.  No issues with hearing noted. No labored breathing present evaluation.        LOWER EXTREMITY PHYSICAL EXAM:    Dermatological:  Inspection of the left 1st toe lateral/medial border demonstrates no incurvation of the offending nail border, borders with + edema, + erythema with + drainage.   Inspection of the left 2nd toe medial border demonstrates no incurvation of the offending nail border, borders with - edema, - erythema with - drainage.   Negative ascending cellulitis evident at present.        1. Ulceration noted to the left hallux.  Measures: *** x *** x ***cm   Wound Base is ***% red granulation, ***% slough, ***% fibrous tissue  Drainage ***  Malodor ***  Mild undermining   Tunneling: ***  Probe to bone: ***    Debridement: Sharp excisional,  utilizing a sterile #15 blade, utilizing a sterile curette  Depth of debridement: debrided to the level of including the *** tissue  Anesthesia:  not required due to the patient's tolerance  Hemostasis:  Minimal bleeding noted, pressure utilized as needed.  Periwound: clear  Wound Base is ***% granular, ***% fibrotic following debridement  Final measurements: *** x *** x ***cm        Neurological:   Epicritic sensation including sharp-dull, light touch, and protective threshold are intact and without focal motor or sensory deficit lower extremity.   Normal muscle mass appreciated to both the lower extremity and foot.     Vascular:   Dorsalis pedis and posterior tibial pulses are 2/4 left.   Capillary filling time with less than 5 seconds at the level of the digital  [facility].   There are no ischemic skin changes evident in lower extremity.      Musculoskeletal:   No pain with palpation to offending nail boarder.   Proper alignment to the lower legs, stable ankle to manual stress, hindfoot, midfoot and forefoot lower extremity.   Muscle strength for all prime movers of the lower leg, ankle, and foot are graded at 5/5.   Appropriate muscle tone and symmetry of lower extremity.   Full, fluid range of motion for all joints from the ankle joint distal without crepitation or instability appreciated in lower extremity.    Assessment:   Encounter Diagnoses   Name Primary?   • Left foot pain Yes       Plan:   1. Spent time discussing the etiology and treatment options for the patient's chief complaint. Reviewed surgical and non-surgical treatment alternatives. Patient understands that surgery is a last treatment option, only to be considered if the pain is severe, limiting activities, and non-responsive to local treatments. Conservative vs surgical wound care measures were discussed with the patient including, but not limited to the use of topical wound care products, proper off-loading, patient compliance, surgical debridement in the office, surgical debridement in the OR and use of plastics type procedures to close the wound. ***X-rays reviewed with patient.  2. Sharp excisional debridement down to through and including the layers of the *** tissues was performed with #15 blade and/or curette right***left ***. Dry sterile dressing applied with antibiotic ointment***betadine.   3. Home dressing care to be of *** with dry sterile dressing daily***Monday, Wednesday, Friday.  4. Offloading of wound was performed with ***.  5. Antibiotic of *** was prescribed for 10 days as tolerated with general precaution discussed with patient.  Deep tissue cultures were taken for antibiotic sensitivity and deescalation will be performed pending outcome of results future.   6. Return to care:*** week/s or  sooner as needed.      Art Da Silva DPM FACFAS    Board Certified Podiatric Surgeon  Reconstructive Rearfoot/Ankle & Foot Surgery  Pager: [phone]

## 2023-11-11 ENCOUNTER — HOSPITAL ENCOUNTER (INPATIENT)
Age: 43
LOS: 5 days | Discharge: OTHER INSTITUTION | DRG: 885 | End: 2023-11-16
Attending: EMERGENCY MEDICINE | Admitting: PSYCHIATRY & NEUROLOGY
Payer: COMMERCIAL

## 2023-11-11 DIAGNOSIS — F25.9 SCHIZOAFFECTIVE SCHIZOPHRENIA (HCC): Primary | ICD-10-CM

## 2023-11-11 DIAGNOSIS — F29 PSYCHOSIS, UNSPECIFIED PSYCHOSIS TYPE (HCC): ICD-10-CM

## 2023-11-11 PROBLEM — F20.9 SCHIZOPHRENIA (HCC): Status: ACTIVE | Noted: 2023-11-11

## 2023-11-11 LAB
ALBUMIN SERPL-MCNC: 3.7 G/DL (ref 3.5–5.2)
ALP SERPL-CCNC: 52 U/L (ref 40–129)
ALT SERPL-CCNC: 13 U/L (ref 0–40)
AMPHET UR QL SCN: NEGATIVE
ANION GAP SERPL CALCULATED.3IONS-SCNC: 12 MMOL/L (ref 7–16)
APAP SERPL-MCNC: <5 UG/ML (ref 10–30)
AST SERPL-CCNC: 17 U/L (ref 0–39)
BARBITURATES UR QL SCN: NEGATIVE
BASOPHILS # BLD: 0.06 K/UL (ref 0–0.2)
BASOPHILS NFR BLD: 2 % (ref 0–2)
BENZODIAZ UR QL: NEGATIVE
BILIRUB SERPL-MCNC: 0.3 MG/DL (ref 0–1.2)
BILIRUB UR QL STRIP: NEGATIVE
BUN SERPL-MCNC: 11 MG/DL (ref 6–20)
BUPRENORPHINE UR QL: NEGATIVE
CALCIUM SERPL-MCNC: 8.4 MG/DL (ref 8.6–10.2)
CANNABINOIDS UR QL SCN: NEGATIVE
CHLORIDE SERPL-SCNC: 107 MMOL/L (ref 98–107)
CK SERPL-CCNC: 277 U/L (ref 20–200)
CLARITY UR: CLEAR
CO2 SERPL-SCNC: 24 MMOL/L (ref 22–29)
COCAINE UR QL SCN: POSITIVE
COLOR UR: YELLOW
COMMENT: NORMAL
CREAT SERPL-MCNC: 0.7 MG/DL (ref 0.7–1.2)
EOSINOPHIL # BLD: 0.11 K/UL (ref 0.05–0.5)
EOSINOPHILS RELATIVE PERCENT: 3 % (ref 0–6)
ERYTHROCYTE [DISTWIDTH] IN BLOOD BY AUTOMATED COUNT: 13.7 % (ref 11.5–15)
ETHANOLAMINE SERPL-MCNC: <10 MG/DL
FENTANYL UR QL: NEGATIVE
GFR SERPL CREATININE-BSD FRML MDRD: >60 ML/MIN/1.73M2
GLUCOSE SERPL-MCNC: 87 MG/DL (ref 74–99)
GLUCOSE UR STRIP-MCNC: NEGATIVE MG/DL
HCT VFR BLD AUTO: 39.2 % (ref 37–54)
HGB BLD-MCNC: 12.7 G/DL (ref 12.5–16.5)
HGB UR QL STRIP.AUTO: NEGATIVE
IMM GRANULOCYTES # BLD AUTO: <0.03 K/UL (ref 0–0.58)
IMM GRANULOCYTES NFR BLD: 0 % (ref 0–5)
KETONES UR STRIP-MCNC: NEGATIVE MG/DL
LEUKOCYTE ESTERASE UR QL STRIP: NEGATIVE
LYMPHOCYTES NFR BLD: 1.15 K/UL (ref 1.5–4)
LYMPHOCYTES RELATIVE PERCENT: 32 % (ref 20–42)
MCH RBC QN AUTO: 30.1 PG (ref 26–35)
MCHC RBC AUTO-ENTMCNC: 32.4 G/DL (ref 32–34.5)
MCV RBC AUTO: 92.9 FL (ref 80–99.9)
METHADONE UR QL: NEGATIVE
MONOCYTES NFR BLD: 0.31 K/UL (ref 0.1–0.95)
MONOCYTES NFR BLD: 9 % (ref 2–12)
NEUTROPHILS NFR BLD: 55 % (ref 43–80)
NEUTS SEG NFR BLD: 1.99 K/UL (ref 1.8–7.3)
NITRITE UR QL STRIP: NEGATIVE
OPIATES UR QL SCN: NEGATIVE
OXYCODONE UR QL SCN: NEGATIVE
PCP UR QL SCN: NEGATIVE
PH UR STRIP: 7.5 [PH] (ref 5–9)
PLATELET # BLD AUTO: 273 K/UL (ref 130–450)
PMV BLD AUTO: 9.9 FL (ref 7–12)
POTASSIUM SERPL-SCNC: 4 MMOL/L (ref 3.5–5)
PROT SERPL-MCNC: 6.3 G/DL (ref 6.4–8.3)
PROT UR STRIP-MCNC: NEGATIVE MG/DL
RBC # BLD AUTO: 4.22 M/UL (ref 3.8–5.8)
SALICYLATES SERPL-MCNC: <0.3 MG/DL (ref 0–30)
SODIUM SERPL-SCNC: 143 MMOL/L (ref 132–146)
SP GR UR STRIP: 1.02 (ref 1–1.03)
TEST INFORMATION: ABNORMAL
TOXIC TRICYCLIC SC,BLOOD: NEGATIVE
TSH SERPL DL<=0.05 MIU/L-ACNC: 0.46 UIU/ML (ref 0.27–4.2)
UROBILINOGEN UR STRIP-ACNC: 1 EU/DL (ref 0–1)
WBC OTHER # BLD: 3.6 K/UL (ref 4.5–11.5)

## 2023-11-11 PROCEDURE — G0480 DRUG TEST DEF 1-7 CLASSES: HCPCS

## 2023-11-11 PROCEDURE — 85025 COMPLETE CBC W/AUTO DIFF WBC: CPT

## 2023-11-11 PROCEDURE — 80053 COMPREHEN METABOLIC PANEL: CPT

## 2023-11-11 PROCEDURE — 96372 THER/PROPH/DIAG INJ SC/IM: CPT

## 2023-11-11 PROCEDURE — 93005 ELECTROCARDIOGRAM TRACING: CPT | Performed by: EMERGENCY MEDICINE

## 2023-11-11 PROCEDURE — 1240000000 HC EMOTIONAL WELLNESS R&B

## 2023-11-11 PROCEDURE — 80307 DRUG TEST PRSMV CHEM ANLYZR: CPT

## 2023-11-11 PROCEDURE — 84443 ASSAY THYROID STIM HORMONE: CPT

## 2023-11-11 PROCEDURE — 80143 DRUG ASSAY ACETAMINOPHEN: CPT

## 2023-11-11 PROCEDURE — 81003 URINALYSIS AUTO W/O SCOPE: CPT

## 2023-11-11 PROCEDURE — 82550 ASSAY OF CK (CPK): CPT

## 2023-11-11 PROCEDURE — 6360000002 HC RX W HCPCS: Performed by: EMERGENCY MEDICINE

## 2023-11-11 PROCEDURE — 80179 DRUG ASSAY SALICYLATE: CPT

## 2023-11-11 PROCEDURE — 99285 EMERGENCY DEPT VISIT HI MDM: CPT

## 2023-11-11 RX ORDER — LORAZEPAM 2 MG/ML
2 INJECTION INTRAMUSCULAR EVERY 6 HOURS PRN
Status: DISCONTINUED | OUTPATIENT
Start: 2023-11-11 | End: 2023-11-16 | Stop reason: HOSPADM

## 2023-11-11 RX ORDER — DIPHENHYDRAMINE HYDROCHLORIDE 50 MG/ML
50 INJECTION INTRAMUSCULAR; INTRAVENOUS EVERY 6 HOURS PRN
Status: DISCONTINUED | OUTPATIENT
Start: 2023-11-11 | End: 2023-11-16 | Stop reason: HOSPADM

## 2023-11-11 RX ORDER — MAGNESIUM HYDROXIDE/ALUMINUM HYDROXICE/SIMETHICONE 120; 1200; 1200 MG/30ML; MG/30ML; MG/30ML
30 SUSPENSION ORAL PRN
Status: DISCONTINUED | OUTPATIENT
Start: 2023-11-11 | End: 2023-11-16 | Stop reason: HOSPADM

## 2023-11-11 RX ORDER — NICOTINE 21 MG/24HR
1 PATCH, TRANSDERMAL 24 HOURS TRANSDERMAL DAILY
Status: DISCONTINUED | OUTPATIENT
Start: 2023-11-11 | End: 2023-11-15

## 2023-11-11 RX ORDER — DROPERIDOL 2.5 MG/ML
5 INJECTION, SOLUTION INTRAMUSCULAR; INTRAVENOUS ONCE
Status: COMPLETED | OUTPATIENT
Start: 2023-11-11 | End: 2023-11-11

## 2023-11-11 RX ORDER — ACETAMINOPHEN 325 MG/1
650 TABLET ORAL EVERY 6 HOURS PRN
Status: DISCONTINUED | OUTPATIENT
Start: 2023-11-11 | End: 2023-11-16 | Stop reason: HOSPADM

## 2023-11-11 RX ORDER — MIDAZOLAM HYDROCHLORIDE 2 MG/2ML
2 INJECTION, SOLUTION INTRAMUSCULAR; INTRAVENOUS ONCE
Status: COMPLETED | OUTPATIENT
Start: 2023-11-11 | End: 2023-11-11

## 2023-11-11 RX ORDER — HYDROXYZINE PAMOATE 25 MG/1
50 CAPSULE ORAL 3 TIMES DAILY PRN
Status: DISCONTINUED | OUTPATIENT
Start: 2023-11-11 | End: 2023-11-16 | Stop reason: HOSPADM

## 2023-11-11 RX ORDER — HALOPERIDOL 5 MG/1
5 TABLET ORAL EVERY 6 HOURS PRN
Status: DISCONTINUED | OUTPATIENT
Start: 2023-11-11 | End: 2023-11-16 | Stop reason: HOSPADM

## 2023-11-11 RX ORDER — HALOPERIDOL 5 MG/ML
5 INJECTION INTRAMUSCULAR EVERY 6 HOURS PRN
Status: DISCONTINUED | OUTPATIENT
Start: 2023-11-11 | End: 2023-11-16 | Stop reason: HOSPADM

## 2023-11-11 RX ORDER — LANOLIN ALCOHOL/MO/W.PET/CERES
3 CREAM (GRAM) TOPICAL NIGHTLY PRN
Status: DISCONTINUED | OUTPATIENT
Start: 2023-11-11 | End: 2023-11-16 | Stop reason: HOSPADM

## 2023-11-11 RX ADMIN — DROPERIDOL 5 MG: 2.5 INJECTION, SOLUTION INTRAMUSCULAR; INTRAVENOUS at 07:23

## 2023-11-11 RX ADMIN — MIDAZOLAM 2 MG: 1 INJECTION INTRAMUSCULAR; INTRAVENOUS at 07:23

## 2023-11-11 ASSESSMENT — SLEEP AND FATIGUE QUESTIONNAIRES
AVERAGE NUMBER OF SLEEP HOURS: 2
SLEEP PATTERN: DIFFICULTY FALLING ASLEEP;EARLY AWAKENING
DO YOU HAVE DIFFICULTY SLEEPING: YES
DO YOU USE A SLEEP AID: NO

## 2023-11-11 ASSESSMENT — PATIENT HEALTH QUESTIONNAIRE - PHQ9
SUM OF ALL RESPONSES TO PHQ QUESTIONS 1-9: 0
SUM OF ALL RESPONSES TO PHQ QUESTIONS 1-9: 0
2. FEELING DOWN, DEPRESSED OR HOPELESS: 0
SUM OF ALL RESPONSES TO PHQ QUESTIONS 1-9: 0
2. FEELING DOWN, DEPRESSED OR HOPELESS: 0
1. LITTLE INTEREST OR PLEASURE IN DOING THINGS: 0
SUM OF ALL RESPONSES TO PHQ9 QUESTIONS 1 & 2: 0
SUM OF ALL RESPONSES TO PHQ QUESTIONS 1-9: 0
SUM OF ALL RESPONSES TO PHQ9 QUESTIONS 1 & 2: 0
SUM OF ALL RESPONSES TO PHQ QUESTIONS 1-9: 0
SUM OF ALL RESPONSES TO PHQ QUESTIONS 1-9: 0
1. LITTLE INTEREST OR PLEASURE IN DOING THINGS: 0

## 2023-11-11 ASSESSMENT — PAIN - FUNCTIONAL ASSESSMENT: PAIN_FUNCTIONAL_ASSESSMENT: NONE - DENIES PAIN

## 2023-11-11 NOTE — ED NOTES
Patient continues to rest in bed no distress noted changing positions. No distress noted.       Karel Gotti RN  11/11/23 5744

## 2023-11-11 NOTE — ED NOTES
Patient woke up demanding to leave explained that the provider has issued a pink slip and he needs to wait to speak with the psychiatric social worker. Patient refused his lunch and breakfast and requested items be thrown out.        Tamera Lombardo, RN  11/11/23 9898

## 2023-11-11 NOTE — ED NOTES
Call placed and message left with Natalia Nazario NP/ Dr. Dipika Jack to present patient for admission. Awaiting a return phone call.       Heidi Jacobo RN  11/11/23 0708

## 2023-11-11 NOTE — ED NOTES
Nurse to nurse report given to Linda [address] on [facility] which includes patient presentation complaint, pink slip, medications, lab results EKG Qtc, and past medical/psych history, diagnosis and admitting orders.        Jennifer Young RN  11/11/23 5530
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
## 2023-11-11 NOTE — BH NOTE
4 Eyes Skin Assessment     NAME:  [employer]. YOB: 1980  MEDICAL RECORD NUMBER:  [id number]    The patient is being assessed for  Admission    I agree that at least one RN has performed a thorough Head to Toe Skin Assessment on the patient. ALL assessment sites listed below have been assessed. Areas assessed by both nurses:    Head, Face, Ears, Shoulders, Back, Chest, Arms, Elbows, Hands, Sacrum. Buttock, Coccyx, Ischium, and Legs. Feet and Heels        Does the Patient have a Wound?  No noted wound(s)       Andrew Prevention initiated by RN: Yes  Wound Care Orders initiated by RN: No    Pressure Injury (Stage 3,4, Unstageable, DTI, NWPT, and Complex wounds) if present, place Wound referral order by RN under : No    New Ostomies, if present place, Ostomy referral order under : No     Nurse 1 eSignature: Electronically signed by Zbigniew España RN on 11/11/23 at 6:34 PM EST    **SHARE this note so that the co-signing nurse can place an eSignature**    Nurse 2 eSignature: Electronically signed by Otto Leonardo RN on 11/11/23 at 6:43 PM EST
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
## 2023-11-11 NOTE — ED NOTES
Pt loud, rambling, paranoid, easily agitated, not cooperative to unit intake procedures, making multiple threatening statements such as: \"You bitches better be careful because nobody is going to disrespect me like that. \" Pt is cooperative to some degree with some staff, agitated and accusatory with others. Flight of ideas, disorganized thought, positive for cocaine, Vertra on scene, pt. medicated for safety.         Catalino Becerril R, MSW, [place]  11/11/23 1647

## 2023-11-11 NOTE — ED NOTES
Patient agitated paranoid rambling in nonsensical statements demanding to leave. Became upset when asked to change and secure his belongings. ED MD notified and stated is issuing a pink slip and medication orders as patient currently is worse than his baseline.       Carol Oconnell  11/11/23 1921

## 2023-11-11 NOTE — ED NOTES
The pt was accepted to [facility] room 7086. Disposition called to Vidal Reyes in admitting.      [facility]  11/11/23 2614

## 2023-11-11 NOTE — ED NOTES
Patient's belongings labeled and stored into [facility] AN AFFILIATE OF [facility] locker #29. 4 bags.       Jakob Rosado  11/11/23 1423

## 2023-11-11 NOTE — ED NOTES
Attempted to call report x2 but no one answers the phone. Will attempt again in a few minutes.       Santiago Sands RN  11/11/23 8486

## 2023-11-12 PROBLEM — F25.9 SCHIZOAFFECTIVE SCHIZOPHRENIA (HCC): Status: ACTIVE | Noted: 2023-11-12

## 2023-11-12 PROCEDURE — 6370000000 HC RX 637 (ALT 250 FOR IP): Performed by: PSYCHIATRY & NEUROLOGY

## 2023-11-12 PROCEDURE — 90792 PSYCH DIAG EVAL W/MED SRVCS: CPT | Performed by: NURSE PRACTITIONER

## 2023-11-12 PROCEDURE — 1240000000 HC EMOTIONAL WELLNESS R&B

## 2023-11-12 RX ORDER — ARIPIPRAZOLE 10 MG/1
10 TABLET ORAL DAILY
Status: DISCONTINUED | OUTPATIENT
Start: 2023-11-12 | End: 2023-11-16 | Stop reason: HOSPADM

## 2023-11-12 RX ADMIN — ACETAMINOPHEN 650 MG: 325 TABLET ORAL at 16:27

## 2023-11-12 ASSESSMENT — PATIENT HEALTH QUESTIONNAIRE - PHQ9
SUM OF ALL RESPONSES TO PHQ QUESTIONS 1-9: 0
1. LITTLE INTEREST OR PLEASURE IN DOING THINGS: 0
SUM OF ALL RESPONSES TO PHQ9 QUESTIONS 1 & 2: 0
SUM OF ALL RESPONSES TO PHQ QUESTIONS 1-9: 0
SUM OF ALL RESPONSES TO PHQ QUESTIONS 1-9: 0
2. FEELING DOWN, DEPRESSED OR HOPELESS: 0
SUM OF ALL RESPONSES TO PHQ QUESTIONS 1-9: 0

## 2023-11-12 ASSESSMENT — SLEEP AND FATIGUE QUESTIONNAIRES
SLEEP PATTERN: DIFFICULTY FALLING ASLEEP;DISTURBED/INTERRUPTED SLEEP;EARLY AWAKENING;RESTLESSNESS
DO YOU HAVE DIFFICULTY SLEEPING: YES
DO YOU USE A SLEEP AID: NO

## 2023-11-12 ASSESSMENT — PAIN SCALES - GENERAL: PAINLEVEL_OUTOF10: 8

## 2023-11-12 ASSESSMENT — PAIN DESCRIPTION - LOCATION: LOCATION: HEAD

## 2023-11-12 ASSESSMENT — PAIN DESCRIPTION - DESCRIPTORS: DESCRIPTORS: ACHING

## 2023-11-12 NOTE — H&P
Department of Psychiatry  History and Physical - Adult     CHIEF COMPLAINT:  \"Can I get a shower? \"    Patient was seen after discussing with the treatment team and reviewing the chart    CIRCUMSTANCES OF ADMISSION:   Patient presented to [facility] emergency department in a psychiatrically  decompensated state with delusions hallucinations disorganized and unstable thoughts, observed to be talking to self and interacting with unseen others. .  He was pink slip for psychosis    HISTORY OF PRESENT ILLNESS:      The patient is a 44 y.o. male with significant past history of schizophrenia, pain abuse, with multiple past inpatient psychiatric hospitalizations as well known to these providers with last inpatient hospitalization here in 2021 presented to [facility] emergency department in a psychiatrically decompensated state with delusions, hallucinations, disorganized and unstable thoughts was observed to be talking to self and interacting with unseen others. He was pink slipped for psychosis. Patient required stat IM medications in the ER as he was not able to provide for his own health safety or wellbeing or that of others. He was medically cleared in the emergency department, UDS and ED positive for cocaine, CK level is 277. QTc 364. He was admitted to [facility] for psychiatric evaluation and stabilization. On evaluation today the patient is out of his room up on the unit pacing the unit he is disorganized with an unstable thought process he appears internally stimulated is talking nonstop to himself and to unseen others. The patient is not able to offer much for conversation given his acute psychiatric decompensated state. He is paranoid, misinterpreting easily agitated with low threshold for stimulation. Patient does acknowledge agreeability to medications. He continues to [facility]. Thought process is disorganized unstable.   Denies suicidal or homicidal ideation intent or plan

## 2023-11-12 NOTE — PROGRESS NOTES
Pt declined to complete leisure assessment at this time. Pt has flight of ideas currently. Will attempt again.

## 2023-11-12 NOTE — PROGRESS NOTES
Pt sleeping on and off this evening. Pt is flat and preoccupied with underlying irritability. Pt denies SI, HI, and AVH, but was sitting in his room talking to unseen others when this nurse approached him. Pt states he \"is here hiding out with the people so the other people are messin with me\". Pt went on to talk about his neighborhood being like a playground for \"big kids\". Med compliant. Denies any needs. States he is going back to bed. No behaviors noted. Will continue to monitor.
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
## 2023-11-12 NOTE — PROGRESS NOTES
Patient declined invitation to the following groups:    [employer]    Patient will continue to be provided with opportunities to enhance leisure skills/interests and/or coping mechanisms.

## 2023-11-12 NOTE — PLAN OF CARE
Problem: Anxiety  Goal: Will report anxiety at manageable levels  Description: INTERVENTIONS:  1. Administer medication as ordered  2. Teach and rehearse alternative coping skills  3. Provide emotional support with 1:1 interaction with staff  Outcome: Not Progressing     Problem: Confusion  Goal: Confusion, delirium, dementia, or psychosis is improved or at baseline  Description: INTERVENTIONS:  1. Assess for possible contributors to thought disturbance, including medications, impaired vision or hearing, underlying metabolic abnormalities, dehydration, psychiatric diagnoses, and notify attending LIP  2. Nancy high risk fall precautions, as indicated  3. Provide frequent short contacts to provide reality reorientation, refocusing and direction  4. Decrease environmental stimuli, including noise as appropriate  5. Monitor and intervene to maintain adequate nutrition, hydration, elimination, sleep and activity  6. If unable to ensure safety without constant attention obtain sitter and review sitter guidelines with assigned personnel  7. Initiate Psychosocial CNS and Spiritual Care consult, as indicated  Outcome: Not Progressing          Patient has been out on unit with non stop non sensical rambling. Unable to get appropriate answer. When needs not met he gets louder. Refusing medications and not attending groups. Denies hallucinations but pre occupied with talking to self. Denies suicidal and homicidal thoughts.

## 2023-11-13 LAB
EKG ATRIAL RATE: 52 BPM
EKG P AXIS: 92 DEGREES
EKG P-R INTERVAL: 186 MS
EKG Q-T INTERVAL: 392 MS
EKG QRS DURATION: 96 MS
EKG QTC CALCULATION (BAZETT): 364 MS
EKG R AXIS: 91 DEGREES
EKG T AXIS: 79 DEGREES
EKG VENTRICULAR RATE: 52 BPM

## 2023-11-13 PROCEDURE — 6370000000 HC RX 637 (ALT 250 FOR IP): Performed by: NURSE PRACTITIONER

## 2023-11-13 PROCEDURE — 93010 ELECTROCARDIOGRAM REPORT: CPT | Performed by: INTERNAL MEDICINE

## 2023-11-13 PROCEDURE — 1240000000 HC EMOTIONAL WELLNESS R&B

## 2023-11-13 PROCEDURE — 6370000000 HC RX 637 (ALT 250 FOR IP): Performed by: PSYCHIATRY & NEUROLOGY

## 2023-11-13 RX ORDER — DIVALPROEX SODIUM 250 MG/1
250 TABLET, DELAYED RELEASE ORAL EVERY 12 HOURS SCHEDULED
Status: DISCONTINUED | OUTPATIENT
Start: 2023-11-13 | End: 2023-11-15

## 2023-11-13 RX ADMIN — HYDROXYZINE PAMOATE 50 MG: 25 CAPSULE ORAL at 17:42

## 2023-11-13 RX ADMIN — DIVALPROEX SODIUM 250 MG: 250 TABLET, DELAYED RELEASE ORAL at 12:40

## 2023-11-13 RX ADMIN — HALOPERIDOL 5 MG: 5 TABLET ORAL at 17:42

## 2023-11-13 RX ADMIN — ARIPIPRAZOLE 10 MG: 10 TABLET ORAL at 12:40

## 2023-11-13 RX ADMIN — DIVALPROEX SODIUM 250 MG: 250 TABLET, DELAYED RELEASE ORAL at 21:24

## 2023-11-13 NOTE — PROGRESS NOTES
Patient declined invitation to education group. Patient was provided with a handout on today's group topic- toxic traits. Patient will continue to be provided with opportunities to enhance leisure skills/interests and/or coping mechanisms.

## 2023-11-13 NOTE — PLAN OF CARE
Problem: Anxiety  Goal: Will report anxiety at manageable levels  Description: INTERVENTIONS:  1. Administer medication as ordered  2. Teach and rehearse alternative coping skills  3. Provide emotional support with 1:1 interaction with staff  11/12/2023 2120 by Cira Montague RN  Outcome: Progressing  11/12/2023 1426 by Amina Briggs RN  Outcome: Not Progressing     Problem: Anxiety  Goal: Will report anxiety at manageable levels  Description: INTERVENTIONS:  1. Administer medication as ordered  2. Teach and rehearse alternative coping skills  3. Provide emotional support with 1:1 interaction with staff  11/12/2023 2120 by Cira Montague RN  Outcome: Progressing  11/12/2023 1426 by Amina Briggs RN  Outcome: Not Progressing     Problem: Confusion  Goal: Confusion, delirium, dementia, or psychosis is improved or at baseline  Description: INTERVENTIONS:  1. Assess for possible contributors to thought disturbance, including medications, impaired vision or hearing, underlying metabolic abnormalities, dehydration, psychiatric diagnoses, and notify attending LIP  2. [place] high risk fall precautions, as indicated  3. Provide frequent short contacts to provide reality reorientation, refocusing and direction  4. Decrease environmental stimuli, including noise as appropriate  5. Monitor and intervene to maintain adequate nutrition, hydration, elimination, sleep and activity  6. If unable to ensure safety without constant attention obtain sitter and review sitter guidelines with assigned personnel  7. Initiate Psychosocial CNS and Spiritual Care consult, as indicated  11/12/2023 1426 by Amina Briggs RN  Outcome: Not Progressing      Pt denies SI and HI. Pt positive for AH of \"voices that tell me stuff but not bad stuff. My head hurts from them talking. \" PRN given. Pt walks unit talking to himself. Pt is calm and cooperative. Refused abilify. Encouraged groups. Appetite appropriate.  Will continue to

## 2023-11-13 NOTE — PROGRESS NOTES
Pt out on the unit talking to unseen others. Denies AVH.  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

## 2023-11-13 NOTE — DISCHARGE INSTRUCTIONS
[facility] [phone]   Help Network homeless outreach program- [facility] [address] for [address] [facility] [phone]   [address] [place] [address]   [address] [place] Residents [address] [address] Phone: [phone]   [facility] Phone: [phone] [facility] [address], [facility], [address]    [address] [facility] Women's [address] Phone: [phone]   Support , Kelin Olaery Phone: [phone]  [address] [facility] [address] Phone: [address] [employer] [place] [address] [address] Phone: [phone]       Cheap Motels in the area:   [facility] [phone]: [address] [place], month-$750   [address] [facility], [facility], [address] [facility] [phone]: night- $80.31, [address] [facility]- [address] [facility], [address] [phone]: night- $52.88, [address] [facility]- [address], [facility], [address] [facility] [phone]: night- $75, week-$255   Days [facility] by [facility] [address] [facility] [phone]   [employer] [facility]- [address] [facility] [address], [place] [phone]) [address], [facility], [address] [facility] [phone] or [phone] program at [facility] in [address]   Address: [address] [place] [address], [facility], [address]  Phone: [address]     Please reach out to one of the following community providers for treatment and support.    Help Hotline: 007 643 525 or [address] and Recovery Board [phone]   [employer] and Recovery Board [phone]   [address] [place] [address] and [facility] Board [phone]   If you are in need of help

## 2023-11-13 NOTE — BH NOTE
Patient gladly took Depakote 250 mg PO and Abilify 10 mg PO together. Compliant with mouth checks. Excited to be given the Depakote in particular.

## 2023-11-13 NOTE — PROGRESS NOTES
Patient agitated in hallway, took shirt off, began to verbally confront unseen others, patient voice loud and pressured with flight of ideas, voicing paranoid delusions, \"what's the rape of that teardrop doing BRO!!\"  Patient non stop talking to unseen others. Patient was medicated with PRN medications for agitation, patient requested oral medications. Patient denies any thoughts to harm self or others. Patient talking nonsensical at this time regarding \"female passports and the thugs \".  Patient pacing halls

## 2023-11-13 NOTE — PROGRESS NOTES
Patient declined invitation to community meeting. Patient will continue to be provided with opportunities to enhance leisure skills/interests and/or coping mechanisms.

## 2023-11-13 NOTE — PROGRESS NOTES
Patient voicing grandiose delusions he is a rapper, talking to self loudly in pat, having conversation with unseen others, \"JULIA Karin Willoughby, how you have science experiments, why are they all so delish?! Patient intrusive, pressured rambling speech with flight of ideas and paranoid delusions also. Patient states \"I saw some bad egrard homies over there BRO! \". \"I think my blankets are handicapped, they're all retarded BRO\"! Patient is compliant with scheduled medications with encouragement. Patient denies any thoughts to harm self or others, denies homicidal ideation. Patient appetite is good. Patient attended group. Patient can be disruptive at times, redirects well with distraction techniques.  Emotional support given

## 2023-11-13 NOTE — GROUP NOTE
Group Therapy Note    Date: 11/13/2023    Group Start Time: 1100  Group End Time: 6918  Group Topic: Psychotherapy    [facility] ACUTE  [facility] Idris Mary, FRANCISCO, MAJOR        Group Therapy Note    Attendees: 8       Patient's Goal:  To increase social interaction and improve relationships with others. Notes:  Pt was attentive in group and was able to identify an agenda. They were also able to verbalize relating to others within the group.       Status After Intervention:  Unchanged    Participation Level: Monopolizing    Participation Quality: Inappropriate and Intrusive      Speech:  pressured      Thought Process/Content: Delusional      Affective Functioning: Exaggerated      Mood: irritable      Level of consciousness:  Alert and Preoccupied      Response to Learning: Resistant      Endings: None Reported    Modes of Intervention: Support, Socialization, and Exploration      Discipline Responsible: /Counselor      Signature:  FRANCISCO Rangel, [place]

## 2023-11-13 NOTE — BH NOTE
Refused scheduled Abilify, specifically requesting to take Depakote. Patient said the reason to take Depakote is because \"it takes away the negative and disfigurates a negative to a positive. Especially at our [employer]. \"

## 2023-11-13 NOTE — PROGRESS NOTES
[facility] FOLLOW-UP NOTE     11/13/2023     Patient was seen and examined in person, Chart reviewed   Patient's case discussed with staff/team    Chief Complaint: Disorganized delusional    Interim History: I saw patient today in his room he is not making any sense he continuously talks about Cleve Neighbors and makes disorganized statements regarding Cleve Neighbors flights of ideas loose associations denies suicidal ideations intent or plan he appears internally stimulated and preoccupied      Appetite: [x] Normal/Unchanged  [] Increased  [] Decreased      Sleep:       [x] Normal/Unchanged  [] Fair       [] Poor              Energy:    [x] Normal/Unchanged  [] Increased  [] Decreased        SI [] Present  [x] Absent    HI  []Present  [x] Absent     Aggression:  [] yes  [x] no    Patient is [x] able  [] unable to CONTRACT FOR SAFETY     PAST MEDICAL/PSYCHIATRIC HISTORY:   Past Medical History:   Diagnosis Date    Depression     Schizoaffective disorder ([address])        FAMILY/SOCIAL HISTORY:  Family History   Problem Relation Age of Onset    Mental Illness Mother     Substance Abuse Mother     No Known Problems Father      Social History     Socioeconomic History    Marital status: Single     Spouse name: Not on file    Number of children: 1    Years of education: 12    Highest education level: Not on file   Occupational History     Comment: SSDI   Tobacco Use    Smoking status: Every Day     Packs/day: 0.50     Years: 24.00     Additional pack years: 0.00     Total pack years: 12.00     Types: Cigarettes    Smokeless tobacco: Never    Tobacco comments:     stopped \"a while ago\"   Vaping Use    Vaping Use: Former   Substance and Sexual Activity    Alcohol use: Not Currently     Comment: Daily    Drug use: Yes     Frequency: 7.0 times per week     Types: Cocaine, Marijuana (Weed)     Comment: Cocaine occassionally;  Marijuana wkly    Sexual activity: Yes     Partners: Female   Other Topics Concern    Not on file   Social

## 2023-11-14 PROCEDURE — 6370000000 HC RX 637 (ALT 250 FOR IP): Performed by: PSYCHIATRY & NEUROLOGY

## 2023-11-14 PROCEDURE — 1240000000 HC EMOTIONAL WELLNESS R&B

## 2023-11-14 PROCEDURE — 6370000000 HC RX 637 (ALT 250 FOR IP): Performed by: NURSE PRACTITIONER

## 2023-11-14 RX ADMIN — DIVALPROEX SODIUM 250 MG: 250 TABLET, DELAYED RELEASE ORAL at 22:36

## 2023-11-14 RX ADMIN — ACETAMINOPHEN 650 MG: 325 TABLET ORAL at 17:49

## 2023-11-14 ASSESSMENT — PAIN - FUNCTIONAL ASSESSMENT: PAIN_FUNCTIONAL_ASSESSMENT: ACTIVITIES ARE NOT PREVENTED

## 2023-11-14 ASSESSMENT — PAIN DESCRIPTION - LOCATION: LOCATION: LEG

## 2023-11-14 ASSESSMENT — PAIN SCALES - GENERAL: PAINLEVEL_OUTOF10: 6

## 2023-11-14 ASSESSMENT — PAIN DESCRIPTION - DESCRIPTORS: DESCRIPTORS: ACHING;DISCOMFORT;SORE

## 2023-11-14 ASSESSMENT — PAIN DESCRIPTION - ORIENTATION: ORIENTATION: LEFT

## 2023-11-14 NOTE — PLAN OF CARE
Pt resting in his room. Pt denies SI, HI and AVH. Pt states that he would not take his medications at first, but then immediately agreed to take them. Pt returned to rest shortly after speaking to this nurse. Problem: Anxiety  Goal: Will report anxiety at manageable levels  Description: INTERVENTIONS:  1. Administer medication as ordered  2. Teach and rehearse alternative coping skills  3. Provide emotional support with 1:1 interaction with staff  11/13/2023 2224 by Sarah Randolph RN  Outcome: Progressing     Problem: Confusion  Goal: Confusion, delirium, dementia, or psychosis is improved or at baseline  Description: INTERVENTIONS:  1. Assess for possible contributors to thought disturbance, including medications, impaired vision or hearing, underlying metabolic abnormalities, dehydration, psychiatric diagnoses, and notify attending LIP  2. [place] high risk fall precautions, as indicated  3. Provide frequent short contacts to provide reality reorientation, refocusing and direction  4. Decrease environmental stimuli, including noise as appropriate  5. Monitor and intervene to maintain adequate nutrition, hydration, elimination, sleep and activity  6. If unable to ensure safety without constant attention obtain sitter and review sitter guidelines with assigned personnel  7. Initiate Psychosocial CNS and Spiritual Care consult, as indicated  11/13/2023 2224 by Sarah Randolph RN  Outcome: Progressing     Problem: Drug Abuse/Detox  Goal: Will have no detox symptoms and will verbalize plan for changing drug-related behavior  Description: INTERVENTIONS:  1. Administer medication as ordered  2. Monitor physical status  3. Provide emotional support with 1:1 interaction with staff  4.  Encourage  recovery focused treatment   11/13/2023 2224 by Sarah Randolph RN  Outcome: Progressing

## 2023-11-14 NOTE — GROUP NOTE
Group Therapy Note    Date: 11/14/2023    Group Start Time: 4119  Group End Time: 6612  Group Topic: Psychoeducation    [facility] BH 2    Tamika Marcelino                                                                        Group Therapy Note    Date: 11/14/2023  Start Time: 1010  End Time:  1050  Number of Participants: 19    Type of Group: Psychoeducation    Wellness Binder Information  Module Name:  Self-Care    Patient's Goal:  Patient will be able to ID one or two ways to improve self-care. Patient will explore potential new interests related to self-care    Notes:  Michelle Barrie [facility] nursing students facilitated group along with CTRS. Nursing students explained and discussed the importance of self-care and provided patient with a self-care activity. Patient engaged in discussion and was more passive in engagement of activity. Patient did accept handout.       Status After Intervention:  Improved    Participation Level: Interactive    Participation Quality: Attentive      Speech:  normal      Thought Process/Content: Flight of ideas      Affective Functioning: Congruent      Mood: euthymic      Level of consciousness:  Alert and Inattentive      Response to Learning: Able to verbalize current knowledge/experience and Able to verbalize/acknowledge new learning      Endings: None Reported    Modes of Intervention: Education, Exploration, and Activity      Discipline Responsible: Psychoeducational Specialist      Signature:  Tamika Marcelino

## 2023-11-14 NOTE — PROGRESS NOTES
BEHAVIORAL HEALTH FOLLOW-UP NOTE     11/14/2023     Patient was seen and examined in person, Chart reviewed   Patient's case discussed with staff/team    Chief Complaint: \"I am okay. \"    Interim History: I saw patient today in his room his affect is slightly more irritable today and tells me that he is doing fine. He does not endorse any auditory or visual hallucinations he denies any suicidal or homicidal ideations intent or plan he is not making delusional statements today however staff indicates that he does continue to walk the unit nonsensical seems to have some paranoia.   He refused his medications today he has poor insight and judgment    Appetite: [x] Normal/Unchanged  [] Increased  [] Decreased      Sleep:       [x] Normal/Unchanged  [] Fair       [] Poor              Energy:    [x] Normal/Unchanged  [] Increased  [] Decreased        SI [] Present  [x] Absent    HI  []Present  [x] Absent     Aggression:  [] yes  [x] no    Patient is [x] able  [] unable to CONTRACT FOR SAFETY     PAST MEDICAL/PSYCHIATRIC HISTORY:   Past Medical History:   Diagnosis Date    Depression     Schizoaffective disorder ([address])        FAMILY/SOCIAL HISTORY:  Family History   Problem Relation Age of Onset    Mental Illness Mother     Substance Abuse Mother     No Known Problems Father      Social History     Socioeconomic History    Marital status: Single     Spouse name: Not on file    Number of children: 1    Years of education: 12    Highest education level: Not on file   Occupational History     Comment: SSDI   Tobacco Use    Smoking status: Every Day     Packs/day: 0.50     Years: 24.00     Additional pack years: 0.00     Total pack years: 12.00     Types: Cigarettes    Smokeless tobacco: Never    Tobacco comments:     stopped \"a while ago\"   Vaping Use    Vaping Use: Former   Substance and Sexual Activity    Alcohol use: Not Currently     Comment: Daily    Drug use: Yes     Frequency: 7.0 times per week     Types: Cocaine,

## 2023-11-14 NOTE — GROUP NOTE
Group Therapy Note    Date: 11/14/2023    Group Start Time: 1115  Group End Time: 1200  Group Topic: Cognitive Skills    [facility]    Ayah Berger, FRANCISCO, MAJOR        Group Therapy Note    Attendees: 15       Patient's Goal:  Pt attended group therapy where we dicussed anger and attachment styles. Notes:  Pt was an active participant in group therapy. Status After Intervention:  Improved    Participation Level: Active Listener and Interactive    Participation Quality: Appropriate, Attentive, and Sharing      Speech:  normal      Thought Process/Content: Flight of ideas      Affective Functioning: Congruent      Mood: euthymic      Level of consciousness:  Alert and Attentive      Response to Learning: Able to verbalize current knowledge/experience and Able to retain information      Endings: None Reported    Modes of Intervention: Education, Support, Socialization, Exploration, Clarifying, and Problem-solving      Discipline Responsible: /Counselor      Signature:   FRANCISCO Jaime, MAJOR

## 2023-11-14 NOTE — PROGRESS NOTES
Patient changed dinner tray for chicken tenders, fries, sweet tea and a fadia chip cookie. Menu faxed.

## 2023-11-14 NOTE — PLAN OF CARE
Problem: Drug Abuse/Detox  Goal: Will have no detox symptoms and will verbalize plan for changing drug-related behavior  Description: INTERVENTIONS:  1. Administer medication as ordered  2. Monitor physical status  3. Provide emotional support with 1:1 interaction with staff  4. Encourage  recovery focused treatment   Outcome: Progressing     Problem: Anxiety  Goal: Will report anxiety at manageable levels  Description: INTERVENTIONS:  1. Administer medication as ordered  2. Teach and rehearse alternative coping skills  3. Provide emotional support with 1:1 interaction with staff  Outcome: Not Progressing     Problem: Confusion  Goal: Confusion, delirium, dementia, or psychosis is improved or at baseline  Description: INTERVENTIONS:  1. Assess for possible contributors to thought disturbance, including medications, impaired vision or hearing, underlying metabolic abnormalities, dehydration, psychiatric diagnoses, and notify attending LIP  2. [place] high risk fall precautions, as indicated  3. Provide frequent short contacts to provide reality reorientation, refocusing and direction  4. Decrease environmental stimuli, including noise as appropriate  5. Monitor and intervene to maintain adequate nutrition, hydration, elimination, sleep and activity  6. If unable to ensure safety without constant attention obtain sitter and review sitter guidelines with assigned personnel  7. Initiate Psychosocial CNS and Spiritual Care consult, as indicated  Outcome: Not Progressing     Patient is irritable and guarded. Walks the unit rambling ,non sensible . Became paranoid when questioning him on hallucinations . \" Why  you getting in my business\"LEAVE ME ALONE\". HE DOES NOT ENDORSE SUICIDAL AND HOMICIDAL THOUGHTS. APPEARS INTERNALLY STIMULATED.

## 2023-11-14 NOTE — PROGRESS NOTES
Patient attended community meeting   Was updated on expectations of the unit, staffing, and programming  Patient shared goal for today as \"believe in God and know things can be better\"

## 2023-11-15 PROCEDURE — 6370000000 HC RX 637 (ALT 250 FOR IP): Performed by: NURSE PRACTITIONER

## 2023-11-15 PROCEDURE — 1240000000 HC EMOTIONAL WELLNESS R&B

## 2023-11-15 RX ORDER — DIVALPROEX SODIUM 500 MG/1
500 TABLET, DELAYED RELEASE ORAL EVERY 12 HOURS SCHEDULED
Status: DISCONTINUED | OUTPATIENT
Start: 2023-11-15 | End: 2023-11-16 | Stop reason: HOSPADM

## 2023-11-15 RX ADMIN — ARIPIPRAZOLE 10 MG: 10 TABLET ORAL at 12:08

## 2023-11-15 RX ADMIN — DIVALPROEX SODIUM 500 MG: 500 TABLET, DELAYED RELEASE ORAL at 12:08

## 2023-11-15 RX ADMIN — DIVALPROEX SODIUM 500 MG: 500 TABLET, DELAYED RELEASE ORAL at 21:59

## 2023-11-15 ASSESSMENT — PAIN SCALES - GENERAL: PAINLEVEL_OUTOF10: 0

## 2023-11-15 NOTE — PROGRESS NOTES
[facility] NOTE     11/15/2023     Patient was seen and examined in person, Chart reviewed   Patient's case discussed with staff/team    Chief Complaint: \"I am okay. \"    Interim History: Patient seen out in the unit today he took his Aristada injection. He denies suicidal ideations intent or plan denies auditory or visual hallucinations. He is still somewhat circumstantial in conversation he has a difficult time tell me where he lives he states that his mom \"takes care of me. \"  Staff indicates that patient is been observed talking to unseen others no behavioral disturbances on the unit.       Appetite: [x] Normal/Unchanged  [] Increased  [] Decreased      Sleep:       [x] Normal/Unchanged  [] Fair       [] Poor              Energy:    [x] Normal/Unchanged  [] Increased  [] Decreased        SI [] Present  [x] Absent    HI  []Present  [x] Absent     Aggression:  [] yes  [x] no    Patient is [x] able  [] unable to CONTRACT FOR SAFETY     PAST MEDICAL/PSYCHIATRIC HISTORY:   Past Medical History:   Diagnosis Date    Depression     Schizoaffective disorder ([address])        FAMILY/SOCIAL HISTORY:  Family History   Problem Relation Age of Onset    Mental Illness Mother     Substance Abuse Mother     No Known Problems Father      Social History     Socioeconomic History    Marital status: Single     Spouse name: Not on file    Number of children: 1    Years of education: 12    Highest education level: Not on file   Occupational History     Comment: SSDI   Tobacco Use    Smoking status: Every Day     Packs/day: 0.50     Years: 24.00     Additional pack years: 0.00     Total pack years: 12.00     Types: Cigarettes    Smokeless tobacco: Never    Tobacco comments:     stopped \"a while ago\"   Vaping Use    Vaping Use: Former   Substance and Sexual Activity    Alcohol use: Not Currently     Comment: Daily    Drug use: Yes     Frequency: 7.0 times per week     Types: Cocaine, Marijuana (Weed)     Comment: Cocaine

## 2023-11-15 NOTE — GROUP NOTE
Group Therapy Note    Date: 11/15/2023    Group Start Time: 1010  Group End Time: 9253  Group Topic: Psychoeducation    [facility]    Ran Morales                                                                        Group Therapy Note    Date: 11/15/2023  Module Name:  supporting others. Patient's Goal:  patient will be able to  identify how to communicate with others about their owns needs, and how to support others. Notes:  Pleasant and sharing in group, accepting of group. Status After Intervention:  Improved    Participation Level:  Active Listener and Interactive    Participation Quality: Appropriate, Attentive, and Sharing      Speech:  normal      Thought Process/Content: Logical      Affective Functioning: Congruent      Mood: euphoric      Level of consciousness:  Alert, Oriented x4, and Attentive      Response to Learning: Able to verbalize/acknowledge new learning, Able to retain information, and Progressing to goal      Endings: None Reported    Modes of Intervention: Education, Support, Socialization, Exploration, and Problem-solving      Discipline Responsible: Psychoeducational Specialist      Signature:  Ricarda Holter, CTRS              Signature:  Ricarda Holter, [place]

## 2023-11-15 NOTE — PLAN OF CARE
Patient is alert and oriented x 4. Denies pain. No noted signs or symptoms of distress. Denies SI/HI, A/V/H, or thoughts of self harm when asked. Appears internally preoccupied, frequently observed talking to himself/or unseen others. Rates Anxiety at 0/10 and Depression at 0/10. Pleasant, polite, and cooperative upon approach but is easily irritated. Incongruent and blunt Affect, reports his mood as \"very happy\" and has a forced smile. Noted with paranoia stating \"other people want to hurt me. \"  Appropriate with peers and staff while out on unit but keeps to himself. Appears slightly disheveled, wearing a washcloth on his head, room Is clean. Goal for the day was - \"stay focused\". Up for breakfast.    Will continue to monitor for safety q 15 minute safety rounds and environmental assessments. Problem: Confusion  Goal: Confusion, delirium, dementia, or psychosis is improved or at baseline  Description: INTERVENTIONS:  1. Assess for possible contributors to thought disturbance, including medications, impaired vision or hearing, underlying metabolic abnormalities, dehydration, psychiatric diagnoses, and notify attending LIP  2. [place] high risk fall precautions, as indicated  3. Provide frequent short contacts to provide reality reorientation, refocusing and direction  4. Decrease environmental stimuli, including noise as appropriate  5. Monitor and intervene to maintain adequate nutrition, hydration, elimination, sleep and activity  6. If unable to ensure safety without constant attention obtain sitter and review sitter guidelines with assigned personnel  7.  Initiate Psychosocial CNS and Spiritual Care consult, as indicated  Outcome: Not Progressing

## 2023-11-15 NOTE — CARE COORDINATION
ANTONIO met with pt in attempt to discuss discharge planning. When asked how pt is doing pt stated \"I can assure you of a lot of things, but I'm not messed around with at all. \" Pt had a wash cloth on his head during this encounter. When asked where pt lives pt stated that he lives in [facility]. SW asked pt if he lives by himself and pt stated\"all my mail goes to my moms house. \" Pt then began to go off on a tangent about how his mom and his brother live in the home and he is under his mom and brother. Pt was not able to state where he lives or where he will go when he is discharged. [phone] to discuss if pt is on a restriction. ANTONIO left a voicemail requesting a call back.

## 2023-11-16 VITALS
SYSTOLIC BLOOD PRESSURE: 93 MMHG | TEMPERATURE: 98.1 F | BODY MASS INDEX: 20.99 KG/M2 | WEIGHT: 155 LBS | HEIGHT: 72 IN | DIASTOLIC BLOOD PRESSURE: 49 MMHG | OXYGEN SATURATION: 98 % | HEART RATE: 63 BPM | RESPIRATION RATE: 14 BRPM

## 2023-11-16 PROCEDURE — 6370000000 HC RX 637 (ALT 250 FOR IP): Performed by: NURSE PRACTITIONER

## 2023-11-16 RX ORDER — ARIPIPRAZOLE 10 MG/1
10 TABLET ORAL DAILY
Qty: 30 TABLET | Refills: 0 | Status: SHIPPED | OUTPATIENT
Start: 2023-11-16 | End: 2023-12-16

## 2023-11-16 RX ORDER — DIVALPROEX SODIUM 500 MG/1
500 TABLET, DELAYED RELEASE ORAL EVERY 12 HOURS SCHEDULED
Qty: 60 TABLET | Refills: 0 | Status: SHIPPED | OUTPATIENT
Start: 2023-11-16 | End: 2023-12-16

## 2023-11-16 RX ADMIN — DIVALPROEX SODIUM 500 MG: 500 TABLET, DELAYED RELEASE ORAL at 08:50

## 2023-11-16 RX ADMIN — ARIPIPRAZOLE 10 MG: 10 TABLET ORAL at 08:50

## 2023-11-16 NOTE — CARE COORDINATION
ANTONIO contacted the [facility] [phone] and was advised pt can come to their facility today and will need meds in hand. ANTONIO updated RN and NP. SW met with pt to discuss his discharge. Pt observed to be talking to himself. Pt reports feeling good today, denied SI/HI/AVH. Pt stated he is just thinking about the mathematics and the positives, he is no longer thinking of the negatives. Pt feels ready to discharge to the [facility] today. Pt will be connected with [facility] at time of discharge from the crisis unit. [facility] will be able to continue his BARROS when he is scheduled with their services. Transportation will be scheduled through [facility]. Pt cooperative, pleasant, bright, circumstantial, tangential, with good eye contact, clear speech, limited insight/judgement.      In order to ensure appropriate transition and discharge planning is in place, the following documents have been transmitted to [facility], as the new outpatient provider:    The d/c diagnosis was transmitted to the next care provider  The reason for hospitalization was transmitted to the next care provider  The d/c medications (dosage and indication) were transmitted to the next care provider   The continuing care plan was transmitted to the next care provider

## 2023-11-16 NOTE — CARE COORDINATION
SW was advised pt would like to stepdown to the [facility] Unit at this time. ANTONIO faxed a referral to the [facility].

## 2023-11-16 NOTE — PROGRESS NOTES
Patient attended morning community meeting. Updated on staffing assignments and daily expectations. Shared goal for the day as to stay protected and never neglected.

## 2023-11-16 NOTE — PLAN OF CARE
Problem: Anxiety  Goal: Will report anxiety at manageable levels  Description: INTERVENTIONS:  1. Administer medication as ordered  2. Teach and rehearse alternative coping skills  3. Provide emotional support with 1:1 interaction with staff  11/15/2023 2226 by Go Barba RN  Outcome: Progressing     Problem: Confusion  Goal: Confusion, delirium, dementia, or psychosis is improved or at baseline  Description: INTERVENTIONS:  1. Assess for possible contributors to thought disturbance, including medications, impaired vision or hearing, underlying metabolic abnormalities, dehydration, psychiatric diagnoses, and notify attending LIP  2. [place] high risk fall precautions, as indicated  3. Provide frequent short contacts to provide reality reorientation, refocusing and direction  4. Decrease environmental stimuli, including noise as appropriate  5. Monitor and intervene to maintain adequate nutrition, hydration, elimination, sleep and activity  6. If unable to ensure safety without constant attention obtain sitter and review sitter guidelines with assigned personnel  7. Initiate Psychosocial CNS and Spiritual Care consult, as indicated  11/15/2023 2226 by Go Barba RN  Outcome: Progressing     Problem: Pain  Goal: Verbalizes/displays adequate comfort level or baseline comfort level  11/15/2023 2226 by Go Barba RN  Outcome: Progressing     Patient is seen pacing around the unit, isolative to self. Patient appears internally stimulated, seen talking to unseen others. Patient is exaggerated, bizarre, making paranoid delusional statements. Denies anxiety or depression. Patient denies suicidal ideation, homicidal ideations and hallucinations. Withdrawn and guarded during assessment. No complaints or concerns verbalized at this time. No unit problems reported. Will continue to observe and support.

## 2023-11-16 NOTE — GROUP NOTE
Group Therapy Note    Date: 11/16/2023    Group Start Time: 1010  Group End Time: 6677  Group Topic: Psychoeducation    [facility]    Alen Shukla                                                                        Group Therapy Note    Date: 11/16/2023      Wellness Binder Information  Module Name:  id of stressors     Patient's Goal:  patient will be able to id daily and life events one has experienced in the last 6 months. Notes:  Willing to share when prompted, able to take turns and share appropriately. Status After Intervention:  Improved    Participation Level:  Active Listener and Interactive    Participation Quality: Appropriate, Attentive, and Sharing      Speech:  normal      Thought Process/Content: Logical      Affective Functioning: Congruent      Mood: euthymic      Level of consciousness:  Alert, Oriented x4, and Attentive      Response to Learning: Able to verbalize/acknowledge new learning, Able to retain information, and Progressing to goal      Endings: None Reported    Modes of Intervention: Education, Support, Socialization, and Clarifying      Discipline Responsible: Psychoeducational Specialist      Signature:  Alen Shukla

## 2023-11-16 NOTE — BH NOTE
[facility]  Discharge Note    Pt discharged with followings belongings:   Dental Appliances: None  Vision - Corrective Lenses: None  Hearing Aid: None  Jewelry: None  Body Piercings Removed: N/A  Clothing:  (hoodie with strings, socks,underware)  Other Valuables:  ($178.10 given to the hospital police)   Valuables returned to patient. Patient educated on aftercare instructions: YES  Patient verbalize understanding of AVS:  YES. Status EXAM upon discharge:  Mental Status and Behavioral Exam  Normal: Yes  Level of Assistance: Independent/Self  Facial Expression: Brightened  Affect: Appropriate  Level of Consciousness: Alert  Frequency of Checks: 4 times per hour, close  Mood:Normal: Yes  Mood: Suspicious  Motor Activity:Normal: Yes  Motor Activity: Increased  Eye Contact: Fair  Observed Behavior: Friendly, Cooperative  Sexual Misconduct History: Current - no  Preception: [place] to person, [place] to time, [place] to place, [place] to situation  Attention:Normal: Yes  Attention: Unable to concentrate  Thought Processes: Tangential  Thought Content:Normal: No  Thought Content: Delusions  Depression Symptoms: No problems reported or observed. Anxiety Symptoms: No problems reported or observed. Candie Symptoms: No problems reported or observed. Hallucinations:  Auditory (comment), Command (comment)  Delusions: Yes  Delusions: Congregational  Memory:Normal: Yes  Memory: Confabulation  Insight and Judgment: No  Insight and Judgment: Poor judgment, Poor insight    Tobacco Screening:  Practical Counseling, on admission, lydia X, if applicable and completed (first 3 are required if patient doesn't refuse)YES:            ( ) Recognizing danger situations (included triggers and roadblocks)                    ( ) Coping skills (new ways to manage stress,relaxation techniques, changing routine, distraction)                                                           ( ) Basic information about quitting (benefits of

## 2023-11-16 NOTE — CARE COORDINATION
SW contacted [facility] [phone] and scheduled transportation to the [facility].  They are aware that the  will need to go to the main entrance and call the nurses station upon arrival.

## 2023-11-16 NOTE — PROGRESS NOTES
CLINICAL PHARMACY NOTE: MEDS TO BEDS    Total # of Prescriptions Filled: 2   The following medications were delivered to the patient:  Divalproex sodium 500 mg  Aripiprazolde 10 mg    Additional Document  MARY mcdonald picked up in the pharmacy

## 2023-11-16 NOTE — BH NOTE
Call placed to Jovana at [facility] to review patient's medications and discharge plan. Patient will be sent with meds in hand as well as a script for his Aristada injection clearly marked due 12/15/23. Discharge papers placed in belongings and reviewed with patient.

## 2023-11-16 NOTE — CARE COORDINATION
SW received a voicemail [facility] [phone] stating pt is on an indefinite restriction at this time.

## 2023-11-16 NOTE — PROGRESS NOTES
Spiritual Support Group Note    Number of Participants in Group:    7                    Time: 2    Goal: Relief from isolation and loneliness             Maddy Sharing             Self-understanding and gain insight              Acceptance and belonging            Recognize they are not alone                Socialization             Empowerment       Encouragement    Topic:  [x] Spiritual Wellness and Self Care                  [x] Hope                     [] Connecting with Divine/Others        [] Thankfulness and Gratitude               [x]  Meaningfulness and Purpose               [] Forgiveness               [] Peace               [] Connect to [employer]      [] Other    Participation Level:   [x] Active Listener   [] Minimal   [] Monopolizing   [] Interactive   [] No Participation   []  Other:     Attention:   [x] Alert   [] Distractible   [] Drowsy   [] Poor   [] Other:    Manner:   [x] Cooperative   [] Suspicious   [] Withdrawn   [] Guarded   [] Irritable   [] Inhospitable   [] Other:     Others Comments from Group:

## 2023-11-16 NOTE — BH NOTE
Patient is awake and alert. Appears oriented, but patient is making bizarre delusional statements to direct questions. His affect is bright. He denies any hallucinations but is baseline preoccupied and talking to unseen others at times. His statements are Sikhism in nature at times but are overall positive stating \"I need to stay clear and out of the belly of the beast.\" He states \"I'm not the creator myself but I can help. I'm a child of the creator. \" He is calm and cooperative for assessment. He denies any anxiety or depression and he denies any suicidal or homicidal thoughts. He is observed in group and sitting quietly. Encouraged continued group participation. Will continue to monitor.

## 2023-11-16 NOTE — DISCHARGE SUMMARY
agency for outpatient follow-up services. At the time of discharge patient did not show any impulsive behavior. He was up on the unit he was attending groups and socializing with peers. He vehemently denied any suicidal homicidal ideations intent or plan. He was eating well and sleeping well there are no neurovegetative signs or symptoms of depression he was observed talking to unseen others which is the patient's known baseline. Thought process remained tangential her patient was not acting on any delusions he was not threatening not having any behavioral disturbances the patient was bright and pleasant asking to be discharged and stated the crisis unit is helped him out in the past.  He was future focused he was agreeable to treatment that he received here. Patient is found to be at his baseline level of functioning seen talking to himself with tangential thoughts. He was appreciative of the help that he received here. This patient no longer meets criteria for inpatient hospitalization. No AVH or paranoid thoughts  No hopeless or worthless feeling  No active SI/HI  Appetite:  [x] Normal  [] Increased  [] Decreased    Sleep:       [x] Normal  [] Fair       [] Poor            Energy:    [x] Normal  [] Increased  [] Decreased     SI [] Present  [x] Absent  HI  []Present  [x] Absent   Aggression:  [] yes  [x] no  Patient is [x] able  [] unable to CONTRACT FOR SAFETY   Medication side effects(SE):  [x] None(Psych. Meds.) [] Other      Mental Status Examination on discharge:    Level of consciousness:  within normal limits   Appearance:  well-appearing  Behavior/Motor:  no abnormalities noted  Attitude toward examiner:  attentive and good eye contact  Speech:  spontaneous, normal rate and normal volume   Mood: \"I feel better. \"  Affect: Bright appropriate and pleasant  Thought processes: Tangential  Thought content: Patient denies auditory or visual hallucinations but is observed talking to himself.   Family

## 2023-11-17 NOTE — CARE COORDINATION
ANTONIO attempted to  contact pt for follow up phone call. Staff at [facility] stated that pt left shortly after arrival and stated \"This is not the type of place for me\" ANTONIO attempted to contact pt, but was unsuccessful.

## 2023-11-19 ENCOUNTER — HOSPITAL ENCOUNTER (EMERGENCY)
Age: 43
Discharge: LEFT BEFORE DISCHARGE | End: 2023-11-19

## 2023-11-19 VITALS
BODY MASS INDEX: 21.43 KG/M2 | OXYGEN SATURATION: 100 % | RESPIRATION RATE: 18 BRPM | TEMPERATURE: 96.8 F | WEIGHT: 158 LBS | SYSTOLIC BLOOD PRESSURE: 148 MMHG | HEART RATE: 91 BPM | DIASTOLIC BLOOD PRESSURE: 62 MMHG

## 2023-11-19 ASSESSMENT — PAIN - FUNCTIONAL ASSESSMENT: PAIN_FUNCTIONAL_ASSESSMENT: NONE - DENIES PAIN

## 2023-11-26 ENCOUNTER — HOSPITAL ENCOUNTER (EMERGENCY)
Age: 43
Discharge: PSYCHIATRIC OR SUBSTANCE USE FACILITY | End: 2023-11-27
Attending: EMERGENCY MEDICINE
Payer: COMMERCIAL

## 2023-11-26 DIAGNOSIS — F39 MOOD DISORDER (HCC): Primary | ICD-10-CM

## 2023-11-26 LAB
ALBUMIN SERPL-MCNC: 4.4 G/DL (ref 3.5–5.2)
ALP SERPL-CCNC: 54 U/L (ref 40–129)
ALT SERPL-CCNC: <5 U/L (ref 0–40)
ANION GAP SERPL CALCULATED.3IONS-SCNC: 12 MMOL/L (ref 7–16)
APAP SERPL-MCNC: <5 UG/ML (ref 10–30)
AST SERPL-CCNC: 17 U/L (ref 0–39)
BASOPHILS # BLD: 0.11 K/UL (ref 0–0.2)
BASOPHILS NFR BLD: 2 % (ref 0–2)
BILIRUB SERPL-MCNC: 0.9 MG/DL (ref 0–1.2)
BUN SERPL-MCNC: 18 MG/DL (ref 6–20)
CALCIUM SERPL-MCNC: 8.9 MG/DL (ref 8.6–10.2)
CHLORIDE SERPL-SCNC: 105 MMOL/L (ref 98–107)
CO2 SERPL-SCNC: 25 MMOL/L (ref 22–29)
CREAT SERPL-MCNC: 0.9 MG/DL (ref 0.7–1.2)
EOSINOPHIL # BLD: 0.06 K/UL (ref 0.05–0.5)
EOSINOPHILS RELATIVE PERCENT: 1 % (ref 0–6)
ERYTHROCYTE [DISTWIDTH] IN BLOOD BY AUTOMATED COUNT: 13.2 % (ref 11.5–15)
ETHANOLAMINE SERPL-MCNC: <10 MG/DL
GFR SERPL CREATININE-BSD FRML MDRD: >60 ML/MIN/1.73M2
GLUCOSE SERPL-MCNC: 116 MG/DL (ref 74–99)
HCT VFR BLD AUTO: 41.6 % (ref 37–54)
HGB BLD-MCNC: 13.6 G/DL (ref 12.5–16.5)
IMM GRANULOCYTES # BLD AUTO: <0.03 K/UL (ref 0–0.58)
IMM GRANULOCYTES NFR BLD: 0 % (ref 0–5)
LYMPHOCYTES NFR BLD: 2.21 K/UL (ref 1.5–4)
LYMPHOCYTES RELATIVE PERCENT: 34 % (ref 20–42)
MCH RBC QN AUTO: 29.8 PG (ref 26–35)
MCHC RBC AUTO-ENTMCNC: 32.7 G/DL (ref 32–34.5)
MCV RBC AUTO: 91.2 FL (ref 80–99.9)
MONOCYTES NFR BLD: 0.54 K/UL (ref 0.1–0.95)
MONOCYTES NFR BLD: 8 % (ref 2–12)
NEUTROPHILS NFR BLD: 55 % (ref 43–80)
NEUTS SEG NFR BLD: 3.58 K/UL (ref 1.8–7.3)
PLATELET # BLD AUTO: 369 K/UL (ref 130–450)
PMV BLD AUTO: 9.6 FL (ref 7–12)
POTASSIUM SERPL-SCNC: 3.9 MMOL/L (ref 3.5–5)
PROT SERPL-MCNC: 7.2 G/DL (ref 6.4–8.3)
RBC # BLD AUTO: 4.56 M/UL (ref 3.8–5.8)
SALICYLATES SERPL-MCNC: <0.3 MG/DL (ref 0–30)
SODIUM SERPL-SCNC: 142 MMOL/L (ref 132–146)
TOXIC TRICYCLIC SC,BLOOD: NEGATIVE
WBC OTHER # BLD: 6.5 K/UL (ref 4.5–11.5)

## 2023-11-26 PROCEDURE — 99285 EMERGENCY DEPT VISIT HI MDM: CPT

## 2023-11-26 PROCEDURE — 80143 DRUG ASSAY ACETAMINOPHEN: CPT

## 2023-11-26 PROCEDURE — G0480 DRUG TEST DEF 1-7 CLASSES: HCPCS

## 2023-11-26 PROCEDURE — 85025 COMPLETE CBC W/AUTO DIFF WBC: CPT

## 2023-11-26 PROCEDURE — 80179 DRUG ASSAY SALICYLATE: CPT

## 2023-11-26 PROCEDURE — 93005 ELECTROCARDIOGRAM TRACING: CPT | Performed by: EMERGENCY MEDICINE

## 2023-11-26 PROCEDURE — 80307 DRUG TEST PRSMV CHEM ANLYZR: CPT

## 2023-11-26 PROCEDURE — 80053 COMPREHEN METABOLIC PANEL: CPT

## 2023-11-27 VITALS
OXYGEN SATURATION: 97 % | BODY MASS INDEX: 20.04 KG/M2 | WEIGHT: 140 LBS | HEIGHT: 70 IN | DIASTOLIC BLOOD PRESSURE: 82 MMHG | RESPIRATION RATE: 18 BRPM | SYSTOLIC BLOOD PRESSURE: 120 MMHG | TEMPERATURE: 98 F | HEART RATE: 61 BPM

## 2023-11-27 LAB
AMPHET UR QL SCN: NEGATIVE
BARBITURATES UR QL SCN: NEGATIVE
BENZODIAZ UR QL: NEGATIVE
BUPRENORPHINE UR QL: NEGATIVE
CANNABINOIDS UR QL SCN: NEGATIVE
CK SERPL-CCNC: 388 U/L (ref 20–200)
COCAINE UR QL SCN: POSITIVE
EKG ATRIAL RATE: 48 BPM
EKG P AXIS: 71 DEGREES
EKG P-R INTERVAL: 184 MS
EKG Q-T INTERVAL: 422 MS
EKG QRS DURATION: 108 MS
EKG QTC CALCULATION (BAZETT): 376 MS
EKG R AXIS: 81 DEGREES
EKG T AXIS: 69 DEGREES
EKG VENTRICULAR RATE: 48 BPM
FENTANYL UR QL: NEGATIVE
METHADONE UR QL: NEGATIVE
OPIATES UR QL SCN: NEGATIVE
OXYCODONE UR QL SCN: NEGATIVE
PCP UR QL SCN: NEGATIVE
TEST INFORMATION: ABNORMAL

## 2023-11-27 PROCEDURE — 93010 ELECTROCARDIOGRAM REPORT: CPT | Performed by: INTERNAL MEDICINE

## 2023-11-27 PROCEDURE — 82550 ASSAY OF CK (CPK): CPT

## 2023-11-27 ASSESSMENT — PAIN - FUNCTIONAL ASSESSMENT: PAIN_FUNCTIONAL_ASSESSMENT: NONE - DENIES PAIN

## 2023-11-27 NOTE — ED NOTES
CALL FROM [address] [facility], [address], [place]  11/27/23 0044
Ekg done       Maia Mc, MARY  11/26/23 1913
Nurse to nurse report given to Negro Wade on Generations  at [phone] which includes patient presentation complaint, pink slip, medications, lab results EKG Qtc, and past psych/medical history and ED stay.        Allyssa Hillman, MARY  11/27/23 2664
Patient belongings labeled and stored into Section G locker #29. 2 bags.      Gara Bumpers  11/26/23 4366
Pt accepted to [facility] by Dr. Pruett Larger [address]    N2N [phone]    Pink slip and updated VS faxed to [facility], [address]  11/27/23 1342       [facility], [address]  11/27/23 1352
Pt confrontational with Section G staff about taking his back long sleeve shirt off. [facility] PD at bedside and able to be redirected to comply with hospital attire.       Brenda Byrne, FRANCIS[place], [place]  11/26/23 1901
Pt continues to talk constantly, delusional themes present, at times laughs to self, no behavioral agitation at this time, awaiting results of CK.        FRANCISCO [place], [place]  11/27/23 0027
Pt given a breakfast tray     Sesar Nguyen  11/27/23 5726
Pt given a lunch tray     Jessica Nguyen  11/27/23 1047
[facility] [address] Spoke with Chapo Heller.      Arlyss Clamp  11/27/23 2616
Pt refused to eat breakfast nakia [facility]  11/27/23 0902
Rah Hampton NP/ Dr. Contreras Wade patient is declined for admission  as patient would benefit from a dual diagnosis unit.       Yocasta Montalvo RN  11/27/23 8320
Re-Assessment:    Spoke with pt who presents with continuous stream of speech, loose associations, flight of ideas. Recurrent delusional themes: his recent meeting meeting with Dr Roberto Carlos Huang, his theories on the existence of Lawrence Castro, how persons unknown are stalking him and he is concerned about their motives. No signs of agitation at this time although ED staff have reported minor agitation at times. Discussed with Dr Amanda Christine who feels pt warrants in-patient assessment, SW agrees. Pt pink-slipped. Pt moved to Section G, referred to Section G nurse Faylene Clines for review with on-call re: possible admission. Pt will need CK completed as tox is positive for cocaine. Section G nurse Faylene Clines arranged for CK add on. Pt continues constant stream of speech, pacing at times through out unit. At times needs strong re-direction to return to room, [facility] [place] were on scene for assistance, pt responded police directions to return to room.        FRANCISCO Souza, [facility]  11/27/23 0912       FRANCISCO [place], [place]  11/27/23 5686
SW spoke to Dr. Essie Crandall who agreed with plan to have patient re-assessed by Section G in the AM.      Sherin Barnes, MAJOR  11/27/23 9316
Talking to unseen others in room on rounds. No distress noted.      Sharath Bermudez RN  11/27/23 5301
against you? []  Yes []  No  3. Have you ever been arrested due to violence? []  Yes []  No  4. Have you ever been cruel to animals? []  Yes []  No    After consideration of C-SSRS screening results, C-SSRS assessments, and this professional's assessment the patient's overall suicide risk assessed to be:  [] No Risk  [] Low   [] Moderate   [] High     [x] Discussed current suicide risk, protective and risk factors with RN and ED Physician. Consulted with ED Physician. Disposition/level of care recommended at this time:  [] Home:   [] Outpatient Provider:   [] Crisis Unit:   [] Inpatient Psychiatric Unit:  [x] Other:     Patient was discussed with ED doctor. Patient to be reasssessed in the AM if not accepted by Dr. Ocampo Gain from the above assessment.                     Bobby Cardona [place]  11/26/23 2110

## 2023-11-27 NOTE — ED PROVIDER NOTES
Department of Emergency Medicine   ED  Provider Note  Admit Date/RoomTime: 2023  6:39 PM  ED Room: [address] [place] [address]        Written by: Bobby Kidd DO  Patient Name: Renetta Pinzon. Attending Provider: Irma Ro Date: 2023  6:39 PM  MRN: [id number]    : 1980      History of Present Illness:  23, Time: 8:36 PM EST  Chief Complaint   Patient presents with    Psychiatric Evaluation     Pt has a flight of ideas and rambling confused and paranoid           HPI   Patient is a 44 y.o. male with a past medical history of schizoaffective disorder, bipolar, cocaine abuse, antisocial personality disorder, presenting to the Emergency Department for psychiatric eval and hallucinations. History is severely limited secondary to patient's mental status on arrival, flight of ideas, inability to obtain much history. Review of Systems:   A complete review of systems was performed and pertinent positives and negatives are stated within HPI, all other systems reviewed and are negative.        --------------------------------------------- PAST HISTORY ---------------------------------------------  Past Medical History:  has a past medical history of Depression and Schizoaffective disorder ([address] Past Surgical History:  has a past surgical history that includes Hip fracture surgery (Left, 2021); eye surgery (19 years ago); and Hip fracture surgery (Left, 2021). Social History:  reports that he has been smoking cigarettes. He has a 12.00 pack-year smoking history. He has never used smokeless tobacco. He reports that he does not currently use alcohol. He reports current drug use. Frequency: 7.00 times per week. Drugs: Cocaine and Marijuana ([place]). Family History: family history includes Mental Illness in his mother; No Known Problems in his father; Substance Abuse in his mother. . Unless otherwise noted, family history is non contributory    The

## 2023-12-26 PROCEDURE — 99283 EMERGENCY DEPT VISIT LOW MDM: CPT

## 2023-12-27 ENCOUNTER — HOSPITAL ENCOUNTER (EMERGENCY)
Age: 43
Discharge: HOME | End: 2023-12-27
Payer: COMMERCIAL

## 2023-12-27 ENCOUNTER — APPOINTMENT (OUTPATIENT)
Dept: GENERAL RADIOLOGY | Age: 43
End: 2023-12-27
Payer: COMMERCIAL

## 2023-12-27 VITALS
HEART RATE: 84 BPM | OXYGEN SATURATION: 100 % | WEIGHT: 160.94 LBS | HEIGHT: 70 IN | TEMPERATURE: 98 F | DIASTOLIC BLOOD PRESSURE: 68 MMHG | RESPIRATION RATE: 20 BRPM | SYSTOLIC BLOOD PRESSURE: 114 MMHG | BODY MASS INDEX: 23.04 KG/M2
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
## 2023-12-27 DIAGNOSIS — R07.89 CHEST WALL PAIN: ICD-10-CM

## 2023-12-27 DIAGNOSIS — S20.212A CONTUSION OF LEFT CHEST WALL, INITIAL ENCOUNTER: ICD-10-CM

## 2023-12-27 DIAGNOSIS — R07.89 STERNAL PAIN: ICD-10-CM

## 2023-12-27 PROCEDURE — 71111 X-RAY EXAM RIBS/CHEST4/> VWS: CPT

## 2023-12-27 PROCEDURE — 71120 X-RAY EXAM BREASTBONE 2/>VWS: CPT

## 2024-01-02 ENCOUNTER — HOSPITAL ENCOUNTER (EMERGENCY)
Age: 44
Discharge: HOME | End: 2024-01-03
Attending: EMERGENCY MEDICINE
Payer: COMMERCIAL

## 2024-01-02 DIAGNOSIS — F22 PARANOIA (HCC): ICD-10-CM

## 2024-01-02 DIAGNOSIS — F19.10 POLYSUBSTANCE ABUSE (HCC): Primary | ICD-10-CM

## 2024-01-02 DIAGNOSIS — R45.851 SUICIDAL IDEATION: ICD-10-CM

## 2024-01-02 LAB
ALBUMIN SERPL-MCNC: 4 G/DL (ref 3.5–5.2)
ALP SERPL-CCNC: 47 U/L (ref 40–129)
ALT SERPL-CCNC: 10 U/L (ref 0–40)
AMPHET UR QL SCN: NEGATIVE
ANION GAP SERPL CALCULATED.3IONS-SCNC: 9 MMOL/L (ref 7–16)
APAP SERPL-MCNC: <5 UG/ML (ref 10–30)
AST SERPL-CCNC: 21 U/L (ref 0–39)
BACTERIA URNS QL MICRO: ABNORMAL
BARBITURATES UR QL SCN: NEGATIVE
BASOPHILS # BLD: 0.06 K/UL (ref 0–0.2)
BASOPHILS NFR BLD: 1 % (ref 0–2)
BENZODIAZ UR QL: NEGATIVE
BILIRUB SERPL-MCNC: 0.2 MG/DL (ref 0–1.2)
BILIRUB UR QL STRIP: NEGATIVE
BUN SERPL-MCNC: 23 MG/DL (ref 6–20)
BUPRENORPHINE UR QL: NEGATIVE
CALCIUM SERPL-MCNC: 8.5 MG/DL (ref 8.6–10.2)
CANNABINOIDS UR QL SCN: NEGATIVE
CHLORIDE SERPL-SCNC: 107 MMOL/L (ref 98–107)
CLARITY UR: CLEAR
CO2 SERPL-SCNC: 26 MMOL/L (ref 22–29)
COCAINE UR QL SCN: POSITIVE
COLOR UR: YELLOW
CREAT SERPL-MCNC: 0.8 MG/DL (ref 0.7–1.2)
EKG ATRIAL RATE: 80 BPM
EKG P AXIS: 79 DEGREES
EKG P-R INTERVAL: 154 MS
EKG Q-T INTERVAL: 380 MS
EKG QRS DURATION: 94 MS
EKG QTC CALCULATION (BAZETT): 438 MS
EKG R AXIS: 92 DEGREES
EKG T AXIS: 74 DEGREES
EKG VENTRICULAR RATE: 80 BPM
EOSINOPHIL # BLD: 0.08 K/UL (ref 0.05–0.5)
EOSINOPHILS RELATIVE PERCENT: 2 % (ref 0–6)
ERYTHROCYTE [DISTWIDTH] IN BLOOD BY AUTOMATED COUNT: 14 % (ref 11.5–15)
ETHANOLAMINE SERPL-MCNC: <10 MG/DL
FENTANYL UR QL: NEGATIVE
GFR SERPL CREATININE-BSD FRML MDRD: >60 ML/MIN/1.73M2
GLUCOSE SERPL-MCNC: 89 MG/DL (ref 74–99)
GLUCOSE UR STRIP-MCNC: NEGATIVE MG/DL
HCT VFR BLD AUTO: 37.9 % (ref 37–54)
HGB BLD-MCNC: 12.4 G/DL (ref 12.5–16.5)
HGB UR QL STRIP.AUTO: ABNORMAL
IMM GRANULOCYTES # BLD AUTO: <0.03 K/UL (ref 0–0.58)
IMM GRANULOCYTES NFR BLD: 0 % (ref 0–5)
KETONES UR STRIP-MCNC: NEGATIVE MG/DL
LEUKOCYTE ESTERASE UR QL STRIP: NEGATIVE
LYMPHOCYTES NFR BLD: 1.28 K/UL (ref 1.5–4)
LYMPHOCYTES RELATIVE PERCENT: 31 % (ref 20–42)
MCH RBC QN AUTO: 30.3 PG (ref 26–35)
MCHC RBC AUTO-ENTMCNC: 32.7 G/DL (ref 32–34.5)
MCV RBC AUTO: 92.7 FL (ref 80–99.9)
METHADONE UR QL: NEGATIVE
MONOCYTES NFR BLD: 0.35 K/UL (ref 0.1–0.95)
MONOCYTES NFR BLD: 8 % (ref 2–12)
NEUTROPHILS NFR BLD: 57 % (ref 43–80)
NEUTS SEG NFR BLD: 2.38 K/UL (ref 1.8–7.3)
NITRITE UR QL STRIP: NEGATIVE
OPIATES UR QL SCN: NEGATIVE
OXYCODONE UR QL SCN: NEGATIVE
PCP UR QL SCN: NEGATIVE
PH UR STRIP: 6 [PH] (ref 5–9)
PLATELET # BLD AUTO: 345 K/UL (ref 130–450)
PMV BLD AUTO: 9.5 FL (ref 7–12)
POTASSIUM SERPL-SCNC: 4 MMOL/L (ref 3.5–5)
PROT SERPL-MCNC: 6.3 G/DL (ref 6.4–8.3)
PROT UR STRIP-MCNC: NEGATIVE MG/DL
RBC # BLD AUTO: 4.09 M/UL (ref 3.8–5.8)
RBC #/AREA URNS HPF: ABNORMAL /HPF
SALICYLATES SERPL-MCNC: <0.3 MG/DL (ref 0–30)
SODIUM SERPL-SCNC: 142 MMOL/L (ref 132–146)
SP GR UR STRIP: >1.03 (ref 1–1.03)
TEST INFORMATION: ABNORMAL
TOXIC TRICYCLIC SC,BLOOD: NEGATIVE
UROBILINOGEN UR STRIP-ACNC: 0.2 EU/DL (ref 0–1)
WBC #/AREA URNS HPF: ABNORMAL /HPF
WBC OTHER # BLD: 4.2 K/UL (ref 4.5–11.5)

## 2024-01-02 PROCEDURE — 93005 ELECTROCARDIOGRAM TRACING: CPT | Performed by: EMERGENCY MEDICINE

## 2024-01-02 PROCEDURE — 80307 DRUG TEST PRSMV CHEM ANLYZR: CPT

## 2024-01-02 PROCEDURE — 81001 URINALYSIS AUTO W/SCOPE: CPT

## 2024-01-02 PROCEDURE — 80143 DRUG ASSAY ACETAMINOPHEN: CPT

## 2024-01-02 PROCEDURE — 82550 ASSAY OF CK (CPK): CPT

## 2024-01-02 PROCEDURE — 80053 COMPREHEN METABOLIC PANEL: CPT

## 2024-01-02 PROCEDURE — 80179 DRUG ASSAY SALICYLATE: CPT

## 2024-01-02 PROCEDURE — G0480 DRUG TEST DEF 1-7 CLASSES: HCPCS

## 2024-01-02 PROCEDURE — 85025 COMPLETE CBC W/AUTO DIFF WBC: CPT

## 2024-01-02 PROCEDURE — 99284 EMERGENCY DEPT VISIT MOD MDM: CPT

## 2024-01-02 NOTE — ED TRIAGE NOTES
Patient paranoid delusions and had thoughts of suicide prior to arrival but currently denies.  Wishes help with rehab.

## 2024-01-03 VITALS
WEIGHT: 150 LBS | TEMPERATURE: 98 F | RESPIRATION RATE: 18 BRPM | DIASTOLIC BLOOD PRESSURE: 61 MMHG | HEART RATE: 68 BPM | OXYGEN SATURATION: 96 % | SYSTOLIC BLOOD PRESSURE: 120 MMHG | HEIGHT: 71 IN | BODY MASS INDEX: 21 KG/M2

## 2024-01-03 LAB
CK SERPL-CCNC: 660 U/L (ref 20–200)
EKG ATRIAL RATE: 73 BPM
EKG P AXIS: 76 DEGREES
EKG P-R INTERVAL: 174 MS
EKG Q-T INTERVAL: 370 MS
EKG QRS DURATION: 92 MS
EKG QTC CALCULATION (BAZETT): 407 MS
EKG R AXIS: 79 DEGREES
EKG T AXIS: 68 DEGREES
EKG VENTRICULAR RATE: 73 BPM

## 2024-01-03 PROCEDURE — 93010 ELECTROCARDIOGRAM REPORT: CPT | Performed by: INTERNAL MEDICINE

## 2024-01-03 NOTE — ED NOTES
Discharge instructions reviewed with patient.  \"He states he will take discharge.\"  Belongings given to patient.    
Gave PT food tray   
Nurse to nurse report given to Adan HERNANDEZ.  
PEER called provide education and support   
PT completed an intake over the phone for AOD.    At this time, pt needs to be stable on Mh medication prior to attending treatment.    Pt is recommended to follow up with community MH services.    At this time he is not meeting criteria for in pt admission, denying SI.  Pt is homeless and he is at baseline.    Fer MORROW will be notified to help encourage in house safety plan.    Discussed d/c with Dr. Laura          
Pt belongings labeled and stored into Section G locker #30.   
Pt given crackers, orange juice, and breakfast tray  
use  Homelessness  MH diagnosis  Lack of essential needs  Lack of self care  Limited support  Multiple hospitalizations  Poor communication  History of malingering, manipulation and disruptive behaviors    Protective Factors:some information per recent chart history  Initiated ED visit  Steady income - SSI and own payee  No history of SI    Suicidal Ideations:  [x] Reports:    [x] Past [] Present   [x] Denies    Suicide Attempts:  [] Reports:   [x] Denies    C-SSRS Screening Completed by RN: Current Suicide Risk:  [] No Risk [] Low [x] Moderate [] High    Homicidal Ideations:  [] Reports:   [] Past [] Present   [x] Denies     Self Injurious/Self Mutilation Behaviors:  [] Reports:    [] Past [] Present   [x] Denies    Hallucinations/Delusions:  [] Reports:   [x] Denies     Substance Use/Alcohol Use/Addiction:  [x] Reports:   [] Denies   [x] SBIRT Screen Complete.     Current or Past Substance Abuse Treatment:  [] Yes, When and Where: Per chart, [facility] 2 years ago  [] No    Current or Past Mental Health Treatment:  [x] Yes, When and Where: Multiple hospitalizations  [] No    Legal Issues:  []  Yes (Specify)  [x]  No    Access to Weapons:  []  Yes (Specify)  [x]  No    Trauma History:  [x] Reports: per chart  [] Denies     Living Situation: Homeless    Employment: SSDI    Education Level: HS graduate    Violence Risk Screening:        Have you ever thought about hurting someone?   [x]  No  []  Yes (Ask the questions listed below)   When?    Did you follow through with the thoughts?      [] No     [] Yes- When and what happened?  2.  Have you ever threatened anyone?  [x]  No  []  Yes (Ask the questions listed below)   When and what happened?    Have you ever threatened someone with a gun, knife or other weapon?   []  No  []  Yes - When and what happened?  2. Have you ever had an order of protection taken out against you? []  Yes [x]  No  3. Have you ever been arrested due to violence? []  Yes [x]  No  4. Have you ever

## 2024-01-03 NOTE — CARE COORDINATION
Peer Recovery Support Note    Name: Joce LLAMAS Michael Carter.  Date: 1/3/2024    Chief Complaint   Patient presents with    Mental Health Problem     Feeling paranoid   thought process at times are disorganized.  Admits to suicidal thoughts prior to arrival but denies current thoughts.  Admits to using cocaine and amphetamines.        Peer Support met with patient.  [] Support and education provided  [] Resources provided   [] Treatment referral:   [x] Other:   [] Patient declined peer recovery services     Referred By: Kerrie (ANTONIO)    Notes: [facility] tried to do an assessment with Joce but were not successful and he was declined.     Signed: Vania Adams, 1/3/2024

## 2024-01-03 NOTE — DISCHARGE INSTRUCTIONS
ATTEND WALK IN SERVICES FROM 11AM-130PM  [facility]  Location: [facility] [place] [address]  Phone number: [phone]  Fax number: [phone]      Help Hotline 465 909-1521 or 1-931.497.9166 or 211   24 hour crisis line for the following [facility] [place]   [email or web address]    PEER WARM LINE: [phone]  Monday through Friday 4pm to 8pm and Saturday 1pm-3pm   You can call during these times to talk with a peer support person as needed Please reach out to one of the following community providers for treatment and support.   Help Hotline: 211, 666.779.8362 or 244-226-2817    [facility] Mental Health and Recovery Board [phone]  [facility] Mental Health and Recovery Board [phone]  [facility] Mental Health and Recovery Board [phone]  If you are in need of help and experiencing any barriers to care please contact help hotline 24 hours a day and/or your local board of mental health and recovery during normal business hours.   Detox program inpatient:   [facility] [phone]  [facility] [phone]  [facility] [phone]  [facility] [phone]  [facility] [phone]  VA services hotline 961-425-6538    Outpatient programs  [facility] outpatient treatment [phone]. [phone]  [place] services [phone]  [facility] [phone]  [facility] [phone]  VA services:   [facility] [phone]  [place] [phone]  [place] [phone]  [facility] [phone]  [facility] [phone]  [facility] [phone]  [facility] [phone]   [facility] [phone]  The counseling center [facility] [phone]    For additional resource support please feel free to contact the behavioral access line at [phone] or the Intensive outpatient department at

## 2024-01-08 ENCOUNTER — HOSPITAL ENCOUNTER (EMERGENCY)
Age: 44
Discharge: HOME | End: 2024-01-08
Payer: COMMERCIAL

## 2024-01-08 VITALS
RESPIRATION RATE: 18 BRPM | OXYGEN SATURATION: 96 % | SYSTOLIC BLOOD PRESSURE: 127 MMHG | DIASTOLIC BLOOD PRESSURE: 96 MMHG | HEART RATE: 74 BPM | TEMPERATURE: 98 F

## 2024-01-08 DIAGNOSIS — Z59.00 HOMELESS: Primary | ICD-10-CM

## 2024-01-08 PROCEDURE — 99282 EMERGENCY DEPT VISIT SF MDM: CPT

## 2024-01-08 SDOH — ECONOMIC STABILITY - HOUSING INSECURITY: HOMELESSNESS UNSPECIFIED: Z59.00

## 2024-01-09 NOTE — ED PROVIDER NOTES
Independent  HPI:  1/8/24, Time: 8:33 PM JAYLENE Rodriguez Jr. is a 43 y.o. male presenting to the ED for homeless.  Patient presents to the emergency department with wanting just to come out of the cold.  Patient does have a psychiatric history but denies any psychiatric concerns today, pleasant in conversation.  States that he would like to just sit in the waiting room to just get out of the cold for few minutes.  Patient otherwise denies suicidal or homicidal ideations denies any unusual depression as well as no associated hallucinations.  Patient pleasant in conversation.  Review of Systems:   A complete review of systems was performed and pertinent positives and negatives are stated within HPI, all other systems reviewed and are negative.          --------------------------------------------- PAST HISTORY ---------------------------------------------  Past Medical History:  has a past medical history of Depression and Schizoaffective disorder (HCC).    Past Surgical History:  has a past surgical history that includes Hip fracture surgery (Left, 9/12/2021); eye surgery (19 years ago); and Hip fracture surgery (Left, 9/28/2021).    Social History:  reports that he has been smoking cigarettes. He has a 12.0 pack-year smoking history. He has never used smokeless tobacco. He reports that he does not currently use alcohol. He reports current drug use. Frequency: 7.00 times per week. Drugs: Cocaine, Marijuana (Weed), and Methamphetamines (Crystal Meth).    Family History: family history includes Mental Illness in his mother; No Known Problems in his father; Substance Abuse in his mother.     The patient’s home medications have been reviewed.    Allergies: Chlorpheniramine-phenylephrine, Motrin [ibuprofen], Penicillins, and Rondec-d [chlophedianol-pseudoephedrine]    -------------------------------------------------- RESULTS -------------------------------------------------  All laboratory and radiology

## 2024-01-10 ENCOUNTER — HOSPITAL ENCOUNTER (EMERGENCY)
Age: 44
Discharge: HOME | End: 2024-01-10
Attending: EMERGENCY MEDICINE
Payer: COMMERCIAL

## 2024-01-10 VITALS
TEMPERATURE: 98.1 F | HEART RATE: 83 BPM | OXYGEN SATURATION: 100 % | SYSTOLIC BLOOD PRESSURE: 122 MMHG | DIASTOLIC BLOOD PRESSURE: 76 MMHG | RESPIRATION RATE: 20 BRPM

## 2024-01-10 DIAGNOSIS — Z59.00 HOMELESS: Primary | ICD-10-CM

## 2024-01-10 PROCEDURE — 99282 EMERGENCY DEPT VISIT SF MDM: CPT

## 2024-01-10 SDOH — ECONOMIC STABILITY - HOUSING INSECURITY: HOMELESSNESS UNSPECIFIED: Z59.00

## 2024-01-10 ASSESSMENT — LIFESTYLE VARIABLES
HOW MANY STANDARD DRINKS CONTAINING ALCOHOL DO YOU HAVE ON A TYPICAL DAY: PATIENT DOES NOT DRINK
HOW OFTEN DO YOU HAVE A DRINK CONTAINING ALCOHOL: NEVER
HOW OFTEN DO YOU HAVE A DRINK CONTAINING ALCOHOL: NEVER

## 2024-01-10 ASSESSMENT — PAIN - FUNCTIONAL ASSESSMENT
PAIN_FUNCTIONAL_ASSESSMENT: NONE - DENIES PAIN
PAIN_FUNCTIONAL_ASSESSMENT: NONE - DENIES PAIN

## 2024-01-10 NOTE — ED PROVIDER NOTES
patient has no edema.  Patient denies homicidal suicidal thoughts.  Reports no active hallucinations.  Patient gait steady and normal.  Neurologically intact.  Patient differential includes homeless as well as mood disorder  Patient was giving something to eat here in the emergency room.  He was eating some vitamins on the waiting room.  Patient resting comfortably vital signs are stable.  Again patient denying any homicidal suicidal thoughts.  Patient will be observed here in the emergency room plan will be to discharge.    Social Determinants affecting Dx or Tx: Patient does smoke    Chronic Conditions: History of schizophrenia history of depression    Records Reviewed:   Patient was last seen on 1/8/2024 for homelessness      Re-Evaluations:             Re-evaluation.  Patient’s symptoms show no change  Patient reevaluate patient in no distress plan will be to discharge.  Patient resting comfortably denying any homicidal suicidal thoughts vital signs are stable.    Consultations:                 Critical Care:         This patient's ED course included: a personal history and physicial eaxmination    This patient has been closely monitored during their ED course.    Counseling:   The emergency provider has spoken with the patient and discussed today’s results, in addition to providing specific details for the plan of care and counseling regarding the diagnosis and prognosis.  Questions are answered at this time and they are agreeable with the plan.       --------------------------------- IMPRESSION AND DISPOSITION ---------------------------------    IMPRESSION  1. Homeless        DISPOSITION  Disposition: Discharge to home  Patient condition is stable        NOTE: This report was transcribed using voice recognition software. Every effort was made to ensure accuracy; however, inadvertent computerized transcription errors may be present          Nabil Garner MD  01/10/24 3738       Nabil Garner,

## 2024-01-16 ENCOUNTER — HOSPITAL ENCOUNTER (EMERGENCY)
Age: 44
Discharge: HOME | End: 2024-01-16
Payer: COMMERCIAL

## 2024-01-16 VITALS
TEMPERATURE: 98.1 F | DIASTOLIC BLOOD PRESSURE: 81 MMHG | SYSTOLIC BLOOD PRESSURE: 134 MMHG | OXYGEN SATURATION: 98 % | RESPIRATION RATE: 18 BRPM | HEART RATE: 75 BPM

## 2024-01-16 DIAGNOSIS — Z00.00 WELL EXAM WITHOUT ABNORMAL FINDINGS OF PATIENT 18 YEARS OF AGE OR OLDER: Primary | ICD-10-CM

## 2024-01-16 PROCEDURE — 99282 EMERGENCY DEPT VISIT SF MDM: CPT

## 2024-01-16 NOTE — ED PROVIDER NOTES
Care/Counseling:  Bella Raines PA-C reviewed today's visit with the patient in addition to providing specific details for the plan of care and counseling regarding the diagnosis and prognosis.  Questions are answered at this time and are agreeable with the plan.    ASSESSMENT     1. Well exam without abnormal findings of patient 18 years of age or older        DISPOSITION   Discharged home.  Patient condition is stable    Discharge Instructions:   Patient referred to  No follow-up provider specified.  NEW MEDICATIONS     New Prescriptions    No medications on file     Electronically signed by Bella Raines PA-C   DD: 1/16/24  **This report was transcribed using voice recognition software. Every effort was made to ensure accuracy; however, inadvertent computerized transcription errors may be present.  END OF ED PROVIDER NOTE      Bella Raines PA-C  01/16/24 1630       Bella Raines PA-C  01/16/24 163

## 2024-01-17 ENCOUNTER — HOSPITAL ENCOUNTER (EMERGENCY)
Age: 44
Discharge: HOME | End: 2024-01-17
Attending: EMERGENCY MEDICINE
Payer: COMMERCIAL

## 2024-01-17 VITALS
TEMPERATURE: 97.4 F | OXYGEN SATURATION: 98 % | DIASTOLIC BLOOD PRESSURE: 69 MMHG | RESPIRATION RATE: 15 BRPM | HEART RATE: 67 BPM | SYSTOLIC BLOOD PRESSURE: 111 MMHG

## 2024-01-17 DIAGNOSIS — Z59.00 HOMELESS: Primary | ICD-10-CM

## 2024-01-17 DIAGNOSIS — Z86.59 HISTORY OF SCHIZOPHRENIA: ICD-10-CM

## 2024-01-17 PROCEDURE — 99282 EMERGENCY DEPT VISIT SF MDM: CPT

## 2024-01-17 SDOH — ECONOMIC STABILITY - HOUSING INSECURITY: HOMELESSNESS UNSPECIFIED: Z59.00

## 2024-01-17 ASSESSMENT — LIFESTYLE VARIABLES
HOW MANY STANDARD DRINKS CONTAINING ALCOHOL DO YOU HAVE ON A TYPICAL DAY: PATIENT DECLINED
HOW OFTEN DO YOU HAVE A DRINK CONTAINING ALCOHOL: PATIENT DECLINED

## 2024-01-17 NOTE — ED PROVIDER NOTES
course.    Counseling:   The emergency provider has spoken with the patient and discussed today’s results, in addition to providing specific details for the plan of care and counseling regarding the diagnosis and prognosis.  Questions are answered at this time and they are agreeable with the plan.       --------------------------------- IMPRESSION AND DISPOSITION ---------------------------------    IMPRESSION  1. Homeless    2. History of schizophrenia        DISPOSITION  Disposition: Discharge to home  Patient condition is stable        NOTE: This report was transcribed using voice recognition software. Every effort was made to ensure accuracy; however, inadvertent computerized transcription errors may be present          Nabil Garner MD  01/17/24 0546       Nabil Garner MD  01/17/24 0551

## 2024-01-21 VITALS
HEIGHT: 71 IN | SYSTOLIC BLOOD PRESSURE: 144 MMHG | TEMPERATURE: 98.1 F | DIASTOLIC BLOOD PRESSURE: 82 MMHG | RESPIRATION RATE: 20 BRPM | HEART RATE: 90 BPM | BODY MASS INDEX: 21 KG/M2 | OXYGEN SATURATION: 99 % | WEIGHT: 150 LBS

## 2024-01-21 PROCEDURE — 99281 EMR DPT VST MAYX REQ PHY/QHP: CPT

## 2024-01-22 ENCOUNTER — HOSPITAL ENCOUNTER (EMERGENCY)
Age: 44
Discharge: HOME | End: 2024-01-22
Payer: COMMERCIAL

## 2024-01-22 DIAGNOSIS — Z59.00 HOMELESS: Primary | ICD-10-CM

## 2024-01-22 SDOH — ECONOMIC STABILITY - HOUSING INSECURITY: HOMELESSNESS UNSPECIFIED: Z59.00

## 2024-01-22 NOTE — ED PROVIDER NOTES
was given the following medications:  Medications - No data to display       PROCEDURES   none    REASSESSMENT   1/22/24       Time: midnight  Patient’s condition remains unchanged. He is sleeping in the back to the ER WR.     CONSULTS:  None  DIFFERENTIAL DX_MDM   MDM:   Social Determinants : Patient is Homeless    Records Reviewed : None_ n/a per encounter visit    CC/HPI Summary, DDx, ED Course, and Reassessment: Patient presents with Cold Exposure (Pt stated \"I would like to be a lobby guest because it is cold outside and I overstayed my welcome where I was staying last.\")    Patient presents to the emergency department because he is cold.  He has no additional complaints.  He is unkempt but well-appearing.  Hypertensive with blood pressure 144/82, but vital signs are otherwise normal.    Plan of Care/Counseling:  Dhaval Iqbal PA-C reviewed today's visit with the patient in addition to providing specific details for the plan of care and counseling regarding the diagnosis and prognosis.  Questions are answered at this time and are agreeable with the plan.    ASSESSMENT     1. Homeless        DISPOSITION   Discharged home.  Patient condition is good    Discharge Instructions:   Patient referred to  Internal Medicine Clinic  [address] [address]  [phone]  Schedule an appointment as soon as possible for a visit   to establish primary care physician    [facility] Emergency Department  [address] [address]  [phone]  Go to   If symptoms worsen    NEW MEDICATIONS     New Prescriptions    No medications on file     Electronically signed by Dhaval Iqbal PA-C   DD: 1/22/24  **This report was transcribed using voice recognition software. Every effort was made to ensure accuracy; however, inadvertent computerized transcription errors may be present.  END OF ED PROVIDER NOTE        Dhaval Iqbal PA-C  01/22/24 0011  ATTENDING PROVIDER

## 2024-01-22 NOTE — DISCHARGE INSTRUCTIONS
Chemical Dependence & Behavioral Health Resources  2018  For Residents of [facility] and [facility]  Call Help-Hotline [facility] [phone]    For a complete list of treatment centers in your area please visit:   “[facility] Charge [place]” website at   [email or web address]    * Alcoholics Anonymous [phone]                *Narcotics Anonymous [phone]    [facility] [phone]  [address] [place] [address]  [email or web address]/  *Residents of:  [facility] Residents Only for Private Insurance.     ALL other [facility] Residents on Medicaid Accepted.   *Payments Accepted: Medicaid or Self-pay ONLY for In-Patient Services.         Private Insurance accepted for Outpatient Programs   *Services Offered: Outpatient Behavioral Health & Chemical Dependency.      [facility] [phone]  [address] [place] [address]   *Payments Accepted: Medicaid, self-pay  [email or web address]      [facility] [phone]  [address] [place] [address]. [place] [address]  [email or web address]/   *Residents of:  [facility] Residents Only   *Payments Accepted: Medicaid or Self-pay ONLY   *Services Offered: Outpatient Behavioral Health & Chemical Dependency.      [facility] [phone]  or [phone]  [address] [place] [address], [place] [address]  [email or web address].Tag & See/   *Residents of:  All [facility] Residents   *Payments Accepted: Self-Pay and Private Insurance   *Services Offered: Detox & Outpatient Substance Abuse Programs.      [facility] [phone]) [phone]. [phone] [place] [address]  [email or web address]  (MUST CALL DAILY FOR BED AVAILABILITY)   *Residents of:  All [facility] Residents   *Payments Accepted: Medicaid or Self-Pay   *Services Offered: Detox & Outpatient Substance Abuse Programs.      [facility] 
cbc, ucg t&s done

## 2024-01-23 ENCOUNTER — HOSPITAL ENCOUNTER (EMERGENCY)
Age: 44
Discharge: HOME | End: 2024-01-24
Payer: COMMERCIAL

## 2024-01-23 DIAGNOSIS — Z59.00 HOMELESS: Primary | ICD-10-CM

## 2024-01-23 PROCEDURE — 99281 EMR DPT VST MAYX REQ PHY/QHP: CPT

## 2024-01-23 SDOH — ECONOMIC STABILITY - HOUSING INSECURITY: HOMELESSNESS UNSPECIFIED: Z59.00

## 2024-01-24 VITALS
OXYGEN SATURATION: 97 % | HEART RATE: 69 BPM | RESPIRATION RATE: 20 BRPM | TEMPERATURE: 98 F | SYSTOLIC BLOOD PRESSURE: 113 MMHG | DIASTOLIC BLOOD PRESSURE: 64 MMHG

## 2024-01-24 ASSESSMENT — PAIN - FUNCTIONAL ASSESSMENT: PAIN_FUNCTIONAL_ASSESSMENT: NONE - DENIES PAIN

## 2024-01-24 NOTE — ED PROVIDER NOTES
Independent   HPI:  1/24/24, Time: 1:01 AM JAYLENE Rodriguez Jr. is a 43 y.o. male presenting to the ED for being homeless.  Patient presents to the emergency department states that he like to come inside and get out of the cold.  Patient is denying any psychiatric concerns, no noted suicidal homicidal ideations and no hallucinations.  Speech is clear and coherent, no rambling.  Patient also appropriate with conversation.    Review of Systems:   A complete review of systems was performed and pertinent positives and negatives are stated within HPI, all other systems reviewed and are negative.          --------------------------------------------- PAST HISTORY ---------------------------------------------  Past Medical History:  has a past medical history of Depression and Schizoaffective disorder (HCC).    Past Surgical History:  has a past surgical history that includes Hip fracture surgery (Left, 9/12/2021); eye surgery (19 years ago); and Hip fracture surgery (Left, 9/28/2021).    Social History:  reports that he has been smoking cigarettes. He has a 12.0 pack-year smoking history. He has never used smokeless tobacco. He reports that he does not currently use alcohol. He reports current drug use. Frequency: 7.00 times per week. Drugs: Cocaine, Marijuana (Weed), and Methamphetamines (Crystal Meth).    Family History: family history includes Mental Illness in his mother; No Known Problems in his father; Substance Abuse in his mother.     The patient’s home medications have been reviewed.    Allergies: Chlorpheniramine-phenylephrine, Motrin [ibuprofen], Penicillins, and Rondec-d [chlophedianol-pseudoephedrine]    -------------------------------------------------- RESULTS -------------------------------------------------  All laboratory and radiology results have been personally reviewed by myself   LABS:  No results found for this visit on 01/23/24.    RADIOLOGY:  Interpreted by Radiologist.  No orders

## 2024-01-26 ENCOUNTER — HOSPITAL ENCOUNTER (EMERGENCY)
Age: 44
Discharge: LEFT BEFORE DISCHARGE | End: 2024-01-26
Attending: EMERGENCY MEDICINE
Payer: COMMERCIAL

## 2024-01-26 VITALS
OXYGEN SATURATION: 98 % | WEIGHT: 150 LBS | HEART RATE: 80 BPM | TEMPERATURE: 98 F | SYSTOLIC BLOOD PRESSURE: 154 MMHG | DIASTOLIC BLOOD PRESSURE: 78 MMHG | RESPIRATION RATE: 18 BRPM | HEIGHT: 71 IN | BODY MASS INDEX: 21 KG/M2

## 2024-01-26 DIAGNOSIS — Z59.00 HOMELESSNESS: ICD-10-CM

## 2024-01-26 DIAGNOSIS — F41.1 ANXIETY STATE: Primary | ICD-10-CM

## 2024-01-26 PROCEDURE — 99281 EMR DPT VST MAYX REQ PHY/QHP: CPT

## 2024-01-26 SDOH — ECONOMIC STABILITY - HOUSING INSECURITY: HOMELESSNESS UNSPECIFIED: Z59.00

## 2024-01-26 ASSESSMENT — PAIN - FUNCTIONAL ASSESSMENT: PAIN_FUNCTIONAL_ASSESSMENT: NONE - DENIES PAIN

## 2024-01-26 NOTE — ED PROVIDER NOTES
distress  Cardiovascular:  Regular rate. Regular rhythm.  2+ distal pulses. Equal extremity pulses.   Chest: No chest wall tenderness  GI:  Abdomen Soft, Non tender,    Musculoskeletal: Moves all extremities x 4. Warm and well perfused Capillary refill <3 seconds  Integument: skin warm and dry. No rashes.   Neurologic: GCS 15, no focal deficits,    Psychiatric: calm  Affect            DIAGNOSTIC RESULTS   LABS:    Labs Reviewed - No data to display    As interpreted by me, the above displayed labs are abnormal. All other labs obtained during this visit were within normal range or not returned as of this dictation.      EKG Interpretation  Interpreted by emergency department physician, Luis Alfredo Muse DO               RADIOLOGY:   Non-plain film images such as CT, Ultrasound and MRI are read by the radiologist. Plain radiographic images are visualized and preliminarily interpreted by the ED Provider with the below findings:         Interpretation per the Radiologist below, if available at the time of this note:    No orders to display     No results found.    No results found.    PROCEDURES   Unless otherwise noted below, none          CRITICAL CARE TIME (.cct)        PAST MEDICAL HISTORY/Chronic Conditions Affecting Care      has a past medical history of Depression and Schizoaffective disorder (HCC).     EMERGENCY DEPARTMENT COURSE    Vitals:    Vitals:    01/26/24 0539 01/26/24 0547   BP:  (!) 154/78   Pulse: 85 80   Resp:  18   Temp: 96.9 °F (36.1 °C) 98 °F (36.7 °C)   TempSrc: Temporal Oral   SpO2: 100% 98%   Weight:  68 kg (150 lb)   Height:  1.803 m (5' 11\")       Patient was given the following medications:  Medications - No data to display               Medical Decision Making/Differential Diagnosis:    CC/HPI Summary, Social Determinants of health, Records Reviewed, DDx, testing done/not done, ED Course, Reassessment, disposition considerations/shared decision making with patient, consults,

## 2024-01-26 NOTE — ED NOTES
After checking main waiting area including the restrooms, patient unable to be located for evaluation at this time.

## 2024-01-27 ENCOUNTER — HOSPITAL ENCOUNTER (EMERGENCY)
Age: 44
Discharge: HOME | End: 2024-01-27
Attending: EMERGENCY MEDICINE
Payer: COMMERCIAL

## 2024-01-27 VITALS
DIASTOLIC BLOOD PRESSURE: 79 MMHG | OXYGEN SATURATION: 99 % | SYSTOLIC BLOOD PRESSURE: 137 MMHG | HEART RATE: 79 BPM | TEMPERATURE: 98 F | RESPIRATION RATE: 16 BRPM

## 2024-01-27 DIAGNOSIS — F20.0 PARANOID SCHIZOPHRENIA, CHRONIC CONDITION (HCC): Primary | ICD-10-CM

## 2024-01-27 DIAGNOSIS — Z59.00 HOMELESSNESS: ICD-10-CM

## 2024-01-27 PROCEDURE — 99283 EMERGENCY DEPT VISIT LOW MDM: CPT

## 2024-01-27 PROCEDURE — 93005 ELECTROCARDIOGRAM TRACING: CPT | Performed by: EMERGENCY MEDICINE

## 2024-01-27 SDOH — ECONOMIC STABILITY - HOUSING INSECURITY: HOMELESSNESS UNSPECIFIED: Z59.00

## 2024-01-27 ASSESSMENT — LIFESTYLE VARIABLES: HOW OFTEN DO YOU HAVE A DRINK CONTAINING ALCOHOL: NEVER

## 2024-01-27 NOTE — DISCHARGE INSTRUCTIONS
Help Hotline 510 445-9504 or 1-989.930.9958 or 211   24 hour crisis line for the following counties  [facility]   [email or web address]    [facility] LINE: 1-304.703.6044  Monday through Friday 4pm to 8pm and Saturday 1pm-3pm   You can call during these times to talk with a peer support person as needed
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
## 2024-01-28 LAB
EKG ATRIAL RATE: 52 BPM
EKG P AXIS: 74 DEGREES
EKG P-R INTERVAL: 162 MS
EKG Q-T INTERVAL: 406 MS
EKG QRS DURATION: 96 MS
EKG QTC CALCULATION (BAZETT): 377 MS
EKG R AXIS: 84 DEGREES
EKG T AXIS: 71 DEGREES
EKG VENTRICULAR RATE: 52 BPM

## 2024-01-28 PROCEDURE — 93010 ELECTROCARDIOGRAM REPORT: CPT | Performed by: INTERNAL MEDICINE

## 2024-01-28 NOTE — ED NOTES
Other staff obtained vitals in preparation for discharge but then patient immediately went back into restroom and again began washing in sink.

## 2024-01-28 NOTE — ED NOTES
Received report from MARY Wheeler. Patient has been marked ready for discharge by physician. Patient okay for discharge per  Domi.

## 2024-01-28 NOTE — ED NOTES
Pt has been in the bathroom sponge bathing for an hour unless someone use the toilet and go back continue to sponge bath.

## 2024-01-28 NOTE — ED NOTES
Patient continues to go in and out of restroom. [place]  asked patient to return to room. Discharge instructions printed.

## 2024-02-08 ENCOUNTER — HOSPITAL ENCOUNTER (EMERGENCY)
Age: 44
Discharge: HOME HEALTH | End: 2024-02-08
Payer: COMMERCIAL

## 2024-02-08 VITALS
RESPIRATION RATE: 16 BRPM | TEMPERATURE: 97.2 F | SYSTOLIC BLOOD PRESSURE: 106 MMHG | HEART RATE: 75 BPM | OXYGEN SATURATION: 99 % | DIASTOLIC BLOOD PRESSURE: 77 MMHG

## 2024-02-08 DIAGNOSIS — Z86.59 HISTORY OF SCHIZOPHRENIA: ICD-10-CM

## 2024-02-08 DIAGNOSIS — Z59.00 HOMELESSNESS: Primary | ICD-10-CM

## 2024-02-08 DIAGNOSIS — R11.2 NAUSEA AND VOMITING, UNSPECIFIED VOMITING TYPE: ICD-10-CM

## 2024-02-08 PROCEDURE — 99282 EMERGENCY DEPT VISIT SF MDM: CPT

## 2024-02-08 RX ORDER — ONDANSETRON 4 MG/1
4 TABLET, ORALLY DISINTEGRATING ORAL ONCE
Status: DISCONTINUED | OUTPATIENT
Start: 2024-02-08 | End: 2024-02-08

## 2024-02-08 SDOH — ECONOMIC STABILITY - HOUSING INSECURITY: HOMELESSNESS UNSPECIFIED: Z59.00

## 2024-02-09 NOTE — ED PROVIDER NOTES
Independent EVIN Visit.        HPI:  2/8/24, Time: 8:57 PM JAYLENE Rodriguez Jr. is a 43 y.o. male presenting to the ED for nausea and vomiting, beginning several days ago.  The complaint has been intermittent, mild in severity, and worsened by eating.  Patient has known psychiatric illness and homelessness.  Is reporting to the emergency department today due to nausea and vomiting however is requesting to eat.  Denies any abdominal pain or back pain.  Denies any suicidal or homicidal ideation.  Afebrile without recent travel or sick contacts.  Patient denies other symptoms at this time.    Review of Systems:   A complete review of systems was performed and pertinent positives and negatives are stated within HPI, all other systems reviewed and are negative.          --------------------------------------------- PAST HISTORY ---------------------------------------------  Past Medical History:  has a past medical history of Depression and Schizoaffective disorder (HCC).    Past Surgical History:  has a past surgical history that includes Hip fracture surgery (Left, 9/12/2021); eye surgery (19 years ago); and Hip fracture surgery (Left, 9/28/2021).    Social History:  reports that he has been smoking cigarettes. He has a 12.0 pack-year smoking history. He has never used smokeless tobacco. He reports that he does not currently use alcohol. He reports current drug use. Frequency: 7.00 times per week. Drugs: Cocaine, Marijuana (Weed), and Methamphetamines (Crystal Meth).    Family History: family history includes Mental Illness in his mother; No Known Problems in his father; Substance Abuse in his mother.     The patient’s home medications have been reviewed.    Allergies: Chlorpheniramine-phenylephrine, Motrin [ibuprofen], Penicillins, and Rondec-d [chlophedianol-pseudoephedrine]    -------------------------------------------------- RESULTS -------------------------------------------------  All laboratory and

## 2024-02-14 ENCOUNTER — APPOINTMENT (OUTPATIENT)
Dept: GENERAL RADIOLOGY | Age: 44
End: 2024-02-14
Payer: COMMERCIAL

## 2024-02-14 ENCOUNTER — HOSPITAL ENCOUNTER (EMERGENCY)
Age: 44
Discharge: HOME | End: 2024-02-14
Attending: STUDENT IN AN ORGANIZED HEALTH CARE EDUCATION/TRAINING PROGRAM
Payer: COMMERCIAL

## 2024-02-14 VITALS
DIASTOLIC BLOOD PRESSURE: 70 MMHG | TEMPERATURE: 97.5 F | SYSTOLIC BLOOD PRESSURE: 114 MMHG | RESPIRATION RATE: 18 BRPM | HEART RATE: 77 BPM | OXYGEN SATURATION: 98 %

## 2024-02-14 DIAGNOSIS — M79.671 PAIN IN BOTH FEET: Primary | ICD-10-CM

## 2024-02-14 DIAGNOSIS — M79.672 PAIN IN BOTH FEET: Primary | ICD-10-CM

## 2024-02-14 PROCEDURE — 73620 X-RAY EXAM OF FOOT: CPT

## 2024-02-14 PROCEDURE — 6370000000 HC RX 637 (ALT 250 FOR IP): Performed by: STUDENT IN AN ORGANIZED HEALTH CARE EDUCATION/TRAINING PROGRAM

## 2024-02-14 PROCEDURE — 99283 EMERGENCY DEPT VISIT LOW MDM: CPT

## 2024-02-14 RX ORDER — ACETAMINOPHEN 325 MG/1
650 TABLET ORAL ONCE
Status: COMPLETED | OUTPATIENT
Start: 2024-02-14 | End: 2024-02-14

## 2024-02-14 RX ADMIN — ACETAMINOPHEN 650 MG: 325 TABLET ORAL at 05:55

## 2024-02-14 NOTE — ED PROVIDER NOTES
Schizoaffective disorder (HCC).     EMERGENCY DEPARTMENT COURSE    Vitals:    Vitals:    02/14/24 0536 02/14/24 0539   BP:  114/70   Pulse: 77    Resp:  18   Temp: 97.5 °F (36.4 °C)    TempSrc: Temporal    SpO2: 98%        Patient was given the following medications:  Medications   acetaminophen (TYLENOL) tablet 650 mg (650 mg Oral Given 2/14/24 0555)                 Medical Decision Making/Differential Diagnosis:    CC/HPI Summary, Social Determinants of health, Records Reviewed, DDx, testing done/not done, ED Course, Reassessment, disposition considerations/shared decision making with patient, consults, disposition:        The patient is a 43-year-old male presenting emerged part complaining of foot pain.  Patient is hemodynamically stable, nontoxic, no acute distress.    Patient is a daily smoker which is the only known social detriment to his health along with his history of drug use.  Patient was admitted to psychiatry in November 2023.  Records reviewed from this visit.     Differential diagnose includes was not limited to dry skin versus fracture.    Patient treated with oral Tylenol and also given Aquaphor for his dry feet.  Recommended him to continue using ointments at home.  X-rays reassuring and patient discharged in stable condition with strict return precautions.       CONSULTS: (Who and What was discussed)  None        I am the Primary Clinician of Record.    FINAL IMPRESSION      1. Pain in both feet          DISPOSITION/PLAN     DISPOSITION Decision To Discharge 02/14/2024 07:40:30 AM      PATIENT REFERRED TO:  Follow up with your doctor or call [phone] for a family doctor.  Follow up with your doctor or call [phone] for a family doctor.  Schedule an appointment as soon as possible for a visit         DISCHARGE MEDICATIONS:  Discharge Medication List as of 2/14/2024  7:52 AM          DISCONTINUED MEDICATIONS:  Discharge Medication List as of 2/14/2024  7:52 AM                 (Please

## 2024-02-15 ENCOUNTER — HOSPITAL ENCOUNTER (EMERGENCY)
Age: 44
Discharge: HOME | End: 2024-02-15
Payer: COMMERCIAL

## 2024-02-15 VITALS
RESPIRATION RATE: 20 BRPM | SYSTOLIC BLOOD PRESSURE: 130 MMHG | DIASTOLIC BLOOD PRESSURE: 57 MMHG | HEART RATE: 63 BPM | OXYGEN SATURATION: 100 % | TEMPERATURE: 97.4 F

## 2024-02-15 DIAGNOSIS — Z59.00 HOMELESSNESS: Primary | ICD-10-CM

## 2024-02-15 DIAGNOSIS — F39 MOOD DISORDER (HCC): ICD-10-CM

## 2024-02-15 PROCEDURE — 99282 EMERGENCY DEPT VISIT SF MDM: CPT

## 2024-02-15 SDOH — ECONOMIC STABILITY - HOUSING INSECURITY: HOMELESSNESS UNSPECIFIED: Z59.00
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
## 2024-04-27 ENCOUNTER — HOSPITAL ENCOUNTER (EMERGENCY)
Age: 44
Discharge: HOME | End: 2024-04-27
Attending: EMERGENCY MEDICINE
Payer: COMMERCIAL

## 2024-04-27 VITALS
RESPIRATION RATE: 18 BRPM | OXYGEN SATURATION: 98 % | WEIGHT: 150 LBS | DIASTOLIC BLOOD PRESSURE: 59 MMHG | TEMPERATURE: 98 F | HEIGHT: 71 IN | SYSTOLIC BLOOD PRESSURE: 105 MMHG | HEART RATE: 98 BPM | BODY MASS INDEX: 21 KG/M2

## 2024-04-27 DIAGNOSIS — Z59.00 HOMELESSNESS: Primary | ICD-10-CM

## 2024-04-27 PROCEDURE — 99282 EMERGENCY DEPT VISIT SF MDM: CPT

## 2024-04-27 SDOH — ECONOMIC STABILITY - HOUSING INSECURITY: HOMELESSNESS UNSPECIFIED: Z59.00

## 2024-04-27 ASSESSMENT — PAIN - FUNCTIONAL ASSESSMENT: PAIN_FUNCTIONAL_ASSESSMENT: NONE - DENIES PAIN
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
## 2024-04-28 VITALS
OXYGEN SATURATION: 93 % | RESPIRATION RATE: 16 BRPM | SYSTOLIC BLOOD PRESSURE: 111 MMHG | HEART RATE: 64 BPM | DIASTOLIC BLOOD PRESSURE: 76 MMHG | TEMPERATURE: 97.9 F

## 2024-04-28 PROCEDURE — 99282 EMERGENCY DEPT VISIT SF MDM: CPT

## 2024-04-29 ENCOUNTER — HOSPITAL ENCOUNTER (EMERGENCY)
Age: 44
Discharge: LEFT BEFORE DISCHARGE | End: 2024-04-29
Attending: STUDENT IN AN ORGANIZED HEALTH CARE EDUCATION/TRAINING PROGRAM
Payer: COMMERCIAL

## 2024-04-29 ENCOUNTER — HOSPITAL ENCOUNTER (EMERGENCY)
Age: 44
Discharge: HOME | End: 2024-04-29
Payer: COMMERCIAL

## 2024-04-29 VITALS
OXYGEN SATURATION: 95 % | RESPIRATION RATE: 19 BRPM | TEMPERATURE: 98 F | HEART RATE: 98 BPM | BODY MASS INDEX: 21 KG/M2 | WEIGHT: 150 LBS | SYSTOLIC BLOOD PRESSURE: 115 MMHG | HEIGHT: 71 IN | DIASTOLIC BLOOD PRESSURE: 83 MMHG

## 2024-04-29 DIAGNOSIS — Z59.00 HOMELESSNESS: Primary | ICD-10-CM

## 2024-04-29 DIAGNOSIS — S09.90XA CLOSED HEAD INJURY, INITIAL ENCOUNTER: ICD-10-CM

## 2024-04-29 DIAGNOSIS — Z53.21 ELOPED FROM EMERGENCY DEPARTMENT: ICD-10-CM
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
## 2024-04-29 PROCEDURE — 99281 EMR DPT VST MAYX REQ PHY/QHP: CPT

## 2024-04-29 SDOH — ECONOMIC STABILITY - HOUSING INSECURITY: HOMELESSNESS UNSPECIFIED: Z59.00

## 2024-04-29 ASSESSMENT — PAIN - FUNCTIONAL ASSESSMENT: PAIN_FUNCTIONAL_ASSESSMENT: NONE - DENIES PAIN

## 2024-04-29 NOTE — DISCHARGE INSTRUCTIONS
Today you refused to get the CT scan of the brain and chest x-ray, return back for any unusual nausea or vomiting, headaches, Vision changes or difficulty ambulating
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
## 2024-04-30 ENCOUNTER — HOSPITAL ENCOUNTER (EMERGENCY)
Age: 44
Discharge: HOME | End: 2024-04-30
Attending: STUDENT IN AN ORGANIZED HEALTH CARE EDUCATION/TRAINING PROGRAM
Payer: COMMERCIAL

## 2024-04-30 VITALS
RESPIRATION RATE: 20 BRPM | WEIGHT: 150 LBS | TEMPERATURE: 98 F | BODY MASS INDEX: 21 KG/M2 | OXYGEN SATURATION: 98 % | HEIGHT: 71 IN | DIASTOLIC BLOOD PRESSURE: 78 MMHG | HEART RATE: 72 BPM | SYSTOLIC BLOOD PRESSURE: 130 MMHG

## 2024-04-30 VITALS
SYSTOLIC BLOOD PRESSURE: 108 MMHG | TEMPERATURE: 98.1 F | RESPIRATION RATE: 16 BRPM | HEART RATE: 79 BPM | HEIGHT: 71 IN | DIASTOLIC BLOOD PRESSURE: 70 MMHG | WEIGHT: 150 LBS | OXYGEN SATURATION: 92 % | BODY MASS INDEX: 21 KG/M2

## 2024-04-30 DIAGNOSIS — Z59.00 HOMELESSNESS: ICD-10-CM

## 2024-04-30 DIAGNOSIS — F20.0 PARANOID SCHIZOPHRENIA (HCC): Primary | ICD-10-CM

## 2024-04-30 PROCEDURE — 99281 EMR DPT VST MAYX REQ PHY/QHP: CPT

## 2024-04-30 SDOH — ECONOMIC STABILITY - HOUSING INSECURITY: HOMELESSNESS UNSPECIFIED: Z59.00

## 2024-04-30 ASSESSMENT — PAIN SCALES - GENERAL: PAINLEVEL_OUTOF10: 1

## 2024-04-30 ASSESSMENT — PAIN DESCRIPTION - FREQUENCY: FREQUENCY: CONTINUOUS

## 2024-04-30 ASSESSMENT — PAIN DESCRIPTION - ORIENTATION: ORIENTATION: LEFT

## 2024-04-30 ASSESSMENT — PAIN DESCRIPTION - LOCATION: LOCATION: ANKLE

## 2024-04-30 ASSESSMENT — PAIN - FUNCTIONAL ASSESSMENT
PAIN_FUNCTIONAL_ASSESSMENT: 0-10
PAIN_FUNCTIONAL_ASSESSMENT: NONE - DENIES PAIN

## 2024-04-30 ASSESSMENT — PAIN DESCRIPTION - PAIN TYPE: TYPE: ACUTE PAIN

## 2024-04-30 NOTE — ED PROVIDER NOTES
[facility] EMERGENCY DEPARTMENT  EMERGENCY DEPARTMENT ENCOUNTER        Pt Name: Joce Rodriguez Jr.  MRN: [id number]  Birthdate 1980  Date of evaluation: 4/29/2024  Provider: Lexie Willard DO  PCP: No primary care provider on file.  Note Started: 8:14 PM EDT 4/29/24    CHIEF COMPLAINT       Chief Complaint   Patient presents with    Homeless     PT reports [facility] and is trying to get back there.  Denies SI/HI; pt denies A/V hallucinations.        HISTORY OF PRESENT ILLNESS: 1 or more Elements   History From: patient    Limitations to history : None    Joce Rodriguez Jr. is a 43 y.o. male with a history of schizophrenia presenting to the emergency department for homelessness.  Arrival complaint states paranoid but patient is not a more paranoid than his baseline due to his chronic schizophrenia.  Patient states that he missed his check-in at the \"[facility]in center\" and was trying to get back there.  Patient denies any suicidal ideations, homicidal ideations, visual hallucinations, auditory hallucinations.  He is not having any pain anywhere.  He states that it is hot out and he is currently homeless so he was just looking for somewhere to go.    Nursing Notes were all reviewed and agreed with or any disagreements were addressed in the HPI.    REVIEW OF SYSTEMS :      Review of Systems    Positives and Pertinent negatives as per HPI.     SURGICAL HISTORY     Past Surgical History:   Procedure Laterality Date    EYE SURGERY  19 years ago    HIP FRACTURE SURGERY Left 9/12/2021    LEFT ACETABULUM OPEN REDUCTION INTERNAL FIXATION - SYNTHES performed by Nba Guerra MD at [facility] [place]    HIP FRACTURE SURGERY Left 9/28/2021    IRRIGATION AND DEBRIDEMENT LEFT HIP WITH EXCISION HEMATOMA performed by Todd Corbett MD at [facility] OR       CURRENTMEDICATIONS       Discharge Medication List as of 4/29/2024  8:50 PM        CONTINUE these

## 2024-04-30 NOTE — ED PROVIDER NOTES
[facility] EMERGENCY DEPARTMENT  EMERGENCY DEPARTMENT ENCOUNTER        Pt Name: Joce Rodriguez Jr.  MRN: [id number]  Birthdate 1980  Date of evaluation: 4/30/2024  Provider: Lexie Willard DO  PCP: No primary care provider on file.  Note Started: 2:23 AM EDT 4/30/24    CHIEF COMPLAINT       Chief Complaint   Patient presents with    Paranoid     PT states \"I'm trying to stay away from danger.  Everyone is fighting and I'm not trying to be around it.\"  Pt denies SI/HI.  Denies auditory/visual hallucinations       HISTORY OF PRESENT ILLNESS: 1 or more Elements   History From: patient    Limitations to history : None    Joce Rodriguez Jr. is a 43 y.o. male who presents to the emergency department complaining of paranoia.  He states he is trying to stay away from danger.  Patient is schizophrenic at baseline and states that he just needed somewhere to sleep this evening.  He states that he does not feel any more paranoid than usual.  Patient denies any suicidal ideations, homicidal ideations, visual hallucinations, auditory hallucinations.  He states that he would never hurt himself and he just wanted to come inside to get something to eat.  He was just here earlier this evening and actually eloped prior to talking with the  and states that it was too busy and he felt overwhelmed so he left and now he is back to get some sleep.    Nursing Notes were all reviewed and agreed with or any disagreements were addressed in the HPI.    REVIEW OF SYSTEMS :      Review of Systems    Positives and Pertinent negatives as per HPI.     SURGICAL HISTORY     Past Surgical History:   Procedure Laterality Date    EYE SURGERY  19 years ago    HIP FRACTURE SURGERY Left 9/12/2021    LEFT ACETABULUM OPEN REDUCTION INTERNAL FIXATION - SYNTHES performed by Nba Guerra MD at [facility] [place]    HIP FRACTURE SURGERY Left 9/28/2021    IRRIGATION AND DEBRIDEMENT LEFT HIP WITH

## 2024-05-01 ENCOUNTER — HOSPITAL ENCOUNTER (EMERGENCY)
Age: 44
Discharge: LEFT BEFORE DISCHARGE | End: 2024-05-01

## 2024-05-02 ENCOUNTER — HOSPITAL ENCOUNTER (EMERGENCY)
Age: 44
Discharge: HOME | End: 2024-05-02
Attending: EMERGENCY MEDICINE
Payer: COMMERCIAL

## 2024-05-02 VITALS
HEART RATE: 74 BPM | BODY MASS INDEX: 21 KG/M2 | OXYGEN SATURATION: 98 % | RESPIRATION RATE: 16 BRPM | SYSTOLIC BLOOD PRESSURE: 114 MMHG | DIASTOLIC BLOOD PRESSURE: 90 MMHG | WEIGHT: 150 LBS | TEMPERATURE: 98.1 F | HEIGHT: 71 IN

## 2024-05-02 DIAGNOSIS — F39 MOOD DISORDER (HCC): Primary | ICD-10-CM

## 2024-05-02 LAB
ALBUMIN SERPL-MCNC: 4 G/DL (ref 3.5–5.2)
ALP SERPL-CCNC: 49 U/L (ref 40–129)
ALT SERPL-CCNC: 14 U/L (ref 0–40)
AMPHET UR QL SCN: NEGATIVE
ANION GAP SERPL CALCULATED.3IONS-SCNC: 8 MMOL/L (ref 7–16)
APAP SERPL-MCNC: <5 UG/ML (ref 10–30)
AST SERPL-CCNC: 20 U/L (ref 0–39)
BARBITURATES UR QL SCN: NEGATIVE
BASOPHILS # BLD: 0.05 K/UL (ref 0–0.2)
BASOPHILS NFR BLD: 1 % (ref 0–2)
BENZODIAZ UR QL: NEGATIVE
BILIRUB SERPL-MCNC: 0.3 MG/DL (ref 0–1.2)
BUN SERPL-MCNC: 12 MG/DL (ref 6–20)
BUPRENORPHINE UR QL: NEGATIVE
CALCIUM SERPL-MCNC: 8.8 MG/DL (ref 8.6–10.2)
CANNABINOIDS UR QL SCN: NEGATIVE
CHLORIDE SERPL-SCNC: 109 MMOL/L (ref 98–107)
CO2 SERPL-SCNC: 27 MMOL/L (ref 22–29)
COCAINE UR QL SCN: POSITIVE
CREAT SERPL-MCNC: 0.9 MG/DL (ref 0.7–1.2)
EOSINOPHIL # BLD: 0.07 K/UL (ref 0.05–0.5)
EOSINOPHILS RELATIVE PERCENT: 1 % (ref 0–6)
ERYTHROCYTE [DISTWIDTH] IN BLOOD BY AUTOMATED COUNT: 14.2 % (ref 11.5–15)
ETHANOLAMINE SERPL-MCNC: <10 MG/DL
FENTANYL UR QL: NEGATIVE
GFR, ESTIMATED: >90 ML/MIN/1.73M2
GLUCOSE SERPL-MCNC: 85 MG/DL (ref 74–99)
HCT VFR BLD AUTO: 40.8 % (ref 37–54)
HGB BLD-MCNC: 13.3 G/DL (ref 12.5–16.5)
IMM GRANULOCYTES # BLD AUTO: <0.03 K/UL (ref 0–0.58)
IMM GRANULOCYTES NFR BLD: 0 % (ref 0–5)
LYMPHOCYTES NFR BLD: 1.01 K/UL (ref 1.5–4)
LYMPHOCYTES RELATIVE PERCENT: 21 % (ref 20–42)
MCH RBC QN AUTO: 29.8 PG (ref 26–35)
MCHC RBC AUTO-ENTMCNC: 32.6 G/DL (ref 32–34.5)
MCV RBC AUTO: 91.5 FL (ref 80–99.9)
METHADONE UR QL: NEGATIVE
MONOCYTES NFR BLD: 0.27 K/UL (ref 0.1–0.95)
MONOCYTES NFR BLD: 6 % (ref 2–12)
NEUTROPHILS NFR BLD: 71 % (ref 43–80)
NEUTS SEG NFR BLD: 3.47 K/UL (ref 1.8–7.3)
OPIATES UR QL SCN: POSITIVE
OXYCODONE UR QL SCN: NEGATIVE
PCP UR QL SCN: NEGATIVE
PLATELET, FLUORESCENCE: 292 K/UL (ref 130–450)
PMV BLD AUTO: 10 FL (ref 7–12)
POTASSIUM SERPL-SCNC: 3.7 MMOL/L (ref 3.5–5)
PROT SERPL-MCNC: 6.4 G/DL (ref 6.4–8.3)
RBC # BLD AUTO: 4.46 M/UL (ref 3.8–5.8)
SALICYLATES SERPL-MCNC: <0.3 MG/DL (ref 0–30)
SODIUM SERPL-SCNC: 144 MMOL/L (ref 132–146)
TEST INFORMATION: ABNORMAL
TOXIC TRICYCLIC SC,BLOOD: NEGATIVE
WBC OTHER # BLD: 4.9 K/UL (ref 4.5–11.5)

## 2024-05-02 PROCEDURE — 80307 DRUG TEST PRSMV CHEM ANLYZR: CPT

## 2024-05-02 PROCEDURE — 80179 DRUG ASSAY SALICYLATE: CPT

## 2024-05-02 PROCEDURE — 99284 EMERGENCY DEPT VISIT MOD MDM: CPT

## 2024-05-02 PROCEDURE — 80143 DRUG ASSAY ACETAMINOPHEN: CPT

## 2024-05-02 PROCEDURE — 85025 COMPLETE CBC W/AUTO DIFF WBC: CPT

## 2024-05-02 PROCEDURE — G0480 DRUG TEST DEF 1-7 CLASSES: HCPCS

## 2024-05-02 PROCEDURE — 80053 COMPREHEN METABOLIC PANEL: CPT

## 2024-05-02 PROCEDURE — 93005 ELECTROCARDIOGRAM TRACING: CPT | Performed by: EMERGENCY MEDICINE

## 2024-05-02 ASSESSMENT — PAIN - FUNCTIONAL ASSESSMENT: PAIN_FUNCTIONAL_ASSESSMENT: NONE - DENIES PAIN

## 2024-05-02 NOTE — ED PROVIDER NOTES
atraumatic  Eyes: PERRL, EOMI  Mouth: Oropharynx clear, handling secretions, no trismus  Neck: Supple, full ROM, non tender to palpation in the midline, no stridor, no crepitus, no meningeal signs  Pulmonary: Lungs clear to auscultation bilaterally, no wheezes, rales, or rhonchi. Not in respiratory distress  Cardiovascular:  Regular rate. Regular rhythm. No murmurs, gallops, or rubs. 2+ distal pulses  Chest: no chest wall tenderness  Abdomen: Soft.  Non tender. Non distended.  +BS.  No rebound, guarding, or rigidity. No pulsatile masses appreciated.  Musculoskeletal: Moves all extremities x 4. Warm and well perfused, no clubbing, cyanosis, or edema. Capillary refill <3 seconds  Skin: warm and dry. No rashes.   Neurologic: GCS 15, CN 2-12 grossly intact, no focal deficits, symmetric strength 5/5 in the upper and lower extremities bilaterally  Psych: Patient reporting no homicidal suicidal thoughts.  Patient does have auditory hallucinations paranoia    -------------------------------------------------- RESULTS -------------------------------------------------  I have personally reviewed all laboratory and imaging results for this patient. Results are listed below.     LABS:  Results for orders placed or performed during the hospital encounter of 05/02/24   CBC with Auto Differential   Result Value Ref Range    WBC 4.9 4.5 - 11.5 k/uL    RBC 4.46 3.80 - 5.80 m/uL    Hemoglobin 13.3 12.5 - 16.5 g/dL    Hematocrit 40.8 37.0 - 54.0 %    MCV 91.5 80.0 - 99.9 fL    MCH 29.8 26.0 - 35.0 pg    MCHC 32.6 32.0 - 34.5 g/dL    RDW 14.2 11.5 - 15.0 %    MPV 10.0 7.0 - 12.0 fL    Platelet, Fluorescence 292 130 - 450 k/uL    Neutrophils % 71 43.0 - 80.0 %    Lymphocytes % 21 20.0 - 42.0 %    Monocytes % 6 2.0 - 12.0 %    Eosinophils % 1 0 - 6 %    Basophils % 1 0.0 - 2.0 %    Immature Granulocytes % 0 0.0 - 5.0 %    Neutrophils Absolute 3.47 1.80 - 7.30 k/uL    Lymphocytes Absolute 1.01 (L) 1.50 - 4.00 k/uL    Monocytes Absolute

## 2024-05-04 LAB
EKG ATRIAL RATE: 69 BPM
EKG P AXIS: 76 DEGREES
EKG P-R INTERVAL: 190 MS
EKG Q-T INTERVAL: 380 MS
EKG QRS DURATION: 104 MS
EKG QTC CALCULATION (BAZETT): 407 MS
EKG R AXIS: 78 DEGREES
EKG T AXIS: 68 DEGREES
EKG VENTRICULAR RATE: 69 BPM

## 2024-05-04 PROCEDURE — 93010 ELECTROCARDIOGRAM REPORT: CPT | Performed by: INTERNAL MEDICINE

## 2024-05-05 ENCOUNTER — HOSPITAL ENCOUNTER (EMERGENCY)
Age: 44
Discharge: HOME | End: 2024-05-05
Payer: COMMERCIAL

## 2024-05-05 VITALS
TEMPERATURE: 98.1 F | DIASTOLIC BLOOD PRESSURE: 68 MMHG | HEART RATE: 85 BPM | RESPIRATION RATE: 16 BRPM | OXYGEN SATURATION: 99 % | SYSTOLIC BLOOD PRESSURE: 104 MMHG

## 2024-05-05 DIAGNOSIS — F39 MOOD DISORDER (HCC): Primary | ICD-10-CM

## 2024-05-05 DIAGNOSIS — Z59.00 HOMELESS: ICD-10-CM

## 2024-05-05 PROCEDURE — 99282 EMERGENCY DEPT VISIT SF MDM: CPT

## 2024-05-05 SDOH — ECONOMIC STABILITY - HOUSING INSECURITY: HOMELESSNESS UNSPECIFIED: Z59.00

## 2024-05-05 ASSESSMENT — PAIN - FUNCTIONAL ASSESSMENT: PAIN_FUNCTIONAL_ASSESSMENT: NONE - DENIES PAIN

## 2024-05-05 NOTE — ED PROVIDER NOTES
-------------------------------------------------  All laboratory and radiology results have been personally reviewed by myself   LABS:  No results found for this visit on 05/05/24.    RADIOLOGY:  Interpreted by Radiologist.  No orders to display       ------------------------- NURSING NOTES AND VITALS REVIEWED ---------------------------   The nursing notes within the ED encounter and vital signs as below have been reviewed.   /68   Pulse 85   Temp 98.1 °F (36.7 °C) (Oral)   Resp 16   SpO2 99%   Oxygen Saturation Interpretation: Normal      ---------------------------------------------------PHYSICAL EXAM--------------------------------------      Constitutional/General: Alert and oriented x3, overall nontoxic  Head: Normocephalic and atraumatic  Eyes: PERRL, EOMI  Mouth: Oropharynx clear, handling secretions, no trismus  Neck: Supple, full ROM,   Pulmonary: Lungs clear to auscultation bilaterally, no wheezes, rales, or rhonchi. Not in respiratory distress  Cardiovascular:  Regular rate and rhythm, no murmurs, gallops, or rubs. 2+ distal pulses  Abdomen: Soft, non tender, non distended,   Extremities: Moves all extremities x 4. Warm and well perfused  Skin: warm and dry without rash  Neurologic: GCS 15,  Psych: Normal Affect, denies suicidal or homicidal ideations      ------------------------------ ED COURSE/MEDICAL DECISION MAKING----------------------  Medications - No data to display      ED COURSE:       Medical Decision Making:    Joce Rodriguez JrNiko is a 43 y.o. male presenting to the ED for mood disorder.  Patient initially presented to the emergency department talking about people coming after him talk to mom Mexicans, marshmallows, he reported that he thought that air was going to choke him but he never had any injury or trauma.  Patient then later informed me that he would just like something to eat and drink and would like something so he can clean up.  Patient denies suicidal or homicidal

## 2024-05-11 ENCOUNTER — HOSPITAL ENCOUNTER (EMERGENCY)
Age: 44
Discharge: LEFT BEFORE DISCHARGE | End: 2024-05-11
Attending: STUDENT IN AN ORGANIZED HEALTH CARE EDUCATION/TRAINING PROGRAM

## 2024-05-11 ENCOUNTER — HOSPITAL ENCOUNTER (EMERGENCY)
Age: 44
Discharge: HOME | End: 2024-05-12
Attending: EMERGENCY MEDICINE
Payer: COMMERCIAL

## 2024-05-11 DIAGNOSIS — Z59.00 HOMELESS: ICD-10-CM

## 2024-05-11 DIAGNOSIS — Z53.21 PATIENT LEFT WITHOUT BEING SEEN: Primary | ICD-10-CM

## 2024-05-11 DIAGNOSIS — F39 MOOD DISORDER (HCC): Primary | ICD-10-CM

## 2024-05-11 PROCEDURE — 99283 EMERGENCY DEPT VISIT LOW MDM: CPT

## 2024-05-11 PROCEDURE — 6370000000 HC RX 637 (ALT 250 FOR IP): Performed by: EMERGENCY MEDICINE

## 2024-05-11 RX ORDER — ARIPIPRAZOLE 10 MG/1
10 TABLET ORAL DAILY
Qty: 30 TABLET | Refills: 0 | Status: SHIPPED | OUTPATIENT
Start: 2024-05-11 | End: 2024-05-11

## 2024-05-11 RX ORDER — DIVALPROEX SODIUM 500 MG/1
500 TABLET, DELAYED RELEASE ORAL ONCE
Status: COMPLETED | OUTPATIENT
Start: 2024-05-11 | End: 2024-05-11

## 2024-05-11 RX ORDER — ARIPIPRAZOLE 10 MG/1
10 TABLET ORAL DAILY
Qty: 30 TABLET | Refills: 0 | Status: SHIPPED | OUTPATIENT
Start: 2024-05-11 | End: 2024-06-10

## 2024-05-11 RX ORDER — DIVALPROEX SODIUM 500 MG/1
500 TABLET, DELAYED RELEASE ORAL EVERY 12 HOURS SCHEDULED
Qty: 60 TABLET | Refills: 0 | Status: SHIPPED | OUTPATIENT
Start: 2024-05-11 | End: 2024-06-10

## 2024-05-11 RX ORDER — DIVALPROEX SODIUM 500 MG/1
500 TABLET, DELAYED RELEASE ORAL EVERY 12 HOURS SCHEDULED
Qty: 60 TABLET | Refills: 0 | Status: SHIPPED | OUTPATIENT
Start: 2024-05-11 | End: 2024-05-11

## 2024-05-11 RX ORDER — ARIPIPRAZOLE 10 MG/1
10 TABLET ORAL ONCE
Status: COMPLETED | OUTPATIENT
Start: 2024-05-11 | End: 2024-05-11

## 2024-05-11 RX ADMIN — ARIPIPRAZOLE 10 MG: 10 TABLET ORAL at 20:34

## 2024-05-11 RX ADMIN — DIVALPROEX SODIUM 500 MG: 500 TABLET, DELAYED RELEASE ORAL at 20:34

## 2024-05-11 SDOH — ECONOMIC STABILITY - HOUSING INSECURITY: HOMELESSNESS UNSPECIFIED: Z59.00

## 2024-05-11 NOTE — ED PROVIDER NOTES
Patient left without being seen.  I never evaluated this patient.     Lexie Willard,   05/11/24 0589

## 2024-05-11 NOTE — ED NOTES
Department of Emergency Medicine  FIRST PROVIDER TRIAGE NOTE             Independent MLP           5/11/24  7:23 PM EDT    Date of Encounter: 5/11/24   MRN: [id number]      HPI: Joce Rodriguez Jr. is a 43 y.o. male who presents to the ED for homeless, depression r/t not being with mother on mothers day    ROS: Negative for Suicidal ideation or Homicidal Ideation.    PE: Gen Appearance/Constitutional: alert  HEENT: NC/NT. PERRLA,  Airway patent.     Initial Plan of Care: All treatment areas with department are currently occupied. Plan to order/Initiate the following while awaiting opening in ED: pending exam.  Initiate Treatment-Testing, Proceed toTreatment Area When Bed Available for ED Attending/MLP to Continue Care    Electronically signed by JUICE Pace CNP   DD: 5/11/24       Estrellita Marie APRN - CNP  05/11/24 1925

## 2024-05-12 VITALS
HEART RATE: 64 BPM | SYSTOLIC BLOOD PRESSURE: 100 MMHG | DIASTOLIC BLOOD PRESSURE: 62 MMHG | OXYGEN SATURATION: 98 % | TEMPERATURE: 98.4 F | RESPIRATION RATE: 16 BRPM | BODY MASS INDEX: 20.92 KG/M2 | HEIGHT: 71 IN

## 2024-05-12 NOTE — ED NOTES
Patient resting in chair with no distress noted. VS updated. Patient expressed he was hungry. Patient provided with boxed meal. Patient informed he is awaiting  to see him this morning.

## 2024-05-12 NOTE — ED NOTES
Behavioral Health Crisis Assessment      Chief Complaint: Patient said he was depressed due to Mother's Day and being cut off by family.    Mental Status Exam:  Patient had been sleeping but was alert/oriented X3. Patient presents with some disorganized thought process. Patient reporting depression, affect congruent. Patient with some agitation with questioning.  Patient denied having auditory hallucinations, reported seeing \"Mexicans.\" Patient denied SI, denied HI.     Legal Status:  [x] Voluntary:  [] Involuntary, Issued by:    Gender:  [x] Male [] Female [] Transgender  [] Other    Sexual Orientation:  [x] Heterosexual [] Homosexual [] Bisexual [] Other    Brief Clinical Summary:    Patient is of 43-year-old homeless male who presented to the ED as a walk-in.  Per ED doctor note, the patient presented with depression due to not being able to be with his family on Mother's Day due to \"people spreading rumors about me.\" SW met with patient after patient was present in the ED for roughly 9 hours.  Patient was asleep but was easily roused to speak to however, patient exhibiting some agitation being questioned.  Patient reported increased depression due to being cut off from family.  Patient denied SI, denied history of self-injurious behaviors or suicide attempts.  Patient denied HI, denied history of aggression.  Patient denied being on any current medications, stated that the last outpatient provider was \"Bobby.\"    Patient history per chart noted AOD treatment at [place] 2 years ago.     Patient diagnosis per chart is schizoaffective disorder. Patient with a [facility], most recently on [facility]. Patient also with history of admissions at [facility] (most recently on 11/27/23), [facility], [facility] and [facility].     Patient denied having a legal guardian, denied having POA.     Collateral Information: None    Risk Factors:some information per recent chart history

## 2024-05-12 NOTE — ED PROVIDER NOTES
hospital pharmacy for pickup.  He is normothermic, he was he is eating here.  Consult placed to  anticipate likely discharge after this    Amount and/or Complexity of Data Reviewed  External Data Reviewed: labs, ECG and notes.    Risk  Prescription drug management.             CONSULTS: (Who and What was discussed)  IP CONSULT TO SOCIAL WORK         I am the Primary Clinician of Record.    FINAL IMPRESSION      1. Mood disorder (HCC)    2. Homeless          DISPOSITION/PLAN     DISPOSITION Ed Observation 05/11/2024 08:08:38 PM                (Please note that portions of this note were completed with a voice recognition program.  Efforts were made to edit the dictations but occasionally words are mis-transcribed.)    Luis Alfredo Muse DO (electronically signed)           Luis Alfredo Muse,   05/11/24 2055

## 2024-05-12 NOTE — DISCHARGE INSTRUCTIONS
[facility]  [address] [place] [address] [phone]    Homeless Shelters   [facility]  [address] [place] [address] [phone]    [facility] [place]   [address] [place] [address]  Free hot meals Mondays and Tuesday 4PM-6PM sit down dinner at 5PM  Free hot showers Wednesday 1:15-3:45PM    [facility] (men only)  [address] [place] [address]  [phone]    [facility]   [phone]    [facility] ([facility])  Women - [phone] ([address] [place] [address])  Men - [phone] ext. [phone] ([address] [place] [address])  Family - [phone] ext. [phone] ([address] [place] [address])    [facility] ([place], [place])  [address] [place] [address] (limited a two day stay if non-resident)  [phone]    [facility] ([place], [place])  [address] [place] [address]  [phone]      [facility]  [facility]  [address], [place] [id number] [phone]  Walk-in hours 11-1:30pm    [facility]  Visit website for more information   [phone]     [facility]  [address] [place], [place] [address] [phone]    [facility]  [address] [place] [address]  [phone]    [facility]  [address], [place] [address]  [phone]    [facility]  [address] [place] [address]  [phone]    [facility]  [address] [place] [address]  [phone]    [facility]  [address] [place] [address]  [phone]    [facility]  [address] [address] [phone]    [facility]  [address] [place] [address] [address], [place], [place]

## 2024-05-16 ENCOUNTER — HOSPITAL ENCOUNTER (EMERGENCY)
Age: 44
Discharge: HOME | End: 2024-05-16
Payer: COMMERCIAL

## 2024-05-16 VITALS
RESPIRATION RATE: 15 BRPM | SYSTOLIC BLOOD PRESSURE: 133 MMHG | TEMPERATURE: 97.8 F | DIASTOLIC BLOOD PRESSURE: 85 MMHG | HEART RATE: 68 BPM | OXYGEN SATURATION: 94 %

## 2024-05-16 DIAGNOSIS — Z59.00 HOMELESSNESS: Primary | ICD-10-CM

## 2024-05-16 PROCEDURE — 99282 EMERGENCY DEPT VISIT SF MDM: CPT

## 2024-05-16 SDOH — ECONOMIC STABILITY - HOUSING INSECURITY: HOMELESSNESS UNSPECIFIED: Z59.00

## 2024-05-16 NOTE — DISCHARGE INSTRUCTIONS
Please follow-up in the family medicine clinic or return to the ED for any new or concerning symptoms

## 2024-05-17 VITALS
DIASTOLIC BLOOD PRESSURE: 65 MMHG | RESPIRATION RATE: 16 BRPM | SYSTOLIC BLOOD PRESSURE: 112 MMHG | HEART RATE: 86 BPM | TEMPERATURE: 97.9 F | OXYGEN SATURATION: 94 %

## 2024-05-17 PROCEDURE — 99281 EMR DPT VST MAYX REQ PHY/QHP: CPT

## 2024-05-18 ENCOUNTER — HOSPITAL ENCOUNTER (EMERGENCY)
Age: 44
Discharge: LEFT BEFORE DISCHARGE | End: 2024-05-18
Attending: EMERGENCY MEDICINE
Payer: COMMERCIAL

## 2024-05-18 DIAGNOSIS — F22 PARANOID (HCC): Primary | ICD-10-CM

## 2024-05-18 NOTE — ED PROVIDER NOTES
HPI:  5/17/24, Time: 11:01 PM EDT         Joce Rodriguez Jr. is a 43 y.o. male history of depression history of schizoaffective disorder presenting to the ED for reportedly feeling paranoid, beginning days ago.  The complaint has been persistent, moderate in severity, and worsened by nothing.  Patient presenting here because of feeling paranoid.  Patient reporting feeling people are out to harm him.  Patient does report auditory hallucinations.  Patient reporting no thoughts of hurting others or himself.  Patient reporting no fever no chills no chest pain or abdominal pain he reports no headache he reports no fall or injury there is no trauma.  Patient reporting no cough.    ROS:   Pertinent positives and negatives are stated within HPI, all other systems reviewed and are negative.  --------------------------------------------- PAST HISTORY ---------------------------------------------  Past Medical History:  has a past medical history of Depression and Schizoaffective disorder (HCC).    Past Surgical History:  has a past surgical history that includes Hip fracture surgery (Left, 9/12/2021); eye surgery (19 years ago); and Hip fracture surgery (Left, 9/28/2021).    Social History:  reports that he has been smoking cigarettes. He has a 12.0 pack-year smoking history. He has never used smokeless tobacco. He reports that he does not currently use alcohol. He reports current drug use. Frequency: 7.00 times per week. Drugs: Cocaine, Marijuana (Weed), and Methamphetamines (Crystal Meth).    Family History: family history includes Mental Illness in his mother; No Known Problems in his father; Substance Abuse in his mother.     The patient’s home medications have been reviewed.    Allergies: Chlorpheniramine-phenylephrine, Motrin [ibuprofen], Penicillins, and Rondec-d [chlophedianol-pseudoephedrine]    ---------------------------------------------------PHYSICAL  affecting Dx or Tx: Patient does smoke    Chronic Conditions: Depression and schizoaffective disorder    Records Reviewed:   Patient was last seen for homelessness      Re-Evaluations:             Plan was to have patient evaluate by .  Patient reportedly eloped from the waiting room.      Consultations:                 Critical Care:         This patient's ED course included: a personal history and physicial eaxmination    This patient has been closely monitored during their ED course.    Counseling:   The emergency provider has spoken with the patient and discussed today’s results, in addition to providing specific details for the plan of care and counseling regarding the diagnosis and prognosis.  Questions are answered at this time and they are agreeable with the plan.       --------------------------------- IMPRESSION AND DISPOSITION ---------------------------------    IMPRESSION  1. Paranoid (HCC)        DISPOSITION  Disposition: Patient eloped from the waiting room.        NOTE: This report was transcribed using voice recognition software. Every effort was made to ensure accuracy; however, inadvertent computerized transcription errors may be present         Nabil Garner MD  05/18/24 2024

## 2024-05-21 ENCOUNTER — HOSPITAL ENCOUNTER (EMERGENCY)
Age: 44
Discharge: HOME | End: 2024-05-21
Attending: STUDENT IN AN ORGANIZED HEALTH CARE EDUCATION/TRAINING PROGRAM
Payer: COMMERCIAL

## 2024-05-21 ENCOUNTER — HOSPITAL ENCOUNTER (EMERGENCY)
Age: 44
Discharge: HOME | End: 2024-05-22
Payer: COMMERCIAL

## 2024-05-21 VITALS
HEART RATE: 65 BPM | DIASTOLIC BLOOD PRESSURE: 65 MMHG | HEIGHT: 71 IN | BODY MASS INDEX: 21 KG/M2 | OXYGEN SATURATION: 98 % | WEIGHT: 150 LBS | RESPIRATION RATE: 16 BRPM | TEMPERATURE: 97.8 F | SYSTOLIC BLOOD PRESSURE: 118 MMHG

## 2024-05-21 DIAGNOSIS — Z59.00 HOMELESSNESS: Primary | ICD-10-CM

## 2024-05-21 DIAGNOSIS — Z59.00 HOMELESS: Primary | ICD-10-CM

## 2024-05-21 PROCEDURE — 99282 EMERGENCY DEPT VISIT SF MDM: CPT

## 2024-05-21 PROCEDURE — 99281 EMR DPT VST MAYX REQ PHY/QHP: CPT

## 2024-05-21 SDOH — ECONOMIC STABILITY - HOUSING INSECURITY: HOMELESSNESS UNSPECIFIED: Z59.00

## 2024-05-21 ASSESSMENT — PAIN - FUNCTIONAL ASSESSMENT
PAIN_FUNCTIONAL_ASSESSMENT: NONE - DENIES PAIN
PAIN_FUNCTIONAL_ASSESSMENT: NONE - DENIES PAIN

## 2024-05-22 VITALS
DIASTOLIC BLOOD PRESSURE: 80 MMHG | TEMPERATURE: 98.1 F | RESPIRATION RATE: 18 BRPM | SYSTOLIC BLOOD PRESSURE: 132 MMHG | HEART RATE: 68 BPM | OXYGEN SATURATION: 98 %

## 2024-05-22 NOTE — ED PROVIDER NOTES
[facility] EMERGENCY DEPARTMENT  EMERGENCY DEPARTMENT ENCOUNTER        Pt Name: Joce Rodriguez Jr.  MRN: [id number]  Birthdate 1980  Date of evaluation: 5/21/2024  Provider: Lexie Willard DO  PCP: No primary care provider on file.  Note Started: 7:30 AM EDT 5/22/24    CHIEF COMPLAINT       Chief Complaint   Patient presents with    Homeless     Pt states \"I'm in safety.\"  Pt reports not eating today.  Pt states \"I haven't slept today.\"  Pt states \"I'm hungry.\"       HISTORY OF PRESENT ILLNESS: 1 or more Elements   History From: patient    Limitations to history : None    Joce Rodriguez Jr. is a 43 y.o. male with a history of schziophrenia presenting to the Emergency Department complaining of homelessness. He states he has not eaten today and he is hungry. He also hasn't slept today and was looking for something to eat and somewhere to sleep. Denies any suicidal or homicidal ideations.     Nursing Notes were all reviewed and agreed with or any disagreements were addressed in the HPI.    REVIEW OF SYSTEMS :      Review of Systems    Positives and Pertinent negatives as per HPI.     SURGICAL HISTORY     Past Surgical History:   Procedure Laterality Date    EYE SURGERY  19 years ago    HIP FRACTURE SURGERY Left 9/12/2021    LEFT ACETABULUM OPEN REDUCTION INTERNAL FIXATION - SYNTHES performed by Nba Guerra MD at [facility]    HIP FRACTURE SURGERY Left 9/28/2021    IRRIGATION AND DEBRIDEMENT LEFT HIP WITH EXCISION HEMATOMA performed by Todd Corbett MD at [facility]       CURRENTMEDICATIONS       Discharge Medication List as of 5/21/2024  8:35 AM        CONTINUE these medications which have NOT CHANGED    Details   ARIPiprazole (ABILIFY) 10 MG tablet Take 1 tablet by mouth daily, Disp-30 tablet, R-0Normal      divalproex (DEPAKOTE) 500 MG DR tablet Take 1 tablet by mouth every 12 hours, Disp-60 tablet, R-0Normal      nicotine polacrilex (NICORETTE) 4 MG  nontoxic, and in no acute distress.     He is homeless which is a known social determinant of his health. Prior records reviewed, he is frequently at the ED for the same thing. Last admission was 11/11/23, records reviewed from this psychiatric admission.     DDX includes but is not limited to homelessness vs hungry vs depression vs anxiety. Patient not suicidal or homicidal.     Patient given meal and blanket. He was then discharged safely when the sun came up. He did not want to talk to social work today. He was discharged in stable condition with strict return precautions given.       CONSULTS: (Who and What was discussed)  None        I am the Primary Clinician of Record.    FINAL IMPRESSION      1. Homelessness          DISPOSITION/PLAN     DISPOSITION Decision To Discharge 05/21/2024 06:07:10 AM      PATIENT REFERRED TO:  Follow up with your doctor or call [phone] for a family doctor.  Follow up with your doctor or call [phone] for a family doctor.  Schedule an appointment as soon as possible for a visit         DISCHARGE MEDICATIONS:  Discharge Medication List as of 5/21/2024  8:35 AM          DISCONTINUED MEDICATIONS:  Discharge Medication List as of 5/21/2024  8:35 AM                 (Please note that portions of this note were completed with a voice recognition program.  Efforts were made to edit the dictations but occasionally words are mis-transcribed.)    Lexie Willard DO (electronically signed)           Lexie Willard DO  05/23/24 0009

## 2024-05-22 NOTE — ED NOTES
Discharge instructions reviewed , including diagnosis, medications, follow up appointments, home care, and also when to call 911.  All discharge instructions questions answered.       Pt left ED ambulatory

## 2024-06-11 ENCOUNTER — HOSPITAL ENCOUNTER (EMERGENCY)
Age: 44
Discharge: HOME | End: 2024-06-11
Payer: COMMERCIAL

## 2024-06-11 VITALS
SYSTOLIC BLOOD PRESSURE: 115 MMHG | OXYGEN SATURATION: 97 % | DIASTOLIC BLOOD PRESSURE: 67 MMHG | TEMPERATURE: 98.2 F | HEART RATE: 76 BPM | RESPIRATION RATE: 16 BRPM

## 2024-06-11 DIAGNOSIS — Z59.00 HOMELESS: Primary | ICD-10-CM

## 2024-06-11 PROCEDURE — 99282 EMERGENCY DEPT VISIT SF MDM: CPT

## 2024-06-11 SDOH — ECONOMIC STABILITY - HOUSING INSECURITY: HOMELESSNESS UNSPECIFIED: Z59.00

## 2024-06-12 PROCEDURE — 99285 EMERGENCY DEPT VISIT HI MDM: CPT

## 2024-06-12 NOTE — ED PROVIDER NOTES
06/11/24.    RADIOLOGY:  Interpreted by Radiologist.  No orders to display       ------------------------- NURSING NOTES AND VITALS REVIEWED ---------------------------   The nursing notes within the ED encounter and vital signs as below have been reviewed.   /67   Pulse 76   Temp 98.2 °F (36.8 °C)   Resp 16   SpO2 97%   Oxygen Saturation Interpretation: Normal      ---------------------------------------------------PHYSICAL EXAM--------------------------------------      Constitutional/General: Alert and oriented x3, well appearing, non toxic in NAD  Head: Normocephalic and atraumatic  Eyes: PERRL, EOMI  Mouth: Oropharynx clear, handling secretions, no trismus  Neck: Supple, full ROM,   Pulmonary: Lungs clear to auscultation bilaterally, no wheezes, rales, or rhonchi. Not in respiratory distress  Cardiovascular:  Regular rate and rhythm, no murmurs, gallops, or rubs. 2+ distal pulses  Abdomen: Soft, non tender, non distended,   Extremities: Moves all extremities x 4. Warm and well perfused  Skin: warm and dry without rash  Neurologic: GCS 15,  Psych: Normal Affect      ------------------------------ ED COURSE/MEDICAL DECISION MAKING----------------------  Medications - No data to display      ED COURSE:       Medical Decision Making:  Joce LLAMAS Michael Khan is a 43 y.o. male presenting to the ED for being homeless.  Patient presents to the emergency department with really only wanting to get some towels and personal products so he can get cleaned up.  He denies any suicidal homicidal ideations denies any chest pain, shortness of breath or any abdominal pain as well as no noted nausea, vomiting or diarrhea.  Patient pleasant in conversation.  Would like something to drink.  Differential includes mood disorder versus drug-seeking behavior versus homelessness.  On exam he is pleasant in conversation.  Does not have any concerning psychiatric issues on today's visit.  Denies suicidal and homicidal ideation

## 2024-06-13 ENCOUNTER — APPOINTMENT (OUTPATIENT)
Dept: CT IMAGING | Age: 44
End: 2024-06-13
Payer: COMMERCIAL

## 2024-06-13 ENCOUNTER — APPOINTMENT (OUTPATIENT)
Dept: GENERAL RADIOLOGY | Age: 44
End: 2024-06-13
Payer: COMMERCIAL

## 2024-06-13 ENCOUNTER — HOSPITAL ENCOUNTER (EMERGENCY)
Age: 44
Discharge: HOME | End: 2024-06-13
Payer: COMMERCIAL

## 2024-06-13 VITALS
WEIGHT: 150 LBS | SYSTOLIC BLOOD PRESSURE: 126 MMHG | RESPIRATION RATE: 16 BRPM | HEIGHT: 69 IN | BODY MASS INDEX: 22.22 KG/M2 | HEART RATE: 86 BPM | DIASTOLIC BLOOD PRESSURE: 86 MMHG | TEMPERATURE: 97.6 F | OXYGEN SATURATION: 97 %

## 2024-06-13 DIAGNOSIS — K59.00 CONSTIPATION, UNSPECIFIED CONSTIPATION TYPE: ICD-10-CM

## 2024-06-13 DIAGNOSIS — M94.0 ACUTE COSTOCHONDRITIS: ICD-10-CM

## 2024-06-13 DIAGNOSIS — R10.84 GENERALIZED ABDOMINAL PAIN: Primary | ICD-10-CM

## 2024-06-13 LAB
ALBUMIN SERPL-MCNC: 3.9 G/DL (ref 3.5–5.2)
ALP SERPL-CCNC: 65 U/L (ref 40–129)
ALT SERPL-CCNC: 11 U/L (ref 0–40)
ANION GAP SERPL CALCULATED.3IONS-SCNC: 8 MMOL/L (ref 7–16)
AST SERPL-CCNC: 17 U/L (ref 0–39)
ATYPICAL LYMPHOCYTE ABSOLUTE COUNT: 0.13 K/UL (ref 0–0.46)
ATYPICAL LYMPHOCYTES: 3 % (ref 0–4)
BASOPHILS # BLD: 0.08 K/UL (ref 0–0.2)
BASOPHILS NFR BLD: 2 % (ref 0–2)
BILIRUB SERPL-MCNC: 0.2 MG/DL (ref 0–1.2)
BUN SERPL-MCNC: 8 MG/DL (ref 6–20)
CALCIUM SERPL-MCNC: 8.6 MG/DL (ref 8.6–10.2)
CHLORIDE SERPL-SCNC: 104 MMOL/L (ref 98–107)
CO2 SERPL-SCNC: 30 MMOL/L (ref 22–29)
CREAT SERPL-MCNC: 1 MG/DL (ref 0.7–1.2)
EKG ATRIAL RATE: 66 BPM
EKG P AXIS: 79 DEGREES
EKG P-R INTERVAL: 174 MS
EKG Q-T INTERVAL: 388 MS
EKG QRS DURATION: 96 MS
EKG QTC CALCULATION (BAZETT): 406 MS
EKG R AXIS: 88 DEGREES
EKG VENTRICULAR RATE: 66 BPM
EOSINOPHIL # BLD: 0.25 K/UL (ref 0.05–0.5)
EOSINOPHILS RELATIVE PERCENT: 5 % (ref 0–6)
ERYTHROCYTE [DISTWIDTH] IN BLOOD BY AUTOMATED COUNT: 14.7 % (ref 11.5–15)
GFR, ESTIMATED: >90 ML/MIN/1.73M2
GLUCOSE SERPL-MCNC: 96 MG/DL (ref 74–99)
HCT VFR BLD AUTO: 40.2 % (ref 37–54)
HGB BLD-MCNC: 12.9 G/DL (ref 12.5–16.5)
LACTATE BLDV-SCNC: 1.7 MMOL/L (ref 0.5–2.2)
LIPASE SERPL-CCNC: 24 U/L (ref 13–60)
LYMPHOCYTES NFR BLD: 2.3 K/UL (ref 1.5–4)
LYMPHOCYTES RELATIVE PERCENT: 48 % (ref 20–42)
MCH RBC QN AUTO: 30.9 PG (ref 26–35)
MCHC RBC AUTO-ENTMCNC: 32.1 G/DL (ref 32–34.5)
MCV RBC AUTO: 96.4 FL (ref 80–99.9)
MONOCYTES NFR BLD: 0.42 K/UL (ref 0.1–0.95)
MONOCYTES NFR BLD: 9 % (ref 2–12)
MYELOCYTES ABSOLUTE COUNT: 0.04 K/UL
MYELOCYTES: 1 %
NEUTROPHILS NFR BLD: 33 % (ref 43–80)
NEUTS SEG NFR BLD: 1.59 K/UL (ref 1.8–7.3)
PLATELET # BLD AUTO: 277 K/UL (ref 130–450)
PMV BLD AUTO: 10.2 FL (ref 7–12)
POTASSIUM SERPL-SCNC: 4.3 MMOL/L (ref 3.5–5)
PROT SERPL-MCNC: 6.2 G/DL (ref 6.4–8.3)
RBC # BLD AUTO: 4.17 M/UL (ref 3.8–5.8)
RBC # BLD: ABNORMAL 10*6/UL
SODIUM SERPL-SCNC: 142 MMOL/L (ref 132–146)
TROPONIN I SERPL HS-MCNC: 9 NG/L (ref 0–11)
WBC OTHER # BLD: 4.8 K/UL (ref 4.5–11.5)

## 2024-06-13 PROCEDURE — 2580000003 HC RX 258: Performed by: NURSE PRACTITIONER

## 2024-06-13 PROCEDURE — 93005 ELECTROCARDIOGRAM TRACING: CPT | Performed by: NURSE PRACTITIONER

## 2024-06-13 PROCEDURE — 93010 ELECTROCARDIOGRAM REPORT: CPT | Performed by: INTERNAL MEDICINE

## 2024-06-13 PROCEDURE — 83690 ASSAY OF LIPASE: CPT

## 2024-06-13 PROCEDURE — 80053 COMPREHEN METABOLIC PANEL: CPT

## 2024-06-13 PROCEDURE — 83605 ASSAY OF LACTIC ACID: CPT

## 2024-06-13 PROCEDURE — 85025 COMPLETE CBC W/AUTO DIFF WBC: CPT

## 2024-06-13 PROCEDURE — 84484 ASSAY OF TROPONIN QUANT: CPT

## 2024-06-13 PROCEDURE — 6360000004 HC RX CONTRAST MEDICATION: Performed by: RADIOLOGY

## 2024-06-13 PROCEDURE — 74177 CT ABD & PELVIS W/CONTRAST: CPT

## 2024-06-13 PROCEDURE — 71045 X-RAY EXAM CHEST 1 VIEW: CPT

## 2024-06-13 PROCEDURE — 6370000000 HC RX 637 (ALT 250 FOR IP): Performed by: NURSE PRACTITIONER

## 2024-06-13 RX ORDER — 0.9 % SODIUM CHLORIDE 0.9 %
1000 INTRAVENOUS SOLUTION INTRAVENOUS ONCE
Status: COMPLETED | OUTPATIENT
Start: 2024-06-13 | End: 2024-06-13

## 2024-06-13 RX ORDER — HYDROCODONE BITARTRATE AND ACETAMINOPHEN 5; 325 MG/1; MG/1
1 TABLET ORAL ONCE
Status: COMPLETED | OUTPATIENT
Start: 2024-06-13 | End: 2024-06-13

## 2024-06-13 RX ORDER — POLYETHYLENE GLYCOL 3350 17 G/17G
POWDER, FOR SOLUTION ORAL
Qty: 238 G | Refills: 0 | Status: SHIPPED | OUTPATIENT
Start: 2024-06-13 | End: 2024-06-27

## 2024-06-13 RX ADMIN — SODIUM CHLORIDE 1000 ML: 9 INJECTION, SOLUTION INTRAVENOUS at 01:54

## 2024-06-13 RX ADMIN — MAJOR MAGNESIUM CITRATE ORAL SOLUTION - LEMON 296 ML: 1.75 LIQUID ORAL at 05:19

## 2024-06-13 RX ADMIN — IOPAMIDOL 75 ML: 755 INJECTION, SOLUTION INTRAVENOUS at 02:27

## 2024-06-13 RX ADMIN — HYDROCODONE BITARTRATE AND ACETAMINOPHEN 1 TABLET: 5; 325 TABLET ORAL at 01:39

## 2024-06-13 ASSESSMENT — PAIN - FUNCTIONAL ASSESSMENT: PAIN_FUNCTIONAL_ASSESSMENT: 0-10

## 2024-06-13 ASSESSMENT — PAIN DESCRIPTION - ORIENTATION: ORIENTATION: RIGHT

## 2024-06-13 ASSESSMENT — PAIN DESCRIPTION - LOCATION: LOCATION: FLANK;ABDOMEN

## 2024-06-13 ASSESSMENT — PAIN SCALES - GENERAL: PAINLEVEL_OUTOF10: 10

## 2024-06-13 ASSESSMENT — PAIN DESCRIPTION - PAIN TYPE: TYPE: ACUTE PAIN

## 2024-06-13 NOTE — ED NOTES
Radiology Procedure Waiver   Name: Joce Rodriguez Jr.  : 1980  MRN: [id number]    Date:  24    Time: 1:54 AM EDT    Benefits of immediately proceeding with Radiology exam(s) without pre-testing outweigh the risks or are not indicated as specified below and therefore the following is/are being waived:    [] Pregnancy test   [] Patients LMP on-time and regular.   [] Patient had Tubal Ligation or has other Contraception Device.   [] Patient  is Menopausal or Premenarcheal.    [] Patient had Full or Partial Hysterectomy.    [] Protocol for Iodine allergy    [] MRI Questionnaire     [x] BUN/Creatinine   [x] Patient age w/no hx of renal dysfunction.   [] Patient on Dialysis.   [x] Recent Normal Labs.  Electronically signed by JUICE Arenas CNP on 24 at 1:54 AM EDT

## 2024-06-13 NOTE — ED PROVIDER NOTES
ALT 11 0 - 40 U/L    AST 17 0 - 39 U/L   Lactic Acid   Result Value Ref Range    Lactic Acid 1.7 0.5 - 2.2 mmol/L   Lipase   Result Value Ref Range    Lipase 24 13 - 60 U/L   1    RADIOLOGY:  Interpreted by Radiologist.  CT ABDOMEN PELVIS W IV CONTRAST Additional Contrast? None   Final Result   Evaluation is suboptimal due to a lack of intra-abdominal fat planes.      No definite acute intra-abdominal pelvic process appreciated.      Moderate to large colonic stool burden, correlate for obstruction.      Note that the appendix is not readily identified.  If there is any relevant   concern then would advise a short-term follow-up exam with intravenous and   oral contrast.      CBD is borderline measuring roughly 6 mm.  Correlate with LFTs.  Could obtain   MRCP if clinically indicated.      Degenerative changes of the spine at L4-L5 with narrowing of the thecal sac.         XR CHEST PORTABLE    (Results Pending)       ------------------------- NURSING NOTES AND VITALS REVIEWED ---------------------------   The nursing notes within the ED encounter and vital signs as below have been reviewed.   /86   Pulse 86   Temp 97.6 °F (36.4 °C) (Oral)   Resp 16   Ht 1.753 m (5' 9\")   Wt 68 kg (150 lb)   SpO2 97%   BMI 22.15 kg/m²   Oxygen Saturation Interpretation: Normal      ---------------------------------------------------PHYSICAL EXAM--------------------------------------      Constitutional/General: Alert and oriented x3,  overall nontoxic  Head: Normocephalic and atraumatic  Eyes: PERRL, EOMI  Mouth: Oropharynx clear, handling secretions, no trismus  Neck: Supple, full ROM,   Pulmonary: Lungs clear to auscultation bilaterally, no wheezes, rales, or rhonchi. Not in respiratory distress, patient did have reproducible pain across the left lower anterior chest.  Equal chest wall excursion no flailing of the chest lungs are clear.  Cardiovascular:  Regular rate and rhythm, no murmurs, gallops, or rubs. 2+

## 2024-06-14 ENCOUNTER — HOSPITAL ENCOUNTER (EMERGENCY)
Age: 44
Discharge: HOME | End: 2024-06-14
Payer: COMMERCIAL

## 2024-06-14 VITALS
SYSTOLIC BLOOD PRESSURE: 124 MMHG | TEMPERATURE: 97.5 F | DIASTOLIC BLOOD PRESSURE: 65 MMHG | HEART RATE: 75 BPM | OXYGEN SATURATION: 97 % | RESPIRATION RATE: 18 BRPM

## 2024-06-14 DIAGNOSIS — M79.671 PAIN IN BOTH FEET: Primary | ICD-10-CM

## 2024-06-14 DIAGNOSIS — M79.672 PAIN IN BOTH FEET: Primary | ICD-10-CM

## 2024-06-14 PROCEDURE — 99282 EMERGENCY DEPT VISIT SF MDM: CPT

## 2024-06-14 ASSESSMENT — PAIN SCALES - GENERAL: PAINLEVEL_OUTOF10: 10

## 2024-06-14 ASSESSMENT — PAIN DESCRIPTION - ORIENTATION: ORIENTATION: RIGHT;LEFT

## 2024-06-14 ASSESSMENT — PAIN - FUNCTIONAL ASSESSMENT: PAIN_FUNCTIONAL_ASSESSMENT: 0-10

## 2024-06-14 ASSESSMENT — PAIN DESCRIPTION - DESCRIPTORS: DESCRIPTORS: ACHING

## 2024-06-14 ASSESSMENT — PAIN DESCRIPTION - LOCATION: LOCATION: FOOT

## 2024-06-14 NOTE — ED PROVIDER NOTES
[ibuprofen], Penicillins, and Rondec-d [chlophedianol-pseudoephedrine]  CURRENT MEDICATIONS       Previous Medications    ARIPIPRAZOLE (ABILIFY) 10 MG TABLET    Take 1 tablet by mouth daily    DIVALPROEX (DEPAKOTE) 500 MG DR TABLET    Take 1 tablet by mouth every 12 hours    NICOTINE POLACRILEX (NICORETTE) 4 MG GUM    Take 1 each by mouth every 2 hours as needed for Smoking cessation    POLYETHYLENE GLYCOL (MIRALAX) 17 GM/SCOOP POWDER    Take 17 g by mouth daily for 14 days. Dispense QS, and no refills       SCREENINGS     [place] Coma Scale  Eye Opening: Spontaneous  Best Verbal Response: Oriented  Best Motor Response: Obeys commands  [place] Coma Scale Score: 15         CIWA Assessment  BP: 124/65  Pulse: 75       PHYSICAL EXAM   Oxygen Saturation Interpretation: Normal on room air analysis.        ED Triage Vitals   BP Temp Temp Source Pulse Respirations SpO2 Height Weight   06/14/24 0217 06/14/24 0153 06/14/24 0153 06/14/24 0153 06/14/24 0153 06/14/24 0153 -- --   124/65 97.5 °F (36.4 °C) Oral 87 18 94 %           Physical Exam  Constitutional/General: Alert and oriented x3, well appearing, non toxic  HEENT:  NC/NT. PERRLA,  Airway patent.  Neck: Supple, full ROM, non tender to palpation in the midline, no stridor, no crepitus, no meningeal signs  Respiratory: Lungs clear to auscultation bilaterally, no wheezes, rales, or rhonchi. Not in respiratory distress  CV:  Regular rate. Regular rhythm. No murmurs, gallops, or rubs. 2+ distal pulses  Chest: No chest wall tenderness  GI:  Abdomen Soft, Non tender, Non distended.  +BS.   No rebound, guarding, or rigidity. No pulsatile masses.  Musculoskeletal: Moves all extremities x 4. Warm and well perfused, no clubbing, cyanosis, or edema. Capillary refill <3 seconds  Integument: skin warm and dry. No rashes.  Bilateral socks removed and patient has good palpable DP/PT pulse bilaterally.  Calluses noted around all toes no evidence of any lacerations abrasions,

## 2024-06-15 ENCOUNTER — APPOINTMENT (OUTPATIENT)
Dept: GENERAL RADIOLOGY | Age: 44
End: 2024-06-15
Payer: COMMERCIAL

## 2024-06-15 ENCOUNTER — HOSPITAL ENCOUNTER (EMERGENCY)
Age: 44
Discharge: HOME | End: 2024-06-15
Payer: COMMERCIAL

## 2024-06-15 VITALS
OXYGEN SATURATION: 98 % | SYSTOLIC BLOOD PRESSURE: 131 MMHG | DIASTOLIC BLOOD PRESSURE: 70 MMHG | RESPIRATION RATE: 16 BRPM | HEART RATE: 72 BPM | TEMPERATURE: 97.7 F

## 2024-06-15 DIAGNOSIS — S39.012A STRAIN OF LUMBAR REGION, INITIAL ENCOUNTER: Primary | ICD-10-CM

## 2024-06-15 DIAGNOSIS — M51.36 DEGENERATIVE DISC DISEASE, LUMBAR: ICD-10-CM

## 2024-06-15 PROCEDURE — 6370000000 HC RX 637 (ALT 250 FOR IP): Performed by: NURSE PRACTITIONER

## 2024-06-15 PROCEDURE — 99283 EMERGENCY DEPT VISIT LOW MDM: CPT

## 2024-06-15 PROCEDURE — 72100 X-RAY EXAM L-S SPINE 2/3 VWS: CPT

## 2024-06-15 RX ORDER — ACETAMINOPHEN 325 MG/1
650 TABLET ORAL ONCE
Status: COMPLETED | OUTPATIENT
Start: 2024-06-15 | End: 2024-06-15

## 2024-06-15 RX ADMIN — ACETAMINOPHEN 650 MG: 325 TABLET ORAL at 21:58

## 2024-06-15 ASSESSMENT — PAIN SCALES - GENERAL
PAINLEVEL_OUTOF10: 10
PAINLEVEL_OUTOF10: 4

## 2024-06-15 ASSESSMENT — PAIN DESCRIPTION - LOCATION
LOCATION: BACK
LOCATION: BACK

## 2024-06-15 ASSESSMENT — PAIN DESCRIPTION - ORIENTATION: ORIENTATION: LOWER

## 2024-06-15 ASSESSMENT — PAIN - FUNCTIONAL ASSESSMENT
PAIN_FUNCTIONAL_ASSESSMENT: NONE - DENIES PAIN
PAIN_FUNCTIONAL_ASSESSMENT: 0-10

## 2024-06-16 NOTE — ED PROVIDER NOTES
Independent   HPI:  6/15/24, Time: 9:50 PM EDT         Joce Rodriguez Jr. is a 43 y.o. male presenting to the ED for lumbar back pain.  Patient presents to the emergency department with complaints of lumbar back pain.  Denies any specific injury or trauma that he can recall states that every now and then it will start to hurt him.  Patient denies take anything for pain relief.  Denies any saddle anesthesia or any unexplained incontinence.  No fevers noted.  No unusual rash or erythema noted.  Patient able to ambulate easily and independently.  Patient pleasant in conversation.    Review of Systems:   A complete review of systems was performed and pertinent positives and negatives are stated within HPI, all other systems reviewed and are negative.          --------------------------------------------- PAST HISTORY ---------------------------------------------  Past Medical History:  has a past medical history of Depression and Schizoaffective disorder (HCC).    Past Surgical History:  has a past surgical history that includes Hip fracture surgery (Left, 9/12/2021); eye surgery (19 years ago); and Hip fracture surgery (Left, 9/28/2021).    Social History:  reports that he has been smoking cigarettes. He has a 12.0 pack-year smoking history. He has never used smokeless tobacco. He reports that he does not currently use alcohol. He reports current drug use. Frequency: 7.00 times per week. Drugs: Cocaine, Marijuana (Weed), and Methamphetamines (Crystal Meth).    Family History: family history includes Mental Illness in his mother; No Known Problems in his father; Substance Abuse in his mother.     The patient’s home medications have been reviewed.    Allergies: Chlorpheniramine-phenylephrine, Motrin [ibuprofen], Penicillins, and Rondec-d [chlophedianol-pseudoephedrine]    -------------------------------------------------- RESULTS -------------------------------------------------  All laboratory and radiology results

## 2024-06-18 ENCOUNTER — HOSPITAL ENCOUNTER (EMERGENCY)
Age: 44
Discharge: HOME | End: 2024-06-18
Payer: COMMERCIAL

## 2024-06-18 VITALS
SYSTOLIC BLOOD PRESSURE: 146 MMHG | RESPIRATION RATE: 18 BRPM | OXYGEN SATURATION: 98 % | HEART RATE: 95 BPM | DIASTOLIC BLOOD PRESSURE: 92 MMHG | TEMPERATURE: 97.8 F

## 2024-06-18 DIAGNOSIS — Z59.00 HOMELESS: Primary | ICD-10-CM

## 2024-06-18 PROCEDURE — 99282 EMERGENCY DEPT VISIT SF MDM: CPT

## 2024-06-18 SDOH — ECONOMIC STABILITY - HOUSING INSECURITY: HOMELESSNESS UNSPECIFIED: Z59.00

## 2024-06-18 NOTE — ED PROVIDER NOTES
Independent   HPI:  6/18/24, Time: 1:09 AM EDT         Joce Rodriguez Jr. is a 43 y.o. male presenting to the ED for wanting to get something to eat.  Patient presents to the emergency department states that he feels like he could be dehydrated, states that it was hot today and did not get enough water.  Patient also reports that he like to get a job and dietary.  Patient is pleasant in conversation and in fact smiling and laughing and joking with staff.  Patient also brought in a big box of юлия greens.  And laughing and smiling about where he got them from.  States he got them off of a truck.  Patient otherwise denies any chest pain, shortness of breath or abdominal pain as well as no noted nausea, vomiting or diarrhea.    Review of Systems:   A complete review of systems was performed and pertinent positives and negatives are stated within HPI, all other systems reviewed and are negative.          --------------------------------------------- PAST HISTORY ---------------------------------------------  Past Medical History:  has a past medical history of Depression and Schizoaffective disorder (HCC).    Past Surgical History:  has a past surgical history that includes Hip fracture surgery (Left, 9/12/2021); eye surgery (19 years ago); and Hip fracture surgery (Left, 9/28/2021).    Social History:  reports that he has been smoking cigarettes. He has a 12.0 pack-year smoking history. He has never used smokeless tobacco. He reports that he does not currently use alcohol. He reports current drug use. Frequency: 7.00 times per week. Drugs: Cocaine, Marijuana (Weed), and Methamphetamines (Crystal Meth).    Family History: family history includes Mental Illness in his mother; No Known Problems in his father; Substance Abuse in his mother.     The patient’s home medications have been reviewed.    Allergies: Chlorpheniramine-phenylephrine, Motrin [ibuprofen], Penicillins, and Rondec-d

## 2024-06-19 ENCOUNTER — APPOINTMENT (OUTPATIENT)
Dept: CT IMAGING | Age: 44
End: 2024-06-19
Payer: COMMERCIAL

## 2024-06-19 ENCOUNTER — HOSPITAL ENCOUNTER (EMERGENCY)
Age: 44
Discharge: LEFT BEFORE DISCHARGE | End: 2024-06-19

## 2024-06-19 VITALS
HEART RATE: 93 BPM | DIASTOLIC BLOOD PRESSURE: 75 MMHG | OXYGEN SATURATION: 92 % | RESPIRATION RATE: 18 BRPM | SYSTOLIC BLOOD PRESSURE: 124 MMHG | TEMPERATURE: 97.9 F

## 2024-06-19 PROCEDURE — 99284 EMERGENCY DEPT VISIT MOD MDM: CPT

## 2024-06-19 PROCEDURE — 6370000000 HC RX 637 (ALT 250 FOR IP): Performed by: NURSE PRACTITIONER

## 2024-06-19 PROCEDURE — 72131 CT LUMBAR SPINE W/O DYE: CPT

## 2024-06-19 RX ORDER — HYDROCODONE BITARTRATE AND ACETAMINOPHEN 5; 325 MG/1; MG/1
1 TABLET ORAL ONCE
Status: COMPLETED | OUTPATIENT
Start: 2024-06-19 | End: 2024-06-19

## 2024-06-19 RX ADMIN — HYDROCODONE BITARTRATE AND ACETAMINOPHEN 1 TABLET: 5; 325 TABLET ORAL at 22:52

## 2024-06-19 ASSESSMENT — PAIN SCALES - GENERAL
PAINLEVEL_OUTOF10: 10
PAINLEVEL_OUTOF10: 7

## 2024-06-19 ASSESSMENT — PAIN DESCRIPTION - ORIENTATION: ORIENTATION: LOWER

## 2024-06-19 ASSESSMENT — PAIN DESCRIPTION - LOCATION
LOCATION: BACK
LOCATION: BACK

## 2024-06-19 ASSESSMENT — PAIN - FUNCTIONAL ASSESSMENT: PAIN_FUNCTIONAL_ASSESSMENT: 0-10

## 2024-06-20 ENCOUNTER — HOSPITAL ENCOUNTER (EMERGENCY)
Age: 44
Discharge: HOME | End: 2024-06-20
Payer: COMMERCIAL

## 2024-06-20 DIAGNOSIS — M54.32 BILATERAL SCIATICA: ICD-10-CM

## 2024-06-20 DIAGNOSIS — M51.36 DEGENERATIVE DISC DISEASE, LUMBAR: ICD-10-CM

## 2024-06-20 DIAGNOSIS — S39.012A STRAIN OF LUMBAR REGION, INITIAL ENCOUNTER: Primary | ICD-10-CM

## 2024-06-20 DIAGNOSIS — M54.31 BILATERAL SCIATICA: ICD-10-CM

## 2024-06-20 NOTE — ED PROVIDER NOTES
plan.      --------------------------------- IMPRESSION AND DISPOSITION ---------------------------------    IMPRESSION  1. Strain of lumbar region, initial encounter    2. Bilateral sciatica    3. Degenerative disc disease, lumbar        DISPOSITION  Disposition: Discharge to home  Patient condition is stable      NOTE: This report was transcribed using voice recognition software. Every effort was made to ensure accuracy; however, inadvertent computerized transcription errors may be present

## 2024-06-23 VITALS
RESPIRATION RATE: 17 BRPM | HEIGHT: 71 IN | DIASTOLIC BLOOD PRESSURE: 71 MMHG | SYSTOLIC BLOOD PRESSURE: 114 MMHG | OXYGEN SATURATION: 96 % | BODY MASS INDEX: 21 KG/M2 | WEIGHT: 150 LBS | HEART RATE: 68 BPM | TEMPERATURE: 97.6 F

## 2024-06-23 PROCEDURE — 99281 EMR DPT VST MAYX REQ PHY/QHP: CPT

## 2024-06-23 RX ORDER — ACETAMINOPHEN 325 MG/1
650 TABLET ORAL ONCE
Status: DISCONTINUED | OUTPATIENT
Start: 2024-06-23 | End: 2024-06-24 | Stop reason: HOSPADM

## 2024-06-23 ASSESSMENT — PAIN DESCRIPTION - ORIENTATION: ORIENTATION: RIGHT;LEFT;LOWER

## 2024-06-23 ASSESSMENT — PAIN - FUNCTIONAL ASSESSMENT: PAIN_FUNCTIONAL_ASSESSMENT: 0-10

## 2024-06-23 ASSESSMENT — PAIN DESCRIPTION - DESCRIPTORS: DESCRIPTORS: ACHING

## 2024-06-24 ENCOUNTER — HOSPITAL ENCOUNTER (EMERGENCY)
Age: 44
Discharge: HOME | End: 2024-06-24
Attending: EMERGENCY MEDICINE
Payer: COMMERCIAL

## 2024-06-24 DIAGNOSIS — M54.50 LUMBAR BACK PAIN: Primary | ICD-10-CM

## 2024-06-24 NOTE — ED PROVIDER NOTES
affecting Dx or Tx: Patient does smoke    Chronic Conditions: History depression history of schizophrenia    Records Reviewed:   Patient was last seen for lumbar back sprain on 6/20/2024      Re-Evaluations:             Re-evaluation.  Patient’s symptoms show no change      Consultations:                 Critical Care:         This patient's ED course included: a personal history and physicial eaxmination    This patient has been closely monitored during their ED course.    Counseling:   The emergency provider has spoken with the patient and discussed today’s results, in addition to providing specific details for the plan of care and counseling regarding the diagnosis and prognosis.  Questions are answered at this time and they are agreeable with the plan.       --------------------------------- IMPRESSION AND DISPOSITION ---------------------------------    IMPRESSION  1. Lumbar back pain        DISPOSITION  Disposition: Discharge to home  Patient condition is stable        NOTE: This report was transcribed using voice recognition software. Every effort was made to ensure accuracy; however, inadvertent computerized transcription errors may be present          Nabil Garner MD  06/23/24 4808       Nabil Garner MD  06/23/24 3906

## 2024-07-05 PROCEDURE — 99282 EMERGENCY DEPT VISIT SF MDM: CPT

## 2024-07-05 ASSESSMENT — PAIN SCALES - GENERAL: PAINLEVEL_OUTOF10: 5

## 2024-07-05 ASSESSMENT — PAIN DESCRIPTION - ONSET: ONSET: ON-GOING

## 2024-07-05 ASSESSMENT — PAIN DESCRIPTION - LOCATION: LOCATION: BACK

## 2024-07-05 ASSESSMENT — PAIN DESCRIPTION - FREQUENCY: FREQUENCY: INTERMITTENT

## 2024-07-06 ENCOUNTER — HOSPITAL ENCOUNTER (EMERGENCY)
Age: 44
Discharge: LEFT BEFORE DISCHARGE | End: 2024-07-06
Attending: EMERGENCY MEDICINE
Payer: COMMERCIAL

## 2024-07-06 VITALS
BODY MASS INDEX: 20.08 KG/M2 | TEMPERATURE: 98 F | RESPIRATION RATE: 14 BRPM | HEART RATE: 63 BPM | DIASTOLIC BLOOD PRESSURE: 78 MMHG | SYSTOLIC BLOOD PRESSURE: 117 MMHG | OXYGEN SATURATION: 100 % | WEIGHT: 144 LBS

## 2024-07-06 DIAGNOSIS — R44.3 HALLUCINATIONS: Primary | ICD-10-CM

## 2024-07-06 NOTE — DISCHARGE INSTRUCTIONS
Help Hotline 806 901-8086 or 1-768.891.8880 or 211   24 hour crisis line for the following [facility]   [email or web address]    PEER WARM LINE: 1-684.562.8816  Monday through Friday 4pm to 8pm and Saturday 1pm-3pm   You can call during these times to talk with a peer support person as needed       [facility]   [address] [place], [place] [address] [phone]  WALK IN HOURS MON-FRIDAY FROM 8-10AM      Homeless Shelters   [facility]  [address] [place] [address] [phone]    [facility]   [address] [place] [address]  [address] [place], [place] [address] [phone]    [facility] (women only)  [address] [place], [place] [id number] [phone]    [facility] (men only)  [address] [place] [address]  [phone] [phone]    [facility]   [phone]    [facility] ([facility])  Women - [phone] ([address] [place] [address])  Men - [phone] ext. [phone] ([address] [place] [address])  Family - [phone] ext. [phone] ([address] [place] [address])    [facility] ([place], [place])  [address] [place] [address] (limited a two day stay if non-resident)  [phone]    [facility] ([place], [place])  [address] [place] [address] [phone]     [facility]  [address] [place] [address] [phone]     [facility]  [address] [place] [address] [phone]    [facility]  [address] [place], [place] [id number] [phone]    [facility] - NOT A SHELTER   [address] [place] [phone]  Free hot meals Mondays and Tuesday 4PM-6PM sit down dinner at 5PM  Free hot showers Wednesday 1:15-3:45PM

## 2024-07-06 NOTE — ED PROVIDER NOTES
DISPOSITION  Disposition: pending  Patient condition is stable      NOTE: This report was transcribed using voice recognition software. Every effort was made to ensure accuracy; however, inadvertent computerized transcription errors may be present        Juan David Contreras MD  07/05/24 3595

## 2024-07-15 ENCOUNTER — HOSPITAL ENCOUNTER (EMERGENCY)
Age: 44
Discharge: LEFT BEFORE DISCHARGE | End: 2024-07-15

## 2024-07-17 ENCOUNTER — HOSPITAL ENCOUNTER (EMERGENCY)
Age: 44
Discharge: HOME | End: 2024-07-18
Payer: COMMERCIAL

## 2024-07-17 VITALS
DIASTOLIC BLOOD PRESSURE: 65 MMHG | HEART RATE: 74 BPM | SYSTOLIC BLOOD PRESSURE: 128 MMHG | TEMPERATURE: 98.2 F | RESPIRATION RATE: 20 BRPM | OXYGEN SATURATION: 98 %

## 2024-07-17 DIAGNOSIS — Z59.00 HOMELESS: Primary | ICD-10-CM

## 2024-07-17 DIAGNOSIS — F20.0 PARANOID SCHIZOPHRENIA (HCC): ICD-10-CM

## 2024-07-17 PROCEDURE — 99282 EMERGENCY DEPT VISIT SF MDM: CPT

## 2024-07-17 SDOH — ECONOMIC STABILITY - HOUSING INSECURITY: HOMELESSNESS UNSPECIFIED: Z59.00

## 2024-07-17 ASSESSMENT — PAIN - FUNCTIONAL ASSESSMENT: PAIN_FUNCTIONAL_ASSESSMENT: NONE - DENIES PAIN

## 2024-07-18 NOTE — ED NOTES
Department of Emergency Medicine  FIRST PROVIDER TRIAGE NOTE             Independent MLP           7/17/24  8:35 PM EDT    Date of Encounter: 7/17/24   MRN: [id number]      HPI: Joce Rodriguez Jr. is a 43 y.o. male who presents to the ED for No chief complaint on file.  Patient having paranoid thoughts.  Flight of ideas during triage.  States he always has auditory and visual hallucinations.  Denies SI and HI    ROS: Negative for cp or sob.    PE: Gen Appearance/Constitutional: alert  Musculoskeletal: moves all extremities x 4     Initial Plan of Care: All treatment areas with department are currently occupied. Plan to order/Initiate the following while awaiting opening in ED: Initiate Treatment-Testing, Proceed toTreatment Area When Bed Available for ED Attending/MLP to Continue Care    Electronically signed by JUICE Kaplan NP   DD: 7/17/24       Robel Juarez APRN - NP  07/17/24 2036

## 2024-07-18 NOTE — ED PROVIDER NOTES
Independent EVIN Visit.       [facility] EMERGENCY DEPARTMENT  ED  Encounter Note  Admit Date/RoomTime: 2024  8:38 PM  ED Room: [place] [address]  NAME: Joce Rodriguez Jr.  : 1980  MRN: [id number]  PCP: No primary care provider on file.    CHIEF COMPLAINT     Paranoid (Patient states \"I feel paranoid from mathematical subtraction for theft, you know when people steal from you, it's scary\". Patient rambling on in triage. -SI/HI)    HISTORY OF PRESENT ILLNESS        Joce Rodriguez Jr. is a 43 y.o. male who presents to the ED ambulatory with complaint of paranoia.  Rambling.  Difficult to obtain history.  Well-known to the department.  He is asked regarding suicidal or homicidal ideations and he denies.  He denies hearing voices or having any visual hallucinations.  Does report he feels paranoid from mathematical subtraction.  Also reports that he is concerned that his family will steal from him or they will accuse him of stealing their possessions.  He is asking for something to eat  REVIEW OF SYSTEMS     Pertinent positives and negatives are stated within HPI, all other systems reviewed and are negative.    Past Medical History:  has a past medical history of Depression and Schizoaffective disorder (HCC).  Surgical History:  has a past surgical history that includes Hip fracture surgery (Left, 2021); eye surgery (19 years ago); and Hip fracture surgery (Left, 2021).  Social History:  reports that he has been smoking cigarettes. He has a 12.0 pack-year smoking history. He has never used smokeless tobacco. He reports that he does not currently use alcohol. He reports current drug use. Frequency: 7.00 times per week. Drugs: Cocaine, Marijuana (Weed), and Methamphetamines (Crystal Meth).  Family History: family history includes Mental Illness in his mother; No Known Problems in his father; Substance Abuse in his mother.   Allergies: Chlorpheniramine-phenylephrine, Motrin  myself)  Labs:  No results found for this visit on 07/17/24.  Imaging:  All Radiology results interpreted by Radiologist unless otherwise noted.  No orders to display       ED COURSE   Vitals:    Vitals:    07/17/24 2023 07/17/24 2036   BP:  128/65   Pulse:  74   Resp:  20   Temp: 98.7 °F (37.1 °C) 98.2 °F (36.8 °C)   TempSrc:  Oral   SpO2:  98%       Patient was given the following medications:  Medications - No data to display       PROCEDURES       REASSESSMENT   7/18/24       Time:   Patient’s condition .    CONSULTS:  None  DIFFERENTIAL DX_MDM   MDM:   Social Determinants : None    Records Reviewed : None_ n/a per encounter visit    CC/HPI Summary, DDx, ED Course, and Reassessment: Patient presents with Paranoid (Patient states \"I feel paranoid from mathematical subtraction for theft, you know when people steal from you, it's scary\". Patient rambling on in triage. -SI/HI)   Joce LLAMAS Michael Khan is a 43 y.o. male who presents to the ED ambulatory with complaint of paranoia.  Rambling.  Difficult to obtain history.  Well-known to the department.  He is asked regarding suicidal or homicidal ideations and he denies.  He denies hearing voices or having any visual hallucinations.  Does report he feels paranoid from mathematical subtraction.  Also reports that he is concerned that his family will steal from him or they will accuse him of stealing their possessions.    Patient is asking for something to eat.  Did consume a meal box here in the department and feels improved.    Plan of Care/Counseling:  JUICE Pace - CNP reviewed today's visit with the patient in addition to providing specific details for the plan of care and counseling regarding the diagnosis and prognosis.  Questions are answered at this time and are agreeable with the plan.    ASSESSMENT     1. Homeless    2. Paranoid schizophrenia (HCC)        DISPOSITION   Discharged home.  Patient condition is stable    Discharge Instructions:   Patient

## 2024-07-31 ENCOUNTER — HOSPITAL ENCOUNTER (EMERGENCY)
Age: 44
Discharge: HOME | End: 2024-07-31

## 2024-07-31 VITALS — HEART RATE: 80 BPM | OXYGEN SATURATION: 94 % | TEMPERATURE: 98.2 F

## 2024-08-04 ENCOUNTER — HOSPITAL ENCOUNTER (EMERGENCY)
Age: 44
Discharge: HOME | End: 2024-08-04
Attending: EMERGENCY MEDICINE
Payer: COMMERCIAL

## 2024-08-04 ENCOUNTER — APPOINTMENT (OUTPATIENT)
Dept: GENERAL RADIOLOGY | Age: 44
End: 2024-08-04
Payer: COMMERCIAL

## 2024-08-04 VITALS
OXYGEN SATURATION: 97 % | HEART RATE: 70 BPM | SYSTOLIC BLOOD PRESSURE: 163 MMHG | DIASTOLIC BLOOD PRESSURE: 64 MMHG | TEMPERATURE: 98 F

## 2024-08-04 DIAGNOSIS — S60.222A CONTUSION OF LEFT HAND, INITIAL ENCOUNTER: Primary | ICD-10-CM

## 2024-08-04 DIAGNOSIS — S60.212A CONTUSION OF LEFT WRIST, INITIAL ENCOUNTER: ICD-10-CM

## 2024-08-04 PROCEDURE — 99283 EMERGENCY DEPT VISIT LOW MDM: CPT

## 2024-08-04 PROCEDURE — 73130 X-RAY EXAM OF HAND: CPT

## 2024-08-04 PROCEDURE — 73110 X-RAY EXAM OF WRIST: CPT

## 2024-08-04 RX ORDER — ACETAMINOPHEN 500 MG
1000 TABLET ORAL ONCE
Status: DISCONTINUED | OUTPATIENT
Start: 2024-08-04 | End: 2024-08-04 | Stop reason: HOSPADM
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
## 2024-08-08 ENCOUNTER — HOSPITAL ENCOUNTER (EMERGENCY)
Age: 44
Discharge: LEFT BEFORE DISCHARGE | End: 2024-08-08
Payer: COMMERCIAL

## 2024-08-08 VITALS
RESPIRATION RATE: 16 BRPM | DIASTOLIC BLOOD PRESSURE: 81 MMHG | HEART RATE: 98 BPM | SYSTOLIC BLOOD PRESSURE: 125 MMHG | TEMPERATURE: 97.8 F | OXYGEN SATURATION: 98 %

## 2024-08-08 DIAGNOSIS — F22 PARANOID (HCC): Primary | ICD-10-CM

## 2024-08-08 PROCEDURE — 99281 EMR DPT VST MAYX REQ PHY/QHP: CPT

## 2024-08-08 NOTE — ED TRIAGE NOTES
Department of Emergency Medicine  FIRST PROVIDER TRIAGE NOTE             Independent MLP           8/8/24  6:16 PM EDT    Date of Encounter: 8/8/24   MRN: [id number]      HPI: Joce Rodriguez Jr. is a 43 y.o. male who presents to the ED for paranoid       ROS: Negative for Suicidal ideation or Homicidal Ideation.    PE: Gen Appearance/Constitutional: alert  HEENT: NC/NT. PERRLA,  Airway patent.     Initial Plan of Care: All treatment areas with department are currently occupied. Plan to order/Initiate the following while awaiting opening in ED: pending exam.  Initiate Treatment-Testing, Proceed toTreatment Area When Bed Available for ED Attending/MLP to Continue Care    Electronically signed by JUICE Pace - CNP   DD: 8/8/24    1845 pm, pt states to staff feels improved, doesn't want to wait.  He is not suicidal or homicidal.  At baseline from initial exam.  Would not wait for discussion regarding treatment benefits and eloped from department

## 2024-08-13 ENCOUNTER — HOSPITAL ENCOUNTER (EMERGENCY)
Age: 44
Discharge: HOME | End: 2024-08-13
Payer: COMMERCIAL

## 2024-08-13 VITALS
HEIGHT: 71 IN | OXYGEN SATURATION: 98 % | RESPIRATION RATE: 16 BRPM | BODY MASS INDEX: 20.3 KG/M2 | SYSTOLIC BLOOD PRESSURE: 104 MMHG | HEART RATE: 88 BPM | TEMPERATURE: 97.9 F | WEIGHT: 145 LBS | DIASTOLIC BLOOD PRESSURE: 88 MMHG

## 2024-08-13 DIAGNOSIS — Z59.00 HOMELESS: Primary | ICD-10-CM

## 2024-08-13 PROCEDURE — 99282 EMERGENCY DEPT VISIT SF MDM: CPT

## 2024-08-13 SDOH — ECONOMIC STABILITY - HOUSING INSECURITY: HOMELESSNESS UNSPECIFIED: Z59.00

## 2024-08-13 ASSESSMENT — PAIN - FUNCTIONAL ASSESSMENT: PAIN_FUNCTIONAL_ASSESSMENT: NONE - DENIES PAIN

## 2024-08-13 NOTE — ED PROVIDER NOTES
Independent  HPI:  8/13/24, Time: 7:31 PM EDT         Joce Rodriguez Jr. is a 43 y.o. male presenting to the ED for homeless.  Patient presents emergency department with wanting something to eat and to be able to get cleaned up.  Patient denies feeling suicidal or homicidal.  Patient with clear thought process and pleasant in conversation.  Patient otherwise denies any chest pain, shortness of breath or abdominal pain in denies any other concerns    Review of Systems:   A complete review of systems was performed and pertinent positives and negatives are stated within HPI, all other systems reviewed and are negative.          --------------------------------------------- PAST HISTORY ---------------------------------------------  Past Medical History:  has a past medical history of Depression and Schizoaffective disorder (HCC).    Past Surgical History:  has a past surgical history that includes Hip fracture surgery (Left, 9/12/2021); eye surgery (19 years ago); and Hip fracture surgery (Left, 9/28/2021).    Social History:  reports that he has been smoking cigarettes. He has a 12 pack-year smoking history. He has never used smokeless tobacco. He reports that he does not currently use alcohol. He reports current drug use. Frequency: 7.00 times per week. Drugs: Cocaine, Marijuana (Weed), and Methamphetamines (Crystal Meth).    Family History: family history includes Mental Illness in his mother; No Known Problems in his father; Substance Abuse in his mother.     The patient’s home medications have been reviewed.    Allergies: Chlorpheniramine-phenylephrine, Motrin [ibuprofen], Penicillins, and Rondec-d [chlophedianol-pseudoephedrine]    -------------------------------------------------- RESULTS -------------------------------------------------  All laboratory and radiology results have been personally reviewed by myself   LABS:  No results found for this visit on 08/13/24.    RADIOLOGY:  Interpreted by

## 2024-08-14 ENCOUNTER — HOSPITAL ENCOUNTER (EMERGENCY)
Age: 44
Discharge: LEFT BEFORE DISCHARGE | End: 2024-08-15
Payer: COMMERCIAL

## 2024-08-14 VITALS
HEART RATE: 71 BPM | RESPIRATION RATE: 17 BRPM | DIASTOLIC BLOOD PRESSURE: 56 MMHG | OXYGEN SATURATION: 95 % | SYSTOLIC BLOOD PRESSURE: 121 MMHG | TEMPERATURE: 97.9 F

## 2024-08-14 DIAGNOSIS — F39 MOOD DISORDER (HCC): Primary | ICD-10-CM

## 2024-08-14 LAB
ALBUMIN SERPL-MCNC: 3.8 G/DL (ref 3.5–5.2)
ALP SERPL-CCNC: 53 U/L (ref 40–129)
ALT SERPL-CCNC: 13 U/L (ref 0–40)
ANION GAP SERPL CALCULATED.3IONS-SCNC: 10 MMOL/L (ref 7–16)
APAP SERPL-MCNC: <5 UG/ML (ref 10–30)
AST SERPL-CCNC: 21 U/L (ref 0–39)
BASOPHILS # BLD: 0.07 K/UL (ref 0–0.2)
BASOPHILS NFR BLD: 1 % (ref 0–2)
BILIRUB SERPL-MCNC: 0.3 MG/DL (ref 0–1.2)
BUN SERPL-MCNC: 13 MG/DL (ref 6–20)
CALCIUM SERPL-MCNC: 9 MG/DL (ref 8.6–10.2)
CHLORIDE SERPL-SCNC: 107 MMOL/L (ref 98–107)
CO2 SERPL-SCNC: 25 MMOL/L (ref 22–29)
CREAT SERPL-MCNC: 0.9 MG/DL (ref 0.7–1.2)
EOSINOPHIL # BLD: 0.09 K/UL (ref 0.05–0.5)
EOSINOPHILS RELATIVE PERCENT: 2 % (ref 0–6)
ERYTHROCYTE [DISTWIDTH] IN BLOOD BY AUTOMATED COUNT: 13.4 % (ref 11.5–15)
ETHANOLAMINE SERPL-MCNC: <10 MG/DL (ref 0–0.08)
GFR, ESTIMATED: >90 ML/MIN/1.73M2
GLUCOSE SERPL-MCNC: 99 MG/DL (ref 74–99)
HCT VFR BLD AUTO: 43.7 % (ref 37–54)
HGB BLD-MCNC: 14.6 G/DL (ref 12.5–16.5)
IMM GRANULOCYTES # BLD AUTO: <0.03 K/UL (ref 0–0.58)
IMM GRANULOCYTES NFR BLD: 0 % (ref 0–5)
LYMPHOCYTES NFR BLD: 1.71 K/UL (ref 1.5–4)
LYMPHOCYTES RELATIVE PERCENT: 33 % (ref 20–42)
MCH RBC QN AUTO: 31.2 PG (ref 26–35)
MCHC RBC AUTO-ENTMCNC: 33.4 G/DL (ref 32–34.5)
MCV RBC AUTO: 93.4 FL (ref 80–99.9)
MONOCYTES NFR BLD: 0.36 K/UL (ref 0.1–0.95)
MONOCYTES NFR BLD: 7 % (ref 2–12)
NEUTROPHILS NFR BLD: 56 % (ref 43–80)
NEUTS SEG NFR BLD: 2.9 K/UL (ref 1.8–7.3)
PLATELET # BLD AUTO: 293 K/UL (ref 130–450)
PMV BLD AUTO: 10 FL (ref 7–12)
POTASSIUM SERPL-SCNC: 4.3 MMOL/L (ref 3.5–5)
PROT SERPL-MCNC: 6.6 G/DL (ref 6.4–8.3)
RBC # BLD AUTO: 4.68 M/UL (ref 3.8–5.8)
SALICYLATES SERPL-MCNC: <0.3 MG/DL (ref 0–30)
SODIUM SERPL-SCNC: 142 MMOL/L (ref 132–146)
TOXIC TRICYCLIC SC,BLOOD: NEGATIVE
WBC OTHER # BLD: 5.2 K/UL (ref 4.5–11.5)

## 2024-08-14 PROCEDURE — 80307 DRUG TEST PRSMV CHEM ANLYZR: CPT

## 2024-08-14 PROCEDURE — 85025 COMPLETE CBC W/AUTO DIFF WBC: CPT

## 2024-08-14 PROCEDURE — G0480 DRUG TEST DEF 1-7 CLASSES: HCPCS

## 2024-08-14 PROCEDURE — 99284 EMERGENCY DEPT VISIT MOD MDM: CPT

## 2024-08-14 PROCEDURE — 80143 DRUG ASSAY ACETAMINOPHEN: CPT

## 2024-08-14 PROCEDURE — 80053 COMPREHEN METABOLIC PANEL: CPT

## 2024-08-14 PROCEDURE — 93005 ELECTROCARDIOGRAM TRACING: CPT | Performed by: NURSE PRACTITIONER

## 2024-08-14 PROCEDURE — 80179 DRUG ASSAY SALICYLATE: CPT

## 2024-08-14 NOTE — ED PROVIDER NOTES
[facility] EMERGENCY DEPARTMENT  ED  Encounter Note  Admit Date/RoomTime: 8/14/2024  6:26 PM  ED Room: CHAIR03/C3    Department of Emergency Medicine  ED Provider Note  Admit Date: 8/14/2024  Room: CHAIR03/C3    Independent       8/14/24  6:27 PM EDT    HPI: Joce LLAMAS Michael Carter. 43 y.o. male presents with a complaint of feeling paranoid, having rambling speech beginning 1 day ago. Complaint has been constant and became more severe today which is what prompted the visit.  Denies suicidal ideation.   Denies homicidal ideation.  Not applicable specific plan.  Patient is well-known to the emergency department.  He normally is seen on a daily basis and normally just wants something to eat and to get cleaned up.  Today he is reporting that he is feeling paranoid and wants a checkup.  He has rambling speech pattern talking about his time in [place] and talking about a positive and a negative and unable to completely follow his thought process.  Patient denies feeling suicidal or homicidal.  He denies any hallucinations.  Overall pleasant in conversation but has disorganized thought process.  Patient reports that he is just trying to get things organized.            Review of Systems:   Pertinent positives and negatives are stated within HPI, all other systems reviewed and are negative.      --------------------------------------------- PAST HISTORY ---------------------------------------------  Past Medical History:  has a past medical history of Depression and Schizoaffective disorder (HCC).    Past Surgical History:  has a past surgical history that includes Hip fracture surgery (Left, 9/12/2021); eye surgery (19 years ago); and Hip fracture surgery (Left, 9/28/2021).    Social History:  reports that he has been smoking cigarettes. He has a 12 pack-year smoking history. He has never used smokeless tobacco. He reports that he does not currently use alcohol. He reports current drug  g/dL    Total Bilirubin 0.3 0.0 - 1.2 mg/dL    Alkaline Phosphatase 53 40 - 129 U/L    ALT 13 0 - 40 U/L    AST 21 0 - 39 U/L   Serum Drug Screen   Result Value Ref Range    Acetaminophen Level <5 (L) 10.0 - 30.0 ug/mL    Ethanol Lvl <10 <10 mg/dL    Salicylate Lvl <0.3 0.0 - 30.0 mg/dL    Toxic Tricyclic Sc,Blood NEGATIVE NEGATIVE   EKG 12 Lead   Result Value Ref Range    Ventricular Rate 70 BPM    Atrial Rate 70 BPM    P-R Interval 170 ms    QRS Duration 98 ms    Q-T Interval 360 ms    QTc Calculation (Bazett) 388 ms    P Axis 69 degrees    R Axis 99 degrees    T Axis 53 degrees       RADIOLOGY:  Interpreted by Radiologist.  No orders to display       EKG Interpretation  Interpreted by emergency department physician          ------------------------- NURSING NOTES AND VITALS REVIEWED ---------------------------   The nursing notes within the ED encounter and vital signs as below have been reviewed.   /69   Pulse 73   Temp 97.6 °F (36.4 °C)   Resp 16   SpO2 96%   Oxygen Saturation Interpretation: Normal            ---------------------------------------------------PHYSICAL EXAM--------------------------------------      Constitutional/General: Alert and oriented x3, well appearing, non toxic in NAD  Head: Normocephalic, atraumatic  Eyes: PERRL, EOMI  Mouth: Oropharynx clear, handling secretions, no trismus  Neck: Supple, full ROM, non tender to palpation in the midline, no stridor, no crepitus, no meningeal signs  Pulmonary: Lungs clear to auscultation bilaterally, no wheezes, rales, or rhonchi. Not in respiratory distress  Cardiovascular:  Regular rate and rhythm, no murmurs, gallops, or rubs. 2+ distal pulses  Abdomen: Soft, non tender, non distended, +BS, no rebound, guarding, or rigidity. No pulsatile masses appreciated  Extremities: Moves all extremities x 4. Warm and well perfused, no clubbing, cyanosis, or edema. Capillary refill <3 seconds  Skin: warm and dry without rash  Neurologic: GCS 15, CN

## 2024-08-14 NOTE — ED NOTES
Patient notified to give urine sample when they have to go to the bathroom, when handed specimen cup prior to going back to waiting room patient stated they do not have to pee and do not want to give a sample

## 2024-08-15 ENCOUNTER — HOSPITAL ENCOUNTER (EMERGENCY)
Age: 44
Discharge: LEFT BEFORE DISCHARGE | End: 2024-08-15
Attending: STUDENT IN AN ORGANIZED HEALTH CARE EDUCATION/TRAINING PROGRAM

## 2024-08-15 VITALS
HEART RATE: 61 BPM | DIASTOLIC BLOOD PRESSURE: 84 MMHG | OXYGEN SATURATION: 97 % | TEMPERATURE: 98.2 F | SYSTOLIC BLOOD PRESSURE: 137 MMHG | RESPIRATION RATE: 18 BRPM

## 2024-08-15 DIAGNOSIS — F22 PARANOID (HCC): Primary | ICD-10-CM

## 2024-08-15 LAB
EKG ATRIAL RATE: 70 BPM
EKG P AXIS: 69 DEGREES
EKG P-R INTERVAL: 170 MS
EKG Q-T INTERVAL: 360 MS
EKG QRS DURATION: 98 MS
EKG QTC CALCULATION (BAZETT): 388 MS
EKG R AXIS: 99 DEGREES
EKG T AXIS: 53 DEGREES
EKG VENTRICULAR RATE: 70 BPM

## 2024-08-15 PROCEDURE — 93010 ELECTROCARDIOGRAM REPORT: CPT | Performed by: INTERNAL MEDICINE

## 2024-08-15 PROCEDURE — 4500000002 HC ER NO CHARGE

## 2024-08-15 NOTE — ED PROVIDER NOTES
Joce Rodriguez is a 43 year old male present emergency department for mental health evaluation.  Patient denies SI or HI.  Patient does have tangential speech and is well-known to the emergency department patient appears to be at his baseline mental status.  Patient denies going to harm himself patient denies any current substance use.  Patient is currently agreeable to talk to social work.  Patient denies any medical complaints denies chest pain, shortness of breath, nausea, vomiting.  Patient does state that he does feel that something suspicious is going on but does not elaborate    The history is provided by the patient and medical records.        Review of Systems   Constitutional:  Negative for chills, diaphoresis, fatigue and fever.   Eyes:  Negative for photophobia and visual disturbance.   Respiratory:  Negative for cough, chest tightness and shortness of breath.    Cardiovascular:  Negative for chest pain, palpitations and leg swelling.   Gastrointestinal:  Negative for abdominal distention, abdominal pain, diarrhea, nausea and vomiting.   Genitourinary:  Negative for dysuria.   Musculoskeletal:  Negative for back pain, neck pain and neck stiffness.   Skin:  Negative for pallor and rash.   Neurological:  Negative for headaches.   Psychiatric/Behavioral:  Negative for confusion and suicidal ideas. The patient is not nervous/anxious.         Physical Exam  Vitals and nursing note reviewed.   Constitutional:       General: He is not in acute distress.     Appearance: Normal appearance. He is not ill-appearing.   HENT:      Head: Normocephalic and atraumatic.   Eyes:      General: No scleral icterus.     Conjunctiva/sclera: Conjunctivae normal.      Pupils: Pupils are equal, round, and reactive to light.   Cardiovascular:      Rate and Rhythm: Normal rate and regular rhythm.   Pulmonary:      Effort: Pulmonary effort is normal.      Breath sounds: Normal breath sounds.   Abdominal:      General: Bowel sounds  radiographs.     XR WRIST LEFT (MIN 3 VIEWS)    Result Date: 8/4/2024  EXAMINATION: XRAY VIEWS OF THE LEFT WRIST 8/4/2024 1:18 pm COMPARISON: None. HISTORY: ORDERING SYSTEM PROVIDED HISTORY: pain, lateral wrist TECHNOLOGIST PROVIDED HISTORY: Reason for exam:->pain, lateral wrist FINDINGS: The left wrist joint spaces are preserved.  No acute fracture, dislocation or radiopaque foreign bodies.  There is an old healed fracture of the distal ulnar shaft with mature callous formation.     No acute osseous abnormality of the left wrist.       ------------------------- NURSING NOTES AND VITALS REVIEWED ---------------------------  Date / Time Roomed:  8/15/2024  5:03 PM  ED Bed Assignment:  GAUDENCIO/GAUDENCIO    The nursing notes within the ED encounter and vital signs as below have been reviewed.   /84   Pulse 61   Temp 98.2 °F (36.8 °C) (Oral)   Resp 18   SpO2 97%   Oxygen Saturation Interpretation: Normal      ------------------------------------------ PROGRESS NOTES ------------------------------------------  Patient eloped from ED    Discharge Medication List as of 8/15/2024  6:26 PM          Diagnosis:  1. Paranoid (HCC)        Disposition:  Patient's disposition: ELOPED  Patient's condition is stable.          Kristina Yañez MD  08/16/24 [id number]

## 2024-08-15 NOTE — ED NOTES
Pt eloped before being treated this RN went to get blood work and pt  walked out of Emergency department. On his way out he stated \"no needles. I love you. No needles. Bye\"

## 2024-08-16 ASSESSMENT — ENCOUNTER SYMPTOMS
SHORTNESS OF BREATH: 0
DIARRHEA: 0
ABDOMINAL PAIN: 0
CHEST TIGHTNESS: 0
VOMITING: 0
COUGH: 0
ABDOMINAL DISTENTION: 0
PHOTOPHOBIA: 0
NAUSEA: 0
BACK PAIN: 0

## 2024-09-16 ENCOUNTER — HOSPITAL ENCOUNTER (EMERGENCY)
Age: 44
Discharge: HOME | End: 2024-09-16
Payer: COMMERCIAL

## 2024-09-16 VITALS
HEART RATE: 66 BPM | RESPIRATION RATE: 20 BRPM | DIASTOLIC BLOOD PRESSURE: 76 MMHG | OXYGEN SATURATION: 98 % | TEMPERATURE: 98 F | HEIGHT: 71 IN | BODY MASS INDEX: 20.22 KG/M2 | SYSTOLIC BLOOD PRESSURE: 128 MMHG

## 2024-09-16 DIAGNOSIS — Z59.00 HOMELESS: Primary | ICD-10-CM

## 2024-09-16 DIAGNOSIS — F22 PARANOID (HCC): ICD-10-CM

## 2024-09-16 PROCEDURE — 99282 EMERGENCY DEPT VISIT SF MDM: CPT

## 2024-09-16 SDOH — ECONOMIC STABILITY - HOUSING INSECURITY: HOMELESSNESS UNSPECIFIED: Z59.00

## 2024-09-16 ASSESSMENT — LIFESTYLE VARIABLES: HOW OFTEN DO YOU HAVE A DRINK CONTAINING ALCOHOL: NEVER

## 2024-09-22 ENCOUNTER — HOSPITAL ENCOUNTER (EMERGENCY)
Age: 44
Discharge: LEFT BEFORE DISCHARGE | End: 2024-09-22
Attending: EMERGENCY MEDICINE

## 2024-09-22 VITALS
RESPIRATION RATE: 18 BRPM | HEART RATE: 52 BPM | SYSTOLIC BLOOD PRESSURE: 111 MMHG | BODY MASS INDEX: 23.51 KG/M2 | OXYGEN SATURATION: 99 % | DIASTOLIC BLOOD PRESSURE: 64 MMHG | WEIGHT: 168.6 LBS | TEMPERATURE: 97.9 F

## 2024-09-22 DIAGNOSIS — Z53.21 ELOPED FROM EMERGENCY DEPARTMENT: Primary | ICD-10-CM

## 2024-09-24 ENCOUNTER — HOSPITAL ENCOUNTER (EMERGENCY)
Age: 44
Discharge: LEFT BEFORE DISCHARGE | End: 2024-09-24
Payer: COMMERCIAL

## 2024-09-24 VITALS
DIASTOLIC BLOOD PRESSURE: 67 MMHG | SYSTOLIC BLOOD PRESSURE: 115 MMHG | HEART RATE: 65 BPM | RESPIRATION RATE: 20 BRPM | OXYGEN SATURATION: 98 % | TEMPERATURE: 98 F

## 2024-09-24 DIAGNOSIS — Z20.2 POTENTIAL EXPOSURE TO STD: Primary | ICD-10-CM

## 2024-09-24 LAB
BILIRUB UR QL STRIP: NEGATIVE
CLARITY UR: ABNORMAL
COLOR UR: YELLOW
EPI CELLS #/AREA URNS HPF: ABNORMAL /HPF
GLUCOSE UR STRIP-MCNC: NEGATIVE MG/DL
HGB UR QL STRIP.AUTO: ABNORMAL
KETONES UR STRIP-MCNC: ABNORMAL MG/DL
LEUKOCYTE ESTERASE UR QL STRIP: ABNORMAL
NITRITE UR QL STRIP: NEGATIVE
PH UR STRIP: 6 [PH] (ref 5–9)
PROT UR STRIP-MCNC: ABNORMAL MG/DL
RBC #/AREA URNS HPF: ABNORMAL /HPF
SP GR UR STRIP: 1.02 (ref 1–1.03)
UROBILINOGEN UR STRIP-ACNC: 0.2 EU/DL (ref 0–1)
WBC #/AREA URNS HPF: ABNORMAL /HPF

## 2024-09-24 PROCEDURE — 99283 EMERGENCY DEPT VISIT LOW MDM: CPT

## 2024-09-24 PROCEDURE — 87591 N.GONORRHOEAE DNA AMP PROB: CPT

## 2024-09-24 PROCEDURE — 81001 URINALYSIS AUTO W/SCOPE: CPT

## 2024-09-24 PROCEDURE — 87491 CHLMYD TRACH DNA AMP PROBE: CPT

## 2024-09-24 RX ORDER — AZITHROMYCIN 250 MG/1
1000 TABLET, FILM COATED ORAL ONCE
Status: DISCONTINUED | OUTPATIENT
Start: 2024-09-24 | End: 2024-09-25 | Stop reason: HOSPADM

## 2024-09-26 ENCOUNTER — HOSPITAL ENCOUNTER (EMERGENCY)
Age: 44
Discharge: HOME | End: 2024-09-27
Attending: EMERGENCY MEDICINE
Payer: COMMERCIAL

## 2024-09-26 VITALS
SYSTOLIC BLOOD PRESSURE: 103 MMHG | BODY MASS INDEX: 23.71 KG/M2 | OXYGEN SATURATION: 97 % | DIASTOLIC BLOOD PRESSURE: 61 MMHG | WEIGHT: 170 LBS | TEMPERATURE: 97.5 F | HEART RATE: 84 BPM | RESPIRATION RATE: 16 BRPM

## 2024-09-26 DIAGNOSIS — N34.2 URETHRITIS: Primary | ICD-10-CM

## 2024-09-26 LAB
CHLAMYDIA DNA UR QL NAA+PROBE: NEGATIVE
N GONORRHOEA DNA UR QL NAA+PROBE: ABNORMAL
SPECIMEN DESCRIPTION: ABNORMAL

## 2024-09-26 PROCEDURE — 96372 THER/PROPH/DIAG INJ SC/IM: CPT

## 2024-09-26 PROCEDURE — 2500000003 HC RX 250 WO HCPCS: Performed by: NURSE PRACTITIONER

## 2024-09-26 PROCEDURE — 99283 EMERGENCY DEPT VISIT LOW MDM: CPT

## 2024-09-26 PROCEDURE — 6370000000 HC RX 637 (ALT 250 FOR IP): Performed by: NURSE PRACTITIONER

## 2024-09-26 PROCEDURE — 6360000002 HC RX W HCPCS: Performed by: NURSE PRACTITIONER

## 2024-09-26 RX ORDER — METRONIDAZOLE 500 MG/1
2000 TABLET ORAL ONCE
Status: COMPLETED | OUTPATIENT
Start: 2024-09-26 | End: 2024-09-26

## 2024-09-26 RX ORDER — AZITHROMYCIN 250 MG/1
1000 TABLET, FILM COATED ORAL ONCE
Status: COMPLETED | OUTPATIENT
Start: 2024-09-26 | End: 2024-09-26

## 2024-09-26 RX ADMIN — LIDOCAINE HYDROCHLORIDE 500 MG: 10 INJECTION, SOLUTION EPIDURAL; INFILTRATION; INTRACAUDAL; PERINEURAL at 23:04

## 2024-09-26 RX ADMIN — AZITHROMYCIN DIHYDRATE 1000 MG: 250 TABLET ORAL at 23:07

## 2024-09-26 RX ADMIN — METRONIDAZOLE 2000 MG: 500 TABLET ORAL at 23:07

## 2024-09-26 ASSESSMENT — PAIN DESCRIPTION - DESCRIPTORS: DESCRIPTORS: THROBBING

## 2024-09-26 ASSESSMENT — PAIN SCALES - GENERAL: PAINLEVEL_OUTOF10: 10

## 2024-09-26 ASSESSMENT — PAIN - FUNCTIONAL ASSESSMENT: PAIN_FUNCTIONAL_ASSESSMENT: 0-10

## 2024-09-26 ASSESSMENT — PAIN DESCRIPTION - LOCATION: LOCATION: PENIS

## 2024-09-27 NOTE — ED PROVIDER NOTES
Independent EVIN Visit.       [facility] EMERGENCY DEPARTMENT  ED  Encounter Note  Admit Date/RoomTime: 2024 10:42 PM  ED Room: [place] [address]  NAME: Joce Rodriguez Jr.  : 1980  MRN: [id number]  PCP: No primary care provider on file.    CHIEF COMPLAINT     Penile Discharge (Stated that his \"joy hurts\" rambling in triage  )    HISTORY OF PRESENT ILLNESS        Joce Rodriguez Jr. is a 44 y.o. male who presents to the ED via private vehicle with complaint of \"his joy hurts\".  Patient is well-known to the department who has longstanding history of depression and schizoaffective disorder.  He is difficult to obtain a history.  At baseline he does have flight of ideas.  Does deny suicidal homicidal ideations.  Repeatedly states \"joy hurts\" he is able to elaborate that it is just at the tip.  He does not have testicular pain.  REVIEW OF SYSTEMS     Pertinent positives and negatives are stated within HPI, all other systems reviewed and are negative.    Past Medical History:  has a past medical history of Depression and Schizoaffective disorder (HCC).  Surgical History:  has a past surgical history that includes Hip fracture surgery (Left, 2021); eye surgery (19 years ago); and Hip fracture surgery (Left, 2021).  Social History:  reports that he has been smoking cigarettes. He has a 12 pack-year smoking history. He has never used smokeless tobacco. He reports current alcohol use. He reports current drug use. Frequency: 7.00 times per week. Drugs: Cocaine, Marijuana (Weed), and Methamphetamines (Crystal Meth).  Family History: family history includes Mental Illness in his mother; No Known Problems in his father; Substance Abuse in his mother.   Allergies: Chlorpheniramine-phenylephrine, Motrin [ibuprofen], Penicillins, and Rondec-d [chlophedianol-pseudoephedrine]  CURRENT MEDICATIONS       Discharge Medication List as of 2024 12:51 AM        CONTINUE these  medications which have NOT CHANGED    Details   ARIPiprazole (ABILIFY) 10 MG tablet Take 1 tablet by mouth daily, Disp-30 tablet, R-0Normal      divalproex (DEPAKOTE) 500 MG DR tablet Take 1 tablet by mouth every 12 hours, Disp-60 tablet, R-0Normal      nicotine polacrilex (NICORETTE) 4 MG gum Take 1 each by mouth every 2 hours as needed for Smoking cessation, Disp-110 each, R-3NO PRINT             SCREENINGS     Macarena Coma Scale  Eye Opening: Spontaneous  Best Verbal Response: Oriented  Best Motor Response: Obeys commands  Macarena Coma Scale Score: 15         CIWA Assessment  BP: 103/61  Pulse: 84       PHYSICAL EXAM   Oxygen Saturation Interpretation: Normal on room air analysis.        ED Triage Vitals   BP Systolic BP Percentile Diastolic BP Percentile Temp Temp src Pulse Respirations SpO2   09/26/24 2236 -- -- 09/26/24 2224 -- 09/26/24 2224 09/26/24 2236 09/26/24 2224   103/61   97.5 °F (36.4 °C)  84 16 97 %      Height Weight - Scale         -- 09/26/24 2236          77.1 kg (170 lb)               Physical Exam  General: Awake alert and oriented.  Well-appearing.  Nontoxic.  HEENT: Normocephalic, atraumatic.  Pupils equal  Neck: Normal range of motion  Cardiac: Regular rate  Respiratory: Respirations even, unlabored.  No respiratory distress  Abdomen: Nondistended  Musculoskelatal: Moves all extremities x4   exam with RN piotr Suarez present.  There is purulent discharge from meatus.  No lesions.  No testicular pain or swelling  Neuro: Nonlateralizing  Skin: Flesh tone, warm, dry  Psych: Normal affect    DIAGNOSTIC RESULTS   (All laboratory and radiology results have been personally reviewed by myself)  Labs:  No results found for this visit on 09/26/24.  Imaging:  All Radiology results interpreted by Radiologist unless otherwise noted.  No orders to display       ED COURSE   Vitals:    Vitals:    09/26/24 2224 09/26/24 2236   BP:  103/61   Pulse: 84    Resp:  16   Temp: 97.5 °F (36.4 °C)    SpO2: 97%

## 2024-10-07 ENCOUNTER — HOSPITAL ENCOUNTER (EMERGENCY)
Age: 44
Discharge: LEFT BEFORE DISCHARGE | End: 2024-10-08
Payer: COMMERCIAL

## 2024-10-07 VITALS
OXYGEN SATURATION: 97 % | HEART RATE: 78 BPM | SYSTOLIC BLOOD PRESSURE: 118 MMHG | TEMPERATURE: 98 F | RESPIRATION RATE: 16 BRPM | DIASTOLIC BLOOD PRESSURE: 82 MMHG

## 2024-10-07 DIAGNOSIS — F39 MOOD DISORDER (HCC): Primary | ICD-10-CM

## 2024-10-07 DIAGNOSIS — F14.10 COCAINE ABUSE (HCC): ICD-10-CM

## 2024-10-07 PROCEDURE — 80053 COMPREHEN METABOLIC PANEL: CPT

## 2024-10-07 PROCEDURE — 99284 EMERGENCY DEPT VISIT MOD MDM: CPT

## 2024-10-07 PROCEDURE — 81001 URINALYSIS AUTO W/SCOPE: CPT

## 2024-10-07 PROCEDURE — 80143 DRUG ASSAY ACETAMINOPHEN: CPT

## 2024-10-07 PROCEDURE — 80179 DRUG ASSAY SALICYLATE: CPT

## 2024-10-07 PROCEDURE — 85025 COMPLETE CBC W/AUTO DIFF WBC: CPT

## 2024-10-07 PROCEDURE — 93005 ELECTROCARDIOGRAM TRACING: CPT | Performed by: NURSE PRACTITIONER

## 2024-10-07 PROCEDURE — G0480 DRUG TEST DEF 1-7 CLASSES: HCPCS

## 2024-10-07 PROCEDURE — 80307 DRUG TEST PRSMV CHEM ANLYZR: CPT

## 2024-10-07 ASSESSMENT — PAIN - FUNCTIONAL ASSESSMENT: PAIN_FUNCTIONAL_ASSESSMENT: NONE - DENIES PAIN

## 2024-10-07 ASSESSMENT — LIFESTYLE VARIABLES: HOW OFTEN DO YOU HAVE A DRINK CONTAINING ALCOHOL: NEVER

## 2024-10-08 LAB
ALBUMIN SERPL-MCNC: 4 G/DL (ref 3.5–5.2)
ALP SERPL-CCNC: 55 U/L (ref 40–129)
ALT SERPL-CCNC: 14 U/L (ref 0–40)
AMPHET UR QL SCN: NEGATIVE
ANION GAP SERPL CALCULATED.3IONS-SCNC: 9 MMOL/L (ref 7–16)
APAP SERPL-MCNC: <5 UG/ML (ref 10–30)
AST SERPL-CCNC: 24 U/L (ref 0–39)
BACTERIA URNS QL MICRO: ABNORMAL
BARBITURATES UR QL SCN: NEGATIVE
BASOPHILS # BLD: 0.11 K/UL (ref 0–0.2)
BASOPHILS NFR BLD: 2 % (ref 0–2)
BENZODIAZ UR QL: NEGATIVE
BILIRUB SERPL-MCNC: 0.4 MG/DL (ref 0–1.2)
BILIRUB UR QL STRIP: NEGATIVE
BUN SERPL-MCNC: 16 MG/DL (ref 6–20)
BUPRENORPHINE UR QL: NEGATIVE
CALCIUM SERPL-MCNC: 8.9 MG/DL (ref 8.6–10.2)
CANNABINOIDS UR QL SCN: NEGATIVE
CHLORIDE SERPL-SCNC: 107 MMOL/L (ref 98–107)
CLARITY UR: CLEAR
CO2 SERPL-SCNC: 28 MMOL/L (ref 22–29)
COCAINE UR QL SCN: POSITIVE
COLOR UR: YELLOW
CREAT SERPL-MCNC: 0.9 MG/DL (ref 0.7–1.2)
EOSINOPHIL # BLD: 0.26 K/UL (ref 0.05–0.5)
EOSINOPHILS RELATIVE PERCENT: 5 % (ref 0–6)
ERYTHROCYTE [DISTWIDTH] IN BLOOD BY AUTOMATED COUNT: 14.2 % (ref 11.5–15)
ETHANOLAMINE SERPL-MCNC: <10 MG/DL (ref 0–0.08)
FENTANYL UR QL: NEGATIVE
GFR, ESTIMATED: >90 ML/MIN/1.73M2
GLUCOSE SERPL-MCNC: 132 MG/DL (ref 74–99)
GLUCOSE UR STRIP-MCNC: NEGATIVE MG/DL
HCT VFR BLD AUTO: 43.7 % (ref 37–54)
HGB BLD-MCNC: 14.3 G/DL (ref 12.5–16.5)
HGB UR QL STRIP.AUTO: ABNORMAL
IMM GRANULOCYTES # BLD AUTO: <0.03 K/UL (ref 0–0.58)
IMM GRANULOCYTES NFR BLD: 0 % (ref 0–5)
KETONES UR STRIP-MCNC: ABNORMAL MG/DL
LEUKOCYTE ESTERASE UR QL STRIP: NEGATIVE
LYMPHOCYTES NFR BLD: 2.25 K/UL (ref 1.5–4)
LYMPHOCYTES RELATIVE PERCENT: 41 % (ref 20–42)
MCH RBC QN AUTO: 29.7 PG (ref 26–35)
MCHC RBC AUTO-ENTMCNC: 32.7 G/DL (ref 32–34.5)
MCV RBC AUTO: 90.9 FL (ref 80–99.9)
METHADONE UR QL: NEGATIVE
MONOCYTES NFR BLD: 0.37 K/UL (ref 0.1–0.95)
MONOCYTES NFR BLD: 7 % (ref 2–12)
NEUTROPHILS NFR BLD: 46 % (ref 43–80)
NEUTS SEG NFR BLD: 2.54 K/UL (ref 1.8–7.3)
NITRITE UR QL STRIP: NEGATIVE
OPIATES UR QL SCN: NEGATIVE
OXYCODONE UR QL SCN: NEGATIVE
PCP UR QL SCN: NEGATIVE
PH UR STRIP: 6 [PH] (ref 5–9)
PLATELET # BLD AUTO: 358 K/UL (ref 130–450)
PMV BLD AUTO: 9.7 FL (ref 7–12)
POTASSIUM SERPL-SCNC: 5.1 MMOL/L (ref 3.5–5)
PROT SERPL-MCNC: 6.9 G/DL (ref 6.4–8.3)
PROT UR STRIP-MCNC: NEGATIVE MG/DL
RBC # BLD AUTO: 4.81 M/UL (ref 3.8–5.8)
RBC #/AREA URNS HPF: ABNORMAL /HPF
SALICYLATES SERPL-MCNC: <0.3 MG/DL (ref 0–30)
SODIUM SERPL-SCNC: 144 MMOL/L (ref 132–146)
SP GR UR STRIP: 1.02 (ref 1–1.03)
TEST INFORMATION: ABNORMAL
TOXIC TRICYCLIC SC,BLOOD: NEGATIVE
UROBILINOGEN UR STRIP-ACNC: 2 EU/DL (ref 0–1)
WBC #/AREA URNS HPF: ABNORMAL /HPF
WBC OTHER # BLD: 5.5 K/UL (ref 4.5–11.5)

## 2024-10-08 NOTE — ED PROVIDER NOTES
(HCC)    2. Cocaine abuse (HCC)        DISPOSITION  Disposition: as per consultation   Patient condition is stable

## 2024-10-08 NOTE — CARE COORDINATION
Social Work /Transition of Care:    SW in to meet with pt who was sleeping but easily woke up to his name being called.  Pt stated it was cold last night.  Pt then asked what time and what day it was.  Pt then grabbed all of his things and stated he had to go he was going to miss an appointment and walked out of the ED.  Charge RN and ED physician aware.

## 2024-10-09 LAB
EKG ATRIAL RATE: 68 BPM
EKG P AXIS: 81 DEGREES
EKG P-R INTERVAL: 166 MS
EKG Q-T INTERVAL: 392 MS
EKG QRS DURATION: 98 MS
EKG QTC CALCULATION (BAZETT): 416 MS
EKG R AXIS: 76 DEGREES
EKG T AXIS: 70 DEGREES
EKG VENTRICULAR RATE: 68 BPM

## 2024-10-09 PROCEDURE — 93010 ELECTROCARDIOGRAM REPORT: CPT | Performed by: INTERNAL MEDICINE

## 2024-10-12 ENCOUNTER — HOSPITAL ENCOUNTER (EMERGENCY)
Age: 44
Discharge: HOME | End: 2024-10-13
Payer: COMMERCIAL

## 2024-10-12 VITALS
SYSTOLIC BLOOD PRESSURE: 116 MMHG | OXYGEN SATURATION: 97 % | TEMPERATURE: 98.1 F | DIASTOLIC BLOOD PRESSURE: 72 MMHG | RESPIRATION RATE: 18 BRPM | HEART RATE: 77 BPM

## 2024-10-12 DIAGNOSIS — F22 PARANOID (HCC): Primary | ICD-10-CM

## 2024-10-12 DIAGNOSIS — F19.10 POLYSUBSTANCE ABUSE (HCC): ICD-10-CM

## 2024-10-12 LAB
ALBUMIN SERPL-MCNC: 4.1 G/DL (ref 3.5–5.2)
ALP SERPL-CCNC: 54 U/L (ref 40–129)
ALT SERPL-CCNC: 14 U/L (ref 0–40)
AMPHET UR QL SCN: NEGATIVE
ANION GAP SERPL CALCULATED.3IONS-SCNC: 8 MMOL/L (ref 7–16)
APAP SERPL-MCNC: <5 UG/ML (ref 10–30)
AST SERPL-CCNC: 25 U/L (ref 0–39)
BACTERIA URNS QL MICRO: ABNORMAL
BARBITURATES UR QL SCN: NEGATIVE
BASOPHILS # BLD: 0.1 K/UL (ref 0–0.2)
BASOPHILS NFR BLD: 2 % (ref 0–2)
BENZODIAZ UR QL: NEGATIVE
BILIRUB SERPL-MCNC: 0.3 MG/DL (ref 0–1.2)
BILIRUB UR QL STRIP: NEGATIVE
BUN SERPL-MCNC: 15 MG/DL (ref 6–20)
BUPRENORPHINE UR QL: NEGATIVE
CALCIUM SERPL-MCNC: 8.9 MG/DL (ref 8.6–10.2)
CANNABINOIDS UR QL SCN: POSITIVE
CHLORIDE SERPL-SCNC: 107 MMOL/L (ref 98–107)
CK SERPL-CCNC: 585 U/L (ref 20–200)
CLARITY UR: CLEAR
CO2 SERPL-SCNC: 28 MMOL/L (ref 22–29)
COCAINE UR QL SCN: POSITIVE
COLOR UR: YELLOW
CREAT SERPL-MCNC: 0.9 MG/DL (ref 0.7–1.2)
EOSINOPHIL # BLD: 0.18 K/UL (ref 0.05–0.5)
EOSINOPHILS RELATIVE PERCENT: 3 % (ref 0–6)
ERYTHROCYTE [DISTWIDTH] IN BLOOD BY AUTOMATED COUNT: 14.2 % (ref 11.5–15)
ETHANOLAMINE SERPL-MCNC: <10 MG/DL (ref 0–0.08)
FENTANYL UR QL: NEGATIVE
GFR, ESTIMATED: >90 ML/MIN/1.73M2
GLUCOSE SERPL-MCNC: 82 MG/DL (ref 74–99)
GLUCOSE UR STRIP-MCNC: NEGATIVE MG/DL
HCT VFR BLD AUTO: 45.6 % (ref 37–54)
HGB BLD-MCNC: 14.7 G/DL (ref 12.5–16.5)
HGB UR QL STRIP.AUTO: ABNORMAL
IMM GRANULOCYTES # BLD AUTO: <0.03 K/UL (ref 0–0.58)
IMM GRANULOCYTES NFR BLD: 0 % (ref 0–5)
KETONES UR STRIP-MCNC: NEGATIVE MG/DL
LEUKOCYTE ESTERASE UR QL STRIP: NEGATIVE
LYMPHOCYTES NFR BLD: 1.77 K/UL (ref 1.5–4)
LYMPHOCYTES RELATIVE PERCENT: 26 % (ref 20–42)
MCH RBC QN AUTO: 29.8 PG (ref 26–35)
MCHC RBC AUTO-ENTMCNC: 32.2 G/DL (ref 32–34.5)
MCV RBC AUTO: 92.3 FL (ref 80–99.9)
METHADONE UR QL: NEGATIVE
MONOCYTES NFR BLD: 0.38 K/UL (ref 0.1–0.95)
MONOCYTES NFR BLD: 6 % (ref 2–12)
NEUTROPHILS NFR BLD: 64 % (ref 43–80)
NEUTS SEG NFR BLD: 4.26 K/UL (ref 1.8–7.3)
NITRITE UR QL STRIP: NEGATIVE
OPIATES UR QL SCN: POSITIVE
OXYCODONE UR QL SCN: NEGATIVE
PCP UR QL SCN: NEGATIVE
PH UR STRIP: 6 [PH] (ref 5–9)
PLATELET # BLD AUTO: 352 K/UL (ref 130–450)
PMV BLD AUTO: 9.6 FL (ref 7–12)
POTASSIUM SERPL-SCNC: 4.4 MMOL/L (ref 3.5–5)
PROT SERPL-MCNC: 6.9 G/DL (ref 6.4–8.3)
PROT UR STRIP-MCNC: 100 MG/DL
RBC # BLD AUTO: 4.94 M/UL (ref 3.8–5.8)
RBC #/AREA URNS HPF: ABNORMAL /HPF
SALICYLATES SERPL-MCNC: 0.4 MG/DL (ref 0–30)
SODIUM SERPL-SCNC: 143 MMOL/L (ref 132–146)
SP GR UR STRIP: >1.03 (ref 1–1.03)
TEST INFORMATION: ABNORMAL
TOXIC TRICYCLIC SC,BLOOD: NEGATIVE
UROBILINOGEN UR STRIP-ACNC: 0.2 EU/DL (ref 0–1)
WBC #/AREA URNS HPF: ABNORMAL /HPF
WBC OTHER # BLD: 6.7 K/UL (ref 4.5–11.5)

## 2024-10-12 PROCEDURE — 99284 EMERGENCY DEPT VISIT MOD MDM: CPT

## 2024-10-12 PROCEDURE — 93005 ELECTROCARDIOGRAM TRACING: CPT | Performed by: NURSE PRACTITIONER

## 2024-10-12 PROCEDURE — 80143 DRUG ASSAY ACETAMINOPHEN: CPT

## 2024-10-12 PROCEDURE — 87086 URINE CULTURE/COLONY COUNT: CPT

## 2024-10-12 PROCEDURE — 81001 URINALYSIS AUTO W/SCOPE: CPT

## 2024-10-12 PROCEDURE — 80053 COMPREHEN METABOLIC PANEL: CPT

## 2024-10-12 PROCEDURE — G0480 DRUG TEST DEF 1-7 CLASSES: HCPCS

## 2024-10-12 PROCEDURE — 80179 DRUG ASSAY SALICYLATE: CPT

## 2024-10-12 PROCEDURE — 82550 ASSAY OF CK (CPK): CPT

## 2024-10-12 PROCEDURE — 80307 DRUG TEST PRSMV CHEM ANLYZR: CPT

## 2024-10-12 PROCEDURE — 85025 COMPLETE CBC W/AUTO DIFF WBC: CPT

## 2024-10-13 NOTE — DISCHARGE INSTRUCTIONS
[facility]  [address] [place] [address] [phone]    [facility]  [facility]  [address] [place] [address] [phone]  Walk-in hours 11-1:30pm      Homeless Shelters   [facility] [place] [facility]  [address] [place] [address] [phone]    [facility]   [address] [place] [address]  Free hot meals Mondays and Tuesday 4PM-6PM sit down dinner at 5PM  Free hot showers Wednesday 1:15-3:45PM    [facility] - Women and Children only   [phone]   [facility]   [address] [place], [place] [id number] [phone]    [facility] (women only)  [address], [place] [id number] [phone]    [facility] (men only)  [address] [place] [address]  [phone]    [facility]   [phone]    [facility] [place] [facility])  Women - [phone] ([address] [place] [address])  Men - [phone] ext. [phone] ([address] [place] [address])  Family - [phone] ext. [phone] ([address] [place] [address])    [facility] ([place], [place])  [address] [place] [address] (limited a two day stay if non-resident)  [phone]    [facility] ([place], [place])  [address] [place] [address] [phone]

## 2024-10-13 NOTE — ED PROVIDER NOTES
- 40 U/L    AST 25 0 - 39 U/L   Urinalysis   Result Value Ref Range    Color, UA Yellow Yellow    Turbidity UA Clear Clear    Glucose, Ur NEGATIVE NEGATIVE mg/dL    Bilirubin, Urine NEGATIVE NEGATIVE    Ketones, Urine NEGATIVE NEGATIVE mg/dL    Specific Gravity, UA >1.030 (H) 1.005 - 1.030    Urine Hgb SMALL (A) NEGATIVE    pH, Urine 6.0 5.0 - 9.0    Protein,  (A) NEGATIVE mg/dL    Urobilinogen, Urine 0.2 0.0 - 1.0 EU/dL    Nitrite, Urine NEGATIVE NEGATIVE    Leukocyte Esterase, Urine NEGATIVE NEGATIVE   Urine Drug Screen   Result Value Ref Range    Amphetamine Screen, Ur NEGATIVE NEGATIVE    Barbiturate Screen, Ur NEGATIVE NEGATIVE    Benzodiazepine Screen, Urine NEGATIVE NEGATIVE    Cocaine Metabolite, Urine POSITIVE (A) NEGATIVE    Methadone Screen, Urine NEGATIVE NEGATIVE    Opiates, Urine POSITIVE (A) NEGATIVE    Phencyclidine, Urine NEGATIVE NEGATIVE    Cannabinoid Scrn, Ur POSITIVE (A) NEGATIVE    Oxycodone Screen, Ur NEGATIVE NEGATIVE    Fentanyl, Ur NEGATIVE NEGATIVE    Buprenorphine Urine NEGATIVE NEGATIVE    Test Information       These drug screen results are for medical purposes only and should not be considered definitive or confirmed.   Serum Drug Screen   Result Value Ref Range    Acetaminophen Level <5 (L) 10.0 - 30.0 ug/mL    Ethanol Lvl <10 <10 mg/dL    Salicylate Lvl 0.4 0.0 - 30.0 mg/dL    Toxic Tricyclic Sc,Blood NEGATIVE NEGATIVE   Microscopic Urinalysis   Result Value Ref Range    WBC, UA 10 TO 20 (A) 0 TO 5 /HPF    RBC, UA 6 TO 9 (A) 0 TO 2 /HPF    Bacteria, UA 3+ (A) None   CK   Result Value Ref Range    Total  (H) 20 - 200 U/L       RADIOLOGY:  Interpreted by Radiologist.  No orders to display       EKG Interpretation  Interpreted by emergency department physician-twelve-lead EKG as interpreted by the emergency room attending and myself showing heart rate of 61, normal sinus rhythm with right axis deviation.  No acute ST elevation depression

## 2024-10-13 NOTE — ED NOTES
10/13/24  Joce Rodriguez Jr.  [id number]  1980    Social Work Behavioral Health Crisis Assessment    Chief Complaint: Patient expressed feeling paranoid.    Mental Status Exam:  Level of consciousness:  awake   Appearance:  street clothes, seated in bed, good grooming, and good hygiene.  Does appear stated age. No acute distress.  Behavior/Motor:  no abnormalities noted  Attitude toward examiner:  cooperative and good eye contact  SI/HI:Denies SI/HI  Speech:  normal volume, hyperverbal, and high productivity , Tone: normal tone  Mood: irritable  Affect: angry  Thought Processes:  flight of ideas.   Thought Content: Delusions:  paranoid  Hallucinations:  Hallucinations: Denies AVOT-H  Cognition:  oriented to person, place, and time   Concentration: distractible  Memory: impaired unable to recall some history, though not formally tested.  Insight: fair   Judgement: fair   Fund of Knowledge: adequate    Legal Status:  [x] Voluntary:  [] Involuntary, Issued by:  [] Probate    Brief Clinical Summary:Patient is of 44-year-old homeless male who presented to the ED as a walk-in. Per APRN-CNP note, patient reporting his paranoia became more severe which prompted ED visit. Patient is chronic for appearing in the ED for secondary gains. SW met with patient for assessment.   Patient admits to still being homeless. Patient denied SI, denied history of self-injurious behaviors or suicide attempts.  Patient denied HI, denied history of aggression.  Patient denied being on any current medications, stated that the last outpatient provider was \"[place].\" Patient stated he has a pending [place] appointment.      Patient history per chart noted AOD treatment at [place] 2 years ago.      Patient diagnosis per chart is schizoaffective disorder. Patient with a history of multiple [facility] admissions, most recently on 11/11/23. Patient also with history of admissions at [facility] (most recently on 11/27/23) Robert Valdiviasor

## 2024-10-14 LAB
EKG ATRIAL RATE: 61 BPM
EKG P AXIS: 35 DEGREES
EKG P-R INTERVAL: 152 MS
EKG Q-T INTERVAL: 376 MS
EKG QRS DURATION: 102 MS
EKG QTC CALCULATION (BAZETT): 378 MS
EKG R AXIS: 102 DEGREES
EKG T AXIS: 21 DEGREES
EKG VENTRICULAR RATE: 61 BPM
MICROORGANISM SPEC CULT: ABNORMAL
SERVICE CMNT-IMP: ABNORMAL
SPECIMEN DESCRIPTION: ABNORMAL

## 2024-10-14 PROCEDURE — 93010 ELECTROCARDIOGRAM REPORT: CPT | Performed by: INTERNAL MEDICINE

## 2024-10-25 ENCOUNTER — HOSPITAL ENCOUNTER (EMERGENCY)
Age: 44
Discharge: HOME | End: 2024-10-26
Payer: MEDICARE

## 2024-10-25 VITALS
DIASTOLIC BLOOD PRESSURE: 76 MMHG | SYSTOLIC BLOOD PRESSURE: 125 MMHG | RESPIRATION RATE: 16 BRPM | TEMPERATURE: 97.1 F | OXYGEN SATURATION: 97 % | HEART RATE: 96 BPM

## 2024-10-25 DIAGNOSIS — Z59.00 HOMELESS: Primary | ICD-10-CM

## 2024-10-25 PROCEDURE — 99282 EMERGENCY DEPT VISIT SF MDM: CPT

## 2024-10-25 SDOH — ECONOMIC STABILITY - HOUSING INSECURITY: HOMELESSNESS UNSPECIFIED: Z59.00

## 2024-10-25 ASSESSMENT — PAIN - FUNCTIONAL ASSESSMENT: PAIN_FUNCTIONAL_ASSESSMENT: NONE - DENIES PAIN

## 2024-10-25 ASSESSMENT — LIFESTYLE VARIABLES: HOW OFTEN DO YOU HAVE A DRINK CONTAINING ALCOHOL: NEVER

## 2024-10-26 NOTE — ED PROVIDER NOTES
Independent EVIN Visit.       [facility] EMERGENCY DEPARTMENT  ED  Encounter Note  Admit Date/RoomTime: 10/25/2024 10:12 PM  ED Room: [place] [address]  NAME: Joce Rodriguez Jr.  : 1980  MRN: [id number]  PCP: No primary care provider on file.    CHIEF COMPLAINT     Homeless (Stated he can in tonight for safety has no where to go this night and is planing to go to rehab on Monday )    HISTORY OF PRESENT ILLNESS        Joce Rodriguez Jr. is a 44 y.o. male who presents to the ED ambulatory homeless.  Planning to go to rehab a little bit paranoid about it.  Not suicidal or homicidal  REVIEW OF SYSTEMS     Pertinent positives and negatives are stated within HPI, all other systems reviewed and are negative.    Past Medical History:  has a past medical history of Depression and Schizoaffective disorder (HCC).  Surgical History:  has a past surgical history that includes Hip fracture surgery (Left, 2021); eye surgery (19 years ago); and Hip fracture surgery (Left, 2021).  Social History:  reports that he has been smoking cigarettes. He has a 12 pack-year smoking history. He has never used smokeless tobacco. He reports that he does not currently use alcohol. He reports that he does not currently use drugs after having used the following drugs: Cocaine, Marijuana (Weed), and Methamphetamines (Crystal Meth). Frequency: 7.00 times per week.  Family History: family history includes Mental Illness in his mother; No Known Problems in his father; Substance Abuse in his mother.   Allergies: Chlorpheniramine-phenylephrine, Motrin [ibuprofen], Penicillins, and Rondec-d [chlophedianol-pseudoephedrine]  CURRENT MEDICATIONS       Discharge Medication List as of 10/26/2024  1:24 AM        CONTINUE these medications which have NOT CHANGED    Details   ARIPiprazole (ABILIFY) 10 MG tablet Take 1 tablet by mouth daily, Disp-30 tablet, R-0Normal      divalproex (DEPAKOTE) 500 MG DR tablet Take 1

## 2024-10-29 ENCOUNTER — APPOINTMENT (OUTPATIENT)
Dept: GENERAL RADIOLOGY | Age: 44
End: 2024-10-29
Payer: MEDICARE

## 2024-10-29 ENCOUNTER — HOSPITAL ENCOUNTER (EMERGENCY)
Age: 44
Discharge: PSYCHIATRIC OR SUBSTANCE USE FACILITY | End: 2024-10-30
Attending: EMERGENCY MEDICINE | Admitting: PSYCHIATRY & NEUROLOGY
Payer: MEDICARE

## 2024-10-29 DIAGNOSIS — R44.3 HALLUCINATIONS: ICD-10-CM

## 2024-10-29 DIAGNOSIS — F23 ACUTE PSYCHOSIS (HCC): Primary | ICD-10-CM

## 2024-10-29 DIAGNOSIS — R46.89 AGGRESSIVE BEHAVIOR: ICD-10-CM

## 2024-10-29 PROBLEM — F20.3 SCHIZOPHRENIA, UNDIFFERENTIATED (HCC): Status: ACTIVE | Noted: 2024-10-29

## 2024-10-29 LAB
ALBUMIN SERPL-MCNC: 3.8 G/DL (ref 3.5–5.2)
ALP SERPL-CCNC: 45 U/L (ref 40–129)
ALT SERPL-CCNC: 14 U/L (ref 0–40)
AMPHET UR QL SCN: NEGATIVE
ANION GAP SERPL CALCULATED.3IONS-SCNC: 9 MMOL/L (ref 7–16)
APAP SERPL-MCNC: <5 UG/ML (ref 10–30)
AST SERPL-CCNC: 19 U/L (ref 0–39)
BARBITURATES UR QL SCN: NEGATIVE
BASOPHILS # BLD: 0.06 K/UL (ref 0–0.2)
BASOPHILS NFR BLD: 1 % (ref 0–2)
BENZODIAZ UR QL: POSITIVE
BILIRUB SERPL-MCNC: 0.3 MG/DL (ref 0–1.2)
BUN SERPL-MCNC: 10 MG/DL (ref 6–20)
BUPRENORPHINE UR QL: NEGATIVE
CALCIUM SERPL-MCNC: 8.4 MG/DL (ref 8.6–10.2)
CANNABINOIDS UR QL SCN: NEGATIVE
CHLORIDE SERPL-SCNC: 106 MMOL/L (ref 98–107)
CO2 SERPL-SCNC: 27 MMOL/L (ref 22–29)
COCAINE UR QL SCN: NEGATIVE
CREAT SERPL-MCNC: 0.8 MG/DL (ref 0.7–1.2)
EKG ATRIAL RATE: 79 BPM
EKG P AXIS: 67 DEGREES
EKG P-R INTERVAL: 172 MS
EKG Q-T INTERVAL: 360 MS
EKG QRS DURATION: 86 MS
EKG QTC CALCULATION (BAZETT): 412 MS
EKG R AXIS: 62 DEGREES
EKG T AXIS: 53 DEGREES
EKG VENTRICULAR RATE: 79 BPM
EOSINOPHIL # BLD: 0.17 K/UL (ref 0.05–0.5)
EOSINOPHILS RELATIVE PERCENT: 4 % (ref 0–6)
ERYTHROCYTE [DISTWIDTH] IN BLOOD BY AUTOMATED COUNT: 14.5 % (ref 11.5–15)
ETHANOLAMINE SERPL-MCNC: <10 MG/DL (ref 0–0.08)
FENTANYL UR QL: NEGATIVE
GFR, ESTIMATED: >90 ML/MIN/1.73M2
GLUCOSE SERPL-MCNC: 81 MG/DL (ref 74–99)
HCT VFR BLD AUTO: 41.5 % (ref 37–54)
HGB BLD-MCNC: 13.7 G/DL (ref 12.5–16.5)
IMM GRANULOCYTES # BLD AUTO: <0.03 K/UL (ref 0–0.58)
IMM GRANULOCYTES NFR BLD: 0 % (ref 0–5)
LYMPHOCYTES NFR BLD: 1.53 K/UL (ref 1.5–4)
LYMPHOCYTES RELATIVE PERCENT: 36 % (ref 20–42)
MAGNESIUM SERPL-MCNC: 1.8 MG/DL (ref 1.6–2.6)
MCH RBC QN AUTO: 30.9 PG (ref 26–35)
MCHC RBC AUTO-ENTMCNC: 33 G/DL (ref 32–34.5)
MCV RBC AUTO: 93.5 FL (ref 80–99.9)
METHADONE UR QL: NEGATIVE
MONOCYTES NFR BLD: 0.42 K/UL (ref 0.1–0.95)
MONOCYTES NFR BLD: 10 % (ref 2–12)
NEUTROPHILS NFR BLD: 49 % (ref 43–80)
NEUTS SEG NFR BLD: 2.07 K/UL (ref 1.8–7.3)
OPIATES UR QL SCN: NEGATIVE
OXYCODONE UR QL SCN: NEGATIVE
PCP UR QL SCN: NEGATIVE
PLATELET # BLD AUTO: 290 K/UL (ref 130–450)
PMV BLD AUTO: 9.2 FL (ref 7–12)
POTASSIUM SERPL-SCNC: 4.4 MMOL/L (ref 3.5–5)
PROT SERPL-MCNC: 6.1 G/DL (ref 6.4–8.3)
RBC # BLD AUTO: 4.44 M/UL (ref 3.8–5.8)
SALICYLATES SERPL-MCNC: <0.3 MG/DL (ref 0–30)
SODIUM SERPL-SCNC: 142 MMOL/L (ref 132–146)
TEST INFORMATION: ABNORMAL
TOXIC TRICYCLIC SC,BLOOD: NEGATIVE
WBC OTHER # BLD: 4.3 K/UL (ref 4.5–11.5)

## 2024-10-29 PROCEDURE — 99285 EMERGENCY DEPT VISIT HI MDM: CPT

## 2024-10-29 PROCEDURE — G0480 DRUG TEST DEF 1-7 CLASSES: HCPCS

## 2024-10-29 PROCEDURE — 85025 COMPLETE CBC W/AUTO DIFF WBC: CPT

## 2024-10-29 PROCEDURE — 6360000002 HC RX W HCPCS

## 2024-10-29 PROCEDURE — 93010 ELECTROCARDIOGRAM REPORT: CPT | Performed by: INTERNAL MEDICINE

## 2024-10-29 PROCEDURE — 93005 ELECTROCARDIOGRAM TRACING: CPT

## 2024-10-29 PROCEDURE — 96372 THER/PROPH/DIAG INJ SC/IM: CPT

## 2024-10-29 PROCEDURE — 71045 X-RAY EXAM CHEST 1 VIEW: CPT

## 2024-10-29 PROCEDURE — 80143 DRUG ASSAY ACETAMINOPHEN: CPT

## 2024-10-29 PROCEDURE — 80307 DRUG TEST PRSMV CHEM ANLYZR: CPT

## 2024-10-29 PROCEDURE — 80053 COMPREHEN METABOLIC PANEL: CPT

## 2024-10-29 PROCEDURE — 83735 ASSAY OF MAGNESIUM: CPT

## 2024-10-29 PROCEDURE — 80179 DRUG ASSAY SALICYLATE: CPT

## 2024-10-29 RX ORDER — MIDAZOLAM HYDROCHLORIDE 5 MG/ML
5 INJECTION, SOLUTION INTRAMUSCULAR; INTRAVENOUS ONCE
Status: COMPLETED | OUTPATIENT
Start: 2024-10-29 | End: 2024-10-29

## 2024-10-29 RX ORDER — DROPERIDOL 2.5 MG/ML
5 INJECTION, SOLUTION INTRAMUSCULAR; INTRAVENOUS ONCE
Status: COMPLETED | OUTPATIENT
Start: 2024-10-29 | End: 2024-10-29

## 2024-10-29 RX ADMIN — DROPERIDOL 5 MG: 2.5 INJECTION, SOLUTION INTRAMUSCULAR; INTRAVENOUS at 14:48

## 2024-10-29 RX ADMIN — MIDAZOLAM 5 MG: 5 INJECTION INTRAMUSCULAR; INTRAVENOUS at 14:48

## 2024-10-29 NOTE — PROGRESS NOTES
Pt is within line of sight. He is calm and cooperative. He is resting quietly in bed with CO at bedside. Pt denied homicidal/suicidal thoughts/hallucinations at this time.

## 2024-10-29 NOTE — ED NOTES
Patient belongings removed and placed in locker #3 in phsycian's room and directly outside of locker #3 in physician's room given large amount of belongings. Pt placed in suicide safe gown. All ligature risks removed from room.

## 2024-10-29 NOTE — ED NOTES
Pt becoming more aggressive. Attempted to re-direct and educate, pt becoming increasingly agitated and aggressive towards staff. Notified Dr Oden and Dr Kenny for orders.

## 2024-10-29 NOTE — ED PROVIDER NOTES
Patient reassessed.  Patient is calm and cooperative with exam at this time.  Patient medically cleared for psychiatric evaluation and treatment. [JH]      ED Course User Index  [] Seth Odne MD  [MS] Yariel Kenny,        This patient's ED course included: History, physical examination, reevaluation prior to disposition    This patient has remained hemodynamically stable during their ED course.    Counseling:   The emergency provider has spoken with the patient and discussed today’s results, in addition to providing specific details for the plan of care and counseling regarding the diagnosis and prognosis.  Questions are answered at this time and they are agreeable with the plan.     --------------------------------- IMPRESSION AND DISPOSITION ---------------------------------    IMPRESSION  1. Acute psychosis (HCC)    2. Hallucinations    3. Aggressive behavior        DISPOSITION  Disposition: Admit to mental health unit - medically cleared for admission  Patient condition is stable    NOTE: This report was transcribed using voice recognition software. Every effort was made to ensure accuracy; however, inadvertent computerized transcription errors may be present

## 2024-10-29 NOTE — ED NOTES
12/03/18    PCP: Truitt Bosworth, NP    Chief Complaint   Patient presents with    Claudication     Left leg cramps        HISTORY OF PRESENT ILLNESS  Sakshi Portillo  is a 53 y.o. female whom presents for Claudication (Left leg cramps)         HPI  Leg Pain/Cramping  Patient presents for presents evaluation of left calf claudication that becomes symptomatic at 0 blocks. It has been present for the past 3months. Rest pain is present. Ulceration is not present. She reports symptoms are worse at night     Risk factors: HTN, HLD, and Tobacco abuser    Patient Active Problem List    Diagnosis Date Noted    Hypertensive left ventricular hypertrophy, without heart failure 10/15/2018    Erythrocytosis 05/22/2018    Secondary polycythemia 05/22/2018    Hypertension     Atypical squamous cells of undetermined significance on cytologic smear of cervix (ASC-US) 11/29/2016    High risk HPV infection 11/29/2016    Depression 08/31/2016    Compliance with medication regimen 08/31/2016    Elevated hematocrit 08/31/2016    Manic bipolar I disorder ([facility] 04/21/2015    Dyslipidemia 03/26/2015    Cocaine abuse ([facility] 03/26/2015    Vitamin D deficiency 03/26/2015     Current Outpatient Medications   Medication Sig Dispense Refill    magnesium oxide (MAG-OX) 400 mg tablet Take 1 Tab by mouth daily. 30 Tab 3    losartan-hydroCHLOROthiazide (HYZAAR) 100-12.5 mg per tablet Take 1 Tab by mouth daily. 30 Tab 0    potassium chloride (K-DUR, KLOR-CON) 20 mEq tablet Take 1 Tab by mouth daily. 30 Tab 3    LORazepam (ATIVAN) 1 mg tablet Take  by mouth every four (4) hours as needed for Anxiety.  multivitamin, tx-iron-ca-min (THERA-M W/ IRON) 9 mg iron-400 mcg tab tablet Take 1 Tab by mouth daily.  lamoTRIgine (LAMICTAL) 100 mg tablet Take 200 mg by mouth daily.  mirtazapine (REMERON) 30 mg tablet Take  by mouth nightly.  For sleep      cloNIDine HCl (CATAPRES) 0.3 mg tablet Take 1 Tab by mouth two (2) times Pt refusing lab work and EKG. [facility] [employer] at bedside as patient is becoming more agitated.   a day. Indications: hypertension 60 Tab 3    amLODIPine-atorvastatin (CADUET) 10-40 mg per tablet Take 1 Tab by mouth daily. 90 Tab 3    calcium-cholecalciferol, D3, (CALTRATE 600+D) tablet Take 1 Tab by mouth daily.  omega-3 acid ethyl esters (LOVAZA) 1 gram capsule Take 2 Caps by mouth two (2) times a day. 360 Cap 3    albuterol (PROVENTIL HFA, VENTOLIN HFA, PROAIR HFA) 90 mcg/actuation inhaler Take 2 Puffs by inhalation every four (4) hours as needed for Wheezing.  6 Inhaler 3    ergocalciferol (VITAMIN D2) 50,000 unit capsule Take one capsule every 7 days till gone, then start taking over-the-counter Vitamin D3 2,000 units every day 4 Cap 2     Allergies   Allergen Reactions    Iodinated Contrast- Oral And Iv Dye Shortness of Breath     Past Medical History:   Diagnosis Date    Bronchitis     CH[facility]estive heart failure) [facility]     Cocaine abuse ([facility] [address] 3/26/2015    Dental caries     Depression     Depression 8/31/2016    Drug abuse ([facility]     Dysphasia 10/17/2018    Elevated hematocrit 8/31/2016    ETOH abuse     H/O screening mammography 12/06/2016    No evidence of malignancy     HPV (human papilloma virus) infection 11/2016    The patient [facility]     Hypertension     Hypertensive left ventricular hypertrophy, without heart failure 10/15/2018    mild to moderate lvh continue bp meds    Ill-defined condition     Marijuana use 10/17/2018    Mood swings     Polycythemia 2018    Psychiatric disorder     depression, Bipolar    Schatzki's ring 08/30/2018    Found on Barium Swallow    Sliding hiatal hernia 10/17/2018    Tobacco abuse     Vitamin D deficiency      Past Surgical History:   Procedure Laterality Date    HX ADENOIDECTOMY      HX APPENDECTOMY      HX COLPOSCOPY  12/06/2016    Low-grade courtney/mild dysplasia RICK I    HX HERNIA REPAIR       Family History   Problem Relation Age of Onset    Hypertension Mother     Heart Attack Father     Hypertension Father    [facility] Cancer Father         skin    Cancer Sister         skin    Bipolar Disorder Sister     Cancer Brother         skin    Cancer Maternal Aunt         colon     Social History     Tobacco Use    Smoking status: Current Every Day Smoker     Packs/day: 0.50     Years: 20.00     Pack years: 10.00     Types: Cigarettes    Smokeless tobacco: Never Used   Substance Use Topics    Alcohol use: No       ROS    Visit Vitals  /85 (BP 1 Location: Left arm, BP Patient Position: Sitting)   Pulse 91   Temp 98 °F (36.7 °C)   Resp 20   Ht 5' 2\" (1.575 m)   Wt 191 lb 9.6 oz (86.9 kg)   SpO2 97%   BMI 35.04 kg/m²       Pain Scale: 2/10    Pain Location: Leg (Left leg cramp)     Physical Exam   Constitutional: She is oriented to person, place, and time and well-developed, well-nourished, and in no distress. HENT:   Head: Normocephalic. Eyes: Pupils are equal, round, and reactive to light. Neck: Normal range of motion. Neck supple. Cardiovascular: Normal rate, regular rhythm and normal heart sounds. Exam reveals no decreased pulses. Pulses:       Dorsalis pedis pulses are 2+ on the right side, and 2+ on the left side. Posterior tibial pulses are 2+ on the right side, and 2+ on the left side. Pulmonary/Chest: Effort normal and breath sounds normal.   Abdominal: Soft. Bowel sounds are normal.   Musculoskeletal: She exhibits no edema. Left lower leg: She exhibits tenderness. She exhibits no swelling, no edema and no deformity. Neurological: She is alert and oriented to person, place, and time. Skin: Skin is warm and dry. No cyanosis. Nails show no clubbing. Psychiatric: Affect normal. Her mood appears anxious. Vitals reviewed.       Lab Results   Component Value Date/Time    WBC 10.3 11/09/2018 10:12 AM    HGB 12.3 11/09/2018 10:12 AM    HCT 38.9 11/09/2018 10:12 AM    PLATELET 440 00/42/0705 10:12 AM    MCV 86.3 11/09/2018 10:12 AM     Lab Results   Component Value Date/Time    Cholesterol, total 111 04/30/2018 08:57 AM    HDL Cholesterol 41 04/30/2018 08:57 AM    LDL, calculated 40.6 04/30/2018 08:57 AM    Triglyceride 147 04/30/2018 08:57 AM    CHOL/HDL Ratio 2.7 04/30/2018 08:57 AM     Lab Results   Component Value Date/Time    Sodium 140 10/05/2018 07:20 AM    Potassium 3.5 10/05/2018 07:20 AM    Chloride 106 10/05/2018 07:20 AM    CO2 27 10/05/2018 07:20 AM    Anion gap 7 10/05/2018 07:20 AM    Glucose 118 (H) 10/05/2018 07:20 AM    BUN 14 10/05/2018 07:20 AM    Creatinine 0.58 (L) 10/05/2018 07:20 AM    BUN/Creatinine ratio 24 (H) 10/05/2018 07:20 AM    GFR est AA >60 10/05/2018 07:20 AM    GFR est non-AA >60 10/05/2018 07:20 AM    Calcium 8.8 10/05/2018 07:20 AM    Bilirubin, total 0.3 10/05/2018 07:20 AM    ALT (SGPT) 17 10/05/2018 07:20 AM    AST (SGOT) 11 (L) 10/05/2018 07:20 AM    Alk. phosphatase 80 10/05/2018 07:20 AM    Protein, total 7.0 10/05/2018 07:20 AM    Albumin 3.6 10/05/2018 07:20 AM    Globulin 3.4 10/05/2018 07:20 AM    A-G Ratio 1.1 10/05/2018 07:20 AM         ASSESSMENT and PLAN      ICD-10-CM ICD-9-CM    1. Leg cramping R25.2 729.82 LOWER EXT ART PVR MULT LEVEL SEG PRESSURES      magnesium oxide (MAG-OX) 400 mg tablet   2. Left leg claudication (HCC) I73.9 443.9 LOWER EXT ART PVR MULT LEVEL SEG PRESSURES   3. Tobacco abuse disorder Z72.0 305.1 LOWER EXT ART PVR MULT LEVEL SEG PRESSURES   4. Hyperlipidemia, unspecified hyperlipidemia type E78.5 272.4 LOWER EXT ART PVR MULT LEVEL SEG PRESSURES     Diagnoses and all orders for this visit:    1. Leg cramping  -     LOWER EXT ART PVR MULT LEVEL SEG PRESSURES  -     magnesium oxide (MAG-OX) 400 mg tablet; Take 1 Tab by mouth daily. 2. Left leg claudication (HCC)  -     LOWER EXT ART PVR MULT LEVEL SEG PRESSURES    3. Tobacco abuse disorder  -     LOWER EXT ART PVR MULT LEVEL SEG PRESSURES    4.  Hyperlipidemia, unspecified hyperlipidemia type  -     LOWER EXT ART PVR MULT LEVEL SEG PRESSURES      the following changes in treatment are made: given risk factors and symptoms will check for PAD. Will start magnesium as well   very strongly urged to quit smoking to reduce cardiovascular risk      There are no discontinued medications. Written instructions followed our verbal discussion of all information discussed above, pending tests ordered and future goals/plans. Patient expressed understanding of current diagnosis, planned testing, follow up and if needed to contact the office for any questions or concerns prior to the next visit. Follow-up Disposition:  Return in about 2 weeks (around 12/17/2018), or if symptoms worsen or fail to improve.

## 2024-10-30 ENCOUNTER — HOSPITAL ENCOUNTER (INPATIENT)
Age: 44
LOS: 2 days | Discharge: HOME | End: 2024-11-01
Attending: PSYCHIATRY & NEUROLOGY | Admitting: PSYCHIATRY & NEUROLOGY
Payer: MEDICARE

## 2024-10-30 VITALS
RESPIRATION RATE: 18 BRPM | BODY MASS INDEX: 23.8 KG/M2 | TEMPERATURE: 98.7 F | OXYGEN SATURATION: 96 % | HEART RATE: 78 BPM | WEIGHT: 170 LBS | HEIGHT: 71 IN | SYSTOLIC BLOOD PRESSURE: 134 MMHG | DIASTOLIC BLOOD PRESSURE: 82 MMHG

## 2024-10-30 PROBLEM — F23 ACUTE PSYCHOSIS (HCC): Status: ACTIVE | Noted: 2024-10-30

## 2024-10-30 PROCEDURE — 1240000000 HC EMOTIONAL WELLNESS R&B

## 2024-10-30 PROCEDURE — 90792 PSYCH DIAG EVAL W/MED SRVCS: CPT | Performed by: NURSE PRACTITIONER

## 2024-10-30 RX ORDER — RISPERIDONE 0.5 MG/1
1 TABLET, ORALLY DISINTEGRATING ORAL 2 TIMES DAILY
Status: DISCONTINUED | OUTPATIENT
Start: 2024-10-30 | End: 2024-10-31

## 2024-10-30 RX ORDER — LANOLIN ALCOHOL/MO/W.PET/CERES
3 CREAM (GRAM) TOPICAL NIGHTLY PRN
Status: DISCONTINUED | OUTPATIENT
Start: 2024-10-30 | End: 2024-11-01 | Stop reason: HOSPADM

## 2024-10-30 RX ORDER — DIVALPROEX SODIUM 500 MG/1
500 TABLET, DELAYED RELEASE ORAL EVERY 12 HOURS SCHEDULED
Status: DISCONTINUED | OUTPATIENT
Start: 2024-10-30 | End: 2024-10-31

## 2024-10-30 RX ORDER — NICOTINE 21 MG/24HR
1 PATCH, TRANSDERMAL 24 HOURS TRANSDERMAL DAILY
Status: DISCONTINUED | OUTPATIENT
Start: 2024-10-30 | End: 2024-11-01 | Stop reason: HOSPADM

## 2024-10-30 RX ORDER — HYDROXYZINE HYDROCHLORIDE 50 MG/1
50 TABLET, FILM COATED ORAL 3 TIMES DAILY PRN
Status: DISCONTINUED | OUTPATIENT
Start: 2024-10-30 | End: 2024-11-01 | Stop reason: HOSPADM

## 2024-10-30 RX ORDER — HALOPERIDOL 5 MG/1
5 TABLET ORAL EVERY 6 HOURS PRN
Status: DISCONTINUED | OUTPATIENT
Start: 2024-10-30 | End: 2024-11-01 | Stop reason: HOSPADM

## 2024-10-30 RX ORDER — DIVALPROEX SODIUM 500 MG/1
500 TABLET, DELAYED RELEASE ORAL EVERY 12 HOURS SCHEDULED
Status: DISCONTINUED | OUTPATIENT
Start: 2024-10-30 | End: 2024-10-30

## 2024-10-30 RX ORDER — ACETAMINOPHEN 325 MG/1
650 TABLET ORAL EVERY 6 HOURS PRN
Status: DISCONTINUED | OUTPATIENT
Start: 2024-10-30 | End: 2024-11-01 | Stop reason: HOSPADM

## 2024-10-30 RX ORDER — MAGNESIUM HYDROXIDE/ALUMINUM HYDROXICE/SIMETHICONE 120; 1200; 1200 MG/30ML; MG/30ML; MG/30ML
30 SUSPENSION ORAL PRN
Status: DISCONTINUED | OUTPATIENT
Start: 2024-10-30 | End: 2024-11-01 | Stop reason: HOSPADM

## 2024-10-30 RX ORDER — HALOPERIDOL 5 MG/ML
5 INJECTION INTRAMUSCULAR EVERY 6 HOURS PRN
Status: DISCONTINUED | OUTPATIENT
Start: 2024-10-30 | End: 2024-11-01 | Stop reason: HOSPADM

## 2024-10-30 ASSESSMENT — LIFESTYLE VARIABLES
HOW OFTEN DO YOU HAVE A DRINK CONTAINING ALCOHOL: PATIENT UNABLE TO ANSWER
HOW MANY STANDARD DRINKS CONTAINING ALCOHOL DO YOU HAVE ON A TYPICAL DAY: PATIENT UNABLE TO ANSWER

## 2024-10-30 NOTE — CARE COORDINATION
SW attempted to complete biopsychosocial assessment with pt. Pt is disorganized and tangential with flight of ideas. He is focused on his breakfast tray and is unable to answer assessment questions at this time. He was informed by staff that tray should be here shortly. SW to attempt assessment again at a later time.

## 2024-10-30 NOTE — DISCHARGE INSTRUCTIONS
Counseling   [address], [place] [address]   Phone: [phone]   Fax: [phone]     [facility]    [address], [place] [address]   Phone: [phone]   Fax: [phone]     [facility] [place]   Address: [address], [place] [address]   Phone: [phone]   Fax: [phone]     [facility]   Address:  [place] [address]   Phone: [phone]   Fax: [phone]     Dr. Phamolone   [address], [place] [address]   Phone:[phone]   Fax: [phone]     [facility] [place]- counseling only   [address] [place] [id number]   Phone: [phone]   Fax:[phone]     [facility]   [address] [place] [address]   Phone: [phone]   Fax: [phone]     [facility]   [address] [place] [address]   Phone: [phone]   Fax: [phone]     [facility] (only counseling no medications)    [address], [place] [address]   Phone: [phone]   Fax: [phone]     Steph GOINS MD   [address] [place] [address]   Phone: [phone]   Fax:[phone]     [facility]   [address] [place] [address]   Phone: [phone]   Fax: [phone]     [facility]   [address] [place] [address]   Phone: [phone]   Fax: [phone]      Substance Use Treatment Facilities:    [facility]  [address] [place] [address]   Phone: [phone]   Fax: [phone]    [address] [place] [address]   Phone: [phone]  Fax: [phone]     [facility]  [address], [place] [address]   Phone: [phone]   Fax: [phone]    [facility]  [address], [place] [address]   Phone: [phone]   Fax:  [phone]    [facility]  [address], [place] [address]   Phone: [phone]   Fax: [phone]    [facility]  [address], [place] [address]   Phone: [phone]   Fax: [phone]    [facility]  [address] [place] [address]   Phone: [phone]  Fax: [phone]     [facility]   [address], [place], [place] [address]   Phone: [phone]       [facility]  [address], [place] [address]   Phone: [phone]   Fax: [phone]    [facility]  [address] [place] [address]   Phone: [phone]   Fax: [phone]    [facility]  [address] [place] [address]   Phone: [phone]   Fax: [phone]    [facility]  [address] [place] [address]   Phone: [phone]   Fax: [phone]    [facility]  [address] [place] [address]   Phone: [phone]   Fax: [phone]    [facility]  [address] [place] [address]   Phone: [phone]   Fax:[phone]     [facility]  [address] [place] [address]   Phone: [phone]   Fax: [phone]     [facility]  [address], [place] [address]   Phone: [phone]   Fax: [email or web address]    [facility]   [address], [place] [address]   Phone: [phone]     Fax:    [facility]   [address], [place] [address]   Phone: [phone]   Fax:[phone]     [facility]  [facility]  Phone:[phone]  Fax:[phone]

## 2024-10-30 NOTE — BH NOTE
At this time this nurse offers pt new medications risperdal and depakote. Pt refuses stating \"Nah, that shit takes my hair out, and I don't like bald head bitches. Fuck you and yo bald head. Got me fucked up.\" RN made aware.
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
## 2024-10-30 NOTE — CONSULTS
Margie from [facility] connected me with Dr. Lewis at [facility].  TW presented the case and Cl was accepted to 7S at [facility], per Dr. MAREN Burciaga.

## 2024-10-30 NOTE — H&P
Mental Illness Mother     Substance Abuse Mother     No Known Problems Father              EXAMINATION:    REVIEW OF SYSTEMS:    ROS:  [x] All negative/unchanged except if checked. Explain positive(checked items) below:  [] Constitutional  [] Eyes  [] Ear/Nose/Mouth/Throat  [] Respiratory  [] CV  [] GI  []   [] Musculoskeletal  [] Skin/Breast  [] Neurological  [] Endocrine  [] Heme/Lymph  [] Allergic/Immunologic    Explanation:     Vitals:  /83   Pulse 75   Temp 96.9 °F (36.1 °C) (Temporal)   Resp 18   Ht 1.803 m (5' 11\")   Wt 77.1 kg (170 lb)   SpO2 96%   BMI 23.71 kg/m²      Physical Examination:   Head: x  Atraumatic: x normocephalic  Skin and Mucosa        Moist x  Dry   Pale  x Normal   Neck:  Thyroid  Palpable   x  Not palpable   venus distention   adenopathy   Chest: x Clear   Rhonchi     Wheezing   CV:  xS1   xS2    xNo murmer   Abdomen:  x  Soft    Tender    Viceromegaly   Extremities:  x No Edema     Edema     Cranial Nerves Examination:   CN II:   xPupils are reactive to light  Pupils are non reactive to light  CN III, IV, VI:  xNo eye deviation    No diplopia or ptosis   CN V:    xFacial Sensation is intact     Facial Sensation is not intact   CN IIIV:   x Hearing is normal to rubbing fingers   CN IX, X:     xNormal gag reflex and phonation   CN XI:   xShoulder shrug and neck rotation is normal  CNXII:    xTongue is midline no deviation or atrophy    Mental Status Examination:    Level of consciousness:  within normal limits   Appearance:  fair grooming and fair hygiene  Behavior/Motor: Agitation  Attitude toward examiner:  cooperative  Speech:  spontaneous, normal rate and normal volume  Mood: Mood is not stated  Affect: Paranoid congruent with stated mood  Thought processes: Unstable disorganized  Thought content: Internally stimulated, psychotic disorganized, misinterpreting paranoid denies SI/HI intent or plan   Language: able to name objects and repeate phrases  Memory not  treatment.  The patient is consenting to this treatment.     Collateral Information:  Will obtain collateral information from the family or friends.  Will obtain medical records as appropriate from out patient providers  Will consult the hospitalist for a physical exam to rule out any co-morbid physical condition.    Home medication Reconciled       New Medications started during this admission :        Prn Haldol 5mg and Vistaril 50mg q6hr for extreme agitation.  Trazodone as ordered for insomnia  Vistaril as ordered for anxiety      Psychotherapy:   Encourage participation in milieu and group therapy  Individual therapy as needed        Patient's diagnosis, treatment plan, medication management was formulated at the end of evaluation and after reviewing relevant documentation. Patient was seen directly by myself and Dr. Burciaga    Depakote 500 mg twice daily  Risperdal 1 mg twice daily      Can discontinue constant observer.  Patient is deemed to be moderate risk for suicide based on productive risk factors as well as risk mitigation      Behavioral Services  Medicare Certification Upon Admission     I certify that this patient's inpatient psychiatric hospital admission is medically necessary for:    [x] (1) Treatment which could reasonably be expected to improve this patient's condition,        [ ] (2) Or for diagnostic study;      AND      [x](2) The inpatient psychiatric services are provided while the individual is under the care of a physician and are included in the individualized plan of care.     Estimated length of stay/service 3 to 7 days based on stability     Plan for post-hospital care outpatient psychiatric and counseling services    NOTE: This report was transcribed using voice recognition software. Every effort was made to ensure accuracy; however, inadvertent computerized transcription errors may be present.       Electronically signed by JUICE Vaz CNP on 10/30/2024 at 8:29 AM

## 2024-10-30 NOTE — PLAN OF CARE
Patient is difficult to assess. Thoughts are disorganized and illogical with flight of ideas, loose associations. Patient is pacing the unit, talking to either self or unseen others. Appears irritable when talking to self but is polite to this RN. Patient is internally stimulated and hypermobile. Patient has no scheduled medications at this time. No groups attended as patient is a new admission. Remains in control of behaviors.     Problem: Candie  Goal: Will exhibit normal sleep and speech and no impulsivity  10/30/2024 0841 by Ronda Coreas RN  Outcome: Not Progressing     Problem: Sleep Disturbance  Goal: Will exhibit normal sleeping pattern  10/30/2024 0841 by Ronda Coreas RN  Outcome: Not Progressing     Problem: Psychosis  Goal: Will report no hallucinations or delusions  10/30/2024 0841 by Ronda Coreas RN  Outcome: Progressing     Problem: Behavior  Goal: Pt/Family maintain appropriate behavior and adhere to behavioral management agreement, if implemented  10/30/2024 0841 by Ronda Coreas RN  Outcome: Progressing     Problem: Anxiety  Goal: Will report anxiety at manageable levels  10/30/2024 0841 by Ronda Coreas RN  Outcome: Progressing     Problem: Drug Abuse/Detox  Goal: Will have no detox symptoms and will verbalize plan for changing drug-related behavior  10/30/2024 0841 by Ronda Coreas RN  Outcome: Progressing     Problem: Involuntary Admit  Goal: Will cooperate with staff recommendations and doctor's orders and will demonstrate appropriate behavior  10/30/2024 0841 by Ronda Coreas RN  Outcome: Progressing

## 2024-10-30 NOTE — PROGRESS NOTES
Prescreannika completed.  I spoke with Margie at [facility].  She stated that there are beds available at [facility] and began the referral process.  She attempted to connect me with Dr. Lewis but was unsuccessful so she stated that she will call me back.  Currently waiting to hear back.

## 2024-10-30 NOTE — PLAN OF CARE
Problem: Candie  Goal: Will exhibit normal sleep and speech and no impulsivity  Description: INTERVENTIONS:  1. Administer medication as ordered  2. Set limits on impulsive behavior  3. Make attempts to decrease external stimuli as possible  Outcome: Progressing     Problem: Psychosis  Goal: Will report no hallucinations or delusions  Description: INTERVENTIONS:  1. Administer medication as  ordered  2. Assist with reality testing to support increasing orientation  3. Assess if patient's hallucinations or delusions are encouraging self harm or harm to others and intervene as appropriate  Outcome: Progressing     Problem: Behavior  Goal: Pt/Family maintain appropriate behavior and adhere to behavioral management agreement, if implemented  Description: INTERVENTIONS:  1. Assess patient/family's coping skills and  non-compliant behavior (including use of illegal substances)  2. Notify security of behavior or suspected illegal substances which indicate the need for search of the family and/or belongings  3. Encourage verbalization of thoughts and concerns in a socially appropriate manner  4. Utilize positive, consistent limit setting strategies supporting safety of patient, staff and others  5. Encourage participation in the decision making process about the behavioral management agreement  6. If a visitor's behavior poses a threat to safety call refer to organization policy.  7. Initiate consult with , Psychosocial CNS, Spiritual Care as appropriate  Outcome: Progressing     Problem: Anxiety  Goal: Will report anxiety at manageable levels  Description: INTERVENTIONS:  1. Administer medication as ordered  2. Teach and rehearse alternative coping skills  3. Provide emotional support with 1:1 interaction with staff  Outcome: Progressing     Problem: Drug Abuse/Detox  Goal: Will have no detox symptoms and will verbalize plan for changing drug-related behavior  Description: INTERVENTIONS:  1. Administer  Sent to pharmacy, left pt msg informing.

## 2024-10-30 NOTE — BH NOTE
Patient arrived to the unit on stretcher via EMS. Patient ambulates independently with a steady gait. Patient is disorganized, tangential and has flight of ideas. Patient is irritable at times and can become verbally aggressive. Consents signed and patient oriented to unit and room. Patient denies suicidal/homicidal ideations. Patient positive for auditory hallucinations and is seen talking to unseen others. Patient is nonsensical at times and is observed pacing the unit.

## 2024-10-30 NOTE — PROGRESS NOTES
Patient approached this RN and reports he needs to be at rehab. Patient reports he was at [facility] for rehab when staff called EMS to bring patient to the hospital for an unknown reason. Will pass information to oncoming staff.

## 2024-10-30 NOTE — CONSULTS
Update:  Cl accepted to 7S, Room 25 ([facility]), per Marjor[facility].  The number to call is [phone].

## 2024-10-31 VITALS
WEIGHT: 170 LBS | RESPIRATION RATE: 16 BRPM | DIASTOLIC BLOOD PRESSURE: 58 MMHG | OXYGEN SATURATION: 97 % | SYSTOLIC BLOOD PRESSURE: 114 MMHG | TEMPERATURE: 98.7 F | BODY MASS INDEX: 23.8 KG/M2 | HEIGHT: 71 IN | HEART RATE: 69 BPM

## 2024-10-31 PROCEDURE — 1240000000 HC EMOTIONAL WELLNESS R&B

## 2024-10-31 PROCEDURE — 99232 SBSQ HOSP IP/OBS MODERATE 35: CPT | Performed by: NURSE PRACTITIONER

## 2024-10-31 PROCEDURE — 6370000000 HC RX 637 (ALT 250 FOR IP): Performed by: PSYCHIATRY & NEUROLOGY

## 2024-10-31 PROCEDURE — 6370000000 HC RX 637 (ALT 250 FOR IP): Performed by: NURSE PRACTITIONER

## 2024-10-31 RX ORDER — LORAZEPAM 2 MG/ML
2 INJECTION INTRAMUSCULAR EVERY 6 HOURS PRN
Status: DISCONTINUED | OUTPATIENT
Start: 2024-10-31 | End: 2024-11-01 | Stop reason: HOSPADM

## 2024-10-31 RX ORDER — VALPROIC ACID 250 MG/1
500 CAPSULE, LIQUID FILLED ORAL 2 TIMES DAILY
Status: DISCONTINUED | OUTPATIENT
Start: 2024-10-31 | End: 2024-10-31

## 2024-10-31 RX ORDER — VALPROIC ACID 250 MG/5ML
500 SOLUTION ORAL 2 TIMES DAILY
Status: DISCONTINUED | OUTPATIENT
Start: 2024-10-31 | End: 2024-11-01

## 2024-10-31 RX ORDER — DIPHENHYDRAMINE HYDROCHLORIDE 50 MG/ML
50 INJECTION INTRAMUSCULAR; INTRAVENOUS EVERY 6 HOURS PRN
Status: DISCONTINUED | OUTPATIENT
Start: 2024-10-31 | End: 2024-11-01 | Stop reason: HOSPADM

## 2024-10-31 RX ORDER — LORAZEPAM 2 MG/ML
INJECTION INTRAMUSCULAR
Status: DISCONTINUED
Start: 2024-10-31 | End: 2024-10-31 | Stop reason: WASHOUT

## 2024-10-31 RX ORDER — LORAZEPAM 1 MG/1
1 TABLET ORAL ONCE
Status: COMPLETED | OUTPATIENT
Start: 2024-10-31 | End: 2024-10-31

## 2024-10-31 RX ORDER — RISPERIDONE 2 MG/1
2 TABLET, ORALLY DISINTEGRATING ORAL 2 TIMES DAILY
Status: DISCONTINUED | OUTPATIENT
Start: 2024-10-31 | End: 2024-11-01

## 2024-10-31 RX ORDER — DIPHENHYDRAMINE HYDROCHLORIDE 50 MG/ML
INJECTION INTRAMUSCULAR; INTRAVENOUS
Status: DISPENSED
Start: 2024-10-31 | End: 2024-10-31

## 2024-10-31 RX ADMIN — HALOPERIDOL 5 MG: 5 TABLET ORAL at 08:15

## 2024-10-31 RX ADMIN — RISPERIDONE 1 MG: 0.5 TABLET, ORALLY DISINTEGRATING ORAL at 08:14

## 2024-10-31 RX ADMIN — HYDROXYZINE HYDROCHLORIDE 50 MG: 50 TABLET, FILM COATED ORAL at 08:15

## 2024-10-31 RX ADMIN — LORAZEPAM 1 MG: 1 TABLET ORAL at 08:15
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
## 2024-10-31 NOTE — BH NOTE
Given PO Ativan 1 mg, Haldol 5 mg, and Atarax 50 mg PO, along with scheduled Risperdal, at this time. Given with police presence.

## 2024-10-31 NOTE — PROGRESS NOTES
BEHAVIORAL SERVICES: One - Hour In- Person Review  For Management of Violent or Self - Destructive Behavior    Seclusion/Restraint:  Seclusion    Reason for Intervention: patient threatening physical harm and to kill others    Response to Intervention:  patient continues to have limited insight into need for intervention, remains delusional and paranoid    Medical Record reviewed and discussed precipitating events/behaviors with RN initiating Intervention:  Yes    Patient Medical Status: NAKIA  Vital Signs:   Respiratory Status:   Circulatory Status:   Skin Integrity:    Orientation: oriented to person and place    Mood/Affect: anxious and labile    Speech: Pressured    Thought Content: delusions, paranoid    Thought Processes: Tangential, Racing, Flight of Ideas, and Circumstantial    Rationale for continued use of intervention: patient remains paranoid. patient unable to verbalize behavioral expectations    Rationale for discontinuing intervention: Patient is able to: verbalize behavioral expectations    One Hour Review Evaluation Physician Notification:  yes

## 2024-10-31 NOTE — PROGRESS NOTES
PT. REMAINS IN SECLUSION. PT. VERBALIZED UNDERSTANDING OF REASON FOR SAME, \" I SAID I WAS GOING TO KILL THAT MOTHER FUCKER\".    PT. HAS NO AWARENESS OF NEED TO REFRAIN FROM SAME, REPORTED \"HE DESERVED IT\".    PT. IS NON STOP TALKING TO SELF.    REVIEWED DISCONTINUATION CRITERIA WITH NO UNDERSTANDING OF NEED FOR COMPLIANCE TO SAME.

## 2024-10-31 NOTE — PROGRESS NOTES
Patient declined verbal invitation to the following group    Spiritual Care    Patient will continue to be provided with opportunities to enhance leisure skills/interests and/or coping mechanisms.

## 2024-10-31 NOTE — PLAN OF CARE
Problem: Safety - Violent/Self-destructive Restraint  Goal: Remains free of injury from restraints (Restraint for Violent/Self-Destructive Behavior)  Description: INTERVENTIONS:  1. Determine that de-escalation and other, less restrictive measures have been tried or would not be effective before applying the restraint  2. Identify and document the criteria for restraint  3. Evaluate the patient's condition at the time of restraint application  4. Inform patient/family regarding the reason for restraint/seclusion  5. Q2H: Monitor comfort, nutrition and hydration needs  6. Q15M: Perform safety checks including skin, circulation, sensory, respiratory and psychological status  7. Ensure continuous observation  8. Identify and implement measures to help patient regain control, assess readiness for release and initiate progressive release per policy  Outcome: Completed     Problem: Candie  Goal: Will exhibit normal sleep and speech and no impulsivity  Description: INTERVENTIONS:  1. Administer medication as ordered  2. Set limits on impulsive behavior  3. Make attempts to decrease external stimuli as possible  Outcome: Not Progressing     Problem: Psychosis  Goal: Will report no hallucinations or delusions  Description: INTERVENTIONS:  1. Administer medication as  ordered  2. Assist with reality testing to support increasing orientation  3. Assess if patient's hallucinations or delusions are encouraging self harm or harm to others and intervene as appropriate  Outcome: Not Progressing     Problem: Behavior  Goal: Pt/Family maintain appropriate behavior and adhere to behavioral management agreement, if implemented  Description: INTERVENTIONS:  1. Assess patient/family's coping skills and  non-compliant behavior (including use of illegal substances)  2. Notify security of behavior or suspected illegal substances which indicate the need for search of the family and/or belongings  3. Encourage verbalization of thoughts and  concerns in a socially appropriate manner  4. Utilize positive, consistent limit setting strategies supporting safety of patient, staff and others  5. Encourage participation in the decision making process about the behavioral management agreement  6. If a visitor's behavior poses a threat to safety call refer to organization policy.  7. Initiate consult with , Psychosocial CNS, Spiritual Care as appropriate  Outcome: Not Progressing     Problem: Anxiety  Goal: Will report anxiety at manageable levels  Description: INTERVENTIONS:  1. Administer medication as ordered  2. Teach and rehearse alternative coping skills  3. Provide emotional support with 1:1 interaction with staff  Outcome: Not Progressing     Problem: Sleep Disturbance  Goal: Will exhibit normal sleeping pattern  Description: INTERVENTIONS:  1. Administer medication as ordered  2. Decrease environmental stimuli, including noise, as appropriate  3. Discourage social isolation and naps during the day  Outcome: Not Progressing     Problem: Involuntary Admit  Goal: Will cooperate with staff recommendations and doctor's orders and will demonstrate appropriate behavior  Description: INTERVENTIONS:  1. Treat underlying conditions and offer medication as ordered  2. Educate regarding involuntary admission procedures and rules  3. Contain excessive/inappropriate behavior per unit and hospital policies  Outcome: Not Progressing

## 2024-10-31 NOTE — PROGRESS NOTES
\"I DON'T FEEL SAFE HERE.\"PT. VOICED CONCERNED THAT FEELS THREATENED BY OTHERS HERE.. PT. IS GRANDIOSE, SPEAKING OF ARTWORK, \"THE LONGER YOU HOLD OFF ON IT, THE MORE IT WILL BE\".

## 2024-10-31 NOTE — BH NOTE
Offered patient newly ordered depakene syrup, patient said \"oh my god man that shit almost killed me!\" Then said \"it was the depakene, that shit made me fall in the tub!\" Patient in dining area at this time, awaiting supplies to take a shower. Demanding with staff. Difficult to accommodate, staff attempting to continually provide a therapeutic presence.

## 2024-10-31 NOTE — PROGRESS NOTES
BEHAVIORAL HEALTH FOLLOW-UP NOTE     10/31/2024     Patient was seen and examined in person, Chart reviewed   Patient's case discussed with staff/team    Chief Complaint: Psychosis    Interim History:   Patient seen this morning in the seclusion room he is observed yelling out.  Patient required as needed medication and seclusion due to became verbally confrontational with another patient on the unit repeatedly threatening to kill another patient on the unit he does remain grandiose at times tends to misinterpret on the unit.  He refused psychiatric indications yesterday but did take his p.o. Risperdal this morning.  Poor insight and judgment poor impulse control    Appetite: [x] Normal/Unchanged  [] Increased  [] Decreased      Sleep:       [x] Normal/Unchanged  [] Fair       [] Poor              Energy:    [x] Normal/Unchanged  [] Increased  [] Decreased        SI [] Present  [x] Absent    HI  []Present  [x] Absent     Aggression:  [] yes  [] no    Patient is [x] able  [] unable to CONTRACT FOR SAFETY     PAST MEDICAL/PSYCHIATRIC HISTORY:   Past Medical History:   Diagnosis Date    Depression     Schizoaffective disorder (HCC)        FAMILY/SOCIAL HISTORY:  Family History   Problem Relation Age of Onset    Mental Illness Mother     Substance Abuse Mother     No Known Problems Father      Social History     Socioeconomic History    Marital status: Single     Spouse name: Not on file    Number of children: 1    Years of education: 12    Highest education level: Not on file   Occupational History     Comment: SSDI   Tobacco Use    Smoking status: Every Day     Current packs/day: 0.50     Average packs/day: 0.5 packs/day for 24.0 years (12.0 ttl pk-yrs)     Types: Cigarettes    Smokeless tobacco: Never    Tobacco comments:     stopped \"a while ago\"   Vaping Use    Vaping status: Former   Substance and Sexual Activity    Alcohol use: Not Currently     Comment: occassionally    Drug use: Not Currently     Frequency: 7.0

## 2024-10-31 NOTE — BH NOTE
Re-attempted to offer patient medication, stating to patient \"would you be willing to take valproic acid, have you ever had valproic acid?\" Patient said he would not take it, would not take syrup because \"that shit almost killed me.\"

## 2024-10-31 NOTE — PROGRESS NOTES
Post Restraint and Seclusion Patient Debriefing    Did debriefing occur? yes, PT. WITH MINIMAL PARTICIPATION    If No, why not?  [] Patient refused  [] Patient is unavailable for debriefing due to:  [] Patient debriefing not completed due to clinical contraindication of:    What events led to the seclusion/restraint incident?      Did being restrained or in seclusion help you regain control of your behavior? yes, \"I LIKED IN THERE\"      Did you feel safe while you were in restraint or seclusion:      [x] Very Safe  [] Safe  [] Somewhat Safe  [] Not Safe     Did you have the chance to gain control of your behavior before you were secluded or restrained? yes,     If Yes, how:    [x] Offered Medication  [x] Talked with  [] Given Time Out      [] Offered alternatives to restraint/seclusion     During the restraint or seclusion process were you offered medicine to help you gain control? No  MEDICATIONS WERE GIVEN PRIOR TO EVENT      Were your physical and emotional needs met, and your privacy rights addressed while you were in restraints/seclusion? yes,       How can we assist you in remaining restraint or seclusion free in the future?  NO ANSWER      Is there anything else you would like to share regarding this restraint/seclusion episode?  NO    Patient consented to family or significant other to participate in debriefing no    Patient's guardian participated in debriefing (when applicable) no, N/A    Patient's guardian unable to participate in debriefing (when applicable) no, N/A    Staff:  Were modifications to the treatment plan completed? yes, MEDICATION CHANGE
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
## 2024-10-31 NOTE — PLAN OF CARE
Problem: Candie  Goal: Will exhibit normal sleep and speech and no impulsivity  Description: INTERVENTIONS:  1. Administer medication as ordered  2. Set limits on impulsive behavior  3. Make attempts to decrease external stimuli as possible  10/30/2024 2036 by Grey Bardales RN  Outcome: Progressing     Problem: Psychosis  Goal: Will report no hallucinations or delusions  Description: INTERVENTIONS:  1. Administer medication as  ordered  2. Assist with reality testing to support increasing orientation  3. Assess if patient's hallucinations or delusions are encouraging self harm or harm to others and intervene as appropriate  10/30/2024 2036 by Grey Bardales RN  Outcome: Progressing     Problem: Behavior  Goal: Pt/Family maintain appropriate behavior and adhere to behavioral management agreement, if implemented  Description: INTERVENTIONS:  1. Assess patient/family's coping skills and  non-compliant behavior (including use of illegal substances)  2. Notify security of behavior or suspected illegal substances which indicate the need for search of the family and/or belongings  3. Encourage verbalization of thoughts and concerns in a socially appropriate manner  4. Utilize positive, consistent limit setting strategies supporting safety of patient, staff and others  5. Encourage participation in the decision making process about the behavioral management agreement  6. If a visitor's behavior poses a threat to safety call refer to organization policy.  7. Initiate consult with , Psychosocial CNS, Spiritual Care as appropriate  10/30/2024 2036 by Grey Bardales RN  Outcome: Progressing     Problem: Anxiety  Goal: Will report anxiety at manageable levels  Description: INTERVENTIONS:  1. Administer medication as ordered  2. Teach and rehearse alternative coping skills  3. Provide emotional support with 1:1 interaction with staff  10/30/2024 2036 by Grey Bardales RN  Outcome: Progressing     Problem: Drug  Abuse/Detox  Goal: Will have no detox symptoms and will verbalize plan for changing drug-related behavior  Description: INTERVENTIONS:  1. Administer medication as ordered  2. Monitor physical status  3. Provide emotional support with 1:1 interaction with staff  4. Encourage  recovery focused treatment   10/30/2024 2036 by Grey Bardales RN  Outcome: Progressing     Problem: Sleep Disturbance  Goal: Will exhibit normal sleeping pattern  Description: INTERVENTIONS:  1. Administer medication as ordered  2. Decrease environmental stimuli, including noise, as appropriate  3. Discourage social isolation and naps during the day  10/30/2024 2036 by Grey Bardales, RN  Outcome: Progressing     Problem: Involuntary Admit  Goal: Will cooperate with staff recommendations and doctor's orders and will demonstrate appropriate behavior  Description: INTERVENTIONS:  1. Treat underlying conditions and offer medication as ordered  2. Educate regarding involuntary admission procedures and rules  3. Contain excessive/inappropriate behavior per unit and hospital policies  10/30/2024 2036 by Grey Bardales RN  Outcome: Progressing     Problem: Risk for Elopement  Goal: Patient will not exit the unit/facility without proper excort  Outcome: Progressing     Problem: Candie  Goal: Will exhibit normal sleep and speech and no impulsivity  Description: INTERVENTIONS:  1. Administer medication as ordered  2. Set limits on impulsive behavior  3. Make attempts to decrease external stimuli as possible  10/30/2024 2036 by Grey Bardales RN  Outcome: Progressing  10/30/2024 0841 by Ronda Coreas, RN  Outcome: Not Progressing  10/30/2024 0653 by Nicole Ramos, RN  Outcome: Progressing     Problem: Sleep Disturbance  Goal: Will exhibit normal sleeping pattern  Description: INTERVENTIONS:  1. Administer medication as ordered  2. Decrease environmental stimuli, including noise, as appropriate  3. Discourage social isolation and naps during the

## 2024-11-01 ENCOUNTER — APPOINTMENT (OUTPATIENT)
Dept: GENERAL RADIOLOGY | Age: 44
End: 2024-11-01
Payer: MEDICARE

## 2024-11-01 ENCOUNTER — HOSPITAL ENCOUNTER (EMERGENCY)
Age: 44
Discharge: HOME | End: 2024-11-02
Payer: MEDICARE

## 2024-11-01 VITALS
OXYGEN SATURATION: 96 % | DIASTOLIC BLOOD PRESSURE: 79 MMHG | HEART RATE: 94 BPM | TEMPERATURE: 97.1 F | SYSTOLIC BLOOD PRESSURE: 117 MMHG | RESPIRATION RATE: 18 BRPM

## 2024-11-01 DIAGNOSIS — M79.642 HAND PAIN, LEFT: Primary | ICD-10-CM

## 2024-11-01 PROBLEM — F20.0 ACUTE EXACERBATION OF CHRONIC PARANOID SCHIZOPHRENIA (HCC): Status: ACTIVE | Noted: 2024-11-01

## 2024-11-01 PROBLEM — F23 ACUTE PSYCHOSIS (HCC): Status: RESOLVED | Noted: 2024-10-30 | Resolved: 2024-11-01

## 2024-11-01 PROCEDURE — 73130 X-RAY EXAM OF HAND: CPT

## 2024-11-01 PROCEDURE — 99283 EMERGENCY DEPT VISIT LOW MDM: CPT

## 2024-11-01 PROCEDURE — 99239 HOSP IP/OBS DSCHRG MGMT >30: CPT | Performed by: NURSE PRACTITIONER

## 2024-11-01 RX ORDER — ACETAMINOPHEN 500 MG
1000 TABLET ORAL ONCE
Status: DISCONTINUED | OUTPATIENT
Start: 2024-11-01 | End: 2024-11-02 | Stop reason: HOSPADM

## 2024-11-01 RX ORDER — NICOTINE 21 MG/24HR
1 PATCH, TRANSDERMAL 24 HOURS TRANSDERMAL DAILY
COMMUNITY
Start: 2024-11-02 | End: 2024-12-02

## 2024-11-01 NOTE — BH NOTE
Patient refused scheduled Depakene syrup and Risperdal tablet. Patient spoke in a disorganized manner, saying things like \"you gotta refuse these meds always because once it's done, you got 'em.\" Patient refused meds in a polite tone of voice. Walking the dining area speaking outloud to nobody. \"They thought they were crazy. Crazy as hell.\" \"If there was no ownership, how do you spell you do not owe, are you in?\" And \"You could spell forward and parallel to a FOR to a FOUR. Because you don't owe Joce. There you go, that's a struthers brother on a hero instead of a piece of pizza.\" This staff member typed this as I was being spoken to by Joce. Continues to walk the dining area and speaking outloud to himself.

## 2024-11-01 NOTE — TRANSITION OF CARE
Behavioral Health Transition Record    Patient Name: Joce Rodriguez Jr.  YOB: 1980   Medical Record Number: [id number]  Date of Admission: 10/30/2024  6:19 AM   Date of Discharge: 11/1/24    Attending Provider: Elliott Lewis MD   Discharging Provider: Dr Burciaga  To contact this individual call [phone] and ask the  to page.  If unavailable, ask to be transferred to Behavioral Health Provider on call.  A Behavioral Health Provider will be available on call 24/7 and during holidays.    Primary Care Provider: No primary care provider on file.    Allergies   Allergen Reactions    Chlorpheniramine-Phenylephrine Swelling    Motrin [Ibuprofen] Itching    Penicillins     Rondec-D [Chlophedianol-Pseudoephedrine]        Reason for Admission: Patient name: Joce Rodriguez Jr.  Patient's past mental health and addiction history: schizophrenia, antisocial bursitis order and cocaine abuse  Patient's presentation to the ED and why the patient needs admission: presented to [facility] ED reporting hallucinations with flights of ideas reporting that he is Juan J Jaquez  Legal status:  []  Voluntary  [x]  Involuntary  []  Probate  Triggering/precipitating events: cocaine abuse  Duration of triggering/precipitating events: ongoing    Admission Diagnosis: Acute psychosis (HCC) [F23]    * No surgery found *    No results found for this visit on 10/30/24.    Immunizations administered during this encounter:   Immunization History   Administered Date(s) Administered    Influenza, Quadv, 6 mo and older, IM, PF (Flulaval, Fluarix) 12/13/2018    TD 2LF, TDVAX, (age 7y+), IM, 0.5mL 09/10/2021     Influenza Vaccination Status: Documentation of patient's or caregiver's refusal of influenza vaccine.    Screening for Metabolic Disorders for Patients on Antipsychotic Medications  (Data obtained from the EMR)    Estimated Body Mass Index  Body mass index is 23.71 kg/m².      Vital Signs/Blood Pressure  BP (!) 114/58

## 2024-11-01 NOTE — PLAN OF CARE
Problem: Anxiety  Goal: Will report anxiety at manageable levels  Description: INTERVENTIONS:  1. Administer medication as ordered  2. Teach and rehearse alternative coping skills  3. Provide emotional support with 1:1 interaction with staff  10/31/2024 2050 by Adriana Seo, RN  Outcome: Progressing     Problem: Risk for Elopement  Goal: Patient will not exit the unit/facility without proper excort  10/31/2024 2050 by Adriana Seo, RN  Outcome: Progressing      Pt observed to be sleeping on assessment, was irritable and uncooperative earlier. Pt is breathing easy, no distress noted. Q15 minute checks continue, will monitor.
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
## 2024-11-01 NOTE — CARE COORDINATION
Pt was seen during treatment team. Pt states that he is hopeful to discharge today and does not feel he should be admitted. He declined substance abuse treatment referral at this time. Pt is chronically homeless and is familiar with the resources available to him in the [facility]. He is help seeking and able to make his needs known. He is agreeable to take long acting injection when discussed with provider. He is not compliant with outpatient follow up and does not want appointments at this time.     Pt will discharge to the street per treatment team.     SW did include list of mental health, substance abuse, and homeless resources in pt discharge paperwork. SW also included walk in hours for [facility] and walk in appointment for [facility].

## 2024-11-01 NOTE — PROGRESS NOTES
[facility]  Discharge Note    Pt discharged with followings belongings:   Dental Appliances: None  Vision - Corrective Lenses: None  Hearing Aid: None  Jewelry: None  Body Piercings Removed: N/A  Clothing: Belt, Footwear, Jacket/Coat, Pajamas, Pants, Shirt, Shorts, Slippers, Socks, Undergarments, Other (Comment)  Other Valuables: Cigarettes, Personal Toiletries, Suitcase, Other (Comment)   Valuables sent home with or returned to patient. Patient educated on aftercare instructions: yes  Information faxed to n/a by n/a  at 1:15 PM .Patient verbalize understanding of AVS:  yes.    Status EXAM upon discharge:  Mental Status and Behavioral Exam  Normal: No  Level of Assistance: Independent/Self  Facial Expression: Hostile  Affect: Unstable  Level of Consciousness: Alert  Frequency of Checks: 4 times per hour, close  Mood:Normal: No  Mood: Suspicious, Irritable, Anxious  Motor Activity:Normal: No  Motor Activity: Agitated  Eye Contact: Fair  Observed Behavior: Agitated, Hostile, Impulsive  Sexual Misconduct History: Current - no  Preception: [place] to person, [place] to time, [place] to place  Attention:Normal: No  Attention: Distractible  Thought Processes: Flight of ideas, Tangential  Thought Content:Normal: No  Thought Content: Delusions, Paranoia, Preoccupations  Depression Symptoms: Impaired concentration, Increased irritability  Anxiety Symptoms: Obsessions, Generalized  Candie Symptoms: Pressured speech, Increased energy, Labile  Hallucinations: Auditory (comment)  Delusions: Yes  Delusions: Paranoid, Obsessions  Memory:Normal: No  Memory: Poor recent, Poor remote, Confabulation  Insight and Judgment: No  Insight and Judgment: Poor judgment, Poor insight    Tobacco Screening:  Practical Counseling, on admission, lydia X, if applicable and completed (first 3 are required if patient doesn't refuse):            ( ) Recognizing danger situations (included triggers and roadblocks)                    ( )

## 2024-11-01 NOTE — PLAN OF CARE
Pt denies homicidal/suicidal thoughts. Denies hallucinations. Pt is seen talking to unseen others while pacing the unit. Pt is impulsive blurts out inappropriate comments. Pt is non complaint with medications and does not attend group therapy. Will continue to monitor and provide support.  Problem: Candie  Goal: Will exhibit normal sleep and speech and no impulsivity  Description: INTERVENTIONS:  1. Administer medication as ordered  2. Set limits on impulsive behavior  3. Make attempts to decrease external stimuli as possible  Outcome: Progressing     Problem: Behavior  Goal: Pt/Family maintain appropriate behavior and adhere to behavioral management agreement, if implemented  Description: INTERVENTIONS:  1. Assess patient/family's coping skills and  non-compliant behavior (including use of illegal substances)  2. Notify security of behavior or suspected illegal substances which indicate the need for search of the family and/or belongings  3. Encourage verbalization of thoughts and concerns in a socially appropriate manner  4. Utilize positive, consistent limit setting strategies supporting safety of patient, staff and others  5. Encourage participation in the decision making process about the behavioral management agreement  6. If a visitor's behavior poses a threat to safety call refer to organization policy.  7. Initiate consult with , Psychosocial CNS, Spiritual Care as appropriate  Outcome: Progressing     Problem: Psychosis  Goal: Will report no hallucinations or delusions  Description: INTERVENTIONS:  1. Administer medication as  ordered  2. Assist with reality testing to support increasing orientation  3. Assess if patient's hallucinations or delusions are encouraging self harm or harm to others and intervene as appropriate  Outcome: Not Progressing

## 2024-11-01 NOTE — GROUP NOTE
Shared goal for the day as to get back to work.                                                                       Group Therapy Note    Date: 11/1/2024    Group Start Time: 0930  Group End Time: 0950  Group Topic: Community Meeting    [facility] AC[facility]  1    Laura Andrews, AMANDEEP    Type of Group: Community Meeting      Patient's Goal:  Patient will be able to id staffing assignments, expectations of patients, and general information re: floor rules. Will be prompted to share goal for the day.     Notes:  Patient appeared to be an active listener, taking in information presented and was prompted to share goal for the day.    Status After Intervention:  Improved    Participation Level: Active Listener and Interactive    Participation Quality: Appropriate, Attentive, and Sharing      Speech:  normal      Thought Process/Content: Logical      Affective Functioning: Congruent      Mood: euthymic      Level of consciousness:  Alert, Oriented x4, and Attentive      Response to Learning: Able to verbalize/acknowledge new learning, Able to retain information, and Progressing to goal      Endings: None Reported    Modes of Intervention: Education, Support, Socialization, and Problem-solving      Discipline Responsible: Psychoeducational Specialist      Signature:  AMANDEEP Kaiser

## 2024-11-02 NOTE — ED PROVIDER NOTES
Independent EVIN Visit.       [facility] EMERGENCY DEPARTMENT  ED  Encounter Note  Admit Date/RoomTime: 2024  7:42 PM  ED Room: [place] [address]  NAME: Joce Rodriguez Jr.  : 1980  MRN: [id number]  PCP: No primary care provider on file.    CHIEF COMPLAINT     Hand Pain (Left hand pain from drawing. Sharp pains shooting through hand )    HISTORY OF PRESENT ILLNESS        Joce Rodriguez Jr. is a 44 y.o. male who presents to the ED ambulatory with complaint of left hand pain with some associated swelling starting today.  States injured hand while drawing.  No fall or other injury.  REVIEW OF SYSTEMS     Pertinent positives and negatives are stated within HPI, all other systems reviewed and are negative.    Past Medical History:  has a past medical history of Depression and Schizoaffective disorder (HCC).  Surgical History:  has a past surgical history that includes Hip fracture surgery (Left, 2021); eye surgery (19 years ago); and Hip fracture surgery (Left, 2021).  Social History:  reports that he has been smoking cigarettes. He has a 12 pack-year smoking history. He has never used smokeless tobacco. He reports that he does not currently use alcohol. He reports that he does not currently use drugs after having used the following drugs: Cocaine, Marijuana (Weed), and Methamphetamines (Crystal Meth). Frequency: 7.00 times per week.  Family History: family history includes Mental Illness in his mother; No Known Problems in his father; Substance Abuse in his mother.   Allergies: Chlorpheniramine-phenylephrine, Motrin [ibuprofen], Penicillins, and Rondec-d [chlophedianol-pseudoephedrine]  CURRENT MEDICATIONS       Previous Medications    NICOTINE (NICODERM CQ) 21 MG/24HR    Place 1 patch onto the skin daily    NICOTINE POLACRILEX (NICORETTE) 4 MG GUM    Take 1 each by mouth every 2 hours as needed for Smoking cessation    RISPERIDONE ER (UZEDY) SUBCUTANEOUS INJECTION

## 2024-11-08 ENCOUNTER — HOSPITAL ENCOUNTER (OUTPATIENT)
Dept: PSYCHIATRY | Age: 44
Discharge: HOME | End: 2024-11-08

## 2024-11-11 ENCOUNTER — HOSPITAL ENCOUNTER (EMERGENCY)
Age: 44
Discharge: HOME | End: 2024-11-11
Attending: EMERGENCY MEDICINE
Payer: MEDICARE

## 2024-11-11 VITALS
RESPIRATION RATE: 17 BRPM | WEIGHT: 180 LBS | SYSTOLIC BLOOD PRESSURE: 110 MMHG | HEART RATE: 79 BPM | TEMPERATURE: 97.7 F | HEIGHT: 72 IN | BODY MASS INDEX: 24.38 KG/M2 | DIASTOLIC BLOOD PRESSURE: 76 MMHG | OXYGEN SATURATION: 96 %

## 2024-11-11 DIAGNOSIS — F20.9 SCHIZOPHRENIA, UNSPECIFIED TYPE (HCC): Primary | ICD-10-CM

## 2024-11-11 PROCEDURE — 99282 EMERGENCY DEPT VISIT SF MDM: CPT

## 2024-11-11 NOTE — ED PROVIDER NOTES
[facility] EMERGENCY DEPARTMENT  EMERGENCY DEPARTMENT ENCOUNTER        Pt Name: Joce Rodriguez Jr.  MRN: [id number]  Birthdate 1980  Date of evaluation: 11/11/2024  Provider: David Quinn MD  PCP: No primary care provider on file.  Note Started: 4:13 PM EST 11/11/24    CHIEF COMPLAINT       Chief Complaint   Patient presents with    Paranoid     Patient stated he feels paranoid. Patient also stated that \"I've been doing this too long and need to change\". No other complaints at this time.        HISTORY OF PRESENT ILLNESS: 1 or more Elements        Joce Rodriguez Jr. is a 44 y.o. male who presents for paranoia. Says he is always paranoid and says that he has been doing this for a long time. He reports intermittent voices which he says he always hears but denies command hallucinations or angry outbursts. He says he was just admitted for this and was given a 30 day injection. He does not think he needs mental health admission but says he just wanted to talk today.  He is calm and cooperative at this time. He denies any medical complaints. He says he has not been using drugs. He denies any other complaints. He is not demonstrating any impulsive behavior. He has been here numerous times in the past, seen by this provider before and this is his mental health baseline.    Nursing Notes were all reviewed and agreed with or any disagreements were addressed in the HPI.      REVIEW OF EXTERNAL NOTE :     11-1-24 psychiatry discharge summary      Chart Review/External Note Review    Last Echo reviewed by Me:  No results found for: \"LVEF\", \"LVEFMODE\"          Controlled Substance Monitoring:    Acute and Chronic Pain Monitoring:        No data to display                    REVIEW OF SYSTEMS :      Positives and Pertinent negatives as per HPI.     SURGICAL HISTORY     Past Surgical History:   Procedure Laterality Date    EYE SURGERY  19 years ago    HIP FRACTURE SURGERY Left

## 2024-11-12 ENCOUNTER — HOSPITAL ENCOUNTER (OUTPATIENT)
Dept: PSYCHIATRY | Age: 44
Discharge: HOME | End: 2024-11-12

## 2024-11-12 PROCEDURE — 99412 PREVENTIVE COUNSELING GROUP: CPT

## 2024-11-12 NOTE — PROGRESS NOTES
[facility]   Progress Note - 10 Week Expanded Program     Client Name: Joce Rodriguez Jr.    [x] Non billable: Reason: approved    Treatment Site:   [x][facility]      [] [facility]                      CO Level:  n/a ppm    Length of Service: 60 minutes       Session Type:  [x] In Person [] Virtually - Provider Location [x][facility]      [] [facility]                    Patient Location    []Patient's Home    [] Other:       Session Provided: [] Individual   [x]Group             Client/Staff Ratio: 2/1                                Client’s target quit date:        Last date of tobacco use:     Client's actual quit date:       [x] Client still smoking     Pharmacotherapy provided this session:  []  NRT: Patch  []21mg . / []14mg.  / []7mg.      []  NRT:  Gum []2mg. / []4mg.   x (  )boxes  []  NRT: Lozenge []2mg.  / []4mg.  x (  ) tubes      []  Varenicline []0.5 mg.  [] 1 mg.  Wks. (  )  []  Bupropion    Wks. (  )  []  Other                                                                Treatment Objectives addressed during the session:       [x] Why I quit? [] Addiction   [] How am I quitting? [] Living without   [] Coping [] Having Fun   [] Stress [] Free for Life   [] Temptation [] The Future    [] Other        Client’s Response to counseling:  [x] Active participant                        [] Passive listener        [] No behavior change, still participating                   [] Inappropriate:   [] Implemented other strategy/behavior changes:   [] Discussed Quit Plan that will be implemented  [] Re-set Quit Date:     Client’s Response to Pharmacotherapy:  [] Decreased cravings  [] Decrease in tobacco use:   [] Maintaining abstinence  [x] No change experienced by client  []        Risk/Benefit Meds education provided to client  []        Adverse reaction:   []       Other:   []  None    Plan of Action:  [] Maintain abstinence  [x] Continue treatment;

## 2024-11-28 ENCOUNTER — HOSPITAL ENCOUNTER (EMERGENCY)
Age: 44
Discharge: HOME | End: 2024-11-28
Attending: EMERGENCY MEDICINE
Payer: MEDICARE

## 2024-11-28 VITALS
OXYGEN SATURATION: 99 % | HEART RATE: 100 BPM | TEMPERATURE: 98.1 F | DIASTOLIC BLOOD PRESSURE: 78 MMHG | SYSTOLIC BLOOD PRESSURE: 115 MMHG | RESPIRATION RATE: 18 BRPM

## 2024-11-28 DIAGNOSIS — F22 PARANOID (HCC): Primary | ICD-10-CM

## 2024-11-28 PROCEDURE — 99282 EMERGENCY DEPT VISIT SF MDM: CPT

## 2024-11-28 NOTE — ED NOTES
ED MD in to see patient again and patient wishes to stay and sleep and get another blanket.  ED MD cleared patient for discharge. VSS.  Attempted to review discharge instructions but patient refused to interact with this writer for review of instructions and he stated he is fine to leave and took the discharge papers.  Patient slowly changing to leave.

## 2024-11-28 NOTE — ED NOTES
Patient presented in ED calm and cooperative. Patient is not displaying aggression. Patient denies SI/HI. Patient reported his usual delusions that \"people are following him.\" Patient appears to be at his baseline.

## 2024-11-30 ASSESSMENT — ENCOUNTER SYMPTOMS
ABDOMINAL PAIN: 0
SHORTNESS OF BREATH: 0
VOMITING: 0
EYE REDNESS: 0
NAUSEA: 0

## 2024-11-30 NOTE — ED PROVIDER NOTES
MD Aric at [facility] OR       CURRENTMEDICATIONS       Discharge Medication List as of 11/28/2024  6:47 PM        CONTINUE these medications which have NOT CHANGED    Details   nicotine (NICODERM CQ) 21 MG/24HR Place 1 patch onto the skin dailyOTC      risperiDONE ER (UZEDY) subcutaneous injection Inject 0.28 mLs into the skin every 30 days for 1 dose Next injection is due 12/1/24, Disp-0.28 mL, R-0Print      nicotine polacrilex (NICORETTE) 4 MG gum Take 1 each by mouth every 2 hours as needed for Smoking cessation, Disp-110 each, R-3NO PRINT             ALLERGIES     Chlorpheniramine-phenylephrine, Motrin [ibuprofen], Penicillins, and Rondec-d [chlophedianol-pseudoephedrine]    FAMILYHISTORY       Family History   Problem Relation Age of Onset    Mental Illness Mother     Substance Abuse Mother     No Known Problems Father         SOCIAL HISTORY       Social History     Tobacco Use    Smoking status: Every Day     Current packs/day: 0.50     Average packs/day: 0.5 packs/day for 24.0 years (12.0 ttl pk-yrs)     Types: Cigarettes    Smokeless tobacco: Never    Tobacco comments:     stopped \"a while ago\"   Vaping Use    Vaping status: Former   Substance Use Topics    Alcohol use: Not Currently     Comment: occassionally    Drug use: Not Currently     Frequency: 7.0 times per week     Types: Cocaine, Marijuana (Weed), Methamphetamines (Crystal Meth)     Comment: Cocaine occassionally; Marijuana wkly       SCREENINGS   NIH Stroke Scale  NIH Stroke Scale Assessed: No  Level of Consciousness (1a): Alert    [place] Coma Scale  Eye Opening: Spontaneous  Best Verbal Response: Oriented  Best Motor Response: Obeys commands  [place]a Coma Scale Score: 15                CIWA Assessment  BP: 115/78  Pulse: 100           PHYSICAL EXAM  1 or more Elements     ED Triage Vitals   BP Systolic BP Percentile Diastolic BP Percentile Temp Temp Source Pulse Respirations SpO2   11/28/24 1623 -- -- 11/28/24 1613 11/28/24 1843 11/28/24 1613

## 2024-12-06 ENCOUNTER — HOSPITAL ENCOUNTER (EMERGENCY)
Age: 44
Discharge: PSYCHIATRIC OR SUBSTANCE USE FACILITY | End: 2024-12-07
Attending: EMERGENCY MEDICINE
Payer: MEDICARE

## 2024-12-06 DIAGNOSIS — Z76.89 ENCOUNTER FOR PSYCHIATRIC ASSESSMENT: Primary | ICD-10-CM

## 2024-12-06 LAB
ALBUMIN SERPL-MCNC: 4 G/DL (ref 3.5–5.2)
ALP SERPL-CCNC: 45 U/L (ref 40–129)
ALT SERPL-CCNC: 12 U/L (ref 0–40)
AMPHET UR QL SCN: NEGATIVE
ANION GAP SERPL CALCULATED.3IONS-SCNC: 8 MMOL/L (ref 7–16)
APAP SERPL-MCNC: <5 UG/ML (ref 10–30)
AST SERPL-CCNC: 17 U/L (ref 0–39)
BARBITURATES UR QL SCN: NEGATIVE
BASOPHILS # BLD: 0.08 K/UL (ref 0–0.2)
BASOPHILS NFR BLD: 2 % (ref 0–2)
BENZODIAZ UR QL: POSITIVE
BILIRUB SERPL-MCNC: 0.2 MG/DL (ref 0–1.2)
BILIRUB UR QL STRIP: NEGATIVE
BUN SERPL-MCNC: 16 MG/DL (ref 6–20)
BUPRENORPHINE UR QL: NEGATIVE
CALCIUM SERPL-MCNC: 8.6 MG/DL (ref 8.6–10.2)
CANNABINOIDS UR QL SCN: NEGATIVE
CHLORIDE SERPL-SCNC: 109 MMOL/L (ref 98–107)
CK SERPL-CCNC: 391 U/L (ref 20–200)
CLARITY UR: CLEAR
CO2 SERPL-SCNC: 26 MMOL/L (ref 22–29)
COCAINE UR QL SCN: POSITIVE
COLOR UR: YELLOW
COMMENT: NORMAL
CREAT SERPL-MCNC: 0.8 MG/DL (ref 0.7–1.2)
EKG ATRIAL RATE: 72 BPM
EKG P AXIS: 75 DEGREES
EKG P-R INTERVAL: 186 MS
EKG Q-T INTERVAL: 376 MS
EKG QRS DURATION: 96 MS
EKG QTC CALCULATION (BAZETT): 411 MS
EKG R AXIS: 97 DEGREES
EKG T AXIS: 60 DEGREES
EKG VENTRICULAR RATE: 72 BPM
EOSINOPHIL # BLD: 0.25 K/UL (ref 0.05–0.5)
EOSINOPHILS RELATIVE PERCENT: 5 % (ref 0–6)
ERYTHROCYTE [DISTWIDTH] IN BLOOD BY AUTOMATED COUNT: 14 % (ref 11.5–15)
ETHANOLAMINE SERPL-MCNC: <10 MG/DL (ref 0–0.08)
FENTANYL UR QL: NEGATIVE
GFR, ESTIMATED: >90 ML/MIN/1.73M2
GLUCOSE SERPL-MCNC: 88 MG/DL (ref 74–99)
GLUCOSE UR STRIP-MCNC: NEGATIVE MG/DL
HCT VFR BLD AUTO: 42.5 % (ref 37–54)
HGB BLD-MCNC: 13.4 G/DL (ref 12.5–16.5)
HGB UR QL STRIP.AUTO: NEGATIVE
IMM GRANULOCYTES # BLD AUTO: <0.03 K/UL (ref 0–0.58)
IMM GRANULOCYTES NFR BLD: 0 % (ref 0–5)
KETONES UR STRIP-MCNC: NEGATIVE MG/DL
LEUKOCYTE ESTERASE UR QL STRIP: NEGATIVE
LYMPHOCYTES NFR BLD: 2.08 K/UL (ref 1.5–4)
LYMPHOCYTES RELATIVE PERCENT: 42 % (ref 20–42)
MCH RBC QN AUTO: 29.8 PG (ref 26–35)
MCHC RBC AUTO-ENTMCNC: 31.5 G/DL (ref 32–34.5)
MCV RBC AUTO: 94.4 FL (ref 80–99.9)
METHADONE UR QL: NEGATIVE
MONOCYTES NFR BLD: 0.44 K/UL (ref 0.1–0.95)
MONOCYTES NFR BLD: 9 % (ref 2–12)
NEUTROPHILS NFR BLD: 43 % (ref 43–80)
NEUTS SEG NFR BLD: 2.14 K/UL (ref 1.8–7.3)
NITRITE UR QL STRIP: NEGATIVE
OPIATES UR QL SCN: NEGATIVE
OXYCODONE UR QL SCN: NEGATIVE
PCP UR QL SCN: NEGATIVE
PH UR STRIP: 6 [PH] (ref 5–9)
PLATELET # BLD AUTO: 309 K/UL (ref 130–450)
PMV BLD AUTO: 9.7 FL (ref 7–12)
POTASSIUM SERPL-SCNC: 4.2 MMOL/L (ref 3.5–5)
PROT SERPL-MCNC: 6.2 G/DL (ref 6.4–8.3)
PROT UR STRIP-MCNC: NEGATIVE MG/DL
RBC # BLD AUTO: 4.5 M/UL (ref 3.8–5.8)
SALICYLATES SERPL-MCNC: <0.3 MG/DL (ref 0–30)
SODIUM SERPL-SCNC: 143 MMOL/L (ref 132–146)
SP GR UR STRIP: 1.02 (ref 1–1.03)
TEST INFORMATION: ABNORMAL
TOXIC TRICYCLIC SC,BLOOD: NEGATIVE
UROBILINOGEN UR STRIP-ACNC: 0.2 EU/DL (ref 0–1)
WBC OTHER # BLD: 5 K/UL (ref 4.5–11.5)

## 2024-12-06 PROCEDURE — 85025 COMPLETE CBC W/AUTO DIFF WBC: CPT

## 2024-12-06 PROCEDURE — 90792 PSYCH DIAG EVAL W/MED SRVCS: CPT

## 2024-12-06 PROCEDURE — 99285 EMERGENCY DEPT VISIT HI MDM: CPT

## 2024-12-06 PROCEDURE — 80307 DRUG TEST PRSMV CHEM ANLYZR: CPT

## 2024-12-06 PROCEDURE — 81003 URINALYSIS AUTO W/O SCOPE: CPT

## 2024-12-06 PROCEDURE — 80179 DRUG ASSAY SALICYLATE: CPT

## 2024-12-06 PROCEDURE — 93010 ELECTROCARDIOGRAM REPORT: CPT | Performed by: INTERNAL MEDICINE

## 2024-12-06 PROCEDURE — 82550 ASSAY OF CK (CPK): CPT

## 2024-12-06 PROCEDURE — 80143 DRUG ASSAY ACETAMINOPHEN: CPT

## 2024-12-06 PROCEDURE — G0480 DRUG TEST DEF 1-7 CLASSES: HCPCS

## 2024-12-06 PROCEDURE — 80053 COMPREHEN METABOLIC PANEL: CPT

## 2024-12-06 PROCEDURE — 93005 ELECTROCARDIOGRAM TRACING: CPT | Performed by: EMERGENCY MEDICINE

## 2024-12-06 PROCEDURE — 6360000002 HC RX W HCPCS: Performed by: EMERGENCY MEDICINE

## 2024-12-06 PROCEDURE — 96372 THER/PROPH/DIAG INJ SC/IM: CPT

## 2024-12-06 PROCEDURE — 2580000003 HC RX 258

## 2024-12-06 RX ORDER — ZIPRASIDONE MESYLATE 20 MG/ML
20 INJECTION, POWDER, LYOPHILIZED, FOR SOLUTION INTRAMUSCULAR ONCE
Status: COMPLETED | OUTPATIENT
Start: 2024-12-06 | End: 2024-12-06

## 2024-12-06 RX ORDER — DROPERIDOL 2.5 MG/ML
2.5 INJECTION, SOLUTION INTRAMUSCULAR; INTRAVENOUS ONCE
Status: COMPLETED | OUTPATIENT
Start: 2024-12-06 | End: 2024-12-06

## 2024-12-06 RX ORDER — MIDAZOLAM HYDROCHLORIDE 2 MG/2ML
2 INJECTION, SOLUTION INTRAMUSCULAR; INTRAVENOUS ONCE
Status: COMPLETED | OUTPATIENT
Start: 2024-12-06 | End: 2024-12-06

## 2024-12-06 RX ORDER — WATER 10 ML/10ML
INJECTION INTRAMUSCULAR; INTRAVENOUS; SUBCUTANEOUS
Status: COMPLETED
Start: 2024-12-06 | End: 2024-12-06

## 2024-12-06 RX ORDER — WATER 10 ML/10ML
INJECTION INTRAMUSCULAR; INTRAVENOUS; SUBCUTANEOUS
Status: DISCONTINUED
Start: 2024-12-06 | End: 2024-12-06 | Stop reason: WASHOUT

## 2024-12-06 RX ADMIN — WATER 1 ML: 1 INJECTION INTRAMUSCULAR; INTRAVENOUS; SUBCUTANEOUS at 22:57

## 2024-12-06 RX ADMIN — MIDAZOLAM 2 MG: 1 INJECTION INTRAMUSCULAR; INTRAVENOUS at 04:01

## 2024-12-06 RX ADMIN — DROPERIDOL 2.5 MG: 2.5 INJECTION, SOLUTION INTRAMUSCULAR; INTRAVENOUS at 04:00

## 2024-12-06 RX ADMIN — ZIPRASIDONE MESYLATE 20 MG: 20 INJECTION, POWDER, LYOPHILIZED, FOR SOLUTION INTRAMUSCULAR at 22:57

## 2024-12-06 ASSESSMENT — PAIN - FUNCTIONAL ASSESSMENT: PAIN_FUNCTIONAL_ASSESSMENT: NONE - DENIES PAIN

## 2024-12-06 NOTE — ED NOTES
Pt agitated after completing his telepsych assessment upset d/t feeling as if they are focusing on his psych issues \"I came because of my chest this has nothing to do with psych\" pt instructed again on need for urine. He lies down and covers up to return to sleep

## 2024-12-06 NOTE — VIRTUAL HEALTH
Joce Rodriguez Jr.  [id number]  1980     EMERGENCY DEPARTMENT TELEPSYCHIATRY EVALUATION    12/06/24    Chief Complaint:  “Man I just came in for asthma”  HPI: Patient is a 44 y.o.  male who presents for psychiatric valuation. Patient presented to the ED on 12/06/24 from home.  Per ED \"Joce Rodriguez Jr. 44 y.o. male presents with a complaint of altered mental status.  Patient with history of schizoaffective disorder well-known to this department is brought in by a friend he has been staying with.  Reports he was due for an injection at [facility] on Monday however insurance did not cover this.  They report increasingly bizarre behaviors for the last several days and believe he may have used illicit substances today.  Upon direct questioning patient will not answer questions regarding suicidal homicidal ideation however gives many bizarre tangential thoughts regarding being poisoned by the gas and the Mexicans who run the gas station down the street.\"    Patient presents manic and irritable.  He was brought in EMS and pink slipped.  Patient was noncompliant with evaluation and yelled at provider multiple times to just go his collateral endorse that he was not able to get his antipsychotic shot on Monday and possibly did some cocaine recently.  [facility]- Friend- Has not been able to get his risperidone and it was due Monday    Past Psychiatric History:  Previous Diagnoses/symptoms: Schizophrenia  Previous suicide attempts/self-harm: Refuses to answer  Inpatient psychiatric hospitalizations: yes  Current outpatient psychiatric provider: [facility]  Current therapist: States not in therapy  Previous psychiatric medication trials: See MAR  Current psychiatric medications: Risperidone ER  History of ECT: unknown  Family Psychiatric History: Denies    Substance Abuse History:  Tobacco:  Refuses to answer  Alcohol:  Refuses to answer  Marijuana:  Refuses to answer  Stimulant:  Refuses    Serum Drug Screen    Collection Time: 12/06/24  5:54 AM   Result Value Ref Range    Acetaminophen Level <5 (L) 10.0 - 30.0 ug/mL    Ethanol Lvl <10 <10 mg/dL    Salicylate Lvl <0.3 0.0 - 30.0 mg/dL    Toxic Tricyclic Sc,Blood NEGATIVE NEGATIVE       Imaging:   No orders to display     Radiology:  No results found.    Review of Systems:  Reports no current cardiovascular, respiratory, gastrointestinal, genitourinary, integumentary, neurological, musculoskeletal, or immunological symptoms today.   PSYCHIATRIC: See HPI above.    PSYCHIATRIC EXAMINATION / MENTAL STATUS EXAM    Level of consciousness:  Moderate dysattention (reduced clarity of awareness with impaired ability to focus, sustain, or shift attention)   Appearance:  hospital attire and lying in bed.  Does appear stated age. No acute distress.  Behavior/Motor: agitated  Attitude toward examiner:  hostile  SI/HI:Denies SI/HI Sleep: Refused answer  Speech:  interrupting and loud  Mood: angry and anxious  Affect:  mood congruent  Thought Processes:  overabundance of ideas, incoherent, and tangential.   Thought Content:  Homocidal ideation denies.   Hallucinations: Unable to assess  Cognition:  oriented to person, place, and time   Concentration: poor  Memory: impaired   Insight: poor   Judgement: poor   Fund of Knowledge: limited      Risk Assessment:  Protective Factors:  Protective: Age >25 and <55, Denies depression, and Denies suicidal ideation  Risk Factors:  Risk Factors: Male gender, Active substance abuse, Highly impulsive behaviors, and Medication noncompliance    C-SSRS Score       12/6/2024     2:30 AM   C-SSRS Suicide Screening   1) Within the past month, have you wished you were dead or wished you could go to sleep and not wake up?  No   2) Have you actually had any thoughts of killing yourself?  No   6) Have you ever done anything, started to do anything, or prepared to do anything to end your life? No    :      Overall Level Suicide Risk:

## 2024-12-06 NOTE — ED PROVIDER NOTES
HPI: Joce Rodriguez . 44 y.o. male presents with a complaint of altered mental status.  Patient with history of schizoaffective disorder well-known to this department is brought in by a friend he has been staying with.  Reports he was due for an injection at [facility] on Monday however insurance did not cover this.  They report increasingly bizarre behaviors for the last several days and believe he may have used illicit substances today.  Upon direct questioning patient will not answer questions regarding suicidal homicidal ideation however gives many bizarre tangential thoughts regarding being poisoned by the gas and the Mexicans who run the gas station down the street.        Review of prior records:     Multiple recent ED visits most recently from 11/30/2024    Psychiatry discharge summary from 11/1/2024, submitted 10/30/2024.    Review of systems:     ENCOUNTER LIMITATION:    Please note that the HPI, ROS, Past History, and Physical Examination are limited due to this patient’s mental illness.        Review of Systems:   A complete review of systems was unable to be performed secondary to the limitations noted above      --------------------------------------------- PAST HISTORY ---------------------------------------------  Past Medical History:  has a past medical history of Depression and Schizoaffective disorder (HCC).    Past Surgical History:  has a past surgical history that includes Hip fracture surgery (Left, 9/12/2021); eye surgery (19 years ago); and Hip fracture surgery (Left, 9/28/2021).    Social History:  reports that he has been smoking cigarettes. He has a 12 pack-year smoking history. He has never used smokeless tobacco. He reports that he does not currently use alcohol. He reports that he does not currently use drugs after having used the following drugs: Cocaine, Marijuana (Weed), and Methamphetamines (Crystal Meth). Frequency: 7.00 times per week.    Family History: family history includes  medically appropriate for psychiatric evaluation and admission to psychiatric facility if indicated   Patient condition is stable    [unfilled]     Please note this note was transcribed using voice recognition software. Every effort was to ensure accuracy however inadvertent transcription errors may be present       Luis Alfredo Muse, DO  12/06/24 0722

## 2024-12-06 NOTE — ED NOTES
Per RN, patient is requesting to be referred to [facility]. Referral will be made to the [facility].    FRANCISCO Richardson, MAKENZIE-S  Behavioral Health

## 2024-12-06 NOTE — ED NOTES
Pt arrived to section G with flight of ideas, non compliant, unable to be redirected. [facility] police called to bedside to medicate for manic behaviors

## 2024-12-07 VITALS
OXYGEN SATURATION: 97 % | SYSTOLIC BLOOD PRESSURE: 128 MMHG | TEMPERATURE: 98.4 F | RESPIRATION RATE: 18 BRPM | HEART RATE: 71 BPM | DIASTOLIC BLOOD PRESSURE: 70 MMHG

## 2024-12-07 ASSESSMENT — PAIN - FUNCTIONAL ASSESSMENT: PAIN_FUNCTIONAL_ASSESSMENT: NONE - DENIES PAIN

## 2024-12-07 NOTE — ED NOTES
Dr. Chamorro notified of patient's behavior. Patient has outstanding order for 20mg of Geodon that was not given by day shift. ED provider gave okay to give the medication.

## 2024-12-07 NOTE — ED NOTES
Reached out to [facility] for an update on patient's transportation time. [facility] outsourced to [place] with a new eta of 10am and 11am given.

## 2024-12-07 NOTE — ED NOTES
Patient stated he was hungry. Patient was provided with boxed lunch. Patient then became verbally aggressive toward staff and screaming racial slurs towards another Section G patient. [place] [employer] at bedside.

## 2024-12-07 NOTE — ED NOTES
Received call from [facility]. Patient has been accepted to [facility] by Dr. Yariel Tee.    Bed assignment: 1612  N2N: [phone]  [facility] eta: 0800

## 2024-12-07 NOTE — ED NOTES
Pt has been pacing back and forth for about 30 minutes. Observed to be internally stimulated, having conversations with himself.

## 2024-12-07 NOTE — ED NOTES
Pt's friend, Pao Woodall [phone], who pt lives with is sitting by bedside. Pt is calm,laughing, and informing friend of his transport. He expressed that he likes [facility], it is nice and is receptive to staying 30-90 days if needed.     Pao also provided SW with updated 's info at [facility].   Jose Lundberg   [phone]- Cell   [phone]

## 2024-12-07 NOTE — ED NOTES
ANTONIO called [facility] for updated ETA. ANTONIO spoke with [facility], reporting that EMT should arrive in about 20 minutes.

## 2024-12-29 ENCOUNTER — HOSPITAL ENCOUNTER (EMERGENCY)
Age: 44
Discharge: PSYCHIATRIC OR SUBSTANCE USE FACILITY | End: 2024-12-30
Attending: EMERGENCY MEDICINE
Payer: MEDICARE

## 2024-12-29 VITALS
DIASTOLIC BLOOD PRESSURE: 73 MMHG | RESPIRATION RATE: 18 BRPM | HEART RATE: 106 BPM | SYSTOLIC BLOOD PRESSURE: 116 MMHG | OXYGEN SATURATION: 94 % | HEIGHT: 71 IN | BODY MASS INDEX: 25.2 KG/M2 | WEIGHT: 180 LBS | TEMPERATURE: 97.7 F

## 2024-12-29 DIAGNOSIS — F20.9 SCHIZOPHRENIA, UNSPECIFIED TYPE (HCC): Primary | ICD-10-CM

## 2024-12-29 DIAGNOSIS — F22 PARANOIA (HCC): ICD-10-CM

## 2024-12-29 LAB
ALBUMIN SERPL-MCNC: 4.5 G/DL (ref 3.5–5.2)
ALP SERPL-CCNC: 46 U/L (ref 40–129)
ALT SERPL-CCNC: 9 U/L (ref 0–40)
ANION GAP SERPL CALCULATED.3IONS-SCNC: 11 MMOL/L (ref 7–16)
APAP SERPL-MCNC: <5 UG/ML (ref 10–30)
AST SERPL-CCNC: 17 U/L (ref 0–39)
BASOPHILS # BLD: 0.08 K/UL (ref 0–0.2)
BASOPHILS NFR BLD: 2 % (ref 0–2)
BILIRUB SERPL-MCNC: 0.6 MG/DL (ref 0–1.2)
BUN SERPL-MCNC: 17 MG/DL (ref 6–20)
CALCIUM SERPL-MCNC: 9.3 MG/DL (ref 8.6–10.2)
CHLORIDE SERPL-SCNC: 103 MMOL/L (ref 98–107)
CO2 SERPL-SCNC: 23 MMOL/L (ref 22–29)
CREAT SERPL-MCNC: 0.9 MG/DL (ref 0.7–1.2)
EOSINOPHIL # BLD: 0.09 K/UL (ref 0.05–0.5)
EOSINOPHILS RELATIVE PERCENT: 2 % (ref 0–6)
ERYTHROCYTE [DISTWIDTH] IN BLOOD BY AUTOMATED COUNT: 13.7 % (ref 11.5–15)
ETHANOLAMINE SERPL-MCNC: <10 MG/DL (ref 0–0.08)
GFR, ESTIMATED: >90 ML/MIN/1.73M2
GLUCOSE SERPL-MCNC: 112 MG/DL (ref 74–99)
HCT VFR BLD AUTO: 45.4 % (ref 37–54)
HGB BLD-MCNC: 14.8 G/DL (ref 12.5–16.5)
IMM GRANULOCYTES # BLD AUTO: <0.03 K/UL (ref 0–0.58)
IMM GRANULOCYTES NFR BLD: 0 % (ref 0–5)
LYMPHOCYTES NFR BLD: 1.44 K/UL (ref 1.5–4)
LYMPHOCYTES RELATIVE PERCENT: 32 % (ref 20–42)
MCH RBC QN AUTO: 30 PG (ref 26–35)
MCHC RBC AUTO-ENTMCNC: 32.6 G/DL (ref 32–34.5)
MCV RBC AUTO: 92.1 FL (ref 80–99.9)
MONOCYTES NFR BLD: 0.5 K/UL (ref 0.1–0.95)
MONOCYTES NFR BLD: 11 % (ref 2–12)
NEUTROPHILS NFR BLD: 53 % (ref 43–80)
NEUTS SEG NFR BLD: 2.44 K/UL (ref 1.8–7.3)
PLATELET, FLUORESCENCE: 240 K/UL (ref 130–450)
PMV BLD AUTO: 11.7 FL (ref 7–12)
POTASSIUM SERPL-SCNC: 3.9 MMOL/L (ref 3.5–5)
PROT SERPL-MCNC: 7.3 G/DL (ref 6.4–8.3)
RBC # BLD AUTO: 4.93 M/UL (ref 3.8–5.8)
SALICYLATES SERPL-MCNC: <0.3 MG/DL (ref 0–30)
SODIUM SERPL-SCNC: 137 MMOL/L (ref 132–146)
TOXIC TRICYCLIC SC,BLOOD: NEGATIVE
WBC OTHER # BLD: 4.6 K/UL (ref 4.5–11.5)

## 2024-12-29 PROCEDURE — 80053 COMPREHEN METABOLIC PANEL: CPT

## 2024-12-29 PROCEDURE — G0480 DRUG TEST DEF 1-7 CLASSES: HCPCS

## 2024-12-29 PROCEDURE — 80143 DRUG ASSAY ACETAMINOPHEN: CPT

## 2024-12-29 PROCEDURE — 80307 DRUG TEST PRSMV CHEM ANLYZR: CPT

## 2024-12-29 PROCEDURE — 93005 ELECTROCARDIOGRAM TRACING: CPT | Performed by: NURSE PRACTITIONER

## 2024-12-29 PROCEDURE — 80179 DRUG ASSAY SALICYLATE: CPT

## 2024-12-29 PROCEDURE — 90791 PSYCH DIAGNOSTIC EVALUATION: CPT | Performed by: COUNSELOR

## 2024-12-29 PROCEDURE — 99285 EMERGENCY DEPT VISIT HI MDM: CPT

## 2024-12-29 PROCEDURE — 85025 COMPLETE CBC W/AUTO DIFF WBC: CPT

## 2024-12-29 ASSESSMENT — PAIN - FUNCTIONAL ASSESSMENT: PAIN_FUNCTIONAL_ASSESSMENT: NONE - DENIES PAIN

## 2024-12-30 LAB
AMPHET UR QL SCN: POSITIVE
BARBITURATES UR QL SCN: NEGATIVE
BENZODIAZ UR QL: NEGATIVE
BILIRUB UR QL STRIP: ABNORMAL
BUPRENORPHINE UR QL: NEGATIVE
CANNABINOIDS UR QL SCN: NEGATIVE
CLARITY UR: CLEAR
COCAINE UR QL SCN: POSITIVE
COLOR UR: YELLOW
COMMENT: ABNORMAL
EKG ATRIAL RATE: 65 BPM
EKG P AXIS: 71 DEGREES
EKG P-R INTERVAL: 164 MS
EKG Q-T INTERVAL: 400 MS
EKG QRS DURATION: 96 MS
EKG QTC CALCULATION (BAZETT): 416 MS
EKG R AXIS: 71 DEGREES
EKG T AXIS: 50 DEGREES
EKG VENTRICULAR RATE: 65 BPM
FENTANYL UR QL: NEGATIVE
GLUCOSE UR STRIP-MCNC: NEGATIVE MG/DL
HGB UR QL STRIP.AUTO: NEGATIVE
KETONES UR STRIP-MCNC: ABNORMAL MG/DL
LEUKOCYTE ESTERASE UR QL STRIP: NEGATIVE
METHADONE UR QL: NEGATIVE
NITRITE UR QL STRIP: NEGATIVE
OPIATES UR QL SCN: NEGATIVE
OXYCODONE UR QL SCN: NEGATIVE
PCP UR QL SCN: NEGATIVE
PH UR STRIP: 6 [PH] (ref 5–9)
PROT UR STRIP-MCNC: NEGATIVE MG/DL
SP GR UR STRIP: >1.03 (ref 1–1.03)
TEST INFORMATION: ABNORMAL
UROBILINOGEN UR STRIP-ACNC: 0.2 EU/DL (ref 0–1)

## 2024-12-30 PROCEDURE — 81003 URINALYSIS AUTO W/O SCOPE: CPT

## 2024-12-30 PROCEDURE — 80307 DRUG TEST PRSMV CHEM ANLYZR: CPT

## 2024-12-30 PROCEDURE — 93010 ELECTROCARDIOGRAM REPORT: CPT | Performed by: INTERNAL MEDICINE

## 2024-12-30 NOTE — ED NOTES
Per access center, patient accepted at [facility] for treatment. RN faxing pink slip at this time

## 2024-12-30 NOTE — ED PROVIDER NOTES
[facility] EMERGENCY DEPARTMENT  ED  Encounter Note  Admit Date/RoomTime: 12/29/2024  8:09 PM  ED Room: [facility]/RZ-01    Department of Emergency Medicine  ED Provider Note  Admit Date: 12/29/2024  Room: 31/31    ED Physician       12/29/24  8:09 PM EST    HPI: Joce Rodriguez Jr. 44 y.o. male presents with a complaint of being paranoid beginning days ago. Complaint has been constant and became more severe today which is what prompted the visit.  Denies suicidal ideation.   Denies homicidal ideation.  Not applicable specific plan.  Patient presents to the emergency department with feeling paranoid over the last several days.  Patient states that he was researching something about mathematics and it was Para graphical- not a Paragraph.  Patient says he is not a Rookie about the situation and that he came straight to the hospital when he started feeling like this.  He also reports that it was all about being vertical about geometry.  Patient was recently at [facility].  He states that he is not taking any medications but cannot clearly tell me if he was prescribed meds upon discharge.  Patient denies feeling suicidal and denies any homicidal ideations as well as denying any drug or alcohol use.  Patient reports otherwise normal state health.  Patient with flat affect.    Patient admitted to [facility] on December 6, 2024          Review of Systems:   Pertinent positives and negatives are stated within HPI, all other systems reviewed and are negative.      --------------------------------------------- PAST HISTORY ---------------------------------------------  Past Medical History:  has a past medical history of Depression and Schizoaffective disorder (HCC).    Past Surgical History:  has a past surgical history that includes Hip fracture surgery (Left, 9/12/2021); eye surgery (19 years ago); and Hip fracture surgery (Left, 9/28/2021).    Social History:  reports that he has been

## 2024-12-30 NOTE — ED NOTES
RN received a call from [facility] and [place] regarding patient's medical history. Staff at facility was questioning about hx of kidney transplant and hip replacement that they had in patient's records. RN navigated through patient chart and verified with patient that he does not have any medical conditions. Patient denied transplant of any kind and denied and surgeries. Patient also denied any daily medications aside from psych medications

## 2024-12-30 NOTE — VIRTUAL HEALTH
Joce Rodriguez Jr.  [id number]  1980     Social Work Behavioral Health Crisis Assessment    12/29/24    Chief Complaint: psychosis    HPI: Patient is a 44 y.o. Black /  male who presents for psych eval. Patient presented to the ED on 12/29/24 from [place]. Per chart, \"presents with a complaint of being paranoid beginning days ago. Complaint has been constant and became more severe today which is what prompted the visit. Denies suicidal ideation. Denies homicidal ideation. Not applicable specific plan. Patient presents to the emergency department with feeling paranoid over the last several days. Patient states that he was researching something about mathematics and it was Para graphical- not a Paragraph. Patient says he is not a Rocchi about the situation and that he came straight to the hospital when he started feeling like this. He also reports that it was all about being vertical about geometry. Patient was recently at [facility]. He states that he is not taking any medications but cannot clearly tell me if he was prescribed meds upon discharge. Patient denies feeling suicidal and denies any homicidal ideations as well as denying any drug or alcohol use. Patient reports otherwise normal state health. Patient with flat affect. Patient admitted to [facility] on December 6, 2024.\"    Past Psychiatric History:  Previous Diagnoses/symptoms: yes; schizoaffective, anti-social personality, schizophrenia, paranoid, bipolar, depression, SI, anxiety  Previous suicide attempts/self-harm: Denies  Inpatient psychiatric hospitalizations: yes; [facility] 12/6/24  Current outpatient psychiatric provider: Denies  Current therapist: States not in therapy  Previous psychiatric medication trials: yes  Current psychiatric medications: Risperidone ER  - no  Family Psychiatric History: yes; mother    Sleep Hours: \"not many\"    Sleep concerns:  yes    Use of sleep medications: denies; past used percocet    Substance

## 2025-04-21 NOTE — ED NOTES
Called patient to schedule Esophageal Motility Study. No answer, left voicemail with call back number, [phone].     Pt departed facility with PAS at this time- pt care transferred.
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
## (undated) DEVICE — INTENDED FOR TISSUE SEPARATION, AND OTHER PROCEDURES THAT REQUIRE A SHARP SURGICAL BLADE TO PUNCTURE OR CUT.: Brand: BARD-PARKER ® STAINLESS STEEL BLADES

## (undated) DEVICE — TRAP,MUCUS SPECIMEN,40CC: Brand: MEDLINE

## (undated) DEVICE — SHEET,DRAPE,53X77,STERILE: Brand: MEDLINE

## (undated) DEVICE — TOWEL,OR,DSP,ST,BLUE,DLX,10/PK,8PK/CS: Brand: MEDLINE

## (undated) DEVICE — SET ORTHO STD STORTSTD1

## (undated) DEVICE — SET ORTHO STD STORTSTD2

## (undated) DEVICE — SURGICAL PROCEDURE PACK BASIC

## (undated) DEVICE — COVER,TABLE,60X90,STERILE: Brand: MEDLINE

## (undated) DEVICE — GOWN,AURORA,BRTHSLV,2XL,18/CS: Brand: MEDLINE

## (undated) DEVICE — RETRACTOR INIT

## (undated) DEVICE — Z DISCONTINUED PER MEDLINE USE 2741943 DRESSING AQUACEL 10 IN ALG W9XL25CM SIL CVR WTRPRF VIR BACT BARR ANTIMIC

## (undated) DEVICE — DRAPE EQUIP CARM 72X42 IN RUBBER BND CLP

## (undated) DEVICE — AGENT HEMOSTATIC SURGIFLOW MATRIX KIT W/THROMBIN

## (undated) DEVICE — 3M™ COBAN™ NL STERILE NON-LATEX SELF-ADHERENT WRAP, 2084S, 4 IN X 5 YD (10 CM X 4,5 M), 18 ROLLS/CASE: Brand: 3M™ COBAN™

## (undated) DEVICE — CLAMP PELVIC REDUCE

## (undated) DEVICE — CLOTH SURG PREP PREOPERATIVE CHLORHEXIDINE GLUC 2% READYPREP

## (undated) DEVICE — Device

## (undated) DEVICE — GOWN,BREATHABLE,IMP SLV 3XL AURORA: Brand: MEDLINE

## (undated) DEVICE — RETRACTOR PELVIS

## (undated) DEVICE — ELECTRODE PT RET AD L9FT HI MOIST COND ADH HYDRGEL CORDED

## (undated) DEVICE — DRILL SYSTEM 7

## (undated) DEVICE — 3M™ IOBAN™ 2 ANTIMICROBIAL INCISE DRAPE 6650EZ: Brand: IOBAN™ 2

## (undated) DEVICE — HANDPIECE SET WITH BONE CLEANING TIP AND SUCTION TUBE: Brand: INTERPULSE

## (undated) DEVICE — GOWN,BREATHABLE SLV,AURORA,XLG,STRL: Brand: MEDLINE

## (undated) DEVICE — Z DISCONTINUED PER MEDLINE USE 2741942 DRESSING AQUACEL 6 IN ALG W9XL15CM SIL CVR WTRPRF VIR BACT BARR ANTIMIC

## (undated) DEVICE — TOTAL HIP-LF: Brand: MEDLINE INDUSTRIES, INC.

## (undated) DEVICE — SWAB SPEC COLL SHFT L5.25IN POLYUR FOAM TIP SFT DBL MEDIA

## (undated) DEVICE — 3M™ STERI-DRAPE™ U-DRAPE 1015: Brand: STERI-DRAPE™

## (undated) DEVICE — GLOVE SURG SZ 8 L12IN FNGR THK79MIL GRN LTX FREE

## (undated) DEVICE — BLADE CLIPPER GEN PURP NS

## (undated) DEVICE — GLOVE ORTHO 8   MSG9480

## (undated) DEVICE — APPLICATOR MEDICATED 26 CC SOLUTION HI LT ORNG CHLORAPREP

## (undated) DEVICE — SCREW BNE L30MM DIA3.5MM CORT S STL ST NONCANNULATED LOK
Type: IMPLANTABLE DEVICE | Site: HIP | Status: NON-FUNCTIONAL
Removed: 2021-09-12

## (undated) DEVICE — GLOVE ORANGE PI 8   MSG9080

## (undated) DEVICE — GAMMEX® NON-LATEX PI ORTHO SIZE 8, STERILE POLYISOPRENE POWDER-FREE SURGICAL GLOVE: Brand: GAMMEX

## (undated) DEVICE — DOUBLE BASIN SET: Brand: MEDLINE INDUSTRIES, INC.

## (undated) DEVICE — CONVERTORS STOCKINETTE: Brand: CONVERTORS

## (undated) DEVICE — 1000 S-DRAPE TOWEL DRAPE 10/BX: Brand: STERI-DRAPE™

## (undated) DEVICE — CONTAINER VACUTAINER ANAER CULTURE SWAB

## (undated) DEVICE — DRIP REDUCTION MANIFOLD

## (undated) DEVICE — SURGICAL PROCEDURE PACK TRAUM

## (undated) DEVICE — SOLUTION IRRIG 3000ML 0.9% SOD CHL USP UROMATIC PLAS CONT